# Patient Record
Sex: MALE | Race: WHITE | URBAN - METROPOLITAN AREA
[De-identification: names, ages, dates, MRNs, and addresses within clinical notes are randomized per-mention and may not be internally consistent; named-entity substitution may affect disease eponyms.]

---

## 2017-10-25 ENCOUNTER — APPOINTMENT (RX ONLY)
Dept: URBAN - METROPOLITAN AREA CLINIC 4 | Facility: CLINIC | Age: 67
Setting detail: DERMATOLOGY
End: 2017-10-25

## 2017-10-25 DIAGNOSIS — L81.4 OTHER MELANIN HYPERPIGMENTATION: ICD-10-CM

## 2017-10-25 DIAGNOSIS — Z71.89 OTHER SPECIFIED COUNSELING: ICD-10-CM

## 2017-10-25 DIAGNOSIS — Z87.2 PERSONAL HISTORY OF DISEASES OF THE SKIN AND SUBCUTANEOUS TISSUE: ICD-10-CM

## 2017-10-25 DIAGNOSIS — D18.0 HEMANGIOMA: ICD-10-CM

## 2017-10-25 DIAGNOSIS — L21.8 OTHER SEBORRHEIC DERMATITIS: ICD-10-CM

## 2017-10-25 DIAGNOSIS — Z85.828 PERSONAL HISTORY OF OTHER MALIGNANT NEOPLASM OF SKIN: ICD-10-CM

## 2017-10-25 DIAGNOSIS — L82.1 OTHER SEBORRHEIC KERATOSIS: ICD-10-CM

## 2017-10-25 DIAGNOSIS — L82.0 INFLAMED SEBORRHEIC KERATOSIS: ICD-10-CM

## 2017-10-25 DIAGNOSIS — D22 MELANOCYTIC NEVI: ICD-10-CM

## 2017-10-25 PROBLEM — D48.5 NEOPLASM OF UNCERTAIN BEHAVIOR OF SKIN: Status: ACTIVE | Noted: 2017-10-25

## 2017-10-25 PROBLEM — D22.5 MELANOCYTIC NEVI OF TRUNK: Status: ACTIVE | Noted: 2017-10-25

## 2017-10-25 PROBLEM — E78.5 HYPERLIPIDEMIA, UNSPECIFIED: Status: ACTIVE | Noted: 2017-10-25

## 2017-10-25 PROBLEM — D18.01 HEMANGIOMA OF SKIN AND SUBCUTANEOUS TISSUE: Status: ACTIVE | Noted: 2017-10-25

## 2017-10-25 PROCEDURE — ? COUNSELING

## 2017-10-25 PROCEDURE — 11100: CPT | Mod: 59

## 2017-10-25 PROCEDURE — ? BIOPSY BY SHAVE METHOD

## 2017-10-25 PROCEDURE — ? LIQUID NITROGEN

## 2017-10-25 PROCEDURE — 99213 OFFICE O/P EST LOW 20 MIN: CPT | Mod: 25

## 2017-10-25 PROCEDURE — ? PRESCRIPTION

## 2017-10-25 PROCEDURE — 17110 DESTRUCTION B9 LES UP TO 14: CPT

## 2017-10-25 RX ORDER — KETOCONAZOLE 20 MG/G
1 CREAM TOPICAL BID
Qty: 1 | Refills: 3 | Status: ERX | COMMUNITY
Start: 2017-10-25

## 2017-10-25 RX ADMIN — KETOCONAZOLE 1: 20 CREAM TOPICAL at 00:00

## 2017-10-25 ASSESSMENT — LOCATION ZONE DERM
LOCATION ZONE: FACE
LOCATION ZONE: LEG
LOCATION ZONE: NECK
LOCATION ZONE: TRUNK
LOCATION ZONE: NOSE
LOCATION ZONE: ARM

## 2017-10-25 ASSESSMENT — LOCATION DETAILED DESCRIPTION DERM
LOCATION DETAILED: RIGHT CLAVICULAR NECK
LOCATION DETAILED: LEFT LATERAL TRAPEZIAL NECK
LOCATION DETAILED: LEFT CLAVICULAR NECK
LOCATION DETAILED: INFERIOR THORACIC SPINE
LOCATION DETAILED: RIGHT POSTERIOR SHOULDER
LOCATION DETAILED: LEFT POSTERIOR SHOULDER
LOCATION DETAILED: SUPERIOR THORACIC SPINE
LOCATION DETAILED: RIGHT SUPERIOR LATERAL UPPER BACK
LOCATION DETAILED: LEFT MEDIAL MALAR CHEEK
LOCATION DETAILED: LEFT INFERIOR POSTERIOR NECK
LOCATION DETAILED: PERIUMBILICAL SKIN
LOCATION DETAILED: RIGHT LATERAL POPLITEAL SKIN
LOCATION DETAILED: RIGHT SUPERIOR UPPER BACK
LOCATION DETAILED: NASAL DORSUM
LOCATION DETAILED: LEFT INFERIOR CENTRAL MALAR CHEEK

## 2017-10-25 ASSESSMENT — LOCATION SIMPLE DESCRIPTION DERM
LOCATION SIMPLE: RIGHT POPLITEAL SKIN
LOCATION SIMPLE: NOSE
LOCATION SIMPLE: LEFT ANTERIOR NECK
LOCATION SIMPLE: LEFT CHEEK
LOCATION SIMPLE: ABDOMEN
LOCATION SIMPLE: UPPER BACK
LOCATION SIMPLE: RIGHT ANTERIOR NECK
LOCATION SIMPLE: POSTERIOR NECK
LOCATION SIMPLE: RIGHT SHOULDER
LOCATION SIMPLE: LEFT SHOULDER
LOCATION SIMPLE: RIGHT UPPER BACK

## 2017-10-25 NOTE — HPI: FULL BODY SKIN EXAMINATION
What Is The Reason For Today's Visit?: Full Body Skin Examination
What Is The Reason For Today's Visit? (Being Monitored For X): concerning skin lesions on an annual basis
Additional History: He has not noticed any changes with his moles.  He has  not noticed any tender or bleeding spots on his face.

## 2017-10-25 NOTE — PROCEDURE: BIOPSY BY SHAVE METHOD
Detail Level: Detailed
Lab: [id number]
Type Of Destruction Used: Curettage
Notification Instructions: Patient will be notified of biopsy results. However, patient instructed to call the office if not contacted within 2 weeks.
Electrodesiccation Text: The wound bed was treated with electrodesiccation after the biopsy was performed.
Size Of Lesion In Cm: 1.1
Destruction After The Procedure: No
Anesthesia Type: 1% lidocaine with epinephrine
Biopsy Type: H and E
Silver Nitrate Text: The wound bed was treated with silver nitrate after the biopsy was performed.
Additional Anesthesia Volume In Cc (Will Not Render If 0): 0
Post-Care Instructions: I reviewed with the patient in detail post-care instructions. Patient is to keep the biopsy site dry overnight, and then apply vaseline twice daily until healed.
Lab Facility: 
Anesthesia Volume In Cc: 0.5
Biopsy Method: Personna blade
Render Post-Care Instructions In Note?: yes
Hemostasis: Drysol and Electrocautery
Cryotherapy Text: The wound bed was treated with cryotherapy after the biopsy was performed.
Electrodesiccation And Curettage Text: The wound bed was treated with electrodesiccation and curettage after the biopsy was performed.
Billing Type: Third-Party Bill
Dressing: bandage
Wound Care: Vaseline
Curettage Text: The wound bed was treated with curettage after the biopsy was performed.
Consent: Written consent was obtained and risks were reviewed including but not limited to scarring, infection, bleeding, scabbing, incomplete removal, nerve damage and allergy to anesthesia.

## 2017-10-25 NOTE — PROCEDURE: LIQUID NITROGEN
Include Z78.9 (Other Specified Conditions Influencing Health Status) As An Associated Diagnosis?: No
Post-Care Instructions: I reviewed with the patient in detail post-care instructions. Patient is to wear sunprotection, and avoid picking at any of the treated lesions. Pt may apply Vaseline to crusted or scabbing areas.
Medical Necessity Clause: This procedure was medically necessary because the lesions that were treated were:
Detail Level: Detailed
Medical Necessity Information: It is in your best interest to select a reason for this procedure from the list below. All of these items fulfill various CMS LCD requirements except the new and changing color options.
Render Post-Care Instructions In Note?: yes
Consent: The patient's consent was obtained including but not limited to risks of crusting, scabbing, blistering, scarring, darker or lighter pigmentary change, recurrence, incomplete removal and infection.

## 2017-11-13 ENCOUNTER — APPOINTMENT (RX ONLY)
Dept: URBAN - METROPOLITAN AREA CLINIC 4 | Facility: CLINIC | Age: 67
Setting detail: DERMATOLOGY
End: 2017-11-13

## 2017-11-13 PROBLEM — D04.62 CARCINOMA IN SITU OF SKIN OF LEFT UPPER LIMB, INCLUDING SHOULDER: Status: ACTIVE | Noted: 2017-11-13

## 2017-11-13 PROCEDURE — ? CURETTAGE AND DESTRUCTION

## 2017-11-13 PROCEDURE — 17261 DSTRJ MAL LES T/A/L .6-1.0CM: CPT

## 2017-11-13 PROCEDURE — ? ADDITIONAL NOTES

## 2017-11-13 NOTE — PROCEDURE: CURETTAGE AND DESTRUCTION
Size Of Lesion In Cm: 0.6
Size Of Lesion After Curettage: 0.9
Detail Level: Detailed
Anesthesia Type: 1% lidocaine with epinephrine
Number Of Curettages: 3
Cautery Type: electrodesiccation
What Was Performed First?: Curettage
Additional Information: (Optional): The wound was cleaned, and a pressure dressing was applied.  The patient received detailed post-op instructions.
Consent was obtained from the patient. The risks, benefits and alternatives to therapy were discussed in detail. Specifically, the risks of infection, scarring, bleeding, prolonged wound healing, nerve injury, incomplete removal, allergy to anesthesia and recurrence were addressed. Alternatives to ED&C, such as: surgical removal and XRT were also discussed.  Prior to the procedure, the treatment site was clearly identified and confirmed by the patient. All components of Universal Protocol/PAUSE Rule completed.
Render Post-Care Instructions In Note?: no
Post-Care Instructions: I reviewed with the patient in detail post-care instructions. Patient is to keep the area dry for 48 hours, and not to engage in any swimming until the area is healed. Should the patient develop any fevers, chills, bleeding, severe pain patient will contact the office immediately.
Bill As A Line Item Or As Units: Line Item

## 2017-11-13 NOTE — PROCEDURE: ADDITIONAL NOTES
Additional Notes: ED & C vs excision discussed at length, patient elects ED & C. Patient is a renal transplant patient, will avoid topical chemo medications at this time.

## 2018-04-18 RX ORDER — KETOCONAZOLE 20 MG/G
CREAM TOPICAL BID
Qty: 1 | Refills: 1 | Status: ERX

## 2018-05-02 ENCOUNTER — APPOINTMENT (RX ONLY)
Dept: URBAN - METROPOLITAN AREA CLINIC 4 | Facility: CLINIC | Age: 68
Setting detail: DERMATOLOGY
End: 2018-05-02

## 2018-05-02 DIAGNOSIS — Z85.828 PERSONAL HISTORY OF OTHER MALIGNANT NEOPLASM OF SKIN: ICD-10-CM

## 2018-05-02 DIAGNOSIS — L82.1 OTHER SEBORRHEIC KERATOSIS: ICD-10-CM

## 2018-05-02 DIAGNOSIS — L57.0 ACTINIC KERATOSIS: ICD-10-CM

## 2018-05-02 PROCEDURE — 17000 DESTRUCT PREMALG LESION: CPT

## 2018-05-02 PROCEDURE — ? ADDITIONAL NOTES

## 2018-05-02 PROCEDURE — ? LIQUID NITROGEN

## 2018-05-02 PROCEDURE — ? OBSERVATION AND MEASURE

## 2018-05-02 PROCEDURE — 99212 OFFICE O/P EST SF 10 MIN: CPT | Mod: 25

## 2018-05-02 PROCEDURE — ? COUNSELING

## 2018-05-02 ASSESSMENT — LOCATION ZONE DERM
LOCATION ZONE: FACE
LOCATION ZONE: ARM
LOCATION ZONE: SCALP

## 2018-05-02 ASSESSMENT — LOCATION SIMPLE DESCRIPTION DERM
LOCATION SIMPLE: LEFT UPPER ARM
LOCATION SIMPLE: SCALP
LOCATION SIMPLE: RIGHT CHEEK

## 2018-05-02 ASSESSMENT — LOCATION DETAILED DESCRIPTION DERM
LOCATION DETAILED: LEFT CENTRAL POSTAURICULAR SKIN
LOCATION DETAILED: RIGHT INFERIOR CENTRAL MALAR CHEEK
LOCATION DETAILED: LEFT DISTAL POSTERIOR UPPER ARM
LOCATION DETAILED: RIGHT CENTRAL POSTAURICULAR SKIN

## 2018-05-02 NOTE — HPI: SKIN LESIONS
Is This A New Presentation, Or A Follow-Up?: Skin Lesions
How Severe Is Your Skin Lesion?: mild
Have Your Skin Lesions Been Treated?: not been treated
Additional History: Per pt c pap machine makes them worse by rubbing

## 2018-05-04 ENCOUNTER — HOSPITAL ENCOUNTER (INPATIENT)
Facility: MEDICAL CENTER | Age: 68
LOS: 6 days | DRG: 871 | End: 2018-05-10
Attending: EMERGENCY MEDICINE | Admitting: HOSPITALIST
Payer: MEDICARE

## 2018-05-04 ENCOUNTER — APPOINTMENT (OUTPATIENT)
Dept: RADIOLOGY | Facility: MEDICAL CENTER | Age: 68
DRG: 871 | End: 2018-05-04
Attending: EMERGENCY MEDICINE
Payer: MEDICARE

## 2018-05-04 DIAGNOSIS — R50.81 FEVER IN OTHER DISEASES: ICD-10-CM

## 2018-05-04 DIAGNOSIS — Z94.0 HISTORY OF RENAL TRANSPLANT: ICD-10-CM

## 2018-05-04 DIAGNOSIS — J96.01 ACUTE RESPIRATORY FAILURE WITH HYPOXIA (HCC): ICD-10-CM

## 2018-05-04 DIAGNOSIS — A41.9 SEPSIS, DUE TO UNSPECIFIED ORGANISM: ICD-10-CM

## 2018-05-04 DIAGNOSIS — N17.9 AKI (ACUTE KIDNEY INJURY) (HCC): ICD-10-CM

## 2018-05-04 DIAGNOSIS — J18.9 PNEUMONIA OF LEFT LOWER LOBE DUE TO INFECTIOUS ORGANISM: ICD-10-CM

## 2018-05-04 LAB
ALBUMIN SERPL BCP-MCNC: 3.8 G/DL (ref 3.2–4.9)
ALBUMIN/GLOB SERPL: 1.1 G/DL
ALP SERPL-CCNC: 82 U/L (ref 30–99)
ALT SERPL-CCNC: 16 U/L (ref 2–50)
ANION GAP SERPL CALC-SCNC: 9 MMOL/L (ref 0–11.9)
APPEARANCE UR: CLEAR
AST SERPL-CCNC: 17 U/L (ref 12–45)
BACTERIA #/AREA URNS HPF: NEGATIVE /HPF
BASOPHILS # BLD AUTO: 0.3 % (ref 0–1.8)
BASOPHILS # BLD: 0.04 K/UL (ref 0–0.12)
BILIRUB SERPL-MCNC: 0.8 MG/DL (ref 0.1–1.5)
BILIRUB UR QL STRIP.AUTO: NEGATIVE
BUN SERPL-MCNC: 27 MG/DL (ref 8–22)
CALCIUM SERPL-MCNC: 9.6 MG/DL (ref 8.5–10.5)
CHLORIDE SERPL-SCNC: 105 MMOL/L (ref 96–112)
CO2 SERPL-SCNC: 25 MMOL/L (ref 20–33)
COLOR UR: YELLOW
CREAT SERPL-MCNC: 1.23 MG/DL (ref 0.5–1.4)
EOSINOPHIL # BLD AUTO: 0.09 K/UL (ref 0–0.51)
EOSINOPHIL NFR BLD: 0.8 % (ref 0–6.9)
EPI CELLS #/AREA URNS HPF: NEGATIVE /HPF
ERYTHROCYTE [DISTWIDTH] IN BLOOD BY AUTOMATED COUNT: 48.2 FL (ref 35.9–50)
GLOBULIN SER CALC-MCNC: 3.4 G/DL (ref 1.9–3.5)
GLUCOSE SERPL-MCNC: 108 MG/DL (ref 65–99)
GLUCOSE UR STRIP.AUTO-MCNC: NEGATIVE MG/DL
HCT VFR BLD AUTO: 46.1 % (ref 42–52)
HGB BLD-MCNC: 15.2 G/DL (ref 14–18)
HYALINE CASTS #/AREA URNS LPF: ABNORMAL /LPF
IMM GRANULOCYTES # BLD AUTO: 0.03 K/UL (ref 0–0.11)
IMM GRANULOCYTES NFR BLD AUTO: 0.3 % (ref 0–0.9)
KETONES UR STRIP.AUTO-MCNC: NEGATIVE MG/DL
LACTATE BLD-SCNC: 2 MMOL/L (ref 0.5–2)
LEUKOCYTE ESTERASE UR QL STRIP.AUTO: ABNORMAL
LYMPHOCYTES # BLD AUTO: 2.31 K/UL (ref 1–4.8)
LYMPHOCYTES NFR BLD: 19.8 % (ref 22–41)
MCH RBC QN AUTO: 29.7 PG (ref 27–33)
MCHC RBC AUTO-ENTMCNC: 33 G/DL (ref 33.7–35.3)
MCV RBC AUTO: 90.2 FL (ref 81.4–97.8)
MICRO URNS: ABNORMAL
MONOCYTES # BLD AUTO: 1 K/UL (ref 0–0.85)
MONOCYTES NFR BLD AUTO: 8.6 % (ref 0–13.4)
NEUTROPHILS # BLD AUTO: 8.17 K/UL (ref 1.82–7.42)
NEUTROPHILS NFR BLD: 70.2 % (ref 44–72)
NITRITE UR QL STRIP.AUTO: NEGATIVE
NRBC # BLD AUTO: 0 K/UL
NRBC BLD-RTO: 0 /100 WBC
PH UR STRIP.AUTO: 5.5 [PH]
PLATELET # BLD AUTO: 146 K/UL (ref 164–446)
PMV BLD AUTO: 9.1 FL (ref 9–12.9)
POTASSIUM SERPL-SCNC: 4.3 MMOL/L (ref 3.6–5.5)
PROT SERPL-MCNC: 7.2 G/DL (ref 6–8.2)
PROT UR QL STRIP: 100 MG/DL
RBC # BLD AUTO: 5.11 M/UL (ref 4.7–6.1)
RBC # URNS HPF: ABNORMAL /HPF
RBC UR QL AUTO: ABNORMAL
SODIUM SERPL-SCNC: 139 MMOL/L (ref 135–145)
SP GR UR STRIP.AUTO: 1.02
UROBILINOGEN UR STRIP.AUTO-MCNC: 0.2 MG/DL
WBC # BLD AUTO: 11.6 K/UL (ref 4.8–10.8)
WBC #/AREA URNS HPF: ABNORMAL /HPF

## 2018-05-04 PROCEDURE — 81001 URINALYSIS AUTO W/SCOPE: CPT

## 2018-05-04 PROCEDURE — 87086 URINE CULTURE/COLONY COUNT: CPT

## 2018-05-04 PROCEDURE — 99223 1ST HOSP IP/OBS HIGH 75: CPT | Performed by: HOSPITALIST

## 2018-05-04 PROCEDURE — 700111 HCHG RX REV CODE 636 W/ 250 OVERRIDE (IP): Performed by: HOSPITALIST

## 2018-05-04 PROCEDURE — 700102 HCHG RX REV CODE 250 W/ 637 OVERRIDE(OP): Performed by: EMERGENCY MEDICINE

## 2018-05-04 PROCEDURE — 96361 HYDRATE IV INFUSION ADD-ON: CPT

## 2018-05-04 PROCEDURE — 700102 HCHG RX REV CODE 250 W/ 637 OVERRIDE(OP): Performed by: HOSPITALIST

## 2018-05-04 PROCEDURE — 96365 THER/PROPH/DIAG IV INF INIT: CPT

## 2018-05-04 PROCEDURE — 700105 HCHG RX REV CODE 258: Performed by: HOSPITALIST

## 2018-05-04 PROCEDURE — 99285 EMERGENCY DEPT VISIT HI MDM: CPT

## 2018-05-04 PROCEDURE — 770006 HCHG ROOM/CARE - MED/SURG/GYN SEMI*

## 2018-05-04 PROCEDURE — 700111 HCHG RX REV CODE 636 W/ 250 OVERRIDE (IP): Performed by: EMERGENCY MEDICINE

## 2018-05-04 PROCEDURE — 700105 HCHG RX REV CODE 258: Performed by: EMERGENCY MEDICINE

## 2018-05-04 PROCEDURE — A9270 NON-COVERED ITEM OR SERVICE: HCPCS | Performed by: EMERGENCY MEDICINE

## 2018-05-04 PROCEDURE — 700102 HCHG RX REV CODE 250 W/ 637 OVERRIDE(OP): Performed by: FAMILY MEDICINE

## 2018-05-04 PROCEDURE — 700101 HCHG RX REV CODE 250: Performed by: EMERGENCY MEDICINE

## 2018-05-04 PROCEDURE — 85025 COMPLETE CBC W/AUTO DIFF WBC: CPT

## 2018-05-04 PROCEDURE — 94760 N-INVAS EAR/PLS OXIMETRY 1: CPT

## 2018-05-04 PROCEDURE — A9270 NON-COVERED ITEM OR SERVICE: HCPCS | Performed by: HOSPITALIST

## 2018-05-04 PROCEDURE — 94640 AIRWAY INHALATION TREATMENT: CPT

## 2018-05-04 PROCEDURE — 83605 ASSAY OF LACTIC ACID: CPT

## 2018-05-04 PROCEDURE — 96375 TX/PRO/DX INJ NEW DRUG ADDON: CPT

## 2018-05-04 PROCEDURE — 71045 X-RAY EXAM CHEST 1 VIEW: CPT

## 2018-05-04 PROCEDURE — 87040 BLOOD CULTURE FOR BACTERIA: CPT

## 2018-05-04 PROCEDURE — 36415 COLL VENOUS BLD VENIPUNCTURE: CPT

## 2018-05-04 PROCEDURE — 80053 COMPREHEN METABOLIC PANEL: CPT

## 2018-05-04 RX ORDER — SODIUM CHLORIDE 9 MG/ML
30 INJECTION, SOLUTION INTRAVENOUS ONCE
Status: COMPLETED | OUTPATIENT
Start: 2018-05-04 | End: 2018-05-04

## 2018-05-04 RX ORDER — HYDROCORTISONE ACETATE 0.5 %
2 CREAM (GRAM) TOPICAL 2 TIMES DAILY
Status: ON HOLD | COMMUNITY
End: 2022-01-01

## 2018-05-04 RX ORDER — TACROLIMUS 1 MG/1
.5-1 CAPSULE ORAL 2 TIMES DAILY
Status: ON HOLD | COMMUNITY
End: 2022-01-01

## 2018-05-04 RX ORDER — ATORVASTATIN CALCIUM 10 MG/1
10 TABLET, FILM COATED ORAL EVERY MORNING
Status: DISCONTINUED | OUTPATIENT
Start: 2018-05-05 | End: 2018-05-10 | Stop reason: HOSPADM

## 2018-05-04 RX ORDER — GUAIFENESIN/DEXTROMETHORPHAN 100-10MG/5
15 SYRUP ORAL EVERY 6 HOURS PRN
Status: DISCONTINUED | OUTPATIENT
Start: 2018-05-04 | End: 2018-05-10 | Stop reason: HOSPADM

## 2018-05-04 RX ORDER — FUROSEMIDE 20 MG/1
20 TABLET ORAL EVERY MORNING
Status: ON HOLD | COMMUNITY
End: 2018-05-10

## 2018-05-04 RX ORDER — PANTOPRAZOLE SODIUM 40 MG/1
40 TABLET, DELAYED RELEASE ORAL EVERY EVENING
COMMUNITY
End: 2019-12-02

## 2018-05-04 RX ORDER — ONDANSETRON 2 MG/ML
4 INJECTION INTRAMUSCULAR; INTRAVENOUS EVERY 4 HOURS PRN
Status: DISCONTINUED | OUTPATIENT
Start: 2018-05-04 | End: 2018-05-10 | Stop reason: HOSPADM

## 2018-05-04 RX ORDER — MYCOPHENOLATE MOFETIL 250 MG/1
500 CAPSULE ORAL 2 TIMES DAILY
Status: DISCONTINUED | OUTPATIENT
Start: 2018-05-04 | End: 2018-05-10 | Stop reason: HOSPADM

## 2018-05-04 RX ORDER — ACETAMINOPHEN 325 MG/1
650 TABLET ORAL EVERY 6 HOURS PRN
Status: DISCONTINUED | OUTPATIENT
Start: 2018-05-04 | End: 2018-05-10 | Stop reason: HOSPADM

## 2018-05-04 RX ORDER — BISACODYL 10 MG
10 SUPPOSITORY, RECTAL RECTAL
Status: DISCONTINUED | OUTPATIENT
Start: 2018-05-04 | End: 2018-05-10 | Stop reason: HOSPADM

## 2018-05-04 RX ORDER — TACROLIMUS 1 MG/1
1 CAPSULE ORAL 2 TIMES DAILY
Status: DISCONTINUED | OUTPATIENT
Start: 2018-05-04 | End: 2018-05-10 | Stop reason: HOSPADM

## 2018-05-04 RX ORDER — ERGOCALCIFEROL 1.25 MG/1
50000 CAPSULE ORAL
COMMUNITY

## 2018-05-04 RX ORDER — CALCITRIOL 0.25 UG/1
0.25 CAPSULE, LIQUID FILLED ORAL
COMMUNITY
End: 2021-01-01

## 2018-05-04 RX ORDER — CEFTRIAXONE 2 G/1
2 INJECTION, POWDER, FOR SOLUTION INTRAMUSCULAR; INTRAVENOUS ONCE
Status: COMPLETED | OUTPATIENT
Start: 2018-05-04 | End: 2018-05-04

## 2018-05-04 RX ORDER — PREDNISONE 5 MG/1
5 TABLET ORAL EVERY MORNING
COMMUNITY
End: 2021-01-01

## 2018-05-04 RX ORDER — PREDNISONE 5 MG/1
5 TABLET ORAL EVERY MORNING
Status: DISCONTINUED | OUTPATIENT
Start: 2018-05-05 | End: 2018-05-10 | Stop reason: HOSPADM

## 2018-05-04 RX ORDER — ACETAMINOPHEN 325 MG/1
650 TABLET ORAL ONCE
Status: COMPLETED | OUTPATIENT
Start: 2018-05-04 | End: 2018-05-04

## 2018-05-04 RX ORDER — MYCOPHENOLATE MOFETIL 250 MG/1
500 CAPSULE ORAL 2 TIMES DAILY
Status: ON HOLD | COMMUNITY
End: 2022-01-01 | Stop reason: SDUPTHER

## 2018-05-04 RX ORDER — SODIUM CHLORIDE 9 MG/ML
INJECTION, SOLUTION INTRAVENOUS CONTINUOUS
Status: ACTIVE | OUTPATIENT
Start: 2018-05-04 | End: 2018-05-05

## 2018-05-04 RX ORDER — OMEPRAZOLE 20 MG/1
20 CAPSULE, DELAYED RELEASE ORAL
Status: DISCONTINUED | OUTPATIENT
Start: 2018-05-04 | End: 2018-05-10 | Stop reason: HOSPADM

## 2018-05-04 RX ORDER — AMOXICILLIN 250 MG
2 CAPSULE ORAL 2 TIMES DAILY
Status: DISCONTINUED | OUTPATIENT
Start: 2018-05-04 | End: 2018-05-10 | Stop reason: HOSPADM

## 2018-05-04 RX ORDER — ALLOPURINOL 300 MG/1
300 TABLET ORAL
Status: ON HOLD | COMMUNITY
End: 2022-01-01

## 2018-05-04 RX ORDER — PANTOPRAZOLE SODIUM 40 MG/1
40 TABLET, DELAYED RELEASE ORAL EVERY EVENING
Status: DISCONTINUED | OUTPATIENT
Start: 2018-05-04 | End: 2018-05-04

## 2018-05-04 RX ORDER — CALCITRIOL 0.25 UG/1
0.25 CAPSULE, LIQUID FILLED ORAL EVERY MORNING
Status: DISCONTINUED | OUTPATIENT
Start: 2018-05-05 | End: 2018-05-10 | Stop reason: HOSPADM

## 2018-05-04 RX ORDER — ALLOPURINOL 300 MG/1
300 TABLET ORAL EVERY MORNING
Status: DISCONTINUED | OUTPATIENT
Start: 2018-05-05 | End: 2018-05-10 | Stop reason: HOSPADM

## 2018-05-04 RX ORDER — AZITHROMYCIN 500 MG/1
500 INJECTION, POWDER, LYOPHILIZED, FOR SOLUTION INTRAVENOUS ONCE
Status: COMPLETED | OUTPATIENT
Start: 2018-05-04 | End: 2018-05-04

## 2018-05-04 RX ORDER — ONDANSETRON 4 MG/1
4 TABLET, ORALLY DISINTEGRATING ORAL EVERY 4 HOURS PRN
Status: DISCONTINUED | OUTPATIENT
Start: 2018-05-04 | End: 2018-05-10 | Stop reason: HOSPADM

## 2018-05-04 RX ORDER — POLYETHYLENE GLYCOL 3350 17 G/17G
1 POWDER, FOR SOLUTION ORAL
Status: DISCONTINUED | OUTPATIENT
Start: 2018-05-04 | End: 2018-05-10 | Stop reason: HOSPADM

## 2018-05-04 RX ADMIN — SODIUM CHLORIDE: 9 INJECTION, SOLUTION INTRAVENOUS at 20:14

## 2018-05-04 RX ADMIN — AZITHROMYCIN MONOHYDRATE 500 MG: 500 INJECTION, POWDER, LYOPHILIZED, FOR SOLUTION INTRAVENOUS at 17:45

## 2018-05-04 RX ADMIN — CEFTRIAXONE SODIUM 2 G: 2 INJECTION, POWDER, FOR SOLUTION INTRAMUSCULAR; INTRAVENOUS at 17:40

## 2018-05-04 RX ADMIN — MYCOPHENOLATE MOFETIL 500 MG: 250 CAPSULE ORAL at 22:36

## 2018-05-04 RX ADMIN — ALBUTEROL SULFATE 2.5 MG: 2.5 SOLUTION RESPIRATORY (INHALATION) at 17:31

## 2018-05-04 RX ADMIN — GUAIFENESIN AND DEXTROMETHORPHAN 15 ML: 100; 10 SYRUP ORAL at 23:31

## 2018-05-04 RX ADMIN — ACETAMINOPHEN 650 MG: 325 TABLET, FILM COATED ORAL at 17:28

## 2018-05-04 RX ADMIN — OMEPRAZOLE 20 MG: 20 CAPSULE, DELAYED RELEASE ORAL at 22:35

## 2018-05-04 RX ADMIN — TACROLIMUS 1 MG: 1 CAPSULE ORAL at 22:36

## 2018-05-04 RX ADMIN — SODIUM CHLORIDE 3756 ML: 9 INJECTION, SOLUTION INTRAVENOUS at 16:00

## 2018-05-04 RX ADMIN — ONDANSETRON 4 MG: 2 INJECTION, SOLUTION INTRAMUSCULAR; INTRAVENOUS at 22:28

## 2018-05-04 ASSESSMENT — ENCOUNTER SYMPTOMS
SPUTUM PRODUCTION: 1
ABDOMINAL PAIN: 1
ORTHOPNEA: 0
PALPITATIONS: 0
SHORTNESS OF BREATH: 1
COUGH: 1
DIZZINESS: 0
HEMOPTYSIS: 0
NAUSEA: 1
CHILLS: 0

## 2018-05-04 ASSESSMENT — PATIENT HEALTH QUESTIONNAIRE - PHQ9
2. FEELING DOWN, DEPRESSED, IRRITABLE, OR HOPELESS: NOT AT ALL
SUM OF ALL RESPONSES TO PHQ9 QUESTIONS 1 AND 2: 0
1. LITTLE INTEREST OR PLEASURE IN DOING THINGS: NOT AT ALL

## 2018-05-04 ASSESSMENT — COPD QUESTIONNAIRES
HAVE YOU SMOKED AT LEAST 100 CIGARETTES IN YOUR ENTIRE LIFE: NO/DON'T KNOW
COPD SCREENING SCORE: 2
DO YOU EVER COUGH UP ANY MUCUS OR PHLEGM?: NO/ONLY WITH OCCASIONAL COLDS OR INFECTIONS
DURING THE PAST 4 WEEKS HOW MUCH DID YOU FEEL SHORT OF BREATH: NONE/LITTLE OF THE TIME

## 2018-05-04 ASSESSMENT — LIFESTYLE VARIABLES
EVER_SMOKED: NEVER
DO YOU DRINK ALCOHOL: NO
EVER_SMOKED: NEVER

## 2018-05-04 ASSESSMENT — PAIN SCALES - GENERAL: PAINLEVEL_OUTOF10: 0

## 2018-05-04 NOTE — ED TRIAGE NOTES
"Chief Complaint   Patient presents with   • Shortness of Breath   To triage via wheelchair with wife, reports SOB, swelling, edema, vomiting. Hx kidney transplant, has been unable to take anti-rejection medication. Increased WOB noted. Sepsis score 3. Charge RN notified.     /97   Pulse (!) 109   Temp (!) 38.4 °C (101.2 °F)   Resp 18   Ht 1.803 m (5' 11\")   Wt (!) 125.2 kg (276 lb)   SpO2 91%   BMI 38.49 kg/m²       "

## 2018-05-04 NOTE — ED PROVIDER NOTES
ED Provider Note    Scribed for Dr. Earl Shipman M.D. by Kel Ramos. 5/4/2018  4:42 PM    Primary care provider: Damian Diaz M.D.  Means of arrival: Walk-in  History obtained from: Patient  History limited by: None    CHIEF COMPLAINT  Chief Complaint   Patient presents with   • Shortness of Breath       HPI  Krishan Acevedo is a 68 y.o. male who presents to the Emergency Department for worsening shortness of breath with a cough onset two days ago. Patient's shortness of breath first started two weeks ago, but was it was only minor at that time. Today, he also began experiencing body aches, nausea, vomiting, and a fever.   Patient has a history of a kidney transplant 10 years ago and he has been compliant with all of his immunosuppressant drugs. At his last follow up, his kidney function was normal.   He denies diarrhea, urinary symptoms.     REVIEW OF SYSTEMS  Pertinent positives include shortness of breath, cough, body aches, nausea, vomiting, fever. Pertinent negatives include no diarrhea, urinary symptoms. As above, all other systems reviewed and are negative. C.  See HPI for further details.     PAST MEDICAL HISTORY   has a past medical history of Kidney failure and Kidney transplant pain.    SURGICAL HISTORY   has a past surgical history that includes peg placement (10/10/2014).    SOCIAL HISTORY  Social History   Substance Use Topics   • Smoking status: Never Smoker      History   Drug use: Unknown       FAMILY HISTORY  No pertinent family history.    CURRENT MEDICATIONS    Current Outpatient Prescriptions on File Prior to Encounter   Medication Sig Dispense Refill   • hydrocodone-acetaminophen (NORCO) 5-325 MG Tab per tablet Take 1-2 Tabs by mouth every four hours as needed. 15 Tab 0   • alprazolam (XANAX) 0.25 MG TABS 0.25 mg by Nasogastric route every 4 hours.     • amiodarone (CORDARONE) 200 MG TABS Take 200 mg by mouth every day.     • Enoxaparin Sodium (LOVENOX SC) Inject 100 mg as  instructed 2 Times a Day. When INR <2     • Ceftazidime 1 GM SOLR by Intravenous route every 8 hours.     • chlorhexidine (PERIDEX) 0.12 % SOLN Take 15 mL by mouth every 12 hours.     • Lactobacillus Rhamnosus, GG, (CULTURELLE PO) Take  by mouth every day.     • cyclobenzaprine (FLEXERIL) 10 MG TABS Take 10 mg by mouth every 8 hours.     • DOCUSATE SODIUM PO Take 100 mg by mouth 2 Times a Day.     • Guaifenesin 200 MG/5ML LIQD Take  by mouth 2 Times a Day.     • Metoprolol Tartrate (LOPRESSOR PO) Take 37.5 mg by mouth every 8 hours. Hold for systolic b/p<110 or HR <60     • miconazole (MICOTIN) 2 % CREA Apply  to affected area(s) every 6 hours.     • Sulfamethoxazole-Trimethoprim (BACTRIM DS PO) by Nasogastric route 2 Times a Day.     • miconazole (MICOTIN) 2 % powder Apply  to affected area(s) 2 Times a Day.     • mycophenolate (CELLCEPT) 500 MG tablet 500 mg by Nasogastric route 2 times a day.     • omeprazole (PRILOSEC) 40 MG delayed-release capsule Take 40 mg by mouth every day.     • TACROLIMUS PO Take 2 mg by mouth 2 Times a Day.     • risperidone (RISPERDAL) 1 MG TABS Take 1 mg by mouth 2 times a day. Check dosage .25 mg prn     • ipratropium-albuterol (DUONEB) 0.5-2.5 (3) MG/3ML nebulizer solution 3 mL by Nebulization route every 6 hours.     • Haloperidol Lactate (HALDOL INJ) 1 mg by Intravenous route every 4 hours.     • MORPHINE SULFATE, PF, IV 1 mg by Intravenous route as needed.     • oxycodone, immediate release, (ROXICODONE) 5 MG TABS 5 mg by Nasogastric route every four hours as needed.     • sennosides (SENOKOT) 8.6 MG TABS Take 8.6 mg by mouth as needed.     • furosemide (LASIX) 40 MG TABS Take 40 mg by mouth every day.     • predniSONE (DELTASONE) 5 MG TABS 5 mg by Nasogastric route every day.     • atorvastatin (LIPITOR) 10 MG TABS Take 10 mg by mouth every evening.     • allopurinol (ZYLOPRIM) 100 MG TABS Take 100 mg by mouth every day.        ALLERGIES  Allergies   Allergen Reactions   • Nsaids  "     Cannot take because of anti rejection meds.       PHYSICAL EXAM  VITAL SIGNS: /97   Pulse (!) 109   Temp (!) 38.4 °C (101.2 °F)   Resp 18   Ht 1.803 m (5' 11\")   Wt (!) 125.2 kg (276 lb)   SpO2 91%   BMI 38.49 kg/m²     Constitutional: Well developed, Well nourished, mild distress, Non-toxic appearance.   HENT: Normocephalic, Atraumatic, Bilateral external ears normal, Oropharynx moist, No oral exudates.   Eyes: PERRLA, EOMI, Conjunctiva normal, No discharge.   Neck: No tenderness, Supple, No stridor.   Lymphatic: No lymphadenopathy noted.   Cardiovascular: Tachycardic, Normal rhythm.   Thorax & Lungs: Tachypnic, Weak breath sounds bilaterally, No respiratory distress, No wheezing, No crackles.   Abdomen: Soft, No tenderness, No masses, No pulsatile masses.   Skin: Warm, Dry, No erythema, No rash.   Extremities:, No edema No cyanosis.   Musculoskeletal: No tenderness to palpation or major deformities noted.  Intact distal pulses  Neurologic: Awake, alert. Moves all extremities spontaneously.  Psychiatric: Affect normal, Judgment normal, Mood normal.     LABS  Results for orders placed or performed during the hospital encounter of 05/04/18   Lactic acid (lactate)   Result Value Ref Range    Lactic Acid 2.0 0.5 - 2.0 mmol/L   CBC WITH DIFFERENTIAL   Result Value Ref Range    WBC 11.6 (H) 4.8 - 10.8 K/uL    RBC 5.11 4.70 - 6.10 M/uL    Hemoglobin 15.2 14.0 - 18.0 g/dL    Hematocrit 46.1 42.0 - 52.0 %    MCV 90.2 81.4 - 97.8 fL    MCH 29.7 27.0 - 33.0 pg    MCHC 33.0 (L) 33.7 - 35.3 g/dL    RDW 48.2 35.9 - 50.0 fL    Platelet Count 146 (L) 164 - 446 K/uL    MPV 9.1 9.0 - 12.9 fL    Neutrophils-Polys 70.20 44.00 - 72.00 %    Lymphocytes 19.80 (L) 22.00 - 41.00 %    Monocytes 8.60 0.00 - 13.40 %    Eosinophils 0.80 0.00 - 6.90 %    Basophils 0.30 0.00 - 1.80 %    Immature Granulocytes 0.30 0.00 - 0.90 %    Nucleated RBC 0.00 /100 WBC    Neutrophils (Absolute) 8.17 (H) 1.82 - 7.42 K/uL    Lymphs " (Absolute) 2.31 1.00 - 4.80 K/uL    Monos (Absolute) 1.00 (H) 0.00 - 0.85 K/uL    Eos (Absolute) 0.09 0.00 - 0.51 K/uL    Baso (Absolute) 0.04 0.00 - 0.12 K/uL    Immature Granulocytes (abs) 0.03 0.00 - 0.11 K/uL    NRBC (Absolute) 0.00 K/uL   COMP METABOLIC PANEL   Result Value Ref Range    Sodium 139 135 - 145 mmol/L    Potassium 4.3 3.6 - 5.5 mmol/L    Chloride 105 96 - 112 mmol/L    Co2 25 20 - 33 mmol/L    Anion Gap 9.0 0.0 - 11.9    Glucose 108 (H) 65 - 99 mg/dL    Bun 27 (H) 8 - 22 mg/dL    Creatinine 1.23 0.50 - 1.40 mg/dL    Calcium 9.6 8.5 - 10.5 mg/dL    AST(SGOT) 17 12 - 45 U/L    ALT(SGPT) 16 2 - 50 U/L    Alkaline Phosphatase 82 30 - 99 U/L    Total Bilirubin 0.8 0.1 - 1.5 mg/dL    Albumin 3.8 3.2 - 4.9 g/dL    Total Protein 7.2 6.0 - 8.2 g/dL    Globulin 3.4 1.9 - 3.5 g/dL    A-G Ratio 1.1 g/dL   ESTIMATED GFR   Result Value Ref Range    GFR If African American >60 >60 mL/min/1.73 m 2    GFR If Non African American 58 (A) >60 mL/min/1.73 m 2      All labs reviewed by me.    RADIOLOGY  DX-CHEST-PORTABLE (1 VIEW)   Final Result      No consolidation.        The radiologist's interpretation of all radiological studies have been reviewed by me.    COURSE & MEDICAL DECISION MAKING  Pertinent Labs & Imaging studies reviewed. (See chart for details)    4:42 PM - Patient seen and examined at bedside. Ordered DX chest, lactate, eGFR, CBC, CMP, urinalysis, urine culture, and blood culture to evaluate his symptoms. The differential diagnoses include but are not limited to: sepsis, pneumonia, renal failure     5:00 PM Paged the hospitalist.    5:14 PM I discussed the patient's case and the above findings with Dr. Tee, hospitalist, who agrees to admit the patient at this time. He will be treated with Tylenol 650 mg PO, Rocephin 2 g IV, Zithromax 500 mg IV, and Proventil nebulizer for his symptoms. Patient will receive an NS bolus for dehydration.    5:36 PM Patient is responding well to the bolus. Informed patient  of decision to admit. He agrees to the plan of care.      Decision Making:  Patient workup is not particularly remarkable, however he is febrile probably septic, certainly at risk because of his immunosuppressive drugs. We'll give him fluids and antibiotics    DISPOSITION:  Patient will be admitted to Dr. Tee in guarded condition.     FINAL IMPRESSION  1. Sepsis, due to unspecified organism (HCC)          Kel RODRIGUES (Scribe), am scribing for, and in the presence of, Earl Shipman M.D..    Electronically signed by: Kel Ramos (Ramin), 5/4/2018    Earl RODRIGUES M.D. personally performed the services described in this documentation, as scribed by Kel Ramos in my presence, and it is both accurate and complete.    The note accurately reflects work and decisions made by me.  Earl Shipman  5/4/2018  11:17 PM

## 2018-05-05 ENCOUNTER — APPOINTMENT (OUTPATIENT)
Dept: RADIOLOGY | Facility: MEDICAL CENTER | Age: 68
DRG: 871 | End: 2018-05-05
Attending: HOSPITALIST
Payer: MEDICARE

## 2018-05-05 PROBLEM — J18.9 LLL PNEUMONIA: Status: ACTIVE | Noted: 2018-05-05

## 2018-05-05 PROBLEM — I10 ESSENTIAL HYPERTENSION: Status: ACTIVE | Noted: 2018-05-05

## 2018-05-05 LAB
ANION GAP SERPL CALC-SCNC: 6 MMOL/L (ref 0–11.9)
BASOPHILS # BLD AUTO: 0.3 % (ref 0–1.8)
BASOPHILS # BLD: 0.03 K/UL (ref 0–0.12)
BNP SERPL-MCNC: 210 PG/ML (ref 0–100)
BUN SERPL-MCNC: 22 MG/DL (ref 8–22)
CALCIUM SERPL-MCNC: 8.3 MG/DL (ref 8.5–10.5)
CHLORIDE SERPL-SCNC: 114 MMOL/L (ref 96–112)
CO2 SERPL-SCNC: 19 MMOL/L (ref 20–33)
CREAT SERPL-MCNC: 1.18 MG/DL (ref 0.5–1.4)
EOSINOPHIL # BLD AUTO: 0.04 K/UL (ref 0–0.51)
EOSINOPHIL NFR BLD: 0.4 % (ref 0–6.9)
ERYTHROCYTE [DISTWIDTH] IN BLOOD BY AUTOMATED COUNT: 49.9 FL (ref 35.9–50)
GLUCOSE SERPL-MCNC: 102 MG/DL (ref 65–99)
HCT VFR BLD AUTO: 38.9 % (ref 42–52)
HGB BLD-MCNC: 12.9 G/DL (ref 14–18)
IMM GRANULOCYTES # BLD AUTO: 0.05 K/UL (ref 0–0.11)
IMM GRANULOCYTES NFR BLD AUTO: 0.4 % (ref 0–0.9)
LYMPHOCYTES # BLD AUTO: 2.29 K/UL (ref 1–4.8)
LYMPHOCYTES NFR BLD: 20.2 % (ref 22–41)
MCH RBC QN AUTO: 30.3 PG (ref 27–33)
MCHC RBC AUTO-ENTMCNC: 33.2 G/DL (ref 33.7–35.3)
MCV RBC AUTO: 91.3 FL (ref 81.4–97.8)
MONOCYTES # BLD AUTO: 1.03 K/UL (ref 0–0.85)
MONOCYTES NFR BLD AUTO: 9.1 % (ref 0–13.4)
NEUTROPHILS # BLD AUTO: 7.88 K/UL (ref 1.82–7.42)
NEUTROPHILS NFR BLD: 69.6 % (ref 44–72)
NRBC # BLD AUTO: 0 K/UL
NRBC BLD-RTO: 0 /100 WBC
PLATELET # BLD AUTO: 128 K/UL (ref 164–446)
PMV BLD AUTO: 9.9 FL (ref 9–12.9)
POTASSIUM SERPL-SCNC: 4.1 MMOL/L (ref 3.6–5.5)
PROCALCITONIN SERPL-MCNC: 0.18 NG/ML
RBC # BLD AUTO: 4.26 M/UL (ref 4.7–6.1)
SODIUM SERPL-SCNC: 139 MMOL/L (ref 135–145)
WBC # BLD AUTO: 11.3 K/UL (ref 4.8–10.8)

## 2018-05-05 PROCEDURE — 700102 HCHG RX REV CODE 250 W/ 637 OVERRIDE(OP): Performed by: HOSPITALIST

## 2018-05-05 PROCEDURE — A9270 NON-COVERED ITEM OR SERVICE: HCPCS | Performed by: HOSPITALIST

## 2018-05-05 PROCEDURE — 700111 HCHG RX REV CODE 636 W/ 250 OVERRIDE (IP): Performed by: HOSPITALIST

## 2018-05-05 PROCEDURE — 83880 ASSAY OF NATRIURETIC PEPTIDE: CPT

## 2018-05-05 PROCEDURE — 71250 CT THORAX DX C-: CPT

## 2018-05-05 PROCEDURE — 87205 SMEAR GRAM STAIN: CPT

## 2018-05-05 PROCEDURE — 80048 BASIC METABOLIC PNL TOTAL CA: CPT

## 2018-05-05 PROCEDURE — 94760 N-INVAS EAR/PLS OXIMETRY 1: CPT

## 2018-05-05 PROCEDURE — 99233 SBSQ HOSP IP/OBS HIGH 50: CPT | Performed by: HOSPITALIST

## 2018-05-05 PROCEDURE — 770006 HCHG ROOM/CARE - MED/SURG/GYN SEMI*

## 2018-05-05 PROCEDURE — 87070 CULTURE OTHR SPECIMN AEROBIC: CPT

## 2018-05-05 PROCEDURE — 700102 HCHG RX REV CODE 250 W/ 637 OVERRIDE(OP): Performed by: FAMILY MEDICINE

## 2018-05-05 PROCEDURE — 80197 ASSAY OF TACROLIMUS: CPT

## 2018-05-05 PROCEDURE — 84145 PROCALCITONIN (PCT): CPT

## 2018-05-05 PROCEDURE — 85025 COMPLETE CBC W/AUTO DIFF WBC: CPT

## 2018-05-05 PROCEDURE — 700105 HCHG RX REV CODE 258: Performed by: HOSPITALIST

## 2018-05-05 PROCEDURE — 36415 COLL VENOUS BLD VENIPUNCTURE: CPT

## 2018-05-05 RX ORDER — SODIUM CHLORIDE 9 MG/ML
INJECTION, SOLUTION INTRAVENOUS CONTINUOUS
Status: DISCONTINUED | OUTPATIENT
Start: 2018-05-05 | End: 2018-05-06

## 2018-05-05 RX ADMIN — TACROLIMUS 1 MG: 1 CAPSULE ORAL at 09:21

## 2018-05-05 RX ADMIN — METOPROLOL TARTRATE 25 MG: 25 TABLET, FILM COATED ORAL at 22:18

## 2018-05-05 RX ADMIN — ALLOPURINOL 300 MG: 300 TABLET ORAL at 09:21

## 2018-05-05 RX ADMIN — ENOXAPARIN SODIUM 40 MG: 100 INJECTION SUBCUTANEOUS at 09:22

## 2018-05-05 RX ADMIN — GUAIFENESIN AND DEXTROMETHORPHAN 15 ML: 100; 10 SYRUP ORAL at 18:00

## 2018-05-05 RX ADMIN — CALCITRIOL 0.25 MCG: 0.25 CAPSULE, LIQUID FILLED ORAL at 09:21

## 2018-05-05 RX ADMIN — SODIUM CHLORIDE: 9 INJECTION, SOLUTION INTRAVENOUS at 10:52

## 2018-05-05 RX ADMIN — PREDNISONE 5 MG: 5 TABLET ORAL at 09:21

## 2018-05-05 RX ADMIN — MYCOPHENOLATE MOFETIL 500 MG: 250 CAPSULE ORAL at 22:18

## 2018-05-05 RX ADMIN — METOPROLOL TARTRATE 25 MG: 25 TABLET, FILM COATED ORAL at 10:51

## 2018-05-05 RX ADMIN — AZITHROMYCIN FOR INJECTION INJECTION, POWDER, LYOPHILIZED, FOR SOLUTION 500 MG: 500 INJECTION INTRAVENOUS at 18:00

## 2018-05-05 RX ADMIN — MYCOPHENOLATE MOFETIL 500 MG: 250 CAPSULE ORAL at 09:22

## 2018-05-05 RX ADMIN — ATORVASTATIN CALCIUM 10 MG: 10 TABLET, FILM COATED ORAL at 09:21

## 2018-05-05 RX ADMIN — ACETAMINOPHEN 650 MG: 325 TABLET, FILM COATED ORAL at 22:17

## 2018-05-05 RX ADMIN — CEFTRIAXONE 2 G: 2 INJECTION, POWDER, FOR SOLUTION INTRAMUSCULAR; INTRAVENOUS at 18:00

## 2018-05-05 RX ADMIN — TACROLIMUS 1 MG: 1 CAPSULE ORAL at 22:17

## 2018-05-05 RX ADMIN — OMEPRAZOLE 20 MG: 20 CAPSULE, DELAYED RELEASE ORAL at 22:17

## 2018-05-05 ASSESSMENT — ENCOUNTER SYMPTOMS
FOCAL WEAKNESS: 0
DEPRESSION: 0
DIARRHEA: 0
VOMITING: 0
DIZZINESS: 0
SHORTNESS OF BREATH: 1
WEAKNESS: 0
HEMOPTYSIS: 0
MYALGIAS: 0
COUGH: 1
PALPITATIONS: 0
CONSTIPATION: 0
HEADACHES: 0
SPEECH CHANGE: 0
DIAPHORESIS: 0
ABDOMINAL PAIN: 0
SORE THROAT: 0
NAUSEA: 0
CLAUDICATION: 0
FEVER: 1
BACK PAIN: 0
SENSORY CHANGE: 0
CHILLS: 1
EYE DISCHARGE: 0
NECK PAIN: 0
EYE PAIN: 0
LOSS OF CONSCIOUSNESS: 0
SPUTUM PRODUCTION: 1
BRUISES/BLEEDS EASILY: 0
WHEEZING: 0

## 2018-05-05 ASSESSMENT — PAIN SCALES - GENERAL
PAINLEVEL_OUTOF10: 5
PAINLEVEL_OUTOF10: 0

## 2018-05-05 ASSESSMENT — COPD QUESTIONNAIRES
HAVE YOU SMOKED AT LEAST 100 CIGARETTES IN YOUR ENTIRE LIFE: NO/DON'T KNOW
DURING THE PAST 4 WEEKS HOW MUCH DID YOU FEEL SHORT OF BREATH: SOME OF THE TIME
DO YOU EVER COUGH UP ANY MUCUS OR PHLEGM?: YES, A FEW DAYS A WEEK OR MONTH
COPD SCREENING SCORE: 5

## 2018-05-05 ASSESSMENT — LIFESTYLE VARIABLES
EVER_SMOKED: NEVER
SUBSTANCE_ABUSE: 0

## 2018-05-05 NOTE — ED NOTES
Pt to Red 8.  IV access obtained.  Blood drawn and sent to lab.  2 set of blood cultures collected and sent to lab.

## 2018-05-05 NOTE — ASSESSMENT & PLAN NOTE
Baseline creatinine is 0.8-1  Avoid nephrotoxins  Renal transplant u/s wnl.  Currently cr is 1.23  Will add 1/2 ns at 75cc/hr

## 2018-05-05 NOTE — CARE PLAN
Problem: Communication  Goal: The ability to communicate needs accurately and effectively will improve  Outcome: PROGRESSING AS EXPECTED  Patient utilizes call light and verbalizes questions/concerns/need    Problem: Infection  Goal: Will remain free from infection  Outcome: PROGRESSING AS EXPECTED  Patient utilizes hand hygiene

## 2018-05-05 NOTE — PROGRESS NOTES
[facility] Hospitalist Progress Note    Date of Service: 5/5/2018    Chief Complaint  68 y.o. 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  Patient has been well for the past couple of months, he recently saw his transplant nephrologist in [place] and creatinine was stable.  He presents with 2-3 weeks of progressive cough, productive of yellow sputum, no hemoptysis.  He endorses fever like symptoms and malaise.  Coughing has been so severe at times that he has vomited.  His abdomen is sore from coughing.  No diarrhea, no constipation, has been urinating normally, no dysuria or flank pain.    Interval Problem Update  5/5:  Productive cough and SOB, wife at bedside.  Ordered sputum culture, urine culture. Started NS 75/hr renal protective.    Consultants/Specialty  none    Disposition  Ambulates, lives with wife.  No needs anticipated.        Review of Systems   Constitutional: Positive for chills, fever and malaise/fatigue. Negative for diaphoresis.   HENT: Negative for congestion and sore throat.    Eyes: Negative for pain and discharge.   Respiratory: Positive for cough, sputum production and shortness of breath. Negative for hemoptysis and wheezing.    Cardiovascular: Negative for chest pain, palpitations, claudication and leg swelling.   Gastrointestinal: Negative for abdominal pain, constipation, diarrhea, melena, nausea and vomiting.   Genitourinary: Negative for dysuria, frequency and urgency.   Musculoskeletal: Negative for back pain, joint pain, myalgias and neck pain.   Skin: Negative for itching and rash.   Neurological: Negative for dizziness, sensory change, speech change, focal weakness, loss of consciousness, weakness and headaches.   Endo/Heme/Allergies: Does not bruise/bleed easily.   Psychiatric/Behavioral: Negative for depression, substance abuse and suicidal ideas.       Physical Exam  Laboratory/Imaging   Hemodynamics  Temp (24hrs), Av.3 °C (99.2 °F), Min:36.9 °C (98.4 °F), Max:37.6 °C (99.7 °F)   Temperature: 37.6 °C (99.7 °F)  Pulse  Av.1  Min: 83  Max: 110   Blood Pressure : 151/74 (Informed RN)      Respiratory      Respiration: 20, Pulse Oximetry: 92 %, O2 Daily Delivery Respiratory : Room Air with O2 Available     Work Of Breathing / Effort: Moderate  RUL Breath Sounds: Clear;Diminished, RML Breath Sounds: Diminished, RLL Breath Sounds: Diminished, ELIAZAR Breath Sounds: Clear;Diminished, LLL Breath Sounds: Diminished    Fluids    Intake/Output Summary (Last 24 hours) at 18 1806  Last data filed at 18 1800   Gross per 24 hour   Intake              584 ml   Output                0 ml   Net              584 ml       Nutrition  Orders Placed This Encounter   Procedures   • Diet Order     Standing Status:   Standing     Number of Occurrences:   1     Order Specific Question:   Diet:     Answer:   Regular [1]     Physical Exam   Constitutional: He is oriented to person, place, and time. He appears well-developed and well-nourished. No distress.   HENT:   Head: Normocephalic and atraumatic.   Mouth/Throat: Oropharynx is clear and moist. No oropharyngeal exudate.   Eyes: Conjunctivae and EOM are normal. Pupils are equal, round, and reactive to light. Right eye exhibits no discharge. Left eye exhibits no discharge. No scleral icterus.   Neck: Normal range of motion. Neck supple. No JVD present. No tracheal deviation present. No thyromegaly present.   Cardiovascular: Normal rate, regular rhythm and normal heart sounds.  Exam reveals no gallop and no friction rub.    No murmur heard.  Pulmonary/Chest: Effort normal and breath sounds normal. No respiratory distress. He has no wheezes. He has no rales. He exhibits no tenderness.   Coughing on exam.   Abdominal: Soft. Bowel sounds are normal. He exhibits no distension and no mass. There is no tenderness. There is no  rebound and no guarding.   Musculoskeletal: Normal range of motion. He exhibits no edema or tenderness.   Lymphadenopathy:     He has no cervical adenopathy.   Neurological: He is alert and oriented to person, place, and time. No cranial nerve deficit. He exhibits normal muscle tone.   Skin: Skin is warm and dry. No rash noted. He is not diaphoretic. No erythema.   Psychiatric: He has a normal mood and affect. His behavior is normal. Judgment and thought content normal.   Nursing note and vitals reviewed.      Recent Labs      05/04/18   1558  05/05/18   0303   WBC  11.6*  11.3*   RBC  5.11  4.26*   HEMOGLOBIN  15.2  12.9*   HEMATOCRIT  46.1  38.9*   MCV  90.2  91.3   MCH  29.7  30.3   MCHC  33.0*  33.2*   RDW  48.2  49.9   PLATELETCT  146*  128*   MPV  9.1  9.9     Recent Labs      05/04/18   1558  05/05/18   0303   SODIUM  139  139   POTASSIUM  4.3  4.1   CHLORIDE  105  114*   CO2  25  19*   GLUCOSE  108*  102*   BUN  27*  22   CREATININE  1.23  1.18   CALCIUM  9.6  8.3*         Recent Labs      05/05/18   0303   BNPBTYPENAT  210*              Assessment/Plan     * LLL pneumonia (HCC)- (present on admission)   Assessment & Plan    LLL seen on CT chest w/o contrast:  Multifocal areas of consolidation.  h/o pneumonia 2008  No home O2.  Now on 2 LPM Nc.  Ordered RT protocol with albuterol prn  Sputum culture induced ordered.  Continue rocephin, zpack.  Started NS 75/hr to keep kidneys well hydrated.        Fever- (present on admission)   Assessment & Plan    Fever with mild leukocytosis  Concerning in setting of immunosuppression  Source LLL pneumonia   pro calcitonin 0.18  Urinalysis neg for infection, +proteinuria.        RADHA (acute kidney injury) (HCC)- (present on admission)   Assessment & Plan    Baseline creatinine is 0.8-1  He is probably on the dry side with probable hemoconcentration  5/5:  Started NS 75/hr.  Avoid nephrotoxins        Acute respiratory failure with hypoxia (HCC)- (present on admission)    Assessment & Plan    No home O2.  On 2 LPM NC  Neb treatments  tx pneumonia.        Essential hypertension- (present on admission)   Assessment & Plan    Added metoprolol 25 bid.        History of renal transplant- (present on admission)   Assessment & Plan    Patient had a transplant approximately 10 years ago in [facility]  Follow renal function carefully  Continue tacrolimus and CellCept  Check tacrolimus level          Quality-Core Measures

## 2018-05-05 NOTE — PROGRESS NOTES
2 RN skin check completed.  Pt has no pressure ulcers, DTIs, suspected pressure ulcers or DTIs noted on assessment.

## 2018-05-05 NOTE — PROGRESS NOTES
Patient returned from CT. Resting on side of bed comfortably eating breakfast. Dr. Short notified bp elevated. Provider to add new orders

## 2018-05-05 NOTE — H&P
Hospital Medicine History and Physical    Date of Service  5/4/2018    Chief Complaint  Chief Complaint   Patient presents with   • Shortness of Breath       History of Presenting Illness  68 y.o. male who presented 5/4/2018 with cough, shortness of breath and fever.    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  Patient has been well for the past couple of months, he recently saw his transplant nephrologist in [place] and creatinine was stable.  He presents with 2-3 weeks of progressive cough, productive of yellow sputum, no hemoptysis.  He endorses fever like symptoms and malaise.  Coughing has been so severe at times that he has vomited.  His abdomen is sore from coughing.  No diarrhea, no constipation, has been urinating normally, no dysuria or flank pain.  Primary Care Physician  Damian Diaz M.D.    Consultants  None    Code Status  Full Code    Review of Systems  Review of Systems   Constitutional: Negative for chills and malaise/fatigue.   Respiratory: Positive for cough, sputum production and shortness of breath. Negative for hemoptysis.    Cardiovascular: Negative for chest pain, palpitations and orthopnea.   Gastrointestinal: Positive for abdominal pain and nausea.   Skin: Negative for itching and rash.   Neurological: Negative for dizziness.   All other systems reviewed and are negative.       Past Medical History  Past Medical History:   Diagnosis Date   • Kidney failure    • Kidney transplant pain        Surgical History  Past Surgical History:   Procedure Laterality Date   • PEG PLACEMENT  10/10/2014    Performed by Brijesh Orozco M.D. at [facility]       Medications  No current facility-administered medications on file prior to encounter.      Current Outpatient Prescriptions on File Prior to Encounter   Medication Sig Dispense Refill   • atorvastatin (LIPITOR) 10 MG TABS Take 10 mg by mouth every morning.          Family History  No family history of autoimmune disease or renal disease in first degree relatives    Social History  Social History   Substance Use Topics   • Smoking status: Never Smoker   • Smokeless tobacco: Not on file   • Alcohol use Not on file       Allergies  Allergies   Allergen Reactions   • Nsaids      Cannot take because of anti rejection meds.        Physical Exam  Laboratory   Hemodynamics  Temp (24hrs), Av.4 °C (101.2 °F), Min:38.4 °C (101.2 °F), Max:38.4 °C (101.2 °F)   Temperature: (!) 38.4 °C (101.2 °F)  Pulse  Av.3  Min: 103  Max: 110 Heart Rate (Monitored): (!) 106  Blood Pressure : 134/72      Respiratory      Respiration: (!) 22, Pulse Oximetry: 91 %, O2 Daily Delivery Respiratory : Room Air with O2 Available     Given By:: Mouthpiece, Work Of Breathing / Effort: Moderate  RML Breath Sounds: Diminished, RLL Breath Sounds: Diminished, LLL Breath Sounds: Diminished    Physical Exam   Constitutional: He is oriented to person, place, and time. He appears well-developed and well-nourished.   Eyes: Conjunctivae and EOM are normal. Right eye exhibits no discharge. Left eye exhibits no discharge.   Neck: Normal range of motion. Neck supple.   Cardiovascular: Normal rate, regular rhythm and intact distal pulses.    No murmur heard.  Pulmonary/Chest: Effort normal and breath sounds normal. No respiratory distress. He has no wheezes.   Abdominal: Soft. Bowel sounds are normal. He exhibits no distension. There is no tenderness. There is no rebound and no guarding.   Musculoskeletal: Normal range of motion. He exhibits no edema.   Neurological: He is alert and oriented to person, place, and time. No cranial nerve deficit.   Skin: Skin is warm and dry. He is not diaphoretic. No erythema.   Psychiatric: He has a normal mood and affect.       Recent Labs      18   1558   WBC  11.6*   RBC  5.11   HEMOGLOBIN  15.2   HEMATOCRIT  46.1   MCV  90.2   MCH  29.7   MCHC  33.0*   RDW  48.2    PLATELETCT  146*   MPV  9.1     Recent Labs      05/04/18   1558   SODIUM  139   POTASSIUM  4.3   CHLORIDE  105   CO2  25   GLUCOSE  108*   BUN  27*   CREATININE  1.23   CALCIUM  9.6     Recent Labs      05/04/18   1558   ALTSGPT  16   ASTSGOT  17   ALKPHOSPHAT  82   TBILIRUBIN  0.8   GLUCOSE  108*                 Lab Results   Component Value Date    TROPONINI 0.03 10/03/2014     Urinalysis:    Lab Results  Component Value Date/Time   SPECGRAVITY 1.025 10/01/2014 1551   GLUCOSEUR Negative 10/01/2014 1551   KETONES Negative 10/01/2014 1551   NITRITE Negative 10/01/2014 1551   WBCURINE 0-2 (A) 10/01/2014 1551   RBCURINE 5-10 (A) 10/01/2014 1551   EPITHELCELL Few 09/09/2014 0150        Imaging  CXR:  No focal consolidation   Assessment/Plan     I anticipate this patient will require at least two midnights for appropriate medical management, necessitating inpatient admission.    Fever- (present on admission)   Assessment & Plan    Fever with mild leukocytosis  Concerning in setting of immunosuppression  Complaints of cough, would be concerned about a respiratory source  Cover pneumonia  Check pro calcitonin  Again with immunosuppression, I am going to check a CT scan of his chest  Urinalysis has been ordered, it is pending        RADHA (acute kidney injury) (HCC)- (present on admission)   Assessment & Plan    Baseline creatinine is 0.8-1  He is probably on the dry side with probable hemoconcentration  Gentle IVF, repeat labs AM  Avoid nephrotoxins        History of renal transplant- (present on admission)   Assessment & Plan    Patient had a transplant approximately 10 years ago in [facility]  Follow renal function carefully  Continue tacrolimus and CellCept  Check tacrolimus level            VTE prophylaxis: lovenox.

## 2018-05-05 NOTE — PROGRESS NOTES
Pt transfered from ED via gurney accompanied by transporter.  Pt ambulated to bed with stand by assist.  Pt assessed, complaining of nausea, pt medicated with zofran per MAR.  Pt has no complaints of pain at this time.  Updated pt on unit routine including call light system, hourly rounding, white board information, and visitors policy.  Bed in lowest position, locked, bed alarm not indicated, pt wearing treaded socks, and pt instructed on need to call for assistance prior to getting out of bed.  Call light, phone, and personal belongs within reach, white board updated.

## 2018-05-05 NOTE — ASSESSMENT & PLAN NOTE
Fever with mild leukocytosis  Concerning in setting of immunosuppression  Source LLL pneumonia   pro calcitonin 0.18 with increase to 0.33, repeat pct  Urinalysis packed wbc, +proteinuria.  UC no growth x 48 hours.  Renal transplant u/s wnl.

## 2018-05-05 NOTE — FLOWSHEET NOTE
Respiratory Therapy Update    Interdisciplinary Plan of Care-Goals (Indications)  Bronchodilator Indications: Strong Subjective / Objective Improvement (05/04/18 1733)  Interdisciplinary Plan of Care-Outcomes   Bronchodilator Outcome: Improved Patient Appearance with Decreased use of Accessory Muscles;Improved Vital Signs and Measures of Gas Exchange (05/04/18 1733)          #SVN Performed: Yes (05/04/18 1733)       FiO2%: 21 % (05/04/18 1733)  O2 (LPM): 0 (05/04/18 1733)  O2 Daily Delivery Respiratory : Room Air with O2 Available (05/04/18 1733)    Breath Sounds  Pre/Post Intervention: Pre Intervention Assessment (05/04/18 1733)  RML Breath Sounds: Diminished (05/04/18 1733)  RLL Breath Sounds: Diminished (05/04/18 1733)  LLL Breath Sounds: Diminished (05/04/18 1733)

## 2018-05-05 NOTE — ASSESSMENT & PLAN NOTE
Patient had a transplant approximately 10 years ago in [facility]  Follow renal function carefully  Continue tacrolimus and CellCept  Check tacrolimus level  5/6 renal transplant u/s wnl.

## 2018-05-06 ENCOUNTER — APPOINTMENT (OUTPATIENT)
Dept: RADIOLOGY | Facility: MEDICAL CENTER | Age: 68
DRG: 871 | End: 2018-05-06
Attending: HOSPITALIST
Payer: MEDICARE

## 2018-05-06 LAB
BACTERIA UR CULT: NORMAL
GRAM STN SPEC: NORMAL
SIGNIFICANT IND 70042: NORMAL
SIGNIFICANT IND 70042: NORMAL
SITE SITE: NORMAL
SITE SITE: NORMAL
SOURCE SOURCE: NORMAL
SOURCE SOURCE: NORMAL

## 2018-05-06 PROCEDURE — A9270 NON-COVERED ITEM OR SERVICE: HCPCS | Performed by: HOSPITALIST

## 2018-05-06 PROCEDURE — 700101 HCHG RX REV CODE 250: Performed by: HOSPITALIST

## 2018-05-06 PROCEDURE — 700105 HCHG RX REV CODE 258: Performed by: HOSPITALIST

## 2018-05-06 PROCEDURE — 94760 N-INVAS EAR/PLS OXIMETRY 1: CPT

## 2018-05-06 PROCEDURE — 99233 SBSQ HOSP IP/OBS HIGH 50: CPT | Performed by: HOSPITALIST

## 2018-05-06 PROCEDURE — 700102 HCHG RX REV CODE 250 W/ 637 OVERRIDE(OP): Performed by: FAMILY MEDICINE

## 2018-05-06 PROCEDURE — 700111 HCHG RX REV CODE 636 W/ 250 OVERRIDE (IP): Performed by: HOSPITALIST

## 2018-05-06 PROCEDURE — 700102 HCHG RX REV CODE 250 W/ 637 OVERRIDE(OP): Performed by: HOSPITALIST

## 2018-05-06 PROCEDURE — 94667 MNPJ CHEST WALL 1ST: CPT

## 2018-05-06 PROCEDURE — 71045 X-RAY EXAM CHEST 1 VIEW: CPT

## 2018-05-06 PROCEDURE — 94640 AIRWAY INHALATION TREATMENT: CPT

## 2018-05-06 PROCEDURE — 76776 US EXAM K TRANSPL W/DOPPLER: CPT

## 2018-05-06 PROCEDURE — 94669 MECHANICAL CHEST WALL OSCILL: CPT

## 2018-05-06 PROCEDURE — 94660 CPAP INITIATION&MGMT: CPT

## 2018-05-06 PROCEDURE — 770006 HCHG ROOM/CARE - MED/SURG/GYN SEMI*

## 2018-05-06 RX ORDER — HYDRALAZINE HYDROCHLORIDE 20 MG/ML
20 INJECTION INTRAMUSCULAR; INTRAVENOUS EVERY 6 HOURS PRN
Status: DISCONTINUED | OUTPATIENT
Start: 2018-05-06 | End: 2018-05-10 | Stop reason: HOSPADM

## 2018-05-06 RX ORDER — METOPROLOL TARTRATE 50 MG/1
50 TABLET, FILM COATED ORAL TWICE DAILY
Status: DISCONTINUED | OUTPATIENT
Start: 2018-05-06 | End: 2018-05-10 | Stop reason: HOSPADM

## 2018-05-06 RX ORDER — TRAMADOL HYDROCHLORIDE 50 MG/1
50-100 TABLET ORAL EVERY 6 HOURS PRN
Status: DISCONTINUED | OUTPATIENT
Start: 2018-05-06 | End: 2018-05-10 | Stop reason: HOSPADM

## 2018-05-06 RX ADMIN — TRAMADOL HYDROCHLORIDE 50 MG: 50 TABLET, COATED ORAL at 22:26

## 2018-05-06 RX ADMIN — PREDNISONE 5 MG: 5 TABLET ORAL at 09:42

## 2018-05-06 RX ADMIN — AZITHROMYCIN FOR INJECTION INJECTION, POWDER, LYOPHILIZED, FOR SOLUTION 500 MG: 500 INJECTION INTRAVENOUS at 17:02

## 2018-05-06 RX ADMIN — MYCOPHENOLATE MOFETIL 500 MG: 250 CAPSULE ORAL at 20:52

## 2018-05-06 RX ADMIN — ATORVASTATIN CALCIUM 10 MG: 10 TABLET, FILM COATED ORAL at 09:42

## 2018-05-06 RX ADMIN — GUAIFENESIN AND DEXTROMETHORPHAN 15 ML: 100; 10 SYRUP ORAL at 06:22

## 2018-05-06 RX ADMIN — METOPROLOL TARTRATE 50 MG: 50 TABLET, FILM COATED ORAL at 20:48

## 2018-05-06 RX ADMIN — ALBUTEROL SULFATE 2.5 MG: 2.5 SOLUTION RESPIRATORY (INHALATION) at 19:14

## 2018-05-06 RX ADMIN — TACROLIMUS 1 MG: 1 CAPSULE ORAL at 09:42

## 2018-05-06 RX ADMIN — ACETAMINOPHEN 650 MG: 325 TABLET, FILM COATED ORAL at 06:22

## 2018-05-06 RX ADMIN — ENOXAPARIN SODIUM 40 MG: 100 INJECTION SUBCUTANEOUS at 09:42

## 2018-05-06 RX ADMIN — ACETAMINOPHEN 650 MG: 325 TABLET, FILM COATED ORAL at 20:52

## 2018-05-06 RX ADMIN — MYCOPHENOLATE MOFETIL 500 MG: 250 CAPSULE ORAL at 09:50

## 2018-05-06 RX ADMIN — METOPROLOL TARTRATE 25 MG: 25 TABLET, FILM COATED ORAL at 09:42

## 2018-05-06 RX ADMIN — GUAIFENESIN AND DEXTROMETHORPHAN 15 ML: 100; 10 SYRUP ORAL at 09:52

## 2018-05-06 RX ADMIN — HYDRALAZINE HYDROCHLORIDE 20 MG: 20 INJECTION INTRAMUSCULAR; INTRAVENOUS at 18:01

## 2018-05-06 RX ADMIN — PIPERACILLIN AND TAZOBACTAM 4.5 G: 4; .5 INJECTION, POWDER, LYOPHILIZED, FOR SOLUTION INTRAVENOUS; PARENTERAL at 18:01

## 2018-05-06 RX ADMIN — PIPERACILLIN AND TAZOBACTAM 4.5 G: 4; .5 INJECTION, POWDER, LYOPHILIZED, FOR SOLUTION INTRAVENOUS; PARENTERAL at 09:51

## 2018-05-06 RX ADMIN — TACROLIMUS 1 MG: 1 CAPSULE ORAL at 20:48

## 2018-05-06 RX ADMIN — OMEPRAZOLE 20 MG: 20 CAPSULE, DELAYED RELEASE ORAL at 20:48

## 2018-05-06 RX ADMIN — CALCITRIOL 0.25 MCG: 0.25 CAPSULE, LIQUID FILLED ORAL at 09:42

## 2018-05-06 RX ADMIN — ALLOPURINOL 300 MG: 300 TABLET ORAL at 09:42

## 2018-05-06 RX ADMIN — TRAMADOL HYDROCHLORIDE 50 MG: 50 TABLET, COATED ORAL at 15:20

## 2018-05-06 ASSESSMENT — ENCOUNTER SYMPTOMS
WHEEZING: 0
DIARRHEA: 0
SORE THROAT: 0
SPEECH CHANGE: 0
SHORTNESS OF BREATH: 1
ABDOMINAL PAIN: 0
CONSTIPATION: 0
MYALGIAS: 0
DIZZINESS: 0
BACK PAIN: 0
VOMITING: 0
CLAUDICATION: 0
FEVER: 1
DIAPHORESIS: 0
HEMOPTYSIS: 0
NAUSEA: 0
FOCAL WEAKNESS: 0
EYE PAIN: 0
CHILLS: 1
EYE DISCHARGE: 0
PALPITATIONS: 0
LOSS OF CONSCIOUSNESS: 0
HEADACHES: 0
COUGH: 1
SPUTUM PRODUCTION: 1
DEPRESSION: 0
NECK PAIN: 0
BRUISES/BLEEDS EASILY: 0
SENSORY CHANGE: 0
WEAKNESS: 0

## 2018-05-06 ASSESSMENT — PAIN SCALES - GENERAL
PAINLEVEL_OUTOF10: 4
PAINLEVEL_OUTOF10: 9
PAINLEVEL_OUTOF10: 6
PAINLEVEL_OUTOF10: 5

## 2018-05-06 ASSESSMENT — LIFESTYLE VARIABLES: SUBSTANCE_ABUSE: 0

## 2018-05-06 NOTE — PROGRESS NOTES
Assumed care of pt, received report from day shift RN, pt assessed.  Pt complaining of 5/10 BL shoulder pain, pt medicated per MAR.  Pt is A&O x4.  Pt not a fall risk, wearing treaded socks, bed locked and in lowest position, bed alarm not indicated.  Pt instructed to call for assistance prior to getting OOB, pt verbalized understanding.  Call light, phone, and personal belongings within reach.

## 2018-05-06 NOTE — ASSESSMENT & PLAN NOTE
h/o machelle's, s/p renal transplant 10 years ago  h/o trach, s/p reversal 2014    LLL seen on CT chest w/o contrast:  Multifocal areas of consolidation.  h/o pneumonia 2008  No home O2.  Ordered RT protocol with albuterol prn.  5/6:  dc'd NS.  Changed rocephin to zosyn IV.  Repeat CXR negative.    - wean zosyn, sputum suggestive of mold growth, given immunocompromised state, its likely cause of pneumonia  - continue diflucan, recommend a 3 week course  - currently on 3litres 02  - continue supplemental oxygen, if unable to wean will need home o2  - f/u cxr 5/9 showed 1.  Decreased central vascular congestion and interstitial pulmonary edema  2.  Persistently enlarged cardiac silhouette

## 2018-05-06 NOTE — CARE PLAN
Problem: Venous Thromboembolism (VTW)/Deep Vein Thrombosis (DVT) Prevention:  Goal: Patient will participate in Venous Thrombosis (VTE)/Deep Vein Thrombosis (DVT)Prevention Measures  Outcome: PROGRESSING AS EXPECTED  Pt on Lovenox for pharmacologic prophylaxis.    Problem: Respiratory:  Goal: Respiratory status will improve  Outcome: PROGRESSING AS EXPECTED  Pt SPO2 94% on 2L NC and has no complaints of shortness of breath or difficulty breathing.

## 2018-05-06 NOTE — PROGRESS NOTES
Assumed care at 0700, received report from NOC RN. Pt c/o shoulder pain, stronger pain med ordered, waiting for new pain medication. POC discussed with patient, IV abx. Bed locked and in lowest position, treaded slippers on, and call light within reach. All needs met at this time, hourly rounding in place.

## 2018-05-06 NOTE — RESPIRATORY CARE
COPD EDUCATION by COPD CLINICAL EDUCATOR  5/6/2018 at 6:47 AM by Christiana Biswas     Patient reviewed by COPD education team. Patient does not qualify for COPD program.

## 2018-05-07 LAB
ANION GAP SERPL CALC-SCNC: 7 MMOL/L (ref 0–11.9)
BACTERIA SPEC RESP CULT: ABNORMAL
BACTERIA SPEC RESP CULT: ABNORMAL
BASOPHILS # BLD AUTO: 0.2 % (ref 0–1.8)
BASOPHILS # BLD: 0.01 K/UL (ref 0–0.12)
BUN SERPL-MCNC: 22 MG/DL (ref 8–22)
CALCIUM SERPL-MCNC: 9 MG/DL (ref 8.5–10.5)
CHLORIDE SERPL-SCNC: 108 MMOL/L (ref 96–112)
CO2 SERPL-SCNC: 20 MMOL/L (ref 20–33)
CREAT SERPL-MCNC: 1.23 MG/DL (ref 0.5–1.4)
EOSINOPHIL # BLD AUTO: 0.05 K/UL (ref 0–0.51)
EOSINOPHIL NFR BLD: 0.9 % (ref 0–6.9)
ERYTHROCYTE [DISTWIDTH] IN BLOOD BY AUTOMATED COUNT: 50.3 FL (ref 35.9–50)
GLUCOSE SERPL-MCNC: 135 MG/DL (ref 65–99)
GRAM STN SPEC: ABNORMAL
HCT VFR BLD AUTO: 38.4 % (ref 42–52)
HGB BLD-MCNC: 12.7 G/DL (ref 14–18)
IMM GRANULOCYTES # BLD AUTO: 0.02 K/UL (ref 0–0.11)
IMM GRANULOCYTES NFR BLD AUTO: 0.4 % (ref 0–0.9)
LYMPHOCYTES # BLD AUTO: 0.94 K/UL (ref 1–4.8)
LYMPHOCYTES NFR BLD: 16.8 % (ref 22–41)
MCH RBC QN AUTO: 30.2 PG (ref 27–33)
MCHC RBC AUTO-ENTMCNC: 33.1 G/DL (ref 33.7–35.3)
MCV RBC AUTO: 91.2 FL (ref 81.4–97.8)
MONOCYTES # BLD AUTO: 0.54 K/UL (ref 0–0.85)
MONOCYTES NFR BLD AUTO: 9.6 % (ref 0–13.4)
NEUTROPHILS # BLD AUTO: 4.04 K/UL (ref 1.82–7.42)
NEUTROPHILS NFR BLD: 72.1 % (ref 44–72)
NRBC # BLD AUTO: 0 K/UL
NRBC BLD-RTO: 0 /100 WBC
PLATELET # BLD AUTO: 98 K/UL (ref 164–446)
PMV BLD AUTO: 9.3 FL (ref 9–12.9)
POTASSIUM SERPL-SCNC: 3.5 MMOL/L (ref 3.6–5.5)
PROCALCITONIN SERPL-MCNC: 0.33 NG/ML
RBC # BLD AUTO: 4.21 M/UL (ref 4.7–6.1)
SIGNIFICANT IND 70042: ABNORMAL
SITE SITE: ABNORMAL
SODIUM SERPL-SCNC: 135 MMOL/L (ref 135–145)
SOURCE SOURCE: ABNORMAL
TACROLIMUS BLD-MCNC: 6.8 NG/ML
WBC # BLD AUTO: 5.6 K/UL (ref 4.8–10.8)

## 2018-05-07 PROCEDURE — 700111 HCHG RX REV CODE 636 W/ 250 OVERRIDE (IP): Performed by: HOSPITALIST

## 2018-05-07 PROCEDURE — A9270 NON-COVERED ITEM OR SERVICE: HCPCS | Performed by: HOSPITALIST

## 2018-05-07 PROCEDURE — 99233 SBSQ HOSP IP/OBS HIGH 50: CPT | Performed by: HOSPITALIST

## 2018-05-07 PROCEDURE — 94640 AIRWAY INHALATION TREATMENT: CPT

## 2018-05-07 PROCEDURE — 80048 BASIC METABOLIC PNL TOTAL CA: CPT

## 2018-05-07 PROCEDURE — 700102 HCHG RX REV CODE 250 W/ 637 OVERRIDE(OP): Performed by: HOSPITALIST

## 2018-05-07 PROCEDURE — 770006 HCHG ROOM/CARE - MED/SURG/GYN SEMI*

## 2018-05-07 PROCEDURE — 93970 EXTREMITY STUDY: CPT

## 2018-05-07 PROCEDURE — 700101 HCHG RX REV CODE 250: Performed by: HOSPITALIST

## 2018-05-07 PROCEDURE — 84145 PROCALCITONIN (PCT): CPT

## 2018-05-07 PROCEDURE — 36415 COLL VENOUS BLD VENIPUNCTURE: CPT

## 2018-05-07 PROCEDURE — 94668 MNPJ CHEST WALL SBSQ: CPT

## 2018-05-07 PROCEDURE — 700105 HCHG RX REV CODE 258: Performed by: HOSPITALIST

## 2018-05-07 PROCEDURE — 85025 COMPLETE CBC W/AUTO DIFF WBC: CPT

## 2018-05-07 RX ORDER — AZITHROMYCIN 250 MG/1
500 TABLET, FILM COATED ORAL DAILY
Status: COMPLETED | OUTPATIENT
Start: 2018-05-07 | End: 2018-05-08

## 2018-05-07 RX ORDER — FLUCONAZOLE 200 MG/1
200 TABLET ORAL DAILY
Status: DISCONTINUED | OUTPATIENT
Start: 2018-05-07 | End: 2018-05-10 | Stop reason: HOSPADM

## 2018-05-07 RX ADMIN — METOPROLOL TARTRATE 50 MG: 50 TABLET, FILM COATED ORAL at 21:02

## 2018-05-07 RX ADMIN — MYCOPHENOLATE MOFETIL 500 MG: 250 CAPSULE ORAL at 21:02

## 2018-05-07 RX ADMIN — TACROLIMUS 1 MG: 1 CAPSULE ORAL at 09:26

## 2018-05-07 RX ADMIN — PREDNISONE 5 MG: 5 TABLET ORAL at 09:26

## 2018-05-07 RX ADMIN — PIPERACILLIN AND TAZOBACTAM 4.5 G: 4; .5 INJECTION, POWDER, LYOPHILIZED, FOR SOLUTION INTRAVENOUS; PARENTERAL at 09:27

## 2018-05-07 RX ADMIN — CALCITRIOL 0.25 MCG: 0.25 CAPSULE, LIQUID FILLED ORAL at 09:26

## 2018-05-07 RX ADMIN — PIPERACILLIN AND TAZOBACTAM 4.5 G: 4; .5 INJECTION, POWDER, LYOPHILIZED, FOR SOLUTION INTRAVENOUS; PARENTERAL at 02:30

## 2018-05-07 RX ADMIN — MYCOPHENOLATE MOFETIL 500 MG: 250 CAPSULE ORAL at 09:26

## 2018-05-07 RX ADMIN — FLUCONAZOLE 200 MG: 200 TABLET ORAL at 17:25

## 2018-05-07 RX ADMIN — ALBUTEROL SULFATE 2.5 MG: 2.5 SOLUTION RESPIRATORY (INHALATION) at 08:02

## 2018-05-07 RX ADMIN — ALBUTEROL SULFATE 2.5 MG: 2.5 SOLUTION RESPIRATORY (INHALATION) at 11:40

## 2018-05-07 RX ADMIN — OMEPRAZOLE 20 MG: 20 CAPSULE, DELAYED RELEASE ORAL at 21:02

## 2018-05-07 RX ADMIN — TACROLIMUS 1 MG: 1 CAPSULE ORAL at 21:02

## 2018-05-07 RX ADMIN — METOPROLOL TARTRATE 50 MG: 50 TABLET, FILM COATED ORAL at 09:27

## 2018-05-07 RX ADMIN — AZITHROMYCIN 500 MG: 250 TABLET, FILM COATED ORAL at 09:26

## 2018-05-07 RX ADMIN — PIPERACILLIN AND TAZOBACTAM 4.5 G: 4; .5 INJECTION, POWDER, LYOPHILIZED, FOR SOLUTION INTRAVENOUS; PARENTERAL at 17:25

## 2018-05-07 RX ADMIN — ENOXAPARIN SODIUM 40 MG: 100 INJECTION SUBCUTANEOUS at 09:27

## 2018-05-07 RX ADMIN — ATORVASTATIN CALCIUM 10 MG: 10 TABLET, FILM COATED ORAL at 09:26

## 2018-05-07 RX ADMIN — ALLOPURINOL 300 MG: 300 TABLET ORAL at 09:26

## 2018-05-07 RX ADMIN — TRAMADOL HYDROCHLORIDE 50 MG: 50 TABLET, COATED ORAL at 21:02

## 2018-05-07 ASSESSMENT — ENCOUNTER SYMPTOMS
SHORTNESS OF BREATH: 1
PALPITATIONS: 0
LOSS OF CONSCIOUSNESS: 0
DIZZINESS: 0
DIARRHEA: 0
SORE THROAT: 0
VOMITING: 0
HEMOPTYSIS: 0
EYE PAIN: 0
CONSTIPATION: 0
WEAKNESS: 0
SPEECH CHANGE: 0
NAUSEA: 0
CLAUDICATION: 0
FEVER: 1
ABDOMINAL PAIN: 0
DEPRESSION: 0
SENSORY CHANGE: 0
BRUISES/BLEEDS EASILY: 0
NECK PAIN: 0
DIAPHORESIS: 0
CHILLS: 1
MYALGIAS: 0
FOCAL WEAKNESS: 0
WHEEZING: 0
EYE DISCHARGE: 0
COUGH: 1
SPUTUM PRODUCTION: 1
BACK PAIN: 0
HEADACHES: 0

## 2018-05-07 ASSESSMENT — PAIN SCALES - GENERAL
PAINLEVEL_OUTOF10: 2
PAINLEVEL_OUTOF10: 3
PAINLEVEL_OUTOF10: 2

## 2018-05-07 ASSESSMENT — LIFESTYLE VARIABLES: SUBSTANCE_ABUSE: 0

## 2018-05-07 NOTE — PROGRESS NOTES
[facility] Hospitalist Progress Note    Date of Service: 5/6/2018    Chief Complaint  68 y.o. 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  Patient has been well for the past couple of months, he recently saw his transplant nephrologist in [place] and creatinine was stable.  He presents with 2-3 weeks of progressive cough, productive of yellow sputum, no hemoptysis.  He endorses fever like symptoms and malaise.  Coughing has been so severe at times that he has vomited.  His abdomen is sore from coughing.  No diarrhea, no constipation, has been urinating normally, no dysuria or flank pain.    Interval Problem Update  5/5:  Productive cough and SOB, wife at bedside.  Ordered sputum culture, urine culture. Started NS 75/hr renal protective.  5/6:  States rt shoulder painful to touch.  No abdominal pain.  Repeated CXR, negative, lungs CTA b/l.  No respiratory distress noted.  Added tramadol prn pain.  Review of chart, 2014 PSAR sputum.  Changed rocephin to zosyn iV.  Ordered u/s renal transplant.  dc'd IVFs, taking po well.      Consultants/Specialty  none    Disposition  Ambulates, lives with wife.  No needs anticipated.        Review of Systems   Constitutional: Positive for chills, fever and malaise/fatigue. Negative for diaphoresis.   HENT: Negative for congestion and sore throat.    Eyes: Negative for pain and discharge.   Respiratory: Positive for cough, sputum production and shortness of breath. Negative for hemoptysis and wheezing.    Cardiovascular: Negative for chest pain, palpitations, claudication and leg swelling.   Gastrointestinal: Negative for abdominal pain, constipation, diarrhea, melena, nausea and vomiting.   Genitourinary: Negative for dysuria, frequency and urgency.   Musculoskeletal: Negative for back pain, joint pain, myalgias and neck pain.   Skin: Negative for itching and  rash.   Neurological: Negative for dizziness, sensory change, speech change, focal weakness, loss of consciousness, weakness and headaches.   Endo/Heme/Allergies: Does not bruise/bleed easily.   Psychiatric/Behavioral: Negative for depression, substance abuse and suicidal ideas.      Physical Exam  Laboratory/Imaging   Hemodynamics  Temp (24hrs), Av.5 °C (99.5 °F), Min:37.3 °C (99.1 °F), Max:37.8 °C (100 °F)   Temperature: 37.8 °C (100 °F)  Pulse  Av.3  Min: 83  Max: 110   Blood Pressure : (!) 166/85 (RN notified)      Respiratory      Respiration: (!) 33, Pulse Oximetry: 93 %     Work Of Breathing / Effort: Moderate;Increased Work of Breathing (dyspnea upon exertion )  RUL Breath Sounds: Diminished, RML Breath Sounds: Diminished, RLL Breath Sounds: Diminished, ELIAZAR Breath Sounds: Diminished, LLL Breath Sounds: Diminished    Fluids    Intake/Output Summary (Last 24 hours) at 18 1710  Last data filed at 18 1800   Gross per 24 hour   Intake              584 ml   Output                0 ml   Net              584 ml       Nutrition  Orders Placed This Encounter   Procedures   • Diet Order     Standing Status:   Standing     Number of Occurrences:   1     Order Specific Question:   Diet:     Answer:   Regular [1]     Physical Exam   Constitutional: He is oriented to person, place, and time. He appears well-developed and well-nourished. No distress.   HENT:   Head: Normocephalic and atraumatic.   Mouth/Throat: Oropharynx is clear and moist. No oropharyngeal exudate.   Eyes: Conjunctivae and EOM are normal. Pupils are equal, round, and reactive to light. Right eye exhibits no discharge. Left eye exhibits no discharge. No scleral icterus.   Neck: Normal range of motion. Neck supple. No JVD present. No tracheal deviation present. No thyromegaly present.   Cardiovascular: Normal rate, regular rhythm and normal heart sounds.  Exam reveals no gallop and no friction rub.    No murmur heard.  Pulmonary/Chest:  Effort normal and breath sounds normal. No respiratory distress. He has no wheezes. He has no rales. He exhibits no tenderness.   No Coughing on exam.   Abdominal: Soft. Bowel sounds are normal. He exhibits no distension and no mass. There is no tenderness. There is no rebound and no guarding.   Musculoskeletal: Normal range of motion. He exhibits tenderness (reproducible rt anterior shoulder pain with palpation.  no obvious deformity.  no RUQ pain.). He exhibits no edema.   Lymphadenopathy:     He has no cervical adenopathy.   Neurological: He is alert and oriented to person, place, and time. No cranial nerve deficit. He exhibits normal muscle tone.   Skin: Skin is warm and dry. No rash noted. He is not diaphoretic. No erythema.   Psychiatric: He has a normal mood and affect. His behavior is normal. Judgment and thought content normal.   Nursing note and vitals reviewed.      Recent Labs      05/04/18   1558  05/05/18   0303   WBC  11.6*  11.3*   RBC  5.11  4.26*   HEMOGLOBIN  15.2  12.9*   HEMATOCRIT  46.1  38.9*   MCV  90.2  91.3   MCH  29.7  30.3   MCHC  33.0*  33.2*   RDW  48.2  49.9   PLATELETCT  146*  128*   MPV  9.1  9.9     Recent Labs      05/04/18   1558  05/05/18   0303   SODIUM  139  139   POTASSIUM  4.3  4.1   CHLORIDE  105  114*   CO2  25  19*   GLUCOSE  108*  102*   BUN  27*  22   CREATININE  1.23  1.18   CALCIUM  9.6  8.3*         Recent Labs      05/05/18   0303   BNPBTYPENAT  210*              Assessment/Plan     * LLL pneumonia (HCC)- (present on admission)   Assessment & Plan    LLL seen on CT chest w/o contrast:  Multifocal areas of consolidation.  h/o pneumonia 2008  No home O2.  Now on 2 LPM Nc.  Ordered RT protocol with albuterol prn  Sputum culture induced ordered.  Continue rocephin, zpack.  Started NS 75/hr to keep kidneys well hydrated.  5/6:  dc'd NS.  Changed rocephin to zosyn IV.  Repeat CXR negative.        Fever- (present on admission)   Assessment & Plan    Fever with mild  leukocytosis  Concerning in setting of immunosuppression  Source LLL pneumonia   pro calcitonin 0.18  Urinalysis packed wbc, +proteinuria.  UC no growth x 24 hours.          RADHA (acute kidney injury) (HCC)- (present on admission)   Assessment & Plan    Baseline creatinine is 0.8-1  He is probably on the dry side with probable hemoconcentration  5/6:  dc'd NS 75/hr since states SOB.  Avoid nephrotoxins  Renal transplant u/s        Acute respiratory failure with hypoxia (HCC)- (present on admission)   Assessment & Plan    No home O2.  On 2 LPM NC  Neb treatments  tx pneumonia.        Essential hypertension- (present on admission)   Assessment & Plan    Added metoprolol 25 bid.        History of renal transplant- (present on admission)   Assessment & Plan    Patient had a transplant approximately 10 years ago in [facility]  Follow renal function carefully  Continue tacrolimus and CellCept  Check tacrolimus level  5/6 renal transplant u/s          Quality-Core Measures

## 2018-05-07 NOTE — DOCUMENTATION QUERY
DOCUMENTATION QUERY    PROVIDERS: Please select “Cosign w/ note”to reply to query.    To better represent the severity of illness of your patient, please review the following information and exercise your independent professional judgment in responding to this query.     Sepsis is documented in the ER MD Progress Notes, Final impression. Based upon the clinical findings, risk factors, and treatment, can this diagnosis be further specified?    Please select all that apply:   • Sepsis present on admission (specify causative organism if known and any associated organ dysfunction if known)  • Sepsis developed after admission  • Sepsis has been ruled out  • Other explanation of clinical findings  • Findings of no clinical significance  • Unable to determine    The medical record reflects the following:   Clinical Findings Admit Vital signs:  -/97, P 109, T 101.2, RR 18, O2 sat 91%    Admit Labs:  -WBC 11.6, Lactic Acid 2.0    Pneumonia dx  Acute respiratory failure dx  Acute kidney injury dx     Treatment NC IVF infusion  Sputum cx  Ct chest  CXR  Rocephin  Zosyn   Risk Factors Hx renal transplant-compliant on immunosuppressant medications  Pneumonia  Kidney injury  Respiratory failure  HTN   Location within medical record History and Physical, Progress Notes, Lab Results      Thank you,   Jory Richard RN BSN  Clinical   [phone] [facility] office  [phone] Cell phone

## 2018-05-07 NOTE — PROGRESS NOTES
Assumed patient care at 1900. POC discussed w/ day nurse and pt, pt in agreement w/ goals. Pt AOx4. Pt states pain in right shoulder and arm, given Tramdol. Pt denies nausea. Pt appears SOB and has difficulty finishing sentences without becoming SOB. RT given treatment. Patient sating >92% on 3L. RT set up CPAP at bedside for patient. Pt up SBA, edge of bed self, voids in urinal. PIV patent, IV abx given. Patient educated on use of call light, hourly rounding, and pain scale. Personal possession and call light within reach. Bed alarm refused. Patient calls appropriately.

## 2018-05-07 NOTE — CARE PLAN
Problem: Safety  Goal: Will remain free from injury  Bed locked and in lowest position, call light and personal belongings within reach, pt educated to call for assistance. Bed alarm refused. Hourly rounding in effect.       Problem: Respiratory:  Goal: Respiratory status will improve  Patient sating above >92% on 3L. Appears SOB with increased work of breathing. Head of bed around 90 degrees to aid breathing. Wheezing noted.

## 2018-05-08 PROCEDURE — 94668 MNPJ CHEST WALL SBSQ: CPT

## 2018-05-08 PROCEDURE — 700111 HCHG RX REV CODE 636 W/ 250 OVERRIDE (IP): Performed by: HOSPITALIST

## 2018-05-08 PROCEDURE — 700102 HCHG RX REV CODE 250 W/ 637 OVERRIDE(OP): Performed by: HOSPITALIST

## 2018-05-08 PROCEDURE — 700105 HCHG RX REV CODE 258: Performed by: INTERNAL MEDICINE

## 2018-05-08 PROCEDURE — 700101 HCHG RX REV CODE 250: Performed by: INTERNAL MEDICINE

## 2018-05-08 PROCEDURE — 99233 SBSQ HOSP IP/OBS HIGH 50: CPT | Performed by: INTERNAL MEDICINE

## 2018-05-08 PROCEDURE — 94640 AIRWAY INHALATION TREATMENT: CPT

## 2018-05-08 PROCEDURE — 94760 N-INVAS EAR/PLS OXIMETRY 1: CPT

## 2018-05-08 PROCEDURE — A9270 NON-COVERED ITEM OR SERVICE: HCPCS | Performed by: HOSPITALIST

## 2018-05-08 PROCEDURE — 700111 HCHG RX REV CODE 636 W/ 250 OVERRIDE (IP): Mod: JG | Performed by: HOSPITALIST

## 2018-05-08 PROCEDURE — 700105 HCHG RX REV CODE 258: Performed by: HOSPITALIST

## 2018-05-08 PROCEDURE — 770006 HCHG ROOM/CARE - MED/SURG/GYN SEMI*

## 2018-05-08 RX ORDER — SODIUM CHLORIDE 450 MG/100ML
INJECTION, SOLUTION INTRAVENOUS CONTINUOUS
Status: DISCONTINUED | OUTPATIENT
Start: 2018-05-08 | End: 2018-05-10 | Stop reason: HOSPADM

## 2018-05-08 RX ADMIN — ALLOPURINOL 300 MG: 300 TABLET ORAL at 08:50

## 2018-05-08 RX ADMIN — MYCOPHENOLATE MOFETIL 500 MG: 250 CAPSULE ORAL at 20:04

## 2018-05-08 RX ADMIN — PIPERACILLIN AND TAZOBACTAM 4.5 G: 4; .5 INJECTION, POWDER, LYOPHILIZED, FOR SOLUTION INTRAVENOUS; PARENTERAL at 13:16

## 2018-05-08 RX ADMIN — SODIUM CHLORIDE: 4.5 INJECTION, SOLUTION INTRAVENOUS at 15:30

## 2018-05-08 RX ADMIN — CALCITRIOL 0.25 MCG: 0.25 CAPSULE, LIQUID FILLED ORAL at 08:50

## 2018-05-08 RX ADMIN — DORNASE ALFA 2.5 MG: 1 SOLUTION RESPIRATORY (INHALATION) at 14:25

## 2018-05-08 RX ADMIN — TACROLIMUS 1 MG: 1 CAPSULE ORAL at 20:03

## 2018-05-08 RX ADMIN — MYCOPHENOLATE MOFETIL 500 MG: 250 CAPSULE ORAL at 08:49

## 2018-05-08 RX ADMIN — TACROLIMUS 1 MG: 1 CAPSULE ORAL at 08:48

## 2018-05-08 RX ADMIN — PIPERACILLIN AND TAZOBACTAM 4.5 G: 4; .5 INJECTION, POWDER, LYOPHILIZED, FOR SOLUTION INTRAVENOUS; PARENTERAL at 01:49

## 2018-05-08 RX ADMIN — TRAMADOL HYDROCHLORIDE 50 MG: 50 TABLET, COATED ORAL at 08:51

## 2018-05-08 RX ADMIN — OMEPRAZOLE 20 MG: 20 CAPSULE, DELAYED RELEASE ORAL at 20:03

## 2018-05-08 RX ADMIN — FLUCONAZOLE 200 MG: 200 TABLET ORAL at 08:50

## 2018-05-08 RX ADMIN — PIPERACILLIN AND TAZOBACTAM 4.5 G: 4; .5 INJECTION, POWDER, LYOPHILIZED, FOR SOLUTION INTRAVENOUS; PARENTERAL at 20:06

## 2018-05-08 RX ADMIN — PREDNISONE 5 MG: 5 TABLET ORAL at 08:51

## 2018-05-08 RX ADMIN — METOPROLOL TARTRATE 50 MG: 50 TABLET, FILM COATED ORAL at 20:03

## 2018-05-08 RX ADMIN — ENOXAPARIN SODIUM 40 MG: 100 INJECTION SUBCUTANEOUS at 08:47

## 2018-05-08 RX ADMIN — ATORVASTATIN CALCIUM 10 MG: 10 TABLET, FILM COATED ORAL at 08:48

## 2018-05-08 RX ADMIN — ACETAMINOPHEN 650 MG: 325 TABLET, FILM COATED ORAL at 20:11

## 2018-05-08 RX ADMIN — METOPROLOL TARTRATE 50 MG: 50 TABLET, FILM COATED ORAL at 08:49

## 2018-05-08 RX ADMIN — AZITHROMYCIN 500 MG: 250 TABLET, FILM COATED ORAL at 08:48

## 2018-05-08 ASSESSMENT — ENCOUNTER SYMPTOMS
DOUBLE VISION: 0
HEADACHES: 0
BACK PAIN: 0
CHILLS: 0
BRUISES/BLEEDS EASILY: 0
FEVER: 0
BLURRED VISION: 0
MYALGIAS: 0
HEARTBURN: 0
PALPITATIONS: 0
DEPRESSION: 0
DIZZINESS: 0
SPUTUM PRODUCTION: 1
SHORTNESS OF BREATH: 1

## 2018-05-08 ASSESSMENT — PAIN SCALES - GENERAL
PAINLEVEL_OUTOF10: 8
PAINLEVEL_OUTOF10: 0
PAINLEVEL_OUTOF10: 2
PAINLEVEL_OUTOF10: 2

## 2018-05-08 NOTE — PROGRESS NOTES
Patient resting comfortably in bed, mild low back pain see MAR. A/Ox4,+sob at times and +bynum with activity. currently on 3liters nc. Respiratory at bedside briefly for neb tx. Fall precautions in place, bed locked in lowest position, call light and personal belongings in reach. Education plan of care for the day and Hourly rounding

## 2018-05-08 NOTE — PROGRESS NOTES
[facility] Hospitalist Progress Note    Date of Service: 5/7/2018    Chief Complaint  68 y.o. 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  Patient has been well for the past couple of months, he recently saw his transplant nephrologist in [place] and creatinine was stable.  He presents with 2-3 weeks of progressive cough, productive of yellow sputum, no hemoptysis.  He endorses fever like symptoms and malaise.  Coughing has been so severe at times that he has vomited.  His abdomen is sore from coughing.  No diarrhea, no constipation, has been urinating normally, no dysuria or flank pain.    Interval Problem Update  5/5:  Productive cough and SOB, wife at bedside.  Ordered sputum culture, urine culture. Started NS 75/hr renal protective.  5/6:  States rt shoulder painful to touch.  No abdominal pain.  Repeated CXR, negative, lungs CTA b/l.  No respiratory distress noted.  Added tramadol prn pain.  Review of chart, 2014 PSAR sputum.  Changed rocephin to zosyn iV.  Ordered u/s renal transplant.  dc'd IVFs, taking po well.    5/7:  Improving overall with zosyn start, less cough of productive sputum.  Sputum culture with mould.  Added diflucan.  Renal transplant u/s wnl.  UC negative for growth.  BCs negative.  Increase in procalcitonin 0.18 to 0.33.  Spoke with wife at bedside, gave updates.    Consultants/Specialty  none    Disposition  Ambulates, lives with wife.  No home O2, has home CPAP.        Review of Systems   Constitutional: Positive for chills, fever and malaise/fatigue. Negative for diaphoresis.   HENT: Negative for congestion and sore throat.    Eyes: Negative for pain and discharge.   Respiratory: Positive for cough, sputum production and shortness of breath. Negative for hemoptysis and wheezing.    Cardiovascular: Negative for chest pain, palpitations, claudication and leg swelling.    Gastrointestinal: Negative for abdominal pain, constipation, diarrhea, melena, nausea and vomiting.   Genitourinary: Negative for dysuria, frequency and urgency.   Musculoskeletal: Negative for back pain, joint pain, myalgias and neck pain.   Skin: Negative for itching and rash.   Neurological: Negative for dizziness, sensory change, speech change, focal weakness, loss of consciousness, weakness and headaches.   Endo/Heme/Allergies: Does not bruise/bleed easily.   Psychiatric/Behavioral: Negative for depression, substance abuse and suicidal ideas.      Physical Exam  Laboratory/Imaging   Hemodynamics  Temp (24hrs), Av.2 °C (98.9 °F), Min:36.4 °C (97.6 °F), Max:38 °C (100.4 °F)   Temperature: 37.1 °C (98.7 °F)  Pulse  Av  Min: 75  Max: 112 Heart Rate (Monitored): 80  Blood Pressure : 135/77      Respiratory      Respiration: 18, Pulse Oximetry: 95 %, O2 Daily Delivery Respiratory : Silicone Nasal Cannula     Given By:: Mouthpiece, PEP/CPT Method: Positive Airway Pressure Device, Work Of Breathing / Effort: Mild  RUL Breath Sounds: Diminished, RML Breath Sounds: Diminished, RLL Breath Sounds: Diminished, ELIAZAR Breath Sounds: Diminished, LLL Breath Sounds: Diminished    Fluids    Intake/Output Summary (Last 24 hours) at 18 1854  Last data filed at 18 1000   Gross per 24 hour   Intake              460 ml   Output              350 ml   Net              110 ml       Nutrition  Orders Placed This Encounter   Procedures   • Diet Order     Standing Status:   Standing     Number of Occurrences:   1     Order Specific Question:   Diet:     Answer:   Regular [1]     Physical Exam   Constitutional: He is oriented to person, place, and time. He appears well-developed and well-nourished. No distress.   HENT:   Head: Normocephalic and atraumatic.   Mouth/Throat: Oropharynx is clear and moist. No oropharyngeal exudate.   Eyes: Conjunctivae and EOM are normal. Pupils are equal, round, and reactive to light. Right  eye exhibits no discharge. Left eye exhibits no discharge. No scleral icterus.   Neck: Normal range of motion. Neck supple. No JVD present. No tracheal deviation present. No thyromegaly present.   Cardiovascular: Normal rate, regular rhythm and normal heart sounds.  Exam reveals no gallop and no friction rub.    No murmur heard.  Pulmonary/Chest: Effort normal and breath sounds normal. No respiratory distress. He has no wheezes. He has no rales. He exhibits no tenderness.   No Coughing on exam.   Abdominal: Soft. Bowel sounds are normal. He exhibits no distension and no mass. There is no tenderness. There is no rebound and no guarding.   Musculoskeletal: Normal range of motion. He exhibits tenderness (reproducible rt anterior shoulder pain with palpation.  no obvious deformity.  no RUQ pain.). He exhibits no edema.   Lymphadenopathy:     He has no cervical adenopathy.   Neurological: He is alert and oriented to person, place, and time. No cranial nerve deficit. He exhibits normal muscle tone.   Skin: Skin is warm and dry. No rash noted. He is not diaphoretic. No erythema.   Psychiatric: He has a normal mood and affect. His behavior is normal. Judgment and thought content normal.   Nursing note and vitals reviewed.      Recent Labs      05/05/18   0303  05/07/18   1115   WBC  11.3*  5.6   RBC  4.26*  4.21*   HEMOGLOBIN  12.9*  12.7*   HEMATOCRIT  38.9*  38.4*   MCV  91.3  91.2   MCH  30.3  30.2   MCHC  33.2*  33.1*   RDW  49.9  50.3*   PLATELETCT  128*  98*   MPV  9.9  9.3     Recent Labs      05/05/18   0303  05/07/18   1115   SODIUM  139  135   POTASSIUM  4.1  3.5*   CHLORIDE  114*  108   CO2  19*  20   GLUCOSE  102*  135*   BUN  22  22   CREATININE  1.18  1.23   CALCIUM  8.3*  9.0         Recent Labs      05/05/18   0303   BNPBTYPENAT  210*              Assessment/Plan     * LLL pneumonia (HCC)- (present on admission)   Assessment & Plan    LLL seen on CT chest w/o contrast:  Multifocal areas of  consolidation.  h/o pneumonia 2008  No home O2.  Now on 2 LPM Nc.  Ordered RT protocol with albuterol prn  Sputum culture induced ordered.  Continue rocephin, zpack.  Started NS 75/hr to keep kidneys well hydrated.  5/6:  dc'd NS.  Changed rocephin to zosyn IV.  Repeat CXR negative.        Fever- (present on admission)   Assessment & Plan    Fever with mild leukocytosis  Concerning in setting of immunosuppression  Source LLL pneumonia   pro calcitonin 0.18 with increase to 0.33.  Urinalysis packed wbc, +proteinuria.  UC no growth x 48 hours.  Renal transplant u/s wnl.          RADHA (acute kidney injury) (HCC)- (present on admission)   Assessment & Plan    Baseline creatinine is 0.8-1  He is probably on the dry side with probable hemoconcentration  5/6:  dc'd NS 75/hr since states SOB and no RADHA noted.  Avoid nephrotoxins  Renal transplant u/s wnl.        Acute respiratory failure with hypoxia (HCC)- (present on admission)   Assessment & Plan    No home O2.  On 2 LPM NC  Neb treatments  tx pneumonia.        Essential hypertension- (present on admission)   Assessment & Plan    Added metoprolol 25 bid.        History of renal transplant- (present on admission)   Assessment & Plan    Patient had a transplant approximately 10 years ago in [facility]  Follow renal function carefully  Continue tacrolimus and CellCept  Check tacrolimus level  5/6 renal transplant u/s wnl.          Quality-Core Measures

## 2018-05-08 NOTE — CARE PLAN
Problem: Safety  Goal: Will remain free from injury  Bed locked and in lowest position, call light and personal belongings within reach, pt educated to call for assistance. Bed alarm refused. Hourly rounding in effect.       Problem: Respiratory:  Goal: Respiratory status will improve  Patient sating >94% on 3L of oxygen. SOB on exertion, no longer SOB at rest. CPAP at night. RT working with patient.

## 2018-05-08 NOTE — DISCHARGE PLANNING
Pt. Currently uses [facility] for his CPAP needs and would like to use [facility] for his oxygen needs. Choice form signed for [facility]. Pt. Has not made a decision about which home health agency he wants to use, and will let case management know when he decides.

## 2018-05-08 NOTE — PROGRESS NOTES
Patient a+o x 4, denies lightheadedness/numbness/tingling/dizziness/chest pain/n/v. Loose bm today. +sob at times and +bynum with activity. currently on 3liters vis nc - pulse ox >93%. Followed by RT - nebs ordered.  Tolerating diet. Iv/po abx a/o, afebrile, vss. Fall precautions/hourly rounding maintained, call light within reach and functioning, all items within reach.  Patient encouraged to call for assistance, poc reviewed with patient, ?'s/concerns answered.

## 2018-05-08 NOTE — DISCHARGE PLANNING
Cierraivon advised that his first choice of home health agencies does not cover the [place] area. Mr. Acevedo has chosen [facility], and CHOICE form   has been faxed to [facility].

## 2018-05-08 NOTE — PROGRESS NOTES
Assumed patient care at 1900. POC discussed w/ day nurse and pt, pt in agreement w/ goals. Pt AOx4. Pt states slight pain in right shoulder, given Tramadol as needed. Pt denies nausea, good oral intake. Pt up SBA, desats with ambulation. CPAP at night. PIV patent, IV abx given. Patient educated on use of call light, hourly rounding, and pain scale. Personal possession and call light within reach. Bed alarm refused. Patient calls appropriately.

## 2018-05-08 NOTE — PROGRESS NOTES
[facility] Hospitalist Progress Note    Date of Service: 5/8/2018    Chief Complaint  68 y.o. 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  Patient has been well for the past couple of months, he recently saw his transplant nephrologist in [place] and creatinine was stable.  He presents with 2-3 weeks of progressive cough, productive of yellow sputum, no hemoptysis.  He endorses fever like symptoms and malaise.  Coughing has been so severe at times that he has vomited.  His abdomen is sore from coughing.  No diarrhea, no constipation, has been urinating normally, no dysuria or flank pain.    Interval Problem Update  5/5:  Productive cough and SOB, wife at bedside.  Ordered sputum culture, urine culture. Started NS 75/hr renal protective.  5/6:  States rt shoulder painful to touch.  No abdominal pain.  Repeated CXR, negative, lungs CTA b/l.  No respiratory distress noted.  Added tramadol prn pain.  Review of chart, 2014 PSAR sputum.  Changed rocephin to zosyn iV.  Ordered u/s renal transplant.  dc'd IVFs, taking po well.    5/7:  Improving overall with zosyn start, less cough of productive sputum.  Sputum culture with mould.  Added diflucan.  Renal transplant u/s wnl.  UC negative for growth.  BCs negative.  Increase in procalcitonin 0.18 to 0.33.  Spoke with wife at bedside, gave updates.  5/8: patient is stable, requiring oxygen 3 litres, improved activity tolerance. Air entry on auscultation is patchy, and rhonchi present. On iv abx's, diflucan.         Consultants/Specialty  none    Disposition  Wean iv abx's, oxygen. Continue diflucan. Fluids for manuel, Serial cxrs. Assess for home o2 tomorrow        Review of Systems   Constitutional: Negative for chills and fever.   HENT: Negative for hearing loss.    Eyes: Negative for blurred vision and double vision.   Respiratory: Positive for sputum  production and shortness of breath.    Cardiovascular: Negative for chest pain and palpitations.   Gastrointestinal: Negative for heartburn.   Genitourinary: Negative for dysuria.   Musculoskeletal: Negative for back pain and myalgias.   Skin: Negative for itching and rash.   Neurological: Negative for dizziness and headaches.   Endo/Heme/Allergies: Does not bruise/bleed easily.   Psychiatric/Behavioral: Negative for depression.      Physical Exam  Laboratory/Imaging   Hemodynamics  Temp (24hrs), Av.4 °C (97.6 °F), Min:36.1 °C (97 °F), Max:37.1 °C (98.7 °F)   Temperature: 36.1 °C (97 °F)  Pulse  Av.8  Min: 64  Max: 112    Blood Pressure : 136/112      Respiratory      Respiration: (!) 24, Pulse Oximetry: 95 %, O2 Daily Delivery Respiratory : Silicone Nasal Cannula     PEP/CPT Method: Positive Airway Pressure Device, Work Of Breathing / Effort: Moderate  RUL Breath Sounds: Diminished, RML Breath Sounds: Diminished, RLL Breath Sounds: Diminished, ELIAZAR Breath Sounds: Diminished, LLL Breath Sounds: Diminished    Fluids    Intake/Output Summary (Last 24 hours) at 18 1426  Last data filed at 18 1056   Gross per 24 hour   Intake             3340 ml   Output              700 ml   Net             2640 ml       Nutrition  Orders Placed This Encounter   Procedures   • Diet Order     Standing Status:   Standing     Number of Occurrences:   1     Order Specific Question:   Diet:     Answer:   Regular [1]     Physical Exam   Constitutional: He is oriented to person, place, and time. No distress.   White male, nad, cooperative   HENT:   Head: Atraumatic.   Eyes: Left eye exhibits no discharge.   Neck: Neck supple.   Cardiovascular: Normal rate, regular rhythm, normal heart sounds and intact distal pulses.    Pulmonary/Chest: Effort normal. No respiratory distress.   (+) rhonchi, patchy air entry b/l   Abdominal: Soft. Bowel sounds are normal. He exhibits no distension.   Musculoskeletal: Normal range of motion.  He exhibits no edema.   Neurological: He is alert and oriented to person, place, and time.   Skin: Skin is warm and dry.   Psychiatric: He has a normal mood and affect.       Recent Labs      05/07/18   1115   WBC  5.6   RBC  4.21*   HEMOGLOBIN  12.7*   HEMATOCRIT  38.4*   MCV  91.2   MCH  30.2   MCHC  33.1*   RDW  50.3*   PLATELETCT  98*   MPV  9.3     Recent Labs      05/07/18   1115   SODIUM  135   POTASSIUM  3.5*   CHLORIDE  108   CO2  20   GLUCOSE  135*   BUN  22   CREATININE  1.23   CALCIUM  9.0                      Assessment/Plan     * LLL pneumonia (HCC)- (present on admission)   Assessment & Plan    h/o machelle's, s/p renal transplant 10 years ago  h/o trach, s/p reversal 2014    LLL seen on CT chest w/o contrast:  Multifocal areas of consolidation.  h/o pneumonia 2008  No home O2.  Ordered RT protocol with albuterol prn.  5/6:  dc'd NS.  Changed rocephin to zosyn IV.  Repeat CXR negative.    - wean zosyn, sputum suggestive of mold growth, given immunocompromised state, its likely cause of pneumonia  - continue diflucan, recommend a 3 week course  - currently on 3litres 02  - continue supplemental oxygen, if unable to wean will need home o2        Fever- (present on admission)   Assessment & Plan    Fever with mild leukocytosis  Concerning in setting of immunosuppression  Source LLL pneumonia   pro calcitonin 0.18 with increase to 0.33.  Urinalysis packed wbc, +proteinuria.  UC no growth x 48 hours.  Renal transplant u/s wnl.          RADHA (acute kidney injury) (HCC)- (present on admission)   Assessment & Plan    Baseline creatinine is 0.8-1  Avoid nephrotoxins  Renal transplant u/s wnl.  Currently cr is 1.23  Will add 1/2 ns at 75cc/hr        Acute respiratory failure with hypoxia (HCC)- (present on admission)   Assessment & Plan    No home O2.  On 2 LPM NC  Neb treatments  tx pneumonia.        Essential hypertension- (present on admission)   Assessment & Plan    Added metoprolol 25 bid.        History of  renal transplant- (present on admission)   Assessment & Plan    Patient had a transplant approximately 10 years ago in [facility]  Follow renal function carefully  Continue tacrolimus and CellCept  Check tacrolimus level  5/6 renal transplant u/s wnl.          Quality-Core Measures   Reviewed items::  Labs reviewed, Radiology images reviewed and Medications reviewed  Gaston catheter::  No Gaston  DVT prophylaxis pharmacological::  Enoxaparin (Lovenox)  Ulcer Prophylaxis::  Yes

## 2018-05-09 ENCOUNTER — APPOINTMENT (OUTPATIENT)
Dept: RADIOLOGY | Facility: MEDICAL CENTER | Age: 68
DRG: 871 | End: 2018-05-09
Attending: INTERNAL MEDICINE
Payer: MEDICARE

## 2018-05-09 LAB
ANION GAP SERPL CALC-SCNC: 7 MMOL/L (ref 0–11.9)
BACTERIA BLD CULT: NORMAL
BACTERIA BLD CULT: NORMAL
BUN SERPL-MCNC: 25 MG/DL (ref 8–22)
CALCIUM SERPL-MCNC: 9.1 MG/DL (ref 8.5–10.5)
CHLORIDE SERPL-SCNC: 113 MMOL/L (ref 96–112)
CO2 SERPL-SCNC: 20 MMOL/L (ref 20–33)
CREAT SERPL-MCNC: 1.17 MG/DL (ref 0.5–1.4)
GLUCOSE SERPL-MCNC: 108 MG/DL (ref 65–99)
POTASSIUM SERPL-SCNC: 4.3 MMOL/L (ref 3.6–5.5)
PROCALCITONIN SERPL-MCNC: 0.15 NG/ML
SIGNIFICANT IND 70042: NORMAL
SIGNIFICANT IND 70042: NORMAL
SITE SITE: NORMAL
SITE SITE: NORMAL
SODIUM SERPL-SCNC: 140 MMOL/L (ref 135–145)
SOURCE SOURCE: NORMAL
SOURCE SOURCE: NORMAL

## 2018-05-09 PROCEDURE — 700105 HCHG RX REV CODE 258: Performed by: INTERNAL MEDICINE

## 2018-05-09 PROCEDURE — 700111 HCHG RX REV CODE 636 W/ 250 OVERRIDE (IP): Performed by: HOSPITALIST

## 2018-05-09 PROCEDURE — A9270 NON-COVERED ITEM OR SERVICE: HCPCS | Performed by: HOSPITALIST

## 2018-05-09 PROCEDURE — 770006 HCHG ROOM/CARE - MED/SURG/GYN SEMI*

## 2018-05-09 PROCEDURE — 94640 AIRWAY INHALATION TREATMENT: CPT

## 2018-05-09 PROCEDURE — 80048 BASIC METABOLIC PNL TOTAL CA: CPT

## 2018-05-09 PROCEDURE — 700111 HCHG RX REV CODE 636 W/ 250 OVERRIDE (IP): Performed by: INTERNAL MEDICINE

## 2018-05-09 PROCEDURE — 71045 X-RAY EXAM CHEST 1 VIEW: CPT

## 2018-05-09 PROCEDURE — 99233 SBSQ HOSP IP/OBS HIGH 50: CPT | Performed by: INTERNAL MEDICINE

## 2018-05-09 PROCEDURE — 700102 HCHG RX REV CODE 250 W/ 637 OVERRIDE(OP): Performed by: HOSPITALIST

## 2018-05-09 PROCEDURE — 700105 HCHG RX REV CODE 258: Performed by: HOSPITALIST

## 2018-05-09 PROCEDURE — 94660 CPAP INITIATION&MGMT: CPT

## 2018-05-09 PROCEDURE — 84145 PROCALCITONIN (PCT): CPT

## 2018-05-09 PROCEDURE — 36415 COLL VENOUS BLD VENIPUNCTURE: CPT

## 2018-05-09 PROCEDURE — 700101 HCHG RX REV CODE 250: Performed by: INTERNAL MEDICINE

## 2018-05-09 RX ORDER — DOXYCYCLINE 100 MG/1
100 TABLET ORAL EVERY 12 HOURS
Status: DISCONTINUED | OUTPATIENT
Start: 2018-05-10 | End: 2018-05-10 | Stop reason: HOSPADM

## 2018-05-09 RX ADMIN — METOPROLOL TARTRATE 50 MG: 50 TABLET, FILM COATED ORAL at 10:17

## 2018-05-09 RX ADMIN — OMEPRAZOLE 20 MG: 20 CAPSULE, DELAYED RELEASE ORAL at 19:39

## 2018-05-09 RX ADMIN — ATORVASTATIN CALCIUM 10 MG: 10 TABLET, FILM COATED ORAL at 10:17

## 2018-05-09 RX ADMIN — MYCOPHENOLATE MOFETIL 500 MG: 250 CAPSULE ORAL at 19:39

## 2018-05-09 RX ADMIN — METOPROLOL TARTRATE 50 MG: 50 TABLET, FILM COATED ORAL at 19:39

## 2018-05-09 RX ADMIN — TACROLIMUS 1 MG: 1 CAPSULE ORAL at 19:39

## 2018-05-09 RX ADMIN — TACROLIMUS 1 MG: 1 CAPSULE ORAL at 10:16

## 2018-05-09 RX ADMIN — PIPERACILLIN AND TAZOBACTAM 4.5 G: 4; .5 INJECTION, POWDER, LYOPHILIZED, FOR SOLUTION INTRAVENOUS; PARENTERAL at 17:43

## 2018-05-09 RX ADMIN — FLUCONAZOLE 200 MG: 200 TABLET ORAL at 10:17

## 2018-05-09 RX ADMIN — PIPERACILLIN AND TAZOBACTAM 4.5 G: 4; .5 INJECTION, POWDER, LYOPHILIZED, FOR SOLUTION INTRAVENOUS; PARENTERAL at 10:18

## 2018-05-09 RX ADMIN — ALLOPURINOL 300 MG: 300 TABLET ORAL at 10:17

## 2018-05-09 RX ADMIN — DORNASE ALFA 2.5 MG: 1 SOLUTION RESPIRATORY (INHALATION) at 11:12

## 2018-05-09 RX ADMIN — PREDNISONE 5 MG: 5 TABLET ORAL at 10:17

## 2018-05-09 RX ADMIN — MYCOPHENOLATE MOFETIL 500 MG: 250 CAPSULE ORAL at 10:16

## 2018-05-09 RX ADMIN — CALCITRIOL 0.25 MCG: 0.25 CAPSULE, LIQUID FILLED ORAL at 10:17

## 2018-05-09 RX ADMIN — ENOXAPARIN SODIUM 40 MG: 100 INJECTION SUBCUTANEOUS at 10:17

## 2018-05-09 RX ADMIN — PIPERACILLIN AND TAZOBACTAM 4.5 G: 4; .5 INJECTION, POWDER, LYOPHILIZED, FOR SOLUTION INTRAVENOUS; PARENTERAL at 01:29

## 2018-05-09 ASSESSMENT — ENCOUNTER SYMPTOMS
SHORTNESS OF BREATH: 0
MYALGIAS: 0
HEADACHES: 0
DEPRESSION: 0
CHILLS: 0
COUGH: 0
BRUISES/BLEEDS EASILY: 0
HEARTBURN: 0
DIZZINESS: 0
FEVER: 0
DIARRHEA: 0

## 2018-05-09 ASSESSMENT — PAIN SCALES - GENERAL
PAINLEVEL_OUTOF10: 0
PAINLEVEL_OUTOF10: 2
PAINLEVEL_OUTOF10: 0

## 2018-05-09 NOTE — DISCHARGE PLANNING
Received DME and HH choice forms, need orders prior to sending. Roxane KING and BRIDGETTE Lowe notified.

## 2018-05-09 NOTE — PROGRESS NOTES
"Assumed care at 1900. Received report from RN. Patient is AOx4. Patient is on 3L NC O2 with a baseline of 0L. Will attempt to wean. Patient is sitting up in bed and appears calm and in no distress. Assessment complete. Labs reviewed. Patient and RN discussed plan of care. Patient questions answered. Patient needs are met at this time. Bed in lowest and locked position. Call light is within reach. Hourly rounding in place. /108   Pulse 95   Temp 36.3 °C (97.3 °F)   Resp 17   Ht 1.803 m (5' 11\")   Wt (!) 128.5 kg (283 lb 4.7 oz)   SpO2 95%   BMI 39.51 kg/m²       "

## 2018-05-09 NOTE — CARE PLAN
Problem: Safety  Goal: Will remain free from injury  Outcome: PROGRESSING AS EXPECTED  Bed on lowest position, call light within reach, patient educated about use of call light and safety precautions. Patient calls appropriately.     Problem: Respiratory:  Goal: Respiratory status will improve  Outcome: PROGRESSING AS EXPECTED  RT following for breathing treatments. Patient is still on 3L O2 NC. Baseline is 0L. Will attempt to wean.

## 2018-05-09 NOTE — DISCHARGE PLANNING
note:  MD indicated that pt will be discharged tomorrow.   RN aware that pt will need home O2 study.   MD aware that he needs to place order for HH and Home O2 DME and F2F.    Addendum:  Based on the home O2 study, pt does not need home O2.

## 2018-05-09 NOTE — PROGRESS NOTES
Patient resting, sitting at bedside mild low back pain  A/Ox4,+sob at times and +bynum with activity. currently on 3liters nc.Fall precautions in place, bed locked in lowest position, call light and personal belongings in reach. Education plan of care for the day and Hourly rounding.  all ?s answered.

## 2018-05-09 NOTE — PROGRESS NOTES
[facility] Hospitalist Progress Note    Date of Service: 5/9/2018    Chief Complaint  68 y.o. 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  Patient has been well for the past couple of months, he recently saw his transplant nephrologist in [place] and creatinine was stable.  He presents with 2-3 weeks of progressive cough, productive of yellow sputum, no hemoptysis.  He endorses fever like symptoms and malaise.  Coughing has been so severe at times that he has vomited.  His abdomen is sore from coughing.  No diarrhea, no constipation, has been urinating normally, no dysuria or flank pain.    Interval Problem Update  5/5:  Productive cough and SOB, wife at bedside.  Ordered sputum culture, urine culture. Started NS 75/hr renal protective.  5/6:  States rt shoulder painful to touch.  No abdominal pain.  Repeated CXR, negative, lungs CTA b/l.  No respiratory distress noted.  Added tramadol prn pain.  Review of chart, 2014 PSAR sputum.  Changed rocephin to zosyn iV.  Ordered u/s renal transplant.  dc'd IVFs, taking po well.    5/7:  Improving overall with zosyn start, less cough of productive sputum.  Sputum culture with mould.  Added diflucan.  Renal transplant u/s wnl.  UC negative for growth.  BCs negative.  Increase in procalcitonin 0.18 to 0.33.  Spoke with wife at bedside, gave updates.  5/8: patient is stable, requiring oxygen 3 litres, improved activity tolerance. Air entry on auscultation is patchy, and rhonchi present. On iv abx's, diflucan.    5/9: still requiring 3 litres o2.  Feeling better, improved activity tolerance. Will check echo today.     Consultants/Specialty  none    Disposition  Possible home dc tomorrow, assess for home o2. f/u with echo        Review of Systems   Constitutional: Negative for chills and fever.   HENT: Negative for hearing loss.    Respiratory: Negative for  cough and shortness of breath.    Cardiovascular: Negative for chest pain.   Gastrointestinal: Negative for diarrhea and heartburn.   Genitourinary: Negative for dysuria.   Musculoskeletal: Negative for myalgias.   Skin: Negative for rash.   Neurological: Negative for dizziness and headaches.   Endo/Heme/Allergies: Does not bruise/bleed easily.   Psychiatric/Behavioral: Negative for depression.      Physical Exam  Laboratory/Imaging   Hemodynamics  Temp (24hrs), Av.3 °C (97.3 °F), Min:36.1 °C (97 °F), Max:36.6 °C (97.8 °F)   Temperature: 36.6 °C (97.8 °F)  Pulse  Av.8  Min: 64  Max: 112    Blood Pressure : 153/88      Respiratory      Respiration: 18, Pulse Oximetry: 95 %, O2 Daily Delivery Respiratory : Silicone Nasal Cannula     Given By:: Mouthpiece, Work Of Breathing / Effort: Moderate  RUL Breath Sounds: Diminished, RML Breath Sounds: Diminished, RLL Breath Sounds: Diminished, ELIAZAR Breath Sounds: Diminished, LLL Breath Sounds: Diminished    Fluids    Intake/Output Summary (Last 24 hours) at 18 1426  Last data filed at 18 1300   Gross per 24 hour   Intake              560 ml   Output              450 ml   Net              110 ml       Nutrition  Orders Placed This Encounter   Procedures   • Diet Order     Standing Status:   Standing     Number of Occurrences:   1     Order Specific Question:   Diet:     Answer:   Regular [1]     Physical Exam   Constitutional: He is oriented to person, place, and time. No distress.   Eyes: Pupils are equal, round, and reactive to light. Left eye exhibits no discharge.   Neck: Neck supple.   Cardiovascular: Normal rate, regular rhythm, normal heart sounds and intact distal pulses.    Pulmonary/Chest: No respiratory distress. He has rales.   Abdominal: Soft. Bowel sounds are normal. He exhibits no distension.   Musculoskeletal: Normal range of motion. He exhibits no edema.   Neurological: He is alert and oriented to person, place, and time.   ambulatory   Skin:  Skin is warm and dry.   Psychiatric: He has a normal mood and affect.   Vitals reviewed.      Recent Labs      05/07/18   1115   WBC  5.6   RBC  4.21*   HEMOGLOBIN  12.7*   HEMATOCRIT  38.4*   MCV  91.2   MCH  30.2   MCHC  33.1*   RDW  50.3*   PLATELETCT  98*   MPV  9.3     Recent Labs      05/07/18   1115  05/09/18   0224   SODIUM  135  140   POTASSIUM  3.5*  4.3   CHLORIDE  108  113*   CO2  20  20   GLUCOSE  135*  108*   BUN  22  25*   CREATININE  1.23  1.17   CALCIUM  9.0  9.1                      Assessment/Plan     * LLL pneumonia (HCC)- (present on admission)   Assessment & Plan    h/o machelle's, s/p renal transplant 10 years ago  h/o trach, s/p reversal 2014    LLL seen on CT chest w/o contrast:  Multifocal areas of consolidation.  h/o pneumonia 2008  No home O2.  Ordered RT protocol with albuterol prn.  5/6:  dc'd NS.  Changed rocephin to zosyn IV.  Repeat CXR negative.    - wean zosyn, sputum suggestive of mold growth, given immunocompromised state, its likely cause of pneumonia  - continue diflucan, recommend a 3 week course  - currently on 3litres 02  - continue supplemental oxygen, if unable to wean will need home o2  - f/u cxr 5/9 showed 1.  Decreased central vascular congestion and interstitial pulmonary edema  2.  Persistently enlarged cardiac silhouette        Fever- (present on admission)   Assessment & Plan    Fever with mild leukocytosis  Concerning in setting of immunosuppression  Source LLL pneumonia   pro calcitonin 0.18 with increase to 0.33, repeat pct  Urinalysis packed wbc, +proteinuria.  UC no growth x 48 hours.  Renal transplant u/s wnl.          RADHA (acute kidney injury) (HCC)- (present on admission)   Assessment & Plan    Baseline creatinine is 0.8-1  Avoid nephrotoxins  Renal transplant u/s wnl.  Currently cr is 1.23  Will add 1/2 ns at 75cc/hr        Acute respiratory failure with hypoxia (HCC)- (present on admission)   Assessment & Plan    No home O2.  On 3 LPM NC  Check echo r/o HF         Essential hypertension- (present on admission)   Assessment & Plan    continue metoprolol 25 bid.        History of renal transplant- (present on admission)   Assessment & Plan    Patient had a transplant approximately 10 years ago in [facility]  Follow renal function carefully  Continue tacrolimus and CellCept  Check tacrolimus level  5/6 renal transplant u/s wnl.          Quality-Core Measures   Reviewed items::  Labs reviewed, Radiology images reviewed and Medications reviewed  Gaston catheter::  No Gaston  DVT prophylaxis pharmacological::  Enoxaparin (Lovenox)  Ulcer Prophylaxis::  Yes

## 2018-05-09 NOTE — CARE PLAN
Problem: Safety  Goal: Will remain free from falls  Outcome: PROGRESSING AS EXPECTED  Pt up standby assist to the bathroom. Pt calls appropriately before getting OOB. Pt refuses bed alarm. Call light within reach, bed in lowest and locked position.     Problem: Respiratory:  Goal: Respiratory status will improve  Outcome: PROGRESSING SLOWER THAN EXPECTED  Pt currently on 3L O2 via NC. Pt de-sats on exertion. O2 sats currently at 95% on 3L. Pt to have home O2 eval. Performed today.

## 2018-05-10 ENCOUNTER — PATIENT OUTREACH (OUTPATIENT)
Dept: HEALTH INFORMATION MANAGEMENT | Facility: OTHER | Age: 68
End: 2018-05-10

## 2018-05-10 VITALS
TEMPERATURE: 98.3 F | SYSTOLIC BLOOD PRESSURE: 169 MMHG | OXYGEN SATURATION: 94 % | WEIGHT: 283.29 LBS | BODY MASS INDEX: 39.66 KG/M2 | HEIGHT: 71 IN | RESPIRATION RATE: 18 BRPM | DIASTOLIC BLOOD PRESSURE: 93 MMHG | HEART RATE: 92 BPM

## 2018-05-10 PROBLEM — N17.9 AKI (ACUTE KIDNEY INJURY) (HCC): Status: RESOLVED | Noted: 2018-05-04 | Resolved: 2018-05-10

## 2018-05-10 LAB
ANION GAP SERPL CALC-SCNC: 5 MMOL/L (ref 0–11.9)
BASOPHILS # BLD AUTO: 0.3 % (ref 0–1.8)
BASOPHILS # BLD: 0.02 K/UL (ref 0–0.12)
BUN SERPL-MCNC: 21 MG/DL (ref 8–22)
CALCIUM SERPL-MCNC: 9.5 MG/DL (ref 8.5–10.5)
CHLORIDE SERPL-SCNC: 113 MMOL/L (ref 96–112)
CO2 SERPL-SCNC: 22 MMOL/L (ref 20–33)
CREAT SERPL-MCNC: 1.3 MG/DL (ref 0.5–1.4)
EOSINOPHIL # BLD AUTO: 0.2 K/UL (ref 0–0.51)
EOSINOPHIL NFR BLD: 3.3 % (ref 0–6.9)
ERYTHROCYTE [DISTWIDTH] IN BLOOD BY AUTOMATED COUNT: 51.2 FL (ref 35.9–50)
GLUCOSE SERPL-MCNC: 136 MG/DL (ref 65–99)
HCT VFR BLD AUTO: 38.3 % (ref 42–52)
HGB BLD-MCNC: 12.6 G/DL (ref 14–18)
IMM GRANULOCYTES # BLD AUTO: 0.02 K/UL (ref 0–0.11)
IMM GRANULOCYTES NFR BLD AUTO: 0.3 % (ref 0–0.9)
LV EJECT FRACT  99904: 60
LV EJECT FRACT MOD 2C 99903: 61.2
LV EJECT FRACT MOD 4C 99902: 57.94
LV EJECT FRACT MOD BP 99901: 56.13
LYMPHOCYTES # BLD AUTO: 1.51 K/UL (ref 1–4.8)
LYMPHOCYTES NFR BLD: 24.6 % (ref 22–41)
MCH RBC QN AUTO: 30.2 PG (ref 27–33)
MCHC RBC AUTO-ENTMCNC: 32.9 G/DL (ref 33.7–35.3)
MCV RBC AUTO: 91.8 FL (ref 81.4–97.8)
MONOCYTES # BLD AUTO: 0.44 K/UL (ref 0–0.85)
MONOCYTES NFR BLD AUTO: 7.2 % (ref 0–13.4)
NEUTROPHILS # BLD AUTO: 3.95 K/UL (ref 1.82–7.42)
NEUTROPHILS NFR BLD: 64.3 % (ref 44–72)
NRBC # BLD AUTO: 0 K/UL
NRBC BLD-RTO: 0 /100 WBC
PLATELET # BLD AUTO: 154 K/UL (ref 164–446)
PMV BLD AUTO: 9.6 FL (ref 9–12.9)
POTASSIUM SERPL-SCNC: 4.1 MMOL/L (ref 3.6–5.5)
RBC # BLD AUTO: 4.17 M/UL (ref 4.7–6.1)
SODIUM SERPL-SCNC: 140 MMOL/L (ref 135–145)
WBC # BLD AUTO: 6.1 K/UL (ref 4.8–10.8)

## 2018-05-10 PROCEDURE — A9270 NON-COVERED ITEM OR SERVICE: HCPCS | Performed by: INTERNAL MEDICINE

## 2018-05-10 PROCEDURE — 99239 HOSP IP/OBS DSCHRG MGMT >30: CPT | Performed by: INTERNAL MEDICINE

## 2018-05-10 PROCEDURE — 93306 TTE W/DOPPLER COMPLETE: CPT

## 2018-05-10 PROCEDURE — 700111 HCHG RX REV CODE 636 W/ 250 OVERRIDE (IP): Mod: JG | Performed by: HOSPITALIST

## 2018-05-10 PROCEDURE — 36415 COLL VENOUS BLD VENIPUNCTURE: CPT

## 2018-05-10 PROCEDURE — 700102 HCHG RX REV CODE 250 W/ 637 OVERRIDE(OP): Performed by: HOSPITALIST

## 2018-05-10 PROCEDURE — 85025 COMPLETE CBC W/AUTO DIFF WBC: CPT

## 2018-05-10 PROCEDURE — 94640 AIRWAY INHALATION TREATMENT: CPT

## 2018-05-10 PROCEDURE — 93306 TTE W/DOPPLER COMPLETE: CPT | Mod: 26 | Performed by: INTERNAL MEDICINE

## 2018-05-10 PROCEDURE — 700102 HCHG RX REV CODE 250 W/ 637 OVERRIDE(OP): Performed by: FAMILY MEDICINE

## 2018-05-10 PROCEDURE — 80048 BASIC METABOLIC PNL TOTAL CA: CPT

## 2018-05-10 PROCEDURE — 700101 HCHG RX REV CODE 250: Performed by: INTERNAL MEDICINE

## 2018-05-10 PROCEDURE — A9270 NON-COVERED ITEM OR SERVICE: HCPCS | Performed by: HOSPITALIST

## 2018-05-10 PROCEDURE — 94760 N-INVAS EAR/PLS OXIMETRY 1: CPT

## 2018-05-10 PROCEDURE — 700102 HCHG RX REV CODE 250 W/ 637 OVERRIDE(OP): Performed by: INTERNAL MEDICINE

## 2018-05-10 RX ORDER — DOXYCYCLINE 100 MG/1
100 TABLET ORAL EVERY 12 HOURS
Qty: 7 TAB | Refills: 0 | Status: SHIPPED | OUTPATIENT
Start: 2018-05-10 | End: 2019-12-02

## 2018-05-10 RX ORDER — DOXYCYCLINE 100 MG/1
100 TABLET ORAL EVERY 12 HOURS
Qty: 7 TAB | Refills: 0 | Status: SHIPPED | OUTPATIENT
Start: 2018-05-10 | End: 2018-05-10

## 2018-05-10 RX ORDER — FLUCONAZOLE 200 MG/1
200 TABLET ORAL DAILY
Qty: 14 TAB | Refills: 0 | Status: SHIPPED | OUTPATIENT
Start: 2018-05-11 | End: 2019-12-02

## 2018-05-10 RX ORDER — GUAIFENESIN/DEXTROMETHORPHAN 100-10MG/5
15 SYRUP ORAL EVERY 6 HOURS PRN
Qty: 560 ML | Refills: 1 | Status: SHIPPED | OUTPATIENT
Start: 2018-05-10 | End: 2018-05-10

## 2018-05-10 RX ORDER — FLUCONAZOLE 200 MG/1
200 TABLET ORAL DAILY
Qty: 14 TAB | Refills: 0 | Status: SHIPPED | OUTPATIENT
Start: 2018-05-11 | End: 2018-05-10

## 2018-05-10 RX ORDER — GUAIFENESIN/DEXTROMETHORPHAN 100-10MG/5
15 SYRUP ORAL EVERY 6 HOURS PRN
Qty: 560 ML | Refills: 1 | Status: SHIPPED | OUTPATIENT
Start: 2018-05-10 | End: 2019-12-02

## 2018-05-10 RX ADMIN — MYCOPHENOLATE MOFETIL 500 MG: 250 CAPSULE ORAL at 08:26

## 2018-05-10 RX ADMIN — GUAIFENESIN AND DEXTROMETHORPHAN 15 ML: 100; 10 SYRUP ORAL at 16:30

## 2018-05-10 RX ADMIN — PREDNISONE 5 MG: 5 TABLET ORAL at 08:26

## 2018-05-10 RX ADMIN — CALCITRIOL 0.25 MCG: 0.25 CAPSULE, LIQUID FILLED ORAL at 08:26

## 2018-05-10 RX ADMIN — ACETAMINOPHEN 650 MG: 325 TABLET, FILM COATED ORAL at 08:31

## 2018-05-10 RX ADMIN — METOPROLOL TARTRATE 50 MG: 50 TABLET, FILM COATED ORAL at 08:27

## 2018-05-10 RX ADMIN — ALLOPURINOL 300 MG: 300 TABLET ORAL at 08:26

## 2018-05-10 RX ADMIN — FLUCONAZOLE 200 MG: 200 TABLET ORAL at 08:26

## 2018-05-10 RX ADMIN — ATORVASTATIN CALCIUM 10 MG: 10 TABLET, FILM COATED ORAL at 08:26

## 2018-05-10 RX ADMIN — DORNASE ALFA 2.5 MG: 1 SOLUTION RESPIRATORY (INHALATION) at 07:20

## 2018-05-10 RX ADMIN — ENOXAPARIN SODIUM 40 MG: 100 INJECTION SUBCUTANEOUS at 08:25

## 2018-05-10 RX ADMIN — DOXYCYCLINE 100 MG: 100 TABLET ORAL at 08:25

## 2018-05-10 RX ADMIN — TACROLIMUS 1 MG: 1 CAPSULE ORAL at 08:26

## 2018-05-10 ASSESSMENT — PAIN SCALES - GENERAL
PAINLEVEL_OUTOF10: 2
PAINLEVEL_OUTOF10: 3
PAINLEVEL_OUTOF10: 5
PAINLEVEL_OUTOF10: 1
PAINLEVEL_OUTOF10: 2

## 2018-05-10 NOTE — PROGRESS NOTES
Assumed care of pt at 0700. Received report from RN. Pt A&Ox4. Assessment complete. Labs reviewed. Pt on 2L O2 via NC, baseline is zero. Pt c/o lower back pain of 5/10, pt medicated per MAR. Pt up standby assist to the bathroom, refuses bed alarm, steady on feet. Pt PIV patent running IVF. Pt denies SOB, chest pain, nausea, dizziness at this time. Pt and RN discussed plan of care. Pt questions answered. Pt needs are met at this time. Bed in lowest and locked position. Call light within reach. Upper bed rails up. Hourly rounding in place.

## 2018-05-10 NOTE — DISCHARGE INSTRUCTIONS
Discharge Instructions    Discharged to home by car with relative. Discharged via wheelchair, hospital escort: Yes.  Special equipment needed: Not Applicable    Be sure to schedule a follow-up appointment with your primary care doctor or any specialists as instructed.     Discharge Plan:   Diet Plan: Discussed  Activity Level: Discussed  Confirmed Follow up Appointment: Patient to Call and Schedule Appointment  Confirmed Symptoms Management: Discussed  Medication Reconciliation Updated: Yes  Pneumococcal Vaccine Administered/Refused: Not given - Patient refused pneumococcal vaccine  Influenza Vaccine Indication: Not indicated: Previously immunized this influenza season and > 8 years of age    I understand that a diet low in cholesterol, fat, and sodium is recommended for good health. Unless I have been given specific instructions below for another diet, I accept this instruction as my diet prescription.   Other diet: Regular    Special Instructions: None    · Is patient discharged on Warfarin / Coumadin?   No     Depression / Suicide Risk    As you are discharged from this [facility] facility, it is important to learn how to keep safe from harming yourself.    Recognize the warning signs:  · Abrupt changes in personality, positive or negative- including increase in energy   · Giving away possessions  · Change in eating patterns- significant weight changes-  positive or negative  · Change in sleeping patterns- unable to sleep or sleeping all the time   · Unwillingness or inability to communicate  · Depression  · Unusual sadness, discouragement and loneliness  · Talk of wanting to die  · Neglect of personal appearance   · Rebelliousness- reckless behavior  · Withdrawal from people/activities they love  · Confusion- inability to concentrate     If you or a loved one observes any of these behaviors or has concerns about self-harm, here's what you can do:  · Talk about it- your feelings and reasons for harming  yourself  · Remove any means that you might use to hurt yourself (examples: pills, rope, extension cords, firearm)  · Get professional help from the community (Mental Health, Substance Abuse, psychological counseling)  · Do not be alone:Call your Safe Contact- someone whom you trust who will be there for you.  · Call your local CRISIS HOTLINE 335-0673 or 655-581-1424  · Call your local Children's Mobile Crisis Response Team [place] (306) 421-3304 or www.[employer]  · Call the toll free National Suicide Prevention Hotlines   · National Suicide Prevention Lifeline 650-895-SDKI (1276)  · National Hope Line Network 800-SUICIDE (744-3779)

## 2018-05-10 NOTE — DISCHARGE SUMMARY
"CHIEF COMPLAINT ON ADMISSION  Chief Complaint   Patient presents with   • Shortness of Breath       CODE STATUS  Full Code    HPI & HOSPITAL COURSE  This is a 68 y.o. 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 Per h&p \"  Patient has been well for the past couple of months, he recently saw his transplant nephrologist in [place] and creatinine was stable.  He presents with 2-3 weeks of progressive cough, productive of yellow sputum, no hemoptysis.  He endorses fever like symptoms and malaise.  Coughing has been so severe at times that he has vomited.  His abdomen is sore from coughing.  No diarrhea, no constipation, has been urinating normally, no dysuria or flank pain.\"    During hospitalization here patient was treated with iv antibiotics, fluids. Sputum cultures were remarkable for mould growth and diflucan was initiated. We are suspecting pna was likely due to yeast given his immunocompromised state. Patient has slowly progressed over the hospital course, he was requiring 3 litres of oxygen, the requirement is now down to 2 litres. Our home oxygen assessment showed he doesn't meet criteria for o2 given he was sating 92-94% on ra. He is tolerating activity, walking in the hallways, and overall responded well to supportive care with breathing treatments, antibiotics. His antibiotics have been weaned to po doxycycline. I expect patient to continue his transplant drugs and be closely followed by his transplant physicians in [place]. Denies cp, sob, n/v, diarrhea.     The patient met 2-midnight criteria for an inpatient stay at the time of discharge.    Therefore, he is discharged in good and stable condition with close outpatient follow-up.    SPECIFIC OUTPATIENT FOLLOW-UP  f/u with transplant renal in 1-2 weeks  f/u with pulmonology as scheduled for pulmonary function testing, sleep apnea management, " addressing face mask issues  Recommend wearing a protective face mask in public areas to avoid exposure to viruses/mold  Resume home medication regimen  Agree with current transplant rejection drug regimen  Continue diflucan for 2 more weeks  Continue doxycycline twice daily x 7 days  mucinex for mucus clearance  Recommend ct chest imaging study in 3-4 weeks (can be done via pulmonology office)    DISCHARGE PROBLEM LIST  Principal Problem:    LLL pneumonia (HCC) POA: Yes  Active Problems:    Fever POA: Yes    Acute respiratory failure with hypoxia (HCC) POA: Yes    RADHA (acute kidney injury) (HCC) POA: Yes    History of renal transplant POA: Yes    Essential hypertension POA: Yes  Resolved Problems:    * No resolved hospital problems. *      FOLLOW UP  No future appointments.  No follow-up provider specified.    MEDICATIONS ON DISCHARGE               DIET  Orders Placed This Encounter   Procedures   • Diet Order     Standing Status:   Standing     Number of Occurrences:   1     Order Specific Question:   Diet:     Answer:   Regular [1]       ACTIVITY  As tolerated.  Weight bearing as tolerated      CONSULTATIONS  None      PROCEDURES  None    DX-CHEST-PORTABLE (1 VIEW)   Final Result      1.  Decreased central vascular congestion and interstitial pulmonary edema   2.  Persistently enlarged cardiac silhouette      LE VENOUS DUPLEX - DVT ([facility] Only)   Final Result      DX-CHEST-PORTABLE (1 VIEW)   Final Result      Congestive heart failure with mild edema.      US-RENAL TRANSPLANT   Final Result      Normal appearance of right lower quadrant renal transplant      CT-CHEST (THORAX) W/O   Final Result      1.  Multifocal mild bilateral pulmonary airspace process and carmelo consolidation within the left lower lobe,      2.  These findings could indicate typical or atypical infection, autoimmune/inflammatory process, drug reaction, or other etiology.      3.  Aortic and coronary artery atherosclerotic  plaque      4.  Cholelithiasis      5.  Small kidneys consistent with sequela of chronic process                  DX-CHEST-PORTABLE (1 VIEW)   Final Result      No consolidation.      ECHOCARDIOGRAM LTD W/ CONT    (Results Pending)       LABORATORY  Lab Results   Component Value Date/Time    SODIUM 140 05/09/2018 02:24 AM    POTASSIUM 4.3 05/09/2018 02:24 AM    CHLORIDE 113 (H) 05/09/2018 02:24 AM    CO2 20 05/09/2018 02:24 AM    GLUCOSE 108 (H) 05/09/2018 02:24 AM    BUN 25 (H) 05/09/2018 02:24 AM    CREATININE 1.17 05/09/2018 02:24 AM        Lab Results   Component Value Date/Time    WBC 5.6 05/07/2018 11:15 AM    HEMOGLOBIN 12.7 (L) 05/07/2018 11:15 AM    HEMATOCRIT 38.4 (L) 05/07/2018 11:15 AM    PLATELETCT 98 (L) 05/07/2018 11:15 AM        Total time of the discharge process exceeds 60 minutes.

## 2018-05-10 NOTE — PROGRESS NOTES
"Assumed care at 1900. Received report from RN. Patient is AOx4. Patient is sitting up in bed and appears calm and in no distress. Patient on O2 for comfort, cpap at night. No complaints of sob at this moment. ECHO pending. Assessment complete. Labs reviewed. Patient and RN discussed plan of care. Patient questions answered. Patient needs are met at this time. Bed in lowest and locked position. Call light is within reach. Hourly rounding in place. /89   Pulse 87   Temp 36.3 °C (97.4 °F)   Resp 18   Ht 1.803 m (5' 11\")   Wt (!) 128.5 kg (283 lb 4.7 oz)   SpO2 95%   BMI 39.51 kg/m²       "

## 2018-05-10 NOTE — CARE PLAN
Problem: Safety  Goal: Will remain free from falls  Outcome: PROGRESSING AS EXPECTED  Pt up standby assist. Pt steady on feet. Pt refuses bed alarm. Bed in lowest and locked position, call light within reach. Pt calls appropriately before getting OOB.     Problem: Discharge Barriers/Planning  Goal: Patient's continuum of care needs will be met  Outcome: PROGRESSING AS EXPECTED  Pt has questions regarding discharge. MD notified. Pt agreeable to discharge today. Pt stated that a friend will come to pick him up.

## 2018-05-10 NOTE — CARE PLAN
Problem: Oxygenation:  Goal: Maintain adequate oxygenation dependent on patient condition  NOC CPAP, 6 cm H2O, 2 LPM bleed-in

## 2018-05-10 NOTE — PROGRESS NOTES
"Pt having coughing spells at this time to the point of vomiting. Pt states \"I seem to not be able to control it.\" MD paged. Pt to be discharged but patient spouse questioning if patient should be discharged.   "

## 2018-05-10 NOTE — DISCHARGE PLANNING
note:  MD aware that DME and HH order needed.  RN will redo home O2 study today although yesterday home O2 study showed pt does not need home O2. However, pt is still SOB today and back on 2liters per NC.    Addendum:  Discussed with IDT :  MD said that pt will not need homehealth and homeO2.

## 2018-05-10 NOTE — DISCHARGE PLANNING
Pt. States his wife is unable to pick him up at discharge due to her sustaining a back injury last night. Pt. Is trying to find a ride via friends. Support given, I told Mr. Acevedo I would check back with him and we will assist with transport home to [place] if he is unable to find a ride.

## 2018-05-10 NOTE — CARE PLAN
Problem: Safety  Goal: Will remain free from injury  Outcome: PROGRESSING AS EXPECTED  Bed on lowest position, call light within reach, patient educated about use of call light and safety precautions. Patient calls appropriately for needs.     Problem: Respiratory:  Goal: Respiratory status will improve  Outcome: PROGRESSING AS EXPECTED  Patient currently on 1-2L O2 for comfort, CPAP at night. Home O2 study completed per day rn.

## 2018-05-10 NOTE — PROGRESS NOTES
MD notified of second home O2 study, per MD, no home O2 needed at this time.     Pt discharge pending ECHO evaluation. Per technician, ECHO to be performed within the next hour.

## 2018-05-11 NOTE — PROGRESS NOTES
"MD saw patient. Pt states \"it feels like I have a feather stuck in my lungs and causing a tickle.\" MD stated to give patient robitussin and hot tea, wait one hour, reassess. If patient feels okay, can discharge today. If patient does not feel okay, per MD, patient can stay over night.   "

## 2018-05-11 NOTE — PROGRESS NOTES
Pt to be discharged home with family. Pt PIV removed prior to discharge. Discharge instructions signed and in patient chart. All patient questions answered. Patient and family requesting Rx to be faxed to pharmacy in [place], pharmacy updated in patient chart. MD notified, MD to e-fax patient Rx.

## 2018-10-10 ENCOUNTER — APPOINTMENT (RX ONLY)
Dept: URBAN - METROPOLITAN AREA CLINIC 4 | Facility: CLINIC | Age: 68
Setting detail: DERMATOLOGY
End: 2018-10-10

## 2018-10-10 DIAGNOSIS — Z71.89 OTHER SPECIFIED COUNSELING: ICD-10-CM

## 2018-10-10 DIAGNOSIS — Z87.2 PERSONAL HISTORY OF DISEASES OF THE SKIN AND SUBCUTANEOUS TISSUE: ICD-10-CM

## 2018-10-10 DIAGNOSIS — L21.8 OTHER SEBORRHEIC DERMATITIS: ICD-10-CM

## 2018-10-10 DIAGNOSIS — Z85.828 PERSONAL HISTORY OF OTHER MALIGNANT NEOPLASM OF SKIN: ICD-10-CM

## 2018-10-10 DIAGNOSIS — D22 MELANOCYTIC NEVI: ICD-10-CM

## 2018-10-10 DIAGNOSIS — L81.4 OTHER MELANIN HYPERPIGMENTATION: ICD-10-CM

## 2018-10-10 DIAGNOSIS — D18.0 HEMANGIOMA: ICD-10-CM

## 2018-10-10 DIAGNOSIS — L82.0 INFLAMED SEBORRHEIC KERATOSIS: ICD-10-CM

## 2018-10-10 DIAGNOSIS — L82.1 OTHER SEBORRHEIC KERATOSIS: ICD-10-CM

## 2018-10-10 PROBLEM — D18.01 HEMANGIOMA OF SKIN AND SUBCUTANEOUS TISSUE: Status: ACTIVE | Noted: 2018-10-10

## 2018-10-10 PROBLEM — D48.5 NEOPLASM OF UNCERTAIN BEHAVIOR OF SKIN: Status: ACTIVE | Noted: 2018-10-10

## 2018-10-10 PROBLEM — D22.5 MELANOCYTIC NEVI OF TRUNK: Status: ACTIVE | Noted: 2018-10-10

## 2018-10-10 PROCEDURE — 99213 OFFICE O/P EST LOW 20 MIN: CPT | Mod: 25

## 2018-10-10 PROCEDURE — 11100: CPT | Mod: 59

## 2018-10-10 PROCEDURE — ? LIQUID NITROGEN

## 2018-10-10 PROCEDURE — 17110 DESTRUCTION B9 LES UP TO 14: CPT

## 2018-10-10 PROCEDURE — ? COUNSELING

## 2018-10-10 PROCEDURE — ? ADDITIONAL NOTES

## 2018-10-10 PROCEDURE — ? BIOPSY BY SHAVE METHOD

## 2018-10-10 ASSESSMENT — LOCATION SIMPLE DESCRIPTION DERM
LOCATION SIMPLE: POSTERIOR SCALP
LOCATION SIMPLE: LEFT UPPER BACK
LOCATION SIMPLE: ABDOMEN
LOCATION SIMPLE: POSTERIOR NECK
LOCATION SIMPLE: UPPER BACK
LOCATION SIMPLE: LEFT UPPER ARM
LOCATION SIMPLE: LEFT LOWER BACK
LOCATION SIMPLE: RIGHT SHOULDER
LOCATION SIMPLE: RIGHT UPPER BACK

## 2018-10-10 ASSESSMENT — LOCATION DETAILED DESCRIPTION DERM
LOCATION DETAILED: LEFT DISTAL POSTERIOR UPPER ARM
LOCATION DETAILED: LEFT INFERIOR UPPER BACK
LOCATION DETAILED: POSTERIOR MID-PARIETAL SCALP
LOCATION DETAILED: MID POSTERIOR NECK
LOCATION DETAILED: LEFT SUPERIOR MEDIAL MIDBACK
LOCATION DETAILED: RIGHT POSTERIOR SHOULDER
LOCATION DETAILED: PERIUMBILICAL SKIN
LOCATION DETAILED: SUPERIOR THORACIC SPINE
LOCATION DETAILED: RIGHT MID-UPPER BACK
LOCATION DETAILED: RIGHT SUPERIOR MEDIAL UPPER BACK

## 2018-10-10 ASSESSMENT — LOCATION ZONE DERM
LOCATION ZONE: ARM
LOCATION ZONE: NECK
LOCATION ZONE: TRUNK
LOCATION ZONE: SCALP

## 2018-10-10 NOTE — PROCEDURE: BIOPSY BY SHAVE METHOD
Cryotherapy Text: The wound bed was treated with cryotherapy after the biopsy was performed.
Hemostasis: Drysol and Electrocautery
Type Of Destruction Used: Curettage
Anesthesia Volume In Cc: 0.5
Post-Care Instructions: I reviewed with the patient in detail post-care instructions. Patient is to keep the biopsy site dry overnight, and then apply vaseline twice daily until healed.
Additional Anesthesia Volume In Cc (Will Not Render If 0): 0
Bill [id number] For Specimen Handling/Conveyance To Laboratory?: no
Silver Nitrate Text: The wound bed was treated with silver nitrate after the biopsy was performed.
Was A Bandage Applied: Yes
Lab: [id number]
Biopsy Type: H and E
Biopsy Method: Personna blade
Anesthesia Type: 1% lidocaine with epinephrine
Electrodesiccation Text: The wound bed was treated with electrodesiccation after the biopsy was performed.
Lab Facility: 
Notification Instructions: Patient will be notified of biopsy results. However, patient instructed to call the office if not contacted within 2 weeks.
Curettage Text: The wound bed was treated with curettage after the biopsy was performed.
Depth Of Biopsy: dermis
Dressing: bandage
Detail Level: Detailed
Billing Type: Third-Party Bill
Electrodesiccation And Curettage Text: The wound bed was treated with electrodesiccation and curettage after the biopsy was performed.
Consent: Written consent was obtained and risks were reviewed including but not limited to scarring, infection, bleeding, scabbing, incomplete removal, nerve damage and allergy to anesthesia.
Wound Care: Vaseline

## 2018-10-10 NOTE — PROCEDURE: LIQUID NITROGEN
Medical Necessity Information: It is in your best interest to select a reason for this procedure from the list below. All of these items fulfill various CMS LCD requirements except the new and changing color options.
Include Z78.9 (Other Specified Conditions Influencing Health Status) As An Associated Diagnosis?: No
Post-Care Instructions: I reviewed with the patient in detail post-care instructions. Patient is to wear sunprotection, and avoid picking at any of the treated lesions. Pt may apply Vaseline to crusted or scabbing areas.
Consent: The patient's consent was obtained including but not limited to risks of crusting, scabbing, blistering, scarring, darker or lighter pigmentary change, recurrence, incomplete removal and infection.
Detail Level: Detailed
Medical Necessity Clause: This procedure was medically necessary because the lesions that were treated were:

## 2018-10-31 ENCOUNTER — APPOINTMENT (RX ONLY)
Dept: URBAN - METROPOLITAN AREA CLINIC 4 | Facility: CLINIC | Age: 68
Setting detail: DERMATOLOGY
End: 2018-10-31

## 2018-10-31 PROBLEM — C44.329 SQUAMOUS CELL CARCINOMA OF SKIN OF OTHER PARTS OF FACE: Status: ACTIVE | Noted: 2018-10-31

## 2018-10-31 PROCEDURE — ? EXCISION

## 2018-10-31 PROCEDURE — 13132 CMPLX RPR F/C/C/M/N/AX/G/H/F: CPT

## 2018-10-31 PROCEDURE — 11642 EXC F/E/E/N/L MAL+MRG 1.1-2: CPT

## 2018-10-31 NOTE — PROCEDURE: EXCISION
Complex Repair And Dorsal Nasal Flap Text: The defect edges were debeveled with a #15 scalpel blade.  The primary defect was closed partially with a complex linear closure.  Given the location of the remaining defect, shape of the defect and the proximity to free margins a dorsal nasal flap was deemed most appropriate for complete closure of the defect.  Using a sterile surgical marker, an appropriate flap was drawn incorporating the defect and placing the expected incisions within the relaxed skin tension lines where possible.    The area thus outlined was incised deep to adipose tissue with a #15 scalpel blade.  The skin margins were undermined to an appropriate distance in all directions utilizing iris scissors.
Trilobed Flap Text: The defect edges were debeveled with a #15 scalpel blade.  Given the location of the defect and the proximity to free margins a trilobed flap was deemed most appropriate.  Using a sterile surgical marker, an appropriate trilobed flap drawn around the defect.    The area thus outlined was incised deep to adipose tissue with a #15 scalpel blade.  The skin margins were undermined to an appropriate distance in all directions utilizing iris scissors.
Complex Repair And O-L Flap Text: The defect edges were debeveled with a #15 scalpel blade.  The primary defect was closed partially with a complex linear closure.  Given the location of the remaining defect, shape of the defect and the proximity to free margins an O-L flap was deemed most appropriate for complete closure of the defect.  Using a sterile surgical marker, an appropriate flap was drawn incorporating the defect and placing the expected incisions within the relaxed skin tension lines where possible.    The area thus outlined was incised deep to adipose tissue with a #15 scalpel blade.  The skin margins were undermined to an appropriate distance in all directions utilizing iris scissors.
Rhombic Flap Text: The defect edges were debeveled with a #15 scalpel blade.  Given the location of the defect and the proximity to free margins a rhombic flap was deemed most appropriate.  Using a sterile surgical marker, an appropriate rhombic flap was drawn incorporating the defect.    The area thus outlined was incised deep to adipose tissue with a #15 scalpel blade.  The skin margins were undermined to an appropriate distance in all directions utilizing iris scissors.
Double M-Plasty Complex Repair Preamble Text (Leave Blank If You Do Not Want): Extensive wide undermining was performed.
Dermal Closure: buried vertical mattress
Cheek-To-Nose Interpolation Flap Text: A decision was made to reconstruct the defect utilizing an interpolation axial flap and a staged reconstruction.  A telfa template was made of the defect.  This telfa template was then used to outline the Cheek-To-Nose Interpolation flap.  The donor area for the pedicle flap was then injected with anesthesia.  The flap was excised through the skin and subcutaneous tissue down to the layer of the underlying musculature.  The interpolation flap was carefully excised within this deep plane to maintain its blood supply.  The edges of the donor site were undermined.   The donor site was closed in a primary fashion.  The pedicle was then rotated into position and sutured.  Once the tube was sutured into place, adequate blood supply was confirmed with blanching and refill.  The pedicle was then wrapped with xeroform gauze and dressed appropriately with a telfa and gauze bandage to ensure continued blood supply and protect the attached pedicle.
Crescentic Advancement Flap Text: The defect edges were debeveled with a #15 scalpel blade.  Given the location of the defect and the proximity to free margins a crescentic advancement flap was deemed most appropriate.  Using a sterile surgical marker, the appropriate advancement flap was drawn incorporating the defect and placing the expected incisions within the relaxed skin tension lines where possible.    The area thus outlined was incised deep to adipose tissue with a #15 scalpel blade.  The skin margins were undermined to an appropriate distance in all directions utilizing iris scissors.
Complex Repair And Skin Substitute Graft Text: The defect edges were debeveled with a #15 scalpel blade.  The primary defect was closed partially with a complex linear closure.  Given the location of the remaining defect, shape of the defect and the proximity to free margins a skin substitute graft was deemed most appropriate to repair the remaining defect.  The graft was trimmed to fit the size of the remaining defect.  The graft was then placed in the primary defect, oriented appropriately, and sutured into place.
Crescentic Intermediate Repair Preamble Text (Leave Blank If You Do Not Want): Undermining was performed with blunt dissection.
Hatchet Flap Text: The defect edges were debeveled with a #15 scalpel blade.  Given the location of the defect, shape of the defect and the proximity to free margins a hatchet flap was deemed most appropriate.  Using a sterile surgical marker, an appropriate hatchet flap was drawn incorporating the defect and placing the expected incisions within the relaxed skin tension lines where possible.    The area thus outlined was incised deep to adipose tissue with a #15 scalpel blade.  The skin margins were undermined to an appropriate distance in all directions utilizing iris scissors.
Consent was obtained from the patient. The risks and benefits to therapy were discussed in detail. Specifically, the risks of infection, scarring, bleeding, prolonged wound healing, incomplete removal, allergy to anesthesia, nerve injury and recurrence were addressed. Prior to the procedure, the treatment site was clearly identified and confirmed by the patient. All components of Universal Protocol/PAUSE Rule completed.
Partial Purse String (Intermediate) Text: Given the location of the defect and the characteristics of the surrounding skin an intermediate purse string closure was deemed most appropriate.  Undermining was performed circumferentially around the surgical defect.  A purse string suture was then placed and tightened. Wound tension of the circular defect prevented complete closure of the wound.
Slit Excision Additional Text (Leave Blank If You Do Not Want): A linear line was drawn on the skin overlying the lesion. An incision was made slowly until the lesion was visualized.  Once visualized, the lesion was removed with blunt dissection.
Mucosal Advancement Flap Text: Given the location of the defect, shape of the defect and the proximity to free margins a mucosal advancement flap was deemed most appropriate. Incisions were made with a 15 blade scalpel in the appropriate fashion along the cutaneous vermilion border and the mucosal lip. The remaining actinically damaged mucosal tissue was excised.  The mucosal advancement flap was then elevated to the gingival sulcus with care taken to preserve the neurovascular structures and advanced into the primary defect. Care was taken to ensure that precise realignment of the vermilion border was achieved.
Show Curettage Variables: Yes
Pre-Excision Curettage Text (Leave Blank If You Do Not Want): Prior to drawing the surgical margin the visible lesion was removed with electrodesiccation and curettage to clearly define the lesion size.
Home Suture Removal Text: Patient was provided a home suture removal kit and will remove their sutures at home.  If they have any questions or difficulties they will call the office.
Secondary Defect Width (In Cm): 0
Alar Island Pedicle Flap Text: The defect edges were debeveled with a #15 scalpel blade.  Given the location of the defect, shape of the defect and the proximity to the alar rim an island pedicle advancement flap was deemed most appropriate.  Using a sterile surgical marker, an appropriate advancement flap was drawn incorporating the defect, outlining the appropriate donor tissue and placing the expected incisions within the nasal ala running parallel to the alar rim. The area thus outlined was incised with a #15 scalpel blade.  The skin margins were undermined minimally to an appropriate distance in all directions around the primary defect and laterally outward around the island pedicle utilizing iris scissors.  There was minimal undermining beneath the pedicle flap.
Render Path Notes In Note?: no
O-T Plasty Text: The defect edges were debeveled with a #15 scalpel blade.  Given the location of the defect, shape of the defect and the proximity to free margins an O-T plasty was deemed most appropriate.  Using a sterile surgical marker, an appropriate O-T plasty was drawn incorporating the defect and placing the expected incisions within the relaxed skin tension lines where possible.    The area thus outlined was incised deep to adipose tissue with a #15 scalpel blade.  The skin margins were undermined to an appropriate distance in all directions utilizing iris scissors.
Repair Type: Complex
Surgeon (Optional): Gabino
Transposition Flap Text: The defect edges were debeveled with a #15 scalpel blade.  Given the location of the defect and the proximity to free margins a transposition flap was deemed most appropriate.  Using a sterile surgical marker, an appropriate transposition flap was drawn incorporating the defect.    The area thus outlined was incised deep to adipose tissue with a #15 scalpel blade.  The skin margins were undermined to an appropriate distance in all directions utilizing iris scissors.
Interpolation Flap Text: A decision was made to reconstruct the defect utilizing an interpolation axial flap and a staged reconstruction.  A telfa template was made of the defect.  This telfa template was then used to outline the interpolation flap.  The donor area for the pedicle flap was then injected with anesthesia.  The flap was excised through the skin and subcutaneous tissue down to the layer of the underlying musculature.  The interpolation flap was carefully excised within this deep plane to maintain its blood supply.  The edges of the donor site were undermined.   The donor site was closed in a primary fashion.  The pedicle was then rotated into position and sutured.  Once the tube was sutured into place, adequate blood supply was confirmed with blanching and refill.  The pedicle was then wrapped with xeroform gauze and dressed appropriately with a telfa and gauze bandage to ensure continued blood supply and protect the attached pedicle.
Saucerization Excision Additional Text (Leave Blank If You Do Not Want): The margin was drawn around the clinically apparent lesion.  Incisions were then made along these lines, in a tangential fashion, to the appropriate tissue plane and the lesion was extirpated.
Modified Advancement Flap Text: The defect edges were debeveled with a #15 scalpel blade.  Given the location of the defect, shape of the defect and the proximity to free margins a modified advancement flap was deemed most appropriate.  Using a sterile surgical marker, an appropriate advancement flap was drawn incorporating the defect and placing the expected incisions within the relaxed skin tension lines where possible.    The area thus outlined was incised deep to adipose tissue with a #15 scalpel blade.  The skin margins were undermined to an appropriate distance in all directions utilizing iris scissors.
Wound Care: Bacitracin
Melolabial Transposition Flap Text: The defect edges were debeveled with a #15 scalpel blade.  Given the location of the defect and the proximity to free margins a melolabial flap was deemed most appropriate.  Using a sterile surgical marker, an appropriate melolabial transposition flap was drawn incorporating the defect.    The area thus outlined was incised deep to adipose tissue with a #15 scalpel blade.  The skin margins were undermined to an appropriate distance in all directions utilizing iris scissors.
Path Notes (To The Dermatopathologist): Please check margins.
O-Z Plasty Text: The defect edges were debeveled with a #15 scalpel blade.  Given the location of the defect, shape of the defect and the proximity to free margins an O-Z plasty (double transposition flap) was deemed most appropriate.  Using a sterile surgical marker, the appropriate transposition flaps were drawn incorporating the defect and placing the expected incisions within the relaxed skin tension lines where possible.    The area thus outlined was incised deep to adipose tissue with a #15 scalpel blade.  The skin margins were undermined to an appropriate distance in all directions utilizing iris scissors.  Hemostasis was achieved with electrocautery.  The flaps were then transposed into place, one clockwise and the other counterclockwise, and anchored with interrupted buried subcutaneous sutures.
Complex Repair And Ftsg Text: The defect edges were debeveled with a #15 scalpel blade.  The primary defect was closed partially with a complex linear closure.  Given the location of the defect, shape of the defect and the proximity to free margins a full thickness skin graft was deemed most appropriate to repair the remaining defect.  The graft was trimmed to fit the size of the remaining defect.  The graft was then placed in the primary defect, oriented appropriately, and sutured into place.
Epidermal Closure: running subcuticular
Lip Wedge Excision Repair Text: Given the location of the defect and the proximity to free margins a full thickness wedge repair was deemed most appropriate.  Using a sterile surgical marker, the appropriate repair was drawn incorporating the defect and placing the expected incisions perpendicular to the vermilion border.  The vermilion border was also meticulously outlined to ensure appropriate reapproximation during the repair.  The area thus outlined was incised through and through with a #15 scalpel blade.  The muscularis and dermis were reaproximated with deep sutures following hemostasis. Care was taken to realign the vermilion border before proceeding with the superficial closure.  Once the vermilion was realigned the superfical and mucosal closure was finished.
Repair Performed By Another Provider Text (Leave Blank If You Do Not Want): After the tissue was excised the defect was repaired by another provider.
Size Of Lesion In Cm: 1.2
Complex Repair And O-T Advancement Flap Text: The defect edges were debeveled with a #15 scalpel blade.  The primary defect was closed partially with a complex linear closure.  Given the location of the remaining defect, shape of the defect and the proximity to free margins an O-T advancement flap was deemed most appropriate for complete closure of the defect.  Using a sterile surgical marker, an appropriate advancement flap was drawn incorporating the defect and placing the expected incisions within the relaxed skin tension lines where possible.    The area thus outlined was incised deep to adipose tissue with a #15 scalpel blade.  The skin margins were undermined to an appropriate distance in all directions utilizing iris scissors.
Complex Repair And Bilobe Flap Text: The defect edges were debeveled with a #15 scalpel blade.  The primary defect was closed partially with a complex linear closure.  Given the location of the remaining defect, shape of the defect and the proximity to free margins a bilobe flap was deemed most appropriate for complete closure of the defect.  Using a sterile surgical marker, an appropriate advancement flap was drawn incorporating the defect and placing the expected incisions within the relaxed skin tension lines where possible.    The area thus outlined was incised deep to adipose tissue with a #15 scalpel blade.  The skin margins were undermined to an appropriate distance in all directions utilizing iris scissors.
Melolabial Interpolation Flap Text: A decision was made to reconstruct the defect utilizing an interpolation axial flap and a staged reconstruction.  A telfa template was made of the defect.  This telfa template was then used to outline the melolabial interpolation flap.  The donor area for the pedicle flap was then injected with anesthesia.  The flap was excised through the skin and subcutaneous tissue down to the layer of the underlying musculature.  The pedicle flap was carefully excised within this deep plane to maintain its blood supply.  The edges of the donor site were undermined.   The donor site was closed in a primary fashion.  The pedicle was then rotated into position and sutured.  Once the tube was sutured into place, adequate blood supply was confirmed with blanching and refill.  The pedicle was then wrapped with xeroform gauze and dressed appropriately with a telfa and gauze bandage to ensure continued blood supply and protect the attached pedicle.
Complex Repair And Double M Plasty Text: The defect edges were debeveled with a #15 scalpel blade.  The primary defect was closed partially with a complex linear closure.  Given the location of the remaining defect, shape of the defect and the proximity to free margins a double M plasty was deemed most appropriate for complete closure of the defect.  Using a sterile surgical marker, an appropriate advancement flap was drawn incorporating the defect and placing the expected incisions within the relaxed skin tension lines where possible.    The area thus outlined was incised deep to adipose tissue with a #15 scalpel blade.  The skin margins were undermined to an appropriate distance in all directions utilizing iris scissors.
Bilobed Transposition Flap Text: The defect edges were debeveled with a #15 scalpel blade.  Given the location of the defect and the proximity to free margins a bilobed transposition flap was deemed most appropriate.  Using a sterile surgical marker, an appropriate bilobe flap drawn around the defect.    The area thus outlined was incised deep to adipose tissue with a #15 scalpel blade.  The skin margins were undermined to an appropriate distance in all directions utilizing iris scissors.
Repair Anesthesia Method: local infiltration
Epidermal Sutures: 5-0 Vicryl Rapide
Z Plasty Text: The lesion was extirpated to the level of the fat with a #15 scalpel blade.  Given the location of the defect, shape of the defect and the proximity to free margins a Z-plasty was deemed most appropriate for repair.  Using a sterile surgical marker, the appropriate transposition arms of the Z-plasty were drawn incorporating the defect and placing the expected incisions within the relaxed skin tension lines where possible.    The area thus outlined was incised deep to adipose tissue with a #15 scalpel blade.  The skin margins were undermined to an appropriate distance in all directions utilizing iris scissors.  The opposing transposition arms were then transposed into place in opposite direction and anchored with interrupted buried subcutaneous sutures.
Cheek Interpolation Flap Text: A decision was made to reconstruct the defect utilizing an interpolation axial flap and a staged reconstruction.  A telfa template was made of the defect.  This telfa template was then used to outline the Cheek Interpolation flap.  The donor area for the pedicle flap was then injected with anesthesia.  The flap was excised through the skin and subcutaneous tissue down to the layer of the underlying musculature.  The interpolation flap was carefully excised within this deep plane to maintain its blood supply.  The edges of the donor site were undermined.   The donor site was closed in a primary fashion.  The pedicle was then rotated into position and sutured.  Once the tube was sutured into place, adequate blood supply was confirmed with blanching and refill.  The pedicle was then wrapped with xeroform gauze and dressed appropriately with a telfa and gauze bandage to ensure continued blood supply and protect the attached pedicle.
O-T Advancement Flap Text: The defect edges were debeveled with a #15 scalpel blade.  Given the location of the defect, shape of the defect and the proximity to free margins an O-T advancement flap was deemed most appropriate.  Using a sterile surgical marker, an appropriate advancement flap was drawn incorporating the defect and placing the expected incisions within the relaxed skin tension lines where possible.    The area thus outlined was incised deep to adipose tissue with a #15 scalpel blade.  The skin margins were undermined to an appropriate distance in all directions utilizing iris scissors.
Double Island Pedicle Flap Text: The defect edges were debeveled with a #15 scalpel blade.  Given the location of the defect, shape of the defect and the proximity to free margins a double island pedicle advancement flap was deemed most appropriate.  Using a sterile surgical marker, an appropriate advancement flap was drawn incorporating the defect, outlining the appropriate donor tissue and placing the expected incisions within the relaxed skin tension lines where possible.    The area thus outlined was incised deep to adipose tissue with a #15 scalpel blade.  The skin margins were undermined to an appropriate distance in all directions around the primary defect and laterally outward around the island pedicle utilizing iris scissors.  There was minimal undermining beneath the pedicle flap.
Banner Transposition Flap Text: The defect edges were debeveled with a #15 scalpel blade.  Given the location of the defect and the proximity to free margins a Banner transposition flap was deemed most appropriate.  Using a sterile surgical marker, an appropriate flap drawn around the defect. The area thus outlined was incised deep to adipose tissue with a #15 scalpel blade.  The skin margins were undermined to an appropriate distance in all directions utilizing iris scissors.
Complex Repair And Epidermal Autograft Text: The defect edges were debeveled with a #15 scalpel blade.  The primary defect was closed partially with a complex linear closure.  Given the location of the defect, shape of the defect and the proximity to free margins an epidermal autograft was deemed most appropriate to repair the remaining defect.  The graft was trimmed to fit the size of the remaining defect.  The graft was then placed in the primary defect, oriented appropriately, and sutured into place.
Complex Repair And Single Advancement Flap Text: The defect edges were debeveled with a #15 scalpel blade.  The primary defect was closed partially with a complex linear closure.  Given the location of the remaining defect, shape of the defect and the proximity to free margins a single advancement flap was deemed most appropriate for complete closure of the defect.  Using a sterile surgical marker, an appropriate advancement flap was drawn incorporating the defect and placing the expected incisions within the relaxed skin tension lines where possible.    The area thus outlined was incised deep to adipose tissue with a #15 scalpel blade.  The skin margins were undermined to an appropriate distance in all directions utilizing iris scissors.
Previous Accession (Optional): [id number]
Additional Anesthesia Volume In Cc: 6
Positioning (Leave Blank If You Do Not Want): The patient was placed in a comfortable position exposing the surgical site.
S Plasty Text: Given the location and shape of the defect, and the orientation of relaxed skin tension lines, an S-plasty was deemed most appropriate for repair.  Using a sterile surgical marker, the appropriate outline of the S-plasty was drawn, incorporating the defect and placing the expected incisions within the relaxed skin tension lines where possible.  The area thus outlined was incised deep to adipose tissue with a #15 scalpel blade.  The skin margins were undermined to an appropriate distance in all directions utilizing iris scissors. The skin flaps were advanced over the defect.  The opposing margins were then approximated with interrupted buried subcutaneous sutures.
Purse String (Simple) Text: Given the location of the defect and the characteristics of the surrounding skin a purse string simple closure was deemed most appropriate.  Undermining was performed circumferentially around the surgical defect.  A purse string suture was then placed and tightened.
Intermediate / Complex Repair - Final Wound Length In Cm: 4.2
Advancement Flap (Double) Text: The defect edges were debeveled with a #15 scalpel blade.  Given the location of the defect and the proximity to free margins a double advancement flap was deemed most appropriate.  Using a sterile surgical marker, the appropriate advancement flaps were drawn incorporating the defect and placing the expected incisions within the relaxed skin tension lines where possible.    The area thus outlined was incised deep to adipose tissue with a #15 scalpel blade.  The skin margins were undermined to an appropriate distance in all directions utilizing iris scissors.
Spiral Flap Text: The defect edges were debeveled with a #15 scalpel blade.  Given the location of the defect, shape of the defect and the proximity to free margins a spiral flap was deemed most appropriate.  Using a sterile surgical marker, an appropriate rotation flap was drawn incorporating the defect and placing the expected incisions within the relaxed skin tension lines where possible. The area thus outlined was incised deep to adipose tissue with a #15 scalpel blade.  The skin margins were undermined to an appropriate distance in all directions utilizing iris scissors.
Deep Sutures: 4-0 Vicryl
Undermining Location (Optional): in the superficial subcutaneous fat
Bi-Rhombic Flap Text: The defect edges were debeveled with a #15 scalpel blade.  Given the location of the defect and the proximity to free margins a bi-rhombic flap was deemed most appropriate.  Using a sterile surgical marker, an appropriate rhombic flap was drawn incorporating the defect. The area thus outlined was incised deep to adipose tissue with a #15 scalpel blade.  The skin margins were undermined to an appropriate distance in all directions utilizing iris scissors.
Estimated Blood Loss (Cc): minimal
V-Y Plasty Text: The defect edges were debeveled with a #15 scalpel blade.  Given the location of the defect, shape of the defect and the proximity to free margins an V-Y advancement flap was deemed most appropriate.  Using a sterile surgical marker, an appropriate advancement flap was drawn incorporating the defect and placing the expected incisions within the relaxed skin tension lines where possible.    The area thus outlined was incised deep to adipose tissue with a #15 scalpel blade.  The skin margins were undermined to an appropriate distance in all directions utilizing iris scissors.
Mercedes Flap Text: The defect edges were debeveled with a #15 scalpel blade.  Given the location of the defect, shape of the defect and the proximity to free margins a Mercedes flap was deemed most appropriate.  Using a sterile surgical marker, an appropriate advancement flap was drawn incorporating the defect and placing the expected incisions within the relaxed skin tension lines where possible. The area thus outlined was incised deep to adipose tissue with a #15 scalpel blade.  The skin margins were undermined to an appropriate distance in all directions utilizing iris scissors.
Complex Repair And M Plasty Text: The defect edges were debeveled with a #15 scalpel blade.  The primary defect was closed partially with a complex linear closure.  Given the location of the remaining defect, shape of the defect and the proximity to free margins an M plasty was deemed most appropriate for complete closure of the defect.  Using a sterile surgical marker, an appropriate advancement flap was drawn incorporating the defect and placing the expected incisions within the relaxed skin tension lines where possible.    The area thus outlined was incised deep to adipose tissue with a #15 scalpel blade.  The skin margins were undermined to an appropriate distance in all directions utilizing iris scissors.
Burow's Advancement Flap Text: The defect edges were debeveled with a #15 scalpel blade.  Given the location of the defect and the proximity to free margins a Burow's advancement flap was deemed most appropriate.  Using a sterile surgical marker, the appropriate advancement flap was drawn incorporating the defect and placing the expected incisions within the relaxed skin tension lines where possible.    The area thus outlined was incised deep to adipose tissue with a #15 scalpel blade.  The skin margins were undermined to an appropriate distance in all directions utilizing iris scissors.
Complex Repair And Z Plasty Text: The defect edges were debeveled with a #15 scalpel blade.  The primary defect was closed partially with a complex linear closure.  Given the location of the remaining defect, shape of the defect and the proximity to free margins a Z plasty was deemed most appropriate for complete closure of the defect.  Using a sterile surgical marker, an appropriate advancement flap was drawn incorporating the defect and placing the expected incisions within the relaxed skin tension lines where possible.    The area thus outlined was incised deep to adipose tissue with a #15 scalpel blade.  The skin margins were undermined to an appropriate distance in all directions utilizing iris scissors.
Anesthesia Volume In Cc: 12
Complex Repair And W Plasty Text: The defect edges were debeveled with a #15 scalpel blade.  The primary defect was closed partially with a complex linear closure.  Given the location of the remaining defect, shape of the defect and the proximity to free margins a W plasty was deemed most appropriate for complete closure of the defect.  Using a sterile surgical marker, an appropriate advancement flap was drawn incorporating the defect and placing the expected incisions within the relaxed skin tension lines where possible.    The area thus outlined was incised deep to adipose tissue with a #15 scalpel blade.  The skin margins were undermined to an appropriate distance in all directions utilizing iris scissors.
Rotation Flap Text: The defect edges were debeveled with a #15 scalpel blade.  Given the location of the defect, shape of the defect and the proximity to free margins a rotation flap was deemed most appropriate.  Using a sterile surgical marker, an appropriate rotation flap was drawn incorporating the defect and placing the expected incisions within the relaxed skin tension lines where possible.    The area thus outlined was incised deep to adipose tissue with a #15 scalpel blade.  The skin margins were undermined to an appropriate distance in all directions utilizing iris scissors.
Body Location Override (Optional - Billing Will Still Be Based On Selected Body Map Location If Applicable): right superior lateral malar cheek
Post-Care Instructions: I reviewed with the patient in detail post-care instructions:\\n1. Apply bacitracin over the steri-strips.  \\n2. Cut non-stick pad (Telfa) to cover the steri-strips\\n3. Apply tape (hypafix) over the non-stick pad\\n4. Change once per day for 5 days\\n5. Shower with bandage on, change bandage after shower\\n\\nPatient is not to engage in any heavy lifting, exercise, hot tub, or swimming for the next 14 days. Should the patient develop any fevers, chills, bleeding, severe pain patient will contact the office immediately.
Ftsg Text: The defect edges were debeveled with a #15 scalpel blade.  Given the location of the defect, shape of the defect and the proximity to free margins a full thickness skin graft was deemed most appropriate.  Using a sterile surgical marker, the primary defect shape was transferred to the donor site. The area thus outlined was incised deep to adipose tissue with a #15 scalpel blade.  The harvested graft was then trimmed of adipose tissue until only dermis and epidermis was left.  The skin margins of the secondary defect were undermined to an appropriate distance in all directions utilizing iris scissors.  The secondary defect was closed with interrupted buried subcutaneous sutures.  The skin edges were then re-apposed with running  sutures.  The skin graft was then placed in the primary defect and oriented appropriately.
Bilobed Flap Text: The defect edges were debeveled with a #15 scalpel blade.  Given the location of the defect and the proximity to free margins a bilobe flap was deemed most appropriate.  Using a sterile surgical marker, an appropriate bilobe flap drawn around the defect.    The area thus outlined was incised deep to adipose tissue with a #15 scalpel blade.  The skin margins were undermined to an appropriate distance in all directions utilizing iris scissors.
Detail Level: Detailed
Split-Thickness Skin Graft Text: The defect edges were debeveled with a #15 scalpel blade.  Given the location of the defect, shape of the defect and the proximity to free margins a split thickness skin graft was deemed most appropriate.  Using a sterile surgical marker, the primary defect shape was transferred to the donor site. The split thickness graft was then harvested.  The skin graft was then placed in the primary defect and oriented appropriately.
Perilesional Excision Additional Text (Leave Blank If You Do Not Want): The margin was drawn around the clinically apparent lesion. Incisions were then made along these lines to the appropriate tissue plane and the lesion was extirpated.
Complex Repair And Double Advancement Flap Text: The defect edges were debeveled with a #15 scalpel blade.  The primary defect was closed partially with a complex linear closure.  Given the location of the remaining defect, shape of the defect and the proximity to free margins a double advancement flap was deemed most appropriate for complete closure of the defect.  Using a sterile surgical marker, an appropriate advancement flap was drawn incorporating the defect and placing the expected incisions within the relaxed skin tension lines where possible.    The area thus outlined was incised deep to adipose tissue with a #15 scalpel blade.  The skin margins were undermined to an appropriate distance in all directions utilizing iris scissors.
Star Wedge Flap Text: The defect edges were debeveled with a #15 scalpel blade.  Given the location of the defect, shape of the defect and the proximity to free margins a star wedge flap was deemed most appropriate.  Using a sterile surgical marker, an appropriate rotation flap was drawn incorporating the defect and placing the expected incisions within the relaxed skin tension lines where possible. The area thus outlined was incised deep to adipose tissue with a #15 scalpel blade.  The skin margins were undermined to an appropriate distance in all directions utilizing iris scissors.
Complex Repair And Dermal Autograft Text: The defect edges were debeveled with a #15 scalpel blade.  The primary defect was closed partially with a complex linear closure.  Given the location of the defect, shape of the defect and the proximity to free margins an dermal autograft was deemed most appropriate to repair the remaining defect.  The graft was trimmed to fit the size of the remaining defect.  The graft was then placed in the primary defect, oriented appropriately, and sutured into place.
Bilateral Helical Rim Advancement Flap Text: The defect edges were debeveled with a #15 blade scalpel.  Given the location of the defect and the proximity to free margins (helical rim) a bilateral helical rim advancement flap was deemed most appropriate.  Using a sterile surgical marker, the appropriate advancement flaps were drawn incorporating the defect and placing the expected incisions between the helical rim and antihelix where possible.  The area thus outlined was incised through and through with a #15 scalpel blade.  With a skin hook and iris scissors, the flaps were gently and sharply undermined and freed up.
Purse String (Intermediate) Text: Given the location of the defect and the characteristics of the surrounding skin a purse string intermediate closure was deemed most appropriate.  Undermining was performed circumfirentially around the surgical defect.  A purse string suture was then placed and tightened.
Lab: [id number]
Keystone Flap Text: The defect edges were debeveled with a #15 scalpel blade.  Given the location of the defect, shape of the defect a keystone flap was deemed most appropriate.  Using a sterile surgical marker, an appropriate keystone flap was drawn incorporating the defect, outlining the appropriate donor tissue and placing the expected incisions within the relaxed skin tension lines where possible. The area thus outlined was incised deep to adipose tissue with a #15 scalpel blade.  The skin margins were undermined to an appropriate distance in all directions around the primary defect and laterally outward around the flap utilizing iris scissors.
Skin Substitute Text: The defect edges were debeveled with a #15 scalpel blade.  Given the location of the defect, shape of the defect and the proximity to free margins a skin substitute graft was deemed most appropriate.  The graft material was trimmed to fit the size of the defect. The graft was then placed in the primary defect and oriented appropriately.
Complex Repair And Modified Advancement Flap Text: The defect edges were debeveled with a #15 scalpel blade.  The primary defect was closed partially with a complex linear closure.  Given the location of the remaining defect, shape of the defect and the proximity to free margins a modified advancement flap was deemed most appropriate for complete closure of the defect.  Using a sterile surgical marker, an appropriate advancement flap was drawn incorporating the defect and placing the expected incisions within the relaxed skin tension lines where possible.    The area thus outlined was incised deep to adipose tissue with a #15 scalpel blade.  The skin margins were undermined to an appropriate distance in all directions utilizing iris scissors.
Cartilage Graft Text: The defect edges were debeveled with a #15 scalpel blade.  Given the location of the defect, shape of the defect, the fact the defect involved a full thickness cartilage defect a cartilage graft was deemed most appropriate.  An appropriate donor site was identified, cleansed, and anesthetized. The cartilage graft was then harvested and transferred to the recipient site, oriented appropriately and then sutured into place.  The secondary defect was then repaired using a primary closure.
Tissue Cultured Epidermal Autograft Text: The defect edges were debeveled with a #15 scalpel blade.  Given the location of the defect, shape of the defect and the proximity to free margins a tissue cultured epidermal autograft was deemed most appropriate.  The graft was then trimmed to fit the size of the defect.  The graft was then placed in the primary defect and oriented appropriately.
Excision Depth: adipose tissue
Epidermal Closure Graft Donor Site (Optional): simple interrupted
Island Pedicle Flap Text: The defect edges were debeveled with a #15 scalpel blade.  Given the location of the defect, shape of the defect and the proximity to free margins an island pedicle advancement flap was deemed most appropriate.  Using a sterile surgical marker, an appropriate advancement flap was drawn incorporating the defect, outlining the appropriate donor tissue and placing the expected incisions within the relaxed skin tension lines where possible.    The area thus outlined was incised deep to adipose tissue with a #15 scalpel blade.  The skin margins were undermined to an appropriate distance in all directions around the primary defect and laterally outward around the island pedicle utilizing iris scissors.  There was minimal undermining beneath the pedicle flap.
Complex Repair And V-Y Plasty Text: The defect edges were debeveled with a #15 scalpel blade.  The primary defect was closed partially with a complex linear closure.  Given the location of the remaining defect, shape of the defect and the proximity to free margins a V-Y plasty was deemed most appropriate for complete closure of the defect.  Using a sterile surgical marker, an appropriate advancement flap was drawn incorporating the defect and placing the expected incisions within the relaxed skin tension lines where possible.    The area thus outlined was incised deep to adipose tissue with a #15 scalpel blade.  The skin margins were undermined to an appropriate distance in all directions utilizing iris scissors.
Complex Repair And Melolabial Flap Text: The defect edges were debeveled with a #15 scalpel blade.  The primary defect was closed partially with a complex linear closure.  Given the location of the remaining defect, shape of the defect and the proximity to free margins a melolabial flap was deemed most appropriate for complete closure of the defect.  Using a sterile surgical marker, an appropriate advancement flap was drawn incorporating the defect and placing the expected incisions within the relaxed skin tension lines where possible.    The area thus outlined was incised deep to adipose tissue with a #15 scalpel blade.  The skin margins were undermined to an appropriate distance in all directions utilizing iris scissors.
Hemostasis: Electrocautery
Dressing: dry sterile dressing
Fusiform Excision Additional Text (Leave Blank If You Do Not Want): The margin was drawn around the clinically apparent lesion.  A fusiform shape was then drawn on the skin incorporating the lesion and margins.  Incisions were then made along these lines to the appropriate tissue plane and the lesion was extirpated.
Paramedian Forehead Flap Text: A decision was made to reconstruct the defect utilizing an interpolation axial flap and a staged reconstruction.  A telfa template was made of the defect.  This telfa template was then used to outline the paramedian forehead pedicle flap.  The donor area for the pedicle flap was then injected with anesthesia.  The flap was excised through the skin and subcutaneous tissue down to the layer of the underlying musculature.  The pedicle flap was carefully excised within this deep plane to maintain its blood supply.  The edges of the donor site were undermined.   The donor site was closed in a primary fashion.  The pedicle was then rotated into position and sutured.  Once the tube was sutured into place, adequate blood supply was confirmed with blanching and refill.  The pedicle was then wrapped with xeroform gauze and dressed appropriately with a telfa and gauze bandage to ensure continued blood supply and protect the attached pedicle.
Island Pedicle Flap-Requiring Vessel Identification Text: The defect edges were debeveled with a #15 scalpel blade.  Given the location of the defect, shape of the defect and the proximity to free margins an island pedicle advancement flap was deemed most appropriate.  Using a sterile surgical marker, an appropriate advancement flap was drawn, based on the axial vessel mentioned above, incorporating the defect, outlining the appropriate donor tissue and placing the expected incisions within the relaxed skin tension lines where possible.    The area thus outlined was incised deep to adipose tissue with a #15 scalpel blade.  The skin margins were undermined to an appropriate distance in all directions around the primary defect and laterally outward around the island pedicle utilizing iris scissors.  There was minimal undermining beneath the pedicle flap.
O-L Flap Text: The defect edges were debeveled with a #15 scalpel blade.  Given the location of the defect, shape of the defect and the proximity to free margins an O-L flap was deemed most appropriate.  Using a sterile surgical marker, an appropriate advancement flap was drawn incorporating the defect and placing the expected incisions within the relaxed skin tension lines where possible.    The area thus outlined was incised deep to adipose tissue with a #15 scalpel blade.  The skin margins were undermined to an appropriate distance in all directions utilizing iris scissors.
Size Of Margin In Cm: 0.2
Excisional Biopsy Additional Text (Leave Blank If You Do Not Want): The margin was drawn around the clinically apparent lesion. An elliptical shape was then drawn on the skin incorporating the lesion and margins.  Incisions were then made along these lines to the appropriate tissue plane and the lesion was extirpated.
W Plasty Text: The lesion was extirpated to the level of the fat with a #15 scalpel blade.  Given the location of the defect, shape of the defect and the proximity to free margins a W-plasty was deemed most appropriate for repair.  Using a sterile surgical marker, the appropriate transposition arms of the W-plasty were drawn incorporating the defect and placing the expected incisions within the relaxed skin tension lines where possible.    The area thus outlined was incised deep to adipose tissue with a #15 scalpel blade.  The skin margins were undermined to an appropriate distance in all directions utilizing iris scissors.  The opposing transposition arms were then transposed into place in opposite direction and anchored with interrupted buried subcutaneous sutures.
V-Y Flap Text: The defect edges were debeveled with a #15 scalpel blade.  Given the location of the defect, shape of the defect and the proximity to free margins a V-Y flap was deemed most appropriate.  Using a sterile surgical marker, an appropriate advancement flap was drawn incorporating the defect and placing the expected incisions within the relaxed skin tension lines where possible.    The area thus outlined was incised deep to adipose tissue with a #15 scalpel blade.  The skin margins were undermined to an appropriate distance in all directions utilizing iris scissors.
Helical Rim Advancement Flap Text: The defect edges were debeveled with a #15 blade scalpel.  Given the location of the defect and the proximity to free margins (helical rim) a double helical rim advancement flap was deemed most appropriate.  Using a sterile surgical marker, the appropriate advancement flaps were drawn incorporating the defect and placing the expected incisions between the helical rim and antihelix where possible.  The area thus outlined was incised through and through with a #15 scalpel blade.  With a skin hook and iris scissors, the flaps were gently and sharply undermined and freed up.
H Plasty Text: Given the location of the defect, shape of the defect and the proximity to free margins a H-plasty was deemed most appropriate for repair.  Using a sterile surgical marker, the appropriate advancement arms of the H-plasty were drawn incorporating the defect and placing the expected incisions within the relaxed skin tension lines where possible. The area thus outlined was incised deep to adipose tissue with a #15 scalpel blade. The skin margins were undermined to an appropriate distance in all directions utilizing iris scissors.  The opposing advancement arms were then advanced into place in opposite direction and anchored with interrupted buried subcutaneous sutures.
Billing Type: Third-Party Bill
Xenograft Text: The defect edges were debeveled with a #15 scalpel blade.  Given the location of the defect, shape of the defect and the proximity to free margins a xenograft was deemed most appropriate.  The graft was then trimmed to fit the size of the defect.  The graft was then placed in the primary defect and oriented appropriately.
Lab Facility: [id number]
Epidermal Autograft Text: The defect edges were debeveled with a #15 scalpel blade.  Given the location of the defect, shape of the defect and the proximity to free margins an epidermal autograft was deemed most appropriate.  Using a sterile surgical marker, the primary defect shape was transferred to the donor site. The epidermal graft was then harvested.  The skin graft was then placed in the primary defect and oriented appropriately.
Elliptical Excision Additional Text (Leave Blank If You Do Not Want): The margin was drawn around the clinically apparent lesion.  An elliptical shape was then drawn on the skin incorporating the lesion and margins.  Incisions were then made along these lines to the appropriate tissue plane and the lesion was extirpated.
Ear Star Wedge Flap Text: The defect edges were debeveled with a #15 blade scalpel.  Given the location of the defect and the proximity to free margins (helical rim) an ear star wedge flap was deemed most appropriate.  Using a sterile surgical marker, the appropriate flap was drawn incorporating the defect and placing the expected incisions between the helical rim and antihelix where possible.  The area thus outlined was incised through and through with a #15 scalpel blade.
Complex Repair And Xenograft Text: The defect edges were debeveled with a #15 scalpel blade.  The primary defect was closed partially with a complex linear closure.  Given the location of the defect, shape of the defect and the proximity to free margins a xenograft was deemed most appropriate to repair the remaining defect.  The graft was trimmed to fit the size of the remaining defect.  The graft was then placed in the primary defect, oriented appropriately, and sutured into place.
Muscle Hinge Flap Text: The defect edges were debeveled with a #15 scalpel blade.  Given the size, depth and location of the defect and the proximity to free margins a muscle hinge flap was deemed most appropriate.  Using a sterile surgical marker, an appropriate hinge flap was drawn incorporating the defect. The area thus outlined was incised with a #15 scalpel blade.  The skin margins were undermined to an appropriate distance in all directions utilizing iris scissors.
Complex Repair And Rotation Flap Text: The defect edges were debeveled with a #15 scalpel blade.  The primary defect was closed partially with a complex linear closure.  Given the location of the remaining defect, shape of the defect and the proximity to free margins a rotation flap was deemed most appropriate for complete closure of the defect.  Using a sterile surgical marker, an appropriate advancement flap was drawn incorporating the defect and placing the expected incisions within the relaxed skin tension lines where possible.    The area thus outlined was incised deep to adipose tissue with a #15 scalpel blade.  The skin margins were undermined to an appropriate distance in all directions utilizing iris scissors.
Dorsal Nasal Flap Text: The defect edges were debeveled with a #15 scalpel blade.  Given the location of the defect and the proximity to free margins a dorsal nasal flap was deemed most appropriate.  Using a sterile surgical marker, an appropriate dorsal nasal flap was drawn around the defect.    The area thus outlined was incised deep to adipose tissue with a #15 scalpel blade.  The skin margins were undermined to an appropriate distance in all directions utilizing iris scissors.
Anesthesia Type: 1% lidocaine with epinephrine
Island Pedicle Flap With Canthal Suspension Text: The defect edges were debeveled with a #15 scalpel blade.  Given the location of the defect, shape of the defect and the proximity to free margins an island pedicle advancement flap was deemed most appropriate.  Using a sterile surgical marker, an appropriate advancement flap was drawn incorporating the defect, outlining the appropriate donor tissue and placing the expected incisions within the relaxed skin tension lines where possible. The area thus outlined was incised deep to adipose tissue with a #15 scalpel blade.  The skin margins were undermined to an appropriate distance in all directions around the primary defect and laterally outward around the island pedicle utilizing iris scissors.  There was minimal undermining beneath the pedicle flap. A suspension suture was placed in the canthal tendon to prevent tension and prevent ectropion.
Posterior Auricular Interpolation Flap Text: A decision was made to reconstruct the defect utilizing an interpolation axial flap and a staged reconstruction.  A telfa template was made of the defect.  This telfa template was then used to outline the posterior auricular interpolation flap.  The donor area for the pedicle flap was then injected with anesthesia.  The flap was excised through the skin and subcutaneous tissue down to the layer of the underlying musculature.  The pedicle flap was carefully excised within this deep plane to maintain its blood supply.  The edges of the donor site were undermined.   The donor site was closed in a primary fashion.  The pedicle was then rotated into position and sutured.  Once the tube was sutured into place, adequate blood supply was confirmed with blanching and refill.  The pedicle was then wrapped with xeroform gauze and dressed appropriately with a telfa and gauze bandage to ensure continued blood supply and protect the attached pedicle.
Advancement Flap (Single) Text: The defect edges were debeveled with a #15 scalpel blade.  Given the location of the defect and the proximity to free margins a single advancement flap was deemed most appropriate.  Using a sterile surgical marker, an appropriate advancement flap was drawn incorporating the defect and placing the expected incisions within the relaxed skin tension lines where possible.    The area thus outlined was incised deep to adipose tissue with a #15 scalpel blade.  The skin margins were undermined to an appropriate distance in all directions utilizing iris scissors.
Mastoid Interpolation Flap Text: A decision was made to reconstruct the defect utilizing an interpolation axial flap and a staged reconstruction.  A telfa template was made of the defect.  This telfa template was then used to outline the mastoid interpolation flap.  The donor area for the pedicle flap was then injected with anesthesia.  The flap was excised through the skin and subcutaneous tissue down to the layer of the underlying musculature.  The pedicle flap was carefully excised within this deep plane to maintain its blood supply.  The edges of the donor site were undermined.   The donor site was closed in a primary fashion.  The pedicle was then rotated into position and sutured.  Once the tube was sutured into place, adequate blood supply was confirmed with blanching and refill.  The pedicle was then wrapped with xeroform gauze and dressed appropriately with a telfa and gauze bandage to ensure continued blood supply and protect the attached pedicle.
Dermal Autograft Text: The defect edges were debeveled with a #15 scalpel blade.  Given the location of the defect, shape of the defect and the proximity to free margins a dermal autograft was deemed most appropriate.  Using a sterile surgical marker, the primary defect shape was transferred to the donor site. The area thus outlined was incised deep to adipose tissue with a #15 scalpel blade.  The harvested graft was then trimmed of adipose and epidermal tissue until only dermis was left.  The skin graft was then placed in the primary defect and oriented appropriately.
Complex Repair And Split-Thickness Skin Graft Text: The defect edges were debeveled with a #15 scalpel blade.  The primary defect was closed partially with a complex linear closure.  Given the location of the defect, shape of the defect and the proximity to free margins a split thickness skin graft was deemed most appropriate to repair the remaining defect.  The graft was trimmed to fit the size of the remaining defect.  The graft was then placed in the primary defect, oriented appropriately, and sutured into place.
No Repair - Repaired With Adjacent Surgical Defect Text (Leave Blank If You Do Not Want): After the excision the defect was repaired concurrently with another surgical defect which was in close approximation.
Curvilinear Excision Additional Text (Leave Blank If You Do Not Want): The margin was drawn around the clinically apparent lesion.  A curvilinear shape was then drawn on the skin incorporating the lesion and margins.  Incisions were then made along these lines to the appropriate tissue plane and the lesion was extirpated.
Complex Repair And Rhombic Flap Text: The defect edges were debeveled with a #15 scalpel blade.  The primary defect was closed partially with a complex linear closure.  Given the location of the remaining defect, shape of the defect and the proximity to free margins a rhombic flap was deemed most appropriate for complete closure of the defect.  Using a sterile surgical marker, an appropriate advancement flap was drawn incorporating the defect and placing the expected incisions within the relaxed skin tension lines where possible.    The area thus outlined was incised deep to adipose tissue with a #15 scalpel blade.  The skin margins were undermined to an appropriate distance in all directions utilizing iris scissors.
Partial Purse String (Simple) Text: Given the location of the defect and the characteristics of the surrounding skin a simple purse string closure was deemed most appropriate.  Undermining was performed circumferentially around the surgical defect.  A purse string suture was then placed and tightened. Wound tension of the circular defect prevented complete closure of the wound.
Complex Repair And Tissue Cultured Epidermal Autograft Text: The defect edges were debeveled with a #15 scalpel blade.  The primary defect was closed partially with a complex linear closure.  Given the location of the defect, shape of the defect and the proximity to free margins an tissue cultured epidermal autograft was deemed most appropriate to repair the remaining defect.  The graft was trimmed to fit the size of the remaining defect.  The graft was then placed in the primary defect, oriented appropriately, and sutured into place.
Scalpel Size: 15 blade
Excision Method: Elliptical
A-T Advancement Flap Text: The defect edges were debeveled with a #15 scalpel blade.  Given the location of the defect, shape of the defect and the proximity to free margins an A-T advancement flap was deemed most appropriate.  Using a sterile surgical marker, an appropriate advancement flap was drawn incorporating the defect and placing the expected incisions within the relaxed skin tension lines where possible.    The area thus outlined was incised deep to adipose tissue with a #15 scalpel blade.  The skin margins were undermined to an appropriate distance in all directions utilizing iris scissors.
Advancement-Rotation Flap Text: The defect edges were debeveled with a #15 scalpel blade.  Given the location of the defect, shape of the defect and the proximity to free margins an advancement-rotation flap was deemed most appropriate.  Using a sterile surgical marker, an appropriate flap was drawn incorporating the defect and placing the expected incisions within the relaxed skin tension lines where possible. The area thus outlined was incised deep to adipose tissue with a #15 scalpel blade.  The skin margins were undermined to an appropriate distance in all directions utilizing iris scissors.
Composite Graft Text: The defect edges were debeveled with a #15 scalpel blade.  Given the location of the defect, shape of the defect, the proximity to free margins and the fact the defect was full thickness a composite graft was deemed most appropriate.  The defect was outline and then transferred to the donor site.  A full thickness graft was then excised from the donor site. The graft was then placed in the primary defect, oriented appropriately and then sutured into place.  The secondary defect was then repaired using a primary closure.
Complex Repair And Transposition Flap Text: The defect edges were debeveled with a #15 scalpel blade.  The primary defect was closed partially with a complex linear closure.  Given the location of the remaining defect, shape of the defect and the proximity to free margins a transposition flap was deemed most appropriate for complete closure of the defect.  Using a sterile surgical marker, an appropriate advancement flap was drawn incorporating the defect and placing the expected incisions within the relaxed skin tension lines where possible.    The area thus outlined was incised deep to adipose tissue with a #15 scalpel blade.  The skin margins were undermined to an appropriate distance in all directions utilizing iris scissors.
Graft Donor Site Bandage (Optional-Leave Blank If You Don't Want In Note): Steri-strips and a pressure bandage were applied to the donor site.
Complex Repair And A-T Advancement Flap Text: The defect edges were debeveled with a #15 scalpel blade.  The primary defect was closed partially with a complex linear closure.  Given the location of the remaining defect, shape of the defect and the proximity to free margins an A-T advancement flap was deemed most appropriate for complete closure of the defect.  Using a sterile surgical marker, an appropriate advancement flap was drawn incorporating the defect and placing the expected incisions within the relaxed skin tension lines where possible.    The area thus outlined was incised deep to adipose tissue with a #15 scalpel blade.  The skin margins were undermined to an appropriate distance in all directions utilizing iris scissors.

## 2019-01-14 NOTE — CARE PLAN
Patient is able to rest between pain assessments. Patient is not grimacing or displaying other pain associated behaviors. Monitoring respirations and oxygen saturation closely. Re-educated on the 0-10 numeric pain scale. Problem: Communication  Goal: The ability to communicate needs accurately and effectively will improve  Outcome: PROGRESSING AS EXPECTED  Pt capable of verbalizing all needs and utilizes call light system approprietly.    Problem: Safety  Goal: Will remain free from falls  Outcome: PROGRESSING AS EXPECTED  Pt low fall risk, pt wearing treaded socks, bed locked and in lowest position, bed alarm not indicated.  Pt call light and phone within reach.

## 2019-05-29 ENCOUNTER — APPOINTMENT (RX ONLY)
Dept: URBAN - METROPOLITAN AREA CLINIC 4 | Facility: CLINIC | Age: 69
Setting detail: DERMATOLOGY
End: 2019-05-29

## 2019-05-29 DIAGNOSIS — L57.0 ACTINIC KERATOSIS: ICD-10-CM

## 2019-05-29 DIAGNOSIS — L24.9 IRRITANT CONTACT DERMATITIS, UNSPECIFIED CAUSE: ICD-10-CM

## 2019-05-29 DIAGNOSIS — Z85.828 PERSONAL HISTORY OF OTHER MALIGNANT NEOPLASM OF SKIN: ICD-10-CM

## 2019-05-29 DIAGNOSIS — L82.0 INFLAMED SEBORRHEIC KERATOSIS: ICD-10-CM

## 2019-05-29 PROBLEM — D48.5 NEOPLASM OF UNCERTAIN BEHAVIOR OF SKIN: Status: ACTIVE | Noted: 2019-05-29

## 2019-05-29 PROCEDURE — ? PRESCRIPTION

## 2019-05-29 PROCEDURE — 11102 TANGNTL BX SKIN SINGLE LES: CPT | Mod: 59

## 2019-05-29 PROCEDURE — 17003 DESTRUCT PREMALG LES 2-14: CPT | Mod: 59

## 2019-05-29 PROCEDURE — ? ADDITIONAL NOTES

## 2019-05-29 PROCEDURE — 17000 DESTRUCT PREMALG LESION: CPT | Mod: 59

## 2019-05-29 PROCEDURE — 99213 OFFICE O/P EST LOW 20 MIN: CPT | Mod: 25

## 2019-05-29 PROCEDURE — ? LIQUID NITROGEN

## 2019-05-29 PROCEDURE — ? COUNSELING

## 2019-05-29 PROCEDURE — 17110 DESTRUCTION B9 LES UP TO 14: CPT

## 2019-05-29 PROCEDURE — ? BIOPSY BY SHAVE METHOD

## 2019-05-29 RX ORDER — KETOCONAZOLE 20 MG/G
TOPICAL CREAM TOPICAL
Qty: 1 | Refills: 1 | Status: ERX

## 2019-05-29 ASSESSMENT — LOCATION SIMPLE DESCRIPTION DERM
LOCATION SIMPLE: RIGHT FOREHEAD
LOCATION SIMPLE: PENIS
LOCATION SIMPLE: LEFT FOREARM
LOCATION SIMPLE: RIGHT CHEEK
LOCATION SIMPLE: ABDOMEN
LOCATION SIMPLE: LEFT ZYGOMA
LOCATION SIMPLE: LEFT EAR

## 2019-05-29 ASSESSMENT — LOCATION ZONE DERM
LOCATION ZONE: EAR
LOCATION ZONE: TRUNK
LOCATION ZONE: PENIS
LOCATION ZONE: ARM
LOCATION ZONE: FACE

## 2019-05-29 ASSESSMENT — LOCATION DETAILED DESCRIPTION DERM
LOCATION DETAILED: LEFT MEDIAL ZYGOMA
LOCATION DETAILED: LEFT TRIANGULAR FOSSA
LOCATION DETAILED: RIGHT SUPERIOR LATERAL MALAR CHEEK
LOCATION DETAILED: RIGHT INFERIOR FOREHEAD
LOCATION DETAILED: LEFT PROXIMAL DORSAL FOREARM
LOCATION DETAILED: XIPHOID
LOCATION DETAILED: RIGHT INFERIOR CENTRAL MALAR CHEEK
LOCATION DETAILED: DORSAL CORONAL SULCUS
LOCATION DETAILED: LEFT VENTRAL PROXIMAL FOREARM

## 2019-05-29 NOTE — PROCEDURE: ADDITIONAL NOTES
Additional Notes: DC hand lotion and all other products to penis. Use dove unscented in shower and ketoconaxole cream bid until clear, then use another week. Return to clinic one month if not clear.
Detail Level: Simple

## 2019-05-29 NOTE — PROCEDURE: BIOPSY BY SHAVE METHOD
Type Of Destruction Used: Curettage
Anesthesia Volume In Cc: 0.5
Electrodesiccation And Curettage Text: The wound bed was treated with electrodesiccation and curettage after the biopsy was performed.
Was A Bandage Applied: Yes
Detail Level: Detailed
Post-Care Instructions: I reviewed with the patient in detail post-care instructions. Patient is to keep the biopsy site dry overnight, and then apply vaseline twice daily until healed.
Biopsy Method: Personna blade
Dressing: bandage
Silver Nitrate Text: The wound bed was treated with silver nitrate after the biopsy was performed.
Hemostasis: Drysol and Electrocautery
Notification Instructions: Patient will be notified of biopsy results. However, patient instructed to call the office if not contacted within 2 weeks.
Bill For Surgical Tray: no
Curettage Text: The wound bed was treated with curettage after the biopsy was performed.
Lab: [id number]
Consent: Written consent was obtained and risks were reviewed including but not limited to scarring, infection, bleeding, scabbing, incomplete removal, nerve damage and allergy to anesthesia.
Size Of Lesion In Cm: 0.4
Anesthesia Type: 1% lidocaine with epinephrine
Additional Anesthesia Volume In Cc (Will Not Render If 0): 0
Cryotherapy Text: The wound bed was treated with cryotherapy after the biopsy was performed.
Depth Of Biopsy: dermis
Lab Facility: 
Biopsy Type: H and E
Wound Care: Vaseline
Electrodesiccation Text: The wound bed was treated with electrodesiccation after the biopsy was performed.
Billing Type: Third-Party Bill

## 2019-05-29 NOTE — PROCEDURE: LIQUID NITROGEN
Detail Level: Detailed
Post-Care Instructions: I reviewed with the patient in detail post-care instructions. Patient is to wear sunprotection, and avoid picking at any of the treated lesions. Pt may apply Vaseline to crusted or scabbing areas.
Render Post-Care Instructions In Note?: no
Medical Necessity Clause: This procedure was medically necessary because the lesions that were treated were:
Consent: The patient's consent was obtained including but not limited to risks of crusting, scabbing, blistering, scarring, darker or lighter pigmentary change, recurrence, incomplete removal and infection.
Medical Necessity Information: It is in your best interest to select a reason for this procedure from the list below. All of these items fulfill various CMS LCD requirements except the new and changing color options.
Duration Of Freeze Thaw-Cycle (Seconds): 0

## 2019-07-29 RX ORDER — KETOCONAZOLE 20 MG/G
2% CREAM TOPICAL BID
Qty: 1 | Refills: 1 | Status: ERX

## 2019-11-12 ENCOUNTER — HOSPITAL ENCOUNTER (EMERGENCY)
Facility: MEDICAL CENTER | Age: 69
End: 2019-11-12
Attending: EMERGENCY MEDICINE
Payer: MEDICARE

## 2019-11-12 ENCOUNTER — APPOINTMENT (OUTPATIENT)
Dept: RADIOLOGY | Facility: MEDICAL CENTER | Age: 69
End: 2019-11-12
Attending: EMERGENCY MEDICINE
Payer: MEDICARE

## 2019-11-12 VITALS
RESPIRATION RATE: 17 BRPM | OXYGEN SATURATION: 96 % | HEART RATE: 85 BPM | TEMPERATURE: 97.5 F | DIASTOLIC BLOOD PRESSURE: 80 MMHG | BODY MASS INDEX: 37.94 KG/M2 | WEIGHT: 271 LBS | HEIGHT: 71 IN | SYSTOLIC BLOOD PRESSURE: 175 MMHG

## 2019-11-12 DIAGNOSIS — I82.411 ACUTE DEEP VEIN THROMBOSIS (DVT) OF FEMORAL VEIN OF RIGHT LOWER EXTREMITY (HCC): ICD-10-CM

## 2019-11-12 LAB
ANION GAP SERPL CALC-SCNC: 8 MMOL/L (ref 0–11.9)
APTT PPP: 33.4 SEC (ref 24.7–36)
BASOPHILS # BLD AUTO: 0.1 % (ref 0–1.8)
BASOPHILS # BLD: 0.01 K/UL (ref 0–0.12)
BUN SERPL-MCNC: 39 MG/DL (ref 8–22)
CALCIUM SERPL-MCNC: 9.4 MG/DL (ref 8.5–10.5)
CHLORIDE SERPL-SCNC: 105 MMOL/L (ref 96–112)
CO2 SERPL-SCNC: 25 MMOL/L (ref 20–33)
CREAT SERPL-MCNC: 1.37 MG/DL (ref 0.5–1.4)
EOSINOPHIL # BLD AUTO: 0.1 K/UL (ref 0–0.51)
EOSINOPHIL NFR BLD: 1 % (ref 0–6.9)
ERYTHROCYTE [DISTWIDTH] IN BLOOD BY AUTOMATED COUNT: 49.9 FL (ref 35.9–50)
GLUCOSE SERPL-MCNC: 117 MG/DL (ref 65–99)
HCT VFR BLD AUTO: 45.4 % (ref 42–52)
HGB BLD-MCNC: 14.9 G/DL (ref 14–18)
IMM GRANULOCYTES # BLD AUTO: 0.04 K/UL (ref 0–0.11)
IMM GRANULOCYTES NFR BLD AUTO: 0.4 % (ref 0–0.9)
INR PPP: 1.04 (ref 0.87–1.13)
LYMPHOCYTES # BLD AUTO: 2.96 K/UL (ref 1–4.8)
LYMPHOCYTES NFR BLD: 28.5 % (ref 22–41)
MCH RBC QN AUTO: 31.6 PG (ref 27–33)
MCHC RBC AUTO-ENTMCNC: 32.8 G/DL (ref 33.7–35.3)
MCV RBC AUTO: 96.2 FL (ref 81.4–97.8)
MONOCYTES # BLD AUTO: 0.94 K/UL (ref 0–0.85)
MONOCYTES NFR BLD AUTO: 9.1 % (ref 0–13.4)
NEUTROPHILS # BLD AUTO: 6.32 K/UL (ref 1.82–7.42)
NEUTROPHILS NFR BLD: 60.9 % (ref 44–72)
NRBC # BLD AUTO: 0 K/UL
NRBC BLD-RTO: 0 /100 WBC
PLATELET # BLD AUTO: 129 K/UL (ref 164–446)
PMV BLD AUTO: 10.2 FL (ref 9–12.9)
POTASSIUM SERPL-SCNC: 4.2 MMOL/L (ref 3.6–5.5)
PROTHROMBIN TIME: 13.8 SEC (ref 12–14.6)
RBC # BLD AUTO: 4.72 M/UL (ref 4.7–6.1)
SODIUM SERPL-SCNC: 138 MMOL/L (ref 135–145)
WBC # BLD AUTO: 10.4 K/UL (ref 4.8–10.8)

## 2019-11-12 PROCEDURE — 85610 PROTHROMBIN TIME: CPT

## 2019-11-12 PROCEDURE — 93971 EXTREMITY STUDY: CPT | Mod: RT

## 2019-11-12 PROCEDURE — 85025 COMPLETE CBC W/AUTO DIFF WBC: CPT

## 2019-11-12 PROCEDURE — 700102 HCHG RX REV CODE 250 W/ 637 OVERRIDE(OP): Performed by: EMERGENCY MEDICINE

## 2019-11-12 PROCEDURE — A9270 NON-COVERED ITEM OR SERVICE: HCPCS | Performed by: EMERGENCY MEDICINE

## 2019-11-12 PROCEDURE — 80048 BASIC METABOLIC PNL TOTAL CA: CPT

## 2019-11-12 PROCEDURE — 85730 THROMBOPLASTIN TIME PARTIAL: CPT

## 2019-11-12 PROCEDURE — 99284 EMERGENCY DEPT VISIT MOD MDM: CPT

## 2019-11-12 RX ORDER — HYDROCODONE BITARTRATE AND ACETAMINOPHEN 5; 325 MG/1; MG/1
1 TABLET ORAL ONCE
Status: DISCONTINUED | OUTPATIENT
Start: 2019-11-12 | End: 2019-11-12

## 2019-11-12 RX ORDER — HYDROCODONE BITARTRATE AND ACETAMINOPHEN 5; 325 MG/1; MG/1
1 TABLET ORAL EVERY 4 HOURS PRN
Qty: 12 TAB | Refills: 0 | Status: SHIPPED | OUTPATIENT
Start: 2019-11-12 | End: 2019-11-15

## 2019-11-12 RX ORDER — HYDROCODONE BITARTRATE AND ACETAMINOPHEN 5; 325 MG/1; MG/1
1 TABLET ORAL ONCE
Status: COMPLETED | OUTPATIENT
Start: 2019-11-12 | End: 2019-11-12

## 2019-11-12 RX ADMIN — RIVAROXABAN 15 MG: 15 TABLET, FILM COATED ORAL at 17:08

## 2019-11-12 RX ADMIN — HYDROCODONE BITARTRATE AND ACETAMINOPHEN 1 TABLET: 5; 325 TABLET ORAL at 15:33

## 2019-11-12 NOTE — ED TRIAGE NOTES
Pt to triage , c/o RLE pain and swelling, pt back from a recent car trip to [place], denies any trauma / injury

## 2019-11-13 NOTE — ED NOTES
..Pt verbalizes understanding of dc instructions. Rx given. Pt states will not drink/drive on rx of narcotics provided. Pt instructed to follow up with anticoagulation clinic and give phone number.  Pt states all questions have been answered and they feel comfortable with dc instructions provided. Pt states has all personal belonging. Pt to lobby via w/c w/o incident

## 2019-11-13 NOTE — ED PROVIDER NOTES
"ED provider note      CHIEF COMPLAINT  Chief Complaint   Patient presents with   • Leg Pain   • Leg Swelling       HPI  Krishan Acevedo is a 69 y.o. male who presents for evaluation of pain and swelling of his right leg, recently traveled to and from [place] in a car, states that he has no known injury to this leg but just feels the pressure increasing particularly in his calf.  No weakness or numbness or tingling, no fever.  No history of DVT or PE.  He has no chest pain or shortness of breath and otherwise states he feels well.  His past medical history is significant for a kidney transplant.      REVIEW OF SYSTEMS  Negative for fever, rash, chest pain, dyspnea, abdominal pain, nausea, vomiting, diarrhea, back pain. All other systems are negative.     PAST MEDICAL HISTORY  Past Medical History:   Diagnosis Date   • Kidney failure    • Kidney transplant pain        FAMILY HISTORY  History reviewed. No pertinent family history.    SOCIAL HISTORY  Social History     Tobacco Use   • Smoking status: Never Smoker   • Smokeless tobacco: Never Used   Substance Use Topics   • Alcohol use: Not Currently   • Drug use: Not on file       SURGICAL HISTORY  Past Surgical History:   Procedure Laterality Date   • PEG PLACEMENT  10/10/2014    Performed by Brijesh Orozco M.D. at SURGERY [facility] ORS       CURRENT MEDICATIONS  I personally reviewed the medication list in the charting documentation.     ALLERGIES  Allergies   Allergen Reactions   • Nsaids      Cannot take because of anti rejection meds.       MEDICAL RECORD  I have reviewed patient's medical record and pertinent results are listed above.      PHYSICAL EXAM  VITAL SIGNS: BP (!) 175/80   Pulse 85   Temp 36.4 °C (97.5 °F) (Temporal)   Resp 17   Ht 1.803 m (5' 11\")   Wt 122.9 kg (271 lb)   SpO2 96%   BMI 37.80 kg/m²    Constitutional: Alert in no apparent distress.  HENT: No signs of trauma.   Eyes: Conjunctiva normal, Non-icteric.   Chest: " Normal nonlabored respirations  Skin: Warm, Dry, No erythema, No rash.   Extremities: No edema.   Musculoskeletal: Asymmetric edema of the right lower extremity, tenderness involving the calf and popliteal region.  No overlying erythema, neurovascularly intact distally.  Neurologic: Alert, No focal deficits noted. Gait is normal.  Psychiatric: Affect normal, Judgment normal.    DIAGNOSTIC STUDIES / PROCEDURES    LABS/EKGs     Results for orders placed or performed during the hospital encounter of 11/12/19   BMP   Result Value Ref Range    Sodium 138 135 - 145 mmol/L    Potassium 4.2 3.6 - 5.5 mmol/L    Chloride 105 96 - 112 mmol/L    Co2 25 20 - 33 mmol/L    Glucose 117 (H) 65 - 99 mg/dL    Bun 39 (H) 8 - 22 mg/dL    Creatinine 1.37 0.50 - 1.40 mg/dL    Calcium 9.4 8.5 - 10.5 mg/dL    Anion Gap 8.0 0.0 - 11.9   ESTIMATED GFR   Result Value Ref Range    GFR If African American >60 >60 mL/min/1.73 m 2    GFR If Non  51 (A) >60 mL/min/1.73 m 2   CBC WITH DIFFERENTIAL   Result Value Ref Range    WBC 10.4 4.8 - 10.8 K/uL    RBC 4.72 4.70 - 6.10 M/uL    Hemoglobin 14.9 14.0 - 18.0 g/dL    Hematocrit 45.4 42.0 - 52.0 %    MCV 96.2 81.4 - 97.8 fL    MCH 31.6 27.0 - 33.0 pg    MCHC 32.8 (L) 33.7 - 35.3 g/dL    RDW 49.9 35.9 - 50.0 fL    Platelet Count 129 (L) 164 - 446 K/uL    MPV 10.2 9.0 - 12.9 fL    Neutrophils-Polys 60.90 44.00 - 72.00 %    Lymphocytes 28.50 22.00 - 41.00 %    Monocytes 9.10 0.00 - 13.40 %    Eosinophils 1.00 0.00 - 6.90 %    Basophils 0.10 0.00 - 1.80 %    Immature Granulocytes 0.40 0.00 - 0.90 %    Nucleated RBC 0.00 /100 WBC    Neutrophils (Absolute) 6.32 1.82 - 7.42 K/uL    Lymphs (Absolute) 2.96 1.00 - 4.80 K/uL    Monos (Absolute) 0.94 (H) 0.00 - 0.85 K/uL    Eos (Absolute) 0.10 0.00 - 0.51 K/uL    Baso (Absolute) 0.01 0.00 - 0.12 K/uL    Immature Granulocytes (abs) 0.04 0.00 - 0.11 K/uL    NRBC (Absolute) 0.00 K/uL   APTT   Result Value Ref Range    APTT 33.4 24.7 - 36.0 sec    PROTHROMBIN TIME   Result Value Ref Range    PT 13.8 12.0 - 14.6 sec    INR 1.04 0.87 - 1.13        RADIOLOGY  US-EXTREMITY VENOUS LOWER UNILAT RIGHT   Final Result      Positive for DVT involving the popliteal vein and lower femoral vein according to discussion with the vascular technician    COURSE & MEDICAL DECISION MAKING  I have reviewed any medical record information, laboratory studies and radiographic results as noted above.    Encounter Summary: This is a 69 y.o. male with asymmetric leg pain and tenderness with some edema, recent traveling, neurovascularly intact, duplex reveals DVT.  He does have a history of kidney transplants of blood work was obtained revealing good renal function with a GFR of 51 and a creatinine of 1.37.  He will be treated with Xarelto, he is been referred to the anticoagulation clinic, stable and appropriate for discharge with outpatient follow-up.  Strict return instructions have been discussed and the patient and his wife at the bedside expressed understanding.      DISPOSITION: Discharged home in stable condition      FINAL IMPRESSION  1. Acute deep vein thrombosis (DVT) of femoral vein of right lower extremity (HCC)           This dictation was created using voice recognition software. The accuracy of the dictation is limited to the abilities of the software. I expect there may be some errors of grammar and possibly content. The nursing notes were reviewed and certain aspects of this information were incorporated into this note.    Electronically signed by: Nadeem Mckeon, 11/12/2019 11:47 PM

## 2019-11-15 ENCOUNTER — ANTICOAGULATION VISIT (OUTPATIENT)
Dept: VASCULAR LAB | Facility: MEDICAL CENTER | Age: 69
End: 2019-11-15
Attending: INTERNAL MEDICINE
Payer: MEDICARE

## 2019-11-15 DIAGNOSIS — I82.4Y1 ACUTE VENOUS EMBOLISM AND THROMBOSIS OF DEEP VESSELS OF PROXIMAL END OF RIGHT LOWER EXTREMITY (HCC): ICD-10-CM

## 2019-11-15 PROBLEM — I82.4Y9 ACUTE VENOUS EMBOLISM AND THROMBOSIS OF DEEP VESSELS OF PROXIMAL LOWER EXTREMITY (HCC): Status: ACTIVE | Noted: 2019-11-15

## 2019-11-15 PROCEDURE — 99212 OFFICE O/P EST SF 10 MIN: CPT

## 2019-11-16 NOTE — PROGRESS NOTES
Anticoagulation Summary  As of 11/15/2019    INR goal:   1.5-2.0   TTR:   --   INR used for dosing:   No new INR was available at the time of this encounter.   Warfarin maintenance plan:   No maintenance plan   Next INR check:   12/2/2019   Target end date:   5/15/2020    Indications    Acute venous embolism and thrombosis of deep vessels of proximal lower extremity (HCC) [I82.4Y9]             Anticoagulation Episode Summary     INR check location:       Preferred lab:       Send INR reminders to:       Comments:         Anticoagulation Care Providers     Provider Role Specialty Phone number    Nadeem Mckeon M.D. Referring Emergency Medicine [phone]    [facility] Responsible  [phone]        Anticoagulation Patient Findings      Pt is new to Xarelto and new to [facility].  Discussed indication for drug therapy.  Explained our services, hours of operation, Xarelto therapy, potential SE. Discussed monitoring parameters, such as blood in urine, blood in stool, discussed what to do if a dose is missed, or suspected as missed.  Pt to continue with current Xarelto dosing regimen. Pt denies any unusual s/s of bleeding, bruising, clotting or any changes to diet or medications.    Patient was seen in ER 11/12/2019 for increasing pain and swelling in right calf 3 days after a drive from [place], [place] prior to that patient also states that he had multiple long car rides to [place].  He denied any injury to this leg.  Patient with right deep venous thrombosis within the right distal femoral vein and popliteal   veins as seen on ultrasound 11/12/2019.  Patient was started on Xarelto 15mg twice daily on 11/12/2019.     Per referring provider Nadeem Mckeon M.D. patient's duration of therapy is 6 months.    Patients CrCl 51 mL/min, 11/12/2019 patient has a hx of renal transplant and is on anti-rejection medications (tacrolimus and mycophenolate)     Patient was started on 15 mg twice daily  for 21 days, then to transition to 20 mg daily 12/04/2019.    Medication reconciliation done.     Potential DI None     Follow up with patient 12/02/2019 before transitioning to 20mg daily.    Added [facility] to Care Team    Written education given to patient    Stefanie Rincon, Pharm.D  Cc Dr Bloch

## 2019-11-19 ENCOUNTER — TELEPHONE (OUTPATIENT)
Dept: VASCULAR LAB | Facility: MEDICAL CENTER | Age: 69
End: 2019-11-19

## 2019-11-20 NOTE — TELEPHONE ENCOUNTER
Initial anticoagulation clinic note and most recent ED note reviewed    Patient started anticoagulation with Xarelto for extensive DVT, possibly provoked by long car rides    Per recommendation of ED physician, we will continue with 6 months of oral anti-coagulation after which time we can check back with PCP as to whether or not we should continue and/or consider further work-up    Will defer any indicated age appropriate screening for occult malignancy to pcp.    Michael Bloch, MD  Anticoagulation Clinic    Cc:      EVENS Diaz

## 2019-11-25 ENCOUNTER — APPOINTMENT (RX ONLY)
Dept: URBAN - METROPOLITAN AREA CLINIC 4 | Facility: CLINIC | Age: 69
Setting detail: DERMATOLOGY
End: 2019-11-25

## 2019-11-25 DIAGNOSIS — D22 MELANOCYTIC NEVI: ICD-10-CM

## 2019-11-25 DIAGNOSIS — L72.0 EPIDERMAL CYST: ICD-10-CM

## 2019-11-25 DIAGNOSIS — L82.1 OTHER SEBORRHEIC KERATOSIS: ICD-10-CM

## 2019-11-25 DIAGNOSIS — L81.4 OTHER MELANIN HYPERPIGMENTATION: ICD-10-CM

## 2019-11-25 PROBLEM — D22.5 MELANOCYTIC NEVI OF TRUNK: Status: ACTIVE | Noted: 2019-11-25

## 2019-11-25 PROCEDURE — 99213 OFFICE O/P EST LOW 20 MIN: CPT

## 2019-11-25 PROCEDURE — ? INCISION AND DRAINAGE

## 2019-11-25 ASSESSMENT — LOCATION SIMPLE DESCRIPTION DERM
LOCATION SIMPLE: LEFT NOSE
LOCATION SIMPLE: LEFT ANTERIOR NECK
LOCATION SIMPLE: RIGHT UPPER BACK
LOCATION SIMPLE: RIGHT FOREARM
LOCATION SIMPLE: RIGHT LOWER BACK
LOCATION SIMPLE: CHEST
LOCATION SIMPLE: LEFT FOREARM
LOCATION SIMPLE: LEFT CHEEK

## 2019-11-25 ASSESSMENT — LOCATION ZONE DERM
LOCATION ZONE: NECK
LOCATION ZONE: ARM
LOCATION ZONE: NOSE
LOCATION ZONE: TRUNK
LOCATION ZONE: FACE

## 2019-11-25 ASSESSMENT — LOCATION DETAILED DESCRIPTION DERM
LOCATION DETAILED: LEFT INFERIOR CENTRAL MALAR CHEEK
LOCATION DETAILED: RIGHT PROXIMAL DORSAL FOREARM
LOCATION DETAILED: LEFT PROXIMAL DORSAL FOREARM
LOCATION DETAILED: LEFT LATERAL SUPERIOR CHEST
LOCATION DETAILED: LEFT CENTRAL MALAR CHEEK
LOCATION DETAILED: LEFT INFERIOR ANTERIOR NECK
LOCATION DETAILED: LEFT NASAL SIDEWALL
LOCATION DETAILED: RIGHT SUPERIOR MEDIAL MIDBACK
LOCATION DETAILED: RIGHT MEDIAL UPPER BACK

## 2019-11-25 NOTE — PROCEDURE: INCISION AND DRAINAGE
Method: 11 blade
Post-Care Instructions: I reviewed with the patient in detail post-care instructions. Patient should keep wound covered and call the office should any redness, pain, swelling or worsening occur.
Epidermal Sutures: 4-0 Ethilon
Preparation Text: The area was prepped in the usual clean fashion.
Size Of Lesion In Cm (Optional But May Be Required For Some Insurances): 0
Lesion Type: Abscess
Detail Level: Detailed
Render Postcare In Note?: No
Dressing: no dressing
Suture Text: The incision was partially closed with
Drainage Type?: cyst-like
Curette Text (Optional): After the contents were expressed a curette was used to partially remove the cyst wall.
Drainage Amount?: minimal
Consent was obtained and risks were reviewed including but not limited to delayed wound healing, infection, need for multiple I and D's, and pain.
Epidermal Closure: simple interrupted

## 2019-12-02 ENCOUNTER — ANTICOAGULATION VISIT (OUTPATIENT)
Dept: VASCULAR LAB | Facility: MEDICAL CENTER | Age: 69
End: 2019-12-02
Attending: INTERNAL MEDICINE
Payer: MEDICARE

## 2019-12-02 DIAGNOSIS — I82.4Y1 ACUTE VENOUS EMBOLISM AND THROMBOSIS OF DEEP VESSELS OF PROXIMAL END OF RIGHT LOWER EXTREMITY (HCC): ICD-10-CM

## 2019-12-02 LAB
INR BLD: 1.6 (ref 0.9–1.2)
INR PPP: 1.6 (ref 2–3.5)

## 2019-12-02 PROCEDURE — 99212 OFFICE O/P EST SF 10 MIN: CPT | Performed by: NURSE PRACTITIONER

## 2019-12-02 PROCEDURE — 85610 PROTHROMBIN TIME: CPT

## 2019-12-02 RX ORDER — FUROSEMIDE 20 MG/1
20 TABLET ORAL 2 TIMES DAILY
COMMUNITY
End: 2020-08-27 | Stop reason: SDUPTHER

## 2019-12-02 RX ORDER — BRIMONIDINE TARTRATE AND TIMOLOL MALEATE 2; 5 MG/ML; MG/ML
2 SOLUTION OPHTHALMIC 2 TIMES DAILY
COMMUNITY

## 2019-12-02 RX ORDER — OMEPRAZOLE 20 MG/1
20 CAPSULE, DELAYED RELEASE ORAL EVERY EVENING
Status: ON HOLD | COMMUNITY
End: 2022-01-01

## 2019-12-02 NOTE — PROGRESS NOTES
Diagnosis: DVT  Drug: Xarelto 20 mg daily  LOT: 6 months (May 2020)  CHADSVASC: n/a  HAS-BLED: 1     Health Status Since Last Assessment  Pt here for his Xarelto 2 week check up. Currently taking Xarelto 15 mg BID then will start 20 mg daily on 12/4/19. Over the weekend the pt states he noticed significantly brown urine which has continued. No carmelo blood. He is concerned as he is a kidney transplant pt and has one kidney. Denies any other bleeding    Also noticed swelling and tenderness to the top of his right foot. May have bummed it while getting in and out of his truck on Saturday but he isn't sure. No calf pain or tenderness. No SOB.    Upon assessment, the anterior portion of his left his mildly edematous, soft to touch, somewhat tender. No redness or warmth noted. Nissa's neg. I don't think an US is necessary at this point.    After much discussion with pt and his wife, given brown urine, I will stop his Xarelto and switch him to Eliquis 5 mg BID. He will start his 1st dose tonight. Samples given and rx sent to [facility] in [place]. He will monitor closely for any discoloration in his urine or for any other signs/symptoms of bleeding.    Encouraged to elevate his RLE as much as possible and to let us know if the swelling or pain worsens. Will f/u with pt in 1 week. Pt requesting BMP to check creatinine. Lab ordered.    - stop taking Xarelto  - start taking Eliquis 5 mg by mouth twice daily - 1st dose tonight  - call for any brown or red urine  - get lab done this week  - elevate right leg as much as possible  - go to the ER for any worsening swelling/pain/redness to your extremities or for any shortness of breath or chest pain    Follow up on Monday, Dec 9th, 2019 at 3:15 pm.    Julianna WAGGONER    Cc: Dr Bloch

## 2019-12-02 NOTE — PATIENT INSTRUCTIONS
- stop taking Xarelto  - start taking Eliquis 5 mg by mouth twice daily - 1st dose tonight  - call for any brown or red urine  - get lab done this week  - elevate right leg as much as possible  - go to the ER for any worsening swelling/pain/redness to your extremities or for any shortness of breath or chest pain

## 2019-12-02 NOTE — Clinical Note
This pt started Xarelto 2 weeks ago for new DVT but having brown urine. Switching him to Eliquis and will see him in 1 week. Just SHANICE.

## 2019-12-04 DIAGNOSIS — R31.9 HEMATURIA, UNSPECIFIED TYPE: ICD-10-CM

## 2019-12-04 NOTE — PROGRESS NOTES
Spoke with pt. He will go to the lab today for a UA. In the meantime, continue Eliquis.    F/u on Monday.    Julianna WAGGONER

## 2019-12-05 ENCOUNTER — HOSPITAL ENCOUNTER (OUTPATIENT)
Dept: LAB | Facility: MEDICAL CENTER | Age: 69
End: 2019-12-05
Attending: NURSE PRACTITIONER
Payer: MEDICARE

## 2019-12-05 DIAGNOSIS — R31.9 HEMATURIA, UNSPECIFIED TYPE: ICD-10-CM

## 2019-12-05 DIAGNOSIS — I82.4Y1 ACUTE VENOUS EMBOLISM AND THROMBOSIS OF DEEP VESSELS OF PROXIMAL END OF RIGHT LOWER EXTREMITY (HCC): ICD-10-CM

## 2019-12-05 LAB
ANION GAP SERPL CALC-SCNC: 8 MMOL/L (ref 0–11.9)
APPEARANCE UR: CLEAR
BACTERIA #/AREA URNS HPF: NEGATIVE /HPF
BILIRUB UR QL STRIP.AUTO: NEGATIVE
BUN SERPL-MCNC: 39 MG/DL (ref 8–22)
CALCIUM SERPL-MCNC: 9.8 MG/DL (ref 8.5–10.5)
CHLORIDE SERPL-SCNC: 104 MMOL/L (ref 96–112)
CO2 SERPL-SCNC: 25 MMOL/L (ref 20–33)
COLOR UR: YELLOW
CREAT SERPL-MCNC: 1.38 MG/DL (ref 0.5–1.4)
EPI CELLS #/AREA URNS HPF: NEGATIVE /HPF
GLUCOSE SERPL-MCNC: 161 MG/DL (ref 65–99)
GLUCOSE UR STRIP.AUTO-MCNC: NEGATIVE MG/DL
HYALINE CASTS #/AREA URNS LPF: ABNORMAL /LPF
KETONES UR STRIP.AUTO-MCNC: NEGATIVE MG/DL
LEUKOCYTE ESTERASE UR QL STRIP.AUTO: ABNORMAL
MICRO URNS: ABNORMAL
NITRITE UR QL STRIP.AUTO: NEGATIVE
PH UR STRIP.AUTO: 5.5 [PH] (ref 5–8)
POTASSIUM SERPL-SCNC: 4.1 MMOL/L (ref 3.6–5.5)
PROT UR QL STRIP: 30 MG/DL
RBC # URNS HPF: ABNORMAL /HPF
RBC UR QL AUTO: ABNORMAL
SODIUM SERPL-SCNC: 137 MMOL/L (ref 135–145)
SP GR UR STRIP.AUTO: 1.01
UROBILINOGEN UR STRIP.AUTO-MCNC: 0.2 MG/DL
WBC #/AREA URNS HPF: ABNORMAL /HPF

## 2019-12-05 PROCEDURE — 87086 URINE CULTURE/COLONY COUNT: CPT

## 2019-12-05 PROCEDURE — 81001 URINALYSIS AUTO W/SCOPE: CPT

## 2019-12-05 PROCEDURE — 36415 COLL VENOUS BLD VENIPUNCTURE: CPT

## 2019-12-05 PROCEDURE — 80048 BASIC METABOLIC PNL TOTAL CA: CPT

## 2019-12-07 LAB
BACTERIA UR CULT: NORMAL
SIGNIFICANT IND 70042: NORMAL
SITE SITE: NORMAL
SOURCE SOURCE: NORMAL

## 2019-12-09 ENCOUNTER — ANTICOAGULATION VISIT (OUTPATIENT)
Dept: VASCULAR LAB | Facility: MEDICAL CENTER | Age: 69
End: 2019-12-09
Attending: INTERNAL MEDICINE
Payer: MEDICARE

## 2019-12-09 VITALS — DIASTOLIC BLOOD PRESSURE: 79 MMHG | SYSTOLIC BLOOD PRESSURE: 146 MMHG | HEART RATE: 100 BPM

## 2019-12-09 DIAGNOSIS — Z79.01 CHRONIC ANTICOAGULATION: ICD-10-CM

## 2019-12-09 DIAGNOSIS — Z23 FLU VACCINE NEED: ICD-10-CM

## 2019-12-09 PROCEDURE — 99212 OFFICE O/P EST SF 10 MIN: CPT

## 2019-12-09 PROCEDURE — 90471 IMMUNIZATION ADMIN: CPT

## 2019-12-09 RX ORDER — METOPROLOL SUCCINATE 50 MG/1
50 TABLET, EXTENDED RELEASE ORAL DAILY
Status: ON HOLD | COMMUNITY
End: 2021-01-31

## 2019-12-09 NOTE — PROGRESS NOTES
Target end date:May 2020     Indication: DVT     Drug: Eliquis      Health Status Since Last Assessment   Patient denies any new relevant medical problems, ED visits or hospitalizations   Patient denies any embolic events (stroke/tia/systemic embolism)    Adherence with DOAC Therapy   Pt has none missed any doses in the average week    Bleeding Risk Assessment     none Epistaxis   Pt denies any excessive or unusual bleeding/hematomas.  Pt denies any GI bleeds or hematemesis.  Pt denies any concerning daily headache or sub dural hematoma symptoms.     Pt denies any hematuria or abnormal vaginal bleeding.   Latest Hemoglobin 14.9   ETOH overuse none     Creatinine Clearance/Renal Function     Latest ClCr >60ml/min    Hepatic function   Latest LFTs not available   Pt denies any history of liver dysfunction      Drug Interactions   Platelets: 129   ASA/other antiplatelets none   NSAID none   Other drug interactions none      XX Verified no anticonvulsant or azole therapy, education provided for future use.     Examination   Blood Pressure 146/79   Symptomatic hypotension none   Significant gait impairment/imbalance/high risk for falls? none    Final Assessment and Recommendations:   Patient appears stable from the anticoagulation staindpoint.     Benefits of continued DOAC therapy outweigh risks for this patient   Recommend pt continue with current DOAC therapy  (with dose adjustment)     Other Actions: urinalysis  Pt has been experiencing hematuria with Xarelto, pt has subsequently been switched to Eliquis.  Will recheck urine in 7 days.    Follow up:   Will follow up with patient 5  months.        Beth Lutz, Clinical Pharmacist, CDE, CACP  Pt requested, consented to and received a FLU HD vaccine during this visit

## 2019-12-16 ENCOUNTER — TELEPHONE (OUTPATIENT)
Dept: VASCULAR LAB | Facility: MEDICAL CENTER | Age: 69
End: 2019-12-16

## 2019-12-16 ENCOUNTER — HOSPITAL ENCOUNTER (OUTPATIENT)
Dept: LAB | Facility: MEDICAL CENTER | Age: 69
End: 2019-12-16
Attending: NURSE PRACTITIONER
Payer: MEDICARE

## 2019-12-16 DIAGNOSIS — Z79.01 CHRONIC ANTICOAGULATION: ICD-10-CM

## 2019-12-16 PROCEDURE — 81015 MICROSCOPIC EXAM OF URINE: CPT

## 2019-12-17 ENCOUNTER — TELEPHONE (OUTPATIENT)
Dept: VASCULAR LAB | Facility: MEDICAL CENTER | Age: 69
End: 2019-12-17

## 2019-12-17 LAB
BACTERIA #/AREA URNS HPF: NEGATIVE /HPF
EPI CELLS #/AREA URNS HPF: NEGATIVE /HPF
HYALINE CASTS #/AREA URNS LPF: ABNORMAL /LPF
RBC # URNS HPF: ABNORMAL /HPF
WBC #/AREA URNS HPF: ABNORMAL /HPF

## 2019-12-18 ENCOUNTER — DOCUMENTATION (OUTPATIENT)
Dept: VASCULAR LAB | Facility: MEDICAL CENTER | Age: 69
End: 2019-12-18

## 2019-12-18 NOTE — Clinical Note
Zurdo Campos - remember that UA you ordered last week for that pt that had hematuria on xarelto that I switched to eliquis? The UA doesn't show occult blood. I talked to the lab and they are supposed to call back to see if it can still be tested from the specimen. Can you keep an eye on this result for me? I don't know if it will just post or what.Thank you for doing that! I'm not back to work until Jan 2nd. I owe you!

## 2019-12-19 NOTE — PROGRESS NOTES
Reviewed UA results from Monday. Occult blood not tested - awaiting call back from lab supervisor to determine if specimen still avail.    Julianna WAGGONER

## 2019-12-20 DIAGNOSIS — Z79.01 CHRONIC ANTICOAGULATION: ICD-10-CM

## 2019-12-23 ENCOUNTER — HOSPITAL ENCOUNTER (OUTPATIENT)
Dept: LAB | Facility: MEDICAL CENTER | Age: 69
End: 2019-12-23
Attending: INTERNAL MEDICINE
Payer: MEDICARE

## 2019-12-23 DIAGNOSIS — Z79.01 CHRONIC ANTICOAGULATION: ICD-10-CM

## 2019-12-23 LAB
APPEARANCE UR: CLEAR
BACTERIA #/AREA URNS HPF: NEGATIVE /HPF
BILIRUB UR QL STRIP.AUTO: NEGATIVE
COLOR UR: YELLOW
EPI CELLS #/AREA URNS HPF: NEGATIVE /HPF
GLUCOSE UR STRIP.AUTO-MCNC: NEGATIVE MG/DL
HYALINE CASTS #/AREA URNS LPF: ABNORMAL /LPF
KETONES UR STRIP.AUTO-MCNC: NEGATIVE MG/DL
LEUKOCYTE ESTERASE UR QL STRIP.AUTO: ABNORMAL
MICRO URNS: ABNORMAL
NITRITE UR QL STRIP.AUTO: NEGATIVE
PH UR STRIP.AUTO: 6 [PH] (ref 5–8)
PROT UR QL STRIP: NEGATIVE MG/DL
RBC # URNS HPF: ABNORMAL /HPF
RBC UR QL AUTO: ABNORMAL
SP GR UR STRIP.AUTO: 1.01
UROBILINOGEN UR STRIP.AUTO-MCNC: 0.2 MG/DL
WBC #/AREA URNS HPF: ABNORMAL /HPF

## 2019-12-23 PROCEDURE — 81001 URINALYSIS AUTO W/SCOPE: CPT

## 2019-12-26 DIAGNOSIS — Z79.01 CHRONIC ANTICOAGULATION: ICD-10-CM

## 2019-12-26 NOTE — PROGRESS NOTES
Discussed trace amount of occult blood in urinalysis.  Much improved over when he was on Xarelto   Will continue to monitor and retest in 4 weeks.  Beth Lutz, Clinical Pharmacist, CDE, CACP

## 2020-01-28 ENCOUNTER — HOSPITAL ENCOUNTER (OUTPATIENT)
Dept: LAB | Facility: MEDICAL CENTER | Age: 70
End: 2020-01-28
Attending: NURSE PRACTITIONER
Payer: MEDICARE

## 2020-01-28 PROCEDURE — 82274 ASSAY TEST FOR BLOOD FECAL: CPT

## 2020-01-30 ENCOUNTER — HOSPITAL ENCOUNTER (OUTPATIENT)
Dept: LAB | Facility: MEDICAL CENTER | Age: 70
End: 2020-01-30
Attending: NURSE PRACTITIONER
Payer: MEDICARE

## 2020-01-30 ENCOUNTER — HOSPITAL ENCOUNTER (OUTPATIENT)
Dept: LAB | Facility: MEDICAL CENTER | Age: 70
End: 2020-01-30
Attending: INTERNAL MEDICINE
Payer: MEDICARE

## 2020-01-30 DIAGNOSIS — Z79.01 CHRONIC ANTICOAGULATION: ICD-10-CM

## 2020-01-30 LAB
25(OH)D3 SERPL-MCNC: 58 NG/ML (ref 30–100)
ALBUMIN SERPL BCP-MCNC: 4 G/DL (ref 3.2–4.9)
ALBUMIN SERPL BCP-MCNC: 4.1 G/DL (ref 3.2–4.9)
ALBUMIN/GLOB SERPL: 1.3 G/DL
ALBUMIN/GLOB SERPL: 1.3 G/DL
ALP SERPL-CCNC: 76 U/L (ref 30–99)
ALP SERPL-CCNC: 79 U/L (ref 30–99)
ALT SERPL-CCNC: 12 U/L (ref 2–50)
ALT SERPL-CCNC: 14 U/L (ref 2–50)
ANION GAP SERPL CALC-SCNC: 7 MMOL/L (ref 0–11.9)
ANION GAP SERPL CALC-SCNC: 7 MMOL/L (ref 0–11.9)
APPEARANCE UR: ABNORMAL
APPEARANCE UR: CLEAR
AST SERPL-CCNC: 11 U/L (ref 12–45)
AST SERPL-CCNC: 11 U/L (ref 12–45)
BACTERIA #/AREA URNS HPF: NEGATIVE /HPF
BACTERIA #/AREA URNS HPF: NEGATIVE /HPF
BASOPHILS # BLD AUTO: 0.2 % (ref 0–1.8)
BASOPHILS # BLD AUTO: 0.3 % (ref 0–1.8)
BASOPHILS # BLD: 0.02 K/UL (ref 0–0.12)
BASOPHILS # BLD: 0.03 K/UL (ref 0–0.12)
BILIRUB SERPL-MCNC: 0.6 MG/DL (ref 0.1–1.5)
BILIRUB SERPL-MCNC: 0.6 MG/DL (ref 0.1–1.5)
BILIRUB UR QL STRIP.AUTO: NEGATIVE
BILIRUB UR QL STRIP.AUTO: NEGATIVE
BUN SERPL-MCNC: 38 MG/DL (ref 8–22)
BUN SERPL-MCNC: 39 MG/DL (ref 8–22)
C DIFF DNA SPEC QL NAA+PROBE: NEGATIVE
C DIFF TOX GENS STL QL NAA+PROBE: NEGATIVE
CALCIUM SERPL-MCNC: 10.1 MG/DL (ref 8.5–10.5)
CALCIUM SERPL-MCNC: 10.1 MG/DL (ref 8.5–10.5)
CHLORIDE SERPL-SCNC: 106 MMOL/L (ref 96–112)
CHLORIDE SERPL-SCNC: 107 MMOL/L (ref 96–112)
CO2 SERPL-SCNC: 24 MMOL/L (ref 20–33)
CO2 SERPL-SCNC: 24 MMOL/L (ref 20–33)
COLOR UR: YELLOW
COLOR UR: YELLOW
CREAT SERPL-MCNC: 1.33 MG/DL (ref 0.5–1.4)
CREAT SERPL-MCNC: 1.37 MG/DL (ref 0.5–1.4)
CREAT UR-MCNC: 39.3 MG/DL
EOSINOPHIL # BLD AUTO: 0.29 K/UL (ref 0–0.51)
EOSINOPHIL # BLD AUTO: 0.3 K/UL (ref 0–0.51)
EOSINOPHIL NFR BLD: 3.1 % (ref 0–6.9)
EOSINOPHIL NFR BLD: 3.3 % (ref 0–6.9)
EPI CELLS #/AREA URNS HPF: NEGATIVE /HPF
EPI CELLS #/AREA URNS HPF: NEGATIVE /HPF
ERYTHROCYTE [DISTWIDTH] IN BLOOD BY AUTOMATED COUNT: 49.2 FL (ref 35.9–50)
ERYTHROCYTE [DISTWIDTH] IN BLOOD BY AUTOMATED COUNT: 49.2 FL (ref 35.9–50)
G LAMBLIA+C PARVUM AG STL QL RAPID: NORMAL
GLOBULIN SER CALC-MCNC: 3.2 G/DL (ref 1.9–3.5)
GLOBULIN SER CALC-MCNC: 3.2 G/DL (ref 1.9–3.5)
GLUCOSE SERPL-MCNC: 109 MG/DL (ref 65–99)
GLUCOSE SERPL-MCNC: 110 MG/DL (ref 65–99)
GLUCOSE UR STRIP.AUTO-MCNC: NEGATIVE MG/DL
GLUCOSE UR STRIP.AUTO-MCNC: NEGATIVE MG/DL
HCT VFR BLD AUTO: 41.8 % (ref 42–52)
HCT VFR BLD AUTO: 42.2 % (ref 42–52)
HGB BLD-MCNC: 13.5 G/DL (ref 14–18)
HGB BLD-MCNC: 13.6 G/DL (ref 14–18)
HYALINE CASTS #/AREA URNS LPF: ABNORMAL /LPF
HYALINE CASTS #/AREA URNS LPF: ABNORMAL /LPF
IMM GRANULOCYTES # BLD AUTO: 0.02 K/UL (ref 0–0.11)
IMM GRANULOCYTES # BLD AUTO: 0.02 K/UL (ref 0–0.11)
IMM GRANULOCYTES NFR BLD AUTO: 0.2 % (ref 0–0.9)
IMM GRANULOCYTES NFR BLD AUTO: 0.2 % (ref 0–0.9)
KETONES UR STRIP.AUTO-MCNC: NEGATIVE MG/DL
KETONES UR STRIP.AUTO-MCNC: NEGATIVE MG/DL
LEUKOCYTE ESTERASE UR QL STRIP.AUTO: ABNORMAL
LEUKOCYTE ESTERASE UR QL STRIP.AUTO: ABNORMAL
LYMPHOCYTES # BLD AUTO: 2.97 K/UL (ref 1–4.8)
LYMPHOCYTES # BLD AUTO: 2.98 K/UL (ref 1–4.8)
LYMPHOCYTES NFR BLD: 32 % (ref 22–41)
LYMPHOCYTES NFR BLD: 32.2 % (ref 22–41)
MCH RBC QN AUTO: 30.9 PG (ref 27–33)
MCH RBC QN AUTO: 31 PG (ref 27–33)
MCHC RBC AUTO-ENTMCNC: 32.2 G/DL (ref 33.7–35.3)
MCHC RBC AUTO-ENTMCNC: 32.3 G/DL (ref 33.7–35.3)
MCV RBC AUTO: 95.9 FL (ref 81.4–97.8)
MCV RBC AUTO: 96.1 FL (ref 81.4–97.8)
MICRO URNS: ABNORMAL
MICRO URNS: ABNORMAL
MONOCYTES # BLD AUTO: 0.85 K/UL (ref 0–0.85)
MONOCYTES # BLD AUTO: 0.89 K/UL (ref 0–0.85)
MONOCYTES NFR BLD AUTO: 9.1 % (ref 0–13.4)
MONOCYTES NFR BLD AUTO: 9.7 % (ref 0–13.4)
NEUTROPHILS # BLD AUTO: 5.01 K/UL (ref 1.82–7.42)
NEUTROPHILS # BLD AUTO: 5.14 K/UL (ref 1.82–7.42)
NEUTROPHILS NFR BLD: 54.4 % (ref 44–72)
NEUTROPHILS NFR BLD: 55.3 % (ref 44–72)
NITRITE UR QL STRIP.AUTO: NEGATIVE
NITRITE UR QL STRIP.AUTO: NEGATIVE
NRBC # BLD AUTO: 0 K/UL
NRBC # BLD AUTO: 0 K/UL
NRBC BLD-RTO: 0 /100 WBC
NRBC BLD-RTO: 0 /100 WBC
PH UR STRIP.AUTO: 6 [PH] (ref 5–8)
PH UR STRIP.AUTO: 6 [PH] (ref 5–8)
PLATELET # BLD AUTO: 162 K/UL (ref 164–446)
PLATELET # BLD AUTO: 170 K/UL (ref 164–446)
PMV BLD AUTO: 9.7 FL (ref 9–12.9)
PMV BLD AUTO: 9.9 FL (ref 9–12.9)
POTASSIUM SERPL-SCNC: 4.2 MMOL/L (ref 3.6–5.5)
POTASSIUM SERPL-SCNC: 4.2 MMOL/L (ref 3.6–5.5)
PROT SERPL-MCNC: 7.2 G/DL (ref 6–8.2)
PROT SERPL-MCNC: 7.3 G/DL (ref 6–8.2)
PROT UR QL STRIP: NEGATIVE MG/DL
PROT UR QL STRIP: NEGATIVE MG/DL
PROT UR-MCNC: 13.5 MG/DL (ref 0–15)
PROT/CREAT UR: 344 MG/G (ref 15–68)
PTH-INTACT SERPL-MCNC: 119 PG/ML (ref 14–72)
RBC # BLD AUTO: 4.35 M/UL (ref 4.7–6.1)
RBC # BLD AUTO: 4.4 M/UL (ref 4.7–6.1)
RBC # URNS HPF: ABNORMAL /HPF
RBC # URNS HPF: ABNORMAL /HPF
RBC UR QL AUTO: ABNORMAL
RBC UR QL AUTO: ABNORMAL
SIGNIFICANT IND 70042: NORMAL
SITE SITE: NORMAL
SODIUM SERPL-SCNC: 137 MMOL/L (ref 135–145)
SODIUM SERPL-SCNC: 138 MMOL/L (ref 135–145)
SOURCE SOURCE: NORMAL
SP GR UR STRIP.AUTO: 1.01
SP GR UR STRIP.AUTO: 1.01
UROBILINOGEN UR STRIP.AUTO-MCNC: 0.2 MG/DL
UROBILINOGEN UR STRIP.AUTO-MCNC: 0.2 MG/DL
WBC # BLD AUTO: 9.2 K/UL (ref 4.8–10.8)
WBC # BLD AUTO: 9.3 K/UL (ref 4.8–10.8)
WBC #/AREA URNS HPF: ABNORMAL /HPF
WBC #/AREA URNS HPF: ABNORMAL /HPF
WBC STL QL MICRO: NORMAL

## 2020-01-30 PROCEDURE — 89055 LEUKOCYTE ASSESSMENT FECAL: CPT

## 2020-01-30 PROCEDURE — 80158 DRUG ASSAY CYCLOSPORINE: CPT

## 2020-01-30 PROCEDURE — 87086 URINE CULTURE/COLONY COUNT: CPT

## 2020-01-30 PROCEDURE — 81001 URINALYSIS AUTO W/SCOPE: CPT | Mod: 91

## 2020-01-30 PROCEDURE — 80053 COMPREHEN METABOLIC PANEL: CPT | Mod: 91

## 2020-01-30 PROCEDURE — 81001 URINALYSIS AUTO W/SCOPE: CPT

## 2020-01-30 PROCEDURE — 87086 URINE CULTURE/COLONY COUNT: CPT | Mod: GA,91

## 2020-01-30 PROCEDURE — 82306 VITAMIN D 25 HYDROXY: CPT

## 2020-01-30 PROCEDURE — 87329 GIARDIA AG IA: CPT

## 2020-01-30 PROCEDURE — 83970 ASSAY OF PARATHORMONE: CPT

## 2020-01-30 PROCEDURE — 85025 COMPLETE CBC W/AUTO DIFF WBC: CPT | Mod: 91

## 2020-01-30 PROCEDURE — 36415 COLL VENOUS BLD VENIPUNCTURE: CPT

## 2020-01-30 PROCEDURE — 87493 C DIFF AMPLIFIED PROBE: CPT

## 2020-01-30 PROCEDURE — 80053 COMPREHEN METABOLIC PANEL: CPT

## 2020-01-30 PROCEDURE — 87328 CRYPTOSPORIDIUM AG IA: CPT

## 2020-01-30 PROCEDURE — 85025 COMPLETE CBC W/AUTO DIFF WBC: CPT

## 2020-01-31 LAB — HEMOCCULT STL QL IA: POSITIVE

## 2020-02-01 LAB
BACTERIA UR CULT: NORMAL
BACTERIA UR CULT: NORMAL
CYCLOSPORINE BLD-MCNC: <20 NG/ML
SIGNIFICANT IND 70042: NORMAL
SIGNIFICANT IND 70042: NORMAL
SITE SITE: NORMAL
SITE SITE: NORMAL
SOURCE SOURCE: NORMAL
SOURCE SOURCE: NORMAL

## 2020-02-06 ENCOUNTER — TELEPHONE (OUTPATIENT)
Dept: VASCULAR LAB | Facility: MEDICAL CENTER | Age: 70
End: 2020-02-06

## 2020-02-06 NOTE — TELEPHONE ENCOUNTER
"Spoke with pt by phone regarding UA results.  He denies any difficulty urination or painful urination, no fevers. No blood in urine. Saw neph yesterday who states his urine test was \"fine.\"  Will continue to follow; pt to f/u with PCP if any symptoms.  Labs in 6mo for DOAC.    ROSALINE Angulo  [facility]    "

## 2020-03-09 DIAGNOSIS — Z79.01 CHRONIC ANTICOAGULATION: ICD-10-CM

## 2020-05-11 ENCOUNTER — APPOINTMENT (OUTPATIENT)
Dept: VASCULAR LAB | Facility: MEDICAL CENTER | Age: 70
End: 2020-05-11
Payer: MEDICARE

## 2020-05-18 ENCOUNTER — RX ONLY (OUTPATIENT)
Age: 70
Setting detail: RX ONLY
End: 2020-05-18

## 2020-05-18 RX ORDER — TRIAMCINOLONE ACETONIDE 1 MG/G
ONCE CREAM TOPICAL BID
Qty: 1 | Refills: 6 | Status: ERX

## 2020-05-19 ENCOUNTER — OFFICE VISIT (OUTPATIENT)
Dept: VASCULAR LAB | Facility: MEDICAL CENTER | Age: 70
End: 2020-05-19
Attending: FAMILY MEDICINE
Payer: MEDICARE

## 2020-05-19 VITALS
SYSTOLIC BLOOD PRESSURE: 122 MMHG | HEART RATE: 89 BPM | BODY MASS INDEX: 39.5 KG/M2 | WEIGHT: 275.9 LBS | HEIGHT: 70 IN | DIASTOLIC BLOOD PRESSURE: 67 MMHG

## 2020-05-19 DIAGNOSIS — Z79.60 LONG-TERM USE OF IMMUNOSUPPRESSANT MEDICATION: ICD-10-CM

## 2020-05-19 DIAGNOSIS — Z79.52 LONG TERM (CURRENT) USE OF SYSTEMIC STEROIDS: ICD-10-CM

## 2020-05-19 DIAGNOSIS — Z94.0 HISTORY OF RENAL TRANSPLANT: ICD-10-CM

## 2020-05-19 DIAGNOSIS — I77.82 ANCA-ASSOCIATED VASCULITIS (HCC): ICD-10-CM

## 2020-05-19 DIAGNOSIS — I82.4Y1 ACUTE VENOUS EMBOLISM AND THROMBOSIS OF DEEP VESSELS OF PROXIMAL END OF RIGHT LOWER EXTREMITY (HCC): ICD-10-CM

## 2020-05-19 DIAGNOSIS — Z79.01 CHRONIC ANTICOAGULATION: ICD-10-CM

## 2020-05-19 DIAGNOSIS — I10 ESSENTIAL HYPERTENSION: ICD-10-CM

## 2020-05-19 PROCEDURE — 99204 OFFICE O/P NEW MOD 45 MIN: CPT | Performed by: FAMILY MEDICINE

## 2020-05-19 PROCEDURE — 99212 OFFICE O/P EST SF 10 MIN: CPT | Performed by: FAMILY MEDICINE

## 2020-05-19 ASSESSMENT — FIBROSIS 4 INDEX: FIB4 SCORE: 1.411300658019085202

## 2020-05-19 ASSESSMENT — ENCOUNTER SYMPTOMS
ORTHOPNEA: 0
CHILLS: 0
DIARRHEA: 0
SORE THROAT: 0
DOUBLE VISION: 0
PALPITATIONS: 0
MYALGIAS: 0
COUGH: 0
FEVER: 0
DIZZINESS: 0
INSOMNIA: 0
BLURRED VISION: 0
ABDOMINAL PAIN: 0
DEPRESSION: 0
VOMITING: 0
HEADACHES: 0
SEIZURES: 0
BLOOD IN STOOL: 0
WEAKNESS: 0
FOCAL WEAKNESS: 0
NAUSEA: 0
WHEEZING: 0
BRUISES/BLEEDS EASILY: 0
TREMORS: 0
HEMOPTYSIS: 0
SHORTNESS OF BREATH: 0
NERVOUS/ANXIOUS: 0

## 2020-05-19 NOTE — PROGRESS NOTES
INITIAL VASCULAR VISIT  Subjective:   Krishan Acevedo is a 70 y.o. male who presents today 5/19/2020 for   Chief Complaint   Patient presents with   • New Patient     Length of Therapy / Eliquis    Referred for length of therapy (LOT) determination of anticoagulation in context of acute venothromboembolic disease    HPI:    VTE disease / Anticoagulation:   Current symptoms:  Denies current SOB, CP, leg swelling, edema, leg pain, cyanosis of digits, redness of extremities.  Has some mild soreness only.   Current antithrombotic agent: eliquis  Complications: none, tolerating well, no bleeding or bruising   Date of initiation of anticoagulation: 11/2019  Adherence: reports complete, no missed doses      Pertinent VTE pmhx:   Date of Diagnosis: 11/2019  Type of Venous thromboembolic disease (VTE): acute RLE DVT   Type of imaging: duplex   Preceding/presenting symptoms: acute RLE swelling and pain after car travel   VTE tx course: start eliquis 10mg BID for 10d, then 5mg BID   Any personal VTE hx? No  Any family VTE hx? No    UNPROVOKED VS PROVOKED:   Recent surgery ? No, denies prior VTE   Recent trauma ? No  Smoker?  reports that he has never smoked. He has never used smokeless tobacco.    Extended travel? Yes, Details: had driven to [place] early october, then Nov travel from [place]  Other periods of immobility? No  Other risk factors for VTE disease:  Chronic prednisone use, tacrolimus, cellcept, obesity.  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  seing nephrology (Dr. Blount) routinely.    Transplant team at [facility].   Reports ab testing monitoring via [facility] and reports has been undetectable.      Past Medical History:    Diagnosis Date   • Kidney failure    • Kidney transplant pain      Past Surgical History:   Procedure Laterality Date   • PEG PLACEMENT  10/10/2014    Performed by Brijesh Orozco M.D. at SURGERY [facility]     Family History   Problem Relation Age of Onset   • Stroke Brother 26        , ? cause   • Stroke Maternal Grandmother            • Clotting Disorder Neg Hx      Social History     Tobacco Use   Smoking Status Never Smoker   Smokeless Tobacco Never Used     Social History     Tobacco Use   • Smoking status: Never Smoker   • Smokeless tobacco: Never Used   Substance Use Topics   • Alcohol use: Not Currently   • Drug use: Not on file     Outpatient Encounter Medications as of 2020   Medication Sig Dispense Refill   • apixaban (ELIQUIS) 5mg Tab Take 1 Tab by mouth 2 Times a Day for 360 days. 180 Tab 3   • metoprolol SR (TOPROL XL) 50 MG TABLET SR 24 HR Take 50 mg by mouth every day.     • furosemide (LASIX) 20 MG Tab Take 20 mg by mouth 2 times a day.     • omeprazole (PRILOSEC) 20 MG delayed-release capsule Take 20 mg by mouth every day.     • Brimonidine Tartrate-Timolol (COMBIGAN) 0.2-0.5 % Solution by Ophthalmic route.     • allopurinol (ZYLOPRIM) 300 MG Tab Take 300 mg by mouth every morning.     • calcitRIOL (ROCALTROL) 0.25 MCG Cap Take 0.25 mcg by mouth every morning.     • Multiple Vitamins-Minerals (CENTRUM SILVER 50+MEN) Tab Take 1 Tab by mouth every morning.     • vitamin D, Ergocalciferol, (DRISDOL) 21833 units Cap capsule Take 50,000 Units by mouth every Monday.     • Glucosamine-Chondroit-Vit C-Mn (GLUCOSAMINE 1500 COMPLEX) Cap Take 2 Caps by mouth 2 Times a Day.     • MAGNESIUM PO Take 1 Tab by mouth 2 Times a Day.     • mycophenolate (CELLCEPT) 250 MG Cap Take 500 mg by mouth 2 times a day.     • predniSONE (DELTASONE) 5 MG Tab Take 5 mg by mouth every morning.     • tacrolimus (PROGRAF) 1 MG Cap Take 1 mg by mouth 2 times a day.     • atorvastatin (LIPITOR) 10 MG TABS  "Take 10 mg by mouth every morning.     • [DISCONTINUED] apixaban (ELIQUIS) 5mg Tab Take 1 Tab by mouth 2 Times a Day. 180 Tab 0     No facility-administered encounter medications on file as of 5/19/2020.      Allergies   Allergen Reactions   • Nsaids      Cannot take because of anti rejection meds.     DIET AND EXERCISE:  Weight Change: stable   BMI Readings from Last 5 Encounters:   05/19/20 39.59 kg/m²   11/12/19 37.80 kg/m²   05/05/18 39.51 kg/m²   11/23/16 33.81 kg/m²   10/10/14 32.89 kg/m²    Diet: common adult  Exercise: no regular exercise program     Review of Systems   Constitutional: Negative for chills, fever and malaise/fatigue.   HENT: Negative for nosebleeds, sore throat and tinnitus.    Eyes: Negative for blurred vision and double vision.   Respiratory: Negative for cough, hemoptysis, shortness of breath and wheezing.    Cardiovascular: Negative for chest pain, palpitations, orthopnea and leg swelling.   Gastrointestinal: Negative for abdominal pain, blood in stool, diarrhea, melena, nausea and vomiting.   Genitourinary: Negative for hematuria.   Musculoskeletal: Negative for joint pain and myalgias.   Skin: Negative for itching and rash.   Neurological: Negative for dizziness, tremors, focal weakness, seizures, weakness and headaches.   Endo/Heme/Allergies: Does not bruise/bleed easily.   Psychiatric/Behavioral: Negative for depression. The patient is not nervous/anxious and does not have insomnia.       Objective:     Vitals:    05/19/20 1429   BP: 122/67   BP Location: Right arm   Patient Position: Sitting   BP Cuff Size: Large adult   Pulse: 89   Weight: (!) 125.1 kg (275 lb 14.4 oz)   Height: 1.778 m (5' 10\")      BP Readings from Last 5 Encounters:   05/19/20 122/67   12/09/19 146/79   11/12/19 (!) 175/80   05/10/18 (!) 169/93   11/23/16 149/87      Body mass index is 39.59 kg/m².  Physical Exam  Vitals signs reviewed.   Constitutional:       Appearance: Normal appearance.   HENT:      Head: " Normocephalic and atraumatic.      Nose: Nose normal.      Mouth/Throat:      Mouth: Mucous membranes are moist.      Pharynx: Oropharynx is clear.   Eyes:      Extraocular Movements: Extraocular movements intact.      Conjunctiva/sclera: Conjunctivae normal.   Neck:      Musculoskeletal: Normal range of motion and neck supple.   Cardiovascular:      Rate and Rhythm: Normal rate and regular rhythm.      Pulses: Normal pulses.           Carotid pulses are 2+ on the right side and 2+ on the left side.       Radial pulses are 2+ on the right side and 2+ on the left side.        Dorsalis pedis pulses are 2+ on the right side and 2+ on the left side.        Posterior tibial pulses are 2+ on the right side and 2+ on the left side.      Heart sounds: Normal heart sounds.      Comments:    Spider telangectasia:       RLE:  None      LLE: none   Varicosities:           RLE: none      LLE: none   Corona phlebectatica:      RLE:  None        LLE:  None   Cording:         RLE:  None     LLE: None     Pulmonary:      Effort: Pulmonary effort is normal.      Breath sounds: Normal breath sounds.   Abdominal:      General: Abdomen is flat. Bowel sounds are normal.      Palpations: Abdomen is soft.   Musculoskeletal:      Right lower leg: No edema.      Left lower leg: No edema.   Skin:     General: Skin is warm and dry.      Capillary Refill: Capillary refill takes less than 2 seconds.   Neurological:      General: No focal deficit present.      Mental Status: He is alert and oriented to person, place, and time. Mental status is at baseline.   Psychiatric:         Mood and Affect: Mood normal.         Behavior: Behavior normal.           Lab Results   Component Value Date    PROTHROMBTM 13.8 11/12/2019    INR 1.60 12/02/2019         Lab Results   Component Value Date    SODIUM 138 01/30/2020    POTASSIUM 4.2 01/30/2020    CHLORIDE 107 01/30/2020    CO2 24 01/30/2020    GLUCOSE 109 (H) 01/30/2020    BUN 39 (H) 01/30/2020     CREATININE 1.33 01/30/2020    IFAFRICA >60 01/30/2020    IFNOTAFR 53 (A) 01/30/2020        Lab Results   Component Value Date    WBC 9.2 01/30/2020    RBC 4.35 (L) 01/30/2020    HEMOGLOBIN 13.5 (L) 01/30/2020    HEMATOCRIT 41.8 (L) 01/30/2020    MCV 96.1 01/30/2020    MCH 31.0 01/30/2020    MCHC 32.3 (L) 01/30/2020    MPV 9.7 01/30/2020      VASCULAR IMAGING:     Last EKG:     CT chest 2018   3.  Aortic and coronary artery atherosclerotic plaque    Echo 2018  Normal left ventricular size and systolic function.  Left ventricular ejection fraction is visually estimated to be 60%.  Mild concentric left ventricular hypertrophy.  Mildly dilated right ventricle.  Normal right ventricular systolic function.  Moderate mitral regurgitation.  Estimated right ventricular systolic pressure  is 35 mmHg.    RLE Venous 11/2019  Deep venous thrombosis within the right distal femoral vein and popliteal    veins.    Limited evaluation of the posterior tibial and peroneal veins. Thrombus not    excluded.    Medical Decision Making:  Today's Assessment / Status / Plan:     1. Acute venous embolism and thrombosis of deep vessels of proximal end of right lower extremity (HCC)     2. Essential hypertension     3. Chronic anticoagulation  apixaban (ELIQUIS) 5mg Tab   4. History of renal transplant     5. Long term (current) use of systemic steroids     6. Long-term use of immunosuppressant medication     7. ANCA-associated vasculitis (HCC)       Patient Type: Primary Prevention    Etiology of Established CVD if Present:   1) Aortic athero per CT scan   2) CAD subclinical per CT scan   3) c-ANCA associated vasculitis (Wegener's, aka GPA)     Antithrombotic therapy:  Indication: acute RLE DVT   Anti-Platelet/Anti-Coagulant Tx: yes  HAS-BLED bleeding risk calc ([email or web address]): 2 pts, 4.1% annual risk (moderate)  Cost is not an issue based upon insurance type.   Had multiple risk factos including travel, prednisone therapy and immunosuppresants  all carry increased risk for VTE.    Underlying AAV increases risk of recurrence but unclear if related to immunosuppresant tx vs disease itself.   We reviewed that 6mo of OAC is adequate, then discussed extension with high dose vs low-dose for additional 1 year, then start ASA 162mg daily indefinitely.  Would also offer options for low-dose indefinite therapy, though evidence is lacking on effectiveness, it is likely beneficial and benefit would outweigh risk of bleeding.    Antithrombotic therapy plan:  - continue eliquis 5mg BID for now - refilled   - he will consider 2.5mg BID and would like to review with Dr. Blount 1st inJuly  - start compression stockings, 20-30mmHg  - will need labs updated and ongoing CBC, CrCl evaluations Q6mo while on DOAC   - counseled on signs and symptoms of acute VTE that require seeking prompt attention in the ED to include shortness of breath, chest pain, pain with deep inhalation, acute leg swelling and/or pain in calf or leg   - elevate legs as much as possible, use compression stockings/socks if directed by your provider  - Avoid hormonal therapies including estrogen or testosterone-containing meds, or raloxifene or tamoxifene (commonly used for osteoporosis)  - Avoid sedentary periods  - continue complete avoidance of tobacco products  - if having any invasive procedure,please make sure the doctor knows of your history of blood clots and current anticoagulation status  - avoid Aspirin and anti-inflammatories (eg. Advil, ibuprofen, Aleve, naproxen, etc) while anticoagulated   - avoid skiing or other dangerous activities to reduce risk of head injury and brain bleeds  - if any bleeding lasting 30min without stopping, please seek care with your PCP, urgent care, or ED  - reversal agents for most blood thinners are now available and used if you have major bleeding    Lipid Management: Qualifies for Statin Therapy Based on 2018 ACC/AHA Guidelines: yes  Calculated 10-Year Risk of  ASCVD (if LDL <189, no CKD G3b/4/5): N/A  Currently on Statin: Yes  NLA Risk Category (2014): High: due to renal transplant, KDIGO guidelines   Tx threshold: non-HDL-C >129, LDL-C >99  Tx goal:  non-HDL <130, LDL-C <100 (optional: apoB<90)   At goal:  yes  Plan:  - reinforced ongoing TLC measures   - lab surveillance as noted   - continue atorva 10mg, consider increase to 20mg     Blood Pressure Management:Goal: ACC/AHA (2017) goal <130/80  Home BP at goal:  yes  Office BP at goal:  yes  Echo: N/A  ACR: N/A  Device candidate? no  Plan:   Monitoring:   - continue home BP monitoring, reviewed correct technique, provide BP log and instructions  - order 24h ABPM:  NO  - monitor lytes/gfr routinely   - contact office if BP consistently >140/>90 to discussion of tx adjustments   Medications:  ACEi/ARB: none   DHP-CCB: none   Thiazide: none   Aldosterone Antagonist: not indicated at this time   Other:   Peripheral Alpha Blocker: not indicated   Loop Diuretic: continue furosemide 20mg BID, KCl if low K+   Other CCB: not indicated   Centrally Acting Alpha Agonist: not indicated   Potassium-sparing diuretic: not indicated   BB: continue metoprolol 50mg  ER daily   DRI: not indicated    Direct Vasodilator: not indicated     Glycemic Status: Normal    Smoking:  continued complete avoidance of all tobacco products     Physical Activity: continue healthy activity to improve CV fitness, see care instructions for additional details     Weight Management and Nutrition: Dietary plan was discussed with patient at this visit including DASH, low sodium and/or as outlined in care instructions     Other:   1) 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    Has ongoing care with nephrology, appears stable on immunosuppresant therapy.   Has risk for arterial and venous disease baseline  - defer mgmt to nephrology    2) hx of renal transplant, stable BUN/Cr  - defer mgmt to nephrology and transplant team     3) atherosclerosis of coronaries  and aorta, subclinical per CT  - continue BP and lipid mgmt, no further imaging needed at this time     Instructed to follow-up with PCP for remainder of adult medical needs: yes  We will partner with other providers in the management of established vascular disease and cardiometabolic risk factors.    Studies to Be Obtained: none    Labs to Be Obtained: as noted above     Follow up in: july with nephro, august with me     Abiodun Delgado M.D.

## 2020-05-20 NOTE — PATIENT INSTRUCTIONS
- counseled on signs and symptoms of acute VTE that require seeking prompt attention in the ED to include shortness of breath, chest pain, pain with deep inhalation, acute leg swelling and/or pain in calf or leg   - elevate legs as much as possible, use compression stockings/socks if directed by your provider  - Avoid hormonal therapies including estrogen or testosterone-containing meds, or raloxifene or tamoxifene (commonly used for osteoporosis)  - Avoid sedentary periods  - continue complete avoidance of tobacco products  - if having any invasive procedure,please make sure the doctor knows of your history of blood clots and current anticoagulation status  - avoid Aspirin and anti-inflammatories (eg. Advil, ibuprofen, Aleve, naproxen, etc) while anticoagulated   - avoid skiing or other dangerous activities to reduce risk of head injury and brain bleeds  - recommended to see your PCP to discuss if you need age-appropriate cancer screenings as a small % of blood clots may be caused by an underlying malignancy  - if any bleeding lasting 30min without stopping, please seek care with your PCP, urgent care, or ED  - reversal agents for most blood thinners are now available and used if you have major bleeding

## 2020-05-27 ENCOUNTER — APPOINTMENT (RX ONLY)
Dept: URBAN - METROPOLITAN AREA CLINIC 4 | Facility: CLINIC | Age: 70
Setting detail: DERMATOLOGY
End: 2020-05-27

## 2020-05-27 DIAGNOSIS — L81.4 OTHER MELANIN HYPERPIGMENTATION: ICD-10-CM

## 2020-05-27 DIAGNOSIS — L82.1 OTHER SEBORRHEIC KERATOSIS: ICD-10-CM

## 2020-05-27 DIAGNOSIS — L82.0 INFLAMED SEBORRHEIC KERATOSIS: ICD-10-CM

## 2020-05-27 DIAGNOSIS — D22 MELANOCYTIC NEVI: ICD-10-CM

## 2020-05-27 PROBLEM — D22.5 MELANOCYTIC NEVI OF TRUNK: Status: ACTIVE | Noted: 2020-05-27

## 2020-05-27 PROBLEM — D48.5 NEOPLASM OF UNCERTAIN BEHAVIOR OF SKIN: Status: ACTIVE | Noted: 2020-05-27

## 2020-05-27 PROCEDURE — ? LIQUID NITROGEN (COSMETIC)

## 2020-05-27 PROCEDURE — 99213 OFFICE O/P EST LOW 20 MIN: CPT | Mod: 25

## 2020-05-27 PROCEDURE — ? BIOPSY BY SHAVE METHOD

## 2020-05-27 PROCEDURE — 11102 TANGNTL BX SKIN SINGLE LES: CPT

## 2020-05-27 PROCEDURE — ? DIAGNOSIS COMMENT

## 2020-05-27 ASSESSMENT — LOCATION ZONE DERM
LOCATION ZONE: TRUNK
LOCATION ZONE: FACE
LOCATION ZONE: NECK
LOCATION ZONE: ARM

## 2020-05-27 ASSESSMENT — LOCATION SIMPLE DESCRIPTION DERM
LOCATION SIMPLE: RIGHT LOWER BACK
LOCATION SIMPLE: RIGHT FOREARM
LOCATION SIMPLE: LEFT ANTERIOR NECK
LOCATION SIMPLE: CHEST
LOCATION SIMPLE: LEFT TEMPLE
LOCATION SIMPLE: RIGHT UPPER BACK
LOCATION SIMPLE: LEFT CHEEK
LOCATION SIMPLE: LEFT FOREARM

## 2020-05-27 ASSESSMENT — LOCATION DETAILED DESCRIPTION DERM
LOCATION DETAILED: LEFT PROXIMAL DORSAL FOREARM
LOCATION DETAILED: RIGHT PROXIMAL DORSAL FOREARM
LOCATION DETAILED: LEFT CENTRAL MALAR CHEEK
LOCATION DETAILED: RIGHT MEDIAL UPPER BACK
LOCATION DETAILED: LEFT LATERAL SUPERIOR CHEST
LOCATION DETAILED: LEFT INFERIOR CENTRAL MALAR CHEEK
LOCATION DETAILED: RIGHT SUPERIOR MEDIAL MIDBACK
LOCATION DETAILED: LEFT INFERIOR ANTERIOR NECK
LOCATION DETAILED: LEFT CENTRAL TEMPLE

## 2020-05-27 NOTE — PROCEDURE: BIOPSY BY SHAVE METHOD
Detail Level: Detailed
Depth Of Biopsy: dermis
Was A Bandage Applied: Yes
Size Of Lesion In Cm: 0
Additional Anticipated Plans: If malignant consider excision
Biopsy Type: H and E
Biopsy Method: Personna blade
Anesthesia Type: 1% lidocaine with epinephrine and a 1:10 solution of 8.4% sodium bicarbonate
Anesthesia Volume In Cc: 6
Hemostasis: Drysol and Electrocautery
Wound Care: Vaseline
Dressing: Band-Aid
Type Of Destruction Used: Curettage
Curettage Text: The wound bed was treated with curettage after the biopsy was performed.
Electrodesiccation Text: The wound bed was treated with electrodesiccation after the biopsy was performed.
Electrodesiccation And Curettage Text: The wound bed was treated with electrodesiccation and curettage after the biopsy was performed.
Lab: [id number]
Lab Facility: 
Render Path Notes In Note?: No
Consent: Written consent was obtained and risks were reviewed including but not limited to scarring, infection, bleeding, scabbing, incomplete removal, nerve damage and allergy to anesthesia.
Post-Care Instructions: I reviewed with the patient in detail post-care instructions. Patient is to keep the biopsy site dry overnight, and then apply vasaline twice daily until healed.
Notification Instructions: Patient will be notified of biopsy results. However, patient instructed to call the office if not contacted within 2 weeks.
Billing Type: Third-Party Bill
Information: Selecting Yes will display possible errors in your note based on the variables you have selected. This validation is only offered as a suggestion for you. PLEASE NOTE THAT THE VALIDATION TEXT WILL BE REMOVED WHEN YOU FINALIZE YOUR NOTE. IF YOU WANT TO FAX A PRELIMINARY NOTE YOU WILL NEED TO TOGGLE THIS TO 'NO' IF YOU DO NOT WANT IT IN YOUR FAXED NOTE.

## 2020-05-27 NOTE — HPI: SKIN LESION
Is This A New Presentation, Or A Follow-Up?: Growth
What Type Of Note Output Would You Prefer (Optional)?: Bullet Format
How Severe Is Your Skin Lesion?: severe
Has Your Skin Lesion Been Treated?: not been treated
Additional History: We ordered Tac cream for patient but the RX was sent to [facility] (no longer his mail order pharmacy) instead of [facility] so he never received it.

## 2020-05-27 NOTE — PROCEDURE: DIAGNOSIS COMMENT
Detail Level: Detailed
Comment: Curette was used. Because patient is immunosuppressed, we will do an excision. PA started today so if excision is necessary we can schedule when results are being given.\\nSEE TASK.

## 2020-06-08 ENCOUNTER — APPOINTMENT (RX ONLY)
Dept: URBAN - METROPOLITAN AREA CLINIC 4 | Facility: CLINIC | Age: 70
Setting detail: DERMATOLOGY
End: 2020-06-08

## 2020-06-08 PROBLEM — D04.5 CARCINOMA IN SITU OF SKIN OF TRUNK: Status: ACTIVE | Noted: 2020-06-08

## 2020-06-08 PROCEDURE — 12032 INTMD RPR S/A/T/EXT 2.6-7.5: CPT

## 2020-06-08 PROCEDURE — 11602 EXC TR-EXT MAL+MARG 1.1-2 CM: CPT

## 2020-06-08 PROCEDURE — ? EXCISION

## 2020-06-08 NOTE — PROCEDURE: EXCISION
Surgeon Performing The Repair (Optional): Anshul
Biopsy Photograph Reviewed: Yes
Previous Accession (Optional): [id number]
Size Of Lesion In Cm: 1.4
X Size Of Lesion In Cm (Optional): 0
Size Of Margin In Cm: 0.3
Excision Method: Fusiform
Anesthesia Volume In Cc: 9
Did You Provide Opioid Counseling: No
Repair Type: Intermediate
Intermediate / Complex Repair - Final Wound Length In Cm: 5.9
Undermining Type: Entire Wound
Debridement Text: The wound edges were debrided prior to proceeding with the closure to facilitate wound healing.
Helical Rim Text: The closure involved the helical rim.
Vermilion Border Text: The closure involved the vermilion border.
Nostril Rim Text: The closure involved the nostril rim.
Retention Suture Text: Retention sutures were placed to support the closure and prevent dehiscence.
Suture Removal: 14 days
Lab: [id number]
Lab Facility: 
Graft Donor Site Bandage (Optional-Leave Blank If You Don't Want In Note): Steri-strips and a pressure bandage were applied to the donor site.
Epidermal Closure Graft Donor Site (Optional): simple interrupted
Billing Type: Third-Party Bill
Excision Depth: adipose tissue
Scalpel Size: 15 blade
Anesthesia Type: 1% lidocaine with 1:100,000 epinephrine and a 1:10 solution of 8.4% sodium bicarbonate
Hemostasis: Electrocautery
Estimated Blood Loss (Cc): minimal
Detail Level: Detailed
Anesthesia Volume In Cc: 6
Deep Sutures: 4-0 Polysorb
Number Of Deep Sutures (Optional): 3
Epidermal Sutures: 4-0 Nylon
Epidermal Closure: running cuticular
Wound Care: Vaseline
Dressing: pressure dressing
Positioning (Leave Blank If You Do Not Want): The patient was placed in a comfortable position exposing the surgical site.
Pre-Excision Curettage Text (Leave Blank If You Do Not Want): Prior to drawing the surgical margin the visible lesion was removed with electrodesiccation and curettage to clearly define the lesion size.
Complex Repair Preamble Text (Leave Blank If You Do Not Want): Extensive wide undermining was performed.
Intermediate Repair Preamble Text (Leave Blank If You Do Not Want): Undermining was performed with blunt dissection.
Curvilinear Excision Additional Text (Leave Blank If You Do Not Want): The margin was drawn around the clinically apparent lesion.  A curvilinear shape was then drawn on the skin incorporating the lesion and margins.  Incisions were then made along these lines to the appropriate tissue plane and the lesion was extirpated.
Fusiform Excision Additional Text (Leave Blank If You Do Not Want): The margin was drawn around the clinically apparent lesion.  A fusiform shape was then drawn on the skin incorporating the lesion and margins.  Incisions were then made along these lines to the appropriate tissue plane and the lesion was extirpated.
Elliptical Excision Additional Text (Leave Blank If You Do Not Want): The margin was drawn around the clinically apparent lesion.  An elliptical shape was then drawn on the skin incorporating the lesion and margins.  Incisions were then made along these lines to the appropriate tissue plane and the lesion was extirpated.
Saucerization Excision Additional Text (Leave Blank If You Do Not Want): The margin was drawn around the clinically apparent lesion.  Incisions were then made along these lines, in a tangential fashion, to the appropriate tissue plane and the lesion was extirpated.
Slit Excision Additional Text (Leave Blank If You Do Not Want): A linear line was drawn on the skin overlying the lesion. An incision was made slowly until the lesion was visualized.  Once visualized, the lesion was removed with blunt dissection.
Excisional Biopsy Additional Text (Leave Blank If You Do Not Want): The margin was drawn around the clinically apparent lesion. An elliptical shape was then drawn on the skin incorporating the lesion and margins.  Incisions were then made along these lines to the appropriate tissue plane and the lesion was extirpated.
Perilesional Excision Additional Text (Leave Blank If You Do Not Want): The margin was drawn around the clinically apparent lesion. Incisions were then made along these lines to the appropriate tissue plane and the lesion was extirpated.
Repair Performed By Another Provider Text (Leave Blank If You Do Not Want): After the tissue was excised the defect was repaired by another provider.
No Repair - Repaired With Adjacent Surgical Defect Text (Leave Blank If You Do Not Want): After the excision the defect was repaired concurrently with another surgical defect which was in close approximation.
Advancement Flap (Single) Text: The defect edges were debeveled with a #15 scalpel blade.  Given the location of the defect and the proximity to free margins a single advancement flap was deemed most appropriate.  Using a sterile surgical marker, an appropriate advancement flap was drawn incorporating the defect and placing the expected incisions within the relaxed skin tension lines where possible.    The area thus outlined was incised deep to adipose tissue with a #15 scalpel blade.  The skin margins were undermined to an appropriate distance in all directions utilizing iris scissors.
Advancement Flap (Double) Text: The defect edges were debeveled with a #15 scalpel blade.  Given the location of the defect and the proximity to free margins a double advancement flap was deemed most appropriate.  Using a sterile surgical marker, the appropriate advancement flaps were drawn incorporating the defect and placing the expected incisions within the relaxed skin tension lines where possible.    The area thus outlined was incised deep to adipose tissue with a #15 scalpel blade.  The skin margins were undermined to an appropriate distance in all directions utilizing iris scissors.
Burow's Advancement Flap Text: The defect edges were debeveled with a #15 scalpel blade.  Given the location of the defect and the proximity to free margins a Burow's advancement flap was deemed most appropriate.  Using a sterile surgical marker, the appropriate advancement flap was drawn incorporating the defect and placing the expected incisions within the relaxed skin tension lines where possible.    The area thus outlined was incised deep to adipose tissue with a #15 scalpel blade.  The skin margins were undermined to an appropriate distance in all directions utilizing iris scissors.
Chonodrocutaneous Helical Advancement Flap Text: The defect edges were debeveled with a #15 scalpel blade.  Given the location of the defect and the proximity to free margins a chondrocutaneous helical advancement flap was deemed most appropriate.  Using a sterile surgical marker, the appropriate advancement flap was drawn incorporating the defect and placing the expected incisions within the relaxed skin tension lines where possible.    The area thus outlined was incised deep to adipose tissue with a #15 scalpel blade.  The skin margins were undermined to an appropriate distance in all directions utilizing iris scissors.
Crescentic Advancement Flap Text: The defect edges were debeveled with a #15 scalpel blade.  Given the location of the defect and the proximity to free margins a crescentic advancement flap was deemed most appropriate.  Using a sterile surgical marker, the appropriate advancement flap was drawn incorporating the defect and placing the expected incisions within the relaxed skin tension lines where possible.    The area thus outlined was incised deep to adipose tissue with a #15 scalpel blade.  The skin margins were undermined to an appropriate distance in all directions utilizing iris scissors.
A-T Advancement Flap Text: The defect edges were debeveled with a #15 scalpel blade.  Given the location of the defect, shape of the defect and the proximity to free margins an A-T advancement flap was deemed most appropriate.  Using a sterile surgical marker, an appropriate advancement flap was drawn incorporating the defect and placing the expected incisions within the relaxed skin tension lines where possible.    The area thus outlined was incised deep to adipose tissue with a #15 scalpel blade.  The skin margins were undermined to an appropriate distance in all directions utilizing iris scissors.
O-T Advancement Flap Text: The defect edges were debeveled with a #15 scalpel blade.  Given the location of the defect, shape of the defect and the proximity to free margins an O-T advancement flap was deemed most appropriate.  Using a sterile surgical marker, an appropriate advancement flap was drawn incorporating the defect and placing the expected incisions within the relaxed skin tension lines where possible.    The area thus outlined was incised deep to adipose tissue with a #15 scalpel blade.  The skin margins were undermined to an appropriate distance in all directions utilizing iris scissors.
O-L Flap Text: The defect edges were debeveled with a #15 scalpel blade.  Given the location of the defect, shape of the defect and the proximity to free margins an O-L flap was deemed most appropriate.  Using a sterile surgical marker, an appropriate advancement flap was drawn incorporating the defect and placing the expected incisions within the relaxed skin tension lines where possible.    The area thus outlined was incised deep to adipose tissue with a #15 scalpel blade.  The skin margins were undermined to an appropriate distance in all directions utilizing iris scissors.
O-Z Flap Text: The defect edges were debeveled with a #15 scalpel blade.  Given the location of the defect, shape of the defect and the proximity to free margins an O-Z flap was deemed most appropriate.  Using a sterile surgical marker, an appropriate transposition flap was drawn incorporating the defect and placing the expected incisions within the relaxed skin tension lines where possible. The area thus outlined was incised deep to adipose tissue with a #15 scalpel blade.  The skin margins were undermined to an appropriate distance in all directions utilizing iris scissors.
Double O-Z Flap Text: The defect edges were debeveled with a #15 scalpel blade.  Given the location of the defect, shape of the defect and the proximity to free margins a Double O-Z flap was deemed most appropriate.  Using a sterile surgical marker, an appropriate transposition flap was drawn incorporating the defect and placing the expected incisions within the relaxed skin tension lines where possible. The area thus outlined was incised deep to adipose tissue with a #15 scalpel blade.  The skin margins were undermined to an appropriate distance in all directions utilizing iris scissors.
V-Y Flap Text: The defect edges were debeveled with a #15 scalpel blade.  Given the location of the defect, shape of the defect and the proximity to free margins a V-Y flap was deemed most appropriate.  Using a sterile surgical marker, an appropriate advancement flap was drawn incorporating the defect and placing the expected incisions within the relaxed skin tension lines where possible.    The area thus outlined was incised deep to adipose tissue with a #15 scalpel blade.  The skin margins were undermined to an appropriate distance in all directions utilizing iris scissors.
Advancement-Rotation Flap Text: The defect edges were debeveled with a #15 scalpel blade.  Given the location of the defect, shape of the defect and the proximity to free margins an advancement-rotation flap was deemed most appropriate.  Using a sterile surgical marker, an appropriate flap was drawn incorporating the defect and placing the expected incisions within the relaxed skin tension lines where possible. The area thus outlined was incised deep to adipose tissue with a #15 scalpel blade.  The skin margins were undermined to an appropriate distance in all directions utilizing iris scissors.
Mercedes Flap Text: The defect edges were debeveled with a #15 scalpel blade.  Given the location of the defect, shape of the defect and the proximity to free margins a Mercedes flap was deemed most appropriate.  Using a sterile surgical marker, an appropriate advancement flap was drawn incorporating the defect and placing the expected incisions within the relaxed skin tension lines where possible. The area thus outlined was incised deep to adipose tissue with a #15 scalpel blade.  The skin margins were undermined to an appropriate distance in all directions utilizing iris scissors.
Modified Advancement Flap Text: The defect edges were debeveled with a #15 scalpel blade.  Given the location of the defect, shape of the defect and the proximity to free margins a modified advancement flap was deemed most appropriate.  Using a sterile surgical marker, an appropriate advancement flap was drawn incorporating the defect and placing the expected incisions within the relaxed skin tension lines where possible.    The area thus outlined was incised deep to adipose tissue with a #15 scalpel blade.  The skin margins were undermined to an appropriate distance in all directions utilizing iris scissors.
Mucosal Advancement Flap Text: Given the location of the defect, shape of the defect and the proximity to free margins a mucosal advancement flap was deemed most appropriate. Incisions were made with a 15 blade scalpel in the appropriate fashion along the cutaneous vermilion border and the mucosal lip. The remaining actinically damaged mucosal tissue was excised.  The mucosal advancement flap was then elevated to the gingival sulcus with care taken to preserve the neurovascular structures and advanced into the primary defect. Care was taken to ensure that precise realignment of the vermilion border was achieved.
Peng Advancement Flap Text: The defect edges were debeveled with a #15 scalpel blade.  Given the location of the defect, shape of the defect and the proximity to free margins a Peng advancement flap was deemed most appropriate.  Using a sterile surgical marker, an appropriate advancement flap was drawn incorporating the defect and placing the expected incisions within the relaxed skin tension lines where possible. The area thus outlined was incised deep to adipose tissue with a #15 scalpel blade.  The skin margins were undermined to an appropriate distance in all directions utilizing iris scissors.
Hatchet Flap Text: The defect edges were debeveled with a #15 scalpel blade.  Given the location of the defect, shape of the defect and the proximity to free margins a hatchet flap was deemed most appropriate.  Using a sterile surgical marker, an appropriate hatchet flap was drawn incorporating the defect and placing the expected incisions within the relaxed skin tension lines where possible.    The area thus outlined was incised deep to adipose tissue with a #15 scalpel blade.  The skin margins were undermined to an appropriate distance in all directions utilizing iris scissors.
Rotation Flap Text: The defect edges were debeveled with a #15 scalpel blade.  Given the location of the defect, shape of the defect and the proximity to free margins a rotation flap was deemed most appropriate.  Using a sterile surgical marker, an appropriate rotation flap was drawn incorporating the defect and placing the expected incisions within the relaxed skin tension lines where possible.    The area thus outlined was incised deep to adipose tissue with a #15 scalpel blade.  The skin margins were undermined to an appropriate distance in all directions utilizing iris scissors.
Spiral Flap Text: The defect edges were debeveled with a #15 scalpel blade.  Given the location of the defect, shape of the defect and the proximity to free margins a spiral flap was deemed most appropriate.  Using a sterile surgical marker, an appropriate rotation flap was drawn incorporating the defect and placing the expected incisions within the relaxed skin tension lines where possible. The area thus outlined was incised deep to adipose tissue with a #15 scalpel blade.  The skin margins were undermined to an appropriate distance in all directions utilizing iris scissors.
Star Wedge Flap Text: The defect edges were debeveled with a #15 scalpel blade.  Given the location of the defect, shape of the defect and the proximity to free margins a star wedge flap was deemed most appropriate.  Using a sterile surgical marker, an appropriate rotation flap was drawn incorporating the defect and placing the expected incisions within the relaxed skin tension lines where possible. The area thus outlined was incised deep to adipose tissue with a #15 scalpel blade.  The skin margins were undermined to an appropriate distance in all directions utilizing iris scissors.
Transposition Flap Text: The defect edges were debeveled with a #15 scalpel blade.  Given the location of the defect and the proximity to free margins a transposition flap was deemed most appropriate.  Using a sterile surgical marker, an appropriate transposition flap was drawn incorporating the defect.    The area thus outlined was incised deep to adipose tissue with a #15 scalpel blade.  The skin margins were undermined to an appropriate distance in all directions utilizing iris scissors.
Muscle Hinge Flap Text: The defect edges were debeveled with a #15 scalpel blade.  Given the size, depth and location of the defect and the proximity to free margins a muscle hinge flap was deemed most appropriate.  Using a sterile surgical marker, an appropriate hinge flap was drawn incorporating the defect. The area thus outlined was incised with a #15 scalpel blade.  The skin margins were undermined to an appropriate distance in all directions utilizing iris scissors.
Melolabial Transposition Flap Text: The defect edges were debeveled with a #15 scalpel blade.  Given the location of the defect and the proximity to free margins a melolabial flap was deemed most appropriate.  Using a sterile surgical marker, an appropriate melolabial transposition flap was drawn incorporating the defect.    The area thus outlined was incised deep to adipose tissue with a #15 scalpel blade.  The skin margins were undermined to an appropriate distance in all directions utilizing iris scissors.
Rhombic Flap Text: The defect edges were debeveled with a #15 scalpel blade.  Given the location of the defect and the proximity to free margins a rhombic flap was deemed most appropriate.  Using a sterile surgical marker, an appropriate rhombic flap was drawn incorporating the defect.    The area thus outlined was incised deep to adipose tissue with a #15 scalpel blade.  The skin margins were undermined to an appropriate distance in all directions utilizing iris scissors.
Rhomboid Transposition Flap Text: The defect edges were debeveled with a #15 scalpel blade.  Given the location of the defect and the proximity to free margins a rhomboid transposition flap was deemed most appropriate.  Using a sterile surgical marker, an appropriate rhomboid flap was drawn incorporating the defect.    The area thus outlined was incised deep to adipose tissue with a #15 scalpel blade.  The skin margins were undermined to an appropriate distance in all directions utilizing iris scissors.
Bi-Rhombic Flap Text: The defect edges were debeveled with a #15 scalpel blade.  Given the location of the defect and the proximity to free margins a bi-rhombic flap was deemed most appropriate.  Using a sterile surgical marker, an appropriate rhombic flap was drawn incorporating the defect. The area thus outlined was incised deep to adipose tissue with a #15 scalpel blade.  The skin margins were undermined to an appropriate distance in all directions utilizing iris scissors.
Helical Rim Advancement Flap Text: The defect edges were debeveled with a #15 blade scalpel.  Given the location of the defect and the proximity to free margins (helical rim) a double helical rim advancement flap was deemed most appropriate.  Using a sterile surgical marker, the appropriate advancement flaps were drawn incorporating the defect and placing the expected incisions between the helical rim and antihelix where possible.  The area thus outlined was incised through and through with a #15 scalpel blade.  With a skin hook and iris scissors, the flaps were gently and sharply undermined and freed up.
Bilateral Helical Rim Advancement Flap Text: The defect edges were debeveled with a #15 blade scalpel.  Given the location of the defect and the proximity to free margins (helical rim) a bilateral helical rim advancement flap was deemed most appropriate.  Using a sterile surgical marker, the appropriate advancement flaps were drawn incorporating the defect and placing the expected incisions between the helical rim and antihelix where possible.  The area thus outlined was incised through and through with a #15 scalpel blade.  With a skin hook and iris scissors, the flaps were gently and sharply undermined and freed up.
Ear Star Wedge Flap Text: The defect edges were debeveled with a #15 blade scalpel.  Given the location of the defect and the proximity to free margins (helical rim) an ear star wedge flap was deemed most appropriate.  Using a sterile surgical marker, the appropriate flap was drawn incorporating the defect and placing the expected incisions between the helical rim and antihelix where possible.  The area thus outlined was incised through and through with a #15 scalpel blade.
Banner Transposition Flap Text: The defect edges were debeveled with a #15 scalpel blade.  Given the location of the defect and the proximity to free margins a Banner transposition flap was deemed most appropriate.  Using a sterile surgical marker, an appropriate flap drawn around the defect. The area thus outlined was incised deep to adipose tissue with a #15 scalpel blade.  The skin margins were undermined to an appropriate distance in all directions utilizing iris scissors.
Bilobed Flap Text: The defect edges were debeveled with a #15 scalpel blade.  Given the location of the defect and the proximity to free margins a bilobe flap was deemed most appropriate.  Using a sterile surgical marker, an appropriate bilobe flap drawn around the defect.    The area thus outlined was incised deep to adipose tissue with a #15 scalpel blade.  The skin margins were undermined to an appropriate distance in all directions utilizing iris scissors.
Bilobed Transposition Flap Text: The defect edges were debeveled with a #15 scalpel blade.  Given the location of the defect and the proximity to free margins a bilobed transposition flap was deemed most appropriate.  Using a sterile surgical marker, an appropriate bilobe flap drawn around the defect.    The area thus outlined was incised deep to adipose tissue with a #15 scalpel blade.  The skin margins were undermined to an appropriate distance in all directions utilizing iris scissors.
Trilobed Flap Text: The defect edges were debeveled with a #15 scalpel blade.  Given the location of the defect and the proximity to free margins a trilobed flap was deemed most appropriate.  Using a sterile surgical marker, an appropriate trilobed flap drawn around the defect.    The area thus outlined was incised deep to adipose tissue with a #15 scalpel blade.  The skin margins were undermined to an appropriate distance in all directions utilizing iris scissors.
Dorsal Nasal Flap Text: The defect edges were debeveled with a #15 scalpel blade.  Given the location of the defect and the proximity to free margins a dorsal nasal flap was deemed most appropriate.  Using a sterile surgical marker, an appropriate dorsal nasal flap was drawn around the defect.    The area thus outlined was incised deep to adipose tissue with a #15 scalpel blade.  The skin margins were undermined to an appropriate distance in all directions utilizing iris scissors.
Island Pedicle Flap Text: The defect edges were debeveled with a #15 scalpel blade.  Given the location of the defect, shape of the defect and the proximity to free margins an island pedicle advancement flap was deemed most appropriate.  Using a sterile surgical marker, an appropriate advancement flap was drawn incorporating the defect, outlining the appropriate donor tissue and placing the expected incisions within the relaxed skin tension lines where possible.    The area thus outlined was incised deep to adipose tissue with a #15 scalpel blade.  The skin margins were undermined to an appropriate distance in all directions around the primary defect and laterally outward around the island pedicle utilizing iris scissors.  There was minimal undermining beneath the pedicle flap.
Island Pedicle Flap With Canthal Suspension Text: The defect edges were debeveled with a #15 scalpel blade.  Given the location of the defect, shape of the defect and the proximity to free margins an island pedicle advancement flap was deemed most appropriate.  Using a sterile surgical marker, an appropriate advancement flap was drawn incorporating the defect, outlining the appropriate donor tissue and placing the expected incisions within the relaxed skin tension lines where possible. The area thus outlined was incised deep to adipose tissue with a #15 scalpel blade.  The skin margins were undermined to an appropriate distance in all directions around the primary defect and laterally outward around the island pedicle utilizing iris scissors.  There was minimal undermining beneath the pedicle flap. A suspension suture was placed in the canthal tendon to prevent tension and prevent ectropion.
Alar Island Pedicle Flap Text: The defect edges were debeveled with a #15 scalpel blade.  Given the location of the defect, shape of the defect and the proximity to the alar rim an island pedicle advancement flap was deemed most appropriate.  Using a sterile surgical marker, an appropriate advancement flap was drawn incorporating the defect, outlining the appropriate donor tissue and placing the expected incisions within the nasal ala running parallel to the alar rim. The area thus outlined was incised with a #15 scalpel blade.  The skin margins were undermined minimally to an appropriate distance in all directions around the primary defect and laterally outward around the island pedicle utilizing iris scissors.  There was minimal undermining beneath the pedicle flap.
Double Island Pedicle Flap Text: The defect edges were debeveled with a #15 scalpel blade.  Given the location of the defect, shape of the defect and the proximity to free margins a double island pedicle advancement flap was deemed most appropriate.  Using a sterile surgical marker, an appropriate advancement flap was drawn incorporating the defect, outlining the appropriate donor tissue and placing the expected incisions within the relaxed skin tension lines where possible.    The area thus outlined was incised deep to adipose tissue with a #15 scalpel blade.  The skin margins were undermined to an appropriate distance in all directions around the primary defect and laterally outward around the island pedicle utilizing iris scissors.  There was minimal undermining beneath the pedicle flap.
Island Pedicle Flap-Requiring Vessel Identification Text: The defect edges were debeveled with a #15 scalpel blade.  Given the location of the defect, shape of the defect and the proximity to free margins an island pedicle advancement flap was deemed most appropriate.  Using a sterile surgical marker, an appropriate advancement flap was drawn, based on the axial vessel mentioned above, incorporating the defect, outlining the appropriate donor tissue and placing the expected incisions within the relaxed skin tension lines where possible.    The area thus outlined was incised deep to adipose tissue with a #15 scalpel blade.  The skin margins were undermined to an appropriate distance in all directions around the primary defect and laterally outward around the island pedicle utilizing iris scissors.  There was minimal undermining beneath the pedicle flap.
Keystone Flap Text: The defect edges were debeveled with a #15 scalpel blade.  Given the location of the defect, shape of the defect a keystone flap was deemed most appropriate.  Using a sterile surgical marker, an appropriate keystone flap was drawn incorporating the defect, outlining the appropriate donor tissue and placing the expected incisions within the relaxed skin tension lines where possible. The area thus outlined was incised deep to adipose tissue with a #15 scalpel blade.  The skin margins were undermined to an appropriate distance in all directions around the primary defect and laterally outward around the flap utilizing iris scissors.
O-T Plasty Text: The defect edges were debeveled with a #15 scalpel blade.  Given the location of the defect, shape of the defect and the proximity to free margins an O-T plasty was deemed most appropriate.  Using a sterile surgical marker, an appropriate O-T plasty was drawn incorporating the defect and placing the expected incisions within the relaxed skin tension lines where possible.    The area thus outlined was incised deep to adipose tissue with a #15 scalpel blade.  The skin margins were undermined to an appropriate distance in all directions utilizing iris scissors.
O-Z Plasty Text: The defect edges were debeveled with a #15 scalpel blade.  Given the location of the defect, shape of the defect and the proximity to free margins an O-Z plasty (double transposition flap) was deemed most appropriate.  Using a sterile surgical marker, the appropriate transposition flaps were drawn incorporating the defect and placing the expected incisions within the relaxed skin tension lines where possible.    The area thus outlined was incised deep to adipose tissue with a #15 scalpel blade.  The skin margins were undermined to an appropriate distance in all directions utilizing iris scissors.  Hemostasis was achieved with electrocautery.  The flaps were then transposed into place, one clockwise and the other counterclockwise, and anchored with interrupted buried subcutaneous sutures.
Double O-Z Plasty Text: The defect edges were debeveled with a #15 scalpel blade.  Given the location of the defect, shape of the defect and the proximity to free margins a Double O-Z plasty (double transposition flap) was deemed most appropriate.  Using a sterile surgical marker, the appropriate transposition flaps were drawn incorporating the defect and placing the expected incisions within the relaxed skin tension lines where possible. The area thus outlined was incised deep to adipose tissue with a #15 scalpel blade.  The skin margins were undermined to an appropriate distance in all directions utilizing iris scissors.  Hemostasis was achieved with electrocautery.  The flaps were then transposed into place, one clockwise and the other counterclockwise, and anchored with interrupted buried subcutaneous sutures.
S Plasty Text: Given the location and shape of the defect, and the orientation of relaxed skin tension lines, an S-plasty was deemed most appropriate for repair.  Using a sterile surgical marker, the appropriate outline of the S-plasty was drawn, incorporating the defect and placing the expected incisions within the relaxed skin tension lines where possible.  The area thus outlined was incised deep to adipose tissue with a #15 scalpel blade.  The skin margins were undermined to an appropriate distance in all directions utilizing iris scissors. The skin flaps were advanced over the defect.  The opposing margins were then approximated with interrupted buried subcutaneous sutures.
V-Y Plasty Text: The defect edges were debeveled with a #15 scalpel blade.  Given the location of the defect, shape of the defect and the proximity to free margins an V-Y advancement flap was deemed most appropriate.  Using a sterile surgical marker, an appropriate advancement flap was drawn incorporating the defect and placing the expected incisions within the relaxed skin tension lines where possible.    The area thus outlined was incised deep to adipose tissue with a #15 scalpel blade.  The skin margins were undermined to an appropriate distance in all directions utilizing iris scissors.
H Plasty Text: Given the location of the defect, shape of the defect and the proximity to free margins a H-plasty was deemed most appropriate for repair.  Using a sterile surgical marker, the appropriate advancement arms of the H-plasty were drawn incorporating the defect and placing the expected incisions within the relaxed skin tension lines where possible. The area thus outlined was incised deep to adipose tissue with a #15 scalpel blade. The skin margins were undermined to an appropriate distance in all directions utilizing iris scissors.  The opposing advancement arms were then advanced into place in opposite direction and anchored with interrupted buried subcutaneous sutures.
W Plasty Text: The lesion was extirpated to the level of the fat with a #15 scalpel blade.  Given the location of the defect, shape of the defect and the proximity to free margins a W-plasty was deemed most appropriate for repair.  Using a sterile surgical marker, the appropriate transposition arms of the W-plasty were drawn incorporating the defect and placing the expected incisions within the relaxed skin tension lines where possible.    The area thus outlined was incised deep to adipose tissue with a #15 scalpel blade.  The skin margins were undermined to an appropriate distance in all directions utilizing iris scissors.  The opposing transposition arms were then transposed into place in opposite direction and anchored with interrupted buried subcutaneous sutures.
Z Plasty Text: The lesion was extirpated to the level of the fat with a #15 scalpel blade.  Given the location of the defect, shape of the defect and the proximity to free margins a Z-plasty was deemed most appropriate for repair.  Using a sterile surgical marker, the appropriate transposition arms of the Z-plasty were drawn incorporating the defect and placing the expected incisions within the relaxed skin tension lines where possible.    The area thus outlined was incised deep to adipose tissue with a #15 scalpel blade.  The skin margins were undermined to an appropriate distance in all directions utilizing iris scissors.  The opposing transposition arms were then transposed into place in opposite direction and anchored with interrupted buried subcutaneous sutures.
Cheek Interpolation Flap Text: A decision was made to reconstruct the defect utilizing an interpolation axial flap and a staged reconstruction.  A telfa template was made of the defect.  This telfa template was then used to outline the Cheek Interpolation flap.  The donor area for the pedicle flap was then injected with anesthesia.  The flap was excised through the skin and subcutaneous tissue down to the layer of the underlying musculature.  The interpolation flap was carefully excised within this deep plane to maintain its blood supply.  The edges of the donor site were undermined.   The donor site was closed in a primary fashion.  The pedicle was then rotated into position and sutured.  Once the tube was sutured into place, adequate blood supply was confirmed with blanching and refill.  The pedicle was then wrapped with xeroform gauze and dressed appropriately with a telfa and gauze bandage to ensure continued blood supply and protect the attached pedicle.
Cheek-To-Nose Interpolation Flap Text: A decision was made to reconstruct the defect utilizing an interpolation axial flap and a staged reconstruction.  A telfa template was made of the defect.  This telfa template was then used to outline the Cheek-To-Nose Interpolation flap.  The donor area for the pedicle flap was then injected with anesthesia.  The flap was excised through the skin and subcutaneous tissue down to the layer of the underlying musculature.  The interpolation flap was carefully excised within this deep plane to maintain its blood supply.  The edges of the donor site were undermined.   The donor site was closed in a primary fashion.  The pedicle was then rotated into position and sutured.  Once the tube was sutured into place, adequate blood supply was confirmed with blanching and refill.  The pedicle was then wrapped with xeroform gauze and dressed appropriately with a telfa and gauze bandage to ensure continued blood supply and protect the attached pedicle.
Interpolation Flap Text: A decision was made to reconstruct the defect utilizing an interpolation axial flap and a staged reconstruction.  A telfa template was made of the defect.  This telfa template was then used to outline the interpolation flap.  The donor area for the pedicle flap was then injected with anesthesia.  The flap was excised through the skin and subcutaneous tissue down to the layer of the underlying musculature.  The interpolation flap was carefully excised within this deep plane to maintain its blood supply.  The edges of the donor site were undermined.   The donor site was closed in a primary fashion.  The pedicle was then rotated into position and sutured.  Once the tube was sutured into place, adequate blood supply was confirmed with blanching and refill.  The pedicle was then wrapped with xeroform gauze and dressed appropriately with a telfa and gauze bandage to ensure continued blood supply and protect the attached pedicle.
Melolabial Interpolation Flap Text: A decision was made to reconstruct the defect utilizing an interpolation axial flap and a staged reconstruction.  A telfa template was made of the defect.  This telfa template was then used to outline the melolabial interpolation flap.  The donor area for the pedicle flap was then injected with anesthesia.  The flap was excised through the skin and subcutaneous tissue down to the layer of the underlying musculature.  The pedicle flap was carefully excised within this deep plane to maintain its blood supply.  The edges of the donor site were undermined.   The donor site was closed in a primary fashion.  The pedicle was then rotated into position and sutured.  Once the tube was sutured into place, adequate blood supply was confirmed with blanching and refill.  The pedicle was then wrapped with xeroform gauze and dressed appropriately with a telfa and gauze bandage to ensure continued blood supply and protect the attached pedicle.
Mastoid Interpolation Flap Text: A decision was made to reconstruct the defect utilizing an interpolation axial flap and a staged reconstruction.  A telfa template was made of the defect.  This telfa template was then used to outline the mastoid interpolation flap.  The donor area for the pedicle flap was then injected with anesthesia.  The flap was excised through the skin and subcutaneous tissue down to the layer of the underlying musculature.  The pedicle flap was carefully excised within this deep plane to maintain its blood supply.  The edges of the donor site were undermined.   The donor site was closed in a primary fashion.  The pedicle was then rotated into position and sutured.  Once the tube was sutured into place, adequate blood supply was confirmed with blanching and refill.  The pedicle was then wrapped with xeroform gauze and dressed appropriately with a telfa and gauze bandage to ensure continued blood supply and protect the attached pedicle.
Posterior Auricular Interpolation Flap Text: A decision was made to reconstruct the defect utilizing an interpolation axial flap and a staged reconstruction.  A telfa template was made of the defect.  This telfa template was then used to outline the posterior auricular interpolation flap.  The donor area for the pedicle flap was then injected with anesthesia.  The flap was excised through the skin and subcutaneous tissue down to the layer of the underlying musculature.  The pedicle flap was carefully excised within this deep plane to maintain its blood supply.  The edges of the donor site were undermined.   The donor site was closed in a primary fashion.  The pedicle was then rotated into position and sutured.  Once the tube was sutured into place, adequate blood supply was confirmed with blanching and refill.  The pedicle was then wrapped with xeroform gauze and dressed appropriately with a telfa and gauze bandage to ensure continued blood supply and protect the attached pedicle.
Paramedian Forehead Flap Text: A decision was made to reconstruct the defect utilizing an interpolation axial flap and a staged reconstruction.  A telfa template was made of the defect.  This telfa template was then used to outline the paramedian forehead pedicle flap.  The donor area for the pedicle flap was then injected with anesthesia.  The flap was excised through the skin and subcutaneous tissue down to the layer of the underlying musculature.  The pedicle flap was carefully excised within this deep plane to maintain its blood supply.  The edges of the donor site were undermined.   The donor site was closed in a primary fashion.  The pedicle was then rotated into position and sutured.  Once the tube was sutured into place, adequate blood supply was confirmed with blanching and refill.  The pedicle was then wrapped with xeroform gauze and dressed appropriately with a telfa and gauze bandage to ensure continued blood supply and protect the attached pedicle.
Lip Wedge Excision Repair Text: Given the location of the defect and the proximity to free margins a full thickness wedge repair was deemed most appropriate.  Using a sterile surgical marker, the appropriate repair was drawn incorporating the defect and placing the expected incisions perpendicular to the vermilion border.  The vermilion border was also meticulously outlined to ensure appropriate reapproximation during the repair.  The area thus outlined was incised through and through with a #15 scalpel blade.  The muscularis and dermis were reaproximated with deep sutures following hemostasis. Care was taken to realign the vermilion border before proceeding with the superficial closure.  Once the vermilion was realigned the superfical and mucosal closure was finished.
Ftsg Text: The defect edges were debeveled with a #15 scalpel blade.  Given the location of the defect, shape of the defect and the proximity to free margins a full thickness skin graft was deemed most appropriate.  Using a sterile surgical marker, the primary defect shape was transferred to the donor site. The area thus outlined was incised deep to adipose tissue with a #15 scalpel blade.  The harvested graft was then trimmed of adipose tissue until only dermis and epidermis was left.  The skin margins of the secondary defect were undermined to an appropriate distance in all directions utilizing iris scissors.  The secondary defect was closed with interrupted buried subcutaneous sutures.  The skin edges were then re-apposed with running  sutures.  The skin graft was then placed in the primary defect and oriented appropriately.
Split-Thickness Skin Graft Text: The defect edges were debeveled with a #15 scalpel blade.  Given the location of the defect, shape of the defect and the proximity to free margins a split thickness skin graft was deemed most appropriate.  Using a sterile surgical marker, the primary defect shape was transferred to the donor site. The split thickness graft was then harvested.  The skin graft was then placed in the primary defect and oriented appropriately.
Burow's Graft Text: The defect edges were debeveled with a #15 scalpel blade.  Given the location of the defect, shape of the defect, the proximity to free margins and the presence of a standing cone deformity a Burow's skin graft was deemed most appropriate. The standing cone was removed and this tissue was then trimmed to the shape of the primary defect. The adipose tissue was also removed until only dermis and epidermis were left.  The skin margins of the secondary defect were undermined to an appropriate distance in all directions utilizing iris scissors.  The secondary defect was closed with interrupted buried subcutaneous sutures.  The skin edges were then re-apposed with running  sutures.  The skin graft was then placed in the primary defect and oriented appropriately.
Cartilage Graft Text: The defect edges were debeveled with a #15 scalpel blade.  Given the location of the defect, shape of the defect, the fact the defect involved a full thickness cartilage defect a cartilage graft was deemed most appropriate.  An appropriate donor site was identified, cleansed, and anesthetized. The cartilage graft was then harvested and transferred to the recipient site, oriented appropriately and then sutured into place.  The secondary defect was then repaired using a primary closure.
Composite Graft Text: The defect edges were debeveled with a #15 scalpel blade.  Given the location of the defect, shape of the defect, the proximity to free margins and the fact the defect was full thickness a composite graft was deemed most appropriate.  The defect was outline and then transferred to the donor site.  A full thickness graft was then excised from the donor site. The graft was then placed in the primary defect, oriented appropriately and then sutured into place.  The secondary defect was then repaired using a primary closure.
Epidermal Autograft Text: The defect edges were debeveled with a #15 scalpel blade.  Given the location of the defect, shape of the defect and the proximity to free margins an epidermal autograft was deemed most appropriate.  Using a sterile surgical marker, the primary defect shape was transferred to the donor site. The epidermal graft was then harvested.  The skin graft was then placed in the primary defect and oriented appropriately.
Dermal Autograft Text: The defect edges were debeveled with a #15 scalpel blade.  Given the location of the defect, shape of the defect and the proximity to free margins a dermal autograft was deemed most appropriate.  Using a sterile surgical marker, the primary defect shape was transferred to the donor site. The area thus outlined was incised deep to adipose tissue with a #15 scalpel blade.  The harvested graft was then trimmed of adipose and epidermal tissue until only dermis was left.  The skin graft was then placed in the primary defect and oriented appropriately.
Skin Substitute Text: The defect edges were debeveled with a #15 scalpel blade.  Given the location of the defect, shape of the defect and the proximity to free margins a skin substitute graft was deemed most appropriate.  The graft material was trimmed to fit the size of the defect. The graft was then placed in the primary defect and oriented appropriately.
Tissue Cultured Epidermal Autograft Text: The defect edges were debeveled with a #15 scalpel blade.  Given the location of the defect, shape of the defect and the proximity to free margins a tissue cultured epidermal autograft was deemed most appropriate.  The graft was then trimmed to fit the size of the defect.  The graft was then placed in the primary defect and oriented appropriately.
Xenograft Text: The defect edges were debeveled with a #15 scalpel blade.  Given the location of the defect, shape of the defect and the proximity to free margins a xenograft was deemed most appropriate.  The graft was then trimmed to fit the size of the defect.  The graft was then placed in the primary defect and oriented appropriately.
Purse String (Intermediate) Text: Given the location of the defect and the characteristics of the surrounding skin a purse string intermediate closure was deemed most appropriate.  Undermining was performed circumfirentially around the surgical defect.  A purse string suture was then placed and tightened.
Purse String (Simple) Text: Given the location of the defect and the characteristics of the surrounding skin a purse string simple closure was deemed most appropriate.  Undermining was performed circumferentially around the surgical defect.  A purse string suture was then placed and tightened.
Partial Purse String (Intermediate) Text: Given the location of the defect and the characteristics of the surrounding skin an intermediate purse string closure was deemed most appropriate.  Undermining was performed circumferentially around the surgical defect.  A purse string suture was then placed and tightened. Wound tension of the circular defect prevented complete closure of the wound.
Partial Purse String (Simple) Text: Given the location of the defect and the characteristics of the surrounding skin a simple purse string closure was deemed most appropriate.  Undermining was performed circumferentially around the surgical defect.  A purse string suture was then placed and tightened. Wound tension of the circular defect prevented complete closure of the wound.
Complex Repair And Single Advancement Flap Text: The defect edges were debeveled with a #15 scalpel blade.  The primary defect was closed partially with a complex linear closure.  Given the location of the remaining defect, shape of the defect and the proximity to free margins a single advancement flap was deemed most appropriate for complete closure of the defect.  Using a sterile surgical marker, an appropriate advancement flap was drawn incorporating the defect and placing the expected incisions within the relaxed skin tension lines where possible.    The area thus outlined was incised deep to adipose tissue with a #15 scalpel blade.  The skin margins were undermined to an appropriate distance in all directions utilizing iris scissors.
Complex Repair And Double Advancement Flap Text: The defect edges were debeveled with a #15 scalpel blade.  The primary defect was closed partially with a complex linear closure.  Given the location of the remaining defect, shape of the defect and the proximity to free margins a double advancement flap was deemed most appropriate for complete closure of the defect.  Using a sterile surgical marker, an appropriate advancement flap was drawn incorporating the defect and placing the expected incisions within the relaxed skin tension lines where possible.    The area thus outlined was incised deep to adipose tissue with a #15 scalpel blade.  The skin margins were undermined to an appropriate distance in all directions utilizing iris scissors.
Complex Repair And Modified Advancement Flap Text: The defect edges were debeveled with a #15 scalpel blade.  The primary defect was closed partially with a complex linear closure.  Given the location of the remaining defect, shape of the defect and the proximity to free margins a modified advancement flap was deemed most appropriate for complete closure of the defect.  Using a sterile surgical marker, an appropriate advancement flap was drawn incorporating the defect and placing the expected incisions within the relaxed skin tension lines where possible.    The area thus outlined was incised deep to adipose tissue with a #15 scalpel blade.  The skin margins were undermined to an appropriate distance in all directions utilizing iris scissors.
Complex Repair And A-T Advancement Flap Text: The defect edges were debeveled with a #15 scalpel blade.  The primary defect was closed partially with a complex linear closure.  Given the location of the remaining defect, shape of the defect and the proximity to free margins an A-T advancement flap was deemed most appropriate for complete closure of the defect.  Using a sterile surgical marker, an appropriate advancement flap was drawn incorporating the defect and placing the expected incisions within the relaxed skin tension lines where possible.    The area thus outlined was incised deep to adipose tissue with a #15 scalpel blade.  The skin margins were undermined to an appropriate distance in all directions utilizing iris scissors.
Complex Repair And O-T Advancement Flap Text: The defect edges were debeveled with a #15 scalpel blade.  The primary defect was closed partially with a complex linear closure.  Given the location of the remaining defect, shape of the defect and the proximity to free margins an O-T advancement flap was deemed most appropriate for complete closure of the defect.  Using a sterile surgical marker, an appropriate advancement flap was drawn incorporating the defect and placing the expected incisions within the relaxed skin tension lines where possible.    The area thus outlined was incised deep to adipose tissue with a #15 scalpel blade.  The skin margins were undermined to an appropriate distance in all directions utilizing iris scissors.
Complex Repair And O-L Flap Text: The defect edges were debeveled with a #15 scalpel blade.  The primary defect was closed partially with a complex linear closure.  Given the location of the remaining defect, shape of the defect and the proximity to free margins an O-L flap was deemed most appropriate for complete closure of the defect.  Using a sterile surgical marker, an appropriate flap was drawn incorporating the defect and placing the expected incisions within the relaxed skin tension lines where possible.    The area thus outlined was incised deep to adipose tissue with a #15 scalpel blade.  The skin margins were undermined to an appropriate distance in all directions utilizing iris scissors.
Complex Repair And Bilobe Flap Text: The defect edges were debeveled with a #15 scalpel blade.  The primary defect was closed partially with a complex linear closure.  Given the location of the remaining defect, shape of the defect and the proximity to free margins a bilobe flap was deemed most appropriate for complete closure of the defect.  Using a sterile surgical marker, an appropriate advancement flap was drawn incorporating the defect and placing the expected incisions within the relaxed skin tension lines where possible.    The area thus outlined was incised deep to adipose tissue with a #15 scalpel blade.  The skin margins were undermined to an appropriate distance in all directions utilizing iris scissors.
Complex Repair And Melolabial Flap Text: The defect edges were debeveled with a #15 scalpel blade.  The primary defect was closed partially with a complex linear closure.  Given the location of the remaining defect, shape of the defect and the proximity to free margins a melolabial flap was deemed most appropriate for complete closure of the defect.  Using a sterile surgical marker, an appropriate advancement flap was drawn incorporating the defect and placing the expected incisions within the relaxed skin tension lines where possible.    The area thus outlined was incised deep to adipose tissue with a #15 scalpel blade.  The skin margins were undermined to an appropriate distance in all directions utilizing iris scissors.
Complex Repair And Rotation Flap Text: The defect edges were debeveled with a #15 scalpel blade.  The primary defect was closed partially with a complex linear closure.  Given the location of the remaining defect, shape of the defect and the proximity to free margins a rotation flap was deemed most appropriate for complete closure of the defect.  Using a sterile surgical marker, an appropriate advancement flap was drawn incorporating the defect and placing the expected incisions within the relaxed skin tension lines where possible.    The area thus outlined was incised deep to adipose tissue with a #15 scalpel blade.  The skin margins were undermined to an appropriate distance in all directions utilizing iris scissors.
Complex Repair And Rhombic Flap Text: The defect edges were debeveled with a #15 scalpel blade.  The primary defect was closed partially with a complex linear closure.  Given the location of the remaining defect, shape of the defect and the proximity to free margins a rhombic flap was deemed most appropriate for complete closure of the defect.  Using a sterile surgical marker, an appropriate advancement flap was drawn incorporating the defect and placing the expected incisions within the relaxed skin tension lines where possible.    The area thus outlined was incised deep to adipose tissue with a #15 scalpel blade.  The skin margins were undermined to an appropriate distance in all directions utilizing iris scissors.
Complex Repair And Transposition Flap Text: The defect edges were debeveled with a #15 scalpel blade.  The primary defect was closed partially with a complex linear closure.  Given the location of the remaining defect, shape of the defect and the proximity to free margins a transposition flap was deemed most appropriate for complete closure of the defect.  Using a sterile surgical marker, an appropriate advancement flap was drawn incorporating the defect and placing the expected incisions within the relaxed skin tension lines where possible.    The area thus outlined was incised deep to adipose tissue with a #15 scalpel blade.  The skin margins were undermined to an appropriate distance in all directions utilizing iris scissors.
Complex Repair And V-Y Plasty Text: The defect edges were debeveled with a #15 scalpel blade.  The primary defect was closed partially with a complex linear closure.  Given the location of the remaining defect, shape of the defect and the proximity to free margins a V-Y plasty was deemed most appropriate for complete closure of the defect.  Using a sterile surgical marker, an appropriate advancement flap was drawn incorporating the defect and placing the expected incisions within the relaxed skin tension lines where possible.    The area thus outlined was incised deep to adipose tissue with a #15 scalpel blade.  The skin margins were undermined to an appropriate distance in all directions utilizing iris scissors.
Complex Repair And M Plasty Text: The defect edges were debeveled with a #15 scalpel blade.  The primary defect was closed partially with a complex linear closure.  Given the location of the remaining defect, shape of the defect and the proximity to free margins an M plasty was deemed most appropriate for complete closure of the defect.  Using a sterile surgical marker, an appropriate advancement flap was drawn incorporating the defect and placing the expected incisions within the relaxed skin tension lines where possible.    The area thus outlined was incised deep to adipose tissue with a #15 scalpel blade.  The skin margins were undermined to an appropriate distance in all directions utilizing iris scissors.
Complex Repair And Double M Plasty Text: The defect edges were debeveled with a #15 scalpel blade.  The primary defect was closed partially with a complex linear closure.  Given the location of the remaining defect, shape of the defect and the proximity to free margins a double M plasty was deemed most appropriate for complete closure of the defect.  Using a sterile surgical marker, an appropriate advancement flap was drawn incorporating the defect and placing the expected incisions within the relaxed skin tension lines where possible.    The area thus outlined was incised deep to adipose tissue with a #15 scalpel blade.  The skin margins were undermined to an appropriate distance in all directions utilizing iris scissors.
Complex Repair And W Plasty Text: The defect edges were debeveled with a #15 scalpel blade.  The primary defect was closed partially with a complex linear closure.  Given the location of the remaining defect, shape of the defect and the proximity to free margins a W plasty was deemed most appropriate for complete closure of the defect.  Using a sterile surgical marker, an appropriate advancement flap was drawn incorporating the defect and placing the expected incisions within the relaxed skin tension lines where possible.    The area thus outlined was incised deep to adipose tissue with a #15 scalpel blade.  The skin margins were undermined to an appropriate distance in all directions utilizing iris scissors.
Complex Repair And Z Plasty Text: The defect edges were debeveled with a #15 scalpel blade.  The primary defect was closed partially with a complex linear closure.  Given the location of the remaining defect, shape of the defect and the proximity to free margins a Z plasty was deemed most appropriate for complete closure of the defect.  Using a sterile surgical marker, an appropriate advancement flap was drawn incorporating the defect and placing the expected incisions within the relaxed skin tension lines where possible.    The area thus outlined was incised deep to adipose tissue with a #15 scalpel blade.  The skin margins were undermined to an appropriate distance in all directions utilizing iris scissors.
Complex Repair And Dorsal Nasal Flap Text: The defect edges were debeveled with a #15 scalpel blade.  The primary defect was closed partially with a complex linear closure.  Given the location of the remaining defect, shape of the defect and the proximity to free margins a dorsal nasal flap was deemed most appropriate for complete closure of the defect.  Using a sterile surgical marker, an appropriate flap was drawn incorporating the defect and placing the expected incisions within the relaxed skin tension lines where possible.    The area thus outlined was incised deep to adipose tissue with a #15 scalpel blade.  The skin margins were undermined to an appropriate distance in all directions utilizing iris scissors.
Complex Repair And Ftsg Text: The defect edges were debeveled with a #15 scalpel blade.  The primary defect was closed partially with a complex linear closure.  Given the location of the defect, shape of the defect and the proximity to free margins a full thickness skin graft was deemed most appropriate to repair the remaining defect.  The graft was trimmed to fit the size of the remaining defect.  The graft was then placed in the primary defect, oriented appropriately, and sutured into place.
Complex Repair And Burow's Graft Text: The defect edges were debeveled with a #15 scalpel blade.  The primary defect was closed partially with a complex linear closure.  Given the location of the defect, shape of the defect, the proximity to free margins and the presence of a standing cone deformity a Burow's graft was deemed most appropriate to repair the remaining defect.  The graft was trimmed to fit the size of the remaining defect.  The graft was then placed in the primary defect, oriented appropriately, and sutured into place.
Complex Repair And Split-Thickness Skin Graft Text: The defect edges were debeveled with a #15 scalpel blade.  The primary defect was closed partially with a complex linear closure.  Given the location of the defect, shape of the defect and the proximity to free margins a split thickness skin graft was deemed most appropriate to repair the remaining defect.  The graft was trimmed to fit the size of the remaining defect.  The graft was then placed in the primary defect, oriented appropriately, and sutured into place.
Complex Repair And Epidermal Autograft Text: The defect edges were debeveled with a #15 scalpel blade.  The primary defect was closed partially with a complex linear closure.  Given the location of the defect, shape of the defect and the proximity to free margins an epidermal autograft was deemed most appropriate to repair the remaining defect.  The graft was trimmed to fit the size of the remaining defect.  The graft was then placed in the primary defect, oriented appropriately, and sutured into place.
Complex Repair And Dermal Autograft Text: The defect edges were debeveled with a #15 scalpel blade.  The primary defect was closed partially with a complex linear closure.  Given the location of the defect, shape of the defect and the proximity to free margins an dermal autograft was deemed most appropriate to repair the remaining defect.  The graft was trimmed to fit the size of the remaining defect.  The graft was then placed in the primary defect, oriented appropriately, and sutured into place.
Complex Repair And Tissue Cultured Epidermal Autograft Text: The defect edges were debeveled with a #15 scalpel blade.  The primary defect was closed partially with a complex linear closure.  Given the location of the defect, shape of the defect and the proximity to free margins an tissue cultured epidermal autograft was deemed most appropriate to repair the remaining defect.  The graft was trimmed to fit the size of the remaining defect.  The graft was then placed in the primary defect, oriented appropriately, and sutured into place.
Complex Repair And Xenograft Text: The defect edges were debeveled with a #15 scalpel blade.  The primary defect was closed partially with a complex linear closure.  Given the location of the defect, shape of the defect and the proximity to free margins a xenograft was deemed most appropriate to repair the remaining defect.  The graft was trimmed to fit the size of the remaining defect.  The graft was then placed in the primary defect, oriented appropriately, and sutured into place.
Complex Repair And Skin Substitute Graft Text: The defect edges were debeveled with a #15 scalpel blade.  The primary defect was closed partially with a complex linear closure.  Given the location of the remaining defect, shape of the defect and the proximity to free margins a skin substitute graft was deemed most appropriate to repair the remaining defect.  The graft was trimmed to fit the size of the remaining defect.  The graft was then placed in the primary defect, oriented appropriately, and sutured into place.
Path Notes (To The Dermatopathologist): Please check margins.
Consent was obtained from the patient. The risks and benefits to therapy were discussed in detail. Specifically, the risks of infection, scarring, bleeding, prolonged wound healing, incomplete removal, allergy to anesthesia, nerve injury and recurrence were addressed. Prior to the procedure, the treatment site was clearly identified and confirmed by the patient. All components of Universal Protocol/PAUSE Rule completed.
Post-Care Instructions: I reviewed with the patient in detail post-care instructions. Patient is not to engage in any heavy lifting, exercise, or swimming for the next 14 days. Should the patient develop any fevers, chills, bleeding, severe pain patient will contact the office immediately.
Where Do You Want The Question To Include Opioid Counseling Located?: Case Summary Tab
Information: Selecting Yes will display possible errors in your note based on the variables you have selected. This validation is only offered as a suggestion for you. PLEASE NOTE THAT THE VALIDATION TEXT WILL BE REMOVED WHEN YOU FINALIZE YOUR NOTE. IF YOU WANT TO FAX A PRELIMINARY NOTE YOU WILL NEED TO TOGGLE THIS TO 'NO' IF YOU DO NOT WANT IT IN YOUR FAXED NOTE.

## 2020-06-11 ENCOUNTER — HOSPITAL ENCOUNTER (OUTPATIENT)
Dept: LAB | Facility: MEDICAL CENTER | Age: 70
End: 2020-06-11
Attending: INTERNAL MEDICINE
Payer: MEDICARE

## 2020-06-11 LAB
25(OH)D3 SERPL-MCNC: 45 NG/ML (ref 30–100)
ALBUMIN SERPL BCP-MCNC: 3.8 G/DL (ref 3.2–4.9)
ALBUMIN/GLOB SERPL: 1.2 G/DL
ALP SERPL-CCNC: 93 U/L (ref 30–99)
ALT SERPL-CCNC: 17 U/L (ref 2–50)
ANION GAP SERPL CALC-SCNC: 12 MMOL/L (ref 7–16)
APPEARANCE UR: ABNORMAL
AST SERPL-CCNC: 17 U/L (ref 12–45)
BACTERIA #/AREA URNS HPF: NEGATIVE /HPF
BASOPHILS # BLD AUTO: 0.3 % (ref 0–1.8)
BASOPHILS # BLD: 0.03 K/UL (ref 0–0.12)
BILIRUB SERPL-MCNC: 0.5 MG/DL (ref 0.1–1.5)
BILIRUB UR QL STRIP.AUTO: NEGATIVE
BUN SERPL-MCNC: 47 MG/DL (ref 8–22)
CALCIUM SERPL-MCNC: 9.8 MG/DL (ref 8.5–10.5)
CHLORIDE SERPL-SCNC: 106 MMOL/L (ref 96–112)
CHOLEST SERPL-MCNC: 112 MG/DL (ref 100–199)
CO2 SERPL-SCNC: 23 MMOL/L (ref 20–33)
COLOR UR: YELLOW
CREAT SERPL-MCNC: 1.57 MG/DL (ref 0.5–1.4)
CREAT UR-MCNC: 88.22 MG/DL
EOSINOPHIL # BLD AUTO: 0.23 K/UL (ref 0–0.51)
EOSINOPHIL NFR BLD: 2.3 % (ref 0–6.9)
EPI CELLS #/AREA URNS HPF: NEGATIVE /HPF
ERYTHROCYTE [DISTWIDTH] IN BLOOD BY AUTOMATED COUNT: 50.1 FL (ref 35.9–50)
FASTING STATUS PATIENT QL REPORTED: NORMAL
GLOBULIN SER CALC-MCNC: 3.3 G/DL (ref 1.9–3.5)
GLUCOSE SERPL-MCNC: 120 MG/DL (ref 65–99)
GLUCOSE UR STRIP.AUTO-MCNC: NEGATIVE MG/DL
HCT VFR BLD AUTO: 42.2 % (ref 42–52)
HDLC SERPL-MCNC: 35 MG/DL
HGB BLD-MCNC: 13.5 G/DL (ref 14–18)
HYALINE CASTS #/AREA URNS LPF: ABNORMAL /LPF
IMM GRANULOCYTES # BLD AUTO: 0.04 K/UL (ref 0–0.11)
IMM GRANULOCYTES NFR BLD AUTO: 0.4 % (ref 0–0.9)
KETONES UR STRIP.AUTO-MCNC: NEGATIVE MG/DL
LDLC SERPL CALC-MCNC: 57 MG/DL
LEUKOCYTE ESTERASE UR QL STRIP.AUTO: ABNORMAL
LYMPHOCYTES # BLD AUTO: 3.71 K/UL (ref 1–4.8)
LYMPHOCYTES NFR BLD: 37.5 % (ref 22–41)
MAGNESIUM SERPL-MCNC: 1.9 MG/DL (ref 1.5–2.5)
MCH RBC QN AUTO: 30.1 PG (ref 27–33)
MCHC RBC AUTO-ENTMCNC: 32 G/DL (ref 33.7–35.3)
MCV RBC AUTO: 94 FL (ref 81.4–97.8)
MICRO URNS: ABNORMAL
MONOCYTES # BLD AUTO: 0.92 K/UL (ref 0–0.85)
MONOCYTES NFR BLD AUTO: 9.3 % (ref 0–13.4)
NEUTROPHILS # BLD AUTO: 4.96 K/UL (ref 1.82–7.42)
NEUTROPHILS NFR BLD: 50.2 % (ref 44–72)
NITRITE UR QL STRIP.AUTO: NEGATIVE
NRBC # BLD AUTO: 0 K/UL
NRBC BLD-RTO: 0 /100 WBC
PH UR STRIP.AUTO: 5.5 [PH] (ref 5–8)
PHOSPHATE SERPL-MCNC: 3.5 MG/DL (ref 2.5–4.5)
PLATELET # BLD AUTO: 152 K/UL (ref 164–446)
PMV BLD AUTO: 9.9 FL (ref 9–12.9)
POTASSIUM SERPL-SCNC: 4.6 MMOL/L (ref 3.6–5.5)
PROT SERPL-MCNC: 7.1 G/DL (ref 6–8.2)
PROT UR QL STRIP: NEGATIVE MG/DL
PROT UR-MCNC: 13 MG/DL (ref 0–15)
PROT/CREAT UR: 147 MG/G (ref 15–68)
PTH-INTACT SERPL-MCNC: 108 PG/ML (ref 14–72)
RBC # BLD AUTO: 4.49 M/UL (ref 4.7–6.1)
RBC # URNS HPF: ABNORMAL /HPF
RBC UR QL AUTO: ABNORMAL
SODIUM SERPL-SCNC: 141 MMOL/L (ref 135–145)
SP GR UR STRIP.AUTO: 1.02
TRIGL SERPL-MCNC: 98 MG/DL (ref 0–149)
URATE SERPL-MCNC: 4.8 MG/DL (ref 2.5–8.3)
UROBILINOGEN UR STRIP.AUTO-MCNC: 0.2 MG/DL
WBC # BLD AUTO: 9.9 K/UL (ref 4.8–10.8)
WBC #/AREA URNS HPF: ABNORMAL /HPF

## 2020-06-11 PROCEDURE — 85025 COMPLETE CBC W/AUTO DIFF WBC: CPT

## 2020-06-11 PROCEDURE — 36415 COLL VENOUS BLD VENIPUNCTURE: CPT

## 2020-06-11 PROCEDURE — 83970 ASSAY OF PARATHORMONE: CPT

## 2020-06-11 PROCEDURE — 84550 ASSAY OF BLOOD/URIC ACID: CPT

## 2020-06-11 PROCEDURE — 82306 VITAMIN D 25 HYDROXY: CPT

## 2020-06-11 PROCEDURE — 80197 ASSAY OF TACROLIMUS: CPT

## 2020-06-11 PROCEDURE — 80053 COMPREHEN METABOLIC PANEL: CPT

## 2020-06-11 PROCEDURE — 80061 LIPID PANEL: CPT

## 2020-06-11 PROCEDURE — 81001 URINALYSIS AUTO W/SCOPE: CPT

## 2020-06-11 PROCEDURE — 84100 ASSAY OF PHOSPHORUS: CPT

## 2020-06-11 PROCEDURE — 83735 ASSAY OF MAGNESIUM: CPT

## 2020-06-13 LAB — TACROLIMUS BLD-MCNC: 9.1 NG/ML

## 2020-06-22 ENCOUNTER — APPOINTMENT (RX ONLY)
Dept: URBAN - METROPOLITAN AREA CLINIC 4 | Facility: CLINIC | Age: 70
Setting detail: DERMATOLOGY
End: 2020-06-22

## 2020-06-22 DIAGNOSIS — Z48.02 ENCOUNTER FOR REMOVAL OF SUTURES: ICD-10-CM

## 2020-06-22 PROCEDURE — ? SUTURE REMOVAL (NO GLOBAL PERIOD)

## 2020-06-22 ASSESSMENT — LOCATION DETAILED DESCRIPTION DERM: LOCATION DETAILED: RIGHT MID-UPPER BACK

## 2020-06-22 ASSESSMENT — LOCATION ZONE DERM: LOCATION ZONE: TRUNK

## 2020-06-22 ASSESSMENT — LOCATION SIMPLE DESCRIPTION DERM: LOCATION SIMPLE: RIGHT UPPER BACK

## 2020-08-06 ENCOUNTER — OFFICE VISIT (OUTPATIENT)
Dept: VASCULAR LAB | Facility: MEDICAL CENTER | Age: 70
End: 2020-08-06
Attending: FAMILY MEDICINE
Payer: MEDICARE

## 2020-08-06 ENCOUNTER — TELEPHONE (OUTPATIENT)
Dept: VASCULAR LAB | Facility: MEDICAL CENTER | Age: 70
End: 2020-08-06

## 2020-08-06 VITALS
BODY MASS INDEX: 38.84 KG/M2 | WEIGHT: 277.4 LBS | DIASTOLIC BLOOD PRESSURE: 76 MMHG | HEART RATE: 70 BPM | SYSTOLIC BLOOD PRESSURE: 145 MMHG | HEIGHT: 71 IN

## 2020-08-06 DIAGNOSIS — R29.898 LEG WEAKNESS, BILATERAL: ICD-10-CM

## 2020-08-06 DIAGNOSIS — Z79.60 LONG-TERM USE OF IMMUNOSUPPRESSANT MEDICATION: ICD-10-CM

## 2020-08-06 DIAGNOSIS — I82.4Y1 ACUTE VENOUS EMBOLISM AND THROMBOSIS OF DEEP VESSELS OF PROXIMAL END OF RIGHT LOWER EXTREMITY (HCC): ICD-10-CM

## 2020-08-06 DIAGNOSIS — I10 ESSENTIAL HYPERTENSION: ICD-10-CM

## 2020-08-06 DIAGNOSIS — R06.09 DYSPNEA ON EXERTION: ICD-10-CM

## 2020-08-06 DIAGNOSIS — Z94.0 HISTORY OF RENAL TRANSPLANT: ICD-10-CM

## 2020-08-06 DIAGNOSIS — Z79.52 LONG TERM (CURRENT) USE OF SYSTEMIC STEROIDS: ICD-10-CM

## 2020-08-06 DIAGNOSIS — Z79.01 CHRONIC ANTICOAGULATION: ICD-10-CM

## 2020-08-06 DIAGNOSIS — I77.82 ANCA-ASSOCIATED VASCULITIS (HCC): ICD-10-CM

## 2020-08-06 DIAGNOSIS — I73.9 CLAUDICATION (HCC): ICD-10-CM

## 2020-08-06 PROCEDURE — 99212 OFFICE O/P EST SF 10 MIN: CPT | Performed by: FAMILY MEDICINE

## 2020-08-06 PROCEDURE — 99214 OFFICE O/P EST MOD 30 MIN: CPT | Performed by: FAMILY MEDICINE

## 2020-08-06 RX ORDER — BROMFENAC SODIUM 0.7 MG/ML
SOLUTION/ DROPS OPHTHALMIC
COMMUNITY
End: 2021-01-22

## 2020-08-06 ASSESSMENT — ENCOUNTER SYMPTOMS
FOCAL WEAKNESS: 0
COUGH: 0
ORTHOPNEA: 0
CLAUDICATION: 1
HEMOPTYSIS: 0
FEVER: 0
VOMITING: 0
BLOOD IN STOOL: 0
SHORTNESS OF BREATH: 1
DIAPHORESIS: 0
NAUSEA: 0
BLURRED VISION: 0
WEIGHT LOSS: 0
TREMORS: 0
ABDOMINAL PAIN: 0
DEPRESSION: 0
HEADACHES: 0
DIZZINESS: 0
PALPITATIONS: 0
SORE THROAT: 0
DIARRHEA: 0
SEIZURES: 0
WEAKNESS: 0
BRUISES/BLEEDS EASILY: 0
DOUBLE VISION: 0
MYALGIAS: 0
CHILLS: 0
NERVOUS/ANXIOUS: 0
INSOMNIA: 0
WHEEZING: 0

## 2020-08-06 ASSESSMENT — FIBROSIS 4 INDEX: FIB4 SCORE: 1.95

## 2020-08-06 NOTE — TELEPHONE ENCOUNTER
Requested 6/2020 consult notes \be faxed over    ----- Message from Abiodun Delgado M.D. sent at 8/6/2020  1:35 PM PDT -----  Please request recent consult note from Dr. Blount (nephrology). Thanks

## 2020-08-06 NOTE — PROGRESS NOTES
FOLLOW-UP VASCULAR VISIT  Subjective:   Krishan Acevedo is a 70 y.o. male who presents today 8/6/2020 for   Chief Complaint   Patient presents with   • Follow-Up     Hx of wegener's, Right Lower Extremity Deep Vein Thrombosis, Length of therapy/Eliquis    Referred for length of therapy (LOT) determination of anticoagulation in context of acute venothromboembolic disease    HPI:    VTE disease / Anticoagulation:   Current symptoms:  Denies current leg pain, cyanosis of digits, redness of extremities.   Residual mild edema bilat, using furosemide.  No pain.  Reports new onset of worsening weakness with walking and DAS over last 1-2 months.   Prior echo 2018 was normal with BNP 200s.  No recent cough, f/c/s or URI symptoms.   Some leg pain with walking resolves at rest.    Current antithrombotic agent: eliquis 5mg BID - has spoken with Dr. Blount (nephro) who feels 2.5mg BID is fine.   Complications: none, tolerating well, no bleeding or bruising   Date of initiation of anticoagulation: 11/2019  Adherence: reports complete, no missed doses      Pertinent VTE pmhx:   Date of Diagnosis: 11/2019  Type of Venous thromboembolic disease (VTE): acute RLE DVT   Type of imaging: duplex   Preceding/presenting symptoms: acute RLE swelling and pain after car travel   VTE tx course: start eliquis 10mg BID for 10d, then 5mg BID   Any personal VTE hx? No  Any family VTE hx? No    UNPROVOKED VS PROVOKED:   Recent surgery ? No, denies prior VTE   Recent trauma ? No  Smoker?  reports that he has never smoked. He has never used smokeless tobacco.    Extended travel? Yes, Details: had driven to [place] early october, then Nov travel from [place]  Other periods of immobility? No  Other risk factors for VTE disease:  Chronic prednisone use, tacrolimus, cellcept, obesity.  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  No leg discoloration.   No prior MARY   Stable on immunosuppressants.  seing nephrology (Dr. Blount) routinely.   Seen last month   Transplant team at [facility].   Reports ab testing monitoring via [facility] and reports has been undetectable.      Outpatient Encounter Medications as of 8/6/2020   Medication Sig Dispense Refill   • Probiotic Product (PROBIOTIC DAILY PO) Take  by mouth.     • Bromfenac Sodium (PROLENSA) 0.07 % Solution by Ophthalmic route.     • apixaban (ELIQUIS) 2.5mg Tab Take 1 Tab by mouth 2 Times a Day for 360 days. 180 Tab 3   • metoprolol SR (TOPROL XL) 50 MG TABLET SR 24 HR Take 50 mg by mouth every day.     • furosemide (LASIX) 20 MG Tab Take 20 mg by mouth 2 times a day.     • omeprazole (PRILOSEC) 20 MG delayed-release capsule Take 20 mg by mouth every day.     • Brimonidine Tartrate-Timolol (COMBIGAN) 0.2-0.5 % Solution by Ophthalmic route.     • allopurinol (ZYLOPRIM) 300 MG Tab Take 300 mg by mouth every morning.     • calcitRIOL (ROCALTROL) 0.25 MCG Cap Take 0.25 mcg by mouth every morning.     • Multiple Vitamins-Minerals (CENTRUM SILVER 50+MEN) Tab Take 1 Tab by mouth every morning.     • vitamin D, Ergocalciferol, (DRISDOL) 97538 units Cap capsule Take 50,000 Units by mouth every Monday.     • Glucosamine-Chondroit-Vit C-Mn (GLUCOSAMINE 1500 COMPLEX) Cap Take 2 Caps by mouth 2 Times a Day.     • MAGNESIUM PO Take 1 Tab by mouth 2 Times a Day.     • mycophenolate (CELLCEPT) 250 MG Cap Take 500 mg by mouth 2 times a day.     • predniSONE (DELTASONE) 5 MG Tab Take 5 mg by mouth every morning.     • tacrolimus (PROGRAF) 1 MG Cap Take 1 mg by mouth 2 times a day.     • atorvastatin (LIPITOR) 10 MG TABS Take 10 mg by mouth every morning.     • [DISCONTINUED] apixaban (ELIQUIS) 5mg Tab Take 1 Tab by mouth 2 Times a Day for 360 days. 180 Tab  "3     No facility-administered encounter medications on file as of 8/6/2020.      Allergies   Allergen Reactions   • Nsaids      Cannot take because of anti rejection meds.     Social History     Tobacco Use   • Smoking status: Never Smoker   • Smokeless tobacco: Never Used   Substance Use Topics   • Alcohol use: Not Currently   • Drug use: Not on file       DIET AND EXERCISE:  Weight Change: stable   BMI Readings from Last 5 Encounters:   08/06/20 39.24 kg/m²   05/19/20 39.59 kg/m²   11/12/19 37.80 kg/m²   05/05/18 39.51 kg/m²   11/23/16 33.81 kg/m²    Diet: common adult  Exercise: no regular exercise program     Review of Systems   Constitutional: Positive for malaise/fatigue. Negative for chills, diaphoresis, fever and weight loss.   HENT: Negative for nosebleeds, sore throat and tinnitus.    Eyes: Negative for blurred vision and double vision.   Respiratory: Positive for shortness of breath. Negative for cough, hemoptysis and wheezing.    Cardiovascular: Positive for claudication and leg swelling (mild). Negative for chest pain, palpitations and orthopnea.   Gastrointestinal: Negative for abdominal pain, blood in stool, diarrhea, melena, nausea and vomiting.   Genitourinary: Negative for hematuria.   Musculoskeletal: Negative for joint pain and myalgias.   Skin: Negative for itching and rash.   Neurological: Negative for dizziness, tremors, focal weakness, seizures, weakness and headaches.   Endo/Heme/Allergies: Does not bruise/bleed easily.   Psychiatric/Behavioral: Negative for depression. The patient is not nervous/anxious and does not have insomnia.       Objective:     Vitals:    08/06/20 1401 08/06/20 1410   BP: 142/77 145/76   BP Location: Right arm Right arm   Patient Position: Sitting Sitting   BP Cuff Size: Large adult Large adult   Pulse: 86 70   Weight: (!) 125.8 kg (277 lb 6.4 oz)    Height: 1.791 m (5' 10.5\")       BP Readings from Last 5 Encounters:   08/06/20 145/76   05/19/20 122/67   12/09/19 " 146/79   19 (!) 175/80   05/10/18 (!) 169/93      Body mass index is 39.24 kg/m².  Physical Exam  Vitals signs reviewed.   Constitutional:       Appearance: Normal appearance.   HENT:      Head: Normocephalic and atraumatic.      Nose: Nose normal.      Mouth/Throat:      Mouth: Mucous membranes are moist.      Pharynx: Oropharynx is clear.   Eyes:      Extraocular Movements: Extraocular movements intact.      Conjunctiva/sclera: Conjunctivae normal.   Neck:      Musculoskeletal: Normal range of motion and neck supple.   Cardiovascular:      Rate and Rhythm: Normal rate and regular rhythm.      Pulses: Normal pulses.           Carotid pulses are 2+ on the right side and 2+ on the left side.       Radial pulses are 2+ on the right side and 2+ on the left side.        Dorsalis pedis pulses are 2+ on the right side and 2+ on the left side.        Posterior tibial pulses are 2+ on the right side and 2+ on the left side.      Heart sounds: Normal heart sounds.      Comments:  stemmer's sign negative     Spider telangectasia:       RLE:  None      LLE: none   Varicosities:           RLE: none      LLE: none   Corona phlebectatica:      RLE:  None        LLE:  None   Cording:         RLE:  None     LLE: None     Pulmonary:      Effort: Pulmonary effort is normal. No respiratory distress.      Breath sounds: Normal breath sounds and air entry.   Abdominal:      General: Abdomen is flat. Bowel sounds are normal.      Palpations: Abdomen is soft.   Musculoskeletal:      Right lower le+ Pitting Edema present.      Left lower le+ Pitting Edema present.   Skin:     General: Skin is warm and dry.      Capillary Refill: Capillary refill takes less than 2 seconds.   Neurological:      General: No focal deficit present.      Mental Status: He is alert and oriented to person, place, and time. Mental status is at baseline.   Psychiatric:         Mood and Affect: Mood normal.         Behavior: Behavior normal.       Lab  Results   Component Value Date    CHOLSTRLTOT 112 06/11/2020    LDL 57 06/11/2020    HDL 35 (A) 06/11/2020    TRIGLYCERIDE 98 06/11/2020      Lab Results   Component Value Date    PROTHROMBTM 13.8 11/12/2019    INR 1.60 12/02/2019         Lab Results   Component Value Date    SODIUM 141 06/11/2020    POTASSIUM 4.6 06/11/2020    CHLORIDE 106 06/11/2020    CO2 23 06/11/2020    GLUCOSE 120 (H) 06/11/2020    BUN 47 (H) 06/11/2020    CREATININE 1.57 (H) 06/11/2020    IFAFRICA 53 (A) 06/11/2020    IFNOTAFR 44 (A) 06/11/2020        Lab Results   Component Value Date    WBC 9.9 06/11/2020    RBC 4.49 (L) 06/11/2020    HEMOGLOBIN 13.5 (L) 06/11/2020    HEMATOCRIT 42.2 06/11/2020    MCV 94.0 06/11/2020    MCH 30.1 06/11/2020    MCHC 32.0 (L) 06/11/2020    MPV 9.9 06/11/2020      VASCULAR IMAGING:     Last EKG:     CT chest 2018   3.  Aortic and coronary artery atherosclerotic plaque    Echo 2018  Normal left ventricular size and systolic function.  Left ventricular ejection fraction is visually estimated to be 60%.  Mild concentric left ventricular hypertrophy.  Mildly dilated right ventricle.  Normal right ventricular systolic function.  Moderate mitral regurgitation.  Estimated right ventricular systolic pressure  is 35 mmHg.    RLE Venous 11/2019  Deep venous thrombosis within the right distal femoral vein and popliteal    veins.    Limited evaluation of the posterior tibial and peroneal veins. Thrombus not    excluded.    Medical Decision Making:  Today's Assessment / Status / Plan:     1. Acute venous embolism and thrombosis of deep vessels of proximal end of right lower extremity (HCC)  apixaban (ELIQUIS) 2.5mg Tab   2. Essential hypertension  EC-ECHOCARDIOGRAM COMPLETE W/O CONT   3. Chronic anticoagulation     4. History of renal transplant     5. Long term (current) use of systemic steroids     6. Long-term use of immunosuppressant medication     7. ANCA-associated vasculitis (HCC)  US-EXTREMITY ARTERY LOWER BILAT  W/MARY (COMBO)   8. Dyspnea on exertion  EC-ECHOCARDIOGRAM COMPLETE W/O CONT    apixaban (ELIQUIS) 2.5mg Tab    DX-CHEST-2 VIEWS    proBrain Natriuretic Peptide, NT    CBC WITHOUT DIFFERENTIAL    Comp Metabolic Panel   9. Claudication (HCC)  US-EXTREMITY ARTERY LOWER BILAT W/MARY (COMBO)   10. Leg weakness, bilateral  US-EXTREMITY ARTERY LOWER BILAT W/MARY (COMBO)     Patient Type: Primary Prevention    Etiology of Established CVD if Present:   1) Aortic athero per CT scan   2) CAD subclinical per CT scan   3) c-ANCA associated vasculitis (Wegener's, aka GPA)     Antithrombotic therapy:  Indication: acute RLE DVT   Anti-Platelet/Anti-Coagulant Tx: yes  Date of initiation: 11/2019  HAS-BLED bleeding risk calc ([email or web address]): 2 pts, 4.1% annual risk (moderate)  Cost is not an issue based upon insurance type.   Had multiple risk factos including travel, prednisone therapy and immunosuppresants all carry increased risk for VTE.    Underlying AAV increases risk of recurrence but unclear if related to immunosuppresant tx vs disease itself.   We reviewed that 6mo of OAC is adequate, then discussed extension with high dose vs low-dose for additional 1 year, then start ASA 162mg daily indefinitely.  Would also offer options for low-dose indefinite therapy, though evidence is lacking on effectiveness, it is likely beneficial and benefit would outweigh risk of bleeding.    Antithrombotic therapy plan:  - reduce eliquis to 2.5mg BID for additional 1 year through Aug 2021, then consider transition to ASA vs continued OAC   - continue compression stockings, 20-30mmHg  - will need labs updated and ongoing CBC, CrCl evaluations Q6mo while on DOAC   - counseled on signs and symptoms of acute VTE that require seeking prompt attention in the ED to include shortness of breath, chest pain, pain with deep inhalation, acute leg swelling and/or pain in calf or leg   - elevate legs as much as possible, use compression stockings/socks if directed  by your provider  - Avoid hormonal therapies including estrogen or testosterone-containing meds, or raloxifene or tamoxifene (commonly used for osteoporosis)  - Avoid sedentary periods  - continue complete avoidance of tobacco products  - if having any invasive procedure,please make sure the doctor knows of your history of blood clots and current anticoagulation status  - avoid Aspirin and anti-inflammatories (eg. Advil, ibuprofen, Aleve, naproxen, etc) while anticoagulated   - avoid skiing or other dangerous activities to reduce risk of head injury and brain bleeds  - if any bleeding lasting 30min without stopping, please seek care with your PCP, urgent care, or ED  - reversal agents for most blood thinners are now available and used if you have major bleeding    Lipid Management: Qualifies for Statin Therapy Based on 2018 ACC/AHA Guidelines: yes  Calculated 10-Year Risk of ASCVD (if LDL <189, no CKD G3b/4/5): N/A  Currently on Statin: Yes  NLA Risk Category (2014): High: due to renal transplant, KDIGO guidelines   Tx goal:  non-HDL <130, LDL-C <100 (optional: apoB<90)   At goal:  yes  Plan:  - reinforced ongoing TLC measures   - monitor labs Q6-12mo  - continue atorva 10mg, consider increase to 20mg     Blood Pressure Management:Goal: ACC/AHA (2017) goal <130/80  Home BP at goal:  yes  Office BP at goal:  yes  Echo: N/A  UACR: N/A  Device candidate? no  Plan:   Monitoring:   - continue home BP monitoring, reviewed correct technique, provide BP log and instructions  - order 24h ABPM:  NO  - monitor lytes/gfr routinely   - contact office if BP consistently >140/>90 to discussion of tx adjustments   Medications:  ACEi/ARB: consider low dose as additional agent   DHP-CCB: none   Thiazide: none   Aldosterone Antagonist: not indicated at this time   Other:   Peripheral Alpha Blocker: not indicated   Loop Diuretic: continue furosemide 20mg BID, KCl if low K+ , consider increasing or using torsemide based upon echo and  BNP results   Other CCB: not indicated   BB: continue metoprolol 50mg  ER daily for now     Glycemic Status: Normal    Smoking:  continued complete avoidance of all tobacco products     Physical Activity: continue healthy activity to improve CV fitness, see care instructions for additional details     Weight Management and Nutrition: Dietary plan was discussed with patient at this visit including DASH, low sodium and/or as outlined in care instructions     Other:   1) 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  Stable.   Has ongoing care with nephrology, appears stable on immunosuppresant therapy.   Has risk for arterial and venous disease baseline  - defer mgmt to nephrology    2) hx of renal transplant, stable BUN/Cr  - defer mgmt to nephrology and transplant team     3) atherosclerosis of coronaries and aorta, subclinical per CT, stable  - continue BP and lipid mgmt, no further imaging needed at this time     4) new weakness with walking, r/o claudication   - check MARY     5) DAS, no overt hypervolemia   No indications of PE, very unlikely while on DOAC   - check Cxr, echo and BNP with labs   - may consider increasing furoseimde or transition to torsemide     Instructed to follow-up with PCP for remainder of adult medical needs: yes  We will partner with other providers in the management of established vascular disease and cardiometabolic risk factors.    Studies to Be Obtained: MARY, CXR, echo - ordered, aprn to track   Labs to Be Obtained: as noted above     Follow up in: 1 mo with me, ED if worsening symptoms or new onset CP     Abiodun Delgado M.D.

## 2020-08-21 ENCOUNTER — HOSPITAL ENCOUNTER (OUTPATIENT)
Dept: LAB | Facility: MEDICAL CENTER | Age: 70
End: 2020-08-21
Attending: FAMILY MEDICINE
Payer: MEDICARE

## 2020-08-21 ENCOUNTER — HOSPITAL ENCOUNTER (OUTPATIENT)
Dept: RADIOLOGY | Facility: MEDICAL CENTER | Age: 70
End: 2020-08-21
Attending: FAMILY MEDICINE
Payer: MEDICARE

## 2020-08-21 ENCOUNTER — HOSPITAL ENCOUNTER (OUTPATIENT)
Dept: CARDIOLOGY | Facility: MEDICAL CENTER | Age: 70
End: 2020-08-21
Attending: FAMILY MEDICINE
Payer: MEDICARE

## 2020-08-21 DIAGNOSIS — I77.82 ANCA-ASSOCIATED VASCULITIS (HCC): ICD-10-CM

## 2020-08-21 DIAGNOSIS — R06.09 DYSPNEA ON EXERTION: ICD-10-CM

## 2020-08-21 DIAGNOSIS — I10 ESSENTIAL HYPERTENSION: ICD-10-CM

## 2020-08-21 DIAGNOSIS — I73.9 CLAUDICATION (HCC): ICD-10-CM

## 2020-08-21 DIAGNOSIS — R29.898 LEG WEAKNESS, BILATERAL: ICD-10-CM

## 2020-08-21 LAB
ALBUMIN SERPL BCP-MCNC: 4.3 G/DL (ref 3.2–4.9)
ALBUMIN/GLOB SERPL: 1.3 G/DL
ALP SERPL-CCNC: 109 U/L (ref 30–99)
ALT SERPL-CCNC: 18 U/L (ref 2–50)
ANION GAP SERPL CALC-SCNC: 14 MMOL/L (ref 7–16)
AST SERPL-CCNC: 17 U/L (ref 12–45)
BILIRUB SERPL-MCNC: 0.4 MG/DL (ref 0.1–1.5)
BUN SERPL-MCNC: 44 MG/DL (ref 8–22)
CALCIUM SERPL-MCNC: 10.8 MG/DL (ref 8.5–10.5)
CHLORIDE SERPL-SCNC: 103 MMOL/L (ref 96–112)
CO2 SERPL-SCNC: 22 MMOL/L (ref 20–33)
CREAT SERPL-MCNC: 1.32 MG/DL (ref 0.5–1.4)
ERYTHROCYTE [DISTWIDTH] IN BLOOD BY AUTOMATED COUNT: 49.3 FL (ref 35.9–50)
GLOBULIN SER CALC-MCNC: 3.3 G/DL (ref 1.9–3.5)
GLUCOSE SERPL-MCNC: 187 MG/DL (ref 65–99)
HCT VFR BLD AUTO: 41.9 % (ref 42–52)
HGB BLD-MCNC: 13.4 G/DL (ref 14–18)
LV EJECT FRACT  99904: 65
LV EJECT FRACT MOD 2C 99903: 66.93
LV EJECT FRACT MOD 4C 99902: 65.51
LV EJECT FRACT MOD BP 99901: 66.49
MCH RBC QN AUTO: 29.8 PG (ref 27–33)
MCHC RBC AUTO-ENTMCNC: 32 G/DL (ref 33.7–35.3)
MCV RBC AUTO: 93.3 FL (ref 81.4–97.8)
NT-PROBNP SERPL IA-MCNC: 1974 PG/ML (ref 0–125)
PLATELET # BLD AUTO: 179 K/UL (ref 164–446)
PMV BLD AUTO: 9.6 FL (ref 9–12.9)
POTASSIUM SERPL-SCNC: 4.7 MMOL/L (ref 3.6–5.5)
PROT SERPL-MCNC: 7.6 G/DL (ref 6–8.2)
RBC # BLD AUTO: 4.49 M/UL (ref 4.7–6.1)
SODIUM SERPL-SCNC: 139 MMOL/L (ref 135–145)
WBC # BLD AUTO: 10.4 K/UL (ref 4.8–10.8)

## 2020-08-21 PROCEDURE — 36415 COLL VENOUS BLD VENIPUNCTURE: CPT

## 2020-08-21 PROCEDURE — 93306 TTE W/DOPPLER COMPLETE: CPT

## 2020-08-21 PROCEDURE — 85027 COMPLETE CBC AUTOMATED: CPT

## 2020-08-21 PROCEDURE — 93922 UPR/L XTREMITY ART 2 LEVELS: CPT

## 2020-08-21 PROCEDURE — 80053 COMPREHEN METABOLIC PANEL: CPT

## 2020-08-21 PROCEDURE — 93306 TTE W/DOPPLER COMPLETE: CPT | Mod: 26 | Performed by: INTERNAL MEDICINE

## 2020-08-21 PROCEDURE — 83880 ASSAY OF NATRIURETIC PEPTIDE: CPT

## 2020-08-27 ENCOUNTER — TELEPHONE (OUTPATIENT)
Dept: VASCULAR LAB | Facility: MEDICAL CENTER | Age: 70
End: 2020-08-27

## 2020-08-27 DIAGNOSIS — I35.1 AORTIC VALVE INSUFFICIENCY, ETIOLOGY OF CARDIAC VALVE DISEASE UNSPECIFIED: ICD-10-CM

## 2020-08-27 DIAGNOSIS — Z79.899 LONG TERM CURRENT USE OF DIURETIC: ICD-10-CM

## 2020-08-27 DIAGNOSIS — R06.09 DYSPNEA ON EXERTION: ICD-10-CM

## 2020-08-27 RX ORDER — FUROSEMIDE 40 MG/1
40 TABLET ORAL 2 TIMES DAILY
Qty: 90 TAB | Refills: 3 | Status: SHIPPED | OUTPATIENT
Start: 2020-08-27 | End: 2020-10-14

## 2020-08-27 RX ORDER — POTASSIUM CHLORIDE 20 MEQ/1
20 TABLET, EXTENDED RELEASE ORAL 2 TIMES DAILY
Qty: 180 TAB | Refills: 3 | Status: SHIPPED | OUTPATIENT
Start: 2020-08-27 | End: 2020-11-17

## 2020-08-27 NOTE — TELEPHONE ENCOUNTER
LM with pt regarding recent echo results with moderate aortic insufficiency.  Instructed him to increase Lasix to 40mg BID per Dr. Delgado.  Also start KCl 20mEq BID.  Referral placed to cardiology to evaluate aortic insufficiency.  Follow up as planned in [facility].   Call back with any questions.     Katerina WAGGONER  [facility]

## 2020-09-01 ENCOUNTER — HOSPITAL ENCOUNTER (OUTPATIENT)
Dept: LAB | Facility: MEDICAL CENTER | Age: 70
End: 2020-09-01
Attending: INTERNAL MEDICINE
Payer: MEDICARE

## 2020-09-01 DIAGNOSIS — I35.1 AORTIC VALVE INSUFFICIENCY, ETIOLOGY OF CARDIAC VALVE DISEASE UNSPECIFIED: ICD-10-CM

## 2020-09-01 LAB
25(OH)D3 SERPL-MCNC: 67 NG/ML (ref 30–100)
ALBUMIN SERPL BCP-MCNC: 4 G/DL (ref 3.2–4.9)
ALBUMIN/GLOB SERPL: 1.3 G/DL
ALP SERPL-CCNC: 106 U/L (ref 30–99)
ALT SERPL-CCNC: 13 U/L (ref 2–50)
ANION GAP SERPL CALC-SCNC: 16 MMOL/L (ref 7–16)
APPEARANCE UR: CLEAR
AST SERPL-CCNC: 11 U/L (ref 12–45)
BACTERIA #/AREA URNS HPF: NEGATIVE /HPF
BASOPHILS # BLD AUTO: 0.2 % (ref 0–1.8)
BASOPHILS # BLD: 0.03 K/UL (ref 0–0.12)
BILIRUB SERPL-MCNC: 0.8 MG/DL (ref 0.1–1.5)
BILIRUB UR QL STRIP.AUTO: NEGATIVE
BUN SERPL-MCNC: 43 MG/DL (ref 8–22)
CALCIUM SERPL-MCNC: 10.3 MG/DL (ref 8.5–10.5)
CHLORIDE SERPL-SCNC: 104 MMOL/L (ref 96–112)
CHOLEST SERPL-MCNC: 120 MG/DL (ref 100–199)
CO2 SERPL-SCNC: 20 MMOL/L (ref 20–33)
COLOR UR: YELLOW
CREAT SERPL-MCNC: 1.36 MG/DL (ref 0.5–1.4)
EOSINOPHIL # BLD AUTO: 0.19 K/UL (ref 0–0.51)
EOSINOPHIL NFR BLD: 1.6 % (ref 0–6.9)
EPI CELLS #/AREA URNS HPF: ABNORMAL /HPF
ERYTHROCYTE [DISTWIDTH] IN BLOOD BY AUTOMATED COUNT: 49.1 FL (ref 35.9–50)
FASTING STATUS PATIENT QL REPORTED: NORMAL
GLOBULIN SER CALC-MCNC: 3.2 G/DL (ref 1.9–3.5)
GLUCOSE SERPL-MCNC: 142 MG/DL (ref 65–99)
GLUCOSE UR STRIP.AUTO-MCNC: NEGATIVE MG/DL
HCT VFR BLD AUTO: 43 % (ref 42–52)
HDLC SERPL-MCNC: 35 MG/DL
HGB BLD-MCNC: 13.8 G/DL (ref 14–18)
HYALINE CASTS #/AREA URNS LPF: ABNORMAL /LPF
IMM GRANULOCYTES # BLD AUTO: 0.05 K/UL (ref 0–0.11)
IMM GRANULOCYTES NFR BLD AUTO: 0.4 % (ref 0–0.9)
KETONES UR STRIP.AUTO-MCNC: NEGATIVE MG/DL
LDLC SERPL CALC-MCNC: 66 MG/DL
LEUKOCYTE ESTERASE UR QL STRIP.AUTO: ABNORMAL
LYMPHOCYTES # BLD AUTO: 3.34 K/UL (ref 1–4.8)
LYMPHOCYTES NFR BLD: 27.3 % (ref 22–41)
MAGNESIUM SERPL-MCNC: 1.6 MG/DL (ref 1.5–2.5)
MCH RBC QN AUTO: 30 PG (ref 27–33)
MCHC RBC AUTO-ENTMCNC: 32.1 G/DL (ref 33.7–35.3)
MCV RBC AUTO: 93.5 FL (ref 81.4–97.8)
MICRO URNS: ABNORMAL
MONOCYTES # BLD AUTO: 1.07 K/UL (ref 0–0.85)
MONOCYTES NFR BLD AUTO: 8.7 % (ref 0–13.4)
NEUTROPHILS # BLD AUTO: 7.57 K/UL (ref 1.82–7.42)
NEUTROPHILS NFR BLD: 61.8 % (ref 44–72)
NITRITE UR QL STRIP.AUTO: NEGATIVE
NRBC # BLD AUTO: 0 K/UL
NRBC BLD-RTO: 0 /100 WBC
PH UR STRIP.AUTO: 5.5 [PH] (ref 5–8)
PHOSPHATE SERPL-MCNC: 3.1 MG/DL (ref 2.5–4.5)
PLATELET # BLD AUTO: 179 K/UL (ref 164–446)
PMV BLD AUTO: 9.9 FL (ref 9–12.9)
POTASSIUM SERPL-SCNC: 4.5 MMOL/L (ref 3.6–5.5)
PROT SERPL-MCNC: 7.2 G/DL (ref 6–8.2)
PROT UR QL STRIP: 30 MG/DL
PTH-INTACT SERPL-MCNC: 80.8 PG/ML (ref 14–72)
RBC # BLD AUTO: 4.6 M/UL (ref 4.7–6.1)
RBC # URNS HPF: ABNORMAL /HPF
RBC UR QL AUTO: ABNORMAL
SODIUM SERPL-SCNC: 140 MMOL/L (ref 135–145)
SP GR UR STRIP.AUTO: 1.02
TRIGL SERPL-MCNC: 96 MG/DL (ref 0–149)
URATE SERPL-MCNC: 5 MG/DL (ref 2.5–8.3)
UROBILINOGEN UR STRIP.AUTO-MCNC: 0.2 MG/DL
WBC # BLD AUTO: 12.3 K/UL (ref 4.8–10.8)
WBC #/AREA URNS HPF: ABNORMAL /HPF

## 2020-09-01 PROCEDURE — 84550 ASSAY OF BLOOD/URIC ACID: CPT

## 2020-09-01 PROCEDURE — 36415 COLL VENOUS BLD VENIPUNCTURE: CPT

## 2020-09-01 PROCEDURE — 84156 ASSAY OF PROTEIN URINE: CPT

## 2020-09-01 PROCEDURE — 82306 VITAMIN D 25 HYDROXY: CPT

## 2020-09-01 PROCEDURE — 85025 COMPLETE CBC W/AUTO DIFF WBC: CPT

## 2020-09-01 PROCEDURE — 84100 ASSAY OF PHOSPHORUS: CPT

## 2020-09-01 PROCEDURE — 80197 ASSAY OF TACROLIMUS: CPT

## 2020-09-01 PROCEDURE — 81001 URINALYSIS AUTO W/SCOPE: CPT

## 2020-09-01 PROCEDURE — 80053 COMPREHEN METABOLIC PANEL: CPT

## 2020-09-01 PROCEDURE — 82570 ASSAY OF URINE CREATININE: CPT

## 2020-09-01 PROCEDURE — 83735 ASSAY OF MAGNESIUM: CPT

## 2020-09-01 PROCEDURE — 83970 ASSAY OF PARATHORMONE: CPT

## 2020-09-01 PROCEDURE — 87086 URINE CULTURE/COLONY COUNT: CPT

## 2020-09-01 PROCEDURE — 82043 UR ALBUMIN QUANTITATIVE: CPT

## 2020-09-01 PROCEDURE — 80061 LIPID PANEL: CPT

## 2020-09-02 LAB
CREAT UR-MCNC: 84.12 MG/DL
CREAT UR-MCNC: 86.41 MG/DL
MICROALBUMIN UR-MCNC: 18.3 MG/DL
MICROALBUMIN/CREAT UR: 212 MG/G (ref 0–30)
PROT UR-MCNC: 32 MG/DL (ref 0–15)

## 2020-09-03 ENCOUNTER — OFFICE VISIT (OUTPATIENT)
Dept: VASCULAR LAB | Facility: MEDICAL CENTER | Age: 70
End: 2020-09-03
Attending: FAMILY MEDICINE
Payer: MEDICARE

## 2020-09-03 VITALS
DIASTOLIC BLOOD PRESSURE: 94 MMHG | HEIGHT: 70 IN | BODY MASS INDEX: 38.64 KG/M2 | SYSTOLIC BLOOD PRESSURE: 140 MMHG | HEART RATE: 104 BPM | WEIGHT: 269.9 LBS

## 2020-09-03 DIAGNOSIS — I77.82 ANCA-ASSOCIATED VASCULITIS (HCC): ICD-10-CM

## 2020-09-03 DIAGNOSIS — I10 ESSENTIAL HYPERTENSION: ICD-10-CM

## 2020-09-03 DIAGNOSIS — Z79.01 CHRONIC ANTICOAGULATION: ICD-10-CM

## 2020-09-03 DIAGNOSIS — I82.4Y1 ACUTE VENOUS EMBOLISM AND THROMBOSIS OF DEEP VESSELS OF PROXIMAL END OF RIGHT LOWER EXTREMITY (HCC): ICD-10-CM

## 2020-09-03 DIAGNOSIS — R60.0 LOCALIZED EDEMA: ICD-10-CM

## 2020-09-03 DIAGNOSIS — Z79.52 LONG TERM (CURRENT) USE OF SYSTEMIC STEROIDS: ICD-10-CM

## 2020-09-03 DIAGNOSIS — Z94.0 HISTORY OF RENAL TRANSPLANT: ICD-10-CM

## 2020-09-03 DIAGNOSIS — R06.09 DYSPNEA ON EXERTION: ICD-10-CM

## 2020-09-03 DIAGNOSIS — I73.9 CLAUDICATION (HCC): ICD-10-CM

## 2020-09-03 DIAGNOSIS — I35.1 AORTIC VALVE INSUFFICIENCY, ETIOLOGY OF CARDIAC VALVE DISEASE UNSPECIFIED: ICD-10-CM

## 2020-09-03 DIAGNOSIS — Z79.899 LONG TERM CURRENT USE OF DIURETIC: ICD-10-CM

## 2020-09-03 DIAGNOSIS — Z79.60 LONG-TERM USE OF IMMUNOSUPPRESSANT MEDICATION: ICD-10-CM

## 2020-09-03 LAB — TACROLIMUS BLD-MCNC: 8.5 NG/ML

## 2020-09-03 PROCEDURE — 99214 OFFICE O/P EST MOD 30 MIN: CPT | Performed by: FAMILY MEDICINE

## 2020-09-03 PROCEDURE — 99212 OFFICE O/P EST SF 10 MIN: CPT

## 2020-09-03 RX ORDER — BENAZEPRIL HYDROCHLORIDE 20 MG/1
20 TABLET ORAL DAILY
Qty: 90 TAB | Refills: 3 | Status: SHIPPED | OUTPATIENT
Start: 2020-09-03 | End: 2021-02-17

## 2020-09-03 ASSESSMENT — ENCOUNTER SYMPTOMS
SHORTNESS OF BREATH: 1
DIZZINESS: 0
CHILLS: 0
NERVOUS/ANXIOUS: 0
SEIZURES: 0
DIAPHORESIS: 0
TREMORS: 0
FEVER: 0
BRUISES/BLEEDS EASILY: 0
DOUBLE VISION: 0
SPUTUM PRODUCTION: 0
HEMOPTYSIS: 0
WEIGHT LOSS: 0
DEPRESSION: 0
WEAKNESS: 0
PALPITATIONS: 0
WHEEZING: 0
BLURRED VISION: 0
COUGH: 0
INSOMNIA: 0
VOMITING: 0
NAUSEA: 0
FOCAL WEAKNESS: 0
SORE THROAT: 0
DIARRHEA: 0
CLAUDICATION: 1
BLOOD IN STOOL: 0
ORTHOPNEA: 0
ABDOMINAL PAIN: 0
HEADACHES: 0
MYALGIAS: 0

## 2020-09-03 ASSESSMENT — FIBROSIS 4 INDEX: FIB4 SCORE: 1.23

## 2020-09-03 NOTE — PROGRESS NOTES
FOLLOW-UP VASCULAR VISIT  Subjective:   Krishan Acevedo is a 70 y.o. male who presents today 9/3/20 for   Chief Complaint   Patient presents with   • Follow-Up      MARY, CXR, echo   Referred for length of therapy (LOT) determination of anticoagulation in context of acute venothromboembolic disease    HPI:    DAS:   Reports some SOB but improved improvement on DAS.    Much less edema.   Had echo with normal LVEF and RVSP wnl. Noted to have mod AI  Has lost 10lb over the course of 2-3 days.   BNP 1974 - only mild CKD, normal liver enzymes.   Has increased lasix to 40mg BID with kcl and pending with cardiology 10/14/20.     VTE disease / Anticoagulation:   Current symptoms:  Denies current leg pain, cyanosis of digits, redness of extremities.   Residual mild edema bilat, using furosemide.  No pain.  Reports new onset of worsening weakness with walking and DAS over last 1-2 months.   Prior echo 2018 was normal with BNP 200s.  No recent cough, f/c/s or URI symptoms.   Some leg pain with walking resolves at rest.    Current antithrombotic agent: eliquis 5mg BID - has spoken with Dr. Blount (nephro) who feels 2.5mg BID is fine.   Complications: none, tolerating well, no bleeding or bruising   Date of initiation of anticoagulation: 11/2019  Adherence: reports complete, no missed doses      Pertinent VTE pmhx:   Date of Diagnosis: 11/2019  Type of Venous thromboembolic disease (VTE): acute RLE DVT   Type of imaging: duplex   Preceding/presenting symptoms: acute RLE swelling and pain after car travel   VTE tx course: start eliquis 10mg BID for 10d, then 5mg BID   Any personal VTE hx? No  Any family VTE hx? No    UNPROVOKED VS PROVOKED:   Recent surgery ? No, denies prior VTE   Recent trauma ? No  Smoker?  reports that he has never smoked. He has never used smokeless tobacco.    Extended travel? Yes, Details: had driven to [place] early october, then Nov travel from [place]  Other periods of immobility? No  Other  risk factors for VTE disease:  Chronic prednisone use, tacrolimus, cellcept, obesity.  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  No leg discoloration.   No prior MARY   Stable on immunosuppressants.  seing nephrology (Dr. Blount) routinely.   Seen last month   Transplant team at [facility].   Reports ab testing monitoring via [facility] and reports has been undetectable.      Outpatient Encounter Medications as of 9/3/2020   Medication Sig Dispense Refill   • benazepril (LOTENSIN) 20 MG Tab Take 1 Tab by mouth every day for 360 days. 90 Tab 3   • furosemide (LASIX) 40 MG Tab Take 1 Tab by mouth 2 times a day. 90 Tab 3   • potassium chloride SA (KDUR) 20 MEQ Tab CR Take 1 Tab by mouth 2 times a day. 180 Tab 3   • Probiotic Product (PROBIOTIC DAILY PO) Take  by mouth.     • Bromfenac Sodium (PROLENSA) 0.07 % Solution by Ophthalmic route.     • apixaban (ELIQUIS) 2.5mg Tab Take 1 Tab by mouth 2 Times a Day for 360 days. 180 Tab 3   • metoprolol SR (TOPROL XL) 50 MG TABLET SR 24 HR Take 50 mg by mouth every day.     • omeprazole (PRILOSEC) 20 MG delayed-release capsule Take 20 mg by mouth every day.     • Brimonidine Tartrate-Timolol (COMBIGAN) 0.2-0.5 % Solution by Ophthalmic route.     • allopurinol (ZYLOPRIM) 300 MG Tab Take 300 mg by mouth every morning.     • calcitRIOL (ROCALTROL) 0.25 MCG Cap Take 0.25 mcg by mouth every morning.     • Multiple Vitamins-Minerals (CENTRUM SILVER 50+MEN) Tab Take 1 Tab by mouth every morning.     • vitamin D, Ergocalciferol, (DRISDOL) 42305 units Cap capsule Take 50,000 Units by mouth every Monday.     • Glucosamine-Chondroit-Vit C-Mn (GLUCOSAMINE 1500 COMPLEX) Cap Take 2 Caps by mouth 2 Times a Day.     • MAGNESIUM PO Take  1 Tab by mouth 2 Times a Day.     • mycophenolate (CELLCEPT) 250 MG Cap Take 500 mg by mouth 2 times a day.     • predniSONE (DELTASONE) 5 MG Tab Take 5 mg by mouth every morning.     • tacrolimus (PROGRAF) 1 MG Cap Take 1 mg by mouth 2 times a day.     • atorvastatin (LIPITOR) 10 MG TABS Take 10 mg by mouth every morning.       No facility-administered encounter medications on file as of 9/3/2020.      Allergies   Allergen Reactions   • Nsaids      Cannot take because of anti rejection meds.     Social History     Tobacco Use   • Smoking status: Never Smoker   • Smokeless tobacco: Never Used   Substance Use Topics   • Alcohol use: Not Currently   • Drug use: Not on file       DIET AND EXERCISE:  Weight Change: stable   BMI Readings from Last 5 Encounters:   09/03/20 38.21 kg/m²   08/06/20 39.24 kg/m²   05/19/20 39.59 kg/m²   11/12/19 37.80 kg/m²   05/05/18 39.51 kg/m²    Diet: common adult  Exercise: no regular exercise program     Review of Systems   Constitutional: Positive for malaise/fatigue (improving). Negative for chills, diaphoresis, fever and weight loss.   HENT: Negative for nosebleeds, sore throat and tinnitus.    Eyes: Negative for blurred vision and double vision.   Respiratory: Positive for shortness of breath (improved ). Negative for cough, hemoptysis, sputum production and wheezing.    Cardiovascular: Positive for claudication. Negative for chest pain, palpitations, orthopnea and leg swelling (resolved ).   Gastrointestinal: Negative for abdominal pain, blood in stool, diarrhea, melena, nausea and vomiting.   Genitourinary: Negative for hematuria.   Musculoskeletal: Negative for joint pain and myalgias.   Skin: Negative for itching and rash.   Neurological: Negative for dizziness, tremors, focal weakness, seizures, weakness and headaches.   Endo/Heme/Allergies: Does not bruise/bleed easily.   Psychiatric/Behavioral: Negative for depression. The patient is not nervous/anxious and does not have  "insomnia.       Objective:     Vitals:    09/03/20 1531 09/03/20 1538   BP: 153/74 140/94   BP Location: Right arm Right arm   Patient Position: Sitting Sitting   BP Cuff Size: Adult Large adult   Pulse: 88 (!) 104   Weight: 122.4 kg (269 lb 14.4 oz)    Height: 1.79 m (5' 10.47\")       BP Readings from Last 5 Encounters:   09/03/20 140/94   08/06/20 145/76   05/19/20 122/67   12/09/19 146/79   11/12/19 (!) 175/80      Body mass index is 38.21 kg/m².  Physical Exam  Vitals signs reviewed.   Constitutional:       Appearance: Normal appearance.   HENT:      Head: Normocephalic and atraumatic.      Nose: Nose normal.      Mouth/Throat:      Mouth: Mucous membranes are moist.      Pharynx: Oropharynx is clear.   Eyes:      Extraocular Movements: Extraocular movements intact.      Conjunctiva/sclera: Conjunctivae normal.   Neck:      Musculoskeletal: Normal range of motion and neck supple.   Cardiovascular:      Rate and Rhythm: Normal rate and regular rhythm.      Pulses: Normal pulses.           Carotid pulses are 2+ on the right side and 2+ on the left side.       Radial pulses are 2+ on the right side and 2+ on the left side.        Dorsalis pedis pulses are 2+ on the right side and 2+ on the left side.        Posterior tibial pulses are 2+ on the right side and 2+ on the left side.      Heart sounds: Normal heart sounds.      Comments:  stemmer's sign negative     Spider telangectasia:       RLE:  None      LLE: none   Varicosities:           RLE: none      LLE: none   Corona phlebectatica:      RLE:  None        LLE:  None   Cording:         RLE:  None     LLE: None     Pulmonary:      Effort: Pulmonary effort is normal. No respiratory distress.      Breath sounds: Normal breath sounds and air entry.   Abdominal:      General: Abdomen is flat. Bowel sounds are normal.      Palpations: Abdomen is soft.   Musculoskeletal:      Right lower leg: No edema.      Left lower leg: No edema.   Skin:     General: Skin is warm " and dry.      Capillary Refill: Capillary refill takes less than 2 seconds.   Neurological:      General: No focal deficit present.      Mental Status: He is alert and oriented to person, place, and time. Mental status is at baseline.   Psychiatric:         Mood and Affect: Mood normal.         Behavior: Behavior normal.       Lab Results   Component Value Date    CHOLSTRLTOT 120 09/01/2020    LDL 66 09/01/2020    HDL 35 (A) 09/01/2020    TRIGLYCERIDE 96 09/01/2020      Lab Results   Component Value Date    PROTHROMBTM 13.8 11/12/2019    INR 1.60 12/02/2019         Lab Results   Component Value Date    SODIUM 140 09/01/2020    POTASSIUM 4.5 09/01/2020    CHLORIDE 104 09/01/2020    CO2 20 09/01/2020    GLUCOSE 142 (H) 09/01/2020    BUN 43 (H) 09/01/2020    CREATININE 1.36 09/01/2020    IFAFRICA >60 09/01/2020    IFNOTAFR 51 (A) 09/01/2020        Lab Results   Component Value Date    WBC 12.3 (H) 09/01/2020    RBC 4.60 (L) 09/01/2020    HEMOGLOBIN 13.8 (L) 09/01/2020    HEMATOCRIT 43.0 09/01/2020    MCV 93.5 09/01/2020    MCH 30.0 09/01/2020    MCHC 32.1 (L) 09/01/2020    MPV 9.9 09/01/2020       Ref. Range 8/21/2020 13:24   NT-proBNP Latest Ref Range: 0 - 125 pg/mL 1974 (H)       VASCULAR IMAGING:     Last EKG:     CT chest 2018   3.  Aortic and coronary artery atherosclerotic plaque    Echo 2018  Normal left ventricular size and systolic function.  Left ventricular ejection fraction is visually estimated to be 60%.  Mild concentric left ventricular hypertrophy.  Mildly dilated right ventricle.  Normal right ventricular systolic function.  Moderate mitral regurgitation.  Estimated right ventricular systolic pressure  is 35 mmHg.    RLE Venous 11/2019  Deep venous thrombosis within the right distal femoral vein and popliteal    veins.    Limited evaluation of the posterior tibial and peroneal veins. Thrombus not    excluded.    MARY 8/21/20   Normal toe pressure   Normal triphasic waveforms with non compressible  vessel   No signifaicant stenosi identified    Echo 8/21/20  Left ventricle is mildly dilated.  A reliable estimation of diastolic function cannot be made due to   dysrhythmia.  Left ventricular ejection fraction is visually estimated to be 65%.  Mild mitral regurgitation.  Moderate aortic insufficiency.  Estimated right ventricular systolic pressure is  20 mmHg.    Medical Decision Making:  Today's Assessment / Status / Plan:     1. Aortic valve insufficiency, etiology of cardiac valve disease unspecified     2. Dyspnea on exertion  Basic Metabolic Panel    proBrain Natriuretic Peptide, NT   3. Long term current use of diuretic     4. Acute venous embolism and thrombosis of deep vessels of proximal end of right lower extremity (HCC)     5. Essential hypertension  benazepril (LOTENSIN) 20 MG Tab    Basic Metabolic Panel    proBrain Natriuretic Peptide, NT   6. ANCA-associated vasculitis (HCC)     7. Long-term use of immunosuppressant medication     8. Long term (current) use of systemic steroids     9. History of renal transplant     10. Chronic anticoagulation     11. Claudication (HCC)     12. Localized edema  Basic Metabolic Panel    proBrain Natriuretic Peptide, NT     Patient Type: Primary Prevention    Etiology of Established CVD if Present:   1) Aortic athero per CT scan   2) CAD subclinical per CT scan   3) c-ANCA associated vasculitis (Wegener's, aka GPA)     Antithrombotic therapy:   Indication: acute RLE DVT    Anti-Platelet/Anti-Coagulant Tx: yes   Date of initiation: 11/2019   HAS-BLED bleeding risk calc ([email or web address]): 2 pts, 4.1% annual risk (moderate)   Last CBC/ BMP: 9/2020  Cost is not an issue based upon insurance type.   Had multiple risk factos including travel, prednisone therapy and immunosuppresants all carry increased risk for VTE.    Underlying AAV increases risk of recurrence but unclear if related to immunosuppresant tx vs disease itself.   We reviewed that 6mo of OAC is adequate, then  discussed extension with high dose vs low-dose for additional 1 year, then start ASA 162mg daily indefinitely.  Would also offer options for low-dose indefinite therapy, though evidence is lacking on effectiveness, it is likely beneficial and benefit would outweigh risk of bleeding.    Antithrombotic therapy plan:  - continue eliquis to 2.5mg BID for additional 1 year through Aug 2021, then consider transition to ASA vs continued OAC   - continue compression stockings, 20-30mmHg  - will need labs updated and ongoing CBC, CrCl evaluations Q6mo while on DOAC   - counseled on signs and symptoms of acute VTE that require seeking prompt attention in the ED to include shortness of breath, chest pain, pain with deep inhalation, acute leg swelling and/or pain in calf or leg   - elevate legs as much as possible, use compression stockings/socks if directed by your provider  - Avoid hormonal therapies including estrogen or testosterone-containing meds, or raloxifene or tamoxifene (commonly used for osteoporosis)  - Avoid sedentary periods  - continue complete avoidance of tobacco products  - if having any invasive procedure,please make sure the doctor knows of your history of blood clots and current anticoagulation status  - avoid Aspirin and anti-inflammatories (eg. Advil, ibuprofen, Aleve, naproxen, etc) while anticoagulated   - avoid skiing or other dangerous activities to reduce risk of head injury and brain bleeds  - if any bleeding lasting 30min without stopping, please seek care with your PCP, urgent care, or ED  - reversal agents for most blood thinners are now available and used if you have major bleeding    Lipid Management: Qualifies for Statin Therapy Based on 2018 ACC/AHA Guidelines: yes  Calculated 10-Year Risk of ASCVD (if LDL <189, no CKD G3b/4/5): N/A  Currently on Statin: Yes  NLA Risk Category (2014): High: due to renal transplant, KDIGO guidelines    Tx goal:  non-HDL <130, LDL-C <100 (optional: apoB<90)     At goal:  yes  Plan:  - reinforced ongoing TLC measures   - monitor labs Q6-12mo  - continue atorva 10mg, consider increase to 20mg     Blood Pressure Management:Goal: ACC/AHA (2017) goal <130/80  Home BP at goal:  yes  Office BP at goal:  yes  Echo: no LVH, mod Ao insuff  UACR: N/A  Device candidate? no  Plan:   Monitoring:   - continue home BP monitoring, reviewed correct technique, provide BP log and instructions  - order 24h ABPM:  NO  - monitor lytes/gfr routinely   - contact office if BP consistently >140/>90 to discussion of tx adjustments   Medications:  ACEi/ARB: start benazepril 20mg daily   DHP-CCB: none   Thiazide: none   Aldosterone Antagonist: not indicated at this time   Other:   Peripheral Alpha Blocker: not indicated   Loop Diuretic: continue furosemide 40mg BID with Kcl 20mEq   Other CCB: not indicated   BB: continue metoprolol 50mg  ER daily for now     Glycemic Status: Normal    Smoking:  continued complete avoidance of all tobacco products     Physical Activity: continue healthy activity to improve CV fitness, see care instructions for additional details     Weight Management and Nutrition: Dietary plan was discussed with patient at this visit including DASH, low sodium and/or as outlined in care instructions     Other:   1) 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  Stable.   Has ongoing care with nephrology, appears stable on immunosuppresant therapy.   Has risk for arterial and venous disease baseline  - defer mgmt to nephrology    2) hx of renal transplant, stable BUN/Cr  - defer mgmt to nephrology and transplant team     3) atherosclerosis of coronaries and aorta, subclinical per CT, stable  - continue BP and lipid mgmt, no further imaging needed at this time     4) new weakness with walking, r/o claudication   - check MARY     5) DAS, no overt hypervolemia, improving after diuresis  BNP elevated, CXR normal   No indications of PE, very unlikely while on DOAC   - continue lasix, f/u with  cardiology     6) moderate AI  - eval with cardiology recommended     7)  Sterile pyuria, neg culture   - consider therapy if symptoms develop  - ongoing care with nephrology     Instructed to follow-up with PCP for remainder of adult medical needs: yes  We will partner with other providers in the management of established vascular disease and cardiometabolic risk factors.    Studies to Be Obtained: none   Labs to Be Obtained: as noted above     Follow up in: 2mo with me     Abiodun Delgado M.D.

## 2020-09-04 LAB
BACTERIA UR CULT: NORMAL
SIGNIFICANT IND 70042: NORMAL
SITE SITE: NORMAL
SOURCE SOURCE: NORMAL

## 2020-10-14 ENCOUNTER — TELEMEDICINE2 (OUTPATIENT)
Dept: CARDIOLOGY | Facility: MEDICAL CENTER | Age: 70
End: 2020-10-14
Payer: MEDICARE

## 2020-10-14 VITALS
BODY MASS INDEX: 37.1 KG/M2 | SYSTOLIC BLOOD PRESSURE: 106 MMHG | HEIGHT: 71 IN | RESPIRATION RATE: 16 BRPM | HEART RATE: 80 BPM | WEIGHT: 265 LBS | OXYGEN SATURATION: 95 % | DIASTOLIC BLOOD PRESSURE: 58 MMHG | TEMPERATURE: 97.3 F

## 2020-10-14 DIAGNOSIS — R07.89 CHEST PRESSURE: ICD-10-CM

## 2020-10-14 DIAGNOSIS — Z86.718 HISTORY OF DVT (DEEP VEIN THROMBOSIS): ICD-10-CM

## 2020-10-14 DIAGNOSIS — I35.1 NONRHEUMATIC AORTIC VALVE INSUFFICIENCY: ICD-10-CM

## 2020-10-14 DIAGNOSIS — R94.31 ABNORMAL ELECTROCARDIOGRAM (ECG) (EKG): ICD-10-CM

## 2020-10-14 DIAGNOSIS — R79.9 ABNORMAL FINDING OF BLOOD CHEMISTRY, UNSPECIFIED: ICD-10-CM

## 2020-10-14 DIAGNOSIS — I48.92 ATRIAL FLUTTER WITH RAPID VENTRICULAR RESPONSE (HCC): ICD-10-CM

## 2020-10-14 DIAGNOSIS — I10 ESSENTIAL HYPERTENSION: ICD-10-CM

## 2020-10-14 DIAGNOSIS — R60.9 EDEMA, UNSPECIFIED TYPE: ICD-10-CM

## 2020-10-14 PROCEDURE — 99205 OFFICE O/P NEW HI 60 MIN: CPT | Mod: 95,CR | Performed by: INTERNAL MEDICINE

## 2020-10-14 ASSESSMENT — ENCOUNTER SYMPTOMS
BACK PAIN: 0
PND: 0
COUGH: 0
CLAUDICATION: 0
WEIGHT GAIN: 0
VOMITING: 0
ALTERED MENTAL STATUS: 0
NEAR-SYNCOPE: 0
DYSPNEA ON EXERTION: 1
HEARTBURN: 0
DEPRESSION: 0
SYNCOPE: 0
CONSTIPATION: 0
ABDOMINAL PAIN: 0
BLURRED VISION: 0
WEIGHT LOSS: 0
FEVER: 0
SHORTNESS OF BREATH: 0
ORTHOPNEA: 0
DECREASED APPETITE: 0
DIZZINESS: 0
DIARRHEA: 0
IRREGULAR HEARTBEAT: 0
FLANK PAIN: 0
PALPITATIONS: 0
NAUSEA: 0

## 2020-10-14 ASSESSMENT — FIBROSIS 4 INDEX: FIB4 SCORE: 1.23

## 2020-10-14 NOTE — PROGRESS NOTES
This encounter was conducted via [employer].  Verbal consent was obtained. Patient's identity was verified.    Cardiology Note    Chief Complaint   Patient presents with   • Shortness of Breath       History of Present Illness: Krishan Acevedo is a 70 y.o. male PMH machelle's disease causing renal failure s/p renal transplant 2008 [facility] ([facility]), severe sepsis requiring admission 2014, hx fungal pneumonia thereafter, hx lower extremity DVT after prolonged car trip who presents for chest tightness and dyspnea.    Describes winded in a few seconds. Also complaints diarrhea believes related to medications. His wife suspects when he started benazepril and metoprolol. As well has been taking diuretics for leg edema which were stopped by nephrologist. They associate stumbling to these medication. His main complaint is chest pressure, dyspnea, leg swelling over last few months. Started on diuretic, lost weight, felt better, however was stopped as was affecting his kidneys. Has only taken lasix three times in past week. Not significantly more symptomatic since stopping diuretic. Angiogram 2003 in [facility] states.     Review of Systems   Constitution: Negative for decreased appetite, fever, malaise/fatigue, weight gain and weight loss.   HENT: Negative for congestion and nosebleeds.    Eyes: Negative for blurred vision.   Cardiovascular: Positive for chest pain, dyspnea on exertion and leg swelling. Negative for claudication, irregular heartbeat, near-syncope, orthopnea, palpitations, paroxysmal nocturnal dyspnea and syncope.   Respiratory: Negative for cough and shortness of breath.    Endocrine: Negative for cold intolerance and heat intolerance.   Skin: Negative for rash.   Musculoskeletal: Negative for back pain.   Gastrointestinal: Negative for abdominal pain, constipation, diarrhea, heartburn, melena, nausea and vomiting.   Genitourinary: Negative for dysuria, flank pain and  hematuria.   Neurological: Negative for dizziness.   Psychiatric/Behavioral: Negative for altered mental status and depression.         Past Medical History:   Diagnosis Date   • Kidney failure    • Kidney transplant pain          Past Surgical History:   Procedure Laterality Date   • PEG PLACEMENT  10/10/2014    Performed by Brijesh Orozco M.D. at [facility]         Current Outpatient Medications   Medication Sig Dispense Refill   • benazepril (LOTENSIN) 20 MG Tab Take 1 Tab by mouth every day for 360 days. 90 Tab 3   • potassium chloride SA (KDUR) 20 MEQ Tab CR Take 1 Tab by mouth 2 times a day. 180 Tab 3   • Probiotic Product (PROBIOTIC DAILY PO) Take  by mouth.     • apixaban (ELIQUIS) 2.5mg Tab Take 1 Tab by mouth 2 Times a Day for 360 days. 180 Tab 3   • metoprolol SR (TOPROL XL) 50 MG TABLET SR 24 HR Take 50 mg by mouth every day.     • omeprazole (PRILOSEC) 20 MG delayed-release capsule Take 20 mg by mouth every day.     • Brimonidine Tartrate-Timolol (COMBIGAN) 0.2-0.5 % Solution by Ophthalmic route.     • allopurinol (ZYLOPRIM) 300 MG Tab Take 300 mg by mouth every bedtime.     • calcitRIOL (ROCALTROL) 0.25 MCG Cap Take 0.25 mcg by mouth every morning.     • Multiple Vitamins-Minerals (CENTRUM SILVER 50+MEN) Tab Take 1 Tab by mouth every morning.     • vitamin D, Ergocalciferol, (DRISDOL) 82959 units Cap capsule Take 50,000 Units by mouth every Monday.     • Glucosamine-Chondroit-Vit C-Mn (GLUCOSAMINE 1500 COMPLEX) Cap Take 2 Caps by mouth 2 Times a Day.     • MAGNESIUM PO Take 1 Tab by mouth 2 Times a Day.     • mycophenolate (CELLCEPT) 250 MG Cap Take 500 mg by mouth 2 times a day.     • predniSONE (DELTASONE) 5 MG Tab Take 5 mg by mouth every morning.     • tacrolimus (PROGRAF) 1 MG Cap Take 1 mg by mouth 2 times a day.     • atorvastatin (LIPITOR) 10 MG TABS Take 10 mg by mouth every morning.     • Bromfenac Sodium (PROLENSA) 0.07 % Solution by Ophthalmic route.       No current  "facility-administered medications for this visit.          Allergies   Allergen Reactions   • Nsaids      Cannot take because of anti rejection meds.         Family History   Problem Relation Age of Onset   • Stroke Brother 26        , ? cause   • Stroke Maternal Grandmother            • Clotting Disorder Neg Hx          Social History     Socioeconomic History   • Marital status:      Spouse name: Not on file   • Number of children: Not on file   • Years of education: Not on file   • Highest education level: Not on file   Occupational History   • Not on file   Social Needs   • Financial resource strain: Not on file   • Food insecurity     Worry: Not on file     Inability: Not on file   • Transportation needs     Medical: Not on file     Non-medical: Not on file   Tobacco Use   • Smoking status: Never Smoker   • Smokeless tobacco: Never Used   Substance and Sexual Activity   • Alcohol use: Not Currently   • Drug use: Not on file   • Sexual activity: Not on file   Lifestyle   • Physical activity     Days per week: Not on file     Minutes per session: Not on file   • Stress: Not on file   Relationships   • Social connections     Talks on phone: Not on file     Gets together: Not on file     Attends Orthodoxy service: Not on file     Active member of club or organization: Not on file     Attends meetings of clubs or organizations: Not on file     Relationship status: Not on file   • Intimate partner violence     Fear of current or ex partner: Not on file     Emotionally abused: Not on file     Physically abused: Not on file     Forced sexual activity: Not on file   Other Topics Concern   • Not on file   Social History Narrative   • Not on file         Physical Exam:  Ambulatory Vitals  /58 (BP Location: Right arm, Patient Position: Sitting, BP Cuff Size: Adult)   Pulse 80   Temp 36.3 °C (97.3 °F)   Resp 16   Ht 1.791 m (5' 10.5\")   Wt 120.2 kg (265 lb)   SpO2 95%    BP Readings from Last 4 " "Encounters:   10/14/20 106/58   09/03/20 140/94   08/06/20 145/76   05/19/20 122/67     Weight/BMI:   Vitals:    10/14/20 1406   BP: 106/58   Weight: 120.2 kg (265 lb)   Height: 1.791 m (5' 10.5\")    Body mass index is 37.49 kg/m².  Wt Readings from Last 4 Encounters:   10/14/20 120.2 kg (265 lb)   09/03/20 122.4 kg (269 lb 14.4 oz)   08/06/20 (!) 125.8 kg (277 lb 6.4 oz)   05/19/20 (!) 125.1 kg (275 lb 14.4 oz)       Physical Exam   Physical Exam:  Constitutional: Alert, no distress, well-groomed.  Skin: No rashes in visible areas.  Eye: Round. Conjunctiva clear, lids normal. No icterus.   ENMT: Lips pink without lesions, good dentition, moist mucous membranes. Phonation normal.  Neck: No masses, no thyromegaly. Moves freely without pain.  CV: Pulse as reported by patient  Respiratory: Unlabored respiratory effort, no cough or audible wheeze  Psych: Alert and oriented x3, normal affect and mood.    Lab Data Review:  Lab Results   Component Value Date/Time    CHOLSTRLTOT 120 09/01/2020 07:33 AM    LDL 66 09/01/2020 07:33 AM    HDL 35 (A) 09/01/2020 07:33 AM    TRIGLYCERIDE 96 09/01/2020 07:33 AM       Lab Results   Component Value Date/Time    SODIUM 140 09/01/2020 07:33 AM    POTASSIUM 4.5 09/01/2020 07:33 AM    CHLORIDE 104 09/01/2020 07:33 AM    CO2 20 09/01/2020 07:33 AM    GLUCOSE 142 (H) 09/01/2020 07:33 AM    BUN 43 (H) 09/01/2020 07:33 AM    CREATININE 1.36 09/01/2020 07:33 AM     CrCl cannot be calculated (Patient's most recent lab result is older than the maximum 7 days allowed.).  Lab Results   Component Value Date/Time    ALKPHOSPHAT 106 (H) 09/01/2020 07:33 AM    ASTSGOT 11 (L) 09/01/2020 07:33 AM    ALTSGPT 13 09/01/2020 07:33 AM    TBILIRUBIN 0.8 09/01/2020 07:33 AM      Lab Results   Component Value Date/Time    WBC 12.3 (H) 09/01/2020 07:33 AM     No results found for: HBA1C  No components found for: TROP      Cardiac Imaging and Procedures Review:      TTE 8/21/20  CONCLUSIONS  Left ventricle is " mildly dilated.  A reliable estimation of diastolic function cannot be made due to   dysrhythmia.  Left ventricular ejection fraction is visually estimated to be 65%.  Mild mitral regurgitation.  Moderate aortic insufficiency.  Estimated right ventricular systolic pressure is  20 mmHg.  Left Ventricle  Left ventricle is mildly dilated. Normal left ventricular wall   thickness. Normal left ventricular systolic function. Left ventricular   ejection fraction is visually estimated to be 65%. Normal regional wall   motion. A reliable estimation of diastolic function cannot be made due   to dysrhythmia.    Medical Decision Making:  Problem List Items Addressed This Visit     Atrial flutter with rapid ventricular response (HCC)    Relevant Orders    Cardiac Event Monitor    Essential hypertension    Relevant Orders    NM-CARDIAC PET    CBC WITH DIFFERENTIAL    Comp Metabolic Panel    LIPID PANEL    TSH    HEMOGLOBIN A1C    proBrain Natriuretic Peptide, NT    Acute venous embolism and thrombosis of deep vessels of proximal lower extremity (HCC)    Aortic regurgitation    Chest pressure    Relevant Orders    NM-CARDIAC PET    CBC WITH DIFFERENTIAL    Comp Metabolic Panel    LIPID PANEL    TSH    HEMOGLOBIN A1C    proBrain Natriuretic Peptide, NT    EC-ECHOCARDIOGRAM COMPLETE W/O CONT    Edema    Relevant Orders    NM-CARDIAC PET    CBC WITH DIFFERENTIAL    Comp Metabolic Panel    LIPID PANEL    TSH    HEMOGLOBIN A1C    proBrain Natriuretic Peptide, NT    EC-ECHOCARDIOGRAM COMPLETE W/O CONT    Abnormal electrocardiogram (ECG) (EKG)     Relevant Orders    NM-CARDIAC PET    Abnormal finding of blood chemistry, unspecified     Relevant Orders    HEMOGLOBIN A1C        ?atrial flutter history? - check event monitor.    DVT, by history provoked although tolerating well and appears largely sedentary still as well as travels by car long distances still. Half dose doac reasonable and can continue.    Describes symptoms of heart  failure. Off diuretic for now. Repeat labs. Repeat TTE. Check nuclear stress.     **In need of further outpatient records. In process of obtaining.    It was my pleasure to meet with Mr. Acevedo.    34 min spent interviewing patient.

## 2020-10-16 ENCOUNTER — TELEPHONE (OUTPATIENT)
Dept: CARDIOLOGY | Facility: MEDICAL CENTER | Age: 70
End: 2020-10-16

## 2020-10-16 NOTE — TELEPHONE ENCOUNTER
JAZMIN Eid from [facility] calling for clarification on PT orders for NM PET scan. On order it states chemical which is confusing them. They do not do PET scans. They want to know if it should be a NM stress test.    Please call Ragini back at [phone]    Thank you

## 2020-10-16 NOTE — TELEPHONE ENCOUNTER
You  Jones Garcia M.D. 3 hours ago (10:22 AM)     I guess they don't do cardiac PETs at [facility]. Did you want him to do the PET here, or would a regular NM at [facility] be ok?      Jones Garcia M.D.  You 25 minutes ago (1:25 PM)     Anthony Phillips,   Yes if patient can travel PET is a much better camera. It should be the default camera for any pharmacologic nuclear stress unless unaffordable to patient or if cannot travel. However, the reading schedule for PET is such that the person ordering it may not be the person reading it which is why you'll see some practitioners stick to pharm spect cameras     It looks like pt is now scheduled for PET at [facility] on 10/21. Called and s/w pt's wife to confirm they are willing to travel. She confirms that she called this morning to make this appt, and they are fine traveling. The only testing they haven't scheduled yet is the Zio. They prefer mail in monitor. Placed on schedule for this on 10/27.    Called Ragini at [facility] and informed that pt will do cardiac PET elsewhere.

## 2020-10-17 NOTE — TELEPHONE ENCOUNTER
Per VR's request, faxed STAT records request to patient's nephrologist, Dr. Blount (F: [phone], P: [phone]), as well as [facility] (F: [phone], P: [phone]), [facility] (F: [phone], P: [phone]), and patient's PCP, Dr. Diaz (F: [phone], P: [phone]). Fax confirmations received.

## 2020-10-21 ENCOUNTER — HOSPITAL ENCOUNTER (OUTPATIENT)
Dept: RADIOLOGY | Facility: MEDICAL CENTER | Age: 70
End: 2020-10-21
Attending: INTERNAL MEDICINE
Payer: MEDICARE

## 2020-10-21 DIAGNOSIS — R94.31 ABNORMAL ELECTROCARDIOGRAM (ECG) (EKG): ICD-10-CM

## 2020-10-21 DIAGNOSIS — R60.9 EDEMA, UNSPECIFIED TYPE: ICD-10-CM

## 2020-10-21 DIAGNOSIS — I10 ESSENTIAL HYPERTENSION: ICD-10-CM

## 2020-10-21 DIAGNOSIS — R07.89 CHEST PRESSURE: ICD-10-CM

## 2020-10-21 PROCEDURE — A9555 RB82 RUBIDIUM: HCPCS

## 2020-10-21 PROCEDURE — 700111 HCHG RX REV CODE 636 W/ 250 OVERRIDE (IP)

## 2020-10-21 PROCEDURE — 78492 MYOCRD IMG PET MLT RST&STRS: CPT | Mod: 26 | Performed by: INTERNAL MEDICINE

## 2020-10-21 PROCEDURE — 93018 CV STRESS TEST I&R ONLY: CPT | Performed by: INTERNAL MEDICINE

## 2020-10-22 NOTE — PROCEDURES
REFERRING PHYSICIAN:  Jones Garcia MD    AGE:  70.    GENDER:  Male.    HEIGHT:  5 feet 10 inches.    WEIGHT:  265   pounds.      INDICATIONS: Chest pain.    PROCEDURE:  The patient reviewed and signed the acknowledgement for testing   form.  The patient was in a fasting state and was properly prepared for testing.    An intravenous line was inserted and a flush of normal saline followed to insure   line patency.    A transmission scan was acquired for attenuation correction using the internal   Germanium sources.  The patient was then administered 30.0 mCi of   Rubidium-82.  Approximately 90 seconds after the infusion, resting imagine   were obtained with ECG-gating.  Following the resting series, the patient   administered 69.4 mg of adenosine over four minutes.  The blood pressure,   heart rate and ECG were monitored and recorded.  After the adenosine infusion   was completed, another transmission scan for attenuation correction was   obtained.  The patient was then administered 30 mCi of Rubidium-82.    Approximately 90 seconds after the infusion, Peak stress images were obtained   with ECG-gating.    CLINICAL RESPONSE:  Resting blood pressure was 138/77 mmHg with a heart rate   of 66 beats per minute.  Immediately post-adenosine infusion the blood   pressure was 150/81 mmHg with a heart rate of 88 beats per minute.    The patient experienced shortness of breath, flush sensation, dizziness,   nausea, wheezing symptoms during testing.      Aminophylline 100 mg was administered following the scan.    ELECTROCARDIOGRAPHIC FINDINGS:  Rest and stress EKG appears to be sinus;   however, atrial flutter cannot be ruled out due to significant tremor   artifact.      SCINTOGRAPHIC FINDINGS:  Rest and stress images show no significant   attenuation.      GATED WALL MOTION FINDINGS:  Resting ejection fraction 48%, stress ejection   fraction 57%, no segmental wall motion abnormalities noted.      CONCLUSIONS AND  IMPRESSIONS:    1.  Normal cardiac PET scan.         No evidence of ischemia nor infarct.         TID 1.31, likely due to artifact.    2.  Resting ejection fraction 48%, stress ejection fraction 57%.    3.  EKG as above.    4.  Shortness of breath; however, chest pain was not experienced during this        study.       ____________________________________     MD MIGNON HANCOCK / STEPHEN    DD:  10/21/2020 17:04:49  DT:  10/21/2020 18:40:09    D#:  [id number]  Job#:  [id number]

## 2020-10-27 ENCOUNTER — TELEPHONE (OUTPATIENT)
Dept: CARDIOLOGY | Facility: MEDICAL CENTER | Age: 70
End: 2020-10-27

## 2020-10-27 ENCOUNTER — NON-PROVIDER VISIT (OUTPATIENT)
Dept: CARDIOLOGY | Facility: MEDICAL CENTER | Age: 70
End: 2020-10-27
Payer: MEDICARE

## 2020-10-27 DIAGNOSIS — I48.92 ATRIAL FLUTTER, UNSPECIFIED TYPE (HCC): ICD-10-CM

## 2020-10-27 NOTE — TELEPHONE ENCOUNTER
Home enrollment completed for the 14 day Zio patch program per .  Monitor to be mailed to patient by [employer].    >Currently pending EOS.

## 2020-10-27 NOTE — TELEPHONE ENCOUNTER
PET DICTATED RESULTS  Jones Garcia M.D.  Jacqueline Muller R.N.               Pending repeat echo. Notable change between rest and stress LVEF. Spoke to patient he is feeling good on current dose of lasix 40mg am and 20mg pm. Can we send him orders for echo and labs to make sure he has a chance to repeat? Not on mychart. Thank you!      Pt currently scheduled for echo on 11/13. Mailed copy of lab slips ordered during 10/14 VR appt.

## 2020-11-05 ENCOUNTER — HOSPITAL ENCOUNTER (OUTPATIENT)
Dept: LAB | Facility: MEDICAL CENTER | Age: 70
End: 2020-11-05
Attending: INTERNAL MEDICINE
Payer: MEDICARE

## 2020-11-05 DIAGNOSIS — I10 ESSENTIAL HYPERTENSION: ICD-10-CM

## 2020-11-05 DIAGNOSIS — R79.9 ABNORMAL FINDING OF BLOOD CHEMISTRY, UNSPECIFIED: ICD-10-CM

## 2020-11-05 DIAGNOSIS — R07.89 CHEST PRESSURE: ICD-10-CM

## 2020-11-05 DIAGNOSIS — R60.9 EDEMA, UNSPECIFIED TYPE: ICD-10-CM

## 2020-11-05 LAB
BASOPHILS # BLD AUTO: 0.3 % (ref 0–1.8)
BASOPHILS # BLD: 0.03 K/UL (ref 0–0.12)
EOSINOPHIL # BLD AUTO: 0.18 K/UL (ref 0–0.51)
EOSINOPHIL NFR BLD: 1.7 % (ref 0–6.9)
ERYTHROCYTE [DISTWIDTH] IN BLOOD BY AUTOMATED COUNT: 55.3 FL (ref 35.9–50)
HCT VFR BLD AUTO: 41 % (ref 42–52)
HGB BLD-MCNC: 12.9 G/DL (ref 14–18)
IMM GRANULOCYTES # BLD AUTO: 0.04 K/UL (ref 0–0.11)
IMM GRANULOCYTES NFR BLD AUTO: 0.4 % (ref 0–0.9)
LYMPHOCYTES # BLD AUTO: 3.22 K/UL (ref 1–4.8)
LYMPHOCYTES NFR BLD: 30.6 % (ref 22–41)
MCH RBC QN AUTO: 30.1 PG (ref 27–33)
MCHC RBC AUTO-ENTMCNC: 31.5 G/DL (ref 33.7–35.3)
MCV RBC AUTO: 95.6 FL (ref 81.4–97.8)
MONOCYTES # BLD AUTO: 1.08 K/UL (ref 0–0.85)
MONOCYTES NFR BLD AUTO: 10.3 % (ref 0–13.4)
NEUTROPHILS # BLD AUTO: 5.98 K/UL (ref 1.82–7.42)
NEUTROPHILS NFR BLD: 56.7 % (ref 44–72)
NRBC # BLD AUTO: 0 K/UL
NRBC BLD-RTO: 0 /100 WBC
PLATELET # BLD AUTO: 144 K/UL (ref 164–446)
PMV BLD AUTO: 10.5 FL (ref 9–12.9)
RBC # BLD AUTO: 4.29 M/UL (ref 4.7–6.1)
WBC # BLD AUTO: 10.5 K/UL (ref 4.8–10.8)

## 2020-11-05 PROCEDURE — 83036 HEMOGLOBIN GLYCOSYLATED A1C: CPT | Mod: GA

## 2020-11-05 PROCEDURE — 84443 ASSAY THYROID STIM HORMONE: CPT

## 2020-11-05 PROCEDURE — 80061 LIPID PANEL: CPT

## 2020-11-05 PROCEDURE — 80053 COMPREHEN METABOLIC PANEL: CPT

## 2020-11-05 PROCEDURE — 83880 ASSAY OF NATRIURETIC PEPTIDE: CPT | Mod: GA

## 2020-11-05 PROCEDURE — 36415 COLL VENOUS BLD VENIPUNCTURE: CPT

## 2020-11-05 PROCEDURE — 85025 COMPLETE CBC W/AUTO DIFF WBC: CPT

## 2020-11-06 ENCOUNTER — OFFICE VISIT (OUTPATIENT)
Dept: VASCULAR LAB | Facility: MEDICAL CENTER | Age: 70
End: 2020-11-06
Attending: FAMILY MEDICINE
Payer: MEDICARE

## 2020-11-06 ENCOUNTER — TELEPHONE (OUTPATIENT)
Dept: CARDIOLOGY | Facility: MEDICAL CENTER | Age: 70
End: 2020-11-06

## 2020-11-06 VITALS
BODY MASS INDEX: 37.1 KG/M2 | SYSTOLIC BLOOD PRESSURE: 122 MMHG | HEIGHT: 71 IN | WEIGHT: 265 LBS | DIASTOLIC BLOOD PRESSURE: 63 MMHG | HEART RATE: 70 BPM

## 2020-11-06 DIAGNOSIS — I35.1 AORTIC VALVE INSUFFICIENCY, ETIOLOGY OF CARDIAC VALVE DISEASE UNSPECIFIED: ICD-10-CM

## 2020-11-06 DIAGNOSIS — Z86.718 HISTORY OF DVT (DEEP VEIN THROMBOSIS): ICD-10-CM

## 2020-11-06 DIAGNOSIS — Z79.899 LONG TERM CURRENT USE OF DIURETIC: ICD-10-CM

## 2020-11-06 DIAGNOSIS — Z79.01 CHRONIC ANTICOAGULATION: ICD-10-CM

## 2020-11-06 DIAGNOSIS — Z79.60 LONG-TERM USE OF IMMUNOSUPPRESSANT MEDICATION: ICD-10-CM

## 2020-11-06 DIAGNOSIS — Z79.52 LONG TERM (CURRENT) USE OF SYSTEMIC STEROIDS: ICD-10-CM

## 2020-11-06 DIAGNOSIS — I77.82 ANCA-ASSOCIATED VASCULITIS (HCC): ICD-10-CM

## 2020-11-06 DIAGNOSIS — I10 ESSENTIAL HYPERTENSION: ICD-10-CM

## 2020-11-06 DIAGNOSIS — R06.09 DYSPNEA ON EXERTION: ICD-10-CM

## 2020-11-06 LAB
ALBUMIN SERPL BCP-MCNC: 4 G/DL (ref 3.2–4.9)
ALBUMIN/GLOB SERPL: 1.3 G/DL
ALP SERPL-CCNC: 91 U/L (ref 30–99)
ALT SERPL-CCNC: 17 U/L (ref 2–50)
ANION GAP SERPL CALC-SCNC: 11 MMOL/L (ref 7–16)
AST SERPL-CCNC: 14 U/L (ref 12–45)
BILIRUB SERPL-MCNC: 0.4 MG/DL (ref 0.1–1.5)
BUN SERPL-MCNC: 59 MG/DL (ref 8–22)
CALCIUM SERPL-MCNC: 10.4 MG/DL (ref 8.5–10.5)
CHLORIDE SERPL-SCNC: 108 MMOL/L (ref 96–112)
CHOLEST SERPL-MCNC: 91 MG/DL (ref 100–199)
CO2 SERPL-SCNC: 20 MMOL/L (ref 20–33)
CREAT SERPL-MCNC: 1.84 MG/DL (ref 0.5–1.4)
EST. AVERAGE GLUCOSE BLD GHB EST-MCNC: 171 MG/DL
FASTING STATUS PATIENT QL REPORTED: NORMAL
GLOBULIN SER CALC-MCNC: 3 G/DL (ref 1.9–3.5)
GLUCOSE SERPL-MCNC: 122 MG/DL (ref 65–99)
HBA1C MFR BLD: 7.6 % (ref 0–5.6)
HDLC SERPL-MCNC: 32 MG/DL
LDLC SERPL CALC-MCNC: 39 MG/DL
NT-PROBNP SERPL IA-MCNC: 1059 PG/ML (ref 0–125)
POTASSIUM SERPL-SCNC: 5.7 MMOL/L (ref 3.6–5.5)
PROT SERPL-MCNC: 7 G/DL (ref 6–8.2)
SODIUM SERPL-SCNC: 139 MMOL/L (ref 135–145)
TRIGL SERPL-MCNC: 99 MG/DL (ref 0–149)
TSH SERPL DL<=0.005 MIU/L-ACNC: 0.76 UIU/ML (ref 0.38–5.33)

## 2020-11-06 PROCEDURE — 99214 OFFICE O/P EST MOD 30 MIN: CPT | Performed by: FAMILY MEDICINE

## 2020-11-06 PROCEDURE — 99212 OFFICE O/P EST SF 10 MIN: CPT

## 2020-11-06 ASSESSMENT — ENCOUNTER SYMPTOMS
SHORTNESS OF BREATH: 1
HEMOPTYSIS: 0
NERVOUS/ANXIOUS: 0
VOMITING: 0
NAUSEA: 0
DOUBLE VISION: 0
HEADACHES: 0
BLURRED VISION: 0
CLAUDICATION: 0
DIZZINESS: 0
WEAKNESS: 0
INSOMNIA: 0
WHEEZING: 0
PALPITATIONS: 0
DIAPHORESIS: 0
TREMORS: 0
ABDOMINAL PAIN: 0
CHILLS: 0
FEVER: 0
WEIGHT LOSS: 0
MYALGIAS: 0
BLOOD IN STOOL: 0
SPUTUM PRODUCTION: 0
BRUISES/BLEEDS EASILY: 0
COUGH: 0
DIARRHEA: 0
ORTHOPNEA: 0
DEPRESSION: 0
FOCAL WEAKNESS: 0
SEIZURES: 0
SORE THROAT: 0

## 2020-11-06 ASSESSMENT — FIBROSIS 4 INDEX: FIB4 SCORE: 1.697749375254330815

## 2020-11-06 NOTE — PATIENT INSTRUCTIONS
Ayr nasal saline gel   Ponaris nasal emollient - astronauts   Humidifier in room     Continue follow-up with cardiology, nephrology, and pulmonology

## 2020-11-06 NOTE — PROGRESS NOTES
FOLLOW-UP VASCULAR VISIT  Subjective:   Krishan Acevedo is a 70 y.o. male who presents today 11/06/20  for   Chief Complaint   Patient presents with   • Office Visit     f/u HTN, DAS, labs   Referred for length of therapy (LOT) determination of anticoagulation in context of acute venothromboembolic disease    HPI:    DAS:   Improving SOB, pending f/u with Dr. Ayers at [facility] for 6min walk test.   Seen by Dr. Quinones (cardio), had neg cardiac PET late 10/2020.    Much less edema. Tolerating diuretics.   Had echo with normal LVEF and RVSP wnl. Noted to have mod AI and has ongoing questions relating to this.      VTE disease / Anticoagulation:   Current symptoms:  Denies current leg pain, cyanosis of digits, redness of extremities.   Residual mild edema bilat, using furosemide.  No pain.  Reports new onset of worsening weakness with walking and DAS over last 1-2 months.   Prior echo 2018 was normal with BNP 200s.  No recent cough, f/c/s or URI symptoms.   Some leg pain with walking resolves at rest.    Current antithrombotic agent: eliquis 5mg BID - has spoken with Dr. Blount (nephro) who feels 2.5mg BID is fine.   Complications: none, tolerating well, no bleeding or bruising   Date of initiation of anticoagulation: 11/2019  Adherence: reports complete, no missed doses      Pertinent VTE pmhx:   Date of Diagnosis: 11/2019  Type of Venous thromboembolic disease (VTE): acute RLE DVT   Type of imaging: duplex   Preceding/presenting symptoms: acute RLE swelling and pain after car travel   VTE tx course: start eliquis 10mg BID for 10d, then 5mg BID   Any personal VTE hx? No  Any family VTE hx? No    UNPROVOKED VS PROVOKED:   Recent surgery ? No, denies prior VTE   Recent trauma ? No  Smoker?  reports that he has never smoked. He has never used smokeless tobacco.    Extended travel? Yes, Details: had driven to [place] early october, then Nov travel from [place]  Other periods of immobility? No  Other  risk factors for VTE disease:  Chronic prednisone use, tacrolimus, cellcept, obesity.  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  No leg discoloration.   No prior MARY   Stable on immunosuppressants.  seing nephrology (Dr. Blount) routinely.   Seen last month   Transplant team at [facility].   Reports ab testing monitoring via [facility] and reports has been undetectable.      Outpatient Encounter Medications as of 11/6/2020   Medication Sig Dispense Refill   • benazepril (LOTENSIN) 20 MG Tab Take 1 Tab by mouth every day for 360 days. 90 Tab 3   • potassium chloride SA (KDUR) 20 MEQ Tab CR Take 1 Tab by mouth 2 times a day. 180 Tab 3   • Probiotic Product (PROBIOTIC DAILY PO) Take  by mouth.     • Bromfenac Sodium (PROLENSA) 0.07 % Solution by Ophthalmic route.     • apixaban (ELIQUIS) 2.5mg Tab Take 1 Tab by mouth 2 Times a Day for 360 days. 180 Tab 3   • metoprolol SR (TOPROL XL) 50 MG TABLET SR 24 HR Take 50 mg by mouth every day.     • omeprazole (PRILOSEC) 20 MG delayed-release capsule Take 20 mg by mouth every day.     • Brimonidine Tartrate-Timolol (COMBIGAN) 0.2-0.5 % Solution by Ophthalmic route.     • allopurinol (ZYLOPRIM) 300 MG Tab Take 300 mg by mouth every bedtime.     • calcitRIOL (ROCALTROL) 0.25 MCG Cap Take 0.25 mcg by mouth every morning.     • Multiple Vitamins-Minerals (CENTRUM SILVER 50+MEN) Tab Take 1 Tab by mouth every morning.     • vitamin D, Ergocalciferol, (DRISDOL) 10208 units Cap capsule Take 50,000 Units by mouth every Monday.     • Glucosamine-Chondroit-Vit C-Mn (GLUCOSAMINE 1500 COMPLEX) Cap Take 2 Caps by mouth 2 Times a Day.     • MAGNESIUM PO Take 1 Tab by mouth 2 Times a Day.     • mycophenolate (CELLCEPT) 250 MG Cap  Take 500 mg by mouth 2 times a day.     • predniSONE (DELTASONE) 5 MG Tab Take 5 mg by mouth every morning.     • tacrolimus (PROGRAF) 1 MG Cap Take 1 mg by mouth 2 times a day.     • atorvastatin (LIPITOR) 10 MG TABS Take 10 mg by mouth every morning.       No facility-administered encounter medications on file as of 11/6/2020.      Allergies   Allergen Reactions   • Nsaids      Cannot take because of anti rejection meds.     Social History     Tobacco Use   • Smoking status: Never Smoker   • Smokeless tobacco: Never Used   Substance Use Topics   • Alcohol use: Not Currently   • Drug use: Not on file       DIET AND EXERCISE:  Weight Change: stable   BMI Readings from Last 5 Encounters:   11/06/20 37.49 kg/m²   10/14/20 37.49 kg/m²   09/03/20 38.21 kg/m²   08/06/20 39.24 kg/m²   05/19/20 39.59 kg/m²    Diet: common adult  Exercise: no regular exercise program     Review of Systems   Constitutional: Positive for malaise/fatigue (improving). Negative for chills, diaphoresis, fever and weight loss.   HENT: Negative for nosebleeds, sore throat and tinnitus.    Eyes: Negative for blurred vision and double vision.   Respiratory: Positive for shortness of breath (improved ). Negative for cough, hemoptysis, sputum production and wheezing.    Cardiovascular: Negative for chest pain, palpitations, orthopnea, claudication and leg swelling (resolved ).   Gastrointestinal: Negative for abdominal pain, blood in stool, diarrhea, melena, nausea and vomiting.   Genitourinary: Negative for hematuria.   Musculoskeletal: Negative for joint pain and myalgias.   Skin: Negative for itching and rash.   Neurological: Negative for dizziness, tremors, focal weakness, seizures, weakness and headaches.   Endo/Heme/Allergies: Does not bruise/bleed easily.   Psychiatric/Behavioral: Negative for depression. The patient is not nervous/anxious and does not have insomnia.       Objective:     Vitals:    11/06/20 1013   BP: 122/63   BP Location: Right  "arm   Patient Position: Sitting   BP Cuff Size: Adult   Pulse: 70   Weight: 120.2 kg (265 lb)   Height: 1.791 m (5' 10.5\")      BP Readings from Last 5 Encounters:   11/06/20 122/63   10/14/20 106/58   09/03/20 140/94   08/06/20 145/76   05/19/20 122/67      Body mass index is 37.49 kg/m².  Physical Exam  Vitals signs reviewed.   Constitutional:       Appearance: Normal appearance.   HENT:      Head: Normocephalic and atraumatic.      Nose: Nose normal.      Mouth/Throat:      Mouth: Mucous membranes are moist.      Pharynx: Oropharynx is clear.   Eyes:      Extraocular Movements: Extraocular movements intact.      Conjunctiva/sclera: Conjunctivae normal.   Neck:      Musculoskeletal: Normal range of motion and neck supple.   Cardiovascular:      Rate and Rhythm: Normal rate and regular rhythm.      Pulses: Normal pulses.           Carotid pulses are 2+ on the right side and 2+ on the left side.       Radial pulses are 2+ on the right side and 2+ on the left side.        Dorsalis pedis pulses are 2+ on the right side and 2+ on the left side.        Posterior tibial pulses are 2+ on the right side and 2+ on the left side.      Heart sounds: Normal heart sounds.      Comments:  stemmer's sign negative     Spider telangectasia:       RLE:  None      LLE: none   Varicosities:           RLE: none      LLE: none   Corona phlebectatica:      RLE:  None        LLE:  None   Cording:         RLE:  None     LLE: None     Pulmonary:      Effort: Pulmonary effort is normal. No respiratory distress.      Breath sounds: Normal breath sounds and air entry.   Abdominal:      General: Abdomen is flat. Bowel sounds are normal.      Palpations: Abdomen is soft.   Musculoskeletal:      Right lower leg: No edema.      Left lower leg: No edema.   Skin:     General: Skin is warm and dry.      Capillary Refill: Capillary refill takes less than 2 seconds.   Neurological:      General: No focal deficit present.      Mental Status: He is alert " and oriented to person, place, and time. Mental status is at baseline.   Psychiatric:         Mood and Affect: Mood normal.         Behavior: Behavior normal.       Lab Results   Component Value Date    CHOLSTRLTOT 91 (L) 11/05/2020    LDL 39 11/05/2020    HDL 32 (A) 11/05/2020    TRIGLYCERIDE 99 11/05/2020      Lab Results   Component Value Date    PROTHROMBTM 13.8 11/12/2019    INR 1.60 12/02/2019       Lab Results   Component Value Date    HBA1C 7.6 (H) 11/05/2020      Lab Results   Component Value Date    SODIUM 139 11/05/2020    POTASSIUM 5.7 (H) 11/05/2020    CHLORIDE 108 11/05/2020    CO2 20 11/05/2020    GLUCOSE 122 (H) 11/05/2020    BUN 59 (H) 11/05/2020    CREATININE 1.84 (H) 11/05/2020    IFAFRICA 44 (A) 11/05/2020    IFNOTAFR 36 (A) 11/05/2020        Lab Results   Component Value Date    WBC 10.5 11/05/2020    RBC 4.29 (L) 11/05/2020    HEMOGLOBIN 12.9 (L) 11/05/2020    HEMATOCRIT 41.0 (L) 11/05/2020    MCV 95.6 11/05/2020    MCH 30.1 11/05/2020    MCHC 31.5 (L) 11/05/2020    MPV 10.5 11/05/2020       Ref. Range 8/21/2020 13:24   NT-proBNP Latest Ref Range: 0 - 125 pg/mL 1974 (H)       VASCULAR IMAGING:     Last EKG:     CT chest 2018   3.  Aortic and coronary artery atherosclerotic plaque    Echo 2018  Normal left ventricular size and systolic function.  Left ventricular ejection fraction is visually estimated to be 60%.  Mild concentric left ventricular hypertrophy.  Mildly dilated right ventricle.  Normal right ventricular systolic function.  Moderate mitral regurgitation.  Estimated right ventricular systolic pressure  is 35 mmHg.    RLE Venous 11/2019  Deep venous thrombosis within the right distal femoral vein and popliteal    veins.    Limited evaluation of the posterior tibial and peroneal veins. Thrombus not    excluded.    MARY 8/21/20   Normal toe pressure   Normal triphasic waveforms with non compressible vessel   No signifaicant stenosi identified    Echo 8/21/20  Left ventricle is mildly  dilated.  A reliable estimation of diastolic function cannot be made due to   dysrhythmia.  Left ventricular ejection fraction is visually estimated to be 65%.  Mild mitral regurgitation.  Moderate aortic insufficiency.  Estimated right ventricular systolic pressure is  20 mmHg.    MARY 8/21/20   Normal toe pressure   Normal triphasic waveforms with non compressible vessel   No significant stenosis identified    Cardiac PET 10/21/20  1.  Normal cardiac PET scan.         No evidence of ischemia nor infarct.         TID 1.31, likely due to artifact.    2.  Resting ejection fraction 48%, stress ejection fraction 57%.    3.  EKG as above.    4.  Shortness of breath; however, chest pain was not experienced during this        study.    Medical Decision Making:  Today's Assessment / Status / Plan:     1. Essential hypertension     2. Dyspnea on exertion     3. History of DVT (deep vein thrombosis)     4. Chronic anticoagulation     5. ANCA-associated vasculitis (HCC)     6. Long-term use of immunosuppressant medication     7. Long term (current) use of systemic steroids     8. Long term current use of diuretic     9. Aortic valve insufficiency, etiology of cardiac valve disease unspecified       Patient Type: Primary Prevention    Etiology of Established CVD if Present:   1) Aortic athero per CT scan   2) CAD subclinical per CT scan   3) c-ANCA associated vasculitis (Wegener's, aka GPA)     Antithrombotic therapy:   Indication: acute RLE DVT    Anti-Platelet/Anti-Coagulant Tx: yes   Date of initiation: 11/2019   HAS-BLED bleeding risk calc ([email or web address]): 2 pts, 4.1% annual risk (moderate)   Last CBC/ BMP: 9/2020  Cost is not an issue based upon insurance type.   Had multiple risk factos including travel, prednisone therapy and immunosuppresants all carry increased risk for VTE.    Underlying AAV increases risk of recurrence but unclear if related to immunosuppresant tx vs disease itself.   We reviewed that 6mo of OAC is  adequate, then discussed extension with high dose vs low-dose for additional 1 year, then start ASA 162mg daily indefinitely.  Would also offer options for low-dose indefinite therapy, though evidence is lacking on effectiveness, it is likely beneficial and benefit would outweigh risk of bleeding.    Antithrombotic therapy plan:  - continue eliquis to 2.5mg BID for additional 1 year through Aug 2021, then consider transition to ASA vs continued OAC   - continue compression stockings, 20-30mmHg  - will need labs updated and ongoing CBC, CrCl evaluations Q6mo while on DOAC   - counseled on signs and symptoms of acute VTE that require seeking prompt attention in the ED to include shortness of breath, chest pain, pain with deep inhalation, acute leg swelling and/or pain in calf or leg   - elevate legs as much as possible, use compression stockings/socks if directed by your provider  - Avoid hormonal therapies including estrogen or testosterone-containing meds, or raloxifene or tamoxifene (commonly used for osteoporosis)  - Avoid sedentary periods  - continue complete avoidance of tobacco products  - if having any invasive procedure,please make sure the doctor knows of your history of blood clots and current anticoagulation status  - avoid Aspirin and anti-inflammatories (eg. Advil, ibuprofen, Aleve, naproxen, etc) while anticoagulated   - avoid skiing or other dangerous activities to reduce risk of head injury and brain bleeds  - if any bleeding lasting 30min without stopping, please seek care with your PCP, urgent care, or ED  - reversal agents for most blood thinners are now available and used if you have major bleeding    Lipid Management: Qualifies for Statin Therapy Based on 2018 ACC/AHA Guidelines: yes  Calculated 10-Year Risk of ASCVD (if LDL <189, no CKD G3b/4/5): N/A  Currently on Statin: Yes  NLA Risk Category (2014): High: due to renal transplant, KDIGO  guidelines    Tx goal:  non-HDL <130, LDL-C <100  (optional: apoB<90)    At goal:  yes  Plan:  - reinforced ongoing TLC measures   - monitor labs Q6-12mo  - continue atorva 10mg, consider increase to 20mg     Blood Pressure Management:   ACC/AHA (2017) goal <130/80   Home BP at goal:  yes   Office BP at goal:  yes  Echo: no LVH, mod Ao insuff  UACR: N/A  Device candidate? no  Plan:   Monitoring:   - continue home BP monitoring, reviewed correct technique, provide BP log and instructions  - order 24h ABPM:  NO  - monitor lytes/gfr routinely   - contact office if BP consistently >140/>90 to discussion of tx adjustments   Medications:  ACEi/ARB: continue benazepril 20mg daily   DHP-CCB: none   Thiazide: none   Aldosterone Antagonist: not indicated at this time   Other:   Peripheral Alpha Blocker: not indicated   Loop Diuretic: continue furosemide 40mg BID with Kcl 20mEq   BB: continue metoprolol 50mg  ER daily for now     Glycemic Status: Normal    Smoking:  continued complete avoidance of all tobacco products     Physical Activity: continue healthy activity to improve CV fitness, see care instructions for additional details     Weight Management and Nutrition: Dietary plan was discussed with patient at this visit including DASH, low sodium and/or as outlined in care instructions     Other:   1) 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  Stable.   Has ongoing care with nephrology, appears stable on immunosuppresant therapy.   Has risk for arterial and venous disease baseline  - defer mgmt to nephrology    2) hx of renal transplant, notable reduction in eGFR from G3a to G4  - defer mgmt to nephrology and transplant team     3) atherosclerosis of coronaries and aorta, subclinical per CT, stable  - continue BP and lipid mgmt, no further imaging needed at this time     4) weakness with walking  Normal MARY indicating little indication of arterial disease.     5) DAS, no overt hypervolemia, improving after diuresis  BNP elevated but improved - difficult to interpret due to  CKD baseline   CXR normal   No indications of PE, very unlikely while on DOAC   Normal cardiac PET  - continue lasix, f/u with cardiology, pulm MD for ongoing care     6) moderate AI, stable   - eval with cardiology recommended     7) normocytic, normochromic anemia, mild   Presumed ACKD, does not appear to be related to acute blood loss from DOAC   - continue surveillance due to use of DOAC     Instructed to follow-up with PCP for remainder of adult medical needs: yes  We will partner with other providers in the management of established vascular disease and cardiometabolic risk factors.    Studies to Be Obtained: none   Labs to Be Obtained: as noted above     Follow up in: 3mo with me, cardiology, nephrology, pulmonology     Abiodun Delgado M.D.

## 2020-11-06 NOTE — TELEPHONE ENCOUNTER
Potassium 5.7 and Cr 1.84 on 11/5/20 labs. LVM with pt at [phone] requesting call back to discuss.

## 2020-11-06 NOTE — TELEPHONE ENCOUNTER
Voalte message from VR: If pt still taking potassium he should stop. Also suggests sending results to [facility] and asking them if pt can start dapagliflozin with a renal transplant.    Faxed lab results to [facility] at [phone] along with a note asking Dr. Blount if pt can take dapagliflozin. Receipt confirmed. Called [phone] and s/w their  alerting them to the faxed question, and informing that they can fax the response back if that is easier.

## 2020-11-07 NOTE — TELEPHONE ENCOUNTER
Called and s/w pt and his wife for 20 mins. They have various concerns and frustrations because pt is seeing multiple specialists, which makes his plan of care complicated and they feel that concerns get lost in the shuffle. For instance, pt has been having bowel issues and has to keep a very bland diet in order to prevent diarrhea. They explain that he is used to having some level of bowel troubles with all the meds he's had to take since his kidney transplant, but they feel it's worsening and are frustrated with it. Due to their various questions and concerns, we decided it would be best to address with VR during an in person appt. Scheduled for his next available on 12/9.     Discussed elevated potassium levels. Pt is continuing to take Lasix 40mg in the morning and 20mg in the evening, and potassium 20meq BID. We discussed stopping the potassium at least for now, and we would f/u next week with further instruction. Pt reports that he will be completing repeat labs for his nephrologist on 11/11, followed by an appt with their APRN a few days later.     To VR

## 2020-11-09 NOTE — TELEPHONE ENCOUNTER
You  Jones Garcia M.D. 3 days ago     Pt still taking  Lasix 40mg in the morning and 20mg in the evening, and potassium 20meq BID. He will try holding KCl over the weekend, and he's completing labs for nephrology on 11/11. Did you want him to do anything else?      Jones Garcia M.D.  You 14 hours ago (6:28 PM)     Sounds like a plan. Lets also try decreasing lasix by holding pm dose. Thanks Jacqueline!      Left detailed VM for pt at [phone] instructing to stop evening dose of Lasix, and to repeat labs as planned for nephrology. Requested that he have them send us the results.

## 2020-11-11 ENCOUNTER — HOSPITAL ENCOUNTER (OUTPATIENT)
Dept: LAB | Facility: MEDICAL CENTER | Age: 70
End: 2020-11-11
Attending: INTERNAL MEDICINE
Payer: MEDICARE

## 2020-11-11 LAB
ALBUMIN SERPL BCP-MCNC: 4.1 G/DL (ref 3.2–4.9)
ALBUMIN/GLOB SERPL: 1.2 G/DL
ALP SERPL-CCNC: 93 U/L (ref 30–99)
ALT SERPL-CCNC: 17 U/L (ref 2–50)
ANION GAP SERPL CALC-SCNC: 12 MMOL/L (ref 7–16)
APPEARANCE UR: CLEAR
AST SERPL-CCNC: 10 U/L (ref 12–45)
BACTERIA #/AREA URNS HPF: NEGATIVE /HPF
BASOPHILS # BLD AUTO: 0.3 % (ref 0–1.8)
BASOPHILS # BLD: 0.03 K/UL (ref 0–0.12)
BILIRUB SERPL-MCNC: 0.4 MG/DL (ref 0.1–1.5)
BILIRUB UR QL STRIP.AUTO: NEGATIVE
BUN SERPL-MCNC: 58 MG/DL (ref 8–22)
CALCIUM SERPL-MCNC: 11 MG/DL (ref 8.5–10.5)
CHLORIDE SERPL-SCNC: 106 MMOL/L (ref 96–112)
CO2 SERPL-SCNC: 21 MMOL/L (ref 20–33)
COLOR UR: YELLOW
CREAT SERPL-MCNC: 1.71 MG/DL (ref 0.5–1.4)
CREAT UR-MCNC: 115.62 MG/DL
EOSINOPHIL # BLD AUTO: 0.25 K/UL (ref 0–0.51)
EOSINOPHIL NFR BLD: 2.4 % (ref 0–6.9)
EPI CELLS #/AREA URNS HPF: NEGATIVE /HPF
ERYTHROCYTE [DISTWIDTH] IN BLOOD BY AUTOMATED COUNT: 55.3 FL (ref 35.9–50)
GLOBULIN SER CALC-MCNC: 3.3 G/DL (ref 1.9–3.5)
GLUCOSE SERPL-MCNC: 100 MG/DL (ref 65–99)
GLUCOSE UR STRIP.AUTO-MCNC: NEGATIVE MG/DL
HCT VFR BLD AUTO: 42.9 % (ref 42–52)
HGB BLD-MCNC: 13.4 G/DL (ref 14–18)
HYALINE CASTS #/AREA URNS LPF: ABNORMAL /LPF
IMM GRANULOCYTES # BLD AUTO: 0.04 K/UL (ref 0–0.11)
IMM GRANULOCYTES NFR BLD AUTO: 0.4 % (ref 0–0.9)
KETONES UR STRIP.AUTO-MCNC: NEGATIVE MG/DL
LEUKOCYTE ESTERASE UR QL STRIP.AUTO: ABNORMAL
LYMPHOCYTES # BLD AUTO: 4.26 K/UL (ref 1–4.8)
LYMPHOCYTES NFR BLD: 40.9 % (ref 22–41)
MCH RBC QN AUTO: 30.5 PG (ref 27–33)
MCHC RBC AUTO-ENTMCNC: 31.2 G/DL (ref 33.7–35.3)
MCV RBC AUTO: 97.5 FL (ref 81.4–97.8)
MICRO URNS: ABNORMAL
MONOCYTES # BLD AUTO: 0.94 K/UL (ref 0–0.85)
MONOCYTES NFR BLD AUTO: 9 % (ref 0–13.4)
NEUTROPHILS # BLD AUTO: 4.89 K/UL (ref 1.82–7.42)
NEUTROPHILS NFR BLD: 47 % (ref 44–72)
NITRITE UR QL STRIP.AUTO: NEGATIVE
NRBC # BLD AUTO: 0 K/UL
NRBC BLD-RTO: 0 /100 WBC
PH UR STRIP.AUTO: 5.5 [PH] (ref 5–8)
PLATELET # BLD AUTO: 149 K/UL (ref 164–446)
PMV BLD AUTO: 10.5 FL (ref 9–12.9)
POTASSIUM SERPL-SCNC: 5.4 MMOL/L (ref 3.6–5.5)
PROT SERPL-MCNC: 7.4 G/DL (ref 6–8.2)
PROT UR QL STRIP: NEGATIVE MG/DL
PROT UR-MCNC: 7 MG/DL (ref 0–15)
PROT/CREAT UR: 61 MG/G (ref 15–68)
RBC # BLD AUTO: 4.4 M/UL (ref 4.7–6.1)
RBC # URNS HPF: ABNORMAL /HPF
RBC UR QL AUTO: ABNORMAL
SODIUM SERPL-SCNC: 139 MMOL/L (ref 135–145)
SP GR UR STRIP.AUTO: 1.02
UROBILINOGEN UR STRIP.AUTO-MCNC: 0.2 MG/DL
WBC # BLD AUTO: 10.4 K/UL (ref 4.8–10.8)
WBC #/AREA URNS HPF: ABNORMAL /HPF

## 2020-11-11 PROCEDURE — 81001 URINALYSIS AUTO W/SCOPE: CPT

## 2020-11-11 PROCEDURE — 82570 ASSAY OF URINE CREATININE: CPT

## 2020-11-11 PROCEDURE — 84156 ASSAY OF PROTEIN URINE: CPT

## 2020-11-11 PROCEDURE — 80053 COMPREHEN METABOLIC PANEL: CPT

## 2020-11-11 PROCEDURE — 36415 COLL VENOUS BLD VENIPUNCTURE: CPT

## 2020-11-11 PROCEDURE — 80197 ASSAY OF TACROLIMUS: CPT

## 2020-11-11 PROCEDURE — 85025 COMPLETE CBC W/AUTO DIFF WBC: CPT

## 2020-11-11 PROCEDURE — 87186 SC STD MICRODIL/AGAR DIL: CPT

## 2020-11-11 PROCEDURE — 87077 CULTURE AEROBIC IDENTIFY: CPT

## 2020-11-11 PROCEDURE — 87086 URINE CULTURE/COLONY COUNT: CPT

## 2020-11-13 ENCOUNTER — HOSPITAL ENCOUNTER (OUTPATIENT)
Dept: CARDIOLOGY | Facility: MEDICAL CENTER | Age: 70
End: 2020-11-13
Attending: INTERNAL MEDICINE
Payer: MEDICARE

## 2020-11-13 DIAGNOSIS — R60.9 EDEMA, UNSPECIFIED TYPE: ICD-10-CM

## 2020-11-13 DIAGNOSIS — R07.89 CHEST PRESSURE: ICD-10-CM

## 2020-11-13 LAB
LV EJECT FRACT  99904: 65
LV EJECT FRACT MOD 2C 99903: 62.01
LV EJECT FRACT MOD 4C 99902: 52.89
LV EJECT FRACT MOD BP 99901: 60.63

## 2020-11-13 PROCEDURE — 93306 TTE W/DOPPLER COMPLETE: CPT

## 2020-11-13 PROCEDURE — 93306 TTE W/DOPPLER COMPLETE: CPT | Mod: 26 | Performed by: INTERNAL MEDICINE

## 2020-11-14 LAB
BACTERIA UR CULT: ABNORMAL
BACTERIA UR CULT: ABNORMAL
SIGNIFICANT IND 70042: ABNORMAL
SITE SITE: ABNORMAL
SOURCE SOURCE: ABNORMAL
TACROLIMUS BLD-MCNC: 10.6 NG/ML

## 2020-11-16 ENCOUNTER — TELEPHONE (OUTPATIENT)
Dept: CARDIOLOGY | Facility: MEDICAL CENTER | Age: 70
End: 2020-11-16

## 2020-11-16 NOTE — TELEPHONE ENCOUNTER
[facility] Released Result Comments  Viewed by Krishan Acevedo on 11/14/2020 10:18 AM  Written by Jones Garcia M.D. on 11/13/2020  6:36 PM  Mr Acevedo,   Attempted to call you. Great news! Your echo has improved. Your aortic regurgitation is now mild where previously it was moderate in severity. Please let us know how you are doing when you get a chance. Am interested in further adjusting your medicines. Also please complete your event monitor.   Best,   Dr Garcia      Called pt. He was returning VR's call from Friday, but he saw result note on [facility] over the weekend and is very pleased with the results.     He reports that he has been feeling about the same with stable SOB. -130s/60-70s, HR 70-80s, SpO2 94-95%.

## 2020-11-16 NOTE — TELEPHONE ENCOUNTER
JAZMIN/eva    Pt called & left following msg with our answering service at 7:25pm on 11/13:    Pt returning call regarding echo results.  Pt ph# is

## 2020-11-17 RX ORDER — FUROSEMIDE 20 MG/1
20 TABLET ORAL 2 TIMES DAILY
COMMUNITY
Start: 2020-11-17 | End: 2021-01-01 | Stop reason: SDUPTHER

## 2020-11-17 NOTE — TELEPHONE ENCOUNTER
You  Jones Garcia M.D. 17 hours ago (2:49 PM)     Which meds did you want to adjust?      Jones Garcia M.D.  You 10 hours ago (10:27 PM)     Yefrancheska lets further decrease his lasix to 40mg once daily. If no change in symptoms, weight, leg swelling, decrease to 20mg daily in one week. Did we ever get his EKG from the telemedicine office? Also is he talking to his nephrologist about his urine culture? Looks like growing pseudomonas. Thanks Jacqueline!      Called pt and discussed decreasing Lasix. He is already taking Lasix 40mg daily (this was decreased on 11/9) and has remained stable on this dose. Discussed cutting dose back to 20mg daily. He wants to make sure this is ok with his nephrologist first. He has an appt with them tomorrow and plans to ask them about this, and the urine culture. If they're ok with him decreasing to Lasix 20mg he will, and if not he will let us know. Instructed that if he does decrease Lasix further that he keep an eye out for increased SOB, weight gain, or swelling.

## 2020-11-23 ENCOUNTER — PATIENT MESSAGE (OUTPATIENT)
Dept: CARDIOLOGY | Facility: MEDICAL CENTER | Age: 70
End: 2020-11-23

## 2020-11-23 NOTE — PATIENT COMMUNICATION
Krishan Acevedo  Patient Customer Service Request Pool 5 days ago        Topic: Technical Quality     Just had a visit with kidney center,    Dr Goldman had requested I reduce Furosemide dose from 40 mg once per day to 20 MG once per day.  Kidney center said go ahead and make the reduction. My next appt with Dr. Landa's office will be Dec. 9, 2020.     I have been exposed to Covid and have been tested at Dr. WILLIAM office, on Nov 16th, they said results will be 3 to 7 days.  I have begun a 14 quarantine.

## 2020-11-30 PROCEDURE — 0298T PR EXT ECG > 48HR TO 21 DAY REVIEW AND INTERPRETATN: CPT | Performed by: INTERNAL MEDICINE

## 2020-11-30 PROCEDURE — 0296T PR EXT ECG > 48HR TO 21 DAY RCRD W/CONECT INTL RCRD: CPT | Performed by: INTERNAL MEDICINE

## 2020-12-04 ENCOUNTER — HOSPITAL ENCOUNTER (OUTPATIENT)
Dept: LAB | Facility: MEDICAL CENTER | Age: 70
End: 2020-12-04
Attending: NURSE PRACTITIONER
Payer: MEDICARE

## 2020-12-04 LAB
ALBUMIN SERPL BCP-MCNC: 4 G/DL (ref 3.2–4.9)
ALBUMIN/GLOB SERPL: 1.2 G/DL
ALP SERPL-CCNC: 119 U/L (ref 30–99)
ALT SERPL-CCNC: 18 U/L (ref 2–50)
ANION GAP SERPL CALC-SCNC: 6 MMOL/L (ref 7–16)
APPEARANCE UR: ABNORMAL
AST SERPL-CCNC: 15 U/L (ref 12–45)
BACTERIA #/AREA URNS HPF: NEGATIVE /HPF
BASOPHILS # BLD AUTO: 0.2 % (ref 0–1.8)
BASOPHILS # BLD: 0.02 K/UL (ref 0–0.12)
BILIRUB SERPL-MCNC: 0.4 MG/DL (ref 0.1–1.5)
BILIRUB UR QL STRIP.AUTO: NEGATIVE
BUN SERPL-MCNC: 42 MG/DL (ref 8–22)
CALCIUM SERPL-MCNC: 10.7 MG/DL (ref 8.5–10.5)
CHLORIDE SERPL-SCNC: 109 MMOL/L (ref 96–112)
CO2 SERPL-SCNC: 24 MMOL/L (ref 20–33)
COLOR UR: YELLOW
CREAT SERPL-MCNC: 1.51 MG/DL (ref 0.5–1.4)
CREAT UR-MCNC: 112.46 MG/DL
EOSINOPHIL # BLD AUTO: 0.28 K/UL (ref 0–0.51)
EOSINOPHIL NFR BLD: 3 % (ref 0–6.9)
EPI CELLS #/AREA URNS HPF: NEGATIVE /HPF
ERYTHROCYTE [DISTWIDTH] IN BLOOD BY AUTOMATED COUNT: 54.2 FL (ref 35.9–50)
GLOBULIN SER CALC-MCNC: 3.3 G/DL (ref 1.9–3.5)
GLUCOSE SERPL-MCNC: 105 MG/DL (ref 65–99)
GLUCOSE UR STRIP.AUTO-MCNC: NEGATIVE MG/DL
HCT VFR BLD AUTO: 41.4 % (ref 42–52)
HGB BLD-MCNC: 12.9 G/DL (ref 14–18)
HYALINE CASTS #/AREA URNS LPF: ABNORMAL /LPF
IMM GRANULOCYTES # BLD AUTO: 0.04 K/UL (ref 0–0.11)
IMM GRANULOCYTES NFR BLD AUTO: 0.4 % (ref 0–0.9)
KETONES UR STRIP.AUTO-MCNC: NEGATIVE MG/DL
LEUKOCYTE ESTERASE UR QL STRIP.AUTO: ABNORMAL
LYMPHOCYTES # BLD AUTO: 3.18 K/UL (ref 1–4.8)
LYMPHOCYTES NFR BLD: 34 % (ref 22–41)
MCH RBC QN AUTO: 30.4 PG (ref 27–33)
MCHC RBC AUTO-ENTMCNC: 31.2 G/DL (ref 33.7–35.3)
MCV RBC AUTO: 97.4 FL (ref 81.4–97.8)
MICRO URNS: ABNORMAL
MONOCYTES # BLD AUTO: 0.9 K/UL (ref 0–0.85)
MONOCYTES NFR BLD AUTO: 9.6 % (ref 0–13.4)
NEUTROPHILS # BLD AUTO: 4.93 K/UL (ref 1.82–7.42)
NEUTROPHILS NFR BLD: 52.8 % (ref 44–72)
NITRITE UR QL STRIP.AUTO: NEGATIVE
NRBC # BLD AUTO: 0 K/UL
NRBC BLD-RTO: 0 /100 WBC
PH UR STRIP.AUTO: 5 [PH] (ref 5–8)
PLATELET # BLD AUTO: 154 K/UL (ref 164–446)
PMV BLD AUTO: 10.4 FL (ref 9–12.9)
POTASSIUM SERPL-SCNC: 5.1 MMOL/L (ref 3.6–5.5)
PROT SERPL-MCNC: 7.3 G/DL (ref 6–8.2)
PROT UR QL STRIP: NEGATIVE MG/DL
PROT UR-MCNC: 8 MG/DL (ref 0–15)
PROT/CREAT UR: 71 MG/G (ref 15–68)
RBC # BLD AUTO: 4.25 M/UL (ref 4.7–6.1)
RBC # URNS HPF: ABNORMAL /HPF
RBC UR QL AUTO: NEGATIVE
SODIUM SERPL-SCNC: 139 MMOL/L (ref 135–145)
SP GR UR STRIP.AUTO: 1.02
UROBILINOGEN UR STRIP.AUTO-MCNC: 0.2 MG/DL
WBC # BLD AUTO: 9.4 K/UL (ref 4.8–10.8)
WBC #/AREA URNS HPF: ABNORMAL /HPF

## 2020-12-04 PROCEDURE — 81001 URINALYSIS AUTO W/SCOPE: CPT

## 2020-12-04 PROCEDURE — 36415 COLL VENOUS BLD VENIPUNCTURE: CPT

## 2020-12-04 PROCEDURE — 87077 CULTURE AEROBIC IDENTIFY: CPT

## 2020-12-04 PROCEDURE — 87186 SC STD MICRODIL/AGAR DIL: CPT

## 2020-12-04 PROCEDURE — 87086 URINE CULTURE/COLONY COUNT: CPT

## 2020-12-04 PROCEDURE — 80053 COMPREHEN METABOLIC PANEL: CPT

## 2020-12-04 PROCEDURE — 80197 ASSAY OF TACROLIMUS: CPT

## 2020-12-04 PROCEDURE — 85025 COMPLETE CBC W/AUTO DIFF WBC: CPT

## 2020-12-07 LAB — TACROLIMUS BLD-MCNC: 9.9 NG/ML

## 2020-12-08 DIAGNOSIS — R06.09 DYSPNEA ON EXERTION: ICD-10-CM

## 2020-12-08 DIAGNOSIS — I82.4Y1 ACUTE VENOUS EMBOLISM AND THROMBOSIS OF DEEP VESSELS OF PROXIMAL END OF RIGHT LOWER EXTREMITY (HCC): ICD-10-CM

## 2020-12-08 LAB
BACTERIA UR CULT: ABNORMAL
BACTERIA UR CULT: ABNORMAL
SIGNIFICANT IND 70042: ABNORMAL
SITE SITE: ABNORMAL
SOURCE SOURCE: ABNORMAL

## 2020-12-09 ENCOUNTER — OFFICE VISIT (OUTPATIENT)
Dept: CARDIOLOGY | Facility: MEDICAL CENTER | Age: 70
End: 2020-12-09
Payer: MEDICARE

## 2020-12-09 ENCOUNTER — TELEPHONE (OUTPATIENT)
Dept: CARDIOLOGY | Facility: MEDICAL CENTER | Age: 70
End: 2020-12-09

## 2020-12-09 VITALS
BODY MASS INDEX: 36.54 KG/M2 | DIASTOLIC BLOOD PRESSURE: 84 MMHG | HEART RATE: 66 BPM | WEIGHT: 261 LBS | SYSTOLIC BLOOD PRESSURE: 120 MMHG | OXYGEN SATURATION: 95 % | HEIGHT: 71 IN

## 2020-12-09 VITALS
HEART RATE: 66 BPM | OXYGEN SATURATION: 95 % | SYSTOLIC BLOOD PRESSURE: 120 MMHG | DIASTOLIC BLOOD PRESSURE: 84 MMHG | BODY MASS INDEX: 36.67 KG/M2 | HEIGHT: 71 IN | RESPIRATION RATE: 14 BRPM | WEIGHT: 261.9 LBS

## 2020-12-09 DIAGNOSIS — I48.92 ATRIAL FLUTTER WITH RAPID VENTRICULAR RESPONSE (HCC): ICD-10-CM

## 2020-12-09 DIAGNOSIS — I25.10 CORONARY ARTERY CALCIFICATION SEEN ON CT SCAN: ICD-10-CM

## 2020-12-09 DIAGNOSIS — I48.0 PAROXYSMAL ATRIAL FIBRILLATION (HCC): ICD-10-CM

## 2020-12-09 DIAGNOSIS — I10 ESSENTIAL HYPERTENSION: ICD-10-CM

## 2020-12-09 DIAGNOSIS — E78.5 HYPERLIPIDEMIA, UNSPECIFIED HYPERLIPIDEMIA TYPE: ICD-10-CM

## 2020-12-09 LAB — EKG IMPRESSION: NORMAL

## 2020-12-09 PROCEDURE — 99205 OFFICE O/P NEW HI 60 MIN: CPT | Performed by: INTERNAL MEDICINE

## 2020-12-09 PROCEDURE — 93000 ELECTROCARDIOGRAM COMPLETE: CPT | Performed by: INTERNAL MEDICINE

## 2020-12-09 PROCEDURE — 99215 OFFICE O/P EST HI 40 MIN: CPT | Performed by: INTERNAL MEDICINE

## 2020-12-09 ASSESSMENT — ENCOUNTER SYMPTOMS
FLANK PAIN: 0
FEVER: 0
ABDOMINAL PAIN: 0
CONSTIPATION: 0
CLAUDICATION: 0
NAUSEA: 0
BACK PAIN: 0
COUGH: 0
IRREGULAR HEARTBEAT: 0
PALPITATIONS: 0
SYNCOPE: 0
DECREASED APPETITE: 0
BLURRED VISION: 0
NEAR-SYNCOPE: 0
PND: 0
ALTERED MENTAL STATUS: 0
DIZZINESS: 0
DYSPNEA ON EXERTION: 0
WEIGHT LOSS: 0
SHORTNESS OF BREATH: 0
HEARTBURN: 0
WEIGHT GAIN: 0
VOMITING: 0
DEPRESSION: 0
DIARRHEA: 0
ORTHOPNEA: 0

## 2020-12-09 ASSESSMENT — FIBROSIS 4 INDEX
FIB4 SCORE: 1.65
FIB4 SCORE: 1.65

## 2020-12-09 NOTE — TELEPHONE ENCOUNTER
Patient scheduled for afib ablation w/[facility] on 1-27-21 with Dr. Forrester. This patient has been instructed to check in at 9:30 for 11:30 case time. Message sent to authorizations. Emailed Cart[facility].

## 2020-12-09 NOTE — TELEPHONE ENCOUNTER
----- Message from Rubin Forrester M.D. sent at 12/9/2020 10:40 AM PST -----  Please schedule for Afib ablation next available with [facility], no meds to hold, thank you    MC

## 2020-12-09 NOTE — PROGRESS NOTES
Cardiology Note    Chief Complaint   Patient presents with   • Atrial Flutter       History of Present Illness: Krishan Acevedo is a 70 y.o. male PMH nonobstructive CAD, machelle's disease causing renal failure s/p renal transplant 2008 [facility] ([facility]), severe sepsis requiring admission 2014, hx fungal pneumonia thereafter, hx lower extremity DVT after prolonged car trip who initially presented for chest tightness and dyspnea s/p cardiac testing 2020 where found atrial flutter.    Improved from prior visit. Had significant weight loss with initial lasix increase. Now stable on decrease to maintenance 20mg. Breathing, leg swelling, chest discomfort, orthopnea resolved. Wife states he has lost about 20 lbs. Since reduction lasix renal markers trending down. Combination water weight and diet; has to watch foods he eats due to diarrhea. Both him and his wife tested negative for covid 11/16/20. Angiogram 2003 in [facility] he states where found nonobstructive CAD.     Review of Systems   Constitution: Negative for decreased appetite, fever, malaise/fatigue, weight gain and weight loss.   HENT: Negative for congestion and nosebleeds.    Eyes: Negative for blurred vision.   Cardiovascular: Negative for chest pain, claudication, dyspnea on exertion, irregular heartbeat, leg swelling, near-syncope, orthopnea, palpitations, paroxysmal nocturnal dyspnea and syncope.   Respiratory: Negative for cough and shortness of breath.    Endocrine: Negative for cold intolerance and heat intolerance.   Skin: Negative for rash.   Musculoskeletal: Negative for back pain.   Gastrointestinal: Negative for abdominal pain, constipation, diarrhea, heartburn, melena, nausea and vomiting.   Genitourinary: Negative for dysuria, flank pain and hematuria.   Neurological: Negative for dizziness.   Psychiatric/Behavioral: Negative for altered mental status and depression.         Past Medical History:   Diagnosis Date    • Kidney failure    • Kidney transplant pain          Past Surgical History:   Procedure Laterality Date   • PEG PLACEMENT  10/10/2014    Performed by Brijesh Orozco M.D. at SURGERY [facility]         Current Outpatient Medications   Medication Sig Dispense Refill   • apixaban (ELIQUIS) 5mg Tab Take 1 Tab by mouth 2 Times a Day for 360 days. 180 Tab 3   • furosemide (LASIX) 20 MG Tab Take 1 Tab by mouth every day.     • benazepril (LOTENSIN) 20 MG Tab Take 1 Tab by mouth every day for 360 days. 90 Tab 3   • Probiotic Product (PROBIOTIC DAILY PO) Take  by mouth.     • metoprolol SR (TOPROL XL) 50 MG TABLET SR 24 HR Take 50 mg by mouth every day.     • omeprazole (PRILOSEC) 20 MG delayed-release capsule Take 20 mg by mouth every day.     • Brimonidine Tartrate-Timolol (COMBIGAN) 0.2-0.5 % Solution by Ophthalmic route.     • allopurinol (ZYLOPRIM) 300 MG Tab Take 300 mg by mouth every bedtime.     • calcitRIOL (ROCALTROL) 0.25 MCG Cap Take 0.25 mcg by mouth every morning.     • Multiple Vitamins-Minerals (CENTRUM SILVER 50+MEN) Tab Take 1 Tab by mouth every morning.     • vitamin D, Ergocalciferol, (DRISDOL) 79280 units Cap capsule Take 50,000 Units by mouth every Monday.     • Glucosamine-Chondroit-Vit C-Mn (GLUCOSAMINE 1500 COMPLEX) Cap Take 2 Caps by mouth 2 Times a Day.     • MAGNESIUM PO Take 1 Tab by mouth 2 Times a Day.     • mycophenolate (CELLCEPT) 250 MG Cap Take 500 mg by mouth 2 times a day.     • predniSONE (DELTASONE) 5 MG Tab Take 5 mg by mouth every morning.     • tacrolimus (PROGRAF) 1 MG Cap Take 1 mg by mouth 2 times a day.     • atorvastatin (LIPITOR) 10 MG TABS Take 10 mg by mouth every morning.     • Bromfenac Sodium (PROLENSA) 0.07 % Solution by Ophthalmic route.       No current facility-administered medications for this visit.          Allergies   Allergen Reactions   • Nsaids      Cannot take because of anti rejection meds.         Family History   Problem Relation Age of Onset  "  • Stroke Brother 26        , ? cause   • Stroke Maternal Grandmother            • Clotting Disorder Neg Hx          Social History     Socioeconomic History   • Marital status:      Spouse name: Not on file   • Number of children: Not on file   • Years of education: Not on file   • Highest education level: Not on file   Occupational History   • Not on file   Social Needs   • Financial resource strain: Not on file   • Food insecurity     Worry: Not on file     Inability: Not on file   • Transportation needs     Medical: Not on file     Non-medical: Not on file   Tobacco Use   • Smoking status: Never Smoker   • Smokeless tobacco: Never Used   Substance and Sexual Activity   • Alcohol use: Not Currently   • Drug use: Not on file   • Sexual activity: Not on file   Lifestyle   • Physical activity     Days per week: Not on file     Minutes per session: Not on file   • Stress: Not on file   Relationships   • Social connections     Talks on phone: Not on file     Gets together: Not on file     Attends Sabianism service: Not on file     Active member of club or organization: Not on file     Attends meetings of clubs or organizations: Not on file     Relationship status: Not on file   • Intimate partner violence     Fear of current or ex partner: Not on file     Emotionally abused: Not on file     Physically abused: Not on file     Forced sexual activity: Not on file   Other Topics Concern   • Not on file   Social History Narrative   • Not on file         Physical Exam:  Ambulatory Vitals  /84 (BP Location: Right arm, Patient Position: Sitting, BP Cuff Size: Adult)   Pulse 66   Resp 14   Ht 1.791 m (5' 10.5\")   Wt 118.8 kg (261 lb 14.4 oz)   SpO2 95%    BP Readings from Last 4 Encounters:   20 120/84   20 120/84   20 122/63   10/14/20 106/58     Weight/BMI:   Vitals:    20 0915   BP: 120/84   Weight: 118.8 kg (261 lb 14.4 oz)   Height: 1.791 m (5' 10.5\")    Body mass index " is 37.05 kg/m².  Wt Readings from Last 4 Encounters:   12/09/20 118.4 kg (261 lb)   12/09/20 118.8 kg (261 lb 14.4 oz)   11/06/20 120.2 kg (265 lb)   10/14/20 120.2 kg (265 lb)       Physical Exam   Constitutional: He is oriented to person, place, and time and well-developed, well-nourished, and in no distress. No distress.   HENT:   Head: Normocephalic and atraumatic.   Eyes: Pupils are equal, round, and reactive to light. Conjunctivae are normal.   Neck: Normal range of motion. Neck supple. No JVD present.   Cardiovascular: Normal rate, regular rhythm, normal heart sounds and intact distal pulses. Exam reveals no gallop and no friction rub.   No murmur heard.  Pulmonary/Chest: Effort normal and breath sounds normal. No respiratory distress. He has no wheezes. He has no rales. He exhibits no tenderness.   Abdominal: Soft. Bowel sounds are normal. He exhibits no distension.   Musculoskeletal:         General: Edema present.   Neurological: He is alert and oriented to person, place, and time.   Skin: Skin is warm and dry.   Psychiatric: Affect and judgment normal.       Lab Data Review:  Lab Results   Component Value Date/Time    CHOLSTRLTOT 91 (L) 11/05/2020 09:22 AM    LDL 39 11/05/2020 09:22 AM    HDL 32 (A) 11/05/2020 09:22 AM    TRIGLYCERIDE 99 11/05/2020 09:22 AM       Lab Results   Component Value Date/Time    SODIUM 139 12/04/2020 07:57 AM    POTASSIUM 5.1 12/04/2020 07:57 AM    CHLORIDE 109 12/04/2020 07:57 AM    CO2 24 12/04/2020 07:57 AM    GLUCOSE 105 (H) 12/04/2020 07:57 AM    BUN 42 (H) 12/04/2020 07:57 AM    CREATININE 1.51 (H) 12/04/2020 07:57 AM     Estimated Creatinine Clearance: 57.5 mL/min (A) (by C-G formula based on SCr of 1.51 mg/dL (H)).  Lab Results   Component Value Date/Time    ALKPHOSPHAT 119 (H) 12/04/2020 07:57 AM    ASTSGOT 15 12/04/2020 07:57 AM    ALTSGPT 18 12/04/2020 07:57 AM    TBILIRUBIN 0.4 12/04/2020 07:57 AM      Lab Results   Component Value Date/Time    WBC 9.4 12/04/2020  07:57 AM     Lab Results   Component Value Date/Time    HBA1C 7.6 (H) 11/05/2020 09:22 AM     No components found for: TROP      Cardiac Imaging and Procedures Review:      EKG 12/9/20 reviewed by me atrial flutter, likely CTI dependent, rate 64, RBBB    zio monitor 11/30/20   -100% atrial flutter, symptomatic  -rare ventricular ectopy.    TTE 8/21/20  CONCLUSIONS  Left ventricle is mildly dilated.  A reliable estimation of diastolic function cannot be made due to   dysrhythmia.  Left ventricular ejection fraction is visually estimated to be 65%.  Mild mitral regurgitation.  Moderate aortic insufficiency.  Estimated right ventricular systolic pressure is  20 mmHg.  Left Ventricle  Left ventricle is mildly dilated. Normal left ventricular wall   thickness. Normal left ventricular systolic function. Left ventricular   ejection fraction is visually estimated to be 65%. Normal regional wall   motion. A reliable estimation of diastolic function cannot be made due   to dysrhythmia.    TTE 11/13/20  CONCLUSIONS  Normal left ventricular size, systolic, and diastolic function. Mild   concentric left ventricular hypertrophy.  Mildly enlarged right ventricular size with preserved systolic   function.  Biatrial enlargement.  Mild mitral regurgitation.  Aortic sclerosis without stenosis. Mild aortic insufficiency.  Mild tricuspid regurgitation.  Estimated right ventricular systolic pressure is  20 mmHg.  Right atrial pressure is estimated to be 3 mmHg.  Normal pericardium without effusion.  Compared to the images of the prior study done on 08/21/20, aortic   regurgitation has improved from moderate to mild.    Nuclear stress PET 10/21/20  CONCLUSIONS AND IMPRESSIONS:    1.  Normal cardiac PET scan.         No evidence of ischemia nor infarct.         TID 1.31, likely due to artifact.    2.  Resting ejection fraction 48%, stress ejection fraction 57%.    3.  EKG as above.    4.  Shortness of breath; however, chest pain was not  experienced during this        study.    Medical Decision Making:  Problem List Items Addressed This Visit     Atrial flutter with rapid ventricular response (HCC)    Relevant Orders    EKG (Completed)    REFERRAL TO CARDIOLOGY    Essential hypertension    Coronary artery calcification seen on CT scan    Hyperlipidemia        atrial flutter   - increase eliquis to 5mg bid for cva prevention  - refer to EP for ablation    HFpEF - NYHA II  -improved, reduce diuretic to maitenance. Over time can attempt to further reduce as tolerated.    DVT - dual purpose doac.     Nonobstructive CAD / HLD - no need additional antiplatelet while on doac. Continue statin. Lipids at goal LDL <70 and trig <150.    It was my pleasure to meet with Mr. Acevedo.

## 2020-12-09 NOTE — PROGRESS NOTES
Arrhythmia Clinic Note (New patient)     DOS: 12/9/2020    Referring physician: Dr Garcia    Chief complaint/Reason for consult: Fatigue    HPI: 70-year-old man with history of Wegener's granulomatosis and end-stage renal disease status post kidney transplant in 2008, E. coli bacteremia in 2014 with prolonged hospitalization and recovery, found to have paroxysmal atrial fibrillation and flutter with RVR during this hospitalization, started on amiodarone, which was discontinued at some point after that, history of DVT last year started on anticoagulation, presented with dyspnea, fatigue, shortness of breath, low energy, found to be in atrial flutter.  He is referred to electrophysiology for evaluation.  Patient notes he has lower energy than he used to.  Definitely more shortness of breath.  No syncope or presyncope.  He is not interested in taking any more medication than he has to.  He was recently increased to Eliquis 5 mg twice daily.  His weight always fluctuates, he says it is hard to balance his furosemide dose with his kidney insufficiency due to history of renal transplant.    ROS (+ highlighted in bold):  Constitutional: Fevers/chills/fatigue/weightloss  HEENT: Blurry vision/eye pain/sore throat/hearing loss  Respiratory: Shortness of breath/cough  Cardiovascular: Chest pain/palpitations/edema/orthopnea/syncope  GI: Nausea/vomitting/diarrhea  MSK: Arthralgias/myagias/muscle weakness  Skin: Rash/sores  Neurological: Numbness/tremors/vertigo  Endocrine: Excessive thirst/polyuria/cold intolerance/heat intolerance  Psych: Depression/anxiety    Past Medical History:   Diagnosis Date   • Kidney failure    • Kidney transplant pain        Past Surgical History:   Procedure Laterality Date   • PEG PLACEMENT  10/10/2014    Performed by Brijesh Orozco M.D. at [facility]       Social History     Socioeconomic History   • Marital status:      Spouse name: Not on file   • Number of  children: Not on file   • Years of education: Not on file   • Highest education level: Not on file   Occupational History   • Not on file   Social Needs   • Financial resource strain: Not on file   • Food insecurity     Worry: Not on file     Inability: Not on file   • Transportation needs     Medical: Not on file     Non-medical: Not on file   Tobacco Use   • Smoking status: Never Smoker   • Smokeless tobacco: Never Used   Substance and Sexual Activity   • Alcohol use: Not Currently   • Drug use: Not on file   • Sexual activity: Not on file   Lifestyle   • Physical activity     Days per week: Not on file     Minutes per session: Not on file   • Stress: Not on file   Relationships   • Social connections     Talks on phone: Not on file     Gets together: Not on file     Attends Spiritism service: Not on file     Active member of club or organization: Not on file     Attends meetings of clubs or organizations: Not on file     Relationship status: Not on file   • Intimate partner violence     Fear of current or ex partner: Not on file     Emotionally abused: Not on file     Physically abused: Not on file     Forced sexual activity: Not on file   Other Topics Concern   • Not on file   Social History Narrative   • Not on file       Family History   Problem Relation Age of Onset   • Stroke Brother 26        , ? cause   • Stroke Maternal Grandmother            • Clotting Disorder Neg Hx        Allergies   Allergen Reactions   • Nsaids      Cannot take because of anti rejection meds.       Current Outpatient Medications   Medication Sig Dispense Refill   • apixaban (ELIQUIS) 5mg Tab Take 1 Tab by mouth 2 Times a Day for 360 days. 180 Tab 3   • furosemide (LASIX) 20 MG Tab Take 1 Tab by mouth every day.     • benazepril (LOTENSIN) 20 MG Tab Take 1 Tab by mouth every day for 360 days. 90 Tab 3   • Probiotic Product (PROBIOTIC DAILY PO) Take  by mouth.     • Bromfenac Sodium (PROLENSA) 0.07 % Solution by Ophthalmic  "route.     • metoprolol SR (TOPROL XL) 50 MG TABLET SR 24 HR Take 50 mg by mouth every day.     • omeprazole (PRILOSEC) 20 MG delayed-release capsule Take 20 mg by mouth every day.     • Brimonidine Tartrate-Timolol (COMBIGAN) 0.2-0.5 % Solution by Ophthalmic route.     • allopurinol (ZYLOPRIM) 300 MG Tab Take 300 mg by mouth every bedtime.     • calcitRIOL (ROCALTROL) 0.25 MCG Cap Take 0.25 mcg by mouth every morning.     • Multiple Vitamins-Minerals (CENTRUM SILVER 50+MEN) Tab Take 1 Tab by mouth every morning.     • vitamin D, Ergocalciferol, (DRISDOL) 17395 units Cap capsule Take 50,000 Units by mouth every Monday.     • Glucosamine-Chondroit-Vit C-Mn (GLUCOSAMINE 1500 COMPLEX) Cap Take 2 Caps by mouth 2 Times a Day.     • MAGNESIUM PO Take 1 Tab by mouth 2 Times a Day.     • mycophenolate (CELLCEPT) 250 MG Cap Take 500 mg by mouth 2 times a day.     • predniSONE (DELTASONE) 5 MG Tab Take 5 mg by mouth every morning.     • tacrolimus (PROGRAF) 1 MG Cap Take 1 mg by mouth 2 times a day.     • atorvastatin (LIPITOR) 10 MG TABS Take 10 mg by mouth every morning.       No current facility-administered medications for this visit.        Physical Exam:  Vitals:    12/09/20 1000   BP: 120/84   BP Location: Left arm   Patient Position: Sitting   BP Cuff Size: Adult   Pulse: 66   SpO2: 95%   Weight: 118.4 kg (261 lb)   Height: 1.791 m (5' 10.5\")     General appearance: NAD, conversant   Eyes: anicteric sclerae, moist conjunctivae; no lid-lag; PERRLA  HENT: Atraumatic; oropharynx clear with moist mucous membranes and no mucosal ulcerations; normal hard and soft palate  Neck: Trachea midline; FROM, supple, no thyromegaly or lymphadenopathy  Lungs: CTA, with normal respiratory effort and no intercostal retractions  CV: Irregular, no MRGs, no JVD   Abdomen: Soft, non-tender; no masses or HSM  Extremities: No peripheral edema or extremity lymphadenopathy  Skin: Normal temperature, turgor and texture; no rash, ulcers or " subcutaneous nodules  Psych: Appropriate affect, alert and oriented to person, place and time    Data:  Lipids:   Lab Results   Component Value Date/Time    CHOLSTRLTOT 91 (L) 11/05/2020 09:22 AM    TRIGLYCERIDE 99 11/05/2020 09:22 AM    HDL 32 (A) 11/05/2020 09:22 AM    LDL 39 11/05/2020 09:22 AM        BMP:  Lab Results   Component Value Date/Time    SODIUM 139 12/04/2020 0757    POTASSIUM 5.1 12/04/2020 0757    CHLORIDE 109 12/04/2020 0757    CO2 24 12/04/2020 0757    GLUCOSE 105 (H) 12/04/2020 0757    BUN 42 (H) 12/04/2020 0757    CREATININE 1.51 (H) 12/04/2020 0757    CALCIUM 10.7 (H) 12/04/2020 0757    ANION 6.0 (L) 12/04/2020 0757        TSH:   Lab Results   Component Value Date/Time    TSHULTRASEN 0.758 11/05/2020 0922        THYROXINE (T4):   No results found for: DANG     CBC:   Lab Results   Component Value Date/Time    WBC 9.4 12/04/2020 07:57 AM    RBC 4.25 (L) 12/04/2020 07:57 AM    HEMOGLOBIN 12.9 (L) 12/04/2020 07:57 AM    HEMATOCRIT 41.4 (L) 12/04/2020 07:57 AM    MCV 97.4 12/04/2020 07:57 AM    MCH 30.4 12/04/2020 07:57 AM    MCHC 31.2 (L) 12/04/2020 07:57 AM    RDW 54.2 (H) 12/04/2020 07:57 AM    PLATELETCT 154 (L) 12/04/2020 07:57 AM    MPV 10.4 12/04/2020 07:57 AM    NEUTSPOLYS 52.80 12/04/2020 07:57 AM    LYMPHOCYTES 34.00 12/04/2020 07:57 AM    MONOCYTES 9.60 12/04/2020 07:57 AM    EOSINOPHILS 3.00 12/04/2020 07:57 AM    BASOPHILS 0.20 12/04/2020 07:57 AM    IMMGRAN 0.40 12/04/2020 07:57 AM    NRBC 0.00 12/04/2020 07:57 AM    NEUTS 4.93 12/04/2020 07:57 AM    LYMPHS 3.18 12/04/2020 07:57 AM    MONOS 0.90 (H) 12/04/2020 07:57 AM    EOS 0.28 12/04/2020 07:57 AM    BASO 0.02 12/04/2020 07:57 AM    IMMGRANAB 0.04 12/04/2020 07:57 AM    NRBCAB 0.00 12/04/2020 07:57 AM        CBC w/o DIFF  Lab Results   Component Value Date/Time    WBC 9.4 12/04/2020 07:57 AM    RBC 4.25 (L) 12/04/2020 07:57 AM    HEMOGLOBIN 12.9 (L) 12/04/2020 07:57 AM    MCV 97.4 12/04/2020 07:57 AM    MCH 30.4 12/04/2020 07:57  AM    MCHC 31.2 (L) 12/04/2020 07:57 AM    RDW 54.2 (H) 12/04/2020 07:57 AM    MPV 10.4 12/04/2020 07:57 AM       Prior echo/stress results reviewed: Preserved ejection fraction, biatrial enlargement    Prior cath results reviewed: Per report, angiogram in 2003 showed nonobstructive coronary disease    EKG interpreted by me: Typical atrial flutter    Impression/Plan:  1.  Paroxysmal atrial fibrillation  2.  Persistent atrial flutter, typical  3.  CKD status post renal transplant  4.  Heart failure with preserved ejection fraction    -We discussed treatment options for his atrial arrhythmias.  He had documented evidence of paroxysmal atrial fibrillation with RVR during his hospitalization in 2014, in addition to atrial flutter.  He is now back in atrial flutter with controlled rate currently.  It does appear typical.  -We discussed options for treatment including cardioversion in the short-term, long-term medication management with antiarrhythmics, versus EP study and ablation.  Patient would like to get everything done at once, and does not want to take anymore medications.  He prefers ablation.  -We discussed pulmonary vein isolation for therapeutic management and continued rhythm control.  We discussed the risks and benefits of this procedure.  Risks include 1-3% risk of major cardiovascular event including stroke, myocardial infarction, phrenic nerve damage, esophageal injury and/or fistula formation, cardiac perforation, pericardial effusion, tamponade, major bleeding, or death.  I quoted a 70 to 80% chance free of atrial fibrillation at 12 months.  We discussed that he may also need a second procedure.  Atrial flutter success rates are above 90%.    We will plan on proceeding with A. fib ablation with CTI line next available with JAVIER.  Continue Eliquis.      Rubin Forrester MD  Cardiac Electrophysiology

## 2020-12-21 ENCOUNTER — APPOINTMENT (RX ONLY)
Dept: URBAN - METROPOLITAN AREA CLINIC 4 | Facility: CLINIC | Age: 70
Setting detail: DERMATOLOGY
End: 2020-12-21

## 2020-12-21 DIAGNOSIS — L82.1 OTHER SEBORRHEIC KERATOSIS: ICD-10-CM

## 2020-12-21 DIAGNOSIS — D22 MELANOCYTIC NEVI: ICD-10-CM

## 2020-12-21 DIAGNOSIS — Z86.007 PERSONAL HISTORY OF IN-SITU NEOPLASM OF SKIN: ICD-10-CM

## 2020-12-21 DIAGNOSIS — L81.4 OTHER MELANIN HYPERPIGMENTATION: ICD-10-CM

## 2020-12-21 PROBLEM — Z85.828 PERSONAL HISTORY OF OTHER MALIGNANT NEOPLASM OF SKIN: Status: ACTIVE | Noted: 2020-12-21

## 2020-12-21 PROBLEM — D22.5 MELANOCYTIC NEVI OF TRUNK: Status: ACTIVE | Noted: 2020-12-21

## 2020-12-21 PROBLEM — D48.5 NEOPLASM OF UNCERTAIN BEHAVIOR OF SKIN: Status: ACTIVE | Noted: 2020-12-21

## 2020-12-21 PROCEDURE — 11102 TANGNTL BX SKIN SINGLE LES: CPT

## 2020-12-21 PROCEDURE — ? OBSERVATION

## 2020-12-21 PROCEDURE — ? BIOPSY BY SHAVE METHOD

## 2020-12-21 PROCEDURE — 99213 OFFICE O/P EST LOW 20 MIN: CPT | Mod: 25

## 2020-12-21 ASSESSMENT — LOCATION ZONE DERM
LOCATION ZONE: FACE
LOCATION ZONE: NECK
LOCATION ZONE: ARM
LOCATION ZONE: TRUNK

## 2020-12-21 ASSESSMENT — LOCATION DETAILED DESCRIPTION DERM
LOCATION DETAILED: LEFT PROXIMAL DORSAL FOREARM
LOCATION DETAILED: RIGHT MID-UPPER BACK
LOCATION DETAILED: LEFT INFERIOR CENTRAL MALAR CHEEK
LOCATION DETAILED: RIGHT SUPERIOR MEDIAL MIDBACK
LOCATION DETAILED: LEFT LATERAL SUPERIOR CHEST
LOCATION DETAILED: LEFT INFERIOR ANTERIOR NECK
LOCATION DETAILED: RIGHT MEDIAL UPPER BACK
LOCATION DETAILED: RIGHT PROXIMAL DORSAL FOREARM
LOCATION DETAILED: LEFT CENTRAL MALAR CHEEK

## 2020-12-21 ASSESSMENT — LOCATION SIMPLE DESCRIPTION DERM
LOCATION SIMPLE: CHEST
LOCATION SIMPLE: LEFT FOREARM
LOCATION SIMPLE: RIGHT LOWER BACK
LOCATION SIMPLE: LEFT ANTERIOR NECK
LOCATION SIMPLE: LEFT CHEEK
LOCATION SIMPLE: RIGHT UPPER BACK
LOCATION SIMPLE: RIGHT FOREARM

## 2020-12-21 NOTE — PROCEDURE: BIOPSY BY SHAVE METHOD
Detail Level: Detailed
Depth Of Biopsy: dermis
Was A Bandage Applied: Yes
Size Of Lesion In Cm: 0
Anticipated Plan (Based On Presumed Biopsy Results): Imiquimod.  If benign needs 6 month appointment
Biopsy Type: H and E
Biopsy Method: Personna blade
Anesthesia Type: 1% lidocaine with epinephrine and a 1:10 solution of 8.4% sodium bicarbonate
Anesthesia Volume In Cc: 2
Hemostasis: Drysol and Electrocautery
Wound Care: Vaseline
Dressing: Band-Aid
Destruction After The Procedure: No
Type Of Destruction Used: Curettage
Curettage Text: The wound bed was treated with curettage after the biopsy was performed.
Electrodesiccation Text: The wound bed was treated with electrodesiccation after the biopsy was performed.
Electrodesiccation And Curettage Text: The wound bed was treated with electrodesiccation and curettage after the biopsy was performed.
Lab: [id number]
Lab Facility: 
Consent: Verbal consent was obtained and risks were reviewed including but not limited to scarring, infection, bleeding, scabbing, incomplete removal, nerve damage and allergy to anesthesia.
Post-Care Instructions: I reviewed with the patient in detail post-care instructions. Patient is to keep the biopsy site dry overnight, and then apply vasaline twice daily until healed.
Notification Instructions: Patient will be notified of biopsy results. However, patient instructed to call the office if not contacted within 2 weeks.
Billing Type: Third-Party Bill
Information: Selecting Yes will display possible errors in your note based on the variables you have selected. This validation is only offered as a suggestion for you. PLEASE NOTE THAT THE VALIDATION TEXT WILL BE REMOVED WHEN YOU FINALIZE YOUR NOTE. IF YOU WANT TO FAX A PRELIMINARY NOTE YOU WILL NEED TO TOGGLE THIS TO 'NO' IF YOU DO NOT WANT IT IN YOUR FAXED NOTE.

## 2021-01-01 ENCOUNTER — OFFICE VISIT (OUTPATIENT)
Dept: CARDIOLOGY | Facility: MEDICAL CENTER | Age: 71
End: 2021-01-01
Payer: MEDICARE

## 2021-01-01 ENCOUNTER — PATIENT MESSAGE (OUTPATIENT)
Dept: CARDIOLOGY | Facility: MEDICAL CENTER | Age: 71
End: 2021-01-01

## 2021-01-01 ENCOUNTER — TELEPHONE (OUTPATIENT)
Dept: CARDIOLOGY | Facility: MEDICAL CENTER | Age: 71
End: 2021-01-01

## 2021-01-01 ENCOUNTER — HOSPITAL ENCOUNTER (OUTPATIENT)
Dept: LAB | Facility: MEDICAL CENTER | Age: 71
End: 2021-07-22
Attending: INTERNAL MEDICINE
Payer: MEDICARE

## 2021-01-01 ENCOUNTER — HOSPITAL ENCOUNTER (OUTPATIENT)
Dept: PULMONOLOGY | Facility: MEDICAL CENTER | Age: 71
End: 2021-08-11
Attending: NURSE PRACTITIONER
Payer: MEDICARE

## 2021-01-01 ENCOUNTER — HOSPITAL ENCOUNTER (OUTPATIENT)
Dept: RADIOLOGY | Facility: MEDICAL CENTER | Age: 71
End: 2021-12-15
Attending: INTERNAL MEDICINE
Payer: MEDICARE

## 2021-01-01 ENCOUNTER — HOSPITAL ENCOUNTER (OUTPATIENT)
Dept: LAB | Facility: MEDICAL CENTER | Age: 71
End: 2021-06-11
Attending: NURSE PRACTITIONER
Payer: MEDICARE

## 2021-01-01 VITALS
HEIGHT: 70 IN | HEART RATE: 76 BPM | RESPIRATION RATE: 18 BRPM | DIASTOLIC BLOOD PRESSURE: 78 MMHG | OXYGEN SATURATION: 96 % | WEIGHT: 262 LBS | BODY MASS INDEX: 37.51 KG/M2 | SYSTOLIC BLOOD PRESSURE: 120 MMHG

## 2021-01-01 VITALS
HEART RATE: 73 BPM | HEIGHT: 71 IN | SYSTOLIC BLOOD PRESSURE: 132 MMHG | DIASTOLIC BLOOD PRESSURE: 62 MMHG | WEIGHT: 260.2 LBS | SYSTOLIC BLOOD PRESSURE: 116 MMHG | DIASTOLIC BLOOD PRESSURE: 70 MMHG | WEIGHT: 263 LBS | RESPIRATION RATE: 19 BRPM | OXYGEN SATURATION: 96 % | BODY MASS INDEX: 36.43 KG/M2 | OXYGEN SATURATION: 97 % | RESPIRATION RATE: 18 BRPM | BODY MASS INDEX: 36.82 KG/M2 | HEART RATE: 82 BPM | HEIGHT: 71 IN

## 2021-01-01 VITALS
OXYGEN SATURATION: 96 % | BODY MASS INDEX: 36.18 KG/M2 | DIASTOLIC BLOOD PRESSURE: 70 MMHG | WEIGHT: 258.4 LBS | RESPIRATION RATE: 14 BRPM | HEART RATE: 76 BPM | SYSTOLIC BLOOD PRESSURE: 128 MMHG | HEIGHT: 71 IN

## 2021-01-01 VITALS
RESPIRATION RATE: 18 BRPM | HEIGHT: 71 IN | BODY MASS INDEX: 36.37 KG/M2 | OXYGEN SATURATION: 96 % | SYSTOLIC BLOOD PRESSURE: 158 MMHG | HEART RATE: 79 BPM | DIASTOLIC BLOOD PRESSURE: 82 MMHG | WEIGHT: 259.8 LBS

## 2021-01-01 DIAGNOSIS — R06.09 DYSPNEA ON EXERTION: ICD-10-CM

## 2021-01-01 DIAGNOSIS — R60.9 EDEMA, UNSPECIFIED TYPE: ICD-10-CM

## 2021-01-01 DIAGNOSIS — Z86.79 S/P ABLATION OF ATRIAL FIBRILLATION: ICD-10-CM

## 2021-01-01 DIAGNOSIS — I82.4Y1 ACUTE VENOUS EMBOLISM AND THROMBOSIS OF DEEP VESSELS OF PROXIMAL END OF RIGHT LOWER EXTREMITY (HCC): ICD-10-CM

## 2021-01-01 DIAGNOSIS — R06.09 DOE (DYSPNEA ON EXERTION): ICD-10-CM

## 2021-01-01 DIAGNOSIS — I35.1 NONRHEUMATIC AORTIC VALVE INSUFFICIENCY: ICD-10-CM

## 2021-01-01 DIAGNOSIS — N18.32 STAGE 3B CHRONIC KIDNEY DISEASE: ICD-10-CM

## 2021-01-01 DIAGNOSIS — I50.32 CHRONIC DIASTOLIC HEART FAILURE (HCC): ICD-10-CM

## 2021-01-01 DIAGNOSIS — Z79.01 CHRONIC ANTICOAGULATION: ICD-10-CM

## 2021-01-01 DIAGNOSIS — I10 ESSENTIAL HYPERTENSION: ICD-10-CM

## 2021-01-01 DIAGNOSIS — I48.92 ATRIAL FLUTTER WITH RAPID VENTRICULAR RESPONSE (HCC): ICD-10-CM

## 2021-01-01 DIAGNOSIS — Z87.898 HISTORY OF CHRONIC COUGH: ICD-10-CM

## 2021-01-01 DIAGNOSIS — I48.0 PAF (PAROXYSMAL ATRIAL FIBRILLATION) (HCC): ICD-10-CM

## 2021-01-01 DIAGNOSIS — I48.0 PAROXYSMAL ATRIAL FIBRILLATION (HCC): ICD-10-CM

## 2021-01-01 DIAGNOSIS — I25.10 CORONARY ARTERY CALCIFICATION SEEN ON CT SCAN: ICD-10-CM

## 2021-01-01 DIAGNOSIS — Z98.890 S/P ABLATION OF ATRIAL FIBRILLATION: ICD-10-CM

## 2021-01-01 DIAGNOSIS — E78.5 HYPERLIPIDEMIA, UNSPECIFIED HYPERLIPIDEMIA TYPE: ICD-10-CM

## 2021-01-01 DIAGNOSIS — I87.2 VENOUS INSUFFICIENCY: ICD-10-CM

## 2021-01-01 LAB
25(OH)D3 SERPL-MCNC: 58 NG/ML (ref 30–100)
ALBUMIN SERPL BCP-MCNC: 3.7 G/DL (ref 3.2–4.9)
ALBUMIN SERPL BCP-MCNC: 3.9 G/DL (ref 3.2–4.9)
ALBUMIN/GLOB SERPL: 1.2 G/DL
ALP SERPL-CCNC: 110 U/L (ref 30–99)
ALT SERPL-CCNC: 14 U/L (ref 2–50)
ANION GAP SERPL CALC-SCNC: 11 MMOL/L (ref 7–16)
APPEARANCE UR: CLEAR
APPEARANCE UR: CLEAR
AST SERPL-CCNC: 14 U/L (ref 12–45)
BACTERIA #/AREA URNS HPF: NEGATIVE /HPF
BACTERIA #/AREA URNS HPF: NEGATIVE /HPF
BACTERIA UR CULT: ABNORMAL
BACTERIA UR CULT: ABNORMAL
BACTERIA UR CULT: NORMAL
BASOPHILS # BLD AUTO: 0.2 % (ref 0–1.8)
BASOPHILS # BLD: 0.02 K/UL (ref 0–0.12)
BILIRUB SERPL-MCNC: 0.4 MG/DL (ref 0.1–1.5)
BILIRUB UR QL STRIP.AUTO: NEGATIVE
BILIRUB UR QL STRIP.AUTO: NEGATIVE
BUN SERPL-MCNC: 35 MG/DL (ref 8–22)
BUN SERPL-MCNC: 62 MG/DL (ref 8–22)
CALCIUM SERPL-MCNC: 10.3 MG/DL (ref 8.5–10.5)
CALCIUM SERPL-MCNC: 9.8 MG/DL (ref 8.5–10.5)
CHLORIDE SERPL-SCNC: 109 MMOL/L (ref 96–112)
CHLORIDE SERPL-SCNC: 112 MMOL/L (ref 96–112)
CO2 SERPL-SCNC: 20 MMOL/L (ref 20–33)
CO2 SERPL-SCNC: 21 MMOL/L (ref 20–33)
COLOR UR: YELLOW
COLOR UR: YELLOW
CREAT SERPL-MCNC: 1.27 MG/DL (ref 0.5–1.4)
CREAT SERPL-MCNC: 1.71 MG/DL (ref 0.5–1.4)
CREAT UR-MCNC: 63.75 MG/DL
CREAT UR-MCNC: 80.04 MG/DL
EKG IMPRESSION: NORMAL
EKG IMPRESSION: NORMAL
EOSINOPHIL # BLD AUTO: 0.3 K/UL (ref 0–0.51)
EOSINOPHIL NFR BLD: 3.1 % (ref 0–6.9)
EPI CELLS #/AREA URNS HPF: NEGATIVE /HPF
EPI CELLS #/AREA URNS HPF: NEGATIVE /HPF
ERYTHROCYTE [DISTWIDTH] IN BLOOD BY AUTOMATED COUNT: 49.3 FL (ref 35.9–50)
GLOBULIN SER CALC-MCNC: 3.2 G/DL (ref 1.9–3.5)
GLUCOSE SERPL-MCNC: 103 MG/DL (ref 65–99)
GLUCOSE SERPL-MCNC: 123 MG/DL (ref 65–99)
GLUCOSE UR STRIP.AUTO-MCNC: NEGATIVE MG/DL
GLUCOSE UR STRIP.AUTO-MCNC: NEGATIVE MG/DL
HCT VFR BLD AUTO: 40.1 % (ref 42–52)
HGB BLD-MCNC: 12.5 G/DL (ref 14–18)
HYALINE CASTS #/AREA URNS LPF: ABNORMAL /LPF
HYALINE CASTS #/AREA URNS LPF: ABNORMAL /LPF
IMM GRANULOCYTES # BLD AUTO: 0.04 K/UL (ref 0–0.11)
IMM GRANULOCYTES NFR BLD AUTO: 0.4 % (ref 0–0.9)
KETONES UR STRIP.AUTO-MCNC: NEGATIVE MG/DL
KETONES UR STRIP.AUTO-MCNC: NEGATIVE MG/DL
LEUKOCYTE ESTERASE UR QL STRIP.AUTO: ABNORMAL
LEUKOCYTE ESTERASE UR QL STRIP.AUTO: ABNORMAL
LYMPHOCYTES # BLD AUTO: 3.64 K/UL (ref 1–4.8)
LYMPHOCYTES NFR BLD: 37.1 % (ref 22–41)
MAGNESIUM SERPL-MCNC: 1.8 MG/DL (ref 1.5–2.5)
MAGNESIUM SERPL-MCNC: 2.2 MG/DL (ref 1.5–2.5)
MCH RBC QN AUTO: 30 PG (ref 27–33)
MCHC RBC AUTO-ENTMCNC: 31.2 G/DL (ref 33.7–35.3)
MCV RBC AUTO: 96.2 FL (ref 81.4–97.8)
MICRO URNS: ABNORMAL
MICRO URNS: ABNORMAL
MONOCYTES # BLD AUTO: 0.86 K/UL (ref 0–0.85)
MONOCYTES NFR BLD AUTO: 8.8 % (ref 0–13.4)
NEUTROPHILS # BLD AUTO: 4.94 K/UL (ref 1.82–7.42)
NEUTROPHILS NFR BLD: 50.4 % (ref 44–72)
NITRITE UR QL STRIP.AUTO: NEGATIVE
NITRITE UR QL STRIP.AUTO: NEGATIVE
NRBC # BLD AUTO: 0 K/UL
NRBC BLD-RTO: 0 /100 WBC
PH UR STRIP.AUTO: 5.5 [PH] (ref 5–8)
PH UR STRIP.AUTO: 6 [PH] (ref 5–8)
PHOSPHATE SERPL-MCNC: 3.3 MG/DL (ref 2.5–4.5)
PHOSPHATE SERPL-MCNC: 3.5 MG/DL (ref 2.5–4.5)
PLATELET # BLD AUTO: 160 K/UL (ref 164–446)
PMV BLD AUTO: 10.3 FL (ref 9–12.9)
POTASSIUM SERPL-SCNC: 4.6 MMOL/L (ref 3.6–5.5)
POTASSIUM SERPL-SCNC: 4.7 MMOL/L (ref 3.6–5.5)
PROT SERPL-MCNC: 7.1 G/DL (ref 6–8.2)
PROT UR QL STRIP: NEGATIVE MG/DL
PROT UR QL STRIP: NEGATIVE MG/DL
PROT UR-MCNC: 5 MG/DL (ref 0–15)
PROT UR-MCNC: 8 MG/DL (ref 0–15)
PROT/CREAT UR: 100 MG/G (ref 15–68)
PTH-INTACT SERPL-MCNC: 124 PG/ML (ref 14–72)
RBC # BLD AUTO: 4.17 M/UL (ref 4.7–6.1)
RBC # URNS HPF: ABNORMAL /HPF
RBC # URNS HPF: ABNORMAL /HPF
RBC UR QL AUTO: ABNORMAL
RBC UR QL AUTO: ABNORMAL
SIGNIFICANT IND 70042: ABNORMAL
SIGNIFICANT IND 70042: NORMAL
SITE SITE: ABNORMAL
SITE SITE: NORMAL
SODIUM SERPL-SCNC: 140 MMOL/L (ref 135–145)
SODIUM SERPL-SCNC: 142 MMOL/L (ref 135–145)
SOURCE SOURCE: ABNORMAL
SOURCE SOURCE: NORMAL
SP GR UR STRIP.AUTO: 1.01
SP GR UR STRIP.AUTO: 1.02
TACROLIMUS BLD-MCNC: 10.9 NG/ML
TACROLIMUS BLD-MCNC: 6.5 NG/ML
URATE SERPL-MCNC: 4.4 MG/DL (ref 2.5–8.3)
URATE SERPL-MCNC: 4.4 MG/DL (ref 2.5–8.3)
UROBILINOGEN UR STRIP.AUTO-MCNC: 0.2 MG/DL
UROBILINOGEN UR STRIP.AUTO-MCNC: 0.2 MG/DL
WBC # BLD AUTO: 9.8 K/UL (ref 4.8–10.8)
WBC #/AREA URNS HPF: ABNORMAL /HPF
WBC #/AREA URNS HPF: ABNORMAL /HPF

## 2021-01-01 PROCEDURE — 84156 ASSAY OF PROTEIN URINE: CPT

## 2021-01-01 PROCEDURE — 83970 ASSAY OF PARATHORMONE: CPT

## 2021-01-01 PROCEDURE — 87086 URINE CULTURE/COLONY COUNT: CPT

## 2021-01-01 PROCEDURE — 99214 OFFICE O/P EST MOD 30 MIN: CPT | Performed by: INTERNAL MEDICINE

## 2021-01-01 PROCEDURE — 81001 URINALYSIS AUTO W/SCOPE: CPT

## 2021-01-01 PROCEDURE — 83735 ASSAY OF MAGNESIUM: CPT

## 2021-01-01 PROCEDURE — 80053 COMPREHEN METABOLIC PANEL: CPT

## 2021-01-01 PROCEDURE — 87077 CULTURE AEROBIC IDENTIFY: CPT

## 2021-01-01 PROCEDURE — 84100 ASSAY OF PHOSPHORUS: CPT

## 2021-01-01 PROCEDURE — 80197 ASSAY OF TACROLIMUS: CPT

## 2021-01-01 PROCEDURE — 84550 ASSAY OF BLOOD/URIC ACID: CPT

## 2021-01-01 PROCEDURE — 36415 COLL VENOUS BLD VENIPUNCTURE: CPT

## 2021-01-01 PROCEDURE — 94726 PLETHYSMOGRAPHY LUNG VOLUMES: CPT | Mod: 26 | Performed by: INTERNAL MEDICINE

## 2021-01-01 PROCEDURE — 99214 OFFICE O/P EST MOD 30 MIN: CPT | Performed by: NURSE PRACTITIONER

## 2021-01-01 PROCEDURE — 94726 PLETHYSMOGRAPHY LUNG VOLUMES: CPT

## 2021-01-01 PROCEDURE — 93970 EXTREMITY STUDY: CPT

## 2021-01-01 PROCEDURE — 94060 EVALUATION OF WHEEZING: CPT

## 2021-01-01 PROCEDURE — 93000 ELECTROCARDIOGRAM COMPLETE: CPT | Performed by: INTERNAL MEDICINE

## 2021-01-01 PROCEDURE — 94729 DIFFUSING CAPACITY: CPT | Mod: 26 | Performed by: INTERNAL MEDICINE

## 2021-01-01 PROCEDURE — 93970 EXTREMITY STUDY: CPT | Mod: 26 | Performed by: INTERNAL MEDICINE

## 2021-01-01 PROCEDURE — 94060 EVALUATION OF WHEEZING: CPT | Mod: 26 | Performed by: INTERNAL MEDICINE

## 2021-01-01 PROCEDURE — 80069 RENAL FUNCTION PANEL: CPT

## 2021-01-01 PROCEDURE — 82570 ASSAY OF URINE CREATININE: CPT

## 2021-01-01 PROCEDURE — 99215 OFFICE O/P EST HI 40 MIN: CPT | Performed by: INTERNAL MEDICINE

## 2021-01-01 PROCEDURE — 85025 COMPLETE CBC W/AUTO DIFF WBC: CPT

## 2021-01-01 PROCEDURE — 82306 VITAMIN D 25 HYDROXY: CPT

## 2021-01-01 PROCEDURE — 94729 DIFFUSING CAPACITY: CPT

## 2021-01-01 PROCEDURE — 87186 SC STD MICRODIL/AGAR DIL: CPT

## 2021-01-01 RX ORDER — LOSARTAN POTASSIUM 100 MG/1
TABLET ORAL
Qty: 90 TABLET | Refills: 3 | Status: ON HOLD | OUTPATIENT
Start: 2021-01-01 | End: 2022-01-01

## 2021-01-01 RX ORDER — HYDROCODONE BITARTRATE AND ACETAMINOPHEN 5; 325 MG/1; MG/1
TABLET ORAL
COMMUNITY
Start: 2021-04-24 | End: 2021-01-01

## 2021-01-01 RX ORDER — HYDROCHLOROTHIAZIDE 12.5 MG/1
12.5 CAPSULE, GELATIN COATED ORAL DAILY
Qty: 30 CAPSULE | Refills: 11 | Status: SHIPPED | OUTPATIENT
Start: 2021-01-01 | End: 2021-01-01

## 2021-01-01 RX ORDER — AMOXICILLIN 500 MG/1
TABLET, FILM COATED ORAL
COMMUNITY
Start: 2021-01-01 | End: 2022-01-01

## 2021-01-01 RX ORDER — HYDROCHLOROTHIAZIDE 25 MG/1
25 TABLET ORAL DAILY
Qty: 90 TABLET | Refills: 3 | Status: SHIPPED | OUTPATIENT
Start: 2021-01-01 | End: 2022-01-01

## 2021-01-01 RX ORDER — FUROSEMIDE 20 MG/1
TABLET ORAL
Qty: 180 TABLET | Refills: 3 | Status: SHIPPED | OUTPATIENT
Start: 2021-01-01 | End: 2021-01-01

## 2021-01-01 RX ORDER — FLUTICASONE PROPIONATE 50 MCG
1 SPRAY, SUSPENSION (ML) NASAL DAILY
COMMUNITY

## 2021-01-01 RX ORDER — TACROLIMUS 0.5 MG/1
CAPSULE ORAL
COMMUNITY
Start: 2021-01-01 | End: 2022-01-01

## 2021-01-01 RX ORDER — ALBUTEROL SULFATE 90 UG/1
2 AEROSOL, METERED RESPIRATORY (INHALATION)
Status: DISCONTINUED | OUTPATIENT
Start: 2021-01-01 | End: 2021-01-01 | Stop reason: HOSPADM

## 2021-01-01 RX ORDER — PREDNISONE 5 MG/1
5 TABLET ORAL EVERY MORNING
Status: ON HOLD | COMMUNITY
End: 2022-01-01

## 2021-01-01 RX ORDER — APIXABAN 5 MG/1
TABLET, FILM COATED ORAL
Qty: 180 TABLET | Refills: 3 | Status: ON HOLD | OUTPATIENT
Start: 2021-01-01 | End: 2022-01-01 | Stop reason: SDUPTHER

## 2021-01-01 ASSESSMENT — ENCOUNTER SYMPTOMS
BLOOD IN STOOL: 0
DECREASED APPETITE: 0
FLANK PAIN: 0
DIZZINESS: 0
PALPITATIONS: 0
DYSPNEA ON EXERTION: 0
CONSTIPATION: 0
HEARTBURN: 0
HEARTBURN: 0
ALTERED MENTAL STATUS: 0
WEIGHT GAIN: 0
NAUSEA: 0
ALTERED MENTAL STATUS: 0
COUGH: 1
COUGH: 0
FOCAL WEAKNESS: 0
VOMITING: 0
ABDOMINAL PAIN: 0
NAUSEA: 0
BACK PAIN: 0
DIARRHEA: 0
IRREGULAR HEARTBEAT: 0
SHORTNESS OF BREATH: 0
DEPRESSION: 0
BLURRED VISION: 0
BLURRED VISION: 0
NAUSEA: 0
WEIGHT LOSS: 0
CHILLS: 0
CLAUDICATION: 0
WEIGHT LOSS: 0
NEAR-SYNCOPE: 0
COUGH: 0
ORTHOPNEA: 0
PND: 0
VOMITING: 0
ABDOMINAL PAIN: 0
PND: 0
CONSTIPATION: 0
BACK PAIN: 0
HEADACHES: 0
HEMOPTYSIS: 0
PALPITATIONS: 0
DYSPNEA ON EXERTION: 0
SPEECH CHANGE: 0
NEAR-SYNCOPE: 0
DIARRHEA: 0
FEVER: 0
SHORTNESS OF BREATH: 0
DEPRESSION: 0
TREMORS: 0
WEIGHT GAIN: 0
PND: 0
ORTHOPNEA: 0
PALPITATIONS: 0
FEVER: 0
SYNCOPE: 0
TINGLING: 0
SPUTUM PRODUCTION: 0
SENSORY CHANGE: 0
DIZZINESS: 0
DIZZINESS: 0
ABDOMINAL PAIN: 0
LOSS OF CONSCIOUSNESS: 0
DECREASED APPETITE: 0
WEIGHT LOSS: 0
HEARTBURN: 0
CLAUDICATION: 0
WHEEZING: 0
DIARRHEA: 0
IRREGULAR HEARTBEAT: 0
SHORTNESS OF BREATH: 0
ORTHOPNEA: 0
FEVER: 0
VOMITING: 0
SYNCOPE: 0
FLANK PAIN: 0

## 2021-01-01 ASSESSMENT — PULMONARY FUNCTION TESTS
FEV1: 1.82
FEV1/FVC_PERCENT_PREDICTED: 75
FEV1_PERCENT_PREDICTED: 57
FVC_LLN: 3.51
FEV1/FVC_PERCENT_PREDICTED: 95
FEV1/FVC_PERCENT_PREDICTED: 94
FEV1/FVC_PERCENT_PREDICTED: 95
FEV1/FVC_PERCENT_PREDICTED: 95
FEV1/FVC_PERCENT_CHANGE: 0
FEV1/FVC_PERCENT_LLN: 63.01
FEV1/FVC_PERCENT_CHANGE: 67
FEV1: 1.86
FEV1/FVC: 71.87
FEV1/FVC_PERCENT_LLN: 63.01
FVC: 2.53
FEV1/FVC: 71.26
FEV1_PERCENT_PREDICTED: 58
FVC_PERCENT_PREDICTED: 60
FEV1_LLN: 2.64
FEV1_LLN: 2.64
FEV1_PREDICTED: 3.16
FVC_PERCENT_PREDICTED: 61
FVC_PREDICTED: 4.21
FEV1/FVC: 72
FEV1/FVC_PREDICTED: 75.47
FEV1_PERCENT_CHANGE: 3
FEV1_PERCENT_CHANGE: 2
FVC_LLN: 3.51
FEV1/FVC: 71.50
FVC: 2.61

## 2021-01-01 ASSESSMENT — FIBROSIS 4 INDEX
FIB4 SCORE: 1.52
FIB4 SCORE: 1.71

## 2021-01-04 ENCOUNTER — NURSE TRIAGE (OUTPATIENT)
Dept: HEALTH INFORMATION MANAGEMENT | Facility: OTHER | Age: 71
End: 2021-01-04

## 2021-01-05 NOTE — TELEPHONE ENCOUNTER
Received communication from Dr. Rubin Forrester to hold Eliquis morning of procedure. Patient called 01/05/2020 and relayed the information from Dr. Forrester to hold Eliquis the morning of procedure. All questions answered.

## 2021-01-05 NOTE — TELEPHONE ENCOUNTER
" Wondering if he should stop taking his eliquis before his procedure on 01/27/2021.  Message MD Rubin Forrester to notify patient of directions with his Eliquis prior to his JAVIER. Informed patient to clarify with his drRobert If he does not get any answer.       Reason for Disposition  • [1] Caller requesting NON-URGENT health information AND [2] PCP's office is the best resource    Additional Information  • Negative: [1] Caller is not with the adult (patient) AND [2] reporting urgent symptoms  • Negative: Lab result questions  • Negative: Medication questions  • Negative: Caller can't be reached by phone  • Negative: Caller has already spoken to PCP or another triager  • Negative: RN needs further essential information from caller in order to complete triage    Answer Assessment - Initial Assessment Questions  1. REASON FOR CALL or QUESTION: \"What is your reason for calling today?\" or \"How can I best help you?\" or \"What question do you have that I can help answer?\"      Wondering if he should stop taking his eliquis before his procedure on 01/27/2021    Protocols used: INFORMATION ONLY CALL-A-AH      "

## 2021-01-14 ENCOUNTER — APPOINTMENT (OUTPATIENT)
Dept: ADMISSIONS | Facility: MEDICAL CENTER | Age: 71
DRG: 274 | End: 2021-01-14
Payer: MEDICARE

## 2021-01-20 ENCOUNTER — HOSPITAL ENCOUNTER (OUTPATIENT)
Dept: LAB | Facility: MEDICAL CENTER | Age: 71
End: 2021-01-20
Attending: INTERNAL MEDICINE
Payer: MEDICARE

## 2021-01-20 LAB
25(OH)D3 SERPL-MCNC: 60 NG/ML (ref 30–100)
ALBUMIN SERPL BCP-MCNC: 3.9 G/DL (ref 3.2–4.9)
ALBUMIN/GLOB SERPL: 1.3 G/DL
ALP SERPL-CCNC: 98 U/L (ref 30–99)
ALT SERPL-CCNC: 13 U/L (ref 2–50)
ANION GAP SERPL CALC-SCNC: 9 MMOL/L (ref 7–16)
APPEARANCE UR: CLEAR
AST SERPL-CCNC: 13 U/L (ref 12–45)
BACTERIA #/AREA URNS HPF: NEGATIVE /HPF
BASOPHILS # BLD AUTO: 0.2 % (ref 0–1.8)
BASOPHILS # BLD: 0.02 K/UL (ref 0–0.12)
BILIRUB SERPL-MCNC: 0.4 MG/DL (ref 0.1–1.5)
BILIRUB UR QL STRIP.AUTO: NEGATIVE
BUN SERPL-MCNC: 64 MG/DL (ref 8–22)
CALCIUM SERPL-MCNC: 10.2 MG/DL (ref 8.5–10.5)
CHLORIDE SERPL-SCNC: 111 MMOL/L (ref 96–112)
CHOLEST SERPL-MCNC: 101 MG/DL (ref 100–199)
CO2 SERPL-SCNC: 20 MMOL/L (ref 20–33)
COLOR UR: YELLOW
CREAT SERPL-MCNC: 1.67 MG/DL (ref 0.5–1.4)
EOSINOPHIL # BLD AUTO: 0.22 K/UL (ref 0–0.51)
EOSINOPHIL NFR BLD: 2.4 % (ref 0–6.9)
EPI CELLS #/AREA URNS HPF: NEGATIVE /HPF
ERYTHROCYTE [DISTWIDTH] IN BLOOD BY AUTOMATED COUNT: 51.7 FL (ref 35.9–50)
FASTING STATUS PATIENT QL REPORTED: NORMAL
GLOBULIN SER CALC-MCNC: 3.1 G/DL (ref 1.9–3.5)
GLUCOSE SERPL-MCNC: 148 MG/DL (ref 65–99)
GLUCOSE UR STRIP.AUTO-MCNC: NEGATIVE MG/DL
HCT VFR BLD AUTO: 41.5 % (ref 42–52)
HDLC SERPL-MCNC: 30 MG/DL
HGB BLD-MCNC: 13.1 G/DL (ref 14–18)
HYALINE CASTS #/AREA URNS LPF: ABNORMAL /LPF
IMM GRANULOCYTES # BLD AUTO: 0.04 K/UL (ref 0–0.11)
IMM GRANULOCYTES NFR BLD AUTO: 0.4 % (ref 0–0.9)
KETONES UR STRIP.AUTO-MCNC: NEGATIVE MG/DL
LDLC SERPL CALC-MCNC: 44 MG/DL
LEUKOCYTE ESTERASE UR QL STRIP.AUTO: ABNORMAL
LYMPHOCYTES # BLD AUTO: 3.26 K/UL (ref 1–4.8)
LYMPHOCYTES NFR BLD: 35.2 % (ref 22–41)
MAGNESIUM SERPL-MCNC: 2.2 MG/DL (ref 1.5–2.5)
MCH RBC QN AUTO: 31.2 PG (ref 27–33)
MCHC RBC AUTO-ENTMCNC: 31.6 G/DL (ref 33.7–35.3)
MCV RBC AUTO: 98.8 FL (ref 81.4–97.8)
MICRO URNS: ABNORMAL
MONOCYTES # BLD AUTO: 0.73 K/UL (ref 0–0.85)
MONOCYTES NFR BLD AUTO: 7.9 % (ref 0–13.4)
NEUTROPHILS # BLD AUTO: 4.98 K/UL (ref 1.82–7.42)
NEUTROPHILS NFR BLD: 53.9 % (ref 44–72)
NITRITE UR QL STRIP.AUTO: NEGATIVE
NRBC # BLD AUTO: 0 K/UL
NRBC BLD-RTO: 0 /100 WBC
PH UR STRIP.AUTO: 5.5 [PH] (ref 5–8)
PHOSPHATE SERPL-MCNC: 3.8 MG/DL (ref 2.5–4.5)
PLATELET # BLD AUTO: 131 K/UL (ref 164–446)
PMV BLD AUTO: 10.3 FL (ref 9–12.9)
POTASSIUM SERPL-SCNC: 5.4 MMOL/L (ref 3.6–5.5)
PROT SERPL-MCNC: 7 G/DL (ref 6–8.2)
PROT UR QL STRIP: NEGATIVE MG/DL
RBC # BLD AUTO: 4.2 M/UL (ref 4.7–6.1)
RBC # URNS HPF: ABNORMAL /HPF
RBC UR QL AUTO: ABNORMAL
SODIUM SERPL-SCNC: 140 MMOL/L (ref 135–145)
SP GR UR STRIP.AUTO: 1.02
TRIGL SERPL-MCNC: 133 MG/DL (ref 0–149)
URATE SERPL-MCNC: 4.1 MG/DL (ref 2.5–8.3)
UROBILINOGEN UR STRIP.AUTO-MCNC: 0.2 MG/DL
WBC # BLD AUTO: 9.3 K/UL (ref 4.8–10.8)
WBC #/AREA URNS HPF: ABNORMAL /HPF

## 2021-01-20 PROCEDURE — 85025 COMPLETE CBC W/AUTO DIFF WBC: CPT

## 2021-01-20 PROCEDURE — 82043 UR ALBUMIN QUANTITATIVE: CPT

## 2021-01-20 PROCEDURE — 84156 ASSAY OF PROTEIN URINE: CPT

## 2021-01-20 PROCEDURE — 84100 ASSAY OF PHOSPHORUS: CPT

## 2021-01-20 PROCEDURE — 87086 URINE CULTURE/COLONY COUNT: CPT

## 2021-01-20 PROCEDURE — 80197 ASSAY OF TACROLIMUS: CPT

## 2021-01-20 PROCEDURE — 87186 SC STD MICRODIL/AGAR DIL: CPT

## 2021-01-20 PROCEDURE — 36415 COLL VENOUS BLD VENIPUNCTURE: CPT

## 2021-01-20 PROCEDURE — 83735 ASSAY OF MAGNESIUM: CPT

## 2021-01-20 PROCEDURE — 84550 ASSAY OF BLOOD/URIC ACID: CPT

## 2021-01-20 PROCEDURE — 80053 COMPREHEN METABOLIC PANEL: CPT

## 2021-01-20 PROCEDURE — 81001 URINALYSIS AUTO W/SCOPE: CPT

## 2021-01-20 PROCEDURE — 80061 LIPID PANEL: CPT

## 2021-01-20 PROCEDURE — 82570 ASSAY OF URINE CREATININE: CPT | Mod: 91

## 2021-01-20 PROCEDURE — 82306 VITAMIN D 25 HYDROXY: CPT

## 2021-01-20 PROCEDURE — 87077 CULTURE AEROBIC IDENTIFY: CPT | Mod: 91

## 2021-01-21 LAB
CREAT UR-MCNC: 85.56 MG/DL
CREAT UR-MCNC: 86.66 MG/DL
MICROALBUMIN UR-MCNC: 1.6 MG/DL
MICROALBUMIN/CREAT UR: 18 MG/G (ref 0–30)
PROT UR-MCNC: 5 MG/DL (ref 0–15)
PROT/CREAT UR: 58 MG/G (ref 15–68)

## 2021-01-22 ENCOUNTER — PRE-ADMISSION TESTING (OUTPATIENT)
Dept: ADMISSIONS | Facility: MEDICAL CENTER | Age: 71
DRG: 274 | End: 2021-01-22
Attending: INTERNAL MEDICINE
Payer: MEDICARE

## 2021-01-22 DIAGNOSIS — Z01.810 PRE-OPERATIVE CARDIOVASCULAR EXAMINATION: ICD-10-CM

## 2021-01-22 DIAGNOSIS — Z01.812 PRE-OPERATIVE LABORATORY EXAMINATION: ICD-10-CM

## 2021-01-22 LAB
ALBUMIN SERPL BCP-MCNC: 4 G/DL (ref 3.2–4.9)
ALBUMIN/GLOB SERPL: 1.3 G/DL
ALP SERPL-CCNC: 107 U/L (ref 30–99)
ALT SERPL-CCNC: 23 U/L (ref 2–50)
ANION GAP SERPL CALC-SCNC: 8 MMOL/L (ref 7–16)
AST SERPL-CCNC: 20 U/L (ref 12–45)
BASOPHILS # BLD AUTO: 0.2 % (ref 0–1.8)
BASOPHILS # BLD: 0.02 K/UL (ref 0–0.12)
BILIRUB SERPL-MCNC: 0.4 MG/DL (ref 0.1–1.5)
BUN SERPL-MCNC: 65 MG/DL (ref 8–22)
CALCIUM SERPL-MCNC: 10.4 MG/DL (ref 8.5–10.5)
CHLORIDE SERPL-SCNC: 108 MMOL/L (ref 96–112)
CO2 SERPL-SCNC: 21 MMOL/L (ref 20–33)
CREAT SERPL-MCNC: 1.7 MG/DL (ref 0.5–1.4)
EKG IMPRESSION: NORMAL
EOSINOPHIL # BLD AUTO: 0.09 K/UL (ref 0–0.51)
EOSINOPHIL NFR BLD: 1 % (ref 0–6.9)
ERYTHROCYTE [DISTWIDTH] IN BLOOD BY AUTOMATED COUNT: 52.1 FL (ref 35.9–50)
GLOBULIN SER CALC-MCNC: 3 G/DL (ref 1.9–3.5)
GLUCOSE SERPL-MCNC: 202 MG/DL (ref 65–99)
HCT VFR BLD AUTO: 40.7 % (ref 42–52)
HGB BLD-MCNC: 12.8 G/DL (ref 14–18)
IMM GRANULOCYTES # BLD AUTO: 0.03 K/UL (ref 0–0.11)
IMM GRANULOCYTES NFR BLD AUTO: 0.3 % (ref 0–0.9)
INR PPP: 1.47 (ref 0.87–1.13)
LYMPHOCYTES # BLD AUTO: 2.17 K/UL (ref 1–4.8)
LYMPHOCYTES NFR BLD: 24.8 % (ref 22–41)
MCH RBC QN AUTO: 30.8 PG (ref 27–33)
MCHC RBC AUTO-ENTMCNC: 31.4 G/DL (ref 33.7–35.3)
MCV RBC AUTO: 98.1 FL (ref 81.4–97.8)
MONOCYTES # BLD AUTO: 0.61 K/UL (ref 0–0.85)
MONOCYTES NFR BLD AUTO: 7 % (ref 0–13.4)
NEUTROPHILS # BLD AUTO: 5.83 K/UL (ref 1.82–7.42)
NEUTROPHILS NFR BLD: 66.7 % (ref 44–72)
NRBC # BLD AUTO: 0 K/UL
NRBC BLD-RTO: 0 /100 WBC
PLATELET # BLD AUTO: 140 K/UL (ref 164–446)
PMV BLD AUTO: 10.1 FL (ref 9–12.9)
POTASSIUM SERPL-SCNC: 6 MMOL/L (ref 3.6–5.5)
PROT SERPL-MCNC: 7 G/DL (ref 6–8.2)
PROTHROMBIN TIME: 18.3 SEC (ref 12–14.6)
RBC # BLD AUTO: 4.15 M/UL (ref 4.7–6.1)
SARS-COV-2 RNA RESP QL NAA+PROBE: NOTDETECTED
SODIUM SERPL-SCNC: 137 MMOL/L (ref 135–145)
SPECIMEN SOURCE: NORMAL
TACROLIMUS BLD-MCNC: 10 NG/ML
WBC # BLD AUTO: 8.8 K/UL (ref 4.8–10.8)

## 2021-01-22 PROCEDURE — 80053 COMPREHEN METABOLIC PANEL: CPT

## 2021-01-22 PROCEDURE — 93010 ELECTROCARDIOGRAM REPORT: CPT | Performed by: INTERNAL MEDICINE

## 2021-01-22 PROCEDURE — U0005 INFEC AGEN DETEC AMPLI PROBE: HCPCS

## 2021-01-22 PROCEDURE — U0003 INFECTIOUS AGENT DETECTION BY NUCLEIC ACID (DNA OR RNA); SEVERE ACUTE RESPIRATORY SYNDROME CORONAVIRUS 2 (SARS-COV-2) (CORONAVIRUS DISEASE [COVID-19]), AMPLIFIED PROBE TECHNIQUE, MAKING USE OF HIGH THROUGHPUT TECHNOLOGIES AS DESCRIBED BY CMS-2020-01-R: HCPCS

## 2021-01-22 PROCEDURE — C9803 HOPD COVID-19 SPEC COLLECT: HCPCS

## 2021-01-22 PROCEDURE — 93005 ELECTROCARDIOGRAM TRACING: CPT

## 2021-01-22 PROCEDURE — 85610 PROTHROMBIN TIME: CPT

## 2021-01-22 PROCEDURE — 36415 COLL VENOUS BLD VENIPUNCTURE: CPT

## 2021-01-22 PROCEDURE — 85025 COMPLETE CBC W/AUTO DIFF WBC: CPT

## 2021-01-22 ASSESSMENT — FIBROSIS 4 INDEX: FIB4 SCORE: 1.98

## 2021-01-27 ENCOUNTER — ANESTHESIA (OUTPATIENT)
Dept: CARDIOLOGY | Facility: MEDICAL CENTER | Age: 71
DRG: 274 | End: 2021-01-27
Payer: MEDICARE

## 2021-01-27 ENCOUNTER — APPOINTMENT (OUTPATIENT)
Dept: CARDIOLOGY | Facility: MEDICAL CENTER | Age: 71
DRG: 274 | End: 2021-01-27
Attending: INTERNAL MEDICINE
Payer: MEDICARE

## 2021-01-27 ENCOUNTER — HOSPITAL ENCOUNTER (INPATIENT)
Facility: MEDICAL CENTER | Age: 71
LOS: 4 days | DRG: 274 | End: 2021-02-01
Attending: INTERNAL MEDICINE | Admitting: INTERNAL MEDICINE
Payer: MEDICARE

## 2021-01-27 ENCOUNTER — ANESTHESIA EVENT (OUTPATIENT)
Dept: CARDIOLOGY | Facility: MEDICAL CENTER | Age: 71
DRG: 274 | End: 2021-01-27
Payer: MEDICARE

## 2021-01-27 DIAGNOSIS — Z79.01 CHRONIC ANTICOAGULATION: ICD-10-CM

## 2021-01-27 DIAGNOSIS — Z98.890 S/P ABLATION OF ATRIAL FIBRILLATION: ICD-10-CM

## 2021-01-27 DIAGNOSIS — I48.0 PAROXYSMAL ATRIAL FIBRILLATION (HCC): ICD-10-CM

## 2021-01-27 DIAGNOSIS — N18.32 STAGE 3B CHRONIC KIDNEY DISEASE: ICD-10-CM

## 2021-01-27 DIAGNOSIS — I48.92 ATRIAL FLUTTER WITH RAPID VENTRICULAR RESPONSE (HCC): ICD-10-CM

## 2021-01-27 DIAGNOSIS — I77.82 ANCA-ASSOCIATED VASCULITIS (HCC): ICD-10-CM

## 2021-01-27 DIAGNOSIS — Z86.79 S/P ABLATION OF ATRIAL FIBRILLATION: ICD-10-CM

## 2021-01-27 DIAGNOSIS — Z79.60 LONG-TERM USE OF IMMUNOSUPPRESSANT MEDICATION: ICD-10-CM

## 2021-01-27 DIAGNOSIS — Z94.0 HISTORY OF RENAL TRANSPLANT: ICD-10-CM

## 2021-01-27 DIAGNOSIS — G93.40 ENCEPHALOPATHY ACUTE: ICD-10-CM

## 2021-01-27 LAB
ACT BLD: 356 SEC (ref 74–137)
ACT BLD: 406 SEC (ref 74–137)
EKG IMPRESSION: NORMAL
INR PPP: 1.49 (ref 0.87–1.13)
PROTHROMBIN TIME: 18.5 SEC (ref 12–14.6)

## 2021-01-27 PROCEDURE — 85610 PROTHROMBIN TIME: CPT

## 2021-01-27 PROCEDURE — 02583ZZ DESTRUCTION OF CONDUCTION MECHANISM, PERCUTANEOUS APPROACH: ICD-10-PCS | Performed by: INTERNAL MEDICINE

## 2021-01-27 PROCEDURE — 700101 HCHG RX REV CODE 250: Performed by: ANESTHESIOLOGY

## 2021-01-27 PROCEDURE — 93613 INTRACARDIAC EPHYS 3D MAPG: CPT | Performed by: INTERNAL MEDICINE

## 2021-01-27 PROCEDURE — 93655 ICAR CATH ABLTJ DSCRT ARRHYT: CPT | Performed by: INTERNAL MEDICINE

## 2021-01-27 PROCEDURE — 93623 PRGRMD STIMJ&PACG IV RX NFS: CPT | Mod: 26 | Performed by: INTERNAL MEDICINE

## 2021-01-27 PROCEDURE — 93010 ELECTROCARDIOGRAM REPORT: CPT | Mod: 59 | Performed by: INTERNAL MEDICINE

## 2021-01-27 PROCEDURE — 93325 DOPPLER ECHO COLOR FLOW MAPG: CPT

## 2021-01-27 PROCEDURE — 4A023FZ MEASUREMENT OF CARDIAC RHYTHM, PERCUTANEOUS APPROACH: ICD-10-PCS | Performed by: INTERNAL MEDICINE

## 2021-01-27 PROCEDURE — 02K83ZZ MAP CONDUCTION MECHANISM, PERCUTANEOUS APPROACH: ICD-10-PCS | Performed by: INTERNAL MEDICINE

## 2021-01-27 PROCEDURE — 4A0234Z MEASUREMENT OF CARDIAC ELECTRICAL ACTIVITY, PERCUTANEOUS APPROACH: ICD-10-PCS | Performed by: INTERNAL MEDICINE

## 2021-01-27 PROCEDURE — 700102 HCHG RX REV CODE 250 W/ 637 OVERRIDE(OP): Performed by: INTERNAL MEDICINE

## 2021-01-27 PROCEDURE — A9270 NON-COVERED ITEM OR SERVICE: HCPCS | Performed by: INTERNAL MEDICINE

## 2021-01-27 PROCEDURE — 160002 HCHG RECOVERY MINUTES (STAT)

## 2021-01-27 PROCEDURE — 700101 HCHG RX REV CODE 250

## 2021-01-27 PROCEDURE — 93325 DOPPLER ECHO COLOR FLOW MAPG: CPT | Mod: 26 | Performed by: INTERNAL MEDICINE

## 2021-01-27 PROCEDURE — 700105 HCHG RX REV CODE 258: Performed by: INTERNAL MEDICINE

## 2021-01-27 PROCEDURE — 700105 HCHG RX REV CODE 258: Performed by: ANESTHESIOLOGY

## 2021-01-27 PROCEDURE — G0378 HOSPITAL OBSERVATION PER HR: HCPCS

## 2021-01-27 PROCEDURE — 700111 HCHG RX REV CODE 636 W/ 250 OVERRIDE (IP): Performed by: ANESTHESIOLOGY

## 2021-01-27 PROCEDURE — 700111 HCHG RX REV CODE 636 W/ 250 OVERRIDE (IP): Performed by: INTERNAL MEDICINE

## 2021-01-27 PROCEDURE — 93662 INTRACARDIAC ECG (ICE): CPT | Mod: 26 | Performed by: INTERNAL MEDICINE

## 2021-01-27 PROCEDURE — 99153 MOD SED SAME PHYS/QHP EA: CPT

## 2021-01-27 PROCEDURE — 93312 ECHO TRANSESOPHAGEAL: CPT | Mod: 26 | Performed by: INTERNAL MEDICINE

## 2021-01-27 PROCEDURE — 85347 COAGULATION TIME ACTIVATED: CPT

## 2021-01-27 PROCEDURE — 700101 HCHG RX REV CODE 250: Performed by: INTERNAL MEDICINE

## 2021-01-27 PROCEDURE — 700111 HCHG RX REV CODE 636 W/ 250 OVERRIDE (IP)

## 2021-01-27 PROCEDURE — 93005 ELECTROCARDIOGRAM TRACING: CPT | Performed by: INTERNAL MEDICINE

## 2021-01-27 PROCEDURE — 93656 COMPRE EP EVAL ABLTJ ATR FIB: CPT | Performed by: INTERNAL MEDICINE

## 2021-01-27 RX ORDER — PROTAMINE SULFATE 10 MG/ML
INJECTION, SOLUTION INTRAVENOUS
Status: COMPLETED
Start: 2021-01-27 | End: 2021-01-27

## 2021-01-27 RX ORDER — LIDOCAINE HYDROCHLORIDE 20 MG/ML
INJECTION, SOLUTION EPIDURAL; INFILTRATION; INTRACAUDAL; PERINEURAL PRN
Status: DISCONTINUED | OUTPATIENT
Start: 2021-01-27 | End: 2021-01-27 | Stop reason: SURG

## 2021-01-27 RX ORDER — HEPARIN SODIUM 200 [USP'U]/100ML
INJECTION, SOLUTION INTRAVENOUS
Status: COMPLETED
Start: 2021-01-27 | End: 2021-01-27

## 2021-01-27 RX ORDER — MYCOPHENOLATE MOFETIL 250 MG/1
500 CAPSULE ORAL 2 TIMES DAILY
Status: DISCONTINUED | OUTPATIENT
Start: 2021-01-27 | End: 2021-02-01 | Stop reason: HOSPADM

## 2021-01-27 RX ORDER — PROTAMINE SULFATE 10 MG/ML
INJECTION, SOLUTION INTRAVENOUS PRN
Status: DISCONTINUED | OUTPATIENT
Start: 2021-01-27 | End: 2021-01-27 | Stop reason: SURG

## 2021-01-27 RX ORDER — TACROLIMUS 1 MG/1
1 CAPSULE ORAL 2 TIMES DAILY
Status: DISCONTINUED | OUTPATIENT
Start: 2021-01-27 | End: 2021-02-01 | Stop reason: HOSPADM

## 2021-01-27 RX ORDER — PHENYLEPHRINE HYDROCHLORIDE 10 MG/ML
INJECTION, SOLUTION INTRAMUSCULAR; INTRAVENOUS; SUBCUTANEOUS PRN
Status: DISCONTINUED | OUTPATIENT
Start: 2021-01-27 | End: 2021-01-27 | Stop reason: SURG

## 2021-01-27 RX ORDER — PHENYLEPHRINE HCL IN 0.9% NACL 0.5 MG/5ML
SYRINGE (ML) INTRAVENOUS
Status: DISPENSED
Start: 2021-01-27 | End: 2021-01-28

## 2021-01-27 RX ORDER — FUROSEMIDE 20 MG/1
20 TABLET ORAL 2 TIMES DAILY
Status: DISCONTINUED | OUTPATIENT
Start: 2021-01-27 | End: 2021-02-01

## 2021-01-27 RX ORDER — ISOPROTERENOL HYDROCHLORIDE 0.2 MG/ML
INJECTION, SOLUTION INTRAVENOUS
Status: COMPLETED
Start: 2021-01-27 | End: 2021-01-27

## 2021-01-27 RX ORDER — ATORVASTATIN CALCIUM 10 MG/1
10 TABLET, FILM COATED ORAL EVERY MORNING
Status: DISCONTINUED | OUTPATIENT
Start: 2021-01-28 | End: 2021-01-28

## 2021-01-27 RX ORDER — ONDANSETRON 2 MG/ML
INJECTION INTRAMUSCULAR; INTRAVENOUS PRN
Status: DISCONTINUED | OUTPATIENT
Start: 2021-01-27 | End: 2021-01-27 | Stop reason: SURG

## 2021-01-27 RX ORDER — PREDNISONE 5 MG/1
5 TABLET ORAL EVERY MORNING
Status: DISCONTINUED | OUTPATIENT
Start: 2021-01-28 | End: 2021-02-01 | Stop reason: HOSPADM

## 2021-01-27 RX ORDER — HEPARIN SODIUM 1000 [USP'U]/ML
INJECTION, SOLUTION INTRAVENOUS; SUBCUTANEOUS
Status: COMPLETED
Start: 2021-01-27 | End: 2021-01-27

## 2021-01-27 RX ORDER — AMOXICILLIN 500 MG/1
CAPSULE ORAL
Status: ON HOLD | COMMUNITY
Start: 2020-12-03 | End: 2021-01-27

## 2021-01-27 RX ORDER — MIDAZOLAM HYDROCHLORIDE 1 MG/ML
INJECTION INTRAMUSCULAR; INTRAVENOUS
Status: COMPLETED
Start: 2021-01-27 | End: 2021-01-27

## 2021-01-27 RX ORDER — BUPIVACAINE HYDROCHLORIDE 2.5 MG/ML
INJECTION, SOLUTION EPIDURAL; INFILTRATION; INTRACAUDAL
Status: COMPLETED
Start: 2021-01-27 | End: 2021-01-27

## 2021-01-27 RX ORDER — LIDOCAINE HYDROCHLORIDE 20 MG/ML
INJECTION, SOLUTION INFILTRATION; PERINEURAL
Status: COMPLETED
Start: 2021-01-27 | End: 2021-01-27

## 2021-01-27 RX ORDER — OMEPRAZOLE 20 MG/1
20 CAPSULE, DELAYED RELEASE ORAL
Status: DISCONTINUED | OUTPATIENT
Start: 2021-01-27 | End: 2021-01-28

## 2021-01-27 RX ORDER — ALLOPURINOL 300 MG/1
300 TABLET ORAL
Status: DISCONTINUED | OUTPATIENT
Start: 2021-01-27 | End: 2021-01-30

## 2021-01-27 RX ORDER — SODIUM CHLORIDE, SODIUM LACTATE, POTASSIUM CHLORIDE, CALCIUM CHLORIDE 600; 310; 30; 20 MG/100ML; MG/100ML; MG/100ML; MG/100ML
INJECTION, SOLUTION INTRAVENOUS CONTINUOUS
Status: ACTIVE | OUTPATIENT
Start: 2021-01-27 | End: 2021-01-27

## 2021-01-27 RX ADMIN — TACROLIMUS 1 MG: 1 CAPSULE ORAL at 21:58

## 2021-01-27 RX ADMIN — PHENYLEPHRINE HYDROCHLORIDE 100 MCG: 10 INJECTION INTRAVENOUS at 13:23

## 2021-01-27 RX ADMIN — POVIDONE IODINE 15 ML: 100 SOLUTION TOPICAL at 10:58

## 2021-01-27 RX ADMIN — PROTAMINE SULFATE 50 MG: 10 INJECTION, SOLUTION INTRAVENOUS at 15:55

## 2021-01-27 RX ADMIN — APIXABAN 5 MG: 5 TABLET, FILM COATED ORAL at 21:58

## 2021-01-27 RX ADMIN — SODIUM CHLORIDE, POTASSIUM CHLORIDE, SODIUM LACTATE AND CALCIUM CHLORIDE: 600; 310; 30; 20 INJECTION, SOLUTION INTRAVENOUS at 14:31

## 2021-01-27 RX ADMIN — MIDAZOLAM 2 MG: 1 INJECTION INTRAMUSCULAR; INTRAVENOUS at 13:06

## 2021-01-27 RX ADMIN — EPHEDRINE SULFATE 10 MG: 50 INJECTION INTRAMUSCULAR; INTRAVENOUS; SUBCUTANEOUS at 13:28

## 2021-01-27 RX ADMIN — PHENYLEPHRINE HYDROCHLORIDE 100 MCG: 10 INJECTION INTRAVENOUS at 14:10

## 2021-01-27 RX ADMIN — EPHEDRINE SULFATE 10 MG: 50 INJECTION INTRAMUSCULAR; INTRAVENOUS; SUBCUTANEOUS at 13:20

## 2021-01-27 RX ADMIN — PROPOFOL 150 MG: 10 INJECTION, EMULSION INTRAVENOUS at 13:06

## 2021-01-27 RX ADMIN — BUPIVACAINE HYDROCHLORIDE: 2.5 INJECTION, SOLUTION EPIDURAL; INFILTRATION; INTRACAUDAL; PERINEURAL at 13:19

## 2021-01-27 RX ADMIN — ONDANSETRON 4 MG: 2 INJECTION INTRAMUSCULAR; INTRAVENOUS at 13:06

## 2021-01-27 RX ADMIN — SODIUM CHLORIDE, POTASSIUM CHLORIDE, SODIUM LACTATE AND CALCIUM CHLORIDE: 600; 310; 30; 20 INJECTION, SOLUTION INTRAVENOUS at 10:58

## 2021-01-27 RX ADMIN — EPHEDRINE SULFATE 10 MG: 50 INJECTION INTRAMUSCULAR; INTRAVENOUS; SUBCUTANEOUS at 14:28

## 2021-01-27 RX ADMIN — EPINEPHRINE 4 MCG/MIN: 1 INJECTION INTRAMUSCULAR; INTRAVENOUS; SUBCUTANEOUS at 14:51

## 2021-01-27 RX ADMIN — HEPARIN SODIUM 2000 UNITS: 200 INJECTION, SOLUTION INTRAVENOUS at 13:18

## 2021-01-27 RX ADMIN — ROCURONIUM BROMIDE 40 MG: 10 INJECTION, SOLUTION INTRAVENOUS at 13:06

## 2021-01-27 RX ADMIN — FUROSEMIDE 20 MG: 20 TABLET ORAL at 21:58

## 2021-01-27 RX ADMIN — PHENYLEPHRINE HYDROCHLORIDE 100 MCG: 10 INJECTION INTRAVENOUS at 13:41

## 2021-01-27 RX ADMIN — MYCOPHENOLATE MOFETIL 500 MG: 250 CAPSULE ORAL at 21:58

## 2021-01-27 RX ADMIN — PHENYLEPHRINE HYDROCHLORIDE 100 MCG: 10 INJECTION INTRAVENOUS at 14:28

## 2021-01-27 RX ADMIN — ISOPROTERENOL HYDROCHLORIDE 200 MCG: 0.2 INJECTION, SOLUTION INTRAMUSCULAR; INTRAVENOUS at 15:31

## 2021-01-27 RX ADMIN — FENTANYL CITRATE 100 MCG: 50 INJECTION, SOLUTION INTRAMUSCULAR; INTRAVENOUS at 13:06

## 2021-01-27 RX ADMIN — ALLOPURINOL 300 MG: 300 TABLET ORAL at 21:58

## 2021-01-27 RX ADMIN — LIDOCAINE HYDROCHLORIDE 100 MG: 20 INJECTION, SOLUTION EPIDURAL; INFILTRATION; INTRACAUDAL at 13:06

## 2021-01-27 RX ADMIN — SUGAMMADEX 200 MG: 100 INJECTION, SOLUTION INTRAVENOUS at 16:00

## 2021-01-27 RX ADMIN — HEPARIN SODIUM 17000 UNITS: 1000 INJECTION, SOLUTION INTRAVENOUS; SUBCUTANEOUS at 15:42

## 2021-01-27 ASSESSMENT — COGNITIVE AND FUNCTIONAL STATUS - GENERAL
DAILY ACTIVITIY SCORE: 24
SUGGESTED CMS G CODE MODIFIER DAILY ACTIVITY: CH
WALKING IN HOSPITAL ROOM: A LITTLE
STANDING UP FROM CHAIR USING ARMS: A LITTLE
MOBILITY SCORE: 21
SUGGESTED CMS G CODE MODIFIER MOBILITY: CJ
CLIMB 3 TO 5 STEPS WITH RAILING: A LITTLE

## 2021-01-27 ASSESSMENT — PAIN DESCRIPTION - PAIN TYPE: TYPE: CHRONIC PAIN

## 2021-01-27 ASSESSMENT — FIBROSIS 4 INDEX: FIB4 SCORE: 2.14

## 2021-01-27 ASSESSMENT — PAIN SCALES - GENERAL: PAIN_LEVEL: 0

## 2021-01-27 NOTE — OP REPORT
Electrophysiology Procedure Note    Procedures Performed:  Pulmonary Vein Isolation  Ablation of additional arrhythmia  Intracardiac Echocardiography  Three-dimensional intracardiac mapping  IV isoproterenol infusion with programmed stimulation  Trans-esophageal echocardiogram    Electrophysiologist: Rubin Forrester MD    Assistant(s): None    Anesthesia: General anesthesia was provided by Dr. Downing of the Anesthesiology service.    Statement of Medical Necessity: This is a 70  year-old male with history of symptomatic paroxysmal atrial  fibrillation and persistent flutter.    Description of Procedure:    Access and catheter placement: After obtaining informed written consent, the patient was  brought to the EP lab in the fasting, non-sedated stated. The patient was sedated and intubated  by the anesthesiologist. The patient was prepped and draped in the usual sterile fashion. Using  the modified Seldinger technique, access was obtained in the right  femoral vein. Guidewires were advanced into the IVC. In the right femoral vein, 3 sheaths of   8F were placed.     IV access in the femoral veins was complicated due to heavily tortuous venous anatomy. Fluoroscopy was used to advance catheters into the heart and long sheaths were necessary to bypass tortuosity.     A deflectable  decapolar catheter was advanced through the LFV to the coronary sinus. A 8F intracardiac  echo catheter was advanced to the right atrium.   Patient was noted to be in atrial flutter. An ablation catheter (ST SF D-F) was advanced into the right atrium. LAT map confirmed CCW typical flutter. Ablation was performed from inferior tricuspid annulus to IVC resulting in flutter termination and bidirectional block >170ms.    Through and 8F sheath,  a long wire was advanced to the SVC. The short sheath was  changed out for a medium-curl Vizigo ([employer]) sheath which was advanced to the SVC. The wire was  removed and the dilator was flushed. A  98-cm transseptal needle ("[employer] was advanced to the tip of the  dilator. The transseptal needle was attached to the manifold and  flushed. Under intracardiac echocardiographic guidance, the sheath/needle   system was withdrawn to the fossa ovalis. At this time, 17,000 units of heparin were administered.   Additional boluses of heparin were given as needed to keep -350 sec during LA dwell time.   The needle was advanced out of the dilator, and RF energy applied via the Henderson needle.   ICE visualized the needle passage through the fossa ovalis into the left  atrium. Confirmation of left atrial location was confirmed by injecting saline and transducing  pressure. The dilator was advanced over the needle into the left atrium, and then the sheath was advanced over the dilator. The dilator and  needle were removed. The sheath was flushed and connected to a continuous heparinized   saline drip.       A duodecapolar Penta-Ray catheter ([employer]) was  advanced through the Vizigo sheath into the left atrium. A 3-dimensional electroanatomical  map was constructed using the CARTO system. The Penta-ray was removed and a 3.5-mm externally-irrigated ablation catheter ([employer]   Thermocool ST SF DF-curve) was advanced through the Vizigo sheath into the left atrium.     Attention was first turned to the left pulmonary veins. The Penta-Ray  catheter was placed in the LSPV and LIPV which showed conduction into the vein. A circumferential  ablation line using radiofrequency energy 20-40W was placed which resulted in LSPV and LIPV isolation.  The catheters were then moved to the RSPV and RIPV which showed evidence of conduction into the veins,   and ablation 20-40W was performed circumferentially around these veins   resulting in RSPV and RIPV isolation. Power was reduced near the esophagus.    Kasey ablation was necessary to isolate the RPVs. In the LPVs, multiple leaks were noted require several re-maps,  and susanne ablation was also performed. Though the veins were noted to be isolated initially upon moving to the RPVs, the LSPV was once again noted to have reconnected after RPV isolation. Further ablation was performed along the roof to isolate this vein.    The Penta-Ray was advanced into all four pulmonary veins and persistent conduction block into the right and left pulmonary veins was demonstrated.     Isuprel 2-4mcg/min was administered and burst pacing was performed in the left atrium without induction of sustained AF, AFL, or SVT.     The left atrial sheath and catheter were withdrawn to the right atrium. The CTI was confirmed to be blocked.  ICE visualization   of the pericardium confirmed no pericardial effusion at the end of the case. The  patient was awakened from anesthesia, catheters and sheaths were removed, Vascade MVP closure devices were deployed, and manual  pressure was held on bilateral groins until hemostasis was achieved.     Electrophysiological Findings:  Sinus cycle length 699 msec  Intervals- A-H 119 msec  H-V 54 msec  QRS 117msec   msec   msec  AV block  ms    Total RF time: 1676 sec  Fluoro time: 4.0 min    Arrhythmias:  Atrial flutter was present at baseline TCL 255ms    Estimated blood loss - 30 mL    Complications: None    Impression:  1. Atrial fibrillation, Paroxysmal  2. Successful isolation of all four pulmonary veins  3. Atrial flutter, persistent, typical  4. Successful CTI ablation with bidirectional block.    Recommendations:  1. Bed rest for 4 hours  2. Telemetry monitoring during recovery  3. Anticoagulant therapy for at least 6-12 months  4. Follow up in Arrhythmia clinic in 4 weeks      Rubin Forrester MD  Cardiac Electrophysiology

## 2021-01-27 NOTE — ANESTHESIA PROCEDURE NOTES
Airway    Date/Time: 1/27/2021 1:06 PM  Performed by: Nehemiah Downing M.D.  Authorized by: Nehemiah Downing M.D.     Location:  OR  Urgency:  Elective  Indications for Airway Management:  Anesthesia      Spontaneous Ventilation: absent    Sedation Level:  Deep  Preoxygenated: Yes    Patient Position:  Sniffing  Final Airway Type:  Endotracheal airway  Final Endotracheal Airway:  ETT  Cuffed: Yes    Technique Used for Successful ETT Placement:  Direct laryngoscopy    Insertion Site:  Oral  Blade Type:  Grace  Laryngoscope Blade/Videolaryngoscope Blade Size:  2  ETT Size (mm):  8.0  Measured from:  Teeth  ETT to Teeth (cm):  24  Placement Verified by: auscultation and capnometry    Cormack-Lehane Classification:  Grade I - full view of glottis  Number of Attempts at Approach:  1

## 2021-01-27 NOTE — ANESTHESIA PREPROCEDURE EVALUATION
Relevant Problems   PULMONARY   (+) LLL pneumonia      CARDIAC   (+) ANCA-associated vasculitis (HCC)   (+) Acute venous embolism and thrombosis of deep vessels of proximal lower extremity (HCC)   (+) Aortic regurgitation   (+) Atrial flutter with rapid ventricular response (HCC)   (+) Coronary artery calcification seen on CT scan   (+) Essential hypertension         (+) Acute renal failure (HCC)       Physical Exam    Airway   Mallampati: II  TM distance: >3 FB  Neck ROM: full       Cardiovascular - normal exam  Rhythm: regular  Rate: normal  (-) murmur     Dental - normal exam           Pulmonary - normal exam  Breath sounds clear to auscultation     Abdominal    Neurological - normal exam                 Anesthesia Plan    ASA 2       Plan - general       Airway plan will be ETT        Induction: intravenous    Postoperative Plan: Postoperative administration of opioids is intended.    Pertinent diagnostic labs and testing reviewed    Informed Consent:    Anesthetic plan and risks discussed with patient.    Use of blood products discussed with: patient whom consented to blood products.

## 2021-01-27 NOTE — H&P
[facility]  Electrophysiology Pre-procedure H&P    DOS:1/27/2021    Planned Procedure: Ablation    Chief complaint/Reason for Procedure: AFL/AF    HPI: 71 y/o M with h/o AF and AFL referred for evaluation, presents for ablation. Feeling OK today.      Past Medical History:   Diagnosis Date   • Arrhythmia 2021    flutter   • Arthritis 2020    right shoulder, knees, & back   • Cough 2020    dry-on lisinipril, now productive -brown   • Dialysis patient (HCC) 2006    x 18 months   • E coli bacteremia 2014    hospitalized x 5 months   • Kidney failure 03/2008    kidney transplant   • Kidney transplant pain 2020    low back   • Sleep apnea     CPAP   • Snoring        Past Surgical History:   Procedure Laterality Date   • OTHER Right 2017    eye surgery, lasix & cataracts, retinal detatchment   • PEG PLACEMENT  10/10/2014    Performed by Brijesh Orozco M.D. at [facility]   • OTHER ORTHOPEDIC SURGERY Left 1995    knee       Social History     Socioeconomic History   • Marital status:      Spouse name: Not on file   • Number of children: Not on file   • Years of education: Not on file   • Highest education level: Not on file   Occupational History   • Not on file   Social Needs   • Financial resource strain: Not on file   • Food insecurity     Worry: Not on file     Inability: Not on file   • Transportation needs     Medical: Not on file     Non-medical: Not on file   Tobacco Use   • Smoking status: Never Smoker   • Smokeless tobacco: Former User     Types: Snuff, Chew   Substance and Sexual Activity   • Alcohol use: Not Currently   • Drug use: Never   • Sexual activity: Not on file   Lifestyle   • Physical activity     Days per week: Not on file     Minutes per session: Not on file   • Stress: Not on file   Relationships   • Social connections     Talks on phone: Not on file     Gets together: Not on file     Attends Mandaen service: Not on file     Active member of club or  "organization: Not on file     Attends meetings of clubs or organizations: Not on file     Relationship status: Not on file   • Intimate partner violence     Fear of current or ex partner: Not on file     Emotionally abused: Not on file     Physically abused: Not on file     Forced sexual activity: Not on file   Other Topics Concern   • Not on file   Social History Narrative   • Not on file       Family History   Problem Relation Age of Onset   • Stroke Brother 26        , ? cause   • Stroke Maternal Grandmother            • Clotting Disorder Neg Hx        Allergies   Allergen Reactions   • Nsaids      Cannot take because of anti rejection meds.       Current Facility-Administered Medications   Medication Dose Route Frequency Provider Last Rate Last Admin   • lactated ringers infusion   Intravenous Continuous Rubin Forrester M.D. 10 mL/hr at 21 1058 New Bag at 21 1058   • BUPIVACAINE HCL (PF) 0.25 % INJ SOLN            • HEPARIN SODIUM (PORCINE) 1000 UNIT/ML INJ SOLN            • LIDOCAINE HCL 2 % INJ SOLN            • HEPARIN (PORCINE) IN NACL 2000-0.9 UNIT/L-% IV SOLN            • SUGAMMADEX SODIUM 200 MG/2ML IV SOLN            • FENTANYL CITRATE (PF) 0.05 MG/ML INJ SOLN (WRAPPED)            • MIDAZOLAM HCL 2 MG/2ML INJ SOLN (WRAPPED)            • PHENYLEPHRINE HCL-NACL 1-0.9 MG/10ML-% IV SOSY                Physical Exam:  Vitals:    21 1325 21 1045   BP:  (!) 98/80   Pulse:  (!) 103   Resp:  20   Temp:  36.3 °C (97.3 °F)   TempSrc:  Temporal   Weight: 114.3 kg (252 lb) 116.5 kg (256 lb 13.4 oz)   Height: 1.803 m (5' 11\") 1.803 m (5' 11\")     General appearance: NAD, conversant   Neck: Trachea midline; FROM, supple, no thyromegaly or lymphadenopathy  CV: Irregular, no MRGs, no JVD   Extremities: No peripheral edema or extremity lymphadenopathy  Skin: Normal temperature, turgor and texture; no rash, ulcers or subcutaneous nodules  Psych: Appropriate affect, alert and oriented to " person, place and time    Data:  Lab Results   Component Value Date/Time    CHOLSTRLTOT 101 01/20/2021 07:48 AM    LDL 44 01/20/2021 07:48 AM    HDL 30 (A) 01/20/2021 07:48 AM    TRIGLYCERIDE 133 01/20/2021 07:48 AM       Lab Results   Component Value Date/Time    SODIUM 137 01/22/2021 02:08 PM    POTASSIUM 6.0 (H) 01/22/2021 02:08 PM    CHLORIDE 108 01/22/2021 02:08 PM    CO2 21 01/22/2021 02:08 PM    GLUCOSE 202 (H) 01/22/2021 02:08 PM    BUN 65 (H) 01/22/2021 02:08 PM    CREATININE 1.70 (H) 01/22/2021 02:08 PM     Lab Results   Component Value Date/Time    ALKPHOSPHAT 107 (H) 01/22/2021 02:08 PM    ASTSGOT 20 01/22/2021 02:08 PM    ALTSGPT 23 01/22/2021 02:08 PM    TBILIRUBIN 0.4 01/22/2021 02:08 PM      Lab Results   Component Value Date/Time    BNPBTYPENAT 210 (H) 05/05/2018 03:03 AM               EKG interpreted by me: Typical RA flutter    Impression/Plan:  1) AFL, persistent  2) pAF    We discussed pulmonary vein isolation for therapeutic management and continued rhythm control.  We discussed the risks and benefits of this procedure.  Risks include 1-3% risk of major cardiovascular event including stroke, myocardial infarction, phrenic nerve damage, esophageal injury and/or fistula formation, cardiac perforation, pericardial effusion, tamponade, major bleeding, or death.  I quoted a 70 to 80% chance free of atrial fibrillation at 12 months.  We discussed that he may also need a second procedure.        Rubin Forrester MD  Cardiac Electrophysiology

## 2021-01-28 ENCOUNTER — APPOINTMENT (OUTPATIENT)
Dept: RADIOLOGY | Facility: MEDICAL CENTER | Age: 71
DRG: 274 | End: 2021-01-28
Attending: INTERNAL MEDICINE
Payer: MEDICARE

## 2021-01-28 ENCOUNTER — APPOINTMENT (OUTPATIENT)
Dept: RADIOLOGY | Facility: MEDICAL CENTER | Age: 71
DRG: 274 | End: 2021-01-28
Attending: NURSE PRACTITIONER
Payer: MEDICARE

## 2021-01-28 ENCOUNTER — APPOINTMENT (OUTPATIENT)
Dept: CARDIOLOGY | Facility: MEDICAL CENTER | Age: 71
DRG: 274 | End: 2021-01-28
Attending: NURSE PRACTITIONER
Payer: MEDICARE

## 2021-01-28 PROBLEM — R65.10 SIRS (SYSTEMIC INFLAMMATORY RESPONSE SYNDROME) (HCC): Status: ACTIVE | Noted: 2021-01-28

## 2021-01-28 PROBLEM — R73.9 HYPERGLYCEMIA: Status: ACTIVE | Noted: 2021-01-28

## 2021-01-28 PROBLEM — G93.40 ENCEPHALOPATHY ACUTE: Status: ACTIVE | Noted: 2021-01-28

## 2021-01-28 PROBLEM — R11.2 NAUSEA AND VOMITING: Status: ACTIVE | Noted: 2021-01-28

## 2021-01-28 LAB
ALBUMIN SERPL BCP-MCNC: 3.8 G/DL (ref 3.2–4.9)
ALBUMIN/GLOB SERPL: 1.3 G/DL
ALP SERPL-CCNC: 135 U/L (ref 30–99)
ALT SERPL-CCNC: 28 U/L (ref 2–50)
AMMONIA PLAS-SCNC: 21 UMOL/L (ref 11–45)
AMYLASE SERPL-CCNC: 20 U/L (ref 20–103)
ANION GAP SERPL CALC-SCNC: 11 MMOL/L (ref 7–16)
ANION GAP SERPL CALC-SCNC: 14 MMOL/L (ref 7–16)
APPEARANCE UR: CLEAR
AST SERPL-CCNC: 37 U/L (ref 12–45)
BACTERIA #/AREA URNS HPF: NEGATIVE /HPF
BASE EXCESS BLDV CALC-SCNC: -8 MMOL/L
BILIRUB SERPL-MCNC: 0.7 MG/DL (ref 0.1–1.5)
BILIRUB UR QL STRIP.AUTO: NEGATIVE
BODY TEMPERATURE: ABNORMAL CENTIGRADE
BUN SERPL-MCNC: 51 MG/DL (ref 8–22)
BUN SERPL-MCNC: 55 MG/DL (ref 8–22)
CALCIUM SERPL-MCNC: 9.9 MG/DL (ref 8.5–10.5)
CALCIUM SERPL-MCNC: 9.9 MG/DL (ref 8.5–10.5)
CHLORIDE SERPL-SCNC: 108 MMOL/L (ref 96–112)
CHLORIDE SERPL-SCNC: 114 MMOL/L (ref 96–112)
CK SERPL-CCNC: 129 U/L (ref 0–154)
CO2 SERPL-SCNC: 15 MMOL/L (ref 20–33)
CO2 SERPL-SCNC: 16 MMOL/L (ref 20–33)
COLOR UR: YELLOW
CREAT SERPL-MCNC: 1.79 MG/DL (ref 0.5–1.4)
CREAT SERPL-MCNC: 1.84 MG/DL (ref 0.5–1.4)
EKG IMPRESSION: NORMAL
EPI CELLS #/AREA URNS HPF: NEGATIVE /HPF
ERYTHROCYTE [DISTWIDTH] IN BLOOD BY AUTOMATED COUNT: 52 FL (ref 35.9–50)
GLOBULIN SER CALC-MCNC: 3 G/DL (ref 1.9–3.5)
GLUCOSE BLD-MCNC: 185 MG/DL (ref 65–99)
GLUCOSE BLD-MCNC: 207 MG/DL (ref 65–99)
GLUCOSE SERPL-MCNC: 180 MG/DL (ref 65–99)
GLUCOSE SERPL-MCNC: 199 MG/DL (ref 65–99)
GLUCOSE UR STRIP.AUTO-MCNC: NEGATIVE MG/DL
HCO3 BLDV-SCNC: 16 MMOL/L (ref 24–28)
HCT VFR BLD AUTO: 39.1 % (ref 42–52)
HGB BLD-MCNC: 12.6 G/DL (ref 14–18)
HYALINE CASTS #/AREA URNS LPF: ABNORMAL /LPF
INR PPP: 1.78 (ref 0.87–1.13)
KETONES UR STRIP.AUTO-MCNC: NEGATIVE MG/DL
LACTATE BLD-SCNC: 1.8 MMOL/L (ref 0.5–2)
LACTATE BLD-SCNC: 2 MMOL/L (ref 0.5–2)
LEUKOCYTE ESTERASE UR QL STRIP.AUTO: ABNORMAL
LIPASE SERPL-CCNC: 11 U/L (ref 11–82)
MAGNESIUM SERPL-MCNC: 1.8 MG/DL (ref 1.5–2.5)
MCH RBC QN AUTO: 31.6 PG (ref 27–33)
MCHC RBC AUTO-ENTMCNC: 32.2 G/DL (ref 33.7–35.3)
MCV RBC AUTO: 98 FL (ref 81.4–97.8)
MICRO URNS: ABNORMAL
NITRITE UR QL STRIP.AUTO: NEGATIVE
PCO2 BLDV: 30.2 MMHG (ref 41–51)
PH BLDV: 7.35 [PH] (ref 7.31–7.45)
PH UR STRIP.AUTO: 5 [PH] (ref 5–8)
PHOSPHATE SERPL-MCNC: 2.6 MG/DL (ref 2.5–4.5)
PLATELET # BLD AUTO: 127 K/UL (ref 164–446)
PMV BLD AUTO: 9.5 FL (ref 9–12.9)
PO2 BLDV: 46.7 MMHG (ref 25–40)
POTASSIUM SERPL-SCNC: 5.3 MMOL/L (ref 3.6–5.5)
POTASSIUM SERPL-SCNC: 5.5 MMOL/L (ref 3.6–5.5)
PROT SERPL-MCNC: 6.8 G/DL (ref 6–8.2)
PROT UR QL STRIP: NEGATIVE MG/DL
PROTHROMBIN TIME: 21.2 SEC (ref 12–14.6)
RBC # BLD AUTO: 3.99 M/UL (ref 4.7–6.1)
RBC # URNS HPF: ABNORMAL /HPF
RBC UR QL AUTO: NEGATIVE
SAO2 % BLDV: 82.8 %
SODIUM SERPL-SCNC: 135 MMOL/L (ref 135–145)
SODIUM SERPL-SCNC: 143 MMOL/L (ref 135–145)
SP GR UR STRIP.AUTO: 1.01
TROPONIN T SERPL-MCNC: 2141 NG/L (ref 6–19)
UROBILINOGEN UR STRIP.AUTO-MCNC: 0.2 MG/DL
WBC # BLD AUTO: 12.4 K/UL (ref 4.8–10.8)
WBC #/AREA URNS HPF: ABNORMAL /HPF

## 2021-01-28 PROCEDURE — 84484 ASSAY OF TROPONIN QUANT: CPT

## 2021-01-28 PROCEDURE — 80048 BASIC METABOLIC PNL TOTAL CA: CPT

## 2021-01-28 PROCEDURE — 82150 ASSAY OF AMYLASE: CPT

## 2021-01-28 PROCEDURE — 700111 HCHG RX REV CODE 636 W/ 250 OVERRIDE (IP): Performed by: INTERNAL MEDICINE

## 2021-01-28 PROCEDURE — 93005 ELECTROCARDIOGRAM TRACING: CPT | Performed by: INTERNAL MEDICINE

## 2021-01-28 PROCEDURE — 70450 CT HEAD/BRAIN W/O DYE: CPT

## 2021-01-28 PROCEDURE — 87040 BLOOD CULTURE FOR BACTERIA: CPT

## 2021-01-28 PROCEDURE — 700102 HCHG RX REV CODE 250 W/ 637 OVERRIDE(OP): Performed by: NURSE PRACTITIONER

## 2021-01-28 PROCEDURE — 71250 CT THORAX DX C-: CPT

## 2021-01-28 PROCEDURE — 99221 1ST HOSP IP/OBS SF/LOW 40: CPT | Performed by: PSYCHIATRY & NEUROLOGY

## 2021-01-28 PROCEDURE — 93005 ELECTROCARDIOGRAM TRACING: CPT | Performed by: NURSE PRACTITIONER

## 2021-01-28 PROCEDURE — 82962 GLUCOSE BLOOD TEST: CPT | Mod: 91

## 2021-01-28 PROCEDURE — 700102 HCHG RX REV CODE 250 W/ 637 OVERRIDE(OP): Performed by: INTERNAL MEDICINE

## 2021-01-28 PROCEDURE — 770020 HCHG ROOM/CARE - TELE (206)

## 2021-01-28 PROCEDURE — 99233 SBSQ HOSP IP/OBS HIGH 50: CPT | Performed by: NURSE PRACTITIONER

## 2021-01-28 PROCEDURE — 93010 ELECTROCARDIOGRAM REPORT: CPT | Performed by: INTERNAL MEDICINE

## 2021-01-28 PROCEDURE — 74018 RADEX ABDOMEN 1 VIEW: CPT

## 2021-01-28 PROCEDURE — 85610 PROTHROMBIN TIME: CPT

## 2021-01-28 PROCEDURE — 93325 DOPPLER ECHO COLOR FLOW MAPG: CPT | Mod: 26 | Performed by: INTERNAL MEDICINE

## 2021-01-28 PROCEDURE — 93325 DOPPLER ECHO COLOR FLOW MAPG: CPT

## 2021-01-28 PROCEDURE — 82550 ASSAY OF CK (CPK): CPT

## 2021-01-28 PROCEDURE — 700111 HCHG RX REV CODE 636 W/ 250 OVERRIDE (IP): Performed by: NURSE PRACTITIONER

## 2021-01-28 PROCEDURE — 700105 HCHG RX REV CODE 258: Performed by: NURSE PRACTITIONER

## 2021-01-28 PROCEDURE — 99223 1ST HOSP IP/OBS HIGH 75: CPT | Performed by: INTERNAL MEDICINE

## 2021-01-28 PROCEDURE — A9270 NON-COVERED ITEM OR SERVICE: HCPCS | Performed by: INTERNAL MEDICINE

## 2021-01-28 PROCEDURE — 85027 COMPLETE CBC AUTOMATED: CPT

## 2021-01-28 PROCEDURE — 51798 US URINE CAPACITY MEASURE: CPT

## 2021-01-28 PROCEDURE — 96374 THER/PROPH/DIAG INJ IV PUSH: CPT

## 2021-01-28 PROCEDURE — A9270 NON-COVERED ITEM OR SERVICE: HCPCS | Performed by: NURSE PRACTITIONER

## 2021-01-28 PROCEDURE — 93010 ELECTROCARDIOGRAM REPORT: CPT | Mod: 59,76 | Performed by: INTERNAL MEDICINE

## 2021-01-28 PROCEDURE — 93308 TTE F-UP OR LMTD: CPT | Mod: 26 | Performed by: INTERNAL MEDICINE

## 2021-01-28 PROCEDURE — 700101 HCHG RX REV CODE 250: Performed by: NURSE PRACTITIONER

## 2021-01-28 PROCEDURE — 80053 COMPREHEN METABOLIC PANEL: CPT

## 2021-01-28 PROCEDURE — 82140 ASSAY OF AMMONIA: CPT

## 2021-01-28 PROCEDURE — 81001 URINALYSIS AUTO W/SCOPE: CPT

## 2021-01-28 PROCEDURE — 36415 COLL VENOUS BLD VENIPUNCTURE: CPT

## 2021-01-28 PROCEDURE — 82607 VITAMIN B-12: CPT

## 2021-01-28 PROCEDURE — 83605 ASSAY OF LACTIC ACID: CPT | Mod: 91

## 2021-01-28 PROCEDURE — 83735 ASSAY OF MAGNESIUM: CPT

## 2021-01-28 PROCEDURE — 84443 ASSAY THYROID STIM HORMONE: CPT

## 2021-01-28 PROCEDURE — 83690 ASSAY OF LIPASE: CPT

## 2021-01-28 PROCEDURE — 80180 DRUG SCRN QUAN MYCOPHENOLATE: CPT

## 2021-01-28 PROCEDURE — 84100 ASSAY OF PHOSPHORUS: CPT

## 2021-01-28 PROCEDURE — 96376 TX/PRO/DX INJ SAME DRUG ADON: CPT

## 2021-01-28 PROCEDURE — 82803 BLOOD GASES ANY COMBINATION: CPT

## 2021-01-28 RX ORDER — HYDRALAZINE HYDROCHLORIDE 20 MG/ML
10 INJECTION INTRAMUSCULAR; INTRAVENOUS EVERY 6 HOURS PRN
Status: DISCONTINUED | OUTPATIENT
Start: 2021-01-28 | End: 2021-02-01 | Stop reason: HOSPADM

## 2021-01-28 RX ORDER — SODIUM CHLORIDE 9 MG/ML
INJECTION, SOLUTION INTRAVENOUS CONTINUOUS
Status: ACTIVE | OUTPATIENT
Start: 2021-01-28 | End: 2021-01-29

## 2021-01-28 RX ORDER — MORPHINE SULFATE 4 MG/ML
2 INJECTION, SOLUTION INTRAMUSCULAR; INTRAVENOUS
Status: COMPLETED | OUTPATIENT
Start: 2021-01-28 | End: 2021-01-28

## 2021-01-28 RX ORDER — COLCHICINE 0.6 MG/1
0.6 TABLET ORAL DAILY
Status: DISCONTINUED | OUTPATIENT
Start: 2021-01-28 | End: 2021-01-28

## 2021-01-28 RX ORDER — BENAZEPRIL HYDROCHLORIDE 20 MG/1
20 TABLET ORAL
Status: DISCONTINUED | OUTPATIENT
Start: 2021-01-28 | End: 2021-02-01 | Stop reason: HOSPADM

## 2021-01-28 RX ORDER — MORPHINE SULFATE 4 MG/ML
2 INJECTION, SOLUTION INTRAMUSCULAR; INTRAVENOUS ONCE
Status: COMPLETED | OUTPATIENT
Start: 2021-01-28 | End: 2021-01-28

## 2021-01-28 RX ORDER — TRAMADOL HYDROCHLORIDE 50 MG/1
50 TABLET ORAL EVERY 6 HOURS PRN
Status: DISCONTINUED | OUTPATIENT
Start: 2021-01-28 | End: 2021-01-28

## 2021-01-28 RX ORDER — DEXTROSE MONOHYDRATE 25 G/50ML
50 INJECTION, SOLUTION INTRAVENOUS
Status: DISCONTINUED | OUTPATIENT
Start: 2021-01-28 | End: 2021-02-01 | Stop reason: HOSPADM

## 2021-01-28 RX ORDER — TRAMADOL HYDROCHLORIDE 50 MG/1
50 TABLET ORAL EVERY 8 HOURS PRN
Status: DISCONTINUED | OUTPATIENT
Start: 2021-01-28 | End: 2021-01-28

## 2021-01-28 RX ORDER — SUCRALFATE 1 G/1
1 TABLET ORAL EVERY 6 HOURS
Status: DISCONTINUED | OUTPATIENT
Start: 2021-01-28 | End: 2021-02-01 | Stop reason: HOSPADM

## 2021-01-28 RX ORDER — METOPROLOL SUCCINATE 25 MG/1
25 TABLET, EXTENDED RELEASE ORAL 2 TIMES DAILY
Status: DISCONTINUED | OUTPATIENT
Start: 2021-01-28 | End: 2021-02-01 | Stop reason: HOSPADM

## 2021-01-28 RX ORDER — COLCHICINE 0.6 MG/1
0.6 TABLET ORAL DAILY
Status: DISCONTINUED | OUTPATIENT
Start: 2021-01-28 | End: 2021-01-30

## 2021-01-28 RX ORDER — ACETAMINOPHEN 500 MG
1000 TABLET ORAL EVERY 6 HOURS PRN
Status: DISCONTINUED | OUTPATIENT
Start: 2021-01-28 | End: 2021-02-01 | Stop reason: HOSPADM

## 2021-01-28 RX ORDER — OMEPRAZOLE 20 MG/1
20 CAPSULE, DELAYED RELEASE ORAL DAILY
Status: DISCONTINUED | OUTPATIENT
Start: 2021-01-28 | End: 2021-02-01 | Stop reason: HOSPADM

## 2021-01-28 RX ORDER — METOPROLOL TARTRATE 1 MG/ML
2.5 INJECTION, SOLUTION INTRAVENOUS
Status: COMPLETED | OUTPATIENT
Start: 2021-01-28 | End: 2021-01-28

## 2021-01-28 RX ADMIN — SUCRALFATE 1 G: 1 TABLET ORAL at 17:38

## 2021-01-28 RX ADMIN — TACROLIMUS 1 MG: 1 CAPSULE ORAL at 07:42

## 2021-01-28 RX ADMIN — MYCOPHENOLATE MOFETIL 500 MG: 250 CAPSULE ORAL at 20:04

## 2021-01-28 RX ADMIN — ALLOPURINOL 300 MG: 300 TABLET ORAL at 20:04

## 2021-01-28 RX ADMIN — MORPHINE SULFATE 2 MG: 4 INJECTION INTRAVENOUS at 10:50

## 2021-01-28 RX ADMIN — HYDRALAZINE HYDROCHLORIDE 10 MG: 20 INJECTION INTRAMUSCULAR; INTRAVENOUS at 15:25

## 2021-01-28 RX ADMIN — SODIUM CHLORIDE: 9 INJECTION, SOLUTION INTRAVENOUS at 15:12

## 2021-01-28 RX ADMIN — ATORVASTATIN CALCIUM 10 MG: 10 TABLET, FILM COATED ORAL at 06:03

## 2021-01-28 RX ADMIN — OMEPRAZOLE 20 MG: 20 CAPSULE, DELAYED RELEASE ORAL at 07:42

## 2021-01-28 RX ADMIN — FAMOTIDINE 20 MG: 10 INJECTION INTRAVENOUS at 17:39

## 2021-01-28 RX ADMIN — BENAZEPRIL HYDROCHLORIDE 20 MG: 20 TABLET ORAL at 06:03

## 2021-01-28 RX ADMIN — MORPHINE SULFATE 2 MG: 4 INJECTION INTRAVENOUS at 07:42

## 2021-01-28 RX ADMIN — MYCOPHENOLATE MOFETIL 500 MG: 250 CAPSULE ORAL at 08:37

## 2021-01-28 RX ADMIN — SUCRALFATE 1 G: 1 TABLET ORAL at 23:53

## 2021-01-28 RX ADMIN — TACROLIMUS 1 MG: 1 CAPSULE ORAL at 20:04

## 2021-01-28 RX ADMIN — METOPROLOL SUCCINATE 25 MG: 25 TABLET, EXTENDED RELEASE ORAL at 17:39

## 2021-01-28 RX ADMIN — METOPROLOL SUCCINATE 25 MG: 25 TABLET, EXTENDED RELEASE ORAL at 06:03

## 2021-01-28 RX ADMIN — COLCHICINE 0.6 MG: 0.6 TABLET, FILM COATED ORAL at 08:37

## 2021-01-28 RX ADMIN — APIXABAN 5 MG: 5 TABLET, FILM COATED ORAL at 06:03

## 2021-01-28 RX ADMIN — FUROSEMIDE 20 MG: 20 TABLET ORAL at 20:05

## 2021-01-28 RX ADMIN — METOPROLOL TARTRATE 2.5 MG: 1 INJECTION, SOLUTION INTRAVENOUS at 10:44

## 2021-01-28 RX ADMIN — PREDNISONE 5 MG: 5 TABLET ORAL at 06:03

## 2021-01-28 RX ADMIN — ACETAMINOPHEN 1000 MG: 500 TABLET ORAL at 20:05

## 2021-01-28 RX ADMIN — INSULIN HUMAN 1 UNITS: 100 INJECTION, SOLUTION PARENTERAL at 20:19

## 2021-01-28 RX ADMIN — FUROSEMIDE 20 MG: 20 TABLET ORAL at 07:42

## 2021-01-28 RX ADMIN — ACETAMINOPHEN 1000 MG: 500 TABLET ORAL at 01:12

## 2021-01-28 RX ADMIN — APIXABAN 5 MG: 5 TABLET, FILM COATED ORAL at 17:39

## 2021-01-28 RX ADMIN — MORPHINE SULFATE 2 MG: 4 INJECTION INTRAVENOUS at 03:36

## 2021-01-28 ASSESSMENT — LIFESTYLE VARIABLES
TOTAL SCORE: 0
HAVE PEOPLE ANNOYED YOU BY CRITICIZING YOUR DRINKING: NO
ALCOHOL_USE: NO
HOW MANY TIMES IN THE PAST YEAR HAVE YOU HAD 5 OR MORE DRINKS IN A DAY: 0
EVER HAD A DRINK FIRST THING IN THE MORNING TO STEADY YOUR NERVES TO GET RID OF A HANGOVER: NO
TOTAL SCORE: 0
SUBSTANCE_ABUSE: 0
ON A TYPICAL DAY WHEN YOU DRINK ALCOHOL HOW MANY DRINKS DO YOU HAVE: 0
HAVE YOU EVER FELT YOU SHOULD CUT DOWN ON YOUR DRINKING: NO
EVER FELT BAD OR GUILTY ABOUT YOUR DRINKING: NO
AVERAGE NUMBER OF DAYS PER WEEK YOU HAVE A DRINK CONTAINING ALCOHOL: 0
CONSUMPTION TOTAL: NEGATIVE
TOTAL SCORE: 0

## 2021-01-28 ASSESSMENT — ENCOUNTER SYMPTOMS
BRUISES/BLEEDS EASILY: 0
CHILLS: 0
NUMBNESS: 0
ORTHOPNEA: 0
COUGH: 0
HEADACHES: 0
NAUSEA: 0
LIGHT-HEADEDNESS: 0
TROUBLE SWALLOWING: 0
SPEECH CHANGE: 0
HALLUCINATIONS: 0
POLYDIPSIA: 0
NECK PAIN: 0
HEARTBURN: 0
WEIGHT LOSS: 0
DOUBLE VISION: 0
SPUTUM PRODUCTION: 0
FOCAL WEAKNESS: 0
PHOTOPHOBIA: 0
WEAKNESS: 0
BLURRED VISION: 0
FATIGUE: 0
SPEECH DIFFICULTY: 0
BLOOD IN STOOL: 0
SHORTNESS OF BREATH: 0
WHEEZING: 0
TREMORS: 0
PALPITATIONS: 0
VOMITING: 0
FEVER: 0
FLANK PAIN: 0
DIZZINESS: 0
BACK PAIN: 0
ABDOMINAL PAIN: 0
CHEST TIGHTNESS: 1
HEMOPTYSIS: 0
NERVOUS/ANXIOUS: 0

## 2021-01-28 ASSESSMENT — CHA2DS2 SCORE
AGE 65 TO 74: YES
SEX: MALE
VASCULAR DISEASE: NO
CHF OR LEFT VENTRICULAR DYSFUNCTION: NO
HYPERTENSION: YES
DIABETES: YES
AGE 75 OR GREATER: NO
CHA2DS2 VASC SCORE: 3
PRIOR STROKE OR TIA OR THROMBOEMBOLISM: NO

## 2021-01-28 ASSESSMENT — PATIENT HEALTH QUESTIONNAIRE - PHQ9
2. FEELING DOWN, DEPRESSED, IRRITABLE, OR HOPELESS: NOT AT ALL
1. LITTLE INTEREST OR PLEASURE IN DOING THINGS: NOT AT ALL
SUM OF ALL RESPONSES TO PHQ9 QUESTIONS 1 AND 2: 0

## 2021-01-28 ASSESSMENT — PAIN DESCRIPTION - PAIN TYPE
TYPE: ACUTE PAIN;CHRONIC PAIN
TYPE: ACUTE PAIN;CHRONIC PAIN
TYPE: CHRONIC PAIN
TYPE: ACUTE PAIN;CHRONIC PAIN

## 2021-01-28 NOTE — CONSULTS
Neurology STROKE CODE H&P  Neurohospitalist Service, [facility] Neurosciences    Referring Physician: Rubin Forrester M.D.    STROKE CODE: Aphasia      To obtain the most accurate data regarding the time called, and time patient seen, refer to the stroke run-sheet and chart.  For time of CT, refer to the radiology report. See A&P below for TPA Decision and door to needle time if and when applicable.    HPI: Krishan Acevedo is a 70 y.o. male with history of arthritis, ESRD s/p kidney transplant (2020), E.coli bacteremia (2014), and TUSHAR with CPAP who presented to [facility] on 1/27/21 elective ablation with PVI of right atrial flutter. Per nursing report, patient was in his normal state of health today at 07:30 this morning. Throughout the day, the patient was complaining of chest and shoulder pain, nausea/vomiting, and mild pressure. At approximately 12:00 today, his RN noted the patient was stammering. The Cardiology APRN was notified and this speech impediment was attributed to his chest and shoulder pain. Rapid response was called at at 14:20 due to speech changes and then unable to follow commands. Blood pressure at this time was 162/99 and BG was 201 mg/dL. Neurology was consulted via Code Stroke at 14:33 for aphasia. Initial NIHSS was 6 for decreased LOC, right lower quadrant vision field cut of right eye only, and generalized weakness with drift to all 4 extremities. STAT CT head was completed with results and interpretation below. Following CT head, the patient's spouse provided further history that he has baseline vision difficulty due to remote retinal detachment in the right eye and has arthritis to the right shoulder with pain limiting his right arm movement. NIHSS was repeated, and  then improved to 3 for decreased LOC, likely baseline right lower quadrant vision cut to right eye, and right arm weakness.     Review of systems: In addition to what is detailed in the HPI  above, (and scanned into the chart if and when applicable), all other systems reviewed and are negative.    Past Medical History:    has a past medical history of Arrhythmia (2021), Arthritis (2020), Cough (2020), Dialysis patient (HCC) (2006), E coli bacteremia (2014), Kidney failure (03/2008), Kidney transplant pain (2020), Sleep apnea, and Snoring.    FHx:  family history includes Stroke in his maternal grandmother; Stroke (age of onset: 26) in his brother.    SHx:   reports that he has never smoked. He quit smokeless tobacco use about 30 years ago.  His smokeless tobacco use included snuff and chew. He reports previous alcohol use. He reports that he does not use drugs.    Allergies:  Allergies   Allergen Reactions   • Nsaids      Cannot take because of anti rejection meds.       Medications:    Current Facility-Administered Medications:   •  acetaminophen (TYLENOL) tablet 1,000 mg, 1,000 mg, Oral, Q6HRS PRN, Alina Luna M.D., 1,000 mg at 01/28/21 0112  •  benazepril (LOTENSIN) tablet 20 mg, 20 mg, Oral, Q DAY, Alina Luna M.D., 20 mg at 01/28/21 0603  •  metoprolol SR (TOPROL XL) tablet 25 mg, 25 mg, Oral, BID, Alina Luna M.D., 25 mg at 01/28/21 1739  •  omeprazole (PRILOSEC) capsule 20 mg, 20 mg, Oral, DAILY, Leticia Todds, A.P.R.N., 20 mg at 01/28/21 0742  •  colchicine (COLCRYS) tablet 0.6 mg, 0.6 mg, Oral, DAILY, Leticia L Ambers, A.P.R.N., 0.6 mg at 01/28/21 0837  •  hydrALAZINE (APRESOLINE) injection 10 mg, 10 mg, Intravenous, Q6HRS PRN, Leticia L Ambers, A.P.R.N., 10 mg at 01/28/21 1525  •  NS infusion, , Intravenous, Continuous, Leticia L Ambers, A.P.R.N., Last Rate: 50 mL/hr at 01/28/21 1512, New Bag at 01/28/21 1512  •  famotidine (PEPCID) injection 20 mg, 20 mg, Intravenous, DAILY, Lamine Perez M.D., 20 mg at 01/28/21 1739  •  sucralfate (CARAFATE) tablet 1 g, 1 g, Oral, Q6HRS, Lamine Perez M.D., 1 g at 01/28/21 1738  •  insulin regular (HumuLIN R,NovoLIN R) injection, 1-6 Units,  Subcutaneous, 4X/DAY ACHS **AND** POC Blood Glucose, , , Q AC AND BEDTIME(S) **AND** NOTIFY MD and PharmD, , , Once **AND** glucose 4 g chewable tablet 16 g, 16 g, Oral, Q15 MIN PRN **AND** dextrose 50% (D50W) injection 50 mL, 50 mL, Intravenous, Q15 MIN PRN, Lamine Perez M.D.  •  allopurinol (ZYLOPRIM) tablet 300 mg, 300 mg, Oral, QHS, Rubin Forrester M.D., 300 mg at 01/27/21 2158  •  apixaban (ELIQUIS) tablet 5 mg, 5 mg, Oral, BID, Rubin Forrester M.D., 5 mg at 01/28/21 1739  •  furosemide (LASIX) tablet 20 mg, 20 mg, Oral, BID, Rubin Forrester M.D., 20 mg at 01/28/21 0742  •  mycophenolate (CELLCEPT) capsule 500 mg, 500 mg, Oral, BID, Rubin Forrester M.D., 500 mg at 01/28/21 0837  •  predniSONE (DELTASONE) tablet 5 mg, 5 mg, Oral, QAM, Rubin Forrester M.D., 5 mg at 01/28/21 0603  •  tacrolimus (PROGRAF) capsule 1 mg, 1 mg, Oral, BID, Rubin Forrester M.D., 1 mg at 01/28/21 0742    Physical Examination:    Vitals:    01/28/21 1401 01/28/21 1416 01/28/21 1515 01/28/21 1543   BP: 149/94 149/89 (!) 168/93 158/89   Pulse: (!) 124 (!) 126     Resp: 20 20     Temp:       TempSrc:       SpO2: 92% 93%     Weight:       Height:           General: Patient is awake and in no acute distress  Eyes: examination of optic disks not indicated at this time  CV: RRR    NEUROLOGICAL EXAM:     Mental status: Drowsy, awakens briskly to repeated verbal stimuli to attend, fully oriented, and follows commands.  Speech and language: Speech is clear with stammering impediment and fluent. The patient is able to name and repeat.  Cranial nerve exam: Pupils are equal, round and reactive to light bilaterally. Visual fields with right lower quadrant vision cut at baseline. Extraocular muscles are intact. Sensation in the face is intact to light touch. Face is symmetric. Hearing to finger rub equal. Palate elevates symmetrically. Shoulder shrug is full. Tongue is midline.  Motor exam: Strength is 3/5 to RUE limited by R shoulder pain, 4/5 to  LUE, and 4-/5 to BLE both distally and proximally. Tone is normal. Asterixis noted to BUE and BLE.  Sensory exam: No sensory deficits identified   Deep tendon reflexes:  2+ and symmetric. Toes down-going bilaterally.  Coordination: No ataxia   Gait: deferred as patient is high fall risk with decreased LOC.     NIH Stroke Scale:    1a. Level of Consciousness (Alert, drowsy, etc): 1= Drowsy    1b. LOC Questions (Month, age): 0= Answers both correctly    1c. LOC Commands (Open/close eyes make fist/let go): 0= Obeys both correctly    2.   Best Gaze (Eyes open - patient follows examiner's finger on face): 0= Normal    3.   Visual Fields (introduce visual stimulus/threat to patient's field quadrants): 1= Partial Hemiania    4.   Facial Paresis (Show teeth, raise eyebrows and squeeze eyes shut): 0= Normal     5a. Motor Arm - Left (Elevate arm to 90 degrees if patient is sitting, 45 degrees if  supine): 1= Drift    5b. Motor Arm - Right (Elevate arm to 90 degrees if patient is sitting, 45 degrees if supine): 1= Drift    6a. Motor Leg - Left (Elevate leg 30 degrees with patient supine): 1= Drift    6b. Motor Leg - Right  (Elevate leg 30 degrees with patient supine): 1= Drift    7.   Limb Ataxia (Finger-nose, heel down shin): 0= No ataxia    8.   Sensory (Pin prick to face, arm, trunk and leg - compare side to side): 0= Normal    9.  Best Language (Name item, describe a picture and read sentences): 0= No aphasia    10. Dysarthria (Evaluate speech clarity by patient repeating listed words): 0= Normal articulation    11. Extinction and Inattention (Use information from prior testing to identify neglect or  double simultaneous stimuli testing): 0= No neglect    Total NIH Score: 6    Objective Data:    Labs:  Lab Results   Component Value Date/Time    PROTHROMBTM 21.2 (H) 01/28/2021 02:10 PM    INR 1.78 (H) 01/28/2021 02:10 PM      Lab Results   Component Value Date/Time    WBC 12.4 (H) 01/28/2021 06:35 AM    RBC 3.99 (L)  01/28/2021 06:35 AM    HEMOGLOBIN 12.6 (L) 01/28/2021 06:35 AM    HEMATOCRIT 39.1 (L) 01/28/2021 06:35 AM    MCV 98.0 (H) 01/28/2021 06:35 AM    MCH 31.6 01/28/2021 06:35 AM    MCHC 32.2 (L) 01/28/2021 06:35 AM    MPV 9.5 01/28/2021 06:35 AM    NEUTSPOLYS 66.70 01/22/2021 02:08 PM    LYMPHOCYTES 24.80 01/22/2021 02:08 PM    MONOCYTES 7.00 01/22/2021 02:08 PM    EOSINOPHILS 1.00 01/22/2021 02:08 PM    BASOPHILS 0.20 01/22/2021 02:08 PM    ANISOCYTOSIS 1+ 08/30/2014 01:45 AM      Lab Results   Component Value Date/Time    SODIUM 135 01/28/2021 02:10 PM    POTASSIUM 5.3 01/28/2021 02:10 PM    CHLORIDE 108 01/28/2021 02:10 PM    CO2 16 (L) 01/28/2021 02:10 PM    GLUCOSE 199 (H) 01/28/2021 02:10 PM    BUN 51 (H) 01/28/2021 02:10 PM    CREATININE 1.84 (H) 01/28/2021 02:10 PM      Lab Results   Component Value Date/Time    CHOLSTRLTOT 101 01/20/2021 07:48 AM    LDL 44 01/20/2021 07:48 AM    HDL 30 (A) 01/20/2021 07:48 AM    TRIGLYCERIDE 133 01/20/2021 07:48 AM       Lab Results   Component Value Date/Time    ALKPHOSPHAT 135 (H) 01/28/2021 06:35 AM    ASTSGOT 37 01/28/2021 06:35 AM    ALTSGPT 28 01/28/2021 06:35 AM    TBILIRUBIN 0.7 01/28/2021 06:35 AM        Imaging/Testing:    I interpreted and/or reviewed the patient's neuroimaging    CT-HEAD W/O   Final Result      No acute intracranial abnormality is identified.      Atrophy      There are periventricular and subcortical white matter changes present.  This finding is nonspecific and could be from previous small vessel ischemia, demyelination, or gliosis.      Hypodensity in the left basal ganglia likely represents an old lacunar infarct.      CT-CHEST (THORAX) W/O   Final Result      1.  Mild scattered pulmonary opacifications are noted some of which could be due to fibrosis or atelectasis. No sizable consolidations are identified. Some mild edema or inflammation could be present.      2.  No pleural effusions are identified.      3.  There is calcific atherosclerosis.       4.  Cholelithiasis.               EC-ECHOCARDIOGRAM LTD W/O CONT   Final Result      EC-JAVIER W/O CONT   Final Result      CL-EP ABLATION ATRIAL FIBRILLATION    (Results Pending)   US-RUQ    (Results Pending)   DG-ENTWATS-0 VIEW    (Results Pending)       Assessment:    Krishan Acevedo is a 70 y.o. male with relevant history of arthritis, ESRD s/p kidney transplant (2020), E.coli bacteremia (2014), and TUSHAR with CPAP who presented to [facility] on 1/27/21 elective ablation with PVI of right atrial flutter. Rapid response was called at at 14:20 due to worsening speech difficulty. Blood pressure at this time was 162/99 and BG was 201 mg/dL. Neurology was consulted via Code Stroke at 14:33 for aphasia. Initial NIHSS was 6 for decreased LOC, right lower quadrant vision field cut of right eye only, and generalized weakness with drift to all 4 extremities. STAT CT head was completed and revealed no acute intracranial abnormality, with hypodensity to the left basal ganglia consistent with chronic lacunar infarct. On exam, patient was noted to have asterixis to BUE and BLE. Given the report that patient's last known well was greater than 4.5 hours and patient is currently taking Eliquis, tPA was contraindicated and not given. Differential diagnoses include toxic/metabolic encephalopathy given leukocytosis and asterixis versus recrudescence of chronic lacunar stroke versus less likely small ischemic stroke.     Plan:    -q4h and PRN neuro assessment. VS per nursing/unit protocol.   -Obtain MRI Brain wo contrast.   -Toxic/metabolic workup per primary team  -Telemetry; currently ST. Screen for Afib/arrhythmia. Last ECG with ST and right BBB at 13:53  -All other medical management per primary team.   -DVT PPX: SCDs and Eliquis.      The evaluation of the patient, and recommended management, was discussed with Dr. Pamela Faith, Dr. Perez, rapid response team, bedside RN, and the resident staff.      Hussein Chavez, MSN APRN [facility]  Nurse Practitioner, Neurohospitalist  [facility]  Clinical  of Neurology, [facility] [employer]

## 2021-01-28 NOTE — CODE DOCUMENTATION
Code stroke called per Dr Cain's request. Pt taken to CT STAT. Per Dr Santiago, Neurology MD assessment, code stroke cancelled. Pt returned to room

## 2021-01-28 NOTE — DISCHARGE INSTRUCTIONS
Discharge Instructions    Discharged to home by car with relative. Discharged via wheelchair, hospital escort: Yes.  Special equipment needed: Walker    Be sure to schedule a follow-up appointment with your primary care doctor or any specialists as instructed.     Discharge Plan:   Diet Plan: Discussed  Activity Level: Discussed  Confirmed Follow up Appointment: Appointment Scheduled  Confirmed Symptoms Management: Discussed  Medication Reconciliation Updated: Yes  Influenza Vaccine Indication: Not indicated: Previously immunized this influenza season and > 8 years of age    I understand that a diet low in cholesterol, fat, and sodium is recommended for good health. Unless I have been given specific instructions below for another diet, I accept this instruction as my diet prescription.   Other diet: Cardiac    Special Instructions:  and None    · Is patient discharged on Warfarin / Coumadin?   No     Depression / Suicide Risk    As you are discharged from this [facility] facility, it is important to learn how to keep safe from harming yourself.    Recognize the warning signs:  · Abrupt changes in personality, positive or negative- including increase in energy   · Giving away possessions  · Change in eating patterns- significant weight changes-  positive or negative  · Change in sleeping patterns- unable to sleep or sleeping all the time   · Unwillingness or inability to communicate  · Depression  · Unusual sadness, discouragement and loneliness  · Talk of wanting to die  · Neglect of personal appearance   · Rebelliousness- reckless behavior  · Withdrawal from people/activities they love  · Confusion- inability to concentrate     If you or a loved one observes any of these behaviors or has concerns about self-harm, here's what you can do:  · Talk about it- your feelings and reasons for harming yourself  · Remove any means that you might use to hurt yourself (examples: pills, rope, extension cords, firearm)  · Get  professional help from the community (Mental Health, Substance Abuse, psychological counseling)  · Do not be alone:Call your Safe Contact- someone whom you trust who will be there for you.  · Call your local CRISIS HOTLINE 790-7420 or 203-744-2592  · Call your local Children's Mobile Crisis Response Team [place] (570) 111-2719 or www.Zorap  · Call the toll free National Suicide Prevention Hotlines   · National Suicide Prevention Lifeline 069-922-TTSM (9997)  · [employer] Hope Line Network 800-SUICIDE (618-7844)        [facility] Post Ablation Patient Instructions:  1. No lifting > 10 lbs x 1 week.      2. No soaking in baths, hot tubs, pools x 1 week.  May shower the day after discharge and take off groin dressings and leave uncovered.  Continue to monitor sites daily for warmth, redness, discolored drainage.  It is common to have a small lump in the area where the cather was (usually the size of a small marble); this will go away but takes approximately 6 weeks to normalize.     3.   Please take all medications as prescribed to you; please do not stop any medications prescribed post ablation unless directed by your healthcare provider.      4.   Please do not miss any doses of your blood thinner (if you have been started on, or take chronic blood thinners).     5.   Please walk and take deep breaths after discharge.  After discharge, if  experiences severe chest pain, shortness of breath unrelieved by rest, neurological changes/signs of stroke, high fever, drainage or bleeding at the catheter sites, you need to be seen in the emergency dept.     6.  Please contact call our office ([phone]) or message via [employer] if you have any questions or concerns post procedurally.    7.  You need to be seen for post ablation follow up 3-4 weeks post procedure.  If you do not have an appointment scheduled, please call [phone] to schedule your follow up appointment.        ACTIVITY: Rest and take it easy for the first 24 hours.  A responsible adult is recommended to remain with you during that time.  It is normal to feel sleepy.  We encourage you to not do anything that requires balance, judgment or coordination.    MILD FLU-LIKE SYMPTOMS ARE NORMAL. YOU MAY EXPERIENCE GENERALIZED MUSCLE ACHES, THROAT IRRITATION, HEADACHE AND/OR SOME NAUSEA.    FOR 24 HOURS DO NOT:  Drive, operate machinery or run household appliances.  Drink beer or alcoholic beverages.   Make important decisions or sign legal documents.    SPECIAL INSTRUCTIONS:     Cardiac Ablation, Care After  This sheet gives you information about how to care for yourself after your procedure. Your health care provider may also give you more specific instructions. If you have problems or questions, contact your health care provider.  What can I expect after the procedure?  After the procedure, it is common to have:  · Bruising around your puncture site.  · Tenderness around your puncture site.  · Skipped heartbeats.  · Tiredness (fatigue).  Follow these instructions at home:  Puncture site care  · Check your puncture site every day for signs of infection. Check for:  ? Redness, swelling, or pain.  ? Fluid or blood. If your puncture site starts to bleed, lie down on your back, apply firm pressure to the area, and contact your health care provider.  ? Warmth.  ? Pus or a bad smell.  Driving  · Ask your health care provider when it is safe for you to drive again after the procedure.  · Do not drive or use heavy machinery while taking prescription pain medicine.  · Do not drive for 24 hours if you were given a medicine to help you relax (sedative) during your procedure.  Activity  · Avoid activities that take a lot of effort for at least 3 days after your procedure.  · Do not lift anything that is heavier than 10 lb (4.5 kg), or the limit that you are told, until your health care provider says that it is safe.  · Return to your  normal activities as told by your health care provider. Ask your health care provider what activities are safe for you.  General instructions  · Take over-the-counter and prescription medicines only as told by your health care provider.  · Do not use any products that contain nicotine or tobacco, such as cigarettes and e-cigarettes. If you need help quitting, ask your health care provider.  · Do not take baths, swim, or use a hot tub until your health care provider approves.  · Do not drink alcohol for 24 hours after your procedure.  · Keep all follow-up visits as told by your health care provider. This is important.  Contact a health care provider if:  · You have redness, mild swelling, or pain around your puncture site.  · You have fluid or blood coming from your puncture site that stops after applying firm pressure to the area.  · Your puncture site feels warm to the touch.  · You have pus or a bad smell coming from your puncture site.  · You have a fever.  · You have chest pain or discomfort that spreads to your neck, jaw, or arm.  · You are sweating a lot.  · You feel nauseous.  · You have a fast or irregular heartbeat.  · You have shortness of breath.  · You are dizzy or light-headed and feel the need to lie down.  · You have pain or numbness in the arm or leg closest to your puncture site.  Get help right away if:  · Your puncture site suddenly swells.  · Your puncture site is bleeding and the bleeding does not stop after applying firm pressure to the area.  These symptoms may represent a serious problem that is an emergency. Do not wait to see if the symptoms will go away. Get medical help right away. Call your local emergency services (911 in the [place] Do not drive yourself to the hospital.  Summary  · After the procedure, it is normal to have bruising and tenderness at the puncture site in your groin, neck, or forearm.  · Check your puncture site every day for signs of infection.  · Get help right away if  your puncture site is bleeding and the bleeding does not stop after applying firm pressure to the area. This is a medical emergency.  This information is not intended to replace advice given to you by your health care provider. Make sure you discuss any questions you have with your health care provider.  Document Released: 03/29/2018 Document Revised: 11/30/2018 Document Reviewed: 03/29/2018  [employer] Patient Education © 2020 [employer]    DIET: To avoid nausea, slowly advance diet as tolerated, avoiding spicy or greasy foods for the first day.  Add more substantial food to your diet according to your physician's instructions. INCREASE FLUIDS AND FIBER TO AVOID CONSTIPATION.    SURGICAL DRESSING/BATHING: Leave dressing in place for 24 hours, may shower once removed    FOLLOW-UP APPOINTMENT:  A follow-up appointment should be arranged with your cardiologist; call to schedule.    You should CALL YOUR PHYSICIAN if you develop:  Fever greater than 101 degrees F.  Pain not relieved by medication, or persistent nausea or vomiting.  Excessive bleeding (blood soaking through dressing) or unexpected drainage from the wound.  Extreme redness or swelling around the incision site, drainage of pus or foul smelling drainage.  Inability to urinate or empty your bladder within 8 hours.  Problems with breathing or chest pain.    You should call 911 if you develop problems with breathing or chest pain.  If you are unable to contact your doctor or surgical center, you should go to the nearest emergency room or urgent care center.  Physician's telephone #: [phone]    If any questions arise, call your doctor.  If your doctor is not available, please feel free to call the Surgical Center at [phone]. The Contact Center is open Monday through Friday 7AM to 5PM and may speak to a nurse at [phone], or toll free at [phone].     A registered nurse may call you a few days after your surgery to see how you are doing after  your procedure.    MEDICATIONS: Resume taking daily medication.  Take prescribed pain medication with food.  If no medication is prescribed, you may take non-aspirin pain medication if needed.  PAIN MEDICATION CAN BE VERY CONSTIPATING.  Take a stool softener or laxative such as senokot, pericolace, or milk of magnesia if needed.    If your physician has prescribed pain medication that includes Acetaminophen (Tylenol), do not take additional Acetaminophen (Tylenol) while taking the prescribed medication.    Depression / Suicide Risk    As you are discharged from this [facility] facility, it is important to learn how to keep safe from harming yourself.    Recognize the warning signs:  · Abrupt changes in personality, positive or negative- including increase in energy   · Giving away possessions  · Change in eating patterns- significant weight changes-  positive or negative  · Change in sleeping patterns- unable to sleep or sleeping all the time   · Unwillingness or inability to communicate  · Depression  · Unusual sadness, discouragement and loneliness  · Talk of wanting to die  · Neglect of personal appearance   · Rebelliousness- reckless behavior  · Withdrawal from people/activities they love  · Confusion- inability to concentrate     If you or a loved one observes any of these behaviors or has concerns about self-harm, here's what you can do:  · Talk about it- your feelings and reasons for harming yourself  · Remove any means that you might use to hurt yourself (examples: pills, rope, extension cords, firearm)  · Get professional help from the community (Mental Health, Substance Abuse, psychological counseling)  · Do not be alone:Call your Safe Contact- someone whom you trust who will be there for you.  · Call your local CRISIS HOTLINE 090-4111 or 173-988-8182  · Call your local Children's Mobile Crisis Response Team [place] (683) 717-8481 or www.POSLavu  · Call the toll free National Suicide  Prevention Hotlines   · National Suicide Prevention Lifeline 881-451-PZDX (3702)  · National Hope Line Network 800-SUICIDE (723-1220)

## 2021-01-28 NOTE — PROGRESS NOTES
12 Hour CC    Monitor Summary:     ST 1st Degree 103-120     Ectopy: (R) PVCs, coup, trip     Measurements:     0.22 / 0.12/ 0.34

## 2021-01-28 NOTE — PROGRESS NOTES
Pt received from PPU. Tele monitor in place monitor room notified. VSS. Pt oriented to room. Educated on use of the call light. Pt demonstrated use of the call light. Discussed POC. All questions answered.

## 2021-01-28 NOTE — CARE PLAN
Problem: Respiratory:  Goal: Respiratory status will improve  Outcome: PROGRESSING AS EXPECTED     Problem: Fluid Volume:  Goal: Will maintain balanced intake and output  Outcome: PROGRESSING AS EXPECTED     Problem: Communication  Goal: The ability to communicate needs accurately and effectively will improve  Outcome: PROGRESSING AS EXPECTED     Problem: Safety  Goal: Will remain free from injury  Outcome: PROGRESSING AS EXPECTED  Goal: Will remain free from falls  Outcome: PROGRESSING AS EXPECTED     Problem: Infection  Goal: Will remain free from infection  Outcome: PROGRESSING AS EXPECTED     Problem: Venous Thromboembolism (VTW)/Deep Vein Thrombosis (DVT) Prevention:  Goal: Patient will participate in Venous Thrombosis (VTE)/Deep Vein Thrombosis (DVT)Prevention Measures  Outcome: PROGRESSING AS EXPECTED     Problem: Bowel/Gastric:  Goal: Normal bowel function is maintained or improved  Outcome: PROGRESSING AS EXPECTED  Goal: Will not experience complications related to bowel motility  Outcome: PROGRESSING AS EXPECTED     Problem: Knowledge Deficit  Goal: Knowledge of disease process/condition, treatment plan, diagnostic tests, and medications will improve  Outcome: PROGRESSING AS EXPECTED  Goal: Knowledge of the prescribed therapeutic regimen will improve  Outcome: PROGRESSING AS EXPECTED

## 2021-01-28 NOTE — ANESTHESIA TIME REPORT
Anesthesia Start and Stop Event Times     Date Time Event    1/27/2021 1250 Ready for Procedure     1300 Anesthesia Start     1617 Anesthesia Stop        Responsible Staff  01/27/21    Name Role Begin End    Nehemiah Downing M.D. Anesth 1300 1617        Preop Diagnosis (Free Text):  Pre-op Diagnosis             Preop Diagnosis (Codes):    Post op Diagnosis  Atrial flutter (HCC)      Premium Reason  A. 3PM - 7AM    Comments:

## 2021-01-28 NOTE — PROGRESS NOTES
1628: Pt arrived from cath lab post Afib ablation. Pt is confused but arouses to RN voice. Pt denies nausea. Pt reports no pain. R groin sight is CDI and soft. Cardiac rhythm appears to be Sinus Tach in the low 100's.     1715: Notified Penelope of pt's status and discharge plan. Pt tolerating water. Pt had small amount of oozing on dressing. Sand bag applied for ten minutes, blood outlined; no new bleeding. No evidence of hematoma.      1755: Wife to bedside.     1806: Dr. Forrester to bedside. Spoke with wife and decided pt needs to be admitted. Pt shivering; bear hugger applied. Temp 97.1F temporal.     1928: Tranfer criteria met.     1744: Transfer criteria met. Report given to Mary Jane ANGELES.     0957: Pt transported to  82-2. Pt's wife Penelope notified.

## 2021-01-28 NOTE — PROGRESS NOTES
MD Luna returned page. MD updated on patient's vitals and current condition. Received orders for a 1 time dose of morphine and home meds benazepril and metoprolol to be ordered for morning.

## 2021-01-28 NOTE — PROGRESS NOTES
"Assumed care of patient this AM. Patient in apparent distress in bed, complaining of 10/10 shoulder to chest pain. Describing it as \"pressure in my chest\" and \"my right arm feels disconnected\". This has been ongoing overnight (see night RN notes). Cardiologist at bedside preforming assessment. Orders to follow. Will continue to monitor.    Post procedure orders not released over night. Released order for EKG.     Patient very hypertensive this AM at shift change. 181/101, 171/99, then 166/99 after IV morphine dose. Will continue to monitor. MD aware of BP.    Patient continues to have shoulder/chest pain in spite of PRN meds. Making small gasping sounds with movement. Patient also continually mumbling incoherent words to himself. When this RN asked who patient was talking to, he stated \"oh I am just mumbling to myself.\" Patient remains cognizant and alert and oriented.  No neuro deficits up assessment. Will continue to monitor.    Patient down for STAT chest CT.    Patient returned to unit. Remains very hypertensive. 153/99 then a recheck of 152/98. Not within parameters for hydralazine PRN. Patient continues to moan and gasp in pain.    Assessed patient shortly after lunch. Patient continues to have small verbal sounds, and is now showing some distinct expressive aphasia. Attempted to complete an NIGH, but patient unable to follow basic commands. When asked the year and president patient stated \"2018\" and \"Obama\". Rapid response called.    (Please see rapid response details for summary of events)    After rapid response workup, patient's mental status continues to fluctuate. Continues to have intermittent expressive aphasia. Continues to have 10/10 chest/shoulder pain. Remains hypertensive in spite of hydralazine.    Wife left bedside.          Monitor Summary  STach @ 120  12/14/32        12 hr CC  "

## 2021-01-28 NOTE — CONSULTS
Hospital Medicine Consultation    Date of Service  1/28/2021    Referring Physician  Rubin Forrester M.D.    Consulting Physician  Lamine Perez M.D.    Reason for Consultation  Altered mental status    History of Presenting Illness  70 y.o. male with past medical history of ANCA vasculitis, kidney transplant, on tacrolimus, mycophenolate, prednisone, CKD stage III, sleep apnea, persistent A. fib, chronic lower back pain, remote history of DVT, who presented 1/27/2021 for ablation procedure.  Patient was admitted to cardiology service and ablation for arrhythmia performed on 1/27 without apparent complications.  Initially treated with heparin, patient was started on apixaban last night.  After the surgery last night patient noted to be somewhat confused, shivering and decision was made to not discharge patient to the hospital.  Overnight she had intermittent chest pain radiating to left shoulder, elevated blood pressure up to 181/101 tachycardia 120.  Patient has been treated with antihypertensive medications labetalol, hydralazine.  Patient has been nauseous and had several episodes of vomiting.  In the morning starting at approximately 11-12 AM according to RN patient noted to have stuttering of the speech and possible mild expressive aphasia, with no gross focal deficit.  He has been confused.  His wife at bedside, according to her this is new.  NIH score 6 due to right and left arm drift, visual field deficit, drowsiness  Neurology consulted, code stroke activated.    CT head without contrast.  Result showed possible old lacunar infarct in left basal ganglia, periventricular and subcortical white matter changes.  Bladder scan showed 0 cc.  UA showed white blood cells 2-5, trace leukocyte esterase.  CT chest without contrast was done due to chest pain, showed scattered pulmonary opacifications, fibrosis versus atelectasis, no consolidations, mild edema, no pleural effusion.  There is cholelithiasis  noted.    History is limited due to patient altered mentation.  According to him he does have some epigastric discomfort.  He states that he is having difficulties concentrating on questions and feel confused in general.  He is complaining of lower back pain, which is chronic.According to his wife, patient noted to have memory problems in the last 6 months.            Review of Systems  Review of Systems   Unable to perform ROS: Mental status change   Constitutional: Negative for chills, fever and weight loss.   HENT: Negative for ear pain, hearing loss and tinnitus.    Eyes: Negative for blurred vision, double vision and photophobia.   Respiratory: Negative for cough, hemoptysis and sputum production.    Cardiovascular: Negative for chest pain, palpitations and orthopnea.   Gastrointestinal: Negative for heartburn, nausea and vomiting.   Genitourinary: Negative for dysuria, flank pain, frequency and hematuria.   Musculoskeletal: Negative for back pain, joint pain and neck pain.   Skin: Negative for itching and rash.   Neurological: Negative for tremors, speech change, focal weakness and headaches.   Endo/Heme/Allergies: Negative for environmental allergies and polydipsia. Does not bruise/bleed easily.   Psychiatric/Behavioral: Negative for hallucinations and substance abuse. The patient is not nervous/anxious.        Past Medical History   has a past medical history of Arrhythmia (2021), Arthritis (2020), Cough (2020), Dialysis patient ([facility]) (2006), E coli bacteremia (2014), Kidney failure (03/2008), Kidney transplant pain (2020), Sleep apnea, and Snoring.    Surgical History   has a past surgical history that includes peg placement (10/10/2014); other orthopedic surgery (Left, 1995); and other (Right, 2017).    Family History  family history includes Stroke in his maternal grandmother; Stroke (age of onset: 26) in his brother.    Social History   reports that he has never smoked. He quit smokeless tobacco use  about 30 years ago.  His smokeless tobacco use included snuff and chew. He reports previous alcohol use. He reports that he does not use drugs.    Medications  Prior to Admission Medications   Prescriptions Last Dose Informant Patient Reported? Taking?   Brimonidine Tartrate-Timolol (COMBIGAN) 0.2-0.5 % Solution 1/27/2021 at [facility] Patient Yes No   Sig: by Ophthalmic route.   Glucosamine-Chondroit-Vit C-Mn (GLUCOSAMINE 1500 COMPLEX) Cap 1/27/2021 at [facility] Patient Yes No   Sig: Take 2 Caps by mouth 2 Times a Day.   MAGNESIUM PO 1/27/2021 at [facility] Patient Yes No   Sig: Take 1 Tab by mouth 2 Times a Day.   Multiple Vitamins-Minerals (CENTRUM SILVER 50+MEN) Tab 1/27/2021 at [facility] Patient Yes No   Sig: Take 1 Tab by mouth every morning.   allopurinol (ZYLOPRIM) 300 MG Tab 1/26/2021 at PM Patient Yes No   Sig: Take 300 mg by mouth every bedtime.   apixaban (ELIQUIS) 5mg Tab 1/27/2021 at [facility] Patient No No   Sig: Take 1 Tab by mouth 2 Times a Day for 360 days.   atorvastatin (LIPITOR) 10 MG TABS 1/27/2021 at [facility] Patient Yes No   Sig: Take 10 mg by mouth every morning.   benazepril (LOTENSIN) 20 MG Tab 1/27/2021 at [facility] Patient No No   Sig: Take 1 Tab by mouth every day for 360 days.   calcitRIOL (ROCALTROL) 0.25 MCG Cap 1/27/2021 at [facility] Patient Yes No   Sig: Take 0.25 mcg by mouth two times a week. 3x per week M W F   furosemide (LASIX) 20 MG Tab 1/27/2021 at [facility] Patient Yes No   Sig: Take 20 mg by mouth 2 times a day.   metoprolol SR (TOPROL XL) 50 MG TABLET SR 24 HR 1/23/2021 Patient Yes No   Sig: Take 50 mg by mouth every day.   mycophenolate (CELLCEPT) 250 MG Cap 1/27/2021 at [facility] Patient Yes No   Sig: Take 500 mg by mouth 2 times a day.   omeprazole (PRILOSEC) 20 MG delayed-release capsule 1/26/2021 at PM Patient Yes No   Sig: Take 20 mg by mouth 1 time a day as needed.   predniSONE (DELTASONE) 5 MG Tab 1/20/2021 Patient Yes No   Sig: Take 5 mg by mouth every morning.   tacrolimus (PROGRAF) 1 MG Cap 1/27/2021 at 0605  Patient Yes No   Sig: Take 1 mg by mouth 2 times a day.   vitamin D, Ergocalciferol, (DRISDOL) 60179 units Cap capsule 1/25/2021 Patient Yes No   Sig: Take 50,000 Units by mouth every Monday.      Facility-Administered Medications: None       Allergies  Allergies   Allergen Reactions   • Nsaids      Cannot take because of anti rejection meds.       Physical Exam  Temp:  [36.1 °C (96.9 °F)-37.5 °C (99.5 °F)] 37.5 °C (99.5 °F)  Pulse:  [] 126  Resp:  [16-20] 20  BP: (110-181)/() 149/89  SpO2:  [91 %-99 %] 93 %    Physical Exam  Vitals signs and nursing note reviewed.   Constitutional:       General: He is not in acute distress.  HENT:      Head: Normocephalic and atraumatic.      Nose: Nose normal.      Mouth/Throat:      Mouth: Mucous membranes are moist.   Eyes:      Extraocular Movements: Extraocular movements intact.      Pupils: Pupils are equal, round, and reactive to light.   Neck:      Musculoskeletal: Normal range of motion and neck supple.   Cardiovascular:      Rate and Rhythm: Regular rhythm. Tachycardia present.   Pulmonary:      Effort: Pulmonary effort is normal.      Breath sounds: Normal breath sounds.   Abdominal:      General: Abdomen is flat. There is no distension.      Tenderness: There is abdominal tenderness in the epigastric area. There is no guarding or rebound.   Musculoskeletal: Normal range of motion.         General: No swelling or deformity.   Skin:     General: Skin is warm and dry.   Neurological:      General: No focal deficit present.      Mental Status: He is alert and oriented to person, place, and time.      Motor: Weakness (generalized) and tremor present.      Coordination: Coordination abnormal.   Psychiatric:         Mood and Affect: Mood normal.         Behavior: Behavior normal.         Cognition and Memory: Cognition is impaired. Memory is impaired.         Fluids      Laboratory  Recent Labs     01/28/21  0635   WBC 12.4*   RBC 3.99*   HEMOGLOBIN 12.6*    HEMATOCRIT 39.1*   MCV 98.0*   MCH 31.6   MCHC 32.2*   RDW 52.0*   PLATELETCT 127*   MPV 9.5     Recent Labs     01/28/21  0635 01/28/21  1410   SODIUM 143 135   POTASSIUM 5.5 5.3   CHLORIDE 114* 108   CO2 15* 16*   GLUCOSE 180* 199*   BUN 55* 51*   CREATININE 1.79* 1.84*   CALCIUM 9.9 9.9     Recent Labs     01/27/21  1045   INR 1.49*                 Imaging  CT-HEAD W/O   Final Result      No acute intracranial abnormality is identified.      Atrophy      There are periventricular and subcortical white matter changes present.  This finding is nonspecific and could be from previous small vessel ischemia, demyelination, or gliosis.      Hypodensity in the left basal ganglia likely represents an old lacunar infarct.      CT-CHEST (THORAX) W/O   Final Result      1.  Mild scattered pulmonary opacifications are noted some of which could be due to fibrosis or atelectasis. No sizable consolidations are identified. Some mild edema or inflammation could be present.      2.  No pleural effusions are identified.      3.  There is calcific atherosclerosis.      4.  Cholelithiasis.               EC-ECHOCARDIOGRAM LTD W/O CONT   Final Result      EC-JAVIER W/O CONT   Final Result      CL-EP ABLATION ATRIAL FIBRILLATION    (Results Pending)   US-RUQ    (Results Pending)   TO-[id number] VIEW    (Results Pending)       Assessment/Plan  Encephalopathy acute  Assessment & Plan  Etiology is unclear at this time, possibly metabolic encephalopathy secondary to tacrolimus and mycophenolate toxicity, hypertensive encephalopathy,  possible stroke.  Sepsis is possible, but no source of infection identified at this time  Bladder scan showed 0 cc.  UA showed white blood cells 2-5, trace leukocyte esterase.  CT chest without contrast was done due to chest pain, showed scattered pulmonary opacifications, fibrosis versus atelectasis, no consolidations, mild edema, no pleural effusion.  There is cholelithiasis noted.  Ordered TSH, CPK, vitamin B12  level, ammonia level, venous blood gases, tacrolimus and mycophenolate levels   neurochecks every 4 hours  Not a candidate for TPA   MRI brain w/o pending      Hyperglycemia  Assessment & Plan  History of diabetes is not adocumented, patient is not on diabetic medication  We will start a sliding scale, check A1c    SIRS (systemic inflammatory response syndrome) (HCC)  Assessment & Plan  SIRS criteria identified on my evaluation include:  Tachycardia, with heart rate greater than 90 BPM and Leukocytosis, with WBC greater than 12,000   No definite source of infection identified at this time.  Patient tested negative for COVID-19 on 1/22.  Consider to repeat if patient spikes fever  UA showed white blood cells 2-5, small leukocyte esterase, however patient denies dysuric symptoms   CT chest without contrast: Mild scattered pulmonary opacifications are noted some of which could be due to fibrosis or atelectasis.  No consolidation seen  Follow with blood culture result, monitor for fever, repeat CBC      Nausea and vomiting  Assessment & Plan  Lipase is not elevated.  Patient does have epigastric discomfort   did have bowel movement today  CT of the chest without contrast showed some cholelithiasis  Ordered right upper quadrant ultrasound for further evaluation  Clear liquid diet    Chest pressure- (present on admission)  Assessment & Plan  At the moment denies chest pain  CT chest without contrast noted, negative for acute abnormalities  Pulses symmetrical, no mediastinal widening on imaging noted.  PE is unlikely as patient is on apixaban and diagnosis would not   Further management per cardiology    Chronic anticoagulation- (present on admission)  Assessment & Plan  Continue apixaban    Long-term use of immunosuppressant medication- (present on admission)  Assessment & Plan  Resume tacrolimus, mycophenolate, prednisone  We will check tacrolimus and mycophenolate level, considered possible  toxicity    Essential hypertension- (present on admission)  Assessment & Plan  Not well controlled  Continue metoprolol, Lasix, benazepril  IV hydralazine as needed

## 2021-01-28 NOTE — PROGRESS NOTES
Cardiology Follow Up Progress Note    Date of Service  1/28/2021    Attending Physician  Rubin Forrester M.D.    Chief Complaint   Elective admission for AF ablation    HPI  Krishan Acevedo is a 70 y.o. male admitted 1/27/2021 for elective admission for AF ablation.  He underwent ablation with  1/27/21 with PVI with roof ablation, Right atrial flutter.    Past medical history significant for ESRD S/P renal transplant in 2008, wegener's granulomatosis, paroxysmal atrial fibrillation, chronic anticoagulation.      Interim Events  N/V, chest pain overnight, states mild pressure and mostly worse with deep breath,. + left shoulder pain (pt states was occurring prior to ablation).  Monitored rhythm: tachy 115-120s.   Labs pending.   BP- hypertensive.       Review of Systems  Review of Systems   Constitutional: Negative for chills, fatigue and fever.   HENT: Negative for congestion, nosebleeds, tinnitus and trouble swallowing.    Respiratory: Positive for chest tightness. Negative for cough, shortness of breath and wheezing.    Cardiovascular: Positive for chest pain. Negative for palpitations and leg swelling.   Gastrointestinal: Negative for abdominal pain, blood in stool, nausea (previously, none now ) and vomiting (Previously, none now ).   Genitourinary: Negative for difficulty urinating.   Musculoskeletal:        Shoulder pain   Neurological: Negative for dizziness, syncope, speech difficulty, weakness, light-headedness and numbness.       Vital signs in last 24 hours  Temp:  [36.1 °C (96.9 °F)-36.7 °C (98 °F)] 36.7 °C (98 °F)  Pulse:  [] 124  Resp:  [16-19] 18  BP: (110-181)/() 157/95  SpO2:  [92 %-99 %] 94 %    Physical Exam  Physical Exam  Vitals signs and nursing note reviewed.   Constitutional:       Appearance: Normal appearance.   HENT:      Head: Normocephalic and atraumatic.   Eyes:      Conjunctiva/sclera: Conjunctivae normal.      Pupils: Pupils are equal, round, and reactive to light.    Neck:      Musculoskeletal: Normal range of motion.   Cardiovascular:      Rate and Rhythm: Regular rhythm. Tachycardia present.      Pulses: Normal pulses.      Heart sounds: Normal heart sounds. No murmur. No friction rub. No gallop.    Pulmonary:      Effort: Pulmonary effort is normal.      Breath sounds: Normal breath sounds. No wheezing, rhonchi or rales.   Musculoskeletal: Normal range of motion.   Skin:     General: Skin is warm and dry.   Neurological:      Mental Status: He is alert and oriented to person, place, and time.   Psychiatric:         Mood and Affect: Mood normal.         Behavior: Behavior normal.         Thought Content: Thought content normal.         Judgment: Judgment normal.         Lab Review  Lab Results   Component Value Date/Time    WBC 12.4 (H) 01/28/2021 06:35 AM    RBC 3.99 (L) 01/28/2021 06:35 AM    HEMOGLOBIN 12.6 (L) 01/28/2021 06:35 AM    HEMATOCRIT 39.1 (L) 01/28/2021 06:35 AM    MCV 98.0 (H) 01/28/2021 06:35 AM    MCH 31.6 01/28/2021 06:35 AM    MCHC 32.2 (L) 01/28/2021 06:35 AM    MPV 9.5 01/28/2021 06:35 AM      Lab Results   Component Value Date/Time    SODIUM 143 01/28/2021 06:35 AM    POTASSIUM 5.5 01/28/2021 06:35 AM    CHLORIDE 114 (H) 01/28/2021 06:35 AM    CO2 15 (L) 01/28/2021 06:35 AM    GLUCOSE 180 (H) 01/28/2021 06:35 AM    BUN 55 (H) 01/28/2021 06:35 AM    CREATININE 1.79 (H) 01/28/2021 06:35 AM      Lab Results   Component Value Date/Time    ASTSGOT 37 01/28/2021 06:35 AM    ALTSGPT 28 01/28/2021 06:35 AM     Lab Results   Component Value Date/Time    CHOLSTRLTOT 101 01/20/2021 07:48 AM    LDL 44 01/20/2021 07:48 AM    HDL 30 (A) 01/20/2021 07:48 AM    TRIGLYCERIDE 133 01/20/2021 07:48 AM       No results for input(s): NTPROBNP in the last 72 hours.    Cardiac Imaging and Procedures Review  Echocardiogram:  1/28/21  CONCLUSIONS  Limited echocardiogram study to evaluate pericardial effusion  post   ablation.  Trivial pericardial effusion without evidence of  hemodynamic   Compromise.    Imaging    Assessment/Plan  1. Atrial Arrhythmias:  -S/P Atrial fibrillation ablation with PVI and roof ablation, typical atrial flutter 1/27/21.  - Monitored rhythm/EKG this AM is Tachy 120s.  ?possible ST vs AT/Atypical AFL.  Will review with Dr. Forrester.   - Post procedure chest pain: EKG this AM without acute findings.  No friction rub on exam.  Stat LTD echo without evidence of effusion.  Did improve some with 2 mg IV morphine. Findings and hx support likely pleuritic/inflammatory pain post ablation.   Unable to give NSAIDs with hx of renal transplant.  Will give another 2 mg dose of IV morphine, start low dose Colchicine given renal status.  Already on oral steroids.   - Anticoagulated with Eliquis.     2. Chest pain:  - As above.     3. HTN:  - PRN Lopressor for tachycardia and HTN.    ADDENDUM:  Chest pain 6/10 remains.  Repeat EKG obtained.  No new changes.    Discussed with Dr. Forrester, will get stat non contrast CT of the chest.   - IV hydralazine PRN  for continued HTN.     ADDENDUM:  - No acute findings on chest CT.  - Reviewed with Dr. Forrester, will start hansel hydration.  Repeat labs at 1300.  - BC for hx of MDRO, will discuss with ID, Dr. Leal.      I have called his wife alonzo and updated her on status/plan.   Plan in collaboration with Dr. Forrester.     LLOYD Hansen.   [facility]  [phone]

## 2021-01-28 NOTE — PROGRESS NOTES
Notified by Charge RN at approximately 1420 rapid response in progress for neurological concerns.  .  I immediately went to pt bedside.  NIH in progress.  Monitored rhythm: remains tachy in 120s.  BP 140s/90s.  Not hypoglycemic.  Pt is having some word finding difficulties.   Stat head CT ordered.  Stat labs  In progress.  EKG without changes.   Dr. Forrester and Payton notified.  Dr. Cain (Covering EP) to bedside to eval pt.  Stroke code called.   Pt to Head CT. Pt evaluated by stroke team, state no hemorrhagic CVA.  Will await further consult/recommendations.   Hospitalist called for consultation.

## 2021-01-28 NOTE — PROGRESS NOTES
Patient nauseous and vomiting. After getting patient back in bed he started to complain of chest pain that he describes as pressure and radiating from right shoulder down to chest. Unclear if pain is from shoulder or cardiac. Cardiology paged for the second time. Stat EKG ordered.

## 2021-01-28 NOTE — CARE PLAN
Problem: Pain Management  Goal: Pain level will decrease to patient's comfort goal  Outcome: PROGRESSING AS EXPECTED     Problem: Respiratory:  Goal: Respiratory status will improve  Outcome: PROGRESSING AS EXPECTED     Problem: Fluid Volume:  Goal: Will maintain balanced intake and output  Outcome: PROGRESSING AS EXPECTED

## 2021-01-28 NOTE — ANESTHESIA POSTPROCEDURE EVALUATION
Patient: Krishan Acevedo    Procedure Summary     Date: 01/27/21 Room / Location: [facility] IMAGING - CATH LAB [facility]    Anesthesia Start: 1300 Anesthesia Stop: 1617    Procedure: CL-EP ABLATION ATRIAL FIBRILLATION Diagnosis:       Atrial flutter with rapid ventricular response (HCC)      Paroxysmal atrial fibrillation (HCC)      Unspecified atrial flutter      Paroxysmal atrial fibrillation      (See Associated Dx)    Scheduled Providers: Rubin Forrester M.D.; Nehemiah Downing M.D. Responsible Provider: Nehemiah Downing M.D.    Anesthesia Type: general ASA Status: 2          Final Anesthesia Type: general  Last vitals  BP   Blood Pressure : (!) 98/80    Temp   36.3 °C (97.3 °F)    Pulse   Pulse: (!) 103   Resp   20    SpO2          Anesthesia Post Evaluation    Patient location during evaluation: PACU  Patient participation: complete - patient participated  Level of consciousness: awake and alert  Pain score: 0    Airway patency: patent  Anesthetic complications: no  Cardiovascular status: hemodynamically stable  Respiratory status: acceptable  Hydration status: euvolemic    PONV: none           Nurse Pain Score: 0 (NPRS)

## 2021-01-29 ENCOUNTER — APPOINTMENT (OUTPATIENT)
Dept: RADIOLOGY | Facility: MEDICAL CENTER | Age: 71
DRG: 274 | End: 2021-01-29
Attending: INTERNAL MEDICINE
Payer: MEDICARE

## 2021-01-29 ENCOUNTER — APPOINTMENT (OUTPATIENT)
Dept: RADIOLOGY | Facility: MEDICAL CENTER | Age: 71
DRG: 274 | End: 2021-01-29
Attending: STUDENT IN AN ORGANIZED HEALTH CARE EDUCATION/TRAINING PROGRAM
Payer: MEDICARE

## 2021-01-29 PROBLEM — R07.89 CHEST PRESSURE: Status: RESOLVED | Noted: 2020-10-14 | Resolved: 2021-01-29

## 2021-01-29 PROBLEM — R11.2 NAUSEA AND VOMITING: Status: RESOLVED | Noted: 2021-01-28 | Resolved: 2021-01-29

## 2021-01-29 PROBLEM — R65.10 SIRS (SYSTEMIC INFLAMMATORY RESPONSE SYNDROME) (HCC): Status: RESOLVED | Noted: 2021-01-28 | Resolved: 2021-01-29

## 2021-01-29 PROBLEM — R25.1 TREMOR: Status: ACTIVE | Noted: 2021-01-29

## 2021-01-29 LAB
ALBUMIN SERPL BCP-MCNC: 3.6 G/DL (ref 3.2–4.9)
ALBUMIN/GLOB SERPL: 1.1 G/DL
ALP SERPL-CCNC: 118 U/L (ref 30–99)
ALT SERPL-CCNC: 25 U/L (ref 2–50)
ANION GAP SERPL CALC-SCNC: 10 MMOL/L (ref 7–16)
ANION GAP SERPL CALC-SCNC: 11 MMOL/L (ref 7–16)
AST SERPL-CCNC: 30 U/L (ref 12–45)
BASOPHILS # BLD AUTO: 0.2 % (ref 0–1.8)
BASOPHILS # BLD: 0.02 K/UL (ref 0–0.12)
BILIRUB SERPL-MCNC: 0.9 MG/DL (ref 0.1–1.5)
BUN SERPL-MCNC: 50 MG/DL (ref 8–22)
BUN SERPL-MCNC: 52 MG/DL (ref 8–22)
CALCIUM SERPL-MCNC: 10.2 MG/DL (ref 8.5–10.5)
CALCIUM SERPL-MCNC: 9.6 MG/DL (ref 8.5–10.5)
CHLORIDE SERPL-SCNC: 108 MMOL/L (ref 96–112)
CHLORIDE SERPL-SCNC: 109 MMOL/L (ref 96–112)
CO2 SERPL-SCNC: 15 MMOL/L (ref 20–33)
CO2 SERPL-SCNC: 17 MMOL/L (ref 20–33)
CREAT SERPL-MCNC: 1.8 MG/DL (ref 0.5–1.4)
CREAT SERPL-MCNC: 1.93 MG/DL (ref 0.5–1.4)
EKG IMPRESSION: NORMAL
EOSINOPHIL # BLD AUTO: 0.01 K/UL (ref 0–0.51)
EOSINOPHIL NFR BLD: 0.1 % (ref 0–6.9)
ERYTHROCYTE [DISTWIDTH] IN BLOOD BY AUTOMATED COUNT: 52.5 FL (ref 35.9–50)
ERYTHROCYTE [DISTWIDTH] IN BLOOD BY AUTOMATED COUNT: 53.1 FL (ref 35.9–50)
EST. AVERAGE GLUCOSE BLD GHB EST-MCNC: 160 MG/DL
GGT SERPL-CCNC: 159 U/L (ref 7–51)
GLOBULIN SER CALC-MCNC: 3.2 G/DL (ref 1.9–3.5)
GLUCOSE BLD-MCNC: 145 MG/DL (ref 65–99)
GLUCOSE BLD-MCNC: 150 MG/DL (ref 65–99)
GLUCOSE BLD-MCNC: 153 MG/DL (ref 65–99)
GLUCOSE BLD-MCNC: 164 MG/DL (ref 65–99)
GLUCOSE BLD-MCNC: 167 MG/DL (ref 65–99)
GLUCOSE SERPL-MCNC: 151 MG/DL (ref 65–99)
GLUCOSE SERPL-MCNC: 164 MG/DL (ref 65–99)
HBA1C MFR BLD: 7.2 % (ref 0–5.6)
HCT VFR BLD AUTO: 35.4 % (ref 42–52)
HCT VFR BLD AUTO: 37.3 % (ref 42–52)
HGB BLD-MCNC: 11.4 G/DL (ref 14–18)
HGB BLD-MCNC: 11.9 G/DL (ref 14–18)
IMM GRANULOCYTES # BLD AUTO: 0.08 K/UL (ref 0–0.11)
IMM GRANULOCYTES NFR BLD AUTO: 0.6 % (ref 0–0.9)
LACTATE BLD-SCNC: 1 MMOL/L (ref 0.5–2)
LACTATE BLD-SCNC: 1.3 MMOL/L (ref 0.5–2)
LYMPHOCYTES # BLD AUTO: 1.74 K/UL (ref 1–4.8)
LYMPHOCYTES NFR BLD: 13.9 % (ref 22–41)
MCH RBC QN AUTO: 31.6 PG (ref 27–33)
MCH RBC QN AUTO: 32 PG (ref 27–33)
MCHC RBC AUTO-ENTMCNC: 31.9 G/DL (ref 33.7–35.3)
MCHC RBC AUTO-ENTMCNC: 32.2 G/DL (ref 33.7–35.3)
MCV RBC AUTO: 99.2 FL (ref 81.4–97.8)
MCV RBC AUTO: 99.4 FL (ref 81.4–97.8)
MONOCYTES # BLD AUTO: 1 K/UL (ref 0–0.85)
MONOCYTES NFR BLD AUTO: 8 % (ref 0–13.4)
NEUTROPHILS # BLD AUTO: 9.71 K/UL (ref 1.82–7.42)
NEUTROPHILS NFR BLD: 77.2 % (ref 44–72)
NRBC # BLD AUTO: 0 K/UL
NRBC BLD-RTO: 0 /100 WBC
PLATELET # BLD AUTO: 110 K/UL (ref 164–446)
PLATELET # BLD AUTO: 114 K/UL (ref 164–446)
PMV BLD AUTO: 9.6 FL (ref 9–12.9)
PMV BLD AUTO: 9.8 FL (ref 9–12.9)
POTASSIUM SERPL-SCNC: 5 MMOL/L (ref 3.6–5.5)
POTASSIUM SERPL-SCNC: 5.1 MMOL/L (ref 3.6–5.5)
PROT SERPL-MCNC: 6.8 G/DL (ref 6–8.2)
RBC # BLD AUTO: 3.56 M/UL (ref 4.7–6.1)
RBC # BLD AUTO: 3.76 M/UL (ref 4.7–6.1)
SODIUM SERPL-SCNC: 135 MMOL/L (ref 135–145)
SODIUM SERPL-SCNC: 135 MMOL/L (ref 135–145)
T4 FREE SERPL-MCNC: 1.75 NG/DL (ref 0.93–1.7)
TSH SERPL DL<=0.005 MIU/L-ACNC: 0.37 UIU/ML (ref 0.38–5.33)
VIT B12 SERPL-MCNC: 2427 PG/ML (ref 211–911)
WBC # BLD AUTO: 12.5 K/UL (ref 4.8–10.8)
WBC # BLD AUTO: 12.6 K/UL (ref 4.8–10.8)

## 2021-01-29 PROCEDURE — 700111 HCHG RX REV CODE 636 W/ 250 OVERRIDE (IP): Performed by: INTERNAL MEDICINE

## 2021-01-29 PROCEDURE — 82962 GLUCOSE BLOOD TEST: CPT

## 2021-01-29 PROCEDURE — A9270 NON-COVERED ITEM OR SERVICE: HCPCS | Performed by: NURSE PRACTITIONER

## 2021-01-29 PROCEDURE — 93010 ELECTROCARDIOGRAM REPORT: CPT | Performed by: INTERNAL MEDICINE

## 2021-01-29 PROCEDURE — 76705 ECHO EXAM OF ABDOMEN: CPT

## 2021-01-29 PROCEDURE — 85025 COMPLETE CBC W/AUTO DIFF WBC: CPT

## 2021-01-29 PROCEDURE — 80048 BASIC METABOLIC PNL TOTAL CA: CPT

## 2021-01-29 PROCEDURE — 51798 US URINE CAPACITY MEASURE: CPT

## 2021-01-29 PROCEDURE — 93005 ELECTROCARDIOGRAM TRACING: CPT | Performed by: INTERNAL MEDICINE

## 2021-01-29 PROCEDURE — 700102 HCHG RX REV CODE 250 W/ 637 OVERRIDE(OP): Performed by: INTERNAL MEDICINE

## 2021-01-29 PROCEDURE — 71045 X-RAY EXAM CHEST 1 VIEW: CPT

## 2021-01-29 PROCEDURE — A9270 NON-COVERED ITEM OR SERVICE: HCPCS | Performed by: INTERNAL MEDICINE

## 2021-01-29 PROCEDURE — 83605 ASSAY OF LACTIC ACID: CPT

## 2021-01-29 PROCEDURE — 85027 COMPLETE CBC AUTOMATED: CPT

## 2021-01-29 PROCEDURE — 99232 SBSQ HOSP IP/OBS MODERATE 35: CPT | Performed by: NURSE PRACTITIONER

## 2021-01-29 PROCEDURE — 70551 MRI BRAIN STEM W/O DYE: CPT

## 2021-01-29 PROCEDURE — 82977 ASSAY OF GGT: CPT

## 2021-01-29 PROCEDURE — 99231 SBSQ HOSP IP/OBS SF/LOW 25: CPT | Performed by: NURSE PRACTITIONER

## 2021-01-29 PROCEDURE — 770020 HCHG ROOM/CARE - TELE (206)

## 2021-01-29 PROCEDURE — 80053 COMPREHEN METABOLIC PANEL: CPT

## 2021-01-29 PROCEDURE — 36415 COLL VENOUS BLD VENIPUNCTURE: CPT

## 2021-01-29 PROCEDURE — 83036 HEMOGLOBIN GLYCOSYLATED A1C: CPT

## 2021-01-29 PROCEDURE — 84439 ASSAY OF FREE THYROXINE: CPT

## 2021-01-29 PROCEDURE — 99233 SBSQ HOSP IP/OBS HIGH 50: CPT | Mod: GC | Performed by: HOSPITALIST

## 2021-01-29 PROCEDURE — 700102 HCHG RX REV CODE 250 W/ 637 OVERRIDE(OP): Performed by: NURSE PRACTITIONER

## 2021-01-29 PROCEDURE — 700111 HCHG RX REV CODE 636 W/ 250 OVERRIDE (IP): Performed by: STUDENT IN AN ORGANIZED HEALTH CARE EDUCATION/TRAINING PROGRAM

## 2021-01-29 RX ORDER — LORAZEPAM 2 MG/ML
0.5 INJECTION INTRAMUSCULAR
Status: DISCONTINUED | OUTPATIENT
Start: 2021-01-29 | End: 2021-01-29

## 2021-01-29 RX ORDER — DIAZEPAM 5 MG/ML
2.5 INJECTION, SOLUTION INTRAMUSCULAR; INTRAVENOUS
Status: COMPLETED | OUTPATIENT
Start: 2021-01-29 | End: 2021-01-29

## 2021-01-29 RX ADMIN — SUCRALFATE 1 G: 1 TABLET ORAL at 17:28

## 2021-01-29 RX ADMIN — BENAZEPRIL HYDROCHLORIDE 20 MG: 20 TABLET ORAL at 05:50

## 2021-01-29 RX ADMIN — DIAZEPAM 2.5 MG: 5 INJECTION, SOLUTION INTRAMUSCULAR; INTRAVENOUS at 10:49

## 2021-01-29 RX ADMIN — INSULIN HUMAN 1 UNITS: 100 INJECTION, SOLUTION PARENTERAL at 18:12

## 2021-01-29 RX ADMIN — ACETAMINOPHEN 1000 MG: 500 TABLET ORAL at 05:50

## 2021-01-29 RX ADMIN — MYCOPHENOLATE MOFETIL 500 MG: 250 CAPSULE ORAL at 10:04

## 2021-01-29 RX ADMIN — ALLOPURINOL 300 MG: 300 TABLET ORAL at 21:50

## 2021-01-29 RX ADMIN — TACROLIMUS 1 MG: 1 CAPSULE ORAL at 21:50

## 2021-01-29 RX ADMIN — METOPROLOL SUCCINATE 25 MG: 25 TABLET, EXTENDED RELEASE ORAL at 17:29

## 2021-01-29 RX ADMIN — FUROSEMIDE 20 MG: 20 TABLET ORAL at 10:06

## 2021-01-29 RX ADMIN — APIXABAN 5 MG: 5 TABLET, FILM COATED ORAL at 17:29

## 2021-01-29 RX ADMIN — APIXABAN 5 MG: 5 TABLET, FILM COATED ORAL at 05:50

## 2021-01-29 RX ADMIN — FUROSEMIDE 20 MG: 20 TABLET ORAL at 21:48

## 2021-01-29 RX ADMIN — OMEPRAZOLE 20 MG: 20 CAPSULE, DELAYED RELEASE ORAL at 05:50

## 2021-01-29 RX ADMIN — FAMOTIDINE 20 MG: 10 INJECTION INTRAVENOUS at 05:50

## 2021-01-29 RX ADMIN — SUCRALFATE 1 G: 1 TABLET ORAL at 13:26

## 2021-01-29 RX ADMIN — MYCOPHENOLATE MOFETIL 500 MG: 250 CAPSULE ORAL at 21:49

## 2021-01-29 RX ADMIN — COLCHICINE 0.6 MG: 0.6 TABLET, FILM COATED ORAL at 05:50

## 2021-01-29 RX ADMIN — SUCRALFATE 1 G: 1 TABLET ORAL at 23:48

## 2021-01-29 RX ADMIN — TACROLIMUS 1 MG: 1 CAPSULE ORAL at 10:05

## 2021-01-29 RX ADMIN — PREDNISONE 5 MG: 5 TABLET ORAL at 05:50

## 2021-01-29 RX ADMIN — SUCRALFATE 1 G: 1 TABLET ORAL at 05:50

## 2021-01-29 RX ADMIN — METOPROLOL SUCCINATE 25 MG: 25 TABLET, EXTENDED RELEASE ORAL at 05:50

## 2021-01-29 ASSESSMENT — ENCOUNTER SYMPTOMS
DOUBLE VISION: 0
LOSS OF CONSCIOUSNESS: 0
WEAKNESS: 0
SENSORY CHANGE: 0
BLURRED VISION: 0
FALLS: 0
SORE THROAT: 0
TROUBLE SWALLOWING: 0
TREMORS: 1
CHEST TIGHTNESS: 0
SEIZURES: 0
FATIGUE: 0
SPEECH DIFFICULTY: 0
NUMBNESS: 0
PALPITATIONS: 0
BLOOD IN STOOL: 0
ABDOMINAL PAIN: 0
MYALGIAS: 0
FEVER: 0
WHEEZING: 0
SPEECH CHANGE: 0
CHILLS: 0
FOCAL WEAKNESS: 0
SHORTNESS OF BREATH: 0
VOMITING: 0
NAUSEA: 0
HEADACHES: 0
LIGHT-HEADEDNESS: 0
DIZZINESS: 0
TINGLING: 0
COUGH: 0

## 2021-01-29 ASSESSMENT — PAIN DESCRIPTION - PAIN TYPE
TYPE: CHRONIC PAIN
TYPE: CHRONIC PAIN
TYPE: ACUTE PAIN;CHRONIC PAIN
TYPE: CHRONIC PAIN

## 2021-01-29 ASSESSMENT — FIBROSIS 4 INDEX: FIB4 SCORE: 3.96

## 2021-01-29 NOTE — PROGRESS NOTES
Cardiology Follow Up Progress Note    Date of Service  1/29/2021    Attending Physician  Rubin Forrester M.D.       Chief Complaint   Elective admission for AF ablation     HPI  Krishan Acevedo is a 70 y.o. male admitted 1/27/2021 for elective admission for AF ablation.  He underwent ablation with  1/27/21 with PVI with roof ablation, Right atrial flutter.     Medical history significant for ESRD S/P renal transplant in 2008, wegener's granulomatosis, paroxysmal atrial fibrillation, chronic anticoagulation.       Interim Events  01/29/2021: Seen in EP Rounds  Denies cardiac complaints. AAOx4. No neuro deficits. BP stable.   Monitored rhythm: ST 100s-120s.   BC x 2- NTD   TSH 0.370- low    Interim Events    No overnight cardiac events  Currently sinus tach, rate 107  Asymptomatic  Denies chest discomfort/dyspnea  Weak to ambulate  Discussed with RN to have PT OT to evaluate the patient  Creatinine rising, consider nephrology consult  Transient disorientation and confusion, currently awake alert oriented      Review of Systems  Review of Systems   Respiratory: Negative for apnea, cough, choking, chest tightness, shortness of breath, wheezing and stridor.    Cardiovascular: Negative for chest pain and leg swelling.   Psychiatric/Behavioral: Dysphoric mood:          Vital signs in last 24 hours  Temp:  [36.3 °C (97.4 °F)-37.1 °C (98.8 °F)] 36.3 °C (97.4 °F)  Pulse:  [] 107  Resp:  [18-28] 18  BP: (128-168)/(67-95) 138/90  SpO2:  [92 %-95 %] 95 %    Physical Exam  Physical Exam  Cardiovascular:      Rate and Rhythm: Tachycardia present.      Pulses: Normal pulses.   Skin:     General: Skin is warm.      Comments: Right groin cath site with mild evidence of ecchymosis, no hematoma   Neurological:      Mental Status: He is alert.         Lab Review  Lab Results   Component Value Date/Time    WBC 12.5 (H) 01/29/2021 08:23 AM    RBC 3.56 (L) 01/29/2021 08:23 AM    HEMOGLOBIN 11.4 (L) 01/29/2021 08:23 AM     HEMATOCRIT 35.4 (L) 01/29/2021 08:23 AM    MCV 99.4 (H) 01/29/2021 08:23 AM    MCH 32.0 01/29/2021 08:23 AM    MCHC 32.2 (L) 01/29/2021 08:23 AM    MPV 9.8 01/29/2021 08:23 AM      Lab Results   Component Value Date/Time    SODIUM 135 01/29/2021 08:23 AM    POTASSIUM 5.0 01/29/2021 08:23 AM    CHLORIDE 109 01/29/2021 08:23 AM    CO2 15 (L) 01/29/2021 08:23 AM    GLUCOSE 151 (H) 01/29/2021 08:23 AM    BUN 50 (H) 01/29/2021 08:23 AM    CREATININE 1.80 (H) 01/29/2021 08:23 AM      Lab Results   Component Value Date/Time    ASTSGOT 37 01/28/2021 06:35 AM    ALTSGPT 28 01/28/2021 06:35 AM     Lab Results   Component Value Date/Time    CHOLSTRLTOT 101 01/20/2021 07:48 AM    LDL 44 01/20/2021 07:48 AM    HDL 30 (A) 01/20/2021 07:48 AM    TRIGLYCERIDE 133 01/20/2021 07:48 AM    TROPONINT 2141 (H) 01/28/2021 02:10 PM       No results for input(s): NTPROBNP in the last 72 hours.     Cardiac Imaging and Procedures Review  EKG 01/29/2021: Sinus tachycardia, RBBB, PVC/PACs     Echocardiogram:  1/28/21  CONCLUSIONS  Limited echocardiogram study to evaluate pericardial effusion post ablation. Trivial pericardial effusion without evidence of hemodynamic compromise.     Imaging  Chest X-Ray (01/29/2021):  IMPRESSION:  Stable enlargement of the cardiomediastinal silhouette without acute cardiopulmonary abnormality.     Cheat X-Ray (01/28/2021):  IMPRESSION:  1.  Air-filled distended loops of bowel are seen with reactive mucosal pattern, appearance suggests ileus or enteritis. Recommend radiographic followup to resolution to exclude progression to obstruction.     CT chest without contrast: Mild scattered pulmonary opacifications are noted some of which could be due to fibrosis or atelectasis.  No consolidation seen     RUQ US (01/29/21):  IMPRESSION:  1.  Hepatomegaly and echogenic liver, compatible with fatty change versus fibrosis.  2.  Cholelithiasis without additional sonographic findings of acute cholecystitis.     Brain MRI  (01/29/2021):  IMPRESSION:  1.  No acute abnormality.  2.  Mild cerebral volume loss  3.  Mild chronic microvascular ischemic disease.        Assessment/Plan  1. Atrial Arrhythmias (AF/AFL)  - S/P RF ablation with PVI/roof ablation and CTI with block by Dr. Loving on 1/27/21.  - Post operative CP/encephalopathy, resolving. Per neurology, possible toxic/metabloic encephalopathy.   - AAOx4, no neuro deficits upon exam. Brain CT and MRI negative. Denies CP. Chest CT negative. RUQ US- No acute cholecystitis.   - No evidence of acute infectious process, TMax 99.3*F, WBC 11. BC x 2 NTD. COVID negative (1/22/21). hx of MDRO. Primary team following.   - LTD echo without evidence of effusion (trivial). Findings and hx support likely pleuritic/inflammatory pain post ablation. Unable to give NSAIDs with hx of renal transplant.  Continue Colchicine given renal status.  Already on oral steroids.   - Continued sinus tachycardia, improving. No evidence of AT/AF/AFL.    - Continue anticoagulation with Eliquis.   Watch CR, consider nephrology consult    Addendum: I discussed with Dr. Duffy patient appears to be dry, bolus of 500 cc NS, will monitor BMP in a.m.      Please contact me with any questions.    WESLY Valdez.   Cardiologist, [facility]  [phone]

## 2021-01-29 NOTE — PROGRESS NOTES
Neurology Progress Note  Neurohospitalist Service, [facility]    Referring Physician: Rubin Forrester M.D.    CC: Aphasia    HPI: Refer to initial documented Neurology H&P, as detailed in the patient's chart.    Interval History 2021: No acute events overnight. Patient is currently sitting up in bed, awake, alert, following commands, and oriented to self, place, situation, but not time (unsure of date, month, or year). Asterixis of BUE and BLE still apparent. Neurologically appears more alert with no impediment of speech, dysarthria, or aphasia noted on exam. He denies headache, vision changes, facial weakness, new muscle weakness, numbness, tingling, abnormal movements, or seizures.     Past Medical History:   Past Medical History:   Diagnosis Date   • Arrhythmia     flutter   • Arthritis     right shoulder, knees, & back   • Cough     dry-on lisinipril, now productive -brown   • Dialysis patient ([facility]) 2006    x 18 months   • E coli bacteremia     hospitalized x 5 months   • Kidney failure 2008    kidney transplant   • Kidney transplant pain 2020    low back   • Sleep apnea     CPAP   • Snoring         FHx:  Family History   Problem Relation Age of Onset   • Stroke Brother 26        , ? cause   • Stroke Maternal Grandmother            • Clotting Disorder Neg Hx         SHx:  Social History     Socioeconomic History   • Marital status:      Spouse name: Not on file   • Number of children: Not on file   • Years of education: Not on file   • Highest education level: Not on file   Occupational History   • Not on file   Social Needs   • Financial resource strain: Not on file   • Food insecurity     Worry: Not on file     Inability: Not on file   • Transportation needs     Medical: Not on file     Non-medical: Not on file   Tobacco Use   • Smoking status: Never Smoker   • Smokeless tobacco: Former User     Types: Snuff, Chew   Substance and Sexual Activity    • Alcohol use: Not Currently   • Drug use: Never   • Sexual activity: Not on file   Lifestyle   • Physical activity     Days per week: Not on file     Minutes per session: Not on file   • Stress: Not on file   Relationships   • Social connections     Talks on phone: Not on file     Gets together: Not on file     Attends Nondenominational service: Not on file     Active member of club or organization: Not on file     Attends meetings of clubs or organizations: Not on file     Relationship status: Not on file   • Intimate partner violence     Fear of current or ex partner: Not on file     Emotionally abused: Not on file     Physically abused: Not on file     Forced sexual activity: Not on file   Other Topics Concern   • Not on file   Social History Narrative   • Not on file        Medications:    Current Facility-Administered Medications:   •  acetaminophen (TYLENOL) tablet 1,000 mg, 1,000 mg, Oral, Q6HRS PRN, Alina Luna M.D., 1,000 mg at 01/29/21 0550  •  benazepril (LOTENSIN) tablet 20 mg, 20 mg, Oral, Q DAY, Alina Luna M.D., 20 mg at 01/29/21 0550  •  metoprolol SR (TOPROL XL) tablet 25 mg, 25 mg, Oral, BID, Alina Luna M.D., 25 mg at 01/29/21 0550  •  omeprazole (PRILOSEC) capsule 20 mg, 20 mg, Oral, DAILY, Leticia Deluca, A.P.R.N., 20 mg at 01/29/21 0550  •  colchicine (COLCRYS) tablet 0.6 mg, 0.6 mg, Oral, DAILY, Leticia Deluca, A.P.R.N., 0.6 mg at 01/29/21 0550  •  hydrALAZINE (APRESOLINE) injection 10 mg, 10 mg, Intravenous, Q6HRS PRN, Leticia Deluca, A.P.R.N., 10 mg at 01/28/21 1525  •  famotidine (PEPCID) injection 20 mg, 20 mg, Intravenous, DAILY, Lamine Perez M.D., 20 mg at 01/29/21 0550  •  sucralfate (CARAFATE) tablet 1 g, 1 g, Oral, Q6HRS, Lamine Perez M.D., 1 g at 01/29/21 1326  •  insulin regular (HumuLIN R,NovoLIN R) injection, 1-6 Units, Subcutaneous, 4X/DAY ACHS, Stopped at 01/29/21 0700 **AND** POC Blood Glucose, , , Q AC AND BEDTIME(S) **AND** NOTIFY MD and PharmD, , , Once **AND**  glucose 4 g chewable tablet 16 g, 16 g, Oral, Q15 MIN PRN **AND** dextrose 50% (D50W) injection 50 mL, 50 mL, Intravenous, Q15 MIN PRN, Lamine Perez M.D.  •  allopurinol (ZYLOPRIM) tablet 300 mg, 300 mg, Oral, QHS, Rubin Forrester M.D., 300 mg at 01/28/21 2004  •  apixaban (ELIQUIS) tablet 5 mg, 5 mg, Oral, BID, Rubin Forrester M.D., 5 mg at 01/29/21 0550  •  furosemide (LASIX) tablet 20 mg, 20 mg, Oral, BID, Rubin Forrester M.D., 20 mg at 01/29/21 1006  •  mycophenolate (CELLCEPT) capsule 500 mg, 500 mg, Oral, BID, Rubin Forrester M.D., 500 mg at 01/29/21 1004  •  predniSONE (DELTASONE) tablet 5 mg, 5 mg, Oral, QAM, Rubin Forrester M.D., 5 mg at 01/29/21 0550  •  tacrolimus (PROGRAF) capsule 1 mg, 1 mg, Oral, BID, Rubin Forrester M.D., 1 mg at 01/29/21 1005    Allergies:  Allergies   Allergen Reactions   • Nsaids      Cannot take because of anti rejection meds.        Review of systems: In addition to what is detailed in the interval history above, all other systems reviewed and are negative.      Physical Examination:   Vitals:    01/29/21 0439 01/29/21 0538 01/29/21 0834 01/29/21 1206   BP:  128/95 139/67 138/90   Pulse:  (!) 102 (!) 105 (!) 107   Resp:  18 18 18   Temp:  36.4 °C (97.5 °F) 37.1 °C (98.8 °F) 36.3 °C (97.4 °F)   TempSrc:  Temporal Temporal Temporal   SpO2:  93% 93% 95%   Weight: 121 kg (265 lb 14 oz)      Height:         General: Patient in no acute distress, pleasant and cooperative.  HEENT: Normocephalic, no signs of acute trauma.   Neck: Supple, no meningeal signs or carotid bruits. There is normal range of motion. No tenderness on exam.   Chest: Clear to auscultation. No cough.   CV: RRR, no murmurs.   Skin: No signs of acute rashes or trauma.   Musculoskeletal: Joints exhibit full range of motion, without any pain to palpation. There are no signs of joint or muscle swelling. There is no tenderness to deep palpation of muscles.   Psychiatric: No hallucinatory behavior. Denies symptoms of  depression or suicidal ideation. Mood and affect appear normal on exam.     NEUROLOGICAL EXAM:   Mental status, orientation: Awake, alert, following commands, and oriented to self, place, situation, but not time (unsure of date, month, or year).   Speech and language: Speech is clear and fluent. The patient is able to name, repeat, and comprehend.   Memory: There is intact recollection of recent and remote events.   Cranial nerve exam: Pupils are 3-4 mm bilaterally and equally reactive to light and accommodation. Visual fields are full to field test. There is no nystagmus on primary or secondary gaze. Intact full EOM in all directions of gaze. Face appears symmetric. Sensation in the face is intact to light touch. Uvula is midline. Palate elevates symmetrically. Tongue is midline and without any signs of tongue biting or fasciculations. Sternocleidomastoid muscles exhibit is normal strength bilaterally. Shoulder shrug is intact bilaterally.   Motor exam: Strength is 4/5 to RUE limited by R shoulder pain, 5/5 to LUE and BLE both distally and proximally. Tone is normal. Asterixis noted to BUE and BLE.  Sensory exam Reveals normal sense of light touch in all extremities.   Deep tendon reflexes:  2+ throughout. Plantar responses are flexor. There is no clonus.   Coordination: Shows a normal finger-nose-finger and heel-down-shin. Normal rapidly alternating movements.   Gait: Deferred per patient preference.       Ancillary Data Reviewed:    Labs:  Lab Results   Component Value Date/Time    PROTHROMBTM 21.2 (H) 01/28/2021 02:10 PM    INR 1.78 (H) 01/28/2021 02:10 PM      Lab Results   Component Value Date/Time    WBC 12.6 (H) 01/29/2021 01:13 PM    RBC 3.76 (L) 01/29/2021 01:13 PM    HEMOGLOBIN 11.9 (L) 01/29/2021 01:13 PM    HEMATOCRIT 37.3 (L) 01/29/2021 01:13 PM    MCV 99.2 (H) 01/29/2021 01:13 PM    MCH 31.6 01/29/2021 01:13 PM    MCHC 31.9 (L) 01/29/2021 01:13 PM    MPV 9.6 01/29/2021 01:13 PM    NEUTSPOLYS 77.20 (H)  01/29/2021 01:13 PM    LYMPHOCYTES 13.90 (L) 01/29/2021 01:13 PM    MONOCYTES 8.00 01/29/2021 01:13 PM    EOSINOPHILS 0.10 01/29/2021 01:13 PM    BASOPHILS 0.20 01/29/2021 01:13 PM    ANISOCYTOSIS 1+ 08/30/2014 01:45 AM      Lab Results   Component Value Date/Time    SODIUM 135 01/29/2021 08:23 AM    POTASSIUM 5.0 01/29/2021 08:23 AM    CHLORIDE 109 01/29/2021 08:23 AM    CO2 15 (L) 01/29/2021 08:23 AM    GLUCOSE 151 (H) 01/29/2021 08:23 AM    BUN 50 (H) 01/29/2021 08:23 AM    CREATININE 1.80 (H) 01/29/2021 08:23 AM      Lab Results   Component Value Date/Time    CHOLSTRLTOT 101 01/20/2021 07:48 AM    LDL 44 01/20/2021 07:48 AM    HDL 30 (A) 01/20/2021 07:48 AM    TRIGLYCERIDE 133 01/20/2021 07:48 AM       Lab Results   Component Value Date/Time    ALKPHOSPHAT 135 (H) 01/28/2021 06:35 AM    ASTSGOT 37 01/28/2021 06:35 AM    ALTSGPT 28 01/28/2021 06:35 AM    TBILIRUBIN 0.7 01/28/2021 06:35 AM        Imaging/Testing:    I interpreted and/or reviewed the patient's neuroimaging    DX-CHEST-PORTABLE (1 VIEW)   Final Result      Stable enlargement of the cardiomediastinal silhouette without acute cardiopulmonary abnormality.      MR-BRAIN-W/O   Final Result      1.  No acute abnormality.   2.  Mild cerebral volume loss   3.  Mild chronic microvascular ischemic disease.      US-RUQ   Final Result         1.  Hepatomegaly and echogenic liver, compatible with fatty change versus fibrosis.   2.  Cholelithiasis without additional sonographic findings of acute cholecystitis.      [id number] VIEW   Final Result         1.  Air-filled distended loops of bowel are seen with reactive mucosal pattern, appearance suggests ileus or enteritis. Recommend radiographic followup to resolution to exclude progression to obstruction.      CT-HEAD W/O   Final Result      No acute intracranial abnormality is identified.      Atrophy      There are periventricular and subcortical white matter changes present.  This finding is nonspecific and  could be from previous small vessel ischemia, demyelination, or gliosis.      Hypodensity in the left basal ganglia likely represents an old lacunar infarct.      CT-CHEST (THORAX) W/O   Final Result      1.  Mild scattered pulmonary opacifications are noted some of which could be due to fibrosis or atelectasis. No sizable consolidations are identified. Some mild edema or inflammation could be present.      2.  No pleural effusions are identified.      3.  There is calcific atherosclerosis.      4.  Cholelithiasis.               EC-ECHOCARDIOGRAM LTD W/O CONT   Final Result      EC-JAVIER W/O CONT   Final Result      CL-EP ABLATION ATRIAL FIBRILLATION    (Results Pending)       Assessment:    Krishan Acevedo is a 70 y.o. male with relevant history of arthritis, ESRD s/p kidney transplant (2020), E.coli bacteremia (2014), and TUSHAR with CPAP who presented to [facility] on 1/27/21 elective ablation with PVI of right atrial flutter. Rapid response was called at at 14:20 due to worsening speech difficulty. Blood pressure at this time was 162/99 and BG was 201 mg/dL. Neurology was consulted via Code Stroke at 14:33 for aphasia. Initial NIHSS was 6 for decreased LOC, right lower quadrant vision field cut of right eye only, and generalized weakness with drift to all 4 extremities. STAT CT head was completed and revealed no acute intracranial abnormality, with hypodensity to the left basal ganglia consistent with chronic lacunar infarct. Given the report that patient's last known well was greater than 4.5 hours and patient is currently taking Eliquis, tPA was contraindicated and not given. Neurological exam today has improved with patient now alert with no impediment of speech, dysarthria, or aphasia noted on exam. On exam, patient has persistent asterixis to BUE and BLE. MRI brain wo contrast reveals no acute intracranial abnormality. Mild disorientation and transient speech difficulty may be best  explained by toxic/metabolic encephalopathy given leukocytosis and asterixis in the setting of new RADHA versus possible infectious process.    Plan:    -Toxic/metabolic workup per primary team  -Telemetry; currently ST. Screen for Afib/arrhythmia. Last ECG with ST and right BBB at 13:53  -All other medical management per primary team.   -DVT PPX: SCDs and Eliquis.     No further recommendations or further studies from a neurological standpoint at this time. Please re-consult if you have further questions or there is a change in status.    The evaluation of the patient, and recommended management, was discussed with Dr. Pamela Faith and bedside RN. I have performed a physical exam and reviewed and updated ROS and Plan today (1/29/2021). In review of yesterday's note (1/28/2021), there are no changes except as documented above.    LLOYD Rangel.   Nurse Practitioner, Neurohospitalist  [facility]  t) [phone] (f) [phone]

## 2021-01-29 NOTE — ASSESSMENT & PLAN NOTE
At the moment denies chest pain  CT chest without contrast noted, negative for acute abnormalities  Pulses symmetrical, no mediastinal widening on imaging noted.  PE is unlikely as patient is on apixaban and diagnosis would not   Further management per cardiology

## 2021-01-29 NOTE — ASSESSMENT & PLAN NOTE
SIRS criteria identified on my evaluation include:  Tachycardia, with heart rate greater than 90 BPM and Leukocytosis, with WBC greater than 12,000   No definite source of infection identified at this time.  Patient tested negative for COVID-19 on 1/22.  Consider to repeat if patient spikes fever  UA showed white blood cells 2-5, small leukocyte esterase, however patient denies dysuric symptoms   CT chest without contrast: Mild scattered pulmonary opacifications are noted some of which could be due to fibrosis or atelectasis.  No consolidation seen  Follow with blood culture result, monitor for fever, repeat CBC

## 2021-01-29 NOTE — ASSESSMENT & PLAN NOTE
Resume tacrolimus, mycophenolate, prednisone  Tacrolimus and mycophenolate level is still pending.  However, since the patient's symptoms have now resolved it is less likely toxicity from these medications.

## 2021-01-29 NOTE — ASSESSMENT & PLAN NOTE
History of diabetes is not adocumented, patient is not on diabetic medication  We will start a sliding scale, check A1c

## 2021-01-29 NOTE — ASSESSMENT & PLAN NOTE
In the neurology note, there is a question of asterixis.  However, on my exam it was more tremor than asterixis.  The patient has a bilateral tremor that is present at rest and worse in the right upper extremity.  The patient also had some rigidity in his right arm.    Plan:  -I am concerned that the patient might have Parkinson's or early Parkinson's.  Therefore, we are going to try him on a trial of Sinemet and as needed Risperdal at nighttime for agitation.  I discussed this medication with the patient and his wife extensively.  They have agreed at this time for a trial of Sinemet.    -Recommend outpatient follow-up.

## 2021-01-29 NOTE — CARE PLAN
Problem: Pain Management  Goal: Pain level will decrease to patient's comfort goal  1/28/2021 2303 by Mary Jane Keys R.N.  Outcome: PROGRESSING SLOWER THAN EXPECTED    Problem: Respiratory:  Goal: Respiratory status will improve  Outcome: PROGRESSING AS EXPECTED     Problem: Communication  Goal: The ability to communicate needs accurately and effectively will improve  1/28/2021 2303 by Mary Jane Keys R.N.  Outcome: PROGRESSING AS EXPECTED  1/28/2021 2301 by Mary Jane Keys R.N.  Outcome: PROGRESSING AS EXPECTED

## 2021-01-29 NOTE — ASSESSMENT & PLAN NOTE
This is likely multifactorial.  The patient did have anesthesia and given his age he may have a little bit of hospital delirium as well (specially at nighttime).  Initially this was thought to be a stroke, but imaging including CT and MRI of the head has been negative.  CT did show an old lacunar stroke in the left basal ganglia.  The patient had a slight elevation in white count post procedure which is often expected and in absence of fever or other symptoms, negative blood cultures, negative chest x-ray and UA this is not sepsis.  The patient's white count has since been trending down, which goes along with an elevated white count that was postprocedural.     Plan:  -Mycophenolate and tacrolimus levels are still pending.  Given that the patient's symptoms have now resolved it is likely not toxicity.  -Patient is now back to baseline.  -Recommend PT/OT

## 2021-01-29 NOTE — PROGRESS NOTES
Daily Progress Note:     Date of Service: 1/29/2021  Primary Team: [facility] Red Team    Attending: Rubin Forrester M.D.   Senior Resident: Loan Alfonso M.D.  Contact:  [phone]    ID:   The patient is a 70-year-old male with a history of ANCA vasculitis, kidney transplant in 2008 on tacrolimus/mycophenolate/prednisone, CKD stage III (baseline creatinine 1.5-1.8), sleep apnea, paroxysmal atrial fibrillation, atrial flutter who presented on 1/27/2021 for an atrial flutter ablation.  The patient successfully underwent an ablation on 1/27.  The patient was initially treated with heparin and started on apixaban on 1/28.  On the night of 1/28 the patient began having left shoulder pain and mild expressive aphasia with an NIH stroke scale of 6 due to right and left arm drift, visual field deficit, drowsiness.  The patient was not given TPA since he is currently taking Eliquis.    Interval Update:  The patient states that he is doing well today and feels better than he did yesterday.  He states that the right shoulder pain is now gone.  Of note the patient is in sinus tachycardia with a RBBB.    The patient had a CT head without contrast that showed a possible old lacunar infarct in the left basal ganglia, periventricular and subcortical white matter changes.  The patient had a subsequent MRI brain without contrast today which showed no acute intracranial abnormality.  Per neurology, this is a toxic/metabolic encephalopathy.      Consultants/Specialty:  Cardiology, EP  Neurology    Review of Systems:    Review of Systems   Constitutional: Negative for chills and fever.   HENT: Negative for congestion and sore throat.    Eyes: Negative for blurred vision and double vision.   Respiratory: Negative for cough and shortness of breath.    Cardiovascular: Negative for chest pain and palpitations.   Gastrointestinal: Negative for nausea and vomiting.   Genitourinary: Negative for dysuria and urgency.   Musculoskeletal: Negative  for falls and myalgias.   Skin: Negative for itching and rash.   Neurological: Positive for tremors. Negative for dizziness, tingling, sensory change, speech change, focal weakness, seizures, loss of consciousness, weakness and headaches.       Objective Data:   Physical Exam:   Vitals:   Temp:  [36.3 °C (97.4 °F)-37.1 °C (98.8 °F)] 36.3 °C (97.4 °F)  Pulse:  [] 107  Resp:  [18-28] 18  BP: (128-168)/(67-95) 138/90  SpO2:  [92 %-95 %] 95 %     Physical Exam  Vitals signs and nursing note reviewed.   Constitutional:       General: He is not in acute distress.     Appearance: He is not toxic-appearing.   HENT:      Head: Normocephalic and atraumatic.      Mouth/Throat:      Mouth: Mucous membranes are moist.      Pharynx: Oropharynx is clear. No oropharyngeal exudate.   Eyes:      General: No scleral icterus.     Extraocular Movements: Extraocular movements intact.      Pupils: Pupils are equal, round, and reactive to light.   Cardiovascular:      Rate and Rhythm: Regular rhythm. Tachycardia present.      Pulses: Normal pulses.      Heart sounds: No murmur. No friction rub. No gallop.    Pulmonary:      Effort: Pulmonary effort is normal. No respiratory distress.      Breath sounds: Normal breath sounds. No wheezing or rales.   Abdominal:      General: Abdomen is flat. There is no distension.      Palpations: Abdomen is soft.   Musculoskeletal:      Right lower leg: No edema.      Left lower leg: No edema.   Skin:     General: Skin is warm and dry.   Neurological:      Mental Status: He is alert and oriented to person, place, and time.      Comments: GENERAL:  Lying in the hospital bed/later seen in chair in no apparent distress.  MENTAL STATUS:  Awake, alert, oriented times 3.  Speech is fluent, comprehension is intact.  Can name and repeat appropriately.  CRANIAL NERVES:  PERRL, EOMI with no nystagmus, face is symmetric, facial sensation is intact, tongue is in the midline, palate is symmetric.  MOTOR:  5/5  throughout.  The patient does have a tremor in bilateral upper extremities worse in the right arm.  There is also some rigidity in right arm.  REFLEXES:  2+ and symmetric, toes are downgoing bilaterally  SENSATION:  Intact to light touch throughout  COORDINATION:  Normal finger to nose on left.  Some overshooting possibly due to tremor on right.  GAIT:  Deferred, but was able to stand up and get to chair with minimal assist.     Psychiatric:         Mood and Affect: Mood normal.         Behavior: Behavior normal.         Labs:   Results for orders placed or performed during the hospital encounter of 01/27/21   PT   Result Value Ref Range    PT 18.5 (H) 12.0 - 14.6 sec    INR 1.49 (H) 0.87 - 1.13   CBC WITHOUT DIFFERENTIAL   Result Value Ref Range    WBC 12.4 (H) 4.8 - 10.8 K/uL    RBC 3.99 (L) 4.70 - 6.10 M/uL    Hemoglobin 12.6 (L) 14.0 - 18.0 g/dL    Hematocrit 39.1 (L) 42.0 - 52.0 %    MCV 98.0 (H) 81.4 - 97.8 fL    MCH 31.6 27.0 - 33.0 pg    MCHC 32.2 (L) 33.7 - 35.3 g/dL    RDW 52.0 (H) 35.9 - 50.0 fL    Platelet Count 127 (L) 164 - 446 K/uL    MPV 9.5 9.0 - 12.9 fL   Comp Metabolic Panel   Result Value Ref Range    Sodium 143 135 - 145 mmol/L    Potassium 5.5 3.6 - 5.5 mmol/L    Chloride 114 (H) 96 - 112 mmol/L    Co2 15 (L) 20 - 33 mmol/L    Anion Gap 14.0 7.0 - 16.0    Glucose 180 (H) 65 - 99 mg/dL    Bun 55 (H) 8 - 22 mg/dL    Creatinine 1.79 (H) 0.50 - 1.40 mg/dL    Calcium 9.9 8.5 - 10.5 mg/dL    AST(SGOT) 37 12 - 45 U/L    ALT(SGPT) 28 2 - 50 U/L    Alkaline Phosphatase 135 (H) 30 - 99 U/L    Total Bilirubin 0.7 0.1 - 1.5 mg/dL    Albumin 3.8 3.2 - 4.9 g/dL    Total Protein 6.8 6.0 - 8.2 g/dL    Globulin 3.0 1.9 - 3.5 g/dL    A-G Ratio 1.3 g/dL   ESTIMATED GFR   Result Value Ref Range    GFR If African American 45 (A) >60 mL/min/1.73 m 2    GFR If Non  37 (A) >60 mL/min/1.73 m 2   LIPASE   Result Value Ref Range    Lipase 11 11 - 82 U/L   AMYLASE   Result Value Ref Range    Amylase 20 20  - 103 U/L   BLOOD CULTURE    Specimen: Peripheral; Blood   Result Value Ref Range    Significant Indicator NEG     Source BLD     Site PERIPHERAL     Culture Result       No Growth  Note: Blood cultures are incubated for 5 days and  are monitored continuously.Positive blood cultures  are called to the RN and reported as soon as  they are identified.     BLOOD CULTURE    Specimen: Peripheral; Blood   Result Value Ref Range    Significant Indicator NEG     Source BLD     Site PERIPHERAL     Culture Result       No Growth  Note: Blood cultures are incubated for 5 days and  are monitored continuously.Positive blood cultures  are called to the RN and reported as soon as  they are identified.     URINALYSIS    Specimen: Urine, Clean Catch   Result Value Ref Range    Color Yellow     Character Clear     Specific Gravity 1.015 <1.035    Ph 5.0 5.0 - 8.0    Glucose Negative Negative mg/dL    Ketones Negative Negative mg/dL    Protein Negative Negative mg/dL    Bilirubin Negative Negative    Urobilinogen, Urine 0.2 Negative    Nitrite Negative Negative    Leukocyte Esterase Small (A) Negative    Occult Blood Negative Negative    Micro Urine Req Microscopic    Basic Metabolic Panel   Result Value Ref Range    Sodium 135 135 - 145 mmol/L    Potassium 5.3 3.6 - 5.5 mmol/L    Chloride 108 96 - 112 mmol/L    Co2 16 (L) 20 - 33 mmol/L    Glucose 199 (H) 65 - 99 mg/dL    Bun 51 (H) 8 - 22 mg/dL    Creatinine 1.84 (H) 0.50 - 1.40 mg/dL    Calcium 9.9 8.5 - 10.5 mg/dL    Anion Gap 11.0 7.0 - 16.0   TROPONIN   Result Value Ref Range    Troponin T 2141 (H) 6 - 19 ng/L   ESTIMATED GFR   Result Value Ref Range    GFR If  44 (A) >60 mL/min/1.73 m 2    GFR If Non  36 (A) >60 mL/min/1.73 m 2   Lactic Acid -STAT Once   Result Value Ref Range    Lactic Acid 1.8 0.5 - 2.0 mmol/L   Lactic Acid Every four hours after STAT order   Result Value Ref Range    Lactic Acid 2.0 0.5 - 2.0 mmol/L   Prothrombin time (INR)    Result Value Ref Range    PT 21.2 (H) 12.0 - 14.6 sec    INR 1.78 (H) 0.87 - 1.13   CREATINE KINASE   Result Value Ref Range    CPK Total 129 0 - 154 U/L   VENOUS BLOOD GAS   Result Value Ref Range    Venous Bg Ph 7.35 7.31 - 7.45    Venous Bg Pco2 30.2 (L) 41.0 - 51.0 mmHg    Venous Bg Po2 46.7 (H) 25.0 - 40.0 mmHg    Venous Bg O2 Saturation 82.8 %    Venous Bg Hco3 16 (L) 24 - 28 mmol/L    Venous Bg Base Excess -8 mmol/L    Body Temp see below Centigrade   TSH   Result Value Ref Range    TSH 0.370 (L) 0.380 - 5.330 uIU/mL   VITAMIN B12   Result Value Ref Range    Vitamin B12 -True Cobalamin 2427 (H) 211 - 911 pg/mL   PHOSPHORUS   Result Value Ref Range    Phosphorus 2.6 2.5 - 4.5 mg/dL   URINE MICROSCOPIC (W/UA)   Result Value Ref Range    WBC 2-5 (A) /hpf    RBC 0-2 (A) /hpf    Bacteria Negative None /hpf    Epithelial Cells Negative /hpf    Hyaline Cast 0-2 /lpf   HEMOGLOBIN A1C   Result Value Ref Range    Glycohemoglobin 7.2 (H) 0.0 - 5.6 %    Est Avg Glucose 160 mg/dL   MAGNESIUM   Result Value Ref Range    Magnesium 1.8 1.5 - 2.5 mg/dL   AMMONIA   Result Value Ref Range    Ammonia 21 11 - 45 umol/L   Lactic Acid Every four hours after STAT order   Result Value Ref Range    Lactic Acid 1.3 0.5 - 2.0 mmol/L   Lactic Acid Every four hours after STAT order   Result Value Ref Range    Lactic Acid 1.0 0.5 - 2.0 mmol/L   CBC WITHOUT DIFFERENTIAL   Result Value Ref Range    WBC 12.5 (H) 4.8 - 10.8 K/uL    RBC 3.56 (L) 4.70 - 6.10 M/uL    Hemoglobin 11.4 (L) 14.0 - 18.0 g/dL    Hematocrit 35.4 (L) 42.0 - 52.0 %    MCV 99.4 (H) 81.4 - 97.8 fL    MCH 32.0 27.0 - 33.0 pg    MCHC 32.2 (L) 33.7 - 35.3 g/dL    RDW 53.1 (H) 35.9 - 50.0 fL    Platelet Count 110 (L) 164 - 446 K/uL    MPV 9.8 9.0 - 12.9 fL   Basic Metabolic Panel   Result Value Ref Range    Sodium 135 135 - 145 mmol/L    Potassium 5.0 3.6 - 5.5 mmol/L    Chloride 109 96 - 112 mmol/L    Co2 15 (L) 20 - 33 mmol/L    Glucose 151 (H) 65 - 99 mg/dL    Bun 50 (H) 8  - 22 mg/dL    Creatinine 1.80 (H) 0.50 - 1.40 mg/dL    Calcium 9.6 8.5 - 10.5 mg/dL    Anion Gap 11.0 7.0 - 16.0   ESTIMATED GFR   Result Value Ref Range    GFR If African American 45 (A) >60 mL/min/1.73 m 2    GFR If Non  37 (A) >60 mL/min/1.73 m 2   CBC WITH DIFFERENTIAL   Result Value Ref Range    WBC 12.6 (H) 4.8 - 10.8 K/uL    RBC 3.76 (L) 4.70 - 6.10 M/uL    Hemoglobin 11.9 (L) 14.0 - 18.0 g/dL    Hematocrit 37.3 (L) 42.0 - 52.0 %    MCV 99.2 (H) 81.4 - 97.8 fL    MCH 31.6 27.0 - 33.0 pg    MCHC 31.9 (L) 33.7 - 35.3 g/dL    RDW 52.5 (H) 35.9 - 50.0 fL    Platelet Count 114 (L) 164 - 446 K/uL    MPV 9.6 9.0 - 12.9 fL    Neutrophils-Polys 77.20 (H) 44.00 - 72.00 %    Lymphocytes 13.90 (L) 22.00 - 41.00 %    Monocytes 8.00 0.00 - 13.40 %    Eosinophils 0.10 0.00 - 6.90 %    Basophils 0.20 0.00 - 1.80 %    Immature Granulocytes 0.60 0.00 - 0.90 %    Nucleated RBC 0.00 /100 WBC    Neutrophils (Absolute) 9.71 (H) 1.82 - 7.42 K/uL    Lymphs (Absolute) 1.74 1.00 - 4.80 K/uL    Monos (Absolute) 1.00 (H) 0.00 - 0.85 K/uL    Eos (Absolute) 0.01 0.00 - 0.51 K/uL    Baso (Absolute) 0.02 0.00 - 0.12 K/uL    Immature Granulocytes (abs) 0.08 0.00 - 0.11 K/uL    NRBC (Absolute) 0.00 K/uL   Comp Metabolic Panel   Result Value Ref Range    Sodium 135 135 - 145 mmol/L    Potassium 5.1 3.6 - 5.5 mmol/L    Chloride 108 96 - 112 mmol/L    Co2 17 (L) 20 - 33 mmol/L    Anion Gap 10.0 7.0 - 16.0    Glucose 164 (H) 65 - 99 mg/dL    Bun 52 (H) 8 - 22 mg/dL    Creatinine 1.93 (H) 0.50 - 1.40 mg/dL    Calcium 10.2 8.5 - 10.5 mg/dL    AST(SGOT) 30 12 - 45 U/L    ALT(SGPT) 25 2 - 50 U/L    Alkaline Phosphatase 118 (H) 30 - 99 U/L    Total Bilirubin 0.9 0.1 - 1.5 mg/dL    Albumin 3.6 3.2 - 4.9 g/dL    Total Protein 6.8 6.0 - 8.2 g/dL    Globulin 3.2 1.9 - 3.5 g/dL    A-G Ratio 1.1 g/dL   ESTIMATED GFR   Result Value Ref Range    GFR If  42 (A) >60 mL/min/1.73 m 2    GFR If Non  34 (A) >60  mL/min/1.73 m 2   POC ACT (resulted by nursing)   Result Value Ref Range    Istat Activated Clotting Time 406 (H) 74 - 137 sec   POC ACT (resulted by nursing)   Result Value Ref Range    Istat Activated Clotting Time 356 (H) 74 - 137 sec   ACCU-CHEK GLUCOSE   Result Value Ref Range    Glucose - Accu-Ck 207 (H) 65 - 99 mg/dL   ACCU-CHEK GLUCOSE   Result Value Ref Range    Glucose - Accu-Ck 185 (H) 65 - 99 mg/dL   ACCU-CHEK GLUCOSE   Result Value Ref Range    Glucose - Accu-Ck 153 (H) 65 - 99 mg/dL   ACCU-CHEK GLUCOSE   Result Value Ref Range    Glucose - Accu-Ck 145 (H) 65 - 99 mg/dL   EKG   Result Value Ref Range    Report       [facility]    Test Date:  2021  Pt Name:    MOMO ESCUDERO                  Department: [facility]  MRN:        [id number]                      Room:       [facility]  Gender:     Male                         Technician: PEP  :        1950                   Requested By:ALAN KUMARI  Order #:    [id number]                    Reading MD: James Quan MD    Measurements  Intervals                                Axis  Rate:       97                           P:          -62  OR:         180                          QRS:        34  QRSD:       152                          T:          -44  QT:         384  QTc:        488    Interpretive Statements  SINUS OR ECTOPIC ATRIAL RHYTHM  RIGHT BUNDLE BRANCH BLOCK  Compared to ECG 2021 14:09:33  Atrial flutter no longer present  Electronically Signed On 2021 17:55:11 PST by James Quan MD     EKG   Result Value Ref Range    Report       [facility]    Test Date:  2021  Pt Name:    MOMO CONROY                  Department: [facility]  MRN:        [id number]                      Room:       [facility]  Gender:     Male                         Technician: DGG  :        1950                   Requested By:ALAN KUMARI  Order #:    [id number]                    Reading MD: Alina Luna MD    Measurements  Intervals                                 Axis  Rate:       113                          P:          0  LA:                                      QRS:        -11  QRSD:       152                          T:          -15  QT:         364  QTc:        500    Interpretive Statements  SINUS TACHYCARDIA  MULTIFORM VENTRICULAR PREMATURE COMPLEXES  RIGHT BUNDLE BRANCH BLOCK  Compared to ECG 2021 17:21:05  Ventricular premature complex(es) now present  Electronically Signed On 2021 7:24:56 PST by Alina Luna MD     EKG   Result Value Ref Range    Report       [facility]    Test Date:  2021  Pt Name:    MOMO CONROY                  Department: 183  MRN:        [id number]                      Room:       T824  Gender:     Male                         Technician: [facility]  :        1950                   Requested By:ALAN KUMARI  Order #:    [id number]                    Reading MD:    Measurements  Intervals                                Axis  Rate:       113                          P:          0  LA:         161                          QRS:        -7  QRSD:       144                          T:          9  QT:         372  QTc:        510    Interpretive Statements  SINUS TACHYCARDIA  MULTIPLE PREMATURE COMPLEXES, VENT & SUPRAVEN  RIGHT BUNDLE BRANCH BLOCK  BASELINE WANDER IN LEAD(S) V5  Compared to ECG 2021 13:53:18  No significant changes     EKG   Result Value Ref Range    Report       [facility]    Test Date:  2021  Pt Name:    MOMO CONROY                  Department: 183  MRN:        [id number]                      Room:       T824  Gender:     Male                         Technician: [facility]  :        1950                   Requested By:MASON NAIK  Order #:    [id number]                    Reading MD: Michael Woods MD    Measurements  Intervals                                Axis  Rate:       121                          P:          0  LA:         296                          QRS:         -29  QRSD:       144                          T:          5  QT:         364  QTc:        517    Interpretive Statements  SINUS TACHYCARDIA  FIRST DEGREE AV BLOCK  RIGHT BUNDLE BRANCH BLOCK  Compared to ECG 2021 02:28:24  Ventricular premature complex(es) no longer present  Electronically Signed On 2021 13:07:27 PST by Mcihael Woods MD     EKG   Result Value Ref Range    Report       [facility]    Test Date:  2021  Pt Name:    MOMO ESCUDERO                  Department: 183  MRN:        [id number]                      Room:       T824  Gender:     Male                         Technician: [facility]  :        1950                   Requested By:ALAN KUMARI  Order #:    [id number]                    Reading MD: Michael Woods MD    Measurements  Intervals                                Axis  Rate:       124                          P:  FL:                                      QRS:        -35  QRSD:       152                          T:          27  QT:         356  QTc:        512    Interpretive Statements  SINUS TACHYCARDIA  RIGHT BUNDLE BRANCH BLOCK  Compared to ECG 2021 10:37:48  No significant changes  Electronically Signed On 2021 14:13:20 PST by Michael Woods MD         Imaging:   DX-CHEST-PORTABLE (1 VIEW)   Final Result      Stable enlargement of the cardiomediastinal silhouette without acute cardiopulmonary abnormality.      MR-BRAIN-W/O   Final Result      1.  No acute abnormality.   2.  Mild cerebral volume loss   3.  Mild chronic microvascular ischemic disease.      US-RUQ   Final Result         1.  Hepatomegaly and echogenic liver, compatible with fatty change versus fibrosis.   2.  Cholelithiasis without additional sonographic findings of acute cholecystitis.      NZ-KRAKITH-0 VIEW   Final Result         1.  Air-filled distended loops of bowel are seen with reactive mucosal pattern, appearance suggests ileus or enteritis. Recommend radiographic followup to  resolution to exclude progression to obstruction.      CT-HEAD W/O   Final Result      No acute intracranial abnormality is identified.      Atrophy      There are periventricular and subcortical white matter changes present.  This finding is nonspecific and could be from previous small vessel ischemia, demyelination, or gliosis.      Hypodensity in the left basal ganglia likely represents an old lacunar infarct.      CT-CHEST (THORAX) W/O   Final Result      1.  Mild scattered pulmonary opacifications are noted some of which could be due to fibrosis or atelectasis. No sizable consolidations are identified. Some mild edema or inflammation could be present.      2.  No pleural effusions are identified.      3.  There is calcific atherosclerosis.      4.  Cholelithiasis.               EC-ECHOCARDIOGRAM LTD W/O CONT   Final Result      EC-JAVIER W/O CONT   Final Result      CL-EP ABLATION ATRIAL FIBRILLATION    (Results Pending)       Problem Representation:   The patient is a 70-year-old male with a past medical history of ANCA vasculitis, kidney transplant in 2008 on tacrolimus, mycophenolate, prednisone.  The patient also has CKD stage III (with baseline creatinine 1.5-1.8), sleep apnea, paroxysmal A. fib, remote history of DVT who presented on 1/27/2021 for atrial flutter ablation.  On 1/28 the patient was noted to have dysarthria/expressive aphasia that was concerning for stroke.  However, imaging did not show any acute infarct.    Encephalopathy acute  Assessment & Plan  Initially this was thought to be a stroke, but imaging including CT and MRI of the head has been negative.  CT did show an old lacunar stroke in the left basal ganglia.  The patient had a slight elevation in white count post procedure which is often expected and in absence of fever or other symptoms, negative blood cultures, negative chest x-ray and UA this is much less likely sepsis.  There is a question if this is recrudescence of his old  stroke.    Plan:  -Mycophenolate and tacrolimus levels are still pending.  Given that the patient's symptoms have now resolved it is likely not toxicity.  -Free T4 ordered  -Patient is now back to baseline.  -Recommend PT/OT.    Tremor  Assessment & Plan  In the neurology note, there is a question of asterixis.  However, on my exam it was more tremor than asterixis.  The patient has a bilateral tremor that is present at rest and worse in the right upper extremity.  The patient also had some rigidity in his right arm.    Plan:  -Recommend outpatient follow-up.    Hyperglycemia  Assessment & Plan  History of diabetes is not adocumented, patient is not on diabetic medication  We will start a sliding scale, check A1c    Chronic anticoagulation- (present on admission)  Assessment & Plan  Continue apixaban for atrial fibrillation.    Long-term use of immunosuppressant medication- (present on admission)  Assessment & Plan  Resume tacrolimus, mycophenolate, prednisone  Tacrolimus and mycophenolate level is still pending.  However, since the patient's symptoms have now resolved it is less likely toxicity from these medications.    Essential hypertension- (present on admission)  Assessment & Plan  Continue metoprolol, Lasix, benazepril  IV hydralazine as needed

## 2021-01-29 NOTE — PROGRESS NOTES
Assumed care at 0700, bedside report received from Mary Jane ANGELES. Pt. Is SR on the monitor. Initial assessment completed, orders reviewed, call light within reach, bed alarm is in use, and hourly rounding in place. POC addressed with patient, no additional questions at this time.

## 2021-01-29 NOTE — ASSESSMENT & PLAN NOTE
Continue metoprolol. Lasix, benazepril have been held in the setting of acute kidney injury.  IV hydralazine as needed

## 2021-01-30 PROBLEM — N18.32 STAGE 3B CHRONIC KIDNEY DISEASE: Status: ACTIVE | Noted: 2021-01-30

## 2021-01-30 PROBLEM — N17.9 AKI (ACUTE KIDNEY INJURY) (HCC): Status: ACTIVE | Noted: 2021-01-30

## 2021-01-30 PROBLEM — Z86.79 S/P ABLATION OF ATRIAL FIBRILLATION: Status: ACTIVE | Noted: 2021-01-30

## 2021-01-30 PROBLEM — Z98.890 S/P ABLATION OF ATRIAL FIBRILLATION: Status: ACTIVE | Noted: 2021-01-30

## 2021-01-30 LAB
ALBUMIN SERPL BCP-MCNC: 3.2 G/DL (ref 3.2–4.9)
ALBUMIN/GLOB SERPL: 1 G/DL
ALP SERPL-CCNC: 123 U/L (ref 30–99)
ALT SERPL-CCNC: 33 U/L (ref 2–50)
ANION GAP SERPL CALC-SCNC: 10 MMOL/L (ref 7–16)
AST SERPL-CCNC: 32 U/L (ref 12–45)
BASOPHILS # BLD AUTO: 0.2 % (ref 0–1.8)
BASOPHILS # BLD: 0.02 K/UL (ref 0–0.12)
BILIRUB SERPL-MCNC: 0.7 MG/DL (ref 0.1–1.5)
BUN SERPL-MCNC: 51 MG/DL (ref 8–22)
CALCIUM SERPL-MCNC: 10 MG/DL (ref 8.5–10.5)
CHLORIDE SERPL-SCNC: 110 MMOL/L (ref 96–112)
CO2 SERPL-SCNC: 18 MMOL/L (ref 20–33)
CREAT SERPL-MCNC: 2.04 MG/DL (ref 0.5–1.4)
EOSINOPHIL # BLD AUTO: 0.13 K/UL (ref 0–0.51)
EOSINOPHIL NFR BLD: 1.2 % (ref 0–6.9)
ERYTHROCYTE [DISTWIDTH] IN BLOOD BY AUTOMATED COUNT: 52.7 FL (ref 35.9–50)
GLOBULIN SER CALC-MCNC: 3.3 G/DL (ref 1.9–3.5)
GLUCOSE BLD-MCNC: 109 MG/DL (ref 65–99)
GLUCOSE BLD-MCNC: 132 MG/DL (ref 65–99)
GLUCOSE BLD-MCNC: 146 MG/DL (ref 65–99)
GLUCOSE BLD-MCNC: 158 MG/DL (ref 65–99)
GLUCOSE BLD-MCNC: 228 MG/DL (ref 65–99)
GLUCOSE SERPL-MCNC: 146 MG/DL (ref 65–99)
HCT VFR BLD AUTO: 36.2 % (ref 42–52)
HGB BLD-MCNC: 11.6 G/DL (ref 14–18)
IMM GRANULOCYTES # BLD AUTO: 0.06 K/UL (ref 0–0.11)
IMM GRANULOCYTES NFR BLD AUTO: 0.5 % (ref 0–0.9)
LYMPHOCYTES # BLD AUTO: 2.32 K/UL (ref 1–4.8)
LYMPHOCYTES NFR BLD: 21 % (ref 22–41)
MCH RBC QN AUTO: 31.9 PG (ref 27–33)
MCHC RBC AUTO-ENTMCNC: 32 G/DL (ref 33.7–35.3)
MCV RBC AUTO: 99.5 FL (ref 81.4–97.8)
MONOCYTES # BLD AUTO: 0.99 K/UL (ref 0–0.85)
MONOCYTES NFR BLD AUTO: 9 % (ref 0–13.4)
MYCOPHENOLATE SERPL LC/MS/MS-MCNC: <0.5 UG/ML (ref 1–3.5)
MYCOPHENOLATE-G SERPL LC/MS/MS-MCNC: 77.1 UG/ML (ref 35–100)
NEUTROPHILS # BLD AUTO: 7.52 K/UL (ref 1.82–7.42)
NEUTROPHILS NFR BLD: 68.1 % (ref 44–72)
NRBC # BLD AUTO: 0 K/UL
NRBC BLD-RTO: 0 /100 WBC
PLATELET # BLD AUTO: 117 K/UL (ref 164–446)
PMV BLD AUTO: 9.5 FL (ref 9–12.9)
POTASSIUM SERPL-SCNC: 4.8 MMOL/L (ref 3.6–5.5)
PROT SERPL-MCNC: 6.5 G/DL (ref 6–8.2)
RBC # BLD AUTO: 3.64 M/UL (ref 4.7–6.1)
SODIUM SERPL-SCNC: 138 MMOL/L (ref 135–145)
WBC # BLD AUTO: 11 K/UL (ref 4.8–10.8)

## 2021-01-30 PROCEDURE — 700102 HCHG RX REV CODE 250 W/ 637 OVERRIDE(OP): Performed by: INTERNAL MEDICINE

## 2021-01-30 PROCEDURE — 99233 SBSQ HOSP IP/OBS HIGH 50: CPT | Mod: GC | Performed by: HOSPITALIST

## 2021-01-30 PROCEDURE — 85025 COMPLETE CBC W/AUTO DIFF WBC: CPT

## 2021-01-30 PROCEDURE — 80197 ASSAY OF TACROLIMUS: CPT

## 2021-01-30 PROCEDURE — A9270 NON-COVERED ITEM OR SERVICE: HCPCS | Performed by: HOSPITALIST

## 2021-01-30 PROCEDURE — A9270 NON-COVERED ITEM OR SERVICE: HCPCS | Performed by: NURSE PRACTITIONER

## 2021-01-30 PROCEDURE — 700102 HCHG RX REV CODE 250 W/ 637 OVERRIDE(OP): Performed by: HOSPITALIST

## 2021-01-30 PROCEDURE — A9270 NON-COVERED ITEM OR SERVICE: HCPCS | Performed by: INTERNAL MEDICINE

## 2021-01-30 PROCEDURE — 82962 GLUCOSE BLOOD TEST: CPT

## 2021-01-30 PROCEDURE — 700105 HCHG RX REV CODE 258: Performed by: STUDENT IN AN ORGANIZED HEALTH CARE EDUCATION/TRAINING PROGRAM

## 2021-01-30 PROCEDURE — 770020 HCHG ROOM/CARE - TELE (206)

## 2021-01-30 PROCEDURE — 80053 COMPREHEN METABOLIC PANEL: CPT

## 2021-01-30 PROCEDURE — 700111 HCHG RX REV CODE 636 W/ 250 OVERRIDE (IP): Performed by: INTERNAL MEDICINE

## 2021-01-30 PROCEDURE — 36415 COLL VENOUS BLD VENIPUNCTURE: CPT

## 2021-01-30 PROCEDURE — 700102 HCHG RX REV CODE 250 W/ 637 OVERRIDE(OP): Performed by: NURSE PRACTITIONER

## 2021-01-30 RX ORDER — RISPERIDONE 0.5 MG/1
0.5 TABLET ORAL
Status: DISCONTINUED | OUTPATIENT
Start: 2021-01-30 | End: 2021-02-01 | Stop reason: HOSPADM

## 2021-01-30 RX ORDER — FAMOTIDINE 20 MG/1
20 TABLET, FILM COATED ORAL DAILY
Status: DISCONTINUED | OUTPATIENT
Start: 2021-01-30 | End: 2021-01-30

## 2021-01-30 RX ORDER — SODIUM CHLORIDE 9 MG/ML
500 INJECTION, SOLUTION INTRAVENOUS ONCE
Status: COMPLETED | OUTPATIENT
Start: 2021-01-30 | End: 2021-01-30

## 2021-01-30 RX ADMIN — SODIUM CHLORIDE 500 ML: 9 INJECTION, SOLUTION INTRAVENOUS at 12:24

## 2021-01-30 RX ADMIN — INSULIN HUMAN 2 UNITS: 100 INJECTION, SOLUTION PARENTERAL at 12:20

## 2021-01-30 RX ADMIN — APIXABAN 5 MG: 5 TABLET, FILM COATED ORAL at 17:05

## 2021-01-30 RX ADMIN — SODIUM CHLORIDE 500 ML: 9 INJECTION, SOLUTION INTRAVENOUS at 15:29

## 2021-01-30 RX ADMIN — COLCHICINE 0.6 MG: 0.6 TABLET, FILM COATED ORAL at 04:54

## 2021-01-30 RX ADMIN — FAMOTIDINE 20 MG: 20 TABLET ORAL at 05:41

## 2021-01-30 RX ADMIN — SUCRALFATE 1 G: 1 TABLET ORAL at 12:24

## 2021-01-30 RX ADMIN — CARBIDOPA AND LEVODOPA 1 TABLET: 25; 100 TABLET ORAL at 21:06

## 2021-01-30 RX ADMIN — BENAZEPRIL HYDROCHLORIDE 20 MG: 20 TABLET ORAL at 04:55

## 2021-01-30 RX ADMIN — APIXABAN 5 MG: 5 TABLET, FILM COATED ORAL at 04:54

## 2021-01-30 RX ADMIN — SUCRALFATE 1 G: 1 TABLET ORAL at 05:41

## 2021-01-30 RX ADMIN — METOPROLOL SUCCINATE 25 MG: 25 TABLET, EXTENDED RELEASE ORAL at 17:05

## 2021-01-30 RX ADMIN — TACROLIMUS 1 MG: 1 CAPSULE ORAL at 21:06

## 2021-01-30 RX ADMIN — PREDNISONE 5 MG: 5 TABLET ORAL at 04:54

## 2021-01-30 RX ADMIN — SUCRALFATE 1 G: 1 TABLET ORAL at 23:06

## 2021-01-30 RX ADMIN — SUCRALFATE 1 G: 1 TABLET ORAL at 17:05

## 2021-01-30 RX ADMIN — METOPROLOL SUCCINATE 25 MG: 25 TABLET, EXTENDED RELEASE ORAL at 04:55

## 2021-01-30 RX ADMIN — MYCOPHENOLATE MOFETIL 500 MG: 250 CAPSULE ORAL at 21:06

## 2021-01-30 RX ADMIN — MYCOPHENOLATE MOFETIL 500 MG: 250 CAPSULE ORAL at 09:48

## 2021-01-30 RX ADMIN — TACROLIMUS 1 MG: 1 CAPSULE ORAL at 09:48

## 2021-01-30 RX ADMIN — OMEPRAZOLE 20 MG: 20 CAPSULE, DELAYED RELEASE ORAL at 04:55

## 2021-01-30 RX ADMIN — CARBIDOPA AND LEVODOPA 1 TABLET: 25; 100 TABLET ORAL at 12:24

## 2021-01-30 ASSESSMENT — ENCOUNTER SYMPTOMS
SENSORY CHANGE: 0
DOUBLE VISION: 0
TINGLING: 0
STRIDOR: 0
MYALGIAS: 0
FOCAL WEAKNESS: 0
VOMITING: 0
SEIZURES: 0
BLURRED VISION: 0
SORE THROAT: 0
FALLS: 0
COUGH: 0
APNEA: 0
HEADACHES: 0
LOSS OF CONSCIOUSNESS: 0
PALPITATIONS: 0
WEAKNESS: 0
FEVER: 0
DIZZINESS: 0
CHILLS: 0
TREMORS: 1
SHORTNESS OF BREATH: 0
SPEECH CHANGE: 0
CHEST TIGHTNESS: 0
NAUSEA: 0
WHEEZING: 0
CHOKING: 0

## 2021-01-30 ASSESSMENT — PAIN DESCRIPTION - PAIN TYPE
TYPE: CHRONIC PAIN
TYPE: ACUTE PAIN
TYPE: ACUTE PAIN
TYPE: CHRONIC PAIN

## 2021-01-30 ASSESSMENT — FIBROSIS 4 INDEX
FIB4 SCORE: 3.43
FIB4 SCORE: 3.79

## 2021-01-30 NOTE — PROGRESS NOTES
Assumed care at 0700, bedside report received from Cecilia ANGELES. Pt. Is ST on the monitor. Initial assessment completed, orders reviewed, call light within reach, bed alarm is in use, and hourly rounding in place. POC addressed with patient, no additional questions at this time.

## 2021-01-30 NOTE — PROGRESS NOTES
Cardiology Follow Up Progress Note    Date of Service  1/29/2021    Attending Physician  Rubin Forrester M.D.    Chief Complaint   Elective admission for AF ablation    HPI  Krishan Acevedo is a 70 y.o. male admitted 1/27/2021 for elective admission for AF ablation.  He underwent ablation with  1/27/21 with PVI with roof ablation, Right atrial flutter.    Medical history significant for ESRD S/P renal transplant in 2008, wegener's granulomatosis, paroxysmal atrial fibrillation, chronic anticoagulation.      Interim Events  01/29/2021: Seen in EP Rounds  Denies cardiac complaints. AAOx4. No neuro deficits. BP stable.   Monitored rhythm: ST 100s-120s.   BC x 2- NTD   TSH 0.370- low    Review of Systems  Review of Systems   Constitutional: Negative for chills, fatigue and fever.   HENT: Negative for congestion, nosebleeds, tinnitus and trouble swallowing.    Respiratory: Negative for cough, chest tightness, shortness of breath and wheezing.    Cardiovascular: Negative for chest pain, palpitations and leg swelling.   Gastrointestinal: Negative for abdominal pain, blood in stool, nausea and vomiting.   Genitourinary: Negative for difficulty urinating.   Musculoskeletal:        Shoulder pain   Neurological: Negative for dizziness, syncope, speech difficulty, weakness, light-headedness and numbness.     Vital signs in last 24 hours  Temp:  [36.3 °C (97.4 °F)-37.1 °C (98.8 °F)] 36.6 °C (97.9 °F)  Pulse:  [] 111  Resp:  [18-28] 18  BP: (128-158)/(67-95) 142/87  SpO2:  [92 %-97 %] 94 %    Physical Exam  Physical Exam  Vitals signs and nursing note reviewed.   Constitutional:       General: He is not in acute distress.     Appearance: Normal appearance. He is not ill-appearing.   Neck:      Musculoskeletal: Normal range of motion.   Cardiovascular:      Rate and Rhythm: Regular rhythm. Tachycardia present.      Pulses: Normal pulses.      Heart sounds: Normal heart sounds. No murmur. No friction rub. No gallop.     Pulmonary:      Effort: Pulmonary effort is normal.      Breath sounds: Normal breath sounds. No wheezing, rhonchi or rales.   Musculoskeletal: Normal range of motion.   Skin:     General: Skin is warm and dry.      Comments: Right groin site CDI, mild bruising, Soft, non-tender. No evidence of active bleeding, edema, erythema, or hematoma.    Neurological:      Mental Status: He is alert and oriented to person, place, and time.   Psychiatric:         Mood and Affect: Mood normal.         Behavior: Behavior normal.         Thought Content: Thought content normal.         Judgment: Judgment normal.       Lab Review  Lab Results   Component Value Date/Time    WBC 12.6 (H) 01/29/2021 01:13 PM    RBC 3.76 (L) 01/29/2021 01:13 PM    HEMOGLOBIN 11.9 (L) 01/29/2021 01:13 PM    HEMATOCRIT 37.3 (L) 01/29/2021 01:13 PM    MCV 99.2 (H) 01/29/2021 01:13 PM    MCH 31.6 01/29/2021 01:13 PM    MCHC 31.9 (L) 01/29/2021 01:13 PM    MPV 9.6 01/29/2021 01:13 PM      Lab Results   Component Value Date/Time    SODIUM 135 01/29/2021 01:13 PM    POTASSIUM 5.1 01/29/2021 01:13 PM    CHLORIDE 108 01/29/2021 01:13 PM    CO2 17 (L) 01/29/2021 01:13 PM    GLUCOSE 164 (H) 01/29/2021 01:13 PM    BUN 52 (H) 01/29/2021 01:13 PM    CREATININE 1.93 (H) 01/29/2021 01:13 PM      Lab Results   Component Value Date/Time    ASTSGOT 30 01/29/2021 01:13 PM    ALTSGPT 25 01/29/2021 01:13 PM     Lab Results   Component Value Date/Time    CHOLSTRLTOT 101 01/20/2021 07:48 AM    LDL 44 01/20/2021 07:48 AM    HDL 30 (A) 01/20/2021 07:48 AM    TRIGLYCERIDE 133 01/20/2021 07:48 AM    TROPONINT 2141 (H) 01/28/2021 02:10 PM       No results for input(s): NTPROBNP in the last 72 hours.    Cardiac Imaging and Procedures Review  EKG 01/29/2021: Sinus tachycardia, RBBB, PVC/PACs    Echocardiogram:  1/28/21  CONCLUSIONS  Limited echocardiogram study to evaluate pericardial effusion post ablation. Trivial pericardial effusion without evidence of hemodynamic  compromise.    Imaging  Chest X-Ray (01/29/2021):  IMPRESSION:  Stable enlargement of the cardiomediastinal silhouette without acute cardiopulmonary abnormality.    Cheat X-Ray (01/28/2021):  IMPRESSION:  1.  Air-filled distended loops of bowel are seen with reactive mucosal pattern, appearance suggests ileus or enteritis. Recommend radiographic followup to resolution to exclude progression to obstruction.    CT chest without contrast: Mild scattered pulmonary opacifications are noted some of which could be due to fibrosis or atelectasis.  No consolidation seen    RUQ US (01/29/21):  IMPRESSION:  1.  Hepatomegaly and echogenic liver, compatible with fatty change versus fibrosis.  2.  Cholelithiasis without additional sonographic findings of acute cholecystitis.    Brain MRI (01/29/2021):  IMPRESSION:  1.  No acute abnormality.  2.  Mild cerebral volume loss  3.  Mild chronic microvascular ischemic disease.    Assessment/Plan  1. Atrial Arrhythmias (AF/AFL)  - S/P RF ablation with PVI/roof ablation and CTI with block by Dr. Loving on 1/27/21.  - Post operative CP/encephalopathy, resolving. Per neurology, possible toxic/metabloic encephalopathy.   - AAOx4, no neuro deficits upon exam. Brain CT and MRI negative. Denies CP. Chest CT negative. RUQ US- No acute cholecystitis.   - No evidence of acute infectious process, TMax 99.5*F, WBC 12.6. BC x 2 NTD. COVID negative (1/22/21). hx of MDRO. Primary team following.   - LTD echo without evidence of effusion (trivial). Findings and hx support likely pleuritic/inflammatory pain post ablation. Unable to give NSAIDs with hx of renal transplant.  Continue Colchicine given renal status.  Already on oral steroids.   - Continued sinus tachycardia, improving. No evidence of AT/AF/AFL.    - Continue anticoagulation with Eliquis.   - Post ablation discharge education per below.   - Continue to monitor, [place] in AM, significantly improved.      ACTIVITY/SPECIFIC OUTPATIENT DISCHARGE  INSTRUCTIONS  Post Ablation Patient Instructions:  No lifting > 10 lbs x 1 week.  No baths or hot tubs x 1 week.  May shower on 01/30/2021 and take off groin dressings.  Continue to monitor sites daily for warmth, redness, discolored drainage.     Please take the esophageal protection medication that is prescribed to you for one month post procedure without missed doses.  Please do not miss any doses of your blood thinner.       Please walk and take deep breaths after discharge.  After discharge, if  experiences chest pain, shortness of breath, hemoptysis, neurological changes, high fever, lightheadedness/dizziness, trouble with catheter sites needs to be seen in the emergency department.    FOLLOW UP  Patient will follow up with EP in outpatient in 2-4 weeks as arranged for procedural followup or sooner if needed.  Patient instructed to monitor blood pressure and Hrs, and call the office if abnormal or with any questions/concerns.      LLOYD Singh.   [facility]  [phone]

## 2021-01-30 NOTE — CARE PLAN
Problem: Communication  Goal: The ability to communicate needs accurately and effectively will improve  Outcome: PROGRESSING AS EXPECTED  Note: Pt is able to communicate needs appropriately. Call light within reach.       Problem: Safety  Goal: Will remain free from injury  Outcome: PROGRESSING AS EXPECTED  Note: Fall precautions in place. Bed in lowest position. Non-skid socks in place. Personal possessions within reach. Mobility sign on door. Bed-alarm on. Call light within reach. Pt educated regarding fall prevention and states understanding.     Goal: Will remain free from falls  Outcome: PROGRESSING AS EXPECTED  Note: Bed locked and in lowest position. Bed alarm on. Treaded socks in use. Call light and belongings within reach. Pt educated to call for assistance. Pt verbalized understanding. Hourly rounding in place.

## 2021-01-30 NOTE — PROGRESS NOTES
Assumed care of patient, bedside report received from BRIDGETTE cassidy. Updated on POC, call light within reach and fall precautions in place. Bed locked and in lowest position. Patient instructed to call for assistance before getting out of bed. All questions answered, no other needs at this time.

## 2021-01-30 NOTE — PROGRESS NOTES
Daily Progress Note:     Date of Service: 1/30/2021  Primary Team: [facility] Red Team    Attending: Rubin Forrester M.D.   Senior Resident: Loan Alfonso M.D.  Contact:  [phone]    ID:   The patient is a 70-year-old male with a history of ANCA vasculitis, kidney transplant in 2008 on tacrolimus/mycophenolate/prednisone, CKD stage III (baseline creatinine 1.5-1.8), sleep apnea, paroxysmal atrial fibrillation, atrial flutter who presented on 1/27/2021 for an atrial flutter ablation.  The patient successfully underwent an ablation on 1/27.  The patient was initially treated with heparin and started on apixaban on 1/28.  On the night of 1/28 the patient began having left shoulder pain and mild expressive aphasia with an NIH stroke scale of 6 due to right and left arm drift, visual field deficit, drowsiness.  The patient was not given TPA since he is currently taking Eliquis.    Interval Update:  The patient was a little confused overnight.  The patient is aware that he was confused.  This morning when I saw him he was doing significantly better.  However, he continues to have a tremor.  His creatinine has been slowly increasing.  He appears clinically dry on exam.  He also has hyaline casts in his urine which is indicative of intravascular volume depletion.  We will be giving him to 500 cc boluses and monitoring his creatinine in the morning.  All nephrotoxic medications such as allopurinol, colchicine, Lasix, benazepril have been held at this time.    Consultants/Specialty:  Cardiology, EP  Neurology    Review of Systems:    Review of Systems   Constitutional: Negative for chills and fever.   HENT: Negative for congestion and sore throat.    Eyes: Negative for blurred vision and double vision.   Respiratory: Negative for cough and shortness of breath.    Cardiovascular: Negative for chest pain and palpitations.   Gastrointestinal: Negative for nausea and vomiting.   Genitourinary: Negative for dysuria and urgency.    Musculoskeletal: Negative for falls and myalgias.   Skin: Negative for itching and rash.   Neurological: Positive for tremors. Negative for dizziness, tingling, sensory change, speech change, focal weakness, seizures, loss of consciousness, weakness and headaches.       Objective Data:   Physical Exam:   Vitals:   Temp:  [36.6 °C (97.9 °F)-37.4 °C (99.3 °F)] 37.3 °C (99.2 °F)  Pulse:  [] 102  Resp:  [18-20] 20  BP: (118-158)/(56-98) 126/70  SpO2:  [92 %-97 %] 95 %     Physical Exam  Vitals signs and nursing note reviewed.   Constitutional:       General: He is not in acute distress.     Appearance: He is not toxic-appearing.   HENT:      Head: Normocephalic and atraumatic.      Mouth/Throat:      Mouth: Mucous membranes are dry.      Pharynx: Oropharynx is clear. No oropharyngeal exudate.   Eyes:      General: No scleral icterus.     Extraocular Movements: Extraocular movements intact.      Pupils: Pupils are equal, round, and reactive to light.   Cardiovascular:      Rate and Rhythm: Regular rhythm. Tachycardia present.      Pulses: Normal pulses.      Heart sounds: No murmur. No friction rub. No gallop.    Pulmonary:      Effort: Pulmonary effort is normal. No respiratory distress.      Breath sounds: Normal breath sounds. No wheezing or rales.   Abdominal:      General: Abdomen is flat. There is no distension.      Palpations: Abdomen is soft.   Musculoskeletal:      Right lower leg: No edema.      Left lower leg: No edema.   Skin:     General: Skin is warm and dry.   Neurological:      Mental Status: He is alert and oriented to person, place, and time.      Comments: GENERAL:  Lying in the hospital bed/later seen in chair in no apparent distress.  MENTAL STATUS:  Awake, alert, oriented times 3.  Speech is fluent, comprehension is intact.  Can name and repeat appropriately.  CRANIAL NERVES:  PERRL, EOMI with no nystagmus, face is symmetric, facial sensation is intact, tongue is in the midline, palate is  symmetric.  MOTOR:  5/5 throughout.  The patient does have a tremor in bilateral upper extremities worse in the right arm.  There is also some rigidity in right arm.  REFLEXES:  2+ and symmetric, toes are downgoing bilaterally  SENSATION:  Intact to light touch throughout  COORDINATION:  Normal finger to nose on left.  Some overshooting possibly due to tremor on right.  GAIT:  Deferred, but was able to stand up and get to chair with minimal assist.     Psychiatric:         Mood and Affect: Mood normal.         Behavior: Behavior normal.         Labs:   Results for orders placed or performed during the hospital encounter of 01/27/21   PT   Result Value Ref Range    PT 18.5 (H) 12.0 - 14.6 sec    INR 1.49 (H) 0.87 - 1.13   CBC WITHOUT DIFFERENTIAL   Result Value Ref Range    WBC 12.4 (H) 4.8 - 10.8 K/uL    RBC 3.99 (L) 4.70 - 6.10 M/uL    Hemoglobin 12.6 (L) 14.0 - 18.0 g/dL    Hematocrit 39.1 (L) 42.0 - 52.0 %    MCV 98.0 (H) 81.4 - 97.8 fL    MCH 31.6 27.0 - 33.0 pg    MCHC 32.2 (L) 33.7 - 35.3 g/dL    RDW 52.0 (H) 35.9 - 50.0 fL    Platelet Count 127 (L) 164 - 446 K/uL    MPV 9.5 9.0 - 12.9 fL   Comp Metabolic Panel   Result Value Ref Range    Sodium 143 135 - 145 mmol/L    Potassium 5.5 3.6 - 5.5 mmol/L    Chloride 114 (H) 96 - 112 mmol/L    Co2 15 (L) 20 - 33 mmol/L    Anion Gap 14.0 7.0 - 16.0    Glucose 180 (H) 65 - 99 mg/dL    Bun 55 (H) 8 - 22 mg/dL    Creatinine 1.79 (H) 0.50 - 1.40 mg/dL    Calcium 9.9 8.5 - 10.5 mg/dL    AST(SGOT) 37 12 - 45 U/L    ALT(SGPT) 28 2 - 50 U/L    Alkaline Phosphatase 135 (H) 30 - 99 U/L    Total Bilirubin 0.7 0.1 - 1.5 mg/dL    Albumin 3.8 3.2 - 4.9 g/dL    Total Protein 6.8 6.0 - 8.2 g/dL    Globulin 3.0 1.9 - 3.5 g/dL    A-G Ratio 1.3 g/dL   ESTIMATED GFR   Result Value Ref Range    GFR If African American 45 (A) >60 mL/min/1.73 m 2    GFR If Non  37 (A) >60 mL/min/1.73 m 2   LIPASE   Result Value Ref Range    Lipase 11 11 - 82 U/L   AMYLASE   Result Value Ref  Range    Amylase 20 20 - 103 U/L   BLOOD CULTURE    Specimen: Peripheral; Blood   Result Value Ref Range    Significant Indicator NEG     Source BLD     Site PERIPHERAL     Culture Result       No Growth  Note: Blood cultures are incubated for 5 days and  are monitored continuously.Positive blood cultures  are called to the RN and reported as soon as  they are identified.     BLOOD CULTURE    Specimen: Peripheral; Blood   Result Value Ref Range    Significant Indicator NEG     Source BLD     Site PERIPHERAL     Culture Result       No Growth  Note: Blood cultures are incubated for 5 days and  are monitored continuously.Positive blood cultures  are called to the RN and reported as soon as  they are identified.     URINALYSIS    Specimen: Urine, Clean Catch   Result Value Ref Range    Color Yellow     Character Clear     Specific Gravity 1.015 <1.035    Ph 5.0 5.0 - 8.0    Glucose Negative Negative mg/dL    Ketones Negative Negative mg/dL    Protein Negative Negative mg/dL    Bilirubin Negative Negative    Urobilinogen, Urine 0.2 Negative    Nitrite Negative Negative    Leukocyte Esterase Small (A) Negative    Occult Blood Negative Negative    Micro Urine Req Microscopic    Basic Metabolic Panel   Result Value Ref Range    Sodium 135 135 - 145 mmol/L    Potassium 5.3 3.6 - 5.5 mmol/L    Chloride 108 96 - 112 mmol/L    Co2 16 (L) 20 - 33 mmol/L    Glucose 199 (H) 65 - 99 mg/dL    Bun 51 (H) 8 - 22 mg/dL    Creatinine 1.84 (H) 0.50 - 1.40 mg/dL    Calcium 9.9 8.5 - 10.5 mg/dL    Anion Gap 11.0 7.0 - 16.0   TROPONIN   Result Value Ref Range    Troponin T 2141 (H) 6 - 19 ng/L   ESTIMATED GFR   Result Value Ref Range    GFR If  44 (A) >60 mL/min/1.73 m 2    GFR If Non  36 (A) >60 mL/min/1.73 m 2   Lactic Acid -STAT Once   Result Value Ref Range    Lactic Acid 1.8 0.5 - 2.0 mmol/L   Lactic Acid Every four hours after STAT order   Result Value Ref Range    Lactic Acid 2.0 0.5 - 2.0 mmol/L    Prothrombin time (INR)   Result Value Ref Range    PT 21.2 (H) 12.0 - 14.6 sec    INR 1.78 (H) 0.87 - 1.13   CREATINE KINASE   Result Value Ref Range    CPK Total 129 0 - 154 U/L   VENOUS BLOOD GAS   Result Value Ref Range    Venous Bg Ph 7.35 7.31 - 7.45    Venous Bg Pco2 30.2 (L) 41.0 - 51.0 mmHg    Venous Bg Po2 46.7 (H) 25.0 - 40.0 mmHg    Venous Bg O2 Saturation 82.8 %    Venous Bg Hco3 16 (L) 24 - 28 mmol/L    Venous Bg Base Excess -8 mmol/L    Body Temp see below Centigrade   TSH   Result Value Ref Range    TSH 0.370 (L) 0.380 - 5.330 uIU/mL   VITAMIN B12   Result Value Ref Range    Vitamin B12 -True Cobalamin 2427 (H) 211 - 911 pg/mL   PHOSPHORUS   Result Value Ref Range    Phosphorus 2.6 2.5 - 4.5 mg/dL   URINE MICROSCOPIC (W/UA)   Result Value Ref Range    WBC 2-5 (A) /hpf    RBC 0-2 (A) /hpf    Bacteria Negative None /hpf    Epithelial Cells Negative /hpf    Hyaline Cast 0-2 /lpf   HEMOGLOBIN A1C   Result Value Ref Range    Glycohemoglobin 7.2 (H) 0.0 - 5.6 %    Est Avg Glucose 160 mg/dL   MAGNESIUM   Result Value Ref Range    Magnesium 1.8 1.5 - 2.5 mg/dL   AMMONIA   Result Value Ref Range    Ammonia 21 11 - 45 umol/L   Lactic Acid Every four hours after STAT order   Result Value Ref Range    Lactic Acid 1.3 0.5 - 2.0 mmol/L   Lactic Acid Every four hours after STAT order   Result Value Ref Range    Lactic Acid 1.0 0.5 - 2.0 mmol/L   CBC WITHOUT DIFFERENTIAL   Result Value Ref Range    WBC 12.5 (H) 4.8 - 10.8 K/uL    RBC 3.56 (L) 4.70 - 6.10 M/uL    Hemoglobin 11.4 (L) 14.0 - 18.0 g/dL    Hematocrit 35.4 (L) 42.0 - 52.0 %    MCV 99.4 (H) 81.4 - 97.8 fL    MCH 32.0 27.0 - 33.0 pg    MCHC 32.2 (L) 33.7 - 35.3 g/dL    RDW 53.1 (H) 35.9 - 50.0 fL    Platelet Count 110 (L) 164 - 446 K/uL    MPV 9.8 9.0 - 12.9 fL   Basic Metabolic Panel   Result Value Ref Range    Sodium 135 135 - 145 mmol/L    Potassium 5.0 3.6 - 5.5 mmol/L    Chloride 109 96 - 112 mmol/L    Co2 15 (L) 20 - 33 mmol/L    Glucose 151 (H) 65 -  99 mg/dL    Bun 50 (H) 8 - 22 mg/dL    Creatinine 1.80 (H) 0.50 - 1.40 mg/dL    Calcium 9.6 8.5 - 10.5 mg/dL    Anion Gap 11.0 7.0 - 16.0   ESTIMATED GFR   Result Value Ref Range    GFR If African American 45 (A) >60 mL/min/1.73 m 2    GFR If Non  37 (A) >60 mL/min/1.73 m 2   CBC WITH DIFFERENTIAL   Result Value Ref Range    WBC 12.6 (H) 4.8 - 10.8 K/uL    RBC 3.76 (L) 4.70 - 6.10 M/uL    Hemoglobin 11.9 (L) 14.0 - 18.0 g/dL    Hematocrit 37.3 (L) 42.0 - 52.0 %    MCV 99.2 (H) 81.4 - 97.8 fL    MCH 31.6 27.0 - 33.0 pg    MCHC 31.9 (L) 33.7 - 35.3 g/dL    RDW 52.5 (H) 35.9 - 50.0 fL    Platelet Count 114 (L) 164 - 446 K/uL    MPV 9.6 9.0 - 12.9 fL    Neutrophils-Polys 77.20 (H) 44.00 - 72.00 %    Lymphocytes 13.90 (L) 22.00 - 41.00 %    Monocytes 8.00 0.00 - 13.40 %    Eosinophils 0.10 0.00 - 6.90 %    Basophils 0.20 0.00 - 1.80 %    Immature Granulocytes 0.60 0.00 - 0.90 %    Nucleated RBC 0.00 /100 WBC    Neutrophils (Absolute) 9.71 (H) 1.82 - 7.42 K/uL    Lymphs (Absolute) 1.74 1.00 - 4.80 K/uL    Monos (Absolute) 1.00 (H) 0.00 - 0.85 K/uL    Eos (Absolute) 0.01 0.00 - 0.51 K/uL    Baso (Absolute) 0.02 0.00 - 0.12 K/uL    Immature Granulocytes (abs) 0.08 0.00 - 0.11 K/uL    NRBC (Absolute) 0.00 K/uL   Comp Metabolic Panel   Result Value Ref Range    Sodium 135 135 - 145 mmol/L    Potassium 5.1 3.6 - 5.5 mmol/L    Chloride 108 96 - 112 mmol/L    Co2 17 (L) 20 - 33 mmol/L    Anion Gap 10.0 7.0 - 16.0    Glucose 164 (H) 65 - 99 mg/dL    Bun 52 (H) 8 - 22 mg/dL    Creatinine 1.93 (H) 0.50 - 1.40 mg/dL    Calcium 10.2 8.5 - 10.5 mg/dL    AST(SGOT) 30 12 - 45 U/L    ALT(SGPT) 25 2 - 50 U/L    Alkaline Phosphatase 118 (H) 30 - 99 U/L    Total Bilirubin 0.9 0.1 - 1.5 mg/dL    Albumin 3.6 3.2 - 4.9 g/dL    Total Protein 6.8 6.0 - 8.2 g/dL    Globulin 3.2 1.9 - 3.5 g/dL    A-G Ratio 1.1 g/dL   ESTIMATED GFR   Result Value Ref Range    GFR If  42 (A) >60 mL/min/1.73 m 2    GFR If Non African  American 34 (A) >60 mL/min/1.73 m 2   FREE THYROXINE   Result Value Ref Range    Free T-4 1.75 (H) 0.93 - 1.70 ng/dL   GAMMA GT (GGT)   Result Value Ref Range    Gamma Gt 159 (H) 7 - 51 U/L   CBC WITH DIFFERENTIAL   Result Value Ref Range    WBC 11.0 (H) 4.8 - 10.8 K/uL    RBC 3.64 (L) 4.70 - 6.10 M/uL    Hemoglobin 11.6 (L) 14.0 - 18.0 g/dL    Hematocrit 36.2 (L) 42.0 - 52.0 %    MCV 99.5 (H) 81.4 - 97.8 fL    MCH 31.9 27.0 - 33.0 pg    MCHC 32.0 (L) 33.7 - 35.3 g/dL    RDW 52.7 (H) 35.9 - 50.0 fL    Platelet Count 117 (L) 164 - 446 K/uL    MPV 9.5 9.0 - 12.9 fL    Neutrophils-Polys 68.10 44.00 - 72.00 %    Lymphocytes 21.00 (L) 22.00 - 41.00 %    Monocytes 9.00 0.00 - 13.40 %    Eosinophils 1.20 0.00 - 6.90 %    Basophils 0.20 0.00 - 1.80 %    Immature Granulocytes 0.50 0.00 - 0.90 %    Nucleated RBC 0.00 /100 WBC    Neutrophils (Absolute) 7.52 (H) 1.82 - 7.42 K/uL    Lymphs (Absolute) 2.32 1.00 - 4.80 K/uL    Monos (Absolute) 0.99 (H) 0.00 - 0.85 K/uL    Eos (Absolute) 0.13 0.00 - 0.51 K/uL    Baso (Absolute) 0.02 0.00 - 0.12 K/uL    Immature Granulocytes (abs) 0.06 0.00 - 0.11 K/uL    NRBC (Absolute) 0.00 K/uL   Comp Metabolic Panel   Result Value Ref Range    Sodium 138 135 - 145 mmol/L    Potassium 4.8 3.6 - 5.5 mmol/L    Chloride 110 96 - 112 mmol/L    Co2 18 (L) 20 - 33 mmol/L    Anion Gap 10.0 7.0 - 16.0    Glucose 146 (H) 65 - 99 mg/dL    Bun 51 (H) 8 - 22 mg/dL    Creatinine 2.04 (H) 0.50 - 1.40 mg/dL    Calcium 10.0 8.5 - 10.5 mg/dL    AST(SGOT) 32 12 - 45 U/L    ALT(SGPT) 33 2 - 50 U/L    Alkaline Phosphatase 123 (H) 30 - 99 U/L    Total Bilirubin 0.7 0.1 - 1.5 mg/dL    Albumin 3.2 3.2 - 4.9 g/dL    Total Protein 6.5 6.0 - 8.2 g/dL    Globulin 3.3 1.9 - 3.5 g/dL    A-G Ratio 1.0 g/dL   ESTIMATED GFR   Result Value Ref Range    GFR If  39 (A) >60 mL/min/1.73 m 2    GFR If Non  32 (A) >60 mL/min/1.73 m 2   POC ACT (resulted by nursing)   Result Value Ref Range    Istat  Activated Clotting Time 406 (H) 74 - 137 sec   POC ACT (resulted by nursing)   Result Value Ref Range    Istat Activated Clotting Time 356 (H) 74 - 137 sec   ACCU-CHEK GLUCOSE   Result Value Ref Range    Glucose - Accu-Ck 207 (H) 65 - 99 mg/dL   ACCU-CHEK GLUCOSE   Result Value Ref Range    Glucose - Accu-Ck 185 (H) 65 - 99 mg/dL   ACCU-CHEK GLUCOSE   Result Value Ref Range    Glucose - Accu-Ck 153 (H) 65 - 99 mg/dL   ACCU-CHEK GLUCOSE   Result Value Ref Range    Glucose - Accu-Ck 145 (H) 65 - 99 mg/dL   ACCU-CHEK GLUCOSE   Result Value Ref Range    Glucose - Accu-Ck 164 (H) 65 - 99 mg/dL   ACCU-CHEK GLUCOSE   Result Value Ref Range    Glucose - Accu-Ck 167 (H) 65 - 99 mg/dL   ACCU-CHEK GLUCOSE   Result Value Ref Range    Glucose - Accu-Ck 150 (H) 65 - 99 mg/dL   ACCU-CHEK GLUCOSE   Result Value Ref Range    Glucose - Accu-Ck 146 (H) 65 - 99 mg/dL   ACCU-CHEK GLUCOSE   Result Value Ref Range    Glucose - Accu-Ck 228 (H) 65 - 99 mg/dL   EKG   Result Value Ref Range    Report       [facility]    Test Date:  2021  Pt Name:    MOMO ESCUDERO                  Department: [facility]  MRN:        [id number]                      Room:       [facility]  Gender:     Male                         Technician: PEP  :        1950                   Requested By:ALAN KUMARI  Order #:    [id number]                    Reading MD: James Quan MD    Measurements  Intervals                                Axis  Rate:       97                           P:          -62  OK:         180                          QRS:        34  QRSD:       152                          T:          -44  QT:         384  QTc:        488    Interpretive Statements  SINUS OR ECTOPIC ATRIAL RHYTHM  RIGHT BUNDLE BRANCH BLOCK  Compared to ECG 2021 14:09:33  Atrial flutter no longer present  Electronically Signed On 2021 17:55:11 PST by James Quan MD     EKG   Result Value Ref Range    Report       [facility]    Test Date:   2021  Pt Name:    MOMO ESCUDERO                  Department: 183  MRN:        [id number]                      Room:       T824  Gender:     Male                         Technician: [facility]  :        1950                   Requested By:ALAN KUMARI  Order #:    [id number]                    Reading MD: Alina Luna MD    Measurements  Intervals                                Axis  Rate:       113                          P:          0  WI:                                      QRS:        -11  QRSD:       152                          T:          -15  QT:         364  QTc:        500    Interpretive Statements  SINUS TACHYCARDIA  MULTIFORM VENTRICULAR PREMATURE COMPLEXES  RIGHT BUNDLE BRANCH BLOCK  Compared to ECG 2021 17:21:05  Ventricular premature complex(es) now present  Electronically Signed On 2021 7:24:56 PST by Alina Luna MD     EKG   Result Value Ref Range    Report       [facility]    Test Date:  2021  Pt Name:    MOMO ESCUDERO                  Department: 183  MRN:        [id number]                      Room:       T824  Gender:     Male                         Technician: JEREMIAH  :        1950                   Requested By:ALAN KUMARI  Order #:    [id number]                    Reading MD: Alina Luna MD    Measurements  Intervals                                Axis  Rate:       113                          P:          0  WI:         161                          QRS:        -7  QRSD:       144                          T:          9  QT:         372  QTc:        510    Interpretive Statements  SINUS TACHYCARDIA  MULTIPLE PREMATURE COMPLEXES, VENT & SUPRAVEN  RIGHT BUNDLE BRANCH BLOCK  BASELINE WANDER IN LEAD(S) V5  Compared to ECG 2021 13:53:18  No significant changes  Electronically Signed On 2021 15:01:35 PST by Alina Luna MD     EKG   Result Value Ref Range    Report       [facility]    Test Date:  2021  Pt Name:    MOMO CONROYRICH                   Department: 183  MRN:        [id number]                      Room:       T824  Gender:     Male                         Technician: [facility]  :        1950                   Requested By:MASON NAIK  Order #:    [id number]                    Reading MD: Michael Woods MD    Measurements  Intervals                                Axis  Rate:       121                          P:          0  LA:         296                          QRS:        -29  QRSD:       144                          T:          5  QT:         364  QTc:        517    Interpretive Statements  SINUS TACHYCARDIA  FIRST DEGREE AV BLOCK  RIGHT BUNDLE BRANCH BLOCK  Compared to ECG 2021 02:28:24  Ventricular premature complex(es) no longer present  Electronically Signed On 2021 13:07:27 PST by Michael Woods MD     EKG   Result Value Ref Range    Report       [facility]    Test Date:  2021  Pt Name:    MOMO ESCUDERO                  Department: 183  MRN:        [id number]                      Room:       T824  Gender:     Male                         Technician: [facility]  :        1950                   Requested By:ALAN KUMARI  Order #:    [id number]                    Reading MD: Michael Woods MD    Measurements  Intervals                                Axis  Rate:       124                          P:  LA:                                      QRS:        -35  QRSD:       152                          T:          27  QT:         356  QTc:        512    Interpretive Statements  SINUS TACHYCARDIA  RIGHT BUNDLE BRANCH BLOCK  Compared to ECG 2021 10:37:48  No significant changes  Electronically Signed On 2021 14:13:20 PST by Michael Woods MD         Imaging:   DX-CHEST-PORTABLE (1 VIEW)   Final Result      Stable enlargement of the cardiomediastinal silhouette without acute cardiopulmonary abnormality.      MR-BRAIN-W/O   Final Result      1.  No acute abnormality.   2.  Mild cerebral volume loss   3.  Mild  chronic microvascular ischemic disease.      US-RUQ   Final Result         1.  Hepatomegaly and echogenic liver, compatible with fatty change versus fibrosis.   2.  Cholelithiasis without additional sonographic findings of acute cholecystitis.      IB-[id number]-1 VIEW   Final Result         1.  Air-filled distended loops of bowel are seen with reactive mucosal pattern, appearance suggests ileus or enteritis. Recommend radiographic followup to resolution to exclude progression to obstruction.      CT-HEAD W/O   Final Result      No acute intracranial abnormality is identified.      Atrophy      There are periventricular and subcortical white matter changes present.  This finding is nonspecific and could be from previous small vessel ischemia, demyelination, or gliosis.      Hypodensity in the left basal ganglia likely represents an old lacunar infarct.      CT-CHEST (THORAX) W/O   Final Result      1.  Mild scattered pulmonary opacifications are noted some of which could be due to fibrosis or atelectasis. No sizable consolidations are identified. Some mild edema or inflammation could be present.      2.  No pleural effusions are identified.      3.  There is calcific atherosclerosis.      4.  Cholelithiasis.               EC-ECHOCARDIOGRAM LTD W/O CONT   Final Result      EC-JAVIER W/O CONT   Final Result      CL-EP ABLATION ATRIAL FIBRILLATION    (Results Pending)       Problem Representation:   The patient is a 70-year-old male with a past medical history of ANCA vasculitis, kidney transplant in 2008 on tacrolimus, mycophenolate, prednisone.  The patient also has CKD stage III (with baseline creatinine 1.5-1.8), sleep apnea, paroxysmal A. fib, remote history of DVT who presented on 1/27/2021 for atrial flutter ablation.  On 1/28 the patient was noted to have dysarthria/expressive aphasia that was concerning for stroke.  However, imaging did not show any acute infarct.    RADHA (acute kidney injury) (HCC)  Assessment &  Plan  The patient's creatinine is slowly been increasing throughout this admission.  He has CKD with a baseline creatinine about 1.5-1.8.  Today the creatinine was 2.  Urinalysis showed hyaline cast.  The patient appears clinically dry.     Plan:  -We will try a fluid challenge with 1 L.  We will recheck creatinine in the morning.    -Stopped benazepril and Lasix in the setting of acute kidney injury.  Also stopped allopurinol and colchicine.  If the patient's creatinine continues to improve then we can slowly restart these medications.    Encephalopathy acute  Assessment & Plan  This is likely multifactorial.  The patient did have anesthesia and given his age he may have a little bit of hospital delirium as well (specially at nighttime).  Initially this was thought to be a stroke, but imaging including CT and MRI of the head has been negative.  CT did show an old lacunar stroke in the left basal ganglia.  The patient had a slight elevation in white count post procedure which is often expected and in absence of fever or other symptoms, negative blood cultures, negative chest x-ray and UA this is not sepsis.  The patient's white count has since been trending down, which goes along with an elevated white count that was postprocedural.     Plan:  -Mycophenolate and tacrolimus levels are still pending.  Given that the patient's symptoms have now resolved it is likely not toxicity.  -Patient is now back to baseline.  -Recommend PT/OT    Chronic anticoagulation- (present on admission)  Assessment & Plan  Continue apixaban for atrial fibrillation.    Essential hypertension- (present on admission)  Assessment & Plan  Continue metoprolol. Lasix, benazepril have been held in the setting of acute kidney injury.  IV hydralazine as needed    Tremor  Assessment & Plan  In the neurology note, there is a question of asterixis.  However, on my exam it was more tremor than asterixis.  The patient has a bilateral tremor that is  present at rest and worse in the right upper extremity.  The patient also had some rigidity in his right arm.    Plan:  -I am concerned that the patient might have Parkinson's or early Parkinson's.  Therefore, we are going to try him on a trial of Sinemet and as needed Risperdal at nighttime for agitation.  I discussed this medication with the patient and his wife extensively.  They have agreed at this time for a trial of Sinemet.    -Recommend outpatient follow-up.    Hyperglycemia  Assessment & Plan  History of diabetes is not adocumented, patient is not on diabetic medication  We will start a sliding scale, check A1c    Long-term use of immunosuppressant medication- (present on admission)  Assessment & Plan  Resume tacrolimus, mycophenolate, prednisone  Tacrolimus and mycophenolate level is still pending.  However, since the patient's symptoms have now resolved it is less likely toxicity from these medications.

## 2021-01-30 NOTE — CARE PLAN
Problem: Communication  Goal: The ability to communicate needs accurately and effectively will improve  Outcome: PROGRESSING AS EXPECTED  Note: Pt is able to communicate needs appropriately. Call light within reach.       Problem: Safety  Goal: Will remain free from injury  Outcome: PROGRESSING AS EXPECTED  Note: Fall precautions in place. Bed in lowest position. Non-skid socks in place. Personal possessions within reach. Mobility sign on door. Bed-alarm on. Call light within reach. Pt educated regarding fall prevention and states understanding.     Goal: Will remain free from falls  Outcome: PROGRESSING AS EXPECTED  Note: Pt educated on the importance fall prevention methods, such as treaded sock and the bed alarm. Pt stated they will use the call light prior to any attempts of ambulation. Ambulatory ability assessed, treaded socks in place, bed locked and in low position, frequent trips to bathroom offered, and call light and phone within reach.

## 2021-01-30 NOTE — PROGRESS NOTES
Monitor Summary    .24-.12-.34  Sinus tach  First degree AV block  Borderline bundle branch block    Rate: 108-116  Ectopies: frequent PVCs, rare PACs, rare couplets, rare triplets, rare trigeminy

## 2021-01-30 NOTE — CARE PLAN
Problem: Pain Management  Goal: Pain level will decrease to patient's comfort goal  Outcome: PROGRESSING AS EXPECTED  Patient states no pain.  Problem: Respiratory:  Goal: Respiratory status will improve  Outcome: PROGRESSING AS EXPECTED   Patient is on room air.    Problem: Communication  Goal: The ability to communicate needs accurately and effectively will improve  Outcome: PROGRESSING AS EXPECTED  Patient communicates with RN appropriately.

## 2021-01-30 NOTE — ASSESSMENT & PLAN NOTE
The patient's creatinine is slowly been increasing throughout this admission.  He has CKD with a baseline creatinine about 1.5-1.8.  Today the creatinine was 2.  Urinalysis showed hyaline cast.  The patient appears clinically dry.     Plan:  -We will try a fluid challenge with 1 L.  We will recheck creatinine in the morning.    -Stopped benazepril and Lasix in the setting of acute kidney injury.  Also stopped allopurinol and colchicine.  If the patient's creatinine continues to improve then we can slowly restart these medications.

## 2021-01-31 LAB
ALBUMIN SERPL BCP-MCNC: 3.2 G/DL (ref 3.2–4.9)
ALBUMIN/GLOB SERPL: 1 G/DL
ALP SERPL-CCNC: 140 U/L (ref 30–99)
ALT SERPL-CCNC: 8 U/L (ref 2–50)
ANION GAP SERPL CALC-SCNC: 8 MMOL/L (ref 7–16)
AST SERPL-CCNC: 43 U/L (ref 12–45)
BILIRUB SERPL-MCNC: 0.6 MG/DL (ref 0.1–1.5)
BUN SERPL-MCNC: 46 MG/DL (ref 8–22)
CALCIUM SERPL-MCNC: 9.7 MG/DL (ref 8.5–10.5)
CHLORIDE SERPL-SCNC: 112 MMOL/L (ref 96–112)
CO2 SERPL-SCNC: 17 MMOL/L (ref 20–33)
CREAT SERPL-MCNC: 1.74 MG/DL (ref 0.5–1.4)
ERYTHROCYTE [DISTWIDTH] IN BLOOD BY AUTOMATED COUNT: 52.6 FL (ref 35.9–50)
GLOBULIN SER CALC-MCNC: 3.2 G/DL (ref 1.9–3.5)
GLUCOSE BLD-MCNC: 139 MG/DL (ref 65–99)
GLUCOSE BLD-MCNC: 143 MG/DL (ref 65–99)
GLUCOSE BLD-MCNC: 147 MG/DL (ref 65–99)
GLUCOSE SERPL-MCNC: 135 MG/DL (ref 65–99)
HCT VFR BLD AUTO: 35.4 % (ref 42–52)
HGB BLD-MCNC: 11.4 G/DL (ref 14–18)
MCH RBC QN AUTO: 31.8 PG (ref 27–33)
MCHC RBC AUTO-ENTMCNC: 32.2 G/DL (ref 33.7–35.3)
MCV RBC AUTO: 98.9 FL (ref 81.4–97.8)
PLATELET # BLD AUTO: 117 K/UL (ref 164–446)
PMV BLD AUTO: 9.6 FL (ref 9–12.9)
POTASSIUM SERPL-SCNC: 5.1 MMOL/L (ref 3.6–5.5)
PROT SERPL-MCNC: 6.4 G/DL (ref 6–8.2)
RBC # BLD AUTO: 3.58 M/UL (ref 4.7–6.1)
SODIUM SERPL-SCNC: 137 MMOL/L (ref 135–145)
WBC # BLD AUTO: 9 K/UL (ref 4.8–10.8)

## 2021-01-31 PROCEDURE — 99232 SBSQ HOSP IP/OBS MODERATE 35: CPT | Performed by: INTERNAL MEDICINE

## 2021-01-31 PROCEDURE — A9270 NON-COVERED ITEM OR SERVICE: HCPCS | Performed by: HOSPITALIST

## 2021-01-31 PROCEDURE — 700102 HCHG RX REV CODE 250 W/ 637 OVERRIDE(OP): Performed by: INTERNAL MEDICINE

## 2021-01-31 PROCEDURE — 700111 HCHG RX REV CODE 636 W/ 250 OVERRIDE (IP): Performed by: NURSE PRACTITIONER

## 2021-01-31 PROCEDURE — 770020 HCHG ROOM/CARE - TELE (206)

## 2021-01-31 PROCEDURE — 700102 HCHG RX REV CODE 250 W/ 637 OVERRIDE(OP): Performed by: HOSPITALIST

## 2021-01-31 PROCEDURE — 80053 COMPREHEN METABOLIC PANEL: CPT

## 2021-01-31 PROCEDURE — 82962 GLUCOSE BLOOD TEST: CPT | Mod: 91

## 2021-01-31 PROCEDURE — A9270 NON-COVERED ITEM OR SERVICE: HCPCS | Performed by: STUDENT IN AN ORGANIZED HEALTH CARE EDUCATION/TRAINING PROGRAM

## 2021-01-31 PROCEDURE — A9270 NON-COVERED ITEM OR SERVICE: HCPCS | Performed by: NURSE PRACTITIONER

## 2021-01-31 PROCEDURE — 85027 COMPLETE CBC AUTOMATED: CPT

## 2021-01-31 PROCEDURE — A9270 NON-COVERED ITEM OR SERVICE: HCPCS | Performed by: INTERNAL MEDICINE

## 2021-01-31 PROCEDURE — 700102 HCHG RX REV CODE 250 W/ 637 OVERRIDE(OP): Performed by: STUDENT IN AN ORGANIZED HEALTH CARE EDUCATION/TRAINING PROGRAM

## 2021-01-31 PROCEDURE — 700102 HCHG RX REV CODE 250 W/ 637 OVERRIDE(OP): Performed by: NURSE PRACTITIONER

## 2021-01-31 PROCEDURE — 36415 COLL VENOUS BLD VENIPUNCTURE: CPT

## 2021-01-31 PROCEDURE — 97165 OT EVAL LOW COMPLEX 30 MIN: CPT

## 2021-01-31 PROCEDURE — 97161 PT EVAL LOW COMPLEX 20 MIN: CPT

## 2021-01-31 PROCEDURE — 99232 SBSQ HOSP IP/OBS MODERATE 35: CPT | Mod: GC | Performed by: HOSPITALIST

## 2021-01-31 PROCEDURE — 700111 HCHG RX REV CODE 636 W/ 250 OVERRIDE (IP): Performed by: INTERNAL MEDICINE

## 2021-01-31 RX ORDER — DEXTROMETHORPHAN HBR. AND GUAIFENESIN 10; 100 MG/5ML; MG/5ML
10 SOLUTION ORAL EVERY 6 HOURS PRN
Status: DISCONTINUED | OUTPATIENT
Start: 2021-01-31 | End: 2021-02-01 | Stop reason: HOSPADM

## 2021-01-31 RX ORDER — RISPERIDONE 0.5 MG/1
0.5 TABLET ORAL
Qty: 30 TAB | Refills: 0 | Status: SHIPPED | OUTPATIENT
Start: 2021-01-31 | End: 2021-02-01

## 2021-01-31 RX ORDER — METOPROLOL SUCCINATE 25 MG/1
25 TABLET, EXTENDED RELEASE ORAL 2 TIMES DAILY
Qty: 60 TAB | Refills: 0 | Status: SHIPPED | OUTPATIENT
Start: 2021-02-01 | End: 2021-02-01

## 2021-01-31 RX ADMIN — APIXABAN 5 MG: 5 TABLET, FILM COATED ORAL at 18:14

## 2021-01-31 RX ADMIN — CARBIDOPA AND LEVODOPA 1 TABLET: 25; 100 TABLET ORAL at 18:15

## 2021-01-31 RX ADMIN — METOPROLOL SUCCINATE 25 MG: 25 TABLET, EXTENDED RELEASE ORAL at 18:15

## 2021-01-31 RX ADMIN — CARBIDOPA AND LEVODOPA 1 TABLET: 25; 100 TABLET ORAL at 10:10

## 2021-01-31 RX ADMIN — PREDNISONE 5 MG: 5 TABLET ORAL at 04:31

## 2021-01-31 RX ADMIN — DEXTROMETHORPHAN HBR, GUAIFENESIN 10 ML: 20; 200 SOLUTION ORAL at 19:42

## 2021-01-31 RX ADMIN — MYCOPHENOLATE MOFETIL 500 MG: 250 CAPSULE ORAL at 10:10

## 2021-01-31 RX ADMIN — SUCRALFATE 1 G: 1 TABLET ORAL at 18:14

## 2021-01-31 RX ADMIN — OMEPRAZOLE 20 MG: 20 CAPSULE, DELAYED RELEASE ORAL at 04:30

## 2021-01-31 RX ADMIN — ACETAMINOPHEN 1000 MG: 500 TABLET ORAL at 05:52

## 2021-01-31 RX ADMIN — MYCOPHENOLATE MOFETIL 500 MG: 250 CAPSULE ORAL at 18:13

## 2021-01-31 RX ADMIN — APIXABAN 5 MG: 5 TABLET, FILM COATED ORAL at 04:30

## 2021-01-31 RX ADMIN — HYDRALAZINE HYDROCHLORIDE 10 MG: 20 INJECTION INTRAMUSCULAR; INTRAVENOUS at 04:29

## 2021-01-31 RX ADMIN — TACROLIMUS 1 MG: 1 CAPSULE ORAL at 18:14

## 2021-01-31 RX ADMIN — DEXTROMETHORPHAN HBR, GUAIFENESIN 10 ML: 20; 200 SOLUTION ORAL at 05:52

## 2021-01-31 RX ADMIN — TACROLIMUS 1 MG: 1 CAPSULE ORAL at 10:10

## 2021-01-31 RX ADMIN — SUCRALFATE 1 G: 1 TABLET ORAL at 12:30

## 2021-01-31 RX ADMIN — SUCRALFATE 1 G: 1 TABLET ORAL at 04:30

## 2021-01-31 RX ADMIN — METOPROLOL SUCCINATE 25 MG: 25 TABLET, EXTENDED RELEASE ORAL at 05:52

## 2021-01-31 ASSESSMENT — ENCOUNTER SYMPTOMS
CHEST TIGHTNESS: 0
APNEA: 0
STRIDOR: 0
CHILLS: 0
SORE THROAT: 0
CHOKING: 0
BLURRED VISION: 0
COUGH: 0
PALPITATIONS: 0
VOMITING: 0
NAUSEA: 0
SPEECH CHANGE: 0
DIZZINESS: 0
FOCAL WEAKNESS: 0
WEAKNESS: 0
TINGLING: 0
MYALGIAS: 0
FEVER: 0
SHORTNESS OF BREATH: 0
DOUBLE VISION: 0
WHEEZING: 0
FALLS: 0
LOSS OF CONSCIOUSNESS: 0
SENSORY CHANGE: 0
TREMORS: 1
SEIZURES: 0
HEADACHES: 0

## 2021-01-31 ASSESSMENT — COGNITIVE AND FUNCTIONAL STATUS - GENERAL
MOVING TO AND FROM BED TO CHAIR: A LITTLE
DAILY ACTIVITIY SCORE: 22
HELP NEEDED FOR BATHING: A LITTLE
SUGGESTED CMS G CODE MODIFIER DAILY ACTIVITY: CJ
STANDING UP FROM CHAIR USING ARMS: A LOT
SUGGESTED CMS G CODE MODIFIER MOBILITY: CK
MOVING FROM LYING ON BACK TO SITTING ON SIDE OF FLAT BED: A LITTLE
TURNING FROM BACK TO SIDE WHILE IN FLAT BAD: A LOT
DRESSING REGULAR LOWER BODY CLOTHING: A LITTLE
CLIMB 3 TO 5 STEPS WITH RAILING: A LITTLE
MOBILITY SCORE: 16
WALKING IN HOSPITAL ROOM: A LITTLE

## 2021-01-31 ASSESSMENT — GAIT ASSESSMENTS
DEVIATION: INCREASED BASE OF SUPPORT
GAIT LEVEL OF ASSIST: SUPERVISED
DISTANCE (FEET): 350

## 2021-01-31 ASSESSMENT — PAIN DESCRIPTION - PAIN TYPE
TYPE: ACUTE PAIN

## 2021-01-31 ASSESSMENT — FIBROSIS 4 INDEX: FIB4 SCORE: 9.36

## 2021-01-31 ASSESSMENT — ACTIVITIES OF DAILY LIVING (ADL): TOILETING: INDEPENDENT

## 2021-01-31 NOTE — PROGRESS NOTES
Cardiology Follow Up Progress Note    Date of Service  1/31/2021    Attending Physician  Rubin Forrester M.D.       Chief Complaint   Elective admission for AF ablation     HPI  Krishan Acevedo is a 70 y.o. male admitted 1/27/2021 for elective admission for AF ablation.  He underwent ablation with  1/27/21 with PVI with roof ablation, Right atrial flutter.     Medical history significant for ESRD S/P renal transplant in 2008, wegener's granulomatosis, paroxysmal atrial fibrillation, chronic anticoagulation.       Interim Events  01/29/2021: Seen in EP Rounds  Denies cardiac complaints. AAOx4. No neuro deficits. BP stable.   Monitored rhythm: ST 100s-120s.   BC x 2- NTD   TSH 0.370- low    Interim Events    No overnight cardiac events  Rhythm stable  Denies chest discomfort/dyspnea  Wife at bedside concerned about discharge today with patient being weak and immobile  PT was unable to evaluate the patient yesterday due to heavy workload.  We will request PT evaluation today prior to discharge.  Cr improved, baseline 1.5-1.8        Review of Systems  Review of Systems   Respiratory: Negative for apnea, cough, choking, chest tightness, shortness of breath, wheezing and stridor.    Cardiovascular: Negative for chest pain and leg swelling.   Psychiatric/Behavioral: Dysphoric mood:          Vital signs in last 24 hours  Temp:  [36.4 °C (97.6 °F)-37.3 °C (99.2 °F)] 36.7 °C (98 °F)  Pulse:  [] 91  Resp:  [17-20] 18  BP: (116-166)/() 134/81  SpO2:  [92 %-95 %] 92 %    Physical Exam  Physical Exam  Cardiovascular:      Rate and Rhythm: Tachycardia present.      Pulses: Normal pulses.   Skin:     General: Skin is warm.      Comments: Right groin cath site with mild evidence of ecchymosis, no hematoma   Neurological:      Mental Status: He is alert.         Lab Review  Lab Results   Component Value Date/Time    WBC 9.0 01/31/2021 02:35 AM    RBC 3.58 (L) 01/31/2021 02:35 AM    HEMOGLOBIN 11.4 (L) 01/31/2021  02:35 AM    HEMATOCRIT 35.4 (L) 01/31/2021 02:35 AM    MCV 98.9 (H) 01/31/2021 02:35 AM    MCH 31.8 01/31/2021 02:35 AM    MCHC 32.2 (L) 01/31/2021 02:35 AM    MPV 9.6 01/31/2021 02:35 AM      Lab Results   Component Value Date/Time    SODIUM 137 01/31/2021 02:35 AM    POTASSIUM 5.1 01/31/2021 02:35 AM    CHLORIDE 112 01/31/2021 02:35 AM    CO2 17 (L) 01/31/2021 02:35 AM    GLUCOSE 135 (H) 01/31/2021 02:35 AM    BUN 46 (H) 01/31/2021 02:35 AM    CREATININE 1.74 (H) 01/31/2021 02:35 AM      Lab Results   Component Value Date/Time    ASTSGOT 43 01/31/2021 02:35 AM    ALTSGPT 8 01/31/2021 02:35 AM     Lab Results   Component Value Date/Time    CHOLSTRLTOT 101 01/20/2021 07:48 AM    LDL 44 01/20/2021 07:48 AM    HDL 30 (A) 01/20/2021 07:48 AM    TRIGLYCERIDE 133 01/20/2021 07:48 AM    TROPONINT 2141 (H) 01/28/2021 02:10 PM       No results for input(s): NTPROBNP in the last 72 hours.     Cardiac Imaging and Procedures Review  EKG 01/29/2021: Sinus tachycardia, RBBB, PVC/PACs     Echocardiogram:  1/28/21  CONCLUSIONS  Limited echocardiogram study to evaluate pericardial effusion post ablation. Trivial pericardial effusion without evidence of hemodynamic compromise.     Imaging  Chest X-Ray (01/29/2021):  IMPRESSION:  Stable enlargement of the cardiomediastinal silhouette without acute cardiopulmonary abnormality.     Cheat X-Ray (01/28/2021):  IMPRESSION:  1.  Air-filled distended loops of bowel are seen with reactive mucosal pattern, appearance suggests ileus or enteritis. Recommend radiographic followup to resolution to exclude progression to obstruction.     CT chest without contrast: Mild scattered pulmonary opacifications are noted some of which could be due to fibrosis or atelectasis.  No consolidation seen     RUQ US (01/29/21):  IMPRESSION:  1.  Hepatomegaly and echogenic liver, compatible with fatty change versus fibrosis.  2.  Cholelithiasis without additional sonographic findings of acute  cholecystitis.     Brain MRI (01/29/2021):  IMPRESSION:  1.  No acute abnormality.  2.  Mild cerebral volume loss  3.  Mild chronic microvascular ischemic disease.        Assessment/Plan  1. Atrial Arrhythmias (AF/AFL)  - S/P RF ablation with PVI/roof ablation and CTI with block by Dr. Forrester on 1/27/21.  - Post operative CP/encephalopathy, resolving. Per neurology, possible toxic/metabloic encephalopathy.   - AAOx4, no neuro deficits upon exam. Brain CT and MRI negative. Denies CP. Chest CT negative. RUQ US- No acute cholecystitis.   - No evidence of acute infectious process, TMax 99.3*F, WBC 11. BC x 2 NTD. COVID negative (1/22/21). hx of MDRO. Primary team following.   - LTD echo without evidence of effusion (trivial). Findings and hx support likely pleuritic/inflammatory pain post ablation. Unable to give NSAIDs with hx of renal transplant.  Continue Colchicine given renal status.  Already on oral steroids.   - Continued sinus tachycardia, improving. No evidence of AT/AF/AFL.    - Continue anticoagulation with Eliquis.       Patient is to follow-up with our cardiology office on 2/10/2021 at 2:15 PM.    Post ablation instructions: No lifting > 10 lbs x 1 week.  No baths or hot tubs x 1 week.  May shower on  and take off dressings.  Continue to monitor sites daily for warmth, redness, or discolored drainage.  Please walk and take deep breaths.  After discharge if you experience chest pain, shortness of breath, coughing up blood, stroke symptoms, fever > 101F, passing out/near passing out, or trouble with the catheter sites, please be seen in the emergency dept. Please call our office at [phone] for atrial fibrillation lasting longer than 2 hours or if you develop signs or symptoms of a urinary tract infection (pain or burning during urination, frequency, urgency, drainage, fever).      Please contact me with any questions.    WESLY Valdez.   Cardiologist, [facility]  [phone]

## 2021-01-31 NOTE — PROGRESS NOTES
Assumed care of patient, bedside report received from BRIDGETTE stauffer. Updated on POC, call light within reach and fall precautions in place. Bed locked and in lowest position. Patient instructed to call for assistance before getting out of bed. All questions answered, no other needs at this time.

## 2021-02-01 VITALS
SYSTOLIC BLOOD PRESSURE: 155 MMHG | TEMPERATURE: 98.9 F | DIASTOLIC BLOOD PRESSURE: 93 MMHG | HEIGHT: 71 IN | BODY MASS INDEX: 35.71 KG/M2 | OXYGEN SATURATION: 97 % | WEIGHT: 255.07 LBS | HEART RATE: 92 BPM | RESPIRATION RATE: 18 BRPM

## 2021-02-01 LAB
ALBUMIN SERPL BCP-MCNC: 3.2 G/DL (ref 3.2–4.9)
ALBUMIN/GLOB SERPL: 1 G/DL
ALP SERPL-CCNC: 145 U/L (ref 30–99)
ALT SERPL-CCNC: 20 U/L (ref 2–50)
ANION GAP SERPL CALC-SCNC: 10 MMOL/L (ref 7–16)
AST SERPL-CCNC: 40 U/L (ref 12–45)
BILIRUB SERPL-MCNC: 0.6 MG/DL (ref 0.1–1.5)
BUN SERPL-MCNC: 44 MG/DL (ref 8–22)
CALCIUM SERPL-MCNC: 9.8 MG/DL (ref 8.5–10.5)
CHLORIDE SERPL-SCNC: 112 MMOL/L (ref 96–112)
CO2 SERPL-SCNC: 16 MMOL/L (ref 20–33)
CREAT SERPL-MCNC: 1.33 MG/DL (ref 0.5–1.4)
EKG IMPRESSION: NORMAL
ERYTHROCYTE [DISTWIDTH] IN BLOOD BY AUTOMATED COUNT: 51.4 FL (ref 35.9–50)
GLOBULIN SER CALC-MCNC: 3.3 G/DL (ref 1.9–3.5)
GLUCOSE BLD-MCNC: 129 MG/DL (ref 65–99)
GLUCOSE SERPL-MCNC: 123 MG/DL (ref 65–99)
HCT VFR BLD AUTO: 36.3 % (ref 42–52)
HGB BLD-MCNC: 11.6 G/DL (ref 14–18)
MAGNESIUM SERPL-MCNC: 1.8 MG/DL (ref 1.5–2.5)
MCH RBC QN AUTO: 31.2 PG (ref 27–33)
MCHC RBC AUTO-ENTMCNC: 32 G/DL (ref 33.7–35.3)
MCV RBC AUTO: 97.6 FL (ref 81.4–97.8)
PHOSPHATE SERPL-MCNC: 2.7 MG/DL (ref 2.5–4.5)
PLATELET # BLD AUTO: 140 K/UL (ref 164–446)
PMV BLD AUTO: 9.6 FL (ref 9–12.9)
POTASSIUM SERPL-SCNC: 4.9 MMOL/L (ref 3.6–5.5)
PROT SERPL-MCNC: 6.5 G/DL (ref 6–8.2)
RBC # BLD AUTO: 3.72 M/UL (ref 4.7–6.1)
SODIUM SERPL-SCNC: 138 MMOL/L (ref 135–145)
WBC # BLD AUTO: 8.6 K/UL (ref 4.8–10.8)

## 2021-02-01 PROCEDURE — A9270 NON-COVERED ITEM OR SERVICE: HCPCS | Performed by: STUDENT IN AN ORGANIZED HEALTH CARE EDUCATION/TRAINING PROGRAM

## 2021-02-01 PROCEDURE — A9270 NON-COVERED ITEM OR SERVICE: HCPCS | Performed by: INTERNAL MEDICINE

## 2021-02-01 PROCEDURE — 84100 ASSAY OF PHOSPHORUS: CPT

## 2021-02-01 PROCEDURE — 85027 COMPLETE CBC AUTOMATED: CPT

## 2021-02-01 PROCEDURE — 83735 ASSAY OF MAGNESIUM: CPT

## 2021-02-01 PROCEDURE — 700102 HCHG RX REV CODE 250 W/ 637 OVERRIDE(OP): Performed by: HOSPITALIST

## 2021-02-01 PROCEDURE — 80053 COMPREHEN METABOLIC PANEL: CPT

## 2021-02-01 PROCEDURE — 700102 HCHG RX REV CODE 250 W/ 637 OVERRIDE(OP): Performed by: INTERNAL MEDICINE

## 2021-02-01 PROCEDURE — 82962 GLUCOSE BLOOD TEST: CPT

## 2021-02-01 PROCEDURE — 700102 HCHG RX REV CODE 250 W/ 637 OVERRIDE(OP): Performed by: STUDENT IN AN ORGANIZED HEALTH CARE EDUCATION/TRAINING PROGRAM

## 2021-02-01 PROCEDURE — 700111 HCHG RX REV CODE 636 W/ 250 OVERRIDE (IP): Performed by: INTERNAL MEDICINE

## 2021-02-01 PROCEDURE — 36415 COLL VENOUS BLD VENIPUNCTURE: CPT

## 2021-02-01 PROCEDURE — 700102 HCHG RX REV CODE 250 W/ 637 OVERRIDE(OP): Performed by: NURSE PRACTITIONER

## 2021-02-01 PROCEDURE — 99238 HOSP IP/OBS DSCHRG MGMT 30/<: CPT | Mod: GC | Performed by: INTERNAL MEDICINE

## 2021-02-01 PROCEDURE — 93010 ELECTROCARDIOGRAM REPORT: CPT | Performed by: INTERNAL MEDICINE

## 2021-02-01 PROCEDURE — A9270 NON-COVERED ITEM OR SERVICE: HCPCS | Performed by: HOSPITALIST

## 2021-02-01 PROCEDURE — 93005 ELECTROCARDIOGRAM TRACING: CPT | Performed by: HOSPITALIST

## 2021-02-01 PROCEDURE — 99232 SBSQ HOSP IP/OBS MODERATE 35: CPT | Performed by: NURSE PRACTITIONER

## 2021-02-01 PROCEDURE — A9270 NON-COVERED ITEM OR SERVICE: HCPCS | Performed by: NURSE PRACTITIONER

## 2021-02-01 RX ORDER — FUROSEMIDE 20 MG/1
20 TABLET ORAL
Status: DISCONTINUED | OUTPATIENT
Start: 2021-02-01 | End: 2021-02-01 | Stop reason: HOSPADM

## 2021-02-01 RX ORDER — METOPROLOL SUCCINATE 25 MG/1
25 TABLET, EXTENDED RELEASE ORAL 2 TIMES DAILY
Qty: 60 TAB | Refills: 0 | Status: SHIPPED | OUTPATIENT
Start: 2021-02-01 | End: 2021-02-17

## 2021-02-01 RX ORDER — RISPERIDONE 0.5 MG/1
0.5 TABLET ORAL
Qty: 10 TAB | Refills: 0 | Status: SHIPPED | OUTPATIENT
Start: 2021-02-01 | End: 2021-03-03

## 2021-02-01 RX ADMIN — PREDNISONE 5 MG: 5 TABLET ORAL at 04:49

## 2021-02-01 RX ADMIN — TACROLIMUS 1 MG: 1 CAPSULE ORAL at 04:47

## 2021-02-01 RX ADMIN — FUROSEMIDE 20 MG: 20 TABLET ORAL at 08:10

## 2021-02-01 RX ADMIN — SUCRALFATE 1 G: 1 TABLET ORAL at 01:27

## 2021-02-01 RX ADMIN — SUCRALFATE 1 G: 1 TABLET ORAL at 04:51

## 2021-02-01 RX ADMIN — ACETAMINOPHEN 1000 MG: 500 TABLET ORAL at 04:58

## 2021-02-01 RX ADMIN — MYCOPHENOLATE MOFETIL 500 MG: 250 CAPSULE ORAL at 04:50

## 2021-02-01 RX ADMIN — METOPROLOL SUCCINATE 25 MG: 25 TABLET, EXTENDED RELEASE ORAL at 04:47

## 2021-02-01 RX ADMIN — OMEPRAZOLE 20 MG: 20 CAPSULE, DELAYED RELEASE ORAL at 04:49

## 2021-02-01 RX ADMIN — CARBIDOPA AND LEVODOPA 1 TABLET: 25; 100 TABLET ORAL at 04:49

## 2021-02-01 RX ADMIN — BENAZEPRIL HYDROCHLORIDE 20 MG: 20 TABLET ORAL at 10:23

## 2021-02-01 RX ADMIN — APIXABAN 5 MG: 5 TABLET, FILM COATED ORAL at 04:47

## 2021-02-01 ASSESSMENT — ENCOUNTER SYMPTOMS
SPEECH DIFFICULTY: 0
CHILLS: 0
FEVER: 0
ABDOMINAL PAIN: 0
FATIGUE: 0
WEAKNESS: 0
NUMBNESS: 0
WHEEZING: 0
PALPITATIONS: 0
NAUSEA: 0
VOMITING: 0
DIZZINESS: 0
BLOOD IN STOOL: 0
COUGH: 0
SHORTNESS OF BREATH: 0
LIGHT-HEADEDNESS: 0
TROUBLE SWALLOWING: 0

## 2021-02-01 ASSESSMENT — PAIN DESCRIPTION - PAIN TYPE
TYPE: ACUTE PAIN
TYPE: ACUTE PAIN

## 2021-02-01 NOTE — PROGRESS NOTES
Received report from night shift RNAida. Assumed care of pt @ 0700. Pt reports no pain at this time. Updated pt on plan of care. Pt resting comfortably at edge of bed. A & O x4. Educated on use of call light. Hourly rounding and continuous monitoring in place.

## 2021-02-01 NOTE — THERAPY
"Occupational Therapy   Initial Evaluation     Patient Name: Krishan Acevedo  Age:  70 y.o., Sex:  male  Medical Record #: [id number]  Today's Date: 1/31/2021    Precautions: Fall Risk    Assessment  Patient is 70 y.o. male admitted for atrial flutter ablation, procedure was successful. Pt with transient confusion following procedure which has since cleared. Pt spouse at bedside, very supportive. Pt presents to OT eval demonstrating basic ADLs and ADL transfers at SPV level, pt spouse able to provide light ADLs assist ie: LB dress. Pt will benefit from home health OT for compensatory strategy recommendations in his natural home environment upon DC, pt and spouse in agreement. Recommend DC home with home health once medically cleared.     Plan    Recommend Occupational Therapy 3 times per week until therapy goals are met for the following treatments:  Self Care/Activities of Daily Living, Therapeutic Activities and Therapeutic Exercises.    DC Equipment Recommendations: Unable to determine at this time  Discharge Recommendations: Recommend home health for continued occupational therapy services     Subjective    \"I feel a lot better than yesterday.\"     Objective     01/31/21 1528   Prior Living Situation   Prior Services Intermittent Physical Support for ADL Per Family   Housing / Facility 1 Story House   Steps Into Home 1   Bathroom Set up Walk In Shower;Shower Chair;Grab Bars   Equipment Owned Tub / Shower Seat;Grab Bar(s) In Tub / Shower   Lives with - Patient's Self Care Capacity Spouse   Comments pt has hx kidney transplant and spouse is very supportive   Prior Level of ADL Function   Comments independent   Prior Level of IADL Function   Home Management Independent   Shopping Independent   Prior Level Of Mobility Independent Without Device in Community;Independent Without Device in Home   Precautions   Precautions Fall Risk   Balance Assessment   Weight Shift Sitting Fair   Weight Shift Standing Fair   Comments " with and without FWW   Bed Mobility    Supine to Sit Supervised   Scooting Supervised   ADL Assessment   Eating Modified Independent   Grooming Supervision;Standing   Upper Body Dressing Supervision   Toileting Supervision   Comments spouse present, able to assist with light ADLs like LB dress   Functional Mobility   Sit to Stand Supervised   Bed, Chair, Wheelchair Transfer Supervised   Toilet Transfers Minimal Assist   Transfer Method Stand Step   Mobility with and without FWW   Short Term Goals   Short Term Goal # 1 pt will demo all functional transfers at SPV level   Short Term Goal # 2 pt will demo LB dress with Swapna   Short Term Goal # 3 pt will tolerate >6min standing grooming at sink at SPV level   Problem List   Problem List Decreased Active Daily Living Skills;Decreased Homemaking Skills;Safety Awareness Deficits / Cognition;Decreased Activity Tolerance;Decreased Functional Mobility;Impaired Postural Control / Balance

## 2021-02-01 NOTE — PROGRESS NOTES
Assumed care of patient, bedside report received from BRIDGETTE Caicedo. Updated on POC, call light within reach and fall precautions in place. Bed locked and in lowest position. Patient instructed to call for assistance before getting out of bed. All questions answered, no other needs at this time.

## 2021-02-01 NOTE — FACE TO FACE
Face to Face Supporting Documentation - Home Health    The encounter with this patient was in whole or in part the primary reason for home health admission.    Date of encounter:   Patient:                    MRN:                       YOB: 2023  Krishan Acevedo  [id number]  1950     Home health to see patient for:  Skilled Nursing care for assessment, interventions & education and Physical Therapy evaluation and treatment    Skilled need for:  Recent Deterioration of Health Status CKD possible early Parkinsons  and Medication Management chronic medications     Skilled nursing interventions to include:  Comment: medication managment     Homebound status evidenced by:  Needs the assistance of another person in order to leave the home. Leaving home requires a considerable and taxing effort. There is a normal inability to leave the home.    Community Physician to provide follow up care: Damian Diaz M.D.     Optional Interventions? No      I certify the face to face encounter for this home health care referral meets the CMS requirements and the encounter/clinical assessment with the patient was, in whole, or in part, for the medical condition(s) listed above, which is the primary reason for home health care. Based on my clinical findings: the service(s) are medically necessary, support the need for home health care, and the homebound criteria are met.  I certify that this patient has had a face to face encounter by myself.  Kolby Watkins M.D. - NPI: [id number]

## 2021-02-01 NOTE — DISCHARGE SUMMARY
Internal Medicine Discharge Summary     Date of Admission: 1/27/2021  Date of Discharge: 02/01/21  Service: Internal Medicine  Attending Physician: Dr. Gardner  Senior Resident: Loan Alfonso M.D.    Discharge Diagnosis:   Atrial flutter  Acute kidney injury on chronic kidney disease stage III  Atrial fibrillation  History of kidney transplant on immunosuppression  Tremors    Hospital problems:  Active Problems:    Encephalopathy acute POA: No    RADHA (acute kidney injury) (HCC) POA: Unknown    Essential hypertension POA: Yes    Chronic anticoagulation POA: Yes    Stage 3b chronic kidney disease POA: Yes    Long-term use of immunosuppressant medication POA: Yes    Hyperglycemia POA: Unknown    Tremor POA: Unknown    S/P ablation of atrial fibrillation POA: Unknown      Overview: 1/27/21    History of renal transplant POA: Yes  Resolved Problems:    Chest pressure POA: Yes    Nausea and vomiting POA: Unknown    SIRS (systemic inflammatory response syndrome) (HCC) POA: Unknown    Hospital Course:   The patient is a 70-year-old male with a past medical history of ANCA vasculitis, kidney transplant on tacrolimus mycophenolate and prednisone, CKD stage III (baseline creatinine 1.5-1.8), atrial fibrillation/atrial flutter (anticoagulated).      Atrial flutter s/p ablation: The patient was admitted on 1/27/2021 for an ablation for atrial flutter.  The patient underwent a successful ablation and will be discharged on benazepril, Lasix 20 mg, metoprolol 25 mg twice daily.  We will continue apixaban on discharge.  Upon discharge the patient was in sinus rhythm.    Encephalopathy/tremor: On 1/28/2021 the patient had an episode of slurred speech and confusion.  Code stroke was called and the patient had a stat CT scan done that showed an old lacunar infarct in the left basal ganglia.  The patient had a subsequent MRI that was negative.  On exam however, the patient did have a tremor in bilateral upper extremities that was  present at rest and worse in the right upper extremity.  The patient also had some mild rigidity in the right arm.  The patient was trialed on Sinemet .  His tremor got significantly better.  We recommend outpatient follow-up with neurology and primary care provider.  The patient was also given 0.5 mg of Risperdal as needed for sundowning.  The patient was seen by PT/OT who recommended that the patient go home with home health.  The patient was also discharged with a walker.    Acute kidney injury: On admission the patient was started back on benazepril, Lasix, colchicine, allopurinol.  After the patient had his ablation, he was still tachycardic in the 100s.  His creatinine slowly increased to 2.  The patient was given a 1 L bolus of fluids and his creatinine improved to 1.3 on discharge.  His kidney injury was likely secondary to dehydration from being n.p.o. from his procedure.  After a 1 liter bolus he was no longer tachycardic.  Recommend not continuing colchicine on discharge.  However, he can continue taking Lasix, benazepril, allopurinol.  The patient has a follow-up with his nephrologist Dr. Blount.    Hyperglycemia: The patient had an A1c of 7.2 on admission.  He does not have a history of diabetes.  Recommend follow-up with PCP.      Consultants:   Dr. Menendez, cardiology    Physical Exam on Day of Discharge:   Vitals:    01/31/21 2103 02/01/21 0013 02/01/21 0424 02/01/21 0800   BP: 150/78 144/79 148/85 155/93   Pulse: 92 85 87 92   Resp: 19 19 19 18   Temp: 37.5 °C (99.5 °F) 37.4 °C (99.3 °F) 37 °C (98.6 °F) 37.2 °C (98.9 °F)   TempSrc: Temporal Temporal Temporal Temporal   SpO2: 96% 94% 94% 97%   Weight: 116 kg (255 lb 1.2 oz)      Height:         Weight/BMI: Body mass index is 35.58 kg/m².  Pulse Oximetry: 97 %, O2 (LPM): 0, O2 Delivery Device: None - Room Air    General: No acute distress, appears comfortable  CVS: RRR, no MGR.   PULM: Clear to auscultation bilaterally  ABD: Non-tender,  non-distended, +BS  EXTR: No peripheral edema  NEURO: MENTAL STATUS:  Awake, alert, oriented times 3.  Speech is fluent, comprehension is intact. He is able to name and repeat.  CRANIAL NERVES:  PERRL, EOMI with no nystagmus, face is symmetric, facial sensation is intact, tongue is in the midline, palate is symmetric.  MOTOR:  5/5 throughout  REFLEXES:  2+ and symmetric, toes are downgoing bilaterally  SENSATION:  Intact to light touch and proprioception throughout  COORDINATION:  Normal finger to nose bilaterally. His tremor has significantly improved.   GAIT:  Deferred      Most Recent Labs:    Lab Results   Component Value Date/Time    WBC 8.6 02/01/2021 03:52 AM    RBC 3.72 (L) 02/01/2021 03:52 AM    HEMOGLOBIN 11.6 (L) 02/01/2021 03:52 AM    HEMATOCRIT 36.3 (L) 02/01/2021 03:52 AM    MCV 97.6 02/01/2021 03:52 AM    MCH 31.2 02/01/2021 03:52 AM    MCHC 32.0 (L) 02/01/2021 03:52 AM    MPV 9.6 02/01/2021 03:52 AM    NEUTSPOLYS 68.10 01/30/2021 02:07 AM    LYMPHOCYTES 21.00 (L) 01/30/2021 02:07 AM    MONOCYTES 9.00 01/30/2021 02:07 AM    EOSINOPHILS 1.20 01/30/2021 02:07 AM    BASOPHILS 0.20 01/30/2021 02:07 AM    ANISOCYTOSIS 1+ 08/30/2014 01:45 AM      Lab Results   Component Value Date/Time    SODIUM 138 02/01/2021 03:52 AM    POTASSIUM 4.9 02/01/2021 03:52 AM    CHLORIDE 112 02/01/2021 03:52 AM    CO2 16 (L) 02/01/2021 03:52 AM    GLUCOSE 123 (H) 02/01/2021 03:52 AM    BUN 44 (H) 02/01/2021 03:52 AM    CREATININE 1.33 02/01/2021 03:52 AM      Lab Results   Component Value Date/Time    ALTSGPT 20 02/01/2021 03:52 AM    ASTSGOT 40 02/01/2021 03:52 AM    ALKPHOSPHAT 145 (H) 02/01/2021 03:52 AM    TBILIRUBIN 0.6 02/01/2021 03:52 AM    LIPASE 11 01/28/2021 02:10 PM    ALBUMIN 3.2 02/01/2021 03:52 AM    GLOBULIN 3.3 02/01/2021 03:52 AM    PREALBUMIN 8.0 (L) 08/25/2014 04:50 AM    INR 1.78 (H) 01/28/2021 02:10 PM    MACROCYTOSIS 1+ 08/30/2014 01:45 AM     Lab Results   Component Value Date/Time    PROTHROMBTM 21.2 (H)  01/28/2021 02:10 PM    INR 1.78 (H) 01/28/2021 02:10 PM        Procedures and Imaging:     DX-CHEST-PORTABLE (1 VIEW)   Final Result      Stable enlargement of the cardiomediastinal silhouette without acute cardiopulmonary abnormality.      MR-BRAIN-W/O   Final Result      1.  No acute abnormality.   2.  Mild cerebral volume loss   3.  Mild chronic microvascular ischemic disease.      US-RUQ   Final Result         1.  Hepatomegaly and echogenic liver, compatible with fatty change versus fibrosis.   2.  Cholelithiasis without additional sonographic findings of acute cholecystitis.      ZU-KRCNMMM-0 VIEW   Final Result         1.  Air-filled distended loops of bowel are seen with reactive mucosal pattern, appearance suggests ileus or enteritis. Recommend radiographic followup to resolution to exclude progression to obstruction.      CT-HEAD W/O   Final Result      No acute intracranial abnormality is identified.      Atrophy      There are periventricular and subcortical white matter changes present.  This finding is nonspecific and could be from previous small vessel ischemia, demyelination, or gliosis.      Hypodensity in the left basal ganglia likely represents an old lacunar infarct.      CT-CHEST (THORAX) W/O   Final Result      1.  Mild scattered pulmonary opacifications are noted some of which could be due to fibrosis or atelectasis. No sizable consolidations are identified. Some mild edema or inflammation could be present.      2.  No pleural effusions are identified.      3.  There is calcific atherosclerosis.      4.  Cholelithiasis.               EC-ECHOCARDIOGRAM LTD W/O CONT   Final Result      EC-JAVIER W/O CONT   Final Result      CL-EP ABLATION ATRIAL FIBRILLATION    (Results Pending)       Condition at Discharge:   Stable     Disposition:    Home with home health and a walker.    Discharge Medications:      Medication List      START taking these medications      Instructions   carbidopa-levodopa   MG Tabs  Commonly known as: SINEMET   Take 1 Tab by mouth 2 (two) times a day for 30 days.  Dose: 1 Tab     risperiDONE 0.5 MG Tabs  Commonly known as: RISPERDAL   Take 1 Tab by mouth at bedtime as needed (Agitation, sundowning) for up to 30 days.  Dose: 0.5 mg        CHANGE how you take these medications      Instructions   metoprolol SR 25 MG Tb24  What changed:   · medication strength  · how much to take  · when to take this  Commonly known as: TOPROL XL   Take 1 Tab by mouth 2 Times a Day for 30 days.  Dose: 25 mg        CONTINUE taking these medications      Instructions   allopurinol 300 MG Tabs  Commonly known as: ZYLOPRIM   Take 300 mg by mouth every bedtime.  Dose: 300 mg     apixaban 5mg Tabs  Commonly known as: ELIQUIS   Take 1 Tab by mouth 2 Times a Day for 360 days.  Dose: 5 mg     atorvastatin 10 MG Tabs  Commonly known as: LIPITOR   Take 10 mg by mouth every morning.  Dose: 10 mg     benazepril 20 MG Tabs  Commonly known as: LOTENSIN   Take 1 Tab by mouth every day for 360 days.  Dose: 20 mg     calcitRIOL 0.25 MCG Caps  Commonly known as: ROCALTROL   Take 0.25 mcg by mouth two times a week. 3x per week M W F  Dose: 0.25 mcg     Centrum Silver 50+Men Tabs   Take 1 Tab by mouth every morning.  Dose: 1 Tab     Combigan 0.2-0.5 % Soln  Generic drug: Brimonidine Tartrate-Timolol   by Ophthalmic route.     furosemide 20 MG Tabs  Commonly known as: LASIX   Take 20 mg by mouth 2 times a day.  Dose: 20 mg     Glucosamine 1500 Complex Caps   Take 2 Caps by mouth 2 Times a Day.  Dose: 2 Cap     MAGNESIUM PO   Take 1 Tab by mouth 2 Times a Day.  Dose: 1 Tab     mycophenolate 250 MG Caps  Commonly known as: CELLCEPT   Take 500 mg by mouth 2 times a day.  Dose: 500 mg     omeprazole 20 MG delayed-release capsule  Commonly known as: PRILOSEC   Take 20 mg by mouth 1 time a day as needed.  Dose: 20 mg     predniSONE 5 MG Tabs  Commonly known as: DELTASONE   Take 5 mg by mouth every morning.  Dose: 5 mg     tacrolimus  1 MG Caps  Commonly known as: PROGRAF   Take 1 mg by mouth 2 times a day.  Dose: 1 mg     vitamin D (Ergocalciferol) 1.25 MG (94295 UT) Caps capsule  Commonly known as: DRISDOL   Take 50,000 Units by mouth every Monday.  Dose: 50,000 Units              Instructions:      The patient was instructed to return to the ER in the event of worsening symptoms. I have counseled the patient on the importance of compliance and the patient has agreed to proceed with all medical recommendations and follow up plan indicated above.   The patient understands that all medications come with benefits and risks. Risks may include permanent injury or death and these risks can be minimized with close reassessment and monitoring.        Follow-up:   Follow-up with Dr. Forrester (EP)  Follow-up with Dr. Blount (Nephrology)  Follow-up with PCP    Time Spent on Discharge: 36 minutes

## 2021-02-01 NOTE — THERAPY
Physical Therapy   Initial Evaluation     Patient Name: Krishan Acevedo  Age:  70 y.o., Sex:  male  Medical Record #: [id number]  Today's Date: 1/31/2021     Precautions: Fall Risk    Assessment  Patient is a 70 y.o. male admitted with atrial flutter s/p ablation on 1/27. Pt seen for PT evaluation at this time. Spouse present throughout. Spouse supportive and able to assist. Pt able to complete functional mobility without assist, ambulated with and without FWW, no LOB. Pt negotiated 3 steps without difficulty. Pt and spouse report no concerns with return home. Would benefit from HHPT to facilitate transition home and assist with DME recommendations. Recommend FWW for community mobility at [place]. Patient will not be actively followed for physical therapy services at this time, however may be seen if requested by physician for 1 more visit within 30 days to address any discharge or equipment needs.     Plan  Recommend Physical Therapy for Evaluation only   [place] Equipment Recommendations: Front-Wheel Walker  Discharge Recommendations: Recommend home health for continued physical therapy services       01/31/21 1529   Prior Living Situation   Prior Services Intermittent Physical Support for ADL Per Family   Housing / Facility 1 Story House   Steps Into Home 1   Steps In Home 0   Bathroom Set up Walk In Shower;Grab Bars (built in bench)   Equipment Owned None   Lives with -  Spouse   Comments spouse present and supportive. able to assist with ADLs and IADLs. pt indep PTA.    Prior Level of Functional Mobility   Bed Mobility Independent   Transfer Status Independent   Ambulation Independent   Distance Ambulation (Feet) community distances   Assistive Devices Used None   Stairs Independent   Balance Assessment   Sitting Balance (Static) Fair +   Sitting Balance (Dynamic) Fair +   Standing Balance (Static) Fair   Standing Balance (Dynamic) Fair   Weight Shift Sitting Fair   Weight Shift Standing Fair   Comments with and without  FWW   Gait Analysis   Gait Level Of Assist Supervised   Assistive Device None   Distance (Feet) 350   Deviation Increased Base Of Support   # of Stairs Climbed 3   Level of Assist with Stairs Supervised   Weight Bearing Status no restrictions   Comments with and without FWW, no LOB. reciprocal gait on stairs   Bed Mobility    Supine to Sit Supervised   Scooting Supervised   Functional Mobility   Sit to Stand Supervised   Bed, Chair, WC Transfer Supervised

## 2021-02-01 NOTE — CARE PLAN
Problem: Pain Management  Goal: Pain level will decrease to patient's comfort goal  Patient denies pain at this time.   Outcome: PROGRESSING AS EXPECTED     Problem: Respiratory:  Goal: Respiratory status will improve  Patient on room air. No SOB at this time.   Outcome: PROGRESSING AS EXPECTED     Problem: Communication  Goal: The ability to communicate needs accurately and effectively will improve  Discussed POC, answered all current questions   Outcome: PROGRESSING AS EXPECTED

## 2021-02-01 NOTE — PROGRESS NOTES
Cardiology Follow Up Progress Note    Date of Service  2/1/2021    Attending Physician  Jesse Gardner M.D.    Chief Complaint   Elective Admission for AF ablation.     HPI  Krishan Acevedo is a 70 y.o. male admitted 1/27/2021 for elective admission for AF ablation.  He underwent ablation with  1/27/21 with PVI with roof ablation, Right atrial flutter.     Medical history significant for ESRD S/P renal transplant in 2008, wegener's granulomatosis, paroxysmal atrial fibrillation, chronic anticoagulation.        Interim Events  Seen in EP rounds.   Monitored rhythm: Sinus.  No events overnight.   States that he is feeling well this AM; no chest pain, no dyspnea, no new concerns.  Hopeful to DC today.  Neuro is back to baseline.  A/O x 4, no dysarthria/evidence of ongoing aphasia.     Labs reviewed.     Review of Systems  Review of Systems   Constitutional: Negative for chills, fatigue and fever.   HENT: Negative for congestion, nosebleeds, tinnitus and trouble swallowing.    Respiratory: Negative for cough, shortness of breath and wheezing.    Cardiovascular: Negative for chest pain, palpitations and leg swelling.   Gastrointestinal: Negative for abdominal pain, blood in stool, nausea and vomiting.   Genitourinary: Negative for hematuria.   Neurological: Negative for dizziness, syncope, speech difficulty, weakness, light-headedness and numbness.       Vital signs in last 24 hours  Temp:  [36.2 °C (97.2 °F)-37.5 °C (99.5 °F)] 37 °C (98.6 °F)  Pulse:  [79-92] 87  Resp:  [18-19] 19  BP: (125-150)/(78-99) 148/85  SpO2:  [92 %-97 %] 94 %    Physical Exam  Physical Exam  Vitals signs and nursing note reviewed.   Constitutional:       Appearance: Normal appearance.   HENT:      Head: Normocephalic and atraumatic.   Eyes:      Conjunctiva/sclera: Conjunctivae normal.      Pupils: Pupils are equal, round, and reactive to light.   Neck:      Musculoskeletal: Normal range of motion.   Cardiovascular:      Rate and  Rhythm: Normal rate and regular rhythm.      Pulses: Normal pulses.      Heart sounds: Normal heart sounds. No murmur. No friction rub. No gallop.    Pulmonary:      Effort: Pulmonary effort is normal.      Breath sounds: Normal breath sounds. No wheezing, rhonchi or rales.   Abdominal:      General: Bowel sounds are normal.   Musculoskeletal: Normal range of motion.      Right lower leg: Edema (Trace ) present.      Left lower leg: Edema (trace) present.   Skin:     General: Skin is warm and dry.   Neurological:      General: No focal deficit present.      Mental Status: He is alert and oriented to person, place, and time. Mental status is at baseline.   Psychiatric:         Mood and Affect: Mood normal.         Behavior: Behavior normal.         Thought Content: Thought content normal.         Judgment: Judgment normal.         Lab Review  Lab Results   Component Value Date/Time    WBC 8.6 02/01/2021 03:52 AM    RBC 3.72 (L) 02/01/2021 03:52 AM    HEMOGLOBIN 11.6 (L) 02/01/2021 03:52 AM    HEMATOCRIT 36.3 (L) 02/01/2021 03:52 AM    MCV 97.6 02/01/2021 03:52 AM    MCH 31.2 02/01/2021 03:52 AM    MCHC 32.0 (L) 02/01/2021 03:52 AM    MPV 9.6 02/01/2021 03:52 AM      Lab Results   Component Value Date/Time    SODIUM 138 02/01/2021 03:52 AM    POTASSIUM 4.9 02/01/2021 03:52 AM    CHLORIDE 112 02/01/2021 03:52 AM    CO2 16 (L) 02/01/2021 03:52 AM    GLUCOSE 123 (H) 02/01/2021 03:52 AM    BUN 44 (H) 02/01/2021 03:52 AM    CREATININE 1.33 02/01/2021 03:52 AM      Lab Results   Component Value Date/Time    ASTSGOT 40 02/01/2021 03:52 AM    ALTSGPT 20 02/01/2021 03:52 AM     Lab Results   Component Value Date/Time    CHOLSTRLTOT 101 01/20/2021 07:48 AM    LDL 44 01/20/2021 07:48 AM    HDL 30 (A) 01/20/2021 07:48 AM    TRIGLYCERIDE 133 01/20/2021 07:48 AM    TROPONINT 2141 (H) 01/28/2021 02:10 PM       No results for input(s): NTPROBNP in the last 72 hours.    Cardiac Imaging and Procedures Review  EKG:  My personal  interpretation of the EKG dated 2/1/21 is Sinus rhtyhm, RBBB, first deg AVB.     Echocardiogram:  1/28/21  CONCLUSIONS  Limited echocardiogram study to evaluate pericardial effusion post ablation. Trivial pericardial effusion without evidence of hemodynamic compromise.     Imaging  Chest X-Ray (01/29/2021):  IMPRESSION:  Stable enlargement of the cardiomediastinal silhouette without acute cardiopulmonary abnormality.     Cheat X-Ray (01/28/2021):  IMPRESSION:  1.  Air-filled distended loops of bowel are seen with reactive mucosal pattern, appearance suggests ileus or enteritis. Recommend radiographic followup to resolution to exclude progression to obstruction.     CT chest without contrast: Mild scattered pulmonary opacifications are noted some of which could be due to fibrosis or atelectasis.  No consolidation seen     RUQ US (01/29/21):  IMPRESSION:  1.  Hepatomegaly and echogenic liver, compatible with fatty change versus fibrosis.  2.  Cholelithiasis without additional sonographic findings of acute cholecystitis.     Brain MRI (01/29/2021):  IMPRESSION:  1.  No acute abnormality.  2.  Mild cerebral volume loss  3.  Mild chronic microvascular ischemic disease.    Assessment/Plan  1. Atrial Arrhythmias (Hx of AF and AFL):  - Elective admission for ablation with PVI/Roof and CTI flutter line by Dr. Forrester 1/27/21.  - Post procedurally with CP, encephalopathy, developed concern for CVA, however CT and MRI without acute findings suggestive of CVA..  No evidence of infectious cause with BC x 2 NTD. Does have a history of MDRO with ecoli.   - Per IM,neuro  suspect possible metabolic cause vs early parkinson's.  He has been started on sinemet per IM team.   - Symptoms have resolved at this time.  Appreciate assistance of neurology and internal medicine teams.   - A/O x 4 this AM, clear speech, appears back at baseline.   - Monitored rhythm: Sinus rhythm this AM.   - Hx of ESRD S/P renal transplant in 2008.  Did have  elevation to SCr which has improved after hydration and medication adjustment.  SCr 1.3 this AM.  Plan is to resume his mediations that were previously held in setting of RADHA.     - He is cleared for discharge from EP standpoint.  Discussed with IM team.  [facility] post DC.       Follow up is arranged next week in EP clinic.  Please call with any questions.     LLOYD Hansen.   [facility]  [phone]

## 2021-02-01 NOTE — FACE TO FACE
Face to Face Supporting Documentation - Home Health    The encounter with this patient was in whole or in part the primary reason for home health admission.    Date of encounter:   Patient:                    MRN:                       YOB: 2023  Krishan Acevedo  [id number]  1950     Home health to see patient for:  Skilled Nursing care for assessment, interventions & education and Physical Therapy evaluation and treatment    Skilled need for:  Recent deterioration of physical health    Skilled nursing interventions to include:  Assessment, intervention, education, physical therapy and treatment    Homebound status evidenced by:  Need the aid of supportive devices such as crutches, canes, wheelchairs or walkers. Leaving home requires a considerable and taxing effort. There is a normal inability to leave the home. NEEDS [place]     Community Physician to provide follow up care: Damian Diaz M.D.     Optional Interventions? No      I certify the face to face encounter for this home health care referral meets the CMS requirements and the encounter/clinical assessment with the patient was, in whole, or in part, for the medical condition(s) listed above, which is the primary reason for home health care. Based on my clinical findings: the service(s) are medically necessary, support the need for home health care, and the homebound criteria are met.  I certify that this patient has had a face to face encounter by myself.  HARLEEN Valdez - NPI: [id number]

## 2021-02-01 NOTE — PROGRESS NOTES
Daily Progress Note:     Date of Service: 1/31/2021  Primary Team: [facility] Red Team    Attending: Dr. Duffy   Senior Resident: Kolby Watkins M.D.  Contact:  [phone]    ID:   The patient is a 70-year-old male with a history of ANCA vasculitis, kidney transplant in 2008 on tacrolimus/mycophenolate/prednisone, CKD stage III (baseline creatinine 1.5-1.8), sleep apnea, paroxysmal atrial fibrillation, atrial flutter who presented on 1/27/2021 for an atrial flutter ablation.  The patient successfully underwent an ablation on 1/27.  The patient was initially treated with heparin and started on apixaban on 1/28.  On the night of 1/28 the patient began having left shoulder pain and mild expressive aphasia with an NIH stroke scale of 6 due to right and left arm drift, visual field deficit, drowsiness.  The patient was not given TPA since he is currently taking Eliquis.    Interval Update:  Patient sitting in the chair this morning. No events overnight. He is alert and oriented. Cardiology saw him and cleared for discharge pending therapy reassement of mobility. His rigidity and intention tremor much improved on Sinemet. Likely has early Parkinson's. Creatinine back to baseline with iv fluids.   All nephrotoxic medications such as allopurinol, colchicine, Lasix, benazepril have been held. If kidney function remains at baseline, consider restarting on discharge. Will d/c in am with home health. Referral placed.     Consultants/Specialty:  Cardiology, EP  Neurology    Review of Systems:    Review of Systems   Constitutional: Negative for chills and fever.   HENT: Negative for congestion and sore throat.    Eyes: Negative for blurred vision and double vision.   Respiratory: Negative for cough and shortness of breath.    Cardiovascular: Negative for chest pain and palpitations.   Gastrointestinal: Negative for nausea and vomiting.   Genitourinary: Negative for dysuria and urgency.   Musculoskeletal: Negative for falls  and myalgias.   Skin: Negative for itching and rash.   Neurological: Positive for tremors. Negative for dizziness, tingling, sensory change, speech change, focal weakness, seizures, loss of consciousness, weakness and headaches.       Objective Data:   Physical Exam:   Vitals:   Temp:  [36.2 °C (97.2 °F)-37.1 °C (98.7 °F)] 36.9 °C (98.4 °F)  Pulse:  [] 79  Resp:  [17-19] 18  BP: (116-166)/() 125/99  SpO2:  [92 %-97 %] 95 %     Physical Exam  Vitals signs and nursing note reviewed.   Constitutional:       General: He is not in acute distress.     Appearance: He is not toxic-appearing.   HENT:      Head: Normocephalic and atraumatic.      Mouth/Throat:      Mouth: Mucous membranes are dry.      Pharynx: Oropharynx is clear. No oropharyngeal exudate.   Eyes:      General: No scleral icterus.     Extraocular Movements: Extraocular movements intact.      Pupils: Pupils are equal, round, and reactive to light.   Cardiovascular:      Rate and Rhythm: Normal rate and regular rhythm.      Pulses: Normal pulses.      Heart sounds: No murmur. No friction rub. No gallop.    Pulmonary:      Effort: Pulmonary effort is normal. No respiratory distress.      Breath sounds: Normal breath sounds. No wheezing or rales.   Abdominal:      General: Abdomen is flat. There is no distension.      Palpations: Abdomen is soft.   Musculoskeletal:      Right lower leg: No edema.      Left lower leg: No edema.   Skin:     General: Skin is warm and dry.   Neurological:      Mental Status: He is alert and oriented to person, place, and time.      Comments: GENERAL:  Up in the chair   MENTAL STATUS:  Awake, alert, oriented times 3.  Speech is fluent, comprehension is intact.  Can name and repeat appropriately.  CRANIAL NERVES:  PERRL, EOMI with no nystagmus, face is symmetric, facial sensation is intact, tongue is in the midline, palate is symmetric.  MOTOR:  5/5 throughout.  Intention tremor bilateral upper extremities that is  improved. Rigidity in the right arm much improved.   COORDINATION:  Finger to nose essentially normal bilateral       Psychiatric:         Mood and Affect: Mood normal.         Behavior: Behavior normal.         Labs:   Results for orders placed or performed during the hospital encounter of 01/27/21   PT   Result Value Ref Range    PT 18.5 (H) 12.0 - 14.6 sec    INR 1.49 (H) 0.87 - 1.13   CBC WITHOUT DIFFERENTIAL   Result Value Ref Range    WBC 12.4 (H) 4.8 - 10.8 K/uL    RBC 3.99 (L) 4.70 - 6.10 M/uL    Hemoglobin 12.6 (L) 14.0 - 18.0 g/dL    Hematocrit 39.1 (L) 42.0 - 52.0 %    MCV 98.0 (H) 81.4 - 97.8 fL    MCH 31.6 27.0 - 33.0 pg    MCHC 32.2 (L) 33.7 - 35.3 g/dL    RDW 52.0 (H) 35.9 - 50.0 fL    Platelet Count 127 (L) 164 - 446 K/uL    MPV 9.5 9.0 - 12.9 fL   Comp Metabolic Panel   Result Value Ref Range    Sodium 143 135 - 145 mmol/L    Potassium 5.5 3.6 - 5.5 mmol/L    Chloride 114 (H) 96 - 112 mmol/L    Co2 15 (L) 20 - 33 mmol/L    Anion Gap 14.0 7.0 - 16.0    Glucose 180 (H) 65 - 99 mg/dL    Bun 55 (H) 8 - 22 mg/dL    Creatinine 1.79 (H) 0.50 - 1.40 mg/dL    Calcium 9.9 8.5 - 10.5 mg/dL    AST(SGOT) 37 12 - 45 U/L    ALT(SGPT) 28 2 - 50 U/L    Alkaline Phosphatase 135 (H) 30 - 99 U/L    Total Bilirubin 0.7 0.1 - 1.5 mg/dL    Albumin 3.8 3.2 - 4.9 g/dL    Total Protein 6.8 6.0 - 8.2 g/dL    Globulin 3.0 1.9 - 3.5 g/dL    A-G Ratio 1.3 g/dL   ESTIMATED GFR   Result Value Ref Range    GFR If African American 45 (A) >60 mL/min/1.73 m 2    GFR If Non  37 (A) >60 mL/min/1.73 m 2   LIPASE   Result Value Ref Range    Lipase 11 11 - 82 U/L   AMYLASE   Result Value Ref Range    Amylase 20 20 - 103 U/L   BLOOD CULTURE    Specimen: Peripheral; Blood   Result Value Ref Range    Significant Indicator NEG     Source BLD     Site PERIPHERAL     Culture Result       No Growth  Note: Blood cultures are incubated for 5 days and  are monitored continuously.Positive blood cultures  are called to the RN and  reported as soon as  they are identified.     BLOOD CULTURE    Specimen: Peripheral; Blood   Result Value Ref Range    Significant Indicator NEG     Source BLD     Site PERIPHERAL     Culture Result       No Growth  Note: Blood cultures are incubated for 5 days and  are monitored continuously.Positive blood cultures  are called to the RN and reported as soon as  they are identified.     URINALYSIS    Specimen: Urine, Clean Catch   Result Value Ref Range    Color Yellow     Character Clear     Specific Gravity 1.015 <1.035    Ph 5.0 5.0 - 8.0    Glucose Negative Negative mg/dL    Ketones Negative Negative mg/dL    Protein Negative Negative mg/dL    Bilirubin Negative Negative    Urobilinogen, Urine 0.2 Negative    Nitrite Negative Negative    Leukocyte Esterase Small (A) Negative    Occult Blood Negative Negative    Micro Urine Req Microscopic    Basic Metabolic Panel   Result Value Ref Range    Sodium 135 135 - 145 mmol/L    Potassium 5.3 3.6 - 5.5 mmol/L    Chloride 108 96 - 112 mmol/L    Co2 16 (L) 20 - 33 mmol/L    Glucose 199 (H) 65 - 99 mg/dL    Bun 51 (H) 8 - 22 mg/dL    Creatinine 1.84 (H) 0.50 - 1.40 mg/dL    Calcium 9.9 8.5 - 10.5 mg/dL    Anion Gap 11.0 7.0 - 16.0   TROPONIN   Result Value Ref Range    Troponin T 2141 (H) 6 - 19 ng/L   ESTIMATED GFR   Result Value Ref Range    GFR If  44 (A) >60 mL/min/1.73 m 2    GFR If Non  36 (A) >60 mL/min/1.73 m 2   Lactic Acid -STAT Once   Result Value Ref Range    Lactic Acid 1.8 0.5 - 2.0 mmol/L   Lactic Acid Every four hours after STAT order   Result Value Ref Range    Lactic Acid 2.0 0.5 - 2.0 mmol/L   Prothrombin time (INR)   Result Value Ref Range    PT 21.2 (H) 12.0 - 14.6 sec    INR 1.78 (H) 0.87 - 1.13   CREATINE KINASE   Result Value Ref Range    CPK Total 129 0 - 154 U/L   VENOUS BLOOD GAS   Result Value Ref Range    Venous Bg Ph 7.35 7.31 - 7.45    Venous Bg Pco2 30.2 (L) 41.0 - 51.0 mmHg    Venous Bg Po2 46.7 (H) 25.0 -  40.0 mmHg    Venous Bg O2 Saturation 82.8 %    Venous Bg Hco3 16 (L) 24 - 28 mmol/L    Venous Bg Base Excess -8 mmol/L    Body Temp see below Centigrade   TSH   Result Value Ref Range    TSH 0.370 (L) 0.380 - 5.330 uIU/mL   VITAMIN B12   Result Value Ref Range    Vitamin B12 -True Cobalamin 2427 (H) 211 - 911 pg/mL   PHOSPHORUS   Result Value Ref Range    Phosphorus 2.6 2.5 - 4.5 mg/dL   URINE MICROSCOPIC (W/UA)   Result Value Ref Range    WBC 2-5 (A) /hpf    RBC 0-2 (A) /hpf    Bacteria Negative None /hpf    Epithelial Cells Negative /hpf    Hyaline Cast 0-2 /lpf   HEMOGLOBIN A1C   Result Value Ref Range    Glycohemoglobin 7.2 (H) 0.0 - 5.6 %    Est Avg Glucose 160 mg/dL   MYCOPHENOLIC ACID   Result Value Ref Range    Mycophenolic Acid Serum <0.5 (L) 1.0 - 3.5 ug/mL    Mycophenolic Acid Glucuronide 77.1 35.0 - 100.0 ug/mL   MAGNESIUM   Result Value Ref Range    Magnesium 1.8 1.5 - 2.5 mg/dL   AMMONIA   Result Value Ref Range    Ammonia 21 11 - 45 umol/L   Lactic Acid Every four hours after STAT order   Result Value Ref Range    Lactic Acid 1.3 0.5 - 2.0 mmol/L   Lactic Acid Every four hours after STAT order   Result Value Ref Range    Lactic Acid 1.0 0.5 - 2.0 mmol/L   CBC WITHOUT DIFFERENTIAL   Result Value Ref Range    WBC 12.5 (H) 4.8 - 10.8 K/uL    RBC 3.56 (L) 4.70 - 6.10 M/uL    Hemoglobin 11.4 (L) 14.0 - 18.0 g/dL    Hematocrit 35.4 (L) 42.0 - 52.0 %    MCV 99.4 (H) 81.4 - 97.8 fL    MCH 32.0 27.0 - 33.0 pg    MCHC 32.2 (L) 33.7 - 35.3 g/dL    RDW 53.1 (H) 35.9 - 50.0 fL    Platelet Count 110 (L) 164 - 446 K/uL    MPV 9.8 9.0 - 12.9 fL   Basic Metabolic Panel   Result Value Ref Range    Sodium 135 135 - 145 mmol/L    Potassium 5.0 3.6 - 5.5 mmol/L    Chloride 109 96 - 112 mmol/L    Co2 15 (L) 20 - 33 mmol/L    Glucose 151 (H) 65 - 99 mg/dL    Bun 50 (H) 8 - 22 mg/dL    Creatinine 1.80 (H) 0.50 - 1.40 mg/dL    Calcium 9.6 8.5 - 10.5 mg/dL    Anion Gap 11.0 7.0 - 16.0   ESTIMATED GFR   Result Value Ref Range     GFR If African American 45 (A) >60 mL/min/1.73 m 2    GFR If Non  37 (A) >60 mL/min/1.73 m 2   CBC WITH DIFFERENTIAL   Result Value Ref Range    WBC 12.6 (H) 4.8 - 10.8 K/uL    RBC 3.76 (L) 4.70 - 6.10 M/uL    Hemoglobin 11.9 (L) 14.0 - 18.0 g/dL    Hematocrit 37.3 (L) 42.0 - 52.0 %    MCV 99.2 (H) 81.4 - 97.8 fL    MCH 31.6 27.0 - 33.0 pg    MCHC 31.9 (L) 33.7 - 35.3 g/dL    RDW 52.5 (H) 35.9 - 50.0 fL    Platelet Count 114 (L) 164 - 446 K/uL    MPV 9.6 9.0 - 12.9 fL    Neutrophils-Polys 77.20 (H) 44.00 - 72.00 %    Lymphocytes 13.90 (L) 22.00 - 41.00 %    Monocytes 8.00 0.00 - 13.40 %    Eosinophils 0.10 0.00 - 6.90 %    Basophils 0.20 0.00 - 1.80 %    Immature Granulocytes 0.60 0.00 - 0.90 %    Nucleated RBC 0.00 /100 WBC    Neutrophils (Absolute) 9.71 (H) 1.82 - 7.42 K/uL    Lymphs (Absolute) 1.74 1.00 - 4.80 K/uL    Monos (Absolute) 1.00 (H) 0.00 - 0.85 K/uL    Eos (Absolute) 0.01 0.00 - 0.51 K/uL    Baso (Absolute) 0.02 0.00 - 0.12 K/uL    Immature Granulocytes (abs) 0.08 0.00 - 0.11 K/uL    NRBC (Absolute) 0.00 K/uL   Comp Metabolic Panel   Result Value Ref Range    Sodium 135 135 - 145 mmol/L    Potassium 5.1 3.6 - 5.5 mmol/L    Chloride 108 96 - 112 mmol/L    Co2 17 (L) 20 - 33 mmol/L    Anion Gap 10.0 7.0 - 16.0    Glucose 164 (H) 65 - 99 mg/dL    Bun 52 (H) 8 - 22 mg/dL    Creatinine 1.93 (H) 0.50 - 1.40 mg/dL    Calcium 10.2 8.5 - 10.5 mg/dL    AST(SGOT) 30 12 - 45 U/L    ALT(SGPT) 25 2 - 50 U/L    Alkaline Phosphatase 118 (H) 30 - 99 U/L    Total Bilirubin 0.9 0.1 - 1.5 mg/dL    Albumin 3.6 3.2 - 4.9 g/dL    Total Protein 6.8 6.0 - 8.2 g/dL    Globulin 3.2 1.9 - 3.5 g/dL    A-G Ratio 1.1 g/dL   ESTIMATED GFR   Result Value Ref Range    GFR If  42 (A) >60 mL/min/1.73 m 2    GFR If Non  34 (A) >60 mL/min/1.73 m 2   FREE THYROXINE   Result Value Ref Range    Free T-4 1.75 (H) 0.93 - 1.70 ng/dL   GAMMA GT (GGT)   Result Value Ref Range    Gamma Gt 159 (H) 7  - 51 U/L   CBC WITH DIFFERENTIAL   Result Value Ref Range    WBC 11.0 (H) 4.8 - 10.8 K/uL    RBC 3.64 (L) 4.70 - 6.10 M/uL    Hemoglobin 11.6 (L) 14.0 - 18.0 g/dL    Hematocrit 36.2 (L) 42.0 - 52.0 %    MCV 99.5 (H) 81.4 - 97.8 fL    MCH 31.9 27.0 - 33.0 pg    MCHC 32.0 (L) 33.7 - 35.3 g/dL    RDW 52.7 (H) 35.9 - 50.0 fL    Platelet Count 117 (L) 164 - 446 K/uL    MPV 9.5 9.0 - 12.9 fL    Neutrophils-Polys 68.10 44.00 - 72.00 %    Lymphocytes 21.00 (L) 22.00 - 41.00 %    Monocytes 9.00 0.00 - 13.40 %    Eosinophils 1.20 0.00 - 6.90 %    Basophils 0.20 0.00 - 1.80 %    Immature Granulocytes 0.50 0.00 - 0.90 %    Nucleated RBC 0.00 /100 WBC    Neutrophils (Absolute) 7.52 (H) 1.82 - 7.42 K/uL    Lymphs (Absolute) 2.32 1.00 - 4.80 K/uL    Monos (Absolute) 0.99 (H) 0.00 - 0.85 K/uL    Eos (Absolute) 0.13 0.00 - 0.51 K/uL    Baso (Absolute) 0.02 0.00 - 0.12 K/uL    Immature Granulocytes (abs) 0.06 0.00 - 0.11 K/uL    NRBC (Absolute) 0.00 K/uL   Comp Metabolic Panel   Result Value Ref Range    Sodium 138 135 - 145 mmol/L    Potassium 4.8 3.6 - 5.5 mmol/L    Chloride 110 96 - 112 mmol/L    Co2 18 (L) 20 - 33 mmol/L    Anion Gap 10.0 7.0 - 16.0    Glucose 146 (H) 65 - 99 mg/dL    Bun 51 (H) 8 - 22 mg/dL    Creatinine 2.04 (H) 0.50 - 1.40 mg/dL    Calcium 10.0 8.5 - 10.5 mg/dL    AST(SGOT) 32 12 - 45 U/L    ALT(SGPT) 33 2 - 50 U/L    Alkaline Phosphatase 123 (H) 30 - 99 U/L    Total Bilirubin 0.7 0.1 - 1.5 mg/dL    Albumin 3.2 3.2 - 4.9 g/dL    Total Protein 6.5 6.0 - 8.2 g/dL    Globulin 3.3 1.9 - 3.5 g/dL    A-G Ratio 1.0 g/dL   ESTIMATED GFR   Result Value Ref Range    GFR If  39 (A) >60 mL/min/1.73 m 2    GFR If Non  32 (A) >60 mL/min/1.73 m 2   Comp Metabolic Panel   Result Value Ref Range    Sodium 137 135 - 145 mmol/L    Potassium 5.1 3.6 - 5.5 mmol/L    Chloride 112 96 - 112 mmol/L    Co2 17 (L) 20 - 33 mmol/L    Anion Gap 8.0 7.0 - 16.0    Glucose 135 (H) 65 - 99 mg/dL    Bun 46 (H) 8  - 22 mg/dL    Creatinine 1.74 (H) 0.50 - 1.40 mg/dL    Calcium 9.7 8.5 - 10.5 mg/dL    AST(SGOT) 43 12 - 45 U/L    ALT(SGPT) 8 2 - 50 U/L    Alkaline Phosphatase 140 (H) 30 - 99 U/L    Total Bilirubin 0.6 0.1 - 1.5 mg/dL    Albumin 3.2 3.2 - 4.9 g/dL    Total Protein 6.4 6.0 - 8.2 g/dL    Globulin 3.2 1.9 - 3.5 g/dL    A-G Ratio 1.0 g/dL   CBC WITHOUT DIFFERENTIAL   Result Value Ref Range    WBC 9.0 4.8 - 10.8 K/uL    RBC 3.58 (L) 4.70 - 6.10 M/uL    Hemoglobin 11.4 (L) 14.0 - 18.0 g/dL    Hematocrit 35.4 (L) 42.0 - 52.0 %    MCV 98.9 (H) 81.4 - 97.8 fL    MCH 31.8 27.0 - 33.0 pg    MCHC 32.2 (L) 33.7 - 35.3 g/dL    RDW 52.6 (H) 35.9 - 50.0 fL    Platelet Count 117 (L) 164 - 446 K/uL    MPV 9.6 9.0 - 12.9 fL   ESTIMATED GFR   Result Value Ref Range    GFR If  47 (A) >60 mL/min/1.73 m 2    GFR If Non  39 (A) >60 mL/min/1.73 m 2   POC ACT (resulted by nursing)   Result Value Ref Range    Istat Activated Clotting Time 406 (H) 74 - 137 sec   POC ACT (resulted by nursing)   Result Value Ref Range    Istat Activated Clotting Time 356 (H) 74 - 137 sec   ACCU-CHEK GLUCOSE   Result Value Ref Range    Glucose - Accu-Ck 207 (H) 65 - 99 mg/dL   ACCU-CHEK GLUCOSE   Result Value Ref Range    Glucose - Accu-Ck 185 (H) 65 - 99 mg/dL   ACCU-CHEK GLUCOSE   Result Value Ref Range    Glucose - Accu-Ck 153 (H) 65 - 99 mg/dL   ACCU-CHEK GLUCOSE   Result Value Ref Range    Glucose - Accu-Ck 145 (H) 65 - 99 mg/dL   ACCU-CHEK GLUCOSE   Result Value Ref Range    Glucose - Accu-Ck 164 (H) 65 - 99 mg/dL   ACCU-CHEK GLUCOSE   Result Value Ref Range    Glucose - Accu-Ck 167 (H) 65 - 99 mg/dL   ACCU-CHEK GLUCOSE   Result Value Ref Range    Glucose - Accu-Ck 150 (H) 65 - 99 mg/dL   ACCU-CHEK GLUCOSE   Result Value Ref Range    Glucose - Accu-Ck 146 (H) 65 - 99 mg/dL   ACCU-CHEK GLUCOSE   Result Value Ref Range    Glucose - Accu-Ck 228 (H) 65 - 99 mg/dL   ACCU-CHEK GLUCOSE   Result Value Ref Range    Glucose -  Accu-Ck 109 (H) 65 - 99 mg/dL   ACCU-CHEK GLUCOSE   Result Value Ref Range    Glucose - Accu-Ck 158 (H) 65 - 99 mg/dL   ACCU-CHEK GLUCOSE   Result Value Ref Range    Glucose - Accu-Ck 132 (H) 65 - 99 mg/dL   ACCU-CHEK GLUCOSE   Result Value Ref Range    Glucose - Accu-Ck 143 (H) 65 - 99 mg/dL   ACCU-CHEK GLUCOSE   Result Value Ref Range    Glucose - Accu-Ck 139 (H) 65 - 99 mg/dL   EKG   Result Value Ref Range    Report       [facility]    Test Date:  2021  Pt Name:    MOMO ESCUDERO                  Department: [facility]  MRN:        [id number]                      Room:       [facility]  Gender:     Male                         Technician: PEP  :        1950                   Requested By:ALAN KUMARI  Order #:    [id number]                    Reading MD: James Quan MD    Measurements  Intervals                                Axis  Rate:       97                           P:          -62  AR:         180                          QRS:        34  QRSD:       152                          T:          -44  QT:         384  QTc:        488    Interpretive Statements  SINUS OR ECTOPIC ATRIAL RHYTHM  RIGHT BUNDLE BRANCH BLOCK  Compared to ECG 2021 14:09:33  Atrial flutter no longer present  Electronically Signed On 2021 17:55:11 PST by James Quan MD     EKG   Result Value Ref Range    Report       [facility]    Test Date:  2021  Pt Name:    MOMO ESCUDERO                  Department: [facility]  MRN:        [id number]                      Room:       T824  Gender:     Male                         Technician: DGG  :        1950                   Requested By:ALAN KUMARI  Order #:    [id number]                    Reading MD: Alina Luna MD    Measurements  Intervals                                Axis  Rate:       113                          P:          0  AR:                                      QRS:        -11  QRSD:       152                          T:          -15  QT:          364  QTc:        500    Interpretive Statements  SINUS TACHYCARDIA  MULTIFORM VENTRICULAR PREMATURE COMPLEXES  RIGHT BUNDLE BRANCH BLOCK  Compared to ECG 2021 17:21:05  Ventricular premature complex(es) now present  Electronically Signed On 2021 7:24:56 PST by Alina Luna MD     EKG   Result Value Ref Range    Report       [facility]    Test Date:  2021  Pt Name:    MOMO [facility]                  Department: 183  MRN:        [id number]                      Room:       T824  Gender:     Male                         Technician: [facility]  :        1950                   Requested By:ALNA KUMRAI  Order #:    [id number]                    Reading MD: Alina Luna MD    Measurements  Intervals                                Axis  Rate:       113                          P:          0  VA:         161                          QRS:        -7  QRSD:       144                          T:          9  QT:         372  QTc:        510    Interpretive Statements  SINUS TACHYCARDIA  MULTIPLE PREMATURE COMPLEXES, VENT & SUPRAVEN  RIGHT BUNDLE BRANCH BLOCK  BASELINE WANDER IN LEAD(S) V5  Compared to ECG 2021 13:53:18  No significant changes  Electronically Signed On 2021 15:01:35 PST by Alina Luna MD     EKG   Result Value Ref Range    Report       [facility]    Test Date:  2021  Pt Name:    [facility]                  Department: 183  MRN:        [id number]                      Room:       T824  Gender:     Male                         Technician: [facility]  :        1950                   Requested By:MASON NAIK  Order #:    [id number]                    Reading MD: Michael Woods MD    Measurements  Intervals                                Axis  Rate:       121                          P:          0  VA:         296                          QRS:        -29  QRSD:       144                          T:          5  QT:         364  QTc:        517    Interpretive  Statements  SINUS TACHYCARDIA  FIRST DEGREE AV BLOCK  RIGHT BUNDLE BRANCH BLOCK  Compared to ECG 2021 02:28:24  Ventricular premature complex(es) no longer present  Electronically Signed On 2021 13:07:27 PST by Michael Woods MD     EKG   Result Value Ref Range    Report       [facility]    Test Date:  2021  Pt Name:    MMOO ESCUDERO                  Department: 183  MRN:        [id number]                      Room:       T824  Gender:     Male                         Technician: [facility]  :        1950                   Requested By:ALAN KUMARI  Order #:    [id number]                    Reading MD: Michael Woods MD    Measurements  Intervals                                Axis  Rate:       124                          P:  CA:                                      QRS:        -35  QRSD:       152                          T:          27  QT:         356  QTc:        512    Interpretive Statements  SINUS TACHYCARDIA  RIGHT BUNDLE BRANCH BLOCK  Compared to ECG 2021 10:37:48  No significant changes  Electronically Signed On 2021 14:13:20 PST by Michael Woods MD         Imaging:   DX-CHEST-PORTABLE (1 VIEW)   Final Result      Stable enlargement of the cardiomediastinal silhouette without acute cardiopulmonary abnormality.      MR-BRAIN-W/O   Final Result      1.  No acute abnormality.   2.  Mild cerebral volume loss   3.  Mild chronic microvascular ischemic disease.      US-RUQ   Final Result         1.  Hepatomegaly and echogenic liver, compatible with fatty change versus fibrosis.   2.  Cholelithiasis without additional sonographic findings of acute cholecystitis.      [id number] VIEW   Final Result         1.  Air-filled distended loops of bowel are seen with reactive mucosal pattern, appearance suggests ileus or enteritis. Recommend radiographic followup to resolution to exclude progression to obstruction.      CT-HEAD W/O   Final Result      No acute intracranial  abnormality is identified.      Atrophy      There are periventricular and subcortical white matter changes present.  This finding is nonspecific and could be from previous small vessel ischemia, demyelination, or gliosis.      Hypodensity in the left basal ganglia likely represents an old lacunar infarct.      CT-CHEST (THORAX) W/O   Final Result      1.  Mild scattered pulmonary opacifications are noted some of which could be due to fibrosis or atelectasis. No sizable consolidations are identified. Some mild edema or inflammation could be present.      2.  No pleural effusions are identified.      3.  There is calcific atherosclerosis.      4.  Cholelithiasis.               EC-ECHOCARDIOGRAM LTD W/O CONT   Final Result      EC-JAVIER W/O CONT   Final Result      CL-EP ABLATION ATRIAL FIBRILLATION    (Results Pending)       Problem Representation:   The patient is a 70-year-old male with a past medical history of ANCA vasculitis, kidney transplant in 2008 on tacrolimus, mycophenolate, prednisone.  The patient also has CKD stage III (with baseline creatinine 1.5-1.8), sleep apnea, paroxysmal A. fib, remote history of DVT who presented on 1/27/2021 for atrial flutter ablation.  On 1/28 the patient was noted to have dysarthria/expressive aphasia that was concerning for stroke.  However, imaging did not show any acute infarct.    RADHA (acute kidney injury) (HCC)  Assessment & Plan  The patient's creatinine is slowly been increasing throughout this admission.  He has CKD with a baseline creatinine about 1.5-1.8.  Yesterday creatinine was 2.  Urinalysis showed hyaline cast.  The patient appeared clinically dry.      Plan:  Creatinine responded to fluids back to baseline  Can restart chronic medications and d/c in am 2/1/21 home with wife      Encephalopathy acute  Assessment & Plan  This is likely multifactorial.  The patient did have anesthesia and given his age he may have a little bit of hospital delirium as well  (specially at nighttime).  Initially this was thought to be a stroke, but imaging including CT and MRI of the head has been negative.  CT did show an old lacunar stroke in the left basal ganglia.  The patient had a slight elevation in white count post procedure which is often expected and in absence of fever or other symptoms, negative blood cultures, negative chest x-ray and UA this is not sepsis.  The patient's white count has since been trending down, which goes along with an elevated white count that was postprocedural. Mycophenolate level ok, reviewed with nephrology.      Plan:  - tacrolimus levels are still pending.  Given that the patient's symptoms have now resolved it is likely not toxicity.  -Patient is now back to baseline.  -OT recommend home health, order placed plan for dc 2/1/21 if cleared on morning rounds      Chronic anticoagulation- (present on admission)  Assessment & Plan  Continue apixaban for atrial fibrillation.     Essential hypertension- (present on admission)  Assessment & Plan  Continue metoprolol. Lasix, benazepril have been held in the setting of acute kidney injury.  IV hydralazine as needed  Can resume home meds on discharge as Cr appears to be back to baseline      Tremor  Assessment & Plan  In the neurology note, there is a question of asterixis.  However, on my exam it was an intention tremor.  The patient has a bilateral motor induced tremor. The patient also had some rigidity in his right arm. Concern for early Parkinson's      Plan:   Sinemet has improved these symptoms and will be continued on discharge   Should f/u with neurology outpatient      Hyperglycemia  Assessment & Plan  History of diabetes is not adocumented, patient is not on diabetic medication  We will start a sliding scale, check A1c     Long-term use of immunosuppressant medication- (present on admission)  Assessment & Plan  Resume tacrolimus, mycophenolate, prednisone  Mycophenolate level appropriate, spoke  with nephrology who confirmed serum levels are not important for that drug usually not measured   Tacrolimus  level is still pending.  However, since the patient's symptoms have now resolved it is less likely toxicity from these medications.

## 2021-02-01 NOTE — PROGRESS NOTES
Pt discharged to home with relative off floor by wheelchair. Tele box removed, monitor room notified. IV's removed. Discharge instructions given to wife and patient.

## 2021-02-02 LAB
BACTERIA BLD CULT: NORMAL
BACTERIA BLD CULT: NORMAL
SIGNIFICANT IND 70042: NORMAL
SIGNIFICANT IND 70042: NORMAL
SITE SITE: NORMAL
SITE SITE: NORMAL
SOURCE SOURCE: NORMAL
SOURCE SOURCE: NORMAL
TACROLIMUS BLD-MCNC: 15.5 NG/ML

## 2021-02-04 NOTE — DOCUMENTATION QUERY
[facility]                                                                       Query Response Note      PATIENT:               MOMO ESCUDERO  ACCT #:                  [id number]  MRN:                     [id number]  :                      1950  ADMIT DATE:       2021 9:10 AM  DISCH DATE:        2021 11:00 AM  RESPONDING  PROVIDER #:        [id number]           QUERY TEXT:    The type of acute encephalopathy is not clear.     Neuro Consult: Most c/w metabolic encephalopathy due to new RADHA   Neuro PN: May be toxic/metabolic encephalopathy given leukocytosis, asterixis, new RADHA   DC Summary: Did return to baseline cognition, unlikely AMS 2/2 immunosuppressive agents.    Please specify type.    NOTE:  If an appropriate response is not listed below, please respond with a new note.    The patient's Clinical Indicators include:   Neuro Consult: Most c/w metabolic encephalopathy due to new RADHA.   BUN: 44 - 45; Creatinine: 1.79 - 1.84; GFR: 36 - 37   CT-Head & MR-Brain: No acute abnormality   MD PN: Encephalopathy poss metabolic 2/2 tacrolimus/mycophenolate toxicity   Neuro PN: May be toxic/metabolic encephalopathy given leukocytosis, asterixis, new RADHA   DC Summary: Did return to baseline cognition, unlikely AMS was 2/2 immunosuppressive agents.  Treatment: Neurology consult; treat underlying condition (RADHA on CKD 3, a-flutter); lab/imaging  Risk Factors: RADHA on CKD 3; hypovolemia/dehydration; a-flutter; immunosuppressive meds    Thank You,  Heike Ramirez RN  Clinical    Connect via [employer] Messenger  Options provided:   -- Metabolic encephalopathy   -- Toxic encephalopathy   -- Toxic/metabolic encephalopathy   -- Acute encephalopathy due to other medical condition, (please specify other medical condition)   -- Other type of encephalopathy   -- Unable to determine      Query created by:  Heike Ramirez on 2/4/2021 9:48 AM    RESPONSE TEXT:    Unable to determine       QUERY TEXT:    Please clarify the relationship, if any, between RADHA and SIRS.    The patient's Clinical Indicators include:  1/28 SIRS 2/4 (HR: 91 - 126; WBC's: 12.4)  1/28 HM Consult: SIRS, no definite source of infection; acute encephalopathy.  1/28 BUN: 44 - 45; Creatinine: 1.79 - 1.84; GFR: 36 - 37; BC's x 2: NEG  1/28 Neuro Consult: New RADHA  Treatment: IVF; treat underlying conditions (a-flutter; encephalopathy); lab/imaging  Risk Factors: RADHA on CKD 3; SIRS; hypovolemia/dehydration; a-flutter    Thank You,  Heike Ramirez RN  Clinical    Connect via [employer]  Options provided:   -- Condition RADHA is due to or associated with SIRS   -- Unrelated to each other   -- Unable to determine      Query created by: Heike Ramirez on 2/4/2021 9:50 AM    RESPONSE TEXT:    Unrelated to each other          Electronically signed by:  CLAUDINE CULLEN MD 2/4/2021 12:06 PM

## 2021-02-12 ENCOUNTER — APPOINTMENT (OUTPATIENT)
Dept: VASCULAR LAB | Facility: MEDICAL CENTER | Age: 71
End: 2021-02-12
Payer: MEDICARE

## 2021-02-17 ENCOUNTER — OFFICE VISIT (OUTPATIENT)
Dept: CARDIOLOGY | Facility: MEDICAL CENTER | Age: 71
End: 2021-02-17
Payer: MEDICARE

## 2021-02-17 VITALS
WEIGHT: 248 LBS | OXYGEN SATURATION: 96 % | HEART RATE: 83 BPM | BODY MASS INDEX: 34.72 KG/M2 | DIASTOLIC BLOOD PRESSURE: 78 MMHG | HEIGHT: 71 IN | RESPIRATION RATE: 16 BRPM | SYSTOLIC BLOOD PRESSURE: 128 MMHG

## 2021-02-17 DIAGNOSIS — I48.92 ATRIAL FLUTTER WITH RAPID VENTRICULAR RESPONSE (HCC): ICD-10-CM

## 2021-02-17 LAB — EKG IMPRESSION: NORMAL

## 2021-02-17 PROCEDURE — 99214 OFFICE O/P EST MOD 30 MIN: CPT | Performed by: INTERNAL MEDICINE

## 2021-02-17 PROCEDURE — 93000 ELECTROCARDIOGRAM COMPLETE: CPT | Performed by: INTERNAL MEDICINE

## 2021-02-17 RX ORDER — LOSARTAN POTASSIUM 100 MG/1
TABLET ORAL
COMMUNITY
Start: 2021-02-02 | End: 2021-04-16 | Stop reason: SDUPTHER

## 2021-02-17 ASSESSMENT — FIBROSIS 4 INDEX: FIB4 SCORE: 4.6

## 2021-02-17 NOTE — PROGRESS NOTES
Arrhythmia Clinic Note (Established patient)    DOS: 2/17/2021    Chief complaint/Reason for consult: AF/AFL    Interval History: Status post recent ablation for atrial fibrillation and CTI dependent flutter.  After ablation, he was not doing well and had a bit of an acute kidney injury.  After few days this resolved and he was discharged home.  Since discharge, he was having persistent cough, this has gotten a little bit better after changing his ACE inhibitor to an ARB but he still has persistent cough.  Still some persistent lethargy as well.  No palpitations however.  No syncope.  He did have some emesis this morning after a significant coughing spasm.  No fevers and no sick contacts.    ROS (+ highlighted in bold):  Constitutional: Fevers/chills/fatigue/weightloss  HEENT: Blurry vision/eye pain/sore throat/hearing loss  Respiratory: Shortness of breath/cough  Cardiovascular: Chest pain/palpitations/edema/orthopnea/syncope  GI: Nausea/vomitting/diarrhea  MSK: Arthralgias/myagias/muscle weakness  Skin: Rash/sores  Neurological: Numbness/tremors/vertigo  Endocrine: Excessive thirst/polyuria/cold intolerance/heat intolerance  Psych: Depression/anxiety    Past Medical History:   Diagnosis Date   • Arrhythmia 2021    flutter   • Arthritis 2020    right shoulder, knees, & back   • Cough 2020    dry-on lisinipril, now productive -brown   • Dialysis patient ([facility]) 2006    x 18 months   • E coli bacteremia 2014    hospitalized x 5 months   • Kidney failure 03/2008    kidney transplant   • Kidney transplant pain 2020    low back   • Sleep apnea     CPAP   • Snoring        Past Surgical History:   Procedure Laterality Date   • OTHER Right 2017    eye surgery, lasix & cataracts, retinal detatchment   • PEG PLACEMENT  10/10/2014    Performed by Brijesh [facility] M[facility] at [facility]   • OTHER ORTHOPEDIC SURGERY Left 1995    knee       Social History     Socioeconomic History   • Marital status:       Spouse name: Not on file   • Number of children: Not on file   • Years of education: Not on file   • Highest education level: Not on file   Occupational History   • Not on file   Tobacco Use   • Smoking status: Never Smoker   • Smokeless tobacco: Former User     Types: Snuff, Chew   Substance and Sexual Activity   • Alcohol use: Not Currently   • Drug use: Never   • Sexual activity: Not on file   Other Topics Concern   • Not on file   Social History Narrative   • Not on file     Social Determinants of Health     Financial Resource Strain:    • Difficulty of Paying Living Expenses:    Food Insecurity:    • Worried About Running Out of Food in the Last Year:    • Ran Out of Food in the Last Year:    Transportation Needs:    • Lack of Transportation (Medical):    • Lack of Transportation (Non-Medical):    Physical Activity:    • Days of Exercise per Week:    • Minutes of Exercise per Session:    Stress:    • Feeling of Stress :    Social Connections:    • Frequency of Communication with Friends and Family:    • Frequency of Social Gatherings with Friends and Family:    • Attends Judaism Services:    • Active Member of Clubs or Organizations:    • Attends Club or Organization Meetings:    • Marital Status:    Intimate Partner Violence:    • Fear of Current or Ex-Partner:    • Emotionally Abused:    • Physically Abused:    • Sexually Abused:        Family History   Problem Relation Age of Onset   • Stroke Brother 26        , ? cause   • Stroke Maternal Grandmother            • Clotting Disorder Neg Hx        Allergies   Allergen Reactions   • Nsaids      Cannot take because of anti rejection meds.       Current Outpatient Medications   Medication Sig Dispense Refill   • losartan (COZAAR) 100 MG Tab TAKE 1 TABLET BY MOUTH DAILY     • apixaban (ELIQUIS) 5mg Tab Take 1 Tab by mouth 2 Times a Day for 360 days. 180 Tab 3   • furosemide (LASIX) 20 MG Tab Take 20 mg by mouth 2 times a day.     • omeprazole (PRILOSEC)  "20 MG delayed-release capsule Take 20 mg by mouth 1 time a day as needed.     • Brimonidine Tartrate-Timolol (COMBIGAN) 0.2-0.5 % Solution by Ophthalmic route.     • allopurinol (ZYLOPRIM) 300 MG Tab Take 300 mg by mouth every bedtime.     • calcitRIOL (ROCALTROL) 0.25 MCG Cap Take 0.25 mcg by mouth two times a week. 3x per week M W F     • Multiple Vitamins-Minerals (CENTRUM SILVER 50+MEN) Tab Take 1 Tab by mouth every morning.     • vitamin D, Ergocalciferol, (DRISDOL) 00889 units Cap capsule Take 50,000 Units by mouth every Monday.     • Glucosamine-Chondroit-Vit C-Mn (GLUCOSAMINE 1500 COMPLEX) Cap Take 2 Caps by mouth 2 Times a Day.     • MAGNESIUM PO Take 1 Tab by mouth 2 Times a Day.     • mycophenolate (CELLCEPT) 250 MG Cap Take 500 mg by mouth 2 times a day.     • predniSONE (DELTASONE) 5 MG Tab Take 5 mg by mouth every morning.     • tacrolimus (PROGRAF) 1 MG Cap Take 1 mg by mouth 2 times a day.     • atorvastatin (LIPITOR) 10 MG TABS Take 10 mg by mouth every morning.     • carbidopa-levodopa (SINEMET)  MG Tab Take 1 Tab by mouth 2 (two) times a day for 30 days. (Patient not taking: Reported on 2/17/2021) 60 Tab 0   • risperiDONE (RISPERDAL) 0.5 MG Tab Take 1 Tab by mouth at bedtime as needed (Agitation, sundowning) for up to 30 days. (Patient not taking: Reported on 2/17/2021) 10 Tab 0     No current facility-administered medications for this visit.       Physical Exam:  Vitals:    02/17/21 1011   BP: 128/78   BP Location: Right arm   Patient Position: Sitting   BP Cuff Size: Adult   Pulse: 83   Resp: 16   SpO2: 96%   Weight: 112 kg (248 lb)   Height: 1.791 m (5' 10.5\")     General appearance: NAD, conversant   Eyes: anicteric sclerae, moist conjunctivae; no lid-lag; PERRLA  HENT: Atraumatic; oropharynx clear with moist mucous membranes and no mucosal ulcerations; normal hard and soft palate  Neck: Trachea midline; FROM, supple, no thyromegaly or lymphadenopathy  Lungs: CTA, with normal respiratory " effort and no intercostal retractions  CV: RRR, no MRGs, no JVD  Abdomen: Soft, non-tender; no masses or HSM  Extremities: No peripheral edema or extremity lymphadenopathy  Skin: Normal temperature, turgor and texture; no rash, ulcers or subcutaneous nodules  Psych: Appropriate affect, alert and oriented to person, place and time    Data:  Lipids:   Lab Results   Component Value Date/Time    CHOLSTRLTOT 101 01/20/2021 07:48 AM    TRIGLYCERIDE 133 01/20/2021 07:48 AM    HDL 30 (A) 01/20/2021 07:48 AM    LDL 44 01/20/2021 07:48 AM        BMP:  Lab Results   Component Value Date/Time    SODIUM 138 02/01/2021 0352    POTASSIUM 4.9 02/01/2021 0352    CHLORIDE 112 02/01/2021 0352    CO2 16 (L) 02/01/2021 0352    GLUCOSE 123 (H) 02/01/2021 0352    BUN 44 (H) 02/01/2021 0352    CREATININE 1.33 02/01/2021 0352    CALCIUM 9.8 02/01/2021 0352    ANION 10.0 02/01/2021 0352        TSH:   Lab Results   Component Value Date/Time    TSHULTRASEN 0.370 (L) 01/28/2021 1410        THYROXINE (T4):   No results found for: DANG     CBC:   Lab Results   Component Value Date/Time    WBC 8.6 02/01/2021 03:52 AM    RBC 3.72 (L) 02/01/2021 03:52 AM    HEMOGLOBIN 11.6 (L) 02/01/2021 03:52 AM    HEMATOCRIT 36.3 (L) 02/01/2021 03:52 AM    MCV 97.6 02/01/2021 03:52 AM    MCH 31.2 02/01/2021 03:52 AM    MCHC 32.0 (L) 02/01/2021 03:52 AM    RDW 51.4 (H) 02/01/2021 03:52 AM    PLATELETCT 140 (L) 02/01/2021 03:52 AM    MPV 9.6 02/01/2021 03:52 AM    NEUTSPOLYS 68.10 01/30/2021 02:07 AM    LYMPHOCYTES 21.00 (L) 01/30/2021 02:07 AM    MONOCYTES 9.00 01/30/2021 02:07 AM    EOSINOPHILS 1.20 01/30/2021 02:07 AM    BASOPHILS 0.20 01/30/2021 02:07 AM    IMMGRAN 0.50 01/30/2021 02:07 AM    NRBC 0.00 01/30/2021 02:07 AM    NEUTS 7.52 (H) 01/30/2021 02:07 AM    LYMPHS 2.32 01/30/2021 02:07 AM    MONOS 0.99 (H) 01/30/2021 02:07 AM    EOS 0.13 01/30/2021 02:07 AM    BASO 0.02 01/30/2021 02:07 AM    IMMGRANAB 0.06 01/30/2021 02:07 AM    NRBCAB 0.00 01/30/2021  02:07 AM        CBC w/o DIFF  Lab Results   Component Value Date/Time    WBC 8.6 02/01/2021 03:52 AM    RBC 3.72 (L) 02/01/2021 03:52 AM    HEMOGLOBIN 11.6 (L) 02/01/2021 03:52 AM    MCV 97.6 02/01/2021 03:52 AM    MCH 31.2 02/01/2021 03:52 AM    MCHC 32.0 (L) 02/01/2021 03:52 AM    RDW 51.4 (H) 02/01/2021 03:52 AM    MPV 9.6 02/01/2021 03:52 AM         EKG interpreted by me: Sinus rhythm    Impression/Plan:  1. Atrial flutter with rapid ventricular response (HCC)  EKG     1.  Typical atrial flutter status post ablation  2.  Paroxysmal atrial fibrillation status post ablation  3.  Chronic oral anticoagulation management with apixaban  4.  Acute on chronic cough  5.  CKD, 1 kidney    -He is getting labs done via his primary care doctor to assess his electrolytes and renal function given his continued lethargy  -Given his cough, I suggested a chest x-ray in a couple weeks if his cough is not improved after coming off of his ACE inhibitor  -He does not have any fever or sick contacts and the cough is acute on chronic, however we did discuss Covid testing which I recommended especially if he has a fever or worsening cough  -Continue anticoagulation status post ablation at this point    I will see him again in 3 months, sooner if needed    Rubin Forrester MD  Cardiac Electrophysiology

## 2021-02-18 NOTE — PROGRESS NOTES
Please be advised that the date of service for the progress note written by ADELA Angulo and attested by me on 1/30/2021 is actually 1/30/2021 but was erroneously reported in the note as 1/29/2021.

## 2021-02-19 ENCOUNTER — HOSPITAL ENCOUNTER (OUTPATIENT)
Dept: LAB | Facility: MEDICAL CENTER | Age: 71
End: 2021-02-19
Attending: INTERNAL MEDICINE
Payer: MEDICARE

## 2021-02-19 LAB
ALBUMIN SERPL BCP-MCNC: 4 G/DL (ref 3.2–4.9)
ALBUMIN/GLOB SERPL: 1.2 G/DL
ALP SERPL-CCNC: 149 U/L (ref 30–99)
ALT SERPL-CCNC: 15 U/L (ref 2–50)
ANION GAP SERPL CALC-SCNC: 12 MMOL/L (ref 7–16)
AST SERPL-CCNC: 18 U/L (ref 12–45)
BASOPHILS # BLD AUTO: 0.2 % (ref 0–1.8)
BASOPHILS # BLD: 0.02 K/UL (ref 0–0.12)
BILIRUB SERPL-MCNC: 0.4 MG/DL (ref 0.1–1.5)
BUN SERPL-MCNC: 67 MG/DL (ref 8–22)
CALCIUM SERPL-MCNC: 11.5 MG/DL (ref 8.5–10.5)
CHLORIDE SERPL-SCNC: 106 MMOL/L (ref 96–112)
CO2 SERPL-SCNC: 17 MMOL/L (ref 20–33)
CREAT SERPL-MCNC: 1.86 MG/DL (ref 0.5–1.4)
EOSINOPHIL # BLD AUTO: 0.35 K/UL (ref 0–0.51)
EOSINOPHIL NFR BLD: 3.8 % (ref 0–6.9)
ERYTHROCYTE [DISTWIDTH] IN BLOOD BY AUTOMATED COUNT: 48.6 FL (ref 35.9–50)
GLOBULIN SER CALC-MCNC: 3.3 G/DL (ref 1.9–3.5)
GLUCOSE SERPL-MCNC: 122 MG/DL (ref 65–99)
HCT VFR BLD AUTO: 40.4 % (ref 42–52)
HGB BLD-MCNC: 13.2 G/DL (ref 14–18)
IMM GRANULOCYTES # BLD AUTO: 0.02 K/UL (ref 0–0.11)
IMM GRANULOCYTES NFR BLD AUTO: 0.2 % (ref 0–0.9)
LYMPHOCYTES # BLD AUTO: 3.82 K/UL (ref 1–4.8)
LYMPHOCYTES NFR BLD: 41.2 % (ref 22–41)
MAGNESIUM SERPL-MCNC: 1.8 MG/DL (ref 1.5–2.5)
MCH RBC QN AUTO: 31.4 PG (ref 27–33)
MCHC RBC AUTO-ENTMCNC: 32.7 G/DL (ref 33.7–35.3)
MCV RBC AUTO: 96 FL (ref 81.4–97.8)
MONOCYTES # BLD AUTO: 1.03 K/UL (ref 0–0.85)
MONOCYTES NFR BLD AUTO: 11.1 % (ref 0–13.4)
NEUTROPHILS # BLD AUTO: 4.03 K/UL (ref 1.82–7.42)
NEUTROPHILS NFR BLD: 43.5 % (ref 44–72)
NRBC # BLD AUTO: 0 K/UL
NRBC BLD-RTO: 0 /100 WBC
PHOSPHATE SERPL-MCNC: 3.3 MG/DL (ref 2.5–4.5)
PLATELET # BLD AUTO: 163 K/UL (ref 164–446)
PMV BLD AUTO: 11.2 FL (ref 9–12.9)
POTASSIUM SERPL-SCNC: 5 MMOL/L (ref 3.6–5.5)
PROT SERPL-MCNC: 7.3 G/DL (ref 6–8.2)
RBC # BLD AUTO: 4.21 M/UL (ref 4.7–6.1)
SODIUM SERPL-SCNC: 135 MMOL/L (ref 135–145)
URATE SERPL-MCNC: 3.6 MG/DL (ref 2.5–8.3)
WBC # BLD AUTO: 9.3 K/UL (ref 4.8–10.8)

## 2021-02-19 PROCEDURE — 80197 ASSAY OF TACROLIMUS: CPT

## 2021-02-19 PROCEDURE — 84550 ASSAY OF BLOOD/URIC ACID: CPT

## 2021-02-19 PROCEDURE — 36415 COLL VENOUS BLD VENIPUNCTURE: CPT

## 2021-02-19 PROCEDURE — 85025 COMPLETE CBC W/AUTO DIFF WBC: CPT

## 2021-02-19 PROCEDURE — 83735 ASSAY OF MAGNESIUM: CPT

## 2021-02-19 PROCEDURE — 80053 COMPREHEN METABOLIC PANEL: CPT

## 2021-02-19 PROCEDURE — 84100 ASSAY OF PHOSPHORUS: CPT

## 2021-02-22 ENCOUNTER — HOSPITAL ENCOUNTER (OUTPATIENT)
Facility: MEDICAL CENTER | Age: 71
End: 2021-02-22
Attending: INTERNAL MEDICINE
Payer: MEDICARE

## 2021-02-22 LAB
APPEARANCE UR: CLEAR
BACTERIA #/AREA URNS HPF: ABNORMAL /HPF
BILIRUB UR QL STRIP.AUTO: NEGATIVE
COLOR UR: YELLOW
CREAT UR-MCNC: 69.71 MG/DL
CREAT UR-MCNC: 70.08 MG/DL
EPI CELLS #/AREA URNS HPF: NEGATIVE /HPF
GLUCOSE UR STRIP.AUTO-MCNC: NEGATIVE MG/DL
KETONES UR STRIP.AUTO-MCNC: NEGATIVE MG/DL
LEUKOCYTE ESTERASE UR QL STRIP.AUTO: ABNORMAL
MICRO URNS: ABNORMAL
MICROALBUMIN UR-MCNC: <1.2 MG/DL
MICROALBUMIN/CREAT UR: NORMAL MG/G (ref 0–30)
NITRITE UR QL STRIP.AUTO: NEGATIVE
PH UR STRIP.AUTO: 6 [PH] (ref 5–8)
PROT UR QL STRIP: NEGATIVE MG/DL
PROT UR-MCNC: 5 MG/DL (ref 0–15)
PROT/CREAT UR: 72 MG/G (ref 15–68)
RBC # URNS HPF: ABNORMAL /HPF
RBC UR QL AUTO: NEGATIVE
SP GR UR STRIP.AUTO: 1.02
TACROLIMUS BLD-MCNC: 13.1 NG/ML
UROBILINOGEN UR STRIP.AUTO-MCNC: 0.2 MG/DL
WBC #/AREA URNS HPF: ABNORMAL /HPF

## 2021-02-22 PROCEDURE — 82043 UR ALBUMIN QUANTITATIVE: CPT

## 2021-02-22 PROCEDURE — 84156 ASSAY OF PROTEIN URINE: CPT

## 2021-02-22 PROCEDURE — 87086 URINE CULTURE/COLONY COUNT: CPT

## 2021-02-22 PROCEDURE — 82570 ASSAY OF URINE CREATININE: CPT | Mod: 91

## 2021-02-22 PROCEDURE — 81001 URINALYSIS AUTO W/SCOPE: CPT

## 2021-02-25 LAB
BACTERIA UR CULT: NORMAL
SIGNIFICANT IND 70042: NORMAL
SITE SITE: NORMAL
SOURCE SOURCE: NORMAL

## 2021-03-22 ENCOUNTER — HOSPITAL ENCOUNTER (OUTPATIENT)
Dept: LAB | Facility: MEDICAL CENTER | Age: 71
End: 2021-03-22
Attending: NURSE PRACTITIONER
Payer: MEDICARE

## 2021-03-22 PROCEDURE — 81001 URINALYSIS AUTO W/SCOPE: CPT

## 2021-03-22 PROCEDURE — 85025 COMPLETE CBC W/AUTO DIFF WBC: CPT

## 2021-03-22 PROCEDURE — 82306 VITAMIN D 25 HYDROXY: CPT

## 2021-03-22 PROCEDURE — 36415 COLL VENOUS BLD VENIPUNCTURE: CPT

## 2021-03-22 PROCEDURE — 84100 ASSAY OF PHOSPHORUS: CPT

## 2021-03-22 PROCEDURE — 84156 ASSAY OF PROTEIN URINE: CPT

## 2021-03-22 PROCEDURE — 87086 URINE CULTURE/COLONY COUNT: CPT

## 2021-03-22 PROCEDURE — 83970 ASSAY OF PARATHORMONE: CPT

## 2021-03-22 PROCEDURE — 87077 CULTURE AEROBIC IDENTIFY: CPT

## 2021-03-22 PROCEDURE — 83735 ASSAY OF MAGNESIUM: CPT

## 2021-03-22 PROCEDURE — 84550 ASSAY OF BLOOD/URIC ACID: CPT

## 2021-03-22 PROCEDURE — 82570 ASSAY OF URINE CREATININE: CPT

## 2021-03-22 PROCEDURE — 80197 ASSAY OF TACROLIMUS: CPT

## 2021-03-22 PROCEDURE — 80053 COMPREHEN METABOLIC PANEL: CPT

## 2021-03-22 PROCEDURE — 87186 SC STD MICRODIL/AGAR DIL: CPT

## 2021-03-23 LAB
25(OH)D3 SERPL-MCNC: 59 NG/ML (ref 30–100)
ALBUMIN SERPL BCP-MCNC: 4 G/DL (ref 3.2–4.9)
ALBUMIN/GLOB SERPL: 1.3 G/DL
ALP SERPL-CCNC: 133 U/L (ref 30–99)
ALT SERPL-CCNC: 22 U/L (ref 2–50)
ANION GAP SERPL CALC-SCNC: 8 MMOL/L (ref 7–16)
APPEARANCE UR: CLEAR
AST SERPL-CCNC: 16 U/L (ref 12–45)
BACTERIA #/AREA URNS HPF: NEGATIVE /HPF
BASOPHILS # BLD AUTO: 0.3 % (ref 0–1.8)
BASOPHILS # BLD: 0.03 K/UL (ref 0–0.12)
BILIRUB SERPL-MCNC: 0.5 MG/DL (ref 0.1–1.5)
BILIRUB UR QL STRIP.AUTO: NEGATIVE
BUN SERPL-MCNC: 41 MG/DL (ref 8–22)
CALCIUM SERPL-MCNC: 10.4 MG/DL (ref 8.5–10.5)
CHLORIDE SERPL-SCNC: 105 MMOL/L (ref 96–112)
CO2 SERPL-SCNC: 25 MMOL/L (ref 20–33)
COLOR UR: YELLOW
CREAT SERPL-MCNC: 1.59 MG/DL (ref 0.5–1.4)
CREAT UR-MCNC: 71.91 MG/DL
EOSINOPHIL # BLD AUTO: 0.27 K/UL (ref 0–0.51)
EOSINOPHIL NFR BLD: 2.8 % (ref 0–6.9)
EPI CELLS #/AREA URNS HPF: NEGATIVE /HPF
ERYTHROCYTE [DISTWIDTH] IN BLOOD BY AUTOMATED COUNT: 50.9 FL (ref 35.9–50)
GLOBULIN SER CALC-MCNC: 3 G/DL (ref 1.9–3.5)
GLUCOSE SERPL-MCNC: 107 MG/DL (ref 65–99)
GLUCOSE UR STRIP.AUTO-MCNC: NEGATIVE MG/DL
HCT VFR BLD AUTO: 39.4 % (ref 42–52)
HGB BLD-MCNC: 12.4 G/DL (ref 14–18)
HYALINE CASTS #/AREA URNS LPF: ABNORMAL /LPF
IMM GRANULOCYTES # BLD AUTO: 0.05 K/UL (ref 0–0.11)
IMM GRANULOCYTES NFR BLD AUTO: 0.5 % (ref 0–0.9)
KETONES UR STRIP.AUTO-MCNC: NEGATIVE MG/DL
LEUKOCYTE ESTERASE UR QL STRIP.AUTO: ABNORMAL
LYMPHOCYTES # BLD AUTO: 4.09 K/UL (ref 1–4.8)
LYMPHOCYTES NFR BLD: 42.5 % (ref 22–41)
MAGNESIUM SERPL-MCNC: 2.1 MG/DL (ref 1.5–2.5)
MCH RBC QN AUTO: 31.2 PG (ref 27–33)
MCHC RBC AUTO-ENTMCNC: 31.5 G/DL (ref 33.7–35.3)
MCV RBC AUTO: 99 FL (ref 81.4–97.8)
MICRO URNS: ABNORMAL
MONOCYTES # BLD AUTO: 1.06 K/UL (ref 0–0.85)
MONOCYTES NFR BLD AUTO: 11 % (ref 0–13.4)
NEUTROPHILS # BLD AUTO: 4.12 K/UL (ref 1.82–7.42)
NEUTROPHILS NFR BLD: 42.9 % (ref 44–72)
NITRITE UR QL STRIP.AUTO: NEGATIVE
NRBC # BLD AUTO: 0 K/UL
NRBC BLD-RTO: 0 /100 WBC
PH UR STRIP.AUTO: 5.5 [PH] (ref 5–8)
PHOSPHATE SERPL-MCNC: 3.8 MG/DL (ref 2.5–4.5)
PLATELET # BLD AUTO: 164 K/UL (ref 164–446)
PMV BLD AUTO: 10 FL (ref 9–12.9)
POTASSIUM SERPL-SCNC: 5.4 MMOL/L (ref 3.6–5.5)
PROT SERPL-MCNC: 7 G/DL (ref 6–8.2)
PROT UR QL STRIP: NEGATIVE MG/DL
PROT UR-MCNC: 7 MG/DL (ref 0–15)
PROT/CREAT UR: 97 MG/G (ref 15–68)
PTH-INTACT SERPL-MCNC: 106 PG/ML (ref 14–72)
RBC # BLD AUTO: 3.98 M/UL (ref 4.7–6.1)
RBC # URNS HPF: ABNORMAL /HPF
RBC UR QL AUTO: ABNORMAL
SODIUM SERPL-SCNC: 138 MMOL/L (ref 135–145)
SP GR UR STRIP.AUTO: 1.01
URATE SERPL-MCNC: 4.7 MG/DL (ref 2.5–8.3)
UROBILINOGEN UR STRIP.AUTO-MCNC: 0.2 MG/DL
WBC # BLD AUTO: 9.6 K/UL (ref 4.8–10.8)
WBC #/AREA URNS HPF: ABNORMAL /HPF

## 2021-03-25 LAB — TACROLIMUS BLD-MCNC: 9.7 NG/ML

## 2021-04-07 DIAGNOSIS — I82.4Y1 ACUTE VENOUS EMBOLISM AND THROMBOSIS OF DEEP VESSELS OF PROXIMAL END OF RIGHT LOWER EXTREMITY (HCC): ICD-10-CM

## 2021-04-07 DIAGNOSIS — R06.09 DYSPNEA ON EXERTION: ICD-10-CM

## 2021-04-07 RX ORDER — APIXABAN 2.5 MG/1
TABLET, FILM COATED ORAL
Qty: 180 TABLET | Refills: 3 | Status: SHIPPED | OUTPATIENT
Start: 2021-04-07 | End: 2021-01-01 | Stop reason: SDUPTHER

## 2021-04-16 DIAGNOSIS — I10 ESSENTIAL HYPERTENSION: ICD-10-CM

## 2021-04-16 RX ORDER — LOSARTAN POTASSIUM 100 MG/1
100 TABLET ORAL DAILY
Qty: 90 TABLET | Refills: 3 | Status: SHIPPED | OUTPATIENT
Start: 2021-04-16 | End: 2021-01-01

## 2021-04-21 ENCOUNTER — PATIENT MESSAGE (OUTPATIENT)
Dept: CARDIOLOGY | Facility: MEDICAL CENTER | Age: 71
End: 2021-04-21

## 2021-04-22 NOTE — PROGRESS NOTES
That is fine.     Thanks,     SILVIA Singh   [facility]  [phone] - [phone]

## 2021-04-24 NOTE — PROGRESS NOTES
Request from dental office. Would not hold OAC that long. Given hx, defer to Dr. Forrester for recommendations on holding duration.     Thanks,     LLOYD Singh.   [facility]  [phone]

## 2021-04-24 NOTE — PROGRESS NOTES
Please notify patient once confirmed. See my prior note.     Thanks,     LLOYD Singh.   [facility]  [phone] - [phone]

## 2021-04-26 NOTE — PATIENT COMMUNICATION
Leticia:   I'm Krishan Acevedo,  1950.  Dr. Forrester did surgery on me 2021.  I have a couple questions for the doctor.   My dentist needs to pull several teeth to install a bridge for me.  He has requested my to be off of blood thinners 5 days prior to and 2 days following the extractions.  Is Dr. Forrester ok with this?     Please contact me at  or  (my wife).  Please leave a message if I don't answer.  Thank you for your time.   I can also be contacted via email @  [email or web address]  Krishan Acevedo    To  to advise per ED

## 2021-05-20 PROBLEM — H35.373 EPIRETINAL MEMBRANE (ERM) OF BOTH EYES: Status: ACTIVE | Noted: 2019-06-10

## 2021-05-20 NOTE — PROGRESS NOTES
Arrhythmia Clinic Note (Established patient)    DOS: 5/20/2021    Chief complaint/Reason for consult: pAF/AFL    Interval History: 71-year-old man with history of Wegener's granulomatosis and renal transplant, paroxysmal atrial fibrillation flutter status post ablation, hypertension, presenting for follow-up.  He had a rough postoperative course following his ablation but has largely recovered.  He notes his blood pressure is little bit higher more recently.  Otherwise he has no complaints and has no palpitations.    ROS (+ highlighted in bold):  Constitutional: Fevers/chills/fatigue/weightloss  HEENT: Blurry vision/eye pain/sore throat/hearing loss  Respiratory: Shortness of breath/cough  Cardiovascular: Chest pain/palpitations/edema/orthopnea/syncope  GI: Nausea/vomitting/diarrhea  MSK: Arthralgias/myagias/muscle weakness  Skin: Rash/sores  Neurological: Numbness/tremors/vertigo  Endocrine: Excessive thirst/polyuria/cold intolerance/heat intolerance  Psych: Depression/anxiety    Past Medical History:   Diagnosis Date   • Arrhythmia 2021    flutter   • Arthritis 2020    right shoulder, knees, & back   • Cough 2020    dry-on lisinipril, now productive -brown   • Dialysis patient ([facility]) 2006    x 18 months   • E coli bacteremia 2014    hospitalized x 5 months   • Kidney failure 03/2008    kidney transplant   • Kidney transplant pain 2020    low back   • Sleep apnea     CPAP   • Snoring        Past Surgical History:   Procedure Laterality Date   • OTHER Right 2017    eye surgery, lasix & cataracts, retinal detatchment   • PEG PLACEMENT  10/10/2014    Performed by Brijesh Orozco M.D. at [facility]   • OTHER ORTHOPEDIC SURGERY Left 1995    knee       Social History     Socioeconomic History   • Marital status:      Spouse name: Not on file   • Number of children: Not on file   • Years of education: Not on file   • Highest education level: Not on file   Occupational History   • Not on file    Tobacco Use   • Smoking status: Never Smoker   • Smokeless tobacco: Former User     Types: Snuff, Chew   Vaping Use   • Vaping Use: Never used   Substance and Sexual Activity   • Alcohol use: Not Currently   • Drug use: Never   • Sexual activity: Not on file   Other Topics Concern   • Not on file   Social History Narrative   • Not on file     Social Determinants of Health     Financial Resource Strain:    • Difficulty of Paying Living Expenses:    Food Insecurity:    • Worried About Running Out of Food in the Last Year:    • Ran Out of Food in the Last Year:    Transportation Needs:    • Lack of Transportation (Medical):    • Lack of Transportation (Non-Medical):    Physical Activity:    • Days of Exercise per Week:    • Minutes of Exercise per Session:    Stress:    • Feeling of Stress :    Social Connections:    • Frequency of Communication with Friends and Family:    • Frequency of Social Gatherings with Friends and Family:    • Attends Latter day Services:    • Active Member of Clubs or Organizations:    • Attends Club or Organization Meetings:    • Marital Status:    Intimate Partner Violence:    • Fear of Current or Ex-Partner:    • Emotionally Abused:    • Physically Abused:    • Sexually Abused:        Family History   Problem Relation Age of Onset   • Stroke Brother 26        , ? cause   • Stroke Maternal Grandmother            • Clotting Disorder Neg Hx        Allergies   Allergen Reactions   • Triazolam Unspecified     All anti anxiety medications cause hallucinations    • Nsaids      Cannot take because of anti rejection meds.       Current Outpatient Medications   Medication Sig Dispense Refill   • Amoxicillin 500 MG Tab TAKE 4 TABLETS BY MOUTH 1 HOUR PRIOR TO DENTAL APPOINTMENT     • apixaban (ELIQUIS) 5mg Tab Take 1 tablet by mouth 2 times a day. 60 tablet 11   • hydrochlorothiazide (MICROZIDE) 12.5 MG capsule Take 1 capsule by mouth every day. 30 capsule 11   • losartan (COZAAR) 100 MG Tab  "Take 1 tablet by mouth every day. TAKE 1 TABLET BY MOUTH DAILY 90 tablet 3   • furosemide (LASIX) 20 MG Tab Take 20 mg by mouth 2 times a day.     • omeprazole (PRILOSEC) 20 MG delayed-release capsule Take 20 mg by mouth 1 time a day as needed.     • Brimonidine Tartrate-Timolol (COMBIGAN) 0.2-0.5 % Solution by Ophthalmic route.     • allopurinol (ZYLOPRIM) 300 MG Tab Take 300 mg by mouth every bedtime.     • Multiple Vitamins-Minerals (CENTRUM SILVER 50+MEN) Tab Take 1 Tab by mouth every morning.     • vitamin D, Ergocalciferol, (DRISDOL) 22748 units Cap capsule Take 50,000 Units by mouth every Monday.     • Glucosamine-Chondroit-Vit C-Mn (GLUCOSAMINE 1500 COMPLEX) Cap Take 2 Caps by mouth 2 Times a Day.     • MAGNESIUM PO Take 1 Tab by mouth 2 Times a Day.     • mycophenolate (CELLCEPT) 250 MG Cap Take 500 mg by mouth 2 times a day.     • predniSONE (DELTASONE) 5 MG Tab Take 5 mg by mouth every morning.     • tacrolimus (PROGRAF) 1 MG Cap Take 1 mg by mouth 2 times a day.     • atorvastatin (LIPITOR) 10 MG TABS Take 10 mg by mouth every morning.       No current facility-administered medications for this visit.       Physical Exam:  Vitals:    05/20/21 1100   BP: 120/78   BP Location: Left arm   Patient Position: Sitting   BP Cuff Size: Adult   Pulse: 76   Resp: 18   SpO2: 96%   Weight: 119 kg (262 lb)   Height: 1.778 m (5' 10\")     General appearance: NAD, conversant   Eyes: anicteric sclerae, moist conjunctivae; no lid-lag; PERRLA  HENT: Atraumatic; oropharynx clear with moist mucous membranes and no mucosal ulcerations; normal hard and soft palate  Neck: Trachea midline; FROM, supple, no thyromegaly or lymphadenopathy  Lungs: CTA, with normal respiratory effort and no intercostal retractions  CV: RRR, no MRGs, no JVD  Abdomen: Soft, non-tender; no masses or HSM  Extremities: No peripheral edema or extremity lymphadenopathy  Skin: Normal temperature, turgor and texture; no rash, ulcers or subcutaneous " nodules  Psych: Appropriate affect, alert and oriented to person, place and time    Data:  Lipids:   Lab Results   Component Value Date/Time    CHOLSTRLTOT 101 01/20/2021 07:48 AM    TRIGLYCERIDE 133 01/20/2021 07:48 AM    HDL 30 (A) 01/20/2021 07:48 AM    LDL 44 01/20/2021 07:48 AM        BMP:  Lab Results   Component Value Date/Time    SODIUM 138 03/22/2021 0734    POTASSIUM 5.4 03/22/2021 0734    CHLORIDE 105 03/22/2021 0734    CO2 25 03/22/2021 0734    GLUCOSE 107 (H) 03/22/2021 0734    BUN 41 (H) 03/22/2021 0734    CREATININE 1.59 (H) 03/22/2021 0734    CALCIUM 10.4 03/22/2021 0734    ANION 8.0 03/22/2021 0734        TSH:   Lab Results   Component Value Date/Time    TSHULTRASEN 0.370 (L) 01/28/2021 1410        THYROXINE (T4):   No results found for: DANG     CBC:   Lab Results   Component Value Date/Time    WBC 9.6 03/22/2021 07:34 AM    RBC 3.98 (L) 03/22/2021 07:34 AM    HEMOGLOBIN 12.4 (L) 03/22/2021 07:34 AM    HEMATOCRIT 39.4 (L) 03/22/2021 07:34 AM    MCV 99.0 (H) 03/22/2021 07:34 AM    MCH 31.2 03/22/2021 07:34 AM    MCHC 31.5 (L) 03/22/2021 07:34 AM    RDW 50.9 (H) 03/22/2021 07:34 AM    PLATELETCT 164 03/22/2021 07:34 AM    MPV 10.0 03/22/2021 07:34 AM    NEUTSPOLYS 42.90 (L) 03/22/2021 07:34 AM    LYMPHOCYTES 42.50 (H) 03/22/2021 07:34 AM    MONOCYTES 11.00 03/22/2021 07:34 AM    EOSINOPHILS 2.80 03/22/2021 07:34 AM    BASOPHILS 0.30 03/22/2021 07:34 AM    IMMGRAN 0.50 03/22/2021 07:34 AM    NRBC 0.00 03/22/2021 07:34 AM    NEUTS 4.12 03/22/2021 07:34 AM    LYMPHS 4.09 03/22/2021 07:34 AM    MONOS 1.06 (H) 03/22/2021 07:34 AM    EOS 0.27 03/22/2021 07:34 AM    BASO 0.03 03/22/2021 07:34 AM    IMMGRANAB 0.05 03/22/2021 07:34 AM    NRBCAB 0.00 03/22/2021 07:34 AM        CBC w/o DIFF  Lab Results   Component Value Date/Time    WBC 9.6 03/22/2021 07:34 AM    RBC 3.98 (L) 03/22/2021 07:34 AM    HEMOGLOBIN 12.4 (L) 03/22/2021 07:34 AM    MCV 99.0 (H) 03/22/2021 07:34 AM    MCH 31.2 03/22/2021 07:34 AM     MCHC 31.5 (L) 03/22/2021 07:34 AM    RDW 50.9 (H) 03/22/2021 07:34 AM    MPV 10.0 03/22/2021 07:34 AM       Prior echo/stress reviewed: Normal EF    EKG interpreted by me: Sinus rhythm with PVCs    Impression/Plan:  1. PAF (paroxysmal atrial fibrillation) ([facility])  EKG   2. Acute venous embolism and thrombosis of deep vessels of proximal end of right lower extremity ([facility])  apixaban (ELIQUIS) 5mg Tab   3. Dyspnea on exertion  apixaban (ELIQUIS) 5mg Tab     1.  Atrial flutter status post ablation  2.  Paroxysmal atrial fibrillation status post PVI  3.  Chronic oral anticoagulation, Eliquis  4.  Hypertension    -Although blood pressure today is normal, he does note elevated reading at home recently.  -I will prescribe hydrochlorothiazide 12.5 mg daily in addition to his currently prescribed losartan  -He has follow-up with Dr. Garcia next month  -Continue Eliquis, labs reviewed    APN follow-up in 6 months, annual MD follow-up with me    Rubin Forrester MD  Cardiac Electrophysiology

## 2021-06-16 PROBLEM — I50.32 CHRONIC DIASTOLIC HEART FAILURE (HCC): Status: ACTIVE | Noted: 2021-01-01

## 2021-06-16 NOTE — PROGRESS NOTES
Cardiology Note    Chief Complaint   Patient presents with   • Atrial Flutter     F/V Dx: Atrial flutter with rapid ventricular response    • Abnormal EKG   • Hyperlipidemia     History of Present Illness: Krishan Acevedo is a 71 y.o. 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), severe sepsis requiring admission 2014, hx fungal pneumonia thereafter, hx lower extremity DVT after prolonged car trip who initially presented for chest tightness and dyspnea where found atrial flutter now s/p ablation 1/27/21 who presents for follow up.    Recounting history so far, patient feels significantly better than since we first met. Also adds has had incremental benefit since receiving ablation. Overall breathing much improved. However, he does state has dyspnea and has to stop walking after about 600 ft. States everyone in his family has some form of lung disease. He was appropriately told by Dr Forrester that machelle's can affect the lungs and was referred to pulmonary as well. Blood pressure has been better controlled since addition of hctz. Repeat labs showed mild increase in creatinine not meeting criteria for manuel. No other cardiac complaints. Compliant with medications.     Review of Systems   Constitutional: Negative for decreased appetite, fever, malaise/fatigue, weight gain and weight loss.   HENT: Negative for congestion and nosebleeds.    Eyes: Negative for blurred vision.   Cardiovascular: Negative for chest pain, claudication, dyspnea on exertion, irregular heartbeat, leg swelling, near-syncope, orthopnea, palpitations, paroxysmal nocturnal dyspnea and syncope.   Respiratory: Negative for cough and shortness of breath.    Endocrine: Negative for cold intolerance and heat intolerance.   Skin: Negative for rash.   Musculoskeletal: Negative for back pain.   Gastrointestinal: Negative for abdominal pain,  constipation, diarrhea, heartburn, melena, nausea and vomiting.   Genitourinary: Negative for dysuria, flank pain and hematuria.   Neurological: Negative for dizziness.   Psychiatric/Behavioral: Negative for altered mental status and depression.         Past Medical History:   Diagnosis Date   • Arrhythmia 2021    flutter   • Arthritis 2020    right shoulder, knees, & back   • Cough 2020    dry-on lisinipril, now productive -brown   • Dialysis patient (HCC) 2006    x 18 months   • E coli bacteremia 2014    hospitalized x 5 months   • Kidney failure 03/2008    kidney transplant   • Kidney transplant pain 2020    low back   • Sleep apnea     CPAP   • Snoring          Past Surgical History:   Procedure Laterality Date   • OTHER Right 2017    eye surgery, lasix & cataracts, retinal detatchment   • PEG PLACEMENT  10/10/2014    Performed by Brijesh Orozco M.D. at [facility] ORS   • OTHER ORTHOPEDIC SURGERY Left 1995    knee         Current Outpatient Medications   Medication Sig Dispense Refill   • furosemide (LASIX) 20 MG Tab Take one tap per day and also take additional tab  tablet 3   • Amoxicillin 500 MG Tab TAKE 4 TABLETS BY MOUTH 1 HOUR PRIOR TO DENTAL APPOINTMENT     • apixaban (ELIQUIS) 5mg Tab Take 1 tablet by mouth 2 times a day. 60 tablet 11   • hydrochlorothiazide (MICROZIDE) 12.5 MG capsule Take 1 capsule by mouth every day. 30 capsule 11   • losartan (COZAAR) 100 MG Tab Take 1 tablet by mouth every day. TAKE 1 TABLET BY MOUTH DAILY 90 tablet 3   • omeprazole (PRILOSEC) 20 MG delayed-release capsule Take 20 mg by mouth 1 time a day as needed.     • Brimonidine Tartrate-Timolol (COMBIGAN) 0.2-0.5 % Solution by Ophthalmic route.     • allopurinol (ZYLOPRIM) 300 MG Tab Take 300 mg by mouth every bedtime.     • Multiple Vitamins-Minerals (CENTRUM SILVER 50+MEN) Tab Take 1 Tab by mouth every morning.     • vitamin D, Ergocalciferol, (DRISDOL) 06000 units Cap capsule Take 50,000 Units by  mouth every Monday.     • Glucosamine-Chondroit-Vit C-Mn (GLUCOSAMINE 1500 COMPLEX) Cap Take 2 Caps by mouth 2 Times a Day.     • MAGNESIUM PO Take 1 Tab by mouth 2 Times a Day.     • mycophenolate (CELLCEPT) 250 MG Cap Take 500 mg by mouth 2 times a day.     • predniSONE (DELTASONE) 5 MG Tab Take 5 mg by mouth every morning.     • tacrolimus (PROGRAF) 1 MG Cap Take 1 mg by mouth 2 times a day.     • atorvastatin (LIPITOR) 10 MG TABS Take 10 mg by mouth every morning.       No current facility-administered medications for this visit.         Allergies   Allergen Reactions   • Triazolam Unspecified     All anti anxiety medications cause hallucinations    • Nsaids      Cannot take because of anti rejection meds.         Family History   Problem Relation Age of Onset   • Stroke Brother 26        , ? cause   • Stroke Maternal Grandmother            • Clotting Disorder Neg Hx          Social History     Socioeconomic History   • Marital status:      Spouse name: Not on file   • Number of children: Not on file   • Years of education: Not on file   • Highest education level: Not on file   Occupational History   • Not on file   Tobacco Use   • Smoking status: Never Smoker   • Smokeless tobacco: Former User     Types: Snuff, Chew   Vaping Use   • Vaping Use: Never used   Substance and Sexual Activity   • Alcohol use: Not Currently   • Drug use: Never   • Sexual activity: Not on file   Other Topics Concern   • Not on file   Social History Narrative   • Not on file     Social Determinants of Health     Financial Resource Strain:    • Difficulty of Paying Living Expenses:    Food Insecurity:    • Worried About Running Out of Food in the Last Year:    • Ran Out of Food in the Last Year:    Transportation Needs:    • Lack of Transportation (Medical):    • Lack of Transportation (Non-Medical):    Physical Activity:    • Days of Exercise per Week:    • Minutes of Exercise per Session:    Stress:    • Feeling of  "Stress :    Social Connections:    • Frequency of Communication with Friends and Family:    • Frequency of Social Gatherings with Friends and Family:    • Attends Anabaptism Services:    • Active Member of Clubs or Organizations:    • Attends Club or Organization Meetings:    • Marital Status:    Intimate Partner Violence:    • Fear of Current or Ex-Partner:    • Emotionally Abused:    • Physically Abused:    • Sexually Abused:          Physical Exam:  Ambulatory Vitals  /70 (BP Location: Left arm, Patient Position: Sitting, BP Cuff Size: Adult)   Pulse 76   Resp 14   Ht 1.791 m (5' 10.5\")   Wt 117 kg (258 lb 6.4 oz)   SpO2 96%    BP Readings from Last 4 Encounters:   06/16/21 128/70   05/20/21 120/78   02/17/21 128/78   02/01/21 155/93     Weight/BMI:   Vitals:    06/16/21 0909   BP: 128/70   Weight: 117 kg (258 lb 6.4 oz)   Height: 1.791 m (5' 10.5\")    Body mass index is 36.55 kg/m².  Wt Readings from Last 4 Encounters:   06/16/21 117 kg (258 lb 6.4 oz)   05/20/21 119 kg (262 lb)   02/17/21 112 kg (248 lb)   01/31/21 116 kg (255 lb 1.2 oz)       Physical Exam  Constitutional:       General: He is not in acute distress.  HENT:      Head: Normocephalic and atraumatic.   Eyes:      Conjunctiva/sclera: Conjunctivae normal.      Pupils: Pupils are equal, round, and reactive to light.   Neck:      Vascular: No JVD.   Cardiovascular:      Rate and Rhythm: Normal rate and regular rhythm.      Heart sounds: Normal heart sounds. No murmur heard.   No friction rub. No gallop.    Pulmonary:      Effort: Pulmonary effort is normal. No respiratory distress.      Breath sounds: Normal breath sounds. No wheezing or rales.   Chest:      Chest wall: No tenderness.   Abdominal:      General: Bowel sounds are normal. There is no distension.      Palpations: Abdomen is soft.   Musculoskeletal:      Cervical back: Normal range of motion and neck supple.   Skin:     General: Skin is warm and dry.   Neurological:      Mental " Status: He is alert and oriented to person, place, and time.   Psychiatric:         Mood and Affect: Affect normal.         Judgment: Judgment normal.         Lab Data Review:  Lab Results   Component Value Date/Time    CHOLSTRLTOT 101 01/20/2021 07:48 AM    LDL 44 01/20/2021 07:48 AM    HDL 30 (A) 01/20/2021 07:48 AM    TRIGLYCERIDE 133 01/20/2021 07:48 AM       Lab Results   Component Value Date/Time    SODIUM 140 06/11/2021 06:32 AM    POTASSIUM 4.7 06/11/2021 06:32 AM    CHLORIDE 109 06/11/2021 06:32 AM    CO2 20 06/11/2021 06:32 AM    GLUCOSE 103 (H) 06/11/2021 06:32 AM    BUN 62 (H) 06/11/2021 06:32 AM    CREATININE 1.71 (H) 06/11/2021 06:32 AM     Estimated Creatinine Clearance: 49.7 mL/min (A) (by C-G formula based on SCr of 1.71 mg/dL (H)).  Lab Results   Component Value Date/Time    ALKPHOSPHAT 110 (H) 06/11/2021 06:32 AM    ASTSGOT 14 06/11/2021 06:32 AM    ALTSGPT 14 06/11/2021 06:32 AM    TBILIRUBIN 0.4 06/11/2021 06:32 AM      Lab Results   Component Value Date/Time    WBC 9.8 06/11/2021 06:32 AM     Lab Results   Component Value Date/Time    HBA1C 7.2 (H) 01/29/2021 12:12 AM     No components found for: TROP      Cardiac Imaging and Procedures Review:      EKG 12/9/20 reviewed by me atrial flutter, likely CTI dependent, rate 64, RBBB    zio monitor 11/30/20   -100% atrial flutter, symptomatic  -rare ventricular ectopy.    TTE 8/21/20  CONCLUSIONS  Left ventricle is mildly dilated.  A reliable estimation of diastolic function cannot be made due to   dysrhythmia.  Left ventricular ejection fraction is visually estimated to be 65%.  Mild mitral regurgitation.  Moderate aortic insufficiency.  Estimated right ventricular systolic pressure is  20 mmHg.  Left Ventricle  Left ventricle is mildly dilated. Normal left ventricular wall   thickness. Normal left ventricular systolic function. Left ventricular   ejection fraction is visually estimated to be 65%. Normal regional wall   motion. A reliable estimation  of diastolic function cannot be made due   to dysrhythmia.    TTE 11/13/20  CONCLUSIONS  Normal left ventricular size, systolic, and diastolic function. Mild   concentric left ventricular hypertrophy.  Mildly enlarged right ventricular size with preserved systolic   function.  Biatrial enlargement.  Mild mitral regurgitation.  Aortic sclerosis without stenosis. Mild aortic insufficiency.  Mild tricuspid regurgitation.  Estimated right ventricular systolic pressure is  20 mmHg.  Right atrial pressure is estimated to be 3 mmHg.  Normal pericardium without effusion.  Compared to the images of the prior study done on 08/21/20, aortic   regurgitation has improved from moderate to mild.    Nuclear stress PET 10/21/20  CONCLUSIONS AND IMPRESSIONS:    1.  Normal cardiac PET scan.         No evidence of ischemia nor infarct.         TID 1.31, likely due to artifact.    2.  Resting ejection fraction 48%, stress ejection fraction 57%.    3.  EKG as above.    4.  Shortness of breath; however, chest pain was not experienced during this        study.    Medical Decision Making:  Problem List Items Addressed This Visit     Atrial flutter with rapid ventricular response (HCC)    Relevant Medications    furosemide (LASIX) 20 MG Tab    Essential hypertension    Relevant Medications    furosemide (LASIX) 20 MG Tab    Aortic regurgitation    Relevant Medications    furosemide (LASIX) 20 MG Tab    Coronary artery calcification seen on CT scan    Relevant Medications    furosemide (LASIX) 20 MG Tab    Hyperlipidemia    Relevant Medications    furosemide (LASIX) 20 MG Tab    Chronic diastolic heart failure (HCC)    Relevant Medications    furosemide (LASIX) 20 MG Tab    Other Relevant Orders    Comp Metabolic Panel    TSH WITH REFLEX TO FT4        HTN - goal 120/80. Continue ARB and HCTZ. Repeat CMP. Reduce lasix    atrial flutter / fib  - continue eliquis bid  - follow up w EP    HFpEF / AI - NYHA II  -maintenance diuresis. Given recent  addition hctz will reduce to lasix 20 daily and bid three times weekly. Repeat labs    DVT - dual purpose doac.     Nonobstructive CAD / HLD - no need additional antiplatelet while on doac. Continue statin. Lipids at goal LDL <70 and trig <150.    It was my pleasure to meet with Mr. Gray

## 2021-06-21 ENCOUNTER — APPOINTMENT (RX ONLY)
Dept: URBAN - METROPOLITAN AREA CLINIC 4 | Facility: CLINIC | Age: 71
Setting detail: DERMATOLOGY
End: 2021-06-21

## 2021-06-21 DIAGNOSIS — D22 MELANOCYTIC NEVI: ICD-10-CM

## 2021-06-21 DIAGNOSIS — Z85.828 PERSONAL HISTORY OF OTHER MALIGNANT NEOPLASM OF SKIN: ICD-10-CM

## 2021-06-21 DIAGNOSIS — L82.1 OTHER SEBORRHEIC KERATOSIS: ICD-10-CM

## 2021-06-21 DIAGNOSIS — D18.0 HEMANGIOMA: ICD-10-CM

## 2021-06-21 DIAGNOSIS — L57.0 ACTINIC KERATOSIS: ICD-10-CM

## 2021-06-21 DIAGNOSIS — Z86.007 PERSONAL HISTORY OF IN-SITU NEOPLASM OF SKIN: ICD-10-CM

## 2021-06-21 DIAGNOSIS — L81.4 OTHER MELANIN HYPERPIGMENTATION: ICD-10-CM

## 2021-06-21 PROBLEM — D22.62 MELANOCYTIC NEVI OF LEFT UPPER LIMB, INCLUDING SHOULDER: Status: ACTIVE | Noted: 2021-06-21

## 2021-06-21 PROBLEM — D22.61 MELANOCYTIC NEVI OF RIGHT UPPER LIMB, INCLUDING SHOULDER: Status: ACTIVE | Noted: 2021-06-21

## 2021-06-21 PROBLEM — D22.5 MELANOCYTIC NEVI OF TRUNK: Status: ACTIVE | Noted: 2021-06-21

## 2021-06-21 PROBLEM — D18.01 HEMANGIOMA OF SKIN AND SUBCUTANEOUS TISSUE: Status: ACTIVE | Noted: 2021-06-21

## 2021-06-21 PROCEDURE — ? COUNSELING

## 2021-06-21 PROCEDURE — 17000 DESTRUCT PREMALG LESION: CPT

## 2021-06-21 PROCEDURE — 17003 DESTRUCT PREMALG LES 2-14: CPT

## 2021-06-21 PROCEDURE — ? LIQUID NITROGEN

## 2021-06-21 PROCEDURE — ? OBSERVATION

## 2021-06-21 PROCEDURE — 99213 OFFICE O/P EST LOW 20 MIN: CPT | Mod: 25

## 2021-06-21 ASSESSMENT — LOCATION DETAILED DESCRIPTION DERM
LOCATION DETAILED: EPIGASTRIC SKIN
LOCATION DETAILED: LEFT PROXIMAL ULNAR DORSAL FOREARM
LOCATION DETAILED: RIGHT CENTRAL MALAR CHEEK
LOCATION DETAILED: RIGHT PROXIMAL RADIAL DORSAL FOREARM
LOCATION DETAILED: RIGHT LOWER CUTANEOUS LIP
LOCATION DETAILED: LEFT DISTAL POSTERIOR UPPER ARM
LOCATION DETAILED: RIGHT SUPERIOR UPPER BACK
LOCATION DETAILED: RIGHT DISTAL POSTERIOR UPPER ARM
LOCATION DETAILED: RIGHT SUPERIOR MEDIAL UPPER BACK
LOCATION DETAILED: RIGHT ANTERIOR DISTAL UPPER ARM
LOCATION DETAILED: RIGHT MID-UPPER BACK
LOCATION DETAILED: LEFT MEDIAL INFERIOR CHEST
LOCATION DETAILED: LEFT ANTERIOR DISTAL UPPER ARM

## 2021-06-21 ASSESSMENT — LOCATION ZONE DERM
LOCATION ZONE: LIP
LOCATION ZONE: FACE
LOCATION ZONE: TRUNK
LOCATION ZONE: ARM

## 2021-06-21 ASSESSMENT — LOCATION SIMPLE DESCRIPTION DERM
LOCATION SIMPLE: CHEST
LOCATION SIMPLE: LEFT FOREARM
LOCATION SIMPLE: RIGHT UPPER BACK
LOCATION SIMPLE: ABDOMEN
LOCATION SIMPLE: RIGHT UPPER ARM
LOCATION SIMPLE: RIGHT LIP
LOCATION SIMPLE: RIGHT FOREARM
LOCATION SIMPLE: LEFT UPPER ARM
LOCATION SIMPLE: RIGHT CHEEK

## 2021-07-15 NOTE — PROGRESS NOTES
Cardiology Clinic Follow-up Note    Date of note:    7/15/2021  Primary Care Provider: Damian Diaz M.D.    Name:             Krishan Acevedo  YOB: 1950  MRN:               [id number]    CC: Increasing dyspnea with exertion    Primary Cardiologist: Dr. Garcia    Patient HPI:   Krishan Acevedo is a 71 y.o. male with PMH including Wegener's granulomatosis causing renal disease, s/p renal transplant in 2008, CKD stage III, mild aortic stenosis, lower extremity DVT, nonobstructive CAD    Interim History:  Mr. Acevedo was last seen in this cardiology office by Dr. Garcia.   At that time his symptoms had seemed to improve and his Lasix was decreased.    Uses CPAP at night.    Patient endorses medication compliance. Stopped lasix per Dr. Blount with Nephrology.    Chronic cough over years, ACE inhibitor was stopped and changed to losartan, did not seem to improve cough. Sees pulmonary at [place] is asking to be referred to [facility] pulmonary.  Is concerned about lungs as primary source vs. Heart.  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    Just got [employer] mobile this week, using now, no arrhythmias    Patient is positive for increasing dyspnea on exertion, this has been progressively worsened over the past few years.  He also has a chronic cough that has been present for the past few years. He states he has mild lower extremity swelling, he has tried to use compression stockings for.  Does experience PND.  He denies palpitations, dizziness or syncopal episodes, and recent weight gain, as his weights have maintained stable for stopping Lasix per nephrology    Review of systems:  All others systems reviewed and negative except for what is outlined in the above HPI    Past Medical History:   Diagnosis Date   • Arrhythmia 2021     flutter   • Arthritis     right shoulder, knees, & back   • Cough 2020    dry-on lisinipril, now productive -brown   • Dialysis patient (HCC) 2006    x 18 months   • E coli bacteremia 2014    hospitalized x 5 months   • Kidney failure 2008    kidney transplant   • Kidney transplant pain     low back   • Sleep apnea     CPAP   • Snoring      Past Surgical History:   Procedure Laterality Date   • OTHER Right 2017    eye surgery, lasix & cataracts, retinal detatchment   • PEG PLACEMENT  10/10/2014    Performed by Brijesh O[facility]co M.D. at [facility]   • OTHER ORTHOPEDIC SURGERY Left     knee     Family History   Problem Relation Age of Onset   • Stroke Brother 26        , ? cause   • Stroke Maternal Grandmother            • Clotting Disorder Neg Hx      Social History     Socioeconomic History   • Marital status:      Spouse name: Not on file   • Number of children: Not on file   • Years of education: Not on file   • Highest education level: Not on file   Occupational History   • Not on file   Tobacco Use   • Smoking status: Never Smoker   • Smokeless tobacco: Former User     Types: Snuff, Chew   Vaping Use   • Vaping Use: Never used   Substance and Sexual Activity   • Alcohol use: Not Currently   • Drug use: Never   • Sexual activity: Not on file   Other Topics Concern   • Not on file   Social History Narrative   • Not on file     Social Determinants of Health     Financial Resource Strain:    • Difficulty of Paying Living Expenses:    Food Insecurity:    • Worried About Running Out of Food in the Last Year:    • Ran Out of Food in the Last Year:    Transportation Needs:    • Lack of Transportation (Medical):    • Lack of Transportation (Non-Medical):    Physical Activity:    • Days of Exercise per Week:    • Minutes of Exercise per Session:    Stress:    • Feeling of Stress :    Social Connections:    • Frequency of Communication with Friends and Family:    •  Frequency of Social Gatherings with Friends and Family:    • Attends Taoism Services:    • Active Member of Clubs or Organizations:    • Attends Club or Organization Meetings:    • Marital Status:    Intimate Partner Violence:    • Fear of Current or Ex-Partner:    • Emotionally Abused:    • Physically Abused:    • Sexually Abused:      Allergies   Allergen Reactions   • Triazolam Unspecified     All anti anxiety medications cause hallucinations    • Nsaids      Cannot take because of anti rejection meds.     Current Outpatient Medications   Medication Sig Dispense Refill   • Amoxicillin 500 MG Tab TAKE 4 TABLETS BY MOUTH 1 HOUR PRIOR TO DENTAL APPOINTMENT     • apixaban (ELIQUIS) 5mg Tab Take 1 tablet by mouth 2 times a day. 60 tablet 11   • hydrochlorothiazide (MICROZIDE) 12.5 MG capsule Take 1 capsule by mouth every day. 30 capsule 11   • losartan (COZAAR) 100 MG Tab Take 1 tablet by mouth every day. TAKE 1 TABLET BY MOUTH DAILY 90 tablet 3   • omeprazole (PRILOSEC) 20 MG delayed-release capsule Take 20 mg by mouth 1 time a day as needed.     • Brimonidine Tartrate-Timolol (COMBIGAN) 0.2-0.5 % Solution by Ophthalmic route.     • allopurinol (ZYLOPRIM) 300 MG Tab Take 300 mg by mouth every bedtime.     • Multiple Vitamins-Minerals (CENTRUM SILVER 50+MEN) Tab Take 1 Tab by mouth every morning.     • vitamin D, Ergocalciferol, (DRISDOL) 98303 units Cap capsule Take 50,000 Units by mouth every Monday.     • Glucosamine-Chondroit-Vit C-Mn (GLUCOSAMINE 1500 COMPLEX) Cap Take 2 Caps by mouth 2 Times a Day.     • MAGNESIUM PO Take 1 Tab by mouth 2 Times a Day.     • mycophenolate (CELLCEPT) 250 MG Cap Take 500 mg by mouth 2 times a day.     • predniSONE (DELTASONE) 5 MG Tab Take 5 mg by mouth every morning.     • tacrolimus (PROGRAF) 1 MG Cap Take 1 mg by mouth 2 times a day.     • atorvastatin (LIPITOR) 10 MG TABS Take 10 mg by mouth every morning.       No current facility-administered medications for this visit.  "      Physical Exam:  Ambulatory Vitals  /82 (BP Location: Left arm, Patient Position: Sitting, BP Cuff Size: Adult)   Pulse 79   Resp 18   Ht 1.791 m (5' 10.5\")   Wt 118 kg (259 lb 12.8 oz)   SpO2 96%    BP Readings from Last 4 Encounters:   07/15/21 158/82   06/16/21 128/70   05/20/21 120/78   02/17/21 128/78       Weight/BMI: Body mass index is 36.75 kg/m².  Wt Readings from Last 4 Encounters:   07/15/21 118 kg (259 lb 12.8 oz)   06/16/21 117 kg (258 lb 6.4 oz)   05/20/21 119 kg (262 lb)   02/17/21 112 kg (248 lb)       General: No apparent distress. Well nourished.   Neck: No JVD. No caroid bruits, trachea midline  Lungs: CTAB. Normal effort, without crackles/rhonchi, no wheezing  Heart: RRR. Normal S1/S2, 2/6 systolic murmur, no rub. 1+ lower extremity edema. 2+ radial pulses.  Ext: No clubbing or cyanosis.  Abdomen: soft, non tender, non distended, no carmelo hepatomegaly.  Neurological: No focal deficits, no facial asymmetry.  Normal speech.  Psychiatric: Appropriate affect, alert and oriented x 4.   Skin: Warm and dry, no rash.    Lab Data Review:  Lab Results   Component Value Date/Time    CHOLSTRLTOT 101 01/20/2021 07:48 AM    LDL 44 01/20/2021 07:48 AM    HDL 30 (A) 01/20/2021 07:48 AM    TRIGLYCERIDE 133 01/20/2021 07:48 AM       Lab Results   Component Value Date/Time    SODIUM 140 06/11/2021 06:32 AM    POTASSIUM 4.7 06/11/2021 06:32 AM    CHLORIDE 109 06/11/2021 06:32 AM    CO2 20 06/11/2021 06:32 AM    GLUCOSE 103 (H) 06/11/2021 06:32 AM    BUN 62 (H) 06/11/2021 06:32 AM    CREATININE 1.71 (H) 06/11/2021 06:32 AM     Lab Results   Component Value Date/Time    ALKPHOSPHAT 110 (H) 06/11/2021 06:32 AM    ASTSGOT 14 06/11/2021 06:32 AM    ALTSGPT 14 06/11/2021 06:32 AM    TBILIRUBIN 0.4 06/11/2021 06:32 AM      Lab Results   Component Value Date/Time    WBC 9.8 06/11/2021 06:32 AM         Cardiac Imaging and Procedures Review:      EKG:     TTE 8/21/20  CONCLUSIONS  Left ventricle is mildly " dilated.  A reliable estimation of diastolic function cannot be made due to   dysrhythmia.  Left ventricular ejection fraction is visually estimated to be 65%.  Mild mitral regurgitation.  Moderate aortic insufficiency.  Estimated right ventricular systolic pressure is  20 mmHg.  Left Ventricle  Left ventricle is mildly dilated. Normal left ventricular wall   thickness. Normal left ventricular systolic function. Left ventricular   ejection fraction is visually estimated to be 65%. Normal regional wall   motion. A reliable estimation of diastolic function cannot be made due   to dysrhythmia.     TTE 20  CONCLUSIONS  Normal left ventricular size, systolic, and diastolic function. Mild   concentric left ventricular hypertrophy.  Mildly enlarged right ventricular size with preserved systolic   function.  Biatrial enlargement.  Mild mitral regurgitation.  Aortic sclerosis without stenosis. Mild aortic insufficiency.  Mild tricuspid regurgitation.  Estimated right ventricular systolic pressure is  20 mmHg.  Right atrial pressure is estimated to be 3 mmHg.  Normal pericardium without effusion.  Compared to the images of the prior study done on 20, aortic   regurgitation has improved from moderate to mild.     Nuclear stress PET 10/21/20  CONCLUSIONS AND IMPRESSIONS:    1.  Normal cardiac PET scan.         No evidence of ischemia nor infarct.         TID 1.31, likely due to artifact.    2.  Resting ejection fraction 48%, stress ejection fraction 57%.    3.  EKG as above.    4.  Shortness of breath; however, chest pain was not experienced during this        study.      Assessment and Clinical Decision Makin. Chronic anticoagulation     2. Stage 3b chronic kidney disease (HCC)     3. Edema, unspecified type     4. Chronic diastolic heart failure (HCC)  REFERRAL TO PULMONARY AND SLEEP MEDICINE    PULMONARY FUNCTION TESTS -Test requested: Complete Pulmonary Function Test   5. DAS (dyspnea on exertion)  REFERRAL  TO PULMONARY AND SLEEP MEDICINE    PULMONARY FUNCTION TESTS -Test requested: Complete Pulmonary Function Test   6. History of chronic cough  REFERRAL TO PULMONARY AND SLEEP MEDICINE    PULMONARY FUNCTION TESTS -Test requested: Complete Pulmonary Function Test   7. S/P ablation of atrial fibrillation       The following treatment plan was discussed    A. fib, status post ablation, chronic anticoagulation  -Continue using [employer], contact office if arrhythmias noticed  -Continue Eliquis  -Has not been on any rate control medication    Stage 3 CKD  -Per Nephro, Dr. Blount, furosemide stopped as patient also on HCTZ  -Has follow-up appointment soon along with BMP check    Dyspnea on exertion, chronic diastolic heart failure, HFpEF   -query lungs as primary source of DAS vs. HFpEF  -PFTs ordered  -Referral to [facility] pulmonology placed    Lower extremity edema  -Contact Cardiology office if increased weight gain, 3lbs in 2 days or 5 lbs in 1 week, and/or increased LE edema  -Also encouraged using compression stocking during day    Plan reviewed in detail with the patient, verbalizes understanding and is in agreement.  Pt is to follow up with  Future Appointments   Date Time Provider Department Center   11/10/2021 10:00 AM SILVIA Hansen [facility] None   12/1/2021 10:20 AM Jones Garcia M.D. [facility] None       Encouraged Pt to follow up with us over the phone or electronically using my [employer] as cardiac issues/concerns arise.      PLEASE NOTE: This dictation was created using voice recognition software. I have made every reasonable attempt to correct obvious errors, but I expect that there are errors of grammar and possibly content that I did not discover before finalizing the note.       LLOYD Gaines.   [facility]  [phone]    Collaborating Physician: Dr Bundy

## 2021-07-15 NOTE — PATIENT COMMUNICATION
Called pt. He reports that his SOB has been slowly worsening for about 2 months. Now it is to the point where he has to stop and rest when walking from one room to another. His O2 sats used to be 95% or greater, but now they are in the lower 90s%. BP has been 140s/70s. About 1.5 weeks ago his nephrologist told him to stop taking Lasix, but he has continued with HCTZ. Scheduled with MR today at 2pm. Discussed ER precautions if symptoms worsen.   
How Severe Is Your Dry Skin?: mild
Additional History: Pt has been applying olive oil on back and aloe on forehead

## 2021-08-12 NOTE — TELEPHONE ENCOUNTER
----- Message from SILVIA Gaines sent at 8/12/2021 12:39 PM PDT -----  Hx of chronic cough and dyspnea on exertion. Wants to follow-up with [facility]. Has been seen at [facility] in past.   PFT showing moderate airflow obstruction.  Can we call to update with results and make sure he is getting appointment with [facility]. I ordered referral at my last visit with him.  Thanks

## 2021-08-12 NOTE — TELEPHONE ENCOUNTER
Called and LM for the patient to callback or respond to [place] message.     Mychart message sent.

## 2021-08-12 NOTE — PROCEDURES
DATE OF SERVICE:  08/11/2021     PULMONARY FUNCTION TEST INTERPRETATION    This exam is performed with a primary diagnosis of shortness of breath to help establish a pre-test probability of the patient’s diagnostic findings.    The Flow Volume Loop is of sufficient quality and the volume-time graph demonstrates an adequate exhalation to provide a confident interpretation. Review of the flow-volume loop reveals a concave pattern suggestive of obstruction.    The FEV1/FVC ratio is 0.71 with an abnormally reduced FEV1 and FVC based on predicted values. After administration of 2 puffs of albuterol, the patient did not achieve a significant bronchodilator response (12% and 200 ml in FEV1 or FVC).    Total lung capacity is within the limits of predicted values. Residual volume is elevated indicating air trapping. The DLCO is normal.    Impression:   1. The noted appearance of the flow volume loop and evidence of increased residual volume suggest an additional obstructive defect even in the presence of a 'technically normal' FEV1/FVC ratio.  If this is correlated clinically, the degree of obstruction would be considered moderate by the FEV1.  2. Air trapping   3. This test does not rule out reactive airways disease. Suggest clinical correlation.      ______________________________  MD MADYSON Flores/CHEN    DD:  08/11/2021 17:06  DT:  08/11/2021 17:18    Job#:  [id number]

## 2021-11-09 NOTE — PROGRESS NOTES
Cardiology/Electrophysiology Follow-up Note      Subjective:   Chief Complaint:   Chief Complaint   Patient presents with   • Atrial Fibrillation     Fv Dx: PAF (paroxysmal atrial fibrillation)       Krishan Acevedo is a 71 y.o. male who presents today for EP follow up for atrial fibrillation.      He previously underwent ablation with Dr. Forrester 1/27/21 with PVI, roof ablation and right atrial flutter with CTI line.     He is followed by Dr. Forrester for EP and Dr. Garcia for cardiology .  Past medical history also significant for ESRD S/P renal transplant in 2008, wegener's granulomatosis, paroxysmal atrial fibrillation, chronic anticoagulation.      Last seen by Rosetta WAGGONER for general cardiology.     Today in follow up he states that he thinks that he has been doing well from a rhythm standpoint.  Has not had any evidence of Afib.  Uses Discoursea to help track.  Reports BPs starting to creep up a bit, log reviewed.  Was on vacation last week in [place], lots of restaurants and car traveling, because of this had slightly increased edema to lower extremities.  Uses lasix PRN per Dr. Blount's rec.  He currently denies chest pain, dizziness, palpitations, pre syncope or syncope, PND, orthopnea.    Patient endorses medication compliance     Riding Recumbent Bike.        Past Medical History:   Diagnosis Date   • Arrhythmia 2021    flutter   • Arthritis 2020    right shoulder, knees, & back   • Cough 2020    dry-on lisinipril, now productive -brown   • Dialysis patient (HCC) 2006    x 18 months   • E coli bacteremia 2014    hospitalized x 5 months   • Kidney failure 03/2008    kidney transplant   • Kidney transplant pain 2020    low back   • Sleep apnea     CPAP   • Snoring      Past Surgical History:   Procedure Laterality Date   • OTHER Right 2017    eye surgery, lasix & cataracts, retinal detatchment   • PEG PLACEMENT  10/10/2014    Performed by Brijesh [facility]rozco M.D. at [facility]   • OTHER  ORTHOPEDIC SURGERY Left     knee     Family History   Problem Relation Age of Onset   • Stroke Brother 26        , ? cause   • Stroke Maternal Grandmother            • Clotting Disorder Neg Hx      Social History     Socioeconomic History   • Marital status:      Spouse name: Not on file   • Number of children: Not on file   • Years of education: Not on file   • Highest education level: Not on file   Occupational History   • Not on file   Tobacco Use   • Smoking status: Never Smoker   • Smokeless tobacco: Former User     Types: Snuff, Chew   Vaping Use   • Vaping Use: Never used   Substance and Sexual Activity   • Alcohol use: Not Currently   • Drug use: Never   • Sexual activity: Not on file   Other Topics Concern   • Not on file   Social History Narrative   • Not on file     Social Determinants of Health     Financial Resource Strain:    • Difficulty of Paying Living Expenses: Not on file   Food Insecurity:    • Worried About Running Out of Food in the Last Year: Not on file   • Ran Out of Food in the Last Year: Not on file   Transportation Needs:    • Lack of Transportation (Medical): Not on file   • Lack of Transportation (Non-Medical): Not on file   Physical Activity:    • Days of Exercise per Week: Not on file   • Minutes of Exercise per Session: Not on file   Stress:    • Feeling of Stress : Not on file   Social Connections:    • Frequency of Communication with Friends and Family: Not on file   • Frequency of Social Gatherings with Friends and Family: Not on file   • Attends Jewish Services: Not on file   • Active Member of Clubs or Organizations: Not on file   • Attends Club or Organization Meetings: Not on file   • Marital Status: Not on file   Intimate Partner Violence:    • Fear of Current or Ex-Partner: Not on file   • Emotionally Abused: Not on file   • Physically Abused: Not on file   • Sexually Abused: Not on file   Housing Stability:    • Unable to Pay for Housing in the Last  Year: Not on file   • Number of Places Lived in the Last Year: Not on file   • Unstable Housing in the Last Year: Not on file     Allergies   Allergen Reactions   • Triazolam Unspecified     All anti anxiety medications cause hallucinations    • Nsaids      Cannot take because of anti rejection meds.       Current Outpatient Medications   Medication Sig Dispense Refill   • Amoxicillin 500 MG Tab TAKE 4 TABLETS BY MOUTH 1 HOUR PRIOR TO DENTAL APPOINTMENT     • apixaban (ELIQUIS) 5mg Tab Take 1 tablet by mouth 2 times a day. 60 tablet 11   • hydrochlorothiazide (MICROZIDE) 12.5 MG capsule Take 1 capsule by mouth every day. 30 capsule 11   • losartan (COZAAR) 100 MG Tab Take 1 tablet by mouth every day. TAKE 1 TABLET BY MOUTH DAILY 90 tablet 3   • omeprazole (PRILOSEC) 20 MG delayed-release capsule Take 20 mg by mouth 1 time a day as needed.     • Brimonidine Tartrate-Timolol (COMBIGAN) 0.2-0.5 % Solution by Ophthalmic route.     • allopurinol (ZYLOPRIM) 300 MG Tab Take 300 mg by mouth every bedtime.     • Multiple Vitamins-Minerals (CENTRUM SILVER 50+MEN) Tab Take 1 Tab by mouth every morning.     • vitamin D, Ergocalciferol, (DRISDOL) 18459 units Cap capsule Take 50,000 Units by mouth every Monday.     • Glucosamine-Chondroit-Vit C-Mn (GLUCOSAMINE 1500 COMPLEX) Cap Take 2 Caps by mouth 2 Times a Day.     • MAGNESIUM PO Take 1 Tab by mouth 2 Times a Day.     • mycophenolate (CELLCEPT) 250 MG Cap Take 500 mg by mouth 2 times a day.     • predniSONE (DELTASONE) 5 MG Tab Take 5 mg by mouth every morning.     • tacrolimus (PROGRAF) 1 MG Cap Take 1 mg by mouth 2 times a day.     • atorvastatin (LIPITOR) 10 MG TABS Take 10 mg by mouth every morning.       No current facility-administered medications for this visit.       Review of Systems   Constitutional: Negative for chills, fever, malaise/fatigue and weight loss.   Respiratory: Positive for cough (secondary to allergies ). Negative for hemoptysis, sputum production,  "shortness of breath and wheezing.    Cardiovascular: Positive for leg swelling. Negative for chest pain, palpitations, orthopnea and PND.   Gastrointestinal: Negative for abdominal pain, blood in stool, diarrhea, heartburn, nausea and vomiting.   Skin: Negative for rash.   Neurological: Negative for dizziness, tingling, tremors, sensory change, speech change, focal weakness, loss of consciousness and headaches.     All others systems reviewed and negative.     Objective:     /70 (BP Location: Right arm, Patient Position: Sitting, BP Cuff Size: Adult)   Pulse 73   Resp 19   Ht 1.791 m (5' 10.5\")   Wt 119 kg (263 lb)   SpO2 97%  Body mass index is 37.2 kg/m².    Weight/BMI: There is no height or weight on file to calculate BMI.  Wt Readings from Last 4 Encounters:   07/15/21 118 kg (259 lb 12.8 oz)   06/16/21 117 kg (258 lb 6.4 oz)   05/20/21 119 kg (262 lb)   02/17/21 112 kg (248 lb)         Physical Exam  HENT:      Head: Normocephalic and atraumatic.      Mouth/Throat:      Mouth: Mucous membranes are moist.      Pharynx: Oropharynx is clear.   Eyes:      Extraocular Movements: Extraocular movements intact.      Conjunctiva/sclera: Conjunctivae normal.      Pupils: Pupils are equal, round, and reactive to light.   Neck:      Vascular: No JVD.      Trachea: No tracheal deviation.   Cardiovascular:      Rate and Rhythm: Normal rate and regular rhythm.      Pulses:           Radial pulses are 2+ on the right side and 2+ on the left side.        Dorsalis pedis pulses are 2+ on the right side and 2+ on the left side.        Posterior tibial pulses are 2+ on the right side and 2+ on the left side.      Heart sounds: Normal heart sounds. No murmur heard.  No friction rub. No gallop.    Pulmonary:      Effort: Pulmonary effort is normal. No respiratory distress.      Breath sounds: Normal breath sounds. No wheezing or rales.   Chest:      Chest wall: No tenderness.   Abdominal:      General: Bowel sounds are " normal.      Palpations: Abdomen is soft.   Musculoskeletal:         General: Normal range of motion.      Cervical back: Normal range of motion and neck supple.      Right lower le+ Edema present.      Left lower le+ Edema present.   Skin:     General: Skin is warm and dry.   Neurological:      Mental Status: He is alert and oriented to person, place, and time.   Psychiatric:         Mood and Affect: Mood and affect normal.         Cognition and Memory: Memory normal.         Judgment: Judgment normal.           Cardiac Imaging and Procedures Review:    EKG (11/10/21) reviewed by myself:   Sinus ty      Labs (personally reviewed and notable for):   BMP  Lab Results   Component Value Date/Time    SODIUM 142 2021 06:29 AM    POTASSIUM 4.6 2021 06:29 AM    CHLORIDE 112 2021 06:29 AM    CO2 21 2021 06:29 AM    GLUCOSE 123 (H) 2021 06:29 AM    BUN 35 (H) 2021 06:29 AM    CREATININE 1.27 2021 06:29 AM      CBC W/O DIFF  Lab Results   Component Value Date/Time    WBC 9.8 2021 06:32 AM    RBC 4.17 (L) 2021 06:32 AM    HEMOGLOBIN 12.5 (L) 2021 06:32 AM    MCV 96.2 2021 06:32 AM    MCH 30.0 2021 06:32 AM    MCHC 31.2 (L) 2021 06:32 AM    RDW 49.3 2021 06:32 AM    MPV 10.3 2021 06:32 AM     Lipids:   Lab Results   Component Value Date/Time    CHOLSTRLTOT 101 2021 07:48 AM    TRIGLYCERIDE 133 2021 07:48 AM    HDL 30 (A) 2021 07:48 AM    LDL 44 2021 07:48 AM     TSH:   Lab Results   Component Value Date/Time    TSHULTRASEN 0.370 (L) 2021 1410     THYROXINE (T4):   [facility]      Assessment:     1. Paroxysmal atrial fibrillation (HCC)     2. S/P ablation of atrial fibrillation     3. Essential hypertension     4. Chronic anticoagulation     5. Stage 3b chronic kidney disease (HCC)         Medical Decision Making:  Today's Assessment / Status / Plan:   1. Paroxysmal Afib:  - S/P  ablation of Afib with PVI, LA roof ablation and typical AFL by Dr. Forrester 2021.    - Has clinically done well post ablation without significant recurrent atrial arrhythmias.  Uses Kardia at home to help track.   - 12 lead EKG today is sinus rhythm.   - He will continue Eliquis.  Not on AADs or av vi blockers.     2. HTN:  - BPs mildly elevated on los-140s systolic.  Discussed potentially increasing HCTZ vs adding another low dose agent.  He would like to discuss with his nephrologist Dr. Blount, will let me know outcome.   - Using lasix PRN, I think this is OK, though increased edema today post vacation so I have asked him to take for the next two days.     3. Anticoagulation:  - Eliquis and tolerating well without bleeding.     4. CKD S/P Transplant:  - Follows with Dr. Blount.  He will touch base with in in regards to HTN agents.        Plan reviewed in detail with the patient and he verbalizes understanding and is in agreement.   RTC in 6 months for reivew, sooner if clinical condition changes      Please note that this dictation was created using voice recognition software. I have made every reasonable attempt to correct obvious errors, but I expect that there are errors of grammar and possibly content that I did not discover before finalizing the note.    LLOYD Hansen.

## 2021-11-18 PROBLEM — I48.91 ATRIAL FIBRILLATION (HCC): Status: ACTIVE | Noted: 2021-01-01

## 2021-12-01 NOTE — PROGRESS NOTES
Cardiology Note    Chief Complaint   Patient presents with   • Atrial Flutter   • Atrial Fibrillation     History of Present Illness: Krishan Acevedo is a 71 y.o. 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    More edema this visit of late related to increased salt intake while on vacation. Responds to compression socks and hctz with somewhat acceptable volume control when strict salt restrictions. Despite this, has some orthopnea with cough during these times. Content with control to date. Followed with nephrologist Dr Griggs recommended no loop diuretics going forward due to RADHA. Now resolved. No other cardiac complaints.     Roni griggs nephrology [phone]    Review of Systems   Constitutional: Negative for decreased appetite, fever, malaise/fatigue, weight gain and weight loss.   HENT: Negative for congestion and nosebleeds.    Eyes: Negative for blurred vision.   Cardiovascular: Negative for chest pain, claudication, dyspnea on exertion, irregular heartbeat, leg swelling, near-syncope, orthopnea, palpitations, paroxysmal nocturnal dyspnea and syncope.   Respiratory: Negative for cough and shortness of breath.    Endocrine: Negative for cold intolerance and heat intolerance.   Skin: Negative for rash.   Musculoskeletal: Negative for back pain.   Gastrointestinal: Negative for abdominal pain, constipation, diarrhea, heartburn, melena, nausea and vomiting.   Genitourinary: Negative for dysuria, flank pain and hematuria.   Neurological: Negative for dizziness.   Psychiatric/Behavioral: Negative for altered mental status and  depression.         Past Medical History:   Diagnosis Date   • Arrhythmia 2021    flutter   • Arthritis 2020    right shoulder, knees, & back   • Cough 2020    dry-on lisinipril, now productive -brown   • Dialysis patient (HCC) 2006    x 18 months   • E coli bacteremia 2014    hospitalized x 5 months   • Kidney failure 03/2008    kidney transplant   • Kidney transplant pain 2020    low back   • Sleep apnea     CPAP   • Snoring          Past Surgical History:   Procedure Laterality Date   • OTHER Right 2017    eye surgery, lasix & cataracts, retinal detatchment   • PEG PLACEMENT  10/10/2014    Performed by Brijesh Orozco M.D. at [facility]S   • OTHER ORTHOPEDIC SURGERY Left 1995    knee         Current Outpatient Medications   Medication Sig Dispense Refill   • PREDNISONE PO Take 5 mg by mouth.     • hydroCHLOROthiazide (HYDRODIURIL) 25 MG Tab Take 1 Tablet by mouth every day. 90 Tablet 3   • tacrolimus (PROGRAF) 0.5 MG Cap      • fluticasone (FLONASE) 50 MCG/ACT nasal spray Administer 1 Spray into affected nostril(S) every day.     • Amoxicillin 500 MG Tab TAKE 4 TABLETS BY MOUTH 1 HOUR PRIOR TO DENTAL APPOINTMENT     • apixaban (ELIQUIS) 5mg Tab Take 1 tablet by mouth 2 times a day. 60 tablet 11   • losartan (COZAAR) 100 MG Tab Take 1 tablet by mouth every day. TAKE 1 TABLET BY MOUTH DAILY 90 tablet 3   • omeprazole (PRILOSEC) 20 MG delayed-release capsule Take 20 mg by mouth 1 time a day as needed.     • Brimonidine Tartrate-Timolol (COMBIGAN) 0.2-0.5 % Solution by Ophthalmic route.     • allopurinol (ZYLOPRIM) 300 MG Tab Take 300 mg by mouth every bedtime.     • Multiple Vitamins-Minerals (CENTRUM SILVER 50+MEN) Tab Take 1 Tab by mouth every morning.     • vitamin D, Ergocalciferol, (DRISDOL) 13506 units Cap capsule Take 50,000 Units by mouth every Monday.     • Glucosamine-Chondroit-Vit C-Mn (GLUCOSAMINE 1500 COMPLEX) Cap Take 2 Caps by mouth 2 Times a Day.     • MAGNESIUM PO Take 1 Tab by  mouth 2 Times a Day.     • mycophenolate (CELLCEPT) 250 MG Cap Take 500 mg by mouth 2 times a day.     • tacrolimus (PROGRAF) 1 MG Cap Take 1 mg by mouth 2 times a day. AM is 1MG tab and the PM is   1/2 MG tab     • atorvastatin (LIPITOR) 10 MG TABS Take 10 mg by mouth every morning.       No current facility-administered medications for this visit.         Allergies   Allergen Reactions   • Triazolam Unspecified     All anti anxiety medications cause hallucinations    • Nsaids      Cannot take because of anti rejection meds.         Family History   Problem Relation Age of Onset   • Stroke Brother 26        , ? cause   • Stroke Maternal Grandmother            • Clotting Disorder Neg Hx          Social History     Socioeconomic History   • Marital status:      Spouse name: Not on file   • Number of children: Not on file   • Years of education: Not on file   • Highest education level: Not on file   Occupational History   • Not on file   Tobacco Use   • Smoking status: Never Smoker   • Smokeless tobacco: Former User     Types: Snuff, Chew   Vaping Use   • Vaping Use: Never used   Substance and Sexual Activity   • Alcohol use: Not Currently   • Drug use: Never   • Sexual activity: Not on file   Other Topics Concern   • Not on file   Social History Narrative   • Not on file     Social Determinants of Health     Financial Resource Strain:    • Difficulty of Paying Living Expenses: Not on file   Food Insecurity:    • Worried About Running Out of Food in the Last Year: Not on file   • Ran Out of Food in the Last Year: Not on file   Transportation Needs:    • Lack of Transportation (Medical): Not on file   • Lack of Transportation (Non-Medical): Not on file   Physical Activity:    • Days of Exercise per Week: Not on file   • Minutes of Exercise per Session: Not on file   Stress:    • Feeling of Stress : Not on file   Social Connections:    • Frequency of Communication with Friends and Family: Not on file   •  "Frequency of Social Gatherings with Friends and Family: Not on file   • Attends Baptism Services: Not on file   • Active Member of Clubs or Organizations: Not on file   • Attends Club or Organization Meetings: Not on file   • Marital Status: Not on file   Intimate Partner Violence:    • Fear of Current or Ex-Partner: Not on file   • Emotionally Abused: Not on file   • Physically Abused: Not on file   • Sexually Abused: Not on file   Housing Stability:    • Unable to Pay for Housing in the Last Year: Not on file   • Number of Places Lived in the Last Year: Not on file   • Unstable Housing in the Last Year: Not on file         Physical Exam:  Ambulatory Vitals  /62 (BP Location: Right arm, Patient Position: Sitting, BP Cuff Size: Large adult)   Pulse 82   Resp 18   Ht 1.791 m (5' 10.5\")   Wt 118 kg (260 lb 3.2 oz)   SpO2 96%    BP Readings from Last 4 Encounters:   12/01/21 116/62   11/10/21 132/70   07/15/21 158/82   06/16/21 128/70     Weight/BMI:   Vitals:    12/01/21 1001   BP: 116/62   Weight: 118 kg (260 lb 3.2 oz)   Height: 1.791 m (5' 10.5\")    Body mass index is 36.81 kg/m².  Wt Readings from Last 4 Encounters:   12/01/21 118 kg (260 lb 3.2 oz)   11/10/21 119 kg (263 lb)   07/15/21 118 kg (259 lb 12.8 oz)   06/16/21 117 kg (258 lb 6.4 oz)       Physical Exam  Constitutional:       General: He is not in acute distress.  HENT:      Head: Normocephalic and atraumatic.   Eyes:      Conjunctiva/sclera: Conjunctivae normal.      Pupils: Pupils are equal, round, and reactive to light.   Neck:      Vascular: No JVD.   Cardiovascular:      Rate and Rhythm: Normal rate and regular rhythm.      Heart sounds: Normal heart sounds. No murmur heard.  No friction rub. No gallop.    Pulmonary:      Effort: Pulmonary effort is normal. No respiratory distress.      Breath sounds: Normal breath sounds. No wheezing or rales.   Chest:      Chest wall: No tenderness.   Abdominal:      General: Bowel sounds are normal. " There is no distension.      Palpations: Abdomen is soft.   Musculoskeletal:      Cervical back: Normal range of motion and neck supple.   Skin:     General: Skin is warm and dry.   Neurological:      Mental Status: He is alert and oriented to person, place, and time.   Psychiatric:         Mood and Affect: Affect normal.         Judgment: Judgment normal.         Lab Data Review:  Lab Results   Component Value Date/Time    CHOLSTRLTOT 101 01/20/2021 07:48 AM    LDL 44 01/20/2021 07:48 AM    HDL 30 (A) 01/20/2021 07:48 AM    TRIGLYCERIDE 133 01/20/2021 07:48 AM       Lab Results   Component Value Date/Time    SODIUM 142 07/22/2021 06:29 AM    POTASSIUM 4.6 07/22/2021 06:29 AM    CHLORIDE 112 07/22/2021 06:29 AM    CO2 21 07/22/2021 06:29 AM    GLUCOSE 123 (H) 07/22/2021 06:29 AM    BUN 35 (H) 07/22/2021 06:29 AM    CREATININE 1.27 07/22/2021 06:29 AM     CrCl cannot be calculated (Patient's most recent lab result is older than the maximum 7 days allowed.).  Lab Results   Component Value Date/Time    ALKPHOSPHAT 110 (H) 06/11/2021 06:32 AM    ASTSGOT 14 06/11/2021 06:32 AM    ALTSGPT 14 06/11/2021 06:32 AM    TBILIRUBIN 0.4 06/11/2021 06:32 AM      Lab Results   Component Value Date/Time    WBC 9.8 06/11/2021 06:32 AM     Lab Results   Component Value Date/Time    HBA1C 7.2 (H) 01/29/2021 12:12 AM     No components found for: TROP      Cardiac Imaging and Procedures Review:      EKG 12/9/20 reviewed by me atrial flutter, likely CTI dependent, rate 64, RBBB    zio monitor 11/30/20   -100% atrial flutter, symptomatic  -rare ventricular ectopy.    TTE 8/21/20  CONCLUSIONS  Left ventricle is mildly dilated.  A reliable estimation of diastolic function cannot be made due to   dysrhythmia.  Left ventricular ejection fraction is visually estimated to be 65%.  Mild mitral regurgitation.  Moderate aortic insufficiency.  Estimated right ventricular systolic pressure is  20 mmHg.  Left Ventricle  Left ventricle is mildly  dilated. Normal left ventricular wall   thickness. Normal left ventricular systolic function. Left ventricular   ejection fraction is visually estimated to be 65%. Normal regional wall   motion. A reliable estimation of diastolic function cannot be made due   to dysrhythmia.    TTE 11/13/20  CONCLUSIONS  Normal left ventricular size, systolic, and diastolic function. Mild   concentric left ventricular hypertrophy.  Mildly enlarged right ventricular size with preserved systolic   function.  Biatrial enlargement.  Mild mitral regurgitation.  Aortic sclerosis without stenosis. Mild aortic insufficiency.  Mild tricuspid regurgitation.  Estimated right ventricular systolic pressure is  20 mmHg.  Right atrial pressure is estimated to be 3 mmHg.  Normal pericardium without effusion.  Compared to the images of the prior study done on 08/21/20, aortic   regurgitation has improved from moderate to mild.    Nuclear stress PET 10/21/20  CONCLUSIONS AND IMPRESSIONS:    1.  Normal cardiac PET scan.         No evidence of ischemia nor infarct.         TID 1.31, likely due to artifact.    2.  Resting ejection fraction 48%, stress ejection fraction 57%.    3.  EKG as above.    4.  Shortness of breath; however, chest pain was not experienced during this        study.    TTE 1/28/21  CONCLUSIONS  Limited echocardiogram study to evaluate pericardial effusion  post   ablation.  Trivial pericardial effusion without evidence of hemodynamic   compromise.    Medical Decision Making:  Problem List Items Addressed This Visit     Essential hypertension    Chronic anticoagulation    Edema    Relevant Orders    US-EXTREMITY VENOUS LOWER BILAT    proBrain Natriuretic Peptide, NT    Basic Metabolic Panel    Coronary artery calcification seen on CT scan    Hyperlipidemia    Atrial fibrillation (HCC)        HTN - goal 120/80. Continue ARB and HCTZ. Repeat BMP.     atrial flutter / fib  - continue eliquis bid  - follow up w EP    HFpEF / AI - NYHA  II  -off loop diuretics. On HCTZ. Mild symptoms.   -consider low dose spironolactone in combo with hctz.   -check BMP and BNP    DVT - dual purpose doac. Repeat venous ultrasound    Nonobstructive CAD / HLD - no need additional antiplatelet while on doac. Continue statin. Lipids at goal LDL <70 and trig <150.    Called [facility] to discuss with Dr Blount.     It was my pleasure to meet with Mr. Acevedo.    A total of 45 minutes of time was spent on day of encounter reviewing medical record, performing history and examination, counseling, ordering medication/test/consults and documentation.

## 2021-12-16 NOTE — TELEPHONE ENCOUNTER
Mr Acevedo,  Does appear you have some venous insufficiency. Will have you follow up with a local vein clinic for further management.   Best,  Dr Garcia   Written by Jones Garcia M.D. on 12/16/2021  8:52 AM PST    Referral placed. [employer] message sent to pt.

## 2022-01-01 ENCOUNTER — APPOINTMENT (OUTPATIENT)
Dept: RADIOLOGY | Facility: MEDICAL CENTER | Age: 72
DRG: 871 | End: 2022-01-01
Attending: STUDENT IN AN ORGANIZED HEALTH CARE EDUCATION/TRAINING PROGRAM
Payer: MEDICARE

## 2022-01-01 ENCOUNTER — APPOINTMENT (OUTPATIENT)
Dept: RADIOLOGY | Facility: MEDICAL CENTER | Age: 72
DRG: 682 | End: 2022-01-01
Attending: NURSE PRACTITIONER
Payer: MEDICARE

## 2022-01-01 ENCOUNTER — APPOINTMENT (OUTPATIENT)
Dept: RADIOLOGY | Facility: MEDICAL CENTER | Age: 72
DRG: 682 | End: 2022-01-01
Attending: INTERNAL MEDICINE
Payer: MEDICARE

## 2022-01-01 ENCOUNTER — APPOINTMENT (OUTPATIENT)
Dept: RADIOLOGY | Facility: MEDICAL CENTER | Age: 72
DRG: 377 | End: 2022-01-01
Attending: EMERGENCY MEDICINE
Payer: MEDICARE

## 2022-01-01 ENCOUNTER — HOSPITAL ENCOUNTER (INPATIENT)
Facility: MEDICAL CENTER | Age: 72
LOS: 7 days | DRG: 377 | End: 2022-03-24
Attending: EMERGENCY MEDICINE | Admitting: STUDENT IN AN ORGANIZED HEALTH CARE EDUCATION/TRAINING PROGRAM
Payer: MEDICARE

## 2022-01-01 ENCOUNTER — APPOINTMENT (OUTPATIENT)
Dept: CARDIOLOGY | Facility: MEDICAL CENTER | Age: 72
DRG: 682 | End: 2022-01-01
Attending: HOSPITALIST
Payer: MEDICARE

## 2022-01-01 ENCOUNTER — ANESTHESIA (OUTPATIENT)
Dept: SURGERY | Facility: MEDICAL CENTER | Age: 72
DRG: 377 | End: 2022-01-01
Payer: MEDICARE

## 2022-01-01 ENCOUNTER — APPOINTMENT (OUTPATIENT)
Dept: RADIOLOGY | Facility: MEDICAL CENTER | Age: 72
DRG: 871 | End: 2022-01-01
Attending: EMERGENCY MEDICINE
Payer: MEDICARE

## 2022-01-01 ENCOUNTER — HOSPITAL ENCOUNTER (OUTPATIENT)
Facility: MEDICAL CENTER | Age: 72
End: 2022-04-12
Attending: EMERGENCY MEDICINE | Admitting: STUDENT IN AN ORGANIZED HEALTH CARE EDUCATION/TRAINING PROGRAM
Payer: MEDICARE

## 2022-01-01 ENCOUNTER — APPOINTMENT (OUTPATIENT)
Dept: RADIOLOGY | Facility: MEDICAL CENTER | Age: 72
DRG: 377 | End: 2022-01-01
Attending: INTERNAL MEDICINE
Payer: MEDICARE

## 2022-01-01 ENCOUNTER — HOSPITAL ENCOUNTER (INPATIENT)
Facility: MEDICAL CENTER | Age: 72
LOS: 11 days | DRG: 871 | End: 2022-04-26
Attending: EMERGENCY MEDICINE | Admitting: STUDENT IN AN ORGANIZED HEALTH CARE EDUCATION/TRAINING PROGRAM
Payer: MEDICARE

## 2022-01-01 ENCOUNTER — APPOINTMENT (OUTPATIENT)
Dept: RADIOLOGY | Facility: MEDICAL CENTER | Age: 72
DRG: 682 | End: 2022-01-01
Attending: GENERAL PRACTICE
Payer: MEDICARE

## 2022-01-01 ENCOUNTER — HOSPITAL ENCOUNTER (EMERGENCY)
Facility: MEDICAL CENTER | Age: 72
End: 2022-03-16
Attending: EMERGENCY MEDICINE
Payer: MEDICARE

## 2022-01-01 ENCOUNTER — APPOINTMENT (OUTPATIENT)
Dept: RADIOLOGY | Facility: MEDICAL CENTER | Age: 72
DRG: 871 | End: 2022-01-01
Attending: NURSE PRACTITIONER
Payer: MEDICARE

## 2022-01-01 ENCOUNTER — HOSPICE ADMISSION (OUTPATIENT)
Dept: HOSPICE | Facility: HOSPICE | Age: 72
End: 2022-01-01
Payer: MEDICARE

## 2022-01-01 ENCOUNTER — HOSPITAL ENCOUNTER (OUTPATIENT)
Dept: LAB | Facility: MEDICAL CENTER | Age: 72
End: 2022-01-05
Attending: NURSE PRACTITIONER
Payer: MEDICARE

## 2022-01-01 ENCOUNTER — APPOINTMENT (OUTPATIENT)
Dept: RADIOLOGY | Facility: MEDICAL CENTER | Age: 72
DRG: 682 | End: 2022-01-01
Attending: EMERGENCY MEDICINE
Payer: MEDICARE

## 2022-01-01 ENCOUNTER — HOME CARE VISIT (OUTPATIENT)
Dept: HOSPICE | Facility: HOSPICE | Age: 72
End: 2022-01-01
Payer: MEDICARE

## 2022-01-01 ENCOUNTER — APPOINTMENT (OUTPATIENT)
Dept: RADIOLOGY | Facility: MEDICAL CENTER | Age: 72
DRG: 682 | End: 2022-01-01
Attending: HOSPITALIST
Payer: MEDICARE

## 2022-01-01 ENCOUNTER — APPOINTMENT (OUTPATIENT)
Dept: RADIOLOGY | Facility: MEDICAL CENTER | Age: 72
DRG: 682 | End: 2022-01-01
Attending: PSYCHIATRY & NEUROLOGY
Payer: MEDICARE

## 2022-01-01 ENCOUNTER — APPOINTMENT (OUTPATIENT)
Dept: RADIOLOGY | Facility: MEDICAL CENTER | Age: 72
DRG: 377 | End: 2022-01-01
Attending: HOSPITALIST
Payer: MEDICARE

## 2022-01-01 ENCOUNTER — APPOINTMENT (OUTPATIENT)
Dept: RADIOLOGY | Facility: MEDICAL CENTER | Age: 72
End: 2022-01-01
Attending: EMERGENCY MEDICINE
Payer: MEDICARE

## 2022-01-01 ENCOUNTER — APPOINTMENT (OUTPATIENT)
Dept: RADIOLOGY | Facility: MEDICAL CENTER | Age: 72
DRG: 682 | End: 2022-01-01
Attending: STUDENT IN AN ORGANIZED HEALTH CARE EDUCATION/TRAINING PROGRAM
Payer: MEDICARE

## 2022-01-01 ENCOUNTER — PATIENT OUTREACH (OUTPATIENT)
Dept: HEALTH INFORMATION MANAGEMENT | Facility: OTHER | Age: 72
End: 2022-01-01

## 2022-01-01 ENCOUNTER — APPOINTMENT (OUTPATIENT)
Dept: CARDIOLOGY | Facility: MEDICAL CENTER | Age: 72
DRG: 871 | End: 2022-01-01
Attending: NURSE PRACTITIONER
Payer: MEDICARE

## 2022-01-01 ENCOUNTER — HOSPITAL ENCOUNTER (INPATIENT)
Facility: MEDICAL CENTER | Age: 72
LOS: 32 days | DRG: 682 | End: 2022-03-05
Attending: INTERNAL MEDICINE | Admitting: INTERNAL MEDICINE
Payer: MEDICARE

## 2022-01-01 ENCOUNTER — ANESTHESIA EVENT (OUTPATIENT)
Dept: SURGERY | Facility: MEDICAL CENTER | Age: 72
DRG: 377 | End: 2022-01-01
Payer: MEDICARE

## 2022-01-01 VITALS
BODY MASS INDEX: 35.79 KG/M2 | DIASTOLIC BLOOD PRESSURE: 65 MMHG | WEIGHT: 250 LBS | SYSTOLIC BLOOD PRESSURE: 144 MMHG | OXYGEN SATURATION: 94 % | TEMPERATURE: 98.6 F | HEIGHT: 70 IN | RESPIRATION RATE: 16 BRPM | HEART RATE: 97 BPM

## 2022-01-01 VITALS
WEIGHT: 249.12 LBS | RESPIRATION RATE: 18 BRPM | DIASTOLIC BLOOD PRESSURE: 63 MMHG | SYSTOLIC BLOOD PRESSURE: 100 MMHG | HEART RATE: 93 BPM | OXYGEN SATURATION: 91 % | BODY MASS INDEX: 33.02 KG/M2 | TEMPERATURE: 96.9 F | HEIGHT: 73 IN

## 2022-01-01 VITALS
SYSTOLIC BLOOD PRESSURE: 146 MMHG | OXYGEN SATURATION: 97 % | WEIGHT: 221.56 LBS | HEIGHT: 70 IN | BODY MASS INDEX: 31.72 KG/M2 | DIASTOLIC BLOOD PRESSURE: 69 MMHG | HEART RATE: 94 BPM | RESPIRATION RATE: 18 BRPM | TEMPERATURE: 98.3 F

## 2022-01-01 VITALS
DIASTOLIC BLOOD PRESSURE: 69 MMHG | TEMPERATURE: 97.5 F | BODY MASS INDEX: 32.72 KG/M2 | OXYGEN SATURATION: 93 % | WEIGHT: 233.69 LBS | HEART RATE: 85 BPM | RESPIRATION RATE: 17 BRPM | SYSTOLIC BLOOD PRESSURE: 130 MMHG | HEIGHT: 71 IN

## 2022-01-01 VITALS
RESPIRATION RATE: 14 BRPM | OXYGEN SATURATION: 95 % | TEMPERATURE: 97.5 F | HEART RATE: 96 BPM | WEIGHT: 250 LBS | BODY MASS INDEX: 35.79 KG/M2 | DIASTOLIC BLOOD PRESSURE: 86 MMHG | HEIGHT: 70 IN | SYSTOLIC BLOOD PRESSURE: 156 MMHG

## 2022-01-01 DIAGNOSIS — R65.21 SEPTIC SHOCK (HCC): ICD-10-CM

## 2022-01-01 DIAGNOSIS — D69.6 THROMBOCYTOPENIA (HCC): ICD-10-CM

## 2022-01-01 DIAGNOSIS — E87.0 HYPERNATREMIA: ICD-10-CM

## 2022-01-01 DIAGNOSIS — R25.1 TREMOR: ICD-10-CM

## 2022-01-01 DIAGNOSIS — R13.10 DYSPHAGIA, UNSPECIFIED TYPE: ICD-10-CM

## 2022-01-01 DIAGNOSIS — M10.9 GOUTY ARTHRITIS: ICD-10-CM

## 2022-01-01 DIAGNOSIS — I48.0 PAROXYSMAL ATRIAL FIBRILLATION (HCC): ICD-10-CM

## 2022-01-01 DIAGNOSIS — R19.7 DIARRHEA, UNSPECIFIED TYPE: ICD-10-CM

## 2022-01-01 DIAGNOSIS — N17.9 ACUTE RENAL FAILURE, UNSPECIFIED ACUTE RENAL FAILURE TYPE (HCC): ICD-10-CM

## 2022-01-01 DIAGNOSIS — D62 ACUTE BLOOD LOSS ANEMIA: ICD-10-CM

## 2022-01-01 DIAGNOSIS — I77.82 ANCA-ASSOCIATED VASCULITIS (HCC): ICD-10-CM

## 2022-01-01 DIAGNOSIS — Z79.01 CHRONIC ANTICOAGULATION: ICD-10-CM

## 2022-01-01 DIAGNOSIS — R93.89 ABNORMAL CT SCAN: ICD-10-CM

## 2022-01-01 DIAGNOSIS — I82.4Y9 ACUTE VENOUS EMBOLISM AND THROMBOSIS OF DEEP VESSELS OF PROXIMAL LOWER EXTREMITY, UNSPECIFIED LATERALITY (HCC): ICD-10-CM

## 2022-01-01 DIAGNOSIS — K26.9 DUODENAL ULCER: ICD-10-CM

## 2022-01-01 DIAGNOSIS — J18.9 CHRONIC PNEUMONIA: ICD-10-CM

## 2022-01-01 DIAGNOSIS — R57.8 HEMORRHAGIC SHOCK (HCC): ICD-10-CM

## 2022-01-01 DIAGNOSIS — Z79.60 LONG-TERM USE OF IMMUNOSUPPRESSANT MEDICATION: ICD-10-CM

## 2022-01-01 DIAGNOSIS — E66.09 CLASS 1 OBESITY DUE TO EXCESS CALORIES WITH BODY MASS INDEX (BMI) OF 33.0 TO 33.9 IN ADULT, UNSPECIFIED WHETHER SERIOUS COMORBIDITY PRESENT: ICD-10-CM

## 2022-01-01 DIAGNOSIS — I82.4Y1 ACUTE VENOUS EMBOLISM AND THROMBOSIS OF DEEP VESSELS OF PROXIMAL END OF RIGHT LOWER EXTREMITY (HCC): ICD-10-CM

## 2022-01-01 DIAGNOSIS — E87.5 HYPERKALEMIA: ICD-10-CM

## 2022-01-01 DIAGNOSIS — I25.10 CORONARY ARTERY CALCIFICATION SEEN ON CT SCAN: ICD-10-CM

## 2022-01-01 DIAGNOSIS — R60.9 EDEMA, UNSPECIFIED TYPE: ICD-10-CM

## 2022-01-01 DIAGNOSIS — R53.1 WEAKNESS: ICD-10-CM

## 2022-01-01 DIAGNOSIS — R53.81 PHYSICAL DECONDITIONING: ICD-10-CM

## 2022-01-01 DIAGNOSIS — Z79.52 LONG TERM (CURRENT) USE OF SYSTEMIC STEROIDS: ICD-10-CM

## 2022-01-01 DIAGNOSIS — N17.9 AKI (ACUTE KIDNEY INJURY) (HCC): ICD-10-CM

## 2022-01-01 DIAGNOSIS — R41.82 ALTERED MENTAL STATUS, UNSPECIFIED ALTERED MENTAL STATUS TYPE: ICD-10-CM

## 2022-01-01 DIAGNOSIS — D84.9 IMMUNOSUPPRESSION (HCC): ICD-10-CM

## 2022-01-01 DIAGNOSIS — E83.42 LOW SERUM MAGNESIUM LEVEL: ICD-10-CM

## 2022-01-01 DIAGNOSIS — I35.1 NONRHEUMATIC AORTIC VALVE INSUFFICIENCY: ICD-10-CM

## 2022-01-01 DIAGNOSIS — K72.00 SHOCK LIVER: ICD-10-CM

## 2022-01-01 DIAGNOSIS — J12.82 PNEUMONIA DUE TO COVID-19 VIRUS: ICD-10-CM

## 2022-01-01 DIAGNOSIS — Z94.0 KIDNEY TRANSPLANT STATUS, LIVING RELATED DONOR: ICD-10-CM

## 2022-01-01 DIAGNOSIS — G93.40 ACUTE ENCEPHALOPATHY: ICD-10-CM

## 2022-01-01 DIAGNOSIS — R79.9 ABNORMAL FINDING OF BLOOD CHEMISTRY, UNSPECIFIED: ICD-10-CM

## 2022-01-01 DIAGNOSIS — R73.9 HYPERGLYCEMIA: ICD-10-CM

## 2022-01-01 DIAGNOSIS — I63.9 CEREBROVASCULAR ACCIDENT (CVA), UNSPECIFIED MECHANISM (HCC): ICD-10-CM

## 2022-01-01 DIAGNOSIS — E87.20 ACIDOSIS: ICD-10-CM

## 2022-01-01 DIAGNOSIS — U07.1 PNEUMONIA DUE TO COVID-19 VIRUS: ICD-10-CM

## 2022-01-01 DIAGNOSIS — H54.7 VISION LOSS: ICD-10-CM

## 2022-01-01 DIAGNOSIS — A41.9 SEPTIC SHOCK (HCC): ICD-10-CM

## 2022-01-01 DIAGNOSIS — R79.89 ELEVATED BRAIN NATRIURETIC PEPTIDE (BNP) LEVEL: ICD-10-CM

## 2022-01-01 DIAGNOSIS — E83.52 HYPERCALCEMIA: ICD-10-CM

## 2022-01-01 DIAGNOSIS — N18.32 STAGE 3B CHRONIC KIDNEY DISEASE: ICD-10-CM

## 2022-01-01 DIAGNOSIS — H57.12 ACUTE LEFT EYE PAIN: ICD-10-CM

## 2022-01-01 DIAGNOSIS — E11.69 TYPE 2 DIABETES MELLITUS WITH OTHER SPECIFIED COMPLICATION, UNSPECIFIED WHETHER LONG TERM INSULIN USE (HCC): ICD-10-CM

## 2022-01-01 DIAGNOSIS — I50.32 CHRONIC DIASTOLIC HEART FAILURE (HCC): ICD-10-CM

## 2022-01-01 DIAGNOSIS — R06.09 DYSPNEA ON EXERTION: ICD-10-CM

## 2022-01-01 DIAGNOSIS — K26.4 GASTROINTESTINAL HEMORRHAGE ASSOCIATED WITH DUODENAL ULCER: ICD-10-CM

## 2022-01-01 DIAGNOSIS — M79.662 PAIN IN LEFT SHIN: ICD-10-CM

## 2022-01-01 DIAGNOSIS — H57.12 LEFT EYE PAIN: ICD-10-CM

## 2022-01-01 DIAGNOSIS — H35.373 EPIRETINAL MEMBRANE (ERM) OF BOTH EYES: ICD-10-CM

## 2022-01-01 DIAGNOSIS — D72.829 LEUKOCYTOSIS, UNSPECIFIED TYPE: ICD-10-CM

## 2022-01-01 DIAGNOSIS — R94.31 ABNORMAL ELECTROCARDIOGRAM (ECG) (EKG): ICD-10-CM

## 2022-01-01 DIAGNOSIS — Z79.01 ANTICOAGULATED: ICD-10-CM

## 2022-01-01 DIAGNOSIS — N45.1 EPIDIDYMITIS, LEFT: ICD-10-CM

## 2022-01-01 DIAGNOSIS — E21.3 HYPERPARATHYROIDISM (HCC): ICD-10-CM

## 2022-01-01 DIAGNOSIS — R63.4 WEIGHT LOSS: ICD-10-CM

## 2022-01-01 DIAGNOSIS — Z94.0 HISTORY OF KIDNEY TRANSPLANT: ICD-10-CM

## 2022-01-01 DIAGNOSIS — I10 ESSENTIAL HYPERTENSION: ICD-10-CM

## 2022-01-01 DIAGNOSIS — E78.5 HYPERLIPIDEMIA, UNSPECIFIED HYPERLIPIDEMIA TYPE: ICD-10-CM

## 2022-01-01 DIAGNOSIS — G93.40 ENCEPHALOPATHY ACUTE: ICD-10-CM

## 2022-01-01 DIAGNOSIS — I48.92 ATRIAL FLUTTER WITH RAPID VENTRICULAR RESPONSE (HCC): ICD-10-CM

## 2022-01-01 DIAGNOSIS — Z98.890 S/P ABLATION OF ATRIAL FIBRILLATION: ICD-10-CM

## 2022-01-01 DIAGNOSIS — K92.2 UPPER GI BLEED: ICD-10-CM

## 2022-01-01 DIAGNOSIS — R82.79 POSITIVE URINE CULTURE: ICD-10-CM

## 2022-01-01 DIAGNOSIS — J18.9 PNEUMONIA OF LEFT LOWER LOBE DUE TO INFECTIOUS ORGANISM: ICD-10-CM

## 2022-01-01 DIAGNOSIS — D84.9 IMMUNOCOMPROMISED (HCC): ICD-10-CM

## 2022-01-01 DIAGNOSIS — Z86.79 S/P ABLATION OF ATRIAL FIBRILLATION: ICD-10-CM

## 2022-01-01 LAB
25(OH)D3 SERPL-MCNC: 62 NG/ML (ref 30–100)
ABO + RH BLD: NORMAL
ABO GROUP BLD: NORMAL
ALBUMIN SERPL BCP-MCNC: 1.9 G/DL (ref 3.2–4.9)
ALBUMIN SERPL BCP-MCNC: 2 G/DL (ref 3.2–4.9)
ALBUMIN SERPL BCP-MCNC: 2.1 G/DL (ref 3.2–4.9)
ALBUMIN SERPL BCP-MCNC: 2.2 G/DL (ref 3.2–4.9)
ALBUMIN SERPL BCP-MCNC: 2.2 G/DL (ref 3.2–4.9)
ALBUMIN SERPL BCP-MCNC: 2.3 G/DL (ref 3.2–4.9)
ALBUMIN SERPL BCP-MCNC: 2.4 G/DL (ref 3.2–4.9)
ALBUMIN SERPL BCP-MCNC: 2.5 G/DL (ref 3.2–4.9)
ALBUMIN SERPL BCP-MCNC: 2.6 G/DL (ref 3.2–4.9)
ALBUMIN SERPL BCP-MCNC: 2.7 G/DL (ref 3.2–4.9)
ALBUMIN SERPL BCP-MCNC: 2.8 G/DL (ref 3.2–4.9)
ALBUMIN SERPL BCP-MCNC: 2.8 G/DL (ref 3.2–4.9)
ALBUMIN SERPL BCP-MCNC: 2.9 G/DL (ref 3.2–4.9)
ALBUMIN SERPL BCP-MCNC: 3.1 G/DL (ref 3.2–4.9)
ALBUMIN SERPL BCP-MCNC: 3.1 G/DL (ref 3.2–4.9)
ALBUMIN SERPL BCP-MCNC: 3.2 G/DL (ref 3.2–4.9)
ALBUMIN SERPL BCP-MCNC: 3.3 G/DL (ref 3.2–4.9)
ALBUMIN SERPL BCP-MCNC: 3.3 G/DL (ref 3.2–4.9)
ALBUMIN SERPL BCP-MCNC: 3.4 G/DL (ref 3.2–4.9)
ALBUMIN SERPL BCP-MCNC: 3.4 G/DL (ref 3.2–4.9)
ALBUMIN SERPL BCP-MCNC: 3.5 G/DL (ref 3.2–4.9)
ALBUMIN SERPL BCP-MCNC: 3.6 G/DL (ref 3.2–4.9)
ALBUMIN SERPL BCP-MCNC: 3.7 G/DL (ref 3.2–4.9)
ALBUMIN SERPL BCP-MCNC: 4.1 G/DL (ref 3.2–4.9)
ALBUMIN SERPL ELPH-MCNC: 3.18 G/DL (ref 3.75–5.01)
ALBUMIN/GLOB SERPL: 0.6 G/DL
ALBUMIN/GLOB SERPL: 0.7 G/DL
ALBUMIN/GLOB SERPL: 0.8 G/DL
ALBUMIN/GLOB SERPL: 0.9 G/DL
ALBUMIN/GLOB SERPL: 1 G/DL
ALBUMIN/GLOB SERPL: 1.1 G/DL
ALBUMIN/GLOB SERPL: 1.3 G/DL
ALP SERPL-CCNC: 100 U/L (ref 30–99)
ALP SERPL-CCNC: 105 U/L (ref 30–99)
ALP SERPL-CCNC: 108 U/L (ref 30–99)
ALP SERPL-CCNC: 111 U/L (ref 30–99)
ALP SERPL-CCNC: 112 U/L (ref 30–99)
ALP SERPL-CCNC: 114 U/L (ref 30–99)
ALP SERPL-CCNC: 116 U/L (ref 30–99)
ALP SERPL-CCNC: 117 U/L (ref 30–99)
ALP SERPL-CCNC: 119 U/L (ref 30–99)
ALP SERPL-CCNC: 124 U/L (ref 30–99)
ALP SERPL-CCNC: 127 U/L (ref 30–99)
ALP SERPL-CCNC: 133 U/L (ref 30–99)
ALP SERPL-CCNC: 133 U/L (ref 30–99)
ALP SERPL-CCNC: 134 U/L (ref 30–99)
ALP SERPL-CCNC: 136 U/L (ref 30–99)
ALP SERPL-CCNC: 138 U/L (ref 30–99)
ALP SERPL-CCNC: 140 U/L (ref 30–99)
ALP SERPL-CCNC: 148 U/L (ref 30–99)
ALP SERPL-CCNC: 149 U/L (ref 30–99)
ALP SERPL-CCNC: 151 U/L (ref 30–99)
ALP SERPL-CCNC: 153 U/L (ref 30–99)
ALP SERPL-CCNC: 153 U/L (ref 30–99)
ALP SERPL-CCNC: 156 U/L (ref 30–99)
ALP SERPL-CCNC: 156 U/L (ref 30–99)
ALP SERPL-CCNC: 157 U/L (ref 30–99)
ALP SERPL-CCNC: 160 U/L (ref 30–99)
ALP SERPL-CCNC: 160 U/L (ref 30–99)
ALP SERPL-CCNC: 175 U/L (ref 30–99)
ALP SERPL-CCNC: 176 U/L (ref 30–99)
ALP SERPL-CCNC: 179 U/L (ref 30–99)
ALP SERPL-CCNC: 86 U/L (ref 30–99)
ALP SERPL-CCNC: 93 U/L (ref 30–99)
ALP SERPL-CCNC: 95 U/L (ref 30–99)
ALP SERPL-CCNC: 97 U/L (ref 30–99)
ALP SERPL-CCNC: 98 U/L (ref 30–99)
ALPHA1 GLOB SERPL ELPH-MCNC: 0.43 G/DL (ref 0.19–0.46)
ALPHA2 GLOB SERPL ELPH-MCNC: 0.93 G/DL (ref 0.48–1.05)
ALT SERPL-CCNC: 107 U/L (ref 2–50)
ALT SERPL-CCNC: 108 U/L (ref 2–50)
ALT SERPL-CCNC: 108 U/L (ref 2–50)
ALT SERPL-CCNC: 11 U/L (ref 2–50)
ALT SERPL-CCNC: 111 U/L (ref 2–50)
ALT SERPL-CCNC: 116 U/L (ref 2–50)
ALT SERPL-CCNC: 12 U/L (ref 2–50)
ALT SERPL-CCNC: 129 U/L (ref 2–50)
ALT SERPL-CCNC: 138 U/L (ref 2–50)
ALT SERPL-CCNC: 14 U/L (ref 2–50)
ALT SERPL-CCNC: 16 U/L (ref 2–50)
ALT SERPL-CCNC: 17 U/L (ref 2–50)
ALT SERPL-CCNC: 177 U/L (ref 2–50)
ALT SERPL-CCNC: 18 U/L (ref 2–50)
ALT SERPL-CCNC: 19 U/L (ref 2–50)
ALT SERPL-CCNC: 20 U/L (ref 2–50)
ALT SERPL-CCNC: 23 U/L (ref 2–50)
ALT SERPL-CCNC: 23 U/L (ref 2–50)
ALT SERPL-CCNC: 24 U/L (ref 2–50)
ALT SERPL-CCNC: 26 U/L (ref 2–50)
ALT SERPL-CCNC: 263 U/L (ref 2–50)
ALT SERPL-CCNC: 28 U/L (ref 2–50)
ALT SERPL-CCNC: 34 U/L (ref 2–50)
ALT SERPL-CCNC: 348 U/L (ref 2–50)
ALT SERPL-CCNC: 35 U/L (ref 2–50)
ALT SERPL-CCNC: 36 U/L (ref 2–50)
ALT SERPL-CCNC: 39 U/L (ref 2–50)
ALT SERPL-CCNC: 397 U/L (ref 2–50)
ALT SERPL-CCNC: 41 U/L (ref 2–50)
ALT SERPL-CCNC: 42 U/L (ref 2–50)
ALT SERPL-CCNC: 45 U/L (ref 2–50)
ALT SERPL-CCNC: 51 U/L (ref 2–50)
ALT SERPL-CCNC: 55 U/L (ref 2–50)
ALT SERPL-CCNC: 57 U/L (ref 2–50)
ALT SERPL-CCNC: 68 U/L (ref 2–50)
ALT SERPL-CCNC: 69 U/L (ref 2–50)
ALT SERPL-CCNC: 73 U/L (ref 2–50)
ALT SERPL-CCNC: 74 U/L (ref 2–50)
ALT SERPL-CCNC: 74 U/L (ref 2–50)
ALT SERPL-CCNC: 88 U/L (ref 2–50)
AMMONIA PLAS-SCNC: 16 UMOL/L (ref 11–45)
AMMONIA PLAS-SCNC: 18 UMOL/L (ref 11–45)
AMMONIA PLAS-SCNC: 73 UMOL/L (ref 11–45)
ANION GAP SERPL CALC-SCNC: 10 MMOL/L (ref 7–16)
ANION GAP SERPL CALC-SCNC: 11 MMOL/L (ref 7–16)
ANION GAP SERPL CALC-SCNC: 12 MMOL/L (ref 7–16)
ANION GAP SERPL CALC-SCNC: 13 MMOL/L (ref 7–16)
ANION GAP SERPL CALC-SCNC: 14 MMOL/L (ref 7–16)
ANION GAP SERPL CALC-SCNC: 15 MMOL/L (ref 7–16)
ANION GAP SERPL CALC-SCNC: 16 MMOL/L (ref 7–16)
ANION GAP SERPL CALC-SCNC: 16 MMOL/L (ref 7–16)
ANION GAP SERPL CALC-SCNC: 17 MMOL/L (ref 7–16)
ANION GAP SERPL CALC-SCNC: 18 MMOL/L (ref 7–16)
ANION GAP SERPL CALC-SCNC: 7 MMOL/L (ref 7–16)
ANION GAP SERPL CALC-SCNC: 7 MMOL/L (ref 7–16)
ANION GAP SERPL CALC-SCNC: 8 MMOL/L (ref 7–16)
ANION GAP SERPL CALC-SCNC: 9 MMOL/L (ref 7–16)
ANISOCYTOSIS BLD QL SMEAR: ABNORMAL
APPEARANCE UR: ABNORMAL
APPEARANCE UR: ABNORMAL
APPEARANCE UR: CLEAR
APTT PPP: 40.4 SEC (ref 24.7–36)
APTT PPP: 42.6 SEC (ref 24.7–36)
APTT PPP: 47.1 SEC (ref 24.7–36)
AST SERPL-CCNC: 100 U/L (ref 12–45)
AST SERPL-CCNC: 16 U/L (ref 12–45)
AST SERPL-CCNC: 17 U/L (ref 12–45)
AST SERPL-CCNC: 18 U/L (ref 12–45)
AST SERPL-CCNC: 18 U/L (ref 12–45)
AST SERPL-CCNC: 19 U/L (ref 12–45)
AST SERPL-CCNC: 20 U/L (ref 12–45)
AST SERPL-CCNC: 20 U/L (ref 12–45)
AST SERPL-CCNC: 21 U/L (ref 12–45)
AST SERPL-CCNC: 22 U/L (ref 12–45)
AST SERPL-CCNC: 244 U/L (ref 12–45)
AST SERPL-CCNC: 25 U/L (ref 12–45)
AST SERPL-CCNC: 27 U/L (ref 12–45)
AST SERPL-CCNC: 288 U/L (ref 12–45)
AST SERPL-CCNC: 29 U/L (ref 12–45)
AST SERPL-CCNC: 32 U/L (ref 12–45)
AST SERPL-CCNC: 38 U/L (ref 12–45)
AST SERPL-CCNC: 39 U/L (ref 12–45)
AST SERPL-CCNC: 39 U/L (ref 12–45)
AST SERPL-CCNC: 40 U/L (ref 12–45)
AST SERPL-CCNC: 44 U/L (ref 12–45)
AST SERPL-CCNC: 45 U/L (ref 12–45)
AST SERPL-CCNC: 46 U/L (ref 12–45)
AST SERPL-CCNC: 51 U/L (ref 12–45)
AST SERPL-CCNC: 53 U/L (ref 12–45)
AST SERPL-CCNC: 54 U/L (ref 12–45)
AST SERPL-CCNC: 58 U/L (ref 12–45)
AST SERPL-CCNC: 63 U/L (ref 12–45)
AST SERPL-CCNC: 66 U/L (ref 12–45)
AST SERPL-CCNC: 70 U/L (ref 12–45)
AST SERPL-CCNC: 72 U/L (ref 12–45)
AST SERPL-CCNC: 73 U/L (ref 12–45)
B-GLOBULIN SERPL ELPH-MCNC: 0.83 G/DL (ref 0.48–1.1)
BACTERIA #/AREA URNS HPF: ABNORMAL /HPF
BACTERIA #/AREA URNS HPF: NEGATIVE /HPF
BACTERIA BLD CULT: ABNORMAL
BACTERIA BLD CULT: ABNORMAL
BACTERIA BLD CULT: NORMAL
BACTERIA CSF CULT: NORMAL
BACTERIA STL CULT: NORMAL
BACTERIA UR CULT: ABNORMAL
BACTERIA UR CULT: NORMAL
BARCODED ABORH UBTYP: 5100
BARCODED ABORH UBTYP: 5100
BARCODED ABORH UBTYP: 6200
BARCODED PRD CODE UBPRD: NORMAL
BARCODED UNIT NUM UBUNT: NORMAL
BASE EXCESS BLDA CALC-SCNC: -6 MMOL/L (ref -4–3)
BASE EXCESS BLDA CALC-SCNC: -7 MMOL/L (ref -4–3)
BASE EXCESS BLDA CALC-SCNC: -9 MMOL/L (ref -4–3)
BASE EXCESS BLDA CALC-SCNC: 3 MMOL/L (ref -4–3)
BASOPHILS # BLD AUTO: 0 % (ref 0–1.8)
BASOPHILS # BLD AUTO: 0.1 % (ref 0–1.8)
BASOPHILS # BLD AUTO: 0.2 % (ref 0–1.8)
BASOPHILS # BLD AUTO: 0.3 % (ref 0–1.8)
BASOPHILS # BLD AUTO: 0.4 % (ref 0–1.8)
BASOPHILS # BLD: 0 K/UL (ref 0–0.12)
BASOPHILS # BLD: 0.01 K/UL (ref 0–0.12)
BASOPHILS # BLD: 0.02 K/UL (ref 0–0.12)
BASOPHILS # BLD: 0.03 K/UL (ref 0–0.12)
BASOPHILS # BLD: 0.04 K/UL (ref 0–0.12)
BASOPHILS # BLD: 0.05 K/UL (ref 0–0.12)
BASOPHILS # BLD: 0.07 K/UL (ref 0–0.12)
BASOPHILS # BLD: 0.08 K/UL (ref 0–0.12)
BILIRUB CONJ SERPL-MCNC: <0.2 MG/DL (ref 0.1–0.5)
BILIRUB INDIRECT SERPL-MCNC: ABNORMAL MG/DL (ref 0–1)
BILIRUB SERPL-MCNC: 0.3 MG/DL (ref 0.1–1.5)
BILIRUB SERPL-MCNC: 0.3 MG/DL (ref 0.1–1.5)
BILIRUB SERPL-MCNC: 0.4 MG/DL (ref 0.1–1.5)
BILIRUB SERPL-MCNC: 0.5 MG/DL (ref 0.1–1.5)
BILIRUB SERPL-MCNC: 0.6 MG/DL (ref 0.1–1.5)
BILIRUB SERPL-MCNC: 0.7 MG/DL (ref 0.1–1.5)
BILIRUB SERPL-MCNC: 0.8 MG/DL (ref 0.1–1.5)
BILIRUB SERPL-MCNC: 0.8 MG/DL (ref 0.1–1.5)
BILIRUB UR QL STRIP.AUTO: NEGATIVE
BLD GP AB SCN SERPL QL: NORMAL
BM IGG SER QL IF: NEGATIVE
BODY TEMPERATURE: 35 CENTIGRADE
BODY TEMPERATURE: ABNORMAL CENTIGRADE
BODY TEMPERATURE: ABNORMAL CENTIGRADE
BODY TEMPERATURE: ABNORMAL DEGREES
BUN SERPL-MCNC: 11 MG/DL (ref 8–22)
BUN SERPL-MCNC: 16 MG/DL (ref 8–22)
BUN SERPL-MCNC: 17 MG/DL (ref 8–22)
BUN SERPL-MCNC: 18 MG/DL (ref 8–22)
BUN SERPL-MCNC: 19 MG/DL (ref 8–22)
BUN SERPL-MCNC: 21 MG/DL (ref 8–22)
BUN SERPL-MCNC: 23 MG/DL (ref 8–22)
BUN SERPL-MCNC: 24 MG/DL (ref 8–22)
BUN SERPL-MCNC: 25 MG/DL (ref 8–22)
BUN SERPL-MCNC: 26 MG/DL (ref 8–22)
BUN SERPL-MCNC: 26 MG/DL (ref 8–22)
BUN SERPL-MCNC: 27 MG/DL (ref 8–22)
BUN SERPL-MCNC: 31 MG/DL (ref 8–22)
BUN SERPL-MCNC: 32 MG/DL (ref 8–22)
BUN SERPL-MCNC: 32 MG/DL (ref 8–22)
BUN SERPL-MCNC: 34 MG/DL (ref 8–22)
BUN SERPL-MCNC: 39 MG/DL (ref 8–22)
BUN SERPL-MCNC: 41 MG/DL (ref 8–22)
BUN SERPL-MCNC: 43 MG/DL (ref 8–22)
BUN SERPL-MCNC: 44 MG/DL (ref 8–22)
BUN SERPL-MCNC: 45 MG/DL (ref 8–22)
BUN SERPL-MCNC: 48 MG/DL (ref 8–22)
BUN SERPL-MCNC: 53 MG/DL (ref 8–22)
BUN SERPL-MCNC: 54 MG/DL (ref 8–22)
BUN SERPL-MCNC: 59 MG/DL (ref 8–22)
BUN SERPL-MCNC: 60 MG/DL (ref 8–22)
BUN SERPL-MCNC: 61 MG/DL (ref 8–22)
BUN SERPL-MCNC: 61 MG/DL (ref 8–22)
BUN SERPL-MCNC: 62 MG/DL (ref 8–22)
BUN SERPL-MCNC: 62 MG/DL (ref 8–22)
BUN SERPL-MCNC: 63 MG/DL (ref 8–22)
BUN SERPL-MCNC: 64 MG/DL (ref 8–22)
BUN SERPL-MCNC: 66 MG/DL (ref 8–22)
BUN SERPL-MCNC: 67 MG/DL (ref 8–22)
BUN SERPL-MCNC: 68 MG/DL (ref 8–22)
BUN SERPL-MCNC: 69 MG/DL (ref 8–22)
BUN SERPL-MCNC: 7 MG/DL (ref 8–22)
BUN SERPL-MCNC: 7 MG/DL (ref 8–22)
BUN SERPL-MCNC: 71 MG/DL (ref 8–22)
BUN SERPL-MCNC: 72 MG/DL (ref 8–22)
BUN SERPL-MCNC: 77 MG/DL (ref 8–22)
BUN SERPL-MCNC: 8 MG/DL (ref 8–22)
BUN SERPL-MCNC: 80 MG/DL (ref 8–22)
BUN SERPL-MCNC: 82 MG/DL (ref 8–22)
BUN SERPL-MCNC: 84 MG/DL (ref 8–22)
BUN SERPL-MCNC: 86 MG/DL (ref 8–22)
BUN SERPL-MCNC: 88 MG/DL (ref 8–22)
BUN SERPL-MCNC: 90 MG/DL (ref 8–22)
BUN SERPL-MCNC: 92 MG/DL (ref 8–22)
BURR CELLS BLD QL SMEAR: NORMAL
BURR CELLS/RBC NFR CSF MANUAL: 9 %
C DIFF DNA SPEC QL NAA+PROBE: NEGATIVE
C DIFF DNA SPEC QL NAA+PROBE: NEGATIVE
C DIFF DNA SPEC QL NAA+PROBE: NORMAL
C DIFF TOX A+B STL QL IA: POSITIVE
C DIFF TOX GENS STL QL NAA+PROBE: NEGATIVE
C DIFF TOX GENS STL QL NAA+PROBE: NEGATIVE
C DIFF TOX GENS STL QL NAA+PROBE: NORMAL
C GATTII+NEOFOR DNA CSF QL NAA+NON-PROBE: NOT DETECTED
C JEJUNI+C COLI AG STL QL: NORMAL
C3 SERPL-MCNC: 127.2 MG/DL (ref 87–200)
C4 SERPL-MCNC: 34.7 MG/DL (ref 19–52)
CA-I SERPL-SCNC: 1.5 MMOL/L (ref 1.1–1.3)
CALCIUM SERPL-MCNC: 10 MG/DL (ref 8.5–10.5)
CALCIUM SERPL-MCNC: 10.3 MG/DL (ref 8.5–10.5)
CALCIUM SERPL-MCNC: 10.4 MG/DL (ref 8.5–10.5)
CALCIUM SERPL-MCNC: 10.5 MG/DL (ref 8.5–10.5)
CALCIUM SERPL-MCNC: 10.8 MG/DL (ref 8.5–10.5)
CALCIUM SERPL-MCNC: 11 MG/DL (ref 8.5–10.5)
CALCIUM SERPL-MCNC: 11 MG/DL (ref 8.5–10.5)
CALCIUM SERPL-MCNC: 11.2 MG/DL (ref 8.5–10.5)
CALCIUM SERPL-MCNC: 11.4 MG/DL (ref 8.5–10.5)
CALCIUM SERPL-MCNC: 11.4 MG/DL (ref 8.5–10.5)
CALCIUM SERPL-MCNC: 11.5 MG/DL (ref 8.5–10.5)
CALCIUM SERPL-MCNC: 11.5 MG/DL (ref 8.5–10.5)
CALCIUM SERPL-MCNC: 11.7 MG/DL (ref 8.5–10.5)
CALCIUM SERPL-MCNC: 11.8 MG/DL (ref 8.5–10.5)
CALCIUM SERPL-MCNC: 11.8 MG/DL (ref 8.5–10.5)
CALCIUM SERPL-MCNC: 11.9 MG/DL (ref 8.5–10.5)
CALCIUM SERPL-MCNC: 7.6 MG/DL (ref 8.5–10.5)
CALCIUM SERPL-MCNC: 7.8 MG/DL (ref 8.5–10.5)
CALCIUM SERPL-MCNC: 8 MG/DL (ref 8.4–10.2)
CALCIUM SERPL-MCNC: 8 MG/DL (ref 8.5–10.5)
CALCIUM SERPL-MCNC: 8.1 MG/DL (ref 8.4–10.2)
CALCIUM SERPL-MCNC: 8.2 MG/DL (ref 8.5–10.5)
CALCIUM SERPL-MCNC: 8.2 MG/DL (ref 8.5–10.5)
CALCIUM SERPL-MCNC: 8.3 MG/DL (ref 8.4–10.2)
CALCIUM SERPL-MCNC: 8.3 MG/DL (ref 8.4–10.2)
CALCIUM SERPL-MCNC: 8.3 MG/DL (ref 8.5–10.5)
CALCIUM SERPL-MCNC: 8.4 MG/DL (ref 8.4–10.2)
CALCIUM SERPL-MCNC: 8.4 MG/DL (ref 8.4–10.2)
CALCIUM SERPL-MCNC: 8.4 MG/DL (ref 8.5–10.5)
CALCIUM SERPL-MCNC: 8.5 MG/DL (ref 8.4–10.2)
CALCIUM SERPL-MCNC: 8.5 MG/DL (ref 8.4–10.2)
CALCIUM SERPL-MCNC: 8.5 MG/DL (ref 8.5–10.5)
CALCIUM SERPL-MCNC: 8.5 MG/DL (ref 8.5–10.5)
CALCIUM SERPL-MCNC: 8.7 MG/DL (ref 8.5–10.5)
CALCIUM SERPL-MCNC: 9 MG/DL (ref 8.5–10.5)
CALCIUM SERPL-MCNC: 9.1 MG/DL (ref 8.5–10.5)
CALCIUM SERPL-MCNC: 9.2 MG/DL (ref 8.5–10.5)
CALCIUM SERPL-MCNC: 9.3 MG/DL (ref 8.5–10.5)
CALCIUM SERPL-MCNC: 9.4 MG/DL (ref 8.5–10.5)
CALCIUM SERPL-MCNC: 9.5 MG/DL (ref 8.5–10.5)
CALCIUM SERPL-MCNC: 9.6 MG/DL (ref 8.5–10.5)
CALCIUM SERPL-MCNC: 9.7 MG/DL (ref 8.5–10.5)
CALCIUM SERPL-MCNC: 9.8 MG/DL (ref 8.5–10.5)
CALCIUM SERPL-MCNC: 9.8 MG/DL (ref 8.5–10.5)
CALCIUM SERPL-MCNC: 9.9 MG/DL (ref 8.5–10.5)
CFT BLD TEG: 3.7 MIN (ref 4.6–9.1)
CFT BLD TEG: 4.2 MIN (ref 4.6–9.1)
CFT BLD TEG: 5 MIN (ref 4.6–9.1)
CFT P HPASE BLD TEG: 3.6 MIN (ref 4.3–8.3)
CFT P HPASE BLD TEG: 4.2 MIN (ref 4.3–8.3)
CFT P HPASE BLD TEG: 4.8 MIN (ref 4.3–8.3)
CH50 SERPL-ACNC: 71.2 U/ML (ref 38.7–89.9)
CHLORIDE SERPL-SCNC: 101 MMOL/L (ref 96–112)
CHLORIDE SERPL-SCNC: 105 MMOL/L (ref 96–112)
CHLORIDE SERPL-SCNC: 106 MMOL/L (ref 96–112)
CHLORIDE SERPL-SCNC: 107 MMOL/L (ref 96–112)
CHLORIDE SERPL-SCNC: 108 MMOL/L (ref 96–112)
CHLORIDE SERPL-SCNC: 109 MMOL/L (ref 96–112)
CHLORIDE SERPL-SCNC: 110 MMOL/L (ref 96–112)
CHLORIDE SERPL-SCNC: 111 MMOL/L (ref 96–112)
CHLORIDE SERPL-SCNC: 112 MMOL/L (ref 96–112)
CHLORIDE SERPL-SCNC: 113 MMOL/L (ref 96–112)
CHLORIDE SERPL-SCNC: 114 MMOL/L (ref 96–112)
CHLORIDE SERPL-SCNC: 115 MMOL/L (ref 96–112)
CHLORIDE SERPL-SCNC: 116 MMOL/L (ref 96–112)
CHLORIDE SERPL-SCNC: 117 MMOL/L (ref 96–112)
CHLORIDE SERPL-SCNC: 117 MMOL/L (ref 96–112)
CHLORIDE SERPL-SCNC: 121 MMOL/L (ref 96–112)
CHLORIDE SERPL-SCNC: 123 MMOL/L (ref 96–112)
CHLORIDE SERPL-SCNC: 124 MMOL/L (ref 96–112)
CHOLEST SERPL-MCNC: 98 MG/DL (ref 100–199)
CK SERPL-CCNC: 108 U/L (ref 0–154)
CLARITY CSF: CLEAR
CLOT ANGLE BLD TEG: 76.7 DEGREES (ref 63–78)
CLOT ANGLE BLD TEG: 77.1 DEGREES (ref 63–78)
CLOT ANGLE BLD TEG: 77.5 DEGREES (ref 63–78)
CLOT LYSIS 30M P MA LENFR BLD TEG: 0 % (ref 0–2.6)
CMV DNA CSF QL NAA+NON-PROBE: NOT DETECTED
CO2 BLDA-SCNC: 17 MMOL/L (ref 20–33)
CO2 SERPL-SCNC: 13 MMOL/L (ref 20–33)
CO2 SERPL-SCNC: 14 MMOL/L (ref 20–33)
CO2 SERPL-SCNC: 15 MMOL/L (ref 20–33)
CO2 SERPL-SCNC: 16 MMOL/L (ref 20–33)
CO2 SERPL-SCNC: 17 MMOL/L (ref 20–33)
CO2 SERPL-SCNC: 18 MMOL/L (ref 20–33)
CO2 SERPL-SCNC: 19 MMOL/L (ref 20–33)
CO2 SERPL-SCNC: 20 MMOL/L (ref 20–33)
CO2 SERPL-SCNC: 21 MMOL/L (ref 20–33)
CO2 SERPL-SCNC: 22 MMOL/L (ref 20–33)
CO2 SERPL-SCNC: 23 MMOL/L (ref 20–33)
CO2 SERPL-SCNC: 24 MMOL/L (ref 20–33)
CO2 SERPL-SCNC: 25 MMOL/L (ref 20–33)
CO2 SERPL-SCNC: 26 MMOL/L (ref 20–33)
COLOR CSF: COLORLESS
COLOR SPUN CSF: COLORLESS
COLOR UR: YELLOW
COMMENT 1642: NORMAL
COMPONENT R 8504R: NORMAL
CORTIS SERPL-MCNC: 12.8 UG/DL (ref 0–23)
CREAT SERPL-MCNC: 1.02 MG/DL (ref 0.5–1.4)
CREAT SERPL-MCNC: 1.13 MG/DL (ref 0.5–1.4)
CREAT SERPL-MCNC: 1.13 MG/DL (ref 0.5–1.4)
CREAT SERPL-MCNC: 1.14 MG/DL (ref 0.5–1.4)
CREAT SERPL-MCNC: 1.15 MG/DL (ref 0.5–1.4)
CREAT SERPL-MCNC: 1.15 MG/DL (ref 0.5–1.4)
CREAT SERPL-MCNC: 1.2 MG/DL (ref 0.5–1.4)
CREAT SERPL-MCNC: 1.25 MG/DL (ref 0.5–1.4)
CREAT SERPL-MCNC: 1.31 MG/DL (ref 0.5–1.4)
CREAT SERPL-MCNC: 1.31 MG/DL (ref 0.5–1.4)
CREAT SERPL-MCNC: 1.35 MG/DL (ref 0.5–1.4)
CREAT SERPL-MCNC: 1.37 MG/DL (ref 0.5–1.4)
CREAT SERPL-MCNC: 1.39 MG/DL (ref 0.5–1.4)
CREAT SERPL-MCNC: 1.41 MG/DL (ref 0.5–1.4)
CREAT SERPL-MCNC: 1.43 MG/DL (ref 0.5–1.4)
CREAT SERPL-MCNC: 1.47 MG/DL (ref 0.5–1.4)
CREAT SERPL-MCNC: 1.51 MG/DL (ref 0.5–1.4)
CREAT SERPL-MCNC: 1.52 MG/DL (ref 0.5–1.4)
CREAT SERPL-MCNC: 1.53 MG/DL (ref 0.5–1.4)
CREAT SERPL-MCNC: 1.55 MG/DL (ref 0.5–1.4)
CREAT SERPL-MCNC: 1.59 MG/DL (ref 0.5–1.4)
CREAT SERPL-MCNC: 1.61 MG/DL (ref 0.5–1.4)
CREAT SERPL-MCNC: 1.61 MG/DL (ref 0.5–1.4)
CREAT SERPL-MCNC: 1.64 MG/DL (ref 0.5–1.4)
CREAT SERPL-MCNC: 1.66 MG/DL (ref 0.5–1.4)
CREAT SERPL-MCNC: 1.67 MG/DL (ref 0.5–1.4)
CREAT SERPL-MCNC: 1.68 MG/DL (ref 0.5–1.4)
CREAT SERPL-MCNC: 1.7 MG/DL (ref 0.5–1.4)
CREAT SERPL-MCNC: 1.7 MG/DL (ref 0.5–1.4)
CREAT SERPL-MCNC: 1.71 MG/DL (ref 0.5–1.4)
CREAT SERPL-MCNC: 1.72 MG/DL (ref 0.5–1.4)
CREAT SERPL-MCNC: 1.76 MG/DL (ref 0.5–1.4)
CREAT SERPL-MCNC: 1.79 MG/DL (ref 0.5–1.4)
CREAT SERPL-MCNC: 1.81 MG/DL (ref 0.5–1.4)
CREAT SERPL-MCNC: 1.82 MG/DL (ref 0.5–1.4)
CREAT SERPL-MCNC: 1.84 MG/DL (ref 0.5–1.4)
CREAT SERPL-MCNC: 1.89 MG/DL (ref 0.5–1.4)
CREAT SERPL-MCNC: 1.95 MG/DL (ref 0.5–1.4)
CREAT SERPL-MCNC: 1.95 MG/DL (ref 0.5–1.4)
CREAT SERPL-MCNC: 1.97 MG/DL (ref 0.5–1.4)
CREAT SERPL-MCNC: 1.98 MG/DL (ref 0.5–1.4)
CREAT SERPL-MCNC: 2.02 MG/DL (ref 0.5–1.4)
CREAT SERPL-MCNC: 2.04 MG/DL (ref 0.5–1.4)
CREAT SERPL-MCNC: 2.06 MG/DL (ref 0.5–1.4)
CREAT SERPL-MCNC: 2.06 MG/DL (ref 0.5–1.4)
CREAT SERPL-MCNC: 2.07 MG/DL (ref 0.5–1.4)
CREAT SERPL-MCNC: 2.1 MG/DL (ref 0.5–1.4)
CREAT SERPL-MCNC: 2.11 MG/DL (ref 0.5–1.4)
CREAT SERPL-MCNC: 2.12 MG/DL (ref 0.5–1.4)
CREAT SERPL-MCNC: 2.15 MG/DL (ref 0.5–1.4)
CREAT SERPL-MCNC: 2.15 MG/DL (ref 0.5–1.4)
CREAT SERPL-MCNC: 2.25 MG/DL (ref 0.5–1.4)
CREAT SERPL-MCNC: 2.29 MG/DL (ref 0.5–1.4)
CREAT SERPL-MCNC: 2.4 MG/DL (ref 0.5–1.4)
CREAT SERPL-MCNC: 2.62 MG/DL (ref 0.5–1.4)
CREAT SERPL-MCNC: 2.72 MG/DL (ref 0.5–1.4)
CREAT SERPL-MCNC: 3.06 MG/DL (ref 0.5–1.4)
CREAT SERPL-MCNC: 3.52 MG/DL (ref 0.5–1.4)
CREAT SERPL-MCNC: 3.56 MG/DL (ref 0.5–1.4)
CREAT UR-MCNC: 55.24 MG/DL
CRP SERPL HS-MCNC: 0.68 MG/DL (ref 0–0.75)
CRP SERPL HS-MCNC: 1.6 MG/DL (ref 0–0.75)
CRP SERPL HS-MCNC: 12.51 MG/DL (ref 0–0.75)
CRP SERPL HS-MCNC: 3.73 MG/DL (ref 0–0.75)
CRP SERPL HS-MCNC: 5.27 MG/DL (ref 0–0.75)
CRP SERPL HS-MCNC: 8.69 MG/DL (ref 0–0.75)
CT.EXTRINSIC BLD ROTEM: 0.8 MIN (ref 0.8–2.1)
CT.EXTRINSIC BLD ROTEM: 1 MIN (ref 0.8–2.1)
CT.EXTRINSIC BLD ROTEM: 1 MIN (ref 0.8–2.1)
DELSYS IDSYS: ABNORMAL
DOHLE BOD BLD QL SMEAR: NORMAL
DOHLE BOD BLD QL SMEAR: NORMAL
E COLI K1 DNA CSF QL NAA+NON-PROBE: NOT DETECTED
EKG IMPRESSION: NORMAL
EOSINOPHIL # BLD AUTO: 0 K/UL (ref 0–0.51)
EOSINOPHIL # BLD AUTO: 0.01 K/UL (ref 0–0.51)
EOSINOPHIL # BLD AUTO: 0.02 K/UL (ref 0–0.51)
EOSINOPHIL # BLD AUTO: 0.03 K/UL (ref 0–0.51)
EOSINOPHIL # BLD AUTO: 0.04 K/UL (ref 0–0.51)
EOSINOPHIL # BLD AUTO: 0.05 K/UL (ref 0–0.51)
EOSINOPHIL # BLD AUTO: 0.06 K/UL (ref 0–0.51)
EOSINOPHIL # BLD AUTO: 0.07 K/UL (ref 0–0.51)
EOSINOPHIL # BLD AUTO: 0.09 K/UL (ref 0–0.51)
EOSINOPHIL # BLD AUTO: 0.11 K/UL (ref 0–0.51)
EOSINOPHIL # BLD AUTO: 0.15 K/UL (ref 0–0.51)
EOSINOPHIL # BLD AUTO: 0.26 K/UL (ref 0–0.51)
EOSINOPHIL NFR BLD: 0 % (ref 0–6.9)
EOSINOPHIL NFR BLD: 0.1 % (ref 0–6.9)
EOSINOPHIL NFR BLD: 0.2 % (ref 0–6.9)
EOSINOPHIL NFR BLD: 0.3 % (ref 0–6.9)
EOSINOPHIL NFR BLD: 0.4 % (ref 0–6.9)
EOSINOPHIL NFR BLD: 0.6 % (ref 0–6.9)
EOSINOPHIL NFR BLD: 0.7 % (ref 0–6.9)
EOSINOPHIL NFR BLD: 0.8 % (ref 0–6.9)
EOSINOPHIL NFR BLD: 0.9 % (ref 0–6.9)
EOSINOPHIL NFR BLD: 1.3 % (ref 0–6.9)
EOSINOPHIL NFR BLD: 1.4 % (ref 0–6.9)
EOSINOPHIL NFR BLD: 1.9 % (ref 0–6.9)
EOSINOPHIL NFR BLD: 2.6 % (ref 0–6.9)
EOSINOPHIL NFR BLD: 2.7 % (ref 0–6.9)
EPI CELLS #/AREA URNS HPF: ABNORMAL /HPF
EPI CELLS #/AREA URNS HPF: NEGATIVE /HPF
ERYTHROCYTE [DISTWIDTH] IN BLOOD BY AUTOMATED COUNT: 48.6 FL (ref 35.9–50)
ERYTHROCYTE [DISTWIDTH] IN BLOOD BY AUTOMATED COUNT: 49.1 FL (ref 35.9–50)
ERYTHROCYTE [DISTWIDTH] IN BLOOD BY AUTOMATED COUNT: 49.4 FL (ref 35.9–50)
ERYTHROCYTE [DISTWIDTH] IN BLOOD BY AUTOMATED COUNT: 49.5 FL (ref 35.9–50)
ERYTHROCYTE [DISTWIDTH] IN BLOOD BY AUTOMATED COUNT: 50.1 FL (ref 35.9–50)
ERYTHROCYTE [DISTWIDTH] IN BLOOD BY AUTOMATED COUNT: 50.4 FL (ref 35.9–50)
ERYTHROCYTE [DISTWIDTH] IN BLOOD BY AUTOMATED COUNT: 50.6 FL (ref 35.9–50)
ERYTHROCYTE [DISTWIDTH] IN BLOOD BY AUTOMATED COUNT: 51.1 FL (ref 35.9–50)
ERYTHROCYTE [DISTWIDTH] IN BLOOD BY AUTOMATED COUNT: 51.2 FL (ref 35.9–50)
ERYTHROCYTE [DISTWIDTH] IN BLOOD BY AUTOMATED COUNT: 51.2 FL (ref 35.9–50)
ERYTHROCYTE [DISTWIDTH] IN BLOOD BY AUTOMATED COUNT: 51.4 FL (ref 35.9–50)
ERYTHROCYTE [DISTWIDTH] IN BLOOD BY AUTOMATED COUNT: 51.5 FL (ref 35.9–50)
ERYTHROCYTE [DISTWIDTH] IN BLOOD BY AUTOMATED COUNT: 51.5 FL (ref 35.9–50)
ERYTHROCYTE [DISTWIDTH] IN BLOOD BY AUTOMATED COUNT: 53.1 FL (ref 35.9–50)
ERYTHROCYTE [DISTWIDTH] IN BLOOD BY AUTOMATED COUNT: 53.2 FL (ref 35.9–50)
ERYTHROCYTE [DISTWIDTH] IN BLOOD BY AUTOMATED COUNT: 53.5 FL (ref 35.9–50)
ERYTHROCYTE [DISTWIDTH] IN BLOOD BY AUTOMATED COUNT: 57.2 FL (ref 35.9–50)
ERYTHROCYTE [DISTWIDTH] IN BLOOD BY AUTOMATED COUNT: 57.5 FL (ref 35.9–50)
ERYTHROCYTE [DISTWIDTH] IN BLOOD BY AUTOMATED COUNT: 58.1 FL (ref 35.9–50)
ERYTHROCYTE [DISTWIDTH] IN BLOOD BY AUTOMATED COUNT: 58.9 FL (ref 35.9–50)
ERYTHROCYTE [DISTWIDTH] IN BLOOD BY AUTOMATED COUNT: 59 FL (ref 35.9–50)
ERYTHROCYTE [DISTWIDTH] IN BLOOD BY AUTOMATED COUNT: 59.5 FL (ref 35.9–50)
ERYTHROCYTE [DISTWIDTH] IN BLOOD BY AUTOMATED COUNT: 59.6 FL (ref 35.9–50)
ERYTHROCYTE [DISTWIDTH] IN BLOOD BY AUTOMATED COUNT: 59.6 FL (ref 35.9–50)
ERYTHROCYTE [DISTWIDTH] IN BLOOD BY AUTOMATED COUNT: 59.7 FL (ref 35.9–50)
ERYTHROCYTE [DISTWIDTH] IN BLOOD BY AUTOMATED COUNT: 59.8 FL (ref 35.9–50)
ERYTHROCYTE [DISTWIDTH] IN BLOOD BY AUTOMATED COUNT: 59.9 FL (ref 35.9–50)
ERYTHROCYTE [DISTWIDTH] IN BLOOD BY AUTOMATED COUNT: 59.9 FL (ref 35.9–50)
ERYTHROCYTE [DISTWIDTH] IN BLOOD BY AUTOMATED COUNT: 60 FL (ref 35.9–50)
ERYTHROCYTE [DISTWIDTH] IN BLOOD BY AUTOMATED COUNT: 60.2 FL (ref 35.9–50)
ERYTHROCYTE [DISTWIDTH] IN BLOOD BY AUTOMATED COUNT: 60.2 FL (ref 35.9–50)
ERYTHROCYTE [DISTWIDTH] IN BLOOD BY AUTOMATED COUNT: 60.3 FL (ref 35.9–50)
ERYTHROCYTE [DISTWIDTH] IN BLOOD BY AUTOMATED COUNT: 60.5 FL (ref 35.9–50)
ERYTHROCYTE [DISTWIDTH] IN BLOOD BY AUTOMATED COUNT: 60.5 FL (ref 35.9–50)
ERYTHROCYTE [DISTWIDTH] IN BLOOD BY AUTOMATED COUNT: 60.7 FL (ref 35.9–50)
ERYTHROCYTE [DISTWIDTH] IN BLOOD BY AUTOMATED COUNT: 60.8 FL (ref 35.9–50)
ERYTHROCYTE [DISTWIDTH] IN BLOOD BY AUTOMATED COUNT: 60.9 FL (ref 35.9–50)
ERYTHROCYTE [DISTWIDTH] IN BLOOD BY AUTOMATED COUNT: 61 FL (ref 35.9–50)
ERYTHROCYTE [DISTWIDTH] IN BLOOD BY AUTOMATED COUNT: 61.2 FL (ref 35.9–50)
ERYTHROCYTE [DISTWIDTH] IN BLOOD BY AUTOMATED COUNT: 61.3 FL (ref 35.9–50)
ERYTHROCYTE [DISTWIDTH] IN BLOOD BY AUTOMATED COUNT: 61.3 FL (ref 35.9–50)
ERYTHROCYTE [DISTWIDTH] IN BLOOD BY AUTOMATED COUNT: 61.5 FL (ref 35.9–50)
ERYTHROCYTE [DISTWIDTH] IN BLOOD BY AUTOMATED COUNT: 61.7 FL (ref 35.9–50)
ERYTHROCYTE [DISTWIDTH] IN BLOOD BY AUTOMATED COUNT: 61.8 FL (ref 35.9–50)
ERYTHROCYTE [DISTWIDTH] IN BLOOD BY AUTOMATED COUNT: 61.9 FL (ref 35.9–50)
ERYTHROCYTE [DISTWIDTH] IN BLOOD BY AUTOMATED COUNT: 62.1 FL (ref 35.9–50)
ERYTHROCYTE [DISTWIDTH] IN BLOOD BY AUTOMATED COUNT: 62.4 FL (ref 35.9–50)
ERYTHROCYTE [DISTWIDTH] IN BLOOD BY AUTOMATED COUNT: 62.7 FL (ref 35.9–50)
ERYTHROCYTE [DISTWIDTH] IN BLOOD BY AUTOMATED COUNT: 63 FL (ref 35.9–50)
ERYTHROCYTE [DISTWIDTH] IN BLOOD BY AUTOMATED COUNT: 63.5 FL (ref 35.9–50)
ERYTHROCYTE [DISTWIDTH] IN BLOOD BY AUTOMATED COUNT: 64.4 FL (ref 35.9–50)
ERYTHROCYTE [DISTWIDTH] IN BLOOD BY AUTOMATED COUNT: 66 FL (ref 35.9–50)
ERYTHROCYTE [DISTWIDTH] IN BLOOD BY AUTOMATED COUNT: 66.9 FL (ref 35.9–50)
ERYTHROCYTE [DISTWIDTH] IN BLOOD BY AUTOMATED COUNT: 67.7 FL (ref 35.9–50)
ERYTHROCYTE [DISTWIDTH] IN BLOOD BY AUTOMATED COUNT: 69.7 FL (ref 35.9–50)
ERYTHROCYTE [DISTWIDTH] IN BLOOD BY AUTOMATED COUNT: 70 FL (ref 35.9–50)
ERYTHROCYTE [DISTWIDTH] IN BLOOD BY AUTOMATED COUNT: 70.9 FL (ref 35.9–50)
ERYTHROCYTE [SEDIMENTATION RATE] IN BLOOD BY WESTERGREN METHOD: 39 MM/HOUR (ref 0–20)
ERYTHROCYTE [SEDIMENTATION RATE] IN BLOOD BY WESTERGREN METHOD: 45 MM/HOUR (ref 0–20)
ERYTHROCYTE [SEDIMENTATION RATE] IN BLOOD BY WESTERGREN METHOD: 67 MM/HOUR (ref 0–20)
EST. AVERAGE GLUCOSE BLD GHB EST-MCNC: 160 MG/DL
EST. AVERAGE GLUCOSE BLD GHB EST-MCNC: 166 MG/DL
EST. AVERAGE GLUCOSE BLD GHB EST-MCNC: 91 MG/DL
EV RNA CSF QL NAA+NON-PROBE: NOT DETECTED
FERRITIN SERPL-MCNC: 373 NG/ML (ref 22–322)
FLUAV RNA SPEC QL NAA+PROBE: NEGATIVE
FLUAV RNA SPEC QL NAA+PROBE: NEGATIVE
FLUBV RNA SPEC QL NAA+PROBE: NEGATIVE
FLUBV RNA SPEC QL NAA+PROBE: NEGATIVE
GAMMA GLOB SERPL ELPH-MCNC: 1.62 G/DL (ref 0.62–1.51)
GASTRIN SERPL-MCNC: 150 PG/ML (ref 0–100)
GFR SERPLBLD CREATININE-BSD FMLA CKD-EPI: 24 ML/MIN/1.73 M 2
GFR SERPLBLD CREATININE-BSD FMLA CKD-EPI: 29 ML/MIN/1.73 M 2
GFR SERPLBLD CREATININE-BSD FMLA CKD-EPI: 32 ML/MIN/1.73 M 2
GFR SERPLBLD CREATININE-BSD FMLA CKD-EPI: 34 ML/MIN/1.73 M 2
GFR SERPLBLD CREATININE-BSD FMLA CKD-EPI: 35 ML/MIN/1.73 M 2
GFR SERPLBLD CREATININE-BSD FMLA CKD-EPI: 38 ML/MIN/1.73 M 2
GFR SERPLBLD CREATININE-BSD FMLA CKD-EPI: 39 ML/MIN/1.73 M 2
GFR SERPLBLD CREATININE-BSD FMLA CKD-EPI: 43 ML/MIN/1.73 M 2
GFR SERPLBLD CREATININE-BSD FMLA CKD-EPI: 47 ML/MIN/1.73 M 2
GFR SERPLBLD CREATININE-BSD FMLA CKD-EPI: 50 ML/MIN/1.73 M 2
GFR SERPLBLD CREATININE-BSD FMLA CKD-EPI: 51 ML/MIN/1.73 M 2
GFR SERPLBLD CREATININE-BSD FMLA CKD-EPI: 53 ML/MIN/1.73 M 2
GFR SERPLBLD CREATININE-BSD FMLA CKD-EPI: 54 ML/MIN/1.73 M 2
GFR SERPLBLD CREATININE-BSD FMLA CKD-EPI: 55 ML/MIN/1.73 M 2
GFR SERPLBLD CREATININE-BSD FMLA CKD-EPI: 57 ML/MIN/1.73 M 2
GFR SERPLBLD CREATININE-BSD FMLA CKD-EPI: 57 ML/MIN/1.73 M 2
GFR SERPLBLD CREATININE-BSD FMLA CKD-EPI: 60 ML/MIN/1.73 M 2
GFR SERPLBLD CREATININE-BSD FMLA CKD-EPI: 67 ML/MIN/1.73 M 2
GFR SERPLBLD CREATININE-BSD FMLA CKD-EPI: 67 ML/MIN/1.73 M 2
GFR SERPLBLD CREATININE-BSD FMLA CKD-EPI: 68 ML/MIN/1.73 M 2
GFR SERPLBLD CREATININE-BSD FMLA CKD-EPI: 77 ML/MIN/1.73 M 2
GLOBULIN SER CALC-MCNC: 1.8 G/DL (ref 1.9–3.5)
GLOBULIN SER CALC-MCNC: 2 G/DL (ref 1.9–3.5)
GLOBULIN SER CALC-MCNC: 2 G/DL (ref 1.9–3.5)
GLOBULIN SER CALC-MCNC: 2.1 G/DL (ref 1.9–3.5)
GLOBULIN SER CALC-MCNC: 2.2 G/DL (ref 1.9–3.5)
GLOBULIN SER CALC-MCNC: 2.2 G/DL (ref 1.9–3.5)
GLOBULIN SER CALC-MCNC: 2.3 G/DL (ref 1.9–3.5)
GLOBULIN SER CALC-MCNC: 2.3 G/DL (ref 1.9–3.5)
GLOBULIN SER CALC-MCNC: 2.7 G/DL (ref 1.9–3.5)
GLOBULIN SER CALC-MCNC: 2.8 G/DL (ref 1.9–3.5)
GLOBULIN SER CALC-MCNC: 2.8 G/DL (ref 1.9–3.5)
GLOBULIN SER CALC-MCNC: 2.9 G/DL (ref 1.9–3.5)
GLOBULIN SER CALC-MCNC: 3 G/DL (ref 1.9–3.5)
GLOBULIN SER CALC-MCNC: 3.1 G/DL (ref 1.9–3.5)
GLOBULIN SER CALC-MCNC: 3.1 G/DL (ref 1.9–3.5)
GLOBULIN SER CALC-MCNC: 3.2 G/DL (ref 1.9–3.5)
GLOBULIN SER CALC-MCNC: 3.3 G/DL (ref 1.9–3.5)
GLOBULIN SER CALC-MCNC: 3.4 G/DL (ref 1.9–3.5)
GLOBULIN SER CALC-MCNC: 3.6 G/DL (ref 1.9–3.5)
GLOBULIN SER CALC-MCNC: 3.7 G/DL (ref 1.9–3.5)
GLOBULIN SER CALC-MCNC: 3.8 G/DL (ref 1.9–3.5)
GLOBULIN SER CALC-MCNC: 3.8 G/DL (ref 1.9–3.5)
GLOBULIN SER CALC-MCNC: 3.9 G/DL (ref 1.9–3.5)
GLOBULIN SER CALC-MCNC: 4.1 G/DL (ref 1.9–3.5)
GLUCOSE BLD STRIP.AUTO-MCNC: 102 MG/DL (ref 65–99)
GLUCOSE BLD STRIP.AUTO-MCNC: 103 MG/DL (ref 65–99)
GLUCOSE BLD STRIP.AUTO-MCNC: 105 MG/DL (ref 65–99)
GLUCOSE BLD STRIP.AUTO-MCNC: 110 MG/DL (ref 65–99)
GLUCOSE BLD STRIP.AUTO-MCNC: 116 MG/DL (ref 65–99)
GLUCOSE BLD STRIP.AUTO-MCNC: 118 MG/DL (ref 65–99)
GLUCOSE BLD STRIP.AUTO-MCNC: 130 MG/DL (ref 65–99)
GLUCOSE BLD STRIP.AUTO-MCNC: 132 MG/DL (ref 65–99)
GLUCOSE BLD STRIP.AUTO-MCNC: 133 MG/DL (ref 65–99)
GLUCOSE BLD STRIP.AUTO-MCNC: 136 MG/DL (ref 65–99)
GLUCOSE BLD STRIP.AUTO-MCNC: 139 MG/DL (ref 65–99)
GLUCOSE BLD STRIP.AUTO-MCNC: 141 MG/DL (ref 65–99)
GLUCOSE BLD STRIP.AUTO-MCNC: 142 MG/DL (ref 65–99)
GLUCOSE BLD STRIP.AUTO-MCNC: 148 MG/DL (ref 65–99)
GLUCOSE BLD STRIP.AUTO-MCNC: 149 MG/DL (ref 65–99)
GLUCOSE BLD STRIP.AUTO-MCNC: 151 MG/DL (ref 65–99)
GLUCOSE BLD STRIP.AUTO-MCNC: 152 MG/DL (ref 65–99)
GLUCOSE BLD STRIP.AUTO-MCNC: 154 MG/DL (ref 65–99)
GLUCOSE BLD STRIP.AUTO-MCNC: 160 MG/DL (ref 65–99)
GLUCOSE BLD STRIP.AUTO-MCNC: 166 MG/DL (ref 65–99)
GLUCOSE BLD STRIP.AUTO-MCNC: 169 MG/DL (ref 65–99)
GLUCOSE BLD STRIP.AUTO-MCNC: 178 MG/DL (ref 65–99)
GLUCOSE BLD STRIP.AUTO-MCNC: 179 MG/DL (ref 65–99)
GLUCOSE BLD STRIP.AUTO-MCNC: 186 MG/DL (ref 65–99)
GLUCOSE BLD STRIP.AUTO-MCNC: 198 MG/DL (ref 65–99)
GLUCOSE BLD STRIP.AUTO-MCNC: 198 MG/DL (ref 65–99)
GLUCOSE BLD STRIP.AUTO-MCNC: 204 MG/DL (ref 65–99)
GLUCOSE BLD STRIP.AUTO-MCNC: 208 MG/DL (ref 65–99)
GLUCOSE BLD STRIP.AUTO-MCNC: 218 MG/DL (ref 65–99)
GLUCOSE BLD STRIP.AUTO-MCNC: 220 MG/DL (ref 65–99)
GLUCOSE BLD STRIP.AUTO-MCNC: 240 MG/DL (ref 65–99)
GLUCOSE BLD STRIP.AUTO-MCNC: 80 MG/DL (ref 65–99)
GLUCOSE BLD STRIP.AUTO-MCNC: 85 MG/DL (ref 65–99)
GLUCOSE BLD STRIP.AUTO-MCNC: 88 MG/DL (ref 65–99)
GLUCOSE BLD STRIP.AUTO-MCNC: 88 MG/DL (ref 65–99)
GLUCOSE BLD STRIP.AUTO-MCNC: 90 MG/DL (ref 65–99)
GLUCOSE BLD STRIP.AUTO-MCNC: 93 MG/DL (ref 65–99)
GLUCOSE BLD STRIP.AUTO-MCNC: 93 MG/DL (ref 65–99)
GLUCOSE BLD STRIP.AUTO-MCNC: 94 MG/DL (ref 65–99)
GLUCOSE BLD STRIP.AUTO-MCNC: 96 MG/DL (ref 65–99)
GLUCOSE BLD STRIP.AUTO-MCNC: 97 MG/DL (ref 65–99)
GLUCOSE BLD STRIP.AUTO-MCNC: 98 MG/DL (ref 65–99)
GLUCOSE BLD-MCNC: 101 MG/DL (ref 65–99)
GLUCOSE BLD-MCNC: 109 MG/DL (ref 65–99)
GLUCOSE BLD-MCNC: 116 MG/DL (ref 65–99)
GLUCOSE BLD-MCNC: 117 MG/DL (ref 65–99)
GLUCOSE BLD-MCNC: 118 MG/DL (ref 65–99)
GLUCOSE BLD-MCNC: 122 MG/DL (ref 65–99)
GLUCOSE BLD-MCNC: 128 MG/DL (ref 65–99)
GLUCOSE BLD-MCNC: 129 MG/DL (ref 65–99)
GLUCOSE BLD-MCNC: 142 MG/DL (ref 65–99)
GLUCOSE BLD-MCNC: 147 MG/DL (ref 65–99)
GLUCOSE BLD-MCNC: 156 MG/DL (ref 65–99)
GLUCOSE BLD-MCNC: 167 MG/DL (ref 65–99)
GLUCOSE BLD-MCNC: 170 MG/DL (ref 65–99)
GLUCOSE BLD-MCNC: 172 MG/DL (ref 65–99)
GLUCOSE BLD-MCNC: 179 MG/DL (ref 65–99)
GLUCOSE BLD-MCNC: 191 MG/DL (ref 65–99)
GLUCOSE BLD-MCNC: 201 MG/DL (ref 65–99)
GLUCOSE BLD-MCNC: 205 MG/DL (ref 65–99)
GLUCOSE BLD-MCNC: 211 MG/DL (ref 65–99)
GLUCOSE BLD-MCNC: 213 MG/DL (ref 65–99)
GLUCOSE BLD-MCNC: 213 MG/DL (ref 65–99)
GLUCOSE BLD-MCNC: 223 MG/DL (ref 65–99)
GLUCOSE BLD-MCNC: 230 MG/DL (ref 65–99)
GLUCOSE BLD-MCNC: 256 MG/DL (ref 65–99)
GLUCOSE BLD-MCNC: 262 MG/DL (ref 65–99)
GLUCOSE BLD-MCNC: 264 MG/DL (ref 65–99)
GLUCOSE BLD-MCNC: 275 MG/DL (ref 65–99)
GLUCOSE BLD-MCNC: 306 MG/DL (ref 65–99)
GLUCOSE BLD-MCNC: 309 MG/DL (ref 65–99)
GLUCOSE BLD-MCNC: 313 MG/DL (ref 65–99)
GLUCOSE BLD-MCNC: 313 MG/DL (ref 65–99)
GLUCOSE BLD-MCNC: 326 MG/DL (ref 65–99)
GLUCOSE BLD-MCNC: 326 MG/DL (ref 65–99)
GLUCOSE BLD-MCNC: 331 MG/DL (ref 65–99)
GLUCOSE BLD-MCNC: 340 MG/DL (ref 65–99)
GLUCOSE BLD-MCNC: 371 MG/DL (ref 65–99)
GLUCOSE BLD-MCNC: 90 MG/DL (ref 65–99)
GLUCOSE BLD-MCNC: 93 MG/DL (ref 65–99)
GLUCOSE BLD-MCNC: 97 MG/DL (ref 65–99)
GLUCOSE BLD-MCNC: 99 MG/DL (ref 65–99)
GLUCOSE CSF-MCNC: 68 MG/DL (ref 40–80)
GLUCOSE SERPL-MCNC: 100 MG/DL (ref 65–99)
GLUCOSE SERPL-MCNC: 100 MG/DL (ref 65–99)
GLUCOSE SERPL-MCNC: 102 MG/DL (ref 65–99)
GLUCOSE SERPL-MCNC: 103 MG/DL (ref 65–99)
GLUCOSE SERPL-MCNC: 103 MG/DL (ref 65–99)
GLUCOSE SERPL-MCNC: 105 MG/DL (ref 65–99)
GLUCOSE SERPL-MCNC: 108 MG/DL (ref 65–99)
GLUCOSE SERPL-MCNC: 110 MG/DL (ref 65–99)
GLUCOSE SERPL-MCNC: 111 MG/DL (ref 65–99)
GLUCOSE SERPL-MCNC: 112 MG/DL (ref 65–99)
GLUCOSE SERPL-MCNC: 113 MG/DL (ref 65–99)
GLUCOSE SERPL-MCNC: 113 MG/DL (ref 65–99)
GLUCOSE SERPL-MCNC: 114 MG/DL (ref 65–99)
GLUCOSE SERPL-MCNC: 114 MG/DL (ref 65–99)
GLUCOSE SERPL-MCNC: 115 MG/DL (ref 65–99)
GLUCOSE SERPL-MCNC: 116 MG/DL (ref 65–99)
GLUCOSE SERPL-MCNC: 118 MG/DL (ref 65–99)
GLUCOSE SERPL-MCNC: 125 MG/DL (ref 65–99)
GLUCOSE SERPL-MCNC: 126 MG/DL (ref 65–99)
GLUCOSE SERPL-MCNC: 129 MG/DL (ref 65–99)
GLUCOSE SERPL-MCNC: 129 MG/DL (ref 65–99)
GLUCOSE SERPL-MCNC: 131 MG/DL (ref 65–99)
GLUCOSE SERPL-MCNC: 134 MG/DL (ref 65–99)
GLUCOSE SERPL-MCNC: 135 MG/DL (ref 65–99)
GLUCOSE SERPL-MCNC: 137 MG/DL (ref 65–99)
GLUCOSE SERPL-MCNC: 138 MG/DL (ref 65–99)
GLUCOSE SERPL-MCNC: 142 MG/DL (ref 65–99)
GLUCOSE SERPL-MCNC: 148 MG/DL (ref 65–99)
GLUCOSE SERPL-MCNC: 148 MG/DL (ref 65–99)
GLUCOSE SERPL-MCNC: 156 MG/DL (ref 65–99)
GLUCOSE SERPL-MCNC: 160 MG/DL (ref 65–99)
GLUCOSE SERPL-MCNC: 169 MG/DL (ref 65–99)
GLUCOSE SERPL-MCNC: 171 MG/DL (ref 65–99)
GLUCOSE SERPL-MCNC: 171 MG/DL (ref 65–99)
GLUCOSE SERPL-MCNC: 173 MG/DL (ref 65–99)
GLUCOSE SERPL-MCNC: 175 MG/DL (ref 65–99)
GLUCOSE SERPL-MCNC: 183 MG/DL (ref 65–99)
GLUCOSE SERPL-MCNC: 214 MG/DL (ref 65–99)
GLUCOSE SERPL-MCNC: 215 MG/DL (ref 65–99)
GLUCOSE SERPL-MCNC: 232 MG/DL (ref 65–99)
GLUCOSE SERPL-MCNC: 265 MG/DL (ref 65–99)
GLUCOSE SERPL-MCNC: 336 MG/DL (ref 65–99)
GLUCOSE SERPL-MCNC: 337 MG/DL (ref 65–99)
GLUCOSE SERPL-MCNC: 79 MG/DL (ref 65–99)
GLUCOSE SERPL-MCNC: 82 MG/DL (ref 65–99)
GLUCOSE SERPL-MCNC: 83 MG/DL (ref 65–99)
GLUCOSE SERPL-MCNC: 85 MG/DL (ref 65–99)
GLUCOSE SERPL-MCNC: 86 MG/DL (ref 65–99)
GLUCOSE SERPL-MCNC: 87 MG/DL (ref 65–99)
GLUCOSE SERPL-MCNC: 89 MG/DL (ref 65–99)
GLUCOSE SERPL-MCNC: 91 MG/DL (ref 65–99)
GLUCOSE SERPL-MCNC: 92 MG/DL (ref 65–99)
GLUCOSE SERPL-MCNC: 93 MG/DL (ref 65–99)
GLUCOSE SERPL-MCNC: 94 MG/DL (ref 65–99)
GLUCOSE SERPL-MCNC: 96 MG/DL (ref 65–99)
GLUCOSE SERPL-MCNC: 97 MG/DL (ref 65–99)
GLUCOSE UR STRIP.AUTO-MCNC: NEGATIVE MG/DL
GP B STREP DNA CSF QL NAA+NON-PROBE: NOT DETECTED
GRAM STN SPEC: NORMAL
GRAM STN SPEC: NORMAL
HAEM INFLU DNA CSF QL NAA+NON-PROBE: NOT DETECTED
HAV IGM SERPL QL IA: NORMAL
HBA1C MFR BLD: 4.8 % (ref 4–5.6)
HBA1C MFR BLD: 7.2 % (ref 4–5.6)
HBA1C MFR BLD: 7.4 % (ref 4–5.6)
HBV CORE IGM SER QL: NORMAL
HBV SURFACE AB SERPL IA-ACNC: 28.8 MIU/ML (ref 0–10)
HBV SURFACE AG SER QL: NORMAL
HCO3 BLDA-SCNC: 15 MMOL/L (ref 17–25)
HCO3 BLDA-SCNC: 16 MMOL/L (ref 17–25)
HCO3 BLDA-SCNC: 16.5 MMOL/L (ref 17–25)
HCO3 BLDA-SCNC: 26 MMOL/L (ref 17–25)
HCT VFR BLD AUTO: 20.5 % (ref 42–52)
HCT VFR BLD AUTO: 21.9 % (ref 42–52)
HCT VFR BLD AUTO: 22.1 % (ref 42–52)
HCT VFR BLD AUTO: 22.5 % (ref 42–52)
HCT VFR BLD AUTO: 23.2 % (ref 42–52)
HCT VFR BLD AUTO: 23.4 % (ref 42–52)
HCT VFR BLD AUTO: 23.5 % (ref 42–52)
HCT VFR BLD AUTO: 23.8 % (ref 42–52)
HCT VFR BLD AUTO: 24.4 % (ref 42–52)
HCT VFR BLD AUTO: 25.1 % (ref 42–52)
HCT VFR BLD AUTO: 25.4 % (ref 42–52)
HCT VFR BLD AUTO: 25.8 % (ref 42–52)
HCT VFR BLD AUTO: 25.8 % (ref 42–52)
HCT VFR BLD AUTO: 26.3 % (ref 42–52)
HCT VFR BLD AUTO: 26.3 % (ref 42–52)
HCT VFR BLD AUTO: 26.8 % (ref 42–52)
HCT VFR BLD AUTO: 26.9 % (ref 42–52)
HCT VFR BLD AUTO: 26.9 % (ref 42–52)
HCT VFR BLD AUTO: 27.1 % (ref 42–52)
HCT VFR BLD AUTO: 27.2 % (ref 42–52)
HCT VFR BLD AUTO: 27.2 % (ref 42–52)
HCT VFR BLD AUTO: 27.3 % (ref 42–52)
HCT VFR BLD AUTO: 27.3 % (ref 42–52)
HCT VFR BLD AUTO: 27.4 % (ref 42–52)
HCT VFR BLD AUTO: 27.4 % (ref 42–52)
HCT VFR BLD AUTO: 27.6 % (ref 42–52)
HCT VFR BLD AUTO: 27.8 % (ref 42–52)
HCT VFR BLD AUTO: 27.9 % (ref 42–52)
HCT VFR BLD AUTO: 28.3 % (ref 42–52)
HCT VFR BLD AUTO: 28.4 % (ref 42–52)
HCT VFR BLD AUTO: 28.8 % (ref 42–52)
HCT VFR BLD AUTO: 29.1 % (ref 42–52)
HCT VFR BLD AUTO: 29.2 % (ref 42–52)
HCT VFR BLD AUTO: 29.4 % (ref 42–52)
HCT VFR BLD AUTO: 29.5 % (ref 42–52)
HCT VFR BLD AUTO: 29.6 % (ref 42–52)
HCT VFR BLD AUTO: 29.8 % (ref 42–52)
HCT VFR BLD AUTO: 30 % (ref 42–52)
HCT VFR BLD AUTO: 30.2 % (ref 42–52)
HCT VFR BLD AUTO: 31.9 % (ref 42–52)
HCT VFR BLD AUTO: 33.1 % (ref 42–52)
HCT VFR BLD AUTO: 33.9 % (ref 42–52)
HCT VFR BLD AUTO: 34.7 % (ref 42–52)
HCT VFR BLD AUTO: 35.7 % (ref 42–52)
HCT VFR BLD AUTO: 36.7 % (ref 42–52)
HCT VFR BLD AUTO: 37.2 % (ref 42–52)
HCT VFR BLD AUTO: 37.3 % (ref 42–52)
HCT VFR BLD AUTO: 37.7 % (ref 42–52)
HCT VFR BLD AUTO: 38.2 % (ref 42–52)
HCT VFR BLD AUTO: 38.3 % (ref 42–52)
HCT VFR BLD AUTO: 38.9 % (ref 42–52)
HCT VFR BLD AUTO: 39.2 % (ref 42–52)
HCT VFR BLD AUTO: 39.5 % (ref 42–52)
HCT VFR BLD AUTO: 39.5 % (ref 42–52)
HCT VFR BLD AUTO: 40 % (ref 42–52)
HCT VFR BLD AUTO: 40.5 % (ref 42–52)
HCT VFR BLD AUTO: 42.1 % (ref 42–52)
HCT VFR BLD AUTO: 43.4 % (ref 42–52)
HCT VFR BLD AUTO: 44.6 % (ref 42–52)
HCV AB SER QL: NORMAL
HDLC SERPL-MCNC: 35 MG/DL
HEMOCCULT STL QL: POSITIVE
HGB BLD-MCNC: 10 G/DL (ref 14–18)
HGB BLD-MCNC: 10.1 G/DL (ref 14–18)
HGB BLD-MCNC: 10.1 G/DL (ref 14–18)
HGB BLD-MCNC: 10.2 G/DL (ref 14–18)
HGB BLD-MCNC: 10.8 G/DL (ref 14–18)
HGB BLD-MCNC: 11.5 G/DL (ref 14–18)
HGB BLD-MCNC: 11.5 G/DL (ref 14–18)
HGB BLD-MCNC: 11.7 G/DL (ref 14–18)
HGB BLD-MCNC: 11.9 G/DL (ref 14–18)
HGB BLD-MCNC: 12.1 G/DL (ref 14–18)
HGB BLD-MCNC: 12.2 G/DL (ref 14–18)
HGB BLD-MCNC: 12.2 G/DL (ref 14–18)
HGB BLD-MCNC: 12.3 G/DL (ref 14–18)
HGB BLD-MCNC: 12.7 G/DL (ref 14–18)
HGB BLD-MCNC: 12.8 G/DL (ref 14–18)
HGB BLD-MCNC: 12.9 G/DL (ref 14–18)
HGB BLD-MCNC: 13 G/DL (ref 14–18)
HGB BLD-MCNC: 13 G/DL (ref 14–18)
HGB BLD-MCNC: 13.1 G/DL (ref 14–18)
HGB BLD-MCNC: 13.3 G/DL (ref 14–18)
HGB BLD-MCNC: 14 G/DL (ref 14–18)
HGB BLD-MCNC: 14.1 G/DL (ref 14–18)
HGB BLD-MCNC: 6.7 G/DL (ref 14–18)
HGB BLD-MCNC: 7 G/DL (ref 14–18)
HGB BLD-MCNC: 7.3 G/DL (ref 14–18)
HGB BLD-MCNC: 7.3 G/DL (ref 14–18)
HGB BLD-MCNC: 7.4 G/DL (ref 14–18)
HGB BLD-MCNC: 7.5 G/DL (ref 14–18)
HGB BLD-MCNC: 7.8 G/DL (ref 14–18)
HGB BLD-MCNC: 8.1 G/DL (ref 14–18)
HGB BLD-MCNC: 8.2 G/DL (ref 14–18)
HGB BLD-MCNC: 8.2 G/DL (ref 14–18)
HGB BLD-MCNC: 8.4 G/DL (ref 14–18)
HGB BLD-MCNC: 8.4 G/DL (ref 14–18)
HGB BLD-MCNC: 8.5 G/DL (ref 14–18)
HGB BLD-MCNC: 8.6 G/DL (ref 14–18)
HGB BLD-MCNC: 8.7 G/DL (ref 14–18)
HGB BLD-MCNC: 8.8 G/DL (ref 14–18)
HGB BLD-MCNC: 8.9 G/DL (ref 14–18)
HGB BLD-MCNC: 9.1 G/DL (ref 14–18)
HGB BLD-MCNC: 9.2 G/DL (ref 14–18)
HGB BLD-MCNC: 9.3 G/DL (ref 14–18)
HGB BLD-MCNC: 9.3 G/DL (ref 14–18)
HGB BLD-MCNC: 9.4 G/DL (ref 14–18)
HGB BLD-MCNC: 9.7 G/DL (ref 14–18)
HGB RETIC QN AUTO: 33.4 PG/CELL (ref 29–35)
HHV6 DNA CSF QL NAA+NON-PROBE: NOT DETECTED
HOROWITZ INDEX BLDA+IHG-RTO: 379 MM[HG]
HSV1 DNA CSF QL NAA+NON-PROBE: NOT DETECTED
HSV2 DNA CSF QL NAA+NON-PROBE: NOT DETECTED
HYALINE CASTS #/AREA URNS LPF: ABNORMAL /LPF
HYPOCHROMIA BLD QL SMEAR: ABNORMAL
HYPOCHROMIA BLD QL SMEAR: ABNORMAL
IGA SERPL-MCNC: 371 MG/DL (ref 68–408)
IGG SERPL-MCNC: 1440 MG/DL (ref 768–1632)
IGM SERPL-MCNC: 124 MG/DL (ref 35–263)
IMM GRANULOCYTES # BLD AUTO: 0.01 K/UL (ref 0–0.11)
IMM GRANULOCYTES # BLD AUTO: 0.02 K/UL (ref 0–0.11)
IMM GRANULOCYTES # BLD AUTO: 0.03 K/UL (ref 0–0.11)
IMM GRANULOCYTES # BLD AUTO: 0.04 K/UL (ref 0–0.11)
IMM GRANULOCYTES # BLD AUTO: 0.05 K/UL (ref 0–0.11)
IMM GRANULOCYTES # BLD AUTO: 0.06 K/UL (ref 0–0.11)
IMM GRANULOCYTES # BLD AUTO: 0.06 K/UL (ref 0–0.11)
IMM GRANULOCYTES # BLD AUTO: 0.07 K/UL (ref 0–0.11)
IMM GRANULOCYTES # BLD AUTO: 0.07 K/UL (ref 0–0.11)
IMM GRANULOCYTES # BLD AUTO: 0.08 K/UL (ref 0–0.11)
IMM GRANULOCYTES # BLD AUTO: 0.09 K/UL (ref 0–0.11)
IMM GRANULOCYTES # BLD AUTO: 0.1 K/UL (ref 0–0.11)
IMM GRANULOCYTES # BLD AUTO: 0.1 K/UL (ref 0–0.11)
IMM GRANULOCYTES # BLD AUTO: 0.11 K/UL (ref 0–0.11)
IMM GRANULOCYTES # BLD AUTO: 0.11 K/UL (ref 0–0.11)
IMM GRANULOCYTES # BLD AUTO: 0.12 K/UL (ref 0–0.11)
IMM GRANULOCYTES # BLD AUTO: 0.13 K/UL (ref 0–0.11)
IMM GRANULOCYTES # BLD AUTO: 0.14 K/UL (ref 0–0.11)
IMM GRANULOCYTES # BLD AUTO: 0.15 K/UL (ref 0–0.11)
IMM GRANULOCYTES # BLD AUTO: 0.16 K/UL (ref 0–0.11)
IMM GRANULOCYTES # BLD AUTO: 0.17 K/UL (ref 0–0.11)
IMM GRANULOCYTES # BLD AUTO: 0.21 K/UL (ref 0–0.11)
IMM GRANULOCYTES # BLD AUTO: 0.23 K/UL (ref 0–0.11)
IMM GRANULOCYTES # BLD AUTO: 0.32 K/UL (ref 0–0.11)
IMM GRANULOCYTES # BLD AUTO: 0.46 K/UL (ref 0–0.11)
IMM GRANULOCYTES NFR BLD AUTO: 0.2 % (ref 0–0.9)
IMM GRANULOCYTES NFR BLD AUTO: 0.3 % (ref 0–0.9)
IMM GRANULOCYTES NFR BLD AUTO: 0.3 % (ref 0–0.9)
IMM GRANULOCYTES NFR BLD AUTO: 0.4 % (ref 0–0.9)
IMM GRANULOCYTES NFR BLD AUTO: 0.5 % (ref 0–0.9)
IMM GRANULOCYTES NFR BLD AUTO: 0.5 % (ref 0–0.9)
IMM GRANULOCYTES NFR BLD AUTO: 0.6 % (ref 0–0.9)
IMM GRANULOCYTES NFR BLD AUTO: 0.7 % (ref 0–0.9)
IMM GRANULOCYTES NFR BLD AUTO: 0.8 % (ref 0–0.9)
IMM GRANULOCYTES NFR BLD AUTO: 0.8 % (ref 0–0.9)
IMM GRANULOCYTES NFR BLD AUTO: 0.9 % (ref 0–0.9)
IMM GRANULOCYTES NFR BLD AUTO: 1 % (ref 0–0.9)
IMM GRANULOCYTES NFR BLD AUTO: 1.1 % (ref 0–0.9)
IMM GRANULOCYTES NFR BLD AUTO: 1.2 % (ref 0–0.9)
IMM GRANULOCYTES NFR BLD AUTO: 1.3 % (ref 0–0.9)
IMM GRANULOCYTES NFR BLD AUTO: 1.4 % (ref 0–0.9)
IMM GRANULOCYTES NFR BLD AUTO: 1.5 % (ref 0–0.9)
IMM GRANULOCYTES NFR BLD AUTO: 1.6 % (ref 0–0.9)
IMM GRANULOCYTES NFR BLD AUTO: 1.8 % (ref 0–0.9)
IMM GRANULOCYTES NFR BLD AUTO: 1.8 % (ref 0–0.9)
IMM RETICS NFR: 28.8 % (ref 9.3–17.4)
INR PPP: 1.07 (ref 0.87–1.13)
INR PPP: 1.1 (ref 0.87–1.13)
INR PPP: 1.22 (ref 0.87–1.13)
INR PPP: 1.23 (ref 0.87–1.13)
INR PPP: 1.29 (ref 0.87–1.13)
INR PPP: 1.31 (ref 0.87–1.13)
INR PPP: 1.55 (ref 0.87–1.13)
INR PPP: 1.96 (ref 0.87–1.13)
INTERPRETATION SERPL IFE-IMP: ABNORMAL
IRON SATN MFR SERPL: ABNORMAL % (ref 15–55)
IRON SERPL-MCNC: 11 UG/DL (ref 50–180)
KETONES UR STRIP.AUTO-MCNC: ABNORMAL MG/DL
KETONES UR STRIP.AUTO-MCNC: NEGATIVE MG/DL
L MONOCYTOG DNA CSF QL NAA+NON-PROBE: NOT DETECTED
LACTATE BLD-SCNC: 1.2 MMOL/L (ref 0.5–2)
LACTATE BLD-SCNC: 1.3 MMOL/L (ref 0.5–2)
LACTATE BLD-SCNC: 1.5 MMOL/L (ref 0.5–2)
LACTATE BLD-SCNC: 1.6 MMOL/L (ref 0.5–2)
LACTATE BLD-SCNC: 1.6 MMOL/L (ref 0.5–2)
LACTATE BLD-SCNC: 2.7 MMOL/L (ref 0.5–2)
LACTATE BLD-SCNC: 3 MMOL/L (ref 0.5–2)
LACTATE BLD-SCNC: 4.6 MMOL/L (ref 0.5–2)
LACTATE BLD-SCNC: 4.9 MMOL/L (ref 0.5–2)
LACTOFERRIN STL QL IA: NEGATIVE
LDLC SERPL CALC-MCNC: 38 MG/DL
LEUKOCYTE ESTERASE UR QL STRIP.AUTO: ABNORMAL
LPM ILPM: 2 LPM
LV EJECT FRACT  99904: 60
LV EJECT FRACT  99904: 65
LV EJECT FRACT MOD 2C 99903: 65.34
LV EJECT FRACT MOD 4C 99902: 59.98
LV EJECT FRACT MOD BP 99901: 61.6
LYMPHOCYTES # BLD AUTO: 0.14 K/UL (ref 1–4.8)
LYMPHOCYTES # BLD AUTO: 0.21 K/UL (ref 1–4.8)
LYMPHOCYTES # BLD AUTO: 0.37 K/UL (ref 1–4.8)
LYMPHOCYTES # BLD AUTO: 0.41 K/UL (ref 1–4.8)
LYMPHOCYTES # BLD AUTO: 0.42 K/UL (ref 1–4.8)
LYMPHOCYTES # BLD AUTO: 0.43 K/UL (ref 1–4.8)
LYMPHOCYTES # BLD AUTO: 0.51 K/UL (ref 1–4.8)
LYMPHOCYTES # BLD AUTO: 0.52 K/UL (ref 1–4.8)
LYMPHOCYTES # BLD AUTO: 0.53 K/UL (ref 1–4.8)
LYMPHOCYTES # BLD AUTO: 0.54 K/UL (ref 1–4.8)
LYMPHOCYTES # BLD AUTO: 0.6 K/UL (ref 1–4.8)
LYMPHOCYTES # BLD AUTO: 0.64 K/UL (ref 1–4.8)
LYMPHOCYTES # BLD AUTO: 0.68 K/UL (ref 1–4.8)
LYMPHOCYTES # BLD AUTO: 0.68 K/UL (ref 1–4.8)
LYMPHOCYTES # BLD AUTO: 0.74 K/UL (ref 1–4.8)
LYMPHOCYTES # BLD AUTO: 0.76 K/UL (ref 1–4.8)
LYMPHOCYTES # BLD AUTO: 0.8 K/UL (ref 1–4.8)
LYMPHOCYTES # BLD AUTO: 0.81 K/UL (ref 1–4.8)
LYMPHOCYTES # BLD AUTO: 0.81 K/UL (ref 1–4.8)
LYMPHOCYTES # BLD AUTO: 0.85 K/UL (ref 1–4.8)
LYMPHOCYTES # BLD AUTO: 0.88 K/UL (ref 1–4.8)
LYMPHOCYTES # BLD AUTO: 0.89 K/UL (ref 1–4.8)
LYMPHOCYTES # BLD AUTO: 0.95 K/UL (ref 1–4.8)
LYMPHOCYTES # BLD AUTO: 1.04 K/UL (ref 1–4.8)
LYMPHOCYTES # BLD AUTO: 1.06 K/UL (ref 1–4.8)
LYMPHOCYTES # BLD AUTO: 1.07 K/UL (ref 1–4.8)
LYMPHOCYTES # BLD AUTO: 1.08 K/UL (ref 1–4.8)
LYMPHOCYTES # BLD AUTO: 1.12 K/UL (ref 1–4.8)
LYMPHOCYTES # BLD AUTO: 1.15 K/UL (ref 1–4.8)
LYMPHOCYTES # BLD AUTO: 1.21 K/UL (ref 1–4.8)
LYMPHOCYTES # BLD AUTO: 1.22 K/UL (ref 1–4.8)
LYMPHOCYTES # BLD AUTO: 1.25 K/UL (ref 1–4.8)
LYMPHOCYTES # BLD AUTO: 1.26 K/UL (ref 1–4.8)
LYMPHOCYTES # BLD AUTO: 1.28 K/UL (ref 1–4.8)
LYMPHOCYTES # BLD AUTO: 1.28 K/UL (ref 1–4.8)
LYMPHOCYTES # BLD AUTO: 1.3 K/UL (ref 1–4.8)
LYMPHOCYTES # BLD AUTO: 1.3 K/UL (ref 1–4.8)
LYMPHOCYTES # BLD AUTO: 1.36 K/UL (ref 1–4.8)
LYMPHOCYTES # BLD AUTO: 1.42 K/UL (ref 1–4.8)
LYMPHOCYTES # BLD AUTO: 1.42 K/UL (ref 1–4.8)
LYMPHOCYTES # BLD AUTO: 1.43 K/UL (ref 1–4.8)
LYMPHOCYTES # BLD AUTO: 1.56 K/UL (ref 1–4.8)
LYMPHOCYTES # BLD AUTO: 1.7 K/UL (ref 1–4.8)
LYMPHOCYTES # BLD AUTO: 1.88 K/UL (ref 1–4.8)
LYMPHOCYTES # BLD AUTO: 1.93 K/UL (ref 1–4.8)
LYMPHOCYTES # BLD AUTO: 1.95 K/UL (ref 1–4.8)
LYMPHOCYTES # BLD AUTO: 2.11 K/UL (ref 1–4.8)
LYMPHOCYTES # BLD AUTO: 2.35 K/UL (ref 1–4.8)
LYMPHOCYTES # BLD AUTO: 2.47 K/UL (ref 1–4.8)
LYMPHOCYTES # BLD AUTO: 2.49 K/UL (ref 1–4.8)
LYMPHOCYTES # BLD AUTO: 3.2 K/UL (ref 1–4.8)
LYMPHOCYTES # BLD AUTO: 3.32 K/UL (ref 1–4.8)
LYMPHOCYTES NFR BLD: 0.9 % (ref 22–41)
LYMPHOCYTES NFR BLD: 11.6 % (ref 22–41)
LYMPHOCYTES NFR BLD: 12 % (ref 22–41)
LYMPHOCYTES NFR BLD: 12.5 % (ref 22–41)
LYMPHOCYTES NFR BLD: 12.8 % (ref 22–41)
LYMPHOCYTES NFR BLD: 13.7 % (ref 22–41)
LYMPHOCYTES NFR BLD: 14.7 % (ref 22–41)
LYMPHOCYTES NFR BLD: 15.4 % (ref 22–41)
LYMPHOCYTES NFR BLD: 15.6 % (ref 22–41)
LYMPHOCYTES NFR BLD: 15.8 % (ref 22–41)
LYMPHOCYTES NFR BLD: 17 % (ref 22–41)
LYMPHOCYTES NFR BLD: 17.5 % (ref 22–41)
LYMPHOCYTES NFR BLD: 17.7 % (ref 22–41)
LYMPHOCYTES NFR BLD: 17.8 % (ref 22–41)
LYMPHOCYTES NFR BLD: 19.7 % (ref 22–41)
LYMPHOCYTES NFR BLD: 2.6 % (ref 22–41)
LYMPHOCYTES NFR BLD: 2.6 % (ref 22–41)
LYMPHOCYTES NFR BLD: 2.7 % (ref 22–41)
LYMPHOCYTES NFR BLD: 21.5 % (ref 22–41)
LYMPHOCYTES NFR BLD: 23.5 % (ref 22–41)
LYMPHOCYTES NFR BLD: 24.1 % (ref 22–41)
LYMPHOCYTES NFR BLD: 24.2 % (ref 22–41)
LYMPHOCYTES NFR BLD: 25.3 % (ref 22–41)
LYMPHOCYTES NFR BLD: 25.4 % (ref 22–41)
LYMPHOCYTES NFR BLD: 29.1 % (ref 22–41)
LYMPHOCYTES NFR BLD: 29.8 % (ref 22–41)
LYMPHOCYTES NFR BLD: 33.3 % (ref 22–41)
LYMPHOCYTES NFR BLD: 34 % (ref 22–41)
LYMPHOCYTES NFR BLD: 34.8 % (ref 22–41)
LYMPHOCYTES NFR BLD: 4.4 % (ref 22–41)
LYMPHOCYTES NFR BLD: 4.5 % (ref 22–41)
LYMPHOCYTES NFR BLD: 4.5 % (ref 22–41)
LYMPHOCYTES NFR BLD: 4.7 % (ref 22–41)
LYMPHOCYTES NFR BLD: 40.8 % (ref 22–41)
LYMPHOCYTES NFR BLD: 5.1 % (ref 22–41)
LYMPHOCYTES NFR BLD: 5.5 % (ref 22–41)
LYMPHOCYTES NFR BLD: 6 % (ref 22–41)
LYMPHOCYTES NFR BLD: 6.1 % (ref 22–41)
LYMPHOCYTES NFR BLD: 6.6 % (ref 22–41)
LYMPHOCYTES NFR BLD: 6.9 % (ref 22–41)
LYMPHOCYTES NFR BLD: 7.1 % (ref 22–41)
LYMPHOCYTES NFR BLD: 7.3 % (ref 22–41)
LYMPHOCYTES NFR BLD: 7.4 % (ref 22–41)
LYMPHOCYTES NFR BLD: 7.5 % (ref 22–41)
LYMPHOCYTES NFR BLD: 7.6 % (ref 22–41)
LYMPHOCYTES NFR BLD: 8.4 % (ref 22–41)
LYMPHOCYTES NFR BLD: 8.5 % (ref 22–41)
LYMPHOCYTES NFR BLD: 8.8 % (ref 22–41)
LYMPHOCYTES NFR BLD: 8.9 % (ref 22–41)
LYMPHOCYTES NFR BLD: 9.3 % (ref 22–41)
LYMPHOCYTES NFR BLD: 9.4 % (ref 22–41)
LYMPHOCYTES NFR BLD: 9.7 % (ref 22–41)
LYMPHOCYTES NFR CSF: 3 %
MACROCYTES BLD QL SMEAR: ABNORMAL
MAGNESIUM SERPL-MCNC: 1.1 MG/DL (ref 1.5–2.5)
MAGNESIUM SERPL-MCNC: 1.2 MG/DL (ref 1.5–2.5)
MAGNESIUM SERPL-MCNC: 1.3 MG/DL (ref 1.5–2.5)
MAGNESIUM SERPL-MCNC: 1.3 MG/DL (ref 1.5–2.5)
MAGNESIUM SERPL-MCNC: 1.4 MG/DL (ref 1.5–2.5)
MAGNESIUM SERPL-MCNC: 1.4 MG/DL (ref 1.5–2.5)
MAGNESIUM SERPL-MCNC: 1.5 MG/DL (ref 1.5–2.5)
MAGNESIUM SERPL-MCNC: 1.7 MG/DL (ref 1.5–2.5)
MAGNESIUM SERPL-MCNC: 1.7 MG/DL (ref 1.5–2.5)
MAGNESIUM SERPL-MCNC: 1.8 MG/DL (ref 1.5–2.5)
MAGNESIUM SERPL-MCNC: 2 MG/DL (ref 1.5–2.5)
MAGNESIUM SERPL-MCNC: 2.1 MG/DL (ref 1.5–2.5)
MAGNESIUM SERPL-MCNC: 2.2 MG/DL (ref 1.5–2.5)
MAGNESIUM SERPL-MCNC: 2.3 MG/DL (ref 1.5–2.5)
MAGNESIUM SERPL-MCNC: 2.3 MG/DL (ref 1.5–2.5)
MANUAL DIFF BLD: ABNORMAL
MANUAL DIFF BLD: NORMAL
MCF BLD TEG: 57.2 MM (ref 52–69)
MCF BLD TEG: 60.8 MM (ref 52–69)
MCF BLD TEG: 65.5 MM (ref 52–69)
MCF.PLATELET INHIB BLD ROTEM: 24.2 MM (ref 15–32)
MCF.PLATELET INHIB BLD ROTEM: 26.3 MM (ref 15–32)
MCF.PLATELET INHIB BLD ROTEM: 34 MM (ref 15–32)
MCH RBC QN AUTO: 28.5 PG (ref 27–33)
MCH RBC QN AUTO: 28.6 PG (ref 27–33)
MCH RBC QN AUTO: 28.6 PG (ref 27–33)
MCH RBC QN AUTO: 28.7 PG (ref 27–33)
MCH RBC QN AUTO: 28.7 PG (ref 27–33)
MCH RBC QN AUTO: 28.8 PG (ref 27–33)
MCH RBC QN AUTO: 28.9 PG (ref 27–33)
MCH RBC QN AUTO: 29 PG (ref 27–33)
MCH RBC QN AUTO: 29.1 PG (ref 27–33)
MCH RBC QN AUTO: 29.2 PG (ref 27–33)
MCH RBC QN AUTO: 29.3 PG (ref 27–33)
MCH RBC QN AUTO: 29.4 PG (ref 27–33)
MCH RBC QN AUTO: 29.5 PG (ref 27–33)
MCH RBC QN AUTO: 29.5 PG (ref 27–33)
MCH RBC QN AUTO: 29.6 PG (ref 27–33)
MCH RBC QN AUTO: 29.7 PG (ref 27–33)
MCH RBC QN AUTO: 29.9 PG (ref 27–33)
MCH RBC QN AUTO: 29.9 PG (ref 27–33)
MCH RBC QN AUTO: 30 PG (ref 27–33)
MCH RBC QN AUTO: 30.1 PG (ref 27–33)
MCH RBC QN AUTO: 30.2 PG (ref 27–33)
MCH RBC QN AUTO: 30.2 PG (ref 27–33)
MCH RBC QN AUTO: 30.3 PG (ref 27–33)
MCH RBC QN AUTO: 30.4 PG (ref 27–33)
MCH RBC QN AUTO: 30.4 PG (ref 27–33)
MCH RBC QN AUTO: 30.6 PG (ref 27–33)
MCH RBC QN AUTO: 31 PG (ref 27–33)
MCHC RBC AUTO-ENTMCNC: 30.5 G/DL (ref 33.7–35.3)
MCHC RBC AUTO-ENTMCNC: 30.6 G/DL (ref 33.7–35.3)
MCHC RBC AUTO-ENTMCNC: 30.6 G/DL (ref 33.7–35.3)
MCHC RBC AUTO-ENTMCNC: 30.7 G/DL (ref 33.7–35.3)
MCHC RBC AUTO-ENTMCNC: 30.8 G/DL (ref 33.7–35.3)
MCHC RBC AUTO-ENTMCNC: 30.9 G/DL (ref 33.7–35.3)
MCHC RBC AUTO-ENTMCNC: 31 G/DL (ref 33.7–35.3)
MCHC RBC AUTO-ENTMCNC: 31 G/DL (ref 33.7–35.3)
MCHC RBC AUTO-ENTMCNC: 31.1 G/DL (ref 33.7–35.3)
MCHC RBC AUTO-ENTMCNC: 31.2 G/DL (ref 33.7–35.3)
MCHC RBC AUTO-ENTMCNC: 31.2 G/DL (ref 33.7–35.3)
MCHC RBC AUTO-ENTMCNC: 31.4 G/DL (ref 33.7–35.3)
MCHC RBC AUTO-ENTMCNC: 31.5 G/DL (ref 33.7–35.3)
MCHC RBC AUTO-ENTMCNC: 31.6 G/DL (ref 33.7–35.3)
MCHC RBC AUTO-ENTMCNC: 31.7 G/DL (ref 33.7–35.3)
MCHC RBC AUTO-ENTMCNC: 31.9 G/DL (ref 33.7–35.3)
MCHC RBC AUTO-ENTMCNC: 32 G/DL (ref 33.7–35.3)
MCHC RBC AUTO-ENTMCNC: 32.1 G/DL (ref 33.7–35.3)
MCHC RBC AUTO-ENTMCNC: 32.2 G/DL (ref 33.7–35.3)
MCHC RBC AUTO-ENTMCNC: 32.3 G/DL (ref 33.7–35.3)
MCHC RBC AUTO-ENTMCNC: 32.3 G/DL (ref 33.7–35.3)
MCHC RBC AUTO-ENTMCNC: 32.4 G/DL (ref 33.7–35.3)
MCHC RBC AUTO-ENTMCNC: 32.5 G/DL (ref 33.7–35.3)
MCHC RBC AUTO-ENTMCNC: 32.6 G/DL (ref 33.7–35.3)
MCHC RBC AUTO-ENTMCNC: 32.7 G/DL (ref 33.7–35.3)
MCHC RBC AUTO-ENTMCNC: 32.8 G/DL (ref 33.7–35.3)
MCHC RBC AUTO-ENTMCNC: 32.9 G/DL (ref 33.7–35.3)
MCHC RBC AUTO-ENTMCNC: 33 G/DL (ref 33.7–35.3)
MCHC RBC AUTO-ENTMCNC: 33 G/DL (ref 33.7–35.3)
MCHC RBC AUTO-ENTMCNC: 33.1 G/DL (ref 33.7–35.3)
MCHC RBC AUTO-ENTMCNC: 33.2 G/DL (ref 33.7–35.3)
MCHC RBC AUTO-ENTMCNC: 33.2 G/DL (ref 33.7–35.3)
MCHC RBC AUTO-ENTMCNC: 33.3 G/DL (ref 33.7–35.3)
MCHC RBC AUTO-ENTMCNC: 33.4 G/DL (ref 33.7–35.3)
MCHC RBC AUTO-ENTMCNC: 33.6 G/DL (ref 33.7–35.3)
MCV RBC AUTO: 85.9 FL (ref 81.4–97.8)
MCV RBC AUTO: 86.4 FL (ref 81.4–97.8)
MCV RBC AUTO: 86.4 FL (ref 81.4–97.8)
MCV RBC AUTO: 86.5 FL (ref 81.4–97.8)
MCV RBC AUTO: 86.7 FL (ref 81.4–97.8)
MCV RBC AUTO: 87.1 FL (ref 81.4–97.8)
MCV RBC AUTO: 87.3 FL (ref 81.4–97.8)
MCV RBC AUTO: 87.5 FL (ref 81.4–97.8)
MCV RBC AUTO: 88 FL (ref 81.4–97.8)
MCV RBC AUTO: 88.6 FL (ref 81.4–97.8)
MCV RBC AUTO: 88.9 FL (ref 81.4–97.8)
MCV RBC AUTO: 89 FL (ref 81.4–97.8)
MCV RBC AUTO: 89.1 FL (ref 81.4–97.8)
MCV RBC AUTO: 89.1 FL (ref 81.4–97.8)
MCV RBC AUTO: 89.2 FL (ref 81.4–97.8)
MCV RBC AUTO: 89.6 FL (ref 81.4–97.8)
MCV RBC AUTO: 89.9 FL (ref 81.4–97.8)
MCV RBC AUTO: 90 FL (ref 81.4–97.8)
MCV RBC AUTO: 90.3 FL (ref 81.4–97.8)
MCV RBC AUTO: 90.3 FL (ref 81.4–97.8)
MCV RBC AUTO: 90.4 FL (ref 81.4–97.8)
MCV RBC AUTO: 90.6 FL (ref 81.4–97.8)
MCV RBC AUTO: 90.6 FL (ref 81.4–97.8)
MCV RBC AUTO: 90.7 FL (ref 81.4–97.8)
MCV RBC AUTO: 90.8 FL (ref 81.4–97.8)
MCV RBC AUTO: 91.1 FL (ref 81.4–97.8)
MCV RBC AUTO: 91.7 FL (ref 81.4–97.8)
MCV RBC AUTO: 91.8 FL (ref 81.4–97.8)
MCV RBC AUTO: 91.9 FL (ref 81.4–97.8)
MCV RBC AUTO: 92.3 FL (ref 81.4–97.8)
MCV RBC AUTO: 92.3 FL (ref 81.4–97.8)
MCV RBC AUTO: 92.5 FL (ref 81.4–97.8)
MCV RBC AUTO: 92.6 FL (ref 81.4–97.8)
MCV RBC AUTO: 92.7 FL (ref 81.4–97.8)
MCV RBC AUTO: 92.8 FL (ref 81.4–97.8)
MCV RBC AUTO: 92.9 FL (ref 81.4–97.8)
MCV RBC AUTO: 92.9 FL (ref 81.4–97.8)
MCV RBC AUTO: 93.2 FL (ref 81.4–97.8)
MCV RBC AUTO: 93.3 FL (ref 81.4–97.8)
MCV RBC AUTO: 93.6 FL (ref 81.4–97.8)
MCV RBC AUTO: 93.9 FL (ref 81.4–97.8)
MCV RBC AUTO: 94.2 FL (ref 81.4–97.8)
MCV RBC AUTO: 94.2 FL (ref 81.4–97.8)
MCV RBC AUTO: 94.4 FL (ref 81.4–97.8)
MCV RBC AUTO: 94.5 FL (ref 81.4–97.8)
MCV RBC AUTO: 95.1 FL (ref 81.4–97.8)
MCV RBC AUTO: 95.2 FL (ref 81.4–97.8)
MCV RBC AUTO: 95.7 FL (ref 81.4–97.8)
MCV RBC AUTO: 95.9 FL (ref 81.4–97.8)
METAMYELOCYTES NFR BLD MANUAL: 1.8 %
MICRO URNS: ABNORMAL
MICROCYTES BLD QL SMEAR: ABNORMAL
MICROCYTES BLD QL SMEAR: ABNORMAL
MONOCLON BAND OBS SERPL: ABNORMAL
MONOCYTES # BLD AUTO: 0 K/UL (ref 0–0.85)
MONOCYTES # BLD AUTO: 0 K/UL (ref 0–0.85)
MONOCYTES # BLD AUTO: 0.06 K/UL (ref 0–0.85)
MONOCYTES # BLD AUTO: 0.16 K/UL (ref 0–0.85)
MONOCYTES # BLD AUTO: 0.21 K/UL (ref 0–0.85)
MONOCYTES # BLD AUTO: 0.23 K/UL (ref 0–0.85)
MONOCYTES # BLD AUTO: 0.24 K/UL (ref 0–0.85)
MONOCYTES # BLD AUTO: 0.26 K/UL (ref 0–0.85)
MONOCYTES # BLD AUTO: 0.27 K/UL (ref 0–0.85)
MONOCYTES # BLD AUTO: 0.3 K/UL (ref 0–0.85)
MONOCYTES # BLD AUTO: 0.3 K/UL (ref 0–0.85)
MONOCYTES # BLD AUTO: 0.33 K/UL (ref 0–0.85)
MONOCYTES # BLD AUTO: 0.33 K/UL (ref 0–0.85)
MONOCYTES # BLD AUTO: 0.34 K/UL (ref 0–0.85)
MONOCYTES # BLD AUTO: 0.34 K/UL (ref 0–0.85)
MONOCYTES # BLD AUTO: 0.35 K/UL (ref 0–0.85)
MONOCYTES # BLD AUTO: 0.35 K/UL (ref 0–0.85)
MONOCYTES # BLD AUTO: 0.37 K/UL (ref 0–0.85)
MONOCYTES # BLD AUTO: 0.37 K/UL (ref 0–0.85)
MONOCYTES # BLD AUTO: 0.39 K/UL (ref 0–0.85)
MONOCYTES # BLD AUTO: 0.4 K/UL (ref 0–0.85)
MONOCYTES # BLD AUTO: 0.41 K/UL (ref 0–0.85)
MONOCYTES # BLD AUTO: 0.42 K/UL (ref 0–0.85)
MONOCYTES # BLD AUTO: 0.43 K/UL (ref 0–0.85)
MONOCYTES # BLD AUTO: 0.44 K/UL (ref 0–0.85)
MONOCYTES # BLD AUTO: 0.45 K/UL (ref 0–0.85)
MONOCYTES # BLD AUTO: 0.48 K/UL (ref 0–0.85)
MONOCYTES # BLD AUTO: 0.51 K/UL (ref 0–0.85)
MONOCYTES # BLD AUTO: 0.52 K/UL (ref 0–0.85)
MONOCYTES # BLD AUTO: 0.53 K/UL (ref 0–0.85)
MONOCYTES # BLD AUTO: 0.57 K/UL (ref 0–0.85)
MONOCYTES # BLD AUTO: 0.59 K/UL (ref 0–0.85)
MONOCYTES # BLD AUTO: 0.59 K/UL (ref 0–0.85)
MONOCYTES # BLD AUTO: 0.61 K/UL (ref 0–0.85)
MONOCYTES # BLD AUTO: 0.62 K/UL (ref 0–0.85)
MONOCYTES # BLD AUTO: 0.62 K/UL (ref 0–0.85)
MONOCYTES # BLD AUTO: 0.66 K/UL (ref 0–0.85)
MONOCYTES # BLD AUTO: 0.68 K/UL (ref 0–0.85)
MONOCYTES # BLD AUTO: 0.7 K/UL (ref 0–0.85)
MONOCYTES # BLD AUTO: 0.73 K/UL (ref 0–0.85)
MONOCYTES # BLD AUTO: 0.74 K/UL (ref 0–0.85)
MONOCYTES # BLD AUTO: 0.82 K/UL (ref 0–0.85)
MONOCYTES # BLD AUTO: 0.83 K/UL (ref 0–0.85)
MONOCYTES # BLD AUTO: 0.9 K/UL (ref 0–0.85)
MONOCYTES # BLD AUTO: 0.93 K/UL (ref 0–0.85)
MONOCYTES # BLD AUTO: 1.18 K/UL (ref 0–0.85)
MONOCYTES NFR BLD AUTO: 0 % (ref 0–13.4)
MONOCYTES NFR BLD AUTO: 0 % (ref 0–13.4)
MONOCYTES NFR BLD AUTO: 0.9 % (ref 0–13.4)
MONOCYTES NFR BLD AUTO: 1.7 % (ref 0–13.4)
MONOCYTES NFR BLD AUTO: 1.7 % (ref 0–13.4)
MONOCYTES NFR BLD AUTO: 1.9 % (ref 0–13.4)
MONOCYTES NFR BLD AUTO: 10.1 % (ref 0–13.4)
MONOCYTES NFR BLD AUTO: 2.5 % (ref 0–13.4)
MONOCYTES NFR BLD AUTO: 2.7 % (ref 0–13.4)
MONOCYTES NFR BLD AUTO: 2.8 % (ref 0–13.4)
MONOCYTES NFR BLD AUTO: 2.9 % (ref 0–13.4)
MONOCYTES NFR BLD AUTO: 2.9 % (ref 0–13.4)
MONOCYTES NFR BLD AUTO: 3 % (ref 0–13.4)
MONOCYTES NFR BLD AUTO: 3.1 % (ref 0–13.4)
MONOCYTES NFR BLD AUTO: 3.2 % (ref 0–13.4)
MONOCYTES NFR BLD AUTO: 3.5 % (ref 0–13.4)
MONOCYTES NFR BLD AUTO: 3.5 % (ref 0–13.4)
MONOCYTES NFR BLD AUTO: 3.6 % (ref 0–13.4)
MONOCYTES NFR BLD AUTO: 3.9 % (ref 0–13.4)
MONOCYTES NFR BLD AUTO: 4.3 % (ref 0–13.4)
MONOCYTES NFR BLD AUTO: 4.4 % (ref 0–13.4)
MONOCYTES NFR BLD AUTO: 4.5 % (ref 0–13.4)
MONOCYTES NFR BLD AUTO: 5.6 % (ref 0–13.4)
MONOCYTES NFR BLD AUTO: 5.8 % (ref 0–13.4)
MONOCYTES NFR BLD AUTO: 6 % (ref 0–13.4)
MONOCYTES NFR BLD AUTO: 6.2 % (ref 0–13.4)
MONOCYTES NFR BLD AUTO: 6.2 % (ref 0–13.4)
MONOCYTES NFR BLD AUTO: 6.8 % (ref 0–13.4)
MONOCYTES NFR BLD AUTO: 7 % (ref 0–13.4)
MONOCYTES NFR BLD AUTO: 7.3 % (ref 0–13.4)
MONOCYTES NFR BLD AUTO: 8 % (ref 0–13.4)
MONOCYTES NFR BLD AUTO: 8.1 % (ref 0–13.4)
MONOCYTES NFR BLD AUTO: 8.1 % (ref 0–13.4)
MONOCYTES NFR BLD AUTO: 8.3 % (ref 0–13.4)
MONOCYTES NFR BLD AUTO: 8.7 % (ref 0–13.4)
MONOCYTES NFR BLD AUTO: 8.8 % (ref 0–13.4)
MONOCYTES NFR BLD AUTO: 8.9 % (ref 0–13.4)
MONOCYTES NFR BLD AUTO: 9 % (ref 0–13.4)
MONOCYTES NFR BLD AUTO: 9 % (ref 0–13.4)
MONOCYTES NFR BLD AUTO: 9.7 % (ref 0–13.4)
MONOCYTES NFR BLD AUTO: 9.7 % (ref 0–13.4)
MONONUC CELLS NFR CSF: 3 %
MORPHOLOGY BLD-IMP: NORMAL
MYELOCYTES NFR BLD MANUAL: 0.9 %
MYELOCYTES NFR BLD MANUAL: 3.4 %
MYELOPEROXIDASE AB SER-ACNC: 48 AU/ML (ref 0–19)
N MEN DNA CSF QL NAA+NON-PROBE: NOT DETECTED
NEUTROPHILS # BLD AUTO: 10.23 K/UL (ref 1.82–7.42)
NEUTROPHILS # BLD AUTO: 10.31 K/UL (ref 1.82–7.42)
NEUTROPHILS # BLD AUTO: 10.84 K/UL (ref 1.82–7.42)
NEUTROPHILS # BLD AUTO: 11.33 K/UL (ref 1.82–7.42)
NEUTROPHILS # BLD AUTO: 12.93 K/UL (ref 1.82–7.42)
NEUTROPHILS # BLD AUTO: 13.37 K/UL (ref 1.82–7.42)
NEUTROPHILS # BLD AUTO: 13.83 K/UL (ref 1.82–7.42)
NEUTROPHILS # BLD AUTO: 14.68 K/UL (ref 1.82–7.42)
NEUTROPHILS # BLD AUTO: 15.21 K/UL (ref 1.82–7.42)
NEUTROPHILS # BLD AUTO: 15.4 K/UL (ref 1.82–7.42)
NEUTROPHILS # BLD AUTO: 18.8 K/UL (ref 1.82–7.42)
NEUTROPHILS # BLD AUTO: 18.8 K/UL (ref 1.82–7.42)
NEUTROPHILS # BLD AUTO: 2.76 K/UL (ref 1.82–7.42)
NEUTROPHILS # BLD AUTO: 2.88 K/UL (ref 1.82–7.42)
NEUTROPHILS # BLD AUTO: 2.93 K/UL (ref 1.82–7.42)
NEUTROPHILS # BLD AUTO: 23.11 K/UL (ref 1.82–7.42)
NEUTROPHILS # BLD AUTO: 3.04 K/UL (ref 1.82–7.42)
NEUTROPHILS # BLD AUTO: 3.11 K/UL (ref 1.82–7.42)
NEUTROPHILS # BLD AUTO: 3.14 K/UL (ref 1.82–7.42)
NEUTROPHILS # BLD AUTO: 3.16 K/UL (ref 1.82–7.42)
NEUTROPHILS # BLD AUTO: 3.47 K/UL (ref 1.82–7.42)
NEUTROPHILS # BLD AUTO: 4.37 K/UL (ref 1.82–7.42)
NEUTROPHILS # BLD AUTO: 4.98 K/UL (ref 1.82–7.42)
NEUTROPHILS # BLD AUTO: 5.03 K/UL (ref 1.82–7.42)
NEUTROPHILS # BLD AUTO: 5.07 K/UL (ref 1.82–7.42)
NEUTROPHILS # BLD AUTO: 5.11 K/UL (ref 1.82–7.42)
NEUTROPHILS # BLD AUTO: 5.41 K/UL (ref 1.82–7.42)
NEUTROPHILS # BLD AUTO: 5.81 K/UL (ref 1.82–7.42)
NEUTROPHILS # BLD AUTO: 5.83 K/UL (ref 1.82–7.42)
NEUTROPHILS # BLD AUTO: 5.98 K/UL (ref 1.82–7.42)
NEUTROPHILS # BLD AUTO: 6.18 K/UL (ref 1.82–7.42)
NEUTROPHILS # BLD AUTO: 6.25 K/UL (ref 1.82–7.42)
NEUTROPHILS # BLD AUTO: 6.26 K/UL (ref 1.82–7.42)
NEUTROPHILS # BLD AUTO: 6.26 K/UL (ref 1.82–7.42)
NEUTROPHILS # BLD AUTO: 6.69 K/UL (ref 1.82–7.42)
NEUTROPHILS # BLD AUTO: 7.01 K/UL (ref 1.82–7.42)
NEUTROPHILS # BLD AUTO: 7.2 K/UL (ref 1.82–7.42)
NEUTROPHILS # BLD AUTO: 7.3 K/UL (ref 1.82–7.42)
NEUTROPHILS # BLD AUTO: 7.39 K/UL (ref 1.82–7.42)
NEUTROPHILS # BLD AUTO: 7.44 K/UL (ref 1.82–7.42)
NEUTROPHILS # BLD AUTO: 7.71 K/UL (ref 1.82–7.42)
NEUTROPHILS # BLD AUTO: 7.8 K/UL (ref 1.82–7.42)
NEUTROPHILS # BLD AUTO: 8.06 K/UL (ref 1.82–7.42)
NEUTROPHILS # BLD AUTO: 8.29 K/UL (ref 1.82–7.42)
NEUTROPHILS # BLD AUTO: 8.31 K/UL (ref 1.82–7.42)
NEUTROPHILS # BLD AUTO: 8.78 K/UL (ref 1.82–7.42)
NEUTROPHILS # BLD AUTO: 8.88 K/UL (ref 1.82–7.42)
NEUTROPHILS # BLD AUTO: 9.02 K/UL (ref 1.82–7.42)
NEUTROPHILS # BLD AUTO: 9.1 K/UL (ref 1.82–7.42)
NEUTROPHILS # BLD AUTO: 9.12 K/UL (ref 1.82–7.42)
NEUTROPHILS # BLD AUTO: 9.14 K/UL (ref 1.82–7.42)
NEUTROPHILS # BLD AUTO: 9.16 K/UL (ref 1.82–7.42)
NEUTROPHILS NFR BLD: 48 % (ref 44–72)
NEUTROPHILS NFR BLD: 52.2 % (ref 44–72)
NEUTROPHILS NFR BLD: 54 % (ref 44–72)
NEUTROPHILS NFR BLD: 58.6 % (ref 44–72)
NEUTROPHILS NFR BLD: 60.2 % (ref 44–72)
NEUTROPHILS NFR BLD: 61.8 % (ref 44–72)
NEUTROPHILS NFR BLD: 62.3 % (ref 44–72)
NEUTROPHILS NFR BLD: 62.6 % (ref 44–72)
NEUTROPHILS NFR BLD: 63.3 % (ref 44–72)
NEUTROPHILS NFR BLD: 65 % (ref 44–72)
NEUTROPHILS NFR BLD: 65.2 % (ref 44–72)
NEUTROPHILS NFR BLD: 66.5 % (ref 44–72)
NEUTROPHILS NFR BLD: 69.7 % (ref 44–72)
NEUTROPHILS NFR BLD: 72.2 % (ref 44–72)
NEUTROPHILS NFR BLD: 73 % (ref 44–72)
NEUTROPHILS NFR BLD: 73.3 % (ref 44–72)
NEUTROPHILS NFR BLD: 73.5 % (ref 44–72)
NEUTROPHILS NFR BLD: 74.8 % (ref 44–72)
NEUTROPHILS NFR BLD: 75.2 % (ref 44–72)
NEUTROPHILS NFR BLD: 76.9 % (ref 44–72)
NEUTROPHILS NFR BLD: 77.5 % (ref 44–72)
NEUTROPHILS NFR BLD: 79.3 % (ref 44–72)
NEUTROPHILS NFR BLD: 80.2 % (ref 44–72)
NEUTROPHILS NFR BLD: 81.1 % (ref 44–72)
NEUTROPHILS NFR BLD: 81.2 % (ref 44–72)
NEUTROPHILS NFR BLD: 82.5 % (ref 44–72)
NEUTROPHILS NFR BLD: 83.7 % (ref 44–72)
NEUTROPHILS NFR BLD: 84.7 % (ref 44–72)
NEUTROPHILS NFR BLD: 85.6 % (ref 44–72)
NEUTROPHILS NFR BLD: 85.9 % (ref 44–72)
NEUTROPHILS NFR BLD: 86 % (ref 44–72)
NEUTROPHILS NFR BLD: 86.2 % (ref 44–72)
NEUTROPHILS NFR BLD: 86.9 % (ref 44–72)
NEUTROPHILS NFR BLD: 87.3 % (ref 44–72)
NEUTROPHILS NFR BLD: 88.2 % (ref 44–72)
NEUTROPHILS NFR BLD: 88.2 % (ref 44–72)
NEUTROPHILS NFR BLD: 88.3 % (ref 44–72)
NEUTROPHILS NFR BLD: 88.6 % (ref 44–72)
NEUTROPHILS NFR BLD: 88.7 % (ref 44–72)
NEUTROPHILS NFR BLD: 89.5 % (ref 44–72)
NEUTROPHILS NFR BLD: 89.7 % (ref 44–72)
NEUTROPHILS NFR BLD: 90 % (ref 44–72)
NEUTROPHILS NFR BLD: 90.9 % (ref 44–72)
NEUTROPHILS NFR BLD: 91.7 % (ref 44–72)
NEUTROPHILS NFR BLD: 91.7 % (ref 44–72)
NEUTROPHILS NFR BLD: 93 % (ref 44–72)
NEUTROPHILS NFR BLD: 93.4 % (ref 44–72)
NEUTROPHILS NFR BLD: 93.7 % (ref 44–72)
NEUTROPHILS NFR BLD: 94.5 % (ref 44–72)
NEUTROPHILS NFR BLD: 97.3 % (ref 44–72)
NEUTROPHILS NFR CSF: 94 %
NEUTS BAND NFR BLD MANUAL: 1.8 % (ref 0–10)
NEUTS BAND NFR BLD MANUAL: 2.8 % (ref 0–10)
NEUTS BAND NFR BLD MANUAL: 22.2 % (ref 0–10)
NEUTS BAND NFR BLD MANUAL: 4.3 % (ref 0–10)
NITRITE UR QL STRIP.AUTO: NEGATIVE
NITRITE UR QL STRIP.AUTO: POSITIVE
NITRITE UR QL STRIP.AUTO: POSITIVE
NRBC # BLD AUTO: 0 K/UL
NRBC # BLD AUTO: 0.02 K/UL
NRBC # BLD AUTO: 0.03 K/UL
NRBC # BLD AUTO: 0.03 K/UL
NRBC # BLD AUTO: 0.04 K/UL
NRBC # BLD AUTO: 0.04 K/UL
NRBC # BLD AUTO: 0.05 K/UL
NRBC # BLD AUTO: 0.06 K/UL
NRBC # BLD AUTO: 0.06 K/UL
NRBC # BLD AUTO: 0.07 K/UL
NRBC # BLD AUTO: 0.08 K/UL
NRBC # BLD AUTO: 0.09 K/UL
NRBC # BLD AUTO: 0.09 K/UL
NRBC # BLD AUTO: 0.1 K/UL
NRBC # BLD AUTO: 0.14 K/UL
NRBC BLD-RTO: 0 /100 WBC
NRBC BLD-RTO: 0.1 /100 WBC
NRBC BLD-RTO: 0.1 /100 WBC
NRBC BLD-RTO: 0.2 /100 WBC
NRBC BLD-RTO: 0.3 /100 WBC
NRBC BLD-RTO: 0.4 /100 WBC
NRBC BLD-RTO: 0.6 /100 WBC
NRBC BLD-RTO: 0.6 /100 WBC
NRBC BLD-RTO: 0.7 /100 WBC
NRBC BLD-RTO: 0.8 /100 WBC
NRBC BLD-RTO: 2.3 /100 WBC
NT-PROBNP SERPL IA-MCNC: 1275 PG/ML (ref 0–125)
NT-PROBNP SERPL IA-MCNC: 1943 PG/ML (ref 0–125)
NT-PROBNP SERPL IA-MCNC: 3347 PG/ML (ref 0–125)
O2/TOTAL GAS SETTING VFR VENT: 28 %
O2/TOTAL GAS SETTING VFR VENT: 95 % (ref 30–60)
OVALOCYTES BLD QL SMEAR: NORMAL
OVALOCYTES BLD QL SMEAR: NORMAL
PA AA BLD-ACNC: 13.1 % (ref 0–11)
PA AA BLD-ACNC: 6 % (ref 0–11)
PA AA BLD-ACNC: NORMAL % (ref 0–11)
PA ADP BLD-ACNC: 29.2 % (ref 0–17)
PA ADP BLD-ACNC: 38.5 % (ref 0–17)
PA ADP BLD-ACNC: NORMAL % (ref 0–17)
PARECHOVIRUS A RNA CSF QL NAA+NON-PROBE: NOT DETECTED
PATHOLOGY CONSULT NOTE: NORMAL
PATHOLOGY STUDY: ABNORMAL
PCO2 BLDA: 25.6 MMHG (ref 26–37)
PCO2 BLDA: 25.8 MMHG (ref 26–37)
PCO2 BLDA: 26.6 MMHG (ref 26–37)
PCO2 BLDA: 33.2 MMHG (ref 26–37)
PCO2 TEMP ADJ BLDA: 24.7 MMHG (ref 26–37)
PCO2 TEMP ADJ BLDA: 30.4 MMHG (ref 26–37)
PH BLDA: 7.38 [PH] (ref 7.4–7.5)
PH BLDA: 7.42 [PH] (ref 7.4–7.5)
PH BLDA: 7.42 [PH] (ref 7.4–7.5)
PH BLDA: 7.51 [PH] (ref 7.4–7.5)
PH TEMP ADJ BLDA: 7.43 [PH] (ref 7.4–7.5)
PH TEMP ADJ BLDA: 7.54 [PH] (ref 7.4–7.5)
PH UR STRIP.AUTO: 5 [PH] (ref 5–8)
PH UR STRIP.AUTO: 5.5 [PH] (ref 5–8)
PH UR STRIP.AUTO: 5.5 [PH] (ref 5–8)
PH UR STRIP.AUTO: 6 [PH] (ref 5–8)
PHOSPHATE SERPL-MCNC: 2.3 MG/DL (ref 2.5–4.5)
PHOSPHATE SERPL-MCNC: 2.4 MG/DL (ref 2.5–4.5)
PHOSPHATE SERPL-MCNC: 2.5 MG/DL (ref 2.5–4.5)
PHOSPHATE SERPL-MCNC: 2.6 MG/DL (ref 2.5–4.5)
PHOSPHATE SERPL-MCNC: 2.8 MG/DL (ref 2.5–4.5)
PHOSPHATE SERPL-MCNC: 2.9 MG/DL (ref 2.5–4.5)
PHOSPHATE SERPL-MCNC: 3 MG/DL (ref 2.5–4.5)
PHOSPHATE SERPL-MCNC: 3.1 MG/DL (ref 2.5–4.5)
PHOSPHATE SERPL-MCNC: 3.3 MG/DL (ref 2.5–4.5)
PHOSPHATE SERPL-MCNC: 3.5 MG/DL (ref 2.5–4.5)
PHOSPHATE SERPL-MCNC: 3.5 MG/DL (ref 2.5–4.5)
PHOSPHATE SERPL-MCNC: 3.7 MG/DL (ref 2.5–4.5)
PHOSPHATE SERPL-MCNC: 3.8 MG/DL (ref 2.5–4.5)
PHOSPHATE SERPL-MCNC: 5.2 MG/DL (ref 2.5–4.5)
PLATELET # BLD AUTO: 104 K/UL (ref 164–446)
PLATELET # BLD AUTO: 105 K/UL (ref 164–446)
PLATELET # BLD AUTO: 106 K/UL (ref 164–446)
PLATELET # BLD AUTO: 107 K/UL (ref 164–446)
PLATELET # BLD AUTO: 108 K/UL (ref 164–446)
PLATELET # BLD AUTO: 109 K/UL (ref 164–446)
PLATELET # BLD AUTO: 112 K/UL (ref 164–446)
PLATELET # BLD AUTO: 115 K/UL (ref 164–446)
PLATELET # BLD AUTO: 118 K/UL (ref 164–446)
PLATELET # BLD AUTO: 119 K/UL (ref 164–446)
PLATELET # BLD AUTO: 122 K/UL (ref 164–446)
PLATELET # BLD AUTO: 122 K/UL (ref 164–446)
PLATELET # BLD AUTO: 123 K/UL (ref 164–446)
PLATELET # BLD AUTO: 124 K/UL (ref 164–446)
PLATELET # BLD AUTO: 126 K/UL (ref 164–446)
PLATELET # BLD AUTO: 127 K/UL (ref 164–446)
PLATELET # BLD AUTO: 132 K/UL (ref 164–446)
PLATELET # BLD AUTO: 143 K/UL (ref 164–446)
PLATELET # BLD AUTO: 143 K/UL (ref 164–446)
PLATELET # BLD AUTO: 144 K/UL (ref 164–446)
PLATELET # BLD AUTO: 146 K/UL (ref 164–446)
PLATELET # BLD AUTO: 152 K/UL (ref 164–446)
PLATELET # BLD AUTO: 154 K/UL (ref 164–446)
PLATELET # BLD AUTO: 156 K/UL (ref 164–446)
PLATELET # BLD AUTO: 157 K/UL (ref 164–446)
PLATELET # BLD AUTO: 161 K/UL (ref 164–446)
PLATELET # BLD AUTO: 163 K/UL (ref 164–446)
PLATELET # BLD AUTO: 165 K/UL (ref 164–446)
PLATELET # BLD AUTO: 165 K/UL (ref 164–446)
PLATELET # BLD AUTO: 175 K/UL (ref 164–446)
PLATELET # BLD AUTO: 183 K/UL (ref 164–446)
PLATELET # BLD AUTO: 186 K/UL (ref 164–446)
PLATELET # BLD AUTO: 188 K/UL (ref 164–446)
PLATELET # BLD AUTO: 215 K/UL (ref 164–446)
PLATELET # BLD AUTO: 54 K/UL (ref 164–446)
PLATELET # BLD AUTO: 56 K/UL (ref 164–446)
PLATELET # BLD AUTO: 57 K/UL (ref 164–446)
PLATELET # BLD AUTO: 58 K/UL (ref 164–446)
PLATELET # BLD AUTO: 60 K/UL (ref 164–446)
PLATELET # BLD AUTO: 61 K/UL (ref 164–446)
PLATELET # BLD AUTO: 64 K/UL (ref 164–446)
PLATELET # BLD AUTO: 64 K/UL (ref 164–446)
PLATELET # BLD AUTO: 66 K/UL (ref 164–446)
PLATELET # BLD AUTO: 67 K/UL (ref 164–446)
PLATELET # BLD AUTO: 67 K/UL (ref 164–446)
PLATELET # BLD AUTO: 70 K/UL (ref 164–446)
PLATELET # BLD AUTO: 71 K/UL (ref 164–446)
PLATELET # BLD AUTO: 73 K/UL (ref 164–446)
PLATELET # BLD AUTO: 73 K/UL (ref 164–446)
PLATELET # BLD AUTO: 76 K/UL (ref 164–446)
PLATELET # BLD AUTO: 84 K/UL (ref 164–446)
PLATELET # BLD AUTO: 85 K/UL (ref 164–446)
PLATELET # BLD AUTO: 85 K/UL (ref 164–446)
PLATELET # BLD AUTO: 87 K/UL (ref 164–446)
PLATELET # BLD AUTO: 91 K/UL (ref 164–446)
PLATELET # BLD AUTO: 92 K/UL (ref 164–446)
PLATELET # BLD AUTO: 93 K/UL (ref 164–446)
PLATELET # BLD AUTO: 98 K/UL (ref 164–446)
PLATELET # BLD AUTO: 98 K/UL (ref 164–446)
PLATELET BLD QL SMEAR: NORMAL
PMV BLD AUTO: 10 FL (ref 9–12.9)
PMV BLD AUTO: 10 FL (ref 9–12.9)
PMV BLD AUTO: 10.1 FL (ref 9–12.9)
PMV BLD AUTO: 10.2 FL (ref 9–12.9)
PMV BLD AUTO: 10.3 FL (ref 9–12.9)
PMV BLD AUTO: 10.4 FL (ref 9–12.9)
PMV BLD AUTO: 10.5 FL (ref 9–12.9)
PMV BLD AUTO: 10.6 FL (ref 9–12.9)
PMV BLD AUTO: 10.6 FL (ref 9–12.9)
PMV BLD AUTO: 10.7 FL (ref 9–12.9)
PMV BLD AUTO: 10.9 FL (ref 9–12.9)
PMV BLD AUTO: 11 FL (ref 9–12.9)
PMV BLD AUTO: 11.1 FL (ref 9–12.9)
PMV BLD AUTO: 11.1 FL (ref 9–12.9)
PMV BLD AUTO: 11.2 FL (ref 9–12.9)
PMV BLD AUTO: 11.4 FL (ref 9–12.9)
PMV BLD AUTO: 11.5 FL (ref 9–12.9)
PMV BLD AUTO: 11.6 FL (ref 9–12.9)
PMV BLD AUTO: 11.6 FL (ref 9–12.9)
PMV BLD AUTO: 11.7 FL (ref 9–12.9)
PMV BLD AUTO: 12.9 FL (ref 9–12.9)
PMV BLD AUTO: 9.1 FL (ref 9–12.9)
PMV BLD AUTO: 9.3 FL (ref 9–12.9)
PMV BLD AUTO: 9.4 FL (ref 9–12.9)
PMV BLD AUTO: 9.5 FL (ref 9–12.9)
PMV BLD AUTO: 9.6 FL (ref 9–12.9)
PMV BLD AUTO: 9.7 FL (ref 9–12.9)
PMV BLD AUTO: 9.8 FL (ref 9–12.9)
PMV BLD AUTO: 9.9 FL (ref 9–12.9)
PO2 BLDA: 106 MMHG (ref 64–87)
PO2 BLDA: 133.7 MMHG (ref 64–87)
PO2 BLDA: 70.9 MMHG (ref 64–87)
PO2 BLDA: 72.1 MMHG (ref 64–87)
PO2 TEMP ADJ BLDA: 101 MMHG (ref 64–87)
PO2 TEMP ADJ BLDA: 61.9 MMHG (ref 64–87)
POIKILOCYTOSIS BLD QL SMEAR: NORMAL
POLYCHROMASIA BLD QL SMEAR: NORMAL
POTASSIUM SERPL-SCNC: 2.8 MMOL/L (ref 3.6–5.5)
POTASSIUM SERPL-SCNC: 3.2 MMOL/L (ref 3.6–5.5)
POTASSIUM SERPL-SCNC: 3.2 MMOL/L (ref 3.6–5.5)
POTASSIUM SERPL-SCNC: 3.4 MMOL/L (ref 3.6–5.5)
POTASSIUM SERPL-SCNC: 3.5 MMOL/L (ref 3.6–5.5)
POTASSIUM SERPL-SCNC: 3.5 MMOL/L (ref 3.6–5.5)
POTASSIUM SERPL-SCNC: 3.6 MMOL/L (ref 3.6–5.5)
POTASSIUM SERPL-SCNC: 3.7 MMOL/L (ref 3.6–5.5)
POTASSIUM SERPL-SCNC: 3.7 MMOL/L (ref 3.6–5.5)
POTASSIUM SERPL-SCNC: 3.8 MMOL/L (ref 3.6–5.5)
POTASSIUM SERPL-SCNC: 3.9 MMOL/L (ref 3.6–5.5)
POTASSIUM SERPL-SCNC: 4.1 MMOL/L (ref 3.6–5.5)
POTASSIUM SERPL-SCNC: 4.2 MMOL/L (ref 3.6–5.5)
POTASSIUM SERPL-SCNC: 4.3 MMOL/L (ref 3.6–5.5)
POTASSIUM SERPL-SCNC: 4.4 MMOL/L (ref 3.6–5.5)
POTASSIUM SERPL-SCNC: 4.4 MMOL/L (ref 3.6–5.5)
POTASSIUM SERPL-SCNC: 4.5 MMOL/L (ref 3.6–5.5)
POTASSIUM SERPL-SCNC: 4.6 MMOL/L (ref 3.6–5.5)
POTASSIUM SERPL-SCNC: 4.7 MMOL/L (ref 3.6–5.5)
POTASSIUM SERPL-SCNC: 4.8 MMOL/L (ref 3.6–5.5)
POTASSIUM SERPL-SCNC: 4.8 MMOL/L (ref 3.6–5.5)
POTASSIUM SERPL-SCNC: 4.9 MMOL/L (ref 3.6–5.5)
POTASSIUM SERPL-SCNC: 5 MMOL/L (ref 3.6–5.5)
POTASSIUM SERPL-SCNC: 5.2 MMOL/L (ref 3.6–5.5)
POTASSIUM SERPL-SCNC: 5.3 MMOL/L (ref 3.6–5.5)
POTASSIUM SERPL-SCNC: 5.4 MMOL/L (ref 3.6–5.5)
POTASSIUM SERPL-SCNC: 5.5 MMOL/L (ref 3.6–5.5)
POTASSIUM SERPL-SCNC: 5.5 MMOL/L (ref 3.6–5.5)
POTASSIUM SERPL-SCNC: 5.7 MMOL/L (ref 3.6–5.5)
POTASSIUM SERPL-SCNC: 5.7 MMOL/L (ref 3.6–5.5)
PREALB SERPL-MCNC: 11.7 MG/DL (ref 18–38)
PREALB SERPL-MCNC: 17.6 MG/DL (ref 18–38)
PREALB SERPL-MCNC: 7.7 MG/DL (ref 18–38)
PROCALCITONIN SERPL-MCNC: 0.07 NG/ML
PROCALCITONIN SERPL-MCNC: 0.22 NG/ML
PRODUCT TYPE UPROD: NORMAL
PROT CSF-MCNC: 92 MG/DL (ref 15–45)
PROT SERPL-MCNC: 3.7 G/DL (ref 6–8.2)
PROT SERPL-MCNC: 3.9 G/DL (ref 6–8.2)
PROT SERPL-MCNC: 4.2 G/DL (ref 6–8.2)
PROT SERPL-MCNC: 4.3 G/DL (ref 6–8.2)
PROT SERPL-MCNC: 4.4 G/DL (ref 6–8.2)
PROT SERPL-MCNC: 4.4 G/DL (ref 6–8.2)
PROT SERPL-MCNC: 4.5 G/DL (ref 6–8.2)
PROT SERPL-MCNC: 4.9 G/DL (ref 6–8.2)
PROT SERPL-MCNC: 4.9 G/DL (ref 6–8.2)
PROT SERPL-MCNC: 5.1 G/DL (ref 6–8.2)
PROT SERPL-MCNC: 5.1 G/DL (ref 6–8.2)
PROT SERPL-MCNC: 5.2 G/DL (ref 6–8.2)
PROT SERPL-MCNC: 5.2 G/DL (ref 6–8.2)
PROT SERPL-MCNC: 5.3 G/DL (ref 6–8.2)
PROT SERPL-MCNC: 5.5 G/DL (ref 6–8.2)
PROT SERPL-MCNC: 5.6 G/DL (ref 6–8.2)
PROT SERPL-MCNC: 5.7 G/DL (ref 6–8.2)
PROT SERPL-MCNC: 5.7 G/DL (ref 6–8.2)
PROT SERPL-MCNC: 5.8 G/DL (ref 6–8.2)
PROT SERPL-MCNC: 5.8 G/DL (ref 6–8.2)
PROT SERPL-MCNC: 5.9 G/DL (ref 6–8.2)
PROT SERPL-MCNC: 6 G/DL (ref 6–8.2)
PROT SERPL-MCNC: 6 G/DL (ref 6–8.2)
PROT SERPL-MCNC: 6.1 G/DL (ref 6–8.2)
PROT SERPL-MCNC: 6.3 G/DL (ref 6–8.2)
PROT SERPL-MCNC: 6.4 G/DL (ref 6–8.2)
PROT SERPL-MCNC: 6.4 G/DL (ref 6–8.2)
PROT SERPL-MCNC: 6.8 G/DL (ref 6–8.2)
PROT SERPL-MCNC: 6.9 G/DL (ref 6–8.2)
PROT SERPL-MCNC: 6.9 G/DL (ref 6–8.2)
PROT SERPL-MCNC: 7 G/DL (ref 6.3–8.2)
PROT SERPL-MCNC: 7 G/DL (ref 6–8.2)
PROT SERPL-MCNC: 7.2 G/DL (ref 6–8.2)
PROT SERPL-MCNC: 7.3 G/DL (ref 6–8.2)
PROT SERPL-MCNC: 7.5 G/DL (ref 6–8.2)
PROT UR QL STRIP: 30 MG/DL
PROT UR QL STRIP: NEGATIVE MG/DL
PROT UR-MCNC: 6 MG/DL (ref 0–15)
PROT/CREAT UR: 109 MG/G (ref 15–68)
PROTEINASE3 AB SER-ACNC: 61 AU/ML (ref 0–19)
PROTHROMBIN TIME: 13.6 SEC (ref 12–14.6)
PROTHROMBIN TIME: 13.8 SEC (ref 12–14.6)
PROTHROMBIN TIME: 14.5 SEC (ref 12–14.6)
PROTHROMBIN TIME: 15.1 SEC (ref 12–14.6)
PROTHROMBIN TIME: 15.1 SEC (ref 12–14.6)
PROTHROMBIN TIME: 15.3 SEC (ref 12–14.6)
PROTHROMBIN TIME: 18.1 SEC (ref 12–14.6)
PROTHROMBIN TIME: 20.9 SEC (ref 12–14.6)
PTH-INTACT SERPL-MCNC: 124 PG/ML (ref 14–72)
PTH-INTACT SERPL-MCNC: 170 PG/ML (ref 14–72)
PTH-INTACT SERPL-MCNC: 194 PG/ML (ref 14–72)
RBC # BLD AUTO: 2.19 M/UL (ref 4.7–6.1)
RBC # BLD AUTO: 2.32 M/UL (ref 4.7–6.1)
RBC # BLD AUTO: 2.42 M/UL (ref 4.7–6.1)
RBC # BLD AUTO: 2.44 M/UL (ref 4.7–6.1)
RBC # BLD AUTO: 2.49 M/UL (ref 4.7–6.1)
RBC # BLD AUTO: 2.55 M/UL (ref 4.7–6.1)
RBC # BLD AUTO: 2.57 M/UL (ref 4.7–6.1)
RBC # BLD AUTO: 2.69 M/UL (ref 4.7–6.1)
RBC # BLD AUTO: 2.72 M/UL (ref 4.7–6.1)
RBC # BLD AUTO: 2.8 M/UL (ref 4.7–6.1)
RBC # BLD AUTO: 2.83 M/UL (ref 4.7–6.1)
RBC # BLD AUTO: 2.84 M/UL (ref 4.7–6.1)
RBC # BLD AUTO: 2.85 M/UL (ref 4.7–6.1)
RBC # BLD AUTO: 2.88 M/UL (ref 4.7–6.1)
RBC # BLD AUTO: 2.89 M/UL (ref 4.7–6.1)
RBC # BLD AUTO: 2.9 M/UL (ref 4.7–6.1)
RBC # BLD AUTO: 2.9 M/UL (ref 4.7–6.1)
RBC # BLD AUTO: 2.91 M/UL (ref 4.7–6.1)
RBC # BLD AUTO: 2.94 M/UL (ref 4.7–6.1)
RBC # BLD AUTO: 2.95 M/UL (ref 4.7–6.1)
RBC # BLD AUTO: 2.96 M/UL (ref 4.7–6.1)
RBC # BLD AUTO: 2.99 M/UL (ref 4.7–6.1)
RBC # BLD AUTO: 3 M/UL (ref 4.7–6.1)
RBC # BLD AUTO: 3.02 M/UL (ref 4.7–6.1)
RBC # BLD AUTO: 3.03 M/UL (ref 4.7–6.1)
RBC # BLD AUTO: 3.04 M/UL (ref 4.7–6.1)
RBC # BLD AUTO: 3.04 M/UL (ref 4.7–6.1)
RBC # BLD AUTO: 3.05 M/UL (ref 4.7–6.1)
RBC # BLD AUTO: 3.07 M/UL (ref 4.7–6.1)
RBC # BLD AUTO: 3.07 M/UL (ref 4.7–6.1)
RBC # BLD AUTO: 3.08 M/UL (ref 4.7–6.1)
RBC # BLD AUTO: 3.11 M/UL (ref 4.7–6.1)
RBC # BLD AUTO: 3.13 M/UL (ref 4.7–6.1)
RBC # BLD AUTO: 3.16 M/UL (ref 4.7–6.1)
RBC # BLD AUTO: 3.17 M/UL (ref 4.7–6.1)
RBC # BLD AUTO: 3.17 M/UL (ref 4.7–6.1)
RBC # BLD AUTO: 3.22 M/UL (ref 4.7–6.1)
RBC # BLD AUTO: 3.22 M/UL (ref 4.7–6.1)
RBC # BLD AUTO: 3.42 M/UL (ref 4.7–6.1)
RBC # BLD AUTO: 3.45 M/UL (ref 4.7–6.1)
RBC # BLD AUTO: 3.45 M/UL (ref 4.7–6.1)
RBC # BLD AUTO: 3.49 M/UL (ref 4.7–6.1)
RBC # BLD AUTO: 3.74 M/UL (ref 4.7–6.1)
RBC # BLD AUTO: 3.9 M/UL (ref 4.7–6.1)
RBC # BLD AUTO: 3.92 M/UL (ref 4.7–6.1)
RBC # BLD AUTO: 4.03 M/UL (ref 4.7–6.1)
RBC # BLD AUTO: 4.11 M/UL (ref 4.7–6.1)
RBC # BLD AUTO: 4.15 M/UL (ref 4.7–6.1)
RBC # BLD AUTO: 4.19 M/UL (ref 4.7–6.1)
RBC # BLD AUTO: 4.37 M/UL (ref 4.7–6.1)
RBC # BLD AUTO: 4.43 M/UL (ref 4.7–6.1)
RBC # BLD AUTO: 4.43 M/UL (ref 4.7–6.1)
RBC # BLD AUTO: 4.46 M/UL (ref 4.7–6.1)
RBC # BLD AUTO: 4.48 M/UL (ref 4.7–6.1)
RBC # BLD AUTO: 4.62 M/UL (ref 4.7–6.1)
RBC # BLD AUTO: 4.8 M/UL (ref 4.7–6.1)
RBC # BLD AUTO: 4.94 M/UL (ref 4.7–6.1)
RBC # CSF: 13 CELLS/UL
RBC # URNS HPF: ABNORMAL /HPF
RBC BLD AUTO: NORMAL
RBC BLD AUTO: PRESENT
RBC UR QL AUTO: ABNORMAL
RETICS # AUTO: 0.07 M/UL (ref 0.04–0.06)
RETICS/RBC NFR: 2.4 % (ref 0.8–2.1)
RH BLD: NORMAL
RSV RNA SPEC QL NAA+PROBE: NEGATIVE
RSV RNA SPEC QL NAA+PROBE: NEGATIVE
S PNEUM DNA CSF QL NAA+NON-PROBE: NOT DETECTED
SAO2 % BLDA: 93.4 % (ref 93–99)
SAO2 % BLDA: 93.9 % (ref 93–99)
SAO2 % BLDA: 97 % (ref 93–99)
SAO2 % BLDA: 98 % (ref 93–99)
SARS-COV+SARS-COV-2 AG RESP QL IA.RAPID: NOTDETECTED
SARS-COV-2 RNA RESP QL NAA+PROBE: DETECTED
SARS-COV-2 RNA RESP QL NAA+PROBE: DETECTED
SCCMEC + MECA PNL NOSE NAA+PROBE: POSITIVE
SCHISTOCYTES BLD QL SMEAR: NORMAL
SIGNIFICANT IND 70042: ABNORMAL
SIGNIFICANT IND 70042: NORMAL
SITE SITE: ABNORMAL
SITE SITE: NORMAL
SODIUM SERPL-SCNC: 134 MMOL/L (ref 135–145)
SODIUM SERPL-SCNC: 135 MMOL/L (ref 135–145)
SODIUM SERPL-SCNC: 135 MMOL/L (ref 135–145)
SODIUM SERPL-SCNC: 136 MMOL/L (ref 135–145)
SODIUM SERPL-SCNC: 136 MMOL/L (ref 135–145)
SODIUM SERPL-SCNC: 137 MMOL/L (ref 135–145)
SODIUM SERPL-SCNC: 137 MMOL/L (ref 135–145)
SODIUM SERPL-SCNC: 138 MMOL/L (ref 135–145)
SODIUM SERPL-SCNC: 138 MMOL/L (ref 135–145)
SODIUM SERPL-SCNC: 139 MMOL/L (ref 135–145)
SODIUM SERPL-SCNC: 140 MMOL/L (ref 135–145)
SODIUM SERPL-SCNC: 141 MMOL/L (ref 135–145)
SODIUM SERPL-SCNC: 142 MMOL/L (ref 135–145)
SODIUM SERPL-SCNC: 143 MMOL/L (ref 135–145)
SODIUM SERPL-SCNC: 144 MMOL/L (ref 135–145)
SODIUM SERPL-SCNC: 145 MMOL/L (ref 135–145)
SODIUM SERPL-SCNC: 145 MMOL/L (ref 135–145)
SODIUM SERPL-SCNC: 146 MMOL/L (ref 135–145)
SODIUM SERPL-SCNC: 147 MMOL/L (ref 135–145)
SODIUM SERPL-SCNC: 148 MMOL/L (ref 135–145)
SODIUM SERPL-SCNC: 149 MMOL/L (ref 135–145)
SODIUM SERPL-SCNC: 149 MMOL/L (ref 135–145)
SODIUM SERPL-SCNC: 150 MMOL/L (ref 135–145)
SODIUM SERPL-SCNC: 151 MMOL/L (ref 135–145)
SODIUM SERPL-SCNC: 152 MMOL/L (ref 135–145)
SODIUM UR-SCNC: 26 MMOL/L
SOURCE SOURCE: ABNORMAL
SOURCE SOURCE: NORMAL
SP GR UR STRIP.AUTO: 1.01
SP GR UR STRIP.AUTO: 1.02
SP GR UR STRIP.AUTO: 1.02
SPECIMEN DRAWN FROM PATIENT: ABNORMAL
SPECIMEN SOURCE: ABNORMAL
SPECIMEN SOURCE: ABNORMAL
SPECIMEN SOURCE: NORMAL
SPECIMEN VOL CSF: 8 ML
T PALLIDUM AB SER QL IA: NORMAL
T3FREE SERPL-MCNC: 2.31 PG/ML (ref 2–4.4)
T4 FREE SERPL-MCNC: 1.47 NG/DL (ref 0.93–1.7)
T4 FREE SERPL-MCNC: 1.47 NG/DL (ref 0.93–1.7)
TACROLIMUS BLD-MCNC: 13.6 NG/ML
TACROLIMUS BLD-MCNC: 18.6 NG/ML
TACROLIMUS BLD-MCNC: 22.5 NG/ML
TACROLIMUS BLD-MCNC: 4.2 NG/ML
TACROLIMUS BLD-MCNC: 5.6 NG/ML
TACROLIMUS BLD-MCNC: 5.9 NG/ML
TACROLIMUS BLD-MCNC: 6.1 NG/ML
TACROLIMUS BLD-MCNC: 7.5 NG/ML
TACROLIMUS BLD-MCNC: 8.9 NG/ML
TEG ALGORITHM TGALG: ABNORMAL
TEG ALGORITHM TGALG: ABNORMAL
TEG ALGORITHM TGALG: NORMAL
TIBC SERPL-MCNC: 100 UG/DL (ref 250–450)
TOXIC GRANULES BLD QL SMEAR: NORMAL
TOXIC GRANULES BLD QL SMEAR: SLIGHT
TOXIC GRANULES BLD QL SMEAR: SLIGHT
TRANS CELLS #/AREA URNS HPF: ABNORMAL /HPF
TRIGL SERPL-MCNC: 124 MG/DL (ref 0–149)
TROPONIN T SERPL-MCNC: 112 NG/L (ref 6–19)
TROPONIN T SERPL-MCNC: 78 NG/L (ref 6–19)
TROPONIN T SERPL-MCNC: 87 NG/L (ref 6–19)
TSH SERPL DL<=0.005 MIU/L-ACNC: 0.16 UIU/ML (ref 0.38–5.33)
TSH SERPL DL<=0.005 MIU/L-ACNC: 0.46 UIU/ML (ref 0.38–5.33)
TUBE # CSF: 3
TUBE # CSF: 3
UIBC SERPL-MCNC: 89 UG/DL (ref 110–370)
UNIT STATUS USTAT: NORMAL
UROBILINOGEN UR STRIP.AUTO-MCNC: 0.2 MG/DL
VIT B12 SERPL-MCNC: 1437 PG/ML (ref 211–911)
VZV DNA CSF QL NAA+NON-PROBE: NOT DETECTED
WBC # BLD AUTO: 10 K/UL (ref 4.8–10.8)
WBC # BLD AUTO: 10.1 K/UL (ref 4.8–10.8)
WBC # BLD AUTO: 11 K/UL (ref 4.8–10.8)
WBC # BLD AUTO: 11.1 K/UL (ref 4.8–10.8)
WBC # BLD AUTO: 11.1 K/UL (ref 4.8–10.8)
WBC # BLD AUTO: 11.2 K/UL (ref 4.8–10.8)
WBC # BLD AUTO: 11.3 K/UL (ref 4.8–10.8)
WBC # BLD AUTO: 11.4 K/UL (ref 4.8–10.8)
WBC # BLD AUTO: 12.2 K/UL (ref 4.8–10.8)
WBC # BLD AUTO: 12.5 K/UL (ref 4.8–10.8)
WBC # BLD AUTO: 13 K/UL (ref 4.8–10.8)
WBC # BLD AUTO: 13.6 K/UL (ref 4.8–10.8)
WBC # BLD AUTO: 14.3 K/UL (ref 4.8–10.8)
WBC # BLD AUTO: 14.3 K/UL (ref 4.8–10.8)
WBC # BLD AUTO: 14.4 K/UL (ref 4.8–10.8)
WBC # BLD AUTO: 14.4 K/UL (ref 4.8–10.8)
WBC # BLD AUTO: 15.4 K/UL (ref 4.8–10.8)
WBC # BLD AUTO: 15.6 K/UL (ref 4.8–10.8)
WBC # BLD AUTO: 16.1 K/UL (ref 4.8–10.8)
WBC # BLD AUTO: 17.5 K/UL (ref 4.8–10.8)
WBC # BLD AUTO: 21 K/UL (ref 4.8–10.8)
WBC # BLD AUTO: 21.3 K/UL (ref 4.8–10.8)
WBC # BLD AUTO: 25.7 K/UL (ref 4.8–10.8)
WBC # BLD AUTO: 4.6 K/UL (ref 4.8–10.8)
WBC # BLD AUTO: 4.8 K/UL (ref 4.8–10.8)
WBC # BLD AUTO: 4.9 K/UL (ref 4.8–10.8)
WBC # BLD AUTO: 4.9 K/UL (ref 4.8–10.8)
WBC # BLD AUTO: 5 K/UL (ref 4.8–10.8)
WBC # BLD AUTO: 5.8 K/UL (ref 4.8–10.8)
WBC # BLD AUTO: 5.8 K/UL (ref 4.8–10.8)
WBC # BLD AUTO: 5.9 K/UL (ref 4.8–10.8)
WBC # BLD AUTO: 6 K/UL (ref 4.8–10.8)
WBC # BLD AUTO: 6.2 K/UL (ref 4.8–10.8)
WBC # BLD AUTO: 6.7 K/UL (ref 4.8–10.8)
WBC # BLD AUTO: 6.9 K/UL (ref 4.8–10.8)
WBC # BLD AUTO: 7 K/UL (ref 4.8–10.8)
WBC # BLD AUTO: 7.1 K/UL (ref 4.8–10.8)
WBC # BLD AUTO: 7.1 K/UL (ref 4.8–10.8)
WBC # BLD AUTO: 7.3 K/UL (ref 4.8–10.8)
WBC # BLD AUTO: 7.3 K/UL (ref 4.8–10.8)
WBC # BLD AUTO: 7.4 K/UL (ref 4.8–10.8)
WBC # BLD AUTO: 7.6 K/UL (ref 4.8–10.8)
WBC # BLD AUTO: 7.6 K/UL (ref 4.8–10.8)
WBC # BLD AUTO: 8 K/UL (ref 4.8–10.8)
WBC # BLD AUTO: 8.1 K/UL (ref 4.8–10.8)
WBC # BLD AUTO: 8.3 K/UL (ref 4.8–10.8)
WBC # BLD AUTO: 8.4 K/UL (ref 4.8–10.8)
WBC # BLD AUTO: 8.6 K/UL (ref 4.8–10.8)
WBC # BLD AUTO: 8.7 K/UL (ref 4.8–10.8)
WBC # BLD AUTO: 9.1 K/UL (ref 4.8–10.8)
WBC # BLD AUTO: 9.2 K/UL (ref 4.8–10.8)
WBC # BLD AUTO: 9.3 K/UL (ref 4.8–10.8)
WBC # BLD AUTO: 9.3 K/UL (ref 4.8–10.8)
WBC # BLD AUTO: 9.5 K/UL (ref 4.8–10.8)
WBC # BLD AUTO: 9.6 K/UL (ref 4.8–10.8)
WBC # BLD AUTO: 9.6 K/UL (ref 4.8–10.8)
WBC # BLD AUTO: 9.7 K/UL (ref 4.8–10.8)
WBC # CSF: 1 CELLS/UL (ref 0–10)
WBC #/AREA URNS HPF: ABNORMAL /HPF

## 2022-01-01 PROCEDURE — 99153 MOD SED SAME PHYS/QHP EA: CPT

## 2022-01-01 PROCEDURE — 97535 SELF CARE MNGMENT TRAINING: CPT

## 2022-01-01 PROCEDURE — 700102 HCHG RX REV CODE 250 W/ 637 OVERRIDE(OP): Performed by: INTERNAL MEDICINE

## 2022-01-01 PROCEDURE — A9270 NON-COVERED ITEM OR SERVICE: HCPCS | Performed by: INTERNAL MEDICINE

## 2022-01-01 PROCEDURE — 700111 HCHG RX REV CODE 636 W/ 250 OVERRIDE (IP): Performed by: STUDENT IN AN ORGANIZED HEALTH CARE EDUCATION/TRAINING PROGRAM

## 2022-01-01 PROCEDURE — 700101 HCHG RX REV CODE 250: Performed by: INTERNAL MEDICINE

## 2022-01-01 PROCEDURE — 700111 HCHG RX REV CODE 636 W/ 250 OVERRIDE (IP): Performed by: HOSPITALIST

## 2022-01-01 PROCEDURE — 83630 LACTOFERRIN FECAL (QUAL): CPT

## 2022-01-01 PROCEDURE — 700111 HCHG RX REV CODE 636 W/ 250 OVERRIDE (IP): Performed by: INTERNAL MEDICINE

## 2022-01-01 PROCEDURE — A9270 NON-COVERED ITEM OR SERVICE: HCPCS | Performed by: STUDENT IN AN ORGANIZED HEALTH CARE EDUCATION/TRAINING PROGRAM

## 2022-01-01 PROCEDURE — 82962 GLUCOSE BLOOD TEST: CPT

## 2022-01-01 PROCEDURE — 700101 HCHG RX REV CODE 250: Performed by: HOSPITALIST

## 2022-01-01 PROCEDURE — 99233 SBSQ HOSP IP/OBS HIGH 50: CPT | Performed by: HOSPITALIST

## 2022-01-01 PROCEDURE — 700105 HCHG RX REV CODE 258: Performed by: STUDENT IN AN ORGANIZED HEALTH CARE EDUCATION/TRAINING PROGRAM

## 2022-01-01 PROCEDURE — 700102 HCHG RX REV CODE 250 W/ 637 OVERRIDE(OP): Performed by: HOSPITALIST

## 2022-01-01 PROCEDURE — 36415 COLL VENOUS BLD VENIPUNCTURE: CPT

## 2022-01-01 PROCEDURE — 99233 SBSQ HOSP IP/OBS HIGH 50: CPT | Performed by: STUDENT IN AN ORGANIZED HEALTH CARE EDUCATION/TRAINING PROGRAM

## 2022-01-01 PROCEDURE — A9270 NON-COVERED ITEM OR SERVICE: HCPCS | Performed by: HOSPITALIST

## 2022-01-01 PROCEDURE — 51798 US URINE CAPACITY MEASURE: CPT

## 2022-01-01 PROCEDURE — 83540 ASSAY OF IRON: CPT

## 2022-01-01 PROCEDURE — 84100 ASSAY OF PHOSPHORUS: CPT

## 2022-01-01 PROCEDURE — 700101 HCHG RX REV CODE 250: Performed by: ANESTHESIOLOGY

## 2022-01-01 PROCEDURE — 700105 HCHG RX REV CODE 258: Performed by: INTERNAL MEDICINE

## 2022-01-01 PROCEDURE — 84300 ASSAY OF URINE SODIUM: CPT

## 2022-01-01 PROCEDURE — 87045 FECES CULTURE AEROBIC BACT: CPT

## 2022-01-01 PROCEDURE — 160035 HCHG PACU - 1ST 60 MINS PHASE I: Performed by: INTERNAL MEDICINE

## 2022-01-01 PROCEDURE — 700111 HCHG RX REV CODE 636 W/ 250 OVERRIDE (IP): Performed by: NURSE PRACTITIONER

## 2022-01-01 PROCEDURE — 83880 ASSAY OF NATRIURETIC PEPTIDE: CPT

## 2022-01-01 PROCEDURE — 700101 HCHG RX REV CODE 250: Performed by: STUDENT IN AN ORGANIZED HEALTH CARE EDUCATION/TRAINING PROGRAM

## 2022-01-01 PROCEDURE — 770022 HCHG ROOM/CARE - ICU (200)

## 2022-01-01 PROCEDURE — 86923 COMPATIBILITY TEST ELECTRIC: CPT

## 2022-01-01 PROCEDURE — 85014 HEMATOCRIT: CPT

## 2022-01-01 PROCEDURE — 85025 COMPLETE CBC W/AUTO DIFF WBC: CPT

## 2022-01-01 PROCEDURE — 99233 SBSQ HOSP IP/OBS HIGH 50: CPT | Performed by: INTERNAL MEDICINE

## 2022-01-01 PROCEDURE — 85025 COMPLETE CBC W/AUTO DIFF WBC: CPT | Mod: 91

## 2022-01-01 PROCEDURE — 80197 ASSAY OF TACROLIMUS: CPT

## 2022-01-01 PROCEDURE — 95819 EEG AWAKE AND ASLEEP: CPT | Performed by: STUDENT IN AN ORGANIZED HEALTH CARE EDUCATION/TRAINING PROGRAM

## 2022-01-01 PROCEDURE — 700102 HCHG RX REV CODE 250 W/ 637 OVERRIDE(OP): Performed by: STUDENT IN AN ORGANIZED HEALTH CARE EDUCATION/TRAINING PROGRAM

## 2022-01-01 PROCEDURE — 700102 HCHG RX REV CODE 250 W/ 637 OVERRIDE(OP): Performed by: NURSE PRACTITIONER

## 2022-01-01 PROCEDURE — 70450 CT HEAD/BRAIN W/O DYE: CPT | Mod: ME

## 2022-01-01 PROCEDURE — 99232 SBSQ HOSP IP/OBS MODERATE 35: CPT | Performed by: GENERAL PRACTICE

## 2022-01-01 PROCEDURE — 83735 ASSAY OF MAGNESIUM: CPT

## 2022-01-01 PROCEDURE — 80053 COMPREHEN METABOLIC PANEL: CPT

## 2022-01-01 PROCEDURE — 87205 SMEAR GRAM STAIN: CPT

## 2022-01-01 PROCEDURE — 85007 BL SMEAR W/DIFF WBC COUNT: CPT

## 2022-01-01 PROCEDURE — 97530 THERAPEUTIC ACTIVITIES: CPT

## 2022-01-01 PROCEDURE — 36556 INSERT NON-TUNNEL CV CATH: CPT

## 2022-01-01 PROCEDURE — 93010 ELECTROCARDIOGRAM REPORT: CPT | Performed by: INTERNAL MEDICINE

## 2022-01-01 PROCEDURE — 86255 FLUORESCENT ANTIBODY SCREEN: CPT

## 2022-01-01 PROCEDURE — 82607 VITAMIN B-12: CPT

## 2022-01-01 PROCEDURE — 83605 ASSAY OF LACTIC ACID: CPT

## 2022-01-01 PROCEDURE — 93005 ELECTROCARDIOGRAM TRACING: CPT | Performed by: NURSE PRACTITIONER

## 2022-01-01 PROCEDURE — 74175 CTA ABDOMEN W/CONTRAST: CPT | Mod: MG

## 2022-01-01 PROCEDURE — 97530 THERAPEUTIC ACTIVITIES: CPT | Mod: CQ

## 2022-01-01 PROCEDURE — 99232 SBSQ HOSP IP/OBS MODERATE 35: CPT | Performed by: NURSE PRACTITIONER

## 2022-01-01 PROCEDURE — 86140 C-REACTIVE PROTEIN: CPT

## 2022-01-01 PROCEDURE — 99221 1ST HOSP IP/OBS SF/LOW 40: CPT | Performed by: STUDENT IN AN ORGANIZED HEALTH CARE EDUCATION/TRAINING PROGRAM

## 2022-01-01 PROCEDURE — 85610 PROTHROMBIN TIME: CPT

## 2022-01-01 PROCEDURE — 83516 IMMUNOASSAY NONANTIBODY: CPT | Mod: 91

## 2022-01-01 PROCEDURE — 770001 HCHG ROOM/CARE - MED/SURG/GYN PRIV*

## 2022-01-01 PROCEDURE — 700105 HCHG RX REV CODE 258: Performed by: EMERGENCY MEDICINE

## 2022-01-01 PROCEDURE — C9113 INJ PANTOPRAZOLE SODIUM, VIA: HCPCS | Performed by: STUDENT IN AN ORGANIZED HEALTH CARE EDUCATION/TRAINING PROGRAM

## 2022-01-01 PROCEDURE — 700102 HCHG RX REV CODE 250 W/ 637 OVERRIDE(OP): Performed by: EMERGENCY MEDICINE

## 2022-01-01 PROCEDURE — 85384 FIBRINOGEN ACTIVITY: CPT

## 2022-01-01 PROCEDURE — 86901 BLOOD TYPING SEROLOGIC RH(D): CPT

## 2022-01-01 PROCEDURE — 99157 MOD SED OTHER PHYS/QHP EA: CPT | Mod: 59 | Performed by: INTERNAL MEDICINE

## 2022-01-01 PROCEDURE — 88300 SURGICAL PATH GROSS: CPT

## 2022-01-01 PROCEDURE — 99223 1ST HOSP IP/OBS HIGH 75: CPT | Mod: FS | Performed by: NURSE PRACTITIONER

## 2022-01-01 PROCEDURE — 70551 MRI BRAIN STEM W/O DYE: CPT | Mod: MC

## 2022-01-01 PROCEDURE — C9113 INJ PANTOPRAZOLE SODIUM, VIA: HCPCS | Performed by: NURSE PRACTITIONER

## 2022-01-01 PROCEDURE — C9803 HOPD COVID-19 SPEC COLLECT: HCPCS | Performed by: EMERGENCY MEDICINE

## 2022-01-01 PROCEDURE — 87899 AGENT NOS ASSAY W/OPTIC: CPT

## 2022-01-01 PROCEDURE — 85046 RETICYTE/HGB CONCENTRATE: CPT

## 2022-01-01 PROCEDURE — 93005 ELECTROCARDIOGRAM TRACING: CPT | Performed by: EMERGENCY MEDICINE

## 2022-01-01 PROCEDURE — 700101 HCHG RX REV CODE 250: Performed by: NURSE PRACTITIONER

## 2022-01-01 PROCEDURE — 02HV33Z INSERTION OF INFUSION DEVICE INTO SUPERIOR VENA CAVA, PERCUTANEOUS APPROACH: ICD-10-PCS | Performed by: INTERNAL MEDICINE

## 2022-01-01 PROCEDURE — 84145 PROCALCITONIN (PCT): CPT

## 2022-01-01 PROCEDURE — 84100 ASSAY OF PHOSPHORUS: CPT | Mod: 91

## 2022-01-01 PROCEDURE — 92526 ORAL FUNCTION THERAPY: CPT

## 2022-01-01 PROCEDURE — 770020 HCHG ROOM/CARE - TELE (206)

## 2022-01-01 PROCEDURE — 97162 PT EVAL MOD COMPLEX 30 MIN: CPT

## 2022-01-01 PROCEDURE — 88346 IMFLUOR 1ST 1ANTB STAIN PX: CPT

## 2022-01-01 PROCEDURE — 94760 N-INVAS EAR/PLS OXIMETRY 1: CPT

## 2022-01-01 PROCEDURE — 30283B1 TRANSFUSION OF NONAUTOLOGOUS 4-FACTOR PROTHROMBIN COMPLEX CONCENTRATE INTO VEIN, PERCUTANEOUS APPROACH: ICD-10-PCS | Performed by: HOSPITALIST

## 2022-01-01 PROCEDURE — 82962 GLUCOSE BLOOD TEST: CPT | Mod: 91

## 2022-01-01 PROCEDURE — 97112 NEUROMUSCULAR REEDUCATION: CPT

## 2022-01-01 PROCEDURE — 99232 SBSQ HOSP IP/OBS MODERATE 35: CPT | Performed by: STUDENT IN AN ORGANIZED HEALTH CARE EDUCATION/TRAINING PROGRAM

## 2022-01-01 PROCEDURE — C9113 INJ PANTOPRAZOLE SODIUM, VIA: HCPCS | Performed by: HOSPITALIST

## 2022-01-01 PROCEDURE — 82140 ASSAY OF AMMONIA: CPT

## 2022-01-01 PROCEDURE — 87086 URINE CULTURE/COLONY COUNT: CPT

## 2022-01-01 PROCEDURE — 99291 CRITICAL CARE FIRST HOUR: CPT | Performed by: INTERNAL MEDICINE

## 2022-01-01 PROCEDURE — 83036 HEMOGLOBIN GLYCOSYLATED A1C: CPT | Mod: GA

## 2022-01-01 PROCEDURE — 80048 BASIC METABOLIC PNL TOTAL CA: CPT

## 2022-01-01 PROCEDURE — A9270 NON-COVERED ITEM OR SERVICE: HCPCS | Performed by: EMERGENCY MEDICINE

## 2022-01-01 PROCEDURE — 99292 CRITICAL CARE ADDL 30 MIN: CPT | Mod: 25 | Performed by: INTERNAL MEDICINE

## 2022-01-01 PROCEDURE — 85652 RBC SED RATE AUTOMATED: CPT

## 2022-01-01 PROCEDURE — 87040 BLOOD CULTURE FOR BACTERIA: CPT

## 2022-01-01 PROCEDURE — 85027 COMPLETE CBC AUTOMATED: CPT

## 2022-01-01 PROCEDURE — 99232 SBSQ HOSP IP/OBS MODERATE 35: CPT | Performed by: INTERNAL MEDICINE

## 2022-01-01 PROCEDURE — A9560 TC99M LABELED RBC: HCPCS

## 2022-01-01 PROCEDURE — 30233N1 TRANSFUSION OF NONAUTOLOGOUS RED BLOOD CELLS INTO PERIPHERAL VEIN, PERCUTANEOUS APPROACH: ICD-10-PCS | Performed by: HOSPITALIST

## 2022-01-01 PROCEDURE — 85730 THROMBOPLASTIN TIME PARTIAL: CPT

## 2022-01-01 PROCEDURE — 85347 COAGULATION TIME ACTIVATED: CPT

## 2022-01-01 PROCEDURE — 88313 SPECIAL STAINS GROUP 2: CPT | Mod: 91

## 2022-01-01 PROCEDURE — 84157 ASSAY OF PROTEIN OTHER: CPT

## 2022-01-01 PROCEDURE — 36430 TRANSFUSION BLD/BLD COMPNT: CPT

## 2022-01-01 PROCEDURE — 87077 CULTURE AEROBIC IDENTIFY: CPT

## 2022-01-01 PROCEDURE — 87426 SARSCOV CORONAVIRUS AG IA: CPT

## 2022-01-01 PROCEDURE — 700105 HCHG RX REV CODE 258: Performed by: HOSPITALIST

## 2022-01-01 PROCEDURE — 84134 ASSAY OF PREALBUMIN: CPT

## 2022-01-01 PROCEDURE — 82784 ASSAY IGA/IGD/IGG/IGM EACH: CPT

## 2022-01-01 PROCEDURE — 80069 RENAL FUNCTION PANEL: CPT

## 2022-01-01 PROCEDURE — 87040 BLOOD CULTURE FOR BACTERIA: CPT | Mod: 91

## 2022-01-01 PROCEDURE — 87493 C DIFF AMPLIFIED PROBE: CPT

## 2022-01-01 PROCEDURE — 83970 ASSAY OF PARATHORMONE: CPT

## 2022-01-01 PROCEDURE — 503369 HCHG GOLDPROBE, 7 FR: Performed by: INTERNAL MEDICINE

## 2022-01-01 PROCEDURE — 84443 ASSAY THYROID STIM HORMONE: CPT

## 2022-01-01 PROCEDURE — 86341 ISLET CELL ANTIBODY: CPT

## 2022-01-01 PROCEDURE — 86162 COMPLEMENT TOTAL (CH50): CPT

## 2022-01-01 PROCEDURE — 86160 COMPLEMENT ANTIGEN: CPT

## 2022-01-01 PROCEDURE — C9113 INJ PANTOPRAZOLE SODIUM, VIA: HCPCS | Performed by: INTERNAL MEDICINE

## 2022-01-01 PROCEDURE — 99285 EMERGENCY DEPT VISIT HI MDM: CPT

## 2022-01-01 PROCEDURE — 99232 SBSQ HOSP IP/OBS MODERATE 35: CPT | Performed by: HOSPITALIST

## 2022-01-01 PROCEDURE — 84295 ASSAY OF SERUM SODIUM: CPT

## 2022-01-01 PROCEDURE — A9270 NON-COVERED ITEM OR SERVICE: HCPCS | Performed by: NURSE PRACTITIONER

## 2022-01-01 PROCEDURE — 93308 TTE F-UP OR LMTD: CPT

## 2022-01-01 PROCEDURE — 88342 IMHCHEM/IMCYTCHM 1ST ANTB: CPT

## 2022-01-01 PROCEDURE — 700111 HCHG RX REV CODE 636 W/ 250 OVERRIDE (IP)

## 2022-01-01 PROCEDURE — 160202 HCHG ENDO MINUTES - 1ST 30 MINS LEVEL 3: Performed by: INTERNAL MEDICINE

## 2022-01-01 PROCEDURE — 84481 FREE ASSAY (FT-3): CPT

## 2022-01-01 PROCEDURE — G0378 HOSPITAL OBSERVATION PER HR: HCPCS

## 2022-01-01 PROCEDURE — 81001 URINALYSIS AUTO W/SCOPE: CPT

## 2022-01-01 PROCEDURE — 82803 BLOOD GASES ANY COMBINATION: CPT

## 2022-01-01 PROCEDURE — 70450 CT HEAD/BRAIN W/O DYE: CPT | Mod: MC

## 2022-01-01 PROCEDURE — 700111 HCHG RX REV CODE 636 W/ 250 OVERRIDE (IP): Mod: JG | Performed by: INTERNAL MEDICINE

## 2022-01-01 PROCEDURE — 83036 HEMOGLOBIN GLYCOSYLATED A1C: CPT

## 2022-01-01 PROCEDURE — 0W3P8ZZ CONTROL BLEEDING IN GASTROINTESTINAL TRACT, VIA NATURAL OR ARTIFICIAL OPENING ENDOSCOPIC: ICD-10-PCS | Performed by: INTERNAL MEDICINE

## 2022-01-01 PROCEDURE — 99233 SBSQ HOSP IP/OBS HIGH 50: CPT | Mod: 25 | Performed by: HOSPITALIST

## 2022-01-01 PROCEDURE — 93005 ELECTROCARDIOGRAM TRACING: CPT | Performed by: HOSPITALIST

## 2022-01-01 PROCEDURE — 93306 TTE W/DOPPLER COMPLETE: CPT | Mod: 26 | Performed by: INTERNAL MEDICINE

## 2022-01-01 PROCEDURE — P9016 RBC LEUKOCYTES REDUCED: HCPCS

## 2022-01-01 PROCEDURE — 160048 HCHG OR STATISTICAL LEVEL 1-5: Performed by: INTERNAL MEDICINE

## 2022-01-01 PROCEDURE — 93970 EXTREMITY STUDY: CPT

## 2022-01-01 PROCEDURE — 86900 BLOOD TYPING SEROLOGIC ABO: CPT

## 2022-01-01 PROCEDURE — 82550 ASSAY OF CK (CPK): CPT

## 2022-01-01 PROCEDURE — 82945 GLUCOSE OTHER FLUID: CPT

## 2022-01-01 PROCEDURE — 99152 MOD SED SAME PHYS/QHP 5/>YRS: CPT

## 2022-01-01 PROCEDURE — 86850 RBC ANTIBODY SCREEN: CPT

## 2022-01-01 PROCEDURE — 36556 INSERT NON-TUNNEL CV CATH: CPT | Mod: LT | Performed by: NURSE PRACTITIONER

## 2022-01-01 PROCEDURE — 86923 COMPATIBILITY TEST ELECTRIC: CPT | Mod: 91

## 2022-01-01 PROCEDURE — 700105 HCHG RX REV CODE 258: Performed by: GENERAL PRACTICE

## 2022-01-01 PROCEDURE — 90935 HEMODIALYSIS ONE EVALUATION: CPT

## 2022-01-01 PROCEDURE — 82941 ASSAY OF GASTRIN: CPT

## 2022-01-01 PROCEDURE — 83550 IRON BINDING TEST: CPT

## 2022-01-01 PROCEDURE — 99239 HOSP IP/OBS DSCHRG MGMT >30: CPT | Performed by: INTERNAL MEDICINE

## 2022-01-01 PROCEDURE — 76775 US EXAM ABDO BACK WALL LIM: CPT

## 2022-01-01 PROCEDURE — 82306 VITAMIN D 25 HYDROXY: CPT

## 2022-01-01 PROCEDURE — 0241U HCHG SARS-COV-2 COVID-19 NFCT DS RESP RNA 4 TRGT MIC: CPT

## 2022-01-01 PROCEDURE — 160009 HCHG ANES TIME/MIN: Performed by: INTERNAL MEDICINE

## 2022-01-01 PROCEDURE — 85018 HEMOGLOBIN: CPT

## 2022-01-01 PROCEDURE — 96374 THER/PROPH/DIAG INJ IV PUSH: CPT

## 2022-01-01 PROCEDURE — 88348 ELECTRON MICROSCOPY DX: CPT

## 2022-01-01 PROCEDURE — 80180 DRUG SCRN QUAN MYCOPHENOLATE: CPT

## 2022-01-01 PROCEDURE — 82272 OCCULT BLD FECES 1-3 TESTS: CPT

## 2022-01-01 PROCEDURE — 84165 PROTEIN E-PHORESIS SERUM: CPT

## 2022-01-01 PROCEDURE — 99291 CRITICAL CARE FIRST HOUR: CPT | Mod: 25,GC | Performed by: PSYCHIATRY & NEUROLOGY

## 2022-01-01 PROCEDURE — 71045 X-RAY EXAM CHEST 1 VIEW: CPT

## 2022-01-01 PROCEDURE — 96372 THER/PROPH/DIAG INJ SC/IM: CPT

## 2022-01-01 PROCEDURE — 89051 BODY FLUID CELL COUNT: CPT

## 2022-01-01 PROCEDURE — 83735 ASSAY OF MAGNESIUM: CPT | Mod: 91

## 2022-01-01 PROCEDURE — 92523 SPEECH SOUND LANG COMPREHEN: CPT

## 2022-01-01 PROCEDURE — 99223 1ST HOSP IP/OBS HIGH 75: CPT | Performed by: INTERNAL MEDICINE

## 2022-01-01 PROCEDURE — 99152 MOD SED SAME PHYS/QHP 5/>YRS: CPT | Performed by: HOSPITALIST

## 2022-01-01 PROCEDURE — A9270 NON-COVERED ITEM OR SERVICE: HCPCS | Performed by: GENERAL PRACTICE

## 2022-01-01 PROCEDURE — 84484 ASSAY OF TROPONIN QUANT: CPT

## 2022-01-01 PROCEDURE — 99223 1ST HOSP IP/OBS HIGH 75: CPT | Performed by: PHYSICAL MEDICINE & REHABILITATION

## 2022-01-01 PROCEDURE — 86780 TREPONEMA PALLIDUM: CPT

## 2022-01-01 PROCEDURE — 36600 WITHDRAWAL OF ARTERIAL BLOOD: CPT

## 2022-01-01 PROCEDURE — 4A10X4Z MONITORING OF CENTRAL NERVOUS ELECTRICAL ACTIVITY, EXTERNAL APPROACH: ICD-10-PCS | Performed by: STUDENT IN AN ORGANIZED HEALTH CARE EDUCATION/TRAINING PROGRAM

## 2022-01-01 PROCEDURE — C1752 CATH,HEMODIALYSIS,SHORT-TERM: HCPCS

## 2022-01-01 PROCEDURE — 85610 PROTHROMBIN TIME: CPT | Mod: 91

## 2022-01-01 PROCEDURE — 160002 HCHG RECOVERY MINUTES (STAT): Performed by: INTERNAL MEDICINE

## 2022-01-01 PROCEDURE — 770000 HCHG ROOM/CARE - INTERMEDIATE ICU *

## 2022-01-01 PROCEDURE — 99233 SBSQ HOSP IP/OBS HIGH 50: CPT | Performed by: GENERAL PRACTICE

## 2022-01-01 PROCEDURE — 700102 HCHG RX REV CODE 250 W/ 637 OVERRIDE(OP): Performed by: GENERAL PRACTICE

## 2022-01-01 PROCEDURE — 700101 HCHG RX REV CODE 250: Performed by: EMERGENCY MEDICINE

## 2022-01-01 PROCEDURE — 0TB03ZX EXCISION OF RIGHT KIDNEY, PERCUTANEOUS APPROACH, DIAGNOSTIC: ICD-10-PCS | Performed by: RADIOLOGY

## 2022-01-01 PROCEDURE — 62270 DX LMBR SPI PNXR: CPT | Performed by: HOSPITALIST

## 2022-01-01 PROCEDURE — 36620 INSERTION CATHETER ARTERY: CPT

## 2022-01-01 PROCEDURE — 4410012 CT-NEEDLE BX-RENAL

## 2022-01-01 PROCEDURE — 700111 HCHG RX REV CODE 636 W/ 250 OVERRIDE (IP): Performed by: EMERGENCY MEDICINE

## 2022-01-01 PROCEDURE — 85576 BLOOD PLATELET AGGREGATION: CPT

## 2022-01-01 PROCEDURE — 36556 INSERT NON-TUNNEL CV CATH: CPT | Mod: RT | Performed by: INTERNAL MEDICINE

## 2022-01-01 PROCEDURE — 96365 THER/PROPH/DIAG IV INF INIT: CPT

## 2022-01-01 PROCEDURE — 85576 BLOOD PLATELET AGGREGATION: CPT | Mod: 91

## 2022-01-01 PROCEDURE — 83916 OLIGOCLONAL BANDS: CPT

## 2022-01-01 PROCEDURE — 82728 ASSAY OF FERRITIN: CPT

## 2022-01-01 PROCEDURE — 700111 HCHG RX REV CODE 636 W/ 250 OVERRIDE (IP): Performed by: GENERAL PRACTICE

## 2022-01-01 PROCEDURE — 87186 SC STD MICRODIL/AGAR DIL: CPT

## 2022-01-01 PROCEDURE — 76776 US EXAM K TRANSPL W/DOPPLER: CPT

## 2022-01-01 PROCEDURE — 96375 TX/PRO/DX INJ NEW DRUG ADDON: CPT

## 2022-01-01 PROCEDURE — 96366 THER/PROPH/DIAG IV INF ADDON: CPT

## 2022-01-01 PROCEDURE — 74176 CT ABD & PELVIS W/O CONTRAST: CPT | Mod: MG

## 2022-01-01 PROCEDURE — 700105 HCHG RX REV CODE 258

## 2022-01-01 PROCEDURE — 93308 TTE F-UP OR LMTD: CPT | Mod: 26 | Performed by: INTERNAL MEDICINE

## 2022-01-01 PROCEDURE — 700111 HCHG RX REV CODE 636 W/ 250 OVERRIDE (IP): Mod: JG | Performed by: STUDENT IN AN ORGANIZED HEALTH CARE EDUCATION/TRAINING PROGRAM

## 2022-01-01 PROCEDURE — 82330 ASSAY OF CALCIUM: CPT

## 2022-01-01 PROCEDURE — 99156 MOD SED OTH PHYS/QHP 5/>YRS: CPT | Mod: 59 | Performed by: INTERNAL MEDICINE

## 2022-01-01 PROCEDURE — 92610 EVALUATE SWALLOWING FUNCTION: CPT

## 2022-01-01 PROCEDURE — 86334 IMMUNOFIX E-PHORESIS SERUM: CPT

## 2022-01-01 PROCEDURE — 36620 INSERTION CATHETER ARTERY: CPT | Performed by: INTERNAL MEDICINE

## 2022-01-01 PROCEDURE — 87641 MR-STAPH DNA AMP PROBE: CPT

## 2022-01-01 PROCEDURE — 88305 TISSUE EXAM BY PATHOLOGIST: CPT

## 2022-01-01 PROCEDURE — 97166 OT EVAL MOD COMPLEX 45 MIN: CPT

## 2022-01-01 PROCEDURE — 82570 ASSAY OF URINE CREATININE: CPT

## 2022-01-01 PROCEDURE — P9016 RBC LEUKOCYTES REDUCED: HCPCS | Mod: 91

## 2022-01-01 PROCEDURE — 99291 CRITICAL CARE FIRST HOUR: CPT | Mod: GC | Performed by: PSYCHIATRY & NEUROLOGY

## 2022-01-01 PROCEDURE — 86706 HEP B SURFACE ANTIBODY: CPT

## 2022-01-01 PROCEDURE — 700105 HCHG RX REV CODE 258: Performed by: PSYCHIATRY & NEUROLOGY

## 2022-01-01 PROCEDURE — 88350 IMFLUOR EA ADDL 1ANTB STN PX: CPT

## 2022-01-01 PROCEDURE — 74018 RADEX ABDOMEN 1 VIEW: CPT

## 2022-01-01 PROCEDURE — 93306 TTE W/DOPPLER COMPLETE: CPT

## 2022-01-01 PROCEDURE — 700101 HCHG RX REV CODE 250: Performed by: PSYCHIATRY & NEUROLOGY

## 2022-01-01 PROCEDURE — 87483 CNS DNA AMP PROBE TYPE 12-25: CPT

## 2022-01-01 PROCEDURE — 93005 ELECTROCARDIOGRAM TRACING: CPT

## 2022-01-01 PROCEDURE — 009U3ZX DRAINAGE OF SPINAL CANAL, PERCUTANEOUS APPROACH, DIAGNOSTIC: ICD-10-PCS | Performed by: HOSPITALIST

## 2022-01-01 PROCEDURE — 95819 EEG AWAKE AND ASLEEP: CPT | Mod: 26 | Performed by: STUDENT IN AN ORGANIZED HEALTH CARE EDUCATION/TRAINING PROGRAM

## 2022-01-01 PROCEDURE — 93005 ELECTROCARDIOGRAM TRACING: CPT | Performed by: INTERNAL MEDICINE

## 2022-01-01 PROCEDURE — 87324 CLOSTRIDIUM AG IA: CPT

## 2022-01-01 PROCEDURE — 99223 1ST HOSP IP/OBS HIGH 75: CPT | Mod: AI | Performed by: INTERNAL MEDICINE

## 2022-01-01 PROCEDURE — 99223 1ST HOSP IP/OBS HIGH 75: CPT | Performed by: STUDENT IN AN ORGANIZED HEALTH CARE EDUCATION/TRAINING PROGRAM

## 2022-01-01 PROCEDURE — 99223 1ST HOSP IP/OBS HIGH 75: CPT | Mod: AI | Performed by: STUDENT IN AN ORGANIZED HEALTH CARE EDUCATION/TRAINING PROGRAM

## 2022-01-01 PROCEDURE — 80074 ACUTE HEPATITIS PANEL: CPT

## 2022-01-01 PROCEDURE — C1751 CATH, INF, PER/CENT/MIDLINE: HCPCS

## 2022-01-01 PROCEDURE — 87070 CULTURE OTHR SPECIMN AEROBIC: CPT

## 2022-01-01 PROCEDURE — 94799 UNLISTED PULMONARY SVC/PX: CPT

## 2022-01-01 PROCEDURE — 84439 ASSAY OF FREE THYROXINE: CPT

## 2022-01-01 PROCEDURE — 99291 CRITICAL CARE FIRST HOUR: CPT | Mod: 25 | Performed by: INTERNAL MEDICINE

## 2022-01-01 PROCEDURE — 84156 ASSAY OF PROTEIN URINE: CPT

## 2022-01-01 PROCEDURE — 84155 ASSAY OF PROTEIN SERUM: CPT

## 2022-01-01 PROCEDURE — 82533 TOTAL CORTISOL: CPT

## 2022-01-01 PROCEDURE — 80061 LIPID PANEL: CPT

## 2022-01-01 PROCEDURE — 70450 CT HEAD/BRAIN W/O DYE: CPT

## 2022-01-01 PROCEDURE — 700117 HCHG RX CONTRAST REV CODE 255: Performed by: NURSE PRACTITIONER

## 2022-01-01 PROCEDURE — 80076 HEPATIC FUNCTION PANEL: CPT

## 2022-01-01 PROCEDURE — 87077 CULTURE AEROBIC IDENTIFY: CPT | Mod: 91

## 2022-01-01 PROCEDURE — 83605 ASSAY OF LACTIC ACID: CPT | Mod: 91

## 2022-01-01 PROCEDURE — 700111 HCHG RX REV CODE 636 W/ 250 OVERRIDE (IP): Performed by: ANESTHESIOLOGY

## 2022-01-01 PROCEDURE — 99291 CRITICAL CARE FIRST HOUR: CPT

## 2022-01-01 PROCEDURE — C9113 INJ PANTOPRAZOLE SODIUM, VIA: HCPCS | Performed by: EMERGENCY MEDICINE

## 2022-01-01 PROCEDURE — 99239 HOSP IP/OBS DSCHRG MGMT >30: CPT | Performed by: HOSPITALIST

## 2022-01-01 PROCEDURE — 70551 MRI BRAIN STEM W/O DYE: CPT | Mod: ME

## 2022-01-01 PROCEDURE — 99236 HOSP IP/OBS SAME DATE HI 85: CPT | Performed by: STUDENT IN AN ORGANIZED HEALTH CARE EDUCATION/TRAINING PROGRAM

## 2022-01-01 RX ORDER — AMLODIPINE BESYLATE 5 MG/1
5 TABLET ORAL DAILY
Qty: 30 TABLET | Status: SHIPPED
Start: 2022-01-01 | End: 2022-01-01

## 2022-01-01 RX ORDER — OMEPRAZOLE 20 MG/1
20 CAPSULE, DELAYED RELEASE ORAL 2 TIMES DAILY
Status: DISCONTINUED | OUTPATIENT
Start: 2022-01-01 | End: 2022-01-01

## 2022-01-01 RX ORDER — POTASSIUM CHLORIDE 7.45 MG/ML
10 INJECTION INTRAVENOUS
Status: DISCONTINUED | OUTPATIENT
Start: 2022-01-01 | End: 2022-01-01

## 2022-01-01 RX ORDER — TACROLIMUS 1 MG/1
1 CAPSULE ORAL EVERY MORNING
Status: DISCONTINUED | OUTPATIENT
Start: 2022-01-01 | End: 2022-01-01

## 2022-01-01 RX ORDER — HALOPERIDOL 5 MG/ML
2-5 INJECTION INTRAMUSCULAR EVERY 4 HOURS PRN
Status: DISCONTINUED | OUTPATIENT
Start: 2022-01-01 | End: 2022-01-01 | Stop reason: HOSPADM

## 2022-01-01 RX ORDER — SODIUM CHLORIDE 9 MG/ML
500 INJECTION, SOLUTION INTRAVENOUS ONCE
Status: COMPLETED | OUTPATIENT
Start: 2022-01-01 | End: 2022-01-01

## 2022-01-01 RX ORDER — SODIUM CHLORIDE, SODIUM LACTATE, POTASSIUM CHLORIDE, AND CALCIUM CHLORIDE .6; .31; .03; .02 G/100ML; G/100ML; G/100ML; G/100ML
1000 INJECTION, SOLUTION INTRAVENOUS ONCE
Status: COMPLETED | OUTPATIENT
Start: 2022-01-01 | End: 2022-01-01

## 2022-01-01 RX ORDER — SODIUM BICARBONATE 650 MG/1
650 TABLET ORAL 3 TIMES DAILY
Qty: 120 TABLET | Refills: 3 | Status: SHIPPED
Start: 2022-01-01 | End: 2022-01-01

## 2022-01-01 RX ORDER — PREDNISONE 5 MG/1
5 TABLET ORAL DAILY
Status: DISCONTINUED | OUTPATIENT
Start: 2022-01-01 | End: 2022-01-01 | Stop reason: HOSPADM

## 2022-01-01 RX ORDER — SODIUM CHLORIDE 9 MG/ML
INJECTION, SOLUTION INTRAVENOUS CONTINUOUS
Status: DISCONTINUED | OUTPATIENT
Start: 2022-01-01 | End: 2022-01-01

## 2022-01-01 RX ORDER — QUETIAPINE FUMARATE 25 MG/1
12.5 TABLET, FILM COATED ORAL ONCE
Status: COMPLETED | OUTPATIENT
Start: 2022-01-01 | End: 2022-01-01

## 2022-01-01 RX ORDER — LANOLIN ALCOHOL/MO/W.PET/CERES
3 CREAM (GRAM) TOPICAL
COMMUNITY

## 2022-01-01 RX ORDER — DEXTROSE MONOHYDRATE 50 MG/ML
INJECTION, SOLUTION INTRAVENOUS CONTINUOUS
Status: DISCONTINUED | OUTPATIENT
Start: 2022-01-01 | End: 2022-01-01

## 2022-01-01 RX ORDER — PREDNISONE 20 MG/1
60 TABLET ORAL DAILY
Status: DISCONTINUED | OUTPATIENT
Start: 2022-01-01 | End: 2022-01-01

## 2022-01-01 RX ORDER — LOPERAMIDE HYDROCHLORIDE 2 MG/1
2 CAPSULE ORAL 4 TIMES DAILY PRN
Status: DISCONTINUED | OUTPATIENT
Start: 2022-01-01 | End: 2022-01-01

## 2022-01-01 RX ORDER — MAGNESIUM SULFATE HEPTAHYDRATE 40 MG/ML
4 INJECTION, SOLUTION INTRAVENOUS ONCE
Status: COMPLETED | OUTPATIENT
Start: 2022-01-01 | End: 2022-01-01

## 2022-01-01 RX ORDER — MAGNESIUM SULFATE HEPTAHYDRATE 40 MG/ML
2 INJECTION, SOLUTION INTRAVENOUS ONCE
Status: COMPLETED | OUTPATIENT
Start: 2022-01-01 | End: 2022-01-01

## 2022-01-01 RX ORDER — MYCOPHENOLATE MOFETIL 250 MG/1
500 CAPSULE ORAL 2 TIMES DAILY
Status: DISCONTINUED | OUTPATIENT
Start: 2022-01-01 | End: 2022-01-01 | Stop reason: HOSPADM

## 2022-01-01 RX ORDER — TACROLIMUS 0.5 MG/1
0.5 CAPSULE ORAL EVERY EVENING
Status: SHIPPED
Start: 2022-01-01

## 2022-01-01 RX ORDER — CINACALCET 30 MG/1
30 TABLET, FILM COATED ORAL
COMMUNITY

## 2022-01-01 RX ORDER — SODIUM BICARBONATE 650 MG/1
650 TABLET ORAL 3 TIMES DAILY
Status: DISCONTINUED | OUTPATIENT
Start: 2022-01-01 | End: 2022-01-01

## 2022-01-01 RX ORDER — PREDNISONE 20 MG/1
20 TABLET ORAL DAILY
Status: DISCONTINUED | OUTPATIENT
Start: 2022-01-01 | End: 2022-01-01 | Stop reason: HOSPADM

## 2022-01-01 RX ORDER — OMEPRAZOLE 20 MG/1
40 CAPSULE, DELAYED RELEASE ORAL 2 TIMES DAILY
Status: SHIPPED
Start: 2022-01-01

## 2022-01-01 RX ORDER — CALCIUM CHLORIDE 100 MG/ML
1 INJECTION INTRAVENOUS; INTRAVENTRICULAR ONCE
Status: COMPLETED | OUTPATIENT
Start: 2022-01-01 | End: 2022-01-01

## 2022-01-01 RX ORDER — FUROSEMIDE 10 MG/ML
80 INJECTION INTRAMUSCULAR; INTRAVENOUS ONCE
Status: COMPLETED | OUTPATIENT
Start: 2022-01-01 | End: 2022-01-01

## 2022-01-01 RX ORDER — DIPHENHYDRAMINE HCL 25 MG
25 TABLET ORAL ONCE
Status: COMPLETED | OUTPATIENT
Start: 2022-01-01 | End: 2022-01-01

## 2022-01-01 RX ORDER — ATENOLOL 25 MG/1
12.5 TABLET ORAL TWICE DAILY
Status: DISCONTINUED | OUTPATIENT
Start: 2022-01-01 | End: 2022-01-01

## 2022-01-01 RX ORDER — TACROLIMUS 0.5 MG/1
0.5 CAPSULE ORAL EVERY 12 HOURS
Status: DISCONTINUED | OUTPATIENT
Start: 2022-01-01 | End: 2022-01-01 | Stop reason: HOSPADM

## 2022-01-01 RX ORDER — DEXTROSE MONOHYDRATE 25 G/50ML
25 INJECTION, SOLUTION INTRAVENOUS
Status: DISCONTINUED | OUTPATIENT
Start: 2022-01-01 | End: 2022-01-01 | Stop reason: HOSPADM

## 2022-01-01 RX ORDER — OMEPRAZOLE 20 MG/1
20 CAPSULE, DELAYED RELEASE ORAL EVERY 12 HOURS
Status: DISCONTINUED | OUTPATIENT
Start: 2022-01-01 | End: 2022-01-01 | Stop reason: HOSPADM

## 2022-01-01 RX ORDER — SODIUM BICARBONATE 650 MG/1
650 TABLET ORAL 2 TIMES DAILY
Status: DISCONTINUED | OUTPATIENT
Start: 2022-01-01 | End: 2022-01-01

## 2022-01-01 RX ORDER — OMEPRAZOLE 20 MG/1
40 CAPSULE, DELAYED RELEASE ORAL 2 TIMES DAILY
Status: DISCONTINUED | OUTPATIENT
Start: 2022-01-01 | End: 2022-01-01 | Stop reason: HOSPADM

## 2022-01-01 RX ORDER — PREDNISONE 5 MG/1
5 TABLET ORAL DAILY
Status: SHIPPED
Start: 2022-01-01 | End: 2022-01-01

## 2022-01-01 RX ORDER — ACETAMINOPHEN 325 MG/1
650 TABLET ORAL EVERY 6 HOURS PRN
Status: DISCONTINUED | OUTPATIENT
Start: 2022-01-01 | End: 2022-01-01 | Stop reason: HOSPADM

## 2022-01-01 RX ORDER — LOSARTAN POTASSIUM 50 MG/1
100 TABLET ORAL
Status: DISCONTINUED | OUTPATIENT
Start: 2022-01-01 | End: 2022-01-01

## 2022-01-01 RX ORDER — KETAMINE HYDROCHLORIDE 50 MG/ML
100 INJECTION, SOLUTION INTRAMUSCULAR; INTRAVENOUS ONCE
Status: COMPLETED | OUTPATIENT
Start: 2022-01-01 | End: 2022-01-01

## 2022-01-01 RX ORDER — SODIUM CHLORIDE, SODIUM LACTATE, POTASSIUM CHLORIDE, AND CALCIUM CHLORIDE .6; .31; .03; .02 G/100ML; G/100ML; G/100ML; G/100ML
500 INJECTION, SOLUTION INTRAVENOUS ONCE
Status: DISCONTINUED | OUTPATIENT
Start: 2022-01-01 | End: 2022-01-01

## 2022-01-01 RX ORDER — PHENYLEPHRINE HYDROCHLORIDE 10 MG/ML
INJECTION, SOLUTION INTRAMUSCULAR; INTRAVENOUS; SUBCUTANEOUS PRN
Status: DISCONTINUED | OUTPATIENT
Start: 2022-01-01 | End: 2022-01-01 | Stop reason: SURG

## 2022-01-01 RX ORDER — HALOPERIDOL 5 MG/ML
1 INJECTION INTRAMUSCULAR
Status: DISCONTINUED | OUTPATIENT
Start: 2022-01-01 | End: 2022-01-01 | Stop reason: HOSPADM

## 2022-01-01 RX ORDER — TACROLIMUS 0.5 MG/1
0.5 CAPSULE ORAL EVERY MORNING
Status: DISCONTINUED | OUTPATIENT
Start: 2022-01-01 | End: 2022-01-01

## 2022-01-01 RX ORDER — DEXTROSE MONOHYDRATE 25 G/50ML
50 INJECTION, SOLUTION INTRAVENOUS
Status: DISCONTINUED | OUTPATIENT
Start: 2022-01-01 | End: 2022-01-01

## 2022-01-01 RX ORDER — MAGNESIUM SULFATE HEPTAHYDRATE 500 MG/ML
2 INJECTION, SOLUTION INTRAMUSCULAR; INTRAVENOUS
Status: DISCONTINUED | OUTPATIENT
Start: 2022-01-01 | End: 2022-01-01

## 2022-01-01 RX ORDER — PHENYLEPHRINE HCL IN 0.9% NACL 0.5 MG/5ML
SYRINGE (ML) INTRAVENOUS
Status: COMPLETED
Start: 2022-01-01 | End: 2022-01-01

## 2022-01-01 RX ORDER — TACROLIMUS 0.5 MG/1
0.5 CAPSULE ORAL EVERY EVENING
Status: DISCONTINUED | OUTPATIENT
Start: 2022-01-01 | End: 2022-01-01 | Stop reason: HOSPADM

## 2022-01-01 RX ORDER — ONDANSETRON 4 MG/1
4 TABLET, ORALLY DISINTEGRATING ORAL EVERY 6 HOURS PRN
COMMUNITY

## 2022-01-01 RX ORDER — DEXTROSE MONOHYDRATE 25 G/50ML
50 INJECTION, SOLUTION INTRAVENOUS ONCE
Status: COMPLETED | OUTPATIENT
Start: 2022-01-01 | End: 2022-01-01

## 2022-01-01 RX ORDER — KETAMINE HYDROCHLORIDE 50 MG/ML
200 INJECTION, SOLUTION INTRAMUSCULAR; INTRAVENOUS ONCE
Status: COMPLETED | OUTPATIENT
Start: 2022-01-01 | End: 2022-01-01

## 2022-01-01 RX ORDER — ONDANSETRON 4 MG/1
4 TABLET, FILM COATED ORAL EVERY 8 HOURS PRN
Status: SHIPPED | COMMUNITY
End: 2022-01-01

## 2022-01-01 RX ORDER — POTASSIUM CHLORIDE 7.45 MG/ML
10 INJECTION INTRAVENOUS
Status: ACTIVE | OUTPATIENT
Start: 2022-01-01 | End: 2022-01-01

## 2022-01-01 RX ORDER — MYCOPHENOLATE MOFETIL 250 MG/1
500 CAPSULE ORAL 2 TIMES DAILY
Status: DISCONTINUED | OUTPATIENT
Start: 2022-01-01 | End: 2022-01-01

## 2022-01-01 RX ORDER — SODIUM BICARBONATE 650 MG/1
650 TABLET ORAL 3 TIMES DAILY
Status: DISCONTINUED | OUTPATIENT
Start: 2022-01-01 | End: 2022-01-01 | Stop reason: HOSPADM

## 2022-01-01 RX ORDER — DIPHENHYDRAMINE HYDROCHLORIDE 50 MG/ML
25 INJECTION INTRAMUSCULAR; INTRAVENOUS EVERY 6 HOURS PRN
Status: DISCONTINUED | OUTPATIENT
Start: 2022-01-01 | End: 2022-01-01

## 2022-01-01 RX ORDER — TACROLIMUS 1 MG/1
1 CAPSULE ORAL EVERY MORNING
Status: DISCONTINUED | OUTPATIENT
Start: 2022-01-01 | End: 2022-01-01 | Stop reason: HOSPADM

## 2022-01-01 RX ORDER — BISACODYL 10 MG
10 SUPPOSITORY, RECTAL RECTAL
COMMUNITY

## 2022-01-01 RX ORDER — LIDOCAINE HYDROCHLORIDE 20 MG/ML
INJECTION, SOLUTION EPIDURAL; INFILTRATION; INTRACAUDAL; PERINEURAL PRN
Status: DISCONTINUED | OUTPATIENT
Start: 2022-01-01 | End: 2022-01-01 | Stop reason: SURG

## 2022-01-01 RX ORDER — MORPHINE SULFATE 4 MG/ML
1 INJECTION INTRAVENOUS ONCE
Status: COMPLETED | OUTPATIENT
Start: 2022-01-01 | End: 2022-01-01

## 2022-01-01 RX ORDER — SODIUM BICARBONATE 650 MG/1
650 TABLET ORAL 3 TIMES DAILY
Qty: 120 TABLET | Refills: 3 | Status: SHIPPED
Start: 2022-01-01

## 2022-01-01 RX ORDER — LOPERAMIDE HYDROCHLORIDE 2 MG/1
2 CAPSULE ORAL 4 TIMES DAILY PRN
Qty: 30 CAPSULE | Refills: 0 | Status: SHIPPED | OUTPATIENT
Start: 2022-01-01 | End: 2022-01-01

## 2022-01-01 RX ORDER — ACETAMINOPHEN 325 MG/1
650 TABLET ORAL ONCE
Status: COMPLETED | OUTPATIENT
Start: 2022-01-01 | End: 2022-01-01

## 2022-01-01 RX ORDER — LOSARTAN POTASSIUM 100 MG/1
100 TABLET ORAL DAILY
Status: SHIPPED | COMMUNITY
End: 2022-01-01

## 2022-01-01 RX ORDER — AMLODIPINE BESYLATE 10 MG/1
5 TABLET ORAL
Status: DISCONTINUED | OUTPATIENT
Start: 2022-01-01 | End: 2022-01-01

## 2022-01-01 RX ORDER — FUROSEMIDE 20 MG/1
20 TABLET ORAL DAILY
Status: ON HOLD | COMMUNITY
End: 2022-01-01

## 2022-01-01 RX ORDER — SODIUM CHLORIDE 9 MG/ML
INJECTION, SOLUTION INTRAVENOUS CONTINUOUS
Status: ACTIVE | OUTPATIENT
Start: 2022-01-01 | End: 2022-01-01

## 2022-01-01 RX ORDER — SODIUM BICARBONATE IN D5W 150/1000ML
PLASTIC BAG, INJECTION (ML) INTRAVENOUS CONTINUOUS
Status: DISCONTINUED | OUTPATIENT
Start: 2022-01-01 | End: 2022-01-01

## 2022-01-01 RX ORDER — SUCRALFATE ORAL 1 G/10ML
1 SUSPENSION ORAL EVERY 6 HOURS
Status: DISCONTINUED | OUTPATIENT
Start: 2022-01-01 | End: 2022-01-01 | Stop reason: HOSPADM

## 2022-01-01 RX ORDER — HYDROCHLOROTHIAZIDE 25 MG/1
25 TABLET ORAL DAILY
Status: SHIPPED | COMMUNITY
End: 2022-01-01

## 2022-01-01 RX ORDER — ACETAMINOPHEN 325 MG/1
650 TABLET ORAL EVERY 6 HOURS PRN
Status: DISCONTINUED | OUTPATIENT
Start: 2022-01-01 | End: 2022-01-01

## 2022-01-01 RX ORDER — HYDRALAZINE HYDROCHLORIDE 20 MG/ML
10 INJECTION INTRAMUSCULAR; INTRAVENOUS EVERY 6 HOURS PRN
Status: DISCONTINUED | OUTPATIENT
Start: 2022-01-01 | End: 2022-01-01

## 2022-01-01 RX ORDER — LOPERAMIDE HYDROCHLORIDE 2 MG/1
2 CAPSULE ORAL 4 TIMES DAILY PRN
Status: DISCONTINUED | OUTPATIENT
Start: 2022-01-01 | End: 2022-01-01 | Stop reason: HOSPADM

## 2022-01-01 RX ORDER — OXYCODONE HCL 5 MG/5 ML
5 SOLUTION, ORAL ORAL
Status: DISCONTINUED | OUTPATIENT
Start: 2022-01-01 | End: 2022-01-01 | Stop reason: HOSPADM

## 2022-01-01 RX ORDER — MYCOPHENOLATE MOFETIL 250 MG/1
500 CAPSULE ORAL 2 TIMES DAILY
Status: SHIPPED
Start: 2022-01-01

## 2022-01-01 RX ORDER — BRIMONIDINE TARTRATE 2 MG/ML
2 SOLUTION/ DROPS OPHTHALMIC 2 TIMES DAILY
Status: DISCONTINUED | OUTPATIENT
Start: 2022-01-01 | End: 2022-01-01 | Stop reason: HOSPADM

## 2022-01-01 RX ORDER — TACROLIMUS 0.5 MG/1
0.5 CAPSULE ORAL EVERY 12 HOURS
Status: DISCONTINUED | OUTPATIENT
Start: 2022-01-01 | End: 2022-01-01

## 2022-01-01 RX ORDER — PHENYLEPHRINE HCL IN 0.9% NACL 0.5 MG/5ML
100 SYRINGE (ML) INTRAVENOUS
Status: ACTIVE | OUTPATIENT
Start: 2022-01-01 | End: 2022-01-01

## 2022-01-01 RX ORDER — DEXTROSE MONOHYDRATE 25 G/50ML
50 INJECTION, SOLUTION INTRAVENOUS
Status: DISCONTINUED | OUTPATIENT
Start: 2022-01-01 | End: 2022-01-01 | Stop reason: HOSPADM

## 2022-01-01 RX ORDER — SUCRALFATE ORAL 1 G/10ML
1 SUSPENSION ORAL EVERY 6 HOURS
Status: SHIPPED
Start: 2022-01-01 | End: 2022-01-01

## 2022-01-01 RX ORDER — CINACALCET 30 MG/1
30 TABLET, FILM COATED ORAL
Status: DISCONTINUED | OUTPATIENT
Start: 2022-01-01 | End: 2022-01-01

## 2022-01-01 RX ORDER — AMLODIPINE BESYLATE 5 MG/1
5 TABLET ORAL
Status: DISCONTINUED | OUTPATIENT
Start: 2022-01-01 | End: 2022-01-01 | Stop reason: HOSPADM

## 2022-01-01 RX ORDER — LINEZOLID 600 MG/1
600 TABLET, FILM COATED ORAL EVERY 12 HOURS
Status: DISCONTINUED | OUTPATIENT
Start: 2022-01-01 | End: 2022-01-01

## 2022-01-01 RX ORDER — SODIUM CHLORIDE, SODIUM LACTATE, POTASSIUM CHLORIDE, CALCIUM CHLORIDE 600; 310; 30; 20 MG/100ML; MG/100ML; MG/100ML; MG/100ML
1000 INJECTION, SOLUTION INTRAVENOUS ONCE
Status: COMPLETED | OUTPATIENT
Start: 2022-01-01 | End: 2022-01-01

## 2022-01-01 RX ORDER — SODIUM BICARBONATE 650 MG/1
1300 TABLET ORAL 3 TIMES DAILY
Qty: 120 TABLET | Refills: 3 | Status: SHIPPED
Start: 2022-01-01 | End: 2022-01-01

## 2022-01-01 RX ORDER — HEPARIN SODIUM 5000 [USP'U]/ML
5000 INJECTION, SOLUTION INTRAVENOUS; SUBCUTANEOUS EVERY 8 HOURS
Status: DISCONTINUED | OUTPATIENT
Start: 2022-01-01 | End: 2022-01-01

## 2022-01-01 RX ORDER — FLUTICASONE PROPIONATE 50 MCG
1 SPRAY, SUSPENSION (ML) NASAL DAILY
Status: DISCONTINUED | OUTPATIENT
Start: 2022-01-01 | End: 2022-01-01

## 2022-01-01 RX ORDER — HYDRALAZINE HYDROCHLORIDE 50 MG/1
25 TABLET, FILM COATED ORAL EVERY 8 HOURS
Status: DISCONTINUED | OUTPATIENT
Start: 2022-01-01 | End: 2022-01-01

## 2022-01-01 RX ORDER — LOPERAMIDE HYDROCHLORIDE 2 MG/1
2 CAPSULE ORAL 4 TIMES DAILY PRN
Status: SHIPPED | COMMUNITY
End: 2022-01-01

## 2022-01-01 RX ORDER — CALCITONIN SALMON 200 [USP'U]/ML
4 INJECTION, SOLUTION INTRAMUSCULAR; SUBCUTANEOUS ONCE
Status: COMPLETED | OUTPATIENT
Start: 2022-01-01 | End: 2022-01-01

## 2022-01-01 RX ORDER — SODIUM CHLORIDE 9 MG/ML
1000 INJECTION, SOLUTION INTRAVENOUS ONCE
Status: COMPLETED | OUTPATIENT
Start: 2022-01-01 | End: 2022-01-01

## 2022-01-01 RX ORDER — TIMOLOL MALEATE 5 MG/ML
2 SOLUTION/ DROPS OPHTHALMIC 2 TIMES DAILY
Status: DISCONTINUED | OUTPATIENT
Start: 2022-01-01 | End: 2022-01-01 | Stop reason: HOSPADM

## 2022-01-01 RX ORDER — ONDANSETRON 4 MG/1
4 TABLET, ORALLY DISINTEGRATING ORAL EVERY 4 HOURS PRN
Status: DISCONTINUED | OUTPATIENT
Start: 2022-01-01 | End: 2022-04-27 | Stop reason: HOSPADM

## 2022-01-01 RX ORDER — OMEPRAZOLE 20 MG/1
20 CAPSULE, DELAYED RELEASE ORAL 2 TIMES DAILY
Status: ON HOLD | COMMUNITY
End: 2022-01-01 | Stop reason: SDUPTHER

## 2022-01-01 RX ORDER — MYCOPHENOLATE MOFETIL 200 MG/ML
500 POWDER, FOR SUSPENSION ORAL 2 TIMES DAILY
Status: DISCONTINUED | OUTPATIENT
Start: 2022-01-01 | End: 2022-01-01

## 2022-01-01 RX ORDER — HYDRALAZINE HYDROCHLORIDE 25 MG/1
25 TABLET, FILM COATED ORAL EVERY 8 HOURS
Status: DISCONTINUED | OUTPATIENT
Start: 2022-01-01 | End: 2022-01-01

## 2022-01-01 RX ORDER — PREDNISONE 20 MG/1
60 TABLET ORAL DAILY
Status: DISCONTINUED | OUTPATIENT
Start: 2022-01-01 | End: 2022-01-01 | Stop reason: HOSPADM

## 2022-01-01 RX ORDER — MAGNESIUM SULFATE HEPTAHYDRATE 40 MG/ML
2 INJECTION, SOLUTION INTRAVENOUS
Status: DISCONTINUED | OUTPATIENT
Start: 2022-01-01 | End: 2022-01-01

## 2022-01-01 RX ORDER — LINEZOLID 2 MG/ML
600 INJECTION, SOLUTION INTRAVENOUS EVERY 12 HOURS
Status: DISCONTINUED | OUTPATIENT
Start: 2022-01-01 | End: 2022-01-01

## 2022-01-01 RX ORDER — OMEPRAZOLE 20 MG/1
20 CAPSULE, DELAYED RELEASE ORAL EVERY 12 HOURS
Status: SHIPPED
Start: 2022-01-01 | End: 2022-01-01

## 2022-01-01 RX ORDER — ACETAMINOPHEN 325 MG/1
650 TABLET ORAL EVERY 6 HOURS PRN
Status: SHIPPED
Start: 2022-01-01 | End: 2022-01-01

## 2022-01-01 RX ORDER — LIDOCAINE HYDROCHLORIDE 20 MG/ML
5 SOLUTION OROPHARYNGEAL EVERY 4 HOURS PRN
Status: DISCONTINUED | OUTPATIENT
Start: 2022-01-01 | End: 2022-04-27 | Stop reason: HOSPADM

## 2022-01-01 RX ORDER — PANTOPRAZOLE SODIUM 40 MG/1
40 TABLET, DELAYED RELEASE ORAL 2 TIMES DAILY
Status: ON HOLD | COMMUNITY
End: 2022-01-01

## 2022-01-01 RX ORDER — TACROLIMUS 0.5 MG/1
0.5 CAPSULE ORAL 2 TIMES DAILY
Status: DISCONTINUED | OUTPATIENT
Start: 2022-01-01 | End: 2022-01-01

## 2022-01-01 RX ORDER — MAGNESIUM SULFATE HEPTAHYDRATE 40 MG/ML
2 INJECTION, SOLUTION INTRAVENOUS
Status: ACTIVE | OUTPATIENT
Start: 2022-01-01 | End: 2022-01-01

## 2022-01-01 RX ORDER — PREDNISONE 10 MG/1
10 TABLET ORAL DAILY
Status: DISCONTINUED | OUTPATIENT
Start: 2022-01-01 | End: 2022-01-01 | Stop reason: HOSPADM

## 2022-01-01 RX ORDER — TACROLIMUS 1 MG/1
1 CAPSULE ORAL EVERY MORNING
Status: SHIPPED
Start: 2022-01-01

## 2022-01-01 RX ORDER — POTASSIUM CHLORIDE 20 MEQ/1
60 TABLET, EXTENDED RELEASE ORAL ONCE
Status: COMPLETED | OUTPATIENT
Start: 2022-01-01 | End: 2022-01-01

## 2022-01-01 RX ORDER — BRIMONIDINE TARTRATE AND TIMOLOL MALEATE 2; 5 MG/ML; MG/ML
2 SOLUTION OPHTHALMIC 2 TIMES DAILY
Status: DISCONTINUED | OUTPATIENT
Start: 2022-01-01 | End: 2022-01-01

## 2022-01-01 RX ORDER — ATROPINE SULFATE 10 MG/ML
2 SOLUTION/ DROPS OPHTHALMIC EVERY 4 HOURS PRN
Status: DISCONTINUED | OUTPATIENT
Start: 2022-01-01 | End: 2022-01-01

## 2022-01-01 RX ORDER — GRANULES FOR ORAL 3 G/1
3 POWDER ORAL ONCE
Status: DISCONTINUED | OUTPATIENT
Start: 2022-01-01 | End: 2022-01-01

## 2022-01-01 RX ORDER — ONDANSETRON 4 MG/1
4 TABLET, ORALLY DISINTEGRATING ORAL EVERY 6 HOURS PRN
Status: DISCONTINUED | OUTPATIENT
Start: 2022-01-01 | End: 2022-01-01

## 2022-01-01 RX ORDER — ONDANSETRON 2 MG/ML
INJECTION INTRAMUSCULAR; INTRAVENOUS
Status: DISPENSED
Start: 2022-01-01 | End: 2022-01-01

## 2022-01-01 RX ORDER — SODIUM CHLORIDE 9 MG/ML
500 INJECTION, SOLUTION INTRAVENOUS
Status: ACTIVE | OUTPATIENT
Start: 2022-01-01 | End: 2022-01-01

## 2022-01-01 RX ORDER — PREDNISONE 20 MG/1
60 TABLET ORAL ONCE
Status: COMPLETED | OUTPATIENT
Start: 2022-01-01 | End: 2022-01-01

## 2022-01-01 RX ORDER — PHENYLEPHRINE HCL IN 0.9% NACL 0.5 MG/5ML
SYRINGE (ML) INTRAVENOUS
Status: ACTIVE
Start: 2022-01-01 | End: 2022-01-01

## 2022-01-01 RX ORDER — ATORVASTATIN CALCIUM 10 MG/1
10 TABLET, FILM COATED ORAL EVERY EVENING
Status: DISCONTINUED | OUTPATIENT
Start: 2022-01-01 | End: 2022-01-01

## 2022-01-01 RX ORDER — ONDANSETRON 2 MG/ML
4 INJECTION INTRAMUSCULAR; INTRAVENOUS EVERY 4 HOURS PRN
Status: DISCONTINUED | OUTPATIENT
Start: 2022-01-01 | End: 2022-04-27 | Stop reason: HOSPADM

## 2022-01-01 RX ORDER — OMEPRAZOLE 20 MG/1
40 CAPSULE, DELAYED RELEASE ORAL 2 TIMES DAILY
Status: DISCONTINUED | OUTPATIENT
Start: 2022-01-01 | End: 2022-01-01

## 2022-01-01 RX ORDER — HYDROMORPHONE HYDROCHLORIDE 1 MG/ML
0.2 INJECTION, SOLUTION INTRAMUSCULAR; INTRAVENOUS; SUBCUTANEOUS
Status: DISCONTINUED | OUTPATIENT
Start: 2022-01-01 | End: 2022-01-01 | Stop reason: HOSPADM

## 2022-01-01 RX ORDER — SODIUM CHLORIDE, SODIUM LACTATE, POTASSIUM CHLORIDE, AND CALCIUM CHLORIDE .6; .31; .03; .02 G/100ML; G/100ML; G/100ML; G/100ML
500 INJECTION, SOLUTION INTRAVENOUS ONCE
Status: COMPLETED | OUTPATIENT
Start: 2022-01-01 | End: 2022-01-01

## 2022-01-01 RX ORDER — MEPERIDINE HYDROCHLORIDE 25 MG/ML
12.5 INJECTION INTRAMUSCULAR; INTRAVENOUS; SUBCUTANEOUS
Status: DISCONTINUED | OUTPATIENT
Start: 2022-01-01 | End: 2022-01-01 | Stop reason: HOSPADM

## 2022-01-01 RX ORDER — LOPERAMIDE HYDROCHLORIDE 2 MG/1
2 CAPSULE ORAL 4 TIMES DAILY PRN
Qty: 30 CAPSULE | Status: SHIPPED
Start: 2022-01-01 | End: 2022-01-01

## 2022-01-01 RX ORDER — POTASSIUM CHLORIDE 20 MEQ/1
20 TABLET, EXTENDED RELEASE ORAL ONCE
Status: COMPLETED | OUTPATIENT
Start: 2022-01-01 | End: 2022-01-01

## 2022-01-01 RX ORDER — SODIUM CHLORIDE AND POTASSIUM CHLORIDE 150; 450 MG/100ML; MG/100ML
INJECTION, SOLUTION INTRAVENOUS CONTINUOUS
Status: DISCONTINUED | OUTPATIENT
Start: 2022-01-01 | End: 2022-01-01

## 2022-01-01 RX ORDER — ONDANSETRON 2 MG/ML
4 INJECTION INTRAMUSCULAR; INTRAVENOUS
Status: DISCONTINUED | OUTPATIENT
Start: 2022-01-01 | End: 2022-01-01 | Stop reason: HOSPADM

## 2022-01-01 RX ORDER — BISACODYL 10 MG
10 SUPPOSITORY, RECTAL RECTAL
Status: DISCONTINUED | OUTPATIENT
Start: 2022-01-01 | End: 2022-01-01 | Stop reason: HOSPADM

## 2022-01-01 RX ORDER — AMLODIPINE BESYLATE 10 MG/1
10 TABLET ORAL
Status: DISCONTINUED | OUTPATIENT
Start: 2022-01-01 | End: 2022-01-01

## 2022-01-01 RX ORDER — AMLODIPINE BESYLATE 5 MG/1
5 TABLET ORAL ONCE
Status: COMPLETED | OUTPATIENT
Start: 2022-01-01 | End: 2022-01-01

## 2022-01-01 RX ORDER — MIDAZOLAM HYDROCHLORIDE 1 MG/ML
.5-2 INJECTION INTRAMUSCULAR; INTRAVENOUS PRN
Status: ACTIVE | OUTPATIENT
Start: 2022-01-01 | End: 2022-01-01

## 2022-01-01 RX ORDER — ATORVASTATIN CALCIUM 10 MG/1
10 TABLET, FILM COATED ORAL EVERY EVENING
Status: DISCONTINUED | OUTPATIENT
Start: 2022-01-01 | End: 2022-01-01 | Stop reason: HOSPADM

## 2022-01-01 RX ORDER — DEXMEDETOMIDINE HYDROCHLORIDE 4 UG/ML
.1-1.5 INJECTION INTRAVENOUS CONTINUOUS
Status: DISCONTINUED | OUTPATIENT
Start: 2022-01-01 | End: 2022-01-01

## 2022-01-01 RX ORDER — LOPERAMIDE HYDROCHLORIDE 2 MG/1
2 CAPSULE ORAL 4 TIMES DAILY PRN
COMMUNITY

## 2022-01-01 RX ORDER — MORPHINE SULFATE 10 MG/ML
5 INJECTION, SOLUTION INTRAMUSCULAR; INTRAVENOUS
Status: DISCONTINUED | OUTPATIENT
Start: 2022-01-01 | End: 2022-04-27 | Stop reason: HOSPADM

## 2022-01-01 RX ORDER — AMLODIPINE BESYLATE 5 MG/1
5 TABLET ORAL EVERY MORNING
Status: ON HOLD | COMMUNITY
End: 2022-01-01

## 2022-01-01 RX ORDER — CINACALCET 30 MG/1
30 TABLET, FILM COATED ORAL
Status: DISCONTINUED | OUTPATIENT
Start: 2022-01-01 | End: 2022-01-01 | Stop reason: HOSPADM

## 2022-01-01 RX ORDER — TACROLIMUS 0.5 MG/1
0.5 CAPSULE ORAL 2 TIMES DAILY
Qty: 60 CAPSULE | Refills: 3 | Status: SHIPPED
Start: 2022-01-01 | End: 2022-01-01

## 2022-01-01 RX ORDER — MAGNESIUM SULFATE HEPTAHYDRATE 500 MG/ML
2 INJECTION, SOLUTION INTRAMUSCULAR; INTRAVENOUS ONCE
Status: DISCONTINUED | OUTPATIENT
Start: 2022-01-01 | End: 2022-01-01

## 2022-01-01 RX ORDER — PREDNISONE 20 MG/1
20 TABLET ORAL DAILY
Status: ON HOLD | COMMUNITY
Start: 2022-01-01 | End: 2022-01-01

## 2022-01-01 RX ORDER — SODIUM BICARBONATE 650 MG/1
1300 TABLET ORAL 3 TIMES DAILY
Status: DISCONTINUED | OUTPATIENT
Start: 2022-01-01 | End: 2022-01-01

## 2022-01-01 RX ORDER — HEPARIN SODIUM (PORCINE) LOCK FLUSH IV SOLN 100 UNIT/ML 100 UNIT/ML
SOLUTION INTRAVENOUS
Status: ACTIVE
Start: 2022-01-01 | End: 2022-01-01

## 2022-01-01 RX ORDER — LINEZOLID 600 MG/1
600 TABLET, FILM COATED ORAL EVERY 12 HOURS
Status: COMPLETED | OUTPATIENT
Start: 2022-01-01 | End: 2022-01-01

## 2022-01-01 RX ORDER — SUCRALFATE ORAL 1 G/10ML
1 SUSPENSION ORAL
COMMUNITY

## 2022-01-01 RX ORDER — CEFTRIAXONE 2 G/1
2 INJECTION, POWDER, FOR SOLUTION INTRAMUSCULAR; INTRAVENOUS ONCE
Status: COMPLETED | OUTPATIENT
Start: 2022-01-01 | End: 2022-01-01

## 2022-01-01 RX ORDER — POTASSIUM CHLORIDE 7.45 MG/ML
10 INJECTION INTRAVENOUS
Status: COMPLETED | OUTPATIENT
Start: 2022-01-01 | End: 2022-01-01

## 2022-01-01 RX ORDER — LABETALOL HYDROCHLORIDE 5 MG/ML
10 INJECTION, SOLUTION INTRAVENOUS EVERY 4 HOURS PRN
Status: DISCONTINUED | OUTPATIENT
Start: 2022-01-01 | End: 2022-01-01

## 2022-01-01 RX ORDER — PHENYLEPHRINE HCL IN 0.9% NACL 0.5 MG/5ML
200 SYRINGE (ML) INTRAVENOUS
Status: ACTIVE | OUTPATIENT
Start: 2022-01-01 | End: 2022-01-01

## 2022-01-01 RX ORDER — HYDROMORPHONE HYDROCHLORIDE 1 MG/ML
0.4 INJECTION, SOLUTION INTRAMUSCULAR; INTRAVENOUS; SUBCUTANEOUS
Status: DISCONTINUED | OUTPATIENT
Start: 2022-01-01 | End: 2022-01-01 | Stop reason: HOSPADM

## 2022-01-01 RX ORDER — FUROSEMIDE 20 MG/1
20 TABLET ORAL 2 TIMES DAILY
Status: SHIPPED | COMMUNITY
End: 2022-01-01

## 2022-01-01 RX ORDER — MIDAZOLAM HYDROCHLORIDE 1 MG/ML
2 INJECTION INTRAMUSCULAR; INTRAVENOUS
Status: DISCONTINUED | OUTPATIENT
Start: 2022-01-01 | End: 2022-01-01

## 2022-01-01 RX ORDER — SODIUM BICARBONATE 650 MG/1
650 TABLET ORAL DAILY
Status: DISCONTINUED | OUTPATIENT
Start: 2022-01-01 | End: 2022-01-01

## 2022-01-01 RX ORDER — NOREPINEPHRINE BITARTRATE 0.03 MG/ML
0-30 INJECTION, SOLUTION INTRAVENOUS CONTINUOUS
Status: DISCONTINUED | OUTPATIENT
Start: 2022-01-01 | End: 2022-01-01

## 2022-01-01 RX ORDER — OXYCODONE HCL 5 MG/5 ML
10 SOLUTION, ORAL ORAL
Status: DISCONTINUED | OUTPATIENT
Start: 2022-01-01 | End: 2022-01-01 | Stop reason: HOSPADM

## 2022-01-01 RX ORDER — SODIUM CHLORIDE 9 MG/ML
250 INJECTION, SOLUTION INTRAVENOUS ONCE
Status: COMPLETED | OUTPATIENT
Start: 2022-01-01 | End: 2022-01-01

## 2022-01-01 RX ORDER — SODIUM POLYSTYRENE SULFONATE 15 G/60ML
15 SUSPENSION ORAL; RECTAL ONCE
Status: COMPLETED | OUTPATIENT
Start: 2022-01-01 | End: 2022-01-01

## 2022-01-01 RX ORDER — ATROPINE SULFATE 10 MG/ML
2 SOLUTION/ DROPS OPHTHALMIC EVERY 4 HOURS PRN
Status: DISCONTINUED | OUTPATIENT
Start: 2022-01-01 | End: 2022-04-27 | Stop reason: HOSPADM

## 2022-01-01 RX ORDER — PREDNISONE 5 MG/1
5 TABLET ORAL DAILY
COMMUNITY
End: 2022-01-01

## 2022-01-01 RX ORDER — KETAMINE HYDROCHLORIDE 50 MG/ML
2 INJECTION, SOLUTION INTRAMUSCULAR; INTRAVENOUS
Status: DISCONTINUED | OUTPATIENT
Start: 2022-01-01 | End: 2022-01-01

## 2022-01-01 RX ORDER — SULFAMETHOXAZOLE AND TRIMETHOPRIM 800; 160 MG/1; MG/1
1 TABLET ORAL 2 TIMES DAILY
COMMUNITY
Start: 2022-01-01

## 2022-01-01 RX ORDER — PREDNISONE 20 MG/1
40 TABLET ORAL DAILY
Status: DISCONTINUED | OUTPATIENT
Start: 2022-04-27 | End: 2022-01-01

## 2022-01-01 RX ORDER — AMLODIPINE BESYLATE 10 MG/1
5 TABLET ORAL ONCE
Status: COMPLETED | OUTPATIENT
Start: 2022-01-01 | End: 2022-01-01

## 2022-01-01 RX ORDER — MORPHINE SULFATE 4 MG/ML
4 INJECTION INTRAVENOUS ONCE
Status: DISCONTINUED | OUTPATIENT
Start: 2022-01-01 | End: 2022-01-01 | Stop reason: HOSPADM

## 2022-01-01 RX ORDER — HEPARIN SODIUM 5000 [USP'U]/ML
5000 INJECTION, SOLUTION INTRAVENOUS; SUBCUTANEOUS EVERY 8 HOURS
Status: DISCONTINUED | OUTPATIENT
Start: 2022-01-01 | End: 2022-01-01 | Stop reason: HOSPADM

## 2022-01-01 RX ORDER — OMEPRAZOLE 20 MG/1
40 CAPSULE, DELAYED RELEASE ORAL EVERY 12 HOURS
Status: DISCONTINUED | OUTPATIENT
Start: 2022-01-01 | End: 2022-01-01

## 2022-01-01 RX ORDER — PREDNISONE 10 MG/1
5 TABLET ORAL EVERY MORNING
Status: DISCONTINUED | OUTPATIENT
Start: 2022-01-01 | End: 2022-01-01

## 2022-01-01 RX ORDER — HYDROCODONE BITARTRATE AND ACETAMINOPHEN 5; 325 MG/1; MG/1
1 TABLET ORAL ONCE
Status: COMPLETED | OUTPATIENT
Start: 2022-01-01 | End: 2022-01-01

## 2022-01-01 RX ORDER — PREDNISONE 10 MG/1
10 TABLET ORAL DAILY
Status: ON HOLD | COMMUNITY
Start: 2022-01-01 | End: 2022-01-01

## 2022-01-01 RX ORDER — POTASSIUM CHLORIDE 20 MEQ/1
40 TABLET, EXTENDED RELEASE ORAL ONCE
Status: COMPLETED | OUTPATIENT
Start: 2022-01-01 | End: 2022-01-01

## 2022-01-01 RX ORDER — PREDNISONE 20 MG/1
20 TABLET ORAL DAILY
Qty: 3 TABLET | Refills: 0 | Status: SHIPPED
Start: 2022-01-01 | End: 2022-01-01

## 2022-01-01 RX ORDER — SODIUM CHLORIDE, SODIUM LACTATE, POTASSIUM CHLORIDE, CALCIUM CHLORIDE 600; 310; 30; 20 MG/100ML; MG/100ML; MG/100ML; MG/100ML
INJECTION, SOLUTION INTRAVENOUS CONTINUOUS
Status: DISCONTINUED | OUTPATIENT
Start: 2022-01-01 | End: 2022-01-01

## 2022-01-01 RX ORDER — TACROLIMUS 0.5 MG/1
0.5 CAPSULE ORAL EVERY EVENING
Status: DISCONTINUED | OUTPATIENT
Start: 2022-01-01 | End: 2022-01-01

## 2022-01-01 RX ORDER — HYDROMORPHONE HYDROCHLORIDE 1 MG/ML
0.1 INJECTION, SOLUTION INTRAMUSCULAR; INTRAVENOUS; SUBCUTANEOUS
Status: DISCONTINUED | OUTPATIENT
Start: 2022-01-01 | End: 2022-01-01 | Stop reason: HOSPADM

## 2022-01-01 RX ORDER — SODIUM CHLORIDE 9 MG/ML
INJECTION, SOLUTION INTRAVENOUS ONCE
Status: DISCONTINUED | OUTPATIENT
Start: 2022-01-01 | End: 2022-01-01 | Stop reason: HOSPADM

## 2022-01-01 RX ORDER — ONDANSETRON 2 MG/ML
4 INJECTION INTRAMUSCULAR; INTRAVENOUS EVERY 6 HOURS PRN
Status: DISCONTINUED | OUTPATIENT
Start: 2022-01-01 | End: 2022-01-01 | Stop reason: HOSPADM

## 2022-01-01 RX ORDER — DIPHENHYDRAMINE HYDROCHLORIDE 50 MG/ML
12.5 INJECTION INTRAMUSCULAR; INTRAVENOUS
Status: DISCONTINUED | OUTPATIENT
Start: 2022-01-01 | End: 2022-01-01 | Stop reason: HOSPADM

## 2022-01-01 RX ORDER — ACETAMINOPHEN 325 MG/1
650 TABLET ORAL EVERY 4 HOURS PRN
Status: DISCONTINUED | OUTPATIENT
Start: 2022-01-01 | End: 2022-01-01

## 2022-01-01 RX ORDER — PANTOPRAZOLE SODIUM 40 MG/10ML
40 INJECTION, POWDER, LYOPHILIZED, FOR SOLUTION INTRAVENOUS 2 TIMES DAILY
Status: DISCONTINUED | OUTPATIENT
Start: 2022-01-01 | End: 2022-01-01 | Stop reason: HOSPADM

## 2022-01-01 RX ORDER — CALCIUM GLUCONATE 20 MG/ML
2 INJECTION, SOLUTION INTRAVENOUS ONCE
Status: COMPLETED | OUTPATIENT
Start: 2022-01-01 | End: 2022-01-01

## 2022-01-01 RX ORDER — HEPARIN SODIUM 1000 [USP'U]/ML
2800 INJECTION, SOLUTION INTRAVENOUS; SUBCUTANEOUS
Status: DISCONTINUED | OUTPATIENT
Start: 2022-01-01 | End: 2022-01-01 | Stop reason: HOSPADM

## 2022-01-01 RX ORDER — POLYETHYLENE GLYCOL 3350 17 G/17G
1 POWDER, FOR SOLUTION ORAL
Status: DISCONTINUED | OUTPATIENT
Start: 2022-01-01 | End: 2022-01-01 | Stop reason: HOSPADM

## 2022-01-01 RX ORDER — MIDAZOLAM HYDROCHLORIDE 1 MG/ML
1 INJECTION INTRAMUSCULAR; INTRAVENOUS
Status: DISCONTINUED | OUTPATIENT
Start: 2022-01-01 | End: 2022-04-27 | Stop reason: HOSPADM

## 2022-01-01 RX ORDER — PANTOPRAZOLE SODIUM 40 MG/10ML
80 INJECTION, POWDER, LYOPHILIZED, FOR SOLUTION INTRAVENOUS ONCE
Status: COMPLETED | OUTPATIENT
Start: 2022-01-01 | End: 2022-01-01

## 2022-01-01 RX ORDER — ALLOPURINOL 300 MG/1
300 TABLET ORAL DAILY
Status: SHIPPED | COMMUNITY
End: 2022-01-01

## 2022-01-01 RX ORDER — MIDAZOLAM HYDROCHLORIDE 1 MG/ML
1-4 INJECTION INTRAMUSCULAR; INTRAVENOUS ONCE
Status: COMPLETED | OUTPATIENT
Start: 2022-01-01 | End: 2022-01-01

## 2022-01-01 RX ORDER — ONDANSETRON 2 MG/ML
4 INJECTION INTRAMUSCULAR; INTRAVENOUS PRN
Status: ACTIVE | OUTPATIENT
Start: 2022-01-01 | End: 2022-01-01

## 2022-01-01 RX ORDER — PREDNISONE 10 MG/1
10 TABLET ORAL DAILY
Qty: 3 TABLET | Refills: 0 | Status: SHIPPED
Start: 2022-01-01 | End: 2022-01-01

## 2022-01-01 RX ORDER — MIDAZOLAM HYDROCHLORIDE 1 MG/ML
INJECTION INTRAMUSCULAR; INTRAVENOUS
Status: DISPENSED
Start: 2022-01-01 | End: 2022-01-01

## 2022-01-01 RX ORDER — TACROLIMUS 0.5 MG/1
.5-1 CAPSULE ORAL 2 TIMES DAILY
Status: DISCONTINUED | OUTPATIENT
Start: 2022-01-01 | End: 2022-01-01

## 2022-01-01 RX ORDER — PREDNISONE 20 MG/1
60 TABLET ORAL DAILY
Qty: 21 TABLET | Refills: 0 | Status: SHIPPED | OUTPATIENT
Start: 2022-01-01 | End: 2022-01-01

## 2022-01-01 RX ORDER — CINACALCET 30 MG/1
30 TABLET, FILM COATED ORAL
Status: SHIPPED
Start: 2022-01-01 | End: 2022-01-01

## 2022-01-01 RX ORDER — PROPARACAINE HYDROCHLORIDE 5 MG/ML
1 SOLUTION/ DROPS OPHTHALMIC ONCE
Status: COMPLETED | OUTPATIENT
Start: 2022-01-01 | End: 2022-01-01

## 2022-01-01 RX ADMIN — FLUTICASONE PROPIONATE 50 MCG: 50 SPRAY, METERED NASAL at 04:40

## 2022-01-01 RX ADMIN — FLUTICASONE PROPIONATE 50 MCG: 50 SPRAY, METERED NASAL at 06:00

## 2022-01-01 RX ADMIN — SODIUM BICARBONATE 650 MG: 650 TABLET ORAL at 09:38

## 2022-01-01 RX ADMIN — HYDRALAZINE HYDROCHLORIDE 25 MG: 25 TABLET, FILM COATED ORAL at 05:03

## 2022-01-01 RX ADMIN — AMLODIPINE BESYLATE 10 MG: 10 TABLET ORAL at 06:18

## 2022-01-01 RX ADMIN — TACROLIMUS 1 MG: 1 CAPSULE ORAL at 05:38

## 2022-01-01 RX ADMIN — ACETAMINOPHEN 650 MG: 325 TABLET, FILM COATED ORAL at 04:47

## 2022-01-01 RX ADMIN — METOPROLOL TARTRATE 25 MG: 25 TABLET, FILM COATED ORAL at 04:50

## 2022-01-01 RX ADMIN — OMEPRAZOLE 40 MG: KIT at 17:17

## 2022-01-01 RX ADMIN — MYCOPHENOLATE MOFETIL 500 MG: 250 CAPSULE ORAL at 05:27

## 2022-01-01 RX ADMIN — PREDNISONE 60 MG: 10 TABLET ORAL at 05:46

## 2022-01-01 RX ADMIN — SODIUM BICARBONATE 650 MG TABLET 650 MG: at 09:38

## 2022-01-01 RX ADMIN — SUCRALFATE 1 G: 1 SUSPENSION ORAL at 17:44

## 2022-01-01 RX ADMIN — TIMOLOL MALEATE 2 DROP: 5 SOLUTION OPHTHALMIC at 04:53

## 2022-01-01 RX ADMIN — ATENOLOL 12.5 MG: 25 TABLET ORAL at 16:42

## 2022-01-01 RX ADMIN — BRIMONIDINE TARTRATE 2 DROP: 2 SOLUTION OPHTHALMIC at 16:40

## 2022-01-01 RX ADMIN — TIMOLOL MALEATE 2 DROP: 5 SOLUTION OPHTHALMIC at 05:32

## 2022-01-01 RX ADMIN — HEPARIN SODIUM 5000 UNITS: 5000 INJECTION, SOLUTION INTRAVENOUS; SUBCUTANEOUS at 22:32

## 2022-01-01 RX ADMIN — HYDRALAZINE HYDROCHLORIDE 25 MG: 25 TABLET, FILM COATED ORAL at 05:10

## 2022-01-01 RX ADMIN — ACETAMINOPHEN 650 MG: 325 TABLET, FILM COATED ORAL at 11:48

## 2022-01-01 RX ADMIN — MEROPENEM 1 G: 1 INJECTION, POWDER, FOR SOLUTION INTRAVENOUS at 23:53

## 2022-01-01 RX ADMIN — PANTOPRAZOLE SODIUM 40 MG: 40 INJECTION, POWDER, LYOPHILIZED, FOR SOLUTION INTRAVENOUS at 05:04

## 2022-01-01 RX ADMIN — APIXABAN 5 MG: 5 TABLET, FILM COATED ORAL at 05:05

## 2022-01-01 RX ADMIN — PREDNISONE 30 MG: 20 TABLET ORAL at 05:19

## 2022-01-01 RX ADMIN — OMEPRAZOLE 40 MG: 20 CAPSULE, DELAYED RELEASE ORAL at 05:10

## 2022-01-01 RX ADMIN — PIPERACILLIN AND TAZOBACTAM 3.38 G: 3; .375 INJECTION, POWDER, LYOPHILIZED, FOR SOLUTION INTRAVENOUS; PARENTERAL at 11:40

## 2022-01-01 RX ADMIN — INSULIN HUMAN 10 UNITS: 100 INJECTION, SOLUTION PARENTERAL at 17:54

## 2022-01-01 RX ADMIN — BRIMONIDINE TARTRATE 2 DROP: 2 SOLUTION OPHTHALMIC at 17:46

## 2022-01-01 RX ADMIN — SODIUM CHLORIDE, POTASSIUM CHLORIDE, SODIUM LACTATE AND CALCIUM CHLORIDE 500 ML: 600; 310; 30; 20 INJECTION, SOLUTION INTRAVENOUS at 20:11

## 2022-01-01 RX ADMIN — SODIUM BICARBONATE: 84 INJECTION, SOLUTION INTRAVENOUS at 10:41

## 2022-01-01 RX ADMIN — MYCOPHENOLATE MOFETIL 500 MG: 250 CAPSULE ORAL at 17:45

## 2022-01-01 RX ADMIN — HYDRALAZINE HYDROCHLORIDE 10 MG: 20 INJECTION INTRAMUSCULAR; INTRAVENOUS at 06:20

## 2022-01-01 RX ADMIN — SODIUM BICARBONATE 650 MG TABLET 650 MG: at 12:20

## 2022-01-01 RX ADMIN — HYDROCORTISONE SODIUM SUCCINATE 50 MG: 100 INJECTION, POWDER, FOR SOLUTION INTRAMUSCULAR; INTRAVENOUS at 05:08

## 2022-01-01 RX ADMIN — OMEPRAZOLE 20 MG: 20 CAPSULE, DELAYED RELEASE ORAL at 04:41

## 2022-01-01 RX ADMIN — INSULIN HUMAN 4 UNITS: 100 INJECTION, SOLUTION PARENTERAL at 21:00

## 2022-01-01 RX ADMIN — SODIUM BICARBONATE 650 MG: 650 TABLET ORAL at 04:42

## 2022-01-01 RX ADMIN — TIMOLOL MALEATE 2 DROP: 5 SOLUTION OPHTHALMIC at 04:43

## 2022-01-01 RX ADMIN — HEPARIN SODIUM 5000 UNITS: 5000 INJECTION, SOLUTION INTRAVENOUS; SUBCUTANEOUS at 04:49

## 2022-01-01 RX ADMIN — DIBASIC SODIUM PHOSPHATE, MONOBASIC POTASSIUM PHOSPHATE AND MONOBASIC SODIUM PHOSPHATE 500 MG: 852; 155; 130 TABLET ORAL at 12:47

## 2022-01-01 RX ADMIN — APIXABAN 5 MG: 5 TABLET, FILM COATED ORAL at 17:52

## 2022-01-01 RX ADMIN — HEPARIN SODIUM 5000 UNITS: 5000 INJECTION, SOLUTION INTRAVENOUS; SUBCUTANEOUS at 22:35

## 2022-01-01 RX ADMIN — TIMOLOL MALEATE 2 DROP: 5 SOLUTION OPHTHALMIC at 18:23

## 2022-01-01 RX ADMIN — HYDROCORTISONE SODIUM SUCCINATE 50 MG: 100 INJECTION, POWDER, FOR SOLUTION INTRAMUSCULAR; INTRAVENOUS at 00:04

## 2022-01-01 RX ADMIN — Medication 200 MCG: at 17:27

## 2022-01-01 RX ADMIN — MYCOPHENOLATE MOFETIL 500 MG: 250 CAPSULE ORAL at 05:03

## 2022-01-01 RX ADMIN — MIDAZOLAM HYDROCHLORIDE 1 MG: 1 INJECTION, SOLUTION INTRAMUSCULAR; INTRAVENOUS at 15:13

## 2022-01-01 RX ADMIN — HYDRALAZINE HYDROCHLORIDE 25 MG: 25 TABLET, FILM COATED ORAL at 14:10

## 2022-01-01 RX ADMIN — MEROPENEM 1 G: 1 INJECTION, POWDER, FOR SOLUTION INTRAVENOUS at 13:14

## 2022-01-01 RX ADMIN — SODIUM BICARBONATE 650 MG: 650 TABLET ORAL at 21:46

## 2022-01-01 RX ADMIN — HEPARIN SODIUM 5000 UNITS: 5000 INJECTION, SOLUTION INTRAVENOUS; SUBCUTANEOUS at 05:15

## 2022-01-01 RX ADMIN — OMEPRAZOLE 20 MG: 20 CAPSULE, DELAYED RELEASE ORAL at 05:26

## 2022-01-01 RX ADMIN — POTASSIUM CHLORIDE AND SODIUM CHLORIDE: 450; 150 INJECTION, SOLUTION INTRAVENOUS at 05:04

## 2022-01-01 RX ADMIN — SODIUM CHLORIDE 1000 MG: 9 INJECTION, SOLUTION INTRAVENOUS at 05:09

## 2022-01-01 RX ADMIN — INSULIN HUMAN 3 UNITS: 100 INJECTION, SOLUTION PARENTERAL at 13:59

## 2022-01-01 RX ADMIN — TIMOLOL MALEATE 2 DROP: 5 SOLUTION OPHTHALMIC at 18:29

## 2022-01-01 RX ADMIN — SUCRALFATE 1 G: 1 SUSPENSION ORAL at 13:23

## 2022-01-01 RX ADMIN — TACROLIMUS 1 MG: 1 CAPSULE ORAL at 05:40

## 2022-01-01 RX ADMIN — HYDRALAZINE HYDROCHLORIDE 25 MG: 25 TABLET, FILM COATED ORAL at 05:26

## 2022-01-01 RX ADMIN — OMEPRAZOLE 40 MG: KIT at 18:42

## 2022-01-01 RX ADMIN — LINEZOLID 600 MG: 600 TABLET, FILM COATED ORAL at 18:46

## 2022-01-01 RX ADMIN — TACROLIMUS 0.5 MG: 0.5 CAPSULE ORAL at 17:45

## 2022-01-01 RX ADMIN — TACROLIMUS 0.5 MG: 0.5 CAPSULE ORAL at 04:45

## 2022-01-01 RX ADMIN — HYDROCORTISONE SODIUM SUCCINATE 50 MG: 100 INJECTION, POWDER, FOR SOLUTION INTRAMUSCULAR; INTRAVENOUS at 00:17

## 2022-01-01 RX ADMIN — TACROLIMUS 1 MG: 1 CAPSULE ORAL at 06:18

## 2022-01-01 RX ADMIN — FUROSEMIDE 80 MG: 10 INJECTION, SOLUTION INTRAMUSCULAR; INTRAVENOUS at 12:09

## 2022-01-01 RX ADMIN — TACROLIMUS 0.5 MG: 0.5 CAPSULE ORAL at 17:47

## 2022-01-01 RX ADMIN — SODIUM BICARBONATE 650 MG: 650 TABLET ORAL at 14:20

## 2022-01-01 RX ADMIN — SODIUM BICARBONATE 650 MG: 650 TABLET ORAL at 21:35

## 2022-01-01 RX ADMIN — VANCOMYCIN HYDROCHLORIDE 125 MG: KIT ORAL at 21:08

## 2022-01-01 RX ADMIN — TACROLIMUS 1 MG: 1 CAPSULE ORAL at 04:48

## 2022-01-01 RX ADMIN — INSULIN HUMAN 3 UNITS: 100 INJECTION, SOLUTION PARENTERAL at 09:53

## 2022-01-01 RX ADMIN — LINEZOLID 600 MG: 600 TABLET, FILM COATED ORAL at 04:51

## 2022-01-01 RX ADMIN — SUCRALFATE 1 G: 1 SUSPENSION ORAL at 13:50

## 2022-01-01 RX ADMIN — METOPROLOL TARTRATE 25 MG: 25 TABLET, FILM COATED ORAL at 04:38

## 2022-01-01 RX ADMIN — TIMOLOL MALEATE 2 DROP: 5 SOLUTION OPHTHALMIC at 18:41

## 2022-01-01 RX ADMIN — PREDNISONE 5 MG: 5 TABLET ORAL at 06:30

## 2022-01-01 RX ADMIN — SODIUM CHLORIDE, POTASSIUM CHLORIDE, SODIUM LACTATE AND CALCIUM CHLORIDE: 600; 310; 30; 20 INJECTION, SOLUTION INTRAVENOUS at 02:31

## 2022-01-01 RX ADMIN — SODIUM BICARBONATE 650 MG: 650 TABLET ORAL at 21:03

## 2022-01-01 RX ADMIN — SODIUM CHLORIDE 8 MG/HR: 9 INJECTION, SOLUTION INTRAVENOUS at 12:05

## 2022-01-01 RX ADMIN — DEXTROSE MONOHYDRATE: 50 INJECTION, SOLUTION INTRAVENOUS at 09:04

## 2022-01-01 RX ADMIN — OMEPRAZOLE 40 MG: 20 CAPSULE, DELAYED RELEASE ORAL at 16:30

## 2022-01-01 RX ADMIN — BRIMONIDINE TARTRATE 2 DROP: 2 SOLUTION OPHTHALMIC at 05:09

## 2022-01-01 RX ADMIN — SODIUM CHLORIDE 8 MG/HR: 9 INJECTION, SOLUTION INTRAVENOUS at 00:47

## 2022-01-01 RX ADMIN — MYCOPHENOLATE MOFETIL 500 MG: 250 CAPSULE ORAL at 17:48

## 2022-01-01 RX ADMIN — SUCRALFATE 1 G: 1 SUSPENSION ORAL at 05:27

## 2022-01-01 RX ADMIN — ATORVASTATIN CALCIUM 10 MG: 10 TABLET, FILM COATED ORAL at 16:30

## 2022-01-01 RX ADMIN — INSULIN HUMAN 4 UNITS: 100 INJECTION, SOLUTION PARENTERAL at 14:02

## 2022-01-01 RX ADMIN — VANCOMYCIN HYDROCHLORIDE 125 MG: KIT ORAL at 08:40

## 2022-01-01 RX ADMIN — POTASSIUM CHLORIDE 40 MEQ: 1500 TABLET, EXTENDED RELEASE ORAL at 09:16

## 2022-01-01 RX ADMIN — FLUTICASONE PROPIONATE 50 MCG: 50 SPRAY, METERED NASAL at 05:11

## 2022-01-01 RX ADMIN — MEROPENEM 1 G: 1 INJECTION, POWDER, FOR SOLUTION INTRAVENOUS at 05:01

## 2022-01-01 RX ADMIN — TIMOLOL MALEATE 2 DROP: 5 SOLUTION OPHTHALMIC at 18:00

## 2022-01-01 RX ADMIN — DEXTROSE MONOHYDRATE: 50 INJECTION, SOLUTION INTRAVENOUS at 09:02

## 2022-01-01 RX ADMIN — HYDROCODONE BITARTRATE AND ACETAMINOPHEN 1 TABLET: 5; 325 TABLET ORAL at 21:49

## 2022-01-01 RX ADMIN — TACROLIMUS 0.5 MG: 0.5 CAPSULE ORAL at 06:00

## 2022-01-01 RX ADMIN — OMEPRAZOLE 20 MG: 20 CAPSULE, DELAYED RELEASE ORAL at 18:19

## 2022-01-01 RX ADMIN — APIXABAN 5 MG: 5 TABLET, FILM COATED ORAL at 16:39

## 2022-01-01 RX ADMIN — LIDOCAINE HYDROCHLORIDE 80 MG: 20 INJECTION, SOLUTION EPIDURAL; INFILTRATION; INTRACAUDAL; PERINEURAL at 14:11

## 2022-01-01 RX ADMIN — BRIMONIDINE TARTRATE 2 DROP: 2 SOLUTION OPHTHALMIC at 18:16

## 2022-01-01 RX ADMIN — SODIUM CHLORIDE 8 MG/HR: 9 INJECTION, SOLUTION INTRAVENOUS at 01:08

## 2022-01-01 RX ADMIN — TIMOLOL MALEATE 2 DROP: 5 SOLUTION OPHTHALMIC at 06:30

## 2022-01-01 RX ADMIN — CALCIUM CHLORIDE INJECTION 1 G: 100 INJECTION, SOLUTION INTRAVENOUS at 21:24

## 2022-01-01 RX ADMIN — BRIMONIDINE TARTRATE 2 DROP: 2 SOLUTION OPHTHALMIC at 18:20

## 2022-01-01 RX ADMIN — SUCRALFATE 1 G: 1 SUSPENSION ORAL at 00:06

## 2022-01-01 RX ADMIN — CINACALCET 30 MG: 30 TABLET, FILM COATED ORAL at 09:07

## 2022-01-01 RX ADMIN — APIXABAN 5 MG: 5 TABLET, FILM COATED ORAL at 18:29

## 2022-01-01 RX ADMIN — TACROLIMUS 0.5 MG: 5 CAPSULE ORAL at 06:31

## 2022-01-01 RX ADMIN — TACROLIMUS 0.5 MG: 0.5 CAPSULE ORAL at 04:51

## 2022-01-01 RX ADMIN — LINEZOLID 600 MG: 600 TABLET, FILM COATED ORAL at 17:17

## 2022-01-01 RX ADMIN — SUCRALFATE 1 G: 1 SUSPENSION ORAL at 23:20

## 2022-01-01 RX ADMIN — PREDNISONE 5 MG: 5 TABLET ORAL at 05:34

## 2022-01-01 RX ADMIN — TIMOLOL MALEATE 2 DROP: 5 SOLUTION OPHTHALMIC at 04:39

## 2022-01-01 RX ADMIN — PIPERACILLIN AND TAZOBACTAM 4.5 G: 4; .5 INJECTION, POWDER, FOR SOLUTION INTRAVENOUS at 00:20

## 2022-01-01 RX ADMIN — HYDRALAZINE HYDROCHLORIDE 25 MG: 25 TABLET, FILM COATED ORAL at 12:22

## 2022-01-01 RX ADMIN — SODIUM CHLORIDE, POTASSIUM CHLORIDE, SODIUM LACTATE AND CALCIUM CHLORIDE 1000 ML: 600; 310; 30; 20 INJECTION, SOLUTION INTRAVENOUS at 22:35

## 2022-01-01 RX ADMIN — PATIROMER 8.4 G: 8.4 POWDER, FOR SUSPENSION ORAL at 13:30

## 2022-01-01 RX ADMIN — CINACALCET 30 MG: 30 TABLET, FILM COATED ORAL at 04:12

## 2022-01-01 RX ADMIN — BRIMONIDINE TARTRATE 2 DROP: 2 SOLUTION OPHTHALMIC at 04:53

## 2022-01-01 RX ADMIN — VANCOMYCIN HYDROCHLORIDE 125 MG: KIT ORAL at 10:42

## 2022-01-01 RX ADMIN — BRIMONIDINE TARTRATE 2 DROP: 2 SOLUTION OPHTHALMIC at 17:16

## 2022-01-01 RX ADMIN — OMEPRAZOLE 20 MG: 20 CAPSULE, DELAYED RELEASE ORAL at 04:40

## 2022-01-01 RX ADMIN — SODIUM BICARBONATE 650 MG: 650 TABLET ORAL at 09:14

## 2022-01-01 RX ADMIN — SODIUM BICARBONATE 650 MG: 650 TABLET ORAL at 16:29

## 2022-01-01 RX ADMIN — PREDNISONE 5 MG: 5 TABLET ORAL at 05:12

## 2022-01-01 RX ADMIN — OMEPRAZOLE 40 MG: 20 CAPSULE, DELAYED RELEASE ORAL at 04:11

## 2022-01-01 RX ADMIN — INSULIN HUMAN 3 UNITS: 100 INJECTION, SOLUTION PARENTERAL at 17:20

## 2022-01-01 RX ADMIN — POTASSIUM PHOSPHATE, MONOBASIC AND POTASSIUM PHOSPHATE, DIBASIC 30 MMOL: 224; 236 INJECTION, SOLUTION, CONCENTRATE INTRAVENOUS at 16:59

## 2022-01-01 RX ADMIN — APIXABAN 5 MG: 5 TABLET, FILM COATED ORAL at 17:48

## 2022-01-01 RX ADMIN — ATENOLOL 12.5 MG: 25 TABLET ORAL at 04:12

## 2022-01-01 RX ADMIN — SUCRALFATE 1 G: 1 SUSPENSION ORAL at 00:41

## 2022-01-01 RX ADMIN — HEPARIN SODIUM 5000 UNITS: 5000 INJECTION, SOLUTION INTRAVENOUS; SUBCUTANEOUS at 05:53

## 2022-01-01 RX ADMIN — INSULIN HUMAN 7 UNITS: 100 INJECTION, SOLUTION PARENTERAL at 17:00

## 2022-01-01 RX ADMIN — CINACALCET 30 MG: 30 TABLET, FILM COATED ORAL at 05:15

## 2022-01-01 RX ADMIN — TACROLIMUS 0.5 MG: 5 CAPSULE ORAL at 05:45

## 2022-01-01 RX ADMIN — PROTHROMBIN, COAGULATION FACTOR VII HUMAN, COAGULATION FACTOR IX HUMAN, COAGULATION FACTOR X HUMAN, PROTEIN C, PROTEIN S HUMAN, AND WATER 2500 UNITS: KIT at 13:54

## 2022-01-01 RX ADMIN — NOREPINEPHRINE BITARTRATE 30 MCG/MIN: 1 INJECTION, SOLUTION, CONCENTRATE INTRAVENOUS at 19:25

## 2022-01-01 RX ADMIN — SUCRALFATE 1 G: 1 SUSPENSION ORAL at 16:59

## 2022-01-01 RX ADMIN — VANCOMYCIN HYDROCHLORIDE 125 MG: KIT ORAL at 06:19

## 2022-01-01 RX ADMIN — TIMOLOL MALEATE 2 DROP: 5 SOLUTION OPHTHALMIC at 17:54

## 2022-01-01 RX ADMIN — INSULIN HUMAN 10 UNITS: 100 INJECTION, SUSPENSION SUBCUTANEOUS at 09:20

## 2022-01-01 RX ADMIN — APIXABAN 5 MG: 5 TABLET, FILM COATED ORAL at 17:06

## 2022-01-01 RX ADMIN — MYCOPHENOLATE MOFETIL 500 MG: 250 CAPSULE ORAL at 16:43

## 2022-01-01 RX ADMIN — FLUTICASONE PROPIONATE 50 MCG: 50 SPRAY, METERED NASAL at 05:40

## 2022-01-01 RX ADMIN — BRIMONIDINE TARTRATE 2 DROP: 2 SOLUTION OPHTHALMIC at 05:15

## 2022-01-01 RX ADMIN — AMLODIPINE BESYLATE 5 MG: 10 TABLET ORAL at 05:10

## 2022-01-01 RX ADMIN — DEXTROSE MONOHYDRATE: 50 INJECTION, SOLUTION INTRAVENOUS at 22:40

## 2022-01-01 RX ADMIN — APIXABAN 5 MG: 5 TABLET, FILM COATED ORAL at 05:11

## 2022-01-01 RX ADMIN — TACROLIMUS 0.5 MG: 0.5 CAPSULE ORAL at 17:22

## 2022-01-01 RX ADMIN — TIMOLOL MALEATE 2 DROP: 5 SOLUTION OPHTHALMIC at 17:06

## 2022-01-01 RX ADMIN — TIMOLOL MALEATE 2 DROP: 5 SOLUTION OPHTHALMIC at 05:26

## 2022-01-01 RX ADMIN — HEPARIN SODIUM 2800 UNITS: 1000 INJECTION, SOLUTION INTRAVENOUS; SUBCUTANEOUS at 18:00

## 2022-01-01 RX ADMIN — CEFEPIME 2 G: 2 INJECTION, POWDER, FOR SOLUTION INTRAVENOUS at 05:11

## 2022-01-01 RX ADMIN — SODIUM CHLORIDE, POTASSIUM CHLORIDE, SODIUM LACTATE AND CALCIUM CHLORIDE: 600; 310; 30; 20 INJECTION, SOLUTION INTRAVENOUS at 05:17

## 2022-01-01 RX ADMIN — PATIROMER 8.4 G: 8.4 POWDER, FOR SUSPENSION ORAL at 12:23

## 2022-01-01 RX ADMIN — POTASSIUM CHLORIDE 60 MEQ: 1500 TABLET, EXTENDED RELEASE ORAL at 10:43

## 2022-01-01 RX ADMIN — HYDRALAZINE HYDROCHLORIDE 25 MG: 25 TABLET, FILM COATED ORAL at 14:39

## 2022-01-01 RX ADMIN — MYCOPHENOLATE MOFETIL 500 MG: 250 CAPSULE ORAL at 05:04

## 2022-01-01 RX ADMIN — SODIUM CHLORIDE, POTASSIUM CHLORIDE, SODIUM LACTATE AND CALCIUM CHLORIDE 1000 ML: 600; 310; 30; 20 INJECTION, SOLUTION INTRAVENOUS at 18:10

## 2022-01-01 RX ADMIN — PREDNISONE 60 MG: 10 TABLET ORAL at 05:30

## 2022-01-01 RX ADMIN — APIXABAN 5 MG: 5 TABLET, FILM COATED ORAL at 05:53

## 2022-01-01 RX ADMIN — MYCOPHENOLATE MOFETIL 500 MG: 250 CAPSULE ORAL at 17:53

## 2022-01-01 RX ADMIN — MYCOPHENOLATE MOFETIL 500 MG: 250 CAPSULE ORAL at 04:39

## 2022-01-01 RX ADMIN — TIMOLOL MALEATE 2 DROP: 5 SOLUTION OPHTHALMIC at 05:13

## 2022-01-01 RX ADMIN — DEXTROSE MONOHYDRATE 50 ML: 25 INJECTION, SOLUTION INTRAVENOUS at 06:39

## 2022-01-01 RX ADMIN — MYCOPHENOLATE MOFETIL 500 MG: 250 CAPSULE ORAL at 16:28

## 2022-01-01 RX ADMIN — SUCRALFATE 1 G: 1 SUSPENSION ORAL at 23:50

## 2022-01-01 RX ADMIN — ONDANSETRON 4 MG: 2 INJECTION INTRAMUSCULAR; INTRAVENOUS at 10:35

## 2022-01-01 RX ADMIN — MYCOPHENOLATE MOFETIL 500 MG: 250 CAPSULE ORAL at 06:36

## 2022-01-01 RX ADMIN — BRIMONIDINE TARTRATE 2 DROP: 2 SOLUTION OPHTHALMIC at 05:33

## 2022-01-01 RX ADMIN — TIMOLOL MALEATE 2 DROP: 5 SOLUTION OPHTHALMIC at 17:16

## 2022-01-01 RX ADMIN — HYDRALAZINE HYDROCHLORIDE 25 MG: 25 TABLET, FILM COATED ORAL at 21:42

## 2022-01-01 RX ADMIN — TIMOLOL MALEATE 2 DROP: 5 SOLUTION OPHTHALMIC at 05:19

## 2022-01-01 RX ADMIN — FLUTICASONE PROPIONATE 50 MCG: 50 SPRAY, METERED NASAL at 04:29

## 2022-01-01 RX ADMIN — SODIUM CHLORIDE 1000 MG: 9 INJECTION, SOLUTION INTRAVENOUS at 08:39

## 2022-01-01 RX ADMIN — MYCOPHENOLATE MOFETIL 500 MG: 250 CAPSULE ORAL at 05:38

## 2022-01-01 RX ADMIN — SODIUM BICARBONATE 650 MG: 650 TABLET ORAL at 16:21

## 2022-01-01 RX ADMIN — LINEZOLID 600 MG: 600 INJECTION, SOLUTION INTRAVENOUS at 05:41

## 2022-01-01 RX ADMIN — CEFTRIAXONE SODIUM 2 G: 2 INJECTION, POWDER, FOR SOLUTION INTRAMUSCULAR; INTRAVENOUS at 13:09

## 2022-01-01 RX ADMIN — SODIUM CHLORIDE 250 ML: 9 INJECTION, SOLUTION INTRAVENOUS at 11:26

## 2022-01-01 RX ADMIN — PROPOFOL 100 MG: 10 INJECTION, EMULSION INTRAVENOUS at 14:11

## 2022-01-01 RX ADMIN — PIPERACILLIN AND TAZOBACTAM 4.5 G: 4; .5 INJECTION, POWDER, FOR SOLUTION INTRAVENOUS at 13:15

## 2022-01-01 RX ADMIN — METOPROLOL TARTRATE 25 MG: 25 TABLET, FILM COATED ORAL at 18:46

## 2022-01-01 RX ADMIN — MEROPENEM 1 G: 1 INJECTION, POWDER, FOR SOLUTION INTRAVENOUS at 13:27

## 2022-01-01 RX ADMIN — MYCOPHENOLATE MOFETIL 500 MG: 250 CAPSULE ORAL at 05:10

## 2022-01-01 RX ADMIN — DEXTROSE MONOHYDRATE: 50 INJECTION, SOLUTION INTRAVENOUS at 18:00

## 2022-01-01 RX ADMIN — DEXMEDETOMIDINE 0.2 MCG/KG/HR: 200 INJECTION, SOLUTION INTRAVENOUS at 20:11

## 2022-01-01 RX ADMIN — PREDNISONE 30 MG: 20 TABLET ORAL at 09:49

## 2022-01-01 RX ADMIN — BRIMONIDINE TARTRATE 2 DROP: 2 SOLUTION OPHTHALMIC at 05:18

## 2022-01-01 RX ADMIN — MYCOPHENOLATE MOFETIL 500 MG: 250 CAPSULE ORAL at 05:53

## 2022-01-01 RX ADMIN — VANCOMYCIN HYDROCHLORIDE 125 MG: KIT ORAL at 05:15

## 2022-01-01 RX ADMIN — BRIMONIDINE TARTRATE 2 DROP: 2 SOLUTION OPHTHALMIC at 05:06

## 2022-01-01 RX ADMIN — OMEPRAZOLE 20 MG: 20 CAPSULE, DELAYED RELEASE ORAL at 18:07

## 2022-01-01 RX ADMIN — SODIUM BICARBONATE 650 MG: 650 TABLET ORAL at 20:38

## 2022-01-01 RX ADMIN — TACROLIMUS 0.5 MG: 0.5 CAPSULE ORAL at 18:11

## 2022-01-01 RX ADMIN — TIMOLOL MALEATE 2 DROP: 5 SOLUTION OPHTHALMIC at 06:20

## 2022-01-01 RX ADMIN — BRIMONIDINE TARTRATE 2 DROP: 2 SOLUTION OPHTHALMIC at 19:20

## 2022-01-01 RX ADMIN — OMEPRAZOLE 40 MG: 20 CAPSULE, DELAYED RELEASE ORAL at 04:28

## 2022-01-01 RX ADMIN — BRIMONIDINE TARTRATE 2 DROP: 2 SOLUTION OPHTHALMIC at 17:08

## 2022-01-01 RX ADMIN — SODIUM CHLORIDE: 9 INJECTION, SOLUTION INTRAVENOUS at 21:43

## 2022-01-01 RX ADMIN — HEPARIN SODIUM 5000 UNITS: 5000 INJECTION, SOLUTION INTRAVENOUS; SUBCUTANEOUS at 21:08

## 2022-01-01 RX ADMIN — OMEPRAZOLE 40 MG: 20 CAPSULE, DELAYED RELEASE ORAL at 04:48

## 2022-01-01 RX ADMIN — TACROLIMUS 0.5 MG: 0.5 CAPSULE ORAL at 16:28

## 2022-01-01 RX ADMIN — SODIUM CHLORIDE 8 MG/HR: 9 INJECTION, SOLUTION INTRAVENOUS at 10:09

## 2022-01-01 RX ADMIN — TACROLIMUS: 5 INJECTION, SOLUTION INTRAVENOUS at 15:27

## 2022-01-01 RX ADMIN — PIPERACILLIN AND TAZOBACTAM 4.5 G: 4; .5 INJECTION, POWDER, FOR SOLUTION INTRAVENOUS at 16:51

## 2022-01-01 RX ADMIN — PIPERACILLIN AND TAZOBACTAM 4.5 G: 4; .5 INJECTION, POWDER, FOR SOLUTION INTRAVENOUS at 22:35

## 2022-01-01 RX ADMIN — INSULIN HUMAN 15 UNITS: 100 INJECTION, SUSPENSION SUBCUTANEOUS at 19:12

## 2022-01-01 RX ADMIN — AMLODIPINE BESYLATE 5 MG: 5 TABLET ORAL at 05:41

## 2022-01-01 RX ADMIN — TACROLIMUS 0.5 MG: 0.5 CAPSULE ORAL at 18:00

## 2022-01-01 RX ADMIN — TIMOLOL MALEATE 2 DROP: 5 SOLUTION OPHTHALMIC at 17:52

## 2022-01-01 RX ADMIN — METOPROLOL TARTRATE 25 MG: 25 TABLET, FILM COATED ORAL at 05:25

## 2022-01-01 RX ADMIN — METOPROLOL TARTRATE 25 MG: 25 TABLET, FILM COATED ORAL at 18:23

## 2022-01-01 RX ADMIN — VANCOMYCIN HYDROCHLORIDE 125 MG: KIT ORAL at 09:22

## 2022-01-01 RX ADMIN — MYCOPHENOLATE MOFETIL 500 MG: 250 CAPSULE ORAL at 06:42

## 2022-01-01 RX ADMIN — CINACALCET 30 MG: 30 TABLET, FILM COATED ORAL at 04:48

## 2022-01-01 RX ADMIN — TACROLIMUS 0.5 MG: 0.5 CAPSULE ORAL at 16:20

## 2022-01-01 RX ADMIN — PREDNISONE 5 MG: 5 TABLET ORAL at 06:35

## 2022-01-01 RX ADMIN — PREDNISONE 60 MG: 20 TABLET ORAL at 04:38

## 2022-01-01 RX ADMIN — MYCOPHENOLATE MOFETIL 500 MG: 250 CAPSULE ORAL at 05:07

## 2022-01-01 RX ADMIN — OMEPRAZOLE 40 MG: 20 CAPSULE, DELAYED RELEASE ORAL at 04:40

## 2022-01-01 RX ADMIN — HEPARIN SODIUM 5000 UNITS: 5000 INJECTION, SOLUTION INTRAVENOUS; SUBCUTANEOUS at 13:15

## 2022-01-01 RX ADMIN — LINEZOLID 600 MG: 600 INJECTION, SOLUTION INTRAVENOUS at 05:53

## 2022-01-01 RX ADMIN — PREDNISONE 5 MG: 5 TABLET ORAL at 05:04

## 2022-01-01 RX ADMIN — MYCOPHENOLATE MOFETIL 500 MG: 250 CAPSULE ORAL at 18:16

## 2022-01-01 RX ADMIN — SODIUM BICARBONATE 650 MG: 650 TABLET ORAL at 08:17

## 2022-01-01 RX ADMIN — MYCOPHENOLATE MOFETIL 500 MG: 250 CAPSULE ORAL at 06:31

## 2022-01-01 RX ADMIN — SODIUM BICARBONATE 650 MG TABLET 650 MG: at 15:53

## 2022-01-01 RX ADMIN — PATIROMER 8.4 G: 8.4 POWDER, FOR SUSPENSION ORAL at 15:29

## 2022-01-01 RX ADMIN — TIMOLOL MALEATE 2 DROP: 5 SOLUTION OPHTHALMIC at 06:04

## 2022-01-01 RX ADMIN — SODIUM BICARBONATE 650 MG: 650 TABLET ORAL at 21:08

## 2022-01-01 RX ADMIN — VANCOMYCIN HYDROCHLORIDE 125 MG: KIT ORAL at 08:17

## 2022-01-01 RX ADMIN — SODIUM BICARBONATE 650 MG: 650 TABLET ORAL at 14:25

## 2022-01-01 RX ADMIN — OMEPRAZOLE 20 MG: 20 CAPSULE, DELAYED RELEASE ORAL at 18:05

## 2022-01-01 RX ADMIN — HEPARIN SODIUM 5000 UNITS: 5000 INJECTION, SOLUTION INTRAVENOUS; SUBCUTANEOUS at 12:47

## 2022-01-01 RX ADMIN — TACROLIMUS 0.5 MG: 0.5 CAPSULE ORAL at 17:36

## 2022-01-01 RX ADMIN — POTASSIUM CHLORIDE 60 MEQ: 1500 TABLET, EXTENDED RELEASE ORAL at 16:28

## 2022-01-01 RX ADMIN — AMLODIPINE BESYLATE 10 MG: 10 TABLET ORAL at 05:04

## 2022-01-01 RX ADMIN — PREDNISONE 60 MG: 10 TABLET ORAL at 05:38

## 2022-01-01 RX ADMIN — BRIMONIDINE TARTRATE 2 DROP: 2 SOLUTION OPHTHALMIC at 04:39

## 2022-01-01 RX ADMIN — TACROLIMUS 1 MG: 1 CAPSULE ORAL at 04:39

## 2022-01-01 RX ADMIN — LINEZOLID 600 MG: 600 TABLET, FILM COATED ORAL at 17:06

## 2022-01-01 RX ADMIN — MEROPENEM 1 G: 1 INJECTION, POWDER, FOR SOLUTION INTRAVENOUS at 13:28

## 2022-01-01 RX ADMIN — APIXABAN 5 MG: 5 TABLET, FILM COATED ORAL at 04:46

## 2022-01-01 RX ADMIN — Medication 200 MCG: at 22:34

## 2022-01-01 RX ADMIN — TIMOLOL MALEATE 2 DROP: 5 SOLUTION OPHTHALMIC at 18:07

## 2022-01-01 RX ADMIN — ACETAMINOPHEN 650 MG: 325 TABLET, FILM COATED ORAL at 05:20

## 2022-01-01 RX ADMIN — TIMOLOL MALEATE 2 DROP: 5 SOLUTION OPHTHALMIC at 05:04

## 2022-01-01 RX ADMIN — MYCOPHENOLATE MOFETIL 500 MG: 250 CAPSULE ORAL at 05:25

## 2022-01-01 RX ADMIN — INSULIN HUMAN 15 UNITS: 100 INJECTION, SUSPENSION SUBCUTANEOUS at 09:57

## 2022-01-01 RX ADMIN — Medication 400 MG: at 09:08

## 2022-01-01 RX ADMIN — HEPARIN SODIUM 5000 UNITS: 5000 INJECTION, SOLUTION INTRAVENOUS; SUBCUTANEOUS at 15:42

## 2022-01-01 RX ADMIN — MYCOPHENOLATE MOFETIL 500 MG: 250 CAPSULE ORAL at 17:22

## 2022-01-01 RX ADMIN — POTASSIUM CHLORIDE 60 MEQ: 1500 TABLET, EXTENDED RELEASE ORAL at 08:08

## 2022-01-01 RX ADMIN — SODIUM CHLORIDE: 9 INJECTION, SOLUTION INTRAVENOUS at 11:38

## 2022-01-01 RX ADMIN — MYCOPHENOLATE MOFETIL 500 MG: 250 CAPSULE ORAL at 17:11

## 2022-01-01 RX ADMIN — ATORVASTATIN CALCIUM 10 MG: 10 TABLET, FILM COATED ORAL at 17:32

## 2022-01-01 RX ADMIN — MYCOPHENOLATE MOFETIL 500 MG: 250 CAPSULE ORAL at 17:44

## 2022-01-01 RX ADMIN — MYCOPHENOLATE MOFETIL 500 MG: 250 CAPSULE ORAL at 16:58

## 2022-01-01 RX ADMIN — SODIUM BICARBONATE 650 MG: 650 TABLET ORAL at 09:57

## 2022-01-01 RX ADMIN — APIXABAN 5 MG: 5 TABLET, FILM COATED ORAL at 06:31

## 2022-01-01 RX ADMIN — ATENOLOL 12.5 MG: 25 TABLET ORAL at 09:06

## 2022-01-01 RX ADMIN — SODIUM CHLORIDE 8 MG/HR: 9 INJECTION, SOLUTION INTRAVENOUS at 15:12

## 2022-01-01 RX ADMIN — SODIUM CHLORIDE, POTASSIUM CHLORIDE, SODIUM LACTATE AND CALCIUM CHLORIDE: 600; 310; 30; 20 INJECTION, SOLUTION INTRAVENOUS at 01:45

## 2022-01-01 RX ADMIN — SODIUM CHLORIDE 8 MG/HR: 9 INJECTION, SOLUTION INTRAVENOUS at 07:17

## 2022-01-01 RX ADMIN — AMLODIPINE BESYLATE 5 MG: 5 TABLET ORAL at 05:33

## 2022-01-01 RX ADMIN — SUCRALFATE 1 G: 1 SUSPENSION ORAL at 11:42

## 2022-01-01 RX ADMIN — FLUTICASONE PROPIONATE 50 MCG: 50 SPRAY, METERED NASAL at 04:37

## 2022-01-01 RX ADMIN — VANCOMYCIN HYDROCHLORIDE 125 MG: KIT ORAL at 21:03

## 2022-01-01 RX ADMIN — APIXABAN 5 MG: 5 TABLET, FILM COATED ORAL at 06:42

## 2022-01-01 RX ADMIN — SODIUM CHLORIDE 1000 MG: 9 INJECTION, SOLUTION INTRAVENOUS at 05:41

## 2022-01-01 RX ADMIN — PREDNISONE 60 MG: 20 TABLET ORAL at 00:41

## 2022-01-01 RX ADMIN — SODIUM CHLORIDE: 9 INJECTION, SOLUTION INTRAVENOUS at 06:03

## 2022-01-01 RX ADMIN — APIXABAN 5 MG: 5 TABLET, FILM COATED ORAL at 05:45

## 2022-01-01 RX ADMIN — TIMOLOL MALEATE 2 DROP: 5 SOLUTION OPHTHALMIC at 04:45

## 2022-01-01 RX ADMIN — TACROLIMUS 0.5 MG: 0.5 CAPSULE ORAL at 04:42

## 2022-01-01 RX ADMIN — SODIUM BICARBONATE 650 MG: 650 TABLET ORAL at 11:48

## 2022-01-01 RX ADMIN — APIXABAN 5 MG: 5 TABLET, FILM COATED ORAL at 06:20

## 2022-01-01 RX ADMIN — HYDROCORTISONE SODIUM SUCCINATE 50 MG: 100 INJECTION, POWDER, FOR SOLUTION INTRAMUSCULAR; INTRAVENOUS at 05:12

## 2022-01-01 RX ADMIN — OMEPRAZOLE 20 MG: 20 CAPSULE, DELAYED RELEASE ORAL at 17:16

## 2022-01-01 RX ADMIN — BRIMONIDINE TARTRATE 2 DROP: 2 SOLUTION OPHTHALMIC at 17:30

## 2022-01-01 RX ADMIN — METOPROLOL TARTRATE 25 MG: 25 TABLET, FILM COATED ORAL at 05:03

## 2022-01-01 RX ADMIN — TACROLIMUS 0.5 MG: 0.5 CAPSULE ORAL at 08:09

## 2022-01-01 RX ADMIN — INSULIN HUMAN 4 UNITS: 100 INJECTION, SOLUTION PARENTERAL at 18:36

## 2022-01-01 RX ADMIN — VANCOMYCIN HYDROCHLORIDE 125 MG: KIT ORAL at 09:46

## 2022-01-01 RX ADMIN — TACROLIMUS 1 MG: 1 CAPSULE ORAL at 06:34

## 2022-01-01 RX ADMIN — INSULIN HUMAN 3 UNITS: 100 INJECTION, SOLUTION PARENTERAL at 06:36

## 2022-01-01 RX ADMIN — SUCRALFATE 1 G: 1 SUSPENSION ORAL at 17:37

## 2022-01-01 RX ADMIN — SODIUM BICARBONATE 650 MG: 650 TABLET ORAL at 09:11

## 2022-01-01 RX ADMIN — LINEZOLID 600 MG: 600 INJECTION, SOLUTION INTRAVENOUS at 20:20

## 2022-01-01 RX ADMIN — SUCRALFATE 1 G: 1 SUSPENSION ORAL at 05:03

## 2022-01-01 RX ADMIN — TACROLIMUS 0.5 MG: 0.5 CAPSULE ORAL at 17:40

## 2022-01-01 RX ADMIN — SUCRALFATE 1 G: 1 SUSPENSION ORAL at 17:48

## 2022-01-01 RX ADMIN — OMEPRAZOLE 20 MG: 20 CAPSULE, DELAYED RELEASE ORAL at 05:20

## 2022-01-01 RX ADMIN — HYDROCORTISONE SODIUM SUCCINATE 50 MG: 100 INJECTION, POWDER, FOR SOLUTION INTRAMUSCULAR; INTRAVENOUS at 17:08

## 2022-01-01 RX ADMIN — BRIMONIDINE TARTRATE 2 DROP: 2 SOLUTION OPHTHALMIC at 17:06

## 2022-01-01 RX ADMIN — SODIUM CHLORIDE 8 MG/HR: 9 INJECTION, SOLUTION INTRAVENOUS at 20:40

## 2022-01-01 RX ADMIN — SODIUM CHLORIDE 8 MG/HR: 9 INJECTION, SOLUTION INTRAVENOUS at 10:30

## 2022-01-01 RX ADMIN — OMEPRAZOLE 20 MG: 20 CAPSULE, DELAYED RELEASE ORAL at 05:24

## 2022-01-01 RX ADMIN — INSULIN HUMAN 15 UNITS: 100 INJECTION, SUSPENSION SUBCUTANEOUS at 18:37

## 2022-01-01 RX ADMIN — FLUTICASONE PROPIONATE 50 MCG: 50 SPRAY, METERED NASAL at 04:13

## 2022-01-01 RX ADMIN — MYCOPHENOLATE MOFETIL 500 MG: 250 CAPSULE ORAL at 16:38

## 2022-01-01 RX ADMIN — MAGNESIUM SULFATE HEPTAHYDRATE 2 G: 40 INJECTION, SOLUTION INTRAVENOUS at 09:52

## 2022-01-01 RX ADMIN — SODIUM BICARBONATE 650 MG: 650 TABLET ORAL at 10:42

## 2022-01-01 RX ADMIN — ATORVASTATIN CALCIUM 10 MG: 10 TABLET, FILM COATED ORAL at 16:46

## 2022-01-01 RX ADMIN — DIBASIC SODIUM PHOSPHATE, MONOBASIC POTASSIUM PHOSPHATE AND MONOBASIC SODIUM PHOSPHATE 500 MG: 852; 155; 130 TABLET ORAL at 09:16

## 2022-01-01 RX ADMIN — METOPROLOL TARTRATE 25 MG: 25 TABLET, FILM COATED ORAL at 17:17

## 2022-01-01 RX ADMIN — ACETAMINOPHEN 650 MG: 325 TABLET, FILM COATED ORAL at 09:15

## 2022-01-01 RX ADMIN — PREDNISONE 60 MG: 20 TABLET ORAL at 05:03

## 2022-01-01 RX ADMIN — DIBASIC SODIUM PHOSPHATE, MONOBASIC POTASSIUM PHOSPHATE AND MONOBASIC SODIUM PHOSPHATE 500 MG: 852; 155; 130 TABLET ORAL at 17:17

## 2022-01-01 RX ADMIN — BRIMONIDINE TARTRATE 2 DROP: 2 SOLUTION OPHTHALMIC at 05:41

## 2022-01-01 RX ADMIN — BRIMONIDINE TARTRATE 2 DROP: 2 SOLUTION OPHTHALMIC at 18:29

## 2022-01-01 RX ADMIN — TACROLIMUS 1 MG: 1 CAPSULE ORAL at 06:42

## 2022-01-01 RX ADMIN — PATIROMER 8.4 G: 8.4 POWDER, FOR SUSPENSION ORAL at 11:41

## 2022-01-01 RX ADMIN — BRIMONIDINE TARTRATE 2 DROP: 2 SOLUTION OPHTHALMIC at 17:52

## 2022-01-01 RX ADMIN — TACROLIMUS 0.5 MG: 0.5 CAPSULE ORAL at 05:34

## 2022-01-01 RX ADMIN — SODIUM BICARBONATE 650 MG TABLET 650 MG: at 15:11

## 2022-01-01 RX ADMIN — BRIMONIDINE TARTRATE 2 DROP: 2 SOLUTION OPHTHALMIC at 18:05

## 2022-01-01 RX ADMIN — SUCRALFATE 1 G: 1 SUSPENSION ORAL at 17:16

## 2022-01-01 RX ADMIN — INSULIN HUMAN 6 UNITS: 100 INJECTION, SOLUTION PARENTERAL at 13:26

## 2022-01-01 RX ADMIN — PATIROMER 8.4 G: 8.4 POWDER, FOR SUSPENSION ORAL at 12:52

## 2022-01-01 RX ADMIN — MYCOPHENOLATE MOFETIL 500 MG: 250 CAPSULE ORAL at 18:23

## 2022-01-01 RX ADMIN — PANTOPRAZOLE SODIUM 80 MG: 40 INJECTION, POWDER, FOR SOLUTION INTRAVENOUS at 10:27

## 2022-01-01 RX ADMIN — SODIUM CHLORIDE, POTASSIUM CHLORIDE, SODIUM LACTATE AND CALCIUM CHLORIDE 1000 ML: 600; 310; 30; 20 INJECTION, SOLUTION INTRAVENOUS at 10:33

## 2022-01-01 RX ADMIN — MEROPENEM 1 G: 1 INJECTION, POWDER, FOR SOLUTION INTRAVENOUS at 17:01

## 2022-01-01 RX ADMIN — HEPARIN SODIUM 5000 UNITS: 5000 INJECTION, SOLUTION INTRAVENOUS; SUBCUTANEOUS at 22:51

## 2022-01-01 RX ADMIN — TACROLIMUS 0.5 MG: 0.5 CAPSULE ORAL at 05:27

## 2022-01-01 RX ADMIN — TACROLIMUS 0.5 MG: 0.5 CAPSULE ORAL at 17:53

## 2022-01-01 RX ADMIN — HYDRALAZINE HYDROCHLORIDE 25 MG: 25 TABLET, FILM COATED ORAL at 20:27

## 2022-01-01 RX ADMIN — SODIUM CHLORIDE 1000 ML: 9 INJECTION, SOLUTION INTRAVENOUS at 14:24

## 2022-01-01 RX ADMIN — PIPERACILLIN AND TAZOBACTAM 4.5 G: 4; .5 INJECTION, POWDER, FOR SOLUTION INTRAVENOUS at 06:18

## 2022-01-01 RX ADMIN — BRIMONIDINE TARTRATE 2 DROP: 2 SOLUTION OPHTHALMIC at 17:20

## 2022-01-01 RX ADMIN — SODIUM CHLORIDE: 9 INJECTION, SOLUTION INTRAVENOUS at 14:30

## 2022-01-01 RX ADMIN — POTASSIUM CHLORIDE 10 MEQ: 7.46 INJECTION, SOLUTION INTRAVENOUS at 16:00

## 2022-01-01 RX ADMIN — MAGNESIUM SULFATE HEPTAHYDRATE 2 G: 40 INJECTION, SOLUTION INTRAVENOUS at 05:06

## 2022-01-01 RX ADMIN — HEPARIN SODIUM 5000 UNITS: 5000 INJECTION, SOLUTION INTRAVENOUS; SUBCUTANEOUS at 06:18

## 2022-01-01 RX ADMIN — TIMOLOL MALEATE 2 DROP: 5 SOLUTION OPHTHALMIC at 17:46

## 2022-01-01 RX ADMIN — TIMOLOL MALEATE 2 DROP: 5 SOLUTION OPHTHALMIC at 18:22

## 2022-01-01 RX ADMIN — HEPARIN SODIUM 5000 UNITS: 5000 INJECTION, SOLUTION INTRAVENOUS; SUBCUTANEOUS at 22:00

## 2022-01-01 RX ADMIN — TIMOLOL MALEATE 2 DROP: 5 SOLUTION OPHTHALMIC at 19:43

## 2022-01-01 RX ADMIN — TIMOLOL MALEATE 2 DROP: 5 SOLUTION OPHTHALMIC at 18:05

## 2022-01-01 RX ADMIN — INSULIN HUMAN 7 UNITS: 100 INJECTION, SOLUTION PARENTERAL at 13:32

## 2022-01-01 RX ADMIN — MYCOPHENOLATE MOFETIL 500 MG: 250 CAPSULE ORAL at 06:11

## 2022-01-01 RX ADMIN — PATIROMER 8.4 G: 8.4 POWDER, FOR SUSPENSION ORAL at 14:58

## 2022-01-01 RX ADMIN — SODIUM BICARBONATE 1300 MG: 650 TABLET ORAL at 21:52

## 2022-01-01 RX ADMIN — BRIMONIDINE TARTRATE 2 DROP: 2 SOLUTION OPHTHALMIC at 18:07

## 2022-01-01 RX ADMIN — SUCRALFATE 1 G: 1 SUSPENSION ORAL at 17:12

## 2022-01-01 RX ADMIN — SODIUM BICARBONATE 650 MG: 650 TABLET ORAL at 15:00

## 2022-01-01 RX ADMIN — VANCOMYCIN HYDROCHLORIDE 125 MG: KIT ORAL at 16:38

## 2022-01-01 RX ADMIN — SUGAMMADEX 200 MG: 100 INJECTION, SOLUTION INTRAVENOUS at 14:28

## 2022-01-01 RX ADMIN — MYCOPHENOLATE MOFETIL 500 MG: 250 CAPSULE ORAL at 18:12

## 2022-01-01 RX ADMIN — METOPROLOL TARTRATE 25 MG: 25 TABLET, FILM COATED ORAL at 05:09

## 2022-01-01 RX ADMIN — INSULIN HUMAN 15 UNITS: 100 INJECTION, SUSPENSION SUBCUTANEOUS at 08:32

## 2022-01-01 RX ADMIN — SODIUM BICARBONATE: 84 INJECTION, SOLUTION INTRAVENOUS at 21:39

## 2022-01-01 RX ADMIN — APIXABAN 5 MG: 5 TABLET, FILM COATED ORAL at 18:16

## 2022-01-01 RX ADMIN — HYDRALAZINE HYDROCHLORIDE 25 MG: 25 TABLET, FILM COATED ORAL at 13:26

## 2022-01-01 RX ADMIN — INSULIN HUMAN 3 UNITS: 100 INJECTION, SOLUTION PARENTERAL at 18:14

## 2022-01-01 RX ADMIN — METOPROLOL TARTRATE 25 MG: 25 TABLET, FILM COATED ORAL at 17:40

## 2022-01-01 RX ADMIN — ONDANSETRON 4 MG: 2 INJECTION INTRAMUSCULAR; INTRAVENOUS at 00:44

## 2022-01-01 RX ADMIN — SODIUM BICARBONATE: 84 INJECTION, SOLUTION INTRAVENOUS at 15:42

## 2022-01-01 RX ADMIN — LOSARTAN POTASSIUM 100 MG: 50 TABLET, FILM COATED ORAL at 05:59

## 2022-01-01 RX ADMIN — HEPARIN SODIUM 5000 UNITS: 5000 INJECTION, SOLUTION INTRAVENOUS; SUBCUTANEOUS at 14:20

## 2022-01-01 RX ADMIN — SODIUM CHLORIDE 8 MG/HR: 9 INJECTION, SOLUTION INTRAVENOUS at 05:05

## 2022-01-01 RX ADMIN — OMEPRAZOLE 20 MG: 20 CAPSULE, DELAYED RELEASE ORAL at 17:17

## 2022-01-01 RX ADMIN — SUCRALFATE 1 G: 1 SUSPENSION ORAL at 04:45

## 2022-01-01 RX ADMIN — TIMOLOL MALEATE 2 DROP: 5 SOLUTION OPHTHALMIC at 05:41

## 2022-01-01 RX ADMIN — TIMOLOL MALEATE 2 DROP: 5 SOLUTION OPHTHALMIC at 06:00

## 2022-01-01 RX ADMIN — VANCOMYCIN HYDROCHLORIDE 125 MG: KIT ORAL at 10:15

## 2022-01-01 RX ADMIN — OMEPRAZOLE 20 MG: 20 CAPSULE, DELAYED RELEASE ORAL at 17:40

## 2022-01-01 RX ADMIN — DEXTROSE MONOHYDRATE: 50 INJECTION, SOLUTION INTRAVENOUS at 07:55

## 2022-01-01 RX ADMIN — LOSARTAN POTASSIUM 100 MG: 50 TABLET, FILM COATED ORAL at 06:30

## 2022-01-01 RX ADMIN — PIPERACILLIN AND TAZOBACTAM 4.5 G: 4; .5 INJECTION, POWDER, FOR SOLUTION INTRAVENOUS at 15:01

## 2022-01-01 RX ADMIN — SODIUM BICARBONATE 650 MG: 650 TABLET ORAL at 21:31

## 2022-01-01 RX ADMIN — BRIMONIDINE TARTRATE 2 DROP: 2 SOLUTION OPHTHALMIC at 05:08

## 2022-01-01 RX ADMIN — VANCOMYCIN HYDROCHLORIDE 125 MG: KIT ORAL at 04:47

## 2022-01-01 RX ADMIN — MYCOPHENOLATE MOFETIL 500 MG: 250 CAPSULE ORAL at 17:13

## 2022-01-01 RX ADMIN — MYCOPHENOLATE MOFETIL 500 MG: 250 CAPSULE ORAL at 05:34

## 2022-01-01 RX ADMIN — SUCRALFATE 1 G: 1 SUSPENSION ORAL at 05:04

## 2022-01-01 RX ADMIN — HYDRALAZINE HYDROCHLORIDE 25 MG: 25 TABLET, FILM COATED ORAL at 06:18

## 2022-01-01 RX ADMIN — INSULIN HUMAN 6 UNITS: 100 INJECTION, SOLUTION PARENTERAL at 08:26

## 2022-01-01 RX ADMIN — TACROLIMUS: 5 INJECTION, SOLUTION INTRAVENOUS at 15:12

## 2022-01-01 RX ADMIN — Medication 400 MG: at 05:04

## 2022-01-01 RX ADMIN — VANCOMYCIN HYDROCHLORIDE 125 MG: KIT ORAL at 16:30

## 2022-01-01 RX ADMIN — BRIMONIDINE TARTRATE 2 DROP: 2 SOLUTION OPHTHALMIC at 18:21

## 2022-01-01 RX ADMIN — BRIMONIDINE TARTRATE 2 DROP: 2 SOLUTION OPHTHALMIC at 06:20

## 2022-01-01 RX ADMIN — HEPARIN SODIUM 5000 UNITS: 5000 INJECTION, SOLUTION INTRAVENOUS; SUBCUTANEOUS at 04:12

## 2022-01-01 RX ADMIN — PREDNISONE 5 MG: 10 TABLET ORAL at 05:53

## 2022-01-01 RX ADMIN — PREDNISONE 5 MG: 5 TABLET ORAL at 06:04

## 2022-01-01 RX ADMIN — PIPERACILLIN AND TAZOBACTAM 4.5 G: 4; .5 INJECTION, POWDER, FOR SOLUTION INTRAVENOUS at 14:53

## 2022-01-01 RX ADMIN — DEXTROSE MONOHYDRATE: 50 INJECTION, SOLUTION INTRAVENOUS at 14:40

## 2022-01-01 RX ADMIN — INSULIN HUMAN 3 UNITS: 100 INJECTION, SOLUTION PARENTERAL at 09:57

## 2022-01-01 RX ADMIN — PREDNISONE 60 MG: 10 TABLET ORAL at 05:10

## 2022-01-01 RX ADMIN — SODIUM CHLORIDE 8 MG/HR: 9 INJECTION, SOLUTION INTRAVENOUS at 04:15

## 2022-01-01 RX ADMIN — OMEPRAZOLE 20 MG: 20 CAPSULE, DELAYED RELEASE ORAL at 05:03

## 2022-01-01 RX ADMIN — TIMOLOL MALEATE 2 DROP: 5 SOLUTION OPHTHALMIC at 18:20

## 2022-01-01 RX ADMIN — MYCOPHENOLATE MOFETIL 500 MG: 250 CAPSULE ORAL at 06:19

## 2022-01-01 RX ADMIN — TACROLIMUS 0.5 MG: 5 CAPSULE ORAL at 18:42

## 2022-01-01 RX ADMIN — SUCRALFATE 1 G: 1 SUSPENSION ORAL at 18:19

## 2022-01-01 RX ADMIN — CINACALCET 30 MG: 30 TABLET, FILM COATED ORAL at 09:49

## 2022-01-01 RX ADMIN — HYDRALAZINE HYDROCHLORIDE 25 MG: 25 TABLET, FILM COATED ORAL at 04:41

## 2022-01-01 RX ADMIN — SODIUM BICARBONATE 83 ML/HR: 84 INJECTION, SOLUTION INTRAVENOUS at 23:47

## 2022-01-01 RX ADMIN — INSULIN HUMAN 10 UNITS: 100 INJECTION, SOLUTION PARENTERAL at 21:43

## 2022-01-01 RX ADMIN — LOPERAMIDE HYDROCHLORIDE 2 MG: 2 CAPSULE ORAL at 09:38

## 2022-01-01 RX ADMIN — TACROLIMUS 0.5 MG: 0.5 CAPSULE ORAL at 17:11

## 2022-01-01 RX ADMIN — PREDNISONE 60 MG: 20 TABLET ORAL at 06:18

## 2022-01-01 RX ADMIN — PIPERACILLIN AND TAZOBACTAM 4.5 G: 4; .5 INJECTION, POWDER, FOR SOLUTION INTRAVENOUS at 21:02

## 2022-01-01 RX ADMIN — ATORVASTATIN CALCIUM 10 MG: 10 TABLET, FILM COATED ORAL at 16:43

## 2022-01-01 RX ADMIN — MAGNESIUM SULFATE 2 G: 2 INJECTION INTRAVENOUS at 09:12

## 2022-01-01 RX ADMIN — OMEPRAZOLE 40 MG: 20 CAPSULE, DELAYED RELEASE ORAL at 05:15

## 2022-01-01 RX ADMIN — TACROLIMUS 0.5 MG: 0.5 CAPSULE ORAL at 18:46

## 2022-01-01 RX ADMIN — HEPARIN SODIUM 5000 UNITS: 5000 INJECTION, SOLUTION INTRAVENOUS; SUBCUTANEOUS at 16:50

## 2022-01-01 RX ADMIN — VANCOMYCIN HYDROCHLORIDE 125 MG: KIT ORAL at 04:52

## 2022-01-01 RX ADMIN — BRIMONIDINE TARTRATE 2 DROP: 2 SOLUTION OPHTHALMIC at 05:32

## 2022-01-01 RX ADMIN — SODIUM BICARBONATE 650 MG: 650 TABLET ORAL at 21:29

## 2022-01-01 RX ADMIN — TACROLIMUS 0.5 MG: 0.5 CAPSULE ORAL at 17:37

## 2022-01-01 RX ADMIN — HEPARIN SODIUM 5000 UNITS: 5000 INJECTION, SOLUTION INTRAVENOUS; SUBCUTANEOUS at 21:30

## 2022-01-01 RX ADMIN — SODIUM BICARBONATE: 84 INJECTION, SOLUTION INTRAVENOUS at 06:16

## 2022-01-01 RX ADMIN — ROCURONIUM BROMIDE 50 MG: 10 INJECTION INTRAVENOUS at 14:11

## 2022-01-01 RX ADMIN — VANCOMYCIN HYDROCHLORIDE 125 MG: KIT ORAL at 15:11

## 2022-01-01 RX ADMIN — SODIUM CHLORIDE 8 MG/HR: 9 INJECTION, SOLUTION INTRAVENOUS at 15:55

## 2022-01-01 RX ADMIN — ATORVASTATIN CALCIUM 10 MG: 10 TABLET, FILM COATED ORAL at 16:38

## 2022-01-01 RX ADMIN — OMEPRAZOLE 40 MG: 20 CAPSULE, DELAYED RELEASE ORAL at 17:12

## 2022-01-01 RX ADMIN — OMEPRAZOLE 40 MG: 20 CAPSULE, DELAYED RELEASE ORAL at 18:28

## 2022-01-01 RX ADMIN — ATORVASTATIN CALCIUM 10 MG: 10 TABLET, FILM COATED ORAL at 16:21

## 2022-01-01 RX ADMIN — VANCOMYCIN HYDROCHLORIDE 125 MG: KIT ORAL at 22:35

## 2022-01-01 RX ADMIN — TIMOLOL MALEATE 2 DROP: 5 SOLUTION OPHTHALMIC at 05:53

## 2022-01-01 RX ADMIN — SUCRALFATE 1 G: 1 SUSPENSION ORAL at 12:20

## 2022-01-01 RX ADMIN — FENTANYL CITRATE 25 MCG: 50 INJECTION, SOLUTION INTRAMUSCULAR; INTRAVENOUS at 15:18

## 2022-01-01 RX ADMIN — PATIROMER 8.4 G: 8.4 POWDER, FOR SUSPENSION ORAL at 12:10

## 2022-01-01 RX ADMIN — SODIUM CHLORIDE 8 MG/HR: 9 INJECTION, SOLUTION INTRAVENOUS at 23:08

## 2022-01-01 RX ADMIN — MYCOPHENOLATE MOFETIL 500 MG: 200 POWDER, FOR SUSPENSION ORAL at 08:39

## 2022-01-01 RX ADMIN — LINEZOLID 600 MG: 600 INJECTION, SOLUTION INTRAVENOUS at 17:51

## 2022-01-01 RX ADMIN — MYCOPHENOLATE MOFETIL 500 MG: 250 CAPSULE ORAL at 06:00

## 2022-01-01 RX ADMIN — APIXABAN 5 MG: 5 TABLET, FILM COATED ORAL at 17:45

## 2022-01-01 RX ADMIN — MYCOPHENOLATE MOFETIL 500 MG: 250 CAPSULE ORAL at 16:20

## 2022-01-01 RX ADMIN — SODIUM CHLORIDE 8 MG/HR: 9 INJECTION, SOLUTION INTRAVENOUS at 03:18

## 2022-01-01 RX ADMIN — VANCOMYCIN HYDROCHLORIDE 125 MG: KIT ORAL at 09:37

## 2022-01-01 RX ADMIN — MYCOPHENOLATE MOFETIL 500 MG: 250 CAPSULE ORAL at 17:47

## 2022-01-01 RX ADMIN — SODIUM BICARBONATE 650 MG TABLET 650 MG: at 20:11

## 2022-01-01 RX ADMIN — PIPERACILLIN AND TAZOBACTAM 4.5 G: 4; .5 INJECTION, POWDER, FOR SOLUTION INTRAVENOUS at 16:27

## 2022-01-01 RX ADMIN — HYDROCORTISONE SODIUM SUCCINATE 50 MG: 100 INJECTION, POWDER, FOR SOLUTION INTRAMUSCULAR; INTRAVENOUS at 05:18

## 2022-01-01 RX ADMIN — OMEPRAZOLE 40 MG: 20 CAPSULE, DELAYED RELEASE ORAL at 06:18

## 2022-01-01 RX ADMIN — ATROPINE SULFATE 2 DROP: 10 SOLUTION OPHTHALMIC at 14:04

## 2022-01-01 RX ADMIN — PREDNISONE 60 MG: 20 TABLET ORAL at 05:09

## 2022-01-01 RX ADMIN — BRIMONIDINE TARTRATE 2 DROP: 2 SOLUTION OPHTHALMIC at 06:42

## 2022-01-01 RX ADMIN — SODIUM CHLORIDE 8 MG/HR: 9 INJECTION, SOLUTION INTRAVENOUS at 22:50

## 2022-01-01 RX ADMIN — METOPROLOL TARTRATE 25 MG: 25 TABLET, FILM COATED ORAL at 16:56

## 2022-01-01 RX ADMIN — OMEPRAZOLE 40 MG: 20 CAPSULE, DELAYED RELEASE ORAL at 05:30

## 2022-01-01 RX ADMIN — SODIUM CHLORIDE 8 MG/HR: 9 INJECTION, SOLUTION INTRAVENOUS at 21:18

## 2022-01-01 RX ADMIN — INSULIN HUMAN 4 UNITS: 100 INJECTION, SOLUTION PARENTERAL at 21:37

## 2022-01-01 RX ADMIN — ENOXAPARIN SODIUM 40 MG: 40 INJECTION SUBCUTANEOUS at 13:20

## 2022-01-01 RX ADMIN — INSULIN HUMAN 6 UNITS: 100 INJECTION, SOLUTION PARENTERAL at 18:09

## 2022-01-01 RX ADMIN — SODIUM CHLORIDE, POTASSIUM CHLORIDE, SODIUM LACTATE AND CALCIUM CHLORIDE: 600; 310; 30; 20 INJECTION, SOLUTION INTRAVENOUS at 09:46

## 2022-01-01 RX ADMIN — BRIMONIDINE TARTRATE 2 DROP: 2 SOLUTION OPHTHALMIC at 05:19

## 2022-01-01 RX ADMIN — TIMOLOL MALEATE 2 DROP: 5 SOLUTION OPHTHALMIC at 05:46

## 2022-01-01 RX ADMIN — HYDROCORTISONE SODIUM SUCCINATE 50 MG: 100 INJECTION, POWDER, FOR SOLUTION INTRAMUSCULAR; INTRAVENOUS at 12:08

## 2022-01-01 RX ADMIN — HYDRALAZINE HYDROCHLORIDE 25 MG: 25 TABLET, FILM COATED ORAL at 14:07

## 2022-01-01 RX ADMIN — TIMOLOL MALEATE 2 DROP: 5 SOLUTION OPHTHALMIC at 18:17

## 2022-01-01 RX ADMIN — CALCIUM GLUCONATE 2 G: 20 INJECTION, SOLUTION INTRAVENOUS at 07:30

## 2022-01-01 RX ADMIN — HYDROCORTISONE SODIUM SUCCINATE 50 MG: 100 INJECTION, POWDER, FOR SOLUTION INTRAMUSCULAR; INTRAVENOUS at 18:44

## 2022-01-01 RX ADMIN — BRIMONIDINE TARTRATE 2 DROP: 2 SOLUTION OPHTHALMIC at 18:23

## 2022-01-01 RX ADMIN — ATENOLOL 12.5 MG: 25 TABLET ORAL at 18:28

## 2022-01-01 RX ADMIN — TIMOLOL MALEATE 2 DROP: 5 SOLUTION OPHTHALMIC at 17:40

## 2022-01-01 RX ADMIN — VANCOMYCIN HYDROCHLORIDE 125 MG: KIT ORAL at 08:59

## 2022-01-01 RX ADMIN — SODIUM BICARBONATE 650 MG: 650 TABLET ORAL at 18:11

## 2022-01-01 RX ADMIN — OMEPRAZOLE 40 MG: 20 CAPSULE, DELAYED RELEASE ORAL at 18:12

## 2022-01-01 RX ADMIN — SODIUM BICARBONATE: 84 INJECTION, SOLUTION INTRAVENOUS at 06:30

## 2022-01-01 RX ADMIN — SUCRALFATE 1 G: 1 SUSPENSION ORAL at 12:00

## 2022-01-01 RX ADMIN — PREDNISONE 5 MG: 5 TABLET ORAL at 05:05

## 2022-01-01 RX ADMIN — SODIUM CHLORIDE 8 MG/HR: 9 INJECTION, SOLUTION INTRAVENOUS at 15:30

## 2022-01-01 RX ADMIN — BRIMONIDINE TARTRATE 2 DROP: 2 SOLUTION OPHTHALMIC at 05:04

## 2022-01-01 RX ADMIN — TIMOLOL MALEATE 2 DROP: 5 SOLUTION OPHTHALMIC at 04:46

## 2022-01-01 RX ADMIN — MYCOPHENOLATE MOFETIL 500 MG: 200 POWDER, FOR SUSPENSION ORAL at 06:31

## 2022-01-01 RX ADMIN — DEXMEDETOMIDINE 0.3 MCG/KG/HR: 200 INJECTION, SOLUTION INTRAVENOUS at 01:32

## 2022-01-01 RX ADMIN — HYDRALAZINE HYDROCHLORIDE 10 MG: 20 INJECTION INTRAMUSCULAR; INTRAVENOUS at 09:03

## 2022-01-01 RX ADMIN — PROTHROMBIN, COAGULATION FACTOR VII HUMAN, COAGULATION FACTOR IX HUMAN, COAGULATION FACTOR X HUMAN, PROTEIN C, PROTEIN S HUMAN, AND WATER 2500 UNITS: KIT at 20:35

## 2022-01-01 RX ADMIN — MYCOPHENOLATE MOFETIL 500 MG: 250 CAPSULE ORAL at 05:40

## 2022-01-01 RX ADMIN — TACROLIMUS 0.5 MG: 0.5 CAPSULE ORAL at 18:20

## 2022-01-01 RX ADMIN — TACROLIMUS 1 MG: 1 CAPSULE ORAL at 04:12

## 2022-01-01 RX ADMIN — HEPARIN SODIUM 5000 UNITS: 5000 INJECTION, SOLUTION INTRAVENOUS; SUBCUTANEOUS at 05:18

## 2022-01-01 RX ADMIN — MAGNESIUM SULFATE HEPTAHYDRATE 2 G: 40 INJECTION, SOLUTION INTRAVENOUS at 14:31

## 2022-01-01 RX ADMIN — BRIMONIDINE TARTRATE 2 DROP: 2 SOLUTION OPHTHALMIC at 04:43

## 2022-01-01 RX ADMIN — TIMOLOL MALEATE 2 DROP: 5 SOLUTION OPHTHALMIC at 05:18

## 2022-01-01 RX ADMIN — DEXTROSE MONOHYDRATE: 50 INJECTION, SOLUTION INTRAVENOUS at 14:07

## 2022-01-01 RX ADMIN — POTASSIUM CHLORIDE 10 MEQ: 7.46 INJECTION, SOLUTION INTRAVENOUS at 14:30

## 2022-01-01 RX ADMIN — TIMOLOL MALEATE 2 DROP: 5 SOLUTION OPHTHALMIC at 18:11

## 2022-01-01 RX ADMIN — APIXABAN 5 MG: 5 TABLET, FILM COATED ORAL at 18:46

## 2022-01-01 RX ADMIN — Medication 200 MCG: at 17:05

## 2022-01-01 RX ADMIN — TACROLIMUS 0.5 MG: 0.5 CAPSULE ORAL at 18:22

## 2022-01-01 RX ADMIN — TACROLIMUS 1 MG: 1 CAPSULE ORAL at 05:10

## 2022-01-01 RX ADMIN — PREDNISONE 60 MG: 10 TABLET ORAL at 04:47

## 2022-01-01 RX ADMIN — DIBASIC SODIUM PHOSPHATE, MONOBASIC POTASSIUM PHOSPHATE AND MONOBASIC SODIUM PHOSPHATE 250 MG: 852; 155; 130 TABLET ORAL at 17:22

## 2022-01-01 RX ADMIN — POTASSIUM CHLORIDE 20 MEQ: 1500 TABLET, EXTENDED RELEASE ORAL at 07:51

## 2022-01-01 RX ADMIN — APIXABAN 5 MG: 5 TABLET, FILM COATED ORAL at 06:07

## 2022-01-01 RX ADMIN — LOSARTAN POTASSIUM 100 MG: 50 TABLET, FILM COATED ORAL at 06:41

## 2022-01-01 RX ADMIN — PIPERACILLIN AND TAZOBACTAM 4.5 G: 4; .5 INJECTION, POWDER, FOR SOLUTION INTRAVENOUS at 06:32

## 2022-01-01 RX ADMIN — OMEPRAZOLE 20 MG: 20 CAPSULE, DELAYED RELEASE ORAL at 18:00

## 2022-01-01 RX ADMIN — PATIROMER 8.4 G: 8.4 POWDER, FOR SUSPENSION ORAL at 13:28

## 2022-01-01 RX ADMIN — TACROLIMUS 0.5 MG: 0.5 CAPSULE ORAL at 17:49

## 2022-01-01 RX ADMIN — BRIMONIDINE TARTRATE 2 DROP: 2 SOLUTION OPHTHALMIC at 04:50

## 2022-01-01 RX ADMIN — VANCOMYCIN HYDROCHLORIDE 125 MG: KIT ORAL at 16:20

## 2022-01-01 RX ADMIN — ATORVASTATIN CALCIUM 10 MG: 10 TABLET, FILM COATED ORAL at 17:12

## 2022-01-01 RX ADMIN — TIMOLOL MALEATE 2 DROP: 5 SOLUTION OPHTHALMIC at 17:59

## 2022-01-01 RX ADMIN — Medication 200 MCG: at 22:14

## 2022-01-01 RX ADMIN — TIMOLOL MALEATE 2 DROP: 5 SOLUTION OPHTHALMIC at 17:50

## 2022-01-01 RX ADMIN — SUCRALFATE 1 G: 1 SUSPENSION ORAL at 06:11

## 2022-01-01 RX ADMIN — VANCOMYCIN HYDROCHLORIDE 125 MG: KIT ORAL at 11:40

## 2022-01-01 RX ADMIN — MAGNESIUM SULFATE HEPTAHYDRATE 2 G: 40 INJECTION, SOLUTION INTRAVENOUS at 08:15

## 2022-01-01 RX ADMIN — PREDNISONE 5 MG: 5 TABLET ORAL at 05:40

## 2022-01-01 RX ADMIN — MYCOPHENOLATE MOFETIL 500 MG: 250 CAPSULE ORAL at 04:29

## 2022-01-01 RX ADMIN — ATORVASTATIN CALCIUM 10 MG: 10 TABLET, FILM COATED ORAL at 16:50

## 2022-01-01 RX ADMIN — AMLODIPINE BESYLATE 10 MG: 10 TABLET ORAL at 05:32

## 2022-01-01 RX ADMIN — HEPARIN SODIUM 5000 UNITS: 5000 INJECTION, SOLUTION INTRAVENOUS; SUBCUTANEOUS at 05:12

## 2022-01-01 RX ADMIN — BRIMONIDINE TARTRATE 2 DROP: 2 SOLUTION OPHTHALMIC at 06:36

## 2022-01-01 RX ADMIN — SODIUM CHLORIDE: 9 INJECTION, SOLUTION INTRAVENOUS at 21:52

## 2022-01-01 RX ADMIN — MEROPENEM 1 G: 1 INJECTION, POWDER, FOR SOLUTION INTRAVENOUS at 01:32

## 2022-01-01 RX ADMIN — APIXABAN 5 MG: 5 TABLET, FILM COATED ORAL at 05:34

## 2022-01-01 RX ADMIN — VANCOMYCIN HYDROCHLORIDE 125 MG: KIT ORAL at 04:29

## 2022-01-01 RX ADMIN — SODIUM POLYSTYRENE SULFONATE 15 G: 15 SUSPENSION ORAL; RECTAL at 08:11

## 2022-01-01 RX ADMIN — SODIUM BICARBONATE 650 MG: 650 TABLET ORAL at 18:04

## 2022-01-01 RX ADMIN — TIMOLOL MALEATE 2 DROP: 5 SOLUTION OPHTHALMIC at 05:09

## 2022-01-01 RX ADMIN — INSULIN HUMAN 15 UNITS: 100 INJECTION, SUSPENSION SUBCUTANEOUS at 17:43

## 2022-01-01 RX ADMIN — SODIUM BICARBONATE 650 MG: 650 TABLET ORAL at 09:06

## 2022-01-01 RX ADMIN — SUCRALFATE 1 G: 1 SUSPENSION ORAL at 23:29

## 2022-01-01 RX ADMIN — FLUORESCEIN SODIUM 1 MG: 1 STRIP OPHTHALMIC at 21:00

## 2022-01-01 RX ADMIN — DEXTROSE MONOHYDRATE: 50 INJECTION, SOLUTION INTRAVENOUS at 05:25

## 2022-01-01 RX ADMIN — HYDRALAZINE HYDROCHLORIDE 25 MG: 25 TABLET, FILM COATED ORAL at 14:33

## 2022-01-01 RX ADMIN — TACROLIMUS 0.5 MG: 0.5 CAPSULE ORAL at 18:04

## 2022-01-01 RX ADMIN — TACROLIMUS 0.5 MG: 5 CAPSULE ORAL at 05:31

## 2022-01-01 RX ADMIN — SODIUM CHLORIDE, POTASSIUM CHLORIDE, SODIUM LACTATE AND CALCIUM CHLORIDE: 600; 310; 30; 20 INJECTION, SOLUTION INTRAVENOUS at 06:54

## 2022-01-01 RX ADMIN — AMLODIPINE BESYLATE 5 MG: 10 TABLET ORAL at 05:46

## 2022-01-01 RX ADMIN — SODIUM CHLORIDE, POTASSIUM CHLORIDE, SODIUM LACTATE AND CALCIUM CHLORIDE: 600; 310; 30; 20 INJECTION, SOLUTION INTRAVENOUS at 16:00

## 2022-01-01 RX ADMIN — PREDNISONE 5 MG: 5 TABLET ORAL at 05:03

## 2022-01-01 RX ADMIN — POTASSIUM CHLORIDE 10 MEQ: 7.46 INJECTION, SOLUTION INTRAVENOUS at 14:32

## 2022-01-01 RX ADMIN — DIBASIC SODIUM PHOSPHATE, MONOBASIC POTASSIUM PHOSPHATE AND MONOBASIC SODIUM PHOSPHATE 250 MG: 852; 155; 130 TABLET ORAL at 05:04

## 2022-01-01 RX ADMIN — BRIMONIDINE TARTRATE 2 DROP: 2 SOLUTION OPHTHALMIC at 06:30

## 2022-01-01 RX ADMIN — MYCOPHENOLATE MOFETIL 500 MG: 250 CAPSULE ORAL at 06:06

## 2022-01-01 RX ADMIN — SODIUM BICARBONATE 650 MG: 650 TABLET ORAL at 11:35

## 2022-01-01 RX ADMIN — HEPARIN SODIUM 2800 UNITS: 1000 INJECTION, SOLUTION INTRAVENOUS; SUBCUTANEOUS at 18:50

## 2022-01-01 RX ADMIN — VANCOMYCIN HYDROCHLORIDE 125 MG: KIT ORAL at 21:16

## 2022-01-01 RX ADMIN — TIMOLOL MALEATE 2 DROP: 5 SOLUTION OPHTHALMIC at 18:13

## 2022-01-01 RX ADMIN — TACROLIMUS 1 MG: 1 CAPSULE ORAL at 06:31

## 2022-01-01 RX ADMIN — TACROLIMUS 1 MG: 1 CAPSULE ORAL at 05:19

## 2022-01-01 RX ADMIN — AMLODIPINE BESYLATE 10 MG: 10 TABLET ORAL at 05:10

## 2022-01-01 RX ADMIN — SODIUM BICARBONATE 650 MG: 650 TABLET ORAL at 09:00

## 2022-01-01 RX ADMIN — POTASSIUM CHLORIDE AND SODIUM CHLORIDE: 450; 150 INJECTION, SOLUTION INTRAVENOUS at 17:23

## 2022-01-01 RX ADMIN — SODIUM CHLORIDE 1000 ML: 9 INJECTION, SOLUTION INTRAVENOUS at 10:30

## 2022-01-01 RX ADMIN — KETAMINE HYDROCHLORIDE 75 MG: 50 INJECTION INTRAMUSCULAR; INTRAVENOUS at 22:15

## 2022-01-01 RX ADMIN — TACROLIMUS 0.5 MG: 0.5 CAPSULE ORAL at 05:04

## 2022-01-01 RX ADMIN — SODIUM CHLORIDE 8 MG/HR: 9 INJECTION, SOLUTION INTRAVENOUS at 01:06

## 2022-01-01 RX ADMIN — BRIMONIDINE TARTRATE 2 DROP: 2 SOLUTION OPHTHALMIC at 17:50

## 2022-01-01 RX ADMIN — TACROLIMUS 0.5 MG: 0.5 CAPSULE ORAL at 18:07

## 2022-01-01 RX ADMIN — TACROLIMUS 0.5 MG: 0.5 CAPSULE ORAL at 17:24

## 2022-01-01 RX ADMIN — TACROLIMUS 1 MG: 1 CAPSULE ORAL at 05:14

## 2022-01-01 RX ADMIN — TACROLIMUS 0.5 MG: 0.5 CAPSULE ORAL at 16:39

## 2022-01-01 RX ADMIN — TACROLIMUS 0.5 MG: 0.5 CAPSULE ORAL at 16:43

## 2022-01-01 RX ADMIN — OMEPRAZOLE 40 MG: 20 CAPSULE, DELAYED RELEASE ORAL at 09:18

## 2022-01-01 RX ADMIN — TACROLIMUS 1 MG: 1 CAPSULE ORAL at 06:05

## 2022-01-01 RX ADMIN — CEFEPIME 2 G: 2 INJECTION, POWDER, FOR SOLUTION INTRAVENOUS at 18:12

## 2022-01-01 RX ADMIN — Medication 300 MCG: at 17:00

## 2022-01-01 RX ADMIN — AMLODIPINE BESYLATE 10 MG: 10 TABLET ORAL at 05:26

## 2022-01-01 RX ADMIN — LOSARTAN POTASSIUM 100 MG: 50 TABLET, FILM COATED ORAL at 04:39

## 2022-01-01 RX ADMIN — TIMOLOL MALEATE 2 DROP: 5 SOLUTION OPHTHALMIC at 17:20

## 2022-01-01 RX ADMIN — OMEPRAZOLE 40 MG: 20 CAPSULE, DELAYED RELEASE ORAL at 16:21

## 2022-01-01 RX ADMIN — VANCOMYCIN HYDROCHLORIDE 125 MG: KIT ORAL at 21:35

## 2022-01-01 RX ADMIN — PREDNISONE 5 MG: 5 TABLET ORAL at 06:11

## 2022-01-01 RX ADMIN — APIXABAN 5 MG: 5 TABLET, FILM COATED ORAL at 05:10

## 2022-01-01 RX ADMIN — SODIUM CHLORIDE 1000 ML: 9 INJECTION, SOLUTION INTRAVENOUS at 10:26

## 2022-01-01 RX ADMIN — HEPARIN SODIUM 5000 UNITS: 5000 INJECTION, SOLUTION INTRAVENOUS; SUBCUTANEOUS at 15:01

## 2022-01-01 RX ADMIN — TACROLIMUS 1 MG: 1 CAPSULE ORAL at 05:30

## 2022-01-01 RX ADMIN — SODIUM BICARBONATE 650 MG: 650 TABLET ORAL at 22:51

## 2022-01-01 RX ADMIN — TIMOLOL MALEATE 2 DROP: 5 SOLUTION OPHTHALMIC at 06:36

## 2022-01-01 RX ADMIN — TIMOLOL MALEATE 2 DROP: 5 SOLUTION OPHTHALMIC at 04:50

## 2022-01-01 RX ADMIN — ACETAMINOPHEN 650 MG: 325 TABLET, FILM COATED ORAL at 06:04

## 2022-01-01 RX ADMIN — MYCOPHENOLATE MOFETIL 500 MG: 250 CAPSULE ORAL at 04:40

## 2022-01-01 RX ADMIN — BRIMONIDINE TARTRATE 2 DROP: 2 SOLUTION OPHTHALMIC at 17:40

## 2022-01-01 RX ADMIN — BRIMONIDINE TARTRATE 2 DROP: 2 SOLUTION OPHTHALMIC at 05:26

## 2022-01-01 RX ADMIN — ACETAMINOPHEN 650 MG: 325 TABLET, FILM COATED ORAL at 04:38

## 2022-01-01 RX ADMIN — DIBASIC SODIUM PHOSPHATE, MONOBASIC POTASSIUM PHOSPHATE AND MONOBASIC SODIUM PHOSPHATE 250 MG: 852; 155; 130 TABLET ORAL at 05:53

## 2022-01-01 RX ADMIN — BRIMONIDINE TARTRATE 2 DROP: 2 SOLUTION OPHTHALMIC at 16:56

## 2022-01-01 RX ADMIN — BRIMONIDINE TARTRATE 2 DROP: 2 SOLUTION OPHTHALMIC at 18:49

## 2022-01-01 RX ADMIN — MORPHINE SULFATE 1 MG: 4 INJECTION INTRAVENOUS at 13:15

## 2022-01-01 RX ADMIN — TIMOLOL MALEATE 2 DROP: 5 SOLUTION OPHTHALMIC at 17:37

## 2022-01-01 RX ADMIN — MAGNESIUM SULFATE HEPTAHYDRATE 4 G: 40 INJECTION, SOLUTION INTRAVENOUS at 09:22

## 2022-01-01 RX ADMIN — SUCRALFATE 1 G: 1 SUSPENSION ORAL at 17:11

## 2022-01-01 RX ADMIN — TACROLIMUS 0.5 MG: 0.5 CAPSULE ORAL at 05:10

## 2022-01-01 RX ADMIN — INSULIN HUMAN 7 UNITS: 100 INJECTION, SOLUTION PARENTERAL at 13:45

## 2022-01-01 RX ADMIN — SUCRALFATE 1 G: 1 SUSPENSION ORAL at 12:47

## 2022-01-01 RX ADMIN — MYCOPHENOLATE MOFETIL 500 MG: 250 CAPSULE ORAL at 19:44

## 2022-01-01 RX ADMIN — QUETIAPINE FUMARATE 12.5 MG: 25 TABLET ORAL at 22:32

## 2022-01-01 RX ADMIN — VANCOMYCIN HYDROCHLORIDE 125 MG: KIT ORAL at 04:11

## 2022-01-01 RX ADMIN — PREDNISONE 60 MG: 10 TABLET ORAL at 06:18

## 2022-01-01 RX ADMIN — BRIMONIDINE TARTRATE 2 DROP: 2 SOLUTION OPHTHALMIC at 18:41

## 2022-01-01 RX ADMIN — VANCOMYCIN HYDROCHLORIDE 125 MG: KIT ORAL at 05:39

## 2022-01-01 RX ADMIN — TACROLIMUS 0.5 MG: 0.5 CAPSULE ORAL at 18:16

## 2022-01-01 RX ADMIN — TACROLIMUS 0.5 MG: 0.5 CAPSULE ORAL at 18:28

## 2022-01-01 RX ADMIN — FLUTICASONE PROPIONATE 50 MCG: 50 SPRAY, METERED NASAL at 05:31

## 2022-01-01 RX ADMIN — APIXABAN 5 MG: 5 TABLET, FILM COATED ORAL at 06:35

## 2022-01-01 RX ADMIN — DIBASIC SODIUM PHOSPHATE, MONOBASIC POTASSIUM PHOSPHATE AND MONOBASIC SODIUM PHOSPHATE 250 MG: 852; 155; 130 TABLET ORAL at 17:43

## 2022-01-01 RX ADMIN — VANCOMYCIN HYDROCHLORIDE 125 MG: KIT ORAL at 22:51

## 2022-01-01 RX ADMIN — LOPERAMIDE HYDROCHLORIDE 2 MG: 2 CAPSULE ORAL at 23:23

## 2022-01-01 RX ADMIN — SODIUM BICARBONATE 650 MG: 650 TABLET ORAL at 10:09

## 2022-01-01 RX ADMIN — INSULIN HUMAN 4 UNITS: 100 INJECTION, SOLUTION PARENTERAL at 11:30

## 2022-01-01 RX ADMIN — MEROPENEM 1 G: 1 INJECTION, POWDER, FOR SOLUTION INTRAVENOUS at 13:23

## 2022-01-01 RX ADMIN — SODIUM BICARBONATE 650 MG: 650 TABLET ORAL at 04:46

## 2022-01-01 RX ADMIN — POTASSIUM CHLORIDE AND SODIUM CHLORIDE: 450; 150 INJECTION, SOLUTION INTRAVENOUS at 17:44

## 2022-01-01 RX ADMIN — PREDNISONE 20 MG: 20 TABLET ORAL at 05:05

## 2022-01-01 RX ADMIN — INSULIN HUMAN 15 UNITS: 100 INJECTION, SUSPENSION SUBCUTANEOUS at 18:11

## 2022-01-01 RX ADMIN — SODIUM BICARBONATE 650 MG: 650 TABLET ORAL at 20:47

## 2022-01-01 RX ADMIN — PREDNISONE 60 MG: 10 TABLET ORAL at 04:12

## 2022-01-01 RX ADMIN — CINACALCET 30 MG: 30 TABLET, FILM COATED ORAL at 09:40

## 2022-01-01 RX ADMIN — MAGNESIUM SULFATE HEPTAHYDRATE 4 G: 40 INJECTION, SOLUTION INTRAVENOUS at 11:39

## 2022-01-01 RX ADMIN — MYCOPHENOLATE MOFETIL 500 MG: 250 CAPSULE ORAL at 18:46

## 2022-01-01 RX ADMIN — APIXABAN 5 MG: 5 TABLET, FILM COATED ORAL at 18:00

## 2022-01-01 RX ADMIN — KETAMINE HYDROCHLORIDE 50 MG: 50 INJECTION INTRAMUSCULAR; INTRAVENOUS at 22:17

## 2022-01-01 RX ADMIN — OMEPRAZOLE 20 MG: 20 CAPSULE, DELAYED RELEASE ORAL at 05:09

## 2022-01-01 RX ADMIN — LOPERAMIDE HYDROCHLORIDE 2 MG: 2 CAPSULE ORAL at 17:47

## 2022-01-01 RX ADMIN — HEPARIN SODIUM 5000 UNITS: 5000 INJECTION, SOLUTION INTRAVENOUS; SUBCUTANEOUS at 21:31

## 2022-01-01 RX ADMIN — PREDNISONE 60 MG: 20 TABLET ORAL at 04:41

## 2022-01-01 RX ADMIN — OMEPRAZOLE 40 MG: 20 CAPSULE, DELAYED RELEASE ORAL at 17:32

## 2022-01-01 RX ADMIN — TACROLIMUS 0.5 MG: 0.5 CAPSULE ORAL at 05:24

## 2022-01-01 RX ADMIN — SUCRALFATE 1 G: 1 SUSPENSION ORAL at 00:35

## 2022-01-01 RX ADMIN — CEFEPIME 2 G: 2 INJECTION, POWDER, FOR SOLUTION INTRAVENOUS at 17:17

## 2022-01-01 RX ADMIN — LINEZOLID 600 MG: 600 TABLET, FILM COATED ORAL at 05:32

## 2022-01-01 RX ADMIN — TIMOLOL MALEATE 2 DROP: 5 SOLUTION OPHTHALMIC at 19:20

## 2022-01-01 RX ADMIN — SODIUM CHLORIDE: 9 INJECTION, SOLUTION INTRAVENOUS at 08:57

## 2022-01-01 RX ADMIN — MYCOPHENOLATE MOFETIL 500 MG: 250 CAPSULE ORAL at 18:28

## 2022-01-01 RX ADMIN — TACROLIMUS 1 MG: 1 CAPSULE ORAL at 04:45

## 2022-01-01 RX ADMIN — MIDAZOLAM HYDROCHLORIDE 1 MG: 1 INJECTION, SOLUTION INTRAMUSCULAR; INTRAVENOUS at 13:06

## 2022-01-01 RX ADMIN — OMEPRAZOLE 20 MG: 20 CAPSULE, DELAYED RELEASE ORAL at 05:04

## 2022-01-01 RX ADMIN — SODIUM BICARBONATE 650 MG: 650 TABLET ORAL at 15:51

## 2022-01-01 RX ADMIN — PIPERACILLIN AND TAZOBACTAM 4.5 G: 4; .5 INJECTION, POWDER, FOR SOLUTION INTRAVENOUS at 12:38

## 2022-01-01 RX ADMIN — HALOPERIDOL LACTATE 5 MG: 5 INJECTION, SOLUTION INTRAMUSCULAR at 19:10

## 2022-01-01 RX ADMIN — PREDNISONE 60 MG: 20 TABLET ORAL at 05:26

## 2022-01-01 RX ADMIN — SODIUM BICARBONATE 650 MG TABLET 650 MG: at 21:11

## 2022-01-01 RX ADMIN — BRIMONIDINE TARTRATE 2 DROP: 2 SOLUTION OPHTHALMIC at 17:59

## 2022-01-01 RX ADMIN — SODIUM CHLORIDE: 9 INJECTION, SOLUTION INTRAVENOUS at 01:23

## 2022-01-01 RX ADMIN — METOPROLOL TARTRATE 25 MG: 25 TABLET, FILM COATED ORAL at 04:42

## 2022-01-01 RX ADMIN — INSULIN HUMAN 12 UNITS: 100 INJECTION, SOLUTION PARENTERAL at 11:38

## 2022-01-01 RX ADMIN — PIPERACILLIN AND TAZOBACTAM 4.5 G: 4; .5 INJECTION, POWDER, FOR SOLUTION INTRAVENOUS at 04:47

## 2022-01-01 RX ADMIN — VANCOMYCIN HYDROCHLORIDE 125 MG: KIT ORAL at 21:31

## 2022-01-01 RX ADMIN — SODIUM CHLORIDE 80 MG: 9 INJECTION, SOLUTION INTRAVENOUS at 21:17

## 2022-01-01 RX ADMIN — SODIUM BICARBONATE 1300 MG: 650 TABLET ORAL at 16:15

## 2022-01-01 RX ADMIN — VANCOMYCIN HYDROCHLORIDE 125 MG: KIT ORAL at 09:06

## 2022-01-01 RX ADMIN — PREDNISONE 5 MG: 5 TABLET ORAL at 05:58

## 2022-01-01 RX ADMIN — BRIMONIDINE TARTRATE 2 DROP: 2 SOLUTION OPHTHALMIC at 18:13

## 2022-01-01 RX ADMIN — PIPERACILLIN AND TAZOBACTAM 4.5 G: 4; .5 INJECTION, POWDER, FOR SOLUTION INTRAVENOUS at 06:37

## 2022-01-01 RX ADMIN — SODIUM BICARBONATE: 84 INJECTION, SOLUTION INTRAVENOUS at 06:11

## 2022-01-01 RX ADMIN — PREDNISONE 30 MG: 20 TABLET ORAL at 05:14

## 2022-01-01 RX ADMIN — IOHEXOL 100 ML: 350 INJECTION, SOLUTION INTRAVENOUS at 13:13

## 2022-01-01 RX ADMIN — MYCOPHENOLATE MOFETIL 500 MG: 250 CAPSULE ORAL at 16:44

## 2022-01-01 RX ADMIN — SODIUM BICARBONATE 650 MG: 650 TABLET ORAL at 10:15

## 2022-01-01 RX ADMIN — SUCRALFATE 1 G: 1 SUSPENSION ORAL at 05:28

## 2022-01-01 RX ADMIN — HEPARIN SODIUM 5000 UNITS: 5000 INJECTION, SOLUTION INTRAVENOUS; SUBCUTANEOUS at 05:39

## 2022-01-01 RX ADMIN — HYDROCORTISONE SODIUM SUCCINATE 50 MG: 100 INJECTION, POWDER, FOR SOLUTION INTRAMUSCULAR; INTRAVENOUS at 12:37

## 2022-01-01 RX ADMIN — HEPARIN SODIUM 5000 UNITS: 5000 INJECTION, SOLUTION INTRAVENOUS; SUBCUTANEOUS at 22:37

## 2022-01-01 RX ADMIN — BRIMONIDINE TARTRATE 2 DROP: 2 SOLUTION OPHTHALMIC at 04:45

## 2022-01-01 RX ADMIN — APIXABAN 5 MG: 5 TABLET, FILM COATED ORAL at 18:05

## 2022-01-01 RX ADMIN — METOPROLOL TARTRATE 25 MG: 25 TABLET, FILM COATED ORAL at 18:29

## 2022-01-01 RX ADMIN — BRIMONIDINE TARTRATE 2 DROP: 2 SOLUTION OPHTHALMIC at 17:37

## 2022-01-01 RX ADMIN — CEFEPIME 2 G: 2 INJECTION, POWDER, FOR SOLUTION INTRAVENOUS at 05:12

## 2022-01-01 RX ADMIN — VANCOMYCIN HYDROCHLORIDE 125 MG: KIT ORAL at 15:08

## 2022-01-01 RX ADMIN — FLUTICASONE PROPIONATE 50 MCG: 50 SPRAY, METERED NASAL at 06:19

## 2022-01-01 RX ADMIN — MYCOPHENOLATE MOFETIL 500 MG: 250 CAPSULE ORAL at 10:58

## 2022-01-01 RX ADMIN — APIXABAN 5 MG: 5 TABLET, FILM COATED ORAL at 05:58

## 2022-01-01 RX ADMIN — APIXABAN 5 MG: 5 TABLET, FILM COATED ORAL at 19:43

## 2022-01-01 RX ADMIN — SODIUM BICARBONATE 650 MG: 650 TABLET ORAL at 16:13

## 2022-01-01 RX ADMIN — SODIUM CHLORIDE: 9 INJECTION, SOLUTION INTRAVENOUS at 07:31

## 2022-01-01 RX ADMIN — SODIUM BICARBONATE 650 MG: 650 TABLET ORAL at 15:11

## 2022-01-01 RX ADMIN — TIMOLOL MALEATE 2 DROP: 5 SOLUTION OPHTHALMIC at 05:15

## 2022-01-01 RX ADMIN — PREDNISONE 30 MG: 20 TABLET ORAL at 05:26

## 2022-01-01 RX ADMIN — PHENYLEPHRINE HYDROCHLORIDE 150 MCG: 10 INJECTION INTRAVENOUS at 14:11

## 2022-01-01 RX ADMIN — SUCRALFATE 1 G: 1 SUSPENSION ORAL at 13:49

## 2022-01-01 RX ADMIN — HYDRALAZINE HYDROCHLORIDE 25 MG: 25 TABLET, FILM COATED ORAL at 22:00

## 2022-01-01 RX ADMIN — VANCOMYCIN HYDROCHLORIDE 125 MG: KIT ORAL at 16:28

## 2022-01-01 RX ADMIN — TACROLIMUS 0.5 MG: 5 CAPSULE ORAL at 17:06

## 2022-01-01 RX ADMIN — METOPROLOL TARTRATE 25 MG: 25 TABLET, FILM COATED ORAL at 12:23

## 2022-01-01 RX ADMIN — SODIUM BICARBONATE: 84 INJECTION, SOLUTION INTRAVENOUS at 16:04

## 2022-01-01 RX ADMIN — OMEPRAZOLE 40 MG: KIT at 16:56

## 2022-01-01 RX ADMIN — OMEPRAZOLE 40 MG: 20 CAPSULE, DELAYED RELEASE ORAL at 16:42

## 2022-01-01 RX ADMIN — TACROLIMUS 0.5 MG: 0.5 CAPSULE ORAL at 22:11

## 2022-01-01 RX ADMIN — BRIMONIDINE TARTRATE 2 DROP: 2 SOLUTION OPHTHALMIC at 05:53

## 2022-01-01 RX ADMIN — SODIUM BICARBONATE 650 MG: 650 TABLET ORAL at 14:59

## 2022-01-01 RX ADMIN — HEPARIN SODIUM 5000 UNITS: 5000 INJECTION, SOLUTION INTRAVENOUS; SUBCUTANEOUS at 04:40

## 2022-01-01 RX ADMIN — TACROLIMUS 0.5 MG: 0.5 CAPSULE ORAL at 16:50

## 2022-01-01 RX ADMIN — TACROLIMUS 1 MG: 1 CAPSULE ORAL at 04:29

## 2022-01-01 RX ADMIN — MIDAZOLAM HYDROCHLORIDE 1 MG: 1 INJECTION, SOLUTION INTRAMUSCULAR; INTRAVENOUS at 18:13

## 2022-01-01 RX ADMIN — PIPERACILLIN AND TAZOBACTAM 4.5 G: 4; .5 INJECTION, POWDER, FOR SOLUTION INTRAVENOUS at 21:32

## 2022-01-01 RX ADMIN — SUCRALFATE 1 G: 1 SUSPENSION ORAL at 05:25

## 2022-01-01 RX ADMIN — VANCOMYCIN HYDROCHLORIDE 125 MG: KIT ORAL at 16:50

## 2022-01-01 RX ADMIN — DEXTROSE MONOHYDRATE: 50 INJECTION, SOLUTION INTRAVENOUS at 16:02

## 2022-01-01 RX ADMIN — BRIMONIDINE TARTRATE 2 DROP: 2 SOLUTION OPHTHALMIC at 19:44

## 2022-01-01 RX ADMIN — METOPROLOL TARTRATE 25 MG: 25 TABLET, FILM COATED ORAL at 18:07

## 2022-01-01 RX ADMIN — TACROLIMUS 0.5 MG: 0.5 CAPSULE ORAL at 19:43

## 2022-01-01 RX ADMIN — SUCRALFATE 1 G: 1 SUSPENSION ORAL at 00:10

## 2022-01-01 RX ADMIN — APIXABAN 5 MG: 5 TABLET, FILM COATED ORAL at 18:19

## 2022-01-01 RX ADMIN — ENOXAPARIN SODIUM 40 MG: 40 INJECTION SUBCUTANEOUS at 05:11

## 2022-01-01 RX ADMIN — DIBASIC SODIUM PHOSPHATE, MONOBASIC POTASSIUM PHOSPHATE AND MONOBASIC SODIUM PHOSPHATE 250 MG: 852; 155; 130 TABLET ORAL at 05:24

## 2022-01-01 RX ADMIN — LIDOCAINE HYDROCHLORIDE 5 ML: 10 INJECTION, SOLUTION INFILTRATION; PERINEURAL at 15:30

## 2022-01-01 RX ADMIN — APIXABAN 5 MG: 5 TABLET, FILM COATED ORAL at 04:40

## 2022-01-01 RX ADMIN — DEXTROSE MONOHYDRATE: 50 INJECTION, SOLUTION INTRAVENOUS at 08:31

## 2022-01-01 RX ADMIN — OMEPRAZOLE 20 MG: 20 CAPSULE, DELAYED RELEASE ORAL at 18:30

## 2022-01-01 RX ADMIN — TIMOLOL MALEATE 2 DROP: 5 SOLUTION OPHTHALMIC at 05:33

## 2022-01-01 RX ADMIN — OMEPRAZOLE 40 MG: 20 CAPSULE, DELAYED RELEASE ORAL at 16:50

## 2022-01-01 RX ADMIN — TACROLIMUS 0.5 MG: 0.5 CAPSULE ORAL at 05:03

## 2022-01-01 RX ADMIN — HEPARIN SODIUM 5000 UNITS: 5000 INJECTION, SOLUTION INTRAVENOUS; SUBCUTANEOUS at 14:34

## 2022-01-01 RX ADMIN — SUCRALFATE 1 G: 1 SUSPENSION ORAL at 17:23

## 2022-01-01 RX ADMIN — OMEPRAZOLE 40 MG: 20 CAPSULE, DELAYED RELEASE ORAL at 16:38

## 2022-01-01 RX ADMIN — HYDRALAZINE HYDROCHLORIDE 25 MG: 25 TABLET, FILM COATED ORAL at 22:26

## 2022-01-01 RX ADMIN — APIXABAN 5 MG: 5 TABLET, FILM COATED ORAL at 04:39

## 2022-01-01 RX ADMIN — VANCOMYCIN HYDROCHLORIDE 125 MG: KIT ORAL at 04:41

## 2022-01-01 RX ADMIN — TACROLIMUS 0.5 MG: 0.5 CAPSULE ORAL at 17:33

## 2022-01-01 RX ADMIN — ATORVASTATIN CALCIUM 10 MG: 10 TABLET, FILM COATED ORAL at 18:13

## 2022-01-01 RX ADMIN — PREDNISONE 5 MG: 5 TABLET ORAL at 04:40

## 2022-01-01 RX ADMIN — PREDNISONE 60 MG: 10 TABLET ORAL at 05:14

## 2022-01-01 RX ADMIN — INSULIN HUMAN 3 UNITS: 100 INJECTION, SOLUTION PARENTERAL at 08:47

## 2022-01-01 RX ADMIN — INSULIN HUMAN 5 UNITS: 100 INJECTION, SOLUTION PARENTERAL at 10:52

## 2022-01-01 RX ADMIN — PREDNISONE 60 MG: 20 TABLET ORAL at 05:31

## 2022-01-01 RX ADMIN — APIXABAN 5 MG: 5 TABLET, FILM COATED ORAL at 19:20

## 2022-01-01 RX ADMIN — LOPERAMIDE HYDROCHLORIDE 2 MG: 2 CAPSULE ORAL at 15:12

## 2022-01-01 RX ADMIN — SODIUM CHLORIDE, POTASSIUM CHLORIDE, SODIUM LACTATE AND CALCIUM CHLORIDE 500 ML: 600; 310; 30; 20 INJECTION, SOLUTION INTRAVENOUS at 16:11

## 2022-01-01 RX ADMIN — TIMOLOL MALEATE 2 DROP: 5 SOLUTION OPHTHALMIC at 18:49

## 2022-01-01 RX ADMIN — INSULIN HUMAN 15 UNITS: 100 INJECTION, SUSPENSION SUBCUTANEOUS at 17:20

## 2022-01-01 RX ADMIN — PATIROMER 8.4 G: 8.4 POWDER, FOR SUSPENSION ORAL at 14:40

## 2022-01-01 RX ADMIN — HYDROCORTISONE SODIUM SUCCINATE 50 MG: 100 INJECTION, POWDER, FOR SOLUTION INTRAMUSCULAR; INTRAVENOUS at 20:23

## 2022-01-01 RX ADMIN — MYCOPHENOLATE MOFETIL 500 MG: 250 CAPSULE ORAL at 05:14

## 2022-01-01 RX ADMIN — PANTOPRAZOLE SODIUM 40 MG: 40 INJECTION, POWDER, LYOPHILIZED, FOR SOLUTION INTRAVENOUS at 09:38

## 2022-01-01 RX ADMIN — SUCRALFATE 1 G: 1 SUSPENSION ORAL at 05:20

## 2022-01-01 RX ADMIN — PREDNISONE 60 MG: 10 TABLET ORAL at 04:40

## 2022-01-01 RX ADMIN — APIXABAN 5 MG: 5 TABLET, FILM COATED ORAL at 05:32

## 2022-01-01 RX ADMIN — MYCOPHENOLATE MOFETIL 500 MG: 250 CAPSULE ORAL at 04:11

## 2022-01-01 RX ADMIN — TACROLIMUS 1 MG: 1 CAPSULE ORAL at 05:07

## 2022-01-01 RX ADMIN — MEROPENEM 1 G: 1 INJECTION, POWDER, FOR SOLUTION INTRAVENOUS at 13:00

## 2022-01-01 RX ADMIN — TACROLIMUS 0.5 MG: 0.5 CAPSULE ORAL at 18:29

## 2022-01-01 RX ADMIN — HEPARIN SODIUM 5000 UNITS: 5000 INJECTION, SOLUTION INTRAVENOUS; SUBCUTANEOUS at 21:16

## 2022-01-01 RX ADMIN — SODIUM BICARBONATE 650 MG: 650 TABLET ORAL at 15:01

## 2022-01-01 RX ADMIN — SODIUM BICARBONATE 650 MG TABLET 650 MG: at 09:08

## 2022-01-01 RX ADMIN — INSULIN HUMAN 6 UNITS: 100 INJECTION, SOLUTION PARENTERAL at 23:53

## 2022-01-01 RX ADMIN — SODIUM BICARBONATE 650 MG: 650 TABLET ORAL at 08:59

## 2022-01-01 RX ADMIN — OMEPRAZOLE 40 MG: 20 CAPSULE, DELAYED RELEASE ORAL at 05:38

## 2022-01-01 RX ADMIN — OMEPRAZOLE 20 MG: 20 CAPSULE, DELAYED RELEASE ORAL at 04:44

## 2022-01-01 RX ADMIN — SODIUM BICARBONATE 650 MG: 650 TABLET ORAL at 09:52

## 2022-01-01 RX ADMIN — INSULIN HUMAN 4 UNITS: 100 INJECTION, SOLUTION PARENTERAL at 17:43

## 2022-01-01 RX ADMIN — SODIUM CHLORIDE 8 MG/HR: 9 INJECTION, SOLUTION INTRAVENOUS at 09:28

## 2022-01-01 RX ADMIN — TACROLIMUS 0.5 MG: 0.5 CAPSULE ORAL at 16:59

## 2022-01-01 RX ADMIN — TACROLIMUS 0.5 MG: 0.5 CAPSULE ORAL at 17:43

## 2022-01-01 RX ADMIN — HYDRALAZINE HYDROCHLORIDE 25 MG: 25 TABLET, FILM COATED ORAL at 22:11

## 2022-01-01 RX ADMIN — APIXABAN 5 MG: 5 TABLET, FILM COATED ORAL at 18:23

## 2022-01-01 RX ADMIN — POTASSIUM PHOSPHATE, MONOBASIC AND POTASSIUM PHOSPHATE, DIBASIC 15 MMOL: 224; 236 INJECTION, SOLUTION, CONCENTRATE INTRAVENOUS at 12:16

## 2022-01-01 RX ADMIN — SODIUM CHLORIDE 8 MG/HR: 9 INJECTION, SOLUTION INTRAVENOUS at 22:41

## 2022-01-01 RX ADMIN — SODIUM CHLORIDE 500 ML: 9 INJECTION, SOLUTION INTRAVENOUS at 02:45

## 2022-01-01 RX ADMIN — SODIUM CHLORIDE, POTASSIUM CHLORIDE, SODIUM LACTATE AND CALCIUM CHLORIDE: 600; 310; 30; 20 INJECTION, SOLUTION INTRAVENOUS at 16:47

## 2022-01-01 RX ADMIN — PATIROMER 8.4 G: 8.4 POWDER, FOR SUSPENSION ORAL at 13:14

## 2022-01-01 RX ADMIN — HYDROCORTISONE SODIUM SUCCINATE 50 MG: 100 INJECTION, POWDER, FOR SOLUTION INTRAMUSCULAR; INTRAVENOUS at 00:57

## 2022-01-01 RX ADMIN — MYCOPHENOLATE MOFETIL 500 MG: 200 POWDER, FOR SUSPENSION ORAL at 19:49

## 2022-01-01 RX ADMIN — MORPHINE SULFATE 5 MG: 10 INJECTION INTRAVENOUS at 18:19

## 2022-01-01 RX ADMIN — MEROPENEM 1 G: 1 INJECTION, POWDER, FOR SOLUTION INTRAVENOUS at 01:00

## 2022-01-01 RX ADMIN — PANTOPRAZOLE SODIUM 40 MG: 40 INJECTION, POWDER, LYOPHILIZED, FOR SOLUTION INTRAVENOUS at 17:44

## 2022-01-01 RX ADMIN — TACROLIMUS 1 MG: 1 CAPSULE ORAL at 05:28

## 2022-01-01 RX ADMIN — VANCOMYCIN HYDROCHLORIDE 125 MG: KIT ORAL at 17:32

## 2022-01-01 RX ADMIN — SODIUM BICARBONATE 650 MG: 650 TABLET ORAL at 08:43

## 2022-01-01 RX ADMIN — LOSARTAN POTASSIUM 100 MG: 50 TABLET, FILM COATED ORAL at 13:28

## 2022-01-01 RX ADMIN — SODIUM BICARBONATE 650 MG: 650 TABLET ORAL at 18:30

## 2022-01-01 RX ADMIN — AMLODIPINE BESYLATE 10 MG: 10 TABLET ORAL at 04:51

## 2022-01-01 RX ADMIN — INSULIN HUMAN 15 UNITS: 100 INJECTION, SUSPENSION SUBCUTANEOUS at 06:34

## 2022-01-01 RX ADMIN — SODIUM CHLORIDE, POTASSIUM CHLORIDE, SODIUM LACTATE AND CALCIUM CHLORIDE 1000 ML: 600; 310; 30; 20 INJECTION, SOLUTION INTRAVENOUS at 13:23

## 2022-01-01 RX ADMIN — SUCRALFATE 1 G: 1 SUSPENSION ORAL at 18:00

## 2022-01-01 RX ADMIN — BRIMONIDINE TARTRATE 2 DROP: 2 SOLUTION OPHTHALMIC at 17:55

## 2022-01-01 RX ADMIN — POTASSIUM CHLORIDE AND SODIUM CHLORIDE: 450; 150 INJECTION, SOLUTION INTRAVENOUS at 03:46

## 2022-01-01 RX ADMIN — CALCITONIN SALMON 300 UNITS: 200 INJECTION, SOLUTION INTRAMUSCULAR; SUBCUTANEOUS at 21:48

## 2022-01-01 RX ADMIN — APIXABAN 5 MG: 5 TABLET, FILM COATED ORAL at 17:47

## 2022-01-01 RX ADMIN — KETAMINE HYDROCHLORIDE 50 MG: 50 INJECTION INTRAMUSCULAR; INTRAVENOUS at 22:31

## 2022-01-01 RX ADMIN — PROPARACAINE HYDROCHLORIDE 1 DROP: 5 SOLUTION/ DROPS OPHTHALMIC at 21:00

## 2022-01-01 RX ADMIN — TIMOLOL MALEATE 2 DROP: 5 SOLUTION OPHTHALMIC at 17:08

## 2022-01-01 RX ADMIN — TACROLIMUS 1 MG: 1 CAPSULE ORAL at 05:12

## 2022-01-01 RX ADMIN — INSULIN HUMAN 10 UNITS: 100 INJECTION, SOLUTION PARENTERAL at 08:16

## 2022-01-01 RX ADMIN — AMLODIPINE BESYLATE 5 MG: 10 TABLET ORAL at 11:39

## 2022-01-01 RX ADMIN — TACROLIMUS 0.5 MG: 0.5 CAPSULE ORAL at 16:56

## 2022-01-01 RX ADMIN — DIPHENHYDRAMINE HYDROCHLORIDE 25 MG: 25 TABLET ORAL at 01:42

## 2022-01-01 RX ADMIN — SODIUM BICARBONATE: 84 INJECTION, SOLUTION INTRAVENOUS at 09:32

## 2022-01-01 RX ADMIN — PIPERACILLIN AND TAZOBACTAM 4.5 G: 4; .5 INJECTION, POWDER, FOR SOLUTION INTRAVENOUS at 21:31

## 2022-01-01 RX ADMIN — SODIUM BICARBONATE: 84 INJECTION, SOLUTION INTRAVENOUS at 02:07

## 2022-01-01 RX ADMIN — HYDRALAZINE HYDROCHLORIDE 25 MG: 25 TABLET, FILM COATED ORAL at 22:44

## 2022-01-01 RX ADMIN — SODIUM CHLORIDE, POTASSIUM CHLORIDE, SODIUM LACTATE AND CALCIUM CHLORIDE: 600; 310; 30; 20 INJECTION, SOLUTION INTRAVENOUS at 23:02

## 2022-01-01 RX ADMIN — SODIUM BICARBONATE 650 MG: 650 TABLET ORAL at 09:22

## 2022-01-01 RX ADMIN — HYDROCORTISONE SODIUM SUCCINATE 50 MG: 100 INJECTION, POWDER, FOR SOLUTION INTRAMUSCULAR; INTRAVENOUS at 11:28

## 2022-01-01 RX ADMIN — MEROPENEM 1 G: 1 INJECTION, POWDER, FOR SOLUTION INTRAVENOUS at 18:22

## 2022-01-01 RX ADMIN — APIXABAN 5 MG: 5 TABLET, FILM COATED ORAL at 17:53

## 2022-01-01 RX ADMIN — HYDROCORTISONE SODIUM SUCCINATE 50 MG: 100 INJECTION, POWDER, FOR SOLUTION INTRAMUSCULAR; INTRAVENOUS at 05:27

## 2022-01-01 RX ADMIN — SODIUM BICARBONATE: 84 INJECTION, SOLUTION INTRAVENOUS at 07:44

## 2022-01-01 RX ADMIN — TIMOLOL MALEATE 2 DROP: 5 SOLUTION OPHTHALMIC at 05:06

## 2022-01-01 RX ADMIN — LINEZOLID 600 MG: 600 TABLET, FILM COATED ORAL at 16:56

## 2022-01-01 RX ADMIN — TACROLIMUS 0.5 MG: 5 CAPSULE ORAL at 05:09

## 2022-01-01 RX ADMIN — MYCOPHENOLATE MOFETIL 500 MG: 250 CAPSULE ORAL at 21:32

## 2022-01-01 RX ADMIN — HYDRALAZINE HYDROCHLORIDE 10 MG: 20 INJECTION INTRAMUSCULAR; INTRAVENOUS at 00:14

## 2022-01-01 RX ADMIN — PIPERACILLIN AND TAZOBACTAM 4.5 G: 4; .5 INJECTION, POWDER, FOR SOLUTION INTRAVENOUS at 04:30

## 2022-01-01 RX ADMIN — MYCOPHENOLATE MOFETIL 500 MG: 250 CAPSULE ORAL at 05:11

## 2022-01-01 RX ADMIN — POTASSIUM CHLORIDE AND SODIUM CHLORIDE: 450; 150 INJECTION, SOLUTION INTRAVENOUS at 05:26

## 2022-01-01 RX ADMIN — VANCOMYCIN HYDROCHLORIDE 125 MG: KIT ORAL at 21:32

## 2022-01-01 RX ADMIN — INSULIN HUMAN 15 UNITS: 100 INJECTION, SUSPENSION SUBCUTANEOUS at 18:34

## 2022-01-01 RX ADMIN — PREDNISONE 5 MG: 5 TABLET ORAL at 05:24

## 2022-01-01 RX ADMIN — AMLODIPINE BESYLATE 5 MG: 5 TABLET ORAL at 15:33

## 2022-01-01 RX ADMIN — MYCOPHENOLATE MOFETIL 500 MG: 250 CAPSULE ORAL at 17:33

## 2022-01-01 RX ADMIN — CINACALCET 30 MG: 30 TABLET, FILM COATED ORAL at 10:07

## 2022-01-01 RX ADMIN — TIMOLOL MALEATE 2 DROP: 5 SOLUTION OPHTHALMIC at 09:11

## 2022-01-01 RX ADMIN — AMLODIPINE BESYLATE 10 MG: 10 TABLET ORAL at 04:41

## 2022-01-01 RX ADMIN — SODIUM BICARBONATE 650 MG TABLET 650 MG: at 14:36

## 2022-01-01 RX ADMIN — OMEPRAZOLE 40 MG: 20 CAPSULE, DELAYED RELEASE ORAL at 16:46

## 2022-01-01 RX ADMIN — INSULIN HUMAN 10 UNITS: 100 INJECTION, SUSPENSION SUBCUTANEOUS at 17:10

## 2022-01-01 RX ADMIN — SODIUM BICARBONATE 650 MG: 650 TABLET ORAL at 08:41

## 2022-01-01 RX ADMIN — APIXABAN 5 MG: 5 TABLET, FILM COATED ORAL at 05:41

## 2022-01-01 RX ADMIN — HEPARIN SODIUM 5000 UNITS: 5000 INJECTION, SOLUTION INTRAVENOUS; SUBCUTANEOUS at 04:38

## 2022-01-01 RX ADMIN — AMLODIPINE BESYLATE 5 MG: 5 TABLET ORAL at 05:53

## 2022-01-01 RX ADMIN — TIMOLOL MALEATE 2 DROP: 5 SOLUTION OPHTHALMIC at 16:56

## 2022-01-01 RX ADMIN — TACROLIMUS 0.5 MG: 0.5 CAPSULE ORAL at 16:38

## 2022-01-01 RX ADMIN — TACROLIMUS 0.5 MG: 5 CAPSULE ORAL at 18:04

## 2022-01-01 RX ADMIN — NOREPINEPHRINE BITARTRATE 7 MCG/MIN: 1 INJECTION, SOLUTION, CONCENTRATE INTRAVENOUS at 23:47

## 2022-01-01 RX ADMIN — HEPARIN SODIUM 5000 UNITS: 5000 INJECTION, SOLUTION INTRAVENOUS; SUBCUTANEOUS at 14:24

## 2022-01-01 RX ADMIN — SODIUM BICARBONATE 650 MG: 650 TABLET ORAL at 15:33

## 2022-01-01 RX ADMIN — PREDNISONE 30 MG: 20 TABLET ORAL at 04:44

## 2022-01-01 RX ADMIN — INSULIN HUMAN 10 UNITS: 100 INJECTION, SUSPENSION SUBCUTANEOUS at 11:37

## 2022-01-01 RX ADMIN — BRIMONIDINE TARTRATE 2 DROP: 2 SOLUTION OPHTHALMIC at 06:00

## 2022-01-01 RX ADMIN — INSULIN HUMAN 15 UNITS: 100 INJECTION, SUSPENSION SUBCUTANEOUS at 09:16

## 2022-01-01 RX ADMIN — HYDROCORTISONE SODIUM SUCCINATE 50 MG: 100 INJECTION, POWDER, FOR SOLUTION INTRAMUSCULAR; INTRAVENOUS at 23:57

## 2022-01-01 RX ADMIN — VANCOMYCIN HYDROCHLORIDE 125 MG: KIT ORAL at 04:37

## 2022-01-01 RX ADMIN — METOPROLOL TARTRATE 25 MG: 25 TABLET, FILM COATED ORAL at 06:18

## 2022-01-01 RX ADMIN — ATORVASTATIN CALCIUM 10 MG: 10 TABLET, FILM COATED ORAL at 18:28

## 2022-01-01 RX ADMIN — INSULIN HUMAN 4 UNITS: 100 INJECTION, SOLUTION PARENTERAL at 09:20

## 2022-01-01 RX ADMIN — CINACALCET 30 MG: 30 TABLET, FILM COATED ORAL at 10:42

## 2022-01-01 RX ADMIN — PREDNISONE 5 MG: 5 TABLET ORAL at 06:42

## 2022-01-01 RX ADMIN — BRIMONIDINE TARTRATE 2 DROP: 2 SOLUTION OPHTHALMIC at 04:46

## 2022-01-01 RX ADMIN — VANCOMYCIN HYDROCHLORIDE 125 MG: KIT ORAL at 14:25

## 2022-01-01 RX ADMIN — TACROLIMUS 0.5 MG: 0.5 CAPSULE ORAL at 05:25

## 2022-01-01 RX ADMIN — HEPARIN SODIUM 5000 UNITS: 5000 INJECTION, SOLUTION INTRAVENOUS; SUBCUTANEOUS at 14:59

## 2022-01-01 RX ADMIN — PIPERACILLIN AND TAZOBACTAM 4.5 G: 4; .5 INJECTION, POWDER, FOR SOLUTION INTRAVENOUS at 23:14

## 2022-01-01 RX ADMIN — TACROLIMUS 0.5 MG: 0.5 CAPSULE ORAL at 18:23

## 2022-01-01 RX ADMIN — LINEZOLID 600 MG: 600 TABLET, FILM COATED ORAL at 06:18

## 2022-01-01 RX ADMIN — KETAMINE HYDROCHLORIDE 50 MG: 50 INJECTION INTRAMUSCULAR; INTRAVENOUS at 14:40

## 2022-01-01 RX ADMIN — DIBASIC SODIUM PHOSPHATE, MONOBASIC POTASSIUM PHOSPHATE AND MONOBASIC SODIUM PHOSPHATE 250 MG: 852; 155; 130 TABLET ORAL at 09:08

## 2022-01-01 RX ADMIN — SUCRALFATE 1 G: 1 SUSPENSION ORAL at 00:29

## 2022-01-01 RX ADMIN — HYDRALAZINE HYDROCHLORIDE 25 MG: 25 TABLET, FILM COATED ORAL at 21:10

## 2022-01-01 RX ADMIN — INSULIN HUMAN 6 UNITS: 100 INJECTION, SOLUTION PARENTERAL at 05:34

## 2022-01-01 RX ADMIN — HYDROCORTISONE SODIUM SUCCINATE 50 MG: 100 INJECTION, POWDER, FOR SOLUTION INTRAMUSCULAR; INTRAVENOUS at 17:30

## 2022-01-01 RX ADMIN — SUCRALFATE 1 G: 1 SUSPENSION ORAL at 23:21

## 2022-01-01 RX ADMIN — SODIUM CHLORIDE 8 MG/HR: 9 INJECTION, SOLUTION INTRAVENOUS at 08:02

## 2022-01-01 RX ADMIN — MYCOPHENOLATE MOFETIL 500 MG: 250 CAPSULE ORAL at 05:30

## 2022-01-01 RX ADMIN — NOREPINEPHRINE BITARTRATE 18 MCG/MIN: 1 INJECTION, SOLUTION, CONCENTRATE INTRAVENOUS at 09:54

## 2022-01-01 RX ADMIN — INSULIN HUMAN 15 UNITS: 100 INJECTION, SUSPENSION SUBCUTANEOUS at 06:15

## 2022-01-01 RX ADMIN — CINACALCET 30 MG: 30 TABLET, FILM COATED ORAL at 08:27

## 2022-01-01 RX ADMIN — TIMOLOL MALEATE 2 DROP: 5 SOLUTION OPHTHALMIC at 17:49

## 2022-01-01 RX ADMIN — ACETAMINOPHEN 650 MG: 325 TABLET, FILM COATED ORAL at 02:44

## 2022-01-01 RX ADMIN — INSULIN HUMAN 3 UNITS: 100 INJECTION, SOLUTION PARENTERAL at 20:26

## 2022-01-01 RX ADMIN — Medication 200 MCG: at 16:55

## 2022-01-01 RX ADMIN — HEPARIN SODIUM 5000 UNITS: 5000 INJECTION, SOLUTION INTRAVENOUS; SUBCUTANEOUS at 13:36

## 2022-01-01 RX ADMIN — SODIUM BICARBONATE 650 MG: 650 TABLET ORAL at 20:27

## 2022-01-01 RX ADMIN — SODIUM CHLORIDE: 9 INJECTION, SOLUTION INTRAVENOUS at 05:33

## 2022-01-01 RX ADMIN — MORPHINE SULFATE 5 MG: 10 INJECTION INTRAVENOUS at 12:57

## 2022-01-01 RX ADMIN — SUCRALFATE 1 G: 1 SUSPENSION ORAL at 05:14

## 2022-01-01 RX ADMIN — LOPERAMIDE HYDROCHLORIDE 2 MG: 2 CAPSULE ORAL at 08:19

## 2022-01-01 RX ADMIN — VANCOMYCIN HYDROCHLORIDE 125 MG: KIT ORAL at 21:30

## 2022-01-01 RX ADMIN — POTASSIUM CHLORIDE 40 MEQ: 20 TABLET, EXTENDED RELEASE ORAL at 10:09

## 2022-01-01 RX ADMIN — PREDNISONE 5 MG: 5 TABLET ORAL at 05:07

## 2022-01-01 RX ADMIN — OMEPRAZOLE 40 MG: KIT at 04:50

## 2022-01-01 RX ADMIN — TACROLIMUS 1 MG: 1 CAPSULE ORAL at 05:05

## 2022-01-01 RX ADMIN — HYDRALAZINE HYDROCHLORIDE 25 MG: 25 TABLET, FILM COATED ORAL at 14:26

## 2022-01-01 RX ADMIN — SODIUM BICARBONATE 650 MG: 650 TABLET ORAL at 21:16

## 2022-01-01 RX ADMIN — SODIUM BICARBONATE: 84 INJECTION, SOLUTION INTRAVENOUS at 09:34

## 2022-01-01 RX ADMIN — TACROLIMUS 1 MG: 1 CAPSULE ORAL at 05:59

## 2022-01-01 RX ADMIN — SUCRALFATE 1 G: 1 SUSPENSION ORAL at 00:33

## 2022-01-01 RX ADMIN — HALOPERIDOL LACTATE 5 MG: 5 INJECTION, SOLUTION INTRAMUSCULAR at 02:49

## 2022-01-01 RX ADMIN — PREDNISONE 5 MG: 5 TABLET ORAL at 05:27

## 2022-01-01 RX ADMIN — INSULIN HUMAN 15 UNITS: 100 INJECTION, SUSPENSION SUBCUTANEOUS at 08:48

## 2022-01-01 RX ADMIN — INSULIN HUMAN 3 UNITS: 100 INJECTION, SOLUTION PARENTERAL at 12:27

## 2022-01-01 RX ADMIN — SODIUM BICARBONATE 650 MG TABLET 650 MG: at 20:45

## 2022-01-01 RX ADMIN — Medication 200 MCG: at 22:25

## 2022-01-01 RX ADMIN — SODIUM CHLORIDE 500 ML: 9 INJECTION, SOLUTION INTRAVENOUS at 21:48

## 2022-01-01 RX ADMIN — HEPARIN SODIUM 5000 UNITS: 5000 INJECTION, SOLUTION INTRAVENOUS; SUBCUTANEOUS at 15:11

## 2022-01-01 RX ADMIN — BRIMONIDINE TARTRATE 2 DROP: 2 SOLUTION OPHTHALMIC at 05:46

## 2022-01-01 RX ADMIN — TACROLIMUS 0.5 MG: 5 CAPSULE ORAL at 17:56

## 2022-01-01 RX ADMIN — Medication 400 MG: at 05:52

## 2022-01-01 RX ADMIN — SODIUM CHLORIDE: 9 INJECTION, SOLUTION INTRAVENOUS at 10:30

## 2022-01-01 RX ADMIN — INSULIN HUMAN 11.5 UNITS: 100 INJECTION, SOLUTION PARENTERAL at 06:40

## 2022-01-01 RX ADMIN — SODIUM BICARBONATE 650 MG: 650 TABLET ORAL at 22:35

## 2022-01-01 RX ADMIN — MYCOPHENOLATE MOFETIL 500 MG: 250 CAPSULE ORAL at 16:50

## 2022-01-01 RX ADMIN — PREDNISONE 60 MG: 20 TABLET ORAL at 04:50

## 2022-01-01 RX ADMIN — HYDRALAZINE HYDROCHLORIDE 25 MG: 25 TABLET, FILM COATED ORAL at 04:38

## 2022-01-01 RX ADMIN — OMEPRAZOLE 20 MG: 20 CAPSULE, DELAYED RELEASE ORAL at 18:22

## 2022-01-01 RX ADMIN — TACROLIMUS 1 MG: 1 CAPSULE ORAL at 04:40

## 2022-01-01 RX ADMIN — SUCRALFATE 1 G: 1 SUSPENSION ORAL at 12:17

## 2022-01-01 RX ADMIN — Medication 200 MCG: at 22:21

## 2022-01-01 RX ADMIN — INSULIN HUMAN 15 UNITS: 100 INJECTION, SUSPENSION SUBCUTANEOUS at 18:14

## 2022-01-01 RX ADMIN — LINEZOLID 600 MG: 600 INJECTION, SOLUTION INTRAVENOUS at 05:09

## 2022-01-01 RX ADMIN — ACETAMINOPHEN 650 MG: 325 TABLET, FILM COATED ORAL at 22:54

## 2022-01-01 RX ADMIN — MYCOPHENOLATE MOFETIL 500 MG: 250 CAPSULE ORAL at 16:39

## 2022-01-01 RX ADMIN — FLUTICASONE PROPIONATE 50 MCG: 50 SPRAY, METERED NASAL at 05:15

## 2022-01-01 RX ADMIN — MYCOPHENOLATE MOFETIL 500 MG: 200 POWDER, FOR SUSPENSION ORAL at 05:11

## 2022-01-01 RX ADMIN — LINEZOLID 600 MG: 600 INJECTION, SOLUTION INTRAVENOUS at 18:00

## 2022-01-01 RX ADMIN — HEPARIN SODIUM 5000 UNITS: 5000 INJECTION, SOLUTION INTRAVENOUS; SUBCUTANEOUS at 05:20

## 2022-01-01 RX ADMIN — HYDRALAZINE HYDROCHLORIDE 25 MG: 25 TABLET, FILM COATED ORAL at 04:50

## 2022-01-01 RX ADMIN — OMEPRAZOLE 40 MG: KIT at 06:19

## 2022-01-01 RX ADMIN — HEPARIN SODIUM 5000 UNITS: 5000 INJECTION, SOLUTION INTRAVENOUS; SUBCUTANEOUS at 04:29

## 2022-01-01 RX ADMIN — Medication 400 MG: at 05:24

## 2022-01-01 RX ADMIN — SODIUM BICARBONATE 650 MG: 650 TABLET ORAL at 14:32

## 2022-01-01 RX ADMIN — CALCIUM GLUCONATE 2 G: 20 INJECTION, SOLUTION INTRAVENOUS at 12:19

## 2022-01-01 RX ADMIN — BRIMONIDINE TARTRATE 2 DROP: 2 SOLUTION OPHTHALMIC at 05:13

## 2022-01-01 RX ADMIN — TACROLIMUS 1 MG: 1 CAPSULE ORAL at 05:48

## 2022-01-01 RX ADMIN — PATIROMER 8.4 G: 8.4 POWDER, FOR SUSPENSION ORAL at 12:22

## 2022-01-01 RX ADMIN — MYCOPHENOLATE MOFETIL 500 MG: 250 CAPSULE ORAL at 04:48

## 2022-01-01 RX ADMIN — HYDRALAZINE HYDROCHLORIDE 25 MG: 25 TABLET, FILM COATED ORAL at 21:17

## 2022-01-01 RX ADMIN — ACETAMINOPHEN 650 MG: 325 TABLET, FILM COATED ORAL at 21:03

## 2022-01-01 RX ADMIN — POTASSIUM CHLORIDE AND SODIUM CHLORIDE: 450; 150 INJECTION, SOLUTION INTRAVENOUS at 17:00

## 2022-01-01 ASSESSMENT — ENCOUNTER SYMPTOMS
NAUSEA: 0
DIAPHORESIS: 0
BRUISES/BLEEDS EASILY: 0
PALPITATIONS: 0
BLURRED VISION: 0
WEIGHT LOSS: 0
MEMORY LOSS: 1
SORE THROAT: 0
HEARTBURN: 0
CONSTITUTIONAL NEGATIVE: 1
EYES NEGATIVE: 1
CHILLS: 0
SORE THROAT: 0
SORE THROAT: 0
PALPITATIONS: 0
DIAPHORESIS: 0
SHORTNESS OF BREATH: 0
ABDOMINAL PAIN: 0
MYALGIAS: 0
PALPITATIONS: 0
SORE THROAT: 0
MYALGIAS: 0
WEAKNESS: 1
FEVER: 0
FOCAL WEAKNESS: 0
CHILLS: 0
DEPRESSION: 0
CONSTITUTIONAL NEGATIVE: 1
CARDIOVASCULAR NEGATIVE: 1
MYALGIAS: 0
MEMORY LOSS: 0
CONSTITUTIONAL NEGATIVE: 1
MYALGIAS: 0
CARDIOVASCULAR NEGATIVE: 1
COUGH: 0
MYALGIAS: 0
ABDOMINAL PAIN: 0
DIZZINESS: 0
DIZZINESS: 0
CHILLS: 0
NERVOUS/ANXIOUS: 0
BRUISES/BLEEDS EASILY: 0
CHILLS: 0
MYALGIAS: 0
BLURRED VISION: 0
BLURRED VISION: 0
EYE REDNESS: 0
NAUSEA: 0
HEADACHES: 0
RESPIRATORY NEGATIVE: 1
DIARRHEA: 0
HEMOPTYSIS: 0
COUGH: 0
COUGH: 0
VOMITING: 0
FOCAL WEAKNESS: 0
MEMORY LOSS: 1
COUGH: 0
BLURRED VISION: 0
RESPIRATORY NEGATIVE: 1
VOMITING: 0
MYALGIAS: 0
CLAUDICATION: 0
SINUS PAIN: 0
CONSTITUTIONAL NEGATIVE: 1
PALPITATIONS: 0
ABDOMINAL PAIN: 0
PSYCHIATRIC NEGATIVE: 1
HEADACHES: 0
NAUSEA: 0
PALPITATIONS: 0
VOMITING: 0
MYALGIAS: 0
NERVOUS/ANXIOUS: 0
ABDOMINAL PAIN: 0
RESPIRATORY NEGATIVE: 1
SHORTNESS OF BREATH: 0
GASTROINTESTINAL NEGATIVE: 1
WEAKNESS: 1
BRUISES/BLEEDS EASILY: 0
DIZZINESS: 0
SORE THROAT: 0
PALPITATIONS: 0
HEADACHES: 0
VOMITING: 0
ABDOMINAL PAIN: 0
NAUSEA: 0
SHORTNESS OF BREATH: 0
DIAPHORESIS: 0
BACK PAIN: 1
VOMITING: 0
HEMOPTYSIS: 0
SHORTNESS OF BREATH: 0
DIARRHEA: 1
STRIDOR: 0
POLYDIPSIA: 0
STRIDOR: 0
CHILLS: 1
WEAKNESS: 1
MEMORY LOSS: 1
DIARRHEA: 1
SORE THROAT: 0
CONSTIPATION: 0
HEADACHES: 0
SHORTNESS OF BREATH: 0
HEADACHES: 0
NAUSEA: 0
COUGH: 0
CARDIOVASCULAR NEGATIVE: 1
EYE REDNESS: 0
CHILLS: 0
EYE PAIN: 1
NAUSEA: 0
FOCAL WEAKNESS: 0
FOCAL WEAKNESS: 0
DIZZINESS: 0
HEADACHES: 0
HEADACHES: 0
MEMORY LOSS: 1
MUSCULOSKELETAL NEGATIVE: 1
EYES NEGATIVE: 1
BRUISES/BLEEDS EASILY: 0
RESPIRATORY NEGATIVE: 1
SHORTNESS OF BREATH: 1
BACK PAIN: 1
WEAKNESS: 1
CARDIOVASCULAR NEGATIVE: 1
VOMITING: 0
MUSCULOSKELETAL NEGATIVE: 1
SHORTNESS OF BREATH: 0
PALPITATIONS: 0
EYES NEGATIVE: 1
HEARTBURN: 0
SORE THROAT: 0
WHEEZING: 0
ABDOMINAL PAIN: 0
CARDIOVASCULAR NEGATIVE: 1
MUSCULOSKELETAL NEGATIVE: 1
CHILLS: 0
FEVER: 0
NECK PAIN: 1
NAUSEA: 0
STRIDOR: 0
CHILLS: 1
CHILLS: 0
SHORTNESS OF BREATH: 0
EYE PAIN: 0
FEVER: 0
FEVER: 0
SHORTNESS OF BREATH: 0
DEPRESSION: 0
DIAPHORESIS: 0
SPUTUM PRODUCTION: 0
HEARTBURN: 0
VOMITING: 0
CHILLS: 1
FLANK PAIN: 0
RESPIRATORY NEGATIVE: 1
EYES NEGATIVE: 1
HEADACHES: 0
DOUBLE VISION: 0
FOCAL WEAKNESS: 0
CARDIOVASCULAR NEGATIVE: 1
EYES NEGATIVE: 1
BLURRED VISION: 0
FEVER: 0
MUSCULOSKELETAL NEGATIVE: 1
DIZZINESS: 0
PSYCHIATRIC NEGATIVE: 1
MUSCULOSKELETAL NEGATIVE: 1
BRUISES/BLEEDS EASILY: 0
CONSTIPATION: 0
NAUSEA: 0
SHORTNESS OF BREATH: 0
PSYCHIATRIC NEGATIVE: 1
NAUSEA: 0
DIAPHORESIS: 0
SORE THROAT: 0
SHORTNESS OF BREATH: 0
PALPITATIONS: 0
SORE THROAT: 0
COUGH: 0
HEADACHES: 0
PALPITATIONS: 0
PALPITATIONS: 0
CONSTITUTIONAL NEGATIVE: 1
DIAPHORESIS: 0
STRIDOR: 0
DIARRHEA: 0
FEVER: 0
HEADACHES: 0
DEPRESSION: 0
CHILLS: 0
DIZZINESS: 0
BACK PAIN: 1
NAUSEA: 0
BRUISES/BLEEDS EASILY: 0
VOMITING: 0
HEADACHES: 0
BLURRED VISION: 0
EYE PAIN: 0
FEVER: 0
DIZZINESS: 0
ABDOMINAL PAIN: 0
NAUSEA: 0
HEADACHES: 0
MUSCULOSKELETAL NEGATIVE: 1
NECK PAIN: 0
ABDOMINAL PAIN: 0
ABDOMINAL PAIN: 0
BACK PAIN: 1
SORE THROAT: 0
NECK PAIN: 1
EYES NEGATIVE: 1
WEAKNESS: 1
PALPITATIONS: 0
EYES NEGATIVE: 1
INSOMNIA: 0
CHILLS: 0
HEMOPTYSIS: 0
EYES NEGATIVE: 1
HEADACHES: 0
EYES NEGATIVE: 1
WEIGHT LOSS: 0
NEUROLOGICAL NEGATIVE: 1
SORE THROAT: 0
PALPITATIONS: 0
MUSCULOSKELETAL NEGATIVE: 1
CHILLS: 0
STRIDOR: 0
COUGH: 0
WEAKNESS: 1
FEVER: 0
SHORTNESS OF BREATH: 0
PALPITATIONS: 0
GASTROINTESTINAL NEGATIVE: 1
NAUSEA: 0
FOCAL WEAKNESS: 0
BLURRED VISION: 0
MUSCULOSKELETAL NEGATIVE: 1
HEADACHES: 0
RESPIRATORY NEGATIVE: 1
DIZZINESS: 0
CHILLS: 0
FEVER: 0
COUGH: 0
EYES NEGATIVE: 1
SHORTNESS OF BREATH: 0
SORE THROAT: 0
DIZZINESS: 0
EYE DISCHARGE: 0
ABDOMINAL PAIN: 0
BLURRED VISION: 0
BRUISES/BLEEDS EASILY: 0
VOMITING: 0
COUGH: 0
SORE THROAT: 0
DIZZINESS: 0
NAUSEA: 0
CARDIOVASCULAR NEGATIVE: 1
FEVER: 0
SHORTNESS OF BREATH: 1
CONSTITUTIONAL NEGATIVE: 1
COUGH: 0
VOMITING: 0
BACK PAIN: 1
FEVER: 0
CONSTIPATION: 0
MEMORY LOSS: 1
SORE THROAT: 0
COUGH: 0
CONSTITUTIONAL NEGATIVE: 1
BLURRED VISION: 0
BRUISES/BLEEDS EASILY: 0
VOMITING: 0
FOCAL WEAKNESS: 0
CONSTITUTIONAL NEGATIVE: 1
MUSCULOSKELETAL NEGATIVE: 1
DIZZINESS: 0
WEIGHT LOSS: 0
COUGH: 0
TINGLING: 0
FEVER: 0
MEMORY LOSS: 1
PALPITATIONS: 0
DIZZINESS: 0
WEAKNESS: 1
WEAKNESS: 1
FEVER: 1
COUGH: 0
HEMOPTYSIS: 0
WEIGHT LOSS: 0
VOMITING: 0
CARDIOVASCULAR NEGATIVE: 1
CHILLS: 0
CHILLS: 0
COUGH: 0
DEPRESSION: 0
COUGH: 0
VOMITING: 0
HEMOPTYSIS: 0
DIZZINESS: 0
COUGH: 1
DIAPHORESIS: 0
CHILLS: 0
PHOTOPHOBIA: 0
DIZZINESS: 0
WHEEZING: 0
NECK PAIN: 1
DEPRESSION: 0
HEADACHES: 0
DIZZINESS: 0
CHILLS: 0
NECK PAIN: 1
NECK PAIN: 1
HEADACHES: 0
PALPITATIONS: 0
WEAKNESS: 1
DIARRHEA: 0
WEAKNESS: 1
FOCAL WEAKNESS: 0
SHORTNESS OF BREATH: 0
WEIGHT LOSS: 0
RESPIRATORY NEGATIVE: 1
DIZZINESS: 0
BACK PAIN: 1
CHILLS: 0
INSOMNIA: 0
HEADACHES: 0
ABDOMINAL PAIN: 0
MEMORY LOSS: 1
NAUSEA: 0
ABDOMINAL PAIN: 0
SORE THROAT: 0
DIAPHORESIS: 0
CARDIOVASCULAR NEGATIVE: 1
ABDOMINAL PAIN: 0
DIARRHEA: 0
DEPRESSION: 0
COUGH: 0
COUGH: 0
EYE REDNESS: 1
VOMITING: 0
COUGH: 0
NERVOUS/ANXIOUS: 0
DEPRESSION: 0
COUGH: 0
DEPRESSION: 0
FEVER: 0
BRUISES/BLEEDS EASILY: 0
ABDOMINAL PAIN: 0
PALPITATIONS: 0
FEVER: 0
LOSS OF CONSCIOUSNESS: 0
CHILLS: 1
DIZZINESS: 0
SORE THROAT: 0
BLOOD IN STOOL: 0
CARDIOVASCULAR NEGATIVE: 1
TINGLING: 0
DOUBLE VISION: 0
FEVER: 0
DEPRESSION: 0
FEVER: 0
HEMOPTYSIS: 0
NECK PAIN: 1
WEIGHT LOSS: 0
DIAPHORESIS: 0
COUGH: 0
MEMORY LOSS: 1
VOMITING: 0
ABDOMINAL PAIN: 0
DOUBLE VISION: 0
COUGH: 0
CHILLS: 0
BACK PAIN: 0
SHORTNESS OF BREATH: 0
PSYCHIATRIC NEGATIVE: 1
SORE THROAT: 0
DIARRHEA: 1
FALLS: 0
SHORTNESS OF BREATH: 0
SEIZURES: 0
RESPIRATORY NEGATIVE: 1
CHILLS: 0
ABDOMINAL PAIN: 0
EYES NEGATIVE: 1
ORTHOPNEA: 0
PSYCHIATRIC NEGATIVE: 1
BACK PAIN: 1
EYES NEGATIVE: 1
SORE THROAT: 0
BLURRED VISION: 0
WEIGHT LOSS: 0
HEADACHES: 0
FEVER: 0
RESPIRATORY NEGATIVE: 1
CONSTITUTIONAL NEGATIVE: 1
EYES NEGATIVE: 1
DIZZINESS: 0
DIZZINESS: 0
WEIGHT LOSS: 0
FEVER: 0
ABDOMINAL PAIN: 0
CONSTIPATION: 0
DIARRHEA: 0
COUGH: 0
ABDOMINAL PAIN: 1
CONSTITUTIONAL NEGATIVE: 1
NAUSEA: 0
SENSORY CHANGE: 0
CONSTIPATION: 0
DIZZINESS: 0
MEMORY LOSS: 1
BLOOD IN STOOL: 1
STRIDOR: 0
WEIGHT LOSS: 0
BLOOD IN STOOL: 0
NERVOUS/ANXIOUS: 0
BRUISES/BLEEDS EASILY: 0
FOCAL WEAKNESS: 0
FOCAL WEAKNESS: 0
WEIGHT LOSS: 0
VOMITING: 0
ABDOMINAL PAIN: 1
DIZZINESS: 0
BACK PAIN: 0
HEADACHES: 0
CARDIOVASCULAR NEGATIVE: 1
DEPRESSION: 0
SHORTNESS OF BREATH: 0
DIARRHEA: 1
ABDOMINAL PAIN: 1
WHEEZING: 0
HEADACHES: 0
BLURRED VISION: 0
MUSCULOSKELETAL NEGATIVE: 1
PSYCHIATRIC NEGATIVE: 1
WEAKNESS: 0
DIARRHEA: 1
MUSCULOSKELETAL NEGATIVE: 1
DIARRHEA: 0
COUGH: 0
BLURRED VISION: 0
DIARRHEA: 0
DOUBLE VISION: 0
COUGH: 0
SORE THROAT: 0
HEADACHES: 0
DIAPHORESIS: 0
BRUISES/BLEEDS EASILY: 0
HEADACHES: 1
NAUSEA: 0
MUSCULOSKELETAL NEGATIVE: 1
PALPITATIONS: 0
NAUSEA: 0
MYALGIAS: 0
WEAKNESS: 1
COUGH: 0
DIARRHEA: 0
DEPRESSION: 0
SHORTNESS OF BREATH: 0
VOMITING: 0
CONSTIPATION: 0
WEAKNESS: 1
FOCAL WEAKNESS: 0
SORE THROAT: 0
FEVER: 0
SHORTNESS OF BREATH: 0
DIZZINESS: 0
WEAKNESS: 1
POLYDIPSIA: 0
WHEEZING: 0
DIZZINESS: 0
VOMITING: 0
ABDOMINAL PAIN: 0
SHORTNESS OF BREATH: 0
FEVER: 0
NAUSEA: 0
WEAKNESS: 1
MYALGIAS: 0
PND: 0
ABDOMINAL PAIN: 0
MYALGIAS: 0
BRUISES/BLEEDS EASILY: 0
WEIGHT LOSS: 0
FEVER: 0
DIARRHEA: 1
ABDOMINAL PAIN: 0
EYE DISCHARGE: 0
DIARRHEA: 1
CHILLS: 0
ABDOMINAL PAIN: 0
PALPITATIONS: 0
RESPIRATORY NEGATIVE: 1
NAUSEA: 0
WEAKNESS: 1
FEVER: 0
FEVER: 0
CARDIOVASCULAR NEGATIVE: 1
SHORTNESS OF BREATH: 0
FEVER: 0
ABDOMINAL PAIN: 0
VOMITING: 0
HEADACHES: 0
DIZZINESS: 0
ABDOMINAL PAIN: 0
CONSTIPATION: 0
SORE THROAT: 0
DEPRESSION: 0
HEADACHES: 0
MYALGIAS: 0
SENSORY CHANGE: 0
CONSTITUTIONAL NEGATIVE: 1
CHILLS: 0
DIZZINESS: 0
ABDOMINAL PAIN: 0
WEIGHT LOSS: 0
SORE THROAT: 0
BRUISES/BLEEDS EASILY: 0
WEAKNESS: 1
STRIDOR: 0
MUSCULOSKELETAL NEGATIVE: 1
WEIGHT LOSS: 0
STRIDOR: 0
SPUTUM PRODUCTION: 0
FEVER: 0
SHORTNESS OF BREATH: 0
DIARRHEA: 0
CARDIOVASCULAR NEGATIVE: 1
EYE REDNESS: 0
BLOOD IN STOOL: 0
SHORTNESS OF BREATH: 0
BLURRED VISION: 1
HEMOPTYSIS: 0
MYALGIAS: 0
BLOOD IN STOOL: 1
DIAPHORESIS: 0
MYALGIAS: 0
PALPITATIONS: 0
STRIDOR: 0
DIARRHEA: 1
SORE THROAT: 0
CHILLS: 0
STRIDOR: 0
PALPITATIONS: 0
SORE THROAT: 0
BLURRED VISION: 0
ORTHOPNEA: 0
FEVER: 0
CHILLS: 0
RESPIRATORY NEGATIVE: 1
BLOOD IN STOOL: 0
HEADACHES: 0
DIZZINESS: 0
NERVOUS/ANXIOUS: 0
WEAKNESS: 1
SENSORY CHANGE: 0
NECK PAIN: 0
COUGH: 0
WEAKNESS: 0
DEPRESSION: 0
EYES NEGATIVE: 1
HEADACHES: 0
STRIDOR: 0
ABDOMINAL PAIN: 0
BRUISES/BLEEDS EASILY: 0
PHOTOPHOBIA: 0
GASTROINTESTINAL NEGATIVE: 1
DIZZINESS: 0
COUGH: 0
VOMITING: 0
GASTROINTESTINAL NEGATIVE: 1
MEMORY LOSS: 1
FEVER: 0
MYALGIAS: 0
NAUSEA: 0
MYALGIAS: 0
COUGH: 0
STRIDOR: 0
CHILLS: 0
SHORTNESS OF BREATH: 0
NECK PAIN: 1
DEPRESSION: 0
NAUSEA: 0

## 2022-01-01 ASSESSMENT — COGNITIVE AND FUNCTIONAL STATUS - GENERAL
STANDING UP FROM CHAIR USING ARMS: A LOT
EATING MEALS: A LITTLE
DRESSING REGULAR UPPER BODY CLOTHING: A LITTLE
DRESSING REGULAR LOWER BODY CLOTHING: TOTAL
DRESSING REGULAR UPPER BODY CLOTHING: A LOT
WALKING IN HOSPITAL ROOM: TOTAL
MOVING FROM LYING ON BACK TO SITTING ON SIDE OF FLAT BED: UNABLE
MOBILITY SCORE: 6
MOVING TO AND FROM BED TO CHAIR: UNABLE
HELP NEEDED FOR BATHING: A LOT
STANDING UP FROM CHAIR USING ARMS: A LOT
SUGGESTED CMS G CODE MODIFIER MOBILITY: CN
SUGGESTED CMS G CODE MODIFIER MOBILITY: CN
DRESSING REGULAR UPPER BODY CLOTHING: A LOT
MOVING FROM LYING ON BACK TO SITTING ON SIDE OF FLAT BED: UNABLE
MOBILITY SCORE: 6
HELP NEEDED FOR BATHING: TOTAL
CLIMB 3 TO 5 STEPS WITH RAILING: TOTAL
TURNING FROM BACK TO SIDE WHILE IN FLAT BAD: A LOT
PERSONAL GROOMING: A LITTLE
SUGGESTED CMS G CODE MODIFIER DAILY ACTIVITY: CL
MOBILITY SCORE: 6
DRESSING REGULAR UPPER BODY CLOTHING: A LITTLE
MOVING FROM LYING ON BACK TO SITTING ON SIDE OF FLAT BED: A LITTLE
TOILETING: TOTAL
SUGGESTED CMS G CODE MODIFIER MOBILITY: CM
EATING MEALS: A LITTLE
MOBILITY SCORE: 19
HELP NEEDED FOR BATHING: TOTAL
TURNING FROM BACK TO SIDE WHILE IN FLAT BAD: UNABLE
SUGGESTED CMS G CODE MODIFIER DAILY ACTIVITY: CK
MOVING FROM LYING ON BACK TO SITTING ON SIDE OF FLAT BED: A LITTLE
TOILETING: TOTAL
SUGGESTED CMS G CODE MODIFIER DAILY ACTIVITY: CL
SUGGESTED CMS G CODE MODIFIER MOBILITY: CK
STANDING UP FROM CHAIR USING ARMS: A LOT
WALKING IN HOSPITAL ROOM: A LOT
SUGGESTED CMS G CODE MODIFIER MOBILITY: CM
STANDING UP FROM CHAIR USING ARMS: A LITTLE
DRESSING REGULAR LOWER BODY CLOTHING: A LOT
STANDING UP FROM CHAIR USING ARMS: TOTAL
HELP NEEDED FOR BATHING: A LOT
DRESSING REGULAR LOWER BODY CLOTHING: A LITTLE
EATING MEALS: A LITTLE
SUGGESTED CMS G CODE MODIFIER DAILY ACTIVITY: CL
WALKING IN HOSPITAL ROOM: A LITTLE
CLIMB 3 TO 5 STEPS WITH RAILING: A LOT
TURNING FROM BACK TO SIDE WHILE IN FLAT BAD: UNABLE
CLIMB 3 TO 5 STEPS WITH RAILING: A LITTLE
SUGGESTED CMS G CODE MODIFIER MOBILITY: CM
MOVING TO AND FROM BED TO CHAIR: UNABLE
DRESSING REGULAR LOWER BODY CLOTHING: A LOT
MOVING TO AND FROM BED TO CHAIR: A LITTLE
HELP NEEDED FOR BATHING: A LITTLE
DAILY ACTIVITIY SCORE: 19
CLIMB 3 TO 5 STEPS WITH RAILING: TOTAL
TOILETING: A LITTLE
SUGGESTED CMS G CODE MODIFIER MOBILITY: CM
MOVING FROM LYING ON BACK TO SITTING ON SIDE OF FLAT BED: A LOT
MOVING TO AND FROM BED TO CHAIR: A LITTLE
SUGGESTED CMS G CODE MODIFIER MOBILITY: CN
CLIMB 3 TO 5 STEPS WITH RAILING: TOTAL
DRESSING REGULAR UPPER BODY CLOTHING: A LOT
HELP NEEDED FOR BATHING: A LITTLE
TURNING FROM BACK TO SIDE WHILE IN FLAT BAD: UNABLE
MOVING FROM LYING ON BACK TO SITTING ON SIDE OF FLAT BED: UNABLE
MOVING FROM LYING ON BACK TO SITTING ON SIDE OF FLAT BED: UNABLE
PERSONAL GROOMING: A LITTLE
DAILY ACTIVITIY SCORE: 14
TOILETING: TOTAL
CLIMB 3 TO 5 STEPS WITH RAILING: TOTAL
TOILETING: A LOT
EATING MEALS: A LOT
SUGGESTED CMS G CODE MODIFIER MOBILITY: CN
PERSONAL GROOMING: A LITTLE
STANDING UP FROM CHAIR USING ARMS: A LOT
DAILY ACTIVITIY SCORE: 10
MOBILITY SCORE: 9
SUGGESTED CMS G CODE MODIFIER MOBILITY: CM
DRESSING REGULAR UPPER BODY CLOTHING: A LITTLE
DAILY ACTIVITIY SCORE: 10
MOBILITY SCORE: 9
DAILY ACTIVITIY SCORE: 11
PERSONAL GROOMING: A LITTLE
STANDING UP FROM CHAIR USING ARMS: A LITTLE
TURNING FROM BACK TO SIDE WHILE IN FLAT BAD: A LITTLE
PERSONAL GROOMING: A LOT
EATING MEALS: A LITTLE
MOBILITY SCORE: 7
STANDING UP FROM CHAIR USING ARMS: A LITTLE
HELP NEEDED FOR BATHING: TOTAL
DAILY ACTIVITIY SCORE: 18
STANDING UP FROM CHAIR USING ARMS: TOTAL
TURNING FROM BACK TO SIDE WHILE IN FLAT BAD: UNABLE
DRESSING REGULAR UPPER BODY CLOTHING: A LITTLE
WALKING IN HOSPITAL ROOM: TOTAL
SUGGESTED CMS G CODE MODIFIER MOBILITY: CM
MOBILITY SCORE: 7
TURNING FROM BACK TO SIDE WHILE IN FLAT BAD: UNABLE
CLIMB 3 TO 5 STEPS WITH RAILING: TOTAL
TOILETING: A LITTLE
STANDING UP FROM CHAIR USING ARMS: A LOT
WALKING IN HOSPITAL ROOM: TOTAL
DAILY ACTIVITIY SCORE: 10
HELP NEEDED FOR BATHING: A LITTLE
PERSONAL GROOMING: A LOT
SUGGESTED CMS G CODE MODIFIER MOBILITY: CK
DAILY ACTIVITIY SCORE: 14
MOBILITY SCORE: 7
DRESSING REGULAR LOWER BODY CLOTHING: TOTAL
CLIMB 3 TO 5 STEPS WITH RAILING: A LOT
TOILETING: TOTAL
DRESSING REGULAR UPPER BODY CLOTHING: A LITTLE
WALKING IN HOSPITAL ROOM: TOTAL
SUGGESTED CMS G CODE MODIFIER DAILY ACTIVITY: CL
DRESSING REGULAR LOWER BODY CLOTHING: TOTAL
WALKING IN HOSPITAL ROOM: TOTAL
MOVING TO AND FROM BED TO CHAIR: A LITTLE
STANDING UP FROM CHAIR USING ARMS: A LOT
DAILY ACTIVITIY SCORE: 14
WALKING IN HOSPITAL ROOM: TOTAL
MOVING FROM LYING ON BACK TO SITTING ON SIDE OF FLAT BED: UNABLE
SUGGESTED CMS G CODE MODIFIER DAILY ACTIVITY: CK
STANDING UP FROM CHAIR USING ARMS: TOTAL
WALKING IN HOSPITAL ROOM: A LOT
MOBILITY SCORE: 7
SUGGESTED CMS G CODE MODIFIER DAILY ACTIVITY: CK
CLIMB 3 TO 5 STEPS WITH RAILING: TOTAL
EATING MEALS: A LOT
DRESSING REGULAR UPPER BODY CLOTHING: A LOT
DRESSING REGULAR LOWER BODY CLOTHING: A LITTLE
WALKING IN HOSPITAL ROOM: A LITTLE
MOVING FROM LYING ON BACK TO SITTING ON SIDE OF FLAT BED: A LITTLE
MOBILITY SCORE: 15
MOBILITY SCORE: 15
MOVING TO AND FROM BED TO CHAIR: UNABLE
MOBILITY SCORE: 7
SUGGESTED CMS G CODE MODIFIER DAILY ACTIVITY: CK
MOVING TO AND FROM BED TO CHAIR: A LITTLE
MOVING FROM LYING ON BACK TO SITTING ON SIDE OF FLAT BED: UNABLE
MOVING TO AND FROM BED TO CHAIR: UNABLE
MOBILITY SCORE: 9
HELP NEEDED FOR BATHING: A LOT
TURNING FROM BACK TO SIDE WHILE IN FLAT BAD: UNABLE
DRESSING REGULAR LOWER BODY CLOTHING: A LOT
DRESSING REGULAR LOWER BODY CLOTHING: A LOT
CLIMB 3 TO 5 STEPS WITH RAILING: TOTAL
TOILETING: A LOT
MOBILITY SCORE: 19
CLIMB 3 TO 5 STEPS WITH RAILING: TOTAL
MOVING FROM LYING ON BACK TO SITTING ON SIDE OF FLAT BED: UNABLE
WALKING IN HOSPITAL ROOM: A LITTLE
MOVING TO AND FROM BED TO CHAIR: UNABLE
STANDING UP FROM CHAIR USING ARMS: TOTAL
MOVING FROM LYING ON BACK TO SITTING ON SIDE OF FLAT BED: UNABLE
CLIMB 3 TO 5 STEPS WITH RAILING: TOTAL
EATING MEALS: A LOT
TURNING FROM BACK TO SIDE WHILE IN FLAT BAD: A LOT
MOVING FROM LYING ON BACK TO SITTING ON SIDE OF FLAT BED: UNABLE
MOVING TO AND FROM BED TO CHAIR: UNABLE
CLIMB 3 TO 5 STEPS WITH RAILING: TOTAL
SUGGESTED CMS G CODE MODIFIER DAILY ACTIVITY: CK
SUGGESTED CMS G CODE MODIFIER MOBILITY: CK
DRESSING REGULAR UPPER BODY CLOTHING: A LITTLE
WALKING IN HOSPITAL ROOM: A LOT
PERSONAL GROOMING: A LITTLE
MOVING FROM LYING ON BACK TO SITTING ON SIDE OF FLAT BED: UNABLE
MOVING FROM LYING ON BACK TO SITTING ON SIDE OF FLAT BED: A LITTLE
MOVING TO AND FROM BED TO CHAIR: UNABLE
CLIMB 3 TO 5 STEPS WITH RAILING: TOTAL
MOVING TO AND FROM BED TO CHAIR: UNABLE
TURNING FROM BACK TO SIDE WHILE IN FLAT BAD: A LOT
HELP NEEDED FOR BATHING: A LOT
DRESSING REGULAR UPPER BODY CLOTHING: A LITTLE
PERSONAL GROOMING: A LOT
HELP NEEDED FOR BATHING: A LOT
MOVING TO AND FROM BED TO CHAIR: UNABLE
EATING MEALS: A LOT
TOILETING: A LOT
TOILETING: A LITTLE
PERSONAL GROOMING: A LITTLE
HELP NEEDED FOR BATHING: A LOT
STANDING UP FROM CHAIR USING ARMS: TOTAL
CLIMB 3 TO 5 STEPS WITH RAILING: A LOT
SUGGESTED CMS G CODE MODIFIER DAILY ACTIVITY: CK
SUGGESTED CMS G CODE MODIFIER MOBILITY: CK
EATING MEALS: A LITTLE
SUGGESTED CMS G CODE MODIFIER MOBILITY: CM
STANDING UP FROM CHAIR USING ARMS: TOTAL
PERSONAL GROOMING: A LITTLE
WALKING IN HOSPITAL ROOM: TOTAL
WALKING IN HOSPITAL ROOM: TOTAL
MOBILITY SCORE: 18
DRESSING REGULAR LOWER BODY CLOTHING: A LOT
TURNING FROM BACK TO SIDE WHILE IN FLAT BAD: UNABLE
WALKING IN HOSPITAL ROOM: TOTAL
MOVING TO AND FROM BED TO CHAIR: UNABLE
SUGGESTED CMS G CODE MODIFIER MOBILITY: CK
DAILY ACTIVITIY SCORE: 18
EATING MEALS: A LITTLE
CLIMB 3 TO 5 STEPS WITH RAILING: A LOT
CLIMB 3 TO 5 STEPS WITH RAILING: TOTAL
DRESSING REGULAR LOWER BODY CLOTHING: A LOT
TURNING FROM BACK TO SIDE WHILE IN FLAT BAD: A LITTLE
PERSONAL GROOMING: A LOT
TOILETING: A LOT
MOBILITY SCORE: 6
DAILY ACTIVITIY SCORE: 16
TOILETING: TOTAL
DRESSING REGULAR LOWER BODY CLOTHING: A LOT
DRESSING REGULAR UPPER BODY CLOTHING: A LITTLE
WALKING IN HOSPITAL ROOM: TOTAL
MOVING FROM LYING ON BACK TO SITTING ON SIDE OF FLAT BED: A LOT
STANDING UP FROM CHAIR USING ARMS: A LOT
TURNING FROM BACK TO SIDE WHILE IN FLAT BAD: UNABLE
SUGGESTED CMS G CODE MODIFIER MOBILITY: CM
MOVING TO AND FROM BED TO CHAIR: A LOT
DAILY ACTIVITIY SCORE: 14
MOVING FROM LYING ON BACK TO SITTING ON SIDE OF FLAT BED: UNABLE
SUGGESTED CMS G CODE MODIFIER DAILY ACTIVITY: CK
WALKING IN HOSPITAL ROOM: TOTAL
STANDING UP FROM CHAIR USING ARMS: A LOT
TURNING FROM BACK TO SIDE WHILE IN FLAT BAD: UNABLE
SUGGESTED CMS G CODE MODIFIER DAILY ACTIVITY: CK

## 2022-01-01 ASSESSMENT — PAIN DESCRIPTION - PAIN TYPE
TYPE: ACUTE PAIN
TYPE: CHRONIC PAIN
TYPE: ACUTE PAIN
TYPE: CHRONIC PAIN
TYPE: ACUTE PAIN
TYPE: ACUTE PAIN
TYPE: CHRONIC PAIN
TYPE: ACUTE PAIN

## 2022-01-01 ASSESSMENT — FIBROSIS 4 INDEX
FIB4 SCORE: 6.06
FIB4 SCORE: 5.77
FIB4 SCORE: 2.08
FIB4 SCORE: 6.72
FIB4 SCORE: 3.9
FIB4 SCORE: 2.8
FIB4 SCORE: 3.33
FIB4 SCORE: 3.11
FIB4 SCORE: 2.83
FIB4 SCORE: 2.04
FIB4 SCORE: 6.72
FIB4 SCORE: 1.43
FIB4 SCORE: 3.11
FIB4 SCORE: 2.91
FIB4 SCORE: 5.11
FIB4 SCORE: 2.93

## 2022-01-01 ASSESSMENT — LIFESTYLE VARIABLES
TOTAL SCORE: 0
TOTAL SCORE: 0
SUBSTANCE_ABUSE: 0
TOTAL SCORE: 0
ALCOHOL_USE: YES
SUBSTANCE_ABUSE: 0
EVER HAD A DRINK FIRST THING IN THE MORNING TO STEADY YOUR NERVES TO GET RID OF A HANGOVER: NO
DOES PATIENT WANT TO STOP DRINKING: NO
CONSUMPTION TOTAL: INCOMPLETE
TOTAL SCORE: 0
DO YOU DRINK ALCOHOL: NO
SUBSTANCE_ABUSE: 0
DOES PATIENT WANT TO STOP DRINKING: NO
HOW MANY TIMES IN THE PAST YEAR HAVE YOU HAD 5 OR MORE DRINKS IN A DAY: 0
SUBSTANCE_ABUSE: 0
ALCOHOL_USE: NO
SUBSTANCE_ABUSE: 0
SUBSTANCE_ABUSE: 0
EVER FELT BAD OR GUILTY ABOUT YOUR DRINKING: NO
HOW MANY TIMES IN THE PAST YEAR HAVE YOU HAD 5 OR MORE DRINKS IN A DAY: 0
ON A TYPICAL DAY WHEN YOU DRINK ALCOHOL HOW MANY DRINKS DO YOU HAVE: 0
TOTAL SCORE: 0
EVER FELT BAD OR GUILTY ABOUT YOUR DRINKING: NO
TOTAL SCORE: 0
SUBSTANCE_ABUSE: 0
EVER FELT BAD OR GUILTY ABOUT YOUR DRINKING: NO
HAVE PEOPLE ANNOYED YOU BY CRITICIZING YOUR DRINKING: NO
AVERAGE NUMBER OF DAYS PER WEEK YOU HAVE A DRINK CONTAINING ALCOHOL: 0
EVER HAD A DRINK FIRST THING IN THE MORNING TO STEADY YOUR NERVES TO GET RID OF A HANGOVER: NO
EVER HAD A DRINK FIRST THING IN THE MORNING TO STEADY YOUR NERVES TO GET RID OF A HANGOVER: NO
SUBSTANCE_ABUSE: 0
CONSUMPTION TOTAL: NEGATIVE
HAVE YOU EVER FELT YOU SHOULD CUT DOWN ON YOUR DRINKING: NO
TOTAL SCORE: 0
SUBSTANCE_ABUSE: 0
SUBSTANCE_ABUSE: 0
CONSUMPTION TOTAL: NEGATIVE
EVER HAD A DRINK FIRST THING IN THE MORNING TO STEADY YOUR NERVES TO GET RID OF A HANGOVER: NO
DO YOU DRINK ALCOHOL: NO
TOTAL SCORE: 0
EVER FELT BAD OR GUILTY ABOUT YOUR DRINKING: NO
TOTAL SCORE: 0
HAVE PEOPLE ANNOYED YOU BY CRITICIZING YOUR DRINKING: NO
HAVE YOU EVER FELT YOU SHOULD CUT DOWN ON YOUR DRINKING: NO
SUBSTANCE_ABUSE: 0
SUBSTANCE_ABUSE: 0
AVERAGE NUMBER OF DAYS PER WEEK YOU HAVE A DRINK CONTAINING ALCOHOL: 0
TOTAL SCORE: 0
HAVE YOU EVER FELT YOU SHOULD CUT DOWN ON YOUR DRINKING: NO
ON A TYPICAL DAY WHEN YOU DRINK ALCOHOL HOW MANY DRINKS DO YOU HAVE: 1
SUBSTANCE_ABUSE: 0
HOW MANY TIMES IN THE PAST YEAR HAVE YOU HAD 5 OR MORE DRINKS IN A DAY: 0
TOTAL SCORE: 0
DOES PATIENT WANT TO STOP DRINKING: NO
ON A TYPICAL DAY WHEN YOU DRINK ALCOHOL HOW MANY DRINKS DO YOU HAVE: 0
HAVE PEOPLE ANNOYED YOU BY CRITICIZING YOUR DRINKING: NO
HAVE PEOPLE ANNOYED YOU BY CRITICIZING YOUR DRINKING: NO
TOTAL SCORE: 0
CONSUMPTION TOTAL: NEGATIVE
HAVE YOU EVER FELT YOU SHOULD CUT DOWN ON YOUR DRINKING: NO
ALCOHOL_USE: NO
AVERAGE NUMBER OF DAYS PER WEEK YOU HAVE A DRINK CONTAINING ALCOHOL: 2

## 2022-01-01 ASSESSMENT — GAIT ASSESSMENTS
GAIT LEVEL OF ASSIST: UNABLE TO PARTICIPATE
ASSISTIVE DEVICE: FRONT WHEEL WALKER
DISTANCE (FEET): 8
GAIT LEVEL OF ASSIST: UNABLE TO PARTICIPATE
ASSISTIVE DEVICE: FRONT WHEEL WALKER
DEVIATION: DECREASED BASE OF SUPPORT;SHUFFLED GAIT;BRADYKINETIC
GAIT LEVEL OF ASSIST: MINIMAL ASSIST
GAIT LEVEL OF ASSIST: UNABLE TO PARTICIPATE
GAIT LEVEL OF ASSIST: MINIMAL ASSIST
GAIT LEVEL OF ASSIST: UNABLE TO PARTICIPATE
DISTANCE (FEET): 5
GAIT LEVEL OF ASSIST: UNABLE TO PARTICIPATE

## 2022-01-01 ASSESSMENT — PAIN SCALES - PAIN ASSESSMENT IN ADVANCED DEMENTIA (PAINAD)
TOTALSCORE: 0
BREATHING: NORMAL
NEGVOCALIZATION: OCCASIONAL MOAN/GROAN, LOW SPEECH, NEGATIVE/DISAPPROVING QUALITY
BODYLANGUAGE: RELAXED
CONSOLABILITY: NO NEED TO CONSOLE
BODYLANGUAGE: TENSE, DISTRESSED PACING, FIDGETING
FACIALEXPRESSION: SMILING OR INEXPRESSIVE
FACIALEXPRESSION: FACIAL GRIMACING
BREATHING: NORMAL
BODYLANGUAGE: RELAXED
TOTALSCORE: 5
FACIALEXPRESSION: SMILING OR INEXPRESSIVE
TOTALSCORE: 0
CONSOLABILITY: DISTRACTED OR REASSURED BY VOICE/TOUCH
BREATHING: NORMAL
CONSOLABILITY: NO NEED TO CONSOLE

## 2022-01-01 ASSESSMENT — CHA2DS2 SCORE
HYPERTENSION: YES
DIABETES: NO
CHF OR LEFT VENTRICULAR DYSFUNCTION: YES
HYPERTENSION: YES
CHF OR LEFT VENTRICULAR DYSFUNCTION: YES
DIABETES: NO
AGE 75 OR GREATER: NO
AGE 65 TO 74: YES
SEX: MALE
PRIOR STROKE OR TIA OR THROMBOEMBOLISM: NO
AGE 75 OR GREATER: NO
VASCULAR DISEASE: NO
SEX: MALE
CHA2DS2 VASC SCORE: 3
AGE 65 TO 74: YES
CHA2DS2 VASC SCORE: 3
VASCULAR DISEASE: NO
PRIOR STROKE OR TIA OR THROMBOEMBOLISM: NO

## 2022-01-01 ASSESSMENT — PATIENT HEALTH QUESTIONNAIRE - PHQ9
1. LITTLE INTEREST OR PLEASURE IN DOING THINGS: NOT AT ALL
2. FEELING DOWN, DEPRESSED, IRRITABLE, OR HOPELESS: NOT AT ALL
SUM OF ALL RESPONSES TO PHQ9 QUESTIONS 1 AND 2: 0
1. LITTLE INTEREST OR PLEASURE IN DOING THINGS: NOT AT ALL
SUM OF ALL RESPONSES TO PHQ9 QUESTIONS 1 AND 2: 0
2. FEELING DOWN, DEPRESSED, IRRITABLE, OR HOPELESS: NOT AT ALL
1. LITTLE INTEREST OR PLEASURE IN DOING THINGS: NOT AT ALL
2. FEELING DOWN, DEPRESSED, IRRITABLE, OR HOPELESS: NOT AT ALL
SUM OF ALL RESPONSES TO PHQ9 QUESTIONS 1 AND 2: 0

## 2022-01-01 ASSESSMENT — PAIN SCALES - GENERAL: PAIN_LEVEL: 2

## 2022-01-01 ASSESSMENT — ACTIVITIES OF DAILY LIVING (ADL): TOILETING: INDEPENDENT

## 2022-02-01 PROBLEM — D63.8 ANEMIA OF CHRONIC DISEASE: Status: ACTIVE | Noted: 2022-01-01

## 2022-02-01 PROBLEM — D69.6 THROMBOCYTOPENIA (HCC): Status: ACTIVE | Noted: 2022-01-01

## 2022-02-01 NOTE — ASSESSMENT & PLAN NOTE
History of, s/p ablation 1/21  On Eliquis 5 mg po BID as outpt  GI cleared for restart of anticoagulation. Apixaban resumed.   Rate controlled.

## 2022-02-01 NOTE — CONSULTS
"[facility] -  CONSULT NOTE               Author: Arslan Javed M.D. Date & Time: 2/1/2022  1:38 PM     DAILY NEPHROLOGY SUMMARY:  2/1: Consult placed, patient presented to hospital    REASON FOR CONSULT: worsening renal funciton    HISTORY OF PRESENT ILLNESS:    Krishan Acevedo is a 71 y.o. 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 Was additionally found to have worsening renal function.     Patient shares that he first tested positive for COVID at an outside hospital several days ago. He felt that he was not improving and possibly getting worse at home which prompted him to call EMS. He mentions that for several days he has been having loose stools, noting 3-5 loose but not watery BMs daily, noting his last loose stool was day prior to presentation. He also mentions that he has become more dependent on his wife to bring him food and water citing fatigue with his COVID infection. He does note that for the past several weeks he has been feeling \"like I'm always thirsty\".     While in the ED he was vitally stable. Was placed on 2L NC sating in the mid 90s BP ranged 123/70 from 159/117. Labs notable for Cr of 2.62. BUN 80, COVID-19 positive, UA noted for large occult blood, small leukocyte esterase. US of kidneys was completed with indicated no hydronephrosis or fluid collections, no renal artery stenosis.      In regards to his transplant history. Living donor transplant was completed in 2008 at an outside hospital. Patient is on Prednisone 5mg, Mycophenolate 500mg BID, and Tacrolimus 1mg and 0.5mg daily. His GFR trends in the 30s-40s although in chart he has climbed to the 56-53 ranges in the past.    Past Medical History:   Diagnosis Date   • Arrhythmia 2021    flutter   • Arthritis 2020    right " shoulder, knees, & back   • Atrial flutter (HCC)    • CAD (coronary artery disease) 2003   • Cough 2020    dry-on lisinipril, now productive -brown   • Dialysis patient (HCC) 2006    x 18 months   • DVT (deep venous thrombosis) (Formerly [facility])    • E coli bacteremia 2014    hospitalized x 5 months   • Kidney failure 03/2008    kidney transplant   • Kidney transplant pain 2020    low back   • Retinal detachment    • Sleep apnea     CPAP   • Snoring    • Wegener's granulomatosis with renal involvement ([facility]) 2008        Past Surgical History:   Procedure Laterality Date   • OTHER Right 2017    eye surgery, lasix & cataracts, retinal detatchment   • PEG PLACEMENT  10/10/2014    Performed by Brijesh Orozco M.D. at [facility]   • OTHER ORTHOPEDIC SURGERY Left 1995    knee        Current Facility-Administered Medications   Medication Dose Route Frequency Provider Last Rate Last Admin   • ondansetron (ZOFRAN) syringe/vial injection 4 mg  4 mg Intravenous Q6HRS PRN Rhonda Mojica M.D.       • ondansetron (ZOFRAN ODT) dispertab 4 mg  4 mg Oral Q6HRS PRN Rhonda Mojica M.D.       • acetaminophen (Tylenol) tablet 650 mg  650 mg Oral Q6HRS PRN Rhonda Mojica M.D.       • hydrALAZINE (APRESOLINE) injection 10 mg  10 mg Intravenous Q6HRS PRN Rhonda Mojica M.D.       • apixaban (ELIQUIS) tablet 5 mg  5 mg Oral BID Rhonda Mojica M.D.       • [START ON 2/2/2022] predniSONE (DELTASONE) tablet 5 mg  5 mg Oral QAM Maria Hassan, A.P.R.N.       • mycophenolate (CELLCEPT) capsule 500 mg  500 mg Oral BID Rhonda Mojica M.D.       • [START ON 2/2/2022] tacrolimus (PROGRAF) capsule 1 mg  1 mg Oral QAM Rhonda Mojica M.D.       • tacrolimus (PROGRAF) capsule 0.5 mg  0.5 mg Oral Q EVENING Rhonda Mojica M.D.       • brimonidine (ALPHAGAN) 0.2 % ophthalmic solution 2 Drop  2 Drop Both Eyes BID Maria Hassan, A.P.R.N.        And   • timolol (TIMOPTIC) 0.5 % ophthalmic solution 2 Drop  2 Drop Both Eyes  BID SILVIA Jeffries         Current Outpatient Medications   Medication Sig Dispense Refill   • furosemide (LASIX) 20 MG Tab Take 20 mg by mouth every day.     • ELIQUIS 5 MG Tab TAKE 1 TABLET BY MOUTH  TWICE DAILY (Patient taking differently: Take 5 mg by mouth 2 times a day.) 180 Tablet 3   • losartan (COZAAR) 100 MG Tab TAKE 1 TABLET BY MOUTH  DAILY (Patient taking differently: Take 100 mg by mouth every day.) 90 Tablet 3   • predniSONE (DELTASONE) 5 MG Tab Take 5 mg by mouth every morning.     • fluticasone (FLONASE) 50 MCG/ACT nasal spray Administer 1 Spray into affected nostril(S) every day.     • omeprazole (PRILOSEC) 20 MG delayed-release capsule Take 20 mg by mouth every evening.     • Brimonidine Tartrate-Timolol (COMBIGAN) 0.2-0.5 % Solution Administer 2 Drops into both eyes 2 times a day.     • allopurinol (ZYLOPRIM) 300 MG Tab Take 300 mg by mouth every bedtime.     • Multiple Vitamins-Minerals (CENTRUM SILVER 50+MEN) Tab Take 1 Tab by mouth every morning.     • vitamin D, Ergocalciferol, (DRISDOL) 97308 units Cap capsule Take 50,000 Units by mouth every Monday.     • Glucosamine-Chondroit-Vit C-Mn (GLUCOSAMINE 1500 COMPLEX) Cap Take 2 Caps by mouth 2 Times a Day.     • MAGNESIUM PO Take 1 Tab by mouth 2 Times a Day.     • mycophenolate (CELLCEPT) 250 MG Cap Take 500 mg by mouth 2 times a day.     • tacrolimus (PROGRAF) 1 MG Cap Take 0.5-1 mg by mouth 2 times a day. 1 mg in the AM  0.5 mg in the PM     • atorvastatin (LIPITOR) 10 MG TABS Take 10 mg by mouth every evening.         Triazolam and Nsaids    Social History     Tobacco Use   • Smoking status: Never Smoker   • Smokeless tobacco: Former User     Types: Snuff, Chew   Vaping Use   • Vaping Use: Never used   Substance Use Topics   • Alcohol use: Not Currently   • Drug use: Never       Social History:   Tobacco: never smoker  Alcohol: Denies  Recreational drugs (illegal and prescription):  denies    Family History   Problem Relation  Age of Onset   • Stroke Brother 26        , ? cause   • Stroke Maternal Grandmother            • Clotting Disorder Neg Hx        Review of Systems:  Review of Systems   Constitutional: Negative for chills and fever.   HENT: Negative for sore throat.    Eyes: Negative for blurred vision and double vision.   Respiratory: Positive for cough and shortness of breath.    Cardiovascular: Negative for chest pain and leg swelling.   Gastrointestinal: Positive for diarrhea. Negative for blood in stool and melena.   Genitourinary: Negative for hematuria and urgency.   Musculoskeletal: Negative for joint pain.   Neurological: Negative for sensory change and weakness.   Psychiatric/Behavioral: Negative for depression and substance abuse.       PHYSICAL EXAM:  Vitals:    22 1015 22 1041 22 1141 22 1257   BP:  147/67 123/70 131/71   Pulse:  99 93 85   Resp:  20 20 20   Temp: 37.2 °C (99 °F)      TempSrc: Temporal      SpO2:  93% 94% 92%   Weight:       Height:           Physical Exam  Constitutional:       Appearance: Normal appearance.   HENT:      Head: Normocephalic and atraumatic.      Mouth/Throat:      Mouth: Mucous membranes are dry.   Eyes:      General: No scleral icterus.  Cardiovascular:      Rate and Rhythm: Normal rate and regular rhythm.      Pulses: Normal pulses.   Pulmonary:      Effort: Pulmonary effort is normal. No respiratory distress.      Breath sounds: Normal breath sounds.      Comments: 2L NC  Abdominal:      Palpations: Abdomen is soft.      Tenderness: There is no abdominal tenderness. There is no right CVA tenderness or left CVA tenderness.   Musculoskeletal:      Cervical back: No rigidity.      Right lower leg: No edema.      Left lower leg: No edema.   Skin:     General: Skin is warm and dry.   Neurological:      Mental Status: He is alert and oriented to person, place, and time.      Cranial Nerves: No cranial nerve deficit.      Sensory: No sensory deficit.       Motor: No weakness.   Psychiatric:         Mood and Affect: Mood normal.         Behavior: Behavior normal.         LABORATORY:  Results for MOMO ESCUDERO (MRN [id number]) as of 2/1/2022 13:56   Ref. Range 2/1/2022 09:00 2/1/2022 09:25 2/1/2022 10:35   WBC Latest Ref Range: 4.8 - 10.8 K/uL 4.9     RBC Latest Ref Range: 4.70 - 6.10 M/uL 4.15 (L)     Hemoglobin Latest Ref Range: 14.0 - 18.0 g/dL 11.9 (L)     Hematocrit Latest Ref Range: 42.0 - 52.0 % 37.2 (L)     MCV Latest Ref Range: 81.4 - 97.8 fL 89.6     MCH Latest Ref Range: 27.0 - 33.0 pg 28.7     MCHC Latest Ref Range: 33.7 - 35.3 g/dL 32.0 (L)     RDW Latest Ref Range: 35.9 - 50.0 fL 48.6     Platelet Count Latest Ref Range: 164 - 446 K/uL 118 (L)     MPV Latest Ref Range: 9.0 - 12.9 fL 9.9     Neutrophils-Polys Latest Ref Range: 44.00 - 72.00 % 61.80     Neutrophils (Absolute) Latest Ref Range: 1.82 - 7.42 K/uL 3.04     Lymphocytes Latest Ref Range: 22.00 - 41.00 % 29.10     Lymphs (Absolute) Latest Ref Range: 1.00 - 4.80 K/uL 1.43     Monocytes Latest Ref Range: 0.00 - 13.40 % 8.10     Monos (Absolute) Latest Ref Range: 0.00 - 0.85 K/uL 0.40     Eosinophils Latest Ref Range: 0.00 - 6.90 % 0.60     Eos (Absolute) Latest Ref Range: 0.00 - 0.51 K/uL 0.03     Basophils Latest Ref Range: 0.00 - 1.80 % 0.00     Baso (Absolute) Latest Ref Range: 0.00 - 0.12 K/uL 0.00     Immature Granulocytes Latest Ref Range: 0.00 - 0.90 % 0.40     Immature Granulocytes (abs) Latest Ref Range: 0.00 - 0.11 K/uL 0.02     Nucleated RBC Latest Units: /100 WBC 0.00     NRBC (Absolute) Latest Units: K/uL 0.00     Sodium Latest Ref Range: 135 - 145 mmol/L 137     Potassium Latest Ref Range: 3.6 - 5.5 mmol/L 4.8     Chloride Latest Ref Range: 96 - 112 mmol/L 107     Co2 Latest Ref Range: 20 - 33 mmol/L 17 (L)     Anion Gap Latest Ref Range: 7.0 - 16.0  13.0     Glucose Latest Ref Range: 65 - 99 mg/dL 131 (H)     Bun Latest Ref Range: 8 - 22 mg/dL 80 (HH)     Creatinine Latest Ref Range:  0.50 - 1.40 mg/dL 2.62 (H)     GFR If  Latest Ref Range: >60 mL/min/1.73 m 2 29 (A)     GFR If Non  Latest Ref Range: >60 mL/min/1.73 m 2 24 (A)     Calcium Latest Ref Range: 8.5 - 10.5 mg/dL 9.7     AST(SGOT) Latest Ref Range: 12 - 45 U/L 40     ALT(SGPT) Latest Ref Range: 2 - 50 U/L 23     Alkaline Phosphatase Latest Ref Range: 30 - 99 U/L 93     Total Bilirubin Latest Ref Range: 0.1 - 1.5 mg/dL 0.5     Albumin Latest Ref Range: 3.2 - 4.9 g/dL 3.3     Total Protein Latest Ref Range: 6.0 - 8.2 g/dL 6.9     Globulin Latest Ref Range: 1.9 - 3.5 g/dL 3.6 (H)     A-G Ratio Latest Units: g/dL 0.9     Lactic Acid Latest Ref Range: 0.5 - 2.0 mmol/L 1.5     Urobilinogen, Urine Latest Ref Range: Negative    0.2   Color Unknown   Yellow   Character Unknown   Clear   Specific Gravity Latest Ref Range: <1.035    1.010   Ph Latest Ref Range: 5.0 - 8.0    5.5   Glucose Latest Ref Range: Negative mg/dL   Negative   Ketones Latest Ref Range: Negative mg/dL   Negative   Bilirubin Latest Ref Range: Negative    Negative   Occult Blood Latest Ref Range: Negative    Large (A)   Protein Latest Ref Range: Negative mg/dL   Negative   Nitrite Latest Ref Range: Negative    Negative   Leukocyte Esterase Latest Ref Range: Negative    Small (A)   Micro Urine Req Unknown   Microscopic   WBC Latest Units: /hpf   10-20 (A)   RBC Latest Units: /hpf   20-50 (A)   Epithelial Cells Latest Units: /hpf   Negative   Bacteria Latest Ref Range: None /hpf   Rare (A)   Influenza virus A RNA Latest Ref Range: Negative   Negative    Influenza virus B, PCR Latest Ref Range: Negative   Negative    RSV, PCR Latest Ref Range: Negative   Negative    SARS-CoV-2 by PCR Unknown  DETECTED (AA)    SARS-CoV-2 Source Unknown  NP Swab        ASSESSMENT AND RECOMMENDATIONS    Problem Representation:   #History of Renal Transplant  #History of ANCA Vasculitis  #Acute on Chronic kidney injury  #CKD 3  #Acidosis  #Afib  #DM2    Suspect  worsening renal function likely secondary to volume depletion in the context of diarrhea and impaired ability to self hydrate secondary to fatigue from COVID-19 infection.  S/P 1L bolus in ED  Recommendations  -Continue with all immunosuppression meds while inpatient: prednisone, tacrolimus, mycophenolate  -Ordering tacrolimus level with AM labs  -Consider starting NS at 125ml/hour for 24 hours. Will reassess rate and fluid type in the AM  -Avoid nephrotoxins where possible, renally dose medications  -Renal diet once eating    Nephrology will continue to follow.

## 2022-02-01 NOTE — ASSESSMENT & PLAN NOTE
Creatinine worsening today 2.06  Worsening  ?rejection vs recurrent vasculitis  Ranged from 1.27-1.71 over the past year  Renal Bx done 2/22: tissue sent out and results are yet pending  Follow daily BMP, UOP  Renally dose medications as appropriate  Renal function improving. Appreciate Nephrology assistance.  HD catheter removed 3/2.

## 2022-02-01 NOTE — ASSESSMENT & PLAN NOTE
S/P transplant 2008 in [place], [place]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 Blount, self-manages lasix administration at home based on fluid volume status  Renal US negative for hydronephrosis or renal stenosis  Neph following  On mycophenalate and tacrolimus with 30mg of prednisone at baseline  DC hydrocortisone and transition to chronic prednisone dosing  Follow daily BMP  Renally dose medications as appropriate  Renal Bx done 2/22; results sent out and pending

## 2022-02-01 NOTE — ASSESSMENT & PLAN NOTE
Outpatient on daily prednisone secondary to renal transplant 2008  Resume home dosing of prednisone 30mg and DC hydrocortisone

## 2022-02-01 NOTE — PROGRESS NOTES
[facility]    Please see Medical Resident Note for full Consult    - RADHA on CKD  - CKD stage III  - Acidosis  - Transplant Kidney  - Chronic Immunosuppression  - Diarrhea  - Covid-19 PNA    Plan is IVF   Continue immunosuppression meds  Avoid nephrotoxins  Renal dose medication as necessary      Thank you and will follow

## 2022-02-01 NOTE — ED TRIAGE NOTES
Pt arrives via EMS from home for Covid. Pt was seen at [facility] a couple days ago but now having increased weakness, cough and in general not getting better. Pt alert and oriented x 4

## 2022-02-01 NOTE — H&P
Hospital Medicine History & Physical Note    Date of Service  2/1/2022    Primary Care Physician  Damian Diaz M.D.    Consultants  nephrology    Specialist Names: Dr. Lyman    Code Status  Prior    Chief Complaint  Chief Complaint   Patient presents with   • Weakness       History of Presenting Illness  Krishan Acevedo is a 71 y.o. male who presented 2/1/2022 with weakness, non-productive cough, SOB, fever, headache, and low energy since attending the ED at [facility] for Covid pneumonia on Sunday 1/25/21. History verified with wife, Penelope, via telephone as patient is unable to give complete history. He has been vaccinated and boosted. Patient also reporting reproducible chest batsheva, no pressure, that does not radiate.  Patient had afib/flutter and ablation in 1/21 and is on Eliquis. He reports he was treated at [facility] for Covid symptoms CHECK WITH WIFE and he was not recovering, hence he called EMS and asked to be brought to [facility].    In the ED, patient was found to be Covid positive. Labs significant for normocytic anemia, glucose of 131, BUN/Cr 80/2.62, GFR 24, and UA negative for UTI however demonstrates presence of blood. CXR demonstrates bilateral hazy opacities and probable vascular congestion/edema. EKG shows sinus tachycardia with paired PVCs and RBBB. Renal ultrasound negative for hydronephrosis or renal stenosis.    I spoke with the patient's wife, Penelope, who is home with Covid symptoms. She verified that she is the medical power of . She verified that patient is a FULL CODE at this time.  Penelope has been updated on the plan of care.    Patient is admitted for management of his Covid 19 pneumonia. Nephrology, Dr. Lyman consulted and has kindly agreed to see the patient.     I discussed the plan of care with patient, bedside RN, nephrology and Dr. Mojica.    Review of Systems  Review of Systems   Constitutional: Positive for fever and malaise/fatigue. Negative for chills.   HENT:  Negative for congestion and hearing loss.    Eyes: Positive for blurred vision. Negative for redness.   Respiratory: Positive for cough and shortness of breath. Negative for hemoptysis and sputum production.    Cardiovascular: Positive for chest pain and leg swelling.   Gastrointestinal: Positive for diarrhea. Negative for nausea and vomiting.   Genitourinary: Positive for frequency and urgency. Negative for dysuria.   Musculoskeletal: Negative for myalgias.   Skin: Negative for rash.   Neurological: Positive for weakness and headaches. Negative for dizziness.   Psychiatric/Behavioral: Negative for depression and substance abuse.   All other systems reviewed and are negative.      Past Medical History   has a past medical history of Arrhythmia (2021), Arthritis (2020), Atrial flutter ([facility]), CAD (coronary artery disease) (2003), Cough (2020), Dialysis patient ([facility]) (2006), DVT (deep venous thrombosis) ([facility]), E coli bacteremia (2014), Kidney failure (03/2008), Kidney transplant pain (2020), Retinal detachment, Sleep apnea, Snoring, and Wegener's granulomatosis with renal involvement ([facility]) (2008).    Surgical History   has a past surgical history that includes peg placement (10/10/2014); other orthopedic surgery (Left, 1995); and other (Right, 2017).     Family History  family history includes Stroke in his maternal grandmother; Stroke (age of onset: 26) in his brother.   Family history reviewed with patient. There is no family history that is pertinent to the chief complaint.     Social History   reports that he has never smoked. He quit smokeless tobacco use about 31 years ago.  His smokeless tobacco use included snuff and chew. He reports previous alcohol use. He reports that he does not use drugs.    Allergies  Allergies   Allergen Reactions   • Triazolam Unspecified     All anti anxiety medications cause hallucinations    • Nsaids      Cannot take because of anti rejection meds.       Medications  Prior to  Admission Medications   Prescriptions Last Dose Informant Patient Reported? Taking?   Brimonidine Tartrate-Timolol (COMBIGAN) 0.2-0.5 % Solution 2/1/2022 at AM Patient Yes No   Sig: Administer 2 Drops into both eyes 2 times a day.   ELIQUIS 5 MG Tab 2/1/2022 at AM Patient No No   Sig: TAKE 1 TABLET BY MOUTH  TWICE DAILY   Patient taking differently: Take 5 mg by mouth 2 times a day.   Glucosamine-Chondroit-Vit C-Mn (GLUCOSAMINE 1500 COMPLEX) Cap 2/1/2022 at AM Patient Yes No   Sig: Take 2 Caps by mouth 2 Times a Day.   MAGNESIUM PO 2/1/2022 at AM Patient Yes No   Sig: Take 1 Tab by mouth 2 Times a Day.   Multiple Vitamins-Minerals (CENTRUM SILVER 50+MEN) Tab 2/1/2022 at AM Patient Yes No   Sig: Take 1 Tab by mouth every morning.   allopurinol (ZYLOPRIM) 300 MG Tab 1/31/2022 at PM Patient Yes No   Sig: Take 300 mg by mouth every bedtime.   atorvastatin (LIPITOR) 10 MG TABS 1/31/2022 at PM Patient Yes No   Sig: Take 10 mg by mouth every evening.   fluticasone (FLONASE) 50 MCG/ACT nasal spray 2/1/2022 at AM Patient Yes No   Sig: Administer 1 Spray into affected nostril(S) every day.   furosemide (LASIX) 20 MG Tab 1/31/2022 at AM Patient Yes Yes   Sig: Take 20 mg by mouth every day.   losartan (COZAAR) 100 MG Tab 1/31/2022 at PM Patient No No   Sig: TAKE 1 TABLET BY MOUTH  DAILY   Patient taking differently: Take 100 mg by mouth every day.   mycophenolate (CELLCEPT) 250 MG Cap 2/1/2022 at AM Patient Yes No   Sig: Take 500 mg by mouth 2 times a day.   omeprazole (PRILOSEC) 20 MG delayed-release capsule 1/31/2022 at PM Patient Yes No   Sig: Take 20 mg by mouth every evening.   predniSONE (DELTASONE) 5 MG Tab 2/1/2022 at AM Patient Yes No   Sig: Take 5 mg by mouth every morning.   tacrolimus (PROGRAF) 1 MG Cap 2/1/2022 at AM Patient Yes No   Sig: Take 0.5-1 mg by mouth 2 times a day. 1 mg in the AM  0.5 mg in the PM   vitamin D, Ergocalciferol, (DRISDOL) 91467 units Cap capsule 1/31/2022 at AM Patient Yes No   Sig: Take  50,000 Units by mouth every Monday.      Facility-Administered Medications: None       Physical Exam  Temp:  [37.9 °C (100.2 °F)] 37.9 °C (100.2 °F)  Pulse:  [100] 100  Resp:  [18] 18  BP: (159)/(118) 159/118  SpO2:  [91 %] 91 %  Blood Pressure : 159/118   Temperature: 37.9 °C (100.2 °F)   Pulse: 100   Respiration: 18   Pulse Oximetry: 91 %       Physical Exam  Vitals and nursing note reviewed.   Constitutional:       General: He is awake.      Appearance: He is obese. He is ill-appearing.   HENT:      Head: Normocephalic and atraumatic.      Right Ear: External ear normal.      Left Ear: External ear normal.      Nose: Nose normal.      Mouth/Throat:      Mouth: Mucous membranes are dry.      Pharynx: Oropharynx is clear.   Eyes:      General: No scleral icterus.     Pupils: Pupils are equal, round, and reactive to light.      Right eye: Pupil is sluggish.      Left eye: Pupil is sluggish.      Comments: Decreased vision right eye secondary to retinal detachment   Cardiovascular:      Rate and Rhythm: Normal rate and regular rhythm.  Extrasystoles are present.     Heart sounds: S1 normal and S2 normal. Heart sounds are distant.   Pulmonary:      Effort: Tachypnea present.      Breath sounds: Decreased air movement present. Rhonchi present.   Abdominal:      General: Bowel sounds are normal.      Palpations: Abdomen is soft.   Genitourinary:     Penis: Normal.    Musculoskeletal:         General: Normal range of motion.      Cervical back: Normal range of motion and neck supple.      Right lower le+ Pitting Edema present.      Left lower le+ Pitting Edema present.   Skin:     General: Skin is warm and dry.      Capillary Refill: Capillary refill takes less than 2 seconds.      Coloration: Skin is pale. Skin is not jaundiced.   Neurological:      Mental Status: He is oriented to person, place, and time. He is lethargic.      Motor: Weakness present.   Psychiatric:         Mood and Affect: Mood is anxious.          Speech: Speech is delayed.         Behavior: Behavior is cooperative.         Laboratory:  Recent Labs     02/01/22  0900   WBC 4.9   RBC 4.15*   HEMOGLOBIN 11.9*   HEMATOCRIT 37.2*   MCV 89.6   MCH 28.7   MCHC 32.0*   RDW 48.6   PLATELETCT 118*   MPV 9.9     Recent Labs     02/01/22  0900   SODIUM 137   POTASSIUM 4.8   CHLORIDE 107   CO2 17*   GLUCOSE 131*   BUN 80*   CREATININE 2.62*   CALCIUM 9.7     Recent Labs     02/01/22  0900   ALTSGPT 23   ASTSGOT 40   ALKPHOSPHAT 93   TBILIRUBIN 0.5   GLUCOSE 131*         No results for input(s): NTPROBNP in the last 72 hours.      No results for input(s): TROPONINT in the last 72 hours.    Imaging:  DX-CHEST-PORTABLE (1 VIEW)   Final Result      1.  Bilateral peripheral lower lobe hazy opacities which is nonspecific but can be seen with Covid 19 pneumonia.   2.  Enlarged cardiac silhouette with probable vascular congestion/edema.      US-RENAL TRANSPLANT COMP    (Results Pending)       X-Ray:  My impression is: diffuse bilateral infiltrates  EKG:  I have personally reviewed the images and compared with prior images. and My impression is: Sinus tachycarida with paried PVCs, RBBB, no ST elevation or depression    Assessment/Plan:  I anticipate this patient will require at least two midnights for appropriate medical management, necessitating inpatient admission.    * RADHA (acute kidney injury) (HCC)- (present on admission)  Assessment & Plan  Creatine 2.64 on admission  Ranged from 1.27-1.71 over the past year  1000 ml fluid bolus in ED  Nephrology, Dr. Lyman consulted, appreciate recommendations  - UA negative for UTI  - AM labs    Thrombocytopenia (HCC)  Assessment & Plan  Platelets 118  - Follow labs    Anemia of chronic disease  Assessment & Plan  Normocytic anemia  - monitor for bleeding    Epiretinal membrane (ERM) of both eyes- (present on admission)  Assessment & Plan  Continue home Combigan    Long term (current) use of systemic steroids- (present on  admission)  Assessment & Plan  Daily prednisone secondary to renal transplant 2008  - Continued home dosing of 5mg daily prednisone  - Await nephrology recomendations    Essential hypertension- (present on admission)  Assessment & Plan  History of  On Losartan at home  - Consider restarting after nephrology consult    Kidney transplant status, living related donor- (present on admission)  Assessment & Plan  S/P transplant 2008 in [place], [place]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 Blount, self-manages lasix administration at home based on fluid volume status  Renal US negative for hydronephrosis or renal stenosis  - Dr. Lyman, nephrology consulted, appreciate recommendations  - Restarted home prednisone   - Will await nephrology recommendations to restart the rest of patients home medications  - Renal dosing for all medication    Atrial flutter - (present on admission)  Assessment & Plan  History of, s/p ablation 1/21  On Eliquis 5 mg po BID  ST on admission  - Eliquis continued      VTE prophylaxis: SCDs/TEDs and therapeutic anticoagulation with Eliquis

## 2022-02-01 NOTE — ED PROVIDER NOTES
ED Provider Note    Scribed for Dwayne Morales M.D. by Sohail Augustine. 2022  9:03 AM    Primary care provider: Damian Diaz M.D.  Means of arrival: EMS  History obtained from: Patient  History limited by: None    CHIEF COMPLAINT  Chief Complaint   Patient presents with    Weakness     HPI  Krishan Acevedo is a 71 y.o. male who presents to the Emergency Department by EMS for evaluation of moderate shortness of breath onset several days ago. The patient was diagnosed with COVID-19 two days ago. Associated symptoms include generalized weakness. The patient denies vomiting. No alleviating or exacerbating factors reported.     REVIEW OF SYSTEMS  Pertinent positives include shortness of breath, generalized weakness  Pertinent negatives include no vomiting.    All other systems reviewed and negative. See HPI for further details.     PAST MEDICAL HISTORY   has a past medical history of Arrhythmia (), Arthritis (), Cough (), Dialysis patient (HCC) (), E coli bacteremia (), Kidney failure (2008), Kidney transplant pain (), Sleep apnea, and Snoring.    SURGICAL HISTORY   has a past surgical history that includes peg placement (10/10/2014); other orthopedic surgery (Left, ); and other (Right, ).    SOCIAL HISTORY  Social History     Tobacco Use    Smoking status: Never Smoker    Smokeless tobacco: Former User     Types: Snuff, Chew   Vaping Use    Vaping Use: Never used   Substance Use Topics    Alcohol use: Not Currently    Drug use: Never      Social History     Substance and Sexual Activity   Drug Use Never       FAMILY HISTORY  Family History   Problem Relation Age of Onset    Stroke Brother 26        , ? cause    Stroke Maternal Grandmother             Clotting Disorder Neg Hx        CURRENT MEDICATIONS  Current Outpatient Medications   Medication Instructions    allopurinol (ZYLOPRIM) 300 mg, Oral, EVERY BEDTIME    Amoxicillin 500 MG Tab TAKE 4 TABLETS BY MOUTH 1  "HOUR PRIOR TO DENTAL APPOINTMENT    atorvastatin (LIPITOR) 10 mg, Oral, EVERY MORNING    Brimonidine Tartrate-Timolol (COMBIGAN) 0.2-0.5 % Solution Ophthalmic    ELIQUIS 5 MG Tab TAKE 1 TABLET BY MOUTH  TWICE DAILY    fluticasone (FLONASE) 50 MCG/ACT nasal spray 1 Spray, Nasal, DAILY    Glucosamine-Chondroit-Vit C-Mn (GLUCOSAMINE 1500 COMPLEX) Cap 2 Capsules, Oral, 2 TIMES DAILY    hydroCHLOROthiazide (HYDRODIURIL) 25 mg, Oral, DAILY    losartan (COZAAR) 100 MG Tab TAKE 1 TABLET BY MOUTH  DAILY    MAGNESIUM PO 1 Tablet, Oral, 2 TIMES DAILY    Multiple Vitamins-Minerals (CENTRUM SILVER 50+MEN) Tab 1 Tablet, Oral, EVERY MORNING    mycophenolate (CELLCEPT) 500 mg, Oral, 2 TIMES DAILY    omeprazole (PRILOSEC) 20 mg, Oral, 1 TIME DAILY PRN    PREDNISONE PO 5 mg, Oral    tacrolimus (PROGRAF) 0.5 MG Cap No dose, route, or frequency recorded.    tacrolimus (PROGRAF) 1 mg, Oral, 2 TIMES DAILY, AM is 1MG tab and the PM is   1/2 MG tab    vitamin D2 (Ergocalciferol) (DRISDOL) 50,000 Units, Oral, EVERY MON     ALLERGIES  Allergies   Allergen Reactions    Triazolam Unspecified     All anti anxiety medications cause hallucinations     Nsaids      Cannot take because of anti rejection meds.       PHYSICAL EXAM  VITAL SIGNS: /118   Pulse 100   Temp 37.9 °C (100.2 °F) (Temporal)   Resp 18   Ht 1.803 m (5' 11\")   Wt 116 kg (255 lb)   SpO2 91%   BMI 35.57 kg/m²     Nursing note and vitals reviewed.  Constitutional: Well-developed. No distress. Obese. Tactile fever.   HENT: Head is normocephalic and atraumatic. Oropharynx is clear and moist without exudate or erythema.   Eyes: Pupils are equal, round, and reactive to light. Conjunctiva are normal.   Cardiovascular: Tachycardic and regular rhythm. No murmur heard. Normal radial pulses.  Pulmonary/Chest: Tachypnenic. Breath sounds normal. No wheezes or rales.   Abdominal: Soft and non-tender. No distention    Musculoskeletal: Extremities exhibit normal range of motion " without edema or tenderness.   Neurological: Awake, alert and oriented to person, place, and time. Slightly confused, answers are somewhat delayed, although does answer most questions appropriately  Skin: Skin is warm and dry. No rash.   Psychiatric: Normal mood and affect. Appropriate for clinical situation.    DIAGNOSTIC STUDIES / PROCEDURES    LABS  Results for orders placed or performed during the hospital encounter of 02/01/22   LACTIC ACID   Result Value Ref Range    Lactic Acid 1.5 0.5 - 2.0 mmol/L   CBC WITH DIFFERENTIAL   Result Value Ref Range    WBC 4.9 4.8 - 10.8 K/uL    RBC 4.15 (L) 4.70 - 6.10 M/uL    Hemoglobin 11.9 (L) 14.0 - 18.0 g/dL    Hematocrit 37.2 (L) 42.0 - 52.0 %    MCV 89.6 81.4 - 97.8 fL    MCH 28.7 27.0 - 33.0 pg    MCHC 32.0 (L) 33.7 - 35.3 g/dL    RDW 48.6 35.9 - 50.0 fL    Platelet Count 118 (L) 164 - 446 K/uL    MPV 9.9 9.0 - 12.9 fL    Neutrophils-Polys 61.80 44.00 - 72.00 %    Lymphocytes 29.10 22.00 - 41.00 %    Monocytes 8.10 0.00 - 13.40 %    Eosinophils 0.60 0.00 - 6.90 %    Basophils 0.00 0.00 - 1.80 %    Immature Granulocytes 0.40 0.00 - 0.90 %    Nucleated RBC 0.00 /100 WBC    Neutrophils (Absolute) 3.04 1.82 - 7.42 K/uL    Lymphs (Absolute) 1.43 1.00 - 4.80 K/uL    Monos (Absolute) 0.40 0.00 - 0.85 K/uL    Eos (Absolute) 0.03 0.00 - 0.51 K/uL    Baso (Absolute) 0.00 0.00 - 0.12 K/uL    Immature Granulocytes (abs) 0.02 0.00 - 0.11 K/uL    NRBC (Absolute) 0.00 K/uL   COMP METABOLIC PANEL   Result Value Ref Range    Sodium 137 135 - 145 mmol/L    Potassium 4.8 3.6 - 5.5 mmol/L    Chloride 107 96 - 112 mmol/L    Co2 17 (L) 20 - 33 mmol/L    Anion Gap 13.0 7.0 - 16.0    Glucose 131 (H) 65 - 99 mg/dL    Bun 80 (HH) 8 - 22 mg/dL    Creatinine 2.62 (H) 0.50 - 1.40 mg/dL    Calcium 9.7 8.5 - 10.5 mg/dL    AST(SGOT) 40 12 - 45 U/L    ALT(SGPT) 23 2 - 50 U/L    Alkaline Phosphatase 93 30 - 99 U/L    Total Bilirubin 0.5 0.1 - 1.5 mg/dL    Albumin 3.3 3.2 - 4.9 g/dL    Total Protein 6.9  6.0 - 8.2 g/dL    Globulin 3.6 (H) 1.9 - 3.5 g/dL    A-G Ratio 0.9 g/dL   COV-2, FLU A/B, AND RSV BY PCR (2-4 HOURS CEPHEID): Collect NP swab in VTM    Specimen: Respirate   Result Value Ref Range    SARS-CoV-2 Source NP Swab    ESTIMATED GFR   Result Value Ref Range    GFR If  29 (A) >60 mL/min/1.73 m 2    GFR If Non  24 (A) >60 mL/min/1.73 m 2     All labs reviewed by me.    RADIOLOGY  DX-CHEST-PORTABLE (1 VIEW)   Final Result      1.  Bilateral peripheral lower lobe hazy opacities which is nonspecific but can be seen with Covid 19 pneumonia.   2.  Enlarged cardiac silhouette with probable vascular congestion/edema.      US-RENAL TRANSPLANT COMP    (Results Pending)     The radiologist's interpretation of all radiological studies have been reviewed by me.    COURSE & MEDICAL DECISION MAKING  Nursing notes, VS, PMSFHx reviewed in chart.     Review of past medical records shows the patient is status post kidney transplant and on immunosuppressants.     9:03 AM - Patient seen and examined at bedside. Patient presents today with likely COVID-19 pneumonia. Patient will be treated with Tylenol 650 mg. Ordered DX-Chest, EKG, Lactic Acid now and every 2 hours, COV-2, Flu A/B and RSV by PCR, CBC with diff, CMP, UA, Urine Culture, and Blood Culture x2 to evaluate his symptoms.     10:03 AM - Patient has acute renal failure with a very high BUN. Will be given a fluid bolus NS infusion 1,000 mL. This is concerning for rejection.    10:11 AM - Paged Hospitalist    10:14 AM I discussed the patient's case and the above findings with Dr. Mojica (Hospitalist) who agreed to evaluate the patient for admission.    HYDRATION: Based on the patient's presentation of Acute Kindney Injury the patient was given IV fluids. IV Hydration was used because oral hydration was not adequate alone. Upon recheck following hydration, the patient was improved.    DISPOSITION:  Patient will be hospitalized by Dr. Mojica in  guarded condition.      FINAL IMPRESSION  1. Weakness    2. RADHA (acute kidney injury) (HCC)    3. History of kidney transplant    4. Pneumonia due to COVID-19 virus    5. Immunosuppression (HCC)        ISohail (Scribe), am scribing for, and in the presence of, Dwayne Morales M.D..    Electronically signed by: Sohail Augustine (Scribe), 2/1/2022    IDwayne M.D. personally performed the services described in this documentation, as scribed by Sohail Augustine in my presence, and it is both accurate and complete.    The note accurately reflects work and decisions made by me.  Dwayne Morales M.D.  2/1/2022  12:18 PM

## 2022-02-02 PROBLEM — R79.89 ELEVATED BRAIN NATRIURETIC PEPTIDE (BNP) LEVEL: Status: ACTIVE | Noted: 2022-01-01

## 2022-02-02 NOTE — HOSPITAL COURSE
Krishan Acevedo is a 71 year old male with PMHx of hypertension, hyperlipidemia, type 2 diabetes A1c 7.2, atrial flutter on Eliquis, ANCA vasculitis s/p transplant, recent COVID infection 1/25/2022 who was admitted on 02/1/2022 with weakness and diarrhea.    Patient is vaccinated against COVID-19. Diagnosed with COVID on 01/25. Patient remains on room air throughout admission.    UA positive for UTI, urine culture positive for carbapenem resistant Pseudomonas aeruginosa. Due to less than 10 K organisms, ID did not recommend any antibiotics at this time.  Patient with worsening AMS on 02/11, repeat urine is worse, pending repeat urine culture and blood culture.  ID reconsulted and prescribed a 7-day course of antibiotics from 2/12-2/18.  He tolerated this and clinical status improved.    Patient had ongoing diarrhea, C. difficile and stool cultures were negative x2.    Patient with RADHA on CKD. Patient initially started on fluid, renal ultrasound unremarkable. Patient started on Veltassa and sodium bicarb.   He developed GI bleeding on 2/24 and was transferred to ICU for altered mentation and hypovolemic shock requiring pressors.  EGD revealed duodenal ulcers.  During this time, he required hemodialysis which improved mentation.  He transferred out of ICU and renal function continued to improve, dialysis access was discontinued.    Patient was evaluated by PT/OT with recommendations for SNF.

## 2022-02-02 NOTE — ED NOTES
Pt awake, semi reclined in bed, speaking without signs of distress. Denies other needs at this time.

## 2022-02-02 NOTE — CARE PLAN
The patient is Stable - Low risk of patient condition declining or worsening    Shift Goals  Clinical Goals: stable respiratory status, maintain skin integrity  Patient Goals: rest, comfort    Progress made toward(s) clinical / shift goals:    Problem: Skin Integrity  Goal: Skin integrity is maintained or improved  Outcome: Progressing  Note: Patient's sacrum and scrotum red and excoriated.  Barrier cream applied.       Problem: Fall Risk  Goal: Patient will remain free from falls  Outcome: Progressing  Note: Free of falls.  Fall precautions in place.  Patient using call light appropriately.        Patient is not progressing towards the following goals:

## 2022-02-02 NOTE — PROGRESS NOTES
4 Eyes Skin Assessment Completed by BRIDGETTE Mansfield and BRIDGETTE Knott.    Head WDL  Ears WDL  Nose WDL  Mouth WDL  Neck WDL  Breast/Chest WDL  Shoulder Blades WDL  Spine WDL  (R) Arm/Elbow/Hand Bruising  (L) Arm/Elbow/Hand Bruising  Abdomen WDL  Groin WDL  Scrotum/Coccyx/Buttocks Redness and Blanching  (R) Leg WDL  (L) Leg WDL  (R) Heel/Foot/Toe WDL  (L) Heel/Foot/Toe WDL          Devices In Places Blood Pressure Cuff      Interventions In Place Q2 Turns, Barrier Cream and Pressure Redistribution Mattress    Possible Skin Injury No    Pictures Uploaded Into Epic N/A  Wound Consult Placed N/A  RN Wound Prevention Protocol Ordered No

## 2022-02-02 NOTE — PROGRESS NOTES
"[facility] -  PROGRESS NOTE               Author: Arslan Javed M.D. Date & Time: 2/2/2022  9:50 AM     HPI:  Krishan Acevedo is a 71 y.o. 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 Was additionally found to have worsening renal function.   Patient shares that he first tested positive for COVID at an outside hospital several days ago. He felt that he was not improving and possibly getting worse at home which prompted him to call EMS. He mentions that for several days he has been having loose stools, noting 3-5 loose but not watery BMs daily, noting his last loose stool was day prior to presentation. He also mentions that he has become more dependent on his wife to bring him food and water citing fatigue with his COVID infection. He does note that for the past several weeks he has been feeling \"like I'm always thirsty\".  While in the ED he was vitally stable. Was placed on 2L NC sating in the mid 90s BP ranged 123/70 from 159/117. Labs notable for Cr of 2.62. BUN 80, COVID-19 positive, UA noted for large occult blood, small leukocyte esterase. US of kidneys was completed with indicated no hydronephrosis or fluid collections, no renal artery stenosis.   In regards to his transplant history. Living donor transplant was completed in 2008 at an outside hospital. Patient is on Prednisone 5mg, Mycophenolate 500mg BID, and Tacrolimus 1mg and 0.5mg daily. His GFR trends in the 30s-40s although in chart he has climbed to the 56-53 ranges in the past.    DAILY NEPHROLOGY SUMMARY:  2/1: Admitted, consult placed  2/2: no overnight events, renal function improving, patient tolerating PO intake    REVIEW OF SYSTEMS:    10 point ROS reviewed and is as per HPI/daily summary or otherwise negative    PMH/PSH/SH/FH: " "Reviewed and unchanged since admission note  CURRENT MEDICATIONS: Reviewed from admission to present day    VS:  BP (!) 165/79   Pulse 84   Temp 37.2 °C (99 °F) (Temporal)   Resp 18   Ht 1.803 m (5' 11\")   Wt 116 kg (255 lb)   SpO2 91%   BMI 35.57 kg/m²   Physical Exam  Constitutional:       Appearance: Normal appearance.   HENT:      Head: Normocephalic and atraumatic.      Mouth/Throat:      Mouth: Mucous membranes are dry.   Eyes:      General: No scleral icterus.  Cardiovascular:      Rate and Rhythm: Normal rate and regular rhythm.      Pulses: Normal pulses.   Pulmonary:      Effort: Pulmonary effort is normal. No respiratory distress.      Breath sounds: Normal breath sounds.   Abdominal:      Palpations: Abdomen is soft.      Tenderness: There is no abdominal tenderness. There is no right CVA tenderness or left CVA tenderness.   Musculoskeletal:      Cervical back: No rigidity.      Right lower leg: No edema.      Left lower leg: No edema.   Skin:     General: Skin is warm and dry.   Neurological:      Mental Status: He is alert and oriented to person, place, and time.      Cranial Nerves: No cranial nerve deficit.      Sensory: No sensory deficit.      Motor: No weakness.   Psychiatric:         Mood and Affect: Mood normal.         Behavior: Behavior normal.       Fluids:  In: 1150 [I.V.:1150]  Out: -     LABS:  Recent Labs     02/01/22  0900 02/02/22  0356   SODIUM 137 139   POTASSIUM 4.8 5.0   CHLORIDE 107 110   CO2 17* 16*   GLUCOSE 131* 113*   BUN 80* 72*   CREATININE 2.62* 1.95*   CALCIUM 9.7 9.8       IMPRESSION:  Problem Representation:   #History of Renal Transplant  #History of ANCA Vasculitis  #Acute on Chronic kidney injury  #CKD 3  #Acidosis  #Afib  #DM2     Suspect worsening renal function likely secondary to volume depletion in the context of diarrhea and impaired ability to self hydrate secondary to fatigue from COVID-19 infection.  S/P 1L bolus in ED    Recommendations:  -Continue " with all immunosuppression meds while inpatient: prednisone, tacrolimus, mycophenolate  -Tacrolimus level drawn on 2/2/22, pending results  -Consider starting NS at 125ml/hour for 24 hours  -Avoid nephrotoxins where possible, renally dose medications  -Renal diet once eating     Nephrology will continue to follow.

## 2022-02-02 NOTE — ED NOTES
Pt noted to have episode of incont. During bed change pt noted to have area of redness and states discomfort to back side of scrotum and middle of buttocks. No open wounds or bleeding noted.

## 2022-02-02 NOTE — ED NOTES
Pt had bowel movement and sheets changed, water given, repositioned for comfort updated on plan of care no other needs at this time. Call light within reach bed low and locked

## 2022-02-02 NOTE — PROGRESS NOTES
[facility] Medicine Daily Progress Note    Date of Service  2/2/2022    Chief Complaint  Krishan Acevedo is a 71 y.o. male admitted 2/1/2022 with RADHA    Hospital Course  Krishan Acevedo is a 71 y.o. male who presented 2/1/2022 with weakness, non-productive cough, SOB, fever, headache, and low energy since attending the ED at [facility] for Covid pneumonia on Sunday 1/25/21.     UA negative for UTI however demonstrates presence of blood. CXR demonstrates bilateral hazy opacities and probable vascular congestion/edema. EKG shows sinus tachycardia with paired PVCs and RBBB. Renal ultrasound negative for hydronephrosis or renal stenosis.  Patient was admitted.  Nephrology was consulted.  Patient's renal function improved.  Nephrology recommended 24 hours of IV fluids.      Interval Problem Update  2/2: Discussed with nephrology, they recommended IV fluids.  Given elevated BNP, will run fluids at 75 mL/h instead of 125.    I have personally seen and examined the patient at bedside. I discussed the plan of care with patient.    Consultants/Specialty  nephrology    Code Status  Full Code    Disposition  Patient is not medically cleared for discharge.   Anticipate discharge to to home with close outpatient follow-up.  I have placed the appropriate orders for post-discharge needs.    Review of Systems  Review of Systems   Constitutional: Negative for chills and fever.   Respiratory: Negative for cough, shortness of breath and wheezing.    Cardiovascular: Negative for chest pain.   Gastrointestinal: Negative for constipation, diarrhea, nausea and vomiting.   Genitourinary: Negative for dysuria.   Neurological: Negative for headaches.        Physical Exam  Temp:  [36.4 °C (97.6 °F)-36.6 °C (97.8 °F)] 36.6 °C (97.8 °F)  Pulse:  [78-98] 78  Resp:  [17-31] 18  BP: (122-178)/(59-91) 137/70  SpO2:  [91 %-97 %] 95 %    Physical Exam  Constitutional:       Appearance: He is well-developed. He is obese. He is ill-appearing.    HENT:      Head: Normocephalic.   Cardiovascular:      Rate and Rhythm: Normal rate.      Heart sounds: No gallop.    Pulmonary:      Effort: No respiratory distress.      Breath sounds: Rales present. No wheezing.   Abdominal:      General: There is no distension.      Tenderness: There is no abdominal tenderness.   Skin:     General: Skin is warm.   Neurological:      Mental Status: He is alert. Mental status is at baseline.         Fluids  No intake or output data in the 24 hours ending 02/02/22 1412    Laboratory  Recent Labs     02/01/22  0900 02/02/22  0356   WBC 4.9 5.0   RBC 4.15* 4.15*   HEMOGLOBIN 11.9* 12.1*   HEMATOCRIT 37.2* 38.3*   MCV 89.6 92.3   MCH 28.7 29.2   MCHC 32.0* 31.6*   RDW 48.6 51.2*   PLATELETCT 118* 115*   MPV 9.9 10.5     Recent Labs     02/01/22  0900 02/02/22  0356   SODIUM 137 139   POTASSIUM 4.8 5.0   CHLORIDE 107 110   CO2 17* 16*   GLUCOSE 131* 113*   BUN 80* 72*   CREATININE 2.62* 1.95*   CALCIUM 9.7 9.8                   Imaging  US-RENAL TRANSPLANT COMP   Final Result      1.  No hydronephrosis or perinephric fluid collection involving the right lower quadrant renal transplant.   2.  No definite evidence of hemodynamically significant renal artery stenosis.   3.  Elevated resistive indices could suggest renal parenchymal disease.      DX-CHEST-PORTABLE (1 VIEW)   Final Result      1.  Bilateral peripheral lower lobe hazy opacities which is nonspecific but can be seen with Covid 19 pneumonia.   2.  Enlarged cardiac silhouette with probable vascular congestion/edema.           Assessment/Plan  * RADHA (acute kidney injury) (HCC)- (present on admission)  Assessment & Plan  Creatine 2.64 on admission  Ranged from 1.27-1.71 over the past year  1000 ml fluid bolus in ED  Nephrology, Dr. Lyman consulted, appreciate recommendations  - UA negative for UTI  - AM labs    Elevated brain natriuretic peptide (BNP) level- (present on admission)  Assessment & Plan  Caution with IV  fluids    Thrombocytopenia (HCC)  Assessment & Plan  Platelets 118 on admission with slight trend down to 115    Anemia of chronic disease  Assessment & Plan  Normocytic anemia  - monitor for bleeding    Epiretinal membrane (ERM) of both eyes- (present on admission)  Assessment & Plan  Continue home Combigan    Long term (current) use of systemic steroids- (present on admission)  Assessment & Plan  Daily prednisone secondary to renal transplant 2008  - Continued home dosing of 5mg daily prednisone  - Await nephrology recomendations    Essential hypertension- (present on admission)  Assessment & Plan  History of  On Losartan at home  - Consider restarting after nephrology consult    Kidney transplant status, living related donor- (present on admission)  Assessment & Plan  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 Blount, self-manages lasix administration at home based on fluid volume status  Renal US negative for hydronephrosis or renal stenosis  - Dr. Lyman, nephrology consulted, appreciate recommendations  - Restarted home prednisone   - Will await nephrology recommendations to restart the rest of patients home medications  - Renal dosing for all medication    Atrial flutter - (present on admission)  Assessment & Plan  History of, s/p ablation 1/21  On Eliquis 5 mg po BID  ST on admission  - Eliquis continued       VTE prophylaxis: apixiban    I have performed a physical exam and reviewed and updated ROS and Plan today (2/2/2022). In review of yesterday's note (2/1/2022), there are no changes except as documented above.

## 2022-02-03 PROBLEM — M79.662 PAIN IN LEFT SHIN: Status: ACTIVE | Noted: 2022-01-01

## 2022-02-03 PROBLEM — R19.7 DIARRHEA: Status: ACTIVE | Noted: 2022-01-01

## 2022-02-03 NOTE — PROGRESS NOTES
[facility] Medicine Daily Progress Note    Date of Service  2/3/2022    Chief Complaint  Krishan Acevedo is a 71 y.o. male admitted 2/1/2022 with weakness    Hospital Course  A 71-year-old man with recently diagnosed COVID 19 (at [facility] ED on 1/25/21), Afib/flutter (AC with eliquis), s/p ablation (1/21), ANCA vasculitis, s/p living donor kdney transplant (2008), DM presented with weakness, non-productive cough, SOB, fever, headache, and low energy.  UA negative for UTI however demonstrates presence of blood. CXR demonstrates bilateral hazy opacities and probable vascular congestion/edema. EKG shows sinus tachycardia with paired PVCs and RBBB. Renal ultrasound negative for hydronephrosis or renal stenosis.      Interval Problem Update  Patient reports ongoing diarrhea for 2 days. Yesterday had 6 and today 2 watery stools. Afebrile, hemodynamically stable. On room air.  BUN/creat 63/1.61, improved, looks like at baseline now.  Resumed home losartan 100 mg daily.  Has pain on left shin on compression. US venous to r/o DVT ordered.    I have personally seen and examined the patient at bedside. I discussed the plan of care with patient, bedside RN, charge RN,  and pharmacy.    Consultants/Specialty  nephrology    Code Status  Full Code    Disposition  Patient is not medically cleared for discharge.   Anticipate discharge to TBD.  I have placed the appropriate orders for post-discharge needs.    Review of Systems  Review of Systems   Constitutional: Positive for malaise/fatigue. Negative for chills and fever.   HENT: Negative.    Eyes: Negative.    Respiratory: Negative for cough and shortness of breath.    Cardiovascular: Negative for chest pain and leg swelling.   Gastrointestinal: Positive for diarrhea. Negative for abdominal pain, nausea and vomiting.   Genitourinary: Negative for dysuria.   Musculoskeletal: Negative for myalgias.   Skin: Negative for rash.   Neurological: Positive for weakness.         Physical Exam  Temp:  [36.4 °C (97.6 °F)-37.3 °C (99.2 °F)] 36.9 °C (98.5 °F)  Pulse:  [73-95] 73  Resp:  [16-20] 20  BP: (134-156)/(70-88) 143/73  SpO2:  [90 %-95 %] 90 %    Physical Exam  Vitals and nursing note reviewed.   Constitutional:       Appearance: He is obese. He is ill-appearing.   HENT:      Head: Normocephalic.      Nose: Nose normal.      Mouth/Throat:      Mouth: Mucous membranes are moist.      Pharynx: Oropharynx is clear.   Eyes:      Pupils: Pupils are equal, round, and reactive to light.   Cardiovascular:      Rate and Rhythm: Normal rate and regular rhythm.   Pulmonary:      Effort: Pulmonary effort is normal. No respiratory distress.      Breath sounds: Rales present. No wheezing.   Abdominal:      General: Abdomen is flat. Bowel sounds are normal. There is no distension.      Tenderness: There is no abdominal tenderness. There is no guarding.   Musculoskeletal:         General: Tenderness (left shin on compression) present. Normal range of motion.      Cervical back: Normal range of motion.   Skin:     General: Skin is warm.   Neurological:      General: No focal deficit present.      Mental Status: He is alert and oriented to person, place, and time.         Fluids    Intake/Output Summary (Last 24 hours) at 2/3/2022 1025  Last data filed at 2/3/2022 0915  Gross per 24 hour   Intake 360 ml   Output 1100 ml   Net -740 ml       Laboratory  Recent Labs     02/01/22  0900 02/02/22  0356 02/03/22  0110   WBC 4.9 5.0 4.9   RBC 4.15* 4.15* 4.19*   HEMOGLOBIN 11.9* 12.1* 12.2*   HEMATOCRIT 37.2* 38.3* 37.7*   MCV 89.6 92.3 90.0   MCH 28.7 29.2 29.1   MCHC 32.0* 31.6* 32.4*   RDW 48.6 51.2* 49.1   PLATELETCT 118* 115* 119*   MPV 9.9 10.5 10.1     Recent Labs     02/01/22  0900 02/02/22  0356 02/03/22  0110   SODIUM 137 139 141   POTASSIUM 4.8 5.0 4.7   CHLORIDE 107 110 110   CO2 17* 16* 19*   GLUCOSE 131* 113* 103*   BUN 80* 72* 63*   CREATININE 2.62* 1.95* 1.61*   CALCIUM 9.7 9.8 9.9                    Imaging  US-RENAL TRANSPLANT COMP   Final Result      1.  No hydronephrosis or perinephric fluid collection involving the right lower quadrant renal transplant.   2.  No definite evidence of hemodynamically significant renal artery stenosis.   3.  Elevated resistive indices could suggest renal parenchymal disease.      DX-CHEST-PORTABLE (1 VIEW)   Final Result      1.  Bilateral peripheral lower lobe hazy opacities which is nonspecific but can be seen with Covid 19 pneumonia.   2.  Enlarged cardiac silhouette with probable vascular congestion/edema.      US-EXTREMITY VENOUS LOWER BILAT    (Results Pending)        Assessment/Plan  * RADHA (acute kidney injury) (HCC)- (present on admission)  Assessment & Plan  Creatine 2.64 on admission  Ranged from 1.27-1.71 over the past year  1000 ml fluid bolus in ED  Nephrology, Dr. 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 Blount, self-manages lasix administration at home based on fluid volume status  Renal US negative for hydronephrosis or renal stenosis  Dr. Lyman, nephrology consulted, appreciate recommendations  Continue home prednisone, tacrolimus, mycophenolate  Renal dosing for all medication    Pain in left shin  Assessment & Plan  On compression  US venous to r/o DVT    Diarrhea  Assessment & Plan  Likely viral  Will check for C.diff  Stool culture, stool WBC    Elevated brain natriuretic peptide (BNP) level- (present on admission)  Assessment & Plan  Caution with IV fluids    Thrombocytopenia (HCC)  Assessment & Plan  Platelets 118 on admission with slight trend down to 115    Anemia of chronic disease  Assessment & Plan  Normocytic anemia  Monitor for bleeding    Epiretinal membrane (ERM) of both eyes- (present on admission)  Assessment  & Plan  Continue home Combigan    Long term (current) use of systemic steroids- (present on admission)  Assessment & Plan  Daily prednisone secondary to renal transplant 2008  Continued home dosing of 5mg daily prednisone    Essential hypertension- (present on admission)  Assessment & Plan  History of  Continue home Losartan    Obesity- (present on admission)  Assessment & Plan  Body mass index is 35.57 kg/m².  Outpatient weight loss counseling     Atrial flutter - (present on admission)  Assessment & Plan  History of, s/p ablation 1/21  On Eliquis 5 mg po BID  ST on admission  Eliquis continued       VTE prophylaxis: therapeutic anticoagulation with apixaban    I have performed a physical exam and reviewed and updated ROS and Plan today (2/3/2022). In review of yesterday's note (2/2/2022), there are no changes except as documented above.

## 2022-02-03 NOTE — PROGRESS NOTES
Received report from NOC RN and assumed care of pt. Pt is A&Ox4 resting in bed on room air. Pt reports no pain. POC discussed with pt; all questions answered. No other needs at this time. Bed locked in lowest position, call light within reach, pt educated to call for assistance, bed alarm on.

## 2022-02-03 NOTE — PROGRESS NOTES
"[facility] -  PROGRESS NOTE               Author: Arslan Javed M.D. Date & Time: 2/3/2022  9:44 AM     HPI:  Krishan Acevedo is a 71 y.o. 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 Was additionally found to have worsening renal function.   Patient shares that he first tested positive for COVID at an outside hospital several days ago. He felt that he was not improving and possibly getting worse at home which prompted him to call EMS. He mentions that for several days he has been having loose stools, noting 3-5 loose but not watery BMs daily, noting his last loose stool was day prior to presentation. He also mentions that he has become more dependent on his wife to bring him food and water citing fatigue with his COVID infection. He does note that for the past several weeks he has been feeling \"like I'm always thirsty\".  While in the ED he was vitally stable. Was placed on 2L NC sating in the mid 90s BP ranged 123/70 from 159/117. Labs notable for Cr of 2.62. BUN 80, COVID-19 positive, UA noted for large occult blood, small leukocyte esterase. US of kidneys was completed with indicated no hydronephrosis or fluid collections, no renal artery stenosis.   In regards to his transplant history. Living donor transplant was completed in 2008 at an outside hospital. Patient is on Prednisone 5mg, Mycophenolate 500mg BID, and Tacrolimus 1mg and 0.5mg daily. His GFR trends in the 30s-40s although in chart he has climbed to the 56-53 ranges in the past.     DAILY NEPHROLOGY SUMMARY:  2/1: Admitted, consult placed  2/2: no overnight events, renal function improving, patient tolerating PO intake  2/3: started on IVF yesterday, renal function improved to around prior baseline range    REVIEW OF SYSTEMS:    10 " "point ROS reviewed and is as per HPI/daily summary or otherwise negative    PMH/PSH/SH/FH: Reviewed and unchanged since admission note  CURRENT MEDICATIONS: Reviewed from admission to present day    VS:  /73   Pulse 73   Temp 36.9 °C (98.5 °F) (Temporal)   Resp 20   Ht 1.803 m (5' 11\")   Wt 116 kg (255 lb)   SpO2 90%   BMI 35.57 kg/m²   Physical Exam  Constitutional:       Appearance: Normal appearance.   HENT:      Head: Normocephalic and atraumatic.      Mouth/Throat:      Mouth: Mucous membranes are moist.   Eyes:      General: No scleral icterus.  Cardiovascular:      Rate and Rhythm: Normal rate and regular rhythm.      Pulses: Normal pulses.   Pulmonary:      Effort: Pulmonary effort is normal. No respiratory distress.   Abdominal:      Palpations: Abdomen is soft.      Tenderness: There is no abdominal tenderness.   Musculoskeletal:      Right lower leg: No edema.      Left lower leg: No edema.   Skin:     General: Skin is warm and dry.   Neurological:      Mental Status: He is alert and oriented to person, place, and time.   Psychiatric:         Mood and Affect: Mood normal.         Behavior: Behavior normal.         Fluids:  In: -   Out: 900     LABS:  Recent Labs     02/01/22  0900 02/02/22  0356 02/03/22  0110   SODIUM 137 139 141   POTASSIUM 4.8 5.0 4.7   CHLORIDE 107 110 110   CO2 17* 16* 19*   GLUCOSE 131* 113* 103*   BUN 80* 72* 63*   CREATININE 2.62* 1.95* 1.61*   CALCIUM 9.7 9.8 9.9       IMPRESSION:    Problem Representation:   #History of Renal Transplant  #History of ANCA Vasculitis  #Acute on Chronic kidney injury  #CKD 3  #Acidosis  #Afib  #DM2     Suspect worsening renal function was likely secondary to volume depletion in the context of diarrhea and impaired ability to self hydrate secondary to fatigue from COVID-19 infection.    S/P 1L bolus in ED  on a 24hr course of 75ml NS started on 2/2/22    Recommendations:  -Continue with all immunosuppression meds while inpatient: " prednisone, tacrolimus, mycophenolate  -Tacrolimus level drawn on 2/2/22, pending results  -Avoid nephrotoxins where possible, renally dose medications  -Renal diet     From a renal/nephrology standpoint, patient is stable for discharge.     Nephrology will continue to follow.

## 2022-02-03 NOTE — CARE PLAN
The patient is Stable - Low risk of patient condition declining or worsening    Shift Goals  Clinical Goals: maintain skin integrity  Patient Goals: rest, comfort    Progress made toward(s) clinical / shift goals:  patient able to turn in bed. Applied barrier cream to esthela anal area.     Patient is not progressing towards the following goals:

## 2022-02-03 NOTE — ASSESSMENT & PLAN NOTE
Improving  Likely viral versus functional  C.diff negative x 2  Stool culture from 2/3 negative.  Imodium PRN

## 2022-02-03 NOTE — PROGRESS NOTES
Received report and assumed care at shift change. A&O x 4 forgetful at times , compliant with cares. Fall precautions in place, bed locked and in lowest position. Needs attended to.

## 2022-02-03 NOTE — CARE PLAN
The patient is Stable - Low risk of patient condition declining or worsening    Shift Goals  Clinical Goals: pt will remain free from falls  Patient Goals: rest, comfort    Progress made toward(s) clinical / shift goals: Assess patient's fall risk. Implement fall precautions. Educate patient on proper use of the call light. Ensure patient's bed is locked in lowest position, call light within reach, and bed alarm is on.     Patient is not progressing towards the following goals:

## 2022-02-04 NOTE — CARE PLAN
The patient is Stable - Low risk of patient condition declining or worsening    Shift Goals  Clinical Goals: pt will remain free from falls  Patient Goals: rest, comfort    Progress made toward(s) clinical / shift goals:  fall prevention measures in place. Bed locked and in lowest position.     Patient is not progressing towards the following goals:

## 2022-02-04 NOTE — PROGRESS NOTES
"[facility] -  PROGRESS NOTE               Author: Arslan Javed M.D. Date & Time: 2/4/2022  12:23 PM     HPI:  Krishan Acevedo is a 71 y.o. 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 Was additionally found to have worsening renal function.   Patient shares that he first tested positive for COVID at an outside hospital several days ago. He felt that he was not improving and possibly getting worse at home which prompted him to call EMS. He mentions that for several days he has been having loose stools, noting 3-5 loose but not watery BMs daily, noting his last loose stool was day prior to presentation. He also mentions that he has become more dependent on his wife to bring him food and water citing fatigue with his COVID infection. He does note that for the past several weeks he has been feeling \"like I'm always thirsty\".  While in the ED he was vitally stable. Was placed on 2L NC sating in the mid 90s BP ranged 123/70 from 159/117. Labs notable for Cr of 2.62. BUN 80, COVID-19 positive, UA noted for large occult blood, small leukocyte esterase. US of kidneys was completed with indicated no hydronephrosis or fluid collections, no renal artery stenosis.   In regards to his transplant history. Living donor transplant was completed in 2008 at an outside hospital. Patient is on Prednisone 5mg, Mycophenolate 500mg BID, and Tacrolimus 1mg and 0.5mg daily. His GFR trends in the 30s-40s although in chart he has climbed to the 56-53 ranges in the past.     DAILY NEPHROLOGY SUMMARY:  2/1: Admitted, consult placed  2/2: no overnight events, renal function improving, patient tolerating PO intake  2/3: started on IVF yesterday, renal function improved to around prior baseline range  2/4: renal function continues " "to improve    REVIEW OF SYSTEMS:    10 point ROS reviewed and is as per HPI/daily summary or otherwise negative    PMH/PSH/SH/FH: Reviewed and unchanged since admission note  CURRENT MEDICATIONS: Reviewed from admission to present day    VS:  /75   Pulse 86   Temp 36.8 °C (98.3 °F) (Temporal)   Resp 16   Ht 1.803 m (5' 11\")   Wt 116 kg (255 lb)   SpO2 96%   BMI 35.57 kg/m²   Physical Exam  Constitutional:       Appearance: Normal appearance.   HENT:      Head: Normocephalic and atraumatic.      Mouth/Throat:      Mouth: Mucous membranes are moist.   Eyes:      General: No scleral icterus.  Cardiovascular:      Rate and Rhythm: Normal rate and regular rhythm.      Pulses: Normal pulses.   Pulmonary:      Effort: Pulmonary effort is normal. No respiratory distress.   Abdominal:      Palpations: Abdomen is soft.      Tenderness: There is no abdominal tenderness.   Musculoskeletal:      Right lower leg: No edema.      Left lower leg: No edema.   Skin:     General: Skin is warm and dry.   Neurological:      Mental Status: He is alert and oriented to person, place, and time.   Psychiatric:         Mood and Affect: Mood normal.         Behavior: Behavior normal.         Fluids:  In: 600 [P.O.:600]  Out: 1250     LABS:  Recent Labs     02/02/22  0356 02/03/22  0110 02/04/22  0227   SODIUM 139 141 141   POTASSIUM 5.0 4.7 4.7   CHLORIDE 110 110 111   CO2 16* 19* 16*   GLUCOSE 113* 103* 100*   BUN 72* 63* 53*   CREATININE 1.95* 1.61* 1.52*   CALCIUM 9.8 9.9 9.9       IMPRESSION:    #History of Renal Transplant  #History of ANCA Vasculitis  #Acute on Chronic kidney injury  #CKD 3  #Acidosis  #Afib  #DM2     Suspect worsening renal function was likely secondary to volume depletion in the context of diarrhea and impaired ability to self hydrate secondary to fatigue from COVID-19 infection.     S/P 1L bolus in ED  S/p IVF from 2/2-2/3  -Tacrolimus level drawn on 2/2/22, : 13.6     Recommendations:  -Continue with all " immunosuppression meds while inpatient: prednisone, tacrolimus, mycophenolate  -Avoid nephrotoxins where possible, renally dose medications  -Renal diet      From a renal/nephrology standpoint, patient is stable for discharge.  Nephrology outpatient follow up in 1 week     Nephrology will continue to follow.

## 2022-02-04 NOTE — PROGRESS NOTES
Received report and assumed care at shift change. A&O x 4, forgetful at times, compliant with cares. Patient continue to have several bowel movements this shift. Fall precautions in place, bed locked and in lowest position. Needs attended to.

## 2022-02-04 NOTE — PROGRESS NOTES
Hospital Medicine Daily Progress Note    Date of Service  2/4/2022    Chief Complaint  Krishan Acevedo is a 71 y.o. male admitted 2/1/2022 with weakness    Hospital Course  A 71-year-old man with recently diagnosed COVID 19 (at [facility] ED on 1/25/21), Afib/flutter (AC with eliquis), s/p ablation (1/21), ANCA vasculitis, s/p living donor kdney transplant (2008), DM presented with weakness, non-productive cough, SOB, fever, headache, and low energy.  UA negative for UTI however demonstrates presence of blood. CXR demonstrates bilateral hazy opacities and probable vascular congestion/edema. EKG shows sinus tachycardia with paired PVCs and RBBB. Renal ultrasound negative for hydronephrosis or renal stenosis.      Interval Problem Update  2/3: Patient reports ongoing diarrhea for 2 days. Yesterday had 6 and today 2 watery stools. Afebrile, hemodynamically stable. On room air.  BUN/creat 63/1.61, improved, looks like at baseline now.  Resumed home losartan 100 mg daily.  Has pain on left shin on compression. US venous to r/o DVT ordered.    2/4: Patient reports RLQ pain. Afebrile, hemodynamically stable. On room air. Still has diarrhea, today x6.   BUN/creat 53/1.52 improving.   C.diff negative. Stool culture from 2/3 negative. Imodium PRN ordered.    I have personally seen and examined the patient at bedside. I discussed the plan of care with patient, bedside RN, charge RN,  and pharmacy.    Consultants/Specialty  nephrology    Code Status  Full Code    Disposition  Patient is not medically cleared for discharge.   Anticipate discharge to to home with close outpatient follow-up.  I have placed the appropriate orders for post-discharge needs.    Review of Systems  Constitutional: Positive for malaise/fatigue. Negative for chills and fever.   HENT: Negative.    Eyes: Negative.    Respiratory: Negative for cough and shortness of breath.    Cardiovascular: Negative for chest pain and leg swelling.    Gastrointestinal: Positive for diarrhea. Negative for abdominal pain, nausea and vomiting.   Genitourinary: Negative for dysuria.   Musculoskeletal: Negative for myalgias.   Skin: Negative for rash.   Neurological: Positive for weakness.     Physical Exam  Temp:  [36.3 °C (97.3 °F)-36.8 °C (98.3 °F)] 36.8 °C (98.3 °F)  Pulse:  [79-97] 86  Resp:  [16-18] 16  BP: (137-155)/(63-81) 155/75  SpO2:  [93 %-96 %] 96 %    Physical Exam  Vitals and nursing note reviewed.   Constitutional:       Appearance: He is obese. He is ill-appearing.   HENT:      Head: Normocephalic.      Nose: Nose normal.      Mouth/Throat:      Mouth: Mucous membranes are moist.      Pharynx: Oropharynx is clear.   Eyes:      Pupils: Pupils are equal, round, and reactive to light.   Cardiovascular:      Rate and Rhythm: Normal rate and regular rhythm.   Pulmonary:      Effort: Pulmonary effort is normal. No respiratory distress.      Breath sounds: Rales present. No wheezing.   Abdominal:      General: Abdomen is flat. Bowel sounds are normal. There is no distension.      Tenderness: There is no abdominal tenderness. There is no guarding.   Musculoskeletal:         General: Tenderness (left shin on compression) present. Normal range of motion.      Cervical back: Normal range of motion.   Skin:     General: Skin is warm.   Neurological:      General: No focal deficit present.      Mental Status: He is alert and oriented to person, place, and time.     Fluids    Intake/Output Summary (Last 24 hours) at 2/4/2022 1454  Last data filed at 2/4/2022 0800  Gross per 24 hour   Intake 360 ml   Output 1050 ml   Net -690 ml       Laboratory  Recent Labs     02/02/22  0356 02/03/22  0110 02/04/22  0227   WBC 5.0 4.9 6.0   RBC 4.15* 4.19* 4.37*   HEMOGLOBIN 12.1* 12.2* 12.7*   HEMATOCRIT 38.3* 37.7* 38.9*   MCV 92.3 90.0 89.0   MCH 29.2 29.1 29.1   MCHC 31.6* 32.4* 32.6*   RDW 51.2* 49.1 49.1   PLATELETCT 115* 119* 123*   MPV 10.5 10.1 9.3     Recent Labs      02/02/22  0356 02/03/22  0110 02/04/22  0227   SODIUM 139 141 141   POTASSIUM 5.0 4.7 4.7   CHLORIDE 110 110 111   CO2 16* 19* 16*   GLUCOSE 113* 103* 100*   BUN 72* 63* 53*   CREATININE 1.95* 1.61* 1.52*   CALCIUM 9.8 9.9 9.9                   Imaging  US-RENAL TRANSPLANT COMP   Final Result      1.  No hydronephrosis or perinephric fluid collection involving the right lower quadrant renal transplant.   2.  No definite evidence of hemodynamically significant renal artery stenosis.   3.  Elevated resistive indices could suggest renal parenchymal disease.      DX-CHEST-PORTABLE (1 VIEW)   Final Result      1.  Bilateral peripheral lower lobe hazy opacities which is nonspecific but can be seen with Covid 19 pneumonia.   2.  Enlarged cardiac silhouette with probable vascular congestion/edema.      US-EXTREMITY VENOUS LOWER BILAT    (Results Pending)        Assessment/Plan  * RADHA (acute kidney injury) (HCC)- (present on admission)  Assessment & Plan  Creatine 2.64 on admission  Ranged from 1.27-1.71 over the past year  1000 ml fluid bolus in ED  Nephrology, Dr. 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 Blount, self-manages lasix administration at home based on fluid volume status  Renal US negative for hydronephrosis or renal stenosis  Dr. Lyman, nephrology consulted, appreciate recommendations  Continue home prednisone, tacrolimus, mycophenolate  Renal dosing for all medication    Pain in left shin  Assessment & Plan  On compression  US venous to r/o DVT    Diarrhea  Assessment & Plan  Likely viral  C.diff negative.  Stool culture from 2/3 negative.  Imodium PRN    Elevated brain natriuretic peptide (BNP) level- (present on admission)  Assessment & Plan  Caution with IV fluids    Thrombocytopenia  ([facility])  Assessment & Plan  Platelets 118 on admission with slight trend down to 115    Anemia of chronic disease  Assessment & Plan  Normocytic anemia  Monitor for bleeding    Epiretinal membrane (ERM) of both eyes- (present on admission)  Assessment & Plan  Continue home Combigan    Long term (current) use of systemic steroids- (present on admission)  Assessment & Plan  Daily prednisone secondary to renal transplant 2008  Continued home dosing of 5mg daily prednisone    Essential hypertension- (present on admission)  Assessment & Plan  History of  Continue home Losartan    Obesity- (present on admission)  Assessment & Plan  Body mass index is 35.57 kg/m².  Outpatient weight loss counseling     Atrial flutter - (present on admission)  Assessment & Plan  History of, s/p ablation 1/21  On Eliquis 5 mg po BID  ST on admission  Eliquis continued       VTE prophylaxis: therapeutic anticoagulation with apixaban    I have performed a physical exam and reviewed and updated ROS and Plan today (2/4/2022). In review of yesterday's note (2/3/2022), there are no changes except as documented above.

## 2022-02-05 NOTE — CARE PLAN
The patient is Stable - Low risk of patient condition declining or worsening    Shift Goals  Clinical Goals: maintain skin integrity  Patient Goals: Rest    Progress made toward(s) clinical / shift goals:  patient able to turn self on bed, barrier paste applied.     Patient is not progressing towards the following goals:

## 2022-02-05 NOTE — DISCHARGE PLANNING
ANTICIPATED DATE OF DISCHARGE:  2/5/2022 or when facility accepts     ANTICIPATED DISCHARGE DISPOSITION:  Home versus SNF     INSURANCE:  Medicare and [employer]     BARRIERS TO DISCHARGE:  COVID, wife with COVID at home, unable to provide care for him, PT/OT recs, weakness and needs transfer assistance, decline in ADLS per bedside nurse     ACTION:  DC Order noted for today. RNCM discussed with bedside nurse and concern for safe dc to home at this time. RNCM called wife, Penelope at [phone] and she says she cannot assist with transfers as she is recovering from COVID as well and is small framed with large . She would like to speak to LASHAUN Barker for medical update and plan. She prefers patient discharge to a facility with her first choice being [facility] and [facility]. Jonh explained [facility] is typically private pay. She says she and her  both have LTC policies when Medicare runs out. She also says her  had kidney transplant in the past which takes him longer to recover from illness. JONH messaged physician.  Choices faxed to [facility] for future needs pending therapy recs.     Plan: CM following for DC planning needs per recs/orders

## 2022-02-05 NOTE — PROGRESS NOTES
Pt. A&Ox4, VSS. Pt. Wife called in AM, notifying RN that she is positive for COVID.  notified. Pt. Given Imodium for frequent loose bowel movements. No complaints of pain. Pt. Resting comfortably, call light within reach. Will continue to monitor.

## 2022-02-05 NOTE — PROGRESS NOTES
Received report and assumed care at shift change. A&O x 4, forgetful at times, compliant with cares. On room air and tolerating well. Fall precautions in place, bed locked and in lowest position. Needs attended to. No complains of pain

## 2022-02-05 NOTE — DISCHARGE SUMMARY
Discharge Summary    CHIEF COMPLAINT ON ADMISSION  Chief Complaint   Patient presents with   • Weakness       Reason for Admission  EMS     Admission Date  2/1/2022    CODE STATUS  Full Code    HPI & HOSPITAL COURSE    A 71-year-old man with recently diagnosed COVID 19 (at [facility] ED on 1/25/21), Afib/flutter (AC with eliquis), s/p ablation (1/21), ANCA vasculitis, s/p living donor kdney transplant (2008), DM presented with weakness, non-productive cough, SOB, fever, headache, and low energy.  UA negative for UTI however demonstrates presence of blood. CXR demonstrates bilateral hazy opacities and probable vascular congestion/edema. EKG shows sinus tachycardia with paired PVCs and RBBB. Renal ultrasound negative for hydronephrosis or renal stenosis.    Patient received IV fluids. Kidney function back to baseline.  C.diff negative. Stool culture from 2/3 negative. Imodium PRN started. Diarrhea improving, stool soft now.  US venous duplex of lower extremitties showed no DVT bilaterally.  Patient is afebrile, hemodynamically stable. On room air. Encouraged oral hydration.    Therefore, he is discharged in fair and stable condition to home with close outpatient follow-up.    The patient met 2-midnight criteria for an inpatient stay at the time of discharge.    Discharge Date  2/5/2022    FOLLOW UP ITEMS POST DISCHARGE  Follow up with PCP  Follow up with nephrology in 1 week    DISCHARGE DIAGNOSES  Principal Problem:    RADHA (acute kidney injury) (HCC) POA: Yes  Active Problems:    Kidney transplant status, living related donor POA: Yes    Atrial flutter  POA: Yes    Obesity POA: Yes    Essential hypertension POA: Yes      Overview: Formatting of this note might be different from the original.      Last Assessment & Plan:       continue metoprolol 25 bid.    Long term (current) use of systemic steroids POA: Yes    Epiretinal membrane (ERM) of both eyes POA: Yes    Anemia of chronic disease POA: Unknown     Thrombocytopenia (HCC) POA: Unknown    Elevated brain natriuretic peptide (BNP) level POA: Yes    Diarrhea POA: Unknown    Pain in left shin POA: Unknown  Resolved Problems:    * No resolved hospital problems. *      FOLLOW UP  Future Appointments   Date Time Provider Department Center   3/11/2022 10:00 AM Rubin Forrester M.D. [facility] None   4/5/2022 10:20 AM Jones Garcia M.D. [facility] None   6/15/2022 12:50 PM Mary Jane Oliveros M.D. [facility] None     [facility] [address] [place] [address]  [phone]  Call  Please call [facility] to schedule a Nephrology hospital follow up. Thank you.     Damian Diaz M.D.  [address] [place] [address]  [phone]    In 1 week        MEDICATIONS ON DISCHARGE     Medication List      START taking these medications      Instructions   loperamide 2 MG Caps  Commonly known as: IMODIUM   Take 1 Capsule by mouth 4 times a day as needed for Diarrhea.  Dose: 2 mg        CHANGE how you take these medications      Instructions   Eliquis 5mg Tabs  What changed: how much to take  Generic drug: apixaban   Doctor's comments: Requesting 1 year supply  TAKE 1 TABLET BY MOUTH  TWICE DAILY        CONTINUE taking these medications      Instructions   allopurinol 300 MG Tabs  Commonly known as: ZYLOPRIM   Take 300 mg by mouth every bedtime.  Dose: 300 mg     atorvastatin 10 MG Tabs  Commonly known as: LIPITOR   Take 10 mg by mouth every evening.  Dose: 10 mg     Centrum Silver 50+Men Tabs   Take 1 Tab by mouth every morning.  Dose: 1 Tablet     Combigan 0.2-0.5 % Soln  Generic drug: Brimonidine Tartrate-Timolol   Administer 2 Drops into both eyes 2 times a day.  Dose: 2 Drop     fluticasone 50 MCG/ACT nasal spray  Commonly known as: FLONASE   Administer 1 Spray into affected nostril(S) every day.  Dose: 1 Spray     Glucosamine 1500 Complex Caps   Take 2 Caps by mouth 2 Times a Day.  Dose: 2 Capsule     losartan 100 MG Tabs  Commonly  known as: COZAAR   Doctor's comments: Requesting 1 year supply  TAKE 1 TABLET BY MOUTH  DAILY     mycophenolate 250 MG Caps  Commonly known as: CELLCEPT   Take 500 mg by mouth 2 times a day.  Dose: 500 mg     omeprazole 20 MG delayed-release capsule  Commonly known as: PRILOSEC   Take 20 mg by mouth every evening.  Dose: 20 mg     predniSONE 5 MG Tabs  Commonly known as: DELTASONE   Take 5 mg by mouth every morning.  Dose: 5 mg     tacrolimus 1 MG Caps  Commonly known as: PROGRAF   Take 0.5-1 mg by mouth 2 times a day. 1 mg in the AM  0.5 mg in the PM  Dose: 0.5-1 mg     vitamin D2 (Ergocalciferol) 1.25 MG (97213 UT) Caps capsule  Commonly known as: Drisdol   Take 50,000 Units by mouth every Monday.  Dose: 50,000 Units        STOP taking these medications    furosemide 20 MG Tabs  Commonly known as: LASIX     MAGNESIUM PO            Allergies  Allergies   Allergen Reactions   • Triazolam Unspecified     All anti anxiety medications cause hallucinations    • Nsaids      Cannot take because of anti rejection meds.       DIET  Orders Placed This Encounter   Procedures   • Diet Order Diet: Consistent CHO (Diabetic); Second Modifier: (optional): Renal     Standing Status:   Standing     Number of Occurrences:   1     Order Specific Question:   Diet:     Answer:   Consistent CHO (Diabetic) [4]     Order Specific Question:   Second Modifier: (optional)     Answer:   Renal [8]       ACTIVITY  As tolerated.  Weight bearing as tolerated    CONSULTATIONS  Nephrology    PROCEDURES  None    LABORATORY  Lab Results   Component Value Date    SODIUM 140 02/05/2022    POTASSIUM 5.0 02/05/2022    CHLORIDE 111 02/05/2022    CO2 17 (L) 02/05/2022    GLUCOSE 97 02/05/2022    BUN 54 (H) 02/05/2022    CREATININE 1.61 (H) 02/05/2022        Lab Results   Component Value Date    WBC 6.0 02/04/2022    HEMOGLOBIN 12.7 (L) 02/04/2022    HEMATOCRIT 38.9 (L) 02/04/2022    PLATELETCT 123 (L) 02/04/2022        Total time of the discharge process  exceeds 32 minutes.

## 2022-02-05 NOTE — PROGRESS NOTES
"[facility] -  PROGRESS NOTE               Author: Tenzin Lyman M.D. Date & Time: 2/5/2022  10:23 AM     HPI:  Krishan Acevedo is a 71 y.o. 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 Was additionally found to have worsening renal function.   Patient shares that he first tested positive for COVID at an outside hospital several days ago. He felt that he was not improving and possibly getting worse at home which prompted him to call EMS. He mentions that for several days he has been having loose stools, noting 3-5 loose but not watery BMs daily, noting his last loose stool was day prior to presentation. He also mentions that he has become more dependent on his wife to bring him food and water citing fatigue with his COVID infection. He does note that for the past several weeks he has been feeling \"like I'm always thirsty\".  While in the ED he was vitally stable. Was placed on 2L NC sating in the mid 90s BP ranged 123/70 from 159/117. Labs notable for Cr of 2.62. BUN 80, COVID-19 positive, UA noted for large occult blood, small leukocyte esterase. US of kidneys was completed with indicated no hydronephrosis or fluid collections, no renal artery stenosis.   In regards to his transplant history. Living donor transplant was completed in 2008 at an outside hospital. Patient is on Prednisone 5mg, Mycophenolate 500mg BID, and Tacrolimus 1mg and 0.5mg daily. His GFR trends in the 30s-40s although in chart he has climbed to the 56-53 ranges in the past.     DAILY NEPHROLOGY SUMMARY:  2/1: Admitted, consult placed  2/2: no overnight events, renal function improving, patient tolerating PO intake  2/3: started on IVF yesterday, renal function improved to around prior baseline range  2/4: renal function " "continues to improve  2/5: Scr down to baseline levels. No new overnight renal events. Feels ok.    REVIEW OF SYSTEMS:    10 point ROS reviewed and is as per HPI/daily summary or otherwise negative    PMH/PSH/SH/FH: Reviewed and unchanged since admission note  CURRENT MEDICATIONS: Reviewed from admission to present day    VS:  BP (!) 161/81   Pulse 95   Temp 36.5 °C (97.7 °F) (Temporal)   Resp 18   Ht 1.803 m (5' 11\")   Wt 116 kg (255 lb)   SpO2 93%   BMI 35.57 kg/m²   Physical Exam  Constitutional:       Appearance: Normal appearance.   HENT:      Head: Normocephalic and atraumatic.      Mouth/Throat:      Mouth: Mucous membranes are moist.   Eyes:      General: No scleral icterus.  Cardiovascular:      Rate and Rhythm: Normal rate and regular rhythm.      Pulses: Normal pulses.   Pulmonary:      Effort: Pulmonary effort is normal. No respiratory distress.   Abdominal:      Palpations: Abdomen is soft.      Tenderness: There is no abdominal tenderness.   Musculoskeletal:      Right lower leg: No edema.      Left lower leg: No edema.   Skin:     General: Skin is warm and dry.   Neurological:      Mental Status: He is alert and oriented to person, place, and time.   Psychiatric:         Mood and Affect: Mood normal.         Behavior: Behavior normal.         Fluids:  In: 360 [P.O.:360]  Out: 1100     LABS:  Recent Labs     02/03/22  0110 02/04/22  0227 02/05/22  0425   SODIUM 141 141 140   POTASSIUM 4.7 4.7 5.0   CHLORIDE 110 111 111   CO2 19* 16* 17*   GLUCOSE 103* 100* 97   BUN 63* 53* 54*   CREATININE 1.61* 1.52* 1.61*   CALCIUM 9.9 9.9 10.3       IMPRESSION:    #History of Renal Transplant  #History of ANCA Vasculitis  #Acute on Chronic kidney injury  #CKD 3  #Acidosis  #Afib  #DM2     Suspect worsening renal function was likely secondary to volume depletion in the context of diarrhea and impaired ability to self hydrate secondary to fatigue from COVID-19 infection.     S/P 1L bolus in ED  S/p IVF from " 2/2-2/3  -Tacrolimus level drawn on 2/2/22, : 13.6     Recommendations:  -Continue with all immunosuppression meds while inpatient: prednisone, tacrolimus, mycophenolate. Will repeat Tac level in the outpatient setting.   -Avoid nephrotoxins where possible, renally dose medications  -Renal diet      From a renal/nephrology standpoint, patient is stable for discharge.  Nephrology outpatient follow up in 1 week

## 2022-02-05 NOTE — PROGRESS NOTES
[facility] Medicine Daily Progress Note    Date of Service  2/5/2022    Chief Complaint  Krishan Acevedo is a 71 y.o. male admitted 2/1/2022 with weakness    Hospital Course  A 71-year-old man with recently diagnosed COVID 19 (at [facility] ED on 1/25/21), Afib/flutter (AC with eliquis), s/p ablation (1/21), ANCA vasculitis, s/p living donor kdney transplant (2008), DM presented with weakness, non-productive cough, SOB, fever, headache, and low energy.  UA negative for UTI however demonstrates presence of blood. CXR demonstrates bilateral hazy opacities and probable vascular congestion/edema. EKG shows sinus tachycardia with paired PVCs and RBBB. Renal ultrasound negative for hydronephrosis or renal stenosis.      Interval Problem Update  2/3: Patient reports ongoing diarrhea for 2 days. Yesterday had 6 and today 2 watery stools. Afebrile, hemodynamically stable. On room air.  BUN/creat 63/1.61, improved, looks like at baseline now.  Resumed home losartan 100 mg daily.  Has pain on left shin on compression. US venous to r/o DVT ordered.     2/4: Patient reports RLQ pain. Afebrile, hemodynamically stable. On room air. Still has diarrhea, today x6.   BUN/creat 53/1.52 improving.   C.diff negative. Stool culture from 2/3 negative. Imodium PRN ordered.    2/5: Diarrhea improving, stool soft now. US venous duplex of lower extremitties showed no DVT bilaterally.  Patient is afebrile, hemodynamically stable. On room air. Encouraged oral hydration. Patient's wife has COVID 19 and cannot take care of patient if he needs any help with ADL. Ordered PT, OT evaluation.    I have personally seen and examined the patient at bedside. I discussed the plan of care with patient, family and bedside RN.    Consultants/Specialty  nephrology    Code Status  Full Code    Disposition  Patient is medically cleared pending PT, OT evaluation for discharge.   Anticipate discharge to TBD.  I have placed the appropriate orders for  post-discharge needs.    Review of Systems  Constitutional: Positive for malaise/fatigue. Negative for chills and fever.   HENT: Negative.    Eyes: Negative.    Respiratory: Negative for cough and shortness of breath.    Cardiovascular: Negative for chest pain and leg swelling.   Gastrointestinal: Positive for diarrhea. Negative for abdominal pain, nausea and vomiting.   Genitourinary: Negative for dysuria.   Musculoskeletal: Negative for myalgias.   Skin: Negative for rash.   Neurological: Positive for weakness.     Physical Exam  Temp:  [36.5 °C (97.7 °F)-37 °C (98.6 °F)] 36.5 °C (97.7 °F)  Pulse:  [88-95] 95  Resp:  [16-18] 18  BP: (156-161)/(77-81) 161/81  SpO2:  [93 %-98 %] 93 %    Physical Exam  Vitals and nursing note reviewed.   Constitutional:       Appearance: He is obese. He is ill-appearing.   HENT:      Head: Normocephalic.      Nose: Nose normal.      Mouth/Throat:      Mouth: Mucous membranes are moist.      Pharynx: Oropharynx is clear.   Eyes:      Pupils: Pupils are equal, round, and reactive to light.   Cardiovascular:      Rate and Rhythm: Normal rate and regular rhythm.   Pulmonary:      Effort: Pulmonary effort is normal. No respiratory distress.      Breath sounds: Rales present. No wheezing.   Abdominal:      General: Abdomen is flat. Bowel sounds are normal. There is no distension.      Tenderness: There is no abdominal tenderness. There is no guarding.   Musculoskeletal:         General: Tenderness (left shin on compression) present. Normal range of motion.      Cervical back: Normal range of motion.   Skin:     General: Skin is warm.   Neurological:      General: No focal deficit present.      Mental Status: He is alert and oriented to person, place, and time.     Fluids    Intake/Output Summary (Last 24 hours) at 2/5/2022 1057  Last data filed at 2/5/2022 0407  Gross per 24 hour   Intake 0 ml   Output 1100 ml   Net -1100 ml       Laboratory  Recent Labs     02/03/22  0110 02/04/22  7557    WBC 4.9 6.0   RBC 4.19* 4.37*   HEMOGLOBIN 12.2* 12.7*   HEMATOCRIT 37.7* 38.9*   MCV 90.0 89.0   MCH 29.1 29.1   MCHC 32.4* 32.6*   RDW 49.1 49.1   PLATELETCT 119* 123*   MPV 10.1 9.3     Recent Labs     02/03/22  0110 02/04/22  0227 02/05/22  0425   SODIUM 141 141 140   POTASSIUM 4.7 4.7 5.0   CHLORIDE 110 111 111   CO2 19* 16* 17*   GLUCOSE 103* 100* 97   BUN 63* 53* 54*   CREATININE 1.61* 1.52* 1.61*   CALCIUM 9.9 9.9 10.3                   Imaging  US-EXTREMITY VENOUS LOWER BILAT   Final Result      US-RENAL TRANSPLANT COMP   Final Result      1.  No hydronephrosis or perinephric fluid collection involving the right lower quadrant renal transplant.   2.  No definite evidence of hemodynamically significant renal artery stenosis.   3.  Elevated resistive indices could suggest renal parenchymal disease.      DX-CHEST-PORTABLE (1 VIEW)   Final Result      1.  Bilateral peripheral lower lobe hazy opacities which is nonspecific but can be seen with Covid 19 pneumonia.   2.  Enlarged cardiac silhouette with probable vascular congestion/edema.           Assessment/Plan  * RADHA (acute kidney injury) (HCC)- (present on admission)  Assessment & Plan  Creatine 2.64 on admission  Ranged from 1.27-1.71 over the past year  1000 ml fluid bolus in ED  Nephrology, Dr. 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 Blount, self-manages lasix administration at home based on fluid volume status  Renal US negative for hydronephrosis or renal stenosis  Dr. Lyman, nephrology consulted, appreciate recommendations  Continue home prednisone, tacrolimus, mycophenolate  Renal dosing for all medication    Pain in left shin  Assessment & Plan  On compression  US venous to r/o DVT    Diarrhea  Assessment &  Plan  Improving  Likely viral  C.diff negative.  Stool culture from 2/3 negative.  Imodium PRN    Elevated brain natriuretic peptide (BNP) level- (present on admission)  Assessment & Plan  Caution with IV fluids    Thrombocytopenia (HCC)  Assessment & Plan  Platelets 118 on admission with slight trend down to 115    Anemia of chronic disease  Assessment & Plan  Normocytic anemia  Monitor for bleeding    Epiretinal membrane (ERM) of both eyes- (present on admission)  Assessment & Plan  Continue home Combigan    Long term (current) use of systemic steroids- (present on admission)  Assessment & Plan  Daily prednisone secondary to renal transplant 2008  Continued home dosing of 5mg daily prednisone    Essential hypertension- (present on admission)  Assessment & Plan  History of  Continue home Losartan    Obesity- (present on admission)  Assessment & Plan  Body mass index is 35.57 kg/m².  Outpatient weight loss counseling     Atrial flutter - (present on admission)  Assessment & Plan  History of, s/p ablation 1/21  On Eliquis 5 mg po BID  ST on admission  Eliquis continued       VTE prophylaxis: therapeutic anticoagulation with apixaban    I have performed a physical exam and reviewed and updated ROS and Plan today (2/5/2022). In review of yesterday's note (2/4/2022), there are no changes except as documented above.

## 2022-02-06 PROBLEM — N39.0 UTI (URINARY TRACT INFECTION): Status: ACTIVE | Noted: 2022-01-01

## 2022-02-06 PROBLEM — R82.79 POSITIVE URINE CULTURE: Status: ACTIVE | Noted: 2022-01-01

## 2022-02-06 NOTE — PROGRESS NOTES
[facility] Medicine Daily Progress Note    Date of Service  2/6/2022    Chief Complaint  Krishan Acevedo is a 71 y.o. male admitted 2/1/2022 with weakness    Hospital Course  A 71-year-old man with recently diagnosed COVID 19 (at [facility] ED on 1/25/21), Afib/flutter (AC with eliquis), s/p ablation (1/21), ANCA vasculitis, s/p living donor kdney transplant (2008), DM presented with weakness, non-productive cough, SOB, fever, headache, and low energy.  UA negative for UTI however demonstrates presence of blood. CXR demonstrates bilateral hazy opacities and probable vascular congestion/edema. EKG shows sinus tachycardia with paired PVCs and RBBB. Renal ultrasound negative for hydronephrosis or renal stenosis.      Interval Problem Update  2/3: Patient reports ongoing diarrhea for 2 days. Yesterday had 6 and today 2 watery stools. Afebrile, hemodynamically stable. On room air.  BUN/creat 63/1.61, improved, looks like at baseline now.  Resumed home losartan 100 mg daily.  Has pain on left shin on compression. US venous to r/o DVT ordered.     2/4: Patient reports RLQ pain. Afebrile, hemodynamically stable. On room air. Still has diarrhea, today x6.   BUN/creat 53/1.52 improving.   C.diff negative. Stool culture from 2/3 negative. Imodium PRN ordered.     2/5: Diarrhea improving, stool soft now. US venous duplex of lower extremitties showed no DVT bilaterally.  Patient is afebrile, hemodynamically stable. On room air. Encouraged oral hydration. Patient's wife has COVID 19 and cannot take care of patient if he needs any help with ADL. Ordered PT, OT evaluation.    2/6: Diarrhea is getting better. Afebrile, hemodynamically stable. On room air.   PT, OT eval pending  Urine culture from 2/1 positive for carbapenem resistant Pseudomonas aeruginosa. Discussed with ID. Dr. Haider reviewed the chart, less than 10K organisms on culture, no antibiotics recommended.  Blood cultures from 2/1 negative to date  PT  recommended post acute placement.  SNF referral placed.    I have personally seen and examined the patient at bedside. I discussed the plan of care with patient and bedside RN.    Consultants/Specialty  nephrology    Code Status  Full Code    Disposition  Patient is not medically cleared for discharge.   Anticipate discharge to D.  I have placed the appropriate orders for post-discharge needs.    Review of Systems  Constitutional: Positive for malaise/fatigue. Negative for chills and fever.   HENT: Negative.    Eyes: Negative.    Respiratory: Negative for cough and shortness of breath.    Cardiovascular: Negative for chest pain and leg swelling.   Gastrointestinal: Positive for diarrhea. Negative for abdominal pain, nausea and vomiting.   Genitourinary: Negative for dysuria.   Musculoskeletal: Negative for myalgias.   Skin: Negative for rash.   Neurological: Positive for weakness.     Physical Exam  Temp:  [35.9 °C (96.6 °F)-36.6 °C (97.9 °F)] 35.9 °C (96.6 °F)  Pulse:  [79-91] 79  Resp:  [18-20] 18  BP: (125-152)/(70-79) 125/70  SpO2:  [91 %-93 %] 93 %    Physical Exam  Vitals and nursing note reviewed.   Constitutional:       Appearance: He is obese. He is ill-appearing.   HENT:      Head: Normocephalic.      Nose: Nose normal.      Mouth/Throat:      Mouth: Mucous membranes are moist.      Pharynx: Oropharynx is clear.   Eyes:      Pupils: Pupils are equal, round, and reactive to light.   Cardiovascular:      Rate and Rhythm: Normal rate and regular rhythm.   Pulmonary:      Effort: Pulmonary effort is normal. No respiratory distress.      Breath sounds: Rales present. No wheezing.   Abdominal:      General: Abdomen is flat. Bowel sounds are normal. There is no distension.      Tenderness: There is no abdominal tenderness. There is no guarding.   Musculoskeletal:         General: Tenderness (left shin on compression) present. Normal range of motion.      Cervical back: Normal range of motion.    Skin:     General: Skin is warm.   Neurological:      General: No focal deficit present.      Mental Status: He is alert and oriented to person, place, and time.     Fluids    Intake/Output Summary (Last 24 hours) at 2/6/2022 1055  Last data filed at 2/6/2022 0746  Gross per 24 hour   Intake 400 ml   Output 300 ml   Net 100 ml       Laboratory  Recent Labs     02/04/22  0227   WBC 6.0   RBC 4.37*   HEMOGLOBIN 12.7*   HEMATOCRIT 38.9*   MCV 89.0   MCH 29.1   MCHC 32.6*   RDW 49.1   PLATELETCT 123*   MPV 9.3     Recent Labs     02/04/22 0227 02/05/22  0425   SODIUM 141 140   POTASSIUM 4.7 5.0   CHLORIDE 111 111   CO2 16* 17*   GLUCOSE 100* 97   BUN 53* 54*   CREATININE 1.52* 1.61*   CALCIUM 9.9 10.3                   Imaging  US-EXTREMITY VENOUS LOWER BILAT   Final Result      US-RENAL TRANSPLANT COMP   Final Result      1.  No hydronephrosis or perinephric fluid collection involving the right lower quadrant renal transplant.   2.  No definite evidence of hemodynamically significant renal artery stenosis.   3.  Elevated resistive indices could suggest renal parenchymal disease.      DX-CHEST-PORTABLE (1 VIEW)   Final Result      1.  Bilateral peripheral lower lobe hazy opacities which is nonspecific but can be seen with Covid 19 pneumonia.   2.  Enlarged cardiac silhouette with probable vascular congestion/edema.           Assessment/Plan  * RADHA (acute kidney injury) (HCC)- (present on admission)  Assessment & Plan  Creatine 2.64 on admission  Ranged from 1.27-1.71 over the past year  1000 ml fluid bolus in ED  Nephrology, Dr. 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 Blount, self-manages lasix administration at home based on fluid volume status  Renal US negative for hydronephrosis or renal  stenosis  Dr. Lyman, nephrology consulted, appreciate recommendations  Continue home prednisone, tacrolimus, mycophenolate  Renal dosing for all medication    Positive urine culture  Assessment & Plan  Urine culture from 2/1 positive for carbapenem resistant Pseudomonas aeruginosa.  Discussed with ID. Dr. Haider, who reviewed the chart, less than 10K organisms on culture, no antibiotics recommended.    Pain in left shin  Assessment & Plan  On compression  US venous to r/o DVT    Diarrhea  Assessment & Plan  Improving  Likely viral  C.diff negative.  Stool culture from 2/3 negative.  Imodium PRN    Elevated brain natriuretic peptide (BNP) level- (present on admission)  Assessment & Plan  Caution with IV fluids    Thrombocytopenia (HCC)  Assessment & Plan  Platelets 118 on admission with slight trend down to 115    Anemia of chronic disease  Assessment & Plan  Normocytic anemia  Monitor for bleeding    Epiretinal membrane (ERM) of both eyes- (present on admission)  Assessment & Plan  Continue home Combigan    Long term (current) use of systemic steroids- (present on admission)  Assessment & Plan  Daily prednisone secondary to renal transplant 2008  Continued home dosing of 5mg daily prednisone    Essential hypertension- (present on admission)  Assessment & Plan  History of  Continue home Losartan    Obesity- (present on admission)  Assessment & Plan  Body mass index is 35.57 kg/m².  Outpatient weight loss counseling     Atrial flutter - (present on admission)  Assessment & Plan  History of, s/p ablation 1/21  On Eliquis 5 mg po BID  ST on admission  Eliquis continued       VTE prophylaxis: therapeutic anticoagulation with apixaban    I have performed a physical exam and reviewed and updated ROS and Plan today (2/6/2022). In review of yesterday's note (2/5/2022), there are no changes except as documented above.

## 2022-02-06 NOTE — THERAPY
"Physical Therapy   Initial Evaluation     Patient Name: Krishan Acevedo  Age:  71 y.o., Sex:  male  Medical Record #: [id number]  Today's Date: 2/6/2022     Precautions  Precautions: Fall Risk    Assessment  Krishan Acevedo is a 71 y.o. 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 Was additionally found to have worsening renal function. Patient presents with confusion, impaired strength, balance and activity tolerance. He was only able to ambulate 8' with Swapna before becoming SOB and needing to sit. His wife is unable to care for him at this time and he will, therefore, need placement for further therapy.  Will continued to follow while in house.     Plan    Recommend Physical Therapy 4 times per week until therapy goals are met for the following treatments:  Bed Mobility, Equipment, Gait Training, Neuro Re-Education / Balance, Stair Training, Therapeutic Activities and Therapeutic Exercises    DC Equipment Recommendations: Unable to determine at this time  Discharge Recommendations: Recommend post-acute placement for additional physical therapy services prior to discharge home       Subjective    \"Let me tell you about my eye before we go any further\"     Objective       02/06/22 1145   Precautions   Precautions Fall Risk   Pain 0 - 10 Group   Location Head   Therapist Pain Assessment Prior to Activity   Prior Living Situation   Prior Services Intermittent Physical Support for ADL Per Family   Housing / Facility 1 Story House   Steps Into Home 1   Steps In Home 0   Bathroom Set up Walk In Shower;Shower Chair   Equipment Owned 4-Wheel Walker;Single Point Cane;Tub / Shower Seat   Lives with - Patient's Self Care Capacity Spouse   Comments spouse normally assists with ADLs, but can no longer 2/2 being sick with Covid    Prior " Level of Functional Mobility   Bed Mobility Independent   Transfer Status Independent   Ambulation Independent   Distance Ambulation (Feet)   (household distances )   Assistive Devices Used Front-Wheel Walker   Stairs Independent   Comments wife assists with dressing and household duties    History of Falls   History of Falls Yes   Cognition    Cognition / Consciousness X   Orientation Level Not Oriented to Year;Not Oriented to Month;Not Oriented to Day   Level of Consciousness Alert   Ability To Follow Commands 1 Step   New Learning Impaired   Attention Impaired  (difficult to redirect )   Comments patient is pleasant and cooperative, but tangential and confused. 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 Satting at 89-90 throughout on RA    Bed Mobility    Supine to Sit Supervised   Scooting Supervised   Functional Mobility   Sit to Stand Minimal Assist   Bed, Chair, Wheelchair Transfer Minimal Assist   How much difficulty does the patient currently have...   Turning over in bed (including  adjusting bedclothes, sheets and blankets)? 4   Sitting down on and standing up from a chair with arms (e.g., wheelchair, bedside commode, etc.) 3   Moving from lying on back to sitting on the side of the bed? 4   How much help from another person does the patient currently need...   Moving to and from a bed to a chair (including a wheelchair)? 3   Need to walk in a hospital room? 3   Climbing 3-5 steps with a railing? 2   6 clicks Mobility Score 19   Activity Tolerance   Sitting in Chair post session    Sitting Edge of Bed 10 min    Standing 3 min    Comments limited by SOB    Edema / Skin Assessment   Comments at risk for skin breakdown    Patient / Family Goals    Patient / Family Goal #1 to go home    Short Term Goals    Short Term Goal # 1 in 6 visits patient will demo all functional transfers with Taty for safe DC    Short Term Goal # 2 in 6 visits patient will ambualte 200' Taty for safe DC    Short Term Goal # 3 in 6 visits patient will navigate 1 stairs with Taty for safe DC    Education Group   Education Provided Role of Physical Therapist;Gait Training;Use of Assistive Device   Role of Physical Therapist Patient Response Patient;Acceptance;Explanation;Demonstration;Verbal Demonstration;Action Demonstration   Gait Training Patient Response Patient;Acceptance;Explanation;Demonstration;Verbal Demonstration;Action Demonstration   Use of Assistive Device Patient Response Patient;Acceptance;Explanation;Demonstration;Verbal Demonstration;Action Demonstration   Problem List    Problems Impaired Bed Mobility;Impaired Transfers;Functional Strength Deficit;Impaired Balance;Decreased Activity Tolerance;Safety Awareness Deficits / Cognition   Anticipated Discharge Equipment and Recommendations   DC Equipment Recommendations Unable to determine at this time   Discharge Recommendations Recommend post-acute placement for additional physical therapy services prior to discharge home     Elizabeth Asencio, PT, DPT, GCS

## 2022-02-06 NOTE — CARE PLAN
The patient is Stable - Low risk of patient condition declining or worsening    Shift Goals  Clinical Goals: Maintain skin integrity  Patient Goals: rest    Progress made toward(s) clinical / shift goals:    Problem: Pain - Standard  Goal: Alleviation of pain or a reduction in pain to the patient’s comfort goal  Outcome: Progressing     Problem: Skin Integrity  Goal: Skin integrity is maintained or improved  Outcome: Progressing       Patient is not progressing towards the following goals:

## 2022-02-06 NOTE — PROGRESS NOTES
Received report and assumed care at shift change. A&O x 4 forgetful at times, cooperative with cares. On room air with oxygen saturation in the 90s. No complain of pain or distress. Fall precautions in place, bed locked and in lowest position. Needs attended to.

## 2022-02-06 NOTE — CONSULTS
[facility] INFECTIOUS DISEASES INPATIENT CONSULT NOTE     Date of Service: 2/6/2022    Consult Requested By: Will Barker M.D.    Reason for Consultation: Bacterial growth on urine culture    Chief Complaint: Weakness    History of Present Illness:     Krishan Acevedo is a 71 y.o. male admitted 2/1/2022.  Patient with renal transplant (2008) secondary to ANCA vasculitis on mycophenolate, tacrolimus, 5 mg of prednisone, atrial flutter status post ablation in January 2021, type 2 diabetes, here with low energy and fatigue following recent COVID-19.  He was also noted to have worsening renal function.  He was tested positive for Covid at outside hospital on 1/25.  He has also been having diarrhea since this diagnosis.  He has been afebrile and without leukocytosis, not on antibiotics.  No urinary symptoms.  UA showed 10-20 white cells, 20-50 red cells.  A urine culture was obtained on 2/1.  Both diarrhea and renal function are improving.  Urine culture from 2/1 growing less than 10,000 CFU of Carbapenem resistant Pseudomonas.  Zosyn was started today and ID consulted for recommendations.  Patient states weakness has overall improved, has no urinary symptoms, notes diarrhea is improved, wishes to go home.    Review of Systems:  All other systems reviewed and are negative expect as noted in HPI    Past Medical History:   Diagnosis Date   • Arrhythmia 2021    flutter   • Arthritis 2020    right shoulder, knees, & back   • Atrial flutter ([facility])    • CAD (coronary artery disease) 2003   • Cough 2020    dry-on lisinipril, now productive -brown   • Dialysis patient ([facility]) 2006    x 18 months   • DVT (deep venous thrombosis) ([facility])    • E coli bacteremia 2014    hospitalized x 5 months   • Kidney failure 03/2008    kidney transplant   • Kidney transplant pain 2020    low back   • Retinal detachment    • Sleep apnea     CPAP   • Snoring    • Wegener's granulomatosis with renal involvement ([facility]) 2008       Past Surgical  History:   Procedure Laterality Date   • OTHER Right 2017    eye surgery, lasix & cataracts, retinal detatchment   • PEG PLACEMENT  10/10/2014    Performed by Brijesh Orozco M.D. at [facility]   • OTHER ORTHOPEDIC SURGERY Left     knee       Family History   Problem Relation Age of Onset   • Stroke Brother 26        , ? cause   • Stroke Maternal Grandmother            • Clotting Disorder Neg Hx        Social History     Socioeconomic History   • Marital status:      Spouse name: Not on file   • Number of children: Not on file   • Years of education: Not on file   • Highest education level: Not on file   Occupational History   • Not on file   Tobacco Use   • Smoking status: Never Smoker   • Smokeless tobacco: Former User     Types: Snuff, Chew   Vaping Use   • Vaping Use: Never used   Substance and Sexual Activity   • Alcohol use: Not Currently   • Drug use: Never   • Sexual activity: Not on file   Other Topics Concern   • Not on file   Social History Narrative   • Not on file     Social Determinants of Health     Financial Resource Strain:    • Difficulty of Paying Living Expenses: Not on file   Food Insecurity:    • Worried About Running Out of Food in the Last Year: Not on file   • Ran Out of Food in the Last Year: Not on file   Transportation Needs:    • Lack of Transportation (Medical): Not on file   • Lack of Transportation (Non-Medical): Not on file   Physical Activity:    • Days of Exercise per Week: Not on file   • Minutes of Exercise per Session: Not on file   Stress:    • Feeling of Stress : Not on file   Social Connections:    • Frequency of Communication with Friends and Family: Not on file   • Frequency of Social Gatherings with Friends and Family: Not on file   • Attends Gnosticism Services: Not on file   • Active Member of Clubs or Organizations: Not on file   • Attends Club or Organization Meetings: Not on file   • Marital Status: Not on file   Intimate Partner  Violence:    • Fear of Current or Ex-Partner: Not on file   • Emotionally Abused: Not on file   • Physically Abused: Not on file   • Sexually Abused: Not on file   Housing Stability:    • Unable to Pay for Housing in the Last Year: Not on file   • Number of Places Lived in the Last Year: Not on file   • Unstable Housing in the Last Year: Not on file       Allergies   Allergen Reactions   • Triazolam Unspecified     All anti anxiety medications cause hallucinations    • Nsaids      Cannot take because of anti rejection meds.       Medications:    Current Facility-Administered Medications:   •  piperacillin-tazobactam (ZOSYN) 4.5 g in  mL IVPB, 4.5 g, Intravenous, Once **AND** piperacillin-tazobactam (ZOSYN) 4.5 g in  mL IVPB, 4.5 g, Intravenous, Q8HRS, Will Barker M.D.  •  loperamide (IMODIUM) capsule 2 mg, 2 mg, Oral, 4X/DAY PRN, Will Barker M.D., 2 mg at 02/05/22 1747  •  losartan (COZAAR) tablet 100 mg, 100 mg, Oral, Q DAY, Will Barker M.D., 100 mg at 02/06/22 0559  •  ondansetron (ZOFRAN) syringe/vial injection 4 mg, 4 mg, Intravenous, Q6HRS PRN, Rhonda Mojica M.D.  •  ondansetron (ZOFRAN ODT) dispertab 4 mg, 4 mg, Oral, Q6HRS PRN, Rhonda Mojica M.D.  •  acetaminophen (Tylenol) tablet 650 mg, 650 mg, Oral, Q6HRS PRN, Rhonda Mojica M.D., 650 mg at 02/03/22 0604  •  hydrALAZINE (APRESOLINE) injection 10 mg, 10 mg, Intravenous, Q6HRS PRN, Rhonda Mojica M.D.  •  apixaban (ELIQUIS) tablet 5 mg, 5 mg, Oral, BID, Rhonda Mojica M.D., 5 mg at 02/06/22 0558  •  predniSONE (DELTASONE) tablet 5 mg, 5 mg, Oral, QAM, LEATHA JeffriesP.RRobertN., 5 mg at 02/06/22 0558  •  mycophenolate (CELLCEPT) capsule 500 mg, 500 mg, Oral, BID, Rhonda Mojica M.D., 500 mg at 02/06/22 0600  •  tacrolimus (PROGRAF) capsule 1 mg, 1 mg, Oral, QAM, Rhonda Mojica M.D., 1 mg at 02/06/22 0559  •  tacrolimus (PROGRAF) capsule 0.5 mg, 0.5 mg, Oral, Q EVENING, Rhonda Mojica M.D., 0.5 mg at  "22 1747  •  brimonidine (ALPHAGAN) 0.2 % ophthalmic solution 2 Drop, 2 Drop, Both Eyes, BID, 2 Drop at 22 0600 **AND** timolol (TIMOPTIC) 0.5 % ophthalmic solution 2 Drop, 2 Drop, Both Eyes, BID, SILVIA Jeffries, 2 Drop at 22 0600    Physical Exam:   Vital Signs: /70   Pulse 79   Temp 35.9 °C (96.6 °F) (Temporal)   Resp 18   Ht 1.803 m (5' 11\")   Wt 116 kg (255 lb)   SpO2 93%   BMI 35.57 kg/m²   Temp  Av.8 °C (98.2 °F)  Min: 35.9 °C (96.6 °F)  Max: 37.9 °C (100.2 °F)  Vital signs reviewed  Constitutional: Patient is oriented to person, place, and time. Appears well-developed and well-nourished. No distress  Head: Atraumatic, normocephalic  Eyes: Conjunctivae normal, right eye ptosis, states poor vision in right eye chronically  Mouth/Throat: Lips without lesions  Cardiovascular: Normal rate, regular rhythm, normal S1S2 and intact distal pulses. No murmur, gallop, or friction rub. No pedal edema.  Pulmonary/Chest: No respiratory distress. Unlabored respiratory effort, lungs clear to auscultation. No wheezes or rales.   Abdominal: Soft, non tender. BS + x 4. No masses or hepatosplenomegaly.   Musculoskeletal: No joint tenderness, swelling, erythema, or restriction of motion noted.  Neurological: Alert and oriented to person, place, and time. No focal neural deficit noted  Skin: Skin is warm and dry. No rashes or embolic phenomena noted on exposed skin  Psychiatric: Normal mood and affect. Behavior is normal.     LABS:  Recent Labs     22   WBC 6.0      Recent Labs     22   HEMOGLOBIN 12.7*   HEMATOCRIT 38.9*   MCV 89.0   MCH 29.1   PLATELETCT 123*       Recent Labs     22   SODIUM 141 140   POTASSIUM 4.7 5.0   CHLORIDE 111 111   CO2 16* 17*   CREATININE 1.52* 1.61*        Recent Labs     22  0227   ALBUMIN 3.4        MICRO:  Blood Culture   Date Value Ref Range Status   2018 No growth after 5 days of " incubation.  Final        Latest pertinent labs were reviewed    IMAGING STUDIES:  Transplant ultrasound with no hydronephrosis or perinephric fluid collection    Hospital Course/Assessment:   Krishan Acevedo is a 71 y.o. male with renal transplant (2008) secondary to ANCA vasculitis on mycophenolate, tacrolimus, 5 mg of prednisone, atrial flutter status post ablation in January 2021, type 2 diabetes, here with low energy and fatigue following recent COVID-19.  He was also noted to have worsening renal function.  He was tested positive for Covid at outside hospital on 1/25.  He has also been having diarrhea since this diagnosis.  He has been afebrile and without leukocytosis, not on antibiotics.  No urinary symptoms.  UA showed 10-20 white cells, 20-50 red cells.  A urine culture was obtained on 2/1.  Both diarrhea and renal function are improving.  Urine culture from 2/1 growing less than 10,000 CFU of Carbapenem resistant Pseudomonas.  ID consulted for recommendations.    Patient is here with fatigue following COVID-19, renal function and diarrhea are improving without antibiotics. A urine culture was checked on 2/1 and this is growing less than 10,000 CFU Carbapenem resistant Pseudomonas. He has no urinary symptoms, no significant bacteriuria. Recommend avoiding antibiotics at this time to prevent the development of further resistance/adverse effects.    Plan:   -Discontinue antibiotics.  Isolation per infection prevention policies  -Follow clinically for any worsening which patient remains at risk for given underlying immunosuppression    Plan was discussed with the primary team, Dr. Barker    ID will sign off.  Please call with questions.    Fernando Haider M.D.    Please note that this dictation was created using voice recognition software. I have worked with technical experts from [employer] to optimize the interface.  I have made every reasonable attempt to correct obvious errors, but there may be  errors of grammar and possibly content that I did not discover before finalizing the note.

## 2022-02-06 NOTE — PROGRESS NOTES
"[facility] -  PROGRESS NOTE               Author: Tenzin Lyman M.D. Date & Time: 2/6/2022  10:48 AM     HPI:  Krishan Acevedo is a 71 y.o. 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 Was additionally found to have worsening renal function.   Patient shares that he first tested positive for COVID at an outside hospital several days ago. He felt that he was not improving and possibly getting worse at home which prompted him to call EMS. He mentions that for several days he has been having loose stools, noting 3-5 loose but not watery BMs daily, noting his last loose stool was day prior to presentation. He also mentions that he has become more dependent on his wife to bring him food and water citing fatigue with his COVID infection. He does note that for the past several weeks he has been feeling \"like I'm always thirsty\".  While in the ED he was vitally stable. Was placed on 2L NC sating in the mid 90s BP ranged 123/70 from 159/117. Labs notable for Cr of 2.62. BUN 80, COVID-19 positive, UA noted for large occult blood, small leukocyte esterase. US of kidneys was completed with indicated no hydronephrosis or fluid collections, no renal artery stenosis.   In regards to his transplant history. Living donor transplant was completed in 2008 at an outside hospital. Patient is on Prednisone 5mg, Mycophenolate 500mg BID, and Tacrolimus 1mg and 0.5mg daily. His GFR trends in the 30s-40s although in chart he has climbed to the 56-53 ranges in the past.     DAILY NEPHROLOGY SUMMARY:  2/1: Admitted, consult placed  2/2: no overnight events, renal function improving, patient tolerating PO intake  2/3: started on IVF yesterday, renal function improved to around prior baseline range  2/4: renal function " "continues to improve  2/5: Scr down to baseline levels. No new overnight renal events. Feels ok.  2/6: Feels ok. Urine culture from 2/1 positive for carbapenem resistant Pseudomonas aeruginosa. He is on zosyn per sensitivity. No labs today    REVIEW OF SYSTEMS:    10 point ROS reviewed and is as per HPI/daily summary or otherwise negative    PMH/PSH/SH/FH: Reviewed and unchanged since admission note  CURRENT MEDICATIONS: Reviewed from admission to present day    VS:  /70   Pulse 79   Temp 35.9 °C (96.6 °F) (Temporal)   Resp 18   Ht 1.803 m (5' 11\")   Wt 116 kg (255 lb)   SpO2 93%   BMI 35.57 kg/m²   Physical Exam  Constitutional:       Appearance: Normal appearance.   HENT:      Head: Normocephalic and atraumatic.      Mouth/Throat:      Mouth: Mucous membranes are moist.   Eyes:      General: No scleral icterus.  Cardiovascular:      Rate and Rhythm: Normal rate and regular rhythm.      Pulses: Normal pulses.   Pulmonary:      Effort: Pulmonary effort is normal. No respiratory distress.   Abdominal:      Palpations: Abdomen is soft.      Tenderness: There is no abdominal tenderness.   Musculoskeletal:      Right lower leg: No edema.      Left lower leg: No edema.   Skin:     General: Skin is warm and dry.   Neurological:      Mental Status: He is alert and oriented to person, place, and time.   Psychiatric:         Mood and Affect: Mood normal.         Behavior: Behavior normal.         Fluids:  In: 640 [P.O.:640]  Out: -     LABS:  Recent Labs     02/04/22  0227 02/05/22  0425   SODIUM 141 140   POTASSIUM 4.7 5.0   CHLORIDE 111 111   CO2 16* 17*   GLUCOSE 100* 97   BUN 53* 54*   CREATININE 1.52* 1.61*   CALCIUM 9.9 10.3       IMPRESSION:    #History of Renal Transplant  #History of ANCA Vasculitis  #Acute on Chronic kidney injury  #CKD 3  #Acidosis  #Afib  #DM2     Get labs  Suspect worsening renal function was likely secondary to volume depletion in the context of diarrhea and impaired ability to " self hydrate secondary to fatigue from COVID-19 infection.     S/P 1L bolus in ED  S/p IVF from 2/2-2/3  -Tacrolimus level drawn on 2/2/22, : 13.6     Recommendations:  -Continue with all immunosuppression meds while inpatient: prednisone, tacrolimus, mycophenolate. Will repeat Tac level in the outpatient setting. ? Elevated sec to diarrhea which has resolved.  -Avoid nephrotoxins where possible, renally dose medications  -Renal diet        Nephrology outpatient follow up in 1 week after discharge

## 2022-02-07 PROBLEM — E87.20 ACIDOSIS: Status: ACTIVE | Noted: 2022-01-01

## 2022-02-07 PROBLEM — E87.5 HYPERKALEMIA: Status: ACTIVE | Noted: 2022-01-01

## 2022-02-07 NOTE — PROGRESS NOTES
Bedside report received from BRIDGETTE Patel. Patient remained asleep. No signs of distress. Call light within reach,bed alarm active, and wheels locked

## 2022-02-07 NOTE — DISCHARGE PLANNING
Agency/Facility Name: [facility]  Outcome: Left a message for admissions.  Awaiting a call back.

## 2022-02-07 NOTE — CARE PLAN
The patient is Watcher - Medium risk of patient condition declining or worsening    Shift Goals  Clinical Goals: reduce potassium  Patient Goals: edge of bed for meals   Family Goals: NA    Progress made toward(s) clinical / shift goals:  No complaints of pain this morning. Medication provided to decrease potassium     Patient is not progressing towards the following goals: Needs help turning self in bed      Problem: Pain - Standard  Goal: Alleviation of pain or a reduction in pain to the patient’s comfort goal  Outcome: Progressing     Problem: Fall Risk  Goal: Patient will remain free from falls  Outcome: Progressing

## 2022-02-07 NOTE — PROGRESS NOTES
"[facility] -  PROGRESS NOTE               Author: Damian Pack M.D. Date & Time: 2/7/2022  8:54 AM     HPI:  Krishan Acevedo is a 71 y.o. 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 Was additionally found to have worsening renal function.   Patient shares that he first tested positive for COVID at an outside hospital several days ago. He felt that he was not improving and possibly getting worse at home which prompted him to call EMS. He mentions that for several days he has been having loose stools, noting 3-5 loose but not watery BMs daily, noting his last loose stool was day prior to presentation. He also mentions that he has become more dependent on his wife to bring him food and water citing fatigue with his COVID infection. He does note that for the past several weeks he has been feeling \"like I'm always thirsty\".  While in the ED he was vitally stable. Was placed on 2L NC sating in the mid 90s BP ranged 123/70 from 159/117. Labs notable for Cr of 2.62. BUN 80, COVID-19 positive, UA noted for large occult blood, small leukocyte esterase. US of kidneys was completed with indicated no hydronephrosis or fluid collections, no renal artery stenosis.   In regards to his transplant history. Living donor transplant was completed in 2008 at an outside hospital. Patient is on Prednisone 5mg, Mycophenolate 500mg BID, and Tacrolimus 1mg and 0.5mg daily. His GFR trends in the 30s-40s although in chart he has climbed to the 56-53 ranges in the past.     DAILY NEPHROLOGY SUMMARY:  2/1: Admitted, consult placed  2/2: no overnight events, renal function improving, patient tolerating PO intake  2/3: started on IVF yesterday, renal function improved to around prior baseline range  2/4: renal function continues " "to improve  2/5: Scr down to baseline levels. No new overnight renal events. Feels ok.  2/6: Feels ok. Urine culture from 2/1 positive for carbapenem resistant Pseudomonas aeruginosa. He is on zosyn per sensitivity. No labs today  2/7: Potassium is 5.7 today.  Creatinine up to 1.95.  Corrected calcium elevated.  CO2 16.    REVIEW OF SYSTEMS:    Deferred    PMH/PSH/SH/FH: Reviewed and unchanged since admission note  CURRENT MEDICATIONS: Reviewed from admission to present day    VS:  /65   Pulse 92   Temp 36.3 °C (97.3 °F) (Temporal)   Resp 19   Ht 1.803 m (5' 11\")   Wt 116 kg (255 lb)   SpO2 90%   BMI 35.57 kg/m²   Deferred due to covid    Fluids:  In: 1740 [P.O.:1740]  Out: 800     LABS:  Recent Labs     02/05/22  0425 02/06/22  1019 02/07/22  0232   SODIUM 140 136 143   POTASSIUM 5.0 5.4 5.7*   CHLORIDE 111 111 114*   CO2 17* 13* 16*   GLUCOSE 97 138* 96   BUN 54* 62* 69*   CREATININE 1.61* 1.70* 1.95*   CALCIUM 10.3 10.3 10.4       IMPRESSION:    #History of Renal Transplant  #History of ANCA Vasculitis  #Acute on Chronic kidney injury  #CKD 3  #Acidosis  #Afib  #DM2     Get labs  Suspect worsening renal function was likely secondary to volume depletion in the context of diarrhea and impaired ability to self hydrate secondary to fatigue from COVID-19 infection.     S/P 1L bolus in ED  S/p IVF from 2/2-2/3  -Tacrolimus level drawn on 2/2/22, : 13.6     Recommendations:  - Hold losartan  - Repeat UA  - Start veltassa 8.6g daily  - Start sodium bicarbonate 650mg PO TID  - Hold IVF for now  - Continue with all immunosuppression meds while inpatient: prednisone, tacrolimus, mycophenolate.  Elevated sec to diarrhea which has resolved.  - Repeat tac level tomorrow   - Avoid nephrotoxins where possible, renally dose medications  - Renal diet             "

## 2022-02-07 NOTE — PROGRESS NOTES
[facility] Medicine Daily Progress Note    Date of Service  2/7/2022    Chief Complaint  Krishan Acevedo is a 71 y.o. male admitted 2/1/2022 with weakness    Hospital Course  A 71-year-old man with recently diagnosed COVID 19 (at [facility] ED on 1/25/21), Afib/flutter (AC with eliquis), s/p ablation (1/21), ANCA vasculitis, s/p living donor kdney transplant (2008), DM presented with weakness, non-productive cough, SOB, fever, headache, and low energy.  UA negative for UTI however demonstrates presence of blood. CXR demonstrates bilateral hazy opacities and probable vascular congestion/edema. EKG shows sinus tachycardia with paired PVCs and RBBB. Renal ultrasound negative for hydronephrosis or renal stenosis.      Interval Problem Update  2/3: Patient reports ongoing diarrhea for 2 days. Yesterday had 6 and today 2 watery stools. Afebrile, hemodynamically stable. On room air.  BUN/creat 63/1.61, improved, looks like at baseline now.  Resumed home losartan 100 mg daily.  Has pain on left shin on compression. US venous to r/o DVT ordered.     2/4: Patient reports RLQ pain. Afebrile, hemodynamically stable. On room air. Still has diarrhea, today x6.   BUN/creat 53/1.52 improving.   C.diff negative. Stool culture from 2/3 negative. Imodium PRN ordered.     2/5: Diarrhea improving, stool soft now. US venous duplex of lower extremitties showed no DVT bilaterally.  Patient is afebrile, hemodynamically stable. On room air. Encouraged oral hydration. Patient's wife has COVID 19 and cannot take care of patient if he needs any help with ADL. Ordered PT, OT evaluation.     2/6: Diarrhea is getting better. Afebrile, hemodynamically stable. On room air.   PT, OT eval pending  Urine culture from 2/1 positive for carbapenem resistant Pseudomonas aeruginosa. Discussed with ID. Dr. Haider reviewed the chart, less than 10K organisms on culture, no antibiotics recommended.  Blood cultures from 2/1 negative to date  PT  recommended post acute placement.  SNF referral placed.    2/7: Patient is upset being stuck in the hospital, reassurance given. Diarrhea improving had 2 watery bowel movements yesterday and once this morning. Afebrile, hemodynamically stable.  K 5.7. Hyperkalemia protocol. Kayexilate ordered.  BUN/creat 1.95 up trending.  Nephrology following recommendations appreciated: hold losartan, repeat UA, start veltassa, sodium bicarbonate, repeat tacrolimus level tomorrow.     I have personally seen and examined the patient at bedside. I discussed the plan of care with patient, bedside RN, charge RN,  and pharmacy.    Consultants/Specialty  nephrology    Code Status  Full Code    Disposition  Patient is not medically cleared for discharge.   Anticipate discharge to to skilled nursing facility.  I have placed the appropriate orders for post-discharge needs.    Review of Systems  Constitutional: Positive for malaise/fatigue. Negative for chills and fever.   HENT: Negative.    Eyes: Negative.    Respiratory: Negative for cough and shortness of breath.    Cardiovascular: Negative for chest pain and leg swelling.   Gastrointestinal: Positive for diarrhea. Negative for abdominal pain, nausea and vomiting.   Genitourinary: Negative for dysuria.   Musculoskeletal: Negative for myalgias.   Skin: Negative for rash.   Neurological: Positive for weakness.      Physical Exam  Temp:  [36.1 °C (97 °F)-36.6 °C (97.8 °F)] 36.3 °C (97.3 °F)  Pulse:  [87-92] 92  Resp:  [18-19] 19  BP: (107-156)/(65-85) 122/65  SpO2:  [90 %-93 %] 90 %    Physical Exam  Vitals and nursing note reviewed.   Constitutional:       Appearance: He is obese. He is ill-appearing.   HENT:      Head: Normocephalic.      Nose: Nose normal.      Mouth/Throat:      Mouth: Mucous membranes are moist.      Pharynx: Oropharynx is clear.   Eyes:      Pupils: Pupils are equal, round, and reactive to light.   Cardiovascular:      Rate and Rhythm: Normal rate and  regular rhythm.   Pulmonary:      Effort: Pulmonary effort is normal. No respiratory distress.      Breath sounds: Rales present. No wheezing.   Abdominal:      General: Abdomen is flat. Bowel sounds are normal. There is no distension.      Tenderness: There is no abdominal tenderness. There is no guarding.   Musculoskeletal:         General: Tenderness (left shin on compression) present. Normal range of motion.      Cervical back: Normal range of motion.   Skin:     General: Skin is warm.   Neurological:      General: No focal deficit present.      Mental Status: He is alert and oriented to person, place, and time.     Fluids    Intake/Output Summary (Last 24 hours) at 2/7/2022 1015  Last data filed at 2/7/2022 0955  Gross per 24 hour   Intake 1500 ml   Output 500 ml   Net 1000 ml       Laboratory  Recent Labs     02/07/22  0232   WBC 5.8   RBC 4.48*   HEMOGLOBIN 12.9*   HEMATOCRIT 40.5*   MCV 90.4   MCH 28.8   MCHC 31.9*   RDW 50.4*   PLATELETCT 154*   MPV 10.2     Recent Labs     02/05/22  0425 02/06/22  1019 02/07/22  0232   SODIUM 140 136 143   POTASSIUM 5.0 5.4 5.7*   CHLORIDE 111 111 114*   CO2 17* 13* 16*   GLUCOSE 97 138* 96   BUN 54* 62* 69*   CREATININE 1.61* 1.70* 1.95*   CALCIUM 10.3 10.3 10.4                   Imaging  US-EXTREMITY VENOUS LOWER BILAT   Final Result      US-RENAL TRANSPLANT COMP   Final Result      1.  No hydronephrosis or perinephric fluid collection involving the right lower quadrant renal transplant.   2.  No definite evidence of hemodynamically significant renal artery stenosis.   3.  Elevated resistive indices could suggest renal parenchymal disease.      DX-CHEST-PORTABLE (1 VIEW)   Final Result      1.  Bilateral peripheral lower lobe hazy opacities which is nonspecific but can be seen with Covid 19 pneumonia.   2.  Enlarged cardiac silhouette with probable vascular congestion/edema.           Assessment/Plan  * RADHA (acute kidney injury) (HCC)- (present on admission)  Assessment  & Plan  Creatine 2.64 on admission  Ranged from 1.27-1.71 over the past year  1000 ml fluid bolus in ED  Nephrology, Dr. 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 Blount, self-manages lasix administration at home based on fluid volume status  Renal US negative for hydronephrosis or renal stenosis  Dr. Lyman, nephrology consulted, appreciate recommendations  Continue home prednisone, tacrolimus, mycophenolate  Renal dosing for all medication    Hyperkalemia  Assessment & Plan  Potassium 5.7 on 2/7  Nephrology is following   Started veltassa  Monitor     Acidosis  Assessment & Plan  Nephrology is following  Started sodium bicarbonate  Monitor     Positive urine culture  Assessment & Plan  Urine culture from 2/1 positive for carbapenem resistant Pseudomonas aeruginosa.  Discussed with ID. Dr. Haider, who reviewed the chart, less than 10K organisms on culture, no antibiotics recommended.    Pain in left shin  Assessment & Plan  On compression  US venous to r/o DVT    Diarrhea  Assessment & Plan  Improving  Likely viral  C.diff negative.  Stool culture from 2/3 negative.  Imodium PRN    Elevated brain natriuretic peptide (BNP) level- (present on admission)  Assessment & Plan  Caution with IV fluids    Thrombocytopenia (HCC)  Assessment & Plan  Platelets 118 on admission with slight trend down to 115    Anemia of chronic disease  Assessment & Plan  Normocytic anemia  Monitor for bleeding    Epiretinal membrane (ERM) of both eyes- (present on admission)  Assessment & Plan  Continue home Combigan    Long term (current) use of systemic steroids- (present on admission)  Assessment & Plan  Daily prednisone secondary to renal transplant 2008  Continued home dosing of 5mg daily prednisone    Essential  hypertension- (present on admission)  Assessment & Plan  History of  Hold Losartan    Obesity- (present on admission)  Assessment & Plan  Body mass index is 35.57 kg/m².  Outpatient weight loss counseling     Atrial flutter - (present on admission)  Assessment & Plan  History of, s/p ablation 1/21  On Eliquis 5 mg po BID  ST on admission  Eliquis continued       VTE prophylaxis: therapeutic anticoagulation with apixaban    I have performed a physical exam and reviewed and updated ROS and Plan today (2/7/2022). In review of yesterday's note (2/6/2022), there are no changes except as documented above.

## 2022-02-07 NOTE — CARE PLAN
The patient is Stable - Low risk of patient condition declining or worsening    Shift Goals  Clinical Goals: safety and maintian skin integrity  Patient Goals: rest    Progress made toward(s) clinical / shift goals:  Safety measures in place including bed alarm active, wheels locked, & call light within reach. Patient repositions self in bed and skin integrity maintained. He was comfortable and able to sleep in between care.  Pharmacist iris confirmed hyperkalemia policy. See mar for administration.    Patient is not progressing towards the following goals:

## 2022-02-08 NOTE — CARE PLAN
The patient is Stable - Low risk of patient condition declining or worsening    Shift Goals  Clinical Goals: monitor labs, maintain skin integrity  Patient Goals: edge of bed for meals   Family Goals: NA    Progress made toward(s) clinical / shift goals:  Pt remaining clean and dry. Barrier paste applied to buttocks    Patient is not progressing towards the following goals:

## 2022-02-08 NOTE — DISCHARGE PLANNING
Anticipated Discharge Disposition: snf    Action: Reviewed in IDT rounds. Pt not to dc today, with hyperkalemia.   Referral in progress to [facility] lft them a message yesterday and will leave one today to see if they will be ready to accept pt tomorrow.     Barriers to Discharge: med cleared tomorrow likely    Plan: care coordination to follow for dc planning needs.    Addendum at 15:00-[facility] has not beds until next week. [facility] unable to accept pt for a number of days due to new ownership. The only snf with availability is [facility]. Will send referral to [facility] also-await their review.

## 2022-02-08 NOTE — THERAPY
Occupational Therapy   Initial Evaluation     Patient Name: Krihsan Acevedo  Age:  71 y.o., Sex:  male  Medical Record #: [id number]  Today's Date: 2/8/2022     Precautions  Precautions: (P) Fall Risk    Assessment  Patient is 71 y.o. male admitted for fatigue, low energy, COVID (+), diarrhea. Pt normally independent with functional mobility and has been requring assistance with ADLs from wife but she is unable to help due to also having COVID. Pt at a supervision level for bed mobility, maxA for lower body ADLs, Josr for STS for transfers with FWW, pt limited by poor activity tolerance and increased confusion, pt very easily distracted and impulsive. Pt would benefit from continued skilled therapy while admitted as well as recommend post-acute placement.    Plan    Recommend Occupational Therapy 3 times per week until therapy goals are met for the following treatments:  Adaptive Equipment, Neuro Re-Education / Balance, Self Care/Activities of Daily Living, Therapeutic Activities and Therapeutic Exercises.    DC Equipment Recommendations: (P) Unable to determine at this time  Discharge Recommendations: (P) Recommend post-acute placement for additional occupational therapy services prior to discharge home     Objective       02/08/22 1335   Prior Living Situation   Prior Services Intermittent Physical Support for ADL Per Family   Housing / Facility 1 Story House   Steps Into Home 1   Steps In Home 0   Bathroom Set up Walk In Shower;Shower Chair   Equipment Owned 4-Wheel Walker;Single Point Cane;Tub / Shower Seat   Lives with - Patient's Self Care Capacity Spouse   Prior Level of ADL Function   Self Feeding Independent   Grooming / Hygiene Independent   Bathing Independent   Dressing Independent   Toileting Independent   Prior Level of IADL Function   Medication Management Requires Assist   Laundry Requires Assist   Kitchen Mobility Requires Assist   Finances Requires Assist   Home Management Requires Assist   Shopping  Requires Assist   Prior Level Of Mobility Supervision With Device in Home   Driving / Transportation Relatives / Others Provide Transportation   Occupation (Pre-Hospital Vocational) Retired Due To Age   History of Falls   History of Falls Yes   Precautions   Precautions Fall Risk   Cognition    Cognition / Consciousness X   Orientation Level Not Oriented to Year;Not Oriented to Month;Not Oriented to Day   Level of Consciousness Alert   Ability To Follow Commands 1 Step   New Learning Impaired   Attention Impaired   Comments Very easily distracted, confused, often off topic   Active ROM Upper Body   Active ROM Upper Body  WDL   Strength Upper Body   Upper Body Strength  WDL   Balance Assessment   Sitting Balance (Static) Fair   Sitting Balance (Dynamic) Fair -   Standing Balance (Static) Poor +   Standing Balance (Dynamic) Poor +   Weight Shift Sitting Fair   Weight Shift Standing Fair   Comments w/ FWW   Bed Mobility    Supine to Sit Supervised   Scooting Supervised   ADL Assessment   Grooming Supervision   Upper Body Dressing Supervision   Lower Body Dressing Maximal Assist   Toileting   (NT-refused)   How much help from another person does the patient currently need...   Putting on and taking off regular lower body clothing? 2   Bathing (including washing, rinsing, and drying)? 2   Toileting, which includes using a toilet, bedpan, or urinal? 2   Putting on and taking off regular upper body clothing? 3   Taking care of personal grooming such as brushing teeth? 3   Eating meals? 4   6 Clicks Daily Activity Score 16   Functional Mobility   Sit to Stand Minimal Assist   Bed, Chair, Wheelchair Transfer Minimal Assist   Transfer Method Stand Step   Mobility bed mobility, in room mobility, back to bed   Comments w/ FWW   Activity Tolerance   Sitting Edge of Bed 10 min   Standing 5 min   Short Term Goals   Short Term Goal # 1 Pt will complete ADL transfers with supervision   Short Term Goal # 2 Pt will complete LB  dressing with supervision   Short Term Goal # 3 Pt will complete toileting with supervision   Education Group   Education Provided Role of Occupational Therapist   Role of Occupational Therapist Patient Response Patient;Acceptance;Explanation;Reinforcement Needed   Problem List   Problem List Decreased Active Daily Living Skills;Decreased Homemaking Skills;Decreased Functional Mobility;Decreased Activity Tolerance;Impaired Postural Control / Balance   Interdisciplinary Plan of Care Collaboration   IDT Collaboration with  Nursing   Patient Position at End of Therapy In Bed;Bed Alarm On;Call Light within Reach;Tray Table within Reach;Phone within Reach   Collaboration Comments RN updated

## 2022-02-08 NOTE — DISCHARGE PLANNING
Agency[facility]Facility Name: [facility]  Spoke To: Jimena  Outcome: Referral accepted.  Will check on bed availability and call this DPA back.      @[phone]  Agency/Facility Name: [facility]  Spoke To: Jimena  Outcome: Won't have a bed until next week.

## 2022-02-08 NOTE — PROGRESS NOTES
Received report from BRIDGETTE Archibald. Pt is A&O x3, disoriented to situation with delayed responses. Pt on RA, no signs of acute distress. VSS. Pt has no complaints of pain.  POC discussed with patient. Safety precautions in place, bed in lowest position, and call light and personal belongings within reach. Hourly rounding in place.

## 2022-02-08 NOTE — PROGRESS NOTES
"[facility] -  PROGRESS NOTE               Author: SILVIA Felder Date & Time: 2/8/2022  9:30 AM     HPI:  Krishan Acevedo is a 71 y.o. 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 Was additionally found to have worsening renal function.   Patient shares that he first tested positive for COVID at an outside hospital several days ago. He felt that he was not improving and possibly getting worse at home which prompted him to call EMS. He mentions that for several days he has been having loose stools, noting 3-5 loose but not watery BMs daily, noting his last loose stool was day prior to presentation. He also mentions that he has become more dependent on his wife to bring him food and water citing fatigue with his COVID infection. He does note that for the past several weeks he has been feeling \"like I'm always thirsty\".  While in the ED he was vitally stable. Was placed on 2L NC sating in the mid 90s BP ranged 123/70 from 159/117. Labs notable for Cr of 2.62. BUN 80, COVID-19 positive, UA noted for large occult blood, small leukocyte esterase. US of kidneys was completed with indicated no hydronephrosis or fluid collections, no renal artery stenosis.   In regards to his transplant history. Living donor transplant was completed in 2008 at an outside hospital. Patient is on Prednisone 5mg, Mycophenolate 500mg BID, and Tacrolimus 1mg and 0.5mg daily. His GFR trends in the 30s-40s although in chart he has climbed to the 56-53 ranges in the past.     DAILY NEPHROLOGY SUMMARY:  2/1: Admitted, consult placed  2/2: no overnight events, renal function improving, patient tolerating PO intake  2/3: started on IVF yesterday, renal function improved to around prior baseline range  2/4: renal function " "continues to improve  2/5: Scr down to baseline levels. No new overnight renal events. Feels ok.  2/6: Feels ok. Urine culture from 2/1 positive for carbapenem resistant Pseudomonas aeruginosa. He is on zosyn per sensitivity. No labs today  2/7: Potassium is 5.7 today.  Creatinine up to 1.95.  Corrected calcium elevated.  CO2 16.  2/08: Chart and notes reviewed. No new labs this am. +Tachycardia.     REVIEW OF SYSTEMS:    Deferred for Covid-19 Precautions    PMH/PSH/SH/FH: Reviewed and unchanged since admission note  CURRENT MEDICATIONS: Reviewed from admission to present day    VS:  /64   Pulse (!) 101 Comment:  Nurse notified   Temp 36.3 °C (97.4 °F) (Temporal)   Resp 19   Ht 1.803 m (5' 11\")   Wt 116 kg (255 lb)   SpO2 93%   BMI 35.57 kg/m²      Physical Examination:  Deferred due to covid    Fluids:  In: 791.3 [P.O.:580; I.V.:111.3]  Out: 250     LABS:  Recent Labs     02/06/22  1019 02/07/22  0232   SODIUM 136 143   POTASSIUM 5.4 5.7*   CHLORIDE 111 114*   CO2 13* 16*   GLUCOSE 138* 96   BUN 62* 69*   CREATININE 1.70* 1.95*   CALCIUM 10.3 10.4       IMPRESSION:    #History of Renal Transplant  #History of ANCA Vasculitis  #Acute on Chronic kidney injury  #CKD 3  #Acidosis  #Afib  #DM2     Get labs  Suspect worsening renal function was likely secondary to volume depletion in the context of diarrhea and impaired ability to self hydrate secondary to fatigue from COVID-19 infection.     S/P 1L bolus in ED  S/p IVF from 2/2-2/3  -Tacrolimus level drawn on 2/2/22, : 13.6     Recommendations:  - Hold losartan  - Repeat UA  - Start veltassa 8.6g daily  - Start sodium bicarbonate 650mg PO TID  - Hold IVF for now  - Continue with all immunosuppression meds while inpatient: prednisone, tacrolimus, mycophenolate.  Elevated sec to diarrhea which has resolved.  - 12 hr Tacrolimus Level to be drawn tomorrow am 2/9/22 at 0600 am before am dose is given   - Avoid nephrotoxins where possible, renally dose " medications  - Renal diet   - Need daily am labs, will follow with further recommendations    **Discussed with Primary RN

## 2022-02-08 NOTE — CARE PLAN
Orientation: Alert and oriented times four.   HNT: WDL  Respiratory: Lung sounds clear even and unlabored   O2: Room air.   Cardiac: S1S2  GI: contient and voiding.   : audiable and active bowel sounds   IV: PIV flushed and intact   Skin: Raw to buttocks. Barrier cream applied.   VS: stable  Pain: denies  Oob: with assistance.    Patient safety: Hourly rounding completed. Call bell within reach and pt educated on assistance. Pt remained free from falls and injury.      The patient is Watcher - Medium risk of patient condition declining or worsening    Shift Goals  Clinical Goals: monitor labs, maintain skin integrity  Patient Goals: edge of bed for meals   Family Goals: NA    Progress made toward(s) clinical / shift goals:  Safety.     Patient is not progressing towards the following goals:

## 2022-02-09 NOTE — DISCHARGE PLANNING
Received Choice form at   Agency/Facility Name: [facility]  Referral sent per Choice form @ [phone]

## 2022-02-09 NOTE — PROGRESS NOTES
"[facility] -  PROGRESS NOTE               Author: SILVIA Carrera Date & Time: 2/9/2022  9:48 AM     HPI:  Krishan Acevedo is a 71 y.o. 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 Was additionally found to have worsening renal function.   Patient shares that he first tested positive for COVID at an outside hospital several days ago. He felt that he was not improving and possibly getting worse at home which prompted him to call EMS. He mentions that for several days he has been having loose stools, noting 3-5 loose but not watery BMs daily, noting his last loose stool was day prior to presentation. He also mentions that he has become more dependent on his wife to bring him food and water citing fatigue with his COVID infection. He does note that for the past several weeks he has been feeling \"like I'm always thirsty\".  While in the ED he was vitally stable. Was placed on 2L NC sating in the mid 90s BP ranged 123/70 from 159/117. Labs notable for Cr of 2.62. BUN 80, COVID-19 positive, UA noted for large occult blood, small leukocyte esterase. US of kidneys was completed with indicated no hydronephrosis or fluid collections, no renal artery stenosis.   In regards to his transplant history. Living donor transplant was completed in 2008 at an outside hospital. Patient is on Prednisone 5mg, Mycophenolate 500mg BID, and Tacrolimus 1mg and 0.5mg daily. His GFR trends in the 30s-40s although in chart he has climbed to the 56-53 ranges in the past.     DAILY NEPHROLOGY SUMMARY:  2/1: Admitted, consult placed  2/2: no overnight events, renal function improving, patient tolerating PO intake  2/3: started on IVF yesterday, renal function improved to around prior baseline range  2/4: renal " "function continues to improve  2/5: Scr down to baseline levels. No new overnight renal events. Feels ok.  2/6: Feels ok. Urine culture from 2/1 positive for carbapenem resistant Pseudomonas aeruginosa. He is on zosyn per sensitivity. No labs today  2/7: Potassium is 5.7 today.  Creatinine up to 1.95.  Corrected calcium elevated.  CO2 16.  2/08: Chart and notes reviewed. No new labs this am. +Tachycardia.   2/09: Nursing notes and assessments reviewed.  Int tachycardia still. Room air. SBP labile 100s-160s. K 5.5    REVIEW OF SYSTEMS:    Deferred for Covid-19 Precautions    PMH/PSH/SH/FH: Reviewed and unchanged since admission note  CURRENT MEDICATIONS: Reviewed from admission to present day    VS:  /78   Pulse (!) 109 Comment: Nurse notified   Temp 36.2 °C (97.1 °F) (Temporal)   Resp 18   Ht 1.803 m (5' 11\")   Wt 116 kg (255 lb)   SpO2 93%   BMI 35.57 kg/m²      Physical Examination:  Deferred due to covid    Fluids:  In: 120 [P.O.:120]  Out: 1200     LABS:  Recent Labs     02/07/22  0232 02/08/22  0848 02/09/22  0620   SODIUM 143 144  144 145   POTASSIUM 5.7* 5.4  5.4 5.5   CHLORIDE 114* 113*  113* 116*   CO2 16* 17*  17* 18*   GLUCOSE 96 115*  115* 115*   BUN 69* 66*  66* 63*   CREATININE 1.95* 1.72*  1.72* 1.67*   CALCIUM 10.4 11.0*  11.0* 11.2*       IMPRESSION:    #History of Renal Transplant  #History of ANCA Vasculitis  #Acute on Chronic kidney injury  #CKD 3  #Acidosis   -CO2 18  #Afib  #DM2  #HTN   - Losartan on hold due to high K   - Hydralazine PRN  #Chronic microhematuria   - No UTI on repeat UA   - Serologies pending     Get labs  Suspect worsening renal function was likely secondary to volume depletion in the context of diarrhea and impaired ability to self hydrate secondary to fatigue from COVID-19 infection.     S/P 1L bolus in ED  S/p IVF from 2/2-2/3  -Tacrolimus level drawn on 2/2/22, : 13.6     Recommendations:  - Continue to hold losartan  - On veltassa 8.6g daily since " 2/7/22  - Low potassium renal diet   - Continue sodium bicarbonate 650mg PO TID  - Hold IVF for now  - Order serologies  - Continue with all immunosuppression meds while inpatient: prednisone, tacrolimus, mycophenolate.  Elevated sec to diarrhea which is improving  - 12 hr Tacrolimus Level pending 2/9 0600  - Avoid nephrotoxins where possible, renally dose medications  - Need daily am labs, will follow with further recommendations    **Discussed with Primary RN

## 2022-02-09 NOTE — PROGRESS NOTES
Received report from BRIDGETTE Magdaleno. Pt is A&O x3, disoriented to situation with delayed responses and confused. Pt on RA, no signs of acute distress. VSS. Pt has no complaints of pain.  POC discussed with patient. Safety precautions in place, bed in lowest position, and call light and personal belongings within reach. Hourly rounding in place.

## 2022-02-09 NOTE — CARE PLAN
The patient is Stable - Low risk of patient condition declining or worsening    Shift Goals  Clinical Goals: maintain skin integrity  Patient Goals: edge of bed for meals   Family Goals: NA    Progress made toward(s) clinical / shift goals:  Pt remaining clean and dry. Barrier paste applied to buttocks      Patient is not progressing towards the following goals:

## 2022-02-09 NOTE — PROGRESS NOTES
Hospital Medicine Daily Progress Note    Date of Service  2/8/2022    Chief Complaint  Krishan Acevedo is a 71 y.o. male admitted 2/1/2022 with generalized weakness    Hospital Course  This is a 71 year old male with PMHx of hypertension, hyperlipidemia, type 2 diabetes A1c 7.2, atrial flutter on Eliquis, ANCA vasculitis s/p transplant, recent COVID infection 1/25/2022 who was admitted on 02/1/2022 with weakness and diarrhea.    Patient is vaccinated against COVID-19. Diagnosed with COVID on 01/25. Patient remains on room air throughout admission.    Patient with ongoing diarrhea, C. difficile and stool cultures negative.    UA positive for UTI, urine culture positive for carbapenem resistant Pseudomonas aeruginosa. Due to less than 10 K organisms, ID did not recommend any antibiotics at this time. Blood cultures are negative to date.    Patient with RADHA on CKD. Patient initially started on fluid, renal ultrasound unremarkable. Patient started on Veltassa and sodium bicarb. Patient is to follow-up with nephrology outpatient.    Patient evaluated by PT/OT with recommendations for SNF.    Interval Problem Update  Hyperkalemia improving.  Continue with Veltassa.    Pending SNF availability.    I have personally seen and examined the patient at bedside. I discussed the plan of care with patient, bedside RN, charge RN and .    Consultants/Specialty  nephrology    Code Status  Full Code    Disposition  Patient is medically cleared for discharge.   Anticipate discharge to to skilled nursing facility.  I have placed the appropriate orders for post-discharge needs.    Review of Systems  Review of Systems   Neurological: Positive for weakness.   All other systems reviewed and are negative.       Physical Exam  Temp:  [36.2 °C (97.2 °F)-36.9 °C (98.5 °F)] 36.3 °C (97.4 °F)  Pulse:  [] 100  Resp:  [18-20] 18  BP: (140-151)/(64-77) 150/77  SpO2:  [93 %-96 %] 93 %    Physical Exam  Vitals and nursing note  reviewed.   Constitutional:       General: He is not in acute distress.     Appearance: Normal appearance.   HENT:      Head: Normocephalic and atraumatic.   Eyes:      Extraocular Movements: Extraocular movements intact.      Conjunctiva/sclera: Conjunctivae normal.      Pupils: Pupils are equal, round, and reactive to light.   Cardiovascular:      Rate and Rhythm: Normal rate and regular rhythm.      Pulses: Normal pulses.      Heart sounds: No murmur heard.  No friction rub. No gallop.    Pulmonary:      Effort: Pulmonary effort is normal. No respiratory distress.      Breath sounds: Normal breath sounds. No wheezing, rhonchi or rales.   Abdominal:      General: Bowel sounds are normal. There is no distension.      Palpations: Abdomen is soft.      Tenderness: There is no abdominal tenderness.   Musculoskeletal:         General: No swelling or tenderness. Normal range of motion.      Cervical back: Normal range of motion and neck supple. No muscular tenderness.      Right lower leg: No edema.      Left lower leg: No edema.   Skin:     General: Skin is warm and dry.      Capillary Refill: Capillary refill takes less than 2 seconds.      Findings: No bruising, erythema or rash.   Neurological:      General: No focal deficit present.      Mental Status: He is alert and oriented to person, place, and time.         Fluids    Intake/Output Summary (Last 24 hours) at 2/8/2022 1613  Last data filed at 2/8/2022 0900  Gross per 24 hour   Intake 0 ml   Output 600 ml   Net -600 ml       Laboratory  Recent Labs     02/07/22  0232 02/08/22  0848   WBC 5.8 5.9   RBC 4.48* 4.62*   HEMOGLOBIN 12.9* 13.3*   HEMATOCRIT 40.5* 42.1   MCV 90.4 91.1   MCH 28.8 28.8   MCHC 31.9* 31.6*   RDW 50.4* 50.1*   PLATELETCT 154* 165   MPV 10.2 10.0     Recent Labs     02/06/22  1019 02/07/22  0232 02/08/22  0848   SODIUM 136 143 144  144   POTASSIUM 5.4 5.7* 5.4  5.4   CHLORIDE 111 114* 113*  113*   CO2 13* 16* 17*  17*   GLUCOSE 138* 96  115*  115*   BUN 62* 69* 66*  66*   CREATININE 1.70* 1.95* 1.72*  1.72*   CALCIUM 10.3 10.4 11.0*  11.0*                   Imaging  US-EXTREMITY VENOUS LOWER BILAT   Final Result      US-RENAL TRANSPLANT COMP   Final Result      1.  No hydronephrosis or perinephric fluid collection involving the right lower quadrant renal transplant.   2.  No definite evidence of hemodynamically significant renal artery stenosis.   3.  Elevated resistive indices could suggest renal parenchymal disease.      DX-CHEST-PORTABLE (1 VIEW)   Final Result      1.  Bilateral peripheral lower lobe hazy opacities which is nonspecific but can be seen with Covid 19 pneumonia.   2.  Enlarged cardiac silhouette with probable vascular congestion/edema.           Assessment/Plan  * RADHA (acute kidney injury) (HCC)- (present on admission)  Assessment & Plan  Creatine 2.64 on admission  Ranged from 1.27-1.71 over the past year  1000 ml fluid bolus in ED  Nephrology, Dr. Lyman consulted, appreciate recommendations  UA negative for UTI    Hyperkalemia  Assessment & Plan  Potassium 5.7 on 2/7  Nephrology is following   Started veltassa  Monitor     Acidosis  Assessment & Plan  Nephrology is following  Started sodium bicarbonate  Monitor     Positive urine culture  Assessment & Plan  Urine culture from 2/1 positive for carbapenem resistant Pseudomonas aeruginosa.  Discussed with ID. Dr. Haider, who reviewed the chart, less than 10K organisms on culture, no antibiotics recommended.    Pain in left shin  Assessment & Plan  On compression  US venous to r/o DVT    Diarrhea  Assessment & Plan  Improving  Likely viral  C.diff negative.  Stool culture from 2/3 negative.  Imodium PRN    Elevated brain natriuretic peptide (BNP) level- (present on admission)  Assessment & Plan  Caution with IV fluids    Thrombocytopenia (HCC)  Assessment & Plan  Platelets 118 on admission with slight trend down to 115    Anemia of chronic disease  Assessment &  Plan  Normocytic anemia  Monitor for bleeding    Epiretinal membrane (ERM) of both eyes- (present on admission)  Assessment & Plan  Continue home Combigan    Long term (current) use of systemic steroids- (present on admission)  Assessment & Plan  Daily prednisone secondary to renal transplant 2008  Continued home dosing of 5mg daily prednisone    Essential hypertension- (present on admission)  Assessment & Plan  History of  Hold Losartan    Kidney transplant status, living related donor- (present on admission)  Assessment & Plan  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 Blount, self-manages lasix administration at home based on fluid volume status  Renal US negative for hydronephrosis or renal stenosis  Dr. Lyman, nephrology consulted, appreciate recommendations  Continue home prednisone, tacrolimus, mycophenolate  Renal dosing for all medication    Obesity- (present on admission)  Assessment & Plan  Body mass index is 35.57 kg/m².  Outpatient weight loss counseling     Atrial flutter - (present on admission)  Assessment & Plan  History of, s/p ablation 1/21  On Eliquis 5 mg po BID  ST on admission  Eliquis continued       VTE prophylaxis: SCDs/TEDs and therapeutic anticoagulation with Eliquis    I have performed a physical exam and reviewed and updated ROS and Plan today (2/8/2022). In review of yesterday's note (2/7/2022), there are no changes except as documented above.

## 2022-02-09 NOTE — PROGRESS NOTES
Received report from NOC RN and assumed care of pt. Pt is A&Ox3, disoriented to situation. Pt is resting in bed on room air. Pt reports no pain. POC discussed with pt; all questions answered. No other needs at this time. Bed locked in lowest position, call light within reach, bed alarm on pt educated to call for assistance.

## 2022-02-09 NOTE — DISCHARGE PLANNING
Agency/Facility Name: [facility]  Spoke To: [facility]  Outcome: Has a bed available today.    @[id number]  Agency/Facility Name: [facility]  Outcome: DPA left a message for [facility], pt will be ready tomorrow.

## 2022-02-09 NOTE — DIETARY
"Nutrition services: Day 8 of admit.  Krishan Acevedo is a 71 y.o. male with admitting DX of RADHA (acute kidney injury)     Consult received for poor PO noted on weekly screen      Assessment:  Height: 180.3 cm (5' 11\")  Weight: 116 kg (255 lb)  Body mass index is 35.57 kg/m²., BMI classification: class 2 obesity  Diet/Intake: consistent CHO (diabetic), renal, low potassium diet/refusal to < 25% of meals on 2/8/22    Evaluation:   1. Pt with complaint of weakness and diarrhea. DX includes UTI, HTN, HLD, DM2, ANCA vasulitis s/p kidney transplant, and recent COVID infection  2. Poor PO intake noted on weekly rescreen. PO intake was good on 2/6 at % but has dwindled to <25% of all meals on 2/8. Nurse said that he refused his breakfast this morning also.   3. RD is not able to visit pt in his room due to dept policy on enhanced droplet isolation. RD tried to call pt on his room and cell phone. No answer.   4. Nursing is not sure if pt will drink Boost supplements. RD will add Boost Glucose Control to meals TID and monitor PO intake of them.     Malnutrition Risk: criteria not met but risk noted due to current poor PO     Recommendations/Plan:  1. Add Boost Glucose Control to meals TID   2. Encourage intake of >50%  3. Document intake of all meals and supplements as % taken in ADL's to provide interdisciplinary communication across all shifts.   4. Monitor weight.  5. Nutrition rep will continue to see patient for ongoing meal and snack preferences.     RD will follow.  "

## 2022-02-09 NOTE — CARE PLAN
Problem: Nutritional:  Goal: Achieve adequate nutritional intake  Description: Patient will consume >50% of meals and supplements.  Outcome: Not Met   See RD note.

## 2022-02-09 NOTE — THERAPY
Physical Therapy   Daily Treatment     Patient Name: Krishan Acevedo  Age:  71 y.o., Sex:  male  Medical Record #: [id number]  Today's Date: 2/8/2022     Precautions  Precautions: Fall Risk    Assessment    Pt was pleasantly confused, easily distracted requiring multiple vc to redirect to task at hand. He reached EOB with spv. He completed STS with Josr progressing to CGA with fww. Ambulated short distance forward backward and side stepping with frequent rest breaks due to low endurance and fatigue. SP02 at 91% on RA with activity. He presents with impairments in strength, balance and activity tolerance. Will continue to follow while in house to progress at this time.     Plan    Continue current treatment plan.    DC Equipment Recommendations: Unable to determine at this time  Discharge Recommendations: Recommend post-acute placement for additional physical therapy services prior to discharge home         02/08/22 1305   Balance   Sitting Balance (Static) Fair   Sitting Balance (Dynamic) Fair -   Standing Balance (Static) Poor +   Standing Balance (Dynamic) Poor +   Weight Shift Sitting Fair   Weight Shift Standing Fair   Gait Analysis   Gait Level Of Assist Minimal Assist   Assistive Device Front Wheel Walker   Distance (Feet) 5   # of Times Distance was Traveled 2   Comments fatigues quickly, easily distracted, Josr for safety with fww    Bed Mobility    Supine to Sit Supervised   Sit to Supine Supervised   Scooting Supervised   Rolling Supervised   Functional Mobility   Sit to Stand Minimal Assist   Skilled Intervention Verbal Cuing;Sequencing   Comments attempted STS with out fww required Josr, progressed to CGA with AD    Short Term Goals    Short Term Goal # 1 in 6 visits patient will demo all functional transfers with Taty for safe DC    Goal Outcome # 1 goal not met   Short Term Goal # 2 in 6 visits patient will ambualte 200' Tayt for safe DC    Goal Outcome # 2 Goal not met   Short Term Goal # 3 in 6 visits  patient will navigate 1 stairs with Taty for safe DC    Goal Outcome # 3 Goal not met

## 2022-02-09 NOTE — DISCHARGE PLANNING
Anticipated Discharge Disposition: snf    Action: Reviewed choice with wife Penelope and she would like pt to transfer to [place].   Ana CASTRO notified [facility] Liaison to reserve a bed for pt for   tomorrow.  Reviewed in IDT rounds. Pt is med cleared.   CM covering on 2/10/22 to arrange GMT WC transport to [place] in the morning. Covid + pt. Will note in hand off report.   Voalte texted above to BSN, MD, and CNA.     Barriers to Discharge:     Plan: plan transfer to [facility] 2-10-22.

## 2022-02-09 NOTE — PROGRESS NOTES
Hospital Medicine Daily Progress Note    Date of Service  2/9/2022    Chief Complaint  Krishan Acevedo is a 71 y.o. male admitted 2/1/2022 with generalized weakness    Hospital Course  This is a 71 year old male with PMHx of hypertension, hyperlipidemia, type 2 diabetes A1c 7.2, atrial flutter on Eliquis, ANCA vasculitis s/p transplant, recent COVID infection 1/25/2022 who was admitted on 02/1/2022 with weakness and diarrhea.    Patient is vaccinated against COVID-19. Diagnosed with COVID on 01/25. Patient remains on room air throughout admission.    Patient with ongoing diarrhea, C. difficile and stool cultures negative.    UA positive for UTI, urine culture positive for carbapenem resistant Pseudomonas aeruginosa. Due to less than 10 K organisms, ID did not recommend any antibiotics at this time. Blood cultures are negative to date.    Patient with RADHA on CKD. Patient initially started on fluid, renal ultrasound unremarkable. Patient started on Veltassa and sodium bicarb. Patient is to follow-up with nephrology outpatient.    Patient evaluated by PT/OT with recommendations for SNF.    Interval Problem Update  SNF bed available tomorrow, will discharge tomorrow.    I have personally seen and examined the patient at bedside. I discussed the plan of care with patient, bedside RN, charge RN and .    Consultants/Specialty  nephrology    Code Status  Full Code    Disposition  Patient is medically cleared for discharge.   Anticipate discharge to to skilled nursing facility.  I have placed the appropriate orders for post-discharge needs.    Review of Systems  Review of Systems   Neurological: Positive for weakness.   All other systems reviewed and are negative.       Physical Exam  Temp:  [36.2 °C (97.1 °F)-37 °C (98.6 °F)] 36.2 °C (97.1 °F)  Pulse:  [] 109  Resp:  [18] 18  BP: (140-163)/(75-80) 140/78  SpO2:  [92 %-93 %] 93 %    Physical Exam  Vitals and nursing note reviewed.   Constitutional:        General: He is not in acute distress.     Appearance: Normal appearance.   HENT:      Head: Normocephalic and atraumatic.   Eyes:      Extraocular Movements: Extraocular movements intact.      Conjunctiva/sclera: Conjunctivae normal.      Pupils: Pupils are equal, round, and reactive to light.   Cardiovascular:      Rate and Rhythm: Normal rate and regular rhythm.      Pulses: Normal pulses.      Heart sounds: No murmur heard.  No friction rub. No gallop.    Pulmonary:      Effort: Pulmonary effort is normal. No respiratory distress.      Breath sounds: Normal breath sounds. No wheezing, rhonchi or rales.   Abdominal:      General: Bowel sounds are normal. There is no distension.      Palpations: Abdomen is soft.      Tenderness: There is no abdominal tenderness.   Musculoskeletal:         General: No swelling or tenderness. Normal range of motion.      Cervical back: Normal range of motion and neck supple. No muscular tenderness.      Right lower leg: No edema.      Left lower leg: No edema.   Skin:     General: Skin is warm and dry.      Capillary Refill: Capillary refill takes less than 2 seconds.      Findings: No bruising, erythema or rash.   Neurological:      General: No focal deficit present.      Mental Status: He is alert and oriented to person, place, and time.         Fluids    Intake/Output Summary (Last 24 hours) at 2/9/2022 1208  Last data filed at 2/8/2022 1700  Gross per 24 hour   Intake 120 ml   Output 600 ml   Net -480 ml       Laboratory  Recent Labs     02/07/22  0232 02/08/22  0848   WBC 5.8 5.9   RBC 4.48* 4.62*   HEMOGLOBIN 12.9* 13.3*   HEMATOCRIT 40.5* 42.1   MCV 90.4 91.1   MCH 28.8 28.8   MCHC 31.9* 31.6*   RDW 50.4* 50.1*   PLATELETCT 154* 165   MPV 10.2 10.0     Recent Labs     02/07/22  0232 02/08/22  0848 02/09/22  0620   SODIUM 143 144  144 145   POTASSIUM 5.7* 5.4  5.4 5.5   CHLORIDE 114* 113*  113* 116*   CO2 16* 17*  17* 18*   GLUCOSE 96 115*  115* 115*   BUN 69* 66*  66*  63*   CREATININE 1.95* 1.72*  1.72* 1.67*   CALCIUM 10.4 11.0*  11.0* 11.2*                   Imaging  US-EXTREMITY VENOUS LOWER BILAT   Final Result      US-RENAL TRANSPLANT COMP   Final Result      1.  No hydronephrosis or perinephric fluid collection involving the right lower quadrant renal transplant.   2.  No definite evidence of hemodynamically significant renal artery stenosis.   3.  Elevated resistive indices could suggest renal parenchymal disease.      DX-CHEST-PORTABLE (1 VIEW)   Final Result      1.  Bilateral peripheral lower lobe hazy opacities which is nonspecific but can be seen with Covid 19 pneumonia.   2.  Enlarged cardiac silhouette with probable vascular congestion/edema.           Assessment/Plan  * RADHA (acute kidney injury) (HCC)- (present on admission)  Assessment & Plan  Creatine 2.64 on admission  Ranged from 1.27-1.71 over the past year  1000 ml fluid bolus in ED  Nephrology, Dr. Lyman consulted, appreciate recommendations  UA negative for UTI    Hyperkalemia  Assessment & Plan  Potassium 5.7 on 2/7  Nephrology is following   Started veltassa  Monitor     Acidosis  Assessment & Plan  Nephrology is following  Started sodium bicarbonate  Monitor     Positive urine culture  Assessment & Plan  Urine culture from 2/1 positive for carbapenem resistant Pseudomonas aeruginosa.  Discussed with ID. Dr. Haider, who reviewed the chart, less than 10K organisms on culture, no antibiotics recommended.    Pain in left shin  Assessment & Plan  On compression  US venous to r/o DVT    Diarrhea  Assessment & Plan  Improving  Likely viral  C.diff negative.  Stool culture from 2/3 negative.  Imodium PRN    Elevated brain natriuretic peptide (BNP) level- (present on admission)  Assessment & Plan  Caution with IV fluids    Thrombocytopenia (HCC)  Assessment & Plan  Platelets 118 on admission with slight trend down to 115    Anemia of chronic disease  Assessment & Plan  Normocytic anemia  Monitor for  bleeding    Epiretinal membrane (ERM) of both eyes- (present on admission)  Assessment & Plan  Continue home Combigan    Long term (current) use of systemic steroids- (present on admission)  Assessment & Plan  Daily prednisone secondary to renal transplant 2008  Continued home dosing of 5mg daily prednisone    Essential hypertension- (present on admission)  Assessment & Plan  History of  Hold Losartan    Kidney transplant status, living related donor- (present on admission)  Assessment & Plan  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 Blount, self-manages lasix administration at home based on fluid volume status  Renal US negative for hydronephrosis or renal stenosis  Dr. Lyman, nephrology consulted, appreciate recommendations  Continue home prednisone, tacrolimus, mycophenolate  Renal dosing for all medication    Obesity- (present on admission)  Assessment & Plan  Body mass index is 35.57 kg/m².  Outpatient weight loss counseling     Atrial flutter - (present on admission)  Assessment & Plan  History of, s/p ablation 1/21  On Eliquis 5 mg po BID  ST on admission  Eliquis continued       VTE prophylaxis: SCDs/TEDs and therapeutic anticoagulation with Eliquis    I have performed a physical exam and reviewed and updated ROS and Plan today (2/9/2022). In review of yesterday's note (2/8/2022), there are no changes except as documented above.

## 2022-02-10 PROBLEM — E83.52 HYPERCALCEMIA: Status: ACTIVE | Noted: 2022-01-01

## 2022-02-10 NOTE — CARE PLAN
The patient is Stable - Low risk of patient condition declining or worsening    Shift Goals  Clinical Goals: Pt will get up to chair for meals  Patient Goals: edge of bed for meals   Family Goals: NA    Progress made toward(s) clinical / shift goals:  Encourage pt to get up for meals. Provide pt with appropriate assistive device for transfer. Ensure chair alarm is on and call light within reach.    Patient is not progressing towards the following goals:

## 2022-02-10 NOTE — PROGRESS NOTES
Received report from BRIDGETTE Sifuentes. Pt is A&O x3, disoriented to situation with delayed responses and confused. Pt on RA, no signs of acute distress. VSS. Pt has no complaints of pain.  POC discussed with patient. Safety precautions in place, bed in lowest position, and call light and personal belongings within reach. Hourly rounding in place.

## 2022-02-10 NOTE — PROGRESS NOTES
"[facility] -  PROGRESS NOTE               Author: SILVIA Carrera Date & Time: 2/10/2022  9:33 AM     HPI:  Krishan Acevedo is a 71 y.o. 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 Was additionally found to have worsening renal function.   Patient shares that he first tested positive for COVID at an outside hospital several days ago. He felt that he was not improving and possibly getting worse at home which prompted him to call EMS. He mentions that for several days he has been having loose stools, noting 3-5 loose but not watery BMs daily, noting his last loose stool was day prior to presentation. He also mentions that he has become more dependent on his wife to bring him food and water citing fatigue with his COVID infection. He does note that for the past several weeks he has been feeling \"like I'm always thirsty\".  While in the ED he was vitally stable. Was placed on 2L NC sating in the mid 90s BP ranged 123/70 from 159/117. Labs notable for Cr of 2.62. BUN 80, COVID-19 positive, UA noted for large occult blood, small leukocyte esterase. US of kidneys was completed with indicated no hydronephrosis or fluid collections, no renal artery stenosis.   In regards to his transplant history. Living donor transplant was completed in 2008 at an outside hospital. Patient is on Prednisone 5mg, Mycophenolate 500mg BID, and Tacrolimus 1mg and 0.5mg daily. His GFR trends in the 30s-40s although in chart he has climbed to the 56-53 ranges in the past.     DAILY NEPHROLOGY SUMMARY:  2/1: Admitted, consult placed  2/2: no overnight events, renal function improving, patient tolerating PO intake  2/3: started on IVF yesterday, renal function improved to around prior baseline range  2/4: renal " "function continues to improve  2/5: Scr down to baseline levels. No new overnight renal events. Feels ok.  2/6: Feels ok. Urine culture from 2/1 positive for carbapenem resistant Pseudomonas aeruginosa. He is on zosyn per sensitivity. No labs today  2/7: Potassium is 5.7 today.  Creatinine up to 1.95.  Corrected calcium elevated.  CO2 16.  2/08: Chart and notes reviewed. No new labs this am. +Tachycardia.   2/09: Nursing notes and assessments reviewed.  Int tachycardia still. Room air. SBP labile 100s-160s. K 5.5  2/10: Spoke with RN, patient has no complaints at this time.   Plan is for discharge today or tomorrow to SNF pending 1L bolus and Calcium recheck.  Current SCa 11.5  VSS RA  -160s    REVIEW OF SYSTEMS:    Deferred for Covid-19 Precautions    PMH/PSH/SH/FH: Reviewed and unchanged since admission note  CURRENT MEDICATIONS: Reviewed from admission to present day    VS:  /80   Pulse 85   Temp 36.4 °C (97.6 °F) (Temporal)   Resp 17   Ht 1.803 m (5' 11\")   Wt 116 kg (255 lb)   SpO2 93%   BMI 35.57 kg/m²      Physical Examination:  Deferred due to covid    Fluids:  In: 240 [P.O.:240]  Out: 100     LABS:  Recent Labs     02/08/22  0848 02/09/22  0620 02/10/22  0127   SODIUM 144  144 145 144   POTASSIUM 5.4  5.4 5.5 5.2   CHLORIDE 113*  113* 116* 114*   CO2 17*  17* 18* 15*   GLUCOSE 115*  115* 115* 103*   BUN 66*  66* 63* 63*   CREATININE 1.72*  1.72* 1.67* 1.68*   CALCIUM 11.0*  11.0* 11.2* 11.5*       IMPRESSION:    #History of Renal Transplant   -Tacrolimus 22.5    2/8/22  #History of ANCA Vasculitis  #Acute on Chronic kidney injury  #CKD 3  #Acidosis   -CO2 18->15  #Afib  #DM2  #HTN   - Losartan on hold due to high K   - Amlodipine 5mg started today 2/10/22   - Hydralazine PRN  #Chronic microhematuria   - No UTI on repeat UA   - Serologies pending     Get labs  Suspect worsening renal function was likely secondary to volume depletion in the context of diarrhea and impaired ability " to self hydrate secondary to fatigue from COVID-19 infection.     S/P 1L bolus in ED  S/p IVF from 2/2-2/3  -Tacrolimus level drawn on 2/2/22, : 13.6     Recommendations:  - Hold PM tacrolimus dose today, start 0.5mg PO BID tomorrow AM and recheck   trough in 1 week.    - Continue to hold losartan  - On veltassa 8.6g daily since 2/7/22  - Low potassium renal diet   - Increase sodium bicarbonate 1300 mg PO TID  - Hold IVF for now  - Await serologies  - Continue with all immunosuppression meds while inpatient: prednisone,    tacrolimus, mycophenolate.  Elevated sec to diarrhea which is improving  - Avoid nephrotoxins where possible, renally dose medications  - Follow up outpatient in our office within 2 weeks if discharged.

## 2022-02-10 NOTE — PROGRESS NOTES
Hospital Medicine Daily Progress Note    Date of Service  2/10/2022    Chief Complaint  Krishan Acevedo is a 71 y.o. male admitted 2/1/2022 with generalized weakness    Hospital Course  This is a 71 year old male with PMHx of hypertension, hyperlipidemia, type 2 diabetes A1c 7.2, atrial flutter on Eliquis, ANCA vasculitis s/p transplant, recent COVID infection 1/25/2022 who was admitted on 02/1/2022 with weakness and diarrhea.    Patient is vaccinated against COVID-19. Diagnosed with COVID on 01/25. Patient remains on room air throughout admission.    Patient with ongoing diarrhea, C. difficile and stool cultures negative.    UA positive for UTI, urine culture positive for carbapenem resistant Pseudomonas aeruginosa. Due to less than 10 K organisms, ID did not recommend any antibiotics at this time. Blood cultures are negative to date.    Patient with RADHA on CKD. Patient initially started on fluid, renal ultrasound unremarkable. Patient started on Veltassa and sodium bicarb. Patient is to follow-up with nephrology outpatient.    Patient evaluated by PT/OT with recommendations for SNF.    Interval Problem Update  SNF bed available tomorrow, will discharge tomorrow.    Hyperglycemia noted on labs, patient given 1 L bolus, will continue to trend.    Tacrolimus level elevated, patient to continue with tacrolimus 0.5 mg twice daily, have a repeat tacrolimus level next week and adjust dosing as needed.    I have personally seen and examined the patient at bedside. I discussed the plan of care with patient, bedside RN, charge RN and .    Consultants/Specialty  nephrology    Code Status  Full Code    Disposition  Patient is medically cleared for discharge.   Anticipate discharge to to skilled nursing facility.  I have placed the appropriate orders for post-discharge needs.    Review of Systems  Review of Systems   Neurological: Positive for weakness.   All other systems reviewed and are negative.        Physical Exam  Temp:  [36 °C (96.8 °F)-36.7 °C (98 °F)] 36.4 °C (97.6 °F)  Pulse:  [85-99] 85  Resp:  [17-19] 17  BP: (150-168)/(77-83) 155/80  SpO2:  [93 %-98 %] 93 %    Physical Exam  Vitals and nursing note reviewed.   Constitutional:       General: He is not in acute distress.     Appearance: Normal appearance.   HENT:      Head: Normocephalic and atraumatic.   Eyes:      Extraocular Movements: Extraocular movements intact.      Conjunctiva/sclera: Conjunctivae normal.      Pupils: Pupils are equal, round, and reactive to light.   Cardiovascular:      Rate and Rhythm: Normal rate and regular rhythm.      Pulses: Normal pulses.      Heart sounds: No murmur heard.  No friction rub. No gallop.    Pulmonary:      Effort: Pulmonary effort is normal. No respiratory distress.      Breath sounds: Normal breath sounds. No wheezing, rhonchi or rales.   Abdominal:      General: Bowel sounds are normal. There is no distension.      Palpations: Abdomen is soft.      Tenderness: There is no abdominal tenderness.   Musculoskeletal:         General: No swelling or tenderness. Normal range of motion.      Cervical back: Normal range of motion and neck supple. No muscular tenderness.      Right lower leg: No edema.      Left lower leg: No edema.   Skin:     General: Skin is warm and dry.      Capillary Refill: Capillary refill takes less than 2 seconds.      Findings: No bruising, erythema or rash.   Neurological:      General: No focal deficit present.      Mental Status: He is alert and oriented to person, place, and time.         Fluids    Intake/Output Summary (Last 24 hours) at 2/10/2022 1316  Last data filed at 2/9/2022 2203  Gross per 24 hour   Intake --   Output 100 ml   Net -100 ml       Laboratory  Recent Labs     02/08/22  0848   WBC 5.9   RBC 4.62*   HEMOGLOBIN 13.3*   HEMATOCRIT 42.1   MCV 91.1   MCH 28.8   MCHC 31.6*   RDW 50.1*   PLATELETCT 165   MPV 10.0     Recent Labs     02/08/22  0848 02/09/22  0620  02/10/22  0127   SODIUM 144  144 145 144   POTASSIUM 5.4  5.4 5.5 5.2   CHLORIDE 113*  113* 116* 114*   CO2 17*  17* 18* 15*   GLUCOSE 115*  115* 115* 103*   BUN 66*  66* 63* 63*   CREATININE 1.72*  1.72* 1.67* 1.68*   CALCIUM 11.0*  11.0* 11.2* 11.5*                   Imaging  US-EXTREMITY VENOUS LOWER BILAT   Final Result      US-RENAL TRANSPLANT COMP   Final Result      1.  No hydronephrosis or perinephric fluid collection involving the right lower quadrant renal transplant.   2.  No definite evidence of hemodynamically significant renal artery stenosis.   3.  Elevated resistive indices could suggest renal parenchymal disease.      DX-CHEST-PORTABLE (1 VIEW)   Final Result      1.  Bilateral peripheral lower lobe hazy opacities which is nonspecific but can be seen with Covid 19 pneumonia.   2.  Enlarged cardiac silhouette with probable vascular congestion/edema.           Assessment/Plan  * RADHA (acute kidney injury) (HCC)- (present on admission)  Assessment & Plan  Creatine 2.64 on admission  Ranged from 1.27-1.71 over the past year  1000 ml fluid bolus in ED  Nephrology, Dr. Lyman consulted, appreciate recommendations  UA negative for UTI    Hypercalcemia  Assessment & Plan  Noted on labs this morning, will patient given 1 L bolus  We will continue to monitor calcium levels    Hyperkalemia  Assessment & Plan  Potassium 5.7 on 2/7  Nephrology is following   Started veltassa  Monitor     Acidosis  Assessment & Plan  Nephrology is following  Started sodium bicarbonate  Monitor     Positive urine culture  Assessment & Plan  Urine culture from 2/1 positive for carbapenem resistant Pseudomonas aeruginosa.  Discussed with ID. Dr. Haider, who reviewed the chart, less than 10K organisms on culture, no antibiotics recommended.    Pain in left shin  Assessment & Plan  On compression  US venous to r/o DVT    Diarrhea  Assessment & Plan  Improving  Likely viral  C.diff negative.  Stool culture from 2/3  negative.  Imodium PRN    Elevated brain natriuretic peptide (BNP) level- (present on admission)  Assessment & Plan  Caution with IV fluids    Thrombocytopenia (HCC)  Assessment & Plan  Platelets 118 on admission with slight trend down to 115    Anemia of chronic disease  Assessment & Plan  Normocytic anemia  Monitor for bleeding    Epiretinal membrane (ERM) of both eyes- (present on admission)  Assessment & Plan  Continue home Combigan    Long term (current) use of systemic steroids- (present on admission)  Assessment & Plan  Daily prednisone secondary to renal transplant 2008  Continued home dosing of 5mg daily prednisone    Essential hypertension- (present on admission)  Assessment & Plan  History of  Hold Losartan    Kidney transplant status, living related donor- (present on admission)  Assessment & Plan  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 Blount, self-manages lasix administration at home based on fluid volume status  Renal US negative for hydronephrosis or renal stenosis  Dr. Lyman, nephrology consulted, appreciate recommendations  Continue home prednisone, tacrolimus, mycophenolate  Renal dosing for all medication    Obesity- (present on admission)  Assessment & Plan  Body mass index is 35.57 kg/m².  Outpatient weight loss counseling     Atrial flutter - (present on admission)  Assessment & Plan  History of, s/p ablation 1/21  On Eliquis 5 mg po BID  ST on admission  Eliquis continued       VTE prophylaxis: SCDs/TEDs and therapeutic anticoagulation with Eliquis    I have performed a physical exam and reviewed and updated ROS and Plan today (2/10/2022). In review of yesterday's note (2/9/2022), there are no changes except as documented above.

## 2022-02-10 NOTE — PROGRESS NOTES
Assumed care at 0700. Patient A&Ox3. Pt denies pain. Call light and belongings within reach. Bed locked and in lowest position. Bed alarm on.

## 2022-02-10 NOTE — CARE PLAN
The patient is Stable - Low risk of patient condition declining or worsening    Shift Goals  Clinical Goals: maintain skin integrity  Patient Goals: edge of bed for meals   Family Goals: NA    Progress made toward(s) clinical / shift goals:  pt remaining clean and dry. Pt moves self in bed. Barrier cream applied to buttocks    Patient is not progressing towards the following goals:

## 2022-02-10 NOTE — DISCHARGE PLANNING
Per Dr. Robbins plan repeat calcium today and then discharge today or tomorrow to SNF     [facility]     DPA notified to check on SNF availability when able today     CM following for DC Planning needs     3:45pm RNCM called [facility] admissions and left message requesting call back regarding bed availability. Cortney with [facility] states she has 3 admissions from [facility] scheduled for tomorrow but if one of them cancels then he would get the bed. [facility] will have a bed for him on Saturday if not tomorrow.

## 2022-02-11 PROBLEM — E87.0 HYPERNATREMIA: Status: ACTIVE | Noted: 2022-01-01

## 2022-02-11 PROBLEM — G93.40 ACUTE ENCEPHALOPATHY: Status: ACTIVE | Noted: 2022-01-01

## 2022-02-11 NOTE — ASSESSMENT & PLAN NOTE
Appears toxic metabolic vs prednisone?  Mentation is improving though remains slightly confused.  Transient change of mentation last night, now resolved. Comfortable at the time of my assessment.  Continue to monitor, may engage with offers of water or ice chips.

## 2022-02-11 NOTE — CARE PLAN
The patient is Stable - Low risk of patient condition declining or worsening    Shift Goals  Clinical Goals: remain free from falls  Patient Goals: rest  Family Goals: n/a    Progress made toward(s) clinical / shift goals:  bed locked and in lowest position, personal belongings within reach, call light within reach, pt close to nursing station, pt educated on the use of call light.     Patient is not progressing towards the following goals:

## 2022-02-11 NOTE — THERAPY
Missed Therapy     Patient Name: Krishan Acevedo  Age:  71 y.o., Sex:  male  Medical Record #: [id number]  Today's Date: 2/11/2022    Attempted to see pt for OT tx. RN asked therapy to hold at this time d/t possible change in status and increased confusion. Will try again later as able.

## 2022-02-11 NOTE — CARE PLAN
The patient is Stable - Low risk of patient condition declining or worsening    Shift Goals  Clinical Goals: monitor lab values  Patient Goals: rest  Family Goals: n/a    Progress made toward(s) clinical / shift goals:    Problem: Knowledge Deficit - Standard  Goal: Patient and family/care givers will demonstrate understanding of plan of care, disease process/condition, diagnostic tests and medications  Outcome: Progressing     Problem: Pain - Standard  Goal: Alleviation of pain or a reduction in pain to the patient’s comfort goal  Outcome: Progressing     Problem: Skin Integrity  Goal: Skin integrity is maintained or improved  Outcome: Progressing     Problem: Fall Risk  Goal: Patient will remain free from falls  Outcome: Progressing       Patient is not progressing towards the following goals:

## 2022-02-11 NOTE — PROGRESS NOTES
Hospital Medicine Daily Progress Note    Date of Service  2/12/2022    Chief Complaint  Krishan Acevedo is a 71 y.o. male admitted 2/1/2022 with generalized weakness    Hospital Course  This is a 71 year old male with PMHx of hypertension, hyperlipidemia, type 2 diabetes A1c 7.2, atrial flutter on Eliquis, ANCA vasculitis s/p transplant, recent COVID infection 1/25/2022 who was admitted on 02/1/2022 with weakness and diarrhea.    Patient is vaccinated against COVID-19. Diagnosed with COVID on 01/25. Patient remains on room air throughout admission.    UA positive for UTI, urine culture positive for carbapenem resistant Pseudomonas aeruginosa. Due to less than 10 K organisms, ID did not recommend any antibiotics at this time.  Patient with worsening AMS on 02/11, repeat urine is worse, pending repeat urine culture and blood culture.  ID reconsulted.    Patient with ongoing diarrhea, C. difficile and stool cultures negative x2.    Patient with RADHA on CKD. Patient initially started on fluid, renal ultrasound unremarkable. Patient started on Veltassa and sodium bicarb. Patient is to follow-up with nephrology outpatient.    Patient evaluated by PT/OT with recommendations for SNF.    Interval Problem Update  Patient is much more disoriented today, concerns for underlying infection.    UA looks worse than prior, prior urine culture noted carbapenem resistant Pseudomonas aeruginosa.  Pending repeat urine culture and blood culture.  ID consulted for antibiotic recommendation.    Patient continues with diarrhea, C. difficile is negative.    Patient with hypernatremia and hyperkalemia, patient started on D5 fluids, will continue to trend serially.    Tacrolimus level elevated, patient to continue with tacrolimus 0.5 mg twice daily, have a repeat tacrolimus level next week and adjust dosing as needed.    I have personally seen and examined the patient at bedside. I discussed the plan of care with patient, bedside RN, charge  RN and .    Consultants/Specialty  nephrology    Code Status  Full Code    Disposition  Patient is not medically cleared for discharge.   Anticipate discharge to to skilled nursing facility.  I have placed the appropriate orders for post-discharge needs.    Review of Systems  Review of Systems   Unable to perform ROS: Mental status change   All other systems reviewed and are negative.       Physical Exam  Temp:  [36.1 °C (97 °F)-36.8 °C (98.3 °F)] 36.4 °C (97.6 °F)  Pulse:  [] 113  Resp:  [19-24] 24  BP: (113-154)/(80-98) 135/83  SpO2:  [93 %-96 %] 96 %    Physical Exam  Vitals and nursing note reviewed.   Constitutional:       General: He is not in acute distress.     Appearance: Normal appearance.   HENT:      Head: Normocephalic and atraumatic.   Eyes:      Extraocular Movements: Extraocular movements intact.      Conjunctiva/sclera: Conjunctivae normal.      Pupils: Pupils are equal, round, and reactive to light.   Cardiovascular:      Rate and Rhythm: Normal rate and regular rhythm.      Pulses: Normal pulses.      Heart sounds: No murmur heard.  No friction rub. No gallop.    Pulmonary:      Effort: Pulmonary effort is normal. No respiratory distress.      Breath sounds: Normal breath sounds. No wheezing, rhonchi or rales.   Abdominal:      General: Bowel sounds are normal. There is no distension.      Palpations: Abdomen is soft.      Tenderness: There is no abdominal tenderness.   Musculoskeletal:         General: No swelling or tenderness. Normal range of motion.      Cervical back: Normal range of motion and neck supple. No muscular tenderness.      Right lower leg: No edema.      Left lower leg: No edema.   Skin:     General: Skin is warm and dry.      Capillary Refill: Capillary refill takes less than 2 seconds.      Findings: No bruising, erythema or rash.   Neurological:      General: No focal deficit present.      Mental Status: He is alert and oriented to person, place, and time.          Fluids  No intake or output data in the 24 hours ending 02/12/22 1228    Laboratory  Recent Labs     02/11/22  1255 02/12/22  0016   WBC 8.0 9.5   RBC 4.80 4.94   HEMOGLOBIN 14.0 14.1   HEMATOCRIT 43.4 44.6   MCV 90.4 90.3   MCH 29.2 28.5   MCHC 32.3* 31.6*   RDW 51.5* 50.6*   PLATELETCT 188 186   MPV 10.3 10.5     Recent Labs     02/10/22  1323 02/10/22  1323 02/11/22  0138 02/11/22  0138 02/11/22  1255 02/11/22  1751 02/12/22  0016   SODIUM 145   < > 149*   < > 150* 152* 152*   POTASSIUM 5.5  --  5.3  --   --   --  5.2   CHLORIDE 117*  --  121*  --   --   --  124*   CO2 16*  --  17*  --   --   --  15*   GLUCOSE 135*  --  114*  --   --   --  142*   BUN 63*  --  64*  --   --   --  61*   CREATININE 1.79*  --  1.70*  --   --   --  1.82*   CALCIUM 11.5*  --  11.8*  --   --   --  11.9*    < > = values in this interval not displayed.                   Imaging  US-EXTREMITY VENOUS LOWER BILAT   Final Result      US-RENAL TRANSPLANT COMP   Final Result      1.  No hydronephrosis or perinephric fluid collection involving the right lower quadrant renal transplant.   2.  No definite evidence of hemodynamically significant renal artery stenosis.   3.  Elevated resistive indices could suggest renal parenchymal disease.      DX-CHEST-PORTABLE (1 VIEW)   Final Result      1.  Bilateral peripheral lower lobe hazy opacities which is nonspecific but can be seen with Covid 19 pneumonia.   2.  Enlarged cardiac silhouette with probable vascular congestion/edema.      CT-CHEST,ABDOMEN,PELVIS W/O    (Results Pending)   CT-HEAD W/O    (Results Pending)        Assessment/Plan  * RADHA (acute kidney injury) (HCC)- (present on admission)  Assessment & Plan  Creatine 2.64 on admission  Ranged from 1.27-1.71 over the past year  1000 ml fluid bolus in ED  Nephrology, following, appreciate recommendations    Acute encephalopathy  Assessment & Plan  Likely secondary to UTI  Prior urine culture noted Carbapenem resistant Pseudomonas  Started  on Yeison as per ID  CT head, chest, ab, pel ordered    Hypernatremia  Assessment & Plan  Patient started on D5  Monitor sodium every 6    Hypercalcemia  Assessment & Plan  Noted on labs this morning, will patient given 1 L bolus  We will continue to monitor calcium levels (considered moderate now)  IVF    Hyperkalemia  Assessment & Plan  Potassium 5.7 on 2/7  Nephrology is following   Started veltassa  Monitor     Acidosis  Assessment & Plan  Nephrology is following  Started sodium bicarbonate  Monitor     Positive urine culture  Assessment & Plan  Urine culture from 2/1 positive for carbapenem resistant Pseudomonas aeruginosa.  Discussed with ID. Dr. Haider, who reviewed the chart, less than 10K organisms on culture, no antibiotics recommended.  On 02/11, with worsening AMS, UA worse when compared to prior, ID reconsulted  Patient to be started on meropenem and CT chest, ab, pelv ordered.    Pain in left shin  Assessment & Plan  On compression  US venous to r/o DVT    Diarrhea  Assessment & Plan  Improving  Likely viral  C.diff negative x 2  Stool culture from 2/3 negative.  Imodium PRN    Elevated brain natriuretic peptide (BNP) level- (present on admission)  Assessment & Plan  Caution with IV fluids    Thrombocytopenia (HCC)  Assessment & Plan  Platelets 118 on admission with slight trend down to 115    Anemia of chronic disease  Assessment & Plan  Normocytic anemia  Monitor for bleeding    Epiretinal membrane (ERM) of both eyes- (present on admission)  Assessment & Plan  Continue home Combigan    Long term (current) use of systemic steroids- (present on admission)  Assessment & Plan  Daily prednisone secondary to renal transplant 2008  Continued home dosing of 5mg daily prednisone    Essential hypertension- (present on admission)  Assessment & Plan  History of  Hold Losartan    Kidney transplant status, living related donor- (present on admission)  Assessment & Plan  S/P transplant 2008 in [place], [place]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 Blount, self-manages lasix administration at home based on fluid volume status  Renal US negative for hydronephrosis or renal stenosis  Dr. Lyman, nephrology consulted, appreciate recommendations  Continue home prednisone, tacrolimus, mycophenolate  Renal dosing for all medication    Obesity- (present on admission)  Assessment & Plan  Body mass index is 35.57 kg/m².  Outpatient weight loss counseling     Atrial flutter - (present on admission)  Assessment & Plan  History of, s/p ablation 1/21  On Eliquis 5 mg po BID  ST on admission  Eliquis continued       VTE prophylaxis: SCDs/TEDs and therapeutic anticoagulation with Eliquis    I have performed a physical exam and reviewed and updated ROS and Plan today (2/12/2022). In review of yesterday's note (2/11/2022), there are no changes except as documented above.

## 2022-02-11 NOTE — DISCHARGE PLANNING
8am    ANTICIPATED DATE OF DISCHARGE: possibly Monday pending medical clearance     ANTICIPATED DISCHARGE DISPOSITION: SNF ([facility] or [facility])    INSURANCE: Medicare and [employer]     BARRIERS TO DISCHARGE:  Labs within range, medical clearance, decline    ACTION: Dr. Robbins reported labs are not where they need to be this morning and will not be ready to discharge until Monday. DPA notified to update SNFs.     PLAN: CM following for dc to SNF     2pm spoke to Jimena with [facility] and [facility]. [facility] does not have any beds until Monday. [facility] has beds for weekend at this time. CM will follow up with Jimena at [phone] if ready over the weekend.

## 2022-02-11 NOTE — PROGRESS NOTES
"Received report from BRIDGETTE Talbot. Pt is A&O x3, disoriented to situation with delayed responses, confused, and anxious. Pt repeatedly said \"I need to see my wife.\" This nurse had to remind the pt that it was 2200. Therapeutic communication was used. Pt on RA, no signs of acute distress. VSS. Pt has no complaints of pain. Pt running NS at 150ml.hour. IV patent with no sign of infiltration. POC discussed with patient. Safety precautions in place, bed in lowest position, and call light and personal belongings within reach. Hourly rounding in place.   "

## 2022-02-11 NOTE — THERAPY
Missed Therapy     Patient Name: Krishan Acevedo  Age:  71 y.o., Sex:  male  Medical Record #: [id number]  Today's Date: 2/11/2022 02/11/22 1121   Interdisciplinary Plan of Care Collaboration   Collaboration Comments Attempted therapy follow up session. Per RN hold at this time due to increased confusion and change in status and lab values. Will continue to follow up.

## 2022-02-11 NOTE — PROGRESS NOTES
Received report from NOC RN and assumed care of pt. Pt is confused this morning, unable to answer orientation questions. Pt is resting in bed on room air. Incontinence care provided. POC discussed with pt; all questions answered. No other needs at this time. Bed locked in lowest position, call light within reach, bed alarm on.

## 2022-02-11 NOTE — PROGRESS NOTES
"[facility] -  PROGRESS NOTE               Author: Damian Pack M.D. Date & Time: 2/11/2022  11:31 AM     HPI:  Krishan Acevedo is a 71 y.o. 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 Was additionally found to have worsening renal function.   Patient shares that he first tested positive for COVID at an outside hospital several days ago. He felt that he was not improving and possibly getting worse at home which prompted him to call EMS. He mentions that for several days he has been having loose stools, noting 3-5 loose but not watery BMs daily, noting his last loose stool was day prior to presentation. He also mentions that he has become more dependent on his wife to bring him food and water citing fatigue with his COVID infection. He does note that for the past several weeks he has been feeling \"like I'm always thirsty\".  While in the ED he was vitally stable. Was placed on 2L NC sating in the mid 90s BP ranged 123/70 from 159/117. Labs notable for Cr of 2.62. BUN 80, COVID-19 positive, UA noted for large occult blood, small leukocyte esterase. US of kidneys was completed with indicated no hydronephrosis or fluid collections, no renal artery stenosis.   In regards to his transplant history. Living donor transplant was completed in 2008 at an outside hospital. Patient is on Prednisone 5mg, Mycophenolate 500mg BID, and Tacrolimus 1mg and 0.5mg daily. His GFR trends in the 30s-40s although in chart he has climbed to the 56-53 ranges in the past.     DAILY NEPHROLOGY SUMMARY:  2/1: Admitted, consult placed  2/2: no overnight events, renal function improving, patient tolerating PO intake  2/3: started on IVF yesterday, renal function improved to around prior baseline range  2/4: renal function continues " "to improve  2/5: Scr down to baseline levels. No new overnight renal events. Feels ok.  2/6: Feels ok. Urine culture from 2/1 positive for carbapenem resistant Pseudomonas aeruginosa. He is on zosyn per sensitivity. No labs today  2/7: Potassium is 5.7 today.  Creatinine up to 1.95.  Corrected calcium elevated.  CO2 16.  2/08: Chart and notes reviewed. No new labs this am. +Tachycardia.   2/09: Nursing notes and assessments reviewed.  Int tachycardia still. Room air. SBP labile 100s-160s. K 5.5  2/10: Spoke with RN, patient has no complaints at this time.   Plan is for discharge today or tomorrow to SNF pending 1L bolus and Calcium recheck.  Current SCa 11.5  VSS RA  -160s  2/11: Sodium up to 149.  Calcium increasing.  Creatinine stable 1.7.    REVIEW OF SYSTEMS:    Deferred for Covid-19 Precautions    PMH/PSH/SH/FH: Reviewed and unchanged since admission note  CURRENT MEDICATIONS: Reviewed from admission to present day    VS:  BP (!) 180/105 Comment: nurse aware  Pulse (!) 104 Comment: nurse aware  Temp 36.7 °C (98 °F) (Temporal)   Resp 19   Ht 1.803 m (5' 11\")   Wt 116 kg (255 lb)   SpO2 94%   BMI 35.57 kg/m²      Physical Examination:  Deferred due to covid    Fluids:  In: 1000   Out: 500     LABS:  Recent Labs     02/10/22  0127 02/10/22  1323 02/11/22  0138   SODIUM 144 145 149*   POTASSIUM 5.2 5.5 5.3   CHLORIDE 114* 117* 121*   CO2 15* 16* 17*   GLUCOSE 103* 135* 114*   BUN 63* 63* 64*   CREATININE 1.68* 1.79* 1.70*   CALCIUM 11.5* 11.5* 11.8*       IMPRESSION:    #History of Renal Transplant   -Tacrolimus 22.5    2/8/22  #History of ANCA Vasculitis  #Acute on Chronic kidney injury  #CKD 3  #Acidosis  #Afib  #DM2  #HTN   - Losartan on hold due to high K   - Amlodipine 5mg started today 2/10/22   - Hydralazine PRN  #Chronic microhematuria   - No UTI on repeat UA   - Serologies pending     Get labs  Suspect worsening renal function was likely secondary to volume depletion in the context of " diarrhea and impaired ability to self hydrate secondary to fatigue from COVID-19 infection.     S/P 1L bolus in ED  S/p IVF from 2/2-2/3  -Tacrolimus level drawn on 2/2/22, : 13.6     Recommendations:  - Tacrolimus 0.5mg PO BID recheck  trough in 1 week 2/18/21  - Start D5W 75cc/hr for now, titrate as needed to maintain euntremia  - Obtain SPEP, k/l ratio  - Continue to hold losartan  - On veltassa 8.6g daily since 2/7/22  - Low potassium renal diet   - Sodium bicarbonate 1300 mg PO TID  - Await serologies - ANCA,anti gbm  - Avoid nephrotoxins where possible, renally dose medications  - Follow up outpatient in our office within 2 weeks if discharged.

## 2022-02-12 NOTE — PROGRESS NOTES
[facility] Medicine Daily Progress Note    Date of Service  2/12/2022    Chief Complaint  Krishan Acevedo is a 71 y.o. male admitted 2/1/2022 with generalized weakness    Hospital Course  This is a 71 year old male with PMHx of hypertension, hyperlipidemia, type 2 diabetes A1c 7.2, atrial flutter on Eliquis, ANCA vasculitis s/p transplant, recent COVID infection 1/25/2022 who was admitted on 02/1/2022 with weakness and diarrhea.    Patient is vaccinated against COVID-19. Diagnosed with COVID on 01/25. Patient remains on room air throughout admission.    UA positive for UTI, urine culture positive for carbapenem resistant Pseudomonas aeruginosa. Due to less than 10 K organisms, ID did not recommend any antibiotics at this time.  Patient with worsening AMS on 02/11, repeat urine is worse, pending repeat urine culture and blood culture.  ID reconsulted.    Patient with ongoing diarrhea, C. difficile and stool cultures negative x2.    Patient with RADHA on CKD. Patient initially started on fluid, renal ultrasound unremarkable. Patient started on Veltassa and sodium bicarb. Patient is to follow-up with nephrology outpatient.    Patient evaluated by PT/OT with recommendations for SNF.    Interval Problem Update  Patient is much more disoriented today.  N.p.o. due to altered mental status. Head CT ordered.    Labs with worsening hypernatremia and hypercalcemia, D5 continue with serial sodium checks.    Spoke with ID for antibiotic regimen for this Carbapenem resistant Pseudomonas UTI.  Patient to be started on meropenem (sensitive to on 4/1 UC) and CT chest, ab, pelv ordered.    Patient transferred to telemetry, due to electrolyte abnormalities and overall worsening clinical evaluation.  Continue to monitor for sepsis.     Spoke to wife at bedside, patient is still full code.  Wife will bring advance directive tomorrow.  Updated on plan of care and all questions answered.    ---    3PM Updated by nursing staff patient is  tachypnic with RR >40. ABG ordered not hypercapnic or acidotic. Still pending Head CT and CT imaging of chest, ab, pel. I spoke with ICU for possible IMCU transfer as patient has high potential to deteriorate - accepted.     Wife, Penelope updated, all questions answered.     I have personally seen and examined the patient at bedside. I discussed the plan of care with patient, family, bedside RN, charge RN and .    Consultants/Specialty  nephrology    Code Status  Full Code    Disposition  Patient is not medically cleared for discharge.   Anticipate discharge to to skilled nursing facility.  I have placed the appropriate orders for post-discharge needs.    Review of Systems  Review of Systems   Unable to perform ROS: Mental status change   All other systems reviewed and are negative.     Physical Exam  Temp:  [36 °C (96.8 °F)-36.8 °C (98.3 °F)] 36 °C (96.8 °F)  Pulse:  [] 113  Resp:  [19-40] 26  BP: (113-154)/(83-98) 143/92  SpO2:  [93 %-97 %] 97 %    Physical Exam  Vitals and nursing note reviewed.   Constitutional:       General: He is not in acute distress.     Appearance: Normal appearance.   HENT:      Head: Normocephalic and atraumatic.   Eyes:      Extraocular Movements: Extraocular movements intact.      Conjunctiva/sclera: Conjunctivae normal.      Pupils: Pupils are equal, round, and reactive to light.   Cardiovascular:      Rate and Rhythm: Normal rate and regular rhythm.      Pulses: Normal pulses.      Heart sounds: No murmur heard.  No friction rub. No gallop.    Pulmonary:      Effort: Pulmonary effort is normal. No respiratory distress.      Breath sounds: Normal breath sounds. No wheezing, rhonchi or rales.   Abdominal:      General: Bowel sounds are normal. There is no distension.      Palpations: Abdomen is soft.      Tenderness: There is no abdominal tenderness.   Musculoskeletal:         General: No swelling or tenderness. Normal range of motion.      Cervical back: Normal range  of motion and neck supple. No muscular tenderness.      Right lower leg: No edema.      Left lower leg: No edema.   Skin:     General: Skin is warm and dry.      Capillary Refill: Capillary refill takes less than 2 seconds.      Findings: No bruising, erythema or rash.   Neurological:      General: No focal deficit present.      Mental Status: He is alert and oriented to person, place, and time.         Fluids  No intake or output data in the 24 hours ending 02/12/22 1544    Laboratory  Recent Labs     02/11/22  1255 02/12/22  0016   WBC 8.0 9.5   RBC 4.80 4.94   HEMOGLOBIN 14.0 14.1   HEMATOCRIT 43.4 44.6   MCV 90.4 90.3   MCH 29.2 28.5   MCHC 32.3* 31.6*   RDW 51.5* 50.6*   PLATELETCT 188 186   MPV 10.3 10.5     Recent Labs     02/10/22  1323 02/10/22  1323 02/11/22  0138 02/11/22  1255 02/11/22  1751 02/12/22  0016 02/12/22  1153   SODIUM 145   < > 149*   < > 152* 152* 150*   POTASSIUM 5.5  --  5.3  --   --  5.2  --    CHLORIDE 117*  --  121*  --   --  124*  --    CO2 16*  --  17*  --   --  15*  --    GLUCOSE 135*  --  114*  --   --  142*  --    BUN 63*  --  64*  --   --  61*  --    CREATININE 1.79*  --  1.70*  --   --  1.82*  --    CALCIUM 11.5*  --  11.8*  --   --  11.9*  --     < > = values in this interval not displayed.                   Imaging  US-EXTREMITY VENOUS LOWER BILAT   Final Result      US-RENAL TRANSPLANT COMP   Final Result      1.  No hydronephrosis or perinephric fluid collection involving the right lower quadrant renal transplant.   2.  No definite evidence of hemodynamically significant renal artery stenosis.   3.  Elevated resistive indices could suggest renal parenchymal disease.      DX-CHEST-PORTABLE (1 VIEW)   Final Result      1.  Bilateral peripheral lower lobe hazy opacities which is nonspecific but can be seen with Covid 19 pneumonia.   2.  Enlarged cardiac silhouette with probable vascular congestion/edema.      CT-CHEST,ABDOMEN,PELVIS W/O    (Results Pending)   CT-HEAD W/O     (Results Pending)        Assessment/Plan  * RADHA (acute kidney injury) (HCC)- (present on admission)  Assessment & Plan  Creatine 2.64 on admission  Ranged from 1.27-1.71 over the past year  1000 ml fluid bolus in ED  Nephrology, following, appreciate recommendations    Acute encephalopathy  Assessment & Plan  Likely secondary to UTI  Prior urine culture noted Carbapenem resistant Pseudomonas  Started on Yeison as per ID  CT head, chest, ab, pel ordered    Hypernatremia  Assessment & Plan  Patient started on D5  Monitor sodium every 6    Hypercalcemia  Assessment & Plan  Noted on labs this morning, will patient given 1 L bolus  We will continue to monitor calcium levels (considered moderate now)  IVF    Hyperkalemia  Assessment & Plan  Potassium 5.7 on 2/7  Nephrology is following   Started veltassa  Monitor     Acidosis  Assessment & Plan  Nephrology is following  Started sodium bicarbonate  Monitor     Positive urine culture  Assessment & Plan  Urine culture from 2/1 positive for carbapenem resistant Pseudomonas aeruginosa.  Discussed with ID. Dr. Haider, who reviewed the chart, less than 10K organisms on culture, no antibiotics recommended.  On 02/11, with worsening AMS, UA worse when compared to prior, ID reconsulted  Patient to be started on meropenem and CT chest, ab, pelv ordered.    Pain in left shin  Assessment & Plan  On compression  US venous to r/o DVT    Diarrhea  Assessment & Plan  Improving  Likely viral  C.diff negative x 2  Stool culture from 2/3 negative.  Imodium PRN    Elevated brain natriuretic peptide (BNP) level- (present on admission)  Assessment & Plan  Caution with IV fluids    Thrombocytopenia (HCC)  Assessment & Plan  Platelets 118 on admission with slight trend down to 115    Anemia of chronic disease  Assessment & Plan  Normocytic anemia  Monitor for bleeding    Epiretinal membrane (ERM) of both eyes- (present on admission)  Assessment & Plan  Continue home Combigan    Long term  (current) use of systemic steroids- (present on admission)  Assessment & Plan  Daily prednisone secondary to renal transplant 2008  Continued home dosing of 5mg daily prednisone    Essential hypertension- (present on admission)  Assessment & Plan  History of  Hold Losartan    Kidney transplant status, living related donor- (present on admission)  Assessment & Plan  S/P transplant 2008 in [place], [place]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 Blount, self-manages lasix administration at home based on fluid volume status  Renal US negative for hydronephrosis or renal stenosis  Dr. Lmyan, nephrology consulted, appreciate recommendations  Continue home prednisone, tacrolimus, mycophenolate  Renal dosing for all medication    Obesity- (present on admission)  Assessment & Plan  Body mass index is 35.57 kg/m².  Outpatient weight loss counseling     Atrial flutter - (present on admission)  Assessment & Plan  History of, s/p ablation 1/21  On Eliquis 5 mg po BID  ST on admission  Eliquis continued       VTE prophylaxis: SCDs/TEDs and therapeutic anticoagulation with Eliquis    I have performed a physical exam and reviewed and updated ROS and Plan today (2/12/2022). In review of yesterday's note (2/11/2022), there are no changes except as documented above.

## 2022-02-12 NOTE — CARE PLAN
The patient is Watcher - Medium risk of patient condition declining or worsening    Shift Goals  Clinical Goals: pt will remain free from falls  Patient Goals: rest  Family Goals: n/a    Progress made toward(s) clinical / shift goals:  Assess patient's fall risk. Implement fall precautions. Educate patient on proper use of the call light. Ensure patient's bed is locked in lowest position, call light within reach, and bed alarm is on. Pt is in desk bed close to nurse's station.    Patient is not progressing towards the following goals:      Problem: Knowledge Deficit - Standard  Goal: Patient and family/care givers will demonstrate understanding of plan of care, disease process/condition, diagnostic tests and medications  Outcome: Not Progressing- unable to assess pt's understanding

## 2022-02-12 NOTE — PROGRESS NOTES
"[facility] -  PROGRESS NOTE               Author: Damian Pack M.D. Date & Time: 2/12/2022  10:15 AM     HPI:  Krishan Acevedo is a 71 y.o. 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 Was additionally found to have worsening renal function.   Patient shares that he first tested positive for COVID at an outside hospital several days ago. He felt that he was not improving and possibly getting worse at home which prompted him to call EMS. He mentions that for several days he has been having loose stools, noting 3-5 loose but not watery BMs daily, noting his last loose stool was day prior to presentation. He also mentions that he has become more dependent on his wife to bring him food and water citing fatigue with his COVID infection. He does note that for the past several weeks he has been feeling \"like I'm always thirsty\".  While in the ED he was vitally stable. Was placed on 2L NC sating in the mid 90s BP ranged 123/70 from 159/117. Labs notable for Cr of 2.62. BUN 80, COVID-19 positive, UA noted for large occult blood, small leukocyte esterase. US of kidneys was completed with indicated no hydronephrosis or fluid collections, no renal artery stenosis.   In regards to his transplant history. Living donor transplant was completed in 2008 at an outside hospital. Patient is on Prednisone 5mg, Mycophenolate 500mg BID, and Tacrolimus 1mg and 0.5mg daily. His GFR trends in the 30s-40s although in chart he has climbed to the 56-53 ranges in the past.     DAILY NEPHROLOGY SUMMARY:  2/1: Admitted, consult placed  2/2: no overnight events, renal function improving, patient tolerating PO intake  2/3: started on IVF yesterday, renal function improved to around prior baseline range  2/4: renal function continues " "to improve  2/5: Scr down to baseline levels. No new overnight renal events. Feels ok.  2/6: Feels ok. Urine culture from 2/1 positive for carbapenem resistant Pseudomonas aeruginosa. He is on zosyn per sensitivity. No labs today  2/7: Potassium is 5.7 today.  Creatinine up to 1.95.  Corrected calcium elevated.  CO2 16.  2/08: Chart and notes reviewed. No new labs this am. +Tachycardia.   2/09: Nursing notes and assessments reviewed.  Int tachycardia still. Room air. SBP labile 100s-160s. K 5.5  2/10: Spoke with RN, patient has no complaints at this time.   Plan is for discharge today or tomorrow to SNF pending 1L bolus and Calcium recheck.  Current SCa 11.5  VSS RA  -160s  2/11: Sodium up to 149.  Calcium increasing.  Creatinine stable 1.7.  2/12: Sodium up to 152.  Calcium remains elevated.  Creatinine 1.8.  I/Os not documented    REVIEW OF SYSTEMS:    Deferred for Covid-19 Precautions    PMH/PSH/SH/FH: Reviewed and unchanged since admission note  CURRENT MEDICATIONS: Reviewed from admission to present day    VS:  /98   Pulse (!) 116   Temp 36.2 °C (97.2 °F) (Temporal)   Resp 20   Ht 1.803 m (5' 11\")   Wt 116 kg (255 lb)   SpO2 93%   BMI 35.57 kg/m²      Physical Examination:  Deferred due to covid    Fluids:  No intake/output data recorded.    LABS:  Recent Labs     02/10/22  1323 02/10/22  1323 02/11/22  0138 02/11/22  0138 02/11/22  1255 02/11/22  1751 02/12/22  0016   SODIUM 145   < > 149*   < > 150* 152* 152*   POTASSIUM 5.5  --  5.3  --   --   --  5.2   CHLORIDE 117*  --  121*  --   --   --  124*   CO2 16*  --  17*  --   --   --  15*   GLUCOSE 135*  --  114*  --   --   --  142*   BUN 63*  --  64*  --   --   --  61*   CREATININE 1.79*  --  1.70*  --   --   --  1.82*   CALCIUM 11.5*  --  11.8*  --   --   --  11.9*    < > = values in this interval not displayed.       IMPRESSION:    #History of Renal Transplant   -Tacrolimus 22.5    2/8/22  #History of ANCA Vasculitis  #Acute on Chronic " kidney injury  #CKD 3  #Acidosis  #Afib  #DM2  #HTN   - Losartan on hold due to high K   - Amlodipine 5mg started today 2/10/22   - Hydralazine PRN  #Chronic microhematuria   - No UTI on repeat UA   - Serologies pending     Get labs  Suspect worsening renal function was likely secondary to volume depletion in the context of diarrhea and impaired ability to self hydrate secondary to fatigue from COVID-19 infection.     S/P 1L bolus in ED  S/p IVF from 2/2-2/3  -Tacrolimus level drawn on 2/2/22, : 13.6     Recommendations:  - Tacrolimus 0.5mg PO BID recheck  trough in 1 week 2/18/21  - Increase D5W 150cc/hr for now, titrate as needed to maintain euntremia  - Obtain SPEP, k/l ratio  - Continue to hold losartan  - On veltassa 8.6g daily since 2/7/22  - Low potassium renal diet   - Sodium bicarbonate 1300 mg PO TID  - Await serologies - ANCA,anti gbm  - Avoid nephrotoxins where possible, renally dose medications

## 2022-02-12 NOTE — CONSULTS
RCC    Asked to evaluate for IMCU transfer by hospitalist and rapid response RN.  EHR reviewed.  Patient seen and examined.  Patient appears appropriate for IMCU, order is placed.  Case reviewed with Dr. Barajas excepting IMCU physician.    Patient is a 71-year-old male admitted 2/1 for weakness, nonproductive cough, fever and malaise with recent Covid pneumonia diagnosed 1/25.  Patient is vaccinated and boosted.  Chronically anticoagulated post ablation with Eliquis.  Noted to have RADHA on admission with a history of prior kidney transplant 2008 for ANCA positive vasculitis and currently immune compromised on CellCept, prednisone and tacrolimus.  Patient clinically improving and approaching discharge 2/11 but has developed confusion and multiple metabolic abnormalities without hypotension or lactic acidosis.  Nephrology and ID following.  Antibiotics adjusted by ID, meropenem started for imipenem resistant Pseudomonas in his urine from a few weeks ago that was of low colony count.  IV bicarb drip started for RADHA and mildly reduced serum bicarb.  CT brain without abnormality and CT chest/abdomen/pelvis without contrast with some nonspecific multifocal pulmonary opacifications that could be atelectasis, pneumonia or recent Covid etc.  Nonspecific duodenal wall thickening and cholelithiasis without other findings consistent with infection also noted on abdominal CT.  1.2 cm nodular density left upper lobe on chest CT may need follow-up.  Patient is on room air and oxygenating well.  Blood gas 7.42/26/72 on room air.  Mild hypernatremia and hypercalcemic with bicarb 17 and relatively stable RADHA on chemistries.  Reviewed with and evaluated at the bedside with rapid response nurse, bedside nurse and case reviewed with hospitalist at length as well.  Likely better monitored in IMCU than on medical floor.  Significant risk for needing upgrade to ICU and I have checked the patient out to the ICU team on tonight.  Serial  follow-up with no change in neurologic, respiratory or hemodynamic status so far.    This patient has been admitted to the IMCU for observation and remains under the care of the Hospitalist Team, Renal and ID (who all questions and concerns should be relayed to).  While a critical care consultation has not been requested, we are immediately available if the patient requires critical care in the future.

## 2022-02-12 NOTE — PROGRESS NOTES
Brief ID note:    ID reconsulted secondary to increasing disorientation.    Chart reviewed.    71-year-old man with a history of renal transplant in 2008 secondary to ANCA vasculitis on mycophenolate, tacrolimus, 5 mg of prednisone, atrial flutter status post ablation in January 2021 and type 2 diabetes mellitus admitted on 2/1/2022 secondary to fatigue following COVID-19.  Patient was initially seen by ID on 2/6 secondary to bacterial growth on urine cultures.  At that time, cessation of antibiotics was recommended.  Today, he has been noted to be more disoriented with electrolyte abnormalities.  Repeat urinalysis on 2/11 showed positive nitrites, packed WBCs but negative bacteria.  Last urine culture on 2/1 grew CRE Pseudomonas aeruginosa.  Blood cultures on 2/11 are negative to date    It is unclear that a urinary tract infection is causing his disorientation however given these acute changes, it is worth starting antibiotics.    Recommendations:  -Start renally dosed IV meropenem  -Recommend MRI of the brain, CT chest, abdomen and pelvis without contrast secondary to RADHA    Preliminary recommendations discussed with hospitalist, Dr. Churchill and Veronica, ID pharmacist

## 2022-02-12 NOTE — PROGRESS NOTES
Assumed patient care, patient Is A&O to self. Responsive to pain and can shake head yes and no. Fall precautions, and seizure precautions are in place. Tele box is on and monitoring.

## 2022-02-12 NOTE — DISCHARGE PLANNING
Anticipated Discharge Disposition: SNF    Action: Per SNF admission, no beds available for over the weekend.     Barriers to Discharge: pending SNF bed    Plan: cont to f/u with DC needs.

## 2022-02-12 NOTE — INFECTION CONTROL
This patient is considered COVID RECOVERED.    Patient initially tested positive for COVID on 2/1/2022.  Patient is greater than or equal to 10 days from symptom onset and/or positive test, with symptom improvement.  Per the CDC guidance, this patient no longer requires transmission based precautions.  Patient may be placed on any unit per the bed assignment policy, including placement in a semi-private room with a roommate.

## 2022-02-12 NOTE — CARE PLAN
Problem: Knowledge Deficit - Standard  Goal: Patient and family/care givers will demonstrate understanding of plan of care, disease process/condition, diagnostic tests and medications  Outcome: Progressing     Problem: Pain - Standard  Goal: Alleviation of pain or a reduction in pain to the patient’s comfort goal  Outcome: Progressing     Problem: Skin Integrity  Goal: Skin integrity is maintained or improved  Outcome: Progressing     Problem: Fall Risk  Goal: Patient will remain free from falls  Outcome: Progressing   The patient is Stable - Low risk of patient condition declining or worsening    Shift Goals  Clinical Goals: pt remain free from falls   Patient Goals: syl  Family Goals: syl    Progress made toward(s) clinical / shift goals:  Pt educated on plan of care for tonight, no signs of pain, pt turns self, bed alarm in place    Patient is not progressing towards the following goals:N/A    Assumed care of patient at change of shift.   Report received at bedside rounding  Patient is disoriented and restless in bed   Bed alarm in place  Call light and patient belongings are in reach, bed is locked and in lowest position- hourly rounding is in place. See flow sheets for further assessment.

## 2022-02-13 PROBLEM — E11.9 TYPE 2 DIABETES MELLITUS (HCC): Status: ACTIVE | Noted: 2022-01-01

## 2022-02-13 PROBLEM — R93.89 ABNORMAL CT SCAN: Status: ACTIVE | Noted: 2022-01-01

## 2022-02-13 PROBLEM — E21.3 HYPERPARATHYROIDISM (HCC): Status: ACTIVE | Noted: 2022-01-01

## 2022-02-13 PROBLEM — D84.9 IMMUNOCOMPROMISED (HCC): Status: ACTIVE | Noted: 2022-01-01

## 2022-02-13 NOTE — PROGRESS NOTES
Completed MRI screening form with Penelope, wife. Patient has new permanent bridge in his mouth - has not had an MRI since. Discussed with MRI and Dr. Barajas - larry to proceed.

## 2022-02-13 NOTE — PROGRESS NOTES
"[facility] -  PROGRESS NOTE               Author: Damian Pack M.D. Date & Time: 2/13/2022  7:49 AM     HPI:  Krishan Acevedo is a 71 y.o. 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 Was additionally found to have worsening renal function.   Patient shares that he first tested positive for COVID at an outside hospital several days ago. He felt that he was not improving and possibly getting worse at home which prompted him to call EMS. He mentions that for several days he has been having loose stools, noting 3-5 loose but not watery BMs daily, noting his last loose stool was day prior to presentation. He also mentions that he has become more dependent on his wife to bring him food and water citing fatigue with his COVID infection. He does note that for the past several weeks he has been feeling \"like I'm always thirsty\".  While in the ED he was vitally stable. Was placed on 2L NC sating in the mid 90s BP ranged 123/70 from 159/117. Labs notable for Cr of 2.62. BUN 80, COVID-19 positive, UA noted for large occult blood, small leukocyte esterase. US of kidneys was completed with indicated no hydronephrosis or fluid collections, no renal artery stenosis.   In regards to his transplant history. Living donor transplant was completed in 2008 at an outside hospital. Patient is on Prednisone 5mg, Mycophenolate 500mg BID, and Tacrolimus 1mg and 0.5mg daily. His GFR trends in the 30s-40s although in chart he has climbed to the 56-53 ranges in the past.     DAILY NEPHROLOGY SUMMARY:  2/1: Admitted, consult placed  2/2: no overnight events, renal function improving, patient tolerating PO intake  2/3: started on IVF yesterday, renal function improved to around prior baseline range  2/4: renal function continues " "to improve  2/5: Scr down to baseline levels. No new overnight renal events. Feels ok.  2/6: Feels ok. Urine culture from 2/1 positive for carbapenem resistant Pseudomonas aeruginosa. He is on zosyn per sensitivity. No labs today  2/7: Potassium is 5.7 today.  Creatinine up to 1.95.  Corrected calcium elevated.  CO2 16.  2/08: Chart and notes reviewed. No new labs this am. +Tachycardia.   2/09: Nursing notes and assessments reviewed.  Int tachycardia still. Room air. SBP labile 100s-160s. K 5.5  2/10: Spoke with RN, patient has no complaints at this time.   Plan is for discharge today or tomorrow to SNF pending 1L bolus and Calcium recheck.  Current SCa 11.5  VSS RA  -160s  2/11: Sodium up to 149.  Calcium increasing.  Creatinine stable 1.7.  2/12: Sodium up to 152.  Calcium remains elevated.  Creatinine 1.8.  I/Os not documented  2/13: Sodium still at 152.  Calcium trending down.  Creatinine up to 2.06.  On bicarb drip.  Somnolent    REVIEW OF SYSTEMS:    Deferred for Covid-19 Precautions    PMH/PSH/SH/FH: Reviewed and unchanged since admission note  CURRENT MEDICATIONS: Reviewed from admission to present day    VS:  BP (!) 86/52   Pulse 73   Temp 37.1 °C (98.8 °F) (Temporal)   Resp (!) 22   Ht 1.803 m (5' 11\")   Wt 110 kg (243 lb 2.7 oz)   SpO2 99%   BMI 33.91 kg/m²      Physical Examination:  Deferred due to covid    Fluids:  In: 3634.3 [I.V.:606.7; Other:150; Enteral:300]  Out: 400     LABS:  Recent Labs     02/11/22  0138 02/11/22  1255 02/12/22  0016 02/12/22  1153 02/12/22  1811 02/12/22  1916   SODIUM 149*   < > 152* 150* 152*  150*  --    POTASSIUM 5.3  --  5.2  --  5.4  --    CHLORIDE 121*  --  124*  --  123*  --    CO2 17*  --  15*  --  17*  --    GLUCOSE 114*  --  142*  --  173*  --    BUN 64*  --  61*  --  67*  --    CREATININE 1.70*  --  1.82*  --  2.06*  --    CALCIUM 11.8*  --  11.9*  --  11.8* 11.4*    < > = values in this interval not displayed.       IMPRESSION:    #History of Renal " Transplant   -Tacrolimus 22.5    2/8/22  #History of ANCA Vasculitis   - Repeat serologies positive  #Acute on Chronic kidney injury  #CKD 3  #Acidosis  #Afib  #DM2  #HTN   - Losartan on hold due to high K   - Amlodipine 5mg started today 2/10/22   - Hydralazine PRN  #Chronic microhematuria   - No UTI on repeat UA   - Serologies pending     Get labs  Suspect worsening renal function was likely secondary to volume depletion in the context of diarrhea and impaired ability to self hydrate secondary to fatigue from COVID-19 infection.     S/P 1L bolus in ED  S/p IVF from 2/2-2/3  -Tacrolimus level drawn on 2/2/22, : 13.6     Recommendations:  - Repeat ANCA serologies positive with +hematuria, however too unstable for renal biopsy at this time  - Will start with Solumedrol 1g daily x 3  - Tacrolimus 0.5mg PO BID recheck  trough in 1 week 2/18/21  - Hold Cellcept for now given worsening clinical status  - Added D5W at 150 cc/hr for now, titrate as needed to maintain euntremia, continue free water flushes  - Follow up SPEP, k/l ratio  - Continue to hold losartan  - On veltassa 8.6g daily since 2/7/22  - Low potassium renal diet   - Sodium bicarbonate 1300 mg PO TID  - Avoid nephrotoxins where possible, renally dose medications

## 2022-02-13 NOTE — DIETARY
"Nutrition Support Assessment:  Day 12 of admit.  Krishan Acevedo is a 71 y.o. male with admitting DX of RADHA (acute kidney injury)     Current problem list:  1. Hypernatremia  2. Acute encephalopathy  3. Hypercalcemia  4. Acidosis  5. Hyperkalemia  6. Elevated BNP  7. Anemia of chronic disease  8. Thrombocytopenia  9. RADHA  10. Long term use of systemic steroids  11. Essential hypertension  12. Atrial flutter  13. Kidney transplant status     Assessment:  Estimated Nutritional Needs based on:   Height: 180.3 cm (5' 11\")  Weight: 110 kg (243 lb 2.7 oz)  Weight to Use in Calculations: 110 kg (243 lb 2.7 oz) - most recent bed scale wt  Ideal Body Weight: 78 kg (172 lb)  Percent Ideal Body Weight: 141.4  Body mass index is 33.91 kg/m²., BMI classification: obese class I    Calculation/Equation: MSJ x 1.0 = 1872 kcals/day   Total Calories / day: 1872 - 2090 (Calories / k - 19)  Total Grams Protein / day: 66 - 110 (Grams Protein / k.6 - 1.0) - lower protein provision indicated in pt with acute on chronic kidney injury, CKD stage 3  Estimated fluid needs: 2762 - 3315 mL/day (25-30 mL/kg of ABW)     Evaluation:   1. Pt initially admitted to the floor. Transferred to [facility] yesterday for higher level of care.  2. Admitted  with weakness, COVID+ .  3. Cortrak placed and verified (duodenal).  4. Free water flushes of 150 mL q4 hours started yesterday (additional 900 mL of free water per day)  5. Glucose 265, BUN 66, Creatinine 2.15, Pre-Albumin 7.7 (), CRP 5.27 (), A1c 7.2 ()   6. Solu-medrol, Cellcept, Prograf on the MAR.  7. Pt previously on a Consistent CHO, Renal, low potassium diet. Per ADL flow sheet, PO has been sub-optima (pt refusing majority of meals).  8. Wt is down 5% during admit (12 days).  9. Specialized, renal tube feeding formula indicated for pt with RADHA on CKD stage 3.   10. Nephrology following, recommended renal diet with low potassium.     Malnutrition Risk: Pt with severe, acute " malnutrition r/t weakness and diarrhea from recent COVID infection, AEB severe wt loss of 5% in 12 days and <50% of estimated energy needs >5 days.     Recommendations/Plan:  1. Start Novasource Renal @ 25 mL/hr. Advance per protocol to goal rate of 40 mL/hr, providing 1920 kcals, 87 grams of protein and 691 mL of free water per day.  2. Fluids per MD. Current tube feeding formula is low volume. Pt will need ~ 2000 mL of additional free water per day to meet estimated fluid needs.    RD following.

## 2022-02-13 NOTE — PROGRESS NOTES
Hospital Medicine Daily Progress Note    Date of Service  2/13/2022    Chief Complaint  Weakness    Hospital Course  This is a 71 year old male with PMHx of hypertension, hyperlipidemia, type 2 diabetes A1c 7.2, atrial flutter on Eliquis, ANCA vasculitis s/p transplant, recent COVID infection 1/25/2022 who was admitted on 02/1/2022 with weakness and diarrhea.  Patient is vaccinated against COVID-19. Diagnosed with COVID on 01/25. Patient remains on room air throughout admission.  UA positive for UTI, urine culture positive for carbapenem resistant Pseudomonas aeruginosa. Due to less than 10 K organisms, ID did not intially recommend any antibiotics at this time.  Patient with worsening AMS on 02/11, repeat urine is worse, pending repeat urine culture and IV meropenem was started by ID Dr. Krishnamurthy who reconsulted.  Patient with ongoing diarrhea, C. difficile and stool cultures negative x2.  Patient with RADHA on CKD. Patient initially started on fluid, renal ultrasound unremarkable. Patient started on Veltassa and sodium bicarb. Nephrology has followed.  On 2/12 he developed hypotension, lactic acidosis and decreased level of consciousness and was transferred to the [facility]. Initiated on Zyvox for MRSA positive nares and abnormal CT lungs. He was also treated for hypercalcemia. Cortrak placed and enteral feeding initiated.    Interval Problem Update  2/13 more alert and conversant but still with lethargy and some word finding difficulty.  Weak.  Wife at bedside and given updates.      I have personally seen and examined the patient at bedside. I discussed the plan of care with patient, family, bedside RN and infectious disease.    Consultants/Specialty  infectious disease and nephrology    Code Status  Full Code    Disposition  Patient is not medically cleared for discharge.   Anticipate discharge to to home with close outpatient follow-up.  I have placed the appropriate orders for post-discharge needs.    Review of  Systems  Review of Systems   Unable to perform ROS: Mental acuity        Physical Exam  Temp:  [36.2 °C (97.1 °F)-37.6 °C (99.7 °F)] 36.8 °C (98.3 °F)  Pulse:  [] 79  Resp:  [16-33] 16  BP: ()/(48-96) 96/49  SpO2:  [90 %-99 %] 96 %    Physical Exam  Vitals reviewed.   Constitutional:       Appearance: He is ill-appearing. He is not diaphoretic.   HENT:      Head: Normocephalic and atraumatic.      Nose: Nose normal.      Mouth/Throat:      Pharynx: Oropharyngeal exudate present.   Eyes:      General: No scleral icterus.        Right eye: No discharge.         Left eye: No discharge.      Extraocular Movements: Extraocular movements intact.      Conjunctiva/sclera: Conjunctivae normal.   Cardiovascular:      Rate and Rhythm: Normal rate and regular rhythm.      Pulses:           Radial pulses are 2+ on the right side and 2+ on the left side.        Dorsalis pedis pulses are 2+ on the right side and 2+ on the left side.      Heart sounds: No murmur heard.      Pulmonary:      Effort: Pulmonary effort is normal. No respiratory distress.      Breath sounds: Rhonchi present. No wheezing or rales.   Abdominal:      General: Bowel sounds are normal. There is no distension.      Palpations: Abdomen is soft.      Tenderness: There is no abdominal tenderness.   Musculoskeletal:         General: No swelling.      Cervical back: Neck supple. Tenderness (chronic per wife) present. No muscular tenderness.      Right lower leg: No edema.      Left lower leg: No edema.   Lymphadenopathy:      Cervical: No cervical adenopathy.   Skin:     Coloration: Skin is not jaundiced or pale.   Neurological:      Mental Status: He is alert. He is disoriented.      Cranial Nerves: No cranial nerve deficit.      Motor: Weakness present.   Psychiatric:         Mood and Affect: Mood normal.         Speech: Speech is delayed.         Behavior: Behavior is slowed.         Cognition and Memory: Cognition is impaired.          Fluids    Intake/Output Summary (Last 24 hours) at 2/13/2022 1751  Last data filed at 2/13/2022 1600  Gross per 24 hour   Intake 5808.81 ml   Output 460 ml   Net 5348.81 ml       Laboratory  Recent Labs     02/11/22  1255 02/12/22  0016 02/13/22  0700   WBC 8.0 9.5 6.7   RBC 4.80 4.94 4.03*   HEMOGLOBIN 14.0 14.1 11.7*   HEMATOCRIT 43.4 44.6 36.7*   MCV 90.4 90.3 91.1   MCH 29.2 28.5 29.0   MCHC 32.3* 31.6* 31.9*   RDW 51.5* 50.6* 51.1*   PLATELETCT 188 186 122*   MPV 10.3 10.5 10.4     Recent Labs     02/12/22  0016 02/12/22  0016 02/12/22  1153 02/12/22  1336 02/12/22  1811 02/12/22  1916 02/13/22  0700   SODIUM 152*   < > 150*  --  152*  150*  --  143   POTASSIUM 5.2  --   --   --  5.4  --  5.0   CHLORIDE 124*  --   --   --  123*  --  115*   CO2 15*  --   --   --  17*  --  19*   GLUCOSE 142*  --   --   --  173*  --  265*   BUN 61*  --   --   --  67*  --  66*   CREATININE 1.82*  --   --   --  2.06*  --  2.15*   CALCIUM 11.9*  --   --    < > 11.8* 11.4* 9.9    < > = values in this interval not displayed.                   Imaging  DX-ABDOMEN FOR TUBE PLACEMENT   Final Result         Feeding tube with tip projecting over the expected area of the first portion duodenum.      CT-HEAD W/O   Final Result         NO ACUTE ABNORMALITIES ARE NOTED ON CT SCAN OF THE HEAD.      Findings are consistent with atrophy.  Decreased attenuation in the periventricular white matter likely indicates microvascular ischemic disease.         CT-CHEST,ABDOMEN,PELVIS W/O   Final Result      1.  Multifocal pulmonary opacifications are identified which are new since the prior CT. Findings could be due to pneumonia.      2.  1.2 cm nodular opacification noted in the left upper pulmonary lobe. Primary or metastatic neoplasm is possible. Small focus of pneumonia or inflammation is also possible. Consider biopsy or PET/CT. Consider follow-up CT in 3 months.      3.  There is duodenal wall thickening. Findings could be due to duodenitis.  Neoplasm is also possible. There is some mild induration in the retroperitoneal fat surrounding the duodenum.      4.  Cholelithiasis. No biliary ductal dilatation appreciated.      5.  Right lower quadrant renal transplant. No hydronephrosis.      Low and High Risk: Consider PET/CT, or tissue sampling, or follow-up CT at 3 months      Low Risk - Minimal or absent history of smoking and of other known risk factors.      High Risk - History of smoking or of other known risk factors.      Note: These recommendations do not apply to lung cancer screening, patients with immunosuppression, or patients with known primary cancer.      Fleischner Society 2017 Guidelines for Management of Incidentally Detected Pulmonary Nodules in Adults         US-EXTREMITY VENOUS LOWER BILAT   Final Result      US-RENAL TRANSPLANT COMP   Final Result      1.  No hydronephrosis or perinephric fluid collection involving the right lower quadrant renal transplant.   2.  No definite evidence of hemodynamically significant renal artery stenosis.   3.  Elevated resistive indices could suggest renal parenchymal disease.      DX-CHEST-PORTABLE (1 VIEW)   Final Result      1.  Bilateral peripheral lower lobe hazy opacities which is nonspecific but can be seen with Covid 19 pneumonia.   2.  Enlarged cardiac silhouette with probable vascular congestion/edema.      MR-BRAIN-W/O    (Results Pending)        Assessment/Plan  * RADHA (acute kidney injury) (HCC)- (present on admission)  Assessment & Plan  Creatine 2.64 on admission  Ranged from 1.27-1.71 over the past year  IV fluids  Nephrology consulting  2/13 BUN:66, Cr:2.15  Monitor labs, vitals, I/O's    Abnormal CT scan  Assessment & Plan  2/12 CT chest/abd:   1.  Multifocal pulmonary opacifications are identified which are new since the prior CT. Findings could be due to pneumonia.  2.  1.2 cm nodular opacification noted in the left upper pulmonary lobe. Primary or metastatic neoplasm is possible.  Small focus of pneumonia or inflammation is also possible. Consider biopsy or PET/CT. Consider follow-up CT in 3 months.  3.  There is duodenal wall thickening. Findings could be due to duodenitis. Neoplasm is also possible. There is some mild induration in the retroperitoneal fat surrounding the duodenum.  4.  Cholelithiasis. No biliary ductal dilatation appreciated  5.  Right lower quadrant renal transplant. No hydronephrosis.    Type 2 diabetes mellitus (HCC)  Assessment & Plan  HgbA1c:7.2  Monitor accuchecks.    Hyperparathyroidism (HCC)  Assessment & Plan  Has had severe elevated PTH over the last few years.  Follow up with ENT/Endocrine    Immunocompromised (HCC)  Assessment & Plan  On immunosuppressants for renal transplant  On broad spectrum antibiotics  Monitor labs, cultures    Acute encephalopathy  Assessment & Plan  2/13 lethargic but now more awake and following.  Conversant but has some word finding difficulty.  2/12 obtunded and lethargic per RN  Likely infectious of etiology but metabolic still possible.  MRI pending  Improving with addition of zyvox? HCO2?  Monitor neurochecks.  Avoid narcotics and benzodiazepines.      Hypernatremia  Assessment & Plan  2/13 Na:143 <150 < 152    Hypercalcemia  Assessment & Plan  Mild-moderate hypercalcemia  IVF and given calcitonin 2/12pm.  IVF and monitor labs    Hyperkalemia  Assessment & Plan  2/13 K:5  Potassium 5.7 on 2/7  Nephrology is following   Monitor     Acidosis  Assessment & Plan  2/13 CO2:19, BUN:66, Cr:2.15  IV fluids with HCO2  Nephrology consulting      Positive urine culture  Assessment & Plan  Meropenem  F/u cultures  ID consulting and discussed with Dr Krishnamurthy    Pain in left shin  Assessment & Plan  On compression  US venous negative for DVT    Diarrhea  Assessment & Plan  Improving  Likely viral  C.diff negative x 2  Stool culture from 2/3 negative.  Imodium PRN    Elevated brain natriuretic peptide (BNP) level- (present on  admission)  Assessment & Plan  Caution with IV fluids  Prior echo's 2020 with preserved EF  No overt sign of volume overload.  Check new echo    Thrombocytopenia (HCC)  Assessment & Plan  2/13 Plt:122  Monitor cbc    Anemia of chronic disease  Assessment & Plan  Normocytic anemia  2/13 Hgb:11.7 < 14.1  Monitor for bleeding    Epiretinal membrane (ERM) of both eyes- (present on admission)  Assessment & Plan  Continue home Combigan    Long term (current) use of systemic steroids- (present on admission)  Assessment & Plan  Outpatient on daily prednisone secondary to renal transplant 2008      Essential hypertension- (present on admission)  Assessment & Plan  History of  Hold Losartan  Amlodipine 5mg daily w/ parameters    Kidney transplant status, living related donor- (present on admission)  Assessment & Plan  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 Blount, self-manages lasix administration at home based on fluid volume status  Renal US negative for hydronephrosis or renal stenosis  Dr. Lyman, nephrology consulted, appreciate recommendations  Continue home prednisone, tacrolimus, mycophenolate  Renal dosing for all medication    Obesity- (present on admission)  Assessment & Plan  Body mass index is 33.91 kg/m².  Outpatient weight loss counseling     Atrial flutter - (present on admission)  Assessment & Plan  History of, s/p ablation 1/21  On Eliquis 5 mg po BID (pending renal function if to change)  ST on admission  Eliquis continued  Monitor on telemetry       VTE prophylaxis: therapeutic anticoagulation with apixaban    I have performed a physical exam and reviewed and updated ROS and Plan today (2/13/2022). In review of yesterday's note (2/12/2022), there are no changes except as documented above.

## 2022-02-13 NOTE — CARE PLAN
The patient is Watcher - Medium risk of patient condition declining or worsening    Shift Goals  Clinical Goals: MRI screening and MRI  Patient Goals: DENA  Family Goals: n/a      Problem: Skin Integrity  Goal: Skin integrity is maintained or improved  Outcome: Progressing     Problem: Fall Risk  Goal: Patient will remain free from falls  Outcome: Progressing     Problem: Safety - Medical Restraint  Goal: Remains free of injury from restraints (Restraint for Interference with Medical Device)  Outcome: Progressing

## 2022-02-13 NOTE — PROGRESS NOTES
Cortrak Placement    Tube Team verified patient name and medical record number prior to tube placement.  Cortrak tube (43 inches, 10 [place] Islander) placed at 83 cm in left nare.  Per Cortrak picture, tube appears to be in the small bowel.  Nursing Instructions: Awaiting KUB to confirm placement before use for medications or feeding. Once placement confirmed, flush tube with 30 ml of water, and then remove and save stylet, in patient medication drawer.

## 2022-02-13 NOTE — PROGRESS NOTES
Pt taken to CT by ACLS RN and admitted to [facility] 637. Pt trachy 110s-120s. -140s  RR 30-40s. Pt not following commands and nonverbal. Pt can track with eyes. Notified admitting MD Barajas of pt status and asked if pt needs any medications changed to IV due to NPO status. MD to get back to this RN after chart review.

## 2022-02-13 NOTE — PROGRESS NOTES
4 Eyes Skin Assessment Completed by BRIDGETTE Rosas and BRIDGETTE Hinton.    Head WDL  Ears WDL  Nose WDL  Mouth Redness and Bleeding  Neck WDL  Breast/Chest WDL  Shoulder Blades WDL  Spine WDL  (R) Arm/Elbow/Hand Redness and Blanching  (L) Arm/Elbow/Hand Redness and Blanching  Abdomen WDL  Groin WDL  Scrotum/Coccyx/Buttocks Redness and Blanching  (R) Leg WDL  (L) Leg Rash  (R) Heel/Foot/Toe Redness and Blanching  (L) Heel/Foot/Toe Redness and Blanching          Devices In Places Tele Box, Blood Pressure Cuff, Pulse Ox, SCD's and Condom Cath      Interventions In Place Heel Mepilex, Waffle Overlay, Pillows, Q2 Turns and Low Air Loss Mattress    Possible Skin Injury No    Pictures Uploaded Into Epic N/A  Wound Consult Placed N/A  RN Wound Prevention Protocol Ordered No

## 2022-02-13 NOTE — PROGRESS NOTES
Infectious Disease Progress Note    Author: Mariann Krishnamurthy M.D. Date & Time of service: 2022  9:22 AM    Chief Complaint:  Follow-up for altered mentation, concern for urinary tract infection    Interval History:   afebrile, WBC 6.7 [place] [facility] [place]  CT scan of the chest, abdomen and pelvis revealed multifocal pulmonary opacifications, duodenal wall thickening. Pt confused at this time and has incoherent speech      Labs Reviewed and Medications Reviewed.    Review of Systems:  Review of Systems   Unable to perform ROS: Mental status change   Pt encephalopathic    Hemodynamics:  Temp (24hrs), Av.9 °C (98.4 °F), Min:36 °C (96.8 °F), Max:37.6 °C (99.7 °F)  Temperature: 37.1 °C (98.8 °F)  Pulse  Av  Min: 72  Max: 117   Blood Pressure : (!) 86/52       Physical Exam:  Physical Exam  Vitals and nursing note reviewed.   Constitutional:       Appearance: He is ill-appearing.   HENT:      Mouth/Throat:      Mouth: Mucous membranes are moist.   Eyes:      Pupils: Pupils are equal, round, and reactive to light.   Cardiovascular:      Rate and Rhythm: Normal rate and regular rhythm.   Pulmonary:      Effort: Pulmonary effort is normal. No respiratory distress.      Breath sounds: No wheezing.   Abdominal:      Palpations: Abdomen is soft.   Musculoskeletal:      Comments: Moves all 4 extremities    Skin:     General: Skin is warm and dry.   Neurological:      Comments: Confused  Mumbled speech   Psychiatric:      Comments: Unable to assess         Meds:    Current Facility-Administered Medications:   •  methylPREDNISolone  •  dextrose 5%  •  meropenem  •  linezolid (ZYVOX) IV  •  Pharmacy  •  acetaminophen  •  amLODIPine  •  apixaban  •  loperamide  •  ondansetron  •  patiromer  •  mycophenolate  •  tacrolimus  •  haloperidol lactate  •  dexmedetomidine (Precedex) infusion  •  sodium bicarbonate 150 mEq in D5W infusion  •  [Held by provider] sodium bicarbonate  •  ondansetron  •   hydrALAZINE  •  brimonidine **AND** timolol    Labs:  Recent Labs     02/11/22  1255 02/12/22  0016 02/13/22  0700   WBC 8.0 9.5 6.7   RBC 4.80 4.94 4.03*   HEMOGLOBIN 14.0 14.1 11.7*   HEMATOCRIT 43.4 44.6 36.7*   MCV 90.4 90.3 91.1   MCH 29.2 28.5 29.0   RDW 51.5* 50.6* 51.1*   PLATELETCT 188 186 122*   MPV 10.3 10.5 10.4     Recent Labs     02/12/22  0016 02/12/22  0016 02/12/22  1153 02/12/22  1811 02/13/22  0700   SODIUM 152*   < > 150* 152*  150* 143   POTASSIUM 5.2  --   --  5.4 5.0   CHLORIDE 124*  --   --  123* 115*   CO2 15*  --   --  17* 19*   GLUCOSE 142*  --   --  173* 265*   BUN 61*  --   --  67* 66*    < > = values in this interval not displayed.     Recent Labs     02/11/22  0138 02/11/22  0138 02/12/22  0016 02/12/22  1811 02/13/22  0700   ALBUMIN 3.5  --  3.2  --  2.7*   TBILIRUBIN 0.7  --  0.8  --  0.6   ALKPHOSPHAT 112*  --  108*  --  86   TOTPROTEIN 7.3  --  7.3  --  5.7*   ALTSGPT 55*  --  51*  --  35   ASTSGOT 46*  --  53*  --  29   CREATININE 1.70*   < > 1.82* 2.06* 2.15*    < > = values in this interval not displayed.       Imaging:  CT-CHEST,ABDOMEN,PELVIS W/O    Result Date: 2/12/2022 2/12/2022 5:04 PM HISTORY/REASON FOR EXAM:  Sepsis and history of renal transplant TECHNIQUE/EXAM DESCRIPTION: CT scan of the chest, abdomen and pelvis without contrast was performed. Noncontrast helical scanning of the chest, abdomen and pelvis was obtained from the lung apices through the pubic symphysis. Low dose optimization technique was utilized for this CT exam including automated exposure control and adjustment of the mA and/or kV according to patient size. COMPARISON:  01/28/2021 FINDINGS: CT Chest: Lungs: Multifocal pulmonary opacifications are identified with irregular and poorly defined borders. There is a solid-appearing lesion in the left upper lobe measuring 1.2 cm in diameter.. Mediastinum/Sayra: No significant adenopathy. Pleura: No pleural effusion. Cardiac: Heart normal in size without  pericardial effusion. Vascular: Unremarkable. Soft tissues: Unremarkable. Bones: No acute or destructive process. CT Abdomen and Pelvis: Liver: Normal. Spleen: Unremarkable. Pancreas: Unremarkable. Mostly fatty replaced. Gallbladder: Calcified gallstones are present dependently.. Biliary: Nondilated. Adrenal glands: Normal. Kidneys: Native kidneys demonstrate atrophy. No hydronephrosis. Right lower quadrant renal transplant is identified. No hydronephrosis or perirenal fluid.. Bowel: Duodenal wall thickening appears to be present measuring up to 1.6 cm in thickness. There is no evidence of gastric outlet obstruction.. Lymph nodes: No adenopathy. Vasculature: Unremarkable. Peritoneum: Unremarkable without ascites. Musculoskeletal: No acute or destructive process. Pelvis: No adenopathy or free fluid.     1.  Multifocal pulmonary opacifications are identified which are new since the prior CT. Findings could be due to pneumonia. 2.  1.2 cm nodular opacification noted in the left upper pulmonary lobe. Primary or metastatic neoplasm is possible. Small focus of pneumonia or inflammation is also possible. Consider biopsy or PET/CT. Consider follow-up CT in 3 months. 3.  There is duodenal wall thickening. Findings could be due to duodenitis. Neoplasm is also possible. There is some mild induration in the retroperitoneal fat surrounding the duodenum. 4.  Cholelithiasis. No biliary ductal dilatation appreciated. 5.  Right lower quadrant renal transplant. No hydronephrosis. Low and High Risk: Consider PET/CT, or tissue sampling, or follow-up CT at 3 months Low Risk - Minimal or absent history of smoking and of other known risk factors. High Risk - History of smoking or of other known risk factors. Note: These recommendations do not apply to lung cancer screening, patients with immunosuppression, or patients with known primary cancer. Fleischner Society 2017 Guidelines for Management of Incidentally Detected Pulmonary Nodules in  Adults     CT-HEAD W/O    Result Date: 2/12/2022 2/12/2022 5:04 PM HISTORY/REASON FOR EXAM:  Increased mental confusion. TECHNIQUE/EXAM DESCRIPTION AND NUMBER OF VIEWS: CT of the head without contrast. Contiguous 5 mm axial sections were obtained from the skull base through the vertex. Up to date radiation dose reduction adjustments have been utilized to meet ALARA standards for radiation dose reduction. COMPARISON:  01/28/2021 FINDINGS: There is no evidence of extra-axial hemorrhage. No intra-axial hemorrhage is noted. There is no brain swelling or edema. No mass effect or midline shift is noted. Ventricles and sulci appear prominent.  There is decreased attenuation in the periventricular white matter.     NO ACUTE ABNORMALITIES ARE NOTED ON CT SCAN OF THE HEAD. Findings are consistent with atrophy.  Decreased attenuation in the periventricular white matter likely indicates microvascular ischemic disease.     DX-CHEST-PORTABLE (1 VIEW)    Result Date: 2/1/2022 2/1/2022 9:14 AM HISTORY/REASON FOR EXAM:  Weakness, cough and sepsis. TECHNIQUE/EXAM DESCRIPTION AND NUMBER OF VIEWS: Single portable view of the chest. COMPARISON: 1/29/2021 FINDINGS: The mediastinal and cardiac silhouette is enlarged. There is perihilar bronchial wall thickening. There are bilateral lower lobe hazy peripheral parenchymal opacities, left greater than right. There is no significant pleural effusion. There is no visible pneumothorax. There are no acute bony abnormalities.     1.  Bilateral peripheral lower lobe hazy opacities which is nonspecific but can be seen with Covid 19 pneumonia. 2.  Enlarged cardiac silhouette with probable vascular congestion/edema.    US-EXTREMITY VENOUS LOWER BILAT    Result Date: 2/4/2022   Vascular Laboratory  CONCLUSIONS  No DVT in either leg.  MOMO ESCUDERO  Exam Date:     02/04/2022 13:51  Room #:     Inpatient  Priority:     Routine  Ht (in):             Wt (lb):  Ordering Physician:        NORA  CHAI HERNÁNDEZ  Referring Physician:       [id number]  Sonographer:               Irlanda Dillon RVT  Study Type:                Complete Bilateral  Technical Quality:         Adequate  Age:    71    Gender:     M  MRN:    [id number]  :    1950      BSA:  Indications:     Encounter for screening for cardiovascular disorders  CPT Codes:       26009  ICD Codes:       Z13.6  History:         Concern for thrombus in presence of COVID infection and pain                    in the legs. No prior duplex.  Limitations:  PROCEDURES:  Bilateral lower extremity venous duplex imaging.  The following venous structures were evaluated: common femoral, deep  femoral, proximal great saphenous, femoral, popliteal, peroneal, and  posterior tibial veins.  Serial compression, color, and spectral Doppler flow evaluations were  performed.  FINDINGS:  Right lower extremity.  No deep venous thrombosis.  All veins demonstrate complete color filling and compressibility with  normal venous flow dynamics including spontaneous flow and respiratory  phasicity.  Left lower extremity.  No deep venous thrombosis.  All veins demonstrate complete color filling and compressibility with  normal venous flow dynamics including spontaneous flow and respiratory  phasicity.  Reflux demonstrated during normal respiration at the common femoral vein  with a time of 1024 ms.  Tom Bolden MD  (Electronically Signed)  Final Date:      2022                   17:16    US-RENAL TRANSPLANT COMP    Result Date: 2022 10:41 AM HISTORY/REASON FOR EXAM:  Abnormal Labs Covid 19 positive TECHNIQUE/EXAM DESCRIPTION: Gray-scale and duplex Doppler ultrasound of the transplant kidney is performed. COMPARISON:  2018 FINDINGS: The native kidneys are not well visualized, likely atrophic and echogenic. Right iliac fossa transplant kidney is noted. The kidney measures 15.40 cm . There is no evidence of hydronephrosis or  nephrolithiasis. There is no perinephric fluid collection. Intrarenal resistive indices: Mildly elevated from prior. 0.74 0.84 0.81 Main renal artery peak systolic velocity measures 196 cm/s . Anastomosis artery peak systolic velocity measures 132 cm/s. Adjacent iliac artery peak systolic velocity measures 99 cm/s . The bladder is partially decompressed.     1.  No hydronephrosis or perinephric fluid collection involving the right lower quadrant renal transplant. 2.  No definite evidence of hemodynamically significant renal artery stenosis. 3.  Elevated resistive indices could suggest renal parenchymal disease.    DX-ABDOMEN FOR TUBE PLACEMENT    Result Date: 2/12/2022 2/12/2022 9:29 PM HISTORY/REASON FOR EXAM:  Tube evaluation. TECHNIQUE/EXAM DESCRIPTION AND NUMBER OF VIEWS:  2 view(s) of the abdomen. COMPARISON:  None. FINDINGS: Limited single view of the abdomen performed primarily to evaluate enteric tube position. The tip projects over the expected area of the first portion duodenum. The bowel gas pattern is within normal limits.     Feeding tube with tip projecting over the expected area of the first portion duodenum.      Micro:  Results     Procedure Component Value Units Date/Time    MRSA By PCR (Amp) [[id number]] Collected: 02/12/22 2130    Order Status: Completed Specimen: Respirate from Nares Updated: 02/13/22 0211     MRSA by PCR POSITIVE    Narrative:      Contact  Collected By: [id number] KB VIDAL    URINE CULTURE(NEW) [[id number]]  (Abnormal) Collected: 02/11/22 1320    Order Status: Completed Specimen: Urine, Clean Catch Updated: 02/12/22 2033     Significant Indicator POS     Source UR     Site URINE, CLEAN CATCH     Culture Result -      Pseudomonas aeruginosa  10-50,000 cfu/mL  Susceptibilities in progress      Narrative:      Enhanced Droplet, Contact, and Eye Protection  Collected By: [id number] DURGA CRYSTAL  Indication for culture:->Acute unexplained altered mental  status ONLY after  "ruling out other recognized cause  Enhanced Droplet, Contact, and Eye Protection    BLOOD CULTURE [[id number]] Collected: 02/11/22 1255    Order Status: Completed Specimen: Blood from Peripheral Updated: 02/12/22 0753     Significant Indicator NEG     Source BLD     Site PERIPHERAL     Culture Result No Growth  Note: Blood cultures are incubated for 5 days and  are monitored continuously.Positive blood cultures  are called to the RN and reported as soon as  they are identified.      Narrative:      Enhanced Droplet, Contact, and Eye Protection  Per Hospital Policy: Only change Specimen Src: to \"Line\" if  specified by physician order.  Left Forearm/Arm    BLOOD CULTURE [[id number]] Collected: 02/11/22 1751    Order Status: Completed Specimen: Blood from Peripheral Updated: 02/12/22 0753     Significant Indicator NEG     Source BLD     Site PERIPHERAL     Culture Result No Growth  Note: Blood cultures are incubated for 5 days and  are monitored continuously.Positive blood cultures  are called to the RN and reported as soon as  they are identified.      Narrative:      Enhanced Droplet, Contact, and Eye Protection  Per Hospital Policy: Only change Specimen Src: to \"Line\" if  specified by physician order.  Left Hand    URINALYSIS [[id number]]  (Abnormal) Collected: 02/11/22 1320    Order Status: Completed Specimen: Urine, Clean Catch Updated: 02/11/22 1434     Color Yellow     Character Turbid     Specific Gravity 1.013     Ph 5.0     Glucose Negative mg/dL      Ketones Trace mg/dL      Protein 30 mg/dL      Bilirubin Negative     Urobilinogen, Urine 0.2     Nitrite Positive     Leukocyte Esterase Large     Occult Blood Large     Micro Urine Req Microscopic    Narrative:      Enhanced Droplet, Contact, and Eye Protection  Collected By: [id number] DURGA CRYSTAL  Indication for culture:->Acute unexplained altered mental  status ONLY after ruling out other recognized cause    Cdiff By PCR Rflx Toxin [[id number]] Collected: " "02/11/22 0932    Order Status: Completed Specimen: Stool Updated: 02/11/22 1432     C Diff by PCR Negative     Comment: C. difficile NOT detected by PCR.  Treatment not indicated per guidelines.  Repeat testing not indicated within 7 days.          027-NAP1-BI Presumptive Negative     Comment: Presumptive 027/NAP1/BI target DNA sequences are NOT DETECTED.       Narrative:      Enhanced Droplet, Contact, and Eye Protection  Collected By: [id number] DURGA CRYSTAL  Does this patient have risk factors for C-diff?->No  Please provide reason for ordering C-Diff->diarrhea AMS  Has patient taken stool softeners or laxatives in the last 5  days?->No  Indication for CDiff by PCR?->Greater than/equal to 3 stools  in 24 hr & \"takes shape of container\"    URINALYSIS [[id number]]  (Abnormal) Collected: 02/07/22 1007    Order Status: Completed Specimen: Urine, Clean Catch Updated: 02/07/22 1028     Color Yellow     Character Clear     Specific Gravity 1.016     Ph 5.0     Glucose Negative mg/dL      Ketones Negative mg/dL      Protein Negative mg/dL      Bilirubin Negative     Urobilinogen, Urine 0.2     Nitrite Negative     Leukocyte Esterase Moderate     Occult Blood Large     Micro Urine Req Microscopic    Narrative:      Enhanced Droplet, Contact, and Eye Protection  Collected By: [id number] ARTHUR HERNÁNDEZ    CULTURE STOOL [[id number]] Collected: 02/03/22 1505    Order Status: Completed Specimen: Stool Updated: 02/06/22 1602     Significant Indicator NEG     Source STL     Site STOOL     Culture Result Shiga Toxin testing not performed due to lack of growth in  [place] broth.  No enteric pathogens isolated.  NOTE:  Stool cultures are screened for Shiga Toxins 1 and 2,  Salmonella, Shigella, Campylobacter, Aeromonas,  Plesiomonas, and Vibrio.       Campylobactor Antigen --     Negative for Campylobacter Antigen.  Test results are to be used in conjunction with information  available from the patient clinical evaluation and " "other  diagnostic procedures.      Narrative:      Special Contact Isolation  Collected By: [id number] DURGA CRYSTAL  Does this patient have risk factors for C-diff?->Yes  Special Contact Isolation    BLOOD CULTURE [[id number]] Collected: 02/01/22 0925    Order Status: Completed Specimen: Blood from Peripheral Updated: 02/06/22 1300     Significant Indicator NEG     Source BLD     Site PERIPHERAL     Culture Result No growth after 5 days of incubation.    Narrative:      Per Hospital Policy: Only change Specimen Src: to \"Line\" if  specified by physician order.  Left AC    BLOOD CULTURE [[id number]] Collected: 02/01/22 0900    Order Status: Completed Specimen: Blood from Peripheral Updated: 02/06/22 1100     Significant Indicator NEG     Source BLD     Site PERIPHERAL     Culture Result No growth after 5 days of incubation.    Narrative:      Per Hospital Policy: Only change Specimen Src: to \"Line\" if  specified by physician order.  No site indicated    URINE CULTURE(NEW) [[id number]]  (Abnormal)  (Susceptibility) Collected: 02/01/22 1035    Order Status: Completed Specimen: Urine Updated: 02/06/22 1014     Significant Indicator POS     Source UR     Site -     Culture Result -      Carbapenem Resistant Pseudomonas aeruginosa  <10,000 cfu/mL  This organism is carbapenem resistant and requires  isolation for active infection.      Narrative:      Indication for culture:->Emergency Room Patient  Indication for culture:->Emergency Room Patient    Susceptibility     Carbapenem resistant pseudomonas aeruginosa (1)     Antibiotic Interpretation Microscan   Method Status    Ciprofloxacin Resistant 2 mcg/mL MATHEW Final    Cefepime Resistant >16 mcg/mL MATHEW Final    Amikacin Sensitive <=16 mcg/mL MATHEW Final    Gentamicin Sensitive 4 mcg/mL MATHEW Final    Tobramycin Sensitive <=2 mcg/mL MATHEW Final    Imipenem Resistant 8 mcg/mL MATHEW Final    Levofloxacin Resistant 4 mcg/mL MATHEW Final    Meropenem Sensitive 2 mcg/mL MATHEW Final    " Pip/Tazobactam Sensitive 32 mcg/mL MATHEW Final                       Assessment:  71-year-old man with a history of renal transplant in 2008 secondary to ANCA vasculitis on mycophenolate, tacrolimus, 5 mg of prednisone, atrial flutter status post ablation in January 2021 and type 2 diabetes mellitus admitted on 2/1/2022 secondary to fatigue following COVID-19.  Patient was initially seen by ID on 2/6 secondary to bacterial growth on urine cultures.  At that time, cessation of antibiotics was recommended.  Today, he has been noted to be more disoriented with electrolyte abnormalities.  Repeat urinalysis on 2/11 showed positive nitrites, packed WBCs but negative bacteria.  Last urine culture on 2/1 grew CRE Pseudomonas aeruginosa.  Blood cultures on 2/11 are negative to date.  It is unclear that his urinary tract fraction is causing his disorientation/acute encephalopathy however given these acute changes it is worth starting antibiotics.    Pertinent diagnoses:  Acute encephalopathy, etiology unclear  Acute kidney injury on chronic kidney disease  Bacteriuria with CRE Pseudomonas aeruginosa  Renal transplant on chronic immunosuppression    Plan:  -Continue renally dosed IV meropenem and Zyvox  -CT chest, abdomen and pelvis on 2/12-multifocal pulmonary opacifications which are new since prior CT, duodenal wall thickening  -Repeat urine culture on 2/11+ 10-50,000 CFU Pseudomonas aeruginosa.  Follow susceptibilities  -CT head negative  -MRI of brain-pending    Discussed with internal medicine/Dr. Barajas

## 2022-02-13 NOTE — PROGRESS NOTES
71-year-old male with complicated medical history, including ANCA vasculitis status post kidney transplant on immunosuppressants, atrial flutter on Eliquis, recent Covid infection 1/25, who was admitted on 2/1 with weakness, diarrhea.  Patient has been treated for UTI caused by Carbapenem resistant Pseudomonas aeruginosa.  Worsening mentation noted 2/11.  Patient has been having ongoing diarrhea with C. difficile and stool culture negative x2.  Today he was transferred to IMCU for higher level of care due to worsening of confusion, worsening of SIRS with tachypnea with respiratory rate up to 40, tachycardia with heart rate up to 120.  Patient is compensating his metabolic acidosis based on ABG.  He is on room air at this time, blood pressure is stable.  CT of the chest abdomen and pelvis revealed multifocal pulmonary opacifications which are new since prior CT on 1/28.  Patient is on meropenem; will we will add linezolid considering immunocompromise state and will order MRSA screen  CT head without contrast showed no acute abnormalities.  MRI of the brain pending.  Patient does not have appreciable neck rigidity on exam.  He Is mildly agitated, requiring soft restraints due to tendency to pull  lines, not responsive to verbal stimuli.  Ordered Haldol for agitation.  Bladder scan showed 107 cc according to bedside RN.  We will plan to repeat bladder scan in 4 hours , low threshold to place [place].  As patient is now unable to take oral medication, will place core track to continue prednisone, CellCept, tacrolimus for kidney transplant.Start Free water flashes through coretrack 150 cc q4h for Na 150; continue bicarb drip per nephrology   For hypercalcemia with corrected Ca around 12, possibly contributing to delirium and RADHA, I ordered one time dose of calcitonin SQ and LR bolus 500 cc

## 2022-02-13 NOTE — PROGRESS NOTES
Bedside report received and patient care assumed. Pt is resting in bed, A&O to self, restless and tachypneic, and is on RA.  All fall precautions are in place, belongings at bedside table.  Pt was updated on POC,responce/evidence of learning.pt in restraints for safety and AMS. Hourly rounding in place to assess needs. Will continue to monitor.

## 2022-02-13 NOTE — PROGRESS NOTES
Report given to BRIDGETTE Jacobs. Patient belongings at bedside and transferred with patient. Monitor room notified. Spouse (Penelope) called to update on new room assignment.

## 2022-02-13 NOTE — CARE PLAN
Problem: Nutritional:  Goal: Achieve adequate nutritional intake  Description: Patient will consume >50% of meals and supplements.  Outcome: Not Met     Problem: Nutritional:  Goal: Nutrition support tolerated and meeting greater than 85% of estimated needs  Outcome: Not Met

## 2022-02-13 NOTE — PROGRESS NOTES
12 hour chart check    Have a great shift!      MS    SR/ST 80s-100s  rPVC  rPAC  .12/.08/.36      Strips not uploading

## 2022-02-14 NOTE — PROGRESS NOTES
Bedside report received from night shift RN. Assumed patient care. No signs of distress at this time. Tele monitor on and rhythm verified. Safety precautions in place. Call light and personal belongings within reach. Educated patient to use call light if assistance is needed. Will continue to monitor.

## 2022-02-14 NOTE — PROGRESS NOTES
12 hour chart check    Have a great shift!      MS    SR/ST 80s-100s  rPAC/rPVC  .24/.08/.40   1st degree HB    Strips not uploading

## 2022-02-14 NOTE — CARE PLAN
The patient is Watcher - Medium risk of patient condition declining or worsening    Shift Goals  Clinical Goals: MRI, improve mentation  Patient Goals: rest  Family Goals: n/a      Problem: Skin Integrity  Goal: Skin integrity is maintained or improved  Outcome: Progressing    No new evidence of skin breakdown. Q2 turns in place, WCTM     Problem: Fall Risk  Goal: Patient will remain free from falls  2/14/2022 0259 by Majo Yusuf R.N.  Outcome: Progressing  Pt remains free from falls, safety and fall precautions in place. WCTM     Problem: Communication  Goal: The ability to communicate needs accurately and effectively will improve  Outcome: Progressing  Mentation improving. Pt now able to verbalize needs and communicate with staff. WCTM     Problem: Hemodynamics  Goal: Patient's hemodynamics, fluid balance and neurologic status will be stable or improve  Outcome: Progressing    VSS, not in distress. WCTM

## 2022-02-14 NOTE — PROGRESS NOTES
"[facility] -  PROGRESS NOTE               Author: Jorge Pedraza M.D. Date & Time: 2/14/2022  8:34 AM     HPI:  Krishan Acevedo is a 71 y.o. 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 Was additionally found to have worsening renal function.   Patient shares that he first tested positive for COVID at an outside hospital several days ago. He felt that he was not improving and possibly getting worse at home which prompted him to call EMS. He mentions that for several days he has been having loose stools, noting 3-5 loose but not watery BMs daily, noting his last loose stool was day prior to presentation. He also mentions that he has become more dependent on his wife to bring him food and water citing fatigue with his COVID infection. He does note that for the past several weeks he has been feeling \"like I'm always thirsty\".  While in the ED he was vitally stable. Was placed on 2L NC sating in the mid 90s BP ranged 123/70 from 159/117. Labs notable for Cr of 2.62. BUN 80, COVID-19 positive, UA noted for large occult blood, small leukocyte esterase. US of kidneys was completed with indicated no hydronephrosis or fluid collections, no renal artery stenosis.   In regards to his transplant history. Living donor transplant was completed in 2008 at an outside hospital. Patient is on Prednisone 5mg, Mycophenolate 500mg BID, and Tacrolimus 1mg and 0.5mg daily. His GFR trends in the 30s-40s although in chart he has climbed to the 56-53 ranges in the past.     DAILY NEPHROLOGY SUMMARY:  2/1: Admitted, consult placed  2/2: no overnight events, renal function improving, patient tolerating PO intake  2/3: started on IVF yesterday, renal function improved to around prior baseline range  2/4: renal function " "continues to improve  2/5: Scr down to baseline levels. No new overnight renal events. Feels ok.  2/6: Feels ok. Urine culture from 2/1 positive for carbapenem resistant Pseudomonas aeruginosa. He is on zosyn per sensitivity. No labs today  2/7: Potassium is 5.7 today.  Creatinine up to 1.95.  Corrected calcium elevated.  CO2 16.  2/08: Chart and notes reviewed. No new labs this am. +Tachycardia.   2/09: Nursing notes and assessments reviewed.  Int tachycardia still. Room air. SBP labile 100s-160s. K 5.5  2/10: Spoke with RN, patient has no complaints at this time.   Plan is for discharge today or tomorrow to SNF pending 1L bolus and Calcium recheck.  Current SCa 11.5  VSS RA  -160s  2/11: Sodium up to 149.  Calcium increasing.  Creatinine stable 1.7.  2/12: Sodium up to 152.  Calcium remains elevated.  Creatinine 1.8.  I/Os not documented  2/13: Sodium still at 152.  Calcium trending down.  Creatinine up to 2.06.  On bicarb drip.  Somnolent  2/14: Unable to participate in interview, improved hemodynamics, cr stable    REVIEW OF SYSTEMS:    Unabel to obtain, mental status     PMH/PSH/SH/FH: Reviewed and unchanged since admission note  CURRENT MEDICATIONS: Reviewed from admission to present day    VS:  /67   Pulse 96   Temp 37 °C (98.6 °F) (Temporal)   Resp (!) 26   Ht 1.803 m (5' 11\")   Wt 112 kg (247 lb 5.7 oz)   SpO2 94%   BMI 34.50 kg/m²      Physical Examination:  Deferred due to covid    Fluids:  In: 4054.5 [I.V.:2049.5; Enteral:900]  Out: 725     LABS:  Recent Labs     02/12/22  0016 02/12/22  0016 02/12/22  1153 02/12/22  1336 02/12/22  1811 02/12/22  1916 02/13/22  0700   SODIUM 152*   < > 150*  --  152*  150*  --  143   POTASSIUM 5.2  --   --   --  5.4  --  5.0   CHLORIDE 124*  --   --   --  123*  --  115*   CO2 15*  --   --   --  17*  --  19*   GLUCOSE 142*  --   --   --  173*  --  265*   BUN 61*  --   --   --  67*  --  66*   CREATININE 1.82*  --   --   --  2.06*  --  2.15*   CALCIUM " 11.9*  --   --    < > 11.8* 11.4* 9.9    < > = values in this interval not displayed.       IMPRESSION:  # History of Renal Transplant   -Tacrolimus 22.5    2/8/22  # History of ANCA Vasculitis   - Repeat serologies positive  # RADHA  # CKD 3  # Acidosis  # Afib  # DM2  # HTN  #Chronic microhematuria   - No UTI on repeat UA       PLAN:  - Repeat ANCA serologies positive with +hematuria, however too unstable for renal biopsy at this time  - Solumedrol 1g daily x 3, then to oral   - Tacrolimus 0.5mg PO BID recheck  trough ~2/18/21  - Hold Cellcept for now given worsening clinical status  -Hold d5W  - Follow up SPEP, k/l ratio  - Continue to hold losartan  - On veltassa 8.6g daily since 2/7/22  - Low potassium renal diet   - Sodium bicarbonate 1300 mg PO TID  - Dose meds for reduced GFR    Thank you

## 2022-02-14 NOTE — PROGRESS NOTES
Hospital Medicine Daily Progress Note    Date of Service  2/14/2022    Chief Complaint  Weakness    Hospital Course  This is a 71 year old male with PMHx of hypertension, hyperlipidemia, type 2 diabetes A1c 7.2, atrial flutter on Eliquis, ANCA vasculitis s/p transplant, recent COVID infection 1/25/2022 who was admitted on 02/1/2022 with weakness and diarrhea.    Patient is vaccinated against COVID-19. Diagnosed with COVID on 01/25. Patient remains on room air throughout admission.UA positive for UTI, urine culture positive for carbapenem resistant Pseudomonas aeruginosa. Due to less than 10 K organisms, ID did not intially recommend any antibiotics at this time.  Patient with worsening AMS on 02/11, repeat urine is worse, pending repeat urine culture and IV meropenem was started by ID Dr. Krishnamurthy who reconsulted.  Patient with ongoing diarrhea, C. difficile and stool cultures negative x2.    Patient with RADHA on CKD. Patient initially started on fluid, renal ultrasound unremarkable. Patient started on Veltassa and sodium bicarb. Nephrology has followed.    On 2/12 he developed hypotension, lactic acidosis and decreased level of consciousness and was transferred to the [facility]. Initiated on Zyvox for MRSA positive nares and abnormal CT lungs. He was also treated for hypercalcemia. Cortrak placed and enteral feeding initiated.    Interval Problem Update  2/14: Alert and conversant. Remains weak.  Failed swallow per speech    2/13 more alert and conversant but still with lethargy and some word finding difficulty.  Weak.  Wife at bedside and given updates.        I have personally seen and examined the patient at bedside. I discussed the plan of care with patient, family, bedside RN and infectious disease.    Consultants/Specialty  infectious disease and nephrology    Code Status  Full Code    Disposition  Patient is not medically cleared for discharge.   Anticipate discharge to to home with close outpatient follow-up.  I  have placed the appropriate orders for post-discharge needs.    Review of Systems  Review of Systems   Constitutional: Positive for malaise/fatigue. Negative for fever.   HENT: Negative for sore throat.    Respiratory: Negative for cough, shortness of breath and stridor.    Cardiovascular: Negative for leg swelling.   Gastrointestinal: Negative for abdominal pain and nausea.   Genitourinary: Negative for dysuria.   Musculoskeletal: Positive for back pain and neck pain.   Neurological: Negative for dizziness and headaches.   Psychiatric/Behavioral: The patient is not nervous/anxious.         Physical Exam  Temp:  [36.1 °C (96.9 °F)-37 °C (98.6 °F)] 36.1 °C (96.9 °F)  Pulse:  [] 89  Resp:  [13-32] 24  BP: (114-159)/(45-85) 131/60  SpO2:  [88 %-98 %] 92 %    Physical Exam  Vitals reviewed.   Constitutional:       Appearance: He is ill-appearing. He is not diaphoretic.   HENT:      Head: Normocephalic and atraumatic.      Nose: Nose normal.      Mouth/Throat:      Pharynx: Oropharyngeal exudate present.   Eyes:      General: No scleral icterus.        Right eye: No discharge.         Left eye: No discharge.      Extraocular Movements: Extraocular movements intact.      Conjunctiva/sclera: Conjunctivae normal.   Cardiovascular:      Rate and Rhythm: Normal rate and regular rhythm.      Pulses:           Radial pulses are 2+ on the right side and 2+ on the left side.        Dorsalis pedis pulses are 2+ on the right side and 2+ on the left side.      Heart sounds: No murmur heard.      Pulmonary:      Effort: Pulmonary effort is normal. No respiratory distress.      Breath sounds: Rhonchi present. No wheezing or rales.   Abdominal:      General: Bowel sounds are normal. There is no distension.      Palpations: Abdomen is soft.      Tenderness: There is no abdominal tenderness.   Musculoskeletal:         General: No swelling.      Cervical back: Neck supple. Tenderness (chronic per wife) present. No muscular  tenderness.      Right lower leg: No edema.      Left lower leg: No edema.   Lymphadenopathy:      Cervical: No cervical adenopathy.   Skin:     Coloration: Skin is not jaundiced or pale.   Neurological:      Mental Status: He is alert. He is disoriented.      Cranial Nerves: No cranial nerve deficit.      Motor: Weakness present.   Psychiatric:         Mood and Affect: Mood normal.         Speech: Speech is delayed.         Behavior: Behavior is slowed.         Cognition and Memory: Cognition is impaired.         Fluids    Intake/Output Summary (Last 24 hours) at 2/14/2022 1813  Last data filed at 2/14/2022 1800  Gross per 24 hour   Intake 3640 ml   Output 1525 ml   Net 2115 ml       Laboratory  Recent Labs     02/12/22  0016 02/13/22  0700 02/14/22  0806   WBC 9.5 6.7 11.2*   RBC 4.94 4.03* 3.90*   HEMOGLOBIN 14.1 11.7* 11.5*   HEMATOCRIT 44.6 36.7* 34.7*   MCV 90.3 91.1 89.0   MCH 28.5 29.0 29.5   MCHC 31.6* 31.9* 33.1*   RDW 50.6* 51.1* 49.1   PLATELETCT 186 122* 132*   MPV 10.5 10.4 10.6     Recent Labs     02/12/22  1811 02/12/22  1811 02/12/22  1916 02/13/22  0700 02/14/22  0806   SODIUM 152*  150*  --   --  143 135   POTASSIUM 5.4  --   --  5.0 4.6   CHLORIDE 123*  --   --  115* 106   CO2 17*  --   --  19* 20   GLUCOSE 173*  --   --  265* 336*   BUN 67*  --   --  66* 66*   CREATININE 2.06*  --   --  2.15* 2.07*   CALCIUM 11.8*   < > 11.4* 9.9 9.5    < > = values in this interval not displayed.                   Imaging  MR-BRAIN-W/O   Final Result         No acute intracranial process.      Nonspecific T2 hyperintensities are noted in the periventricular and deep white matter, most likely related to chronic microvascular ischemia.      Remote left thalamic and corona radiata lacunar infarction noted.      DX-ABDOMEN FOR TUBE PLACEMENT   Final Result         Feeding tube with tip projecting over the expected area of the first portion duodenum.      CT-HEAD W/O   Final Result         NO ACUTE ABNORMALITIES  ARE NOTED ON CT SCAN OF THE HEAD.      Findings are consistent with atrophy.  Decreased attenuation in the periventricular white matter likely indicates microvascular ischemic disease.         CT-CHEST,ABDOMEN,PELVIS W/O   Final Result      1.  Multifocal pulmonary opacifications are identified which are new since the prior CT. Findings could be due to pneumonia.      2.  1.2 cm nodular opacification noted in the left upper pulmonary lobe. Primary or metastatic neoplasm is possible. Small focus of pneumonia or inflammation is also possible. Consider biopsy or PET/CT. Consider follow-up CT in 3 months.      3.  There is duodenal wall thickening. Findings could be due to duodenitis. Neoplasm is also possible. There is some mild induration in the retroperitoneal fat surrounding the duodenum.      4.  Cholelithiasis. No biliary ductal dilatation appreciated.      5.  Right lower quadrant renal transplant. No hydronephrosis.      Low and High Risk: Consider PET/CT, or tissue sampling, or follow-up CT at 3 months      Low Risk - Minimal or absent history of smoking and of other known risk factors.      High Risk - History of smoking or of other known risk factors.      Note: These recommendations do not apply to lung cancer screening, patients with immunosuppression, or patients with known primary cancer.      Fleischner Society 2017 Guidelines for Management of Incidentally Detected Pulmonary Nodules in Adults         US-EXTREMITY VENOUS LOWER BILAT   Final Result      US-RENAL TRANSPLANT COMP   Final Result      1.  No hydronephrosis or perinephric fluid collection involving the right lower quadrant renal transplant.   2.  No definite evidence of hemodynamically significant renal artery stenosis.   3.  Elevated resistive indices could suggest renal parenchymal disease.      DX-CHEST-PORTABLE (1 VIEW)   Final Result      1.  Bilateral peripheral lower lobe hazy opacities which is nonspecific but can be seen with Covid 19  pneumonia.   2.  Enlarged cardiac silhouette with probable vascular congestion/edema.      EC-ECHOCARDIOGRAM COMPLETE W/O CONT    (Results Pending)        Assessment/Plan  * RADHA (acute kidney injury) (HCC)- (present on admission)  Assessment & Plan  Creatine 2.64 on admission  Ranged from 1.27-1.71 over the past year  IV fluids  Nephrology consulting  2/14 BUN:66, Cr:2.07  2/13 BUN:66, Cr:2.15  Monitor labs, vitals, I/O's    Abnormal CT scan  Assessment & Plan  Future outpatient follow up.  2/12 CT chest/abd:   1.  Multifocal pulmonary opacifications are identified which are new since the prior CT. Findings could be due to pneumonia.  2.  1.2 cm nodular opacification noted in the left upper pulmonary lobe. Primary or metastatic neoplasm is possible. Small focus of pneumonia or inflammation is also possible. Consider biopsy or PET/CT. Consider follow-up CT in 3 months.  3.  There is duodenal wall thickening. Findings could be due to duodenitis. Neoplasm is also possible. There is some mild induration in the retroperitoneal fat surrounding the duodenum.  4.  Cholelithiasis. No biliary ductal dilatation appreciated  5.  Right lower quadrant renal transplant. No hydronephrosis.    Type 2 diabetes mellitus (HCC)  Assessment & Plan  HgbA1c:7.2  Monitor accuchecks.    Hyperparathyroidism (HCC)  Assessment & Plan  Has had severe elevated PTH over the last few years.  Follow up with ENT/Endocrine    Immunocompromised (HCC)  Assessment & Plan  On immunosuppressants for renal transplant  On broad spectrum antibiotics  Monitor labs, cultures    Acute encephalopathy  Assessment & Plan  2/14 Resolving.  2/13 lethargic but now more awake and following.  Conversant but has some word finding difficulty.  2/12 obtunded and lethargic per RN  Likely infectious of etiology but metabolic still possible.  2/14 MRI Brain no acute findings  Improving with addition of zyvox  Monitor neurochecks.  Avoid narcotics and  benzodiazepines.    Hypernatremia  Assessment & Plan  2/13 Na:143 <150 < 152    Hypercalcemia  Assessment & Plan  Mild-moderate hypercalcemia  IVF and given calcitonin 2/12pm.  IVF and monitor labs  2/14 Ca: 9.5    Hyperkalemia  Assessment & Plan  2/14 K:4.6  2/13 K:5  Potassium 5.7 on 2/7  Nephrology is following   Monitor     Acidosis  Assessment & Plan  2/14 CO2:20, BUN:66, Cr:2.07  2/13 CO2:19, BUN:66, Cr:2.15  IV fluids with HCO2  Nephrology consulting    Positive urine culture  Assessment & Plan  Meropenem  F/u cultures  ID consulting and discussed with Dr Krishnamurthy    Pain in left shin  Assessment & Plan  On compression  US venous negative for DVT    Diarrhea  Assessment & Plan  Improving  Likely viral  C.diff negative x 2  Stool culture from 2/3 negative.  Imodium PRN    Elevated brain natriuretic peptide (BNP) level- (present on admission)  Assessment & Plan  Caution with IV fluids  Prior echo's 2020 with preserved EF  No overt sign of volume overload.  Check new echo    Thrombocytopenia (HCC)  Assessment & Plan  2/13 Plt:122  Monitor cbc    Anemia of chronic disease  Assessment & Plan  Normocytic anemia  2/14 Hgb:11.5  2/13 Hgb:11.7 < 14.1  Monitor for bleeding    Epiretinal membrane (ERM) of both eyes- (present on admission)  Assessment & Plan  Continue home Combigan    Long term (current) use of systemic steroids- (present on admission)  Assessment & Plan  Outpatient on daily prednisone secondary to renal transplant 2008      Essential hypertension- (present on admission)  Assessment & Plan  History of  Hold Losartan  Amlodipine 5mg daily w/ parameters    Kidney transplant status, living related donor- (present on admission)  Assessment & Plan  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 Blount, self-manages lasix administration at home based on fluid volume status  Renal US negative for hydronephrosis or renal stenosis  Dr. Lyman, nephrology  consulted, appreciate recommendations  Continue home prednisone, tacrolimus, mycophenolate  Renal dosing for all medication    Obesity- (present on admission)  Assessment & Plan  Body mass index is 33.91 kg/m².  Outpatient weight loss counseling     Atrial flutter - (present on admission)  Assessment & Plan  History of, s/p ablation 1/21  On Eliquis 5 mg po BID (pending renal function if to change)  ST on admission  Eliquis continued  Monitor on telemetry       VTE prophylaxis: therapeutic anticoagulation with apixaban    I have performed a physical exam and reviewed and updated ROS and Plan today (2/14/2022). In review of yesterday's note (2/13/2022), there are no changes except as documented above.

## 2022-02-14 NOTE — PROGRESS NOTES
Bedside report received and patient care assumed. Pt is resting in bed, A&O to self, with no complaints of pain, and is on RA. All fall precautions are in place, belongings at bedside table.  Pt was updated on POC, no questions or concerns. Pt educated on use of call light for assistance. Will continue to monitor.

## 2022-02-14 NOTE — CARE PLAN
The patient is Stable - Low risk of patient condition declining or worsening    Shift Goals  Clinical Goals: MRI, improve mentation  Patient Goals: rest  Family Goals: n/a    Progress made toward(s) clinical / shift goals:    Problem: Pain - Standard  Goal: Alleviation of pain or a reduction in pain to the patient’s comfort goal  Outcome: Progressing     Problem: Fall Risk  Goal: Patient will remain free from falls  Outcome: Progressing     Problem: Safety - Medical Restraint  Goal: Remains free of injury from restraints (Restraint for Interference with Medical Device)  Outcome: Progressing  Goal: Free from restraint(s) (Restraint for Interference with Medical Device)  Outcome: Progressing       Patient is not progressing towards the following goals:      Problem: Knowledge Deficit - Standard  Goal: Patient and family/care givers will demonstrate understanding of plan of care, disease process/condition, diagnostic tests and medications  Outcome: Not Progressing

## 2022-02-14 NOTE — PROGRESS NOTES
Infectious Disease Progress Note    Author: Mariann Krishnamurthy M.D. Date & Time of service: 2022  1:54 PM    Chief Complaint:  Follow-up for altered mentation, concern for urinary tract infection    Interval History:   afebrile, WBC 6.7 [facility] after rapid response.  CT scan of the chest, abdomen and pelvis revealed multifocal pulmonary opacifications, duodenal wall thickening. Pt confused at this time and has incoherent speech   afebrile WBC 11.2.  MRI of the brain is negative for any acute abnormalities.  Repeat urine culture is growing Pseudomonas aeruginosa, susceptible to meropenem.  Encephalopathy is improving.  Being evaluated by speech    Labs Reviewed and Medications Reviewed.    Review of Systems:  Review of Systems   Unable to perform ROS: Mental status change   Pt encephalopathic however slowly improving    Hemodynamics:  Temp (24hrs), Av.5 °C (97.7 °F), Min:36.1 °C (96.9 °F), Max:37 °C (98.6 °F)  Temperature: 36.4 °C (97.6 °F)  Pulse  Av.6  Min: 66  Max: 117   Blood Pressure : 148/82       Physical Exam:  Physical Exam  Vitals and nursing note reviewed.   Constitutional:       Appearance: He is ill-appearing.   HENT:      Mouth/Throat:      Mouth: Mucous membranes are moist.   Eyes:      Pupils: Pupils are equal, round, and reactive to light.   Cardiovascular:      Rate and Rhythm: Normal rate and regular rhythm.   Pulmonary:      Effort: Pulmonary effort is normal. No respiratory distress.      Breath sounds: No wheezing.   Abdominal:      Palpations: Abdomen is soft.   Musculoskeletal:      Comments: Moves all 4 extremities    Skin:     General: Skin is warm and dry.   Neurological:      Comments: Confused  Mumbled speech   Psychiatric:      Comments: Unable to assess         Meds:    Current Facility-Administered Medications:   •  insulin regular **AND** POC blood glucose manual result **AND** NOTIFY MD and PharmD **AND** Administer 20 grams of glucose (approximately  8 ounces of fruit juice) every 15 minutes PRN FSBG less than 70 mg/dL **AND** dextrose 50%  •  methylPREDNISolone  •  meropenem  •  linezolid (ZYVOX) IV  •  Pharmacy  •  acetaminophen  •  amLODIPine  •  apixaban  •  loperamide  •  ondansetron  •  patiromer  •  tacrolimus  •  haloperidol lactate  •  [Held by provider] sodium bicarbonate  •  ondansetron  •  hydrALAZINE  •  brimonidine **AND** timolol    Labs:  Recent Labs     02/12/22 0016 02/13/22 0700 02/14/22  0806   WBC 9.5 6.7 11.2*   RBC 4.94 4.03* 3.90*   HEMOGLOBIN 14.1 11.7* 11.5*   HEMATOCRIT 44.6 36.7* 34.7*   MCV 90.3 91.1 89.0   MCH 28.5 29.0 29.5   RDW 50.6* 51.1* 49.1   PLATELETCT 186 122* 132*   MPV 10.5 10.4 10.6   NEUTSPOLYS  --   --  91.70*   LYMPHOCYTES  --   --  4.70*   MONOCYTES  --   --  2.70   EOSINOPHILS  --   --  0.00   BASOPHILS  --   --  0.10     Recent Labs     02/12/22 1811 02/13/22 0700 02/14/22  0806   SODIUM 152*  150* 143 135   POTASSIUM 5.4 5.0 4.6   CHLORIDE 123* 115* 106   CO2 17* 19* 20   GLUCOSE 173* 265* 336*   BUN 67* 66* 66*     Recent Labs     02/12/22 0016 02/12/22 0016 02/12/22 1811 02/13/22 0700 02/14/22  0806   ALBUMIN 3.2  --   --  2.7* 2.5*   TBILIRUBIN 0.8  --   --  0.6 0.5   ALKPHOSPHAT 108*  --   --  86 108*   TOTPROTEIN 7.3  --   --  5.7* 5.5*   ALTSGPT 51*  --   --  35 39   ASTSGOT 53*  --   --  29 32   CREATININE 1.82*   < > 2.06* 2.15* 2.07*    < > = values in this interval not displayed.       Imaging:  CT-CHEST,ABDOMEN,PELVIS W/O    Result Date: 2/12/2022 2/12/2022 5:04 PM HISTORY/REASON FOR EXAM:  Sepsis and history of renal transplant TECHNIQUE/EXAM DESCRIPTION: CT scan of the chest, abdomen and pelvis without contrast was performed. Noncontrast helical scanning of the chest, abdomen and pelvis was obtained from the lung apices through the pubic symphysis. Low dose optimization technique was utilized for this CT exam including automated exposure control and adjustment of the mA and/or kV according  to patient size. COMPARISON:  01/28/2021 FINDINGS: CT Chest: Lungs: Multifocal pulmonary opacifications are identified with irregular and poorly defined borders. There is a solid-appearing lesion in the left upper lobe measuring 1.2 cm in diameter.. Mediastinum/Sayra: No significant adenopathy. Pleura: No pleural effusion. Cardiac: Heart normal in size without pericardial effusion. Vascular: Unremarkable. Soft tissues: Unremarkable. Bones: No acute or destructive process. CT Abdomen and Pelvis: Liver: Normal. Spleen: Unremarkable. Pancreas: Unremarkable. Mostly fatty replaced. Gallbladder: Calcified gallstones are present dependently.. Biliary: Nondilated. Adrenal glands: Normal. Kidneys: Native kidneys demonstrate atrophy. No hydronephrosis. Right lower quadrant renal transplant is identified. No hydronephrosis or perirenal fluid.. Bowel: Duodenal wall thickening appears to be present measuring up to 1.6 cm in thickness. There is no evidence of gastric outlet obstruction.. Lymph nodes: No adenopathy. Vasculature: Unremarkable. Peritoneum: Unremarkable without ascites. Musculoskeletal: No acute or destructive process. Pelvis: No adenopathy or free fluid.     1.  Multifocal pulmonary opacifications are identified which are new since the prior CT. Findings could be due to pneumonia. 2.  1.2 cm nodular opacification noted in the left upper pulmonary lobe. Primary or metastatic neoplasm is possible. Small focus of pneumonia or inflammation is also possible. Consider biopsy or PET/CT. Consider follow-up CT in 3 months. 3.  There is duodenal wall thickening. Findings could be due to duodenitis. Neoplasm is also possible. There is some mild induration in the retroperitoneal fat surrounding the duodenum. 4.  Cholelithiasis. No biliary ductal dilatation appreciated. 5.  Right lower quadrant renal transplant. No hydronephrosis. Low and High Risk: Consider PET/CT, or tissue sampling, or follow-up CT at 3 months Low Risk -  Minimal or absent history of smoking and of other known risk factors. High Risk - History of smoking or of other known risk factors. Note: These recommendations do not apply to lung cancer screening, patients with immunosuppression, or patients with known primary cancer. Fleischner Society 2017 Guidelines for Management of Incidentally Detected Pulmonary Nodules in Adults     CT-HEAD W/O    Result Date: 2/12/2022 2/12/2022 5:04 PM HISTORY/REASON FOR EXAM:  Increased mental confusion. TECHNIQUE/EXAM DESCRIPTION AND NUMBER OF VIEWS: CT of the head without contrast. Contiguous 5 mm axial sections were obtained from the skull base through the vertex. Up to date radiation dose reduction adjustments have been utilized to meet ALARA standards for radiation dose reduction. COMPARISON:  01/28/2021 FINDINGS: There is no evidence of extra-axial hemorrhage. No intra-axial hemorrhage is noted. There is no brain swelling or edema. No mass effect or midline shift is noted. Ventricles and sulci appear prominent.  There is decreased attenuation in the periventricular white matter.     NO ACUTE ABNORMALITIES ARE NOTED ON CT SCAN OF THE HEAD. Findings are consistent with atrophy.  Decreased attenuation in the periventricular white matter likely indicates microvascular ischemic disease.     DX-CHEST-PORTABLE (1 VIEW)    Result Date: 2/1/2022 2/1/2022 9:14 AM HISTORY/REASON FOR EXAM:  Weakness, cough and sepsis. TECHNIQUE/EXAM DESCRIPTION AND NUMBER OF VIEWS: Single portable view of the chest. COMPARISON: 1/29/2021 FINDINGS: The mediastinal and cardiac silhouette is enlarged. There is perihilar bronchial wall thickening. There are bilateral lower lobe hazy peripheral parenchymal opacities, left greater than right. There is no significant pleural effusion. There is no visible pneumothorax. There are no acute bony abnormalities.     1.  Bilateral peripheral lower lobe hazy opacities which is nonspecific but can be seen with Covid 19  pneumonia. 2.  Enlarged cardiac silhouette with probable vascular congestion/edema.    US-EXTREMITY VENOUS LOWER BILAT    Result Date: 2022   Vascular Laboratory  CONCLUSIONS  No DVT in either leg.  MOMO ESCUDERO  Exam Date:     2022 13:51  Room #:     Inpatient  Priority:     Routine  Ht (in):             Wt (lb):  Ordering Physician:        CHAI MELNEDEZ  Referring Physician:       NORA Dailey  Sonographer:               Irlanda Dillon RVT  Study Type:                Complete Bilateral  Technical Quality:         Adequate  Age:    71    Gender:     M  MRN:    [id number]  :    1950      BSA:  Indications:     Encounter for screening for cardiovascular disorders  CPT Codes:       53360  ICD Codes:       Z13.6  History:         Concern for thrombus in presence of COVID infection and pain                    in the legs. No prior duplex.  Limitations:  PROCEDURES:  Bilateral lower extremity venous duplex imaging.  The following venous structures were evaluated: common femoral, deep  femoral, proximal great saphenous, femoral, popliteal, peroneal, and  posterior tibial veins.  Serial compression, color, and spectral Doppler flow evaluations were  performed.  FINDINGS:  Right lower extremity.  No deep venous thrombosis.  All veins demonstrate complete color filling and compressibility with  normal venous flow dynamics including spontaneous flow and respiratory  phasicity.  Left lower extremity.  No deep venous thrombosis.  All veins demonstrate complete color filling and compressibility with  normal venous flow dynamics including spontaneous flow and respiratory  phasicity.  Reflux demonstrated during normal respiration at the common femoral vein  with a time of 1024 ms.  Tom Bolden MD  (Electronically Signed)  Final Date:      2022                   17:16    US-RENAL TRANSPLANT COMP    Result Date: 2022 10:41 AM HISTORY/REASON FOR EXAM:   Abnormal Labs Covid 19 positive TECHNIQUE/EXAM DESCRIPTION: Gray-scale and duplex Doppler ultrasound of the transplant kidney is performed. COMPARISON:  5/6/2018 FINDINGS: The native kidneys are not well visualized, likely atrophic and echogenic. Right iliac fossa transplant kidney is noted. The kidney measures 15.40 cm . There is no evidence of hydronephrosis or nephrolithiasis. There is no perinephric fluid collection. Intrarenal resistive indices: Mildly elevated from prior. 0.74 0.84 0.81 Main renal artery peak systolic velocity measures 196 cm/s . Anastomosis artery peak systolic velocity measures 132 cm/s. Adjacent iliac artery peak systolic velocity measures 99 cm/s . The bladder is partially decompressed.     1.  No hydronephrosis or perinephric fluid collection involving the right lower quadrant renal transplant. 2.  No definite evidence of hemodynamically significant renal artery stenosis. 3.  Elevated resistive indices could suggest renal parenchymal disease.    DX-ABDOMEN FOR TUBE PLACEMENT    Result Date: 2/12/2022 2/12/2022 9:29 PM HISTORY/REASON FOR EXAM:  Tube evaluation. TECHNIQUE/EXAM DESCRIPTION AND NUMBER OF VIEWS:  2 view(s) of the abdomen. COMPARISON:  None. FINDINGS: Limited single view of the abdomen performed primarily to evaluate enteric tube position. The tip projects over the expected area of the first portion duodenum. The bowel gas pattern is within normal limits.     Feeding tube with tip projecting over the expected area of the first portion duodenum.      Micro:  Results     Procedure Component Value Units Date/Time    URINE CULTURE(NEW) [[id number]]  (Abnormal)  (Susceptibility) Collected: 02/11/22 1320    Order Status: Completed Specimen: Urine, Clean Catch Updated: 02/13/22 1410     Significant Indicator POS     Source UR     Site URINE, CLEAN CATCH     Culture Result Usual skin compa <10,000 cfu/mL      Pseudomonas aeruginosa  10-50,000 cfu/mL      Narrative:      Enhanced  "Droplet, Contact, and Eye Protection  Collected By: [id number] Merrill BERNARD BONILLA  Indication for culture:->Acute unexplained altered mental  status ONLY after ruling out other recognized cause  Enhanced Droplet, Contact, and Eye Protection    Susceptibility     Pseudomonas aeruginosa (1)     Antibiotic Interpretation Microscan   Method Status    Ciprofloxacin Resistant 2 mcg/mL MATHEW Final    Cefepime Sensitive 4 mcg/mL MATHEW Final    Amikacin Sensitive <=16 mcg/mL MATHEW Final    Gentamicin Sensitive 4 mcg/mL MATHEW Final    Tobramycin Sensitive <=2 mcg/mL MATHEW Final    Imipenem Resistant 8 mcg/mL MATHEW Final    Levofloxacin Resistant 4 mcg/mL MATHEW Final    Meropenem Sensitive <=1 mcg/mL MATHEW Final    Pip/Tazobactam Sensitive <=8 mcg/mL MATHEW Final                   MRSA By PCR (Amp) [[id number]] Collected: 02/12/22 2130    Order Status: Completed Specimen: Respirate from Nares Updated: 02/13/22 0211     MRSA by PCR POSITIVE    Narrative:      Contact  Collected By: [id number] KB VIDAL    BLOOD CULTURE [[id number]] Collected: 02/11/22 1255    Order Status: Completed Specimen: Blood from Peripheral Updated: 02/12/22 0753     Significant Indicator NEG     Source BLD     Site PERIPHERAL     Culture Result No Growth  Note: Blood cultures are incubated for 5 days and  are monitored continuously.Positive blood cultures  are called to the RN and reported as soon as  they are identified.      Narrative:      Enhanced Droplet, Contact, and Eye Protection  Per Hospital Policy: Only change Specimen Src: to \"Line\" if  specified by physician order.  Left Forearm/Arm    BLOOD CULTURE [[id number]] Collected: 02/11/22 1751    Order Status: Completed Specimen: Blood from Peripheral Updated: 02/12/22 0753     Significant Indicator NEG     Source BLD     Site PERIPHERAL     Culture Result No Growth  Note: Blood cultures are incubated for 5 days and  are monitored continuously.Positive blood cultures  are called to the RN and reported as soon " "as  they are identified.      Narrative:      Enhanced Droplet, Contact, and Eye Protection  Per Hospital Policy: Only change Specimen Src: to \"Line\" if  specified by physician order.  Left Hand    URINALYSIS [[id number]]  (Abnormal) Collected: 02/11/22 1320    Order Status: Completed Specimen: Urine, Clean Catch Updated: 02/11/22 1434     Color Yellow     Character Turbid     Specific Gravity 1.013     Ph 5.0     Glucose Negative mg/dL      Ketones Trace mg/dL      Protein 30 mg/dL      Bilirubin Negative     Urobilinogen, Urine 0.2     Nitrite Positive     Leukocyte Esterase Large     Occult Blood Large     Micro Urine Req Microscopic    Narrative:      Enhanced Droplet, Contact, and Eye Protection  Collected By: [id number] DURGA CRYSTAL  Indication for culture:->Acute unexplained altered mental  status ONLY after ruling out other recognized cause    Cdiff By PCR Rflx Toxin [[id number]] Collected: 02/11/22 0932    Order Status: Completed Specimen: Stool Updated: 02/11/22 1432     C Diff by PCR Negative     Comment: C. difficile NOT detected by PCR.  Treatment not indicated per guidelines.  Repeat testing not indicated within 7 days.          027-NAP1-BI Presumptive Negative     Comment: Presumptive 027/NAP1/BI target DNA sequences are NOT DETECTED.       Narrative:      Enhanced Droplet, Contact, and Eye Protection  Collected By: [id number] DURGA MILLER.  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  Patient was initially seen by ID on 2/6 secondary to bacterial growth on urine cultures.  At that time, cessation of antibiotics was recommended.  Today, he has been noted to be more disoriented with electrolyte abnormalities.  Repeat urinalysis on 2/11 showed positive nitrites, packed WBCs but negative bacteria.  Last urine culture on 2/1 grew CRE Pseudomonas aeruginosa.  Blood cultures on 2/11 are negative to date.  It is unclear that his urinary tract fraction is causing his disorientation/acute encephalopathy however given these acute changes it is worth starting antibiotics.  However, patient found to have multifocal pulmonary opacifications concerning for pneumonia on imaging.    Pertinent diagnoses:  Acute encephalopathy, etiology unclear.  Improving  Acute kidney injury on chronic kidney disease, improving  Bacteriuria with CRE Pseudomonas aeruginosa  Renal transplant on chronic immunosuppression  Acute hypoxic respiratory failure  Pneumonia, noted on CT scan  Leukocytosis, new.  Multifactorial, patient also on methylprednisone    Plan:  -Continue renally dosed IV meropenem and Zyvox 600 mg twice daily  -Monitor CBC while on Zyvox  -CT chest, abdomen and pelvis on 2/12-multifocal pulmonary opacifications which are new since prior CT, duodenal wall thickening  -Repeat urine culture on 2/11+ 10-50,000 CFU Pseudomonas aeruginosa.  Follow susceptibilities-susceptible to meropenem  -CT head negative  -MRI of brain-negative for any acute abnormalities  -Plan a 7-day course of antibiotics from 2/12.  Stop date 2/18/2022    I have performed a physical exam and reviewed and updated ROS and plan today 2/14/2022.  In review of yesterday's note 2/13/2022, there are no changes except as documented above.      Discussed with internal medicine/Dr. Barajas.  ID signing off.  Please reconsult if needed

## 2022-02-14 NOTE — ASSESSMENT & PLAN NOTE
Future outpatient follow up.  2/12 CT chest/abd:   1.  Multifocal pulmonary opacifications are identified which are new since the prior CT. Findings could be due to pneumonia.  2.  1.2 cm nodular opacification noted in the left upper pulmonary lobe. Primary or metastatic neoplasm is possible. Small focus of pneumonia or inflammation is also possible. Consider biopsy or PET/CT. Consider follow-up CT in 3 months.  3.  There is duodenal wall thickening. Findings could be due to duodenitis. Neoplasm is also possible. There is some mild induration in the retroperitoneal fat surrounding the duodenum.  4.  Cholelithiasis. No biliary ductal dilatation appreciated  5.  Right lower quadrant renal transplant. No hydronephrosis.

## 2022-02-14 NOTE — DISCHARGE PLANNING
Care Transition Team Discharge Planning    Anticipated Discharge Disposition: d/c to SNF when medically stable    Action: Pt was transferred from  to [facility],     Lsw spoke to MD for rounds, and pt is immuno compromised d/t hx kidney placement. Pt admitted w/ sepsis. Pt is stable enough to transfer back to [facility], is on enteral tube feeds, and we are currently waiting for more medical assessment results.      Barriers to Discharge: medical stability, an open bed, transport    Plan: [facility] will continue to follow, and assist w/ d/c planning.

## 2022-02-15 NOTE — CARE PLAN
Problem: Nutritional:  Goal: Achieve adequate nutritional intake  Description: Patient will consume >50% of meals and supplements.  Outcome: Not Progressing     Problem: Nutritional:  Goal: Nutrition support tolerated and meeting greater than 85% of estimated needs  Outcome: Progressing

## 2022-02-15 NOTE — DIETARY
Nutrition Services: Update   [facility] @ 40 mL/hr (goal rate) however has been held 2/2 emesis this AM.  Diet was also initiated 2/14 per SLP with <25% thus far.  D/w RN - recommend continuing to hold TF and see how pt does w/ PO intake.    RD(s) to monitor need for TF to resume 24 hours vs nocturnally if PO intake is poor.

## 2022-02-15 NOTE — CARE PLAN
The patient is Stable - Low risk of patient condition declining or worsening    Shift Goals  Clinical Goals: improved mentation  Patient Goals: rest  Family Goals: DENA    Progress made toward(s) clinical / shift goals:    Problem: Knowledge Deficit - Standard  Goal: Patient and family/care givers will demonstrate understanding of plan of care, disease process/condition, diagnostic tests and medications  Outcome: Progressing     Problem: Skin Integrity  Goal: Skin integrity is maintained or improved  Outcome: Progressing     Problem: Fall Risk  Goal: Patient will remain free from falls  Outcome: Progressing       Patient is not progressing towards the following goals:

## 2022-02-15 NOTE — PROGRESS NOTES
Hospital Medicine Daily Progress Note    Date of Service  2/15/2022    Chief Complaint  Weakness    Hospital Course  This is a 71 year old male with PMHx of hypertension, hyperlipidemia, type 2 diabetes A1c 7.2, atrial flutter on Eliquis, ANCA vasculitis s/p transplant, recent COVID infection 1/25/2022 who was admitted on 02/1/2022 with weakness and diarrhea.    Patient is vaccinated against COVID-19. Diagnosed with COVID on 01/25. Patient remains on room air throughout admission.UA positive for UTI, urine culture positive for carbapenem resistant Pseudomonas aeruginosa. Due to less than 10 K organisms, ID did not intially recommend any antibiotics at this time.  Patient with worsening AMS on 02/11, repeat urine is worse, pending repeat urine culture and IV meropenem was started by ID Dr. Krishnamurthy who reconsulted.  Patient with ongoing diarrhea, C. difficile and stool cultures negative x2.    Patient with RADHA on CKD. Patient initially started on fluid, renal ultrasound unremarkable. Patient started on Veltassa and sodium bicarb. Nephrology has followed.    On 2/12 he developed hypotension, lactic acidosis and decreased level of consciousness and was transferred to the [facility]. Initiated on Zyvox for MRSA positive nares and abnormal CT lungs. He was also treated for hypercalcemia. Cortrak placed and enteral feeding initiated.    Interval Problem Update  Patient seen and examined today.    Patient tolerating treatment and therapies.  All Data, Medication data reviewed.  Case discussed with nursing as available.  Plan of Care reviewed with patient and notified of changes.  2/15 the patient is more conversant, per wife much improved mentally, he did have some nausea vomiting earlier today, he was cleared for oral diet with still a core track in place. Patient with improved urine output, 1.9 L, the patient is switched to oral prednisone coming off high-dose Solu-Medrol with the tentative diagnosis of recurrent vasculitis.  Plans would be for future biopsy once patient is more stable. Ongoing correction of sodium with free water flushes,  Close laboratory follow-up  2/14: Alert and conversant. Remains weak.  Failed swallow per speech    2/13 more alert and conversant but still with lethargy and some word finding difficulty.  Weak.  Wife at bedside and given updates.        I have personally seen and examined the patient at bedside. I discussed the plan of care with patient, family, bedside RN and infectious disease.    Consultants/Specialty  infectious disease and nephrology    Code Status  Full Code    Disposition  Patient is not medically cleared for discharge.   Anticipate discharge to to home with close outpatient follow-up.  I have placed the appropriate orders for post-discharge needs.    Review of Systems  Review of Systems   Constitutional: Positive for malaise/fatigue. Negative for fever.   HENT: Negative for sore throat.    Respiratory: Negative for cough, shortness of breath and stridor.    Cardiovascular: Negative for leg swelling.   Gastrointestinal: Negative for abdominal pain and nausea.   Genitourinary: Negative for dysuria.   Musculoskeletal: Positive for back pain and neck pain.   Neurological: Negative for dizziness and headaches.   Psychiatric/Behavioral: Positive for memory loss. The patient is not nervous/anxious.         Physical Exam  Temp:  [36.1 °C (96.9 °F)-37.1 °C (98.7 °F)] 37.1 °C (98.7 °F)  Pulse:  [] 105  Resp:  [13-28] 18  BP: (114-168)/(60-83) 164/75  SpO2:  [90 %-97 %] 91 %    Physical Exam  Vitals reviewed.   Constitutional:       Appearance: He is obese. He is ill-appearing. He is not diaphoretic.      Interventions: Nasal cannula in place.   HENT:      Head: Normocephalic and atraumatic.      Nose: Nose normal.   Eyes:      General: No scleral icterus.        Right eye: No discharge.         Left eye: No discharge.      Extraocular Movements: Extraocular movements intact.       Conjunctiva/sclera: Conjunctivae normal.   Cardiovascular:      Rate and Rhythm: Normal rate and regular rhythm.      Pulses:           Radial pulses are 2+ on the right side and 2+ on the left side.        Dorsalis pedis pulses are 2+ on the right side and 2+ on the left side.      Heart sounds: No murmur heard.      Pulmonary:      Effort: Pulmonary effort is normal. No respiratory distress.      Breath sounds: Rhonchi present. No wheezing or rales.   Abdominal:      General: Bowel sounds are normal. There is no distension.      Palpations: Abdomen is soft.      Tenderness: There is no abdominal tenderness.   Musculoskeletal:         General: No swelling.      Cervical back: Neck supple. Tenderness (chronic per wife) present. No muscular tenderness.      Right lower leg: No edema.      Left lower leg: No edema.   Lymphadenopathy:      Cervical: No cervical adenopathy.   Skin:     Coloration: Skin is not jaundiced or pale.   Neurological:      Mental Status: He is lethargic and disoriented.      Cranial Nerves: No cranial nerve deficit.      Motor: Weakness present.   Psychiatric:         Mood and Affect: Mood normal.         Speech: Speech is delayed.         Behavior: Behavior is slowed.         Cognition and Memory: Cognition is impaired.         Fluids    Intake/Output Summary (Last 24 hours) at 2/15/2022 0746  Last data filed at 2/15/2022 0553  Gross per 24 hour   Intake 2650 ml   Output 1900 ml   Net 750 ml       Laboratory  Recent Labs     02/13/22  0700 02/14/22  0806 02/15/22  0342   WBC 6.7 11.2* 11.3*   RBC 4.03* 3.90* 4.15*   HEMOGLOBIN 11.7* 11.5* 12.2*   HEMATOCRIT 36.7* 34.7* 37.3*   MCV 91.1 89.0 89.9   MCH 29.0 29.5 29.4   MCHC 31.9* 33.1* 32.7*   RDW 51.1* 49.1 49.5   PLATELETCT 122* 132* 143*   MPV 10.4 10.6 10.7     Recent Labs     02/13/22  0700 02/14/22  0806 02/15/22  0342   SODIUM 143 135 138   POTASSIUM 5.0 4.6 5.2   CHLORIDE 115* 106 107   CO2 19* 20 22   GLUCOSE 265* 336* 337*   BUN 66*  66* 71*   CREATININE 2.15* 2.07* 1.98*   CALCIUM 9.9 9.5 10.5                   Imaging  MR-BRAIN-W/O   Final Result         No acute intracranial process.      Nonspecific T2 hyperintensities are noted in the periventricular and deep white matter, most likely related to chronic microvascular ischemia.      Remote left thalamic and corona radiata lacunar infarction noted.      DX-ABDOMEN FOR TUBE PLACEMENT   Final Result         Feeding tube with tip projecting over the expected area of the first portion duodenum.      CT-HEAD W/O   Final Result         NO ACUTE ABNORMALITIES ARE NOTED ON CT SCAN OF THE HEAD.      Findings are consistent with atrophy.  Decreased attenuation in the periventricular white matter likely indicates microvascular ischemic disease.         CT-CHEST,ABDOMEN,PELVIS W/O   Final Result      1.  Multifocal pulmonary opacifications are identified which are new since the prior CT. Findings could be due to pneumonia.      2.  1.2 cm nodular opacification noted in the left upper pulmonary lobe. Primary or metastatic neoplasm is possible. Small focus of pneumonia or inflammation is also possible. Consider biopsy or PET/CT. Consider follow-up CT in 3 months.      3.  There is duodenal wall thickening. Findings could be due to duodenitis. Neoplasm is also possible. There is some mild induration in the retroperitoneal fat surrounding the duodenum.      4.  Cholelithiasis. No biliary ductal dilatation appreciated.      5.  Right lower quadrant renal transplant. No hydronephrosis.      Low and High Risk: Consider PET/CT, or tissue sampling, or follow-up CT at 3 months      Low Risk - Minimal or absent history of smoking and of other known risk factors.      High Risk - History of smoking or of other known risk factors.      Note: These recommendations do not apply to lung cancer screening, patients with immunosuppression, or patients with known primary cancer.      Fleischner Society 2017 Guidelines for  Management of Incidentally Detected Pulmonary Nodules in Adults         US-EXTREMITY VENOUS LOWER BILAT   Final Result      US-RENAL TRANSPLANT COMP   Final Result      1.  No hydronephrosis or perinephric fluid collection involving the right lower quadrant renal transplant.   2.  No definite evidence of hemodynamically significant renal artery stenosis.   3.  Elevated resistive indices could suggest renal parenchymal disease.      DX-CHEST-PORTABLE (1 VIEW)   Final Result      1.  Bilateral peripheral lower lobe hazy opacities which is nonspecific but can be seen with Covid 19 pneumonia.   2.  Enlarged cardiac silhouette with probable vascular congestion/edema.      EC-ECHOCARDIOGRAM COMPLETE W/O CONT    (Results Pending)        Assessment/Plan  * RADHA (acute kidney injury) (Formerly [facility])- (present on admission)  Assessment & Plan  Creatine 2.64 on admission  Ranged from 1.27-1.71 over the past year  IV fluids  Nephrology consulting, suspecting recurrent vasculitis,  S/p treatment with high-dose steroids  Ins and outs, close laboratory follow-up    Type 2 diabetes mellitus (Formerly [facility])  Assessment & Plan  HgbA1c:7.2  Monitor accuchecks.    Hyperparathyroidism (Formerly [facility])  Assessment & Plan  Has had severe elevated PTH over the last few years.  Follow up with ENT/Endocrine    Immunocompromised (Formerly [facility])  Assessment & Plan  On immunosuppressants for renal transplant  On broad spectrum antibiotics  Monitor labs, cultures    Acute encephalopathy  Assessment & Plan  Appears toxic metabolic, no focal findings,  Avoid narcotics and benzodiazepines.    Hypernatremia  Assessment & Plan  Improving with addition of free water, monitor    Hypercalcemia  Assessment & Plan  Mild-moderate hypercalcemia  IVF and given calcitonin 2/12pm.  IVF and monitor labs      Hyperkalemia  Assessment & Plan  Improved,  Nephrology is following   Monitor     Acidosis  Assessment & Plan  Improving  Nephrology consulting    Positive urine culture  Assessment &  Plan  Meropenem for Pseudomonas    ID consulting / Dr Krishnamurthy    Pain in left shin  Assessment & Plan    US venous negative for DVT    Diarrhea  Assessment & Plan  Improving  Likely viral versus functional  C.diff negative x 2  Stool culture from 2/3 negative.  Imodium PRN    Elevated brain natriuretic peptide (BNP) level- (present on admission)  Assessment & Plan  Caution with IV fluids  Prior echo's 2020 with preserved EF  No overt sign of volume overload.      Thrombocytopenia (HCC)  Assessment & Plan  Mild,  Monitor cbc    Anemia of chronic disease  Assessment & Plan  Normocytic anemia    Monitor for bleeding    Epiretinal membrane (ERM) of both eyes- (present on admission)  Assessment & Plan  Continue home Combigan    Long term (current) use of systemic steroids- (present on admission)  Assessment & Plan  Outpatient on daily prednisone secondary to renal transplant 2008      Essential hypertension- (present on admission)  Assessment & Plan  History of  Hold Losartan  Amlodipine 5mg daily w/ parameters    Kidney transplant status, living related donor- (present on admission)  Assessment & Plan  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 Blount, self-manages lasix administration at home based on fluid volume status  Renal US negative for hydronephrosis or renal stenosis  Dr. Lyman, nephrology consulted, appreciate recommendations  Continue home prednisone, tacrolimus, mycophenolate  Renal dosing for all medication    Obesity- (present on admission)  Assessment & Plan  Body mass index is 33.91 kg/m².  Outpatient weight loss counseling     Atrial flutter - (present on admission)  Assessment & Plan  History of, s/p ablation 1/21  On Eliquis 5 mg po BID (pending renal function if to change)  ST on admission  Eliquis continued  Monitor on telemetry    Abnormal CT scan  Assessment & Plan  Future outpatient follow up.  2/12 CT chest/abd:   1.  Multifocal  pulmonary opacifications are identified which are new since the prior CT. Findings could be due to pneumonia.  2.  1.2 cm nodular opacification noted in the left upper pulmonary lobe. Primary or metastatic neoplasm is possible. Small focus of pneumonia or inflammation is also possible. Consider biopsy or PET/CT. Consider follow-up CT in 3 months.  3.  There is duodenal wall thickening. Findings could be due to duodenitis. Neoplasm is also possible. There is some mild induration in the retroperitoneal fat surrounding the duodenum.  4.  Cholelithiasis. No biliary ductal dilatation appreciated  5.  Right lower quadrant renal transplant. No hydronephrosis.     Plan  Adjusted medication for blood pressure control, as needed's available  Ongoing anticoagulation  Glycemic control, addition of NPH, evaluate after hopefully his p.o. intake will improve  Ongoing antibiotics as suggested by infectious disease, follow cultures  Ongoing steroid use  Appreciate nephrology input  See orders  Medically complex high risk patient    VTE prophylaxis: therapeutic anticoagulation with apixaban    I have performed a physical exam and reviewed and updated ROS and Plan today (2/15/2022). In review of yesterday's note (2/14/2022), there are no changes except as documented above.      Please note that this dictation was created using voice recognition software. I have made every reasonable attempt to correct obvious errors, but I expect that there are errors of grammar and possibly context that I did not discover before finalizing the note.

## 2022-02-15 NOTE — DOCUMENTATION QUERY
"                                                                         [facility]                                                                       Query Response Note      PATIENT:               MOMO ESCUDERO  ACCT #:                  [id number]  MRN:                     [id number]  :                      1950  ADMIT DATE:       2022 8:41 AM  DISCH DATE:          RESPONDING  PROVIDER #:        [id number]           QUERY TEXT:    Acute encephalopathy is documented in the Medical Record.  Can a more specific diagnosis be provided?    NOTE:  If the appropriate response is not listed below, please respond with a new note.    If you have any questions please contact:  Candida Roberts RN CDI [facility]  [email or web address]  Candida Roberts [address]      The patient's Clinical Indicators include:  71 year old male with a history of a kidney transplant  was admitted for Covid PNA and RADHA.     Karl  \"Acute encephalopathy\" & \"Likely infectious of etiology but metabolic still possible.\"   \"Acute encephalopathy likey secondary to UTI\"   Itzel  \"Acute encephalopathy likely secondary to UTI\"    Risk factors:  COVID, kidney transplant, UTI  Treatment: IV Meropenem, Zyvox  Options provided:   -- Acute metabolic encephalopathy due to UTI   -- Acute encephalopathy due to other medical condition, (please specify other medical condition)   -- [Unable to determine      Query created by: Candida Roberts on 2022 4:09 PM    RESPONSE TEXT:    Acute encephalopathy of multiple etiology possibility but clearing with addition of broad spectrum antibiotic and had MRSA nares positive with possible pneumonia on CT chest. In addition there were metabolic derangement that has improved with fluids. Likely infection as most likely etiology but metabolic etiology still plausible.          Electronically signed by:  MOMO HOLGUIN DO 2022 5:58 PM              "

## 2022-02-15 NOTE — PROGRESS NOTES
"[facility] -  PROGRESS NOTE               Author: Jorge Pedraza M.D. Date & Time: 2/15/2022  8:21 AM     HPI:  Krishan Acevedo is a 71 y.o. 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 Was additionally found to have worsening renal function.   Patient shares that he first tested positive for COVID at an outside hospital several days ago. He felt that he was not improving and possibly getting worse at home which prompted him to call EMS. He mentions that for several days he has been having loose stools, noting 3-5 loose but not watery BMs daily, noting his last loose stool was day prior to presentation. He also mentions that he has become more dependent on his wife to bring him food and water citing fatigue with his COVID infection. He does note that for the past several weeks he has been feeling \"like I'm always thirsty\".  While in the ED he was vitally stable. Was placed on 2L NC sating in the mid 90s BP ranged 123/70 from 159/117. Labs notable for Cr of 2.62. BUN 80, COVID-19 positive, UA noted for large occult blood, small leukocyte esterase. US of kidneys was completed with indicated no hydronephrosis or fluid collections, no renal artery stenosis.   In regards to his transplant history. Living donor transplant was completed in 2008 at an outside hospital. Patient is on Prednisone 5mg, Mycophenolate 500mg BID, and Tacrolimus 1mg and 0.5mg daily. His GFR trends in the 30s-40s although in chart he has climbed to the 56-53 ranges in the past.     DAILY NEPHROLOGY SUMMARY:  2/1: Admitted, consult placed  2/2: no overnight events, renal function improving, patient tolerating PO intake  2/3: started on IVF yesterday, renal function improved to around prior baseline range  2/4: renal function " "continues to improve  2/5: Scr down to baseline levels. No new overnight renal events. Feels ok.  2/6: Feels ok. Urine culture from 2/1 positive for carbapenem resistant Pseudomonas aeruginosa. He is on zosyn per sensitivity. No labs today  2/7: Potassium is 5.7 today.  Creatinine up to 1.95.  Corrected calcium elevated.  CO2 16.  2/08: Chart and notes reviewed. No new labs this am. +Tachycardia.   2/09: Nursing notes and assessments reviewed.  Int tachycardia still. Room air. SBP labile 100s-160s. K 5.5  2/10: Spoke with RN, patient has no complaints at this time.   Plan is for discharge today or tomorrow to SNF pending 1L bolus and Calcium recheck.  Current SCa 11.5  VSS RA  -160s  2/11: Sodium up to 149.  Calcium increasing.  Creatinine stable 1.7.  2/12: Sodium up to 152.  Calcium remains elevated.  Creatinine 1.8.  I/Os not documented  2/13: Sodium still at 152.  Calcium trending down.  Creatinine up to 2.06.  On bicarb drip.  Somnolent  2/14: Unable to participate in interview, improved hemodynamics, cr stable  2/15: stable hemodynamics, 1.9L UOP, cr to 1.9, labile blood pressures, ongoing intermittent delirium    REVIEW OF SYSTEMS:    Unabel to obtain, mental status     PMH/PSH/SH/FH: Reviewed and unchanged since admission note  CURRENT MEDICATIONS: Reviewed from admission to present day    VS:  VS:  /81   Pulse (!) 102   Temp 37.1 °C (98.7 °F) (Temporal)   Resp (!) 21   Ht 1.803 m (5' 11\")   Wt 112 kg (247 lb 5.7 oz)   SpO2 91%   BMI 34.50 kg/m²   GENERAL: no acute distress  CV: RRR, No pedal edema  RESP: non-labored  GI: Soft  MSK: No joint deformities   SKIN: No concerning rashes  NEURO: AOx1  PSYCH: Cooperative    Fluids:  In: 2650 [I.V.:300; Enteral:900]  Out: 1900     LABS:  Recent Labs     02/13/22  0700 02/14/22  0806 02/15/22  0342   SODIUM 143 135 138   POTASSIUM 5.0 4.6 5.2   CHLORIDE 115* 106 107   CO2 19* 20 22   GLUCOSE 265* 336* 337*   BUN 66* 66* 71*   CREATININE 2.15* " 2.07* 1.98*   CALCIUM 9.9 9.5 10.5       IMPRESSION:  # History of Renal Transplant  -Tacrolimus 22.5    2/8/22  # History of ANCA Vasculitis  - Repeat serologies positive  # RADHA  # CKD 3  # Acidosis  # Afib  # DM2  # HTN  # Chronic microhematuria  - No UTI on repeat UA       PLAN:  - Solumedrol 1g daily x 3, then to oral pred 60mg daily on 2/16  -tentative plan for renal biopsy once more stable given concern for recurrent ANCA vasculitis   - Tacrolimus 0.5mg PO BID recheck  trough ~2/18/21  - Hold Cellcept for now   - Increase free water flushes to 250cc q4  - Follow up SPEP, k/l ratio  - Continue to hold losartan  - On veltassa 8.6g daily since 2/7/22  - Low potassium renal diet   - Sodium bicarbonate 1300 mg PO TID  - Dose meds for reduced GFR    Thank you

## 2022-02-15 NOTE — CARE PLAN
Problem: Knowledge Deficit - Standard  Goal: Patient and family/care givers will demonstrate understanding of plan of care, disease process/condition, diagnostic tests and medications  Outcome: Progressing     Problem: Pain - Standard  Goal: Alleviation of pain or a reduction in pain to the patient’s comfort goal  Outcome: Progressing     Problem: Skin Integrity  Goal: Skin integrity is maintained or improved  Outcome: Progressing     Problem: Fall Risk  Goal: Patient will remain free from falls  Outcome: Progressing     Problem: Psychosocial  Goal: Patient's ability to verbalize feelings about condition will improve  Outcome: Progressing     Problem: Communication  Goal: The ability to communicate needs accurately and effectively will improve  Outcome: Progressing     Problem: Hemodynamics  Goal: Patient's hemodynamics, fluid balance and neurologic status will be stable or improve  Outcome: Progressing     Problem: Risk for Aspiration  Goal: Patient's risk for aspiration will be absent or decrease  Outcome: Progressing     Problem: Nutrition  Goal: Patient's nutritional and fluid intake will be adequate or improve  Outcome: Progressing     Problem: Urinary Elimination  Goal: Establish and maintain regular urinary output  Outcome: Progressing     Problem: Bowel Elimination  Goal: Establish and maintain regular bowel function  Outcome: Progressing     Problem: Safety - Medical Restraint  Goal: Remains free of injury from restraints (Restraint for Interference with Medical Device)  Outcome: Met  Goal: Free from restraint(s) (Restraint for Interference with Medical Device)  Outcome: Met     Problem: Respiratory  Goal: Patient will achieve/maintain optimum respiratory ventilation and gas exchange  Outcome: Met   The patient is Stable - Low risk of patient condition declining or worsening    Shift Goals  Clinical Goals: improved mentation  Patient Goals: rest  Family Goals: DENA    Progress made toward(s) clinical /  shift goals:  Pt had increased alertness and appropriate mentation.     Patient is not progressing towards the following goals: none

## 2022-02-16 NOTE — PROGRESS NOTES
Per report from BRIDGETTE Hammer, day shift hold pt tube feeds due to emesis and encourage po intake. Will continue water flushes for sodium level reduction per order 250 Q4.

## 2022-02-16 NOTE — PROGRESS NOTES
Informed Dr. Albarado of patient's lack of PO intake. MD wants to attempt to motivate patient to drink Boosts and attempt meals if possible before restarting tube feeds. Will continue to monitor.

## 2022-02-16 NOTE — CARE PLAN
Problem: Knowledge Deficit - Standard  Goal: Patient and family/care givers will demonstrate understanding of plan of care, disease process/condition, diagnostic tests and medications  Outcome: Progressing     Problem: Pain - Standard  Goal: Alleviation of pain or a reduction in pain to the patient’s comfort goal  Outcome: Progressing     Problem: Skin Integrity  Goal: Skin integrity is maintained or improved  Outcome: Progressing     Problem: Fall Risk  Goal: Patient will remain free from falls  Outcome: Progressing     Problem: Psychosocial  Goal: Patient's level of anxiety will decrease  Outcome: Progressing  Goal: Patient's ability to verbalize feelings about condition will improve  Outcome: Progressing  Goal: Patient's ability to re-evaluate and adapt role responsibilities will improve  Outcome: Progressing  Goal: Patient and family will demonstrate ability to cope with life altering diagnosis and/or procedure  Outcome: Progressing  Goal: Spiritual and cultural needs incorporated into hospitalization  Outcome: Progressing     Problem: Communication  Goal: The ability to communicate needs accurately and effectively will improve  Outcome: Progressing     Problem: Discharge Barriers/Planning  Goal: Patient's continuum of care needs are met  Outcome: Progressing     Problem: Hemodynamics  Goal: Patient's hemodynamics, fluid balance and neurologic status will be stable or improve  Outcome: Progressing     Problem: Chest Tube Management  Goal: Complications related to chest tube will be avoided or minimized  Outcome: Progressing     Problem: Fluid Volume  Goal: Fluid volume balance will be maintained  Outcome: Progressing     Problem: Mechanical Ventilation  Goal: Safe management of artificial airway and ventilation  Outcome: Progressing  Goal: Successful weaning off mechanical ventilator, spontaneously maintains adequate gas exchange  Outcome: Progressing  Goal: Patient will be able to express needs and understand  communication  Outcome: Progressing     Problem: Dysphagia  Goal: Dysphagia will improve  Outcome: Progressing     Problem: Risk for Aspiration  Goal: Patient's risk for aspiration will be absent or decrease  Outcome: Progressing     Problem: Nutrition  Goal: Patient's nutritional and fluid intake will be adequate or improve  Outcome: Progressing  Goal: Enteral nutrition will be maintained or improve  Outcome: Progressing  Goal: Enteral nutrition will be maintained or improve  Outcome: Progressing     Problem: Urinary Elimination  Goal: Establish and maintain regular urinary output  Outcome: Progressing     Problem: Bowel Elimination  Goal: Establish and maintain regular bowel function  Outcome: Progressing     Problem: Gastrointestinal Irritability  Goal: Nausea and vomiting will be absent or improve  Outcome: Progressing  Goal: Diarrhea will be absent or improved  Outcome: Progressing     Problem: Rectal Tube  Goal: Fecal output will be contained and skin will remain free from irritation  Outcome: Progressing     Problem: Mobility  Goal: Patient's capacity to carry out activities will improve  Outcome: Progressing     Problem: Self Care  Goal: Patient will have the ability to perform ADLs independently or with assistance (bathe, groom, dress, toilet and feed)  Outcome: Progressing     Problem: Infection - Standard  Goal: Patient will remain free from infection  Outcome: Progressing     Problem: Wound/ / Incision Healing  Goal: Patient's wound/surgical incision will decrease in size and heals properly  Outcome: Progressing   The patient is Stable - Low risk of patient condition declining or worsening    Shift Goals  Clinical Goals: no emesis, increased PO intake  Patient Goals: rest and nutrition  Family Goals: [facility]    Progress made toward(s) clinical / shift goals:  no emesis overnight, low PO intake for dinner but boost consumed 100%    Patient is not progressing towards the following goals: occasional  confusion

## 2022-02-16 NOTE — THERAPY
Physical Therapy   Daily Treatment     Patient Name: Krishan Acevedo  Age:  71 y.o., Sex:  male  Medical Record #: [id number]  Today's Date: 2/15/2022     Precautions  Precautions: Fall Risk;Nasogastric Tube;Swallow Precautions ( See Comments)  Comments: confusion    Assessment    Pt is confused, struggles to name the year, month, reason. Pt shows a heavy posterior lean in sitting and standing, would be unsafe to attempt stand-pivot to chair. See details below. PT to continue.    Plan    Continue current treatment plan.    DC Equipment Recommendations: Unable to determine at this time  Discharge Recommendations: Recommend post-acute placement for additional physical therapy services prior to discharge home       Objective       02/15/22 1614   Total Time Spent   Total Time Spent (Mins) 31   Charge Group   Charges  Yes   PT Therapeutic Activities 2   Precautions   Precautions Fall Risk;Nasogastric Tube;Swallow Precautions ( See Comments)   Vitals   O2 Delivery Device None - Room Air   Pain 0 - 10 Group   Therapist Pain Assessment Prior to Activity;During Activity;Nurse Notified;0   Cognition    Cognition / Consciousness X   Speech/ Communication Delayed Responses   Orientation Level Not Oriented to Place;Not Oriented to Month   Level of Consciousness Alert   Ability To Follow Commands 1 Step   Safety Awareness Impaired   New Learning Impaired   Attention Impaired   Comments stops mid-sentence, not completing his thoughts, some comments out of context.    Active ROM Lower Body    Active ROM Lower Body  WDL   Strength Lower Body   Lower Body Strength  X   Gross Strength Generalized Weakness, Equal Bilaterally   Other Treatments   Other Treatments Provided unable to follow cues to lean forward in sitting.    Neurological Concerns   Neurological Concerns Yes   Sitting Posture During ADL's Posterior Lean   Standing Posture During ADL's Posterior Lean   Balance   Sitting Balance (Static) Poor +   Sitting Balance (Dynamic)  Trace +   Standing Balance (Static) Trace +   Standing Balance (Dynamic) Trace   Weight Shift Sitting Poor   Weight Shift Standing Absent   Skilled Intervention Verbal Cuing;Sequencing   Gait Analysis   Gait Level Of Assist Unable to Participate   Bed Mobility    Supine to Sit Moderate Assist   Sit to Supine Maximal Assist   Scooting Moderate Assist   Rolling Moderate Assist to Rt.   Skilled Intervention Verbal Cuing;Sequencing   Comments heavy posterior lean, struggles to follow cues to lean hands on knees while sitting EOB   Functional Mobility   Sit to Stand Moderate Assist  (x 2 person, heavy posterior lean, unsafe)   Bed, Chair, Wheelchair Transfer Unable to Participate   How much difficulty does the patient currently have...   Turning over in bed (including adjusting bedclothes, sheets and blankets)? 2   Sitting down on and standing up from a chair with arms (e.g., wheelchair, bedside commode, etc.) 2   Moving from lying on back to sitting on the side of the bed? 2   How much help from another person does the patient currently need...   Moving to and from a bed to a chair (including a wheelchair)? 1   Need to walk in a hospital room? 1   Climbing 3-5 steps with a railing? 1   6 clicks Mobility Score 9   Activity Tolerance   Standing 60 seconds   Short Term Goals    Short Term Goal # 1 in 6 visits patient will demo all functional transfers with Taty for safe DC    Goal Outcome # 1 goal not met   Short Term Goal # 2 in 6 visits patient will ambualte 200' Taty for safe DC    Goal Outcome # 2 Goal not met   Short Term Goal # 3 in 6 visits patient will navigate 1 stairs with Taty for safe DC    Goal Outcome # 3 Goal not met   Education Group   Role of Physical Therapist Patient Response Patient;Acceptance;Explanation;Verbal Demonstration   Anticipated Discharge Equipment and Recommendations   DC Equipment Recommendations Unable to determine at this time   Discharge Recommendations Recommend post-acute placement for  additional physical therapy services prior to discharge home   Interdisciplinary Plan of Care Collaboration   IDT Collaboration with  Nursing   Patient Position at End of Therapy In Bed;Call Light within Reach;Tray Table within Reach;Phone within Reach;Bed Alarm On   Collaboration Comments nsg updated   Session Information   Date / Session Number  2/15-3 (1/4, 2/19)

## 2022-02-16 NOTE — THERAPY
"Speech Language Pathology  Daily Treatment     Patient Name: Krishan Acevedo  Age:  71 y.o., Sex:  male  Medical Record #: [id number]  Today's Date: 2/16/2022     Precautions  Precautions: Fall Risk,Nasogastric Tube,Swallow Precautions ( See Comments)  Comments: confusion    Assessment    Patient seen on this date to check tolerance of his soft and bite sized diet wiwth thin liquids. Patient with prolonged mastication and prolonged oral holding (approx. ~30 seconds) prior to swallow initiation. No overt s/sx of aspiration was appreciated during the therapy session during PO trials of soft and bite sized textures and thin liquids. Patient is appropriate to continue his soft and bite sized textures with thin liquids. Patient with poor appetite consuming less than 10% of his meal tray, confusion appears to be a barrier as patient states he \"wants to be sure his 'honey' eats enough\", however no one else was in the room during therapy session.      Recommendations:  Thin Liquids and soft/bite sized solids              -Small bites, small sips and sit upright for all PO intake              -Direct supervision for meals/ 1:1 feeding              -Pills crushed in pudding, float if small              -Oral care after meals to mitigate risk of aspiration PNA    Plan    Continue current treatment plan.    Discharge Recommendations: Recommend post-acute placement for additional speech therapy services prior to discharge home  Objective       02/16/22 1045   Dysphagia    Diet / Liquid Recommendation Thin (0);Soft & Bite-Sized (6) - (Dysphagia III)   Nutritional Liquid Intake Rating Scale Non thickened beverages   Nutritional Food Intake Rating Scale Total oral diet with multiple consistencies without special preparation but with specific food limitations   Nursing Communication Swallow Precaution Sign Posted at Head of Bed   Skilled Intervention Verbal Cueing   Recommended Route of Medication Administration   Medication " "Administration  Float Whole with Puree;Crush all Medications in Puree   Patient / Family Goals   Patient / Family Goal #1 \"I share joann with my wife\"   Goal #1 Outcome Progressing as expected   Short Term Goals   Short Term Goal # 1 Patient will consume a diet of thin liquids and soft and bite sized solids with no s/sx of aspiration given direct supervision/feeding A.   Goal Outcome # 1 Progressing as expected     "

## 2022-02-16 NOTE — THERAPY
"Occupational Therapy  Daily Treatment     Patient Name: Krishan Acevedo  Age:  71 y.o., Sex:  male  Medical Record #: [id number]  Today's Date: 2/15/2022       Precautions: Fall Risk,Nasogastric Tube,Swallow Precautions ( See Comments)  Comments: confusion    Assessment    Pt seen for OT tx.  Pt is A&O x 2.  Pt has delayed responses & inconsistently followed commands.  Pt has limited insight into his deficits.  Pt required Mod A with extra time for supine to sit EOB.  Once EOB pt had strong Post lean & required increased facilitation to maintain midline.  Pt did stand with Mod A x 2 but again had strong post lean.  Pt unsafe to transfer to bedside chair.  Pt incontinent & unaware of loose stool while standing.  Pt required Max A to return to supine in bed as he did not comprehend what was being asked of him when told to lay back in bed.  Pt will need Post Acute services once medically cleared.    Plan    Continue current treatment plan.    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  Pt unable to follow tactile or verbal cues to come forward & stay in midline   Other Treatments   Other Treatments Provided Long time spent try to get pt to follow " commands consistently to sit in midline without falling backwards   Balance   Sitting Balance (Static) Poor +   Sitting Balance (Dynamic) Trace +   Standing Balance (Static) Trace +   Standing Balance (Dynamic) Trace   Weight Shift Sitting Poor   Weight Shift Standing Absent   Bed Mobility    Supine to Sit Moderate Assist   Sit to Supine Maximal Assist   Scooting Moderate Assist   Rolling Moderate Assist to Rt.   Comments pt has difficulty comprehending directions.   Activities of Daily Living   Eating Moderate Assist   Grooming Moderate Assist;Seated   Bathing Maximal Assist   Upper Body Dressing Moderate Assist   Lower Body Dressing Maximal Assist   Toileting Total Assist   Comments cognitive impairments playing a role in his independence   Functional Mobility   Sit to Stand Moderate Assist  (Mod A x 2)   Bed, Chair, Wheelchair Transfer Unable to Participate   Comments pt with strong post lean in both sitting & standing.  Pt appeared unsafe to transfer to bedside chair as he is not consistently following commands   Short Term Goals   Short Term Goal # 1 Pt will complete ADL transfers with supervision   Goal Outcome # 1 Goal not met   Short Term Goal # 2 Pt will complete LB dressing with supervision   Goal Outcome # 2 Progressing slower than expected   Short Term Goal # 3 Pt will complete toileting with supervision   Goal Outcome # 3 Progressing slower than expected

## 2022-02-16 NOTE — DISCHARGE PLANNING
Care Transition Team Discharge Planning    Anticipated Discharge Disposition: d/c to SNF when medically stable    Action: Lsw attended rounds, Md indicates pt needs to mobilize more. Pt's wife will bring in his cpap from home. MD requested pt eat solid food and drink. See if pt is able to do this successfully. If so, d/c feed tube.     Barriers to Discharge: medical stabilty    Plan: Lsw will continue to follow, and assist w/ d/c planning.

## 2022-02-16 NOTE — PROGRESS NOTES
Hospital Medicine Daily Progress Note    Date of Service  2/16/2022    Chief Complaint  Weakness    Hospital Course  This is a 71 year old male with PMHx of hypertension, hyperlipidemia, type 2 diabetes A1c 7.2, atrial flutter on Eliquis, ANCA vasculitis s/p transplant, recent COVID infection 1/25/2022 who was admitted on 02/1/2022 with weakness and diarrhea.    Patient is vaccinated against COVID-19. Diagnosed with COVID on 01/25. Patient remains on room air throughout admission.UA positive for UTI, urine culture positive for carbapenem resistant Pseudomonas aeruginosa. Due to less than 10 K organisms, ID did not intially recommend any antibiotics at this time.  Patient with worsening AMS on 02/11, repeat urine is worse, pending repeat urine culture and IV meropenem was started by ID Dr. Krishnamurthy who reconsulted.  Patient with ongoing diarrhea, C. difficile and stool cultures negative x2.    Patient with RADHA on CKD. Patient initially started on fluid, renal ultrasound unremarkable. Patient started on Veltassa and sodium bicarb. Nephrology has followed.    On 2/12 he developed hypotension, lactic acidosis and decreased level of consciousness and was transferred to the [facility]. Initiated on Zyvox for MRSA positive nares and abnormal CT lungs. He was also treated for hypercalcemia. Cortrak placed and enteral feeding initiated.    Interval Problem Update  Patient seen and examined today.    Patient tolerating treatment and therapies.  All Data, Medication data reviewed.  Case discussed with nursing as available.  Plan of Care reviewed with patient and notified of changes.  2/16 the patient appears to have on and off worsening encephalopathy, afebrile, increasing blood pressures with needs for as needed medication, the patient remains on room air, slight increase in leukocytosis, chemistry with stable sodium, slightly improved renal function with a creatinine of 1.76, calcium of 11.4, albumin at 2.9.  Pulling core track and  encouraging patient to increase his p.o. intake, both fluids and solids.  Ongoing need to increase insulin for improved glycemic control, 1 blood culture from 2/11+ for gram-negative elisabeth.  Patient with fine tremor.  2/15 the patient is more conversant, per wife much improved mentally, he did have some nausea vomiting earlier today, he was cleared for oral diet with still a core track in place. Patient with improved urine output, 1.9 L, the patient is switched to oral prednisone coming off high-dose Solu-Medrol with the tentative diagnosis of recurrent vasculitis. Plans would be for future biopsy once patient is more stable. Ongoing correction of sodium with free water flushes,  Close laboratory follow-up  2/14: Alert and conversant. Remains weak.  Failed swallow per speech    2/13 more alert and conversant but still with lethargy and some word finding difficulty.  Weak.  Wife at bedside and given updates.        I have personally seen and examined the patient at bedside. I discussed the plan of care with patient, family, bedside RN and infectious disease.    Consultants/Specialty  infectious disease and nephrology    Code Status  Full Code    Disposition  Patient is not medically cleared for discharge.   Anticipate discharge to to home with close outpatient follow-up.  Versus rehab/SNF  I have placed the appropriate orders for post-discharge needs.    Review of Systems  Review of Systems   Constitutional: Positive for malaise/fatigue. Negative for fever.   HENT: Negative for sore throat.    Respiratory: Negative for cough, shortness of breath and stridor.    Cardiovascular: Negative for leg swelling.   Gastrointestinal: Negative for abdominal pain and nausea.   Genitourinary: Negative for dysuria.   Musculoskeletal: Positive for back pain and neck pain.   Neurological: Negative for dizziness and headaches.   Psychiatric/Behavioral: Positive for memory loss. The patient is not nervous/anxious.         Physical  Exam  Temp:  [36 °C (96.8 °F)-37.1 °C (98.7 °F)] 36 °C (96.8 °F)  Pulse:  [] 102  Resp:  [14-41] 20  BP: (137-183)/(72-96) 163/81  SpO2:  [90 %-94 %] 92 %    Physical Exam  Vitals reviewed.   Constitutional:       Appearance: He is obese. He is ill-appearing. He is not diaphoretic.      Interventions: Nasal cannula in place.   HENT:      Head: Normocephalic and atraumatic.      Nose: Nose normal.   Eyes:      General: No scleral icterus.        Right eye: No discharge.         Left eye: No discharge.      Extraocular Movements: Extraocular movements intact.      Conjunctiva/sclera: Conjunctivae normal.   Cardiovascular:      Rate and Rhythm: Normal rate and regular rhythm.      Pulses:           Radial pulses are 2+ on the right side and 2+ on the left side.        Dorsalis pedis pulses are 2+ on the right side and 2+ on the left side.      Heart sounds: No murmur heard.  Pulmonary:      Effort: Pulmonary effort is normal. No respiratory distress.      Breath sounds: Rhonchi present. No wheezing or rales.   Abdominal:      General: Bowel sounds are normal. There is no distension.      Palpations: Abdomen is soft.      Tenderness: There is no abdominal tenderness.   Musculoskeletal:         General: No swelling.      Cervical back: Neck supple. Tenderness (chronic per wife) present. No muscular tenderness.      Right lower leg: No edema.      Left lower leg: No edema.   Lymphadenopathy:      Cervical: No cervical adenopathy.   Skin:     Coloration: Skin is not jaundiced or pale.   Neurological:      Mental Status: He is lethargic and disoriented.      Cranial Nerves: No cranial nerve deficit.      Motor: Weakness present.   Psychiatric:         Mood and Affect: Mood normal.         Speech: Speech is delayed.         Behavior: Behavior is slowed.         Cognition and Memory: Cognition is impaired.         Fluids    Intake/Output Summary (Last 24 hours) at 2/16/2022 3472  Last data filed at 2/16/2022 3136  Gross  per 24 hour   Intake 1240 ml   Output 1900 ml   Net -660 ml       Laboratory  Recent Labs     02/14/22  0806 02/15/22  0342 02/16/22  0515   WBC 11.2* 11.3* 14.3*   RBC 3.90* 4.15* 4.46*   HEMOGLOBIN 11.5* 12.2* 13.1*   HEMATOCRIT 34.7* 37.3* 39.5*   MCV 89.0 89.9 88.6   MCH 29.5 29.4 29.4   MCHC 33.1* 32.7* 33.2*   RDW 49.1 49.5 49.4   PLATELETCT 132* 143* 144*   MPV 10.6 10.7 9.8     Recent Labs     02/14/22  0806 02/15/22  0342 02/16/22  0515   SODIUM 135 138 139   POTASSIUM 4.6 5.2 4.3   CHLORIDE 106 107 107   CO2 20 22 21   GLUCOSE 336* 337* 232*   BUN 66* 71* 82*   CREATININE 2.07* 1.98* 1.76*   CALCIUM 9.5 10.5 11.4*                   Imaging  EC-ECHOCARDIOGRAM LTD W/O CONT   Final Result      MR-BRAIN-W/O   Final Result         No acute intracranial process.      Nonspecific T2 hyperintensities are noted in the periventricular and deep white matter, most likely related to chronic microvascular ischemia.      Remote left thalamic and corona radiata lacunar infarction noted.      DX-ABDOMEN FOR TUBE PLACEMENT   Final Result         Feeding tube with tip projecting over the expected area of the first portion duodenum.      CT-HEAD W/O   Final Result         NO ACUTE ABNORMALITIES ARE NOTED ON CT SCAN OF THE HEAD.      Findings are consistent with atrophy.  Decreased attenuation in the periventricular white matter likely indicates microvascular ischemic disease.         CT-CHEST,ABDOMEN,PELVIS W/O   Final Result      1.  Multifocal pulmonary opacifications are identified which are new since the prior CT. Findings could be due to pneumonia.      2.  1.2 cm nodular opacification noted in the left upper pulmonary lobe. Primary or metastatic neoplasm is possible. Small focus of pneumonia or inflammation is also possible. Consider biopsy or PET/CT. Consider follow-up CT in 3 months.      3.  There is duodenal wall thickening. Findings could be due to duodenitis. Neoplasm is also possible. There is some mild  induration in the retroperitoneal fat surrounding the duodenum.      4.  Cholelithiasis. No biliary ductal dilatation appreciated.      5.  Right lower quadrant renal transplant. No hydronephrosis.      Low and High Risk: Consider PET/CT, or tissue sampling, or follow-up CT at 3 months      Low Risk - Minimal or absent history of smoking and of other known risk factors.      High Risk - History of smoking or of other known risk factors.      Note: These recommendations do not apply to lung cancer screening, patients with immunosuppression, or patients with known primary cancer.      Fleischner Society 2017 Guidelines for Management of Incidentally Detected Pulmonary Nodules in Adults         US-EXTREMITY VENOUS LOWER BILAT   Final Result      US-RENAL TRANSPLANT COMP   Final Result      1.  No hydronephrosis or perinephric fluid collection involving the right lower quadrant renal transplant.   2.  No definite evidence of hemodynamically significant renal artery stenosis.   3.  Elevated resistive indices could suggest renal parenchymal disease.      DX-CHEST-PORTABLE (1 VIEW)   Final Result      1.  Bilateral peripheral lower lobe hazy opacities which is nonspecific but can be seen with Covid 19 pneumonia.   2.  Enlarged cardiac silhouette with probable vascular congestion/edema.           Assessment/Plan  * RADHA (acute kidney injury) (HCC)- (present on admission)  Assessment & Plan  Creatine 2.64 on admission  Ranged from 1.27-1.71 over the past year  IV fluids  Nephrology consulting, suspecting recurrent vasculitis,  S/p treatment with high-dose steroids  Ins and outs, close laboratory follow-up    Type 2 diabetes mellitus ([facility])  Assessment & Plan  HgbA1c:7.2  Monitor accuchecks.    Hyperparathyroidism ([facility])  Assessment & Plan  Has had severe elevated PTH over the last few years.  Follow up with ENT/Endocrine    Immunocompromised ([facility])  Assessment & Plan  On immunosuppressants for renal transplant  On broad spectrum  antibiotics  Monitor labs, cultures    Acute encephalopathy  Assessment & Plan  Appears toxic metabolic, no focal findings,  Avoid narcotics and benzodiazepines.    Hypernatremia  Assessment & Plan  Improving with addition of free water, monitor    Hypercalcemia  Assessment & Plan  Mild-moderate hypercalcemia  IVF and given calcitonin 2/12pm.  IVF and monitor labs      Hyperkalemia  Assessment & Plan  Improved,  Nephrology is following   Monitor     Acidosis  Assessment & Plan  Improving  Nephrology consulting    Positive urine culture  Assessment & Plan  Meropenem for Pseudomonas    ID consulting / Dr Krishnamurthy    Pain in left shin  Assessment & Plan    US venous negative for DVT    Diarrhea  Assessment & Plan  Improving  Likely viral versus functional  C.diff negative x 2  Stool culture from 2/3 negative.  Imodium PRN    Elevated brain natriuretic peptide (BNP) level- (present on admission)  Assessment & Plan  Caution with IV fluids  Prior echo's 2020 with preserved EF  No overt sign of volume overload.      Thrombocytopenia (HCC)  Assessment & Plan  Mild,  Monitor cbc    Anemia of chronic disease  Assessment & Plan  Normocytic anemia    Monitor for bleeding    Epiretinal membrane (ERM) of both eyes- (present on admission)  Assessment & Plan  Continue home Combigan    Long term (current) use of systemic steroids- (present on admission)  Assessment & Plan  Outpatient on daily prednisone secondary to renal transplant 2008      Essential hypertension- (present on admission)  Assessment & Plan  History of  Hold Losartan  Amlodipine 5mg daily w/ parameters    Kidney transplant status, living related donor- (present on admission)  Assessment & Plan  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 Blount, self-manages lasix administration at home based on fluid volume status  Renal US negative for hydronephrosis or renal stenosis  Dr. Lyman, nephrology consulted,  appreciate recommendations  Continue home prednisone, tacrolimus, mycophenolate  Renal dosing for all medication    Obesity- (present on admission)  Assessment & Plan  Body mass index is 33.91 kg/m².  Outpatient weight loss counseling     Atrial flutter - (present on admission)  Assessment & Plan  History of, s/p ablation 1/21  On Eliquis 5 mg po BID (pending renal function if to change)  ST on admission  Eliquis continued  Monitor on telemetry    Abnormal CT scan  Assessment & Plan  Future outpatient follow up.  2/12 CT chest/abd:   1.  Multifocal pulmonary opacifications are identified which are new since the prior CT. Findings could be due to pneumonia.  2.  1.2 cm nodular opacification noted in the left upper pulmonary lobe. Primary or metastatic neoplasm is possible. Small focus of pneumonia or inflammation is also possible. Consider biopsy or PET/CT. Consider follow-up CT in 3 months.  3.  There is duodenal wall thickening. Findings could be due to duodenitis. Neoplasm is also possible. There is some mild induration in the retroperitoneal fat surrounding the duodenum.  4.  Cholelithiasis. No biliary ductal dilatation appreciated  5.  Right lower quadrant renal transplant. No hydronephrosis.     Plan  Monitor labs in progress closely, encourage p.o. intake, remove feeding tube  Adjusted medication for blood pressure control, as needed's available  Ongoing anticoagulation  Glycemic control, addition of NPH, evaluate after hopefully his p.o. intake will improve  Ongoing antibiotics as suggested by infectious disease, follow cultures  Ongoing steroid use  Appreciate nephrology input  See orders  Medically complex high risk patient    VTE prophylaxis: therapeutic anticoagulation with apixaban    I have performed a physical exam and reviewed and updated ROS and Plan today (2/16/2022). In review of yesterday's note (2/15/2022), there are no changes except as documented above.      Please note that this dictation was  created using voice recognition software. I have made every reasonable attempt to correct obvious errors, but I expect that there are errors of grammar and possibly context that I did not discover before finalizing the note.

## 2022-02-16 NOTE — PROGRESS NOTES
"[facility] -  PROGRESS NOTE               Author: Jorge Pedraza M.D. Date & Time: 2/16/2022  8:16 AM     HPI:  Krishan Acevedo is a 71 y.o. 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 Was additionally found to have worsening renal function.   Patient shares that he first tested positive for COVID at an outside hospital several days ago. He felt that he was not improving and possibly getting worse at home which prompted him to call EMS. He mentions that for several days he has been having loose stools, noting 3-5 loose but not watery BMs daily, noting his last loose stool was day prior to presentation. He also mentions that he has become more dependent on his wife to bring him food and water citing fatigue with his COVID infection. He does note that for the past several weeks he has been feeling \"like I'm always thirsty\".  While in the ED he was vitally stable. Was placed on 2L NC sating in the mid 90s BP ranged 123/70 from 159/117. Labs notable for Cr of 2.62. BUN 80, COVID-19 positive, UA noted for large occult blood, small leukocyte esterase. US of kidneys was completed with indicated no hydronephrosis or fluid collections, no renal artery stenosis.   In regards to his transplant history. Living donor transplant was completed in 2008 at an outside hospital. Patient is on Prednisone 5mg, Mycophenolate 500mg BID, and Tacrolimus 1mg and 0.5mg daily. His GFR trends in the 30s-40s although in chart he has climbed to the 56-53 ranges in the past.     DAILY NEPHROLOGY SUMMARY:  2/1: Admitted, consult placed  2/2: no overnight events, renal function improving, patient tolerating PO intake  2/3: started on IVF yesterday, renal function improved to around prior baseline range  2/4: renal function " "continues to improve  2/5: Scr down to baseline levels. No new overnight renal events. Feels ok.  2/6: Feels ok. Urine culture from 2/1 positive for carbapenem resistant Pseudomonas aeruginosa. He is on zosyn per sensitivity. No labs today  2/7: Potassium is 5.7 today.  Creatinine up to 1.95.  Corrected calcium elevated.  CO2 16.  2/08: Chart and notes reviewed. No new labs this am. +Tachycardia.   2/09: Nursing notes and assessments reviewed.  Int tachycardia still. Room air. SBP labile 100s-160s. K 5.5  2/10: Spoke with RN, patient has no complaints at this time.   Plan is for discharge today or tomorrow to SNF pending 1L bolus and Calcium recheck.  Current SCa 11.5  VSS RA  -160s  2/11: Sodium up to 149.  Calcium increasing.  Creatinine stable 1.7.  2/12: Sodium up to 152.  Calcium remains elevated.  Creatinine 1.8.  I/Os not documented  2/13: Sodium still at 152.  Calcium trending down.  Creatinine up to 2.06.  On bicarb drip.  Somnolent  2/14: Unable to participate in interview, improved hemodynamics, cr stable  2/15: stable hemodynamics, 1.9L UOP, cr to 1.9, labile blood pressures, ongoing intermittent delirium  2/16: Hypertensive this AM, slight improvement in Cr, ca climbing, net neg 600cc with 1.9L UOP, confused    REVIEW OF SYSTEMS:    Unabel to obtain, mental status     PMH/PSH/SH/FH: Reviewed and unchanged since admission note  CURRENT MEDICATIONS: Reviewed from admission to present day    VS:  VS:  BP (!) 163/81   Pulse (!) 102   Temp 36 °C (96.8 °F) (Temporal)   Resp 20   Ht 1.803 m (5' 11\")   Wt 112 kg (247 lb 5.7 oz)   SpO2 92%   BMI 34.50 kg/m²   GENERAL: no acute distress  CV: RRR, No pedal edema  RESP: non-labored  GI: Soft  MSK: No joint deformities   SKIN: No concerning rashes  NEURO: AOx1  PSYCH: Cooperative    Fluids:  In: 1240 [P.O.:40; Enteral:1080]  Out: 1900     LABS:  Recent Labs     02/14/22  0806 02/15/22  0342 02/16/22  0515   SODIUM 135 138 139   POTASSIUM 4.6 5.2 4.3 "   CHLORIDE 106 107 107   CO2 20 22 21   GLUCOSE 336* 337* 232*   BUN 66* 71* 82*   CREATININE 2.07* 1.98* 1.76*   CALCIUM 9.5 10.5 11.4*       IMPRESSION:  # History of Renal Transplant  -Tacrolimus 22.5    2/8/22  # History of ANCA Vasculitis  - Repeat serologies positive  # RADHA  # CKD 3  # Acidosis  # Afib  # DM2  # HTN  # Chronic microhematuria  - No UTI on repeat UA  # Hypercalcemia: PTH mediated   # Hyperparathyroid: may need further imaging going forward       PLAN:  -Oral pred 60mg daily  -Tentative plan for renal biopsy once more stable given concern for recurrent ANCA vasculitis   - start sensipar 30mg TIW  - starting metoprolol for uncontrolled HTN  - Tacrolimus 0.5mg PO BID recheck  trough 2/18/21  - Hold Cellcept for now   - Free water flushes to 250cc q4  - Continue to hold losartan  - On veltassa 8.6g daily since 2/7/22  - Low potassium renal diet   - Sodium bicarbonate 1300 mg PO TID  - Dose meds for reduced GFR    Thank you

## 2022-02-17 NOTE — PROGRESS NOTES
"[facility] -  PROGRESS NOTE               Author: Jorge Pedraza M.D. Date & Time: 2/17/2022  8:47 AM     HPI:  Krishan Acevedo is a 71 y.o. 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 Was additionally found to have worsening renal function.   Patient shares that he first tested positive for COVID at an outside hospital several days ago. He felt that he was not improving and possibly getting worse at home which prompted him to call EMS. He mentions that for several days he has been having loose stools, noting 3-5 loose but not watery BMs daily, noting his last loose stool was day prior to presentation. He also mentions that he has become more dependent on his wife to bring him food and water citing fatigue with his COVID infection. He does note that for the past several weeks he has been feeling \"like I'm always thirsty\".  While in the ED he was vitally stable. Was placed on 2L NC sating in the mid 90s BP ranged 123/70 from 159/117. Labs notable for Cr of 2.62. BUN 80, COVID-19 positive, UA noted for large occult blood, small leukocyte esterase. US of kidneys was completed with indicated no hydronephrosis or fluid collections, no renal artery stenosis.   In regards to his transplant history. Living donor transplant was completed in 2008 at an outside hospital. Patient is on Prednisone 5mg, Mycophenolate 500mg BID, and Tacrolimus 1mg and 0.5mg daily. His GFR trends in the 30s-40s although in chart he has climbed to the 56-53 ranges in the past.     DAILY NEPHROLOGY SUMMARY:  2/1: Admitted, consult placed  2/2: no overnight events, renal function improving, patient tolerating PO intake  2/3: started on IVF yesterday, renal function improved to around prior baseline range  2/4: renal function " "continues to improve  2/5: Scr down to baseline levels. No new overnight renal events. Feels ok.  2/6: Feels ok. Urine culture from 2/1 positive for carbapenem resistant Pseudomonas aeruginosa. He is on zosyn per sensitivity. No labs today  2/7: Potassium is 5.7 today.  Creatinine up to 1.95.  Corrected calcium elevated.  CO2 16.  2/08: Chart and notes reviewed. No new labs this am. +Tachycardia.   2/09: Nursing notes and assessments reviewed.  Int tachycardia still. Room air. SBP labile 100s-160s. K 5.5  2/10: Spoke with RN, patient has no complaints at this time.   Plan is for discharge today or tomorrow to SNF pending 1L bolus and Calcium recheck.  Current SCa 11.5  VSS RA  -160s  2/11: Sodium up to 149.  Calcium increasing.  Creatinine stable 1.7.  2/12: Sodium up to 152.  Calcium remains elevated.  Creatinine 1.8.  I/Os not documented  2/13: Sodium still at 152.  Calcium trending down.  Creatinine up to 2.06.  On bicarb drip.  Somnolent  2/14: Unable to participate in interview, improved hemodynamics, cr stable  2/15: stable hemodynamics, 1.9L UOP, cr to 1.9, labile blood pressures, ongoing intermittent delirium  2/16: Hypertensive this AM, slight improvement in Cr, ca climbing, net neg 600cc with 1.9L UOP, confused  2/17: 1.4L UOP,ongoing encephalopthy, sodium climbing, minimal PO intake      REVIEW OF SYSTEMS:    Unabel to obtain, mental status     PMH/PSH/SH/FH: Reviewed and unchanged since admission note  CURRENT MEDICATIONS: Reviewed from admission to present day    VS:  VS:  BP (!) 164/84   Pulse 75   Temp 36.6 °C (97.9 °F) (Temporal)   Resp (!) 26   Ht 1.803 m (5' 11\")   Wt 112 kg (247 lb 5.7 oz)   SpO2 91%   BMI 34.50 kg/m²   GENERAL: no acute distress  CV: RRR, No pedal edema  RESP: non-labored  GI: Soft  MSK: No joint deformities   SKIN: No concerning rashes  NEURO: AOx1  PSYCH: Cooperative    Fluids:  In: 250 [Enteral:250]  Out: 1400     LABS:  Recent Labs     02/15/22  0342 " 02/16/22  0515 02/17/22  0352   SODIUM 138 139 146*   POTASSIUM 5.2 4.3 4.3   CHLORIDE 107 107 110   CO2 22 21 24   GLUCOSE 337* 232* 169*   BUN 71* 82* 90*   CREATININE 1.98* 1.76* 1.71*   CALCIUM 10.5 11.4* 10.8*       IMPRESSION:  # History of Renal Transplant  -Tacrolimus 22.5    2/8/22  # History of ANCA Vasculitis  - Repeat serologies positive  # RADHA  # CKD 3  # Acidosis  # Afib  # DM2  # HTN  # Chronic microhematuria  - No UTI on repeat UA  # Hypercalcemia: PTH mediated   # Hyperparathyroid: may need further imaging going forward       PLAN:  -Oral pred 60mg daily  -Tentative plan for renal biopsy next Monday, holding eliquis until then   -Increase free water intake. If unable to intake PO, would run IV  -re-check FK level tomorrow, 2/18  -sensipar 30mg TIW  -Hold Cellcept for now   -Continue to hold losartan  -On veltassa 8.6g daily since 2/7/22  -Low potassium renal diet   -Dose meds for reduced GFR    Discussed with hospitalist     Thank you

## 2022-02-17 NOTE — CARE PLAN
Problem: Nutrition  Goal: Patient's nutritional and fluid intake will be adequate or improve  Outcome: Not Progressing     Problem: Knowledge Deficit - Standard  Goal: Patient and family/care givers will demonstrate understanding of plan of care, disease process/condition, diagnostic tests and medications  Outcome: Progressing     Problem: Pain - Standard  Goal: Alleviation of pain or a reduction in pain to the patient’s comfort goal  Outcome: Progressing     Problem: Skin Integrity  Goal: Skin integrity is maintained or improved  Outcome: Progressing     Problem: Fall Risk  Goal: Patient will remain free from falls  Outcome: Progressing     Problem: Dysphagia  Goal: Dysphagia will improve  Outcome: Progressing     Problem: Risk for Aspiration  Goal: Patient's risk for aspiration will be absent or decrease  Outcome: Progressing     Problem: Urinary Elimination  Goal: Establish and maintain regular urinary output  Outcome: Progressing     Problem: Bowel Elimination  Goal: Establish and maintain regular bowel function  Outcome: Progressing     Problem: Chest Tube Management  Goal: Complications related to chest tube will be avoided or minimized  Outcome: Met     Problem: Mechanical Ventilation  Goal: Safe management of artificial airway and ventilation  Outcome: Met  Goal: Successful weaning off mechanical ventilator, spontaneously maintains adequate gas exchange  Outcome: Met  Goal: Patient will be able to express needs and understand communication  Outcome: Met     Problem: Nutrition  Goal: Enteral nutrition will be maintained or improve  Outcome: Met  Goal: Enteral nutrition will be maintained or improve  Outcome: Met     Problem: Rectal Tube  Goal: Fecal output will be contained and skin will remain free from irritation  Outcome: Met   The patient is Watcher - Medium risk of patient condition declining or worsening    Shift Goals  Clinical Goals: BP>160, incresed meal consumption  Patient Goals: rest and  nutrition  Family Goals: DENA    Progress made toward(s) clinical / shift goals:  Pt BP is stable, low oral intake but pt does consume boost.    Patient is not progressing towards the following goals:Increased confusion from prior 24 hrs. Pt attempted to refuse oral meds and took a lot of coaching.       Problem: Nutrition  Goal: Patient's nutritional and fluid intake will be adequate or improve  Outcome: Not Progressing

## 2022-02-17 NOTE — PROGRESS NOTES
Hospital Medicine Daily Progress Note    Date of Service  2/17/2022    Chief Complaint  Weakness    Hospital Course  This is a 71 year old male with PMHx of hypertension, hyperlipidemia, type 2 diabetes A1c 7.2, atrial flutter on Eliquis, ANCA vasculitis s/p transplant, recent COVID infection 1/25/2022 who was admitted on 02/1/2022 with weakness and diarrhea.    Patient is vaccinated against COVID-19. Diagnosed with COVID on 01/25. Patient remains on room air throughout admission.UA positive for UTI, urine culture positive for carbapenem resistant Pseudomonas aeruginosa. Due to less than 10 K organisms, ID did not intially recommend any antibiotics at this time.  Patient with worsening AMS on 02/11, repeat urine is worse, pending repeat urine culture and IV meropenem was started by ID Dr. Krishnamurthy who reconsulted.  Patient with ongoing diarrhea, C. difficile and stool cultures negative x2.    Patient with RADHA on CKD. Patient initially started on fluid, renal ultrasound unremarkable. Patient started on Veltassa and sodium bicarb. Nephrology has followed.    On 2/12 he developed hypotension, lactic acidosis and decreased level of consciousness and was transferred to the [facility]. Initiated on Zyvox for MRSA positive nares and abnormal CT lungs. He was also treated for hypercalcemia. Cortrak placed and enteral feeding initiated.    Interval Problem Update  Patient seen and examined today.    Patient tolerating treatment and therapies.  All Data, Medication data reviewed.  Case discussed with nursing as available.  Plan of Care reviewed with patient and notified of changes.      2/17 patient with ongoing significant confusion, encephalopathy, confused, appears otherwise nonfocal, difficult p.o. intake, but he takes fluids well, no evidence of aspiration at this time, discussed with nephrology, plans for renal biopsy possibly Monday, no evidence of acute blood loss, hemoglobin is stable, discontinuing anticoagulation for  planned biopsy and secondary reports of melena.  Glycemic control more adequate, renal function further slightly improved.    2/16 the patient appears to have on and off worsening encephalopathy, afebrile, increasing blood pressures with needs for as needed medication, the patient remains on room air, slight increase in leukocytosis, chemistry with stable sodium, slightly improved renal function with a creatinine of 1.76, calcium of 11.4, albumin at 2.9.  Pulling core track and encouraging patient to increase his p.o. intake, both fluids and solids.  Ongoing need to increase insulin for improved glycemic control, 1 blood culture from 2/11+ for gram-negative elisabeth.  Patient with fine tremor.  2/15 the patient is more conversant, per wife much improved mentally, he did have some nausea vomiting earlier today, he was cleared for oral diet with still a core track in place. Patient with improved urine output, 1.9 L, the patient is switched to oral prednisone coming off high-dose Solu-Medrol with the tentative diagnosis of recurrent vasculitis. Plans would be for future biopsy once patient is more stable. Ongoing correction of sodium with free water flushes,  Close laboratory follow-up  2/14: Alert and conversant. Remains weak.  Failed swallow per speech    2/13 more alert and conversant but still with lethargy and some word finding difficulty.  Weak.  Wife at bedside and given updates.        I have personally seen and examined the patient at bedside. I discussed the plan of care with patient, family, bedside RN and infectious disease.    Consultants/Specialty  infectious disease and nephrology    Code Status  Full Code    Disposition  Patient is not medically cleared for discharge.   Anticipate discharge to to home with close outpatient follow-up.  Versus rehab/SNF  I have placed the appropriate orders for post-discharge needs.    Review of Systems  Review of Systems   Constitutional: Positive for malaise/fatigue.  Negative for fever.   HENT: Negative for sore throat.    Respiratory: Negative for cough, shortness of breath and stridor.    Cardiovascular: Negative for leg swelling.   Gastrointestinal: Negative for abdominal pain and nausea.   Genitourinary: Negative for dysuria.   Musculoskeletal: Positive for back pain and neck pain.   Neurological: Negative for dizziness and headaches.   Psychiatric/Behavioral: Positive for memory loss. The patient is not nervous/anxious.         Physical Exam  Temp:  [36.6 °C (97.8 °F)-36.6 °C (97.9 °F)] 36.6 °C (97.9 °F)  Pulse:  [] 96  Resp:  [12-27] 18  BP: (118-170)/() 146/93  SpO2:  [91 %-95 %] 93 %    Physical Exam  Vitals reviewed.   Constitutional:       Appearance: He is obese. He is ill-appearing. He is not diaphoretic.      Interventions: Nasal cannula in place.   HENT:      Head: Normocephalic and atraumatic.      Nose: Nose normal.   Eyes:      General: No scleral icterus.        Right eye: No discharge.         Left eye: No discharge.      Extraocular Movements: Extraocular movements intact.      Conjunctiva/sclera: Conjunctivae normal.   Cardiovascular:      Rate and Rhythm: Normal rate and regular rhythm.      Pulses:           Radial pulses are 2+ on the right side and 2+ on the left side.        Dorsalis pedis pulses are 2+ on the right side and 2+ on the left side.      Heart sounds: No murmur heard.  Pulmonary:      Effort: Pulmonary effort is normal. No respiratory distress.      Breath sounds: Rhonchi present. No wheezing or rales.   Abdominal:      General: Bowel sounds are normal. There is no distension.      Palpations: Abdomen is soft.      Tenderness: There is no abdominal tenderness.   Musculoskeletal:         General: No swelling.      Cervical back: Neck supple. Tenderness (chronic per wife) present. No muscular tenderness.      Right lower leg: No edema.      Left lower leg: No edema.   Lymphadenopathy:      Cervical: No cervical adenopathy.    Skin:     Coloration: Skin is not jaundiced or pale.   Neurological:      Mental Status: He is lethargic and disoriented.      Cranial Nerves: No cranial nerve deficit.      Motor: Weakness present.   Psychiatric:         Mood and Affect: Mood normal.         Speech: Speech is delayed.         Behavior: Behavior is slowed.         Cognition and Memory: Cognition is impaired.         Fluids    Intake/Output Summary (Last 24 hours) at 2/17/2022 0812  Last data filed at 2/17/2022 0635  Gross per 24 hour   Intake --   Output 1300 ml   Net -1300 ml       Laboratory  Recent Labs     02/15/22  0342 02/16/22  0515 02/16/22  1858 02/17/22  0352   WBC 11.3* 14.3*  --  14.3*   RBC 4.15* 4.46*  --  4.43*   HEMOGLOBIN 12.2* 13.1* 12.8* 13.0*   HEMATOCRIT 37.3* 39.5* 39.2* 40.0*   MCV 89.9 88.6  --  90.3   MCH 29.4 29.4  --  29.3   MCHC 32.7* 33.2*  --  32.5*   RDW 49.5 49.4  --  51.2*   PLATELETCT 143* 144*  --  161*   MPV 10.7 9.8  --  10.2     Recent Labs     02/15/22  0342 02/16/22  0515 02/17/22  0352   SODIUM 138 139 146*   POTASSIUM 5.2 4.3 4.3   CHLORIDE 107 107 110   CO2 22 21 24   GLUCOSE 337* 232* 169*   BUN 71* 82* 90*   CREATININE 1.98* 1.76* 1.71*   CALCIUM 10.5 11.4* 10.8*                   Imaging  EC-ECHOCARDIOGRAM LTD W/O CONT   Final Result      MR-BRAIN-W/O   Final Result         No acute intracranial process.      Nonspecific T2 hyperintensities are noted in the periventricular and deep white matter, most likely related to chronic microvascular ischemia.      Remote left thalamic and corona radiata lacunar infarction noted.      DX-ABDOMEN FOR TUBE PLACEMENT   Final Result         Feeding tube with tip projecting over the expected area of the first portion duodenum.      CT-HEAD W/O   Final Result         NO ACUTE ABNORMALITIES ARE NOTED ON CT SCAN OF THE HEAD.      Findings are consistent with atrophy.  Decreased attenuation in the periventricular white matter likely indicates microvascular ischemic  disease.         CT-CHEST,ABDOMEN,PELVIS W/O   Final Result      1.  Multifocal pulmonary opacifications are identified which are new since the prior CT. Findings could be due to pneumonia.      2.  1.2 cm nodular opacification noted in the left upper pulmonary lobe. Primary or metastatic neoplasm is possible. Small focus of pneumonia or inflammation is also possible. Consider biopsy or PET/CT. Consider follow-up CT in 3 months.      3.  There is duodenal wall thickening. Findings could be due to duodenitis. Neoplasm is also possible. There is some mild induration in the retroperitoneal fat surrounding the duodenum.      4.  Cholelithiasis. No biliary ductal dilatation appreciated.      5.  Right lower quadrant renal transplant. No hydronephrosis.      Low and High Risk: Consider PET/CT, or tissue sampling, or follow-up CT at 3 months      Low Risk - Minimal or absent history of smoking and of other known risk factors.      High Risk - History of smoking or of other known risk factors.      Note: These recommendations do not apply to lung cancer screening, patients with immunosuppression, or patients with known primary cancer.      Fleischner Society 2017 Guidelines for Management of Incidentally Detected Pulmonary Nodules in Adults         US-EXTREMITY VENOUS LOWER BILAT   Final Result      US-RENAL TRANSPLANT COMP   Final Result      1.  No hydronephrosis or perinephric fluid collection involving the right lower quadrant renal transplant.   2.  No definite evidence of hemodynamically significant renal artery stenosis.   3.  Elevated resistive indices could suggest renal parenchymal disease.      DX-CHEST-PORTABLE (1 VIEW)   Final Result      1.  Bilateral peripheral lower lobe hazy opacities which is nonspecific but can be seen with Covid 19 pneumonia.   2.  Enlarged cardiac silhouette with probable vascular congestion/edema.           Assessment/Plan  * RADHA (acute kidney injury) (HCC)- (present on  admission)  Assessment & Plan  Creatine 2.64 on admission  Ranged from 1.27-1.71 over the past year  IV fluids  Nephrology consulting, suspecting recurrent vasculitis,  S/p treatment with high-dose steroids  Ins and outs, close laboratory follow-up    Type 2 diabetes mellitus (HCC)  Assessment & Plan  HgbA1c:7.2  Monitor accuchecks.    Hyperparathyroidism (HCC)  Assessment & Plan  Has had severe elevated PTH over the last few years.  Follow up with ENT/Endocrine    Immunocompromised ([facility])  Assessment & Plan  On immunosuppressants for renal transplant  On broad spectrum antibiotics  Monitor labs, cultures    Acute encephalopathy- (present on admission)  Assessment & Plan  Appears toxic metabolic, no focal findings,  Avoid narcotics and benzodiazepines.    Hypernatremia  Assessment & Plan  Improving with addition of free water, monitor    Hypercalcemia  Assessment & Plan  Mild-moderate hypercalcemia  IVF and given calcitonin 2/12pm.  IVF and monitor labs      Hyperkalemia  Assessment & Plan  Improved,  Nephrology is following   Monitor     Acidosis  Assessment & Plan  Improving  Nephrology consulting    Positive urine culture- (present on admission)  Assessment & Plan  Meropenem for Pseudomonas    ID consulting / Dr Krishnamurthy    Pain in left shin- (present on admission)  Assessment & Plan    US venous negative for DVT    Diarrhea  Assessment & Plan  Improving  Likely viral versus functional  C.diff negative x 2  Stool culture from 2/3 negative.  Imodium PRN    Elevated brain natriuretic peptide (BNP) level- (present on admission)  Assessment & Plan  Caution with IV fluids  Prior echo's 2020 with preserved EF  No overt sign of volume overload.      Thrombocytopenia (HCC)  Assessment & Plan  Mild,  Monitor cbc    Anemia of chronic disease  Assessment & Plan  Normocytic anemia    Monitor for bleeding    Epiretinal membrane (ERM) of both eyes- (present on admission)  Assessment & Plan  Continue home Combigan    Long term  (current) use of systemic steroids- (present on admission)  Assessment & Plan  Outpatient on daily prednisone secondary to renal transplant 2008      Essential hypertension- (present on admission)  Assessment & Plan  History of  Hold Losartan  Amlodipine 5mg daily w/ parameters    Kidney transplant status, living related donor- (present on admission)  Assessment & Plan  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 Blount, self-manages lasix administration at home based on fluid volume status  Renal US negative for hydronephrosis or renal stenosis  Dr. Lyman, nephrology consulted, appreciate recommendations  Continue  prednisone, tacrolimus  Renal dosing for all medication    Obesity- (present on admission)  Assessment & Plan  Body mass index is 33.91 kg/m².  Outpatient weight loss counseling     Atrial flutter - (present on admission)  Assessment & Plan  History of, s/p ablation 1/21  On Eliquis 5 mg po BID (pending renal function if to change)  ST on admission  Eliquis continued  Monitor on telemetry    Abnormal CT scan  Assessment & Plan  Future outpatient follow up.  2/12 CT chest/abd:   1.  Multifocal pulmonary opacifications are identified which are new since the prior CT. Findings could be due to pneumonia.  2.  1.2 cm nodular opacification noted in the left upper pulmonary lobe. Primary or metastatic neoplasm is possible. Small focus of pneumonia or inflammation is also possible. Consider biopsy or PET/CT. Consider follow-up CT in 3 months.  3.  There is duodenal wall thickening. Findings could be due to duodenitis. Neoplasm is also possible. There is some mild induration in the retroperitoneal fat surrounding the duodenum.  4.  Cholelithiasis. No biliary ductal dilatation appreciated  5.  Right lower quadrant renal transplant. No hydronephrosis.     Plan  Monitor labs in progress closely, encourage p.o. intake, remove feeding tube  Adjusted  medication for blood pressure control, as needed's available  Hold anticoagulation, monitor H&H  Glycemic control, addition of NPH, evaluate after hopefully his p.o. intake will improve  Ongoing antibiotics as suggested by infectious disease, follow cultures  Ongoing steroid use  Appreciate nephrology input  See orders  Medically complex high risk patient    VTE prophylaxis: therapeutic anticoagulation with apixaban    I have performed a physical exam and reviewed and updated ROS and Plan today (2/17/2022). In review of yesterday's note (2/16/2022), there are no changes except as documented above.      Please note that this dictation was created using voice recognition software. I have made every reasonable attempt to correct obvious errors, but I expect that there are errors of grammar and possibly context that I did not discover before finalizing the note.

## 2022-02-18 NOTE — CARE PLAN
Problem: Nutritional:  Goal: Achieve adequate nutritional intake  Description: Patient will consume >50% of meals and supplements.  Outcome: Progressing     Pt with poor intake of meals <25%, however % x last two recorded Boost Glucose Control supplements. RD continues to monitor for adequate intake.

## 2022-02-18 NOTE — THERAPY
Occupational Therapy Contact Note:     02/17/22 2747   Interdisciplinary Plan of Care Collaboration   Collaboration Comments Attempted to see pt for OT tx on IMC, pt actively txfing to S1. Pt will continue to be followed by therapy on S1.       ALEXANDER German/SANDRA

## 2022-02-18 NOTE — THERAPY
Physical Therapy   Daily Treatment     Patient Name: Krishan Acevedo  Age:  71 y.o., Sex:  male  Medical Record #: [id number]  Today's Date: 2/18/2022     Precautions  Precautions: Fall Risk    Assessment    Pt with improved trunk seated balance, following function based commands; needs physical assist for stepping and upright standing balance, kyphotic with posterior lean but less than prior note indicates; sp02 >90 throughout did note increased WOB; will follow, needs placement.     Plan    Continue current treatment plan.    DC Equipment Recommendations: Unable to determine at this time  Discharge Recommendations: Recommend post-acute placement for additional physical therapy services prior to discharge home      Abridged Subjective/Objective     02/18/22 1150   Cognition    Cognition / Consciousness X   Level of Consciousness Alert   Ability To Follow Commands 1 Step   Safety Awareness Impaired   New Learning Impaired   Attention Impaired   Comments minimal verbal output; did follow function based commands for standing, limited isolated movements to command;   Passive ROM Lower Body   Passive ROM Lower Body WDL   Sensation Lower Body   Lower Extremity Sensation   WDL   Balance   Sitting Balance (Static) Fair   Sitting Balance (Dynamic) Fair -   Standing Balance (Static) Poor +   Standing Balance (Dynamic) Poor   Weight Shift Sitting Fair   Weight Shift Standing Poor   Skilled Intervention Postural Facilitation;Sequencing;Tactile Cuing;Verbal Cuing   Comments B Ue support in sitting/standing; kyphotic needing cues to use UEs on walker appropriately;   Gait Analysis   Gait Level Of Assist Unable to Participate   Comments taking a few shuffled steps along bed but knees buckle and increase in flexion with stepping toward top of bed   Bed Mobility    Supine to Sit   (up with OT)   Sit to Supine Maximal Assist   Functional Mobility   Sit to Stand Moderate Assist  (of 2 with FWW x 3 reps)   Bed, Chair, Wheelchair  Transfer Unable to Participate   Comments cues for glute control and upright trunk control, during pericare; session limited by fecal incontinence   Short Term Goals    Short Term Goal # 1 in 6 visits patient will demo all functional transfers with Taty for safe DC    Goal Outcome # 1 goal not met   Short Term Goal # 2 in 6 visits patient will ambualte 200' Taty for safe DC    Goal Outcome # 2 Goal not met   Short Term Goal # 3 in 6 visits patient will navigate 1 stairs with Taty for safe DC    Goal Outcome # 3 Goal not met   Education Group   Role of Physical Therapist Patient Response Patient;Acceptance;Explanation;Verbal Demonstration

## 2022-02-18 NOTE — THERAPY
Occupational Therapy  Daily Treatment     Patient Name: Krishan Acevedo  Age:  71 y.o., Sex:  male  Medical Record #: [id number]  Today's Date: 2/18/2022     Precautions  Precautions: (P) Fall Risk  Comments: confusion    Assessment    Patient progressing towards short term goals. Patient with mod A for bed mobility. Patient with mod A for LB dressing. Patient with standing at bed side with HHA. Patient with max A for pericare. Increased independence in ADL and functional mobility. Continue OT POC.     Plan    Continue current treatment plan.    DC Equipment Recommendations: Unable to determine at this time  Discharge Recommendations: (P) Recommend post-acute placement for additional occupational therapy services prior to discharge home    Subjective    Patient cooperative during session, motivated. Supine in bed upon arrival.      Objective       02/18/22 1300   Treatment Charges   OT Self Care / ADL 1   OT Neuromuscular Re-education / Balance 1   Precautions   Precautions Fall Risk   Cognition    Comments Patient confused but participatory during session. Able to follow simple one step commands   Bed Mobility    Supine to Sit Moderate Assist   Sit to Supine Maximal Assist   Activities of Daily Living   Grooming   (Min A for washing face sitting EOB, CGA for static sitting balance)   Lower Body Dressing   (Patient able to princess socks at bed levle wiht crossed leg technique once therapist donned to mid foot level)   Toileting   (Patient with BM while standing bed side, Max/total A for pericare following BM. Patient stood for 50% of pericare task  with Min A of 2 for standing balance)   How much help from another person does the patient currently need...   Putting on and taking off regular lower body clothing? 2   Bathing (including washing, rinsing, and drying)? 2   Toileting, which includes using a toilet, bedpan, or urinal? 2   Putting on and taking off regular upper body clothing? 3   Taking care of personal  grooming such as brushing teeth? 3   Eating meals? 2   6 Clicks Daily Activity Score 14   Functional Mobility   Sit to Stand Moderate Assist   Comments Patient with sit to stand from bed with HHA x3 trials. Tolerated standing ~15-20 Seconds   Patient / Family Goals   Patient / Family Goal #1 Get better   Short Term Goals   Short Term Goal # 1 Pt will complete ADL transfers with supervision   Goal Outcome # 1 Progressing as expected   Short Term Goal # 2 Pt will complete LB dressing with supervision   Goal Outcome # 2 Progressing as expected   Short Term Goal # 3 Pt will complete toileting with supervision   Goal Outcome # 3 Progressing slower than expected   Education Group   Additional Comments Educated patient on importance of OOB and safety with functional mobility   Anticipated Discharge Equipment and Recommendations   Discharge Recommendations Recommend post-acute placement for additional occupational therapy services prior to discharge home   Interdisciplinary Plan of Care Collaboration   Collaboration Comments CONSTANTINO vinesg functional mobility and ADL status   Session Information   Date / Session Number  2/18 #3 (1/3 2/21)   Priority 2

## 2022-02-18 NOTE — PROGRESS NOTES
"[facility] -  PROGRESS NOTE               Author: Jorge Pedraza M.D. Date & Time: 2/18/2022  8:47 AM     HPI:  Krishan Acevedo is a 71 y.o. 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 Was additionally found to have worsening renal function.   Patient shares that he first tested positive for COVID at an outside hospital several days ago. He felt that he was not improving and possibly getting worse at home which prompted him to call EMS. He mentions that for several days he has been having loose stools, noting 3-5 loose but not watery BMs daily, noting his last loose stool was day prior to presentation. He also mentions that he has become more dependent on his wife to bring him food and water citing fatigue with his COVID infection. He does note that for the past several weeks he has been feeling \"like I'm always thirsty\".  While in the ED he was vitally stable. Was placed on 2L NC sating in the mid 90s BP ranged 123/70 from 159/117. Labs notable for Cr of 2.62. BUN 80, COVID-19 positive, UA noted for large occult blood, small leukocyte esterase. US of kidneys was completed with indicated no hydronephrosis or fluid collections, no renal artery stenosis.   In regards to his transplant history. Living donor transplant was completed in 2008 at an outside hospital. Patient is on Prednisone 5mg, Mycophenolate 500mg BID, and Tacrolimus 1mg and 0.5mg daily. His GFR trends in the 30s-40s although in chart he has climbed to the 56-53 ranges in the past.     DAILY NEPHROLOGY SUMMARY:  2/1: Admitted, consult placed  2/2: no overnight events, renal function improving, patient tolerating PO intake  2/3: started on IVF yesterday, renal function improved to around prior baseline range  2/4: renal function " "continues to improve  2/5: Scr down to baseline levels. No new overnight renal events. Feels ok.  2/6: Feels ok. Urine culture from 2/1 positive for carbapenem resistant Pseudomonas aeruginosa. He is on zosyn per sensitivity. No labs today  2/7: Potassium is 5.7 today.  Creatinine up to 1.95.  Corrected calcium elevated.  CO2 16.  2/08: Chart and notes reviewed. No new labs this am. +Tachycardia.   2/09: Nursing notes and assessments reviewed.  Int tachycardia still. Room air. SBP labile 100s-160s. K 5.5  2/10: Spoke with RN, patient has no complaints at this time.   Plan is for discharge today or tomorrow to SNF pending 1L bolus and Calcium recheck.  Current SCa 11.5  VSS RA  -160s  2/11: Sodium up to 149.  Calcium increasing.  Creatinine stable 1.7.  2/12: Sodium up to 152.  Calcium remains elevated.  Creatinine 1.8.  I/Os not documented  2/13: Sodium still at 152.  Calcium trending down.  Creatinine up to 2.06.  On bicarb drip.  Somnolent  2/14: Unable to participate in interview, improved hemodynamics, cr stable  2/15: stable hemodynamics, 1.9L UOP, cr to 1.9, labile blood pressures, ongoing intermittent delirium  2/16: Hypertensive this AM, slight improvement in Cr, ca climbing, net neg 600cc with 1.9L UOP, confused  2/17: 1.4L UOP,ongoing encephalopthy, sodium climbing, minimal PO intake    2/18: IOs not recorded, cr 1.5, tremulous, minimal PO intake-serum sodium climbing    REVIEW OF SYSTEMS:    Unabel to obtain, mental status     PMH/PSH/SH/FH: Reviewed and unchanged since admission note  CURRENT MEDICATIONS: Reviewed from admission to present day    VS:  VS:  /57   Pulse 88   Temp 35.9 °C (96.7 °F) (Temporal)   Resp 18   Ht 1.803 m (5' 11\")   Wt 112 kg (247 lb 5.7 oz)   SpO2 90%   BMI 34.50 kg/m²   GENERAL: no acute distress  CV: RRR, No pedal edema  RESP: non-labored  GI: Soft  MSK: No joint deformities   SKIN: No concerning rashes  NEURO: AOx1  PSYCH: Cooperative    Fluids:  No " intake/output data recorded.    LABS:  Recent Labs     02/16/22  0515 02/17/22  0352 02/18/22  0519   SODIUM 139 146* 146*   POTASSIUM 4.3 4.3 4.3   CHLORIDE 107 110 111   CO2 21 24 22   GLUCOSE 232* 169* 85   BUN 82* 90* 92*   CREATININE 1.76* 1.71* 1.51*   CALCIUM 11.4* 10.8* 10.3       IMPRESSION:  # History of Renal Transplant  -Tacrolimus 22.5  2/8, dose reduced   # History of ANCA Vasculitis  - Repeat serologies positive  # RADHA  # CKD 3  # Acidosis  # Afib  # DM2  # HTN  # Chronic microhematuria  - No UTI on repeat UA  # Hypercalcemia: PTH mediated   # Hyperparathyroid: may need further imaging going forward       PLAN:  -Oral pred 60mg daily  -Tentative plan for renal biopsy next Monday after eliquis wash-out. NPO Sunday night  -Would start d5W at 50cc/hr or replace NG tube and restart FWF  -FU 2/18 FK level  -sensipar 30mg TIW  -Hold Cellcept for now   -Continue to hold losartan  -On veltassa 8.6g daily since 2/7/22  -Low potassium renal diet   -Dose meds for reduced GFR    Discussed with hospitalist     Thank you

## 2022-02-18 NOTE — CARE PLAN
The patient is Stable - Low risk of patient condition declining or worsening    Shift Goals  Clinical Goals: increase water and edwige consumption  Patient Goals: rest/comfort  Family Goals: na    Progress made toward(s) clinical / shift goals:  Yes    Problem: Knowledge Deficit - Standard  Goal: Patient and family/care givers will demonstrate understanding of plan of care, disease process/condition, diagnostic tests and medications  Outcome: Progressing     Problem: Fall Risk  Goal: Patient will remain free from falls  Outcome: Progressing     Problem: Psychosocial  Goal: Patient's level of anxiety will decrease  Outcome: Progressing     Problem: Communication  Goal: The ability to communicate needs accurately and effectively will improve  Outcome: Progressing     Problem: Dysphagia  Goal: Dysphagia will improve  Outcome: Progressing       Patient is not progressing towards the following goals:

## 2022-02-18 NOTE — DIETARY
Nutrition Services: Update   Day 17 of admit.  Krishan Acevedo is a 71 y.o. male with admitting DX of RADHA (acute kidney injury).    Pt is currently on Level 6 soft and bite sized diet with level 0 thin liquids with a renal modifier. TF was held on 2/15 due to 2 episodes of emesis and pt transitioned from TF to full PO oral diet. Pt with noted poor intake, mostly <25%.  Pt currently receiving and appears to be drinking Boost Glucose Control supplements with % x 2 recorded Boosts in ADLs. Due to COVID 19 regulations RD unable to visit, but continues to monitor for adequate intake.     Malnutrition Risk: Does not meet criteria, though is at risk with continued poor PO.     Recommendations/Plan:  1. If PO does not improve consider restarting TF to meet nutritional needs.    2. Encourage intake of meals and supplements  3. Document intake of all meals as % taken in ADL's to provide interdisciplinary communication across all shifts.   4. Obtain updated weight.    RD following

## 2022-02-18 NOTE — PROGRESS NOTES
Pt transferred in bed to T at 1600. Pt alert to self only. VSS. Denies pain. Blood glucose 167. Wife is aware of transfer. Report taken from BRIDGETTE Keith

## 2022-02-18 NOTE — PROGRESS NOTES
Hospital Medicine Daily Progress Note    Date of Service  2/18/2022    Chief Complaint  Weakness    Hospital Course  This is a 71 year old male with PMHx of hypertension, hyperlipidemia, type 2 diabetes A1c 7.2, atrial flutter on Eliquis, ANCA vasculitis s/p transplant, recent COVID infection 1/25/2022 who was admitted on 02/1/2022 with weakness and diarrhea.    Patient is vaccinated against COVID-19. Diagnosed with COVID on 01/25. Patient remains on room air throughout admission.UA positive for UTI, urine culture positive for carbapenem resistant Pseudomonas aeruginosa. Due to less than 10 K organisms, ID did not intially recommend any antibiotics at this time.  Patient with worsening AMS on 02/11, repeat urine is worse, pending repeat urine culture and IV meropenem was started by ID Dr. Krishnamurthy who reconsulted.  Patient with ongoing diarrhea, C. difficile and stool cultures negative x2.    Patient with RADHA on CKD. Patient initially started on fluid, renal ultrasound unremarkable. Patient started on Veltassa and sodium bicarb. Nephrology has followed.    On 2/12 he developed hypotension, lactic acidosis and decreased level of consciousness and was transferred to the [facility]. Initiated on Zyvox for MRSA positive nares and abnormal CT lungs. He was also treated for hypercalcemia. Cortrak placed and enteral feeding initiated.    Interval Problem Update  Patient seen and examined today.    Patient tolerating treatment and therapies.  All Data, Medication data reviewed.  Case discussed with nursing as available.  Plan of Care reviewed with patient and notified of changes.      2/18: The patient was transferred from out of floor overnight.  His creatinine is improving.  Today is his last dose of antibiotic.    2/17 patient with ongoing significant confusion, encephalopathy, confused, appears otherwise nonfocal, difficult p.o. intake, but he takes fluids well, no evidence of aspiration at this time, discussed with nephrology,  plans for renal biopsy possibly Monday, no evidence of acute blood loss, hemoglobin is stable, discontinuing anticoagulation for planned biopsy and secondary reports of melena.  Glycemic control more adequate, renal function further slightly improved.    2/16 the patient appears to have on and off worsening encephalopathy, afebrile, increasing blood pressures with needs for as needed medication, the patient remains on room air, slight increase in leukocytosis, chemistry with stable sodium, slightly improved renal function with a creatinine of 1.76, calcium of 11.4, albumin at 2.9.  Pulling core track and encouraging patient to increase his p.o. intake, both fluids and solids.  Ongoing need to increase insulin for improved glycemic control, 1 blood culture from 2/11+ for gram-negative elisabeth.  Patient with fine tremor.  2/15 the patient is more conversant, per wife much improved mentally, he did have some nausea vomiting earlier today, he was cleared for oral diet with still a core track in place. Patient with improved urine output, 1.9 L, the patient is switched to oral prednisone coming off high-dose Solu-Medrol with the tentative diagnosis of recurrent vasculitis. Plans would be for future biopsy once patient is more stable. Ongoing correction of sodium with free water flushes,  Close laboratory follow-up  2/14: Alert and conversant. Remains weak.  Failed swallow per speech    2/13 more alert and conversant but still with lethargy and some word finding difficulty.  Weak.  Wife at bedside and given updates.        I have personally seen and examined the patient at bedside. I discussed the plan of care with patient, family, bedside RN and infectious disease.    Consultants/Specialty  infectious disease and nephrology    Code Status  Full Code    Disposition  Patient is not medically cleared for discharge.   Anticipate discharge to to home with close outpatient follow-up.  Versus rehab/SNF  I have placed the appropriate  orders for post-discharge needs.    Review of Systems  Review of Systems   Constitutional: Positive for malaise/fatigue. Negative for fever.   HENT: Negative for sore throat.    Respiratory: Negative for cough, shortness of breath and stridor.    Cardiovascular: Negative for chest pain, palpitations and leg swelling.   Gastrointestinal: Negative for abdominal pain and nausea.   Genitourinary: Negative for dysuria.   Musculoskeletal: Positive for back pain and neck pain.   Neurological: Negative for dizziness and headaches.   Psychiatric/Behavioral: Positive for memory loss.        Physical Exam  Temp:  [35.9 °C (96.7 °F)-36.7 °C (98 °F)] 35.9 °C (96.7 °F)  Pulse:  [62-94] 88  Resp:  [16-25] 18  BP: (115-144)/(57-81) 115/57  SpO2:  [90 %-93 %] 90 %    Physical Exam  Vitals reviewed.   Constitutional:       Appearance: He is obese. He is ill-appearing. He is not diaphoretic.      Interventions: Nasal cannula in place.   HENT:      Head: Normocephalic and atraumatic.      Nose: Nose normal.   Eyes:      General:         Right eye: No discharge.      Extraocular Movements: Extraocular movements intact.      Conjunctiva/sclera: Conjunctivae normal.   Cardiovascular:      Rate and Rhythm: Normal rate and regular rhythm.      Pulses: Normal pulses.           Radial pulses are 2+ on the right side and 2+ on the left side.        Dorsalis pedis pulses are 2+ on the right side and 2+ on the left side.      Heart sounds: Normal heart sounds.   Pulmonary:      Effort: Pulmonary effort is normal. No respiratory distress.      Breath sounds: Rhonchi present. No wheezing or rales.   Abdominal:      General: Bowel sounds are normal. There is no distension.      Palpations: Abdomen is soft.   Musculoskeletal:         General: No swelling.      Cervical back: Neck supple. Tenderness (chronic per wife) present. No muscular tenderness.      Right lower leg: No edema.      Left lower leg: No edema.   Lymphadenopathy:      Cervical: No  cervical adenopathy.   Skin:     Coloration: Skin is not jaundiced or pale.   Neurological:      Mental Status: He is lethargic and disoriented.      Cranial Nerves: No cranial nerve deficit.      Motor: Weakness present.   Psychiatric:         Mood and Affect: Mood normal.         Speech: Speech is delayed.         Behavior: Behavior is slowed.         Cognition and Memory: Cognition is impaired.         Fluids  No intake or output data in the 24 hours ending 02/18/22 0806    Laboratory  Recent Labs     02/16/22  0515 02/16/22  1858 02/17/22  0352 02/18/22  0519   WBC 14.3*  --  14.3* 14.4*   RBC 4.46*  --  4.43* 4.43*   HEMOGLOBIN 13.1* 12.8* 13.0* 13.0*   HEMATOCRIT 39.5* 39.2* 40.0* 39.5*   MCV 88.6  --  90.3 89.2   MCH 29.4  --  29.3 29.3   MCHC 33.2*  --  32.5* 32.9*   RDW 49.4  --  51.2* 51.5*   PLATELETCT 144*  --  161* 143*   MPV 9.8  --  10.2 10.7     Recent Labs     02/16/22  0515 02/17/22  0352 02/18/22  0519   SODIUM 139 146* 146*   POTASSIUM 4.3 4.3 4.3   CHLORIDE 107 110 111   CO2 21 24 22   GLUCOSE 232* 169* 85   BUN 82* 90* 92*   CREATININE 1.76* 1.71* 1.51*   CALCIUM 11.4* 10.8* 10.3                   Imaging  EC-ECHOCARDIOGRAM LTD W/O CONT   Final Result      MR-BRAIN-W/O   Final Result         No acute intracranial process.      Nonspecific T2 hyperintensities are noted in the periventricular and deep white matter, most likely related to chronic microvascular ischemia.      Remote left thalamic and corona radiata lacunar infarction noted.      DX-ABDOMEN FOR TUBE PLACEMENT   Final Result         Feeding tube with tip projecting over the expected area of the first portion duodenum.      CT-HEAD W/O   Final Result         NO ACUTE ABNORMALITIES ARE NOTED ON CT SCAN OF THE HEAD.      Findings are consistent with atrophy.  Decreased attenuation in the periventricular white matter likely indicates microvascular ischemic disease.         CT-CHEST,ABDOMEN,PELVIS W/O   Final Result      1.  Multifocal  pulmonary opacifications are identified which are new since the prior CT. Findings could be due to pneumonia.      2.  1.2 cm nodular opacification noted in the left upper pulmonary lobe. Primary or metastatic neoplasm is possible. Small focus of pneumonia or inflammation is also possible. Consider biopsy or PET/CT. Consider follow-up CT in 3 months.      3.  There is duodenal wall thickening. Findings could be due to duodenitis. Neoplasm is also possible. There is some mild induration in the retroperitoneal fat surrounding the duodenum.      4.  Cholelithiasis. No biliary ductal dilatation appreciated.      5.  Right lower quadrant renal transplant. No hydronephrosis.      Low and High Risk: Consider PET/CT, or tissue sampling, or follow-up CT at 3 months      Low Risk - Minimal or absent history of smoking and of other known risk factors.      High Risk - History of smoking or of other known risk factors.      Note: These recommendations do not apply to lung cancer screening, patients with immunosuppression, or patients with known primary cancer.      Fleischner Society 2017 Guidelines for Management of Incidentally Detected Pulmonary Nodules in Adults         US-EXTREMITY VENOUS LOWER BILAT   Final Result      US-RENAL TRANSPLANT COMP   Final Result      1.  No hydronephrosis or perinephric fluid collection involving the right lower quadrant renal transplant.   2.  No definite evidence of hemodynamically significant renal artery stenosis.   3.  Elevated resistive indices could suggest renal parenchymal disease.      DX-CHEST-PORTABLE (1 VIEW)   Final Result      1.  Bilateral peripheral lower lobe hazy opacities which is nonspecific but can be seen with Covid 19 pneumonia.   2.  Enlarged cardiac silhouette with probable vascular congestion/edema.           Assessment/Plan  * RADHA (acute kidney injury) (HCC)- (present on admission)  Assessment & Plan  Creatine 2.64 on admission and now 1.5  Back to baseline  Ranged  from 1.27-1.71 over the past year  IV fluids  Nephrology consulting, suspecting recurrent vasculitis,  S/p treatment with high-dose steroids  Ins and outs, close laboratory follow-up    Abnormal CT scan  Assessment & Plan  Future outpatient follow up.  2/12 CT chest/abd:   1.  Multifocal pulmonary opacifications are identified which are new since the prior CT. Findings could be due to pneumonia.  2.  1.2 cm nodular opacification noted in the left upper pulmonary lobe. Primary or metastatic neoplasm is possible. Small focus of pneumonia or inflammation is also possible. Consider biopsy or PET/CT. Consider follow-up CT in 3 months.  3.  There is duodenal wall thickening. Findings could be due to duodenitis. Neoplasm is also possible. There is some mild induration in the retroperitoneal fat surrounding the duodenum.  4.  Cholelithiasis. No biliary ductal dilatation appreciated  5.  Right lower quadrant renal transplant. No hydronephrosis.    Type 2 diabetes mellitus (HCC)  Assessment & Plan  HgbA1c:7.2  Monitor accuchecks.    Hyperparathyroidism (HCC)  Assessment & Plan  Has had severe elevated PTH over the last few years.  Follow up with ENT/Endocrine    Immunocompromised (HCC)  Assessment & Plan  On immunosuppressants for renal transplant  On broad spectrum antibiotics  Monitor labs, cultures    Acute encephalopathy- (present on admission)  Assessment & Plan  Appears toxic metabolic, no focal findings,  Avoid narcotics and benzodiazepines.    Hypernatremia  Assessment & Plan  Improving with addition of free water, monitor    Hypercalcemia  Assessment & Plan  Mild-moderate hypercalcemia  IVF and given calcitonin 2/12pm.  IVF and monitor labs      Hyperkalemia  Assessment & Plan  Improved,  Nephrology is following   Monitor     Acidosis  Assessment & Plan  Improving  Nephrology consulting    Positive urine culture- (present on admission)  Assessment & Plan  Meropenem for Pseudomonas  Last abx today    Pain in left  shin- (present on admission)  Assessment & Plan    US venous negative for DVT    Diarrhea  Assessment & Plan  Improving  Likely viral versus functional  C.diff negative x 2  Stool culture from 2/3 negative.  Imodium PRN    Elevated brain natriuretic peptide (BNP) level- (present on admission)  Assessment & Plan  Caution with IV fluids  Prior echo's 2020 with preserved EF  No overt sign of volume overload.      Thrombocytopenia (HCC)  Assessment & Plan  Mild,  Monitor cbc    Anemia of chronic disease  Assessment & Plan  Normocytic anemia    Monitor for bleeding    Epiretinal membrane (ERM) of both eyes- (present on admission)  Assessment & Plan  Continue home Combigan    Long term (current) use of systemic steroids- (present on admission)  Assessment & Plan  Outpatient on daily prednisone secondary to renal transplant 2008      Essential hypertension- (present on admission)  Assessment & Plan  History of  Hold Losartan  Amlodipine 5mg daily w/ parameters    Kidney transplant status, living related donor- (present on admission)  Assessment & Plan  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 Blount, self-manages lasix administration at home based on fluid volume status  Renal US negative for hydronephrosis or renal stenosis  Dr. Lyman, nephrology consulted, appreciate recommendations  Continue  prednisone, tacrolimus  Renal dosing for all medication      Obesity- (present on admission)  Assessment & Plan  Body mass index is 33.91 kg/m².  Outpatient weight loss counseling     Atrial flutter - (present on admission)  Assessment & Plan  History of, s/p ablation 1/21  On Eliquis 5 mg po BID (pending renal function if to change)  ST on admission  Eliquis continued  Monitor on telemetry       VTE prophylaxis: therapeutic anticoagulation with apixaban    I have performed a physical exam and reviewed and updated ROS and Plan today (2/18/2022). In review of  yesterday's note (2/17/2022), there are no changes except as documented above.

## 2022-02-18 NOTE — DISCHARGE PLANNING
Anticipated Discharge Disposition:   SNF     Action:   Discussed discharge planning needs during rounds. Pt was recently transferred from [facility]. Per MD, pt is not medically cleared. Pt has accepting SNF.     Barriers to Discharge:   Medical clearance    Plan:   Hospital Care Management will continue to follow and assist with discharge planning needs.

## 2022-02-19 NOTE — CARE PLAN
The patient is Stable - Low risk of patient condition declining or worsening    Shift Goals  Clinical Goals: free from falls, Q2 turns  Patient Goals: rest, comfort  Family Goals: wife at bedside    Progress made toward(s) clinical / shift goals:  continue progressing towards goals, safety    Patient is not progressing towards the following goals:

## 2022-02-19 NOTE — PROGRESS NOTES
Hospital Medicine Daily Progress Note    Date of Service  2/19/2022    Chief Complaint  Weakness    Hospital Course  This is a 71 year old male with PMHx of hypertension, hyperlipidemia, type 2 diabetes A1c 7.2, atrial flutter on Eliquis, ANCA vasculitis s/p transplant, recent COVID infection 1/25/2022 who was admitted on 02/1/2022 with weakness and diarrhea.    Patient is vaccinated against COVID-19. Diagnosed with COVID on 01/25. Patient remains on room air throughout admission.UA positive for UTI, urine culture positive for carbapenem resistant Pseudomonas aeruginosa. Due to less than 10 K organisms, ID did not intially recommend any antibiotics at this time.  Patient with worsening AMS on 02/11, repeat urine is worse, pending repeat urine culture and IV meropenem was started by ID Dr. Krishnamurthy who reconsulted.  Patient with ongoing diarrhea, C. difficile and stool cultures negative x2.    Patient with RADHA on CKD. Patient initially started on fluid, renal ultrasound unremarkable. Patient started on Veltassa and sodium bicarb. Nephrology has followed.    On 2/12 he developed hypotension, lactic acidosis and decreased level of consciousness and was transferred to the [facility]. Initiated on Zyvox for MRSA positive nares and abnormal CT lungs. He was also treated for hypercalcemia. Cortrak placed and enteral feeding initiated.    Interval Problem Update  Patient seen and examined today.    Patient tolerating treatment and therapies.  All Data, Medication data reviewed.  Case discussed with nursing as available.  Plan of Care reviewed with patient and notified of changes.    2/19 patient is sleeping comfortably on the bed.  His sodium continued to increase to 149.  I increase D5W to 100 cc/h.  Also discussed with bedside RN to make sure he drinks 1.5 L of fluid a day.  To continue to follow up with a.m. labs.    2/18: The patient was transferred from out of floor overnight.  His creatinine is improving.  Today is his last  dose of antibiotic.    2/17 patient with ongoing significant confusion, encephalopathy, confused, appears otherwise nonfocal, difficult p.o. intake, but he takes fluids well, no evidence of aspiration at this time, discussed with nephrology, plans for renal biopsy possibly Monday, no evidence of acute blood loss, hemoglobin is stable, discontinuing anticoagulation for planned biopsy and secondary reports of melena.  Glycemic control more adequate, renal function further slightly improved.    2/16 the patient appears to have on and off worsening encephalopathy, afebrile, increasing blood pressures with needs for as needed medication, the patient remains on room air, slight increase in leukocytosis, chemistry with stable sodium, slightly improved renal function with a creatinine of 1.76, calcium of 11.4, albumin at 2.9.  Pulling core track and encouraging patient to increase his p.o. intake, both fluids and solids.  Ongoing need to increase insulin for improved glycemic control, 1 blood culture from 2/11+ for gram-negative elisabeth.  Patient with fine tremor.  2/15 the patient is more conversant, per wife much improved mentally, he did have some nausea vomiting earlier today, he was cleared for oral diet with still a core track in place. Patient with improved urine output, 1.9 L, the patient is switched to oral prednisone coming off high-dose Solu-Medrol with the tentative diagnosis of recurrent vasculitis. Plans would be for future biopsy once patient is more stable. Ongoing correction of sodium with free water flushes,  Close laboratory follow-up  2/14: Alert and conversant. Remains weak.  Failed swallow per speech    2/13 more alert and conversant but still with lethargy and some word finding difficulty.  Weak.  Wife at bedside and given updates.        I have personally seen and examined the patient at bedside. I discussed the plan of care with patient, family, bedside RN and infectious  disease.    Consultants/Specialty  infectious disease and nephrology    Code Status  Full Code    Disposition  Patient is not medically cleared for discharge.   Anticipate discharge to to home with close outpatient follow-up.  Versus rehab/SNF  I have placed the appropriate orders for post-discharge needs.    Review of Systems  Review of Systems   Constitutional: Positive for malaise/fatigue. Negative for fever.   HENT: Negative for sore throat.    Respiratory: Negative for cough, shortness of breath and stridor.    Cardiovascular: Negative for chest pain and leg swelling.   Gastrointestinal: Negative for abdominal pain.   Genitourinary: Negative for dysuria.   Musculoskeletal: Positive for back pain and neck pain.   Neurological: Negative for dizziness and headaches.        Physical Exam  Temp:  [36.2 °C (97.2 °F)-36.6 °C (97.8 °F)] 36.5 °C (97.7 °F)  Pulse:  [87-91] 89  Resp:  [18-20] 20  BP: (117-146)/(82-96) 146/96  SpO2:  [92 %-94 %] 92 %    Physical Exam  Vitals reviewed.   Constitutional:       Appearance: He is obese. He is ill-appearing. He is not diaphoretic.      Interventions: Nasal cannula in place.   HENT:      Head: Normocephalic and atraumatic.      Nose: Nose normal.   Eyes:      General:         Right eye: No discharge.      Extraocular Movements: Extraocular movements intact.      Conjunctiva/sclera: Conjunctivae normal.   Cardiovascular:      Rate and Rhythm: Normal rate and regular rhythm.      Pulses: Normal pulses.           Radial pulses are 2+ on the right side and 2+ on the left side.        Dorsalis pedis pulses are 2+ on the right side and 2+ on the left side.   Pulmonary:      Effort: Pulmonary effort is normal. No respiratory distress.      Breath sounds: Rhonchi present. No wheezing or rales.   Abdominal:      General: Bowel sounds are normal. There is no distension.      Palpations: Abdomen is soft.   Musculoskeletal:         General: No swelling.      Cervical back: Neck supple.  Tenderness (chronic per wife) present. No muscular tenderness.      Right lower leg: No edema.      Left lower leg: No edema.   Lymphadenopathy:      Cervical: No cervical adenopathy.   Skin:     Coloration: Skin is not jaundiced or pale.   Neurological:      Mental Status: He is lethargic.      Motor: Weakness present.   Psychiatric:         Speech: Speech is delayed.         Behavior: Behavior is slowed.         Cognition and Memory: Cognition is impaired.         Fluids    Intake/Output Summary (Last 24 hours) at 2/19/2022 0706  Last data filed at 2/19/2022 0458  Gross per 24 hour   Intake 720 ml   Output 2600 ml   Net -1880 ml       Laboratory  Recent Labs     02/16/22  1858 02/17/22  0352 02/18/22  0519   WBC  --  14.3* 14.4*   RBC  --  4.43* 4.43*   HEMOGLOBIN 12.8* 13.0* 13.0*   HEMATOCRIT 39.2* 40.0* 39.5*   MCV  --  90.3 89.2   MCH  --  29.3 29.3   MCHC  --  32.5* 32.9*   RDW  --  51.2* 51.5*   PLATELETCT  --  161* 143*   MPV  --  10.2 10.7     Recent Labs     02/17/22  0352 02/18/22  0519 02/19/22  0407   SODIUM 146* 146* 149*   POTASSIUM 4.3 4.3 4.6   CHLORIDE 110 111 115*   CO2 24 22 22   GLUCOSE 169* 85 89   BUN 90* 92* 88*   CREATININE 1.71* 1.51* 1.55*   CALCIUM 10.8* 10.3 10.3                   Imaging  EC-ECHOCARDIOGRAM LTD W/O CONT   Final Result      MR-BRAIN-W/O   Final Result         No acute intracranial process.      Nonspecific T2 hyperintensities are noted in the periventricular and deep white matter, most likely related to chronic microvascular ischemia.      Remote left thalamic and corona radiata lacunar infarction noted.      DX-ABDOMEN FOR TUBE PLACEMENT   Final Result         Feeding tube with tip projecting over the expected area of the first portion duodenum.      CT-HEAD W/O   Final Result         NO ACUTE ABNORMALITIES ARE NOTED ON CT SCAN OF THE HEAD.      Findings are consistent with atrophy.  Decreased attenuation in the periventricular white matter likely indicates  microvascular ischemic disease.         CT-CHEST,ABDOMEN,PELVIS W/O   Final Result      1.  Multifocal pulmonary opacifications are identified which are new since the prior CT. Findings could be due to pneumonia.      2.  1.2 cm nodular opacification noted in the left upper pulmonary lobe. Primary or metastatic neoplasm is possible. Small focus of pneumonia or inflammation is also possible. Consider biopsy or PET/CT. Consider follow-up CT in 3 months.      3.  There is duodenal wall thickening. Findings could be due to duodenitis. Neoplasm is also possible. There is some mild induration in the retroperitoneal fat surrounding the duodenum.      4.  Cholelithiasis. No biliary ductal dilatation appreciated.      5.  Right lower quadrant renal transplant. No hydronephrosis.      Low and High Risk: Consider PET/CT, or tissue sampling, or follow-up CT at 3 months      Low Risk - Minimal or absent history of smoking and of other known risk factors.      High Risk - History of smoking or of other known risk factors.      Note: These recommendations do not apply to lung cancer screening, patients with immunosuppression, or patients with known primary cancer.      Fleischner Society 2017 Guidelines for Management of Incidentally Detected Pulmonary Nodules in Adults         US-EXTREMITY VENOUS LOWER BILAT   Final Result      US-RENAL TRANSPLANT COMP   Final Result      1.  No hydronephrosis or perinephric fluid collection involving the right lower quadrant renal transplant.   2.  No definite evidence of hemodynamically significant renal artery stenosis.   3.  Elevated resistive indices could suggest renal parenchymal disease.      DX-CHEST-PORTABLE (1 VIEW)   Final Result      1.  Bilateral peripheral lower lobe hazy opacities which is nonspecific but can be seen with Covid 19 pneumonia.   2.  Enlarged cardiac silhouette with probable vascular congestion/edema.           Assessment/Plan  * RADHA (acute kidney injury) (HCC)-  (present on admission)  Assessment & Plan  Creatine 2.64 on admission and now 1.55  Back to baseline  Ranged from 1.27-1.71 over the past year  IV fluids  Nephrology consulting, suspecting recurrent vasculitis,  S/p treatment with high-dose steroids  Ins and outs, close laboratory follow-up    Abnormal CT scan  Assessment & Plan  Future outpatient follow up.  2/12 CT chest/abd:   1.  Multifocal pulmonary opacifications are identified which are new since the prior CT. Findings could be due to pneumonia.  2.  1.2 cm nodular opacification noted in the left upper pulmonary lobe. Primary or metastatic neoplasm is possible. Small focus of pneumonia or inflammation is also possible. Consider biopsy or PET/CT. Consider follow-up CT in 3 months.  3.  There is duodenal wall thickening. Findings could be due to duodenitis. Neoplasm is also possible. There is some mild induration in the retroperitoneal fat surrounding the duodenum.  4.  Cholelithiasis. No biliary ductal dilatation appreciated  5.  Right lower quadrant renal transplant. No hydronephrosis.    Type 2 diabetes mellitus (HCC)  Assessment & Plan  HgbA1c:7.2  Monitor accuchecks.    Hyperparathyroidism (HCC)  Assessment & Plan  Has had severe elevated PTH over the last few years.  Follow up with ENT/Endocrine    Immunocompromised (HCC)  Assessment & Plan  On immunosuppressants for renal transplant  On broad spectrum antibiotics  Monitor labs, cultures    Acute encephalopathy- (present on admission)  Assessment & Plan  Appears toxic metabolic, no focal findings,  Avoid narcotics and benzodiazepines.    Hypernatremia  Assessment & Plan  Sodium is 149 and it is worsening  On D5W infusion  Follow CMP in the morning    Hypercalcemia  Assessment & Plan  Mild-moderate hypercalcemia  IVF and given calcitonin 2/12pm.  IVF and monitor labs      Hyperkalemia  Assessment & Plan  Improved,  Nephrology is following   Monitor     Acidosis  Assessment & Plan  Improving  Nephrology  consulting    Positive urine culture- (present on admission)  Assessment & Plan  Meropenem for Pseudomonas  Last abx today    Pain in left shin- (present on admission)  Assessment & Plan    US venous negative for DVT    Diarrhea  Assessment & Plan  Improving  Likely viral versus functional  C.diff negative x 2  Stool culture from 2/3 negative.  Imodium PRN    Elevated brain natriuretic peptide (BNP) level- (present on admission)  Assessment & Plan  Caution with IV fluids  Prior echo's 2020 with preserved EF  No overt sign of volume overload.      Thrombocytopenia (HCC)  Assessment & Plan  Mild,  Monitor cbc    Anemia of chronic disease  Assessment & Plan  Normocytic anemia    Monitor for bleeding    Epiretinal membrane (ERM) of both eyes- (present on admission)  Assessment & Plan  Continue home Combigan    Long term (current) use of systemic steroids- (present on admission)  Assessment & Plan  Outpatient on daily prednisone secondary to renal transplant 2008      Essential hypertension- (present on admission)  Assessment & Plan  History of  Hold Losartan  Amlodipine 5mg daily w/ parameters    Kidney transplant status, living related donor- (present on admission)  Assessment & Plan  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 Blount, self-manages lasix administration at home based on fluid volume status  Renal US negative for hydronephrosis or renal stenosis  Dr. Lyman, nephrology consulted, appreciate recommendations  Continue  prednisone, tacrolimus  Renal dosing for all medication      Obesity- (present on admission)  Assessment & Plan  Body mass index is 33.91 kg/m².  Outpatient weight loss counseling     Atrial flutter - (present on admission)  Assessment & Plan  History of, s/p ablation 1/21  On Eliquis 5 mg po BID (pending renal function if to change)  ST on admission  Eliquis continued  Monitor on telemetry       VTE prophylaxis: therapeutic  anticoagulation with apixaban    I have performed a physical exam and reviewed and updated ROS and Plan today (2/19/2022). In review of yesterday's note (2/18/2022), there are no changes except as documented above.

## 2022-02-19 NOTE — CARE PLAN
The patient is Watcher - Medium risk of patient condition declining or worsening    Shift Goals  Clinical Goals: q2 turns  Patient Goals: rest/comfort  Family Goals: na    Progress made toward(s) clinical / shift goals:  tolerating turn    Patient is not progressing towards the following goals:  Patient remains only oriented to self

## 2022-02-19 NOTE — CARE PLAN
Problem: Knowledge Deficit - Standard  Goal: Patient and family/care givers will demonstrate understanding of plan of care, disease process/condition, diagnostic tests and medications  Outcome: Progressing  Note: Educated on POC     Problem: Fall Risk  Goal: Patient will remain free from falls  Outcome: Progressing  Note: Fall precautions in place   The patient is Watcher - Medium risk of patient condition declining or worsening    Shift Goals  Clinical Goals: Q2 turns, reorientation  Patient Goals: sleep  Family Goals: n a    Progress made toward(s) clinical / shift goals:  yes    Patient is not progressing towards the following goals:

## 2022-02-19 NOTE — PROGRESS NOTES
"[facility] -  PROGRESS NOTE               Author: Jorge Pedraza M.D. Date & Time: 2/19/2022  8:19 AM     HPI:  Krishan Acevedo is a 71 y.o. 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 Was additionally found to have worsening renal function.   Patient shares that he first tested positive for COVID at an outside hospital several days ago. He felt that he was not improving and possibly getting worse at home which prompted him to call EMS. He mentions that for several days he has been having loose stools, noting 3-5 loose but not watery BMs daily, noting his last loose stool was day prior to presentation. He also mentions that he has become more dependent on his wife to bring him food and water citing fatigue with his COVID infection. He does note that for the past several weeks he has been feeling \"like I'm always thirsty\".  While in the ED he was vitally stable. Was placed on 2L NC sating in the mid 90s BP ranged 123/70 from 159/117. Labs notable for Cr of 2.62. BUN 80, COVID-19 positive, UA noted for large occult blood, small leukocyte esterase. US of kidneys was completed with indicated no hydronephrosis or fluid collections, no renal artery stenosis.   In regards to his transplant history. Living donor transplant was completed in 2008 at an outside hospital. Patient is on Prednisone 5mg, Mycophenolate 500mg BID, and Tacrolimus 1mg and 0.5mg daily. His GFR trends in the 30s-40s although in chart he has climbed to the 56-53 ranges in the past.     DAILY NEPHROLOGY SUMMARY:  2/1: Admitted, consult placed  2/2: no overnight events, renal function improving, patient tolerating PO intake  2/3: started on IVF yesterday, renal function improved to around prior baseline range  2/4: renal function " "continues to improve  2/5: Scr down to baseline levels. No new overnight renal events. Feels ok.  2/6: Feels ok. Urine culture from 2/1 positive for carbapenem resistant Pseudomonas aeruginosa. He is on zosyn per sensitivity. No labs today  2/7: Potassium is 5.7 today.  Creatinine up to 1.95.  Corrected calcium elevated.  CO2 16.  2/08: Chart and notes reviewed. No new labs this am. +Tachycardia.   2/09: Nursing notes and assessments reviewed.  Int tachycardia still. Room air. SBP labile 100s-160s. K 5.5  2/10: Spoke with RN, patient has no complaints at this time.   Plan is for discharge today or tomorrow to SNF pending 1L bolus and Calcium recheck.  Current SCa 11.5  VSS RA  -160s  2/11: Sodium up to 149.  Calcium increasing.  Creatinine stable 1.7.  2/12: Sodium up to 152.  Calcium remains elevated.  Creatinine 1.8.  I/Os not documented  2/13: Sodium still at 152.  Calcium trending down.  Creatinine up to 2.06.  On bicarb drip.  Somnolent  2/14: Unable to participate in interview, improved hemodynamics, cr stable  2/15: stable hemodynamics, 1.9L UOP, cr to 1.9, labile blood pressures, ongoing intermittent delirium  2/16: Hypertensive this AM, slight improvement in Cr, ca climbing, net neg 600cc with 1.9L UOP, confused  2/17: 1.4L UOP,ongoing encephalopthy, sodium climbing, minimal PO intake    2/18: IOs not recorded, cr 1.5, tremulous, minimal PO intake-serum sodium climbing  2/19: 2.6L  UOP, sodium climbing, more confused this a.m, wife at bedside    REVIEW OF SYSTEMS:    Unabel to obtain, mental status     PMH/PSH/SH/FH: Reviewed and unchanged since admission note  CURRENT MEDICATIONS: Reviewed from admission to present day    VS:  VS:  /77   Pulse 83   Temp 36.8 °C (98.3 °F) (Temporal)   Resp 18   Ht 1.803 m (5' 11\")   Wt 112 kg (247 lb 5.7 oz)   SpO2 95%   BMI 34.50 kg/m²   GENERAL: no acute distress  CV: RRR, No pedal edema  RESP: non-labored  GI: Soft  MSK: No joint deformities   SKIN: " No concerning rashes  NEURO: Tremor  PSYCH: Cooperative    Fluids:  In: 720 [P.O.:720]  Out: 2600     LABS:  Recent Labs     02/17/22  0352 02/18/22  0519 02/19/22  0407   SODIUM 146* 146* 149*   POTASSIUM 4.3 4.3 4.6   CHLORIDE 110 111 115*   CO2 24 22 22   GLUCOSE 169* 85 89   BUN 90* 92* 88*   CREATININE 1.71* 1.51* 1.55*   CALCIUM 10.8* 10.3 10.3       IMPRESSION:  # History of Renal Transplant  -Tacrolimus 22.5  2/8, dose reduced   # History of ANCA Vasculitis  - Repeat serologies positive  # RADHA  # CKD 3  # Acidosis  # Afib  # DM2  # HTN  # Chronic microhematuria  - No UTI on repeat UA  # Hypercalcemia: PTH mediated   # Hyperparathyroid: may need further imaging going forward       PLAN:  -Oral pred 60mg daily  -Tentative plan for renal biopsy Monday after eliquis wash-out. NPO Sunday night  -Increase d5w to 150/hr, bridgette has osmotic diuresis  -recheck serum sodium at 1500 today to adjust free water as needed  -FU 2/18 FK level  -sensipar 30mg TIW  -Hold Cellcept for now   -Continue to hold losartan  -On veltassa 8.6g daily since 2/7/22  -Low potassium renal diet   -Dose meds for reduced GFR    Discussed with hospitalist     Thank you

## 2022-02-19 NOTE — PROGRESS NOTES
Bedside report received. Pt A&Ox1. No reports of pain. POC discussed with pt. Pt verbalizes understanding. Call light and belongings within reach. Bed locked and in lowest position. Alarm and fall precautions in place.

## 2022-02-20 NOTE — CARE PLAN
CW filled out medical necessity  The patient is Stable - Low risk of patient condition declining or worsening    Shift Goals  Clinical Goals: safety, maintain skin integrity  Patient Goals: sleep  Family Goals: NA    Progress made toward(s) clinical / shift goals:    Problem: Skin Integrity  Goal: Skin integrity is maintained or improved  Outcome: Progressing     Problem: Fall Risk  Goal: Patient will remain free from falls  Outcome: Progressing       Patient is not progressing towards the following goals:

## 2022-02-20 NOTE — PROGRESS NOTES
Hospital Medicine Daily Progress Note    Date of Service  2/20/2022    Chief Complaint  Weakness    Hospital Course  This is a 71 year old male with PMHx of hypertension, hyperlipidemia, type 2 diabetes A1c 7.2, atrial flutter on Eliquis, ANCA vasculitis s/p transplant, recent COVID infection 1/25/2022 who was admitted on 02/1/2022 with weakness and diarrhea.    Patient is vaccinated against COVID-19. Diagnosed with COVID on 01/25. Patient remains on room air throughout admission.UA positive for UTI, urine culture positive for carbapenem resistant Pseudomonas aeruginosa. Due to less than 10 K organisms, ID did not intially recommend any antibiotics at this time.  Patient with worsening AMS on 02/11, repeat urine is worse, pending repeat urine culture and IV meropenem was started by ID Dr. Krishnamurthy who reconsulted.  Patient with ongoing diarrhea, C. difficile and stool cultures negative x2.    Patient with RADHA on CKD. Patient initially started on fluid, renal ultrasound unremarkable. Patient started on Veltassa and sodium bicarb. Nephrology has followed.    On 2/12 he developed hypotension, lactic acidosis and decreased level of consciousness and was transferred to the [facility]. Initiated on Zyvox for MRSA positive nares and abnormal CT lungs. He was also treated for hypercalcemia. Cortrak placed and enteral feeding initiated.    Interval Problem Update  Patient seen and examined today.    Patient tolerating treatment and therapies.  All Data, Medication data reviewed.  Case discussed with nursing as available.  Plan of Care reviewed with patient and notified of changes.    2/20: Today morning lab is still pending.    2/19 patient is sleeping comfortably on the bed.  His sodium continued to increase to 149.  I increase D5W to 100 cc/h.  Also discussed with bedside RN to make sure he drinks 1.5 L of fluid a day.  To continue to follow up with a.m. labs.    2/18: The patient was transferred from out of floor overnight.  His  creatinine is improving.  Today is his last dose of antibiotic.    2/17 patient with ongoing significant confusion, encephalopathy, confused, appears otherwise nonfocal, difficult p.o. intake, but he takes fluids well, no evidence of aspiration at this time, discussed with nephrology, plans for renal biopsy possibly Monday, no evidence of acute blood loss, hemoglobin is stable, discontinuing anticoagulation for planned biopsy and secondary reports of melena.  Glycemic control more adequate, renal function further slightly improved.    2/16 the patient appears to have on and off worsening encephalopathy, afebrile, increasing blood pressures with needs for as needed medication, the patient remains on room air, slight increase in leukocytosis, chemistry with stable sodium, slightly improved renal function with a creatinine of 1.76, calcium of 11.4, albumin at 2.9.  Pulling core track and encouraging patient to increase his p.o. intake, both fluids and solids.  Ongoing need to increase insulin for improved glycemic control, 1 blood culture from 2/11+ for gram-negative elisabeth.  Patient with fine tremor.  2/15 the patient is more conversant, per wife much improved mentally, he did have some nausea vomiting earlier today, he was cleared for oral diet with still a core track in place. Patient with improved urine output, 1.9 L, the patient is switched to oral prednisone coming off high-dose Solu-Medrol with the tentative diagnosis of recurrent vasculitis. Plans would be for future biopsy once patient is more stable. Ongoing correction of sodium with free water flushes,  Close laboratory follow-up  2/14: Alert and conversant. Remains weak.  Failed swallow per speech    2/13 more alert and conversant but still with lethargy and some word finding difficulty.  Weak.  Wife at bedside and given updates.        I have personally seen and examined the patient at bedside. I discussed the plan of care with patient, family, bedside RN and  infectious disease.    Consultants/Specialty  infectious disease and nephrology    Code Status  Full Code    Disposition  Patient is not medically cleared for discharge.   Anticipate discharge to to home with close outpatient follow-up.  Versus rehab/SNF  I have placed the appropriate orders for post-discharge needs.    Review of Systems  Review of Systems   Constitutional: Positive for malaise/fatigue. Negative for chills and fever.   HENT: Negative for sore throat.    Respiratory: Negative for cough, hemoptysis, shortness of breath and stridor.    Cardiovascular: Negative for chest pain, palpitations and leg swelling.   Gastrointestinal: Negative for abdominal pain.   Genitourinary: Negative for dysuria.   Musculoskeletal: Positive for back pain and neck pain.   Neurological: Negative for dizziness and headaches.        Physical Exam  Temp:  [35.9 °C (96.6 °F)-36.9 °C (98.5 °F)] 36.7 °C (98.1 °F)  Pulse:  [68-86] 68  Resp:  [18-20] 19  BP: (111-145)/(70-79) 139/72  SpO2:  [92 %-95 %] 92 %    Physical Exam  Vitals reviewed.   Constitutional:       Appearance: He is obese. He is ill-appearing. He is not diaphoretic.      Interventions: Nasal cannula in place.   HENT:      Head: Normocephalic and atraumatic.      Nose: Nose normal.   Eyes:      General:         Right eye: No discharge.      Extraocular Movements: Extraocular movements intact.      Conjunctiva/sclera: Conjunctivae normal.   Cardiovascular:      Rate and Rhythm: Normal rate and regular rhythm.      Pulses: Normal pulses.           Radial pulses are 2+ on the right side and 2+ on the left side.        Dorsalis pedis pulses are 2+ on the right side and 2+ on the left side.   Pulmonary:      Effort: Pulmonary effort is normal. No respiratory distress.      Breath sounds: Rhonchi present. No wheezing.   Abdominal:      General: Bowel sounds are normal. There is no distension.      Palpations: Abdomen is soft.   Musculoskeletal:         General: No  swelling.      Cervical back: Neck supple. Tenderness (chronic per wife) present. No muscular tenderness.      Right lower leg: No edema.      Left lower leg: No edema.   Lymphadenopathy:      Cervical: No cervical adenopathy.   Skin:     Coloration: Skin is not jaundiced.   Neurological:      Mental Status: He is lethargic.      Motor: Weakness present.   Psychiatric:         Speech: Speech is delayed.         Behavior: Behavior is slowed.         Cognition and Memory: Cognition is impaired.         Fluids    Intake/Output Summary (Last 24 hours) at 2/20/2022 0824  Last data filed at 2/20/2022 0500  Gross per 24 hour   Intake 2245 ml   Output 3000 ml   Net -755 ml       Laboratory  Recent Labs     02/18/22  0519   WBC 14.4*   RBC 4.43*   HEMOGLOBIN 13.0*   HEMATOCRIT 39.5*   MCV 89.2   MCH 29.3   MCHC 32.9*   RDW 51.5*   PLATELETCT 143*   MPV 10.7     Recent Labs     02/18/22  0519 02/19/22  0407   SODIUM 146* 151*  149*   POTASSIUM 4.3 4.6   CHLORIDE 111 115*   CO2 22 22   GLUCOSE 85 89   BUN 92* 88*   CREATININE 1.51* 1.55*   CALCIUM 10.3 10.3                   Imaging  EC-ECHOCARDIOGRAM LTD W/O CONT   Final Result      MR-BRAIN-W/O   Final Result         No acute intracranial process.      Nonspecific T2 hyperintensities are noted in the periventricular and deep white matter, most likely related to chronic microvascular ischemia.      Remote left thalamic and corona radiata lacunar infarction noted.      DX-ABDOMEN FOR TUBE PLACEMENT   Final Result         Feeding tube with tip projecting over the expected area of the first portion duodenum.      CT-HEAD W/O   Final Result         NO ACUTE ABNORMALITIES ARE NOTED ON CT SCAN OF THE HEAD.      Findings are consistent with atrophy.  Decreased attenuation in the periventricular white matter likely indicates microvascular ischemic disease.         CT-CHEST,ABDOMEN,PELVIS W/O   Final Result      1.  Multifocal pulmonary opacifications are identified which are new  since the prior CT. Findings could be due to pneumonia.      2.  1.2 cm nodular opacification noted in the left upper pulmonary lobe. Primary or metastatic neoplasm is possible. Small focus of pneumonia or inflammation is also possible. Consider biopsy or PET/CT. Consider follow-up CT in 3 months.      3.  There is duodenal wall thickening. Findings could be due to duodenitis. Neoplasm is also possible. There is some mild induration in the retroperitoneal fat surrounding the duodenum.      4.  Cholelithiasis. No biliary ductal dilatation appreciated.      5.  Right lower quadrant renal transplant. No hydronephrosis.      Low and High Risk: Consider PET/CT, or tissue sampling, or follow-up CT at 3 months      Low Risk - Minimal or absent history of smoking and of other known risk factors.      High Risk - History of smoking or of other known risk factors.      Note: These recommendations do not apply to lung cancer screening, patients with immunosuppression, or patients with known primary cancer.      Fleischner Society 2017 Guidelines for Management of Incidentally Detected Pulmonary Nodules in Adults         US-EXTREMITY VENOUS LOWER BILAT   Final Result      US-RENAL TRANSPLANT COMP   Final Result      1.  No hydronephrosis or perinephric fluid collection involving the right lower quadrant renal transplant.   2.  No definite evidence of hemodynamically significant renal artery stenosis.   3.  Elevated resistive indices could suggest renal parenchymal disease.      DX-CHEST-PORTABLE (1 VIEW)   Final Result      1.  Bilateral peripheral lower lobe hazy opacities which is nonspecific but can be seen with Covid 19 pneumonia.   2.  Enlarged cardiac silhouette with probable vascular congestion/edema.      CT-NEEDLE BX-RENAL    (Results Pending)        Assessment/Plan  * RADHA (acute kidney injury) (HCC)- (present on admission)  Assessment & Plan  Creatine 2.64 on admission and now 1.55  Pending today morning labs  Back to  baseline  Ranged from 1.27-1.71 over the past year  IV fluids  Nephrology consulting, suspecting recurrent vasculitis,  S/p treatment with high-dose steroids  Ins and outs, close laboratory follow-up  To have renal biopsy tomorrow  NPO tonight.    Abnormal CT scan  Assessment & Plan  Future outpatient follow up.  2/12 CT chest/abd:   1.  Multifocal pulmonary opacifications are identified which are new since the prior CT. Findings could be due to pneumonia.  2.  1.2 cm nodular opacification noted in the left upper pulmonary lobe. Primary or metastatic neoplasm is possible. Small focus of pneumonia or inflammation is also possible. Consider biopsy or PET/CT. Consider follow-up CT in 3 months.  3.  There is duodenal wall thickening. Findings could be due to duodenitis. Neoplasm is also possible. There is some mild induration in the retroperitoneal fat surrounding the duodenum.  4.  Cholelithiasis. No biliary ductal dilatation appreciated  5.  Right lower quadrant renal transplant. No hydronephrosis.    Type 2 diabetes mellitus (HCC)  Assessment & Plan  HgbA1c:7.2  Monitor accuchecks.    Hyperparathyroidism (HCC)  Assessment & Plan  Has had severe elevated PTH over the last few years.  Follow up with ENT/Endocrine    Immunocompromised (HCC)  Assessment & Plan  On immunosuppressants for renal transplant  On broad spectrum antibiotics  Monitor labs, cultures    Acute encephalopathy- (present on admission)  Assessment & Plan  Appears toxic metabolic, no focal findings,  Avoid narcotics and benzodiazepines.    Hypernatremia  Assessment & Plan  Sodium is 149 and it is worsening  On D5W infusion  Follow CMP in the morning    Hypercalcemia  Assessment & Plan  Mild-moderate hypercalcemia  IVF and given calcitonin 2/12pm.  IVF and monitor labs      Hyperkalemia  Assessment & Plan  Improved,  Nephrology is following   Monitor     Acidosis  Assessment & Plan  Improving  Nephrology consulting    Positive urine culture- (present on  admission)  Assessment & Plan  Meropenem for Pseudomonas  Last abx today    Pain in left shin- (present on admission)  Assessment & Plan    US venous negative for DVT    Diarrhea  Assessment & Plan  Improving  Likely viral versus functional  C.diff negative x 2  Stool culture from 2/3 negative.  Imodium PRN    Elevated brain natriuretic peptide (BNP) level- (present on admission)  Assessment & Plan  Caution with IV fluids  Prior echo's 2020 with preserved EF  No overt sign of volume overload.      Thrombocytopenia (HCC)  Assessment & Plan  Mild,  Monitor cbc    Anemia of chronic disease  Assessment & Plan  Normocytic anemia    Monitor for bleeding    Epiretinal membrane (ERM) of both eyes- (present on admission)  Assessment & Plan  Continue home Combigan    Long term (current) use of systemic steroids- (present on admission)  Assessment & Plan  Outpatient on daily prednisone secondary to renal transplant 2008      Essential hypertension- (present on admission)  Assessment & Plan  History of  Hold Losartan  Amlodipine 5mg daily w/ parameters    Kidney transplant status, living related donor- (present on admission)  Assessment & Plan  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 Blount, self-manages lasix administration at home based on fluid volume status  Renal US negative for hydronephrosis or renal stenosis  Dr. Lyman, nephrology consulted, appreciate recommendations  Continue  prednisone, tacrolimus  Renal dosing for all medication      Obesity- (present on admission)  Assessment & Plan  Body mass index is 33.91 kg/m².  Outpatient weight loss counseling     Atrial flutter - (present on admission)  Assessment & Plan  History of, s/p ablation 1/21  On Eliquis 5 mg po BID (pending renal function if to change)  ST on admission  Eliquis continued  Monitor on telemetry       VTE prophylaxis: therapeutic anticoagulation with apixaban    I have performed a  physical exam and reviewed and updated ROS and Plan today (2/20/2022). In review of yesterday's note (2/19/2022), there are no changes except as documented above.

## 2022-02-20 NOTE — PROGRESS NOTES
"[facility] -  PROGRESS NOTE               Author: Jorge Pedraza M.D. Date & Time: 2/20/2022  8:17 AM     HPI:  Krishan Acevedo is a 71 y.o. 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 Was additionally found to have worsening renal function.   Patient shares that he first tested positive for COVID at an outside hospital several days ago. He felt that he was not improving and possibly getting worse at home which prompted him to call EMS. He mentions that for several days he has been having loose stools, noting 3-5 loose but not watery BMs daily, noting his last loose stool was day prior to presentation. He also mentions that he has become more dependent on his wife to bring him food and water citing fatigue with his COVID infection. He does note that for the past several weeks he has been feeling \"like I'm always thirsty\".  While in the ED he was vitally stable. Was placed on 2L NC sating in the mid 90s BP ranged 123/70 from 159/117. Labs notable for Cr of 2.62. BUN 80, COVID-19 positive, UA noted for large occult blood, small leukocyte esterase. US of kidneys was completed with indicated no hydronephrosis or fluid collections, no renal artery stenosis.   In regards to his transplant history. Living donor transplant was completed in 2008 at an outside hospital. Patient is on Prednisone 5mg, Mycophenolate 500mg BID, and Tacrolimus 1mg and 0.5mg daily. His GFR trends in the 30s-40s although in chart he has climbed to the 56-53 ranges in the past.     DAILY NEPHROLOGY SUMMARY:  2/1: Admitted, consult placed  2/2: no overnight events, renal function improving, patient tolerating PO intake  2/3: started on IVF yesterday, renal function improved to around prior baseline range  2/4: renal function " "continues to improve  2/5: Scr down to baseline levels. No new overnight renal events. Feels ok.  2/6: Feels ok. Urine culture from 2/1 positive for carbapenem resistant Pseudomonas aeruginosa. He is on zosyn per sensitivity. No labs today  2/7: Potassium is 5.7 today.  Creatinine up to 1.95.  Corrected calcium elevated.  CO2 16.  2/08: Chart and notes reviewed. No new labs this am. +Tachycardia.   2/09: Nursing notes and assessments reviewed.  Int tachycardia still. Room air. SBP labile 100s-160s. K 5.5  2/10: Spoke with RN, patient has no complaints at this time.   Plan is for discharge today or tomorrow to SNF pending 1L bolus and Calcium recheck.  Current SCa 11.5  VSS RA  -160s  2/11: Sodium up to 149.  Calcium increasing.  Creatinine stable 1.7.  2/12: Sodium up to 152.  Calcium remains elevated.  Creatinine 1.8.  I/Os not documented  2/13: Sodium still at 152.  Calcium trending down.  Creatinine up to 2.06.  On bicarb drip.  Somnolent  2/14: Unable to participate in interview, improved hemodynamics, cr stable  2/15: stable hemodynamics, 1.9L UOP, cr to 1.9, labile blood pressures, ongoing intermittent delirium  2/16: Hypertensive this AM, slight improvement in Cr, ca climbing, net neg 600cc with 1.9L UOP, confused  2/17: 1.4L UOP,ongoing encephalopthy, sodium climbing, minimal PO intake    2/18: IOs not recorded, cr 1.5, tremulous, minimal PO intake-serum sodium climbing  2/19: 2.6L  UOP, sodium climbing, more confused this a.m, wife at bedside  2/20: 3L UOP, more alert this AM, PO intake slightly increase, answering some questions, no am labs     REVIEW OF SYSTEMS:    Unabel to obtain, mental status     PMH/PSH/SH/FH: Reviewed and unchanged since admission note  CURRENT MEDICATIONS: Reviewed from admission to present day    VS:  VS:  /72   Pulse 68   Temp 36.7 °C (98.1 °F) (Temporal)   Resp 19   Ht 1.803 m (5' 11\")   Wt 112 kg (247 lb 5.7 oz)   SpO2 92%   BMI 34.50 kg/m²   GENERAL: no " acute distress  CV: RRR, No pedal edema  RESP: non-labored  GI: Soft  MSK: No joint deformities   SKIN: No concerning rashes  NEURO: Tremor  PSYCH: Cooperative    Fluids:  In: 2245 [P.O.:490; I.V.:1755]  Out: 3000     LABS:  Recent Labs     02/18/22  0519 02/19/22  0407   SODIUM 146* 151*  149*   POTASSIUM 4.3 4.6   CHLORIDE 111 115*   CO2 22 22   GLUCOSE 85 89   BUN 92* 88*   CREATININE 1.51* 1.55*   CALCIUM 10.3 10.3       IMPRESSION:  # History of Renal Transplant  -Tacrolimus 22.5  2/8, dose reduced   # History of ANCA Vasculitis  - Repeat serologies positive  # RADHA  # CKD 3  # Acidosis  # Afib  # DM2  # HTN  # Chronic microhematuria  - No UTI on repeat UA  # Hypercalcemia: PTH mediated   # Hyperparathyroid: may need further imaging going forward       PLAN:  -Oral pred 60mg daily  -Tentative plan for renal biopsy yuesday AM (given Monday holiday) after eliquis wash-out. NPO Monday night  -Increase d5w to 150/hr, likely has osmotic diuresis  -2/18 FK level 8.9, continue 0.5mg BID  -sensipar 30mg TIW  -Hold Cellcept for now   -Continue to hold losartan  -On veltassa 8.6g daily since 2/7/22  -Low potassium renal diet   -Dose meds for reduced GFR    Discussed with hospitalist     Thank you

## 2022-02-21 NOTE — PROGRESS NOTES
Hospital Medicine Daily Progress Note    Date of Service  2/21/2022    Chief Complaint  Weakness    Hospital Course  This is a 71 year old male with PMHx of hypertension, hyperlipidemia, type 2 diabetes A1c 7.2, atrial flutter on Eliquis, ANCA vasculitis s/p transplant, recent COVID infection 1/25/2022 who was admitted on 02/1/2022 with weakness and diarrhea.    Patient is vaccinated against COVID-19. Diagnosed with COVID on 01/25. Patient remains on room air throughout admission.UA positive for UTI, urine culture positive for carbapenem resistant Pseudomonas aeruginosa. Due to less than 10 K organisms, ID did not intially recommend any antibiotics at this time.  Patient with worsening AMS on 02/11, repeat urine is worse, pending repeat urine culture and IV meropenem was started by ID Dr. Krishnamurthy who reconsulted.  Patient with ongoing diarrhea, C. difficile and stool cultures negative x2.    Patient with RADHA on CKD. Patient initially started on fluid, renal ultrasound unremarkable. Patient started on Veltassa and sodium bicarb. Nephrology has followed.    On 2/12 he developed hypotension, lactic acidosis and decreased level of consciousness and was transferred to the [facility]. Initiated on Zyvox for MRSA positive nares and abnormal CT lungs. He was also treated for hypercalcemia. Cortrak placed and enteral feeding initiated.    Interval Problem Update  Patient seen and examined today.    Patient tolerating treatment and therapies.  All Data, Medication data reviewed.  Case discussed with nursing as available.  Plan of Care reviewed with patient and notified of changes.  2/21: His sodium level has normalized.  I decrease his D5W infusion rate to 50 cc/h.  Encourage oral intake.  N.p.o. at midnight for biopsy tomorrow.    2/20: Today morning lab is still pending.    2/19 patient is sleeping comfortably on the bed.  His sodium continued to increase to 149.  I increase D5W to 100 cc/h.  Also discussed with bedside RN to make  sure he drinks 1.5 L of fluid a day.  To continue to follow up with a.m. labs.    2/18: The patient was transferred from out of floor overnight.  His creatinine is improving.  Today is his last dose of antibiotic.    2/17 patient with ongoing significant confusion, encephalopathy, confused, appears otherwise nonfocal, difficult p.o. intake, but he takes fluids well, no evidence of aspiration at this time, discussed with nephrology, plans for renal biopsy possibly Monday, no evidence of acute blood loss, hemoglobin is stable, discontinuing anticoagulation for planned biopsy and secondary reports of melena.  Glycemic control more adequate, renal function further slightly improved.    2/16 the patient appears to have on and off worsening encephalopathy, afebrile, increasing blood pressures with needs for as needed medication, the patient remains on room air, slight increase in leukocytosis, chemistry with stable sodium, slightly improved renal function with a creatinine of 1.76, calcium of 11.4, albumin at 2.9.  Pulling core track and encouraging patient to increase his p.o. intake, both fluids and solids.  Ongoing need to increase insulin for improved glycemic control, 1 blood culture from 2/11+ for gram-negative elisabeth.  Patient with fine tremor.  2/15 the patient is more conversant, per wife much improved mentally, he did have some nausea vomiting earlier today, he was cleared for oral diet with still a core track in place. Patient with improved urine output, 1.9 L, the patient is switched to oral prednisone coming off high-dose Solu-Medrol with the tentative diagnosis of recurrent vasculitis. Plans would be for future biopsy once patient is more stable. Ongoing correction of sodium with free water flushes,  Close laboratory follow-up  2/14: Alert and conversant. Remains weak.  Failed swallow per speech    2/13 more alert and conversant but still with lethargy and some word finding difficulty.  Weak.  Wife at bedside  and given updates.        I have personally seen and examined the patient at bedside. I discussed the plan of care with patient, family, bedside RN and infectious disease.    Consultants/Specialty  infectious disease and nephrology    Code Status  Full Code    Disposition  Patient is not medically cleared for discharge.   Anticipate discharge to to home with close outpatient follow-up.  Versus rehab/SNF  I have placed the appropriate orders for post-discharge needs.    Review of Systems  Review of Systems   Constitutional: Positive for malaise/fatigue. Negative for chills and fever.   HENT: Negative for sore throat.    Respiratory: Negative for cough, hemoptysis and stridor.    Cardiovascular: Negative for chest pain, palpitations and leg swelling.   Gastrointestinal: Negative for abdominal pain, diarrhea and vomiting.   Genitourinary: Negative for dysuria.   Neurological: Negative for dizziness and headaches.        Physical Exam  Temp:  [36.1 °C (97 °F)-36.4 °C (97.6 °F)] 36.3 °C (97.4 °F)  Pulse:  [65-86] 86  Resp:  [17-19] 19  BP: (114-145)/(66-81) 134/81  SpO2:  [92 %-95 %] 94 %    Physical Exam  Vitals reviewed.   Constitutional:       Appearance: He is obese. He is ill-appearing.      Interventions: Nasal cannula in place.   HENT:      Head: Normocephalic and atraumatic.      Nose: Nose normal.   Eyes:      General:         Right eye: No discharge.      Extraocular Movements: Extraocular movements intact.      Conjunctiva/sclera: Conjunctivae normal.   Cardiovascular:      Rate and Rhythm: Normal rate and regular rhythm.      Pulses: Normal pulses.           Radial pulses are 2+ on the right side and 2+ on the left side.        Dorsalis pedis pulses are 2+ on the right side and 2+ on the left side.   Pulmonary:      Effort: Pulmonary effort is normal. No respiratory distress.      Breath sounds: Rhonchi present.   Abdominal:      General: Bowel sounds are normal.      Palpations: Abdomen is soft.    Musculoskeletal:         General: No swelling.      Cervical back: Neck supple. Tenderness (chronic per wife) present. No muscular tenderness.   Lymphadenopathy:      Cervical: No cervical adenopathy.   Skin:     Coloration: Skin is not jaundiced.   Neurological:      Mental Status: He is lethargic.      Motor: Weakness present.   Psychiatric:         Speech: Speech is delayed.         Behavior: Behavior is slowed.         Cognition and Memory: Cognition is impaired.         Fluids    Intake/Output Summary (Last 24 hours) at 2/21/2022 0647  Last data filed at 2/21/2022 0500  Gross per 24 hour   Intake --   Output 2700 ml   Net -2700 ml       Laboratory      Recent Labs     02/20/22  0956 02/20/22  1456 02/21/22  0245   SODIUM 142 140 135   POTASSIUM 4.2 4.8 5.4   CHLORIDE 112 109 106   CO2 21 19* 21   GLUCOSE 134* 171* 175*   BUN 68* 61* 60*   CREATININE 1.20 1.15 1.13   CALCIUM 9.6 9.4 9.5                   Imaging  EC-ECHOCARDIOGRAM LTD W/O CONT   Final Result      MR-BRAIN-W/O   Final Result         No acute intracranial process.      Nonspecific T2 hyperintensities are noted in the periventricular and deep white matter, most likely related to chronic microvascular ischemia.      Remote left thalamic and corona radiata lacunar infarction noted.      DX-ABDOMEN FOR TUBE PLACEMENT   Final Result         Feeding tube with tip projecting over the expected area of the first portion duodenum.      CT-HEAD W/O   Final Result         NO ACUTE ABNORMALITIES ARE NOTED ON CT SCAN OF THE HEAD.      Findings are consistent with atrophy.  Decreased attenuation in the periventricular white matter likely indicates microvascular ischemic disease.         CT-CHEST,ABDOMEN,PELVIS W/O   Final Result      1.  Multifocal pulmonary opacifications are identified which are new since the prior CT. Findings could be due to pneumonia.      2.  1.2 cm nodular opacification noted in the left upper pulmonary lobe. Primary or metastatic  neoplasm is possible. Small focus of pneumonia or inflammation is also possible. Consider biopsy or PET/CT. Consider follow-up CT in 3 months.      3.  There is duodenal wall thickening. Findings could be due to duodenitis. Neoplasm is also possible. There is some mild induration in the retroperitoneal fat surrounding the duodenum.      4.  Cholelithiasis. No biliary ductal dilatation appreciated.      5.  Right lower quadrant renal transplant. No hydronephrosis.      Low and High Risk: Consider PET/CT, or tissue sampling, or follow-up CT at 3 months      Low Risk - Minimal or absent history of smoking and of other known risk factors.      High Risk - History of smoking or of other known risk factors.      Note: These recommendations do not apply to lung cancer screening, patients with immunosuppression, or patients with known primary cancer.      Fleischner Society 2017 Guidelines for Management of Incidentally Detected Pulmonary Nodules in Adults         US-EXTREMITY VENOUS LOWER BILAT   Final Result      US-RENAL TRANSPLANT COMP   Final Result      1.  No hydronephrosis or perinephric fluid collection involving the right lower quadrant renal transplant.   2.  No definite evidence of hemodynamically significant renal artery stenosis.   3.  Elevated resistive indices could suggest renal parenchymal disease.      DX-CHEST-PORTABLE (1 VIEW)   Final Result      1.  Bilateral peripheral lower lobe hazy opacities which is nonspecific but can be seen with Covid 19 pneumonia.   2.  Enlarged cardiac silhouette with probable vascular congestion/edema.           Assessment/Plan  * RADHA (acute kidney injury) (HCC)- (present on admission)  Assessment & Plan  Creatine 2.64 on admission and now 1.1   Pending today morning labs  Back to baseline  Ranged from 1.27-1.71 over the past year  IV fluids  Nephrology consulting, suspecting recurrent vasculitis,  S/p treatment with high-dose steroids  Ins and outs, close laboratory  follow-up  To have renal biopsy tomorrow  NPO tonight.    Abnormal CT scan  Assessment & Plan  Future outpatient follow up.  2/12 CT chest/abd:   1.  Multifocal pulmonary opacifications are identified which are new since the prior CT. Findings could be due to pneumonia.  2.  1.2 cm nodular opacification noted in the left upper pulmonary lobe. Primary or metastatic neoplasm is possible. Small focus of pneumonia or inflammation is also possible. Consider biopsy or PET/CT. Consider follow-up CT in 3 months.  3.  There is duodenal wall thickening. Findings could be due to duodenitis. Neoplasm is also possible. There is some mild induration in the retroperitoneal fat surrounding the duodenum.  4.  Cholelithiasis. No biliary ductal dilatation appreciated  5.  Right lower quadrant renal transplant. No hydronephrosis.    Type 2 diabetes mellitus (HCC)  Assessment & Plan  HgbA1c:7.2  Monitor accuchecks.    Hyperparathyroidism (HCC)  Assessment & Plan  Has had severe elevated PTH over the last few years.  Follow up with ENT/Endocrine    Immunocompromised (HCC)  Assessment & Plan  On immunosuppressants for renal transplant  On broad spectrum antibiotics  Monitor labs, cultures    Acute encephalopathy- (present on admission)  Assessment & Plan  Appears toxic metabolic, no focal findings,  Avoid narcotics and benzodiazepines.    Hypernatremia  Assessment & Plan  Sodium is 135  improving  On D5W infusion  Follow CMP in the morning    Hypercalcemia  Assessment & Plan  Mild-moderate hypercalcemia  IVF and given calcitonin 2/12pm.  IVF and monitor labs      Hyperkalemia  Assessment & Plan  Improved,  Nephrology is following   Monitor     Acidosis  Assessment & Plan  Improving  Nephrology consulting    Positive urine culture- (present on admission)  Assessment & Plan  Meropenem for Pseudomonas  Last abx today    Pain in left shin- (present on admission)  Assessment & Plan    US venous negative for DVT    Diarrhea  Assessment &  Plan  Improving  Likely viral versus functional  C.diff negative x 2  Stool culture from 2/3 negative.  Imodium PRN    Elevated brain natriuretic peptide (BNP) level- (present on admission)  Assessment & Plan  Caution with IV fluids  Prior echo's 2020 with preserved EF  No overt sign of volume overload.      Thrombocytopenia (HCC)  Assessment & Plan  Mild,  Monitor cbc    Anemia of chronic disease  Assessment & Plan  Normocytic anemia    Monitor for bleeding    Epiretinal membrane (ERM) of both eyes- (present on admission)  Assessment & Plan  Continue home Combigan    Long term (current) use of systemic steroids- (present on admission)  Assessment & Plan  Outpatient on daily prednisone secondary to renal transplant 2008      Essential hypertension- (present on admission)  Assessment & Plan  History of  Hold Losartan  Amlodipine 5mg daily w/ parameters    Kidney transplant status, living related donor- (present on admission)  Assessment & Plan  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 Blount, self-manages lasix administration at home based on fluid volume status  Renal US negative for hydronephrosis or renal stenosis  Dr. Lyman, nephrology consulted, appreciate recommendations  Continue  prednisone, tacrolimus  Renal dosing for all medication      Obesity- (present on admission)  Assessment & Plan  Body mass index is 33.91 kg/m².  Outpatient weight loss counseling     Atrial flutter - (present on admission)  Assessment & Plan  History of, s/p ablation 1/21  On Eliquis 5 mg po BID (pending renal function if to change)  ST on admission  Eliquis continued  Monitor on telemetry      I certify that the patient requires continued medically necessary hospital services for the treatment of acute kidney injury and will remain in the hospital for a few more days days.  Discharge plan is anticipated to be in a few more day.   VTE prophylaxis: therapeutic  anticoagulation with apixaban    I have performed a physical exam and reviewed and updated ROS and Plan today (2/21/2022). In review of yesterday's note (2/20/2022), there are no changes except as documented above.

## 2022-02-21 NOTE — DISCHARGE PLANNING
Anticipated Discharge Disposition:   SNF    Action:   Discussed discharge planning needs during rounds. Per MD, pending renal biopsy tomorrow.     Barriers to Discharge:   Medical clearance.  Placement acceptance and bed availability.    Plan:   Hospital Care Management will continue to follow and assist with discharge planning needs.

## 2022-02-21 NOTE — CARE PLAN
Pt confused and alert to self only. Pt unable to keep gown on, able to turn self x2 BM. He has remained free from falls.       Problem: Pain - Standard  Goal: Alleviation of pain or a reduction in pain to the patient’s comfort goal  Outcome: Progressing     Problem: Skin Integrity  Goal: Skin integrity is maintained or improved  Outcome: Progressing     Problem: Fall Risk  Goal: Patient will remain free from falls  Outcome: Progressing     Problem: Psychosocial  Goal: Patient's level of anxiety will decrease  Outcome: Progressing   The patient is Stable - Low risk of patient condition declining or worsening    Shift Goals  Clinical Goals: safety  Patient Goals: rest  Family Goals: NA    Progress made toward(s) clinical / shift goals:  yes    Patient is not progressing towards the following goals:

## 2022-02-21 NOTE — PROGRESS NOTES
"[facility] -  PROGRESS NOTE               Author: Karishma Cruz M.D. Date & Time: 2/21/2022  11:30 AM     HPI:  Krishan Acevedo is a 71 y.o. 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 Was additionally found to have worsening renal function.   Patient shares that he first tested positive for COVID at an outside hospital several days ago. He felt that he was not improving and possibly getting worse at home which prompted him to call EMS. He mentions that for several days he has been having loose stools, noting 3-5 loose but not watery BMs daily, noting his last loose stool was day prior to presentation. He also mentions that he has become more dependent on his wife to bring him food and water citing fatigue with his COVID infection. He does note that for the past several weeks he has been feeling \"like I'm always thirsty\".  While in the ED he was vitally stable. Was placed on 2L NC sating in the mid 90s BP ranged 123/70 from 159/117. Labs notable for Cr of 2.62. BUN 80, COVID-19 positive, UA noted for large occult blood, small leukocyte esterase. US of kidneys was completed with indicated no hydronephrosis or fluid collections, no renal artery stenosis.   In regards to his transplant history. Living donor transplant was completed in 2008 at an outside hospital. Patient is on Prednisone 5mg, Mycophenolate 500mg BID, and Tacrolimus 1mg and 0.5mg daily. His GFR trends in the 30s-40s although in chart he has climbed to the 56-53 ranges in the past.     DAILY NEPHROLOGY SUMMARY:  2/1: Admitted, consult placed  2/2: no overnight events, renal function improving, patient tolerating PO intake  2/3: started on IVF yesterday, renal function improved to around prior baseline range  2/4: renal function continues " "to improve  2/5: Scr down to baseline levels. No new overnight renal events. Feels ok.  2/6: Feels ok. Urine culture from 2/1 positive for carbapenem resistant Pseudomonas aeruginosa. He is on zosyn per sensitivity. No labs today  2/7: Potassium is 5.7 today.  Creatinine up to 1.95.  Corrected calcium elevated.  CO2 16.  2/08: Chart and notes reviewed. No new labs this am. +Tachycardia.   2/09: Nursing notes and assessments reviewed.  Int tachycardia still. Room air. SBP labile 100s-160s. K 5.5  2/10: Spoke with RN, patient has no complaints at this time.   Plan is for discharge today or tomorrow to SNF pending 1L bolus and Calcium recheck.  Current SCa 11.5  VSS RA  -160s  2/11: Sodium up to 149.  Calcium increasing.  Creatinine stable 1.7.  2/12: Sodium up to 152.  Calcium remains elevated.  Creatinine 1.8.  I/Os not documented  2/13: Sodium still at 152.  Calcium trending down.  Creatinine up to 2.06.  On bicarb drip.  Somnolent  2/14: Unable to participate in interview, improved hemodynamics, cr stable  2/15: stable hemodynamics, 1.9L UOP, cr to 1.9, labile blood pressures, ongoing intermittent delirium  2/16: Hypertensive this AM, slight improvement in Cr, ca climbing, net neg 600cc with 1.9L UOP, confused  2/17: 1.4L UOP,ongoing encephalopthy, sodium climbing, minimal PO intake    2/18: IOs not recorded, cr 1.5, tremulous, minimal PO intake-serum sodium climbing  2/19: 2.6L  UOP, sodium climbing, more confused this a.m, wife at bedside  2/21: remains confused, biopsy was postponed to until tomorrow due to holiday.      REVIEW OF SYSTEMS:    Unabel to obtain, mental status     PMH/PSH/SH/FH:   Reviewed and unchanged since admission note    CURRENT MEDICATIONS:   Reviewed from admission to present day    VS:  /55   Pulse 69   Temp 36.2 °C (97.1 °F) (Temporal)   Resp 18   Ht 1.803 m (5' 11\")   Wt 112 kg (247 lb 5.7 oz)   SpO2 95%   BMI 34.50 kg/m²     Physical Exam  GENERAL: no acute " distress  CV: RRR, No pedal edema  RESP: non-labored  GI: Soft  MSK: No joint deformities   SKIN: No concerning rashes  NEURO: no Tremor  PSYCH: Cooperative  Fluids:  In: -   Out: 2700     LABS:  Recent Labs     02/20/22  0956 02/20/22  1456 02/21/22  0245   SODIUM 142 140 135   POTASSIUM 4.2 4.8 5.4   CHLORIDE 112 109 106   CO2 21 19* 21   GLUCOSE 134* 171* 175*   BUN 68* 61* 60*   CREATININE 1.20 1.15 1.13   CALCIUM 9.6 9.4 9.5       IMAGING:   All imaging reviewed from admission to present day    IMPRESSION:  # History of Renal Transplant  -Tacrolimus 22.5  2/8, dose reduced   # History of ANCA Vasculitis  - Repeat serologies positive  # RADHA  # CKD 3  # Acidosis  # Afib  # DM2  # HTN  # Chronic microhematuria  - No UTI on repeat UA  # Hypercalcemia: PTH mediated   # Hyperparathyroid: may need further imaging going forward       PLAN:  -Oral pred 60mg daily  - awaiting transplant kidney biopsy , which was postponed until tomorrow   - d5w at 50 ml/hr  But restart sod bicarb . If Na trends down recommend to stop D5w  -FU 2/18 FK level  -sensipar 30mg TIW  -Hold Cellcept for now   -Continue to hold losartan  -On veltassa 8.6g daily since 2/7/22  -Low potassium renal diet   -Dose meds for reduced GFR     Thank you

## 2022-02-22 NOTE — CARE PLAN
Pt pulled out 2 IV today. Remains confused, taking off clothes, trying to get up out of bed and only Oriented to self. He has remained free from falls and denies any pain          The patient is Stable - Low risk of patient condition declining or worsening    Shift Goals  Clinical Goals: safety  Patient Goals: DENA  Family Goals: DENA    Progress made toward(s) clinical / shift goals:        Problem: Knowledge Deficit - Standard  Goal: Patient and family/care givers will demonstrate understanding of plan of care, disease process/condition, diagnostic tests and medications  Outcome: Progressing     Problem: Pain - Standard  Goal: Alleviation of pain or a reduction in pain to the patient’s comfort goal  Outcome: Progressing     Problem: Skin Integrity  Goal: Skin integrity is maintained or improved  Outcome: Progressing     Problem: Fall Risk  Goal: Patient will remain free from falls  Outcome: Progressing

## 2022-02-22 NOTE — CARE PLAN
Problem: Nutritional:  Goal: Achieve adequate nutritional intake  Description: Patient will consume >50% of meals and supplements.  Outcome: Not Progressing   PO <25% of meals.

## 2022-02-22 NOTE — THERAPY
Missed Therapy     Patient Name: Krishan Acevedo  Age:  71 y.o., Sex:  male  Medical Record #: [id number]  Today's Date: 2/22/2022    Discussed missed therapy with RN.    Attempted to see pt for dysphagia tx; however, pt is currently NPO for a procedure. SLP will hold today and see as appropriate/able.        02/22/22 6582   Treatment Variance   Reason For Missed Therapy Medical - Patient  in Procedure   Total Time Spent   Total Time Spent (Mins) 2

## 2022-02-22 NOTE — DIETARY
Nutrition Services: Brief Update    RD following pt for adequate PO intake.  Pt previously on a L6/L0 diet with Renal modification.  Supplement order in place, pt receiving Boost Glucose Control TID.  PO per flow sheet has been sub-optimal recently (refused to <25%).  Pt is currently NPO pending biopsy.  Previously on tube feeding. Advance Directive on file states no artificial nutrition desired.    Recommendations/Plan:  1. Diet advancement per MD.  2. Encourage intake of meals and supplements.  3. Document intake of all meals and supplements as % taken in ADLs to provide interdisciplinary communication across all shifts.   4. Monitor weight.  5. Nutrition rep will continue to see patient for ongoing meal and snack preferences.     RD following per dept guidelines.

## 2022-02-22 NOTE — INFECTION CONTROL
This patient is considered COVID RECOVERED.    Patient initially tested positive for COVID-19 on 2/1/2022 Patient is greater than or equal to 15 days from symptom onset and/or positive test, with symptom improvement.    Per the CDC guidance, this patient no longer requires transmission based precautions.  Patient may be placed on any unit per the bed assignment policy, including placement in a semi-private room with a roommate.

## 2022-02-22 NOTE — THERAPY
Physical Therapy   Daily Treatment     Patient Name: Krishan Acevedo  Age:  71 y.o., Sex:  male  Medical Record #: [id number]  Today's Date: 2/21/2022    Precautions: Fall Risk    Assessment  Pt seen for PT treatment session, appears motivated to participate. Required mod-max A for mobility, performed STS to FWW x3 with assist for anterior weight shift. Unable to take steps at this time. SpO2 high 90s, pt reports SOB limiting session. PT will follow.       Plan  Continue current treatment plan.  DC Equipment Recommendations: Unable to determine at this time  Discharge Recommendations: Recommend post-acute placement for additional physical therapy services prior to discharge home       02/21/22 8945   Cognition    Speech/ Communication Delayed Responses  (minimal verbalizations but does speak when encouraged)   Level of Consciousness Alert   Ability To Follow Commands 1 Step   New Learning Impaired   Attention Impaired   Comments follows commands but with incr cueing, tends to be distracted, very odd behavior and affect   Balance   Sitting Balance (Static) Fair   Sitting Balance (Dynamic) Fair -   Standing Balance (Static) Poor   Standing Balance (Dynamic) Poor -   Weight Shift Sitting Fair   Weight Shift Standing Poor   Skilled Intervention Verbal Cuing;Postural Facilitation;Sequencing   Comments standing with FWW, posterior lean   Gait Analysis   Gait Level Of Assist Unable to Participate   Comments attempted stepping, unable to weight shift   Bed Mobility    Supine to Sit Moderate Assist   Sit to Supine Moderate Assist   Skilled Intervention Verbal Cuing;Sequencing   Comments partially limited by cog   Functional Mobility   Sit to Stand Maximal Assist   Bed, Chair, Wheelchair Transfer Unable to Participate   Skilled Intervention Verbal Cuing;Sequencing;Postural Facilitation   Comments STS x3, assist for anterior weight shift   Short Term Goals    Short Term Goal # 1 in 6 visits patient will demo all functional  transfers with Taty for safe DC    Goal Outcome # 1 goal not met   Short Term Goal # 2 in 6 visits patient will ambualte 200' Taty for safe DC    Goal Outcome # 2 Goal not met   Short Term Goal # 3 in 6 visits patient will navigate 1 stairs with Taty for safe DC    Goal Outcome # 3 Goal not met

## 2022-02-22 NOTE — PROGRESS NOTES
Hospital Medicine Daily Progress Note    Date of Service  2/22/2022    Chief Complaint  Weakness    Hospital Course  This is a 71 year old male with PMHx of hypertension, hyperlipidemia, type 2 diabetes A1c 7.2, atrial flutter on Eliquis, ANCA vasculitis s/p transplant, recent COVID infection 1/25/2022 who was admitted on 02/1/2022 with weakness and diarrhea.    Patient is vaccinated against COVID-19. Diagnosed with COVID on 01/25. Patient remains on room air throughout admission.UA positive for UTI, urine culture positive for carbapenem resistant Pseudomonas aeruginosa. Due to less than 10 K organisms, ID did not intially recommend any antibiotics at this time.  Patient with worsening AMS on 02/11, repeat urine is worse, pending repeat urine culture and IV meropenem was started by ID Dr. Krishnamurthy who reconsulted.  Patient with ongoing diarrhea, C. difficile and stool cultures negative x2.    Patient with RADHA on CKD. Patient initially started on fluid, renal ultrasound unremarkable. Patient started on Veltassa and sodium bicarb. Nephrology has followed.    On 2/12 he developed hypotension, lactic acidosis and decreased level of consciousness and was transferred to the [facility]. Initiated on Zyvox for MRSA positive nares and abnormal CT lungs. He was also treated for hypercalcemia. Cortrak placed and enteral feeding initiated.    Interval Problem Update  Patient seen and examined today.    Patient tolerating treatment and therapies.  All Data, Medication data reviewed.  Case discussed with nursing as available.  Plan of Care reviewed with patient and notified of changes.    2/22: He is planning to have biopsy today.  His creatinine got worse today.  And potassium is getting higher.    2/21: His sodium level has normalized.  I decrease his D5W infusion rate to 50 cc/h.  Encourage oral intake.  N.p.o. at midnight for biopsy tomorrow.    2/20: Today morning lab is still pending.    2/19 patient is sleeping comfortably on the  bed.  His sodium continued to increase to 149.  I increase D5W to 100 cc/h.  Also discussed with bedside RN to make sure he drinks 1.5 L of fluid a day.  To continue to follow up with a.m. labs.    2/18: The patient was transferred from out of floor overnight.  His creatinine is improving.  Today is his last dose of antibiotic.    2/17 patient with ongoing significant confusion, encephalopathy, confused, appears otherwise nonfocal, difficult p.o. intake, but he takes fluids well, no evidence of aspiration at this time, discussed with nephrology, plans for renal biopsy possibly Monday, no evidence of acute blood loss, hemoglobin is stable, discontinuing anticoagulation for planned biopsy and secondary reports of melena.  Glycemic control more adequate, renal function further slightly improved.    2/16 the patient appears to have on and off worsening encephalopathy, afebrile, increasing blood pressures with needs for as needed medication, the patient remains on room air, slight increase in leukocytosis, chemistry with stable sodium, slightly improved renal function with a creatinine of 1.76, calcium of 11.4, albumin at 2.9.  Pulling core track and encouraging patient to increase his p.o. intake, both fluids and solids.  Ongoing need to increase insulin for improved glycemic control, 1 blood culture from 2/11+ for gram-negative elisabeth.  Patient with fine tremor.  2/15 the patient is more conversant, per wife much improved mentally, he did have some nausea vomiting earlier today, he was cleared for oral diet with still a core track in place. Patient with improved urine output, 1.9 L, the patient is switched to oral prednisone coming off high-dose Solu-Medrol with the tentative diagnosis of recurrent vasculitis. Plans would be for future biopsy once patient is more stable. Ongoing correction of sodium with free water flushes,  Close laboratory follow-up  2/14: Alert and conversant. Remains weak.  Failed swallow per  speech    2/13 more alert and conversant but still with lethargy and some word finding difficulty.  Weak.  Wife at bedside and given updates.        I have personally seen and examined the patient at bedside. I discussed the plan of care with patient, family, bedside RN and infectious disease.    Consultants/Specialty  infectious disease and nephrology    Code Status  Full Code    Disposition  Patient is not medically cleared for discharge.   Anticipate discharge to to home with close outpatient follow-up.  Versus rehab/SNF  I have placed the appropriate orders for post-discharge needs.    Review of Systems  Review of Systems   Constitutional: Positive for malaise/fatigue. Negative for chills and fever.   HENT: Negative for sore throat.    Respiratory: Negative for cough, hemoptysis and stridor.    Cardiovascular: Negative for chest pain, palpitations and leg swelling.   Gastrointestinal: Negative for abdominal pain, constipation, diarrhea and vomiting.   Genitourinary: Negative for dysuria.   Neurological: Negative for dizziness and headaches.        Physical Exam  Temp:  [35.8 °C (96.5 °F)-36.2 °C (97.2 °F)] 36.2 °C (97.2 °F)  Pulse:  [69-88] 88  Resp:  [18] 18  BP: (103-136)/(55-75) 115/75  SpO2:  [92 %-97 %] 92 %    Physical Exam  Vitals reviewed.   Constitutional:       Appearance: He is obese. He is ill-appearing.      Interventions: Nasal cannula in place.   HENT:      Head: Normocephalic and atraumatic.      Nose: Nose normal.   Eyes:      General:         Right eye: No discharge.      Extraocular Movements: Extraocular movements intact.      Conjunctiva/sclera: Conjunctivae normal.   Cardiovascular:      Rate and Rhythm: Normal rate.      Pulses: Normal pulses.           Radial pulses are 2+ on the right side and 2+ on the left side.        Dorsalis pedis pulses are 2+ on the right side and 2+ on the left side.   Pulmonary:      Effort: Pulmonary effort is normal. No respiratory distress.      Breath  sounds: Rhonchi present.   Abdominal:      General: Bowel sounds are normal.      Palpations: Abdomen is soft.   Musculoskeletal:         General: No swelling or tenderness.      Cervical back: Neck supple. Tenderness: chronic per wife. No muscular tenderness.   Lymphadenopathy:      Cervical: No cervical adenopathy.   Skin:     Coloration: Skin is not jaundiced.   Neurological:      Mental Status: He is lethargic.      Motor: Weakness present.   Psychiatric:         Speech: Speech is delayed.         Behavior: Behavior is slowed.         Cognition and Memory: Cognition is impaired.         Fluids    Intake/Output Summary (Last 24 hours) at 2/22/2022 0750  Last data filed at 2/22/2022 0428  Gross per 24 hour   Intake --   Output 1150 ml   Net -1150 ml       Laboratory      Recent Labs     02/20/22  1456 02/21/22  0245 02/22/22  0151   SODIUM 140 135 144   POTASSIUM 4.8 5.4 5.7*   CHLORIDE 109 106 112   CO2 19* 21 21   GLUCOSE 171* 175* 115*   BUN 61* 60* 59*   CREATININE 1.15 1.13 1.41*   CALCIUM 9.4 9.5 9.9                   Imaging  EC-ECHOCARDIOGRAM LTD W/O CONT   Final Result      MR-BRAIN-W/O   Final Result         No acute intracranial process.      Nonspecific T2 hyperintensities are noted in the periventricular and deep white matter, most likely related to chronic microvascular ischemia.      Remote left thalamic and corona radiata lacunar infarction noted.      DX-ABDOMEN FOR TUBE PLACEMENT   Final Result         Feeding tube with tip projecting over the expected area of the first portion duodenum.      CT-HEAD W/O   Final Result         NO ACUTE ABNORMALITIES ARE NOTED ON CT SCAN OF THE HEAD.      Findings are consistent with atrophy.  Decreased attenuation in the periventricular white matter likely indicates microvascular ischemic disease.         CT-CHEST,ABDOMEN,PELVIS W/O   Final Result      1.  Multifocal pulmonary opacifications are identified which are new since the prior CT. Findings could be due  to pneumonia.      2.  1.2 cm nodular opacification noted in the left upper pulmonary lobe. Primary or metastatic neoplasm is possible. Small focus of pneumonia or inflammation is also possible. Consider biopsy or PET/CT. Consider follow-up CT in 3 months.      3.  There is duodenal wall thickening. Findings could be due to duodenitis. Neoplasm is also possible. There is some mild induration in the retroperitoneal fat surrounding the duodenum.      4.  Cholelithiasis. No biliary ductal dilatation appreciated.      5.  Right lower quadrant renal transplant. No hydronephrosis.      Low and High Risk: Consider PET/CT, or tissue sampling, or follow-up CT at 3 months      Low Risk - Minimal or absent history of smoking and of other known risk factors.      High Risk - History of smoking or of other known risk factors.      Note: These recommendations do not apply to lung cancer screening, patients with immunosuppression, or patients with known primary cancer.      Fleischner Society 2017 Guidelines for Management of Incidentally Detected Pulmonary Nodules in Adults         US-EXTREMITY VENOUS LOWER BILAT   Final Result      US-RENAL TRANSPLANT COMP   Final Result      1.  No hydronephrosis or perinephric fluid collection involving the right lower quadrant renal transplant.   2.  No definite evidence of hemodynamically significant renal artery stenosis.   3.  Elevated resistive indices could suggest renal parenchymal disease.      DX-CHEST-PORTABLE (1 VIEW)   Final Result      1.  Bilateral peripheral lower lobe hazy opacities which is nonspecific but can be seen with Covid 19 pneumonia.   2.  Enlarged cardiac silhouette with probable vascular congestion/edema.      CT-NEEDLE BX-RENAL    (Results Pending)        Assessment/Plan  * RADHA (acute kidney injury) (HCC)- (present on admission)  Assessment & Plan  Creatinine worsening today 1.4 with potassium 5.7  Ranged from 1.27-1.71 over the past year  IV fluids  Nephrology  consulting, suspecting recurrent vasculitis,  S/p treatment with high-dose steroids  Ins and outs, close laboratory follow-up  To have renal biopsy today      Abnormal CT scan  Assessment & Plan  Future outpatient follow up.  2/12 CT chest/abd:   1.  Multifocal pulmonary opacifications are identified which are new since the prior CT. Findings could be due to pneumonia.  2.  1.2 cm nodular opacification noted in the left upper pulmonary lobe. Primary or metastatic neoplasm is possible. Small focus of pneumonia or inflammation is also possible. Consider biopsy or PET/CT. Consider follow-up CT in 3 months.  3.  There is duodenal wall thickening. Findings could be due to duodenitis. Neoplasm is also possible. There is some mild induration in the retroperitoneal fat surrounding the duodenum.  4.  Cholelithiasis. No biliary ductal dilatation appreciated  5.  Right lower quadrant renal transplant. No hydronephrosis.    Type 2 diabetes mellitus (HCC)  Assessment & Plan  HgbA1c:7.2  Monitor accuchecks.    Hyperparathyroidism (HCC)  Assessment & Plan  Has had severe elevated PTH over the last few years.  Follow up with ENT/Endocrine    Immunocompromised (HCC)  Assessment & Plan  On immunosuppressants for renal transplant  On broad spectrum antibiotics  Monitor labs, cultures    Acute encephalopathy- (present on admission)  Assessment & Plan  Appears toxic metabolic, no focal findings,  Avoid narcotics and benzodiazepines.    Hypernatremia  Assessment & Plan  Sodium is 144  improving  On D5W infusion  Follow CMP in the morning    Hypercalcemia  Assessment & Plan  Mild-moderate hypercalcemia  IVF and given calcitonin 2/12pm.  IVF and monitor labs      Hyperkalemia  Assessment & Plan  Potassium 5.7 today  Nephrology is following   Monitor   Hyperkalemia protocol    Acidosis  Assessment & Plan  Improving  Nephrology consulting    Positive urine culture- (present on admission)  Assessment & Plan  Meropenem for Pseudomonas  Last  abx today    Pain in left shin- (present on admission)  Assessment & Plan    US venous negative for DVT    Diarrhea  Assessment & Plan  Improving  Likely viral versus functional  C.diff negative x 2  Stool culture from 2/3 negative.  Imodium PRN    Elevated brain natriuretic peptide (BNP) level- (present on admission)  Assessment & Plan  Caution with IV fluids  Prior echo's 2020 with preserved EF  No overt sign of volume overload.      Thrombocytopenia (HCC)  Assessment & Plan  Mild,  Monitor cbc    Anemia of chronic disease  Assessment & Plan  Normocytic anemia    Monitor for bleeding    Epiretinal membrane (ERM) of both eyes- (present on admission)  Assessment & Plan  Continue home Combigan    Long term (current) use of systemic steroids- (present on admission)  Assessment & Plan  Outpatient on daily prednisone secondary to renal transplant 2008      Essential hypertension- (present on admission)  Assessment & Plan  History of  Hold Losartan  Amlodipine 5mg daily w/ parameters    Kidney transplant status, living related donor- (present on admission)  Assessment & Plan  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 Blount, self-manages lasix administration at home based on fluid volume status  Renal US negative for hydronephrosis or renal stenosis  Dr. Lyman, nephrology consulted, appreciate recommendations  Continue  prednisone, tacrolimus  Renal dosing for all medication      Obesity- (present on admission)  Assessment & Plan  Body mass index is 33.91 kg/m².  Outpatient weight loss counseling     Atrial flutter - (present on admission)  Assessment & Plan  History of, s/p ablation 1/21  On Eliquis 5 mg po BID (pending renal function if to change)  ST on admission  Eliquis continued  Monitor on telemetry        VTE prophylaxis: therapeutic anticoagulation with apixaban    I have performed a physical exam and reviewed and updated ROS and Plan today  (2/22/2022). In review of yesterday's note (2/21/2022), there are no changes except as documented above.

## 2022-02-22 NOTE — CARE PLAN
The patient is Watcher - Medium risk of patient condition declining or worsening    Shift Goals  Clinical Goals: Safety  Patient Goals: Sleep  Family Goals: DENA    Progress made toward(s) clinical / shift goals:  Patient is oriented to self. Patient is easily redirected. Educated patient on safety and using the call light for assistance. Patient nods appropriately but needs reinforcement at times. Patient is able to turn himself and skin remains intact. Patient remains free from falls. Patient is NPO at this time for biopsy in the morning. Bed low, locked, bed alarm on and call light within reach. Will continue to monitor.    Problem: Pain - Standard  Goal: Alleviation of pain or a reduction in pain to the patient’s comfort goal  Outcome: Progressing     Problem: Skin Integrity  Goal: Skin integrity is maintained or improved  Outcome: Progressing     Problem: Fall Risk  Goal: Patient will remain free from falls  Outcome: Progressing       Patient is not progressing towards the following goals:      Problem: Knowledge Deficit - Standard  Goal: Patient and family/care givers will demonstrate understanding of plan of care, disease process/condition, diagnostic tests and medications  Outcome: Not Progressing   Patient is confused and is impulsive at times. Will redirect as needed.

## 2022-02-22 NOTE — OR SURGEON
Immediate Post- Operative Note    PostOp Diagnosis: RENAL INSUFFICIENCY. RLQ RENAL TRANSPLANT KIDNEY.       Procedure(s): CT GUIDED RIGHT RENAL BIOPSY    18G CORES BIOPINCE NEEDLE X 3    SUBMITTED TO ATTENDING PATHOLOGIST      Estimated Blood Loss: <5CC      FINDINGS: NEEDLE POSITION CONFIRMED IN RENAL CORTEX. MINIMAL EXPECTED PERINEPHRIC HEMORRHAGE.         Complications: NONE            2/22/2022     3:32 PM     Brijesh Monet M.D.

## 2022-02-22 NOTE — PROCEDURES
Pt presents to CT suite via transport. Pt was consented by MD at bedside, confirmed by this RN and consent at bedside. Pt transferred to CT table table in supine position. Pt placed on monitor, prepped and draped in a sterile fashion. BIOPSY OF right kidney COMPLETED, 3 CORES OBTAINED AND given to pathologist.  REPORT CALled to josy ANGELES, TAKEN VIA LEONARD with stable VS. CARE ASSUMED BY REPORT RN.

## 2022-02-22 NOTE — PROGRESS NOTES
IR/CT Note:      1444 Dr. Monet and Lesli ANGELES contacted wife Penelope Acevedo for consent.  Wife gives consent via telephone for procedure.  Potassium level discussed with Dr. Monet regarding sedation standards and policy.  Per Dr. Monet will treat for anxiety.

## 2022-02-23 NOTE — CARE PLAN
The patient is Stable - Low risk of patient condition declining or worsening    Shift Goals  Clinical Goals: Safety, Maintain lines  Patient Goals: Sleep  Family Goals: DENA    Progress made toward(s) clinical / shift goals:    Problem: Knowledge Deficit - Standard  Goal: Patient and family/care givers will demonstrate understanding of plan of care, disease process/condition, diagnostic tests and medications  Outcome: Progressing     Problem: Skin Integrity  Goal: Skin integrity is maintained or improved  Outcome: Progressing     Problem: Fall Risk  Goal: Patient will remain free from falls  Outcome: Progressing       Patient is not progressing towards the following goals:

## 2022-02-23 NOTE — PROGRESS NOTES
Hospital Medicine Daily Progress Note    Date of Service  2/23/2022    Chief Complaint  Weakness    Hospital Course  This is a 71 year old male with PMHx of hypertension, hyperlipidemia, type 2 diabetes A1c 7.2, atrial flutter on Eliquis, ANCA vasculitis s/p transplant, recent COVID infection 1/25/2022 who was admitted on 02/1/2022 with weakness and diarrhea.    Patient is vaccinated against COVID-19. Diagnosed with COVID on 01/25. Patient remains on room air throughout admission.UA positive for UTI, urine culture positive for carbapenem resistant Pseudomonas aeruginosa. Due to less than 10 K organisms, ID did not intially recommend any antibiotics at this time.  Patient with worsening AMS on 02/11, repeat urine is worse, pending repeat urine culture and IV meropenem was started by ID Dr. Krishnamurthy who reconsulted.  Patient with ongoing diarrhea, C. difficile and stool cultures negative x2.    Patient with RADHA on CKD. Patient initially started on fluid, renal ultrasound unremarkable. Patient started on Veltassa and sodium bicarb. Nephrology has followed.    On 2/12 he developed hypotension, lactic acidosis and decreased level of consciousness and was transferred to the [facility]. Initiated on Zyvox for MRSA positive nares and abnormal CT lungs. He was also treated for hypercalcemia. Cortrak placed and enteral feeding initiated.    Interval Problem Update  Patient seen and examined today.    Patient tolerating treatment and therapies.  All Data, Medication data reviewed.  Case discussed with nursing as available.  Plan of Care reviewed with patient and notified of changes.    2/23: The patient had renal biopsy done yesterday.  His creatinine continue to get worse.  Nephrology is following and managing that.    2/22: He is planning to have biopsy today.  His creatinine got worse today.  And potassium is getting higher.    2/21: His sodium level has normalized.  I decrease his D5W infusion rate to 50 cc/h.  Encourage oral  intake.  N.p.o. at midnight for biopsy tomorrow.    2/20: Today morning lab is still pending.    2/19 patient is sleeping comfortably on the bed.  His sodium continued to increase to 149.  I increase D5W to 100 cc/h.  Also discussed with bedside RN to make sure he drinks 1.5 L of fluid a day.  To continue to follow up with a.m. labs.    2/18: The patient was transferred from out of floor overnight.  His creatinine is improving.  Today is his last dose of antibiotic.    2/17 patient with ongoing significant confusion, encephalopathy, confused, appears otherwise nonfocal, difficult p.o. intake, but he takes fluids well, no evidence of aspiration at this time, discussed with nephrology, plans for renal biopsy possibly Monday, no evidence of acute blood loss, hemoglobin is stable, discontinuing anticoagulation for planned biopsy and secondary reports of melena.  Glycemic control more adequate, renal function further slightly improved.    2/16 the patient appears to have on and off worsening encephalopathy, afebrile, increasing blood pressures with needs for as needed medication, the patient remains on room air, slight increase in leukocytosis, chemistry with stable sodium, slightly improved renal function with a creatinine of 1.76, calcium of 11.4, albumin at 2.9.  Pulling core track and encouraging patient to increase his p.o. intake, both fluids and solids.  Ongoing need to increase insulin for improved glycemic control, 1 blood culture from 2/11+ for gram-negative elisabeth.  Patient with fine tremor.  2/15 the patient is more conversant, per wife much improved mentally, he did have some nausea vomiting earlier today, he was cleared for oral diet with still a core track in place. Patient with improved urine output, 1.9 L, the patient is switched to oral prednisone coming off high-dose Solu-Medrol with the tentative diagnosis of recurrent vasculitis. Plans would be for future biopsy once patient is more stable. Ongoing  correction of sodium with free water flushes,  Close laboratory follow-up  2/14: Alert and conversant. Remains weak.  Failed swallow per speech    2/13 more alert and conversant but still with lethargy and some word finding difficulty.  Weak.  Wife at bedside and given updates.        I have personally seen and examined the patient at bedside. I discussed the plan of care with patient, family, bedside RN and infectious disease.    Consultants/Specialty  infectious disease and nephrology    Code Status  Full Code    Disposition  Patient is not medically cleared for discharge.   Anticipate discharge to to home with close outpatient follow-up.  Versus rehab/SNF  I have placed the appropriate orders for post-discharge needs.    Review of Systems  Review of Systems   Constitutional: Positive for malaise/fatigue. Negative for chills.   HENT: Negative for sore throat.    Respiratory: Negative for cough, hemoptysis and stridor.    Cardiovascular: Negative for chest pain and palpitations.   Gastrointestinal: Negative for abdominal pain, constipation and diarrhea.   Genitourinary: Negative for dysuria.   Neurological: Negative for dizziness and headaches.        Physical Exam  Temp:  [36.3 °C (97.3 °F)-36.9 °C (98.4 °F)] 36.8 °C (98.2 °F)  Pulse:  [] 103  Resp:  [18-20] 20  BP: (100-141)/(48-93) 141/93  SpO2:  [92 %-100 %] 92 %    Physical Exam  Vitals reviewed.   Constitutional:       Appearance: He is obese. He is ill-appearing.      Interventions: Nasal cannula in place.   HENT:      Head: Normocephalic and atraumatic.      Nose: Nose normal.   Eyes:      General:         Right eye: No discharge.      Extraocular Movements: Extraocular movements intact.      Conjunctiva/sclera: Conjunctivae normal.   Cardiovascular:      Rate and Rhythm: Normal rate.      Pulses: Normal pulses.           Radial pulses are 2+ on the right side and 2+ on the left side.        Dorsalis pedis pulses are 2+ on the right side and 2+ on the  left side.   Pulmonary:      Effort: Pulmonary effort is normal.      Breath sounds: Rhonchi present.   Abdominal:      General: Bowel sounds are normal.      Palpations: Abdomen is soft.   Musculoskeletal:         General: No swelling or tenderness.      Cervical back: Neck supple. Tenderness: chronic per wife. No muscular tenderness.   Lymphadenopathy:      Cervical: No cervical adenopathy.   Skin:     Coloration: Skin is not jaundiced.   Neurological:      Mental Status: He is lethargic.      Motor: Weakness present.   Psychiatric:         Speech: Speech is delayed.         Behavior: Behavior is slowed.         Cognition and Memory: Cognition is impaired.         Fluids    Intake/Output Summary (Last 24 hours) at 2/23/2022 0741  Last data filed at 2/23/2022 0400  Gross per 24 hour   Intake --   Output 900 ml   Net -900 ml       Laboratory  Recent Labs     02/23/22  0454   WBC 11.1*   RBC 3.92*   HEMOGLOBIN 11.5*   HEMATOCRIT 35.7*   MCV 91.1   MCH 29.3   MCHC 32.2*   RDW 53.2*   PLATELETCT 92*   MPV 11.4     Recent Labs     02/21/22  0245 02/22/22  0151 02/23/22  0454   SODIUM 135 144 148*   POTASSIUM 5.4 5.7* 4.7   CHLORIDE 106 112 115*   CO2 21 21 21   GLUCOSE 175* 115* 113*   BUN 60* 59* 62*   CREATININE 1.13 1.41* 1.72*   CALCIUM 9.5 9.9 10.0     Recent Labs     02/22/22  0916   INR 1.07               Imaging  CT-NEEDLE BX-RENAL   Final Result      1.  CT GUIDED RIGHT LOWER QUADRANT/PELVIC RENAL TRANSPLANT KIDNEY BIOPSY.      EC-ECHOCARDIOGRAM LTD W/O CONT   Final Result      MR-BRAIN-W/O   Final Result         No acute intracranial process.      Nonspecific T2 hyperintensities are noted in the periventricular and deep white matter, most likely related to chronic microvascular ischemia.      Remote left thalamic and corona radiata lacunar infarction noted.      DX-ABDOMEN FOR TUBE PLACEMENT   Final Result         Feeding tube with tip projecting over the expected area of the first portion duodenum.       CT-HEAD W/O   Final Result         NO ACUTE ABNORMALITIES ARE NOTED ON CT SCAN OF THE HEAD.      Findings are consistent with atrophy.  Decreased attenuation in the periventricular white matter likely indicates microvascular ischemic disease.         CT-CHEST,ABDOMEN,PELVIS W/O   Final Result      1.  Multifocal pulmonary opacifications are identified which are new since the prior CT. Findings could be due to pneumonia.      2.  1.2 cm nodular opacification noted in the left upper pulmonary lobe. Primary or metastatic neoplasm is possible. Small focus of pneumonia or inflammation is also possible. Consider biopsy or PET/CT. Consider follow-up CT in 3 months.      3.  There is duodenal wall thickening. Findings could be due to duodenitis. Neoplasm is also possible. There is some mild induration in the retroperitoneal fat surrounding the duodenum.      4.  Cholelithiasis. No biliary ductal dilatation appreciated.      5.  Right lower quadrant renal transplant. No hydronephrosis.      Low and High Risk: Consider PET/CT, or tissue sampling, or follow-up CT at 3 months      Low Risk - Minimal or absent history of smoking and of other known risk factors.      High Risk - History of smoking or of other known risk factors.      Note: These recommendations do not apply to lung cancer screening, patients with immunosuppression, or patients with known primary cancer.      Fleischner Society 2017 Guidelines for Management of Incidentally Detected Pulmonary Nodules in Adults         US-EXTREMITY VENOUS LOWER BILAT   Final Result      US-RENAL TRANSPLANT COMP   Final Result      1.  No hydronephrosis or perinephric fluid collection involving the right lower quadrant renal transplant.   2.  No definite evidence of hemodynamically significant renal artery stenosis.   3.  Elevated resistive indices could suggest renal parenchymal disease.      DX-CHEST-PORTABLE (1 VIEW)   Final Result      1.  Bilateral peripheral lower lobe hazy  opacities which is nonspecific but can be seen with Covid 19 pneumonia.   2.  Enlarged cardiac silhouette with probable vascular congestion/edema.           Assessment/Plan  * RADHA (acute kidney injury) (HCC)- (present on admission)  Assessment & Plan  Creatinine worsening today 1.7   worsening  Ranged from 1.27-1.71 over the past year  IV fluids  Nephrology consulting, suspecting recurrent vasculitis,  S/p treatment with high-dose steroids  Ins and outs, close laboratory follow-up        Abnormal CT scan  Assessment & Plan  Future outpatient follow up.  2/12 CT chest/abd:   1.  Multifocal pulmonary opacifications are identified which are new since the prior CT. Findings could be due to pneumonia.  2.  1.2 cm nodular opacification noted in the left upper pulmonary lobe. Primary or metastatic neoplasm is possible. Small focus of pneumonia or inflammation is also possible. Consider biopsy or PET/CT. Consider follow-up CT in 3 months.  3.  There is duodenal wall thickening. Findings could be due to duodenitis. Neoplasm is also possible. There is some mild induration in the retroperitoneal fat surrounding the duodenum.  4.  Cholelithiasis. No biliary ductal dilatation appreciated  5.  Right lower quadrant renal transplant. No hydronephrosis.    Type 2 diabetes mellitus (HCC)  Assessment & Plan  HgbA1c:7.2  Monitor accuchecks.    Hyperparathyroidism (HCC)  Assessment & Plan  Has had severe elevated PTH over the last few years.  Follow up with ENT/Endocrine    Immunocompromised (HCC)  Assessment & Plan  On immunosuppressants for renal transplant  On broad spectrum antibiotics  Monitor labs, cultures    Acute encephalopathy- (present on admission)  Assessment & Plan  Appears toxic metabolic, no focal findings,  Avoid narcotics and benzodiazepines.    Hypernatremia  Assessment & Plan  Sodium is 148  On D5W infusion: to adjust as needed  Follow CMP in the morning    Hypercalcemia  Assessment & Plan  Mild-moderate  hypercalcemia  IVF and given calcitonin 2/12pm.  IVF and monitor labs      Hyperkalemia  Assessment & Plan  Potassium 5.7 today  Nephrology is following   Monitor   Hyperkalemia protocol    Acidosis  Assessment & Plan  Improving  Nephrology consulting    Positive urine culture- (present on admission)  Assessment & Plan  Meropenem for Pseudomonas  Last abx today    Pain in left shin- (present on admission)  Assessment & Plan    US venous negative for DVT    Diarrhea  Assessment & Plan  Improving  Likely viral versus functional  C.diff negative x 2  Stool culture from 2/3 negative.  Imodium PRN    Elevated brain natriuretic peptide (BNP) level- (present on admission)  Assessment & Plan  Caution with IV fluids  Prior echo's 2020 with preserved EF  No overt sign of volume overload.      Thrombocytopenia (HCC)  Assessment & Plan  Mild,  Monitor cbc    Anemia of chronic disease  Assessment & Plan  Normocytic anemia    Monitor for bleeding    Epiretinal membrane (ERM) of both eyes- (present on admission)  Assessment & Plan  Continue home Combigan    Long term (current) use of systemic steroids- (present on admission)  Assessment & Plan  Outpatient on daily prednisone secondary to renal transplant 2008      Essential hypertension- (present on admission)  Assessment & Plan  History of  Hold Losartan  Amlodipine 5mg daily w/ parameters    Kidney transplant status, living related donor- (present on admission)  Assessment & Plan  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 Blount, self-manages lasix administration at home based on fluid volume status  Renal US negative for hydronephrosis or renal stenosis  Dr. Lyman, nephrology consulted, appreciate recommendations  Continue  prednisone, tacrolimus  Renal dosing for all medication      Obesity- (present on admission)  Assessment & Plan  Body mass index is 33.91 kg/m².  Outpatient weight loss counseling     Atrial  flutter - (present on admission)  Assessment & Plan  History of, s/p ablation 1/21  On Eliquis 5 mg po BID (pending renal function if to change)  ST on admission  Eliquis continued  Monitor on telemetry        VTE prophylaxis: therapeutic anticoagulation with apixaban    I have performed a physical exam and reviewed and updated ROS and Plan today (2/23/2022). In review of yesterday's note (2/22/2022), there are no changes except as documented above.

## 2022-02-23 NOTE — CARE PLAN
Problem: Knowledge Deficit - Standard  Goal: Patient and family/care givers will demonstrate understanding of plan of care, disease process/condition, diagnostic tests and medications  Outcome: Progressing     Problem: Pain - Standard  Goal: Alleviation of pain or a reduction in pain to the patient’s comfort goal  Outcome: Progressing     Problem: Skin Integrity  Goal: Skin integrity is maintained or improved  Outcome: Progressing     Problem: Fall Risk  Goal: Patient will remain free from falls  Outcome: Progressing     Problem: Psychosocial  Goal: Patient's level of anxiety will decrease  Outcome: Progressing  Goal: Patient's ability to verbalize feelings about condition will improve  Outcome: Progressing  Goal: Patient's ability to re-evaluate and adapt role responsibilities will improve  Outcome: Progressing  Goal: Patient and family will demonstrate ability to cope with life altering diagnosis and/or procedure  Outcome: Progressing  Goal: Spiritual and cultural needs incorporated into hospitalization  Outcome: Progressing     Problem: Communication  Goal: The ability to communicate needs accurately and effectively will improve  Outcome: Progressing     Problem: Discharge Barriers/Planning  Goal: Patient's continuum of care needs are met  Outcome: Progressing     Problem: Hemodynamics  Goal: Patient's hemodynamics, fluid balance and neurologic status will be stable or improve  Outcome: Progressing     Problem: Fluid Volume  Goal: Fluid volume balance will be maintained  Outcome: Progressing     Problem: Dysphagia  Goal: Dysphagia will improve  Outcome: Progressing     Problem: Risk for Aspiration  Goal: Patient's risk for aspiration will be absent or decrease  Outcome: Progressing     Problem: Nutrition  Goal: Patient's nutritional and fluid intake will be adequate or improve  Outcome: Progressing     Problem: Urinary Elimination  Goal: Establish and maintain regular urinary output  Outcome: Progressing      Problem: Bowel Elimination  Goal: Establish and maintain regular bowel function  Outcome: Progressing     Problem: Gastrointestinal Irritability  Goal: Nausea and vomiting will be absent or improve  Outcome: Progressing  Goal: Diarrhea will be absent or improved  Outcome: Progressing     Problem: Mobility  Goal: Patient's capacity to carry out activities will improve  Outcome: Progressing     Problem: Self Care  Goal: Patient will have the ability to perform ADLs independently or with assistance (bathe, groom, dress, toilet and feed)  Outcome: Progressing     Problem: Infection - Standard  Goal: Patient will remain free from infection  Outcome: Progressing     Problem: Wound/ / Incision Healing  Goal: Patient's wound/surgical incision will decrease in size and heals properly  Outcome: Progressing   The patient is Watcher - Medium risk of patient condition declining or worsening    Shift Goals  Clinical Goals: Safety, Maintain lines  Patient Goals: Sleep  Family Goals: DENA    Progress made toward(s) clinical / shift goals:      Patient is not progressing towards the following goals:

## 2022-02-23 NOTE — PROGRESS NOTES
"[facility] -  PROGRESS NOTE               Author: Karishma Cruz M.D. Date & Time: 2/23/2022  1:11 PM     HPI:  Krishan Acevedo is a 71 y.o. 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 Was additionally found to have worsening renal function.   Patient shares that he first tested positive for COVID at an outside hospital several days ago. He felt that he was not improving and possibly getting worse at home which prompted him to call EMS. He mentions that for several days he has been having loose stools, noting 3-5 loose but not watery BMs daily, noting his last loose stool was day prior to presentation. He also mentions that he has become more dependent on his wife to bring him food and water citing fatigue with his COVID infection. He does note that for the past several weeks he has been feeling \"like I'm always thirsty\".  While in the ED he was vitally stable. Was placed on 2L NC sating in the mid 90s BP ranged 123/70 from 159/117. Labs notable for Cr of 2.62. BUN 80, COVID-19 positive, UA noted for large occult blood, small leukocyte esterase. US of kidneys was completed with indicated no hydronephrosis or fluid collections, no renal artery stenosis.   In regards to his transplant history. Living donor transplant was completed in 2008 at an outside hospital. Patient is on Prednisone 5mg, Mycophenolate 500mg BID, and Tacrolimus 1mg and 0.5mg daily. His GFR trends in the 30s-40s although in chart he has climbed to the 56-53 ranges in the past.     DAILY NEPHROLOGY SUMMARY:  2/1: Admitted, consult placed  2/2: no overnight events, renal function improving, patient tolerating PO intake  2/3: started on IVF yesterday, renal function improved to around prior baseline range  2/4: renal function continues " to improve  2/5: Scr down to baseline levels. No new overnight renal events. Feels ok.  2/6: Feels ok. Urine culture from 2/1 positive for carbapenem resistant Pseudomonas aeruginosa. He is on zosyn per sensitivity. No labs today  2/7: Potassium is 5.7 today.  Creatinine up to 1.95.  Corrected calcium elevated.  CO2 16.  2/08: Chart and notes reviewed. No new labs this am. +Tachycardia.   2/09: Nursing notes and assessments reviewed.  Int tachycardia still. Room air. SBP labile 100s-160s. K 5.5  2/10: Spoke with RN, patient has no complaints at this time.   Plan is for discharge today or tomorrow to SNF pending 1L bolus and Calcium recheck.  Current SCa 11.5  VSS RA  -160s  2/11: Sodium up to 149.  Calcium increasing.  Creatinine stable 1.7.  2/12: Sodium up to 152.  Calcium remains elevated.  Creatinine 1.8.  I/Os not documented  2/13: Sodium still at 152.  Calcium trending down.  Creatinine up to 2.06.  On bicarb drip.  Somnolent  2/14: Unable to participate in interview, improved hemodynamics, cr stable  2/15: stable hemodynamics, 1.9L UOP, cr to 1.9, labile blood pressures, ongoing intermittent delirium  2/16: Hypertensive this AM, slight improvement in Cr, ca climbing, net neg 600cc with 1.9L UOP, confused  2/17: 1.4L UOP,ongoing encephalopthy, sodium climbing, minimal PO intake    2/18: IOs not recorded, cr 1.5, tremulous, minimal PO intake-serum sodium climbing  2/19: 2.6L  UOP, sodium climbing, more confused this a.m, wife at bedside  2/21: remains confused, biopsy was postponed to until tomorrow due to holiday.   2/22: confused. Refused Veltassa for 2 days but talked to him about it.   2/23: more awake and alert, wife is at bedside. UOP is great in velez bacg but not being documented. S/p allograft kidney biopsy 2/22/22.     REVIEW OF SYSTEMS:    10 point ROS reviewed and is as per HPI/daily summary or otherwise negative    PMH/PSH/SH/FH:   Reviewed and unchanged since admission note    CURRENT  "MEDICATIONS:   Reviewed from admission to present day    VS:  /60   Pulse 88   Temp 36.6 °C (97.9 °F) (Temporal)   Resp 18   Ht 1.803 m (5' 11\")   Wt 112 kg (247 lb 5.7 oz)   SpO2 94%   BMI 34.50 kg/m²     Physical Exam  Vitals and nursing note reviewed.   Constitutional:       Appearance: He is obese. He is ill-appearing.   HENT:      Head: Normocephalic and atraumatic.   Eyes:      General: No scleral icterus.  Cardiovascular:      Rate and Rhythm: Normal rate and regular rhythm.      Pulses: Normal pulses.      Comments: No edema  Pulmonary:      Effort: Pulmonary effort is normal. No respiratory distress.   Abdominal:      General: Abdomen is flat. There is no distension.   Musculoskeletal:         General: No swelling or deformity.   Skin:     Coloration: Skin is not jaundiced.      Findings: No bruising or rash.   Neurological:      Mental Status: He is alert. He is disoriented.      Motor: no Tremor present.   Psychiatric:         Behavior: Behavior normal.   Fluids:  In: -   Out: 900     LABS:  Recent Labs     02/21/22  0245 02/22/22  0151 02/23/22  0454   SODIUM 135 144 148*   POTASSIUM 5.4 5.7* 4.7   CHLORIDE 106 112 115*   CO2 21 21 21   GLUCOSE 175* 115* 113*   BUN 60* 59* 62*   CREATININE 1.13 1.41* 1.72*   CALCIUM 9.5 9.9 10.0       IMAGING:   All imaging reviewed from admission to present day    IMPRESSION:  # History of Renal Transplant with RADHA   -Tacrolimus 22.5  2/8, dose reduced, repeated FK level 2/18 8.9 but lab was drawn after the dose so it is not a trough.   # History of ANCA Vasculitis, chronic hematuria   - Repeat serologies positive. ? recurrence of vasculitis in kidney allograft vs COVID induced RADHA   # CKD 3  # Acidosis, improved   # Afib  # DM2  # HTN  # Hypercalcemia: PTH mediated   # Hyperparathyroid: may need further imaging going forward  # Acute encephalopathy  - azotemia is partially driven by steroids. BUN is improving , unlikely uremic        PLAN:  - c/w  pred " 60mg daily  - awaiting transplant kidney biopsy result   -  good UOP which is not being documented. Cr is worse, suspect Du eto pre- renal azotemia. Increase d5w rate to 100 ml/hr for next 24 hrs . Get Urine Na.   - pending Tacro level ( drawn 2/22)  - sensipar 30mg TIW  - Hold Cellcept for now   - bicarb to 650 mg bid.  - Continue to hold losartan  -  decrease veltassa to 8 g q day   - Low potassium renal diet   - Dose meds for reduced GFR     Thank you

## 2022-02-23 NOTE — PROGRESS NOTES
Received bedside report and accepted care of patient.    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 Pt is on 3L via oxymask at this time with no visible or stated sign of distress, discomfort, or SOB. Pt is tolerating diet. Pt has a velez in place contact for . Pt has one PIV running D5 at 50mL/hr.    Pt is impulsive to get out of bed, pulling out PIVs, pulling down mask causing him to dsat. 1200 placed soft wrist restraints, MD order placed. Wife informed.    Patient currently resting in bed in no visible or stated signs of distress. Bed alarm in place, controls on and bed in locked and lowest position. Call light and personal possessions within reach. Patient educated about use of call light and verbalized understanding.

## 2022-02-23 NOTE — PROGRESS NOTES
"Radiology Progress Note   Author: SILVIA Claire Date & Time created: 2/23/2022  10:43 AM   Date of admission  2/1/2022  Note to reader: this note follows the APSO format rather than the historical SOAP format. Assessment and plan located at the top of the note for ease of use.    Chief Complaint  71 y.o. male admitted 2/1/2022 with   Chief Complaint   Patient presents with   • Weakness         HPI  \" 71 y.o. 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 Was additionally found to have worsening renal function.\"     Assessment/Plan  Interval History   Principal Problem:    RADHA (acute kidney injury) (HCC)  Active Problems:    Atrial flutter     Obesity    Kidney transplant status, living related donor    Essential hypertension    Long term (current) use of systemic steroids    Epiretinal membrane (ERM) of both eyes    Anemia of chronic disease    Thrombocytopenia (HCC)    Elevated brain natriuretic peptide (BNP) level    Diarrhea    Pain in left shin    Positive urine culture    Acidosis    Hyperkalemia    Hypercalcemia    Hypernatremia    Acute encephalopathy    Immunocompromised (HCC)    Hyperparathyroidism (HCC)    Type 2 diabetes mellitus (HCC)    Abnormal CT scan      Plan IR  -Pending renal BX results   -Nephrology following   -Possible HD catheter in future if kidney function continues to decline, please place order if needed     -Thank you for allowing Interventional Radiology team to participate in the patients care, if any additional care or requests are needed in the future please do not hesitate call or place IR order   [phone]      IR:   2/22- RLQ Transplant Renal BX   2/23- BX site CDI, no redness or swelling, patient confused, in restraints          Review of Systems  Physical Exam   Review of Systems "   Unable to perform ROS: Mental acuity      Vitals:    02/23/22 0806   BP: 113/69   Pulse: 97   Resp: 20   Temp: 36.3 °C (97.4 °F)   SpO2: 98%        Physical Exam  Constitutional:       Appearance: He is obese.      Interventions: He is restrained.   HENT:      Head: Normocephalic.      Nose: Nose normal.      Mouth/Throat:      Mouth: Mucous membranes are dry.   Eyes:      Pupils: Pupils are equal, round, and reactive to light.   Cardiovascular:      Rate and Rhythm: Normal rate.   Pulmonary:      Effort: No respiratory distress.   Abdominal:      Palpations: Abdomen is soft.      Tenderness: There is no abdominal tenderness.   Genitourinary:     Comments: Gaston  Musculoskeletal:         General: No tenderness or deformity.      Cervical back: Neck supple.   Skin:     General: Skin is warm and dry.      Capillary Refill: Capillary refill takes less than 2 seconds.      Coloration: Skin is not jaundiced or pale.   Neurological:      Mental Status: He is alert. He is disoriented.      Motor: No weakness.   Psychiatric:         Speech: Speech is tangential.         Cognition and Memory: Cognition is impaired. He exhibits impaired recent memory.         Judgment: Judgment is impulsive.             Labs    Recent Labs     02/23/22  0454   WBC 11.1*   RBC 3.92*   HEMOGLOBIN 11.5*   HEMATOCRIT 35.7*   MCV 91.1   MCH 29.3   MCHC 32.2*   RDW 53.2*   PLATELETCT 92*   MPV 11.4     Recent Labs     02/21/22  0245 02/22/22  0151 02/23/22  0454   SODIUM 135 144 148*   POTASSIUM 5.4 5.7* 4.7   CHLORIDE 106 112 115*   CO2 21 21 21   GLUCOSE 175* 115* 113*   BUN 60* 59* 62*   CREATININE 1.13 1.41* 1.72*   CALCIUM 9.5 9.9 10.0     Recent Labs     02/21/22  0245 02/22/22  0151 02/23/22  0454   ALBUMIN 2.6* 2.9* 3.1*   TBILIRUBIN 0.7 0.7 0.7   ALKPHOSPHAT 156* 156* 175*   TOTPROTEIN 5.3* 5.8* 5.8*   ALTSGPT 73* 74* 107*   ASTSGOT 44 45 72*   CREATININE 1.13 1.41* 1.72*     CT-NEEDLE BX-RENAL   Final Result      1.  CT GUIDED RIGHT  LOWER QUADRANT/PELVIC RENAL TRANSPLANT KIDNEY BIOPSY.      EC-ECHOCARDIOGRAM LTD W/O CONT   Final Result      MR-BRAIN-W/O   Final Result         No acute intracranial process.      Nonspecific T2 hyperintensities are noted in the periventricular and deep white matter, most likely related to chronic microvascular ischemia.      Remote left thalamic and corona radiata lacunar infarction noted.      DX-ABDOMEN FOR TUBE PLACEMENT   Final Result         Feeding tube with tip projecting over the expected area of the first portion duodenum.      CT-HEAD W/O   Final Result         NO ACUTE ABNORMALITIES ARE NOTED ON CT SCAN OF THE HEAD.      Findings are consistent with atrophy.  Decreased attenuation in the periventricular white matter likely indicates microvascular ischemic disease.         CT-CHEST,ABDOMEN,PELVIS W/O   Final Result      1.  Multifocal pulmonary opacifications are identified which are new since the prior CT. Findings could be due to pneumonia.      2.  1.2 cm nodular opacification noted in the left upper pulmonary lobe. Primary or metastatic neoplasm is possible. Small focus of pneumonia or inflammation is also possible. Consider biopsy or PET/CT. Consider follow-up CT in 3 months.      3.  There is duodenal wall thickening. Findings could be due to duodenitis. Neoplasm is also possible. There is some mild induration in the retroperitoneal fat surrounding the duodenum.      4.  Cholelithiasis. No biliary ductal dilatation appreciated.      5.  Right lower quadrant renal transplant. No hydronephrosis.      Low and High Risk: Consider PET/CT, or tissue sampling, or follow-up CT at 3 months      Low Risk - Minimal or absent history of smoking and of other known risk factors.      High Risk - History of smoking or of other known risk factors.      Note: These recommendations do not apply to lung cancer screening, patients with immunosuppression, or patients with known primary cancer.      Fleischner Society  2017 Guidelines for Management of Incidentally Detected Pulmonary Nodules in Adults         US-EXTREMITY VENOUS LOWER BILAT   Final Result      US-RENAL TRANSPLANT COMP   Final Result      1.  No hydronephrosis or perinephric fluid collection involving the right lower quadrant renal transplant.   2.  No definite evidence of hemodynamically significant renal artery stenosis.   3.  Elevated resistive indices could suggest renal parenchymal disease.      DX-CHEST-PORTABLE (1 VIEW)   Final Result      1.  Bilateral peripheral lower lobe hazy opacities which is nonspecific but can be seen with Covid 19 pneumonia.   2.  Enlarged cardiac silhouette with probable vascular congestion/edema.          INR   Date Value Ref Range Status   02/22/2022 1.07 0.87 - 1.13 Final     Comment:     INR - Non-therapeutic Reference Range: 0.87-1.13  INR - Therapeutic Reference Range: 2.0-4.0       POC INR   Date Value Ref Range Status   12/02/2019 1.6 (H) 0.9 - 1.2 Final     Comment:     INR - Non-therapeutic Reference Range: 0.9-1.2  INR - Therapeutic Reference Range: 2.0-4.0          Intake/Output Summary (Last 24 hours) at 2/23/2022 1043  Last data filed at 2/23/2022 0400  Gross per 24 hour   Intake --   Output 900 ml   Net -900 ml      Labs not explicitly included in this progress note were reviewed by the author. Radiology/imaging not explicitly included in this progress note was reviewed by the author.     I have performed a physical exam and reviewed and updated ROS and Plan today (2/23/2022).     10 minutes in directly providing and coordinating care and extensive data review.  No time overlap and excludes procedures.

## 2022-02-24 PROBLEM — R57.8 HEMORRHAGIC SHOCK (HCC): Status: ACTIVE | Noted: 2022-01-01

## 2022-02-24 PROBLEM — K92.2 GI BLEED: Status: ACTIVE | Noted: 2022-01-01

## 2022-02-24 NOTE — PROGRESS NOTES
Hospital Medicine Daily Progress Note    Date of Service  2/24/2022    Chief Complaint  Weakness    Hospital Course  This is a 71 year old male with PMHx of hypertension, hyperlipidemia, type 2 diabetes A1c 7.2, atrial flutter on Eliquis, ANCA vasculitis s/p transplant, recent COVID infection 1/25/2022 who was admitted on 02/1/2022 with weakness and diarrhea.    Patient is vaccinated against COVID-19. Diagnosed with COVID on 01/25. Patient remains on room air throughout admission.UA positive for UTI, urine culture positive for carbapenem resistant Pseudomonas aeruginosa. Due to less than 10 K organisms, ID did not intially recommend any antibiotics at this time.  Patient with worsening AMS on 02/11, repeat urine is worse, pending repeat urine culture and IV meropenem was started by ID Dr. Krishnamurthy who reconsulted.  Patient with ongoing diarrhea, C. difficile and stool cultures negative x2.    Patient with RADHA on CKD. Patient initially started on fluid, renal ultrasound unremarkable. Patient started on Veltassa and sodium bicarb. Nephrology has followed.    On 2/12 he developed hypotension, lactic acidosis and decreased level of consciousness and was transferred to the [facility]. Initiated on Zyvox for MRSA positive nares and abnormal CT lungs. He was also treated for hypercalcemia. Cortrak placed and enteral feeding initiated.    Interval Problem Update  Patient seen and examined today.    Patient tolerating treatment and therapies.  All Data, Medication data reviewed.  Case discussed with nursing as available.  Plan of Care reviewed with patient and notified of changes.    2/24: the patient is confused. His radha is worsening and nephro is managing it.  More lethergic today and difficult to be aroused later today per nursing. Vital stable. Ordered CT head and EEG  Also consulted neurologist today  Also left a message for his wife.    2/23: The patient had renal biopsy done yesterday.  His creatinine continue to get worse.   Nephrology is following and managing that.    2/22: He is planning to have biopsy today.  His creatinine got worse today.  And potassium is getting higher.    2/21: His sodium level has normalized.  I decrease his D5W infusion rate to 50 cc/h.  Encourage oral intake.  N.p.o. at midnight for biopsy tomorrow.    2/20: Today morning lab is still pending.    2/19 patient is sleeping comfortably on the bed.  His sodium continued to increase to 149.  I increase D5W to 100 cc/h.  Also discussed with bedside RN to make sure he drinks 1.5 L of fluid a day.  To continue to follow up with a.m. labs.    2/18: The patient was transferred from out of floor overnight.  His creatinine is improving.  Today is his last dose of antibiotic.    2/17 patient with ongoing significant confusion, encephalopathy, confused, appears otherwise nonfocal, difficult p.o. intake, but he takes fluids well, no evidence of aspiration at this time, discussed with nephrology, plans for renal biopsy possibly Monday, no evidence of acute blood loss, hemoglobin is stable, discontinuing anticoagulation for planned biopsy and secondary reports of melena.  Glycemic control more adequate, renal function further slightly improved.    2/16 the patient appears to have on and off worsening encephalopathy, afebrile, increasing blood pressures with needs for as needed medication, the patient remains on room air, slight increase in leukocytosis, chemistry with stable sodium, slightly improved renal function with a creatinine of 1.76, calcium of 11.4, albumin at 2.9.  Pulling core track and encouraging patient to increase his p.o. intake, both fluids and solids.  Ongoing need to increase insulin for improved glycemic control, 1 blood culture from 2/11+ for gram-negative elisabeth.  Patient with fine tremor.  2/15 the patient is more conversant, per wife much improved mentally, he did have some nausea vomiting earlier today, he was cleared for oral diet with still a core  track in place. Patient with improved urine output, 1.9 L, the patient is switched to oral prednisone coming off high-dose Solu-Medrol with the tentative diagnosis of recurrent vasculitis. Plans would be for future biopsy once patient is more stable. Ongoing correction of sodium with free water flushes,  Close laboratory follow-up  2/14: Alert and conversant. Remains weak.  Failed swallow per speech    2/13 more alert and conversant but still with lethargy and some word finding difficulty.  Weak.  Wife at bedside and given updates.        I have personally seen and examined the patient at bedside. I discussed the plan of care with patient, family, bedside RN and infectious disease.    Consultants/Specialty  infectious disease and nephrology    Code Status  Full Code    Disposition  Patient is not medically cleared for discharge.   Anticipate discharge to to home with close outpatient follow-up.  Versus rehab/SNF  I have placed the appropriate orders for post-discharge needs.    Review of Systems  Review of Systems   Constitutional: Positive for malaise/fatigue. Negative for chills.   HENT: Negative for sore throat.    Respiratory: Negative for cough, hemoptysis and stridor.    Cardiovascular: Negative for chest pain, palpitations and orthopnea.   Gastrointestinal: Negative for abdominal pain, constipation, diarrhea and vomiting.   Genitourinary: Negative for dysuria.   Neurological: Negative for dizziness and headaches.        Physical Exam  Temp:  [36.1 °C (96.9 °F)-36.6 °C (97.9 °F)] 36.1 °C (96.9 °F)  Pulse:  [] 100  Resp:  [18-20] 18  BP: (112-135)/(56-74) 135/56  SpO2:  [94 %-98 %] 95 %    Physical Exam  Vitals reviewed.   Constitutional:       Appearance: He is obese. He is ill-appearing.      Interventions: Nasal cannula in place.   HENT:      Head: Normocephalic and atraumatic.      Nose: Nose normal.   Eyes:      General:         Right eye: No discharge.      Extraocular Movements: Extraocular  movements intact.      Conjunctiva/sclera: Conjunctivae normal.   Cardiovascular:      Rate and Rhythm: Normal rate.      Pulses: Normal pulses.           Radial pulses are 2+ on the right side and 2+ on the left side.        Dorsalis pedis pulses are 2+ on the right side and 2+ on the left side.      Heart sounds: Normal heart sounds.   Pulmonary:      Effort: Pulmonary effort is normal.      Breath sounds: Normal breath sounds.   Abdominal:      General: Bowel sounds are normal.      Palpations: Abdomen is soft.   Musculoskeletal:         General: No swelling or tenderness.      Cervical back: Neck supple. Tenderness: chronic per wife. No muscular tenderness.   Lymphadenopathy:      Cervical: No cervical adenopathy.   Skin:     Coloration: Skin is not jaundiced.   Neurological:      Mental Status: He is lethargic.      Motor: Weakness present.   Psychiatric:         Speech: Speech is delayed.         Behavior: Behavior is slowed.         Cognition and Memory: Cognition is impaired.         Fluids    Intake/Output Summary (Last 24 hours) at 2/24/2022 0746  Last data filed at 2/24/2022 0500  Gross per 24 hour   Intake 1350 ml   Output 1200 ml   Net 150 ml       Laboratory  Recent Labs     02/23/22  0454 02/24/22  0343   WBC 11.1* 13.6*   RBC 3.92* 3.74*   HEMOGLOBIN 11.5* 10.8*   HEMATOCRIT 35.7* 33.9*   MCV 91.1 90.6   MCH 29.3 28.9   MCHC 32.2* 31.9*   RDW 53.2* 53.1*   PLATELETCT 92* 107*   MPV 11.4 11.1     Recent Labs     02/22/22  0151 02/23/22  0454 02/24/22  0343   SODIUM 144 148* 142   POTASSIUM 5.7* 4.7 4.7   CHLORIDE 112 115* 113*   CO2 21 21 18*   GLUCOSE 115* 113* 129*   BUN 59* 62* 77*   CREATININE 1.41* 1.72* 2.06*   CALCIUM 9.9 10.0 9.7     Recent Labs     02/22/22  0916   INR 1.07               Imaging  CT-NEEDLE BX-RENAL   Final Result      1.  CT GUIDED RIGHT LOWER QUADRANT/PELVIC RENAL TRANSPLANT KIDNEY BIOPSY.      EC-ECHOCARDIOGRAM LTD W/O CONT   Final Result      MR-BRAIN-W/O   Final Result          No acute intracranial process.      Nonspecific T2 hyperintensities are noted in the periventricular and deep white matter, most likely related to chronic microvascular ischemia.      Remote left thalamic and corona radiata lacunar infarction noted.      DX-ABDOMEN FOR TUBE PLACEMENT   Final Result         Feeding tube with tip projecting over the expected area of the first portion duodenum.      CT-HEAD W/O   Final Result         NO ACUTE ABNORMALITIES ARE NOTED ON CT SCAN OF THE HEAD.      Findings are consistent with atrophy.  Decreased attenuation in the periventricular white matter likely indicates microvascular ischemic disease.         CT-CHEST,ABDOMEN,PELVIS W/O   Final Result      1.  Multifocal pulmonary opacifications are identified which are new since the prior CT. Findings could be due to pneumonia.      2.  1.2 cm nodular opacification noted in the left upper pulmonary lobe. Primary or metastatic neoplasm is possible. Small focus of pneumonia or inflammation is also possible. Consider biopsy or PET/CT. Consider follow-up CT in 3 months.      3.  There is duodenal wall thickening. Findings could be due to duodenitis. Neoplasm is also possible. There is some mild induration in the retroperitoneal fat surrounding the duodenum.      4.  Cholelithiasis. No biliary ductal dilatation appreciated.      5.  Right lower quadrant renal transplant. No hydronephrosis.      Low and High Risk: Consider PET/CT, or tissue sampling, or follow-up CT at 3 months      Low Risk - Minimal or absent history of smoking and of other known risk factors.      High Risk - History of smoking or of other known risk factors.      Note: These recommendations do not apply to lung cancer screening, patients with immunosuppression, or patients with known primary cancer.      Fleischner Society 2017 Guidelines for Management of Incidentally Detected Pulmonary Nodules in Adults         US-EXTREMITY VENOUS LOWER BILAT   Final Result       US-RENAL TRANSPLANT COMP   Final Result      1.  No hydronephrosis or perinephric fluid collection involving the right lower quadrant renal transplant.   2.  No definite evidence of hemodynamically significant renal artery stenosis.   3.  Elevated resistive indices could suggest renal parenchymal disease.      DX-CHEST-PORTABLE (1 VIEW)   Final Result      1.  Bilateral peripheral lower lobe hazy opacities which is nonspecific but can be seen with Covid 19 pneumonia.   2.  Enlarged cardiac silhouette with probable vascular congestion/edema.           Assessment/Plan  * RADHA (acute kidney injury) (HCC)- (present on admission)  Assessment & Plan  Creatinine worsening today 2.06  worsening  Ranged from 1.27-1.71 over the past year  IV fluids  Nephrology consulting, suspecting recurrent vasculitis  S/p treatment with high-dose steroids  Ins and outs, close laboratory follow-up  On IVF  Biopsy pending  Follow cmp in am        Abnormal CT scan  Assessment & Plan  Future outpatient follow up.  2/12 CT chest/abd:   1.  Multifocal pulmonary opacifications are identified which are new since the prior CT. Findings could be due to pneumonia.  2.  1.2 cm nodular opacification noted in the left upper pulmonary lobe. Primary or metastatic neoplasm is possible. Small focus of pneumonia or inflammation is also possible. Consider biopsy or PET/CT. Consider follow-up CT in 3 months.  3.  There is duodenal wall thickening. Findings could be due to duodenitis. Neoplasm is also possible. There is some mild induration in the retroperitoneal fat surrounding the duodenum.  4.  Cholelithiasis. No biliary ductal dilatation appreciated  5.  Right lower quadrant renal transplant. No hydronephrosis.    Type 2 diabetes mellitus (HCC)  Assessment & Plan  HgbA1c:7.2  Monitor accuchecks.    Hyperparathyroidism (HCC)  Assessment & Plan  Has had severe elevated PTH over the last few years.  Follow up with ENT/Endocrine    Immunocompromised  (HCC)  Assessment & Plan  On immunosuppressants for renal transplant  On broad spectrum antibiotics  Monitor labs, cultures    Acute encephalopathy- (present on admission)  Assessment & Plan  Appears toxic metabolic vs prednisone?  On restraint  More lethargic today  Ordered CT head today  Avoid narcotics and benzodiazepines.    Hypernatremia  Assessment & Plan  Sodium is 142  On D5W infusion: to adjust as needed  Follow CMP in the morning    Hypercalcemia  Assessment & Plan  Mild-moderate hypercalcemia  IVF and given calcitonin 2/12pm.  IVF and monitor labs      Hyperkalemia  Assessment & Plan  Potassium 4.7 today  Nephrology is following   Monitor   Hyperkalemia protocol    Acidosis  Assessment & Plan  Improving  Nephrology consulting    Positive urine culture- (present on admission)  Assessment & Plan  Meropenem for Pseudomonas  Last abx today    Pain in left shin- (present on admission)  Assessment & Plan    US venous negative for DVT    Diarrhea  Assessment & Plan  Improving  Likely viral versus functional  C.diff negative x 2  Stool culture from 2/3 negative.  Imodium PRN    Elevated brain natriuretic peptide (BNP) level- (present on admission)  Assessment & Plan  Caution with IV fluids  Prior echo's 2020 with preserved EF  No overt sign of volume overload.      Thrombocytopenia (HCC)  Assessment & Plan  Mild,  Monitor cbc    Anemia of chronic disease  Assessment & Plan  Normocytic anemia    Monitor for bleeding    Epiretinal membrane (ERM) of both eyes- (present on admission)  Assessment & Plan  Continue home Combigan    Long term (current) use of systemic steroids- (present on admission)  Assessment & Plan  Outpatient on daily prednisone secondary to renal transplant 2008      Essential hypertension- (present on admission)  Assessment & Plan  History of  Hold Losartan  Amlodipine 5mg daily w/ parameters    Kidney transplant status, living related donor- (present on admission)  Assessment & Plan  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 Blount, self-manages lasix administration at home based on fluid volume status  Renal US negative for hydronephrosis or renal stenosis  Dr. Lyman, nephrology consulted, appreciate recommendations  Continue  prednisone, tacrolimus  Renal dosing for all medication  Biopsy pending    Obesity- (present on admission)  Assessment & Plan  Body mass index is 33.91 kg/m².  Outpatient weight loss counseling     Atrial flutter - (present on admission)  Assessment & Plan  History of, s/p ablation 1/21  On Eliquis 5 mg po BID (pending renal function if to change)  ST on admission  Eliquis continued  Monitor on telemetry        VTE prophylaxis: therapeutic anticoagulation with apixaban    I have performed a physical exam and reviewed and updated ROS and Plan today (2/24/2022). In review of yesterday's note (2/23/2022), there are no changes except as documented above.

## 2022-02-24 NOTE — CARE PLAN
The patient is Stable - Low risk of patient condition declining or worsening    Shift Goals  Clinical Goals: Safety, Maintain lines  Patient Goals: Sleep  Family Goals: DENA    Progress made toward(s) clinical / shift goals:    Problem: Skin Integrity  Goal: Skin integrity is maintained or improved  Outcome: Progressing     Problem: Fall Risk  Goal: Patient will remain free from falls  Outcome: Progressing       Patient is not progressing towards the following goals:

## 2022-02-24 NOTE — PROGRESS NOTES
Received bedside report and accepted care of patient.    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 Pt is on room air at this time with no visible or stated sign of distress, discomfort, or SOB. Pt is tolerating diet. Pt has a velez in place contact for . Pt has one PIV running D5 at 50mL/hr.    Pt is impulsive to get out of bed, pulling out PIVs, pulling down mask causing him to dsat. soft wrist restraints, MD order renewed    Patient currently resting in bed in no visible or stated signs of distress. Bed alarm in place, controls on and bed in locked and lowest position. Call light and personal possessions within reach. Patient educated about use of call light and verbalized understanding.

## 2022-02-24 NOTE — CARE PLAN
The patient is Stable - Low risk of patient condition declining or worsening    Shift Goals  Clinical Goals: Safety  Patient Goals: pt will not have events of fall or injury  Family Goals: DENA    Progress made toward(s) clinical / shift goals:      Patient is not progressing towards the following goals:

## 2022-02-24 NOTE — THERAPY
Occupational Therapy  Daily Treatment     Patient Name: Krishan Acevedo  Age:  71 y.o., Sex:  male  Medical Record #: [id number]  Today's Date: 2/23/2022     Precautions  Precautions: (P) Fall Risk  Comments: (P) 2 point restraints 2/23, confused    Assessment    Patient progressing towards short term goals. Patient able to tolerate standing bed side with FWW. Patient with pericare at bed side. Educated on safety and importance of OOB activity. Continue OT POC.     Plan    Continue current treatment plan.    DC Equipment Recommendations: Unable to determine at this time  Discharge Recommendations: (P) Recommend post-acute placement for additional occupational therapy services prior to discharge home    Subjective    Patient cooperative during session. Nurse ludwig occupational therapist to work with patient.       Objective       02/23/22 0801   Total Time Spent   Total Time Spent (Mins) 38   Treatment Charges   OT Self Care / ADL 2   OT Neuromuscular Re-education / Balance 1   Precautions   Precautions Fall Risk   Comments 2 point restraints 2/23, confused   Vitals   O2 Delivery Device None - Room Air   Cognition    Comments Follows simple one step commands, easily distracted   Bed Mobility    Supine to Sit Moderate Assist   Sit to Supine Moderate Assist   Scooting Minimal Assist   Comments Nurse tech present during session to assist for safety   Activities of Daily Living   Grooming   (Min A for washing face sitting EOB, Required verbal cues to complete task and stay engaged in task)   Upper Body Dressing Moderate Assist  (Sitting EOB donned gown)   Toileting   (Patient with BM while standing bedside. Max A for pericare care. Patient with min A of 2 for standing balance.)   How much help from another person does the patient currently need...   Putting on and taking off regular lower body clothing? 2   Bathing (including washing, rinsing, and drying)? 2   Toileting, which includes using a toilet, bedpan, or urinal?  2   Putting on and taking off regular upper body clothing? 3   Taking care of personal grooming such as brushing teeth? 3   Eating meals? 2   6 Clicks Daily Activity Score 14   Functional Mobility   Sit to Stand Moderate Assist  (HHA)   Comments Patient with side steps to head of bed with mod A of 2, required assistance for weight shifting and advancing to head of bed   Short Term Goals   Short Term Goal # 1 Pt will complete ADL transfers with supervision   Goal Outcome # 1 Progressing as expected   Short Term Goal # 2 Pt will complete LB dressing with supervision   Goal Outcome # 2 Progressing as expected   Short Term Goal # 3 Pt will complete toileting with supervision   Goal Outcome # 3 Progressing slower than expected   Education Group   Additional Comments educated on importance of OOB activity   Anticipated Discharge Equipment and Recommendations   Discharge Recommendations Recommend post-acute placement for additional occupational therapy services prior to discharge home   Interdisciplinary Plan of Care Collaboration   Collaboration Comments SBAR functional mobility and ADL status   Session Information   Date / Session Number  2/23 #4 (1/3 2/27)   Priority 2

## 2022-02-24 NOTE — PROGRESS NOTES
"[facility] -  PROGRESS NOTE               Author: Karishma Cruz M.D. Date & Time: 2/24/2022  10:13 AM     HPI:  Krishan Acevedo is a 71 y.o. 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 Was additionally found to have worsening renal function.   Patient shares that he first tested positive for COVID at an outside hospital several days ago. He felt that he was not improving and possibly getting worse at home which prompted him to call EMS. He mentions that for several days he has been having loose stools, noting 3-5 loose but not watery BMs daily, noting his last loose stool was day prior to presentation. He also mentions that he has become more dependent on his wife to bring him food and water citing fatigue with his COVID infection. He does note that for the past several weeks he has been feeling \"like I'm always thirsty\".  While in the ED he was vitally stable. Was placed on 2L NC sating in the mid 90s BP ranged 123/70 from 159/117. Labs notable for Cr of 2.62. BUN 80, COVID-19 positive, UA noted for large occult blood, small leukocyte esterase. US of kidneys was completed with indicated no hydronephrosis or fluid collections, no renal artery stenosis.   In regards to his transplant history. Living donor transplant was completed in 2008 at an outside hospital. Patient is on Prednisone 5mg, Mycophenolate 500mg BID, and Tacrolimus 1mg and 0.5mg daily. His GFR trends in the 30s-40s although in chart he has climbed to the 56-53 ranges in the past.     DAILY NEPHROLOGY SUMMARY:  2/1: Admitted, consult placed  2/2: no overnight events, renal function improving, patient tolerating PO intake  2/3: started on IVF yesterday, renal function improved to around prior baseline range  2/4: renal function continues " to improve  2/5: Scr down to baseline levels. No new overnight renal events. Feels ok.  2/6: Feels ok. Urine culture from 2/1 positive for carbapenem resistant Pseudomonas aeruginosa. He is on zosyn per sensitivity. No labs today  2/7: Potassium is 5.7 today.  Creatinine up to 1.95.  Corrected calcium elevated.  CO2 16.  2/08: Chart and notes reviewed. No new labs this am. +Tachycardia.   2/09: Nursing notes and assessments reviewed.  Int tachycardia still. Room air. SBP labile 100s-160s. K 5.5  2/10: Spoke with RN, patient has no complaints at this time.   Plan is for discharge today or tomorrow to SNF pending 1L bolus and Calcium recheck.  Current SCa 11.5  VSS RA  -160s  2/11: Sodium up to 149.  Calcium increasing.  Creatinine stable 1.7.  2/12: Sodium up to 152.  Calcium remains elevated.  Creatinine 1.8.  I/Os not documented  2/13: Sodium still at 152.  Calcium trending down.  Creatinine up to 2.06.  On bicarb drip.  Somnolent  2/14: Unable to participate in interview, improved hemodynamics, cr stable  2/15: stable hemodynamics, 1.9L UOP, cr to 1.9, labile blood pressures, ongoing intermittent delirium  2/16: Hypertensive this AM, slight improvement in Cr, ca climbing, net neg 600cc with 1.9L UOP, confused  2/17: 1.4L UOP,ongoing encephalopthy, sodium climbing, minimal PO intake    2/18: IOs not recorded, cr 1.5, tremulous, minimal PO intake-serum sodium climbing  2/19: 2.6L  UOP, sodium climbing, more confused this a.m, wife at bedside  2/21: remains confused, biopsy was postponed to until tomorrow due to holiday.   2/22: confused. Refused Veltassa for 2 days but talked to him about it.   2/23: more awake and alert, wife is at bedside. UOP is great in velez bacg but not being documented. S/p allograft kidney biopsy 2/22/22.  2/23: could not sleep at night, cr is worsening but UOP is great.     REVIEW OF SYSTEMS:    10 point ROS reviewed and is as per HPI/daily summary or otherwise  "negative    PMH/PSH/SH/FH:   Reviewed and unchanged since admission note    CURRENT MEDICATIONS:   Reviewed from admission to present day    VS:  /56   Pulse 100   Temp 36.1 °C (96.9 °F) (Temporal)   Resp 18   Ht 1.803 m (5' 11\")   Wt 112 kg (247 lb 5.7 oz)   SpO2 95%   BMI 34.50 kg/m²     Physical Exam  Vitals and nursing note reviewed.   Constitutional:       Appearance: He is obese. He is ill-appearing.   HENT:      Head: Normocephalic and atraumatic.   Eyes:      General: No scleral icterus.  Cardiovascular:      Rate and Rhythm: Normal rate and regular rhythm.      Pulses: Normal pulses.      Comments: No edema  Pulmonary:      Effort: Pulmonary effort is normal. No respiratory distress.   Abdominal:      General: Abdomen is flat. There is no distension.   Musculoskeletal:         General: No swelling or deformity.   Skin:     Coloration: Skin is not jaundiced.      Findings: No bruising or rash.   Neurological:      Mental Status: He is alert. He is disoriented.      Motor: no Tremor present.   Psychiatric:         Behavior: Behavior normal.   Fluids:  In: 1350 [P.O.:1350]  Out: 1200     LABS:  Recent Labs     02/22/22  0151 02/23/22  0454 02/24/22  0343   SODIUM 144 148* 142   POTASSIUM 5.7* 4.7 4.7   CHLORIDE 112 115* 113*   CO2 21 21 18*   GLUCOSE 115* 113* 129*   BUN 59* 62* 77*   CREATININE 1.41* 1.72* 2.06*   CALCIUM 9.9 10.0 9.7       IMAGING:   All imaging reviewed from admission to present day    IMPRESSION:  # History of Renal Transplant with RADHA   -Tacrolimus 22.5  2/8, dose reduced, repeated FK level 2/18 8.9 but lab was drawn after the dose so it is not a trough.   # History of ANCA Vasculitis, chronic hematuria   - Repeat serologies positive. ? recurrence of vasculitis in kidney allograft vs COVID induced RADHA   # CKD 3  # Acidosis, improved   # Afib  # DM2  # HTN  # Hypercalcemia: PTH mediated   # Hyperparathyroid: may need further imaging going forward  # Acute encephalopathy  - " azotemia is partially driven by steroids. BUN is improving , unlikely uremic        PLAN:  - unclear reasone for worsening Cr, UA is ordered hopefully will help with iagnosis, also still waiting for Biopsy report.   -  c/w  pred 60mg daily  -  switch IVF to Bicarb d5w  - pending Tacro level ( drawn 2/22)  - sensipar 30mg TIW  - Hold Cellcept for now   - increase bicarb to 650 mg TID.  - Continue to hold losartan  -  veltassa to 8 g q day   - Low potassium renal diet   - Dose meds for reduced GFR    Thank you      1:45 PM  Prelim biopsy report: no rejection, no Vasculitis. IGA deposits with RBC casts ? Postinfectious and ATN.   Decrease prednisone to 30 mg qd.

## 2022-02-24 NOTE — THERAPY
Physical Therapy   Daily Treatment     Patient Name: Krishan Acevedo  Age:  71 y.o., Sex:  male  Medical Record #: [id number]  Today's Date: 2/23/2022       Precautions: Fall Risk  Comments: confusion    Assessment    Pt was pleasant, flat affect, with delayed responses. He required modA to reach EOB with vc for technique. He was able to hold himself up at EOB for balance. He performed STS 3x with modA for lift off and balance with HHA. Attempted to side step but presents with difficulty with balance and sequencing. Pt required initiation to return back to bed with modA. Will continue to follow while in house.     Plan    Continue current treatment plan.    DC Equipment Recommendations: Unable to determine at this time  Discharge Recommendations: Recommend post-acute placement for additional physical therapy services prior to discharge home         02/23/22 9044   Other Treatments   Other Treatments Provided seated EOB for balance, laq and marches with max vc   Balance   Sitting Balance (Static) Fair   Sitting Balance (Dynamic) Fair -   Standing Balance (Static) Poor   Standing Balance (Dynamic) Poor -   Weight Shift Sitting Fair   Weight Shift Standing Poor   Skilled Intervention Verbal Cuing;Sequencing   Comments standing with HHA, posterior lean difficulty weight shifting   Gait Analysis   Gait Level Of Assist Unable to Participate   Comments attempted side stepping but unable to sequence and coordinate movement.   Bed Mobility    Supine to Sit Moderate Assist   Sit to Supine Moderate Assist   Scooting Moderate Assist   Skilled Intervention Verbal Cuing;Sequencing   Comments modA extra time to complete. Vc for coordination and sequencing.   Functional Mobility   Sit to Stand Moderate Assist   Skilled Intervention Verbal Cuing;Sequencing   Comments modA for lift off and balance STS 3x with HHA   Short Term Goals    Short Term Goal # 1 in 6 visits patient will demo all functional transfers with Taty for safe DC     Goal Outcome # 1 goal not met   Short Term Goal # 2 in 6 visits patient will ambualte 200' Taty for safe DC    Goal Outcome # 2 Goal not met   Short Term Goal # 3 in 6 visits patient will navigate 1 stairs with Taty for safe DC    Goal Outcome # 3 Goal not met

## 2022-02-24 NOTE — PROGRESS NOTES
Received bedside report and accepted care of patient.    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 Pt is on room air at this time with no visible or stated sign of distress, discomfort, or SOB. Pt is tolerating diet. Pt has a velez in place contact for     Patient currently resting in bed in no visible or stated signs of distress. Bed alarm in place, controls on and bed in locked and lowest position. Call light and personal possessions within reach. Patient educated about use of call light and verbalized understanding.     Bilateral soft wrist restraints in place

## 2022-02-24 NOTE — THERAPY
"Speech Language Pathology  Daily Treatment     Patient Name: Krishan Acevedo  Age:  71 y.o., Sex:  male  Medical Record #: [id number]  Today's Date: 2/24/2022     Precautions  Precautions: Fall Risk  Comments: confusion    Assessment    Patient seen this Pm upright in bed with use of trend to achieve more upright. Pt with AMS and intermittent ability to follow commands. Pt achieved lip closure to spoon to anticipate oral intake. Trials of water presented with success in 3/3 with no obvious s/sx of aspiration.Then pt \"drifted' with eye gaze and begin to hold bolus. Trials were discontinued with difficulty to redirect patient to task. Concerned patient may not be able to meet nutritional needs, though it appears AMS is recent and MDs are aware. Plan to hold PO intake until pt is awake/alert and appropriate for safe intake. May need to consider alternative means if AMS persists.     RECOMMENDATIONS:  1) Cautiously Continue Soft and Bite sized diet with Thin Liquids given strict 1:1 assist WHEN AWAKE/ALERT/APPROPRIATE  Meds: float in puree  2) Maintain excellent oral care to reduce risk of aspiration pna  3) SLP following for ongoing dysphagia management including diet modification/advancement as appropriate, high follow up to determine appropriateness of continued PO diet.     Plan    Continue current treatment plan.    Discharge Recommendations: Recommend post-acute placement for additional speech therapy services prior to discharge home    Subjective    Patient received awake in bed in bilateral wrist restraints. Mouth open posture, mouth breathing, obvious xerostomia. Oral cares provided but no voicing achieved. Pt nodding head yes to offered liquids.      Objective     02/24/22 1415   Cognitive-Linguistic   Level of Consciousness Alert   Orientation Level Not Oriented to Name;Not Oriented to Age;Not Oriented to Address;Not Oriented to Year;Not Oriented to Month;Not Oriented to Day;Not Oriented to Time;Not Oriented " "to Place;Not Oriented to Reason   Speech / Dysarthria   Comments communicating only with head nods   Dysphagia    Diet / Liquid Recommendation   (NO diet change but proceed with caution)   Nursing Communication Swallow Precaution Sign Posted at Head of Bed   Skilled Intervention Verbal Cueing;Tactile Cueing;Compensatory Strategies   Comments Attempted to assist with lunch meal but patient altered at this time. RN and MD aware with RADHA reportedly worsening, Nephro on board. Lunch meal held due to concerns, should concerns persist then alternative means may need to be considered.   Patient / Family Goals   Patient / Family Goal #1 \"I share spafloraetti with my wife\"   Goal #1 Outcome Progressing slower than expected   Short Term Goals   Short Term Goal # 1 Patient will consume a diet of thin liquids and soft and bite sized solids with no s/sx of aspiration given direct supervision/feeding A.   Goal Outcome # 1 Progressing slower than expected     "

## 2022-02-25 PROBLEM — K72.00 SHOCK LIVER: Status: ACTIVE | Noted: 2022-01-01

## 2022-02-25 PROBLEM — K26.9 DUODENAL ULCER: Status: ACTIVE | Noted: 2022-01-01

## 2022-02-25 NOTE — CARE PLAN
The patient is Unstable - High likelihood or risk of patient condition declining or worsening    Shift Goals  Clinical Goals: Safety, Hydration  Patient Goals: Sleep  Family Goals: DENA    Progress made toward(s) clinical / shift goals:  Safety, hemodynamic stability    Patient is not progressing towards the following goals:      Problem: Knowledge Deficit - Standard  Goal: Patient and family/care givers will demonstrate understanding of plan of care, disease process/condition, diagnostic tests and medications  Outcome: Progressing     Problem: Pain - Standard  Goal: Alleviation of pain or a reduction in pain to the patient’s comfort goal  Outcome: Progressing     Problem: Skin Integrity  Goal: Skin integrity is maintained or improved  Outcome: Progressing     Problem: Psychosocial  Goal: Patient's level of anxiety will decrease  Outcome: Progressing     Problem: Communication  Goal: The ability to communicate needs accurately and effectively will improve  Outcome: Progressing     Problem: Hemodynamics  Goal: Patient's hemodynamics, fluid balance and neurologic status will be stable or improve  Outcome: Progressing     Problem: Fall Risk  Goal: Patient will remain free from falls  Outcome: Met

## 2022-02-25 NOTE — ASSESSMENT & PLAN NOTE
Due to wegeners granulomatosis, s/p transplant 2008  On tacrolimus and steroids  Biopsy - no rejection.  Nephrology onboard

## 2022-02-25 NOTE — CONSULTS
Critical Care Consultation    Date of consult: 2/24/2022    Referring Physician  Jered Hughes MD    Reason for Consultation  Shock, obtundation    History of Presenting Illness  71 y.o. 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 Admitted with RADHA in the setting of prior renal transplant in 2008 on CellCept, prednisone and tacrolimus. He was found to have a Carbapenem resistant Pseudomonas UTI and completed antimicrobials per ID. Patient was transferred to the floor where his RADHA has been being monitored by nephrology and 2 days ago he underwent a renal biopsy. Today the patient became lethargic and confused, a head CT was obtained which showed no acute findings, neurology was consulted for EEG. When he returned to his room the patient was found to have a blood pressure in the fifties systolic and I was consulted emergently for transfer to the ICU. Upon my arrival in the patient's room he is obtunded, pale and his bedside nurse reports that he has had a few maroon stools. Patient was transferred emergently to the ICU where he had 2 additional large-volume maroon stools and was given uncrossed matched blood and his Eliquis was reversed with Kcentra. GI has been consulted for assistance in management.    Code Status  DNAR, I OK    Review of Systems  Review of Systems   Unable to perform ROS: Critical illness       Past Medical History   has a past medical history of Arrhythmia (2021), Arthritis (2020), Atrial flutter (HCC), CAD (coronary artery disease) (2003), Cough (2020), Dialysis patient ([facility]) (2006), DVT (deep venous thrombosis) ([facility]), E coli bacteremia (2014), Kidney failure (03/2008), Kidney transplant pain (2020), Retinal detachment, Sleep apnea, Snoring, and Wegener's granulomatosis with renal involvement ([facility]) (2008).    Surgical  History   has a past surgical history that includes peg placement (10/10/2014); other orthopedic surgery (Left, 1995); and other (Right, 2017).    Family History  family history includes Stroke in his maternal grandmother; Stroke (age of onset: 26) in his brother.    Social History   reports that he has never smoked. He quit smokeless tobacco use about 31 years ago.  His smokeless tobacco use included snuff and chew. He reports previous alcohol use. He reports that he does not use drugs.    Medications  Home Medications     Reviewed by Cristo Hernandez (Pharmacy Tech) on 02/01/22 at 1022  Med List Status: Complete   Medication Last Dose Status   allopurinol (ZYLOPRIM) 300 MG Tab 1/31/2022 Active   atorvastatin (LIPITOR) 10 MG TABS 1/31/2022 Active   Brimonidine Tartrate-Timolol (COMBIGAN) 0.2-0.5 % Solution 2/1/2022 Active   ELIQUIS 5 MG Tab 2/1/2022 Active   fluticasone (FLONASE) 50 MCG/ACT nasal spray 2/1/2022 Active   furosemide (LASIX) 20 MG Tab 1/31/2022 Active   Glucosamine-Chondroit-Vit C-Mn (GLUCOSAMINE 1500 COMPLEX) Cap 2/1/2022 Active   losartan (COZAAR) 100 MG Tab 1/31/2022 Active   MAGNESIUM PO 2/1/2022 Active   Multiple Vitamins-Minerals (CENTRUM SILVER 50+MEN) Tab 2/1/2022 Active   mycophenolate (CELLCEPT) 250 MG Cap 2/1/2022 Active   omeprazole (PRILOSEC) 20 MG delayed-release capsule 1/31/2022 Active   predniSONE (DELTASONE) 5 MG Tab 2/1/2022 Active   tacrolimus (PROGRAF) 1 MG Cap 2/1/2022 Active   vitamin D, Ergocalciferol, (DRISDOL) 33352 units Cap capsule 1/31/2022 Active              Current Facility-Administered Medications   Medication Dose Route Frequency Provider Last Rate Last Admin   • sodium bicarbonate 8.4 % 75 mEq in dextrose 5% 1,000 mL infusion   Intravenous Continuous Karishma Cruz M.D. 100 mL/hr at 02/24/22 0932 New Bag at 02/24/22 0932   • sodium bicarbonate tablet 650 mg  650 mg Oral TID Karishma Cruz M.D.   650 mg at 02/24/22 0900   • norepinephrine (Levophed) 8 mg in 250 mL NS  infusion (premix)  0-30 mcg/min Intravenous Continuous Kervin Porter Jr., D.O. 56.3 mL/hr at 02/24/22 2210 30 mcg/min at 02/24/22 2210   • vasopressin (VASOSTRICT) 20 Units in  mL Infusion  0.03 Units/min Intravenous Continuous Shannen OVIEDO Latona   Dose not Required at 02/24/22 1945   • hydrocortisone sodium succinate PF (Solu-CORTEF) 100 MG injection 50 mg  50 mg Intravenous Q6HRS Kervin Porter Jr., D.O.   50 mg at 02/24/22 2023   • pantoprazole (Protonix) 80 mg in  mL Infusion  8 mg/hr Intravenous Continuous Kervin Porter Jr., D.O. 25 mL/hr at 02/24/22 2118 8 mg/hr at 02/24/22 2118   • phenylephrine (LAINE-SYNEPHRINE) 100 mcg/mL inj (IV Push Syringe) 200 mcg  200 mcg Intravenous Q5 MIN PRN Kervin Porter Jr., D.O.   200 mcg at 02/24/22 2234   • patiromer (VELTASSA) powder for oral suspension 8.4 g  8.4 g Oral DAILY AT NOON Karishma Cruz M.D.   8.4 g at 02/24/22 1210   • cinacalcet (SENSIPAR) tablet 30 mg  30 mg Oral MO, WE + FR Jorge Pedraza M.D.   30 mg at 02/23/22 0827   • insulin NPH (HumuLIN N,NovoLIN N) injection  15 Units Subcutaneous BID INSULIN Frank Albarado M.D.   15 Units at 02/24/22 0848   • acetaminophen (Tylenol) tablet 650 mg  650 mg Oral Q6HRS PRN Farnk Albarado M.D.   650 mg at 02/24/22 0438   • loperamide (IMODIUM) capsule 2 mg  2 mg Oral 4X/DAY PRN Frank Albarado M.D.       • tacrolimus (PROGRAF) capsule 0.5 mg  0.5 mg Oral Q12HRS rFank Albarado M.D.   0.5 mg at 02/24/22 0445   • insulin regular (HumuLIN R,NovoLIN R) injection  3-14 Units Subcutaneous 4X/DAY ACHS Krishan Barajas D.O.   3 Units at 02/24/22 0847    And   • dextrose 50% (D50W) injection 50 mL  50 mL Intravenous Q15 MIN PRN Krishan Barajas D.O.       • ondansetron (ZOFRAN ODT) dispertab 4 mg  4 mg Enteral Tube Q6HRS PRN Lamine Perez M.D.       • haloperidol lactate (HALDOL) injection 2-5 mg  2-5 mg Intravenous Q4HRS PRN Lamine Perez M.D.   5 mg at 02/21/22 0249   • ondansetron (ZOFRAN)  syringe/vial injection 4 mg  4 mg Intravenous Q6HRS PRN Rhonda Mojica M.D.   4 mg at 02/15/22 1035   • hydrALAZINE (APRESOLINE) injection 10 mg  10 mg Intravenous Q6HRS PRN Rhonda Mojica M.D.   10 mg at 02/16/22 0014   • brimonidine (ALPHAGAN) 0.2 % ophthalmic solution 2 Drop  2 Drop Both Eyes BID Maria A. Mo, A.P.R.N.   2 Drop at 02/24/22 0439    And   • timolol (TIMOPTIC) 0.5 % ophthalmic solution 2 Drop  2 Drop Both Eyes BID Maria A. Mo, A.P.R.N.   2 Drop at 02/24/22 0439       Allergies  Allergies   Allergen Reactions   • Triazolam Unspecified     All anti anxiety medications cause hallucinations    • Nsaids      Cannot take because of anti rejection meds.       Vital Signs last 24 hours  Temp:  [35.1 °C (95.2 °F)-36.6 °C (97.9 °F)] 35.6 °C (96.1 °F)  Pulse:  [] 105  Resp:  [12-26] 24  BP: ()/(33-84) 136/68  SpO2:  [92 %-99 %] 99 %    Physical Exam  Physical Exam  Vitals and nursing note reviewed.   Constitutional:       General: He is in acute distress.      Appearance: He is obese. He is toxic-appearing.   HENT:      Head: Normocephalic and atraumatic.      Right Ear: External ear normal.      Left Ear: External ear normal.      Nose: Nose normal.      Mouth/Throat:      Mouth: Mucous membranes are dry.      Pharynx: Oropharynx is clear.      Comments: Poor dentition  Eyes:      Conjunctiva/sclera: Conjunctivae normal.      Pupils: Pupils are equal, round, and reactive to light.   Cardiovascular:      Rate and Rhythm: Regular rhythm. Tachycardia present.   Pulmonary:      Effort: Tachypnea present.      Breath sounds: Rales present. No decreased breath sounds.   Abdominal:      General: There is no distension.      Palpations: Abdomen is soft.      Tenderness: There is no abdominal tenderness.      Comments: Right lower quadrant percutaneous transplant kidney biopsy site dressing clean dry and intact   Musculoskeletal:      Cervical back: Neck supple.      Right lower leg:  Edema present.      Left lower leg: Edema present.   Skin:     General: Skin is dry.      Capillary Refill: Capillary refill takes more than 3 seconds.      Coloration: Skin is pale.      Comments: Cool   Neurological:      Cranial Nerves: No cranial nerve deficit.      Motor: No weakness.      Comments: Obtunded upon my 1st arrival, mental status slowly improved with resuscitation however he remains confused   Psychiatric:         Cognition and Memory: Cognition is impaired. Memory is impaired.         Judgment: Judgment is inappropriate.         Fluids    Intake/Output Summary (Last 24 hours) at 2/24/2022 2307  Last data filed at 2/24/2022 2200  Gross per 24 hour   Intake 249.1 ml   Output 655 ml   Net -405.9 ml       Laboratory  Recent Results (from the past 48 hour(s))   CBC WITH DIFFERENTIAL    Collection Time: 02/23/22  4:54 AM   Result Value Ref Range    WBC 11.1 (H) 4.8 - 10.8 K/uL    RBC 3.92 (L) 4.70 - 6.10 M/uL    Hemoglobin 11.5 (L) 14.0 - 18.0 g/dL    Hematocrit 35.7 (L) 42.0 - 52.0 %    MCV 91.1 81.4 - 97.8 fL    MCH 29.3 27.0 - 33.0 pg    MCHC 32.2 (L) 33.7 - 35.3 g/dL    RDW 53.2 (H) 35.9 - 50.0 fL    Platelet Count 92 (L) 164 - 446 K/uL    MPV 11.4 9.0 - 12.9 fL    Neutrophils-Polys 73.00 (H) 44.00 - 72.00 %    Lymphocytes 17.00 (L) 22.00 - 41.00 %    Monocytes 8.10 0.00 - 13.40 %    Eosinophils 0.30 0.00 - 6.90 %    Basophils 0.20 0.00 - 1.80 %    Immature Granulocytes 1.40 (H) 0.00 - 0.90 %    Nucleated RBC 0.20 /100 WBC    Neutrophils (Absolute) 8.06 (H) 1.82 - 7.42 K/uL    Lymphs (Absolute) 1.88 1.00 - 4.80 K/uL    Monos (Absolute) 0.90 (H) 0.00 - 0.85 K/uL    Eos (Absolute) 0.03 0.00 - 0.51 K/uL    Baso (Absolute) 0.02 0.00 - 0.12 K/uL    Immature Granulocytes (abs) 0.16 (H) 0.00 - 0.11 K/uL    NRBC (Absolute) 0.02 K/uL   Comp Metabolic Panel    Collection Time: 02/23/22  4:54 AM   Result Value Ref Range    Sodium 148 (H) 135 - 145 mmol/L    Potassium 4.7 3.6 - 5.5 mmol/L    Chloride 115 (H) 96  - 112 mmol/L    Co2 21 20 - 33 mmol/L    Anion Gap 12.0 7.0 - 16.0    Glucose 113 (H) 65 - 99 mg/dL    Bun 62 (H) 8 - 22 mg/dL    Creatinine 1.72 (H) 0.50 - 1.40 mg/dL    Calcium 10.0 8.5 - 10.5 mg/dL    AST(SGOT) 72 (H) 12 - 45 U/L    ALT(SGPT) 107 (H) 2 - 50 U/L    Alkaline Phosphatase 175 (H) 30 - 99 U/L    Total Bilirubin 0.7 0.1 - 1.5 mg/dL    Albumin 3.1 (L) 3.2 - 4.9 g/dL    Total Protein 5.8 (L) 6.0 - 8.2 g/dL    Globulin 2.7 1.9 - 3.5 g/dL    A-G Ratio 1.1 g/dL   ESTIMATED GFR    Collection Time: 02/23/22  4:54 AM   Result Value Ref Range    GFR If  47 (A) >60 mL/min/1.73 m 2    GFR If Non  39 (A) >60 mL/min/1.73 m 2   Histology Request    Collection Time: 02/23/22  6:39 AM   Result Value Ref Range    Pathology Request Sent to Histo    POCT glucose device results    Collection Time: 02/23/22  8:28 AM   Result Value Ref Range    POC Glucose, Blood 141 (H) 65 - 99 mg/dL   POCT glucose device results    Collection Time: 02/23/22 12:24 PM   Result Value Ref Range    POC Glucose, Blood 198 (H) 65 - 99 mg/dL   POCT glucose device results    Collection Time: 02/23/22  6:32 PM   Result Value Ref Range    POC Glucose, Blood 139 (H) 65 - 99 mg/dL   POCT glucose device results    Collection Time: 02/23/22 10:11 PM   Result Value Ref Range    POC Glucose, Blood 149 (H) 65 - 99 mg/dL   CBC WITH DIFFERENTIAL    Collection Time: 02/24/22  3:43 AM   Result Value Ref Range    WBC 13.6 (H) 4.8 - 10.8 K/uL    RBC 3.74 (L) 4.70 - 6.10 M/uL    Hemoglobin 10.8 (L) 14.0 - 18.0 g/dL    Hematocrit 33.9 (L) 42.0 - 52.0 %    MCV 90.6 81.4 - 97.8 fL    MCH 28.9 27.0 - 33.0 pg    MCHC 31.9 (L) 33.7 - 35.3 g/dL    RDW 53.1 (H) 35.9 - 50.0 fL    Platelet Count 107 (L) 164 - 446 K/uL    MPV 11.1 9.0 - 12.9 fL    Neutrophils-Polys 62.60 44.00 - 72.00 %    Lymphocytes 23.50 22.00 - 41.00 %    Monocytes 8.70 0.00 - 13.40 %    Eosinophils 0.00 0.00 - 6.90 %    Basophils 0.00 0.00 - 1.80 %    Nucleated RBC 0.10  /100 WBC    Neutrophils (Absolute) 9.10 (H) 1.82 - 7.42 K/uL    Lymphs (Absolute) 3.20 1.00 - 4.80 K/uL    Monos (Absolute) 1.18 (H) 0.00 - 0.85 K/uL    Eos (Absolute) 0.00 0.00 - 0.51 K/uL    Baso (Absolute) 0.00 0.00 - 0.12 K/uL    NRBC (Absolute) 0.02 K/uL   Comp Metabolic Panel    Collection Time: 02/24/22  3:43 AM   Result Value Ref Range    Sodium 142 135 - 145 mmol/L    Potassium 4.7 3.6 - 5.5 mmol/L    Chloride 113 (H) 96 - 112 mmol/L    Co2 18 (L) 20 - 33 mmol/L    Anion Gap 11.0 7.0 - 16.0    Glucose 129 (H) 65 - 99 mg/dL    Bun 77 (HH) 8 - 22 mg/dL    Creatinine 2.06 (H) 0.50 - 1.40 mg/dL    Calcium 9.7 8.5 - 10.5 mg/dL    AST(SGOT) 63 (H) 12 - 45 U/L    ALT(SGPT) 111 (H) 2 - 50 U/L    Alkaline Phosphatase 179 (H) 30 - 99 U/L    Total Bilirubin 0.5 0.1 - 1.5 mg/dL    Albumin 2.7 (L) 3.2 - 4.9 g/dL    Total Protein 5.7 (L) 6.0 - 8.2 g/dL    Globulin 3.0 1.9 - 3.5 g/dL    A-G Ratio 0.9 g/dL   ESTIMATED GFR    Collection Time: 02/24/22  3:43 AM   Result Value Ref Range    GFR If  38 (A) >60 mL/min/1.73 m 2    GFR If Non  32 (A) >60 mL/min/1.73 m 2   DIFFERENTIAL MANUAL    Collection Time: 02/24/22  3:43 AM   Result Value Ref Range    Bands-Stabs 4.30 0.00 - 10.00 %    Myelocytes 0.90 %    Manual Diff Status PERFORMED    PERIPHERAL SMEAR REVIEW    Collection Time: 02/24/22  3:43 AM   Result Value Ref Range    Peripheral Smear Review see below    PLATELET ESTIMATE    Collection Time: 02/24/22  3:43 AM   Result Value Ref Range    Plt Estimation Decreased    MORPHOLOGY    Collection Time: 02/24/22  3:43 AM   Result Value Ref Range    RBC Morphology Normal    ABO Rh Confirm    Collection Time: 02/24/22  3:43 AM   Result Value Ref Range    ABO Rh Confirm A POS    POCT glucose device results    Collection Time: 02/24/22  8:46 AM   Result Value Ref Range    POC Glucose, Blood 198 (H) 65 - 99 mg/dL   URINE SODIUM RANDOM    Collection Time: 02/24/22  9:00 AM   Result Value Ref Range     Sodium, Urine -per volume 26 mmol/L   URINALYSIS    Collection Time: 02/24/22  9:00 AM    Specimen: Urine, Gaston Cath   Result Value Ref Range    Color Yellow     Character Clear     Specific Gravity 1.014 <1.035    Ph 5.0 5.0 - 8.0    Glucose Negative Negative mg/dL    Ketones Negative Negative mg/dL    Protein Negative Negative mg/dL    Bilirubin Negative Negative    Urobilinogen, Urine 0.2 Negative    Nitrite Negative Negative    Leukocyte Esterase Moderate (A) Negative    Occult Blood Small (A) Negative    Micro Urine Req Microscopic    URINE MICROSCOPIC (W/UA)    Collection Time: 02/24/22  9:00 AM   Result Value Ref Range    WBC  (A) /hpf    RBC 10-20 (A) /hpf    Bacteria Negative None /hpf    Epithelial Cells Negative /hpf    Hyaline Cast 3-5 (A) /lpf   POCT glucose device results    Collection Time: 02/24/22  3:33 PM   Result Value Ref Range    POC Glucose, Blood 208 (H) 65 - 99 mg/dL   POCT arterial blood gas device results    Collection Time: 02/24/22  3:49 PM   Result Value Ref Range    Ph 7.417 7.400 - 7.500    Pco2 25.6 (L) 26.0 - 37.0 mmHg    Po2 106 (H) 64 - 87 mmHg    Tco2 17 (L) 20 - 33 mmol/L    S02 98 93 - 99 %    Hco3 16.5 (L) 17.0 - 25.0 mmol/L    BE -7 (L) -4 - 3 mmol/L    Body Temp 97.1 F degrees    O2 Therapy 28 %    iPF Ratio 379     Ph Temp Delon 7.430 7.400 - 7.500    Pco2 Temp Co 24.7 (L) 26.0 - 37.0 mmHg    Po2 Temp Cor 101 (H) 64 - 87 mmHg    Specimen Arterial     DelSys Other     LPM 2 lpm   ABG - LAB    Collection Time: 02/24/22  5:02 PM   Result Value Ref Range    Ph 7.38 (L) 7.40 - 7.50    Pco2 26.6 26.0 - 37.0 mmHg    Po2 133.7 (H) 64.0 - 87.0 mmHg    O2 Saturation 97.0 93.0 - 99.0 %    Hco3 15 (L) 17 - 25 mmol/L    Base Excess -9 (L) -4 - 3 mmol/L    Body Temp see below Centigrade   Comp Metabolic Panel    Collection Time: 02/24/22  5:44 PM   Result Value Ref Range    Sodium 144 135 - 145 mmol/L    Potassium 4.9 3.6 - 5.5 mmol/L    Chloride 114 (H) 96 - 112 mmol/L    Co2 14  (L) 20 - 33 mmol/L    Anion Gap 16.0 7.0 - 16.0    Glucose 183 (H) 65 - 99 mg/dL    Bun 86 (HH) 8 - 22 mg/dL    Creatinine 3.52 (H) 0.50 - 1.40 mg/dL    Calcium 8.5 8.5 - 10.5 mg/dL    AST(SGOT) 244 (H) 12 - 45 U/L    ALT(SGPT) 348 (H) 2 - 50 U/L    Alkaline Phosphatase 124 (H) 30 - 99 U/L    Total Bilirubin 0.5 0.1 - 1.5 mg/dL    Albumin 2.0 (L) 3.2 - 4.9 g/dL    Total Protein 4.2 (L) 6.0 - 8.2 g/dL    Globulin 2.2 1.9 - 3.5 g/dL    A-G Ratio 0.9 g/dL   CBC WITH DIFFERENTIAL    Collection Time: 02/24/22  5:44 PM   Result Value Ref Range    WBC 16.1 (H) 4.8 - 10.8 K/uL    RBC 2.49 (L) 4.70 - 6.10 M/uL    Hemoglobin 7.4 (L) 14.0 - 18.0 g/dL    Hematocrit 22.5 (L) 42.0 - 52.0 %    MCV 90.4 81.4 - 97.8 fL    MCH 29.7 27.0 - 33.0 pg    MCHC 32.9 (L) 33.7 - 35.3 g/dL    RDW 53.5 (H) 35.9 - 50.0 fL    Platelet Count 126 (L) 164 - 446 K/uL    MPV 11.6 9.0 - 12.9 fL    Neutrophils-Polys 91.70 (H) 44.00 - 72.00 %    Lymphocytes 0.90 (L) 22.00 - 41.00 %    Monocytes 2.80 0.00 - 13.40 %    Eosinophils 0.00 0.00 - 6.90 %    Basophils 0.00 0.00 - 1.80 %    Nucleated RBC 0.60 /100 WBC    Neutrophils (Absolute) 15.21 (H) 1.82 - 7.42 K/uL    Lymphs (Absolute) 0.14 (L) 1.00 - 4.80 K/uL    Monos (Absolute) 0.45 0.00 - 0.85 K/uL    Eos (Absolute) 0.00 0.00 - 0.51 K/uL    Baso (Absolute) 0.00 0.00 - 0.12 K/uL    NRBC (Absolute) 0.09 K/uL   MAGNESIUM    Collection Time: 02/24/22  5:44 PM   Result Value Ref Range    Magnesium 2.3 1.5 - 2.5 mg/dL   PHOSPHORUS    Collection Time: 02/24/22  5:44 PM   Result Value Ref Range    Phosphorus 5.2 (H) 2.5 - 4.5 mg/dL   TROPONIN    Collection Time: 02/24/22  5:44 PM   Result Value Ref Range    Troponin T 78 (H) 6 - 19 ng/L   LACTIC ACID    Collection Time: 02/24/22  5:44 PM   Result Value Ref Range    Lactic Acid 4.6 (HH) 0.5 - 2.0 mmol/L   proBrain Natriuretic Peptide, NT    Collection Time: 02/24/22  5:44 PM   Result Value Ref Range    NT-proBNP 1275 (H) 0 - 125 pg/mL   COD - Adult (Type and  Screen)    Collection Time: 02/24/22  5:44 PM   Result Value Ref Range    ABO Grouping Only A     Rh Grouping Only POS     Antibody Screen-Cod NEG    DIFFERENTIAL MANUAL    Collection Time: 02/24/22  5:44 PM   Result Value Ref Range    Bands-Stabs 2.80 0.00 - 10.00 %    Metamyelocytes 1.80 %    Manual Diff Status PERFORMED    PERIPHERAL SMEAR REVIEW    Collection Time: 02/24/22  5:44 PM   Result Value Ref Range    Peripheral Smear Review see below    PLATELET ESTIMATE    Collection Time: 02/24/22  5:44 PM   Result Value Ref Range    Plt Estimation Decreased    MORPHOLOGY    Collection Time: 02/24/22  5:44 PM   Result Value Ref Range    RBC Morphology Present     Poikilocytosis 2+     Schistocytes 1+     Echinocytes 2+     Toxic Gran Marked     Dohle Bodies Few    ESTIMATED GFR    Collection Time: 02/24/22  5:44 PM   Result Value Ref Range    GFR If  21 (A) >60 mL/min/1.73 m 2    GFR If Non African American 17 (A) >60 mL/min/1.73 m 2   UN-XM'D RBC    Collection Time: 02/24/22  6:58 PM   Result Value Ref Range    Component R       R99                 Red Cells, LR       [id number]   transfused   02/24/22   19:00      Product Type R99     Dispense Status transfused     Unit Number (Barcoded) [id number]     Product Code (Barcoded) M2501A72     Blood Type (Barcoded) 5100     Component R       R99                 Red Cells, LR       [id number]   transfused   02/24/22   19:00      Product Type R99     Dispense Status transfused     Unit Number (Barcoded) [id number]     Product Code (Barcoded) V7994X23     Blood Type (Barcoded) 5100    PLATELET MAPPING WITH BASIC TEG    Collection Time: 02/24/22  7:29 PM   Result Value Ref Range    Reaction Time Initial-R 4.2 (L) 4.6 - 9.1 min    React Time Initial Hep 4.2 (L) 4.3 - 8.3 min    Clot Kinetics-K 0.8 0.8 - 2.1 min    Clot Angle-Angle 77.5 63.0 - 78.0 degrees    Maximum Clot Strength-MA 65.5 52.0 - 69.0 mm    TEG Functional Fibrinogen(MA) 34.0 (H)  15.0 - 32.0 mm    Lysis 30 minutes-LY30 0.0 0.0 - 2.6 %    % Inhibition ADP 29.2 (H) 0.0 - 17.0 %    % Inhibition AA 6.0 0.0 - 11.0 %    TEG Algorithm Link Algorithm    POCT glucose device results    Collection Time: 22  7:40 PM   Result Value Ref Range    POC Glucose, Blood 148 (H) 65 - 99 mg/dL   EKG    Collection Time: 22 11:00 PM   Result Value Ref Range    Report       [facility]    Test Date:  2022  Pt Name:    MOMO ESCUDERO                  Department: [facility]  MRN:        [id number]                      Room:       [facility]  Gender:     Male                         Technician: FABIAN  :        [id number]                   Requested By:MEDINA HOUSER JR  Order #:    [id number]                    Reading MD:    Measurements  Intervals                                Axis  Rate:       104                          P:          -34  DC:         152                          QRS:        -9  QRSD:       132                          T:          -30  QT:         384  QTc:        506    Interpretive Statements  SINUS TACHYCARDIA  ATRIAL PREMATURE COMPLEX  RIGHT BUNDLE BRANCH BLOCK  Compared to ECG 2022 23:47:41  Atrial premature complex(es) now present  Sinus rhythm no longer present         Imaging  DX-CHEST-FOR LINE PLACEMENT Perform procedure in: Patient's Room   Final Result      Right internal jugular catheter is been placed and appears appropriately located      CT-HEAD W/O   Final Result         1.  No acute intracranial process.      2. Diffuse atrophy and periventricular white matter changes consistent with chronic small vessel disease.      CT-NEEDLE BX-RENAL   Final Result      1.  CT GUIDED RIGHT LOWER QUADRANT/PELVIC RENAL TRANSPLANT KIDNEY BIOPSY.      EC-ECHOCARDIOGRAM LTD W/O CONT   Final Result      MR-BRAIN-W/O   Final Result         No acute intracranial process.      Nonspecific T2 hyperintensities are noted in the periventricular and deep white matter, most likely related to chronic  microvascular ischemia.      Remote left thalamic and corona radiata lacunar infarction noted.      DX-ABDOMEN FOR TUBE PLACEMENT   Final Result         Feeding tube with tip projecting over the expected area of the first portion duodenum.      CT-HEAD W/O   Final Result         NO ACUTE ABNORMALITIES ARE NOTED ON CT SCAN OF THE HEAD.      Findings are consistent with atrophy.  Decreased attenuation in the periventricular white matter likely indicates microvascular ischemic disease.         CT-CHEST,ABDOMEN,PELVIS W/O   Final Result      1.  Multifocal pulmonary opacifications are identified which are new since the prior CT. Findings could be due to pneumonia.      2.  1.2 cm nodular opacification noted in the left upper pulmonary lobe. Primary or metastatic neoplasm is possible. Small focus of pneumonia or inflammation is also possible. Consider biopsy or PET/CT. Consider follow-up CT in 3 months.      3.  There is duodenal wall thickening. Findings could be due to duodenitis. Neoplasm is also possible. There is some mild induration in the retroperitoneal fat surrounding the duodenum.      4.  Cholelithiasis. No biliary ductal dilatation appreciated.      5.  Right lower quadrant renal transplant. No hydronephrosis.      Low and High Risk: Consider PET/CT, or tissue sampling, or follow-up CT at 3 months      Low Risk - Minimal or absent history of smoking and of other known risk factors.      High Risk - History of smoking or of other known risk factors.      Note: These recommendations do not apply to lung cancer screening, patients with immunosuppression, or patients with known primary cancer.      Fleischner Society 2017 Guidelines for Management of Incidentally Detected Pulmonary Nodules in Adults         US-EXTREMITY VENOUS LOWER BILAT   Final Result      US-RENAL TRANSPLANT COMP   Final Result      1.  No hydronephrosis or perinephric fluid collection involving the right lower quadrant renal transplant.   2.   No definite evidence of hemodynamically significant renal artery stenosis.   3.  Elevated resistive indices could suggest renal parenchymal disease.      DX-CHEST-PORTABLE (1 VIEW)   Final Result      1.  Bilateral peripheral lower lobe hazy opacities which is nonspecific but can be seen with Covid 19 pneumonia.   2.  Enlarged cardiac silhouette with probable vascular congestion/edema.      CT-ABDOMEN-PELVIS W/O    (Results Pending)       Assessment/Plan  * RADHA (acute kidney injury) (HCC)- (present on admission)  Assessment & Plan  Worsening creatinine  Continue Prograf  Increase prednisone 30 daily to stress dose steroids  IVF  Renal dose meds, avoid nephrotoxins  Strict I/Os  Follow renal function    GI bleed  Assessment & Plan  Maroon stool upon arrival in the ICU  Brisk upper versus lower  GI consulted  PPI  Plan for endoscopy  Resuscitate with 2 units PRBCs    Hemorrhagic shock ([facility])  Assessment & Plan  Secondary to GI bleeding  Aggressive colloid resuscitation  Norepinephrine in the bridging vasopressor  PPI  GI consult for EGD  Reverse coagulopathy    Type 2 diabetes mellitus ([facility])  Assessment & Plan  Goal blood glucose 140-180  basal insulin, sliding scale insulin, accuchecks  hypoglycemia protocol  A1c 7.4      Immunocompromised (HCC)- (present on admission)  Assessment & Plan  On CellCept and prednisone for kidney transplant    Acute encephalopathy- (present on admission)  Assessment & Plan  Worsening today  CT unremarkable  EEG pending  Likely toxic metabolic  Continue neurochecks    Positive urine culture- (present on admission)  Assessment & Plan  Pseudomonas UTI  Complete antibiotics on 2/18/2022    Anemia of chronic disease- (present on admission)  Assessment & Plan  Now worsening in the setting of GI bleeding  2 units PRBCs  Reverse Eliquis  Goal hemoglobin 8 in the setting of active bleeding, trend hemoglobin    Long term (current) use of systemic steroids- (present on admission)  Assessment &  Plan  Will stress dose given current shock state    Essential hypertension- (present on admission)  Assessment & Plan  Hold meds in the setting of shock    Kidney transplant status, living related donor- (present on admission)  Assessment & Plan  On tacrolimus and steroids  Now with RADHA  Follow-up biopsy pathology    Atrial flutter - (present on admission)  Assessment & Plan  Stop Eliquis given bleeding  Hold beta-blocker with hypotension  Telemetry monitoring  Optimize electrolytes      Discussed patient condition and risk of morbidity and/or mortality with Hospitalist, Family, RN, RT, Pharmacy, Code status disscussed, Charge nurse / hot rounds, Patient and GI.      The patient remains critically ill.  Critical care time = 110 minutes in directly providing and coordinating critical care and extensive data review.  No time overlap and excludes procedures.

## 2022-02-25 NOTE — ASSESSMENT & PLAN NOTE
Likely due to ATN from hemorrhagic shock.  Oliguric  Sodium bicarb IV until CO2 18. - dc'ed  prednisone daily to stress dose steroids  Renal dose meds, avoid nephrotoxins  Strict I/Os  Follow renal function  Temporary dialysis  Hold cellcept, losartan  Continue sensipar, veltassa

## 2022-02-25 NOTE — PROCEDURES
INPATIENT ROUTINE VIDEO ELECTROENCEPHALOGRAM REPORT      Referring provider:   Dr. Hughes    DOS: 02/25/22 (0 hours and 27 minutes of total recording time).     INDICATION:  Krishan Acevedo 71 y.o. male presenting with altered mental status    CURRENT ANTIEPILEPTIC AND/OR SEDATING REGIMEN: No AEDS    TECHNIQUE: Routine VEEG was set up by a Neurodiagnostic technologist who performed education to the patient and staff. A minimum of 23 electrodes and 23 channel recording was setup and performed by Neurodiagnostic technologist, in accordance with the international 10-20 system. The study was reviewed in bipolar and referential montages. The recording examined the patient in the  awake, drowsy, and sleep state(s).     DESCRIPTION OF THE RECORD:  During maximal wakefulness, the background was continuous and showed a 5 Hz posterior dominant rhythm, with a mixture of mostly polymorphic theta/delta frequencies.  Reactivity and state changes were present.  During drowsiness, delta frequencies were seen along with a loss of myogenic artifact    EEG Sleep: Occasional N2 sleep transients in the form of rudimentary sleep spindles fragments and vertex waves were seen in the leads over the central regions.     ACTIVATION PROCEDURES:   Photic driving was performed in a stepwise fashion from 1-30 Hz and did not elicit any abnormalities on EEG.    ICTAL AND INTERICTAL FINDINGS:   No focal or generalized epileptiform activity noted.     No regional slowing was seen during this routine study.      No definite electrographic or electroclinical seizures.     EKG: Sampling of the EKG recording showed an abnormal rhythm      EVENTS:  Occassional clinical events of myoclonic jerks of feet, head, and/or neck/shoulder were seen on video with no abnormal EEG correlate.    INTERPRETATION:   Abnormal video EEG recording in the awake, drowsy, and sleep state(s):  - Moderate background slowing suggestive of a non-specific encephalopathy. Clinical  correlation recommended.  - No persistent focal asymmetries seen.  - No epileptiform discharges seen   - No seizures. Clinical correlation is recommended.  -Occassional clinical events of myoclonic jerks of feet, head, and/or neck/shoulder were seen on video with no abnormal EEG correlate.    Note: This EEG does not rule out epilepsy.  If the clinical suspicion remains high for seizures, a prolonged recording to capture clinical or subclinical events may be helpful.        Fernando Jenkins MD  Epilepsy and General Neurology  Department of Neurology  Instructor of Clinical Neurology [facility].   Office: [phone]  Fax: [phone]

## 2022-02-25 NOTE — PROCEDURES
Central Line Insertion    Date/Time: 2/25/2022 11:05 AM  Performed by: SILVIA Armenta  Authorized by: SILVIA Armenta     Consent:     Consent obtained:  Written    Consent given by:  Spouse    Risks discussed:  Arterial puncture, infection, bleeding and pneumothorax  Pre-procedure details:     Hand hygiene: Hand hygiene performed prior to insertion      Sterile barrier technique: All elements of maximal sterile technique followed      Skin preparation:  2% chlorhexidine    Skin preparation agent: Skin preparation agent completely dried prior to procedure    Sedation:     Sedation type:  None  Anesthesia:     Anesthesia method:  Local infiltration    Local anesthetic:  Lidocaine 1% w/o epi  Procedure details:     Location:  L internal jugular    Patient position:  Trendelenburg    Procedural supplies:  Triple lumen    Catheter size: 13F, 20 length.    Landmarks identified: yes      Ultrasound guidance: yes      Sterile ultrasound techniques: Sterile gel and sterile probe covers were used      Number of attempts:  1    Successful placement: yes    Post-procedure details:     Post-procedure:  Dressing applied and line sutured    Assessment:  Blood return through all ports, no pneumothorax on x-ray and placement verified by x-ray    Patient tolerance of procedure:  Tolerated well, no immediate complications  Comments:      Guidewire removed          SILVIA Armenta

## 2022-02-25 NOTE — ASSESSMENT & PLAN NOTE
Secondary to GI bleeding  EGD shows 3 duodenal bulb ulcers  Continued to bleed, CTA abdomen and pelvis 2/25 - no active extravasation present.  Norepi, vasopressin, MAP goal >65  PPI drip  Repeat EGD 2/26 with clip placed  Hgb stabilized since  -Continue ICU level of care  -Q12 H/H  -Start sucralfate when tolerating PO  -Continue on PPI drip and supportive care for GIB

## 2022-02-25 NOTE — PROCEDURES
Central Line Insertion    Date/Time: 2/25/2022 5:30 PM  Performed by: Kervin Porter Jr., D.O.  Authorized by: Kervin Porter Jr., D.O.     Consent:     Consent obtained:  Emergent situation  Universal protocol:     Immediately prior to procedure, a time out was called: yes      Patient identity confirmed:  Arm band  Pre-procedure details:     Hand hygiene: Hand hygiene performed prior to insertion      Sterile barrier technique: All elements of maximal sterile technique followed      Skin preparation:  ChloraPrep    Skin preparation agent: Skin preparation agent completely dried prior to procedure    Sedation:     Sedation type:  None  Anesthesia:     Anesthesia method:  Local infiltration  Procedure details:     Location:  R internal jugular    Patient position:  Trendelenburg    Procedural supplies:  Triple lumen    Catheter size:  7 Fr    Landmarks identified: yes      Ultrasound guidance: yes      Sterile ultrasound techniques: Sterile gel and sterile probe covers were used      Number of attempts:  1    Successful placement: yes    Post-procedure details:     Post-procedure:  Dressing applied and line sutured    Assessment:  Blood return through all ports, free fluid flow, no pneumothorax on x-ray and placement verified by x-ray    Patient tolerance of procedure:  Tolerated well, no immediate complications  Comments:      Wire verified is removed by myself and the bedside RN  Chest x-ray reviewed and central line is in good position and available for immediate use

## 2022-02-25 NOTE — PROGRESS NOTES
Manuel Porter and Roya at bedside for EGD.     Timeout at 2212. All agree. Consent obtained by Dr. Pryor from wife.     Medications per MAR.     End time 2237

## 2022-02-25 NOTE — PROGRESS NOTES
[facility] ICU Progress Note      Admit Date: 2/1/2022    Resident(s): Girish Owusu M.D.   Attending:  JAMI PIMENTEL/ Dr. Julio    Patient ID:    Name:  Krishan Acevedo   YOB: 1950  Age:  71 y.o.  male   MRN:  [id number]    Hospital Course (carried forward and updated):  Krishan Acevedo is a 71 y.o. 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  He was admitted to the hospital with acute kidney injury, has been treated with the prednisone and underwent kidney biopsy which showed no signs of rejection, urinary tract infection with Carbapenem resistant Pseudomonas and he completed antibiotics.  CT head without obtained which did not show any acute bleeds or abnormalities, patient had maroon-colored stools therefore GI was consulted and underwent EGD on 2/24/2022 which showed significant duodenal bulb ulcerations x3 which was clipped and cauterized.  Patient was admitted to the intensive care for hemorrhagic shock requiring multiple pressors.    Consultants:  Critical Care  Gastroenterology  Nephrology    Interval Events:  2/25: Patient had 4 maroon-colored stools I updated GI service, recommended CTA of the abdomen to see extravasation.  Patient remained on 2 vasopressors, hemoglobin stable and not requiring any transfusions at this time, kidney function worsened therefore I contacted with nephrology, recommended temporary hemodialysis.  Therefore temporary dialysis catheter was placed today.  Potassium is stable at 5 however increasing from last, continue bicarb drip due to significant acidosis, no signs of overt volume overload.      Vitals Range last 24h:  Temp:  [35.1 °C (95.2 °F)-36.8 °C (98.2 °F)] 36.8 °C (98.2 °F)  Pulse:  [] 94  Resp:  [12-36] 36  BP: ()/(33-84) 115/63  SpO2:  [92 %-100 %] 100 %      Intake/Output  Summary (Last 24 hours) at 2/25/2022 1120  Last data filed at 2/25/2022 0800  Gross per 24 hour   Intake 4232.16 ml   Output 450 ml   Net 3782.16 ml        Review of Systems   Unable to perform ROS: Mental acuity       PHYSICAL EXAM:  Vitals:    02/25/22 0630 02/25/22 0645 02/25/22 0700 02/25/22 0800   BP:    115/63   Pulse: 95 85 92 94   Resp: 15 (!) 22 13 (!) 36   Temp:       TempSrc:    Bladder   SpO2: 100% 100% 100% 100%   Weight:       Height:        Body mass index is 34.5 kg/m².    O2 therapy: Pulse Oximetry: 100 %, O2 (LPM): 6, O2 Delivery Device: Oxymask    Date 02/25/22 0700 - 02/26/22 0659   Shift 3503-5626 4370-7912 7437-6327 24 Hour Total   INTAKE   P.O. 0   0     P.O. 0   0   I.V. 83.7   83.7     Norepinephrine Volume 83.7   83.7   Other 0   0     Medications (PO/Enteral Liquids) 0   0   IV Piggyback 467.5   467.5     Volume (mL) (sodium bicarbonate 8.4 % 75 mEq in dextrose 5% 1,000 mL infusion) 200   200     Volume (mL) (pantoprazole (Protonix) 80 mg in  mL IVPB) 0   0     Volume (mL) (pantoprazole (Protonix) 80 mg in  mL Infusion) 267.5   267.5   Shift Total 551.2   551.2   OUTPUT   Urine 45   45     Output (mL) (Urethral Catheter Non-latex;Straight-tip;Temperature probe) 25   25     Output (mL) ([REMOVED] Urethral Catheter Straight-tip) 20   20   Shift Total 45   45   .2   506.2        Physical Exam  Constitutional:       General: He is not in acute distress.     Appearance: He is ill-appearing.   HENT:      Head: Normocephalic and atraumatic.      Nose: No rhinorrhea.      Mouth/Throat:      Mouth: Mucous membranes are moist.      Pharynx: No posterior oropharyngeal erythema.   Eyes:      General: No scleral icterus.  Cardiovascular:      Rate and Rhythm: Tachycardia present. Rhythm irregular.      Heart sounds: Normal heart sounds. No murmur heard.  Pulmonary:      Effort: No respiratory distress.      Breath sounds: No wheezing, rhonchi or rales.   Abdominal:      General:  Abdomen is flat. There is no distension.      Comments: Maroon colored soft stools present on the bed.   Musculoskeletal:      Right lower leg: No edema.      Left lower leg: No edema.   Skin:     Capillary Refill: Capillary refill takes 2 to 3 seconds.      Coloration: Skin is pale.   Neurological:      Comments: Patient is responding to painful stimuli, however no response to verbal.         Recent Labs     02/24/22  1549 02/24/22  1702   BVYEI05A  --  7.38*   [id number]  --  26.6   [id number]  --  133.7*   UNCV1ESI  --  97.0   ARTHCO3  --  15*   ARTBE  --  -9*   ISTATAPH 7.417  --    ISTATAPCO2 25.6*  --    ISTATAPO2 106*  --    ISTATATCO2 17*  --    [id number] 98  --    ISTATARTHCO3 16.5*  --    ISTATARTBE -7*  --    ISTATTEMP 97.1 F  --    ISTATFIO2 28  --    ISTATSPEC Arterial  --    ISTATAPHTC 7.430  --    YYCFLFAB3TF 101*  --      Recent Labs     02/24/22  0343 02/24/22  1744 02/25/22  0345   SODIUM 142 144 141   POTASSIUM 4.7 4.9 5.0   CHLORIDE 113* 114* 114*   CO2 18* 14* 15*   BUN 77* 86* 84*   CREATININE 2.06* 3.52* 3.56*   MAGNESIUM  --  2.3  --    PHOSPHORUS  --  5.2*  --    CALCIUM 9.7 8.5 8.5     Recent Labs     02/24/22  0343 02/24/22  1744 02/25/22  0345   ALTSGPT 111* 348* 397*   ASTSGOT 63* 244* 288*   ALKPHOSPHAT 179* 124* 133*   TBILIRUBIN 0.5 0.5 0.6   GLUCOSE 129* 183* 214*     Recent Labs     02/24/22  2240 02/25/22  0345 02/25/22  0930   RBC 3.42* 3.49* 3.16*   HEMOGLOBIN 10.0* 10.1* 9.1*   HEMATOCRIT 29.8* 30.2* 27.6*   PLATELETCT 124* 122* 106*     Recent Labs     02/24/22  0343 02/24/22  1744 02/24/22  2240 02/25/22  0345 02/25/22  0930   WBC 13.6* 16.1* 21.3* 25.7* 21.0*   NEUTSPOLYS 62.60 91.70* 88.30* 89.70* 89.50*   LYMPHOCYTES 23.50 0.90* 6.00* 5.10* 6.10*   MONOCYTES 8.70 2.80 3.90 3.20 2.90   EOSINOPHILS 0.00 0.00 0.00 0.00 0.00   BASOPHILS 0.00 0.00 0.30 0.20 0.40   ASTSGOT 63* 244*  --  288*  --    ALTSGPT 111* 348*  --  397*  --    ALKPHOSPHAT 179* 124*  --  133*  --     TBILIRUBIN 0.5 0.5  --  0.6  --        Meds:  • sodium bicarbonate 150 meq in D5W 1000 mL   83 mL/hr at 02/25/22 0934   • tacrolimus  0.5 mg     • [Held by provider] sodium bicarbonate  650 mg     • norepinephrine (Levophed) infusion  0-30 mcg/min 16 mcg/min (02/25/22 1000)   • vasopressin (PITRESSIN) infusion  0.03 Units/min Stopped (02/24/22 1945)   • hydrocortisone sodium succinate PF  50 mg     • pantoprazole (PROTONIX) infusion  8 mg/hr 8 mg/hr (02/25/22 0717)   • [Held by provider] patiromer  8.4 g     • cinacalcet  30 mg     • insulin NPH  15 Units     • acetaminophen  650 mg     • loperamide  2 mg     • [Held by provider] tacrolimus  0.5 mg     • insulin regular  3-14 Units      And   • dextrose 50%  50 mL     • ondansetron  4 mg     • haloperidol lactate  2-5 mg     • ondansetron  4 mg     • hydrALAZINE  10 mg     • brimonidine  2 Drop      And   • timolol  2 Drop          Procedures:  Art line and central line placement 2/24 by Kervin Porter Jr., D.O.  EGD 2/24 by Raymond Pryor M.D.  Central line placement 2/25 by LLOYD Armenta    Imaging:  DX-CHEST-LIMITED (1 VIEW)   Final Result      1.  Interval placement of left IJ catheter with distal tip extending to mid SVC.      2.  Right IJ catheter unchanged in position overlying mid SVC.      3.  Subtle peripheral patchy airspace opacities noted in the lung fields.      DX-CHEST-FOR LINE PLACEMENT Perform procedure in: Patient's Room   Final Result      Right internal jugular catheter is been placed and appears appropriately located      CT-HEAD W/O   Final Result         1.  No acute intracranial process.      2. Diffuse atrophy and periventricular white matter changes consistent with chronic small vessel disease.      CT-NEEDLE BX-RENAL   Final Result      1.  CT GUIDED RIGHT LOWER QUADRANT/PELVIC RENAL TRANSPLANT KIDNEY BIOPSY.      EC-ECHOCARDIOGRAM LTD W/O CONT   Final Result      MR-BRAIN-W/O   Final Result         No acute intracranial process.       Nonspecific T2 hyperintensities are noted in the periventricular and deep white matter, most likely related to chronic microvascular ischemia.      Remote left thalamic and corona radiata lacunar infarction noted.      DX-ABDOMEN FOR TUBE PLACEMENT   Final Result         Feeding tube with tip projecting over the expected area of the first portion duodenum.      CT-HEAD W/O   Final Result         NO ACUTE ABNORMALITIES ARE NOTED ON CT SCAN OF THE HEAD.      Findings are consistent with atrophy.  Decreased attenuation in the periventricular white matter likely indicates microvascular ischemic disease.         CT-CHEST,ABDOMEN,PELVIS W/O   Final Result      1.  Multifocal pulmonary opacifications are identified which are new since the prior CT. Findings could be due to pneumonia.      2.  1.2 cm nodular opacification noted in the left upper pulmonary lobe. Primary or metastatic neoplasm is possible. Small focus of pneumonia or inflammation is also possible. Consider biopsy or PET/CT. Consider follow-up CT in 3 months.      3.  There is duodenal wall thickening. Findings could be due to duodenitis. Neoplasm is also possible. There is some mild induration in the retroperitoneal fat surrounding the duodenum.      4.  Cholelithiasis. No biliary ductal dilatation appreciated.      5.  Right lower quadrant renal transplant. No hydronephrosis.      Low and High Risk: Consider PET/CT, or tissue sampling, or follow-up CT at 3 months      Low Risk - Minimal or absent history of smoking and of other known risk factors.      High Risk - History of smoking or of other known risk factors.      Note: These recommendations do not apply to lung cancer screening, patients with immunosuppression, or patients with known primary cancer.      Fleischner Society 2017 Guidelines for Management of Incidentally Detected Pulmonary Nodules in Adults         US-EXTREMITY VENOUS LOWER BILAT   Final Result      US-RENAL TRANSPLANT COMP   Final  Result      1.  No hydronephrosis or perinephric fluid collection involving the right lower quadrant renal transplant.   2.  No definite evidence of hemodynamically significant renal artery stenosis.   3.  Elevated resistive indices could suggest renal parenchymal disease.      DX-CHEST-PORTABLE (1 VIEW)   Final Result      1.  Bilateral peripheral lower lobe hazy opacities which is nonspecific but can be seen with Covid 19 pneumonia.   2.  Enlarged cardiac silhouette with probable vascular congestion/edema.      CT-CTA ABDOMEN WITH & W/O-POST PROCESS    (Results Pending)       ASSESSEMENT and PLAN:    * Hemorrhagic shock (HCC)  Assessment & Plan  Secondary to GI bleeding  EGD shows 3 duodenal bulb ulcers  Continued to bleed, CTA was ordered - pending  Norepi, vasopressin, MAP goal >65  PPI drip  GI onboard.    Shock liver  Assessment & Plan  AST, ALT elevation after significant GI bleed  Continue to monitor  See hemorrhagic shock.    Duodenal ulcer  Assessment & Plan  See hemorrhagic shock    Type 2 diabetes mellitus ([facility])  Assessment & Plan  Goal blood glucose 140-180  basal insulin, sliding scale insulin, accuchecks  hypoglycemia protocol  A1c 7.4      Immunocompromised ([facility])- (present on admission)  Assessment & Plan  Tacrolimus continue  Cellcept hold  Prednisone continue    Acute encephalopathy- (present on admission)  Assessment & Plan  Likely due to hemorrhagic shock / GI bleed + BUN from RADHA  CT head no acute abnormalities.  EEG -non specific encephalopathy.  Dialysis  Bleeding control per GI    Positive urine culture- (present on admission)  Assessment & Plan  Pseudomonas UTI  Complete antibiotics on 2/18/2022    Anemia of chronic disease- (present on admission)  Assessment & Plan  Now worsening in the setting of GI bleeding  2 units PRBCs  Reverse Eliquis  Goal hemoglobin 8 in the setting of active bleeding, trend hemoglobin    RADHA (acute kidney injury) ([facility])- (present on admission)  Assessment &  Plan  Likely due to ATN from hemorrhagic shock.  Oliguric  Sodium bicarb IV until CO2 18.  Increase prednisone 30 daily to stress dose steroids  Renal dose meds, avoid nephrotoxins  Strict I/Os  Follow renal function  Temporary dialysis  Hold cellcept, losartan  Continue sensiparjimtassa    Long term (current) use of systemic steroids- (present on admission)  Assessment & Plan  Will stress dose given current shock state    Essential hypertension- (present on admission)  Assessment & Plan  Hold meds in the setting of shock    Kidney transplant status, living related donor- (present on admission)  Assessment & Plan  Due to wegeners granulomatosis, s/p transplant 2008  On tacrolimus and steroids  Biopsy - no rejection.  Nephrology onboard    Atrial flutter - (present on admission)  Assessment & Plan  Hold Eliquis given bleeding  Hold beta-blocker with hypotension  Telemetry monitoring  Optimize electrolytes  Rate control <110      DISPO: remain in icu due to hemorrhagic shock    CODE STATUS: dnr intubation ok    Quality Measures:  Feeding: NPO for now  Analgesia: tylenol  Sedation: not indicated  Thromboprophylaxis: scds. Pharm contraindicated  Head of bed: >30 degrees  Ulcer prophylaxis: ppi drip  Glycemic control: Correctional: na / Basal: na  Bowel care: bowel regimen contrainidicated  Indwelling lines: 2 central line, piv, art line  Deescalation of antibiotics: none      Girish Owusu M.D.

## 2022-02-25 NOTE — DISCHARGE PLANNING
Anticipated Discharge Disposition: SNF    Action: RN CM attended IDT rounds and reviewed patient chart.  Patient transferred to ICU after rapid response was called for decline in status.  Continues to require ICU level of care at this time.  Patient has been accepted at [facility] pending medical clearance and bed availability.           Barriers to Discharge: medical clearance; ICU level of care    Plan:  HCM to remain available for discharge planning needs and barriers     Care Transition Team Assessment  Emergency Contact  Penelope Jones (wife/POA) [phone]    Information Source  Orientation Level: Unable to assess  Information Given By: Other (Comments)  Informant's Name: EHR/chart review  Who is responsible for making decisions for patient? : POA  Name(s) of Primary Decision Maker: Penelope Acevedo    Readmission Evaluation  Is this a readmission?: No    Elopement Risk  Legal Hold: No  Ambulatory or Self Mobile in Wheelchair: No-Not an Elopement Risk  Disoriented: Place-At Risk for Elopement,Time-At Risk for Elopement,Situation-At Risk for Elopement  Psychiatric Symptoms: None  History of Wandering: No  Elopement this Admit: No  Vocalizing Wanting to Leave: No  Displays Behaviors, Body Language Wanting to Leave: No-Not at Risk for Elopement  Elopement Risk: Not at Risk for Elopement  Wanderguard On: No (See Comments) (not on a unit where wanderguards can be utilizss)  Personal Belongings: Hospital Clothing Only    Interdisciplinary Discharge Planning  Lives with - Patient's Self Care Capacity: Spouse  Patient or legal guardian wants to designate a caregiver: No  Housing / Facility: [address]  Prior Services: Intermittent Physical Support for ADL Per Family    Discharge Preparedness  What is your plan after discharge?: Skilled nursing facility  What are your discharge supports?: Spouse  Prior Functional Level: Ambulatory  Difficulity with ADLs: None  Difficulity with IADLs: None    Functional  Assesment  Prior Functional Level: Ambulatory    Finances  Financial Barriers to Discharge: No  Prescription Coverage: Yes    Vision / Hearing Impairment  Vision Impairment : Yes  Right Eye Vision: Blind  Left Eye Vision: Other (Comments) (pt unable to answer)    Advance Directive  Advance Directive?: DPOA for Health Care  Durable Power of  Name and Contact : Penelope Acevedo, spouse [phone]    Domestic Abuse  Have you ever been the victim of abuse or violence?: No  Physical Abuse or Sexual Abuse: No  Verbal Abuse or Emotional Abuse: No  Possible Abuse/Neglect Reported to:: Not Applicable    Psychological Assessment  History of Substance Abuse: None  History of Psychiatric Problems: No  Non-compliant with Treatment: No  Newly Diagnosed Illness: No    Discharge Risks or Barriers  Discharge risks or barriers?: Post-acute placement / services,Complex medical needs  Patient risk factors: Cognitive / sensory / physical deficit,Complex medical needs    Anticipated Discharge Information  Discharge Disposition: D/T to SNF with Medicare cert in anticipation of skilled care (03)

## 2022-02-25 NOTE — CODE DOCUMENTATION
1640:  Patient off CT table.  Unresponsive to sternal rub.  +pulse.  Transported to [facility].  1645:  Return to [facility], BP 59/33.  STAT page to Dr. Hughes.

## 2022-02-25 NOTE — THERAPY
Missed Therapy     Patient Name: Krishan Acevedo  Age:  71 y.o., Sex:  male  Medical Record #: [id number]  Today's Date: 2/25/2022    Attempted to see pt for OT session. Now s/p EGD 2/24 with clipped and cauterized duodenal ulcerations, txf'd to [facility] for hemorrhagic shock. Pt is currently not medically stable. Will hold and reattempt as appropriate/able.

## 2022-02-25 NOTE — ASSESSMENT & PLAN NOTE
Improving  Hep panel negative.  AST, ALT elevation after significant GI bleed  Continue to monitor  See hemorrhagic shock.

## 2022-02-25 NOTE — CONSULTS
Date of Consultation:  2/24/2022    Patient: : Krishan Acevedo  MRN: [id number]    Referring Physician: Dr. Porter     GI:Raymond Pryor M.D.     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  Was admitted February 1, 2021 due to progressive fatigue Covid positivity and hypoxemia patient was on prednisone CellCept and tacrolimus.  Patient demonstrated Carbapenem resistant Pseduomonas.  Patient has been on anticoagulation.  Per ICU attending patient had progressive multiple maroon color stool today with progressive confusion and hypotension thus transferred to the ICU for further management.  Patient's blood pressures systolic dropped down to 50 improved on pressor to 90/60.  Globin dropped from 10.8->7.4        Past Medical History:   Diagnosis Date   • Arrhythmia 2021    flutter   • Arthritis 2020    right shoulder, knees, & back   • Cough 2020    dry-on lisinipril, now productive -brown   • Kidney transplant pain 2020    low back   • E coli bacteremia 2014    hospitalized x 5 months   • Kidney failure 03/2008    kidney transplant   • Wegener's granulomatosis with renal involvement ([facility]) 2008   • Dialysis patient ([facility]) 2006    x 18 months   • CAD (coronary artery disease) 2003   • Atrial flutter ([facility])    • DVT (deep venous thrombosis) ([facility])    • Retinal detachment    • Sleep apnea     CPAP   • Snoring          Past Surgical History:   Procedure Laterality Date   • OTHER Right 2017    eye surgery, lasix & cataracts, retinal detatchment   • PEG PLACEMENT  10/10/2014    Performed by Brijesh Orozco M.D. at [facility]   • OTHER ORTHOPEDIC SURGERY Left 1995    knee       Family History   Problem Relation Age of Onset    • Stroke Brother 26        , ? cause   • Stroke Maternal Grandmother            • Clotting Disorder Neg Hx        Social History     Socioeconomic History   • Marital status:    Tobacco Use   • Smoking status: Never Smoker   • Smokeless tobacco: Former User     Types: Snuff, Chew   Vaping Use   • Vaping Use: Never used   Substance and Sexual Activity   • Alcohol use: Not Currently   • Drug use: Never       Review of Systems   Unable to perform ROS: Mental status change         Physical Exam:  Vitals:    22 1900 22 1915 22 1930 22 194   BP:  100/71     Pulse: 99 (!) 112 (!) 117 87   Resp: (!) 22 (!) 23 (!) 24 (!) 22   Temp: (!) 35.3 °C (95.5 °F)      TempSrc: Temporal      SpO2: 95% 96% 95% 96%   Weight:       Height:           Physical Exam  Constitutional:       Appearance: He is obese.   HENT:      Head: Normocephalic and atraumatic.      Nose: Nose normal.      Mouth/Throat:      Mouth: Mucous membranes are moist.   Cardiovascular:      Rate and Rhythm: Regular rhythm. Tachycardia present.      Pulses: Normal pulses.   Pulmonary:      Effort: Pulmonary effort is normal.      Breath sounds: Normal breath sounds.   Abdominal:      General: Abdomen is flat. Bowel sounds are normal. There is no distension.      Palpations: Abdomen is soft. There is no mass.      Tenderness: There is no guarding or rebound.      Hernia: No hernia is present.   Musculoskeletal:      Cervical back: Normal range of motion.           Labs:  Recent Labs     22  0454 22  0343 22  1744   WBC 11.1* 13.6* 16.1*   RBC 3.92* 3.74* 2.49*   HEMOGLOBIN 11.5* 10.8* 7.4*   HEMATOCRIT 35.7* 33.9* 22.5*   MCV 91.1 90.6 90.4   MCH 29.3 28.9 29.7   MCHC 32.2* 31.9* 32.9*   RDW 53.2* 53.1* 53.5*   PLATELETCT 92* 107* 126*   MPV 11.4 11.1 11.6     Recent Labs     22  0454 22  0343 22  1744   SODIUM 148* 142 144   POTASSIUM 4.7 4.7 4.9   CHLORIDE 115* 113* 114*   CO2 21 18* 14*    GLUCOSE 113* 129* 183*   BUN 62* 77* 86*     Recent Labs     02/22/22  0916   INR 1.07       Recent Labs     02/22/22  0916 02/23/22  0454 02/24/22  0343 02/24/22  1744   ASTSGOT  --  72* 63* 244*   ALTSGPT  --  107* 111* 348*   TBILIRUBIN  --  0.7 0.5 0.5   ALKPHOSPHAT  --  175* 179* 124*   GLOBULIN  --  2.7 3.0 2.2   INR 1.07  --   --   --          Imaging:  DX-CHEST-FOR LINE PLACEMENT Perform procedure in: Patient's Room  Narrative: 2/24/2022 6:28 PM    HISTORY/REASON FOR EXAM: .  Central line placement    TECHNIQUE/EXAM DESCRIPTION AND NUMBER OF VIEWS:  Single view of the chest.    COMPARISON: 1 view chest 8/29/2014    FINDINGS:  Right internal jugular catheter has been placed.    LUNGS: There is a mild airspace process at the right lung base. The left lung is partially obscured by overlying device.    HEART and MEDIASTINUM: Heart and mediastinum: enlarged.    Pleura: There are no pleural effusion or pneumothoraces.    Osseous structures: No significant bony abnormality.  Impression: Right internal jugular catheter is been placed and appears appropriately located  CT-HEAD W/O  Narrative: 2/24/2022 4:24 PM    HISTORY/REASON FOR EXAM:  Mental status change, unknown cause    TECHNIQUE/EXAM DESCRIPTION AND NUMBER OF VIEWS:  CT scan of the head without contrast.    Contiguous 5 mm axial sections were obtained from the skull base through the vertex.    Up to date radiation dose reduction adjustments have been utilized to meet ALARA standards for radiation dose reduction.    COMPARISON: 2/12/2022    FINDINGS:     No acute hemorrhage, mass-effect, or midline shift.   There is diffuse atrophy.   Periventricular white matter changes are consistent with chronic small vessel disease.   Ventricles and cisterns are patent. No ischemia or intracranial mass   is identified. The paranasal sinuses and mastoid air cells are clear.  Impression: 1.  No acute intracranial process.    2. Diffuse atrophy and periventricular white  matter changes consistent with chronic small vessel disease.            Impressions:  1. Hematochezia   2. Anemia- likely from acute bleeding; unclear if rapid upper vs lower GI  3. Leukocytosis  4. Hypotension on pressor  5. Electrolyte disturbance  6. Renal transplant on immunosuppressive medication  7. Encephalopathy  8. Worsening RADHA  9. CHF  10. COVID history  11. Aflutter on chronic anticoagulation.     71-year-old male with multiple conditions atrial flutter on chronic anticoagulation as well as history of renal transplant on immunosuppressive medication who is being seen for maroon color stool hypotension suggestive of hematochezia.  Location of bleeding unclear whether upper or lower.  Currently has RADHA with worsening kidney function.  Patient hypotensive in ICU on pressor support.       Recommendations:  1. Plan for urgent EGD, possibly tonight, when patient more stabilized   2. If EGD negative, consider CTA with consideration of worsening kidney injury  3. Reversal of anticoagulation.  4. Obtain consent from patient or family member  5. PPI therapy.       This note was generated using voice recognition software which has a small chance of producing errors of grammar and possibly content. I have made every reasonable attempt to find and correct any obvious errors, but expect that some may not be found prior to finalization of this note.

## 2022-02-25 NOTE — PROCEDURES
Conscious Sedation Procedure Note    Indication: GI bleed    Consent: I have discussed with the patient and/or the patient representative the indication, alternatives, and the possible risks and/or complications of the planned procedure and the anesthesia methods. The patient and/or patient representative appear to understand and agree to proceed.    Physician Involvement: The attending physician was present and supervising this procedure.    Pre-Sedation Documentation and Exam: I have personally completed a history, physical exam & review of systems for this patient (see notes).  Vital signs have been reviewed (see flow sheet for vitals).    Airway Assessment: normal, dentition not prohibitive    Prior History of Anesthesia Complications: none    ASA Classification: Class 3 - A patient with severe systemic disease that limits activity but is not incapacitating and E Status - the procedure is performed on an Emergency basis    Sedation/ Anesthesia Plan: intravenous sedation    Medications Used: ketamine 175 mg given over 3 doses (75, 50, 50) intravenously    Monitoring and Safety: The patient was placed on a cardiac monitor and vital signs, pulse oximetry and level of consciousness were continuously evaluated throughout the procedure. The patient was closely monitored until recovery from the medications was complete and the patient had returned to baseline status. Respiratory therapy was on standby at all times during the procedure.    Post-Sedation Vital Signs: Vital signs were reviewed and were stable after the procedure (see flow sheet for vitals)            Post-Sedation Exam: Lungs: clear and Cardiovascular: Unchanged           Complications: hypotension which was present prior to procedure    Sedation Time: 1011 to 1038. 27 total minutes    CPT:  27423, and 12674

## 2022-02-25 NOTE — PROGRESS NOTES
0330: ROSALINE Pride notified that pt had another large bloody bm, SBP in the 110s while titrating levo gtt down, HR in the low 100s. Drawing CBC now.

## 2022-02-25 NOTE — PROGRESS NOTES
"[facility] -  PROGRESS NOTE               Author: Kimberly Valentine M.D. Date & Time: 2/25/2022  10:02 AM     HPI:  Krishan Acevedo is a 71 y.o. 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 Was additionally found to have worsening renal function.   Patient shares that he first tested positive for COVID at an outside hospital several days ago. He felt that he was not improving and possibly getting worse at home which prompted him to call EMS. He mentions that for several days he has been having loose stools, noting 3-5 loose but not watery BMs daily, noting his last loose stool was day prior to presentation. He also mentions that he has become more dependent on his wife to bring him food and water citing fatigue with his COVID infection. He does note that for the past several weeks he has been feeling \"like I'm always thirsty\".  While in the ED he was vitally stable. Was placed on 2L NC sating in the mid 90s BP ranged 123/70 from 159/117. Labs notable for Cr of 2.62. BUN 80, COVID-19 positive, UA noted for large occult blood, small leukocyte esterase. US of kidneys was completed with indicated no hydronephrosis or fluid collections, no renal artery stenosis.   In regards to his transplant history. Living donor transplant was completed in 2008 at an outside hospital. Patient is on Prednisone 5mg, Mycophenolate 500mg BID, and Tacrolimus 1mg and 0.5mg daily. His GFR trends in the 30s-40s although in chart he has climbed to the 56-53 ranges in the past.      DAILY NEPHROLOGY SUMMARY:  2/1: Admitted, consult placed  2/2: no overnight events, renal function improving, patient tolerating PO intake  2/3: started on IVF yesterday, renal function improved to around prior baseline range  2/4: renal function " continues to improve  2/5: Scr down to baseline levels. No new overnight renal events. Feels ok.  2/6: Feels ok. Urine culture from 2/1 positive for carbapenem resistant Pseudomonas aeruginosa. He is on zosyn per sensitivity. No labs today  2/7: Potassium is 5.7 today.  Creatinine up to 1.95.  Corrected calcium elevated.  CO2 16.  2/08: Chart and notes reviewed. No new labs this am. +Tachycardia.   2/09: Nursing notes and assessments reviewed.  Int tachycardia still. Room air. SBP labile 100s-160s. K 5.5  2/10: Spoke with RN, patient has no complaints at this time.   Plan is for discharge today or tomorrow to SNF pending 1L bolus and Calcium recheck.  Current SCa 11.5  VSS RA  -160s  2/11: Sodium up to 149.  Calcium increasing.  Creatinine stable 1.7.  2/12: Sodium up to 152.  Calcium remains elevated.  Creatinine 1.8.  I/Os not documented  2/13: Sodium still at 152.  Calcium trending down.  Creatinine up to 2.06.  On bicarb drip.  Somnolent  2/14: Unable to participate in interview, improved hemodynamics, cr stable  2/15: stable hemodynamics, 1.9L UOP, cr to 1.9, labile blood pressures, ongoing intermittent delirium  2/16: Hypertensive this AM, slight improvement in Cr, ca climbing, net neg 600cc with 1.9L UOP, confused  2/17: 1.4L UOP,ongoing encephalopthy, sodium climbing, minimal PO intake    2/18: IOs not recorded, cr 1.5, tremulous, minimal PO intake-serum sodium climbing  2/19: 2.6L  UOP, sodium climbing, more confused this a.m, wife at bedside  2/21: remains confused, biopsy was postponed to until tomorrow due to holiday.   2/22: confused. Refused Veltassa for 2 days but talked to him about it.   2/23: more awake and alert, wife is at bedside. UOP is great in velez bacg but not being documented. S/p allograft kidney biopsy 2/22/22.  2/23: could not sleep at night, cr is worsening but UOP is great.   2/24: Patient was transferred to ICU due to altered mentation and GI bleed.  Underwent endoscopy which  showed duodenal ulcers.  In Shock On norepinephrine@ 18. UOP has decreased. Not intubated yet.       ROS  Unable to obtain as the patient is lethargic    Physical Exam  Constitutional:       Comments: Appears lethargic   HENT:      Head: Normocephalic and atraumatic.      Nose: Nose normal.      Mouth/Throat:      Mouth: Mucous membranes are dry.   Eyes:      Extraocular Movements: Extraocular movements intact.      Pupils: Pupils are equal, round, and reactive to light.   Cardiovascular:      Rate and Rhythm: Normal rate and regular rhythm.      Pulses: Normal pulses.      Heart sounds: Normal heart sounds.   Pulmonary:      Effort: Pulmonary effort is normal.      Breath sounds: Normal breath sounds.   Abdominal:      Palpations: Abdomen is soft.   Musculoskeletal:         General: Normal range of motion.      Cervical back: Normal range of motion.   Skin:     General: Skin is warm.   Neurological:      Comments: Unable to assess.  Moves extremities spontaneously         PAST FAMILY HISTORY: Reviewed and Unchanged  SOCIAL HISTORY: Reviewed and Unchanged  CURRENT MEDICATIONS: Reviewed  IMAGING STUDIES: Reviewed    Fluids:  In: 3680.9 [I.V.:534.3]  Out: 405     LABS:  Recent Results (from the past 24 hour(s))   POCT glucose device results    Collection Time: 02/24/22  3:33 PM   Result Value Ref Range    POC Glucose, Blood 208 (H) 65 - 99 mg/dL   POCT arterial blood gas device results    Collection Time: 02/24/22  3:49 PM   Result Value Ref Range    Ph 7.417 7.400 - 7.500    Pco2 25.6 (L) 26.0 - 37.0 mmHg    Po2 106 (H) 64 - 87 mmHg    Tco2 17 (L) 20 - 33 mmol/L    S02 98 93 - 99 %    Hco3 16.5 (L) 17.0 - 25.0 mmol/L    BE -7 (L) -4 - 3 mmol/L    Body Temp 97.1 F degrees    O2 Therapy 28 %    iPF Ratio 379     Ph Temp Delon 7.430 7.400 - 7.500    Pco2 Temp Co 24.7 (L) 26.0 - 37.0 mmHg    Po2 Temp Cor 101 (H) 64 - 87 mmHg    Specimen Arterial     DelSys Other     LPM 2 lpm   ABG - LAB    Collection Time: 02/24/22  5:02  PM   Result Value Ref Range    Ph 7.38 (L) 7.40 - 7.50    Pco2 26.6 26.0 - 37.0 mmHg    Po2 133.7 (H) 64.0 - 87.0 mmHg    O2 Saturation 97.0 93.0 - 99.0 %    Hco3 15 (L) 17 - 25 mmol/L    Base Excess -9 (L) -4 - 3 mmol/L    Body Temp see below Centigrade   Comp Metabolic Panel    Collection Time: 02/24/22  5:44 PM   Result Value Ref Range    Sodium 144 135 - 145 mmol/L    Potassium 4.9 3.6 - 5.5 mmol/L    Chloride 114 (H) 96 - 112 mmol/L    Co2 14 (L) 20 - 33 mmol/L    Anion Gap 16.0 7.0 - 16.0    Glucose 183 (H) 65 - 99 mg/dL    Bun 86 (HH) 8 - 22 mg/dL    Creatinine 3.52 (H) 0.50 - 1.40 mg/dL    Calcium 8.5 8.5 - 10.5 mg/dL    AST(SGOT) 244 (H) 12 - 45 U/L    ALT(SGPT) 348 (H) 2 - 50 U/L    Alkaline Phosphatase 124 (H) 30 - 99 U/L    Total Bilirubin 0.5 0.1 - 1.5 mg/dL    Albumin 2.0 (L) 3.2 - 4.9 g/dL    Total Protein 4.2 (L) 6.0 - 8.2 g/dL    Globulin 2.2 1.9 - 3.5 g/dL    A-G Ratio 0.9 g/dL   CBC WITH DIFFERENTIAL    Collection Time: 02/24/22  5:44 PM   Result Value Ref Range    WBC 16.1 (H) 4.8 - 10.8 K/uL    RBC 2.49 (L) 4.70 - 6.10 M/uL    Hemoglobin 7.4 (L) 14.0 - 18.0 g/dL    Hematocrit 22.5 (L) 42.0 - 52.0 %    MCV 90.4 81.4 - 97.8 fL    MCH 29.7 27.0 - 33.0 pg    MCHC 32.9 (L) 33.7 - 35.3 g/dL    RDW 53.5 (H) 35.9 - 50.0 fL    Platelet Count 126 (L) 164 - 446 K/uL    MPV 11.6 9.0 - 12.9 fL    Neutrophils-Polys 91.70 (H) 44.00 - 72.00 %    Lymphocytes 0.90 (L) 22.00 - 41.00 %    Monocytes 2.80 0.00 - 13.40 %    Eosinophils 0.00 0.00 - 6.90 %    Basophils 0.00 0.00 - 1.80 %    Nucleated RBC 0.60 /100 WBC    Neutrophils (Absolute) 15.21 (H) 1.82 - 7.42 K/uL    Lymphs (Absolute) 0.14 (L) 1.00 - 4.80 K/uL    Monos (Absolute) 0.45 0.00 - 0.85 K/uL    Eos (Absolute) 0.00 0.00 - 0.51 K/uL    Baso (Absolute) 0.00 0.00 - 0.12 K/uL    NRBC (Absolute) 0.09 K/uL   MAGNESIUM    Collection Time: 02/24/22  5:44 PM   Result Value Ref Range    Magnesium 2.3 1.5 - 2.5 mg/dL   PHOSPHORUS    Collection Time: 02/24/22  5:44  PM   Result Value Ref Range    Phosphorus 5.2 (H) 2.5 - 4.5 mg/dL   TROPONIN    Collection Time: 02/24/22  5:44 PM   Result Value Ref Range    Troponin T 78 (H) 6 - 19 ng/L   LACTIC ACID    Collection Time: 02/24/22  5:44 PM   Result Value Ref Range    Lactic Acid 4.6 (HH) 0.5 - 2.0 mmol/L   proBrain Natriuretic Peptide, NT    Collection Time: 02/24/22  5:44 PM   Result Value Ref Range    NT-proBNP 1275 (H) 0 - 125 pg/mL   COD - Adult (Type and Screen)    Collection Time: 02/24/22  5:44 PM   Result Value Ref Range    ABO Grouping Only A     Rh Grouping Only POS     Antibody Screen-Cod NEG    DIFFERENTIAL MANUAL    Collection Time: 02/24/22  5:44 PM   Result Value Ref Range    Bands-Stabs 2.80 0.00 - 10.00 %    Metamyelocytes 1.80 %    Manual Diff Status PERFORMED    PERIPHERAL SMEAR REVIEW    Collection Time: 02/24/22  5:44 PM   Result Value Ref Range    Peripheral Smear Review see below    PLATELET ESTIMATE    Collection Time: 02/24/22  5:44 PM   Result Value Ref Range    Plt Estimation Decreased    MORPHOLOGY    Collection Time: 02/24/22  5:44 PM   Result Value Ref Range    RBC Morphology Present     Poikilocytosis 2+     Schistocytes 1+     Echinocytes 2+     Toxic Gran Marked     Dohle Bodies Few    ESTIMATED GFR    Collection Time: 02/24/22  5:44 PM   Result Value Ref Range    GFR If  21 (A) >60 mL/min/1.73 m 2    GFR If Non African American 17 (A) >60 mL/min/1.73 m 2   UN-XM'D RBC    Collection Time: 02/24/22  6:58 PM   Result Value Ref Range    Component R       R99                 Red Cells, LR       [id number]   transfused   02/24/22   19:00      Product Type R99     Dispense Status transfused     Unit Number (Barcoded) [id number]     Product Code (Barcoded) D1752F42     Blood Type (Barcoded) 5100     Component R       R99                 Red Cells, LR       [id number]   transfused   02/24/22   19:00      Product Type R99     Dispense Status transfused     Unit Number (Barcoded)  [id number]     Product Code (Barcoded) J4832N43     Blood Type (Barcoded) 5100    PLATELET MAPPING WITH BASIC TEG    Collection Time: 02/24/22  7:29 PM   Result Value Ref Range    Reaction Time Initial-R 4.2 (L) 4.6 - 9.1 min    React Time Initial Hep 4.2 (L) 4.3 - 8.3 min    Clot Kinetics-K 0.8 0.8 - 2.1 min    Clot Angle-Angle 77.5 63.0 - 78.0 degrees    Maximum Clot Strength-MA 65.5 52.0 - 69.0 mm    TEG Functional Fibrinogen(MA) 34.0 (H) 15.0 - 32.0 mm    Lysis 30 minutes-LY30 0.0 0.0 - 2.6 %    % Inhibition ADP 29.2 (H) 0.0 - 17.0 %    % Inhibition AA 6.0 0.0 - 11.0 %    TEG Algorithm Link Algorithm    POCT glucose device results    Collection Time: 02/24/22  7:40 PM   Result Value Ref Range    POC Glucose, Blood 148 (H) 65 - 99 mg/dL   CBC WITH DIFFERENTIAL    Collection Time: 02/24/22 10:40 PM   Result Value Ref Range    WBC 21.3 (H) 4.8 - 10.8 K/uL    RBC 3.42 (L) 4.70 - 6.10 M/uL    Hemoglobin 10.0 (L) 14.0 - 18.0 g/dL    Hematocrit 29.8 (L) 42.0 - 52.0 %    MCV 87.1 81.4 - 97.8 fL    MCH 29.2 27.0 - 33.0 pg    MCHC 33.6 (L) 33.7 - 35.3 g/dL    RDW 57.5 (H) 35.9 - 50.0 fL    Platelet Count 124 (L) 164 - 446 K/uL    MPV 11.4 9.0 - 12.9 fL    Neutrophils-Polys 88.30 (H) 44.00 - 72.00 %    Lymphocytes 6.00 (L) 22.00 - 41.00 %    Monocytes 3.90 0.00 - 13.40 %    Eosinophils 0.00 0.00 - 6.90 %    Basophils 0.30 0.00 - 1.80 %    Immature Granulocytes 1.50 (H) 0.00 - 0.90 %    Nucleated RBC 0.40 /100 WBC    Neutrophils (Absolute) 18.80 (H) 1.82 - 7.42 K/uL    Lymphs (Absolute) 1.28 1.00 - 4.80 K/uL    Monos (Absolute) 0.83 0.00 - 0.85 K/uL    Eos (Absolute) 0.00 0.00 - 0.51 K/uL    Baso (Absolute) 0.07 0.00 - 0.12 K/uL    Immature Granulocytes (abs) 0.32 (H) 0.00 - 0.11 K/uL    NRBC (Absolute) 0.08 K/uL   EKG    Collection Time: 02/24/22 11:00 PM   Result Value Ref Range    Report       [facility]    Test Date:  2022-02-24  Pt Name:    MOMO ESCUDERO                  Department: [facility]  MRN:        [id number]                       Room:       [facility]  Gender:     Male                         Technician: FABIAN  :        1950                   Requested By:MEDINA BETTY HOUSER   Order #:    [id number]                    Reading MD: Jones Garcia MD    Measurements  Intervals                                Axis  Rate:       104                          P:          -34  IL:         152                          QRS:        -9  QRSD:       132                          T:          -30  QT:         384  QTc:        506    Interpretive Statements  SINUS TACHYCARDIA  ATRIAL PREMATURE COMPLEX  RIGHT BUNDLE BRANCH BLOCK  Compared to ECG 2022 23:47:41  Atrial premature complex(es) now present  Sinus rhythm no longer present  Electronically Signed On 2022 5:29:32 PST by Jones Garcia MD     CBC WITH DIFFERENTIAL    Collection Time: 22  3:45 AM   Result Value Ref Range    WBC 25.7 (H) 4.8 - 10.8 K/uL    RBC 3.49 (L) 4.70 - 6.10 M/uL    Hemoglobin 10.1 (L) 14.0 - 18.0 g/dL    Hematocrit 30.2 (L) 42.0 - 52.0 %    MCV 86.5 81.4 - 97.8 fL    MCH 28.9 27.0 - 33.0 pg    MCHC 33.4 (L) 33.7 - 35.3 g/dL    RDW 59.6 (H) 35.9 - 50.0 fL    Platelet Count 122 (L) 164 - 446 K/uL    MPV 11.4 9.0 - 12.9 fL    Neutrophils-Polys 89.70 (H) 44.00 - 72.00 %    Lymphocytes 5.10 (L) 22.00 - 41.00 %    Monocytes 3.20 0.00 - 13.40 %    Eosinophils 0.00 0.00 - 6.90 %    Basophils 0.20 0.00 - 1.80 %    Immature Granulocytes 1.80 (H) 0.00 - 0.90 %    Nucleated RBC 0.30 /100 WBC    Neutrophils (Absolute) 23.11 (H) 1.82 - 7.42 K/uL    Lymphs (Absolute) 1.30 1.00 - 4.80 K/uL    Monos (Absolute) 0.82 0.00 - 0.85 K/uL    Eos (Absolute) 0.00 0.00 - 0.51 K/uL    Baso (Absolute) 0.05 0.00 - 0.12 K/uL    Immature Granulocytes (abs) 0.46 (H) 0.00 - 0.11 K/uL    NRBC (Absolute) 0.07 K/uL   Comp Metabolic Panel    Collection Time: 22  3:45 AM   Result Value Ref Range    Sodium 141 135 - 145 mmol/L    Potassium 5.0 3.6 - 5.5 mmol/L    Chloride 114 (H) 96  - 112 mmol/L    Co2 15 (L) 20 - 33 mmol/L    Anion Gap 12.0 7.0 - 16.0    Glucose 214 (H) 65 - 99 mg/dL    Bun 84 (HH) 8 - 22 mg/dL    Creatinine 3.56 (H) 0.50 - 1.40 mg/dL    Calcium 8.5 8.5 - 10.5 mg/dL    AST(SGOT) 288 (H) 12 - 45 U/L    ALT(SGPT) 397 (H) 2 - 50 U/L    Alkaline Phosphatase 133 (H) 30 - 99 U/L    Total Bilirubin 0.6 0.1 - 1.5 mg/dL    Albumin 2.0 (L) 3.2 - 4.9 g/dL    Total Protein 4.3 (L) 6.0 - 8.2 g/dL    Globulin 2.3 1.9 - 3.5 g/dL    A-G Ratio 0.9 g/dL   ESTIMATED GFR    Collection Time: 02/25/22  3:45 AM   Result Value Ref Range    GFR If  20 (A) >60 mL/min/1.73 m 2    GFR If Non African American 17 (A) >60 mL/min/1.73 m 2   POCT glucose device results    Collection Time: 02/25/22  6:33 AM   Result Value Ref Range    POC Glucose, Blood 178 (H) 65 - 99 mg/dL       (click the triangle to expand results)      IMPRESSION:  # History of Renal Transplant with [facility]   - in the beginning of admission 2/2 ? 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 Then worsened again likely 2/2 ATN from acute GI bleed and shock. Currently oliguric.   - Hematuria +  -Tacrolimus 22.5  2/8, dose reduced, repeated FK level 2/18 8.9 but lab was drawn after the dose so it is not a trough. repeat FK level  on 2/22 = 4.2.   # History of ANCA Vasculitis  - Repeat serologies positive ( MPO 42, PR3 61 on 2/10)  but no signs of vasculitis on Allograft biopsy   # CKD 3  # Acidosis 2/2 Renal failure  # Afib  # DM2  # HTN  # Hypercalcemia: PTH mediated   # Hyperparathyroid: may need further imaging going forward  # Acute encephalopathy  - azotemia is partially driven by steroids and GI bleeding. But can not rule out uremia at this time.   # Acute upper GI bleed 2/2 duodenal ulcers, s/p repair  # anemia of acute blood loss    # Elevated LFT? shoch liver   # NSTEMI     PLAN:  - HD today for uremia once HD cath is placed  by ICU team  - V hydrocortisone/Decadron per primary team. Will need to re-start prednisone once hydrocortisone course is finished and pt is cleared for PO intake   - As the patient is  N.p.o, Tacrolimus has to be switched to IV tacrolimus 0.5 mg as a slow drip over 24 hours to minimize side effects. Please monitor for arrhythmias, anaphylaxis reaction. If side effects appear can be switched to Cyclosporin IV since has less side effect profile. Once patient is cleared to PO intake please let us know so we will re-start tacrolimus PO 1 mg in am and 0.5 mg in pm)   - switch IVF to Bicarb d5w  - Lasix trial 80 mg once   - re-start sensipar 30mg TIW once cleared for PO intake   - Hold Cellcept for now   - Continue to hold losartan  - Low potassium renal diet once back to PO   - Dose meds for reduced GFR    Discussed with Primary team and pharmacy.   Thank you    The patient was evaluated with the Resident.  I reviewed the note and discussed the findings.  Please see plan in the note for full details regarding this patient.  The chart was reviewed and summarized.  Available labs, imaging, and pertinent patient data were also reviewed.  Available nursing, consultant, and other records were also reviewed.  I am actively involved in the patient's care.

## 2022-02-25 NOTE — PROGRESS NOTES
This RN entered pt room and found pt to have large bloody bm. ROSALINE Yangy notified. Ordered to continue to monitor for signs of bleeding in vitals (I.e. hypotension requiring increase in levo gtt, tachycardia). PT HR currently 105, BP 97/51 with MAP 67. Continue Q6 CBC lab draws

## 2022-02-25 NOTE — PROCEDURES
Esophagogastroduodenoscopy  Date of Procedure: 2/24/2022  Attending Physician: Raymond Pryor MD    Indications: GI bleeding  Instrument: Olympus Flexible Endoscope  [facility]      Pre-Anesthesia Assessment:  Prior to the procedure, a History and Physical was performed, and patient medications and allergies were reviewed. The patient’s tolerance of previous anesthesia was also reviewed. The risks and benefits of the procedure and the sedation options and risks were discussed with patient's wife including but not limited to infection, bleeding, aspiration, perforation, adverse medication reaction, missed diagnosis, and missed lesions. Penelope verbalized understanding. All questions were answered, and informed consent was obtained.     After I obtained informed consent from the patient, the patient was placed in the supine position. Appropriate time-out protocol was followed: the correct patient, the correct procedure, and the correct equipment in the room were confirmed. Throughout the procedure, the patient’s blood pressure, pulse, and oxygen saturations were monitored continuously. The Olympus flexible gastroscope was gently passed through the incisoral orifice into the oral cavity and under direct visualization the esophagus was intubated. The endoscope was passed down the esophagus, through the stomach and into the 2nd portion of the duodenum. Retroflexion was performed at the gastroesophageal junction. Color, texture, mucosa and anatomy of esophagus, stomach, and duodenum were carefully examined with the scope.  After completion of the examination, the endoscope as removed. The patient tolerated the procedure well. There were no immediate postoperative complications.       Findings:    EGD:  Esophagus: normal appearing, no esphagitis  Stomach: some food material but no fresh blood. Retroflexion normal  Duodenum: 3 deep ulceration (1cm, 2.5cm, 2cm) noted in the duodenal bulb of which visible  vessels were noted without any active bleeding.  The visible vessels were cauterized with gold probe in multiple areas.  Subsequently 4 clips were placed on the 2 of the ulceration on the left side.  There was 1 ulcer with a visible vessel that was difficult to visualize on the sweep.  Attempt to cauterized with gold probe with difficult secondary to tangential view.  One Hemoclip was placed in attempt to approximate this area with some difficulty.  These ulceration appeared to be semifibrotic.    Impressions:   1.  3 ulceration in the duodenal bulb/duodenal sweep with visible vessel treated with gold probe cautery and hemoclips      Recommendations:   1.  Monitor for postprocedure complication including perforation bleeding  2.  Continue PPI drip  3.  If recurrence some bleeding may need repeat EGD and or surgical consultation/CT angiography  4. If able, can use carafate for further protection  5. Avoid NSAIDS      This note was generated using voice recognition software which has a small chance of producing errors of grammar and possibly content. I have made every reasonable attempt to find and correct any obvious errors, but expect that some may not be found prior to finalization of this note

## 2022-02-25 NOTE — ASSESSMENT & PLAN NOTE
Goal blood glucose 140-180  basal insulin, sliding scale insulin, accuchecks  hypoglycemia protocol  A1c 7.4

## 2022-02-25 NOTE — PROGRESS NOTES
1520:  RRT team to bedside.  Gregory ANGELES called for a staff concern for worsening mentation.     1549:  ABG drawn by RT    1554:  Dr. Hughes returned page.  RRT RN updated MD on patient's worsening mentation, ETCO2 and ABG results.  Plan to change head CT to STAT and obtain EEG.     1605:  Patient transported to CT scan.    1630:  CT scan in progress.    1640:  Patient obtunded, unresponsive to sternal rub post CT scan. Transported to S142 accompanied by ACLS RN x2. RRT called.  See RRT documentation.

## 2022-02-25 NOTE — PROCEDURES
"Arterial Line Insertion    Date/Time: 2/24/2022 5:15 PM  Performed by: Kervin Porter Jr., D.O.  Authorized by: Kervin Porter Jr., D.O.   Consent: The procedure was performed in an emergent situation.  Patient identity confirmed: arm band  Time out: Immediately prior to procedure a \"time out\" was called to verify the correct patient, procedure, equipment, support staff and site/side marked as required.  Preparation: Patient was prepped and draped in the usual sterile fashion.  Indications: hemodynamic monitoring  Location: right radial  Needle gauge: 20  Seldinger technique: Seldinger technique used  Number of attempts: 1  Post-procedure: line sutured and dressing applied  Post-procedure CMS: unchanged  Patient tolerance: patient tolerated the procedure well with no immediate complications        "

## 2022-02-25 NOTE — PROGRESS NOTES
Date of Consultation:  2/24/2022    Patient: : Krishan Acevedo  MRN: [id number]    Referring Physician: Dr. Porter     GI: Panchito Spears M.D. with [facility]     Reason for Consultation: GI bleed    History of Present Illness:   71-year-old male with very complicated past medical history including ANCA vasculitis status post live donor kidney transplant 2008 atrial fibrillation's/flutter status post ablation January 2021 type 2 diabetes Covid infection who is being consulted for GI bleed by ICU.  Was admitted February 1, 2021 due to progressive fatigue Covid positivity and hypoxemia patient was on prednisone CellCept and tacrolimus.  Patient demonstrated Carbapenem resistant Pseduomonas.  Patient has been on anticoagulation.  Per ICU attending patient had progressive multiple maroon color stool today with progressive confusion and hypotension thus transferred to the ICU for further management.  Patient's blood pressures systolic dropped down to 50 improved on pressor to 90/60.  Globin dropped from 10.8->7.4    INTERVAL HISTORY:    2/25/22: Continues to have large maroon colored stools x 4 since EGD last night. Hgb stable 10.1. Still on pressors. MAP: 75.   No further BMs since this morning.    EGD 2/24/22:  3 ulcerations in the duodenal bulb/duodenal sweep with visible vessel treated with gold probe cautery and hemoclips.         Past Medical History:   Diagnosis Date   • Arrhythmia 2021    flutter   • Arthritis 2020    right shoulder, knees, & back   • Cough 2020    dry-on lisinipril, now productive -brown   • Kidney transplant pain 2020    low back   • E coli bacteremia 2014    hospitalized x 5 months   • Kidney failure 03/2008    kidney transplant   • Wegener's granulomatosis with renal involvement ([facility]) 2008   • Dialysis patient ([facility]) 2006    x 18 months   • CAD (coronary artery disease) 2003   • Atrial flutter ([facility])    • DVT (deep venous thrombosis) ([facility])    • Retinal detachment    •  Sleep apnea     CPAP   • Snoring          Past Surgical History:   Procedure Laterality Date   • OTHER Right 2017    eye surgery, lasix & cataracts, retinal detatchment   • PEG PLACEMENT  10/10/2014    Performed by Brijesh Orozco M.D. at [facility]   • OTHER ORTHOPEDIC SURGERY Left     knee       Family History   Problem Relation Age of Onset   • Stroke Brother 26        , ? cause   • Stroke Maternal Grandmother            • Clotting Disorder Neg Hx        Social History     Socioeconomic History   • Marital status:    Tobacco Use   • Smoking status: Never Smoker   • Smokeless tobacco: Former User     Types: Snuff, Chew   Vaping Use   • Vaping Use: Never used   Substance and Sexual Activity   • Alcohol use: Not Currently   • Drug use: Never       Review of Systems   Unable to perform ROS: Mental status change         Physical Exam:  Vitals:    22 0600 22 0615 22 0630 22 0645   BP:       Pulse: 100 100 95 85   Resp: (!) 22 18 15 (!) 22   Temp: 36.8 °C (98.2 °F)      TempSrc: Temporal      SpO2: 100% 100% 100% 100%   Weight:       Height:           Physical Exam  Constitutional:       Appearance: He is obese. He is ill-appearing.   HENT:      Head: Normocephalic and atraumatic.      Nose: Nose normal.      Mouth/Throat:      Mouth: Mucous membranes are moist.   Cardiovascular:      Rate and Rhythm: Normal rate and regular rhythm.      Pulses: Normal pulses.   Pulmonary:      Effort: Pulmonary effort is normal.      Breath sounds: Normal breath sounds.   Abdominal:      General: Abdomen is flat. Bowel sounds are normal. There is no distension.      Palpations: Abdomen is soft. There is no mass.      Tenderness: There is no guarding or rebound.      Hernia: No hernia is present.   Musculoskeletal:      Cervical back: Normal range of motion.   Skin:     General: Skin is warm.      Coloration: Skin is pale.   Neurological:      Mental Status: He is  unresponsive.           Labs:  Recent Labs     02/24/22  1744 02/24/22  2240 02/25/22  0345   WBC 16.1* 21.3* 25.7*   RBC 2.49* 3.42* 3.49*   HEMOGLOBIN 7.4* 10.0* 10.1*   HEMATOCRIT 22.5* 29.8* 30.2*   MCV 90.4 87.1 86.5   MCH 29.7 29.2 28.9   MCHC 32.9* 33.6* 33.4*   RDW 53.5* 57.5* 59.6*   PLATELETCT 126* 124* 122*   MPV 11.6 11.4 11.4     Recent Labs     02/24/22  0343 02/24/22  1744 02/25/22  0345   SODIUM 142 144 141   POTASSIUM 4.7 4.9 5.0   CHLORIDE 113* 114* 114*   CO2 18* 14* 15*   GLUCOSE 129* 183* 214*   BUN 77* 86* 84*     Recent Labs     02/22/22  0916   INR 1.07       Recent Labs     02/22/22  0916 02/23/22  0454 02/24/22 0343 02/24/22 1744 02/25/22 0345   ASTSGOT  --    < > 63* 244* 288*   ALTSGPT  --    < > 111* 348* 397*   TBILIRUBIN  --    < > 0.5 0.5 0.6   ALKPHOSPHAT  --    < > 179* 124* 133*   GLOBULIN  --    < > 3.0 2.2 2.3   INR 1.07  --   --   --   --     < > = values in this interval not displayed.         Imaging:  DX-CHEST-FOR LINE PLACEMENT Perform procedure in: Patient's Room  Narrative: 2/24/2022 6:28 PM    HISTORY/REASON FOR EXAM: .  Central line placement    TECHNIQUE/EXAM DESCRIPTION AND NUMBER OF VIEWS:  Single view of the chest.    COMPARISON: 1 view chest 8/29/2014    FINDINGS:  Right internal jugular catheter has been placed.    LUNGS: There is a mild airspace process at the right lung base. The left lung is partially obscured by overlying device.    HEART and MEDIASTINUM: Heart and mediastinum: enlarged.    Pleura: There are no pleural effusion or pneumothoraces.    Osseous structures: No significant bony abnormality.  Impression: Right internal jugular catheter is been placed and appears appropriately located  CT-HEAD W/O  Narrative: 2/24/2022 4:24 PM    HISTORY/REASON FOR EXAM:  Mental status change, unknown cause    TECHNIQUE/EXAM DESCRIPTION AND NUMBER OF VIEWS:  CT scan of the head without contrast.    Contiguous 5 mm axial sections were obtained from the skull base  through the vertex.    Up to date radiation dose reduction adjustments have been utilized to meet ALARA standards for radiation dose reduction.    COMPARISON: 2/12/2022    FINDINGS:     No acute hemorrhage, mass-effect, or midline shift.   There is diffuse atrophy.   Periventricular white matter changes are consistent with chronic small vessel disease.   Ventricles and cisterns are patent. No ischemia or intracranial mass   is identified. The paranasal sinuses and mastoid air cells are clear.  Impression: 1.  No acute intracranial process.    2. Diffuse atrophy and periventricular white matter changes consistent with chronic small vessel disease.            Impressions:  1. Hematochezia   2. Anemia- likely from acute bleeding; unclear if rapid upper vs lower GI  3. Leukocytosis  4. Hypotension on pressor  5. Electrolyte disturbance  6. Renal transplant on immunosuppressive medication  7. Encephalopathy  8. Worsening RADHA  9. CHF  10. COVID history  11. Aflutter on chronic anticoagulation.     71-year-old male with multiple conditions atrial flutter on chronic anticoagulation as well as history of renal transplant on immunosuppressive medication who is being seen for maroon color stool hypotension suggestive of hematochezia.  Location of bleeding unclear whether upper or lower.  Currently has RADHA with worsening kidney function.  Patient hypotensive in ICU on pressor support.     -Continues to have maroon colored stools x 4 since EGD last night. Hgb: 10.1 stable.        Recommendations:  1. Continue PPI drip  2. Continue to trend Hgb transfuse >7  3.  Recommend CT angiography if continued concerns for bleeding - with reflex to IR  4. If able, can use carafate for further protection  5. Avoid NSAID's    GI Attending -    Patient seen, examined, chart reviewed and case discussed and plan arrived at with A.P.R.N.  Agree with this note.    If recurrent bleeding then order STAT CT angio and reflex to IR    Panchito  MICA Spears.

## 2022-02-25 NOTE — EEG PROGRESS NOTE
Tech went to go do routine EEG for pt. Pt. Was at CT, tech volted Neurohospitalist to see if EEG can be done in the AM, because of restricted time for test. [facility].

## 2022-02-25 NOTE — ASSESSMENT & PLAN NOTE
Likely due to hemorrhagic shock / GI bleed + BUN from RADHA  CT head no acute abnormalities.  EEG -non specific encephalopathy.  Dialysis  Bleeding control per G

## 2022-02-25 NOTE — PROGRESS NOTES
Rapid response was called due to patient being less responsive, hypotensive and not being able to get CT head.  Dr. Dang was consulted who is going to admit the patient to ICU and take over the care of the patient.  I tried calling the wife again and still couldn't get a hold of her.

## 2022-02-25 NOTE — THERAPY
PT treatment session deferred as RN reports pt is not medically stable. Will reattempt PT treatment session as able and appropriate. Thanks    Jeni Storm, PT, DPT  [place] [id number]

## 2022-02-25 NOTE — THERAPY
Missed Therapy     Patient Name: Krishan Acevedo  Age:  71 y.o., Sex:  male  Medical Record #: [id number]  Today's Date: 2/25/2022    Discussed missed therapy with RN       02/25/22 1105   Treatment Variance   Reason For Missed Therapy Medical - Patient Is Not Medically Stable   Total Time Spent   Total Time Spent (Mins) 5   Interdisciplinary Plan of Care Collaboration   IDT Collaboration with  Nursing   Collaboration Comments This SLP attempted to see patient for dysphagia tx session. Patient has been tx to ICU for HLOC d/t GI bleed and lethargy since SLP last saw patient yesterday. Per RN, hold, as patient is not appropriate for PO trials/reassessment today. SLP will hold tx and re-attempt tomorrow as able/appropriate. Thank you.

## 2022-02-25 NOTE — ASSESSMENT & PLAN NOTE
Hold Eliquis given bleeding  Hold beta-blocker with hypotension  Telemetry monitoring  Optimize electrolytes  Rate control <110

## 2022-02-26 PROBLEM — D62 ACUTE BLOOD LOSS ANEMIA: Status: ACTIVE | Noted: 2022-01-01

## 2022-02-26 NOTE — PROGRESS NOTES
Date of Consultation:  2/24/2022    Patient: : Krishan Acevedo  MRN: [id number]    Referring Physician: Dr. Porter     GI: Judie Kee, [facility]     Reason for Consultation: GI bleed    History of Present Illness:   71-year-old male with very complicated past medical history including ANCA vasculitis status post live donor kidney transplant 2008 atrial fibrillation's/flutter status post ablation January 2021 type 2 diabetes Covid infection who is being consulted for GI bleed by ICU.  Was admitted February 1, 2021 due to progressive fatigue Covid positivity and hypoxemia patient was on prednisone CellCept and tacrolimus.  Patient demonstrated Carbapenem resistant Pseduomonas.  Patient has been on anticoagulation.  Per ICU attending patient had progressive multiple maroon color stool today with progressive confusion and hypotension thus transferred to the ICU for further management.  Patient's blood pressures systolic dropped down to 50 improved on pressor to 90/60.  Globin dropped from 10.8->7.4    INTERVAL HISTORY:    2/25/22: Continues to have large maroon colored stools x 4 since EGD last night. Hgb stable 10.1. Still on pressors. MAP: 75.   No further BMs since this morning.    EGD 2/24/22:  3 ulcerations in the duodenal bulb/duodenal sweep with visible vessel treated with gold probe cautery and hemoclips.     2/26/22: Continues to have black tarry stools. Hgb trending down 7.3. VS: normotensive on 1 pressor. BUN: 63. Crt: 3.06. CTA negative for active GI hemorrhage.        Past Medical History:   Diagnosis Date   • Arrhythmia 2021    flutter   • Arthritis 2020    right shoulder, knees, & back   • Cough 2020    dry-on lisinipril, now productive -brown   • Kidney transplant pain 2020    low back   • E coli bacteremia 2014    hospitalized x 5 months   • Kidney failure 03/2008    kidney transplant   • Wegener's granulomatosis with renal involvement ([facility]) 2008   • Dialysis patient ([facility]) 2006    x 18 months   •  CAD (coronary artery disease)    • Atrial flutter ([facility])    • DVT (deep venous thrombosis) ([facility])    • Retinal detachment    • Sleep apnea     CPAP   • Snoring          Past Surgical History:   Procedure Laterality Date   • [place] UPPER GI ENDOSCOPY,DIAGNOSIS  2022    Procedure: GASTROSCOPY;  Surgeon: Raymond Pryor M.D.;  Location: SURGERY SAME DAY [facility];  Service: Gastroenterology   • OTHER Right 2017    eye surgery, lasix & cataracts, retinal detatchment   • PEG PLACEMENT  10/10/2014    Performed by Brijesh Orozco M.D. at SURGERY [facility]   • OTHER ORTHOPEDIC SURGERY Left     knee       Family History   Problem Relation Age of Onset   • Stroke Brother 26        , ? cause   • Stroke Maternal Grandmother            • Clotting Disorder Neg Hx        Social History     Socioeconomic History   • Marital status:    Tobacco Use   • Smoking status: Never Smoker   • Smokeless tobacco: Former User     Types: Snuff, Chew   Vaping Use   • Vaping Use: Never used   Substance and Sexual Activity   • Alcohol use: Not Currently   • Drug use: Never       Review of Systems   Unable to perform ROS: Mental status change         Physical Exam:  Vitals:    22 0600 22 0615 22 0630 22 0645   BP: 107/59      Pulse: 70 64 73 67   Resp: (!) 27 (!) 26 (!) 54 (!) 10   Temp:       TempSrc: Bladder      SpO2: 96%      Weight: 104 kg (229 lb 4.5 oz)      Height:           Physical Exam  Constitutional:       Appearance: He is obese. He is ill-appearing.   HENT:      Head: Normocephalic and atraumatic.      Nose: Nose normal.      Mouth/Throat:      Mouth: Mucous membranes are moist.   Cardiovascular:      Rate and Rhythm: Normal rate and regular rhythm.      Pulses: Normal pulses.   Pulmonary:      Effort: Pulmonary effort is normal.      Breath sounds: Normal breath sounds.   Abdominal:      General: Abdomen is flat. Bowel sounds are normal. There is no distension.      Palpations:  Abdomen is soft. There is no mass.      Tenderness: There is no guarding or rebound.      Hernia: No hernia is present.   Musculoskeletal:      Cervical back: Normal range of motion.   Skin:     General: Skin is warm.      Coloration: Skin is pale.   Neurological:      Mental Status: He is unresponsive.           Labs:  Recent Labs     02/25/22  2152 02/26/22  0400 02/26/22  1037   WBC 15.4* 12.2* 9.1   RBC 2.84* 2.72* 2.55*   HEMOGLOBIN 8.2* 7.8* 7.3*   HEMATOCRIT 24.4* 23.5* 22.1*   MCV 85.9 86.4 86.7   MCH 28.9 28.7 28.6   MCHC 33.6* 33.2* 33.0*   RDW 60.5* 60.9* 61.5*   PLATELETCT 76* 67* 60*   MPV 10.9 10.5 10.9     Recent Labs     02/24/22  1744 02/25/22  0345 02/26/22  0400   SODIUM 144 141 142   POTASSIUM 4.9 5.0 3.9   CHLORIDE 114* 114* 108   CO2 14* 15* 25   GLUCOSE 183* 214* 160*   BUN 86* 84* 63*           Recent Labs     02/24/22  1744 02/25/22  0345 02/26/22  0400   ASTSGOT 244* 288* 100*   ALTSGPT 348* 397* 263*   TBILIRUBIN 0.5 0.6 0.4   ALKPHOSPHAT 124* 133* 105*   GLOBULIN 2.2 2.3 1.8*         Imaging:  CT-CTA ABDOMEN WITH & W/O-POST PROCESS  Narrative: 2/25/2022 12:32 PM    HISTORY/REASON FOR EXAM:  Duodenal ulcer. Melena.    TECHNIQUE/EXAM DESCRIPTION:  CT angiogram without and with contrast of the abdomen and pelvis, with reconstructions. CT angiographic technique was utilized.    Intravenous contrast: 100 mL of nonionic Omnipaque 350 contrast.    Sagittal and coronal reconstructed images of the aorta were generated utilizing multiplanar volume reformat technique.    Low dose optimization technique was utilized for this CT exam including automated exposure control and adjustment of the mA and/or kV according to patient size.    COMPARISON: CT 2/12/2022    FINDINGS:    Aorta: Pre-contrast images demonstrate no evidence of displaced calcified intima or intramural hematoma.  Diameter: The abdominal aorta is normal in caliber.  Dissection: Absent.  Celiac artery origin: Widely patent.  Superior  mesenteric artery origin: Widely patent.  Renal artery origins: Severe stenosis proximally with diminutive mid and distal renal arteries  Inferior mesenteric artery: Patent.  Common iliac arteries: Nonaneurysmal and patent.    Heart: Normal size, partially visualized. Calcification of the aortic valve. No significant pericardial effusion.    3D angiographic/MIP images of the vasculature confirm the vascular findings as described above.    Lungs: Bibasilar atelectasis.  Liver: Homogeneous enhancement. No masses identified.  Biliary system: No ductal dilatation is appreciated. Multiple calcifications are in the gallbladder  Spleen: Unremarkable.  Adrenal glands: Unremarkable.  Pancreas: Fatty atrophy. Otherwise grossly unremarkable.  Kidneys: Marked atrophy of the native kidneys. Renal transplant in the right iliac fossa. There is poor enhancement of the transplant kidney. There is air in the collecting system.  Bowel: Multiple clips are noted in the duodenum. No contrast extravasation is appreciated. There are scattered air-fluid levels without significant bowel dilatation.  Ascites: None.  Lymph nodes: No adenopathy is identified.  Bladder: Partially decompressed with a Gaston catheter. Air is in the bladder from catheter insertion  Bones: Degenerative changes are in the spine and sacroiliac joints  Impression: 1.  Surgical clips visualized in the duodenum. No active extravasation is appreciated    2.  Marked atrophy of the native kidneys.    3.  Transplant kidney in the right iliac fossa with poor enhancement. Air in the collecting system is likely related to prior instrumentation.    4.  Cholelithiasis.  DX-CHEST-LIMITED (1 VIEW)  Narrative: 2/25/2022 10:51 AM    HISTORY/REASON FOR EXAM:  Line placement. Left IJ CVC.    TECHNIQUE/EXAM DESCRIPTION AND NUMBER OF VIEWS:  Single AP view of the chest.    COMPARISON:  Chest x-ray 2/24/2022    FINDINGS:  Lines/tubes:  Interval placement of left IJ catheter with distal tip  overlying the mid SVC. Right IJ catheter is noted in place with distal tip overlying the mid SVC    Lungs:  There are subtle patchy peripheral opacities in the lung fields.    Pleura:  There is no pleural effusion or pneumothorax.    Heart and mediastinum:  The heart silhouette is within normal limits.    Bones:  Normal  Impression: 1.  Interval placement of left IJ catheter with distal tip extending to mid SVC.    2.  Right IJ catheter unchanged in position overlying mid SVC.    3.  Subtle peripheral patchy airspace opacities noted in the lung fields.            Impressions:  1. Hematochezia   2. Anemia- likely from acute bleeding; unclear if rapid upper vs lower GI  3. Leukocytosis  4. Hypotension on pressor  5. Electrolyte disturbance  6. Renal transplant on immunosuppressive medication  7. Encephalopathy  8. Worsening RADHA  9. CHF  10. COVID history  11. Aflutter on chronic anticoagulation.     71-year-old male with multiple conditions atrial flutter on chronic anticoagulation as well as history of renal transplant on immunosuppressive medication who is being seen for maroon color stool hypotension suggestive of hematochezia.  Location of bleeding unclear whether upper or lower.  Currently has RADHA with worsening kidney function.  Patient hypotensive in ICU on pressor support.     -Continues to have maroon colored stools x 4 since EGD last night. Hgb: 10.1 stable.   -Continues to have black tarry stools, Hgb trending down 7.3. CTA negative for GI hemorrhage.       Recommendations:  1. Continue PPI drip  2. Continue to trend Hgb transfuse >7  3.  Risks, benefits, and alternatives of EGD were discussed with patient and/or family member(s).  Consenting person(s) were given opportunities to ask questions and discuss other options.  Risks including but not limited to perforation, infection, bleeding, missed lesion(s), possible need for surgery(ies) and/or interventional radiology, possible need for repeat procedure(s)  and/or additional testing, hospitalization possibly prolonged, cardiac and/or pulmonary event, aspiration, hypoxia, stroke, medication and/or anesthesia reaction, indefinite diagnosis, discomfort, unsuccessful and/or incomplete procedure, ineffective therapy and/or persistent symptoms, damage to adjacent organs and/or vascular structures, and other adverse events possibly life-threatening.  Interactive discussion was undertaken with Layman's terms. I answered questions in full and to satisfaction.  Consenting person(s) stated understanding and acceptance of these risks, and wished to proceed.  Informed consent was given in clear state of mind.     Plan for EGD at bedside 2/26/22 at 1400 with Dr. Goetz.      GI attending attestation:  Kim Goetz, DO, FACP    I saw and evaluated the patient.  I agree with the assessment and recommendations above.    The patient is a very pleasant 71-year-old man with history of ANCA vasculitis status post renal transplant and atrial letter on anticoagulation who was admitted for progressive Covid related symptoms.  His Covid infection has cleared but he continues to have decompensation.  EGD 2 days ago demonstrated large duodenal ulcers and multiple hemoclips were placed over visible vessels.  However, he continues to have evidence of bleeding with persistent maroon-colored stools and melena.  CTA was negative.  His hemoglobin has down trended to 7.3 from 10.1 previously.  He also has hypotension requiring pressor support.  Will proceed with EGD with therapeutic and diagnostic intent.  -N.p.o.  -Diagnostic EGD with possible intervention  -maintain central access  -Continue supportive care and IVF  -Trend hemoglobin  -Continue Protonix drip 8 mg/hour  -Avoid NSAIDs  -Transfuse for goal hemoglobin > 7  -Transfuse for goal platelet count > 50  -Transfuse for goal INR <2.5      Thank you for inviting me to participate in the care of this patient. Please do not hesitate to call GI  consultants with additional questions/concerns or changes in the patient's clinical status at [phone].

## 2022-02-26 NOTE — PROGRESS NOTES
Dr Sheth updated around 0600 regarding inability to get consent. Per Dr Sheth, Dr Owusu updated regarding situation.

## 2022-02-26 NOTE — CARE PLAN
The patient is Watcher - Medium risk of patient condition declining or worsening    Shift Goals  Clinical Goals: stable BP, Q2 turns, monitor HGB  Patient Goals: syl, incomprehensible  Family Goals: improve    Progress made toward(s) clinical / shift goals:  levophed titrating down, skin checked, trending hemoglobin, CT scan completed    Patient is not progressing towards the following goals:      Problem: Knowledge Deficit - Standard  Goal: Patient and family/care givers will demonstrate understanding of plan of care, disease process/condition, diagnostic tests and medications  Outcome: Not Progressing  Not able to communicate     Problem: Communication  Goal: The ability to communicate needs accurately and effectively will improve  Outcome: Not Progressing  Not able to communicate     Problem: Nutrition  Goal: Patient's nutritional and fluid intake will be adequate or improve  Outcome: Not Progressing   lethargic

## 2022-02-26 NOTE — PROGRESS NOTES
RN attempted to call patient's wife Penelope for blood transfusion consent. No answer x3- Voicemail left providing details and call back number.

## 2022-02-26 NOTE — CARE PLAN
The patient is Watcher - Medium risk of patient condition declining or worsening    Shift Goals  Clinical Goals: Stable vitals, q6h hgb, q2h turns   Patient Goals: DENA  Family Goals: DENA    Progress made toward(s) clinical / shift goals:  in progress    Problem: Pain - Standard  Goal: Alleviation of pain or a reduction in pain to the patient’s comfort goal  Outcome: Progressing     Problem: Skin Integrity  Goal: Skin integrity is maintained or improved  Outcome: Progressing     Problem: Communication  Goal: The ability to communicate needs accurately and effectively will improve  Outcome: Progressing     Problem: Risk for Aspiration  Goal: Patient's risk for aspiration will be absent or decrease  Outcome: Progressing       Patient is not progressing towards the following goals:    Problem: Knowledge Deficit - Standard  Goal: Patient and family/care givers will demonstrate understanding of plan of care, disease process/condition, diagnostic tests and medications  Outcome: Not Progressing  Note: Patient lethargic and confused. Reoriented frequently regarding plan of care.     Problem: Nutrition  Goal: Patient's nutritional and fluid intake will be adequate or improve  Outcome: Not Progressing  Note: Patient currently NPO- will re-evaluate once cleared for nutrition

## 2022-02-26 NOTE — OP REPORT
EGD Report    Date:  2/26/2022    Surgeon: Kim Goetz D.O., [facility]    Indications: Melena, maroon stools, history of duodenal ulcers, anemia, hypertension    Procedure: Diagnostic EGD with control of bleeding        Procedure in detail, Findings and Endoscopic Diagnosis:      -Prior to the procedure, a History and Physical was performed, and patient medications and allergies were reviewed. The patient’s tolerance of previous anesthesia was also reviewed. The risks and benefits of the procedure and the sedation options and risks were discussed with the patient. All questions were answered, and informed consent was obtained. The patient was deemed in satisfactory condition to undergo the procedure.    -Prior Anticoagulants: the patient has taken no previous anticoagulant or antiplatelet agents.    -ASA Grade Assessment: see anesthesia note     Anesthesia/medications:  see anesthesia note     -The patient was placed in the left lateral decubitus position. The scope was passed under direct vision. Continuous oxygen was provided via nasal cannula and intravenous sedation was administered in divided doses throughout the procedure. The patient’s blood pressure, pulse, and oxygen saturation were monitored continuously throughout the procedure.    -The adult gastroscope was gently advanced under direct visualization over the tongue, down the esophagus, through the stomach and into the third portion of the duodenum. The color, texture, mucosa and anatomy of esophagus, stomach and duodenum were carefully examined with the scope. The scope was then withdrawn from the patient and the procedure terminated. Further details are in the finding section, based on anatomical location.  Retroflexion was performed in the stomach.    -The patient tolerated the procedure well and there were no immediate complications. The patient was then transferred to the recovery room in stable condition.    EBL: Minimal    Complications:  No  immediate complications      Findings:  Duodenum: There were two large ulcerations in the duodenal bulb/sweep.  Multiple hemoclips were visible.  In the larger of the two ulcers, there was a protuberant visible vessel.  Placement of one Hemoclip was successful.      Stomach: There was mild patchy erythema in the antrum and fundus.  No evidence of active bleeding was identified.    Esophagus: The DH was located 37 cm from the incisors.  The EGF was located 34 cm from the incisors.  The SCJ was located 34 cm from the incisors.  There was LA class a esophagitis with a small area of minimal oozing that resolved at the end of the procedure.      Summary of Findings:  -Two large duodenal ulcers with hemoclips in place and one visible vessel; one Hemoclip placed  -Patchy erythematous gastropathy  -Hiatal hernia  -LA class a esophagitis with minimal oozing        Recommendations:  -Continue care in the ICU  -Diet per the primary team (recommend clear liquid diet if initiated)  -Resume current medications  -Maintain central access  -Continue supportive care and IVF  -Trend hemoglobin  -Continue Protonix drip 8 mg/hour  -Start sucralfate 1 g solution 4 times daily x14 days (once taking p.o.)  -Avoid NSAIDs  -Transfuse for goal hemoglobin > 7  -Transfuse for goal platelet count > 50  -Transfuse for goal INR <2.5  -If recurrent GI bleeding, recommend IR or surgical evaluation and treatment      Kim Goetz DO, FACP      Thank you for inviting me to participate in the care of this patient. Please do not hesitate to call GI consultants with additional questions/concerns or changes in the patient's clinical status at [phone].

## 2022-02-26 NOTE — PROGRESS NOTES
[facility] ICU Progress Note      Admit Date: 2/1/2022    Resident(s): Girish Owusu M.D.   Attending:  JAMI PIMENTEL/ Dr. Julio    Patient ID:    Name:  Krishan Acevedo   YOB: 1950  Age:  71 y.o.  male   MRN:  [id number]    Hospital Course (carried forward and updated):  Krishan Acevedo is a 71 y.o. 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  He was admitted to the hospital with acute kidney injury, has been treated with the prednisone and underwent kidney biopsy which showed no signs of rejection, urinary tract infection with Carbapenem resistant Pseudomonas and he completed antibiotics.  CT head without obtained which did not show any acute bleeds or abnormalities, patient had maroon-colored stools therefore GI was consulted and underwent EGD on 2/24/2022 which showed significant duodenal bulb ulcerations x3 which was clipped and cauterized.  Patient was admitted to the intensive care for hemorrhagic shock requiring multiple pressors.    Consultants:  Critical Care  Gastroenterology  Nephrology    Interval Events:  2/25: Patient had 4 maroon-colored stools I updated GI service, recommended CTA of the abdomen to see extravasation.  Patient remained on 2 vasopressors, hemoglobin stable and not requiring any transfusions at this time, kidney function worsened therefore I contacted with nephrology, recommended temporary hemodialysis.  Therefore temporary dialysis catheter was placed today.  Potassium is stable at 5 however increasing from last, continue bicarb drip due to significant acidosis, no signs of overt volume overload.    2/26: Had HD 1L net out. Levofed rate is improving. Patient is alert to verbal stimuli. Still bleeding 3 maroon colored stool, Hb down to 7.8, will trend one more time and transfuse if kept decreasing.  Dc'ed bicarb drip.      Vitals Range last 24h:  Temp:  [36.8 °C (98.2 °F)] 36.8 °C (98.2 °F)  Pulse:  [62-94] 67  Resp:  [10-54] 10  BP: ()/(50-85) 107/59  SpO2:  [93 %-100 %] 96 %      Intake/Output Summary (Last 24 hours) at 2/26/2022 0758  Last data filed at 2/26/2022 0600  Gross per 24 hour   Intake 3877.82 ml   Output 2611 ml   Net 1266.82 ml        Review of Systems   Unable to perform ROS: Mental acuity       PHYSICAL EXAM:  Vitals:    02/26/22 0600 02/26/22 0615 02/26/22 0630 02/26/22 0645   BP: 107/59      Pulse: 70 64 73 67   Resp: (!) 27 (!) 26 (!) 54 (!) 10   Temp:       TempSrc: Bladder      SpO2: 96%      Weight: 104 kg (229 lb 4.5 oz)      Height:        Body mass index is 31.98 kg/m².    O2 therapy: Pulse Oximetry: 96 %, O2 (LPM): 1, O2 Delivery Device: Oxymask         Physical Exam  Constitutional:       General: He is not in acute distress.     Appearance: He is ill-appearing.   HENT:      Head: Normocephalic and atraumatic.      Nose: No rhinorrhea.      Mouth/Throat:      Mouth: Mucous membranes are moist.      Pharynx: No posterior oropharyngeal erythema.   Eyes:      General: No scleral icterus.  Cardiovascular:      Rate and Rhythm: Tachycardia present. Rhythm irregular.      Heart sounds: Normal heart sounds. No murmur heard.  Pulmonary:      Effort: No respiratory distress.      Breath sounds: No wheezing, rhonchi or rales.   Abdominal:      General: Abdomen is flat. There is no distension.      Comments: Hyperactive bowel sounds   Musculoskeletal:      Right lower leg: No edema.      Left lower leg: No edema.   Skin:     Capillary Refill: Capillary refill takes 2 to 3 seconds.      Coloration: Skin is pale.   Neurological:      Comments: Patient is responding to verbal and painful stimuli         Recent Labs     02/24/22  1549 02/24/22  1702   ZONCP41Z  --  7.38*   [id number]  --  26.6   SFDQL900H  --  133.7*   PQRW2YVZ  --  97.0   ARTHCO3  --  15*   ARTBE  --  -9*   ISTATAPH 7.417   --    ISTATAPCO2 25.6*  --    ISTATAPO2 106*  --    ISTATATCO2 17*  --    UNUWWLY4QOW 98  --    ISTATARTHCO3 16.5*  --    ISTATARTBE -7*  --    ISTATTEMP 97.1 F  --    ISTATFIO2 28  --    ISTATSPEC Arterial  --    ISTATAPHTC 7.430  --    [id number] 101*  --      Recent Labs     02/24/22 1744 02/25/22  0345 02/26/22  0400   SODIUM 144 141 142   POTASSIUM 4.9 5.0 3.9   CHLORIDE 114* 114* 108   CO2 14* 15* 25   BUN 86* 84* 63*   CREATININE 3.52* 3.56* 3.06*   MAGNESIUM 2.3  --   --    PHOSPHORUS 5.2*  --   --    CALCIUM 8.5 8.5 7.6*     Recent Labs     02/24/22 1744 02/25/22 0345 02/26/22  0400   ALTSGPT 348* 397* 263*   ASTSGOT 244* 288* 100*   ALKPHOSPHAT 124* 133* 105*   TBILIRUBIN 0.5 0.6 0.4   GLUCOSE 183* 214* 160*     Recent Labs     02/25/22  1615 02/25/22 2152 02/26/22  0400   RBC 2.95* 2.84* 2.72*   HEMOGLOBIN 8.5* 8.2* 7.8*   HEMATOCRIT 25.8* 24.4* 23.5*   PLATELETCT 85* 76* 67*     Recent Labs     02/24/22 1744 02/24/22  2240 02/25/22 0345 02/25/22  0930 02/25/22  1615 02/25/22 2152 02/26/22  0400   WBC 16.1*   < > 25.7*   < > 17.5* 15.4* 12.2*   NEUTSPOLYS 91.70*   < > 89.70*   < > 88.20* 90.00* 88.60*   LYMPHOCYTES 0.90*   < > 5.10*   < > 7.40* 5.50* 6.60*   MONOCYTES 2.80   < > 3.20   < > 3.00 3.50 3.50   EOSINOPHILS 0.00   < > 0.00   < > 0.00 0.00 0.00   BASOPHILS 0.00   < > 0.20   < > 0.20 0.10 0.20   ASTSGOT 244*  --  288*  --   --   --  100*   ALTSGPT 348*  --  397*  --   --   --  263*   ALKPHOSPHAT 124*  --  133*  --   --   --  105*   TBILIRUBIN 0.5  --  0.6  --   --   --  0.4    < > = values in this interval not displayed.       Meds:  • NS       • maintenance IV with additives   4 mL/hr at 02/25/22 1527   • diphenhydrAMINE  25 mg     • insulin regular  3-14 Units      And   • dextrose 50%  50 mL     • heparin  2,800 Units     • [Held by provider] sodium bicarbonate  650 mg     • norepinephrine (Levophed) infusion  0-30 mcg/min 3 mcg/min (02/26/22 9347)   • vasopressin (PITRESSIN) infusion   0.03 Units/min Stopped (02/24/22 1945)   • hydrocortisone sodium succinate PF  50 mg     • pantoprazole (PROTONIX) infusion  8 mg/hr 8 mg/hr (02/26/22 0415)   • [Held by provider] patiromer  8.4 g     • [Held by provider] cinacalcet  30 mg     • insulin NPH  15 Units     • acetaminophen  650 mg     • loperamide  2 mg     • [Held by provider] tacrolimus  0.5 mg     • ondansetron  4 mg     • haloperidol lactate  2-5 mg     • ondansetron  4 mg     • hydrALAZINE  10 mg     • brimonidine  2 Drop      And   • timolol  2 Drop          Procedures:  Art line and central line placement 2/24 by Kervin Porter Jr., D.O.  EGD 2/24 by Raymond Pryor M.D.  Central line placement 2/25 by LOLYD Armenta    Imaging:  CT-CTA ABDOMEN WITH & W/O-POST PROCESS   Final Result      1.  Surgical clips visualized in the duodenum. No active extravasation is appreciated      2.  Marked atrophy of the native kidneys.      3.  Transplant kidney in the right iliac fossa with poor enhancement. Air in the collecting system is likely related to prior instrumentation.      4.  Cholelithiasis.      DX-CHEST-LIMITED (1 VIEW)   Final Result      1.  Interval placement of left IJ catheter with distal tip extending to mid SVC.      2.  Right IJ catheter unchanged in position overlying mid SVC.      3.  Subtle peripheral patchy airspace opacities noted in the lung fields.      DX-CHEST-FOR LINE PLACEMENT Perform procedure in: Patient's Room   Final Result      Right internal jugular catheter is been placed and appears appropriately located      CT-HEAD W/O   Final Result         1.  No acute intracranial process.      2. Diffuse atrophy and periventricular white matter changes consistent with chronic small vessel disease.      CT-NEEDLE BX-RENAL   Final Result      1.  CT GUIDED RIGHT LOWER QUADRANT/PELVIC RENAL TRANSPLANT KIDNEY BIOPSY.      EC-ECHOCARDIOGRAM LTD W/O CONT   Final Result      MR-BRAIN-W/O   Final Result         No acute intracranial  process.      Nonspecific T2 hyperintensities are noted in the periventricular and deep white matter, most likely related to chronic microvascular ischemia.      Remote left thalamic and corona radiata lacunar infarction noted.      DX-ABDOMEN FOR TUBE PLACEMENT   Final Result         Feeding tube with tip projecting over the expected area of the first portion duodenum.      CT-HEAD W/O   Final Result         NO ACUTE ABNORMALITIES ARE NOTED ON CT SCAN OF THE HEAD.      Findings are consistent with atrophy.  Decreased attenuation in the periventricular white matter likely indicates microvascular ischemic disease.         CT-CHEST,ABDOMEN,PELVIS W/O   Final Result      1.  Multifocal pulmonary opacifications are identified which are new since the prior CT. Findings could be due to pneumonia.      2.  1.2 cm nodular opacification noted in the left upper pulmonary lobe. Primary or metastatic neoplasm is possible. Small focus of pneumonia or inflammation is also possible. Consider biopsy or PET/CT. Consider follow-up CT in 3 months.      3.  There is duodenal wall thickening. Findings could be due to duodenitis. Neoplasm is also possible. There is some mild induration in the retroperitoneal fat surrounding the duodenum.      4.  Cholelithiasis. No biliary ductal dilatation appreciated.      5.  Right lower quadrant renal transplant. No hydronephrosis.      Low and High Risk: Consider PET/CT, or tissue sampling, or follow-up CT at 3 months      Low Risk - Minimal or absent history of smoking and of other known risk factors.      High Risk - History of smoking or of other known risk factors.      Note: These recommendations do not apply to lung cancer screening, patients with immunosuppression, or patients with known primary cancer.      Fleischner Society 2017 Guidelines for Management of Incidentally Detected Pulmonary Nodules in Adults         US-EXTREMITY VENOUS LOWER BILAT   Final Result      US-RENAL TRANSPLANT COMP    Final Result      1.  No hydronephrosis or perinephric fluid collection involving the right lower quadrant renal transplant.   2.  No definite evidence of hemodynamically significant renal artery stenosis.   3.  Elevated resistive indices could suggest renal parenchymal disease.      DX-CHEST-PORTABLE (1 VIEW)   Final Result      1.  Bilateral peripheral lower lobe hazy opacities which is nonspecific but can be seen with Covid 19 pneumonia.   2.  Enlarged cardiac silhouette with probable vascular congestion/edema.          ASSESSEMENT and PLAN:    * Hemorrhagic shock (HCC)  Assessment & Plan  Secondary to GI bleeding  EGD shows 3 duodenal bulb ulcers  Continued to bleed, CTA abdomen and pelvis 2/25 - no active extravasation present.  Norepi, vasopressin, MAP goal >65  PPI drip  GI onboard.    Shock liver  Assessment & Plan  Improving  Hep panel negative.  AST, ALT elevation after significant GI bleed  Continue to monitor  See hemorrhagic shock.    Acute encephalopathy- (present on admission)  Assessment & Plan  Likely due to hemorrhagic shock / GI bleed + BUN from RADHA  CT head no acute abnormalities.  EEG -non specific encephalopathy.  Dialysis  Bleeding control per GI    Thrombocytopenia (HCC)  Assessment & Plan  Unknown etiology.  Has been noted 2 weeks back.  Might be secondary to liver failure or shock  No signs of infection at this time.    Acute blood loss anemia- (present on admission)  Assessment & Plan  Now worsening in the setting of GI bleeding  2 units PRBCs  Hold apixaban  Goal hemoglobin 8 in the setting of active bleeding, trend hemoglobin    RADHA (acute kidney injury) (HCC)- (present on admission)  Assessment & Plan  Likely due to ATN from hemorrhagic shock.  Oliguric  Sodium bicarb IV until CO2 18. - dc'ed  prednisone daily to stress dose steroids  Renal dose meds, avoid nephrotoxins  Strict I/Os  Follow renal function  Temporary dialysis  Hold cellcept, losartan  Continue sensipar,  veltassa    Essential hypertension- (present on admission)  Assessment & Plan  Hold meds in the setting of shock    Duodenal ulcer  Assessment & Plan  See hemorrhagic shock    Type 2 diabetes mellitus (HCC)  Assessment & Plan  Goal blood glucose 140-180  basal insulin, sliding scale insulin, accuchecks  hypoglycemia protocol  A1c 7.4      Immunocompromised (HCC)- (present on admission)  Assessment & Plan  Tacrolimus continue  Cellcept hold  Prednisone continue    Positive urine culture- (present on admission)  Assessment & Plan  Pseudomonas UTI  Completed antibiotics on 2/18/2022    Long term (current) use of systemic steroids- (present on admission)  Assessment & Plan  Will stress dose given current shock state    Kidney transplant status, living related donor- (present on admission)  Assessment & Plan  Due to wegeners granulomatosis, s/p transplant 2008  On tacrolimus and steroids  Biopsy - no rejection.  Nephrology onboard    Atrial flutter - (present on admission)  Assessment & Plan  Hold Eliquis given bleeding  Hold beta-blocker with hypotension  Telemetry monitoring  Optimize electrolytes  Rate control <110      DISPO: remain in icu due to hemorrhagic shock    CODE STATUS: dnr intubation ok    Quality Measures:  Feeding: ,NPO for now, per dietary  Analgesia: tylenol  Sedation: not indicated  Thromboprophylaxis: scds. Pharm contraindicated  Head of bed: >30 degrees  Ulcer prophylaxis: ppi drip  Glycemic control: Correctional: na / Basal: na  Bowel care: bowel regimen contrainidicated  Indwelling lines: 2 central line, piv, art line  Deescalation of antibiotics: none      Girish Owusu M.D.

## 2022-02-26 NOTE — PROGRESS NOTES
[facility] Services Progress Note    Hemodialysis treatment #1 x 2 hours completed as ordered per Dr. ДМИТРИЙ Cruz  Treatment started at 1646 and ended at 1848.      Net UF Removed: 1,000 mL (see Dialysis I & O flow sheet)    Patient tolerated treatment with vasopressor support x1 titrated by PCN for BP support.   Patient VS stable during and post treatment.   See Acute HD flow sheets on clinical data notes under media for details.      Post tx access: Left HD catheter flushed with saline then locked with heparin 1000 units/ml per designated amount in each lumen (see MAR) then clamped, capped aseptically and labeled properly. CVC dressings clean, dry, intact, reinforced. No s/s of infection to HD catheter site. Heparin lock to be aspirated prior to next dialysis/CVC use by dialysis RN only. Please do not flush or draw from ports.    Please notify Nephrologist/Dialysis for follow-up.     Report given to primary care nurse PRATEEK Cavanaugh RN.

## 2022-02-26 NOTE — PROGRESS NOTES
"[facility] -  PROGRESS NOTE               Author: Karishma Cruz M.D. Date & Time: 2/26/2022  1:59 PM     HPI:  Krishan Acevedo is a 71 y.o. 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 Was additionally found to have worsening renal function.   Patient shares that he first tested positive for COVID at an outside hospital several days ago. He felt that he was not improving and possibly getting worse at home which prompted him to call EMS. He mentions that for several days he has been having loose stools, noting 3-5 loose but not watery BMs daily, noting his last loose stool was day prior to presentation. He also mentions that he has become more dependent on his wife to bring him food and water citing fatigue with his COVID infection. He does note that for the past several weeks he has been feeling \"like I'm always thirsty\".  While in the ED he was vitally stable. Was placed on 2L NC sating in the mid 90s BP ranged 123/70 from 159/117. Labs notable for Cr of 2.62. BUN 80, COVID-19 positive, UA noted for large occult blood, small leukocyte esterase. US of kidneys was completed with indicated no hydronephrosis or fluid collections, no renal artery stenosis.   In regards to his transplant history. Living donor transplant was completed in 2008 at an outside hospital. Patient is on Prednisone 5mg, Mycophenolate 500mg BID, and Tacrolimus 1mg and 0.5mg daily. His GFR trends in the 30s-40s although in chart he has climbed to the 56-53 ranges in the past.        DAILY NEPHROLOGY SUMMARY:  2/1: Admitted, consult placed  2/2: no overnight events, renal function improving, patient tolerating PO intake  2/3: started on IVF yesterday, renal function improved to around prior baseline range  2/4: renal function " continues to improve  2/5: Scr down to baseline levels. No new overnight renal events. Feels ok.  2/6: Feels ok. Urine culture from 2/1 positive for carbapenem resistant Pseudomonas aeruginosa. He is on zosyn per sensitivity. No labs today  2/7: Potassium is 5.7 today.  Creatinine up to 1.95.  Corrected calcium elevated.  CO2 16.  2/08: Chart and notes reviewed. No new labs this am. +Tachycardia.   2/09: Nursing notes and assessments reviewed.  Int tachycardia still. Room air. SBP labile 100s-160s. K 5.5  2/10: Spoke with RN, patient has no complaints at this time.   Plan is for discharge today or tomorrow to SNF pending 1L bolus and Calcium recheck.  Current SCa 11.5  VSS RA  -160s  2/11: Sodium up to 149.  Calcium increasing.  Creatinine stable 1.7.  2/12: Sodium up to 152.  Calcium remains elevated.  Creatinine 1.8.  I/Os not documented  2/13: Sodium still at 152.  Calcium trending down.  Creatinine up to 2.06.  On bicarb drip.  Somnolent  2/14: Unable to participate in interview, improved hemodynamics, cr stable  2/15: stable hemodynamics, 1.9L UOP, cr to 1.9, labile blood pressures, ongoing intermittent delirium  2/16: Hypertensive this AM, slight improvement in Cr, ca climbing, net neg 600cc with 1.9L UOP, confused  2/17: 1.4L UOP,ongoing encephalopthy, sodium climbing, minimal PO intake    2/18: IOs not recorded, cr 1.5, tremulous, minimal PO intake-serum sodium climbing  2/19: 2.6L  UOP, sodium climbing, more confused this a.m, wife at bedside  2/21: remains confused, biopsy was postponed to until tomorrow due to holiday.   2/22: confused. Refused Veltassa for 2 days but talked to him about it.   2/23: more awake and alert, wife is at bedside. UOP is great in velez bacg but not being documented. S/p allograft kidney biopsy 2/22/22.  2/23: could not sleep at night, cr is worsening but UOP is great.   2/24-2/25: Patient was transferred to ICU due to altered mentation and GI bleed.  Underwent endoscopy  "which showed duodenal ulcers.  In Shock On norepinephrine@ 18. UOP has decreased. Not intubated yet.   2/26: got HD yesterday, Mentation is better today. UOP is better.     REVIEW OF SYSTEMS:    10 point ROS reviewed and is as per HPI/daily summary or otherwise negative    PMH/PSH/SH/FH:   Reviewed and unchanged since admission note    CURRENT MEDICATIONS:   Reviewed from admission to present day    VS:  /60   Pulse 81   Temp 36.8 °C (98.2 °F) (Bladder)   Resp 12   Ht 1.803 m (5' 11\")   Wt 104 kg (229 lb 4.5 oz)   SpO2 96%   BMI 31.98 kg/m²     Physical Exam  Vitals and nursing note reviewed.   Constitutional:       General: He is not in acute distress.     Appearance: He is ill-appearing.   HENT:      Head: Normocephalic and atraumatic.   Cardiovascular:      Rate and Rhythm: Normal rate and regular rhythm.      Pulses: Normal pulses.      Heart sounds: No murmur heard.    No gallop.   Pulmonary:      Effort: Pulmonary effort is normal. No respiratory distress.      Breath sounds: No wheezing or rales.   Abdominal:      General: Abdomen is flat. Bowel sounds are normal. There is no distension.      Tenderness: There is no abdominal tenderness.   Musculoskeletal:         General: No swelling or deformity.   Skin:     Coloration: Skin is pale. Skin is not jaundiced.      Findings: No bruising or lesion.   Neurological:      Mental Status: He is alert. He is disoriented.   Psychiatric:         Behavior: Behavior normal.         Fluids:  In: 3877.8 [I.V.:2202.1; Dialysis:500]  Out: 2631     LABS:  Recent Labs     02/24/22  1744 02/25/22  0345 02/26/22  0400   SODIUM 144 141 142   POTASSIUM 4.9 5.0 3.9   CHLORIDE 114* 114* 108   CO2 14* 15* 25   GLUCOSE 183* 214* 160*   BUN 86* 84* 63*   CREATININE 3.52* 3.56* 3.06*   CALCIUM 8.5 8.5 7.6*       IMAGING:   All imaging reviewed from admission to present day    IMPRESSION:  # History of Renal Transplant with RADHA   - in the beginning of admission 2/2 ? 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 Then worsened again likely 2/2 ATN from acute GI bleed and shock. Currently oliguric.   - Hematuria +  -Tacrolimus 22.5  2/8, dose reduced, repeated FK level 2/18 8.9 but lab was drawn after the dose so it is not a trough. repeat FK level  on 2/22 = 4.2.   # History of ANCA Vasculitis  - Repeat serologies positive ( MPO 42, PR3 61 on 2/10)  but no signs of vasculitis on Allograft biopsy   # CKD 3  # Acidosis 2/2 Renal failure  # Afib  # DM2  # HTN  # Hypercalcemia: PTH mediated   # Hyperparathyroid: may need further imaging going forward  # Acute encephalopathy  - azotemia is partially driven by steroids and GI bleeding. But can not rule out uremia at this time.   # Acute upper GI bleed 2/2 duodenal ulcers, s/p repair  # anemia of acute blood loss    # Elevated LFT? shoch liver   # NSTEMI      PLAN:  - HD #1 yesterday and HD #2 today for uremia. No UF . Daily eval for RRT need    - hydrocortisone/Decadron per primary team. Will need to re-start prednisone once hydrocortisone course is finished and pt is cleared for PO intake   - c/w IV tacrolimus 0.5 mg as a slow drip over 24 hours to minimize side effects. Please monitor for arrhythmias, anaphylaxis reaction. If side effects appear can be switched to Cyclosporin IV since has less side effect profile. Once patient is cleared to PO intake please switch back to tacrolimus PO 1 mg in am and 0.5 mg in pm.   - re-start sensipar 30mg TIW once cleared for PO intake   - Hold Cellcept for now   - Continue to holding losartan  - Low potassium renal diet once back to PO   - Dose meds for reduced GFR  - strict IO    Discussed with Primary Team.   Thank you.

## 2022-02-26 NOTE — ASSESSMENT & PLAN NOTE
Unknown etiology.  Has been noted 2 weeks back.  Might be secondary to liver failure or shock  No signs of infection at this time.

## 2022-02-27 NOTE — PROGRESS NOTES
Date of Consultation:  2/24/2022    Prog note 2/27/22    Patient: : Krishan Acevedo  MRN: [id number]    Referring Physician: Dr. Porter     GI: Garry Leija M.D.     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  Was admitted February 1, 2021 due to progressive fatigue Covid positivity and hypoxemia patient was on prednisone CellCept and tacrolimus.  Patient demonstrated Carbapenem resistant Pseduomonas.  Patient has been on anticoagulation.  Per ICU attending patient had progressive multiple maroon color stool today with progressive confusion and hypotension thus transferred to the ICU for further management.  Patient's blood pressures systolic dropped down to 50 improved on pressor to 90/60.  Globin dropped from 10.8->7.4    INTERVAL HISTORY:    2/25/22: Continues to have large maroon colored stools x 4 since EGD last night. Hgb stable 10.1. Still on pressors. MAP: 75.   No further BMs since this morning.    EGD 2/24/22:  3 ulcerations in the duodenal bulb/duodenal sweep with visible vessel treated with gold probe cautery and hemoclips.     2/26/22: Continues to have black tarry stools. Hgb trending down 7.3. VS: normotensive on 1 pressor. BUN: 63. Crt: 3.06. CTA negative for active GI hemorrhage.    2/26 egd  Summary of Findings:  -Two large duodenal ulcers with hemoclips in place and one visible vessel; one Hemoclip placed  -Patchy erythematous gastropathy  -Hiatal hernia  -LA class a esophagitis with minimal oozing    2/27: awake and alert today. Denies abd pain.        Past Medical History:   Diagnosis Date   • Arrhythmia 2021    flutter   • Arthritis 2020    right shoulder, knees, & back   • Cough 2020    dry-on  lisinipril, now productive -brown   • Kidney transplant pain     low back   • E coli bacteremia     hospitalized x 5 months   • Kidney failure 2008    kidney transplant   • Wegener's granulomatosis with renal involvement (HCC)    • Dialysis patient (HCC) 2006    x 18 months   • CAD (coronary artery disease)    • Atrial flutter (HCC)    • DVT (deep venous thrombosis) ([facility])    • Retinal detachment    • Sleep apnea     CPAP   • Snoring          Past Surgical History:   Procedure Laterality Date   • [facility] UPPER GI ENDOSCOPY,DIAGNOSIS  2022    Procedure: GASTROSCOPY;  Surgeon: Raymond Pryor M.D.;  Location: SURGERY SAME DAY [facility];  Service: Gastroenterology   • OTHER Right 2017    eye surgery, lasix & cataracts, retinal detatchment   • PEG PLACEMENT  10/10/2014    Performed by Brijesh Orozco M.D. at [facility]   • OTHER ORTHOPEDIC SURGERY Left     knee       Family History   Problem Relation Age of Onset   • Stroke Brother 26        , ? cause   • Stroke Maternal Grandmother            • Clotting Disorder Neg Hx        Social History     Socioeconomic History   • Marital status:    Tobacco Use   • Smoking status: Never Smoker   • Smokeless tobacco: Former User     Types: Snuff, Chew   Vaping Use   • Vaping Use: Never used   Substance and Sexual Activity   • Alcohol use: Not Currently   • Drug use: Never       Review of Systems   Unable to perform ROS: Mental status change         Physical Exam:  Vitals:    22 0715 22 0730 22 0745 22 0800   BP:    113/54   Pulse: 66 62 61 (!) 56   Resp: 12 12 12 12   Temp:       TempSrc:       SpO2: 97% 99% 99% 97%   Weight:       Height:           Phys Exam    HENMT:  Normocephalic, Atraumatic, Oropharynx moist mucous membranes, No oral exudates, Nose normal.    Abdomen: Bowel sounds normal, Soft, No tenderness, No guarding, No rebound,  Skin: Warm, Dry, No erythema, No rash, no  induration.      Labs:  Recent Labs     02/26/22  1905 02/27/22  0134 02/27/22  0506   WBC 13.0* 8.3 8.4   RBC 3.17* 2.85* 2.99*   HEMOGLOBIN 9.2* 8.4* 8.7*   HEMATOCRIT 27.4* 25.4* 26.3*   MCV 86.4 89.1 88.0   MCH 29.0 29.5 29.1   MCHC 33.6* 33.1* 33.1*   RDW 57.2* 59.9* 59.5*   PLATELETCT 73* 57* 54*   MPV 11.2 11.2 10.6     Recent Labs     02/25/22  0345 02/26/22  0400 02/27/22  0506   SODIUM 141 142 143   POTASSIUM 5.0 3.9 4.2   CHLORIDE 114* 108 110   CO2 15* 25 26   GLUCOSE 214* 160* 92   BUN 84* 63* 32*           Recent Labs     02/25/22  0345 02/26/22  0400 02/27/22  0506   ASTSGOT 288* 100* 54*   ALTSGPT 397* 263* 177*   TBILIRUBIN 0.6 0.4 0.5   ALKPHOSPHAT 133* 105* 100*   GLOBULIN 2.3 1.8* 2.0         Imaging:  CT-CTA ABDOMEN WITH & W/O-POST PROCESS  Narrative: 2/25/2022 12:32 PM    HISTORY/REASON FOR EXAM:  Duodenal ulcer. Melena.    TECHNIQUE/EXAM DESCRIPTION:  CT angiogram without and with contrast of the abdomen and pelvis, with reconstructions. CT angiographic technique was utilized.    Intravenous contrast: 100 mL of nonionic Omnipaque 350 contrast.    Sagittal and coronal reconstructed images of the aorta were generated utilizing multiplanar volume reformat technique.    Low dose optimization technique was utilized for this CT exam including automated exposure control and adjustment of the mA and/or kV according to patient size.    COMPARISON: CT 2/12/2022    FINDINGS:    Aorta: Pre-contrast images demonstrate no evidence of displaced calcified intima or intramural hematoma.  Diameter: The abdominal aorta is normal in caliber.  Dissection: Absent.  Celiac artery origin: Widely patent.  Superior mesenteric artery origin: Widely patent.  Renal artery origins: Severe stenosis proximally with diminutive mid and distal renal arteries  Inferior mesenteric artery: Patent.  Common iliac arteries: Nonaneurysmal and patent.    Heart: Normal size, partially visualized. Calcification of the aortic valve. No  significant pericardial effusion.    3D angiographic/MIP images of the vasculature confirm the vascular findings as described above.    Lungs: Bibasilar atelectasis.  Liver: Homogeneous enhancement. No masses identified.  Biliary system: No ductal dilatation is appreciated. Multiple calcifications are in the gallbladder  Spleen: Unremarkable.  Adrenal glands: Unremarkable.  Pancreas: Fatty atrophy. Otherwise grossly unremarkable.  Kidneys: Marked atrophy of the native kidneys. Renal transplant in the right iliac fossa. There is poor enhancement of the transplant kidney. There is air in the collecting system.  Bowel: Multiple clips are noted in the duodenum. No contrast extravasation is appreciated. There are scattered air-fluid levels without significant bowel dilatation.  Ascites: None.  Lymph nodes: No adenopathy is identified.  Bladder: Partially decompressed with a Gaston catheter. Air is in the bladder from catheter insertion  Bones: Degenerative changes are in the spine and sacroiliac joints  Impression: 1.  Surgical clips visualized in the duodenum. No active extravasation is appreciated    2.  Marked atrophy of the native kidneys.    3.  Transplant kidney in the right iliac fossa with poor enhancement. Air in the collecting system is likely related to prior instrumentation.    4.  Cholelithiasis.  DX-CHEST-LIMITED (1 VIEW)  Narrative: 2/25/2022 10:51 AM    HISTORY/REASON FOR EXAM:  Line placement. Left IJ CVC.    TECHNIQUE/EXAM DESCRIPTION AND NUMBER OF VIEWS:  Single AP view of the chest.    COMPARISON:  Chest x-ray 2/24/2022    FINDINGS:  Lines/tubes:  Interval placement of left IJ catheter with distal tip overlying the mid SVC. Right IJ catheter is noted in place with distal tip overlying the mid SVC    Lungs:  There are subtle patchy peripheral opacities in the lung fields.    Pleura:  There is no pleural effusion or pneumothorax.    Heart and mediastinum:  The heart silhouette is within normal  limits.    Bones:  Normal  Impression: 1.  Interval placement of left IJ catheter with distal tip extending to mid SVC.    2.  Right IJ catheter unchanged in position overlying mid SVC.    3.  Subtle peripheral patchy airspace opacities noted in the lung fields.        Impressions:  1. Hematochezia   2. Anemia- likely from acute bleeding; unclear if rapid upper vs lower GI  3. Leukocytosis  4. Hypotension on pressor  5. Electrolyte disturbance  6. Renal transplant on immunosuppressive medication  7. Encephalopathy  8. Worsening RADHA  9. CHF  10. COVID history  11. Aflutter on chronic anticoagulation.       -advance diet as tolerated  -PPI gtt for 48 more hours and then BID for 12 weeks  -carafate QID for 14 days; HD cannot chelate aluminium  -may resume anticoagulation      My total time spent caring for the patient on the day of the encounter was 56 minutes including critical care time.   This does not include time spent on separately billable procedures/tests.    Garry Leija MD, MEd  [facility]

## 2022-02-27 NOTE — PROGRESS NOTES
Held NPH Insulin due to two low fingerstick blood sugars (80, 81) as ordered by Dr Owusu. Per MD continue to hold until blood sugars >180 then give.

## 2022-02-27 NOTE — CARE PLAN
The patient is Watcher - Medium risk of patient condition declining or worsening    Shift Goals  Clinical Goals: Increase engagement  Patient Goals: Unable to assess  Family Goals: Unable to assess    Progress made toward(s) clinical / shift goals:  in progress    Problem: Pain - Standard  Goal: Alleviation of pain or a reduction in pain to the patient’s comfort goal  Outcome: Progressing     Problem: Skin Integrity  Goal: Skin integrity is maintained or improved  Outcome: Progressing     Problem: Psychosocial  Goal: Patient's level of anxiety will decrease  Outcome: Progressing  Goal: Patient's ability to verbalize feelings about condition will improve  Outcome: Progressing     Patient is not progressing towards the following goals:    Problem: Knowledge Deficit - Standard  Goal: Patient and family/care givers will demonstrate understanding of plan of care, disease process/condition, diagnostic tests and medications  Outcome: Not Progressing  Note: Patient oriented to self and occasionally place- will continue to reorient patient as necessary.     Problem: Self Care  Goal: Patient will have the ability to perform ADLs independently or with assistance (bathe, groom, dress, toilet and feed)  Outcome: Not Progressing  Note: Patient still lethargic and unable participate- will continue to encourage patient with ADLs when appropriate

## 2022-02-27 NOTE — THERAPY
Speech Language Pathology  Daily Treatment     Patient Name: Krishan Acevedo  Age:  71 y.o., Sex:  male  Medical Record #: [id number]  Today's Date: 2/27/2022     Precautions  Precautions: Fall Risk,Swallow Precautions ( See Comments)  Comments: confusion    Assessment    Pt seen this date for swallow reassessment, currently NPO/NN 2/2 GI bleed and tx to ICU. Clears only trialed per GI recs. PO trials of ice, MTL water, liquidized and thins assessed. Intermittently delayed swallow initiation appreciated and vocal quality remained clear. Of note, pt speaking in whispered voice but was able to project with adequate volume when cued. Pt allowed 2 trials of water to spill anteriorly, but with encouragement consumed 3 additional trials with timely swallow and no anterior spillage. No s/sx of aspiration appreciated. Pt required 1:1 feeding assistance due to lethargy/fatigue and UE weakness.     Recommend initiation of a clear liquid diet (per GI note) with adherence to the following: upright at 90* for PO, 1:1 feeding assistance, slow rate, small bites/sips. Float meds in applesauce. SLP following.     Plan    Continue current treatment plan.    Discharge Recommendations: Recommend post-acute placement for additional speech therapy services prior to discharge home    Subjective    Pt alert, confused, participated with encouragement, made jokes during session.      Objective       02/27/22 1213   Dysphagia    Dysphagia X   Positioning / Behavior Modification Self Monitoring;Modulate Rate or Bite Size   Other Treatments PO trials clears of ice, MTL, TN0, LQ3   Diet / Liquid Recommendation Thin (0);Liquidised (3) - (Nectar Thick Full Liquid);Other (Comments)  (clear liquid diet)   Nutritional Liquid Intake Rating Scale Non thickened beverages   Nutritional Food Intake Rating Scale Total oral diet of a single consistency   Skilled Intervention Verbal Cueing;Compensatory Strategies   Recommended Route of Medication  "Administration   Medication Administration  Other (See Comments)  (float in applesauce)   Patient / Family Goals   Patient / Family Goal #1 \"I share spafloraetti with my wife\"   Goal #1 Outcome Progressing slower than expected   Short Term Goals   Short Term Goal # 1 Patient will consume a diet of thin liquids and soft and bite sized solids with no s/sx of aspiration given direct supervision/feeding A.   Goal Outcome # 1 Goal not met   Short Term Goal # 2 NEW 2/27: Pt will consume a thin liquid/clear liquid diet with no s/sx of aspiration with min cues.     "

## 2022-02-27 NOTE — PROCEDURES
Conscious Sedation Procedure Note    Indication: GI bleed, endoscopy    Consent: I have discussed with the patient and/or the patient representative the indication, alternatives, and the possible risks and/or complications of the planned procedure and the anesthesia methods. The patient and/or patient representative appear to understand and agree to proceed.    Physician Involvement: The attending physician was present and supervising this procedure.    Pre-Sedation Documentation and Exam: I have personally completed a history, physical exam & review of systems for this patient (see notes).  Vital signs have been reviewed (see flow sheet for vitals).    Airway Assessment: dentition not prohibitive, normal neck range of motion    Prior History of Anesthesia Complications: none    ASA Classification: Class 3 - A patient with severe systemic disease that limits activity but is not incapacitating and E Status - the procedure is performed on an Emergency basis    Sedation/ Anesthesia Plan: intravenous sedation    Medications Used: ketamine 50 mg intravenously    Monitoring and Safety: The patient was placed on a cardiac monitor and vital signs, pulse oximetry and level of consciousness were continuously evaluated throughout the procedure. The patient was closely monitored until recovery from the medications was complete and the patient had returned to baseline status. Respiratory therapy was on standby at all times during the procedure.    Post-Sedation Vital Signs: Vital signs were reviewed and were stable after the procedure (see flow sheet for vitals)            Post-Sedation Exam: Lungs: clear and Cardiovascular: regular rate and rhythm           Complications: none    Sedation Time: 1438 to 1456. 18 Total minutes    CPT: 54993 and 05451

## 2022-02-27 NOTE — PROGRESS NOTES
Hemodialysis done today, started @ 1545 and ended @ 1846 with no fluid removed as ordered, 1 unit of PRBC given, no adverse reaction noted, VSS post HD. Report given to BRIDGETTE Dillon. See flow sheet for details.

## 2022-02-27 NOTE — PROGRESS NOTES
[facility] Progress Note      Admit Date: 2/1/2022    Resident(s): Kervin Alvarez M.D.   Attending:  JAMI PIMENTEL/ Dr. Julio    Patient ID:    Name:  Krishan Acevedo   YOB: 1950  Age:  71 y.o.  male   MRN:  [id number]    Hospital Course (carried forward and updated):  Krishan Acevedo is a 71 y.o. 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  He was admitted to the hospital with acute kidney injury, has been treated with the prednisone and underwent kidney biopsy which showed no signs of rejection, urinary tract infection with Carbapenem resistant Pseudomonas and he completed antibiotics.  CT head without obtained which did not show any acute bleeds or abnormalities, patient had maroon-colored stools therefore GI was consulted and underwent EGD on 2/24/2022 which showed significant duodenal bulb ulcerations x3 which was clipped and cauterized.  Patient was admitted to the intensive care for hemorrhagic shock requiring multiple pressors.    Consultants:  Critical Care  Gastroenterology  Nephrology    Interval Events:  2/25: Patient had 4 maroon-colored stools I updated GI service, recommended CTA of the abdomen to see extravasation.  Patient remained on 2 vasopressors, hemoglobin stable and not requiring any transfusions at this time, kidney function worsened therefore I contacted with nephrology, recommended temporary hemodialysis.  Therefore temporary dialysis catheter was placed today.  Potassium is stable at 5 however increasing from last, continue bicarb drip due to significant acidosis, no signs of overt volume overload.    2/26: Had HD 1L net out. Levofed rate is improving. Patient is alert to verbal stimuli. Still bleeding 3 maroon colored stool, Hb down to 7.8, will trend one more time and transfuse if kept decreasing.  Dc'ed bicarb drip.    2/27: repeat EGD yesterday afternoon, 1 clip for exposed vessel, continue ICU level of care    Not awake enough this morning to meaningfully participate. Plan to attempt to assess patient for improvement and initiating oral meds/diet. Hemoglobin stabilized in the interim.      Vitals Range last 24h:  Temp:  [37 °C (98.6 °F)] 37 °C (98.6 °F)  Pulse:  [49-83] 56  Resp:  [8-35] 12  BP: (100-139)/(48-66) 113/54  SpO2:  [91 %-100 %] 97 %      Intake/Output Summary (Last 24 hours) at 2/27/2022 1009  Last data filed at 2/27/2022 0800  Gross per 24 hour   Intake 1495.85 ml   Output 1175 ml   Net 320.85 ml        Review of Systems   Unable to perform ROS: Mental acuity       PHYSICAL EXAM:  Vitals:    02/27/22 0715 02/27/22 0730 02/27/22 0745 02/27/22 0800   BP:    113/54   Pulse: 66 62 61 (!) 56   Resp: 12 12 12 12   Temp:       TempSrc:       SpO2: 97% 99% 99% 97%   Weight:       Height:        Body mass index is 31.98 kg/m².    O2 therapy: Pulse Oximetry: 97 %, O2 (LPM): 1, O2 Delivery Device: Oxymask    Date 02/27/22 0700 - 02/28/22 0659   Shift 7968-9529 4605-7742 7029-9564 24 Hour Total   INTAKE   I.V. 3   3     Norepinephrine Volume 3   3   IV Piggyback 116   116     Volume (mL) (pantoprazole (Protonix) 80 mg in  mL Infusion) 100   100     Volume (mL) (tacrolimus 0.5 mg in  mL) 16   16   Shift Total 119   119   OUTPUT   Urine 50   50     Output (mL) (Urethral Catheter Non-latex;Straight-tip;Temperature probe) 50   50   Stool 0   0     Measurable Stool (mL) 0   0   Shift Total 50   50   NET 69   69        Physical Exam  Constitutional:       General: He is not in acute distress.     Appearance: He is ill-appearing.   HENT:      Head: Normocephalic and atraumatic.      Nose: No rhinorrhea.      Mouth/Throat:      Mouth: Mucous membranes are moist.      Pharynx: No posterior oropharyngeal erythema.   Eyes:      General: No scleral icterus.  Cardiovascular:      Rate and Rhythm:  Tachycardia present. Rhythm irregular.      Heart sounds: Normal heart sounds. No murmur heard.  Pulmonary:      Effort: No respiratory distress.      Breath sounds: No wheezing, rhonchi or rales.   Abdominal:      General: Abdomen is flat. There is no distension.      Comments: Hyperactive bowel sounds   Musculoskeletal:      Right lower leg: No edema.      Left lower leg: No edema.   Skin:     Capillary Refill: Capillary refill takes 2 to 3 seconds.      Coloration: Skin is pale.   Neurological:      Comments: Patient is responding to verbal and painful stimuli         Recent Labs     02/24/22  1549 02/24/22  1702   [id number]  --  7.38*   [id number]  --  26.6   [id number]  --  133.7*   YSTV3YPH  --  97.0   ARTHCO3  --  15*   ARTBE  --  -9*   ISTATAPH 7.417  --    ISTATAPCO2 25.6*  --    ISTATAPO2 106*  --    ISTATATCO2 17*  --    WKNYJHF8DUQ 98  --    ISTATARTHCO3 16.5*  --    ISTATARTBE -7*  --    ISTATTEMP 97.1 F  --    ISTATFIO2 28  --    ISTATSPEC Arterial  --    ISTATAPHTC 7.430  --    [id number] 101*  --      Recent Labs     02/24/22  1744 02/25/22  0345 02/26/22  0400 02/27/22  0506   SODIUM 144 141 142 143   POTASSIUM 4.9 5.0 3.9 4.2   CHLORIDE 114* 114* 108 110   CO2 14* 15* 25 26   BUN 86* 84* 63* 32*   CREATININE 3.52* 3.56* 3.06* 2.04*   MAGNESIUM 2.3  --   --   --    PHOSPHORUS 5.2*  --   --   --    CALCIUM 8.5 8.5 7.6* 7.8*     Recent Labs     02/25/22  0345 02/26/22  0400 02/27/22  0506   ALTSGPT 397* 263* 177*   ASTSGOT 288* 100* 54*   ALKPHOSPHAT 133* 105* 100*   TBILIRUBIN 0.6 0.4 0.5   GLUCOSE 214* 160* 92     Recent Labs     02/26/22  1905 02/27/22  0134 02/27/22  0506   RBC 3.17* 2.85* 2.99*   HEMOGLOBIN 9.2* 8.4* 8.7*   HEMATOCRIT 27.4* 25.4* 26.3*   PLATELETCT 73* 57* 54*     Recent Labs     02/25/22  0345 02/25/22  0930 02/26/22  0400 02/26/22  1037 02/26/22  1905 02/27/22  0134 02/27/22  0506   WBC 25.7*   < > 12.2*   < > 13.0* 8.3 8.4   NEUTSPOLYS 89.70*   < > 88.60*   < > 86.90* 84.70*  85.90*   LYMPHOCYTES 5.10*   < > 6.60*   < > 7.30* 9.70* 8.80*   MONOCYTES 3.20   < > 3.50   < > 4.50 4.50 4.40   EOSINOPHILS 0.00   < > 0.00   < > 0.00 0.00 0.00   BASOPHILS 0.20   < > 0.20   < > 0.10 0.10 0.10   ASTSGOT 288*  --  100*  --   --   --  54*   ALTSGPT 397*  --  263*  --   --   --  177*   ALKPHOSPHAT 133*  --  105*  --   --   --  100*   TBILIRUBIN 0.6  --  0.4  --   --   --  0.5    < > = values in this interval not displayed.       Meds:  • maintenance IV with additives   4 mL/hr at 02/26/22 1512   • diphenhydrAMINE  25 mg     • insulin regular  3-14 Units      And   • dextrose 50%  50 mL     • heparin  2,800 Units     • [Held by provider] sodium bicarbonate  650 mg     • norepinephrine (Levophed) infusion  0-30 mcg/min 1 mcg/min (02/27/22 0700)   • vasopressin (PITRESSIN) infusion  0.03 Units/min Stopped (02/24/22 1945)   • hydrocortisone sodium succinate PF  50 mg     • pantoprazole (PROTONIX) infusion  8 mg/hr 8 mg/hr (02/27/22 0106)   • [Held by provider] patiromer  8.4 g     • [Held by provider] cinacalcet  30 mg     • acetaminophen  650 mg     • loperamide  2 mg     • [Held by provider] tacrolimus  0.5 mg     • ondansetron  4 mg     • haloperidol lactate  2-5 mg     • ondansetron  4 mg     • hydrALAZINE  10 mg     • brimonidine  2 Drop      And   • timolol  2 Drop          Procedures:  Art line and central line placement 2/24 by Kervin Porter Jr., D.O.  EGD 2/24 by Raymond Pryor M.D.  Central line placement 2/25 by LLOYD Armenta    Imaging:  CT-CTA ABDOMEN WITH & W/O-POST PROCESS   Final Result      1.  Surgical clips visualized in the duodenum. No active extravasation is appreciated      2.  Marked atrophy of the native kidneys.      3.  Transplant kidney in the right iliac fossa with poor enhancement. Air in the collecting system is likely related to prior instrumentation.      4.  Cholelithiasis.      DX-CHEST-LIMITED (1 VIEW)   Final Result      1.  Interval placement of left IJ  catheter with distal tip extending to mid SVC.      2.  Right IJ catheter unchanged in position overlying mid SVC.      3.  Subtle peripheral patchy airspace opacities noted in the lung fields.      DX-CHEST-FOR LINE PLACEMENT Perform procedure in: Patient's Room   Final Result      Right internal jugular catheter is been placed and appears appropriately located      CT-HEAD W/O   Final Result         1.  No acute intracranial process.      2. Diffuse atrophy and periventricular white matter changes consistent with chronic small vessel disease.      CT-NEEDLE BX-RENAL   Final Result      1.  CT GUIDED RIGHT LOWER QUADRANT/PELVIC RENAL TRANSPLANT KIDNEY BIOPSY.      EC-ECHOCARDIOGRAM LTD W/O CONT   Final Result      MR-BRAIN-W/O   Final Result         No acute intracranial process.      Nonspecific T2 hyperintensities are noted in the periventricular and deep white matter, most likely related to chronic microvascular ischemia.      Remote left thalamic and corona radiata lacunar infarction noted.      DX-ABDOMEN FOR TUBE PLACEMENT   Final Result         Feeding tube with tip projecting over the expected area of the first portion duodenum.      CT-HEAD W/O   Final Result         NO ACUTE ABNORMALITIES ARE NOTED ON CT SCAN OF THE HEAD.      Findings are consistent with atrophy.  Decreased attenuation in the periventricular white matter likely indicates microvascular ischemic disease.         CT-CHEST,ABDOMEN,PELVIS W/O   Final Result      1.  Multifocal pulmonary opacifications are identified which are new since the prior CT. Findings could be due to pneumonia.      2.  1.2 cm nodular opacification noted in the left upper pulmonary lobe. Primary or metastatic neoplasm is possible. Small focus of pneumonia or inflammation is also possible. Consider biopsy or PET/CT. Consider follow-up CT in 3 months.      3.  There is duodenal wall thickening. Findings could be due to duodenitis. Neoplasm is also possible. There is some  mild induration in the retroperitoneal fat surrounding the duodenum.      4.  Cholelithiasis. No biliary ductal dilatation appreciated.      5.  Right lower quadrant renal transplant. No hydronephrosis.      Low and High Risk: Consider PET/CT, or tissue sampling, or follow-up CT at 3 months      Low Risk - Minimal or absent history of smoking and of other known risk factors.      High Risk - History of smoking or of other known risk factors.      Note: These recommendations do not apply to lung cancer screening, patients with immunosuppression, or patients with known primary cancer.      Fleischner Society 2017 Guidelines for Management of Incidentally Detected Pulmonary Nodules in Adults         US-EXTREMITY VENOUS LOWER BILAT   Final Result      US-RENAL TRANSPLANT COMP   Final Result      1.  No hydronephrosis or perinephric fluid collection involving the right lower quadrant renal transplant.   2.  No definite evidence of hemodynamically significant renal artery stenosis.   3.  Elevated resistive indices could suggest renal parenchymal disease.      DX-CHEST-PORTABLE (1 VIEW)   Final Result      1.  Bilateral peripheral lower lobe hazy opacities which is nonspecific but can be seen with Covid 19 pneumonia.   2.  Enlarged cardiac silhouette with probable vascular congestion/edema.          ASSESSEMENT and PLAN:    * Hemorrhagic shock (HCC)  Assessment & Plan  Secondary to GI bleeding  EGD shows 3 duodenal bulb ulcers  Continued to bleed, CTA abdomen and pelvis 2/25 - no active extravasation present.  Norepi, vasopressin, MAP goal >65  PPI drip  Repeat EGD 2/26 with clip placed  Hgb stabilized since  -Continue ICU level of care  -Q12 H/H  -Start sucralfate when tolerating PO  -Continue on PPI drip and supportive care for GIB    Acute encephalopathy- (present on admission)  Assessment & Plan  Likely due to hemorrhagic shock / GI bleed + BUN from RADHA  CT head no acute abnormalities.  EEG -non specific  encephalopathy.  Dialysis  Bleeding control per G    Shock liver  Assessment & Plan  Improving  Hep panel negative.  AST, ALT elevation after significant GI bleed  Continue to monitor  See hemorrhagic shock.    Duodenal ulcer  Assessment & Plan  See hemorrhagic shock    Type 2 diabetes mellitus (HCC)  Assessment & Plan  Goal blood glucose 140-180  basal insulin, sliding scale insulin, accuchecks  hypoglycemia protocol  A1c 7.4      Immunocompromised (HCC)- (present on admission)  Assessment & Plan  Tacrolimus continue  Cellcept hold  Prednisone continue    Positive urine culture- (present on admission)  Assessment & Plan  Pseudomonas UTI  Completed antibiotics on 2/18/2022    Thrombocytopenia (HCC)  Assessment & Plan  Unknown etiology.  Has been noted 2 weeks back.  Might be secondary to liver failure or shock  No signs of infection at this time.    Acute blood loss anemia- (present on admission)  Assessment & Plan  Due to gastritis and ulcers, s/p repeat EGD 2/26  See above    RADHA (acute kidney injury) (HCC)- (present on admission)  Assessment & Plan  Likely due to ATN from hemorrhagic shock.  Oliguric  Sodium bicarb IV until CO2 18. - dc'ed  prednisone daily to stress dose steroids  Renal dose meds, avoid nephrotoxins  Strict I/Os  Follow renal function  Temporary dialysis  Hold cellcept, losartan  Continue sensipar, veltassa    Long term (current) use of systemic steroids- (present on admission)  Assessment & Plan  Will stress dose given current shock state    Essential hypertension- (present on admission)  Assessment & Plan  Hold meds in the setting of shock    Kidney transplant status, living related donor- (present on admission)  Assessment & Plan  Due to wegeners granulomatosis, s/p transplant 2008  On tacrolimus and steroids  Biopsy - no rejection.  Nephrology onboard    Atrial flutter - (present on admission)  Assessment & Plan  Hold Eliquis given bleeding  Hold beta-blocker with hypotension  Telemetry  monitoring  Optimize electrolytes  Rate control <110      DISPO: remain in icu due to hemorrhagic shock    CODE STATUS: dnr intubation ok    Quality Measures:  Feeding: ,NPO for now, per dietary  Analgesia: tylenol  Sedation: not indicated  Thromboprophylaxis: scds. Pharm contraindicated  Head of bed: >30 degrees  Ulcer prophylaxis: ppi drip  Glycemic control: Correctional: na / Basal: na  Bowel care: bowel regimen contrainidicated  Indwelling lines: 2 central line, piv, art line  Deescalation of antibiotics: none      Kervin Alvarez M.D.

## 2022-02-27 NOTE — CARE PLAN
The patient is Stable - Low risk of patient condition declining or worsening    Shift Goals  Clinical Goals: Wean vasopressors  Patient Goals: Unable to assess  Family Goals: Unable to assess    Progress made toward(s) clinical / shift goals:  Titrating Levophed as ordered, appropriate for speech evaluation    Patient is not progressing towards the following goals:      Problem: Knowledge Deficit - Standard  Goal: Patient and family/care givers will demonstrate understanding of plan of care, disease process/condition, diagnostic tests and medications  Outcome: Progressing  Patient is more oriented and aware of situation, place and self. Patient's wife Penelope is present, actively asking questions of specialists and nursing staff and is receptive to education related to procedures and medications patient is receiving.      Problem: Psychosocial  Goal: Patient's ability to verbalize feelings about condition will improve  Outcome: Progressing  Patient is verbalizing feelings of comfort, anxiety and pain. Patient is improving ability to communicate needs. Patient's mood is improving.      Problem: Communication  Goal: The ability to communicate needs accurately and effectively will improve  Outcome: Progressing  Patient and patient family actively engages with nursing and interprofessional team staff related to questions, feelings and current admitting diagnosis. Patient is alert, aware and joking with spouse and staff.

## 2022-02-27 NOTE — PROGRESS NOTES
"[facility] -  PROGRESS NOTE               Author: Karishma Cruz M.D. Date & Time: 2/27/2022  11:51 AM     HPI:  Krishan Acevedo is a 71 y.o. 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 Was additionally found to have worsening renal function.   Patient shares that he first tested positive for COVID at an outside hospital several days ago. He felt that he was not improving and possibly getting worse at home which prompted him to call EMS. He mentions that for several days he has been having loose stools, noting 3-5 loose but not watery BMs daily, noting his last loose stool was day prior to presentation. He also mentions that he has become more dependent on his wife to bring him food and water citing fatigue with his COVID infection. He does note that for the past several weeks he has been feeling \"like I'm always thirsty\".  While in the ED he was vitally stable. Was placed on 2L NC sating in the mid 90s BP ranged 123/70 from 159/117. Labs notable for Cr of 2.62. BUN 80, COVID-19 positive, UA noted for large occult blood, small leukocyte esterase. US of kidneys was completed with indicated no hydronephrosis or fluid collections, no renal artery stenosis.   In regards to his transplant history. Living donor transplant was completed in 2008 at an outside hospital. Patient is on Prednisone 5mg, Mycophenolate 500mg BID, and Tacrolimus 1mg and 0.5mg daily. His GFR trends in the 30s-40s although in chart he has climbed to the 56-53 ranges in the past.        DAILY NEPHROLOGY SUMMARY:  2/1: Admitted, consult placed  2/2: no overnight events, renal function improving, patient tolerating PO intake  2/3: started on IVF yesterday, renal function improved to around prior baseline range  2/4: renal function " continues to improve  2/5: Scr down to baseline levels. No new overnight renal events. Feels ok.  2/6: Feels ok. Urine culture from 2/1 positive for carbapenem resistant Pseudomonas aeruginosa. He is on zosyn per sensitivity. No labs today  2/7: Potassium is 5.7 today.  Creatinine up to 1.95.  Corrected calcium elevated.  CO2 16.  2/08: Chart and notes reviewed. No new labs this am. +Tachycardia.   2/09: Nursing notes and assessments reviewed.  Int tachycardia still. Room air. SBP labile 100s-160s. K 5.5  2/10: Spoke with RN, patient has no complaints at this time.   Plan is for discharge today or tomorrow to SNF pending 1L bolus and Calcium recheck.  Current SCa 11.5  VSS RA  -160s  2/11: Sodium up to 149.  Calcium increasing.  Creatinine stable 1.7.  2/12: Sodium up to 152.  Calcium remains elevated.  Creatinine 1.8.  I/Os not documented  2/13: Sodium still at 152.  Calcium trending down.  Creatinine up to 2.06.  On bicarb drip.  Somnolent  2/14: Unable to participate in interview, improved hemodynamics, cr stable  2/15: stable hemodynamics, 1.9L UOP, cr to 1.9, labile blood pressures, ongoing intermittent delirium  2/16: Hypertensive this AM, slight improvement in Cr, ca climbing, net neg 600cc with 1.9L UOP, confused  2/17: 1.4L UOP,ongoing encephalopthy, sodium climbing, minimal PO intake    2/18: IOs not recorded, cr 1.5, tremulous, minimal PO intake-serum sodium climbing  2/19: 2.6L  UOP, sodium climbing, more confused this a.m, wife at bedside  2/21: remains confused, biopsy was postponed to until tomorrow due to holiday.   2/22: confused. Refused Veltassa for 2 days but talked to him about it.   2/23: more awake and alert, wife is at bedside. UOP is great in velez bacg but not being documented. S/p allograft kidney biopsy 2/22/22.  2/23: could not sleep at night, cr is worsening but UOP is great.   2/24-2/25: Patient was transferred to ICU due to altered mentation and GI bleed.  Underwent endoscopy  "which showed duodenal ulcers.  In Shock On norepinephrine@ 18. UOP has decreased. Not intubated yet.   2/26: got HD yesterday, Mentation is better today. UOP is better.   2/27: awake and communicative, wife and friend at bedside. UOP 1.3L.     REVIEW OF SYSTEMS:    10 point ROS reviewed and is as per HPI/daily summary or otherwise negative    PMH/PSH/SH/FH:   Reviewed and unchanged since admission note    CURRENT MEDICATIONS:   Reviewed from admission to present day    VS:  /54   Pulse (!) 56   Temp 37 °C (98.6 °F) (Bladder)   Resp 12   Ht 1.803 m (5' 11\")   Wt 104 kg (229 lb 4.5 oz)   SpO2 97%   BMI 31.98 kg/m²     Physical Exam  Vitals and nursing note reviewed.   Constitutional:       General: He is not in acute distress.     Appearance: He is ill-appearing.   HENT:      Head: Normocephalic and atraumatic.   Cardiovascular:      Rate and Rhythm: Normal rate and regular rhythm.      Pulses: Normal pulses.      Heart sounds: No murmur heard.    No gallop.   Pulmonary:      Effort: Pulmonary effort is normal. No respiratory distress.      Breath sounds: No wheezing or rales.   Abdominal:      General: Abdomen is flat. Bowel sounds are normal. There is no distension.      Tenderness: There is no abdominal tenderness.   Musculoskeletal:         General: No swelling or deformity.   Skin:     Coloration: Skin is pale. Skin is not jaundiced.      Findings: No bruising or lesion.   Neurological:      Mental Status: He is alert. Oriented to person and place  Psychiatric:         Behavior: Behavior normal.   Fluids:  In: 1622.3 [I.V.:154.3; Blood:300; Dialysis:500; Enteral:30]  Out: 1575     LABS:  Recent Labs     02/25/22  0345 02/26/22  0400 02/27/22  0506   SODIUM 141 142 143   POTASSIUM 5.0 3.9 4.2   CHLORIDE 114* 108 110   CO2 15* 25 26   GLUCOSE 214* 160* 92   BUN 84* 63* 32*   CREATININE 3.56* 3.06* 2.04*   CALCIUM 8.5 7.6* 7.8*       IMAGING:   All imaging reviewed from admission to present " day    IMPRESSION:  # History of Renal Transplant with RADHA   - in the beginning of admission 2/2 ? 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 Then worsened again likely 2/2 ATN from acute GI bleed and shock. Currently oliguric.   - Hematuria +  -Tacrolimus 22.5  2/8, dose reduced, repeated FK level 2/18 8.9 but lab was drawn after the dose so it is not a trough. repeat FK level  on 2/22 = 4.2.  - s/p HD on 2/25 and 2/26 for uremic encephalopathy.   # History of ANCA Vasculitis  - Repeat serologies positive ( MPO 42, PR3 61 on 2/10)  but no signs of vasculitis on Allograft biopsy   # CKD 3  # Acidosis 2/2 Renal failure  # Afib  # DM2  # HTN  # Hypercalcemia: PTH mediated   # Hyperparathyroid: may need further imaging going forward  # Acute encephalopathy  - azotemia is partially driven by steroids and GI bleeding. But can not rule out uremia at this time.   # Acute upper GI bleed 2/2 duodenal ulcers  - EGD 2/26: Two large duodenal ulcers with hemoclips in place and one visible vessel; one Hemoclip place. Patchy erythematous gastropathy. Hiatal hernia. LA class a esophagitis with minimal oozing.   # anemia of acute blood loss    # Elevated LFT? shoch liver   # NSTEMI      PLAN:  - no indication for HD today.  UOP improved to 1.3L w/p diuretics. Daily eval for RRT need    - hydrocortisone/Decadron per primary team. Will need to re-start prednisone 30 mg qd once hydrocortisone course is finished and pt is cleared for PO intake. Then taper back to 5 mg over several days.   - c/w IV tacrolimus 0.5 mg as a slow drip over 24 hours to minimize side effects. Please monitor for arrhythmias, anaphylaxis reaction. If side effects appear can be switched to Cyclosporin IV since has less side effect profile. Once patient is cleared to PO intake please switch back to tacrolimus PO 1 mg in am and 0.5 mg in pm ( total of 1.5mg a  Day).    - re-start sensipar 30mg TIW once cleared for PO intake   - c/w holding Cellcept for now   - Continue to holding losartan  - Low potassium renal diet once back to PO   - Dose meds for reduced GFR  - strict IO    discussed with primary team.

## 2022-02-28 NOTE — CARE PLAN
The patient is Stable - Low risk of patient condition declining or worsening    Shift Goals  Clinical Goals: Stable vitals  Patient Goals: Water  Family Goals: DENA    Progress made toward(s) clinical / shift goals:  Patient's HR and BP at goals off pressors since 2/27 at 12:00. Patient alert this morning and appropriate for PO intake, consumed sips of water and 25% of breakfast.     Patient is not progressing towards the following goals:

## 2022-02-28 NOTE — DISCHARGE PLANNING
Anticipated Discharge Disposition: [facility] or [facility] when medically cleared     Action: RNCM attended IDT rounds and reviewed chart. Pt has order to transfer to floor when bed available.     Barriers to Discharge: medical clearance, bed availability     Plan:  f/u with medical team to discuss DC needs and barriers

## 2022-02-28 NOTE — THERAPY
"Physical Therapy   Daily Treatment     Patient Name: Krishan Acevedo  Age:  71 y.o., Sex:  male  Medical Record #: [id number]  Today's Date: 2/28/2022     Precautions  Precautions: Fall Risk;Swallow Precautions ( See Comments)    Assessment    Patient remains limited by impaired cognition, impaired balance and coordination, and functional weakness. He mobilized as detailed below, he was able to stand from EOB with assist but unable to weight shift for steps. He had nonsensical speech throughout session but was able to follow simple commands for mobility. Will continue to follow.    Plan    Continue current treatment plan.    DC Equipment Recommendations: Unable to determine at this time  Discharge Recommendations: Recommend post-acute placement for additional physical therapy services prior to discharge home      Subjective    \"I moved from [place] to [place] when I was 12.\"     Objective       02/28/22 1247   Total Time Spent   Total Time Spent (Mins) 27   Charge Group   Charges  Yes   PT Therapeutic Activities 2   Precautions   Precautions Fall Risk;Swallow Precautions ( See Comments)   Vitals   O2 (LPM) 1   O2 Delivery Device Silicone Nasal Cannula   Pain 0 - 10 Group   Therapist Pain Assessment   (no pain complaint during session)   Cognition    Cognition / Consciousness X   Speech/ Communication Delayed Responses   Level of Consciousness Alert   Ability To Follow Commands 1 Step   Safety Awareness Impaired   New Learning Impaired   Attention Impaired   Comments nonsensical speech but followed commands   Passive ROM Lower Body   Passive ROM Lower Body WDL   Active ROM Lower Body    Active ROM Lower Body  WDL   Strength Lower Body   Lower Body Strength  X   Comments gross weakness persists but patient able to stand with assist   Sensation Lower Body   Lower Extremity Sensation   Not Tested   Balance   Sitting Balance (Static) Fair   Sitting Balance (Dynamic) Fair -   Standing Balance (Static) Poor   Standing " Balance (Dynamic) Poor -   Weight Shift Sitting Fair   Weight Shift Standing Absent   Skilled Intervention Compensatory Strategies;Facilitation;Postural Facilitation;Sequencing;Tactile Cuing;Verbal Cuing   Comments able to maintain sitting balance EOB; HHA x2 in standing and posterior lean   Gait Analysis   Gait Level Of Assist Unable to Participate   Comments unable to weight shift for steps; patient also having BM during mobility   Bed Mobility    Supine to Sit Minimal Assist   Sit to Supine Moderate Assist   Scooting Minimal Assist  (seated)   Skilled Intervention Compensatory Strategies;Facilitation;Verbal Cuing;Sequencing;Tactile Cuing   Functional Mobility   Sit to Stand Minimal Assist  (with elevated bed surface)   Bed, Chair, Wheelchair Transfer Unable to Participate   ICU Target Mobility Level   ICU Mobility - Targeted Level Level 3A   How much difficulty does the patient currently have...   Turning over in bed (including adjusting bedclothes, sheets and blankets)? 1   Sitting down on and standing up from a chair with arms (e.g., wheelchair, bedside commode, etc.) 1   Moving from lying on back to sitting on the side of the bed? 1   How much help from another person does the patient currently need...   Moving to and from a bed to a chair (including a wheelchair)? 2   Need to walk in a hospital room? 1   Climbing 3-5 steps with a railing? 1   6 clicks Mobility Score 7   Activity Tolerance   Sitting in Chair NT   Sitting Edge of Bed 15 min   Standing 3-5 min   Comments limited by cognition, fatigue   Patient / Family Goals    Patient / Family Goal #1 to go home    Goal #1 Outcome Goal not met   Short Term Goals    Short Term Goal # 1 in 6 visits patient will demo all functional transfers with Taty for safe DC    Goal Outcome # 1 goal not met   Short Term Goal # 2 in 6 visits patient will ambualte 200' Taty for safe DC    Goal Outcome # 2 Goal not met   Short Term Goal # 3 in 6 visits patient will navigate 1  stairs with Taty for safe DC    Goal Outcome # 3 Goal not met   Anticipated Discharge Equipment and Recommendations   DC Equipment Recommendations Unable to determine at this time   Discharge Recommendations Recommend post-acute placement for additional physical therapy services prior to discharge home   Interdisciplinary Plan of Care Collaboration   IDT Collaboration with  Nursing   Patient Position at End of Therapy In Bed;Other (Comments)  (RN and CNA at bedside to assist with pericare)   Collaboration Comments RN present during majority of session   Session Information   Date / Session Number  2/28-7 (1/4, 3/5)

## 2022-02-28 NOTE — PROGRESS NOTES
Date of Consultation:  2/24/2022    Prog note 2/27/22    Patient: : Krishan Acevedo  MRN: [id number]    Referring Physician: Dr. Porter     GI: SILVIA Peña     Reason for Consultation: GI bleed    History of Present Illness:   71-year-old male with very complicated past medical history including ANCA vasculitis status post live donor kidney transplant 2008 atrial fibrillation's/flutter status post ablation January 2021 type 2 diabetes Covid infection who is being consulted for GI bleed by ICU.  Was admitted February 1, 2021 due to progressive fatigue Covid positivity and hypoxemia patient was on prednisone CellCept and tacrolimus.  Patient demonstrated Carbapenem resistant Pseduomonas.  Patient has been on anticoagulation.  Per ICU attending patient had progressive multiple maroon color stool today with progressive confusion and hypotension thus transferred to the ICU for further management.  Patient's blood pressures systolic dropped down to 50 improved on pressor to 90/60.  Globin dropped from 10.8->7.4    INTERVAL HISTORY:    2/25/22: Continues to have large maroon colored stools x 4 since EGD last night. Hgb stable 10.1. Still on pressors. MAP: 75.   No further BMs since this morning.    EGD 2/24/22:  3 ulcerations in the duodenal bulb/duodenal sweep with visible vessel treated with gold probe cautery and hemoclips.     2/26/22: Continues to have black tarry stools. Hgb trending down 7.3. VS: normotensive on 1 pressor. BUN: 63. Crt: 3.06. CTA negative for active GI hemorrhage.    2/26 egd  Summary of Findings:  -Two large duodenal ulcers with hemoclips in place and one visible vessel; one Hemoclip placed  -Patchy erythematous gastropathy  -Hiatal hernia  -LA class a esophagitis with minimal oozing    2/27: awake and alert today. Denies abd pain.  2/28/2022: Hgb stable. No overt bleeding. Awake but confused.         Past Medical History:   Diagnosis Date   • Arrhythmia 2021    flutter   • Arthritis 2020     right shoulder, knees, & back   • Cough     dry-on lisinipril, now productive -brown   • Kidney transplant pain     low back   • E coli bacteremia     hospitalized x 5 months   • Kidney failure 2008    kidney transplant   • Wegener's granulomatosis with renal involvement (HCC)    • Dialysis patient (HCC) 2006    x 18 months   • CAD (coronary artery disease)    • Atrial flutter ([facility])    • DVT (deep venous thrombosis) ([facility])    • Retinal detachment    • Sleep apnea     CPAP   • Snoring          Past Surgical History:   Procedure Laterality Date   • [place] UPPER GI ENDOSCOPY,DIAGNOSIS  2022    Procedure: GASTROSCOPY;  Surgeon: Kim Goetz D.O.;  Location: SURGERY SAME DAY [facility];  Service: Gastroenterology   • [place] UPPER GI ENDOSCOPY,DIAGNOSIS  2022    Procedure: GASTROSCOPY;  Surgeon: Raymond Pryor M.D.;  Location: SURGERY SAME DAY [facility];  Service: Gastroenterology   • OTHER Right 2017    eye surgery, lasix & cataracts, retinal detatchment   • PEG PLACEMENT  10/10/2014    Performed by Brijesh Orozco M.D. at [facility]   • OTHER ORTHOPEDIC SURGERY Left     knee       Family History   Problem Relation Age of Onset   • Stroke Brother 26        , ? cause   • Stroke Maternal Grandmother            • Clotting Disorder Neg Hx        Social History     Socioeconomic History   • Marital status:    Tobacco Use   • Smoking status: Never Smoker   • Smokeless tobacco: Former User     Types: Snuff, Chew   Vaping Use   • Vaping Use: Never used   Substance and Sexual Activity   • Alcohol use: Not Currently   • Drug use: Never       Review of Systems   Unable to perform ROS: Mental status change         Physical Exam:  Vitals:    22 0500 22 0600 22 0800 22 1200   BP: 130/60 128/61  142/65   Pulse: (!) 51 (!) 51 (!) 59 (!) 53   Resp: 12 (!) 10 20 18   Temp:    36.1 °C (96.9 °F)   TempSrc:  Bladder Bladder Temporal   SpO2:  97% 97% 97%    Weight:       Height:           Phys Exam    HENMT:  Normocephalic, Atraumatic, Oropharynx moist mucous membranes, No oral exudates, Nose normal.    Abdomen: Bowel sounds normal, Soft, No tenderness, No guarding, No rebound,  Skin: Warm, Dry, No erythema, No rash, no induration.      Labs:  Recent Labs     02/27/22  0506 02/27/22  1633 02/28/22  0426   WBC 8.4 7.3 8.4   RBC 2.99* 2.90* 3.02*   HEMOGLOBIN 8.7* 8.4* 8.8*   HEMATOCRIT 26.3* 25.8* 26.9*   MCV 88.0 89.0 89.1   MCH 29.1 29.0 29.1   MCHC 33.1* 32.6* 32.7*   RDW 59.5* 59.9* 59.6*   PLATELETCT 54* 58* 61*   MPV 10.6 10.5 10.9     Recent Labs     02/26/22  0400 02/27/22  0506 02/28/22  0426   SODIUM 142 143 142   POTASSIUM 3.9 4.2 4.1   CHLORIDE 108 110 110   CO2 25 26 23   GLUCOSE 160* 92 126*   BUN 63* 32* 44*           Recent Labs     02/26/22  0400 02/27/22  0506 02/28/22  0426   ASTSGOT 100* 54* 32   ALTSGPT 263* 177* 138*   TBILIRUBIN 0.4 0.5 0.5   ALKPHOSPHAT 105* 100* 108*   GLOBULIN 1.8* 2.0 2.0         Imaging:  CT-CTA ABDOMEN WITH & W/O-POST PROCESS  Narrative: 2/25/2022 12:32 PM    HISTORY/REASON FOR EXAM:  Duodenal ulcer. Melena.    TECHNIQUE/EXAM DESCRIPTION:  CT angiogram without and with contrast of the abdomen and pelvis, with reconstructions. CT angiographic technique was utilized.    Intravenous contrast: 100 mL of nonionic Omnipaque 350 contrast.    Sagittal and coronal reconstructed images of the aorta were generated utilizing multiplanar volume reformat technique.    Low dose optimization technique was utilized for this CT exam including automated exposure control and adjustment of the mA and/or kV according to patient size.    COMPARISON: CT 2/12/2022    FINDINGS:    Aorta: Pre-contrast images demonstrate no evidence of displaced calcified intima or intramural hematoma.  Diameter: The abdominal aorta is normal in caliber.  Dissection: Absent.  Celiac artery origin: Widely patent.  Superior mesenteric artery origin: Widely  patent.  Renal artery origins: Severe stenosis proximally with diminutive mid and distal renal arteries  Inferior mesenteric artery: Patent.  Common iliac arteries: Nonaneurysmal and patent.    Heart: Normal size, partially visualized. Calcification of the aortic valve. No significant pericardial effusion.    3D angiographic/MIP images of the vasculature confirm the vascular findings as described above.    Lungs: Bibasilar atelectasis.  Liver: Homogeneous enhancement. No masses identified.  Biliary system: No ductal dilatation is appreciated. Multiple calcifications are in the gallbladder  Spleen: Unremarkable.  Adrenal glands: Unremarkable.  Pancreas: Fatty atrophy. Otherwise grossly unremarkable.  Kidneys: Marked atrophy of the native kidneys. Renal transplant in the right iliac fossa. There is poor enhancement of the transplant kidney. There is air in the collecting system.  Bowel: Multiple clips are noted in the duodenum. No contrast extravasation is appreciated. There are scattered air-fluid levels without significant bowel dilatation.  Ascites: None.  Lymph nodes: No adenopathy is identified.  Bladder: Partially decompressed with a Gasotn catheter. Air is in the bladder from catheter insertion  Bones: Degenerative changes are in the spine and sacroiliac joints  Impression: 1.  Surgical clips visualized in the duodenum. No active extravasation is appreciated    2.  Marked atrophy of the native kidneys.    3.  Transplant kidney in the right iliac fossa with poor enhancement. Air in the collecting system is likely related to prior instrumentation.    4.  Cholelithiasis.  DX-CHEST-LIMITED (1 VIEW)  Narrative: 2/25/2022 10:51 AM    HISTORY/REASON FOR EXAM:  Line placement. Left IJ CVC.    TECHNIQUE/EXAM DESCRIPTION AND NUMBER OF VIEWS:  Single AP view of the chest.    COMPARISON:  Chest x-ray 2/24/2022    FINDINGS:  Lines/tubes:  Interval placement of left IJ catheter with distal tip overlying the mid SVC. Right IJ  catheter is noted in place with distal tip overlying the mid SVC    Lungs:  There are subtle patchy peripheral opacities in the lung fields.    Pleura:  There is no pleural effusion or pneumothorax.    Heart and mediastinum:  The heart silhouette is within normal limits.    Bones:  Normal  Impression: 1.  Interval placement of left IJ catheter with distal tip extending to mid SVC.    2.  Right IJ catheter unchanged in position overlying mid SVC.    3.  Subtle peripheral patchy airspace opacities noted in the lung fields.        Impressions:  1. Upper GI bleed with large duodenal ulcers s/p hemostasis  2. Anemia- likely from acute bleeding; unclear if rapid upper vs lower GI  3. Leukocytosis  4. Hypotension on pressor  5. Electrolyte disturbance  6. Renal transplant on immunosuppressive medication  7. Encephalopathy  8. Worsening RADHA  9. CHF  10. COVID history  11. Aflutter on chronic anticoagulation.     Plan:  -advance diet as tolerated  -PPI gtt for 24 more hours and then Omeprazole 40 mg BID for 12 weeks  -carafate QID for 14 days  -may resume anticoagulation  - Outpatient follow up with [facility] in 4-8 weeks. Consider repeat EGD in 8 weeks to check for healing of ulcer\    GI will sign off.    ROSALINE Peña  [facility]

## 2022-02-28 NOTE — PROGRESS NOTES
Hospital Medicine Daily Progress Note    Date of Service  2/28/2022    Chief Complaint  Krishan Acevedo is a 71 y.o. male admitted 2/1/2022 with weakness    Hospital Course  71-year-old male with previous medical history of hypertension, dyslipidemia, type 2 diabetes, atrial flutter s/p ablation in January 2021, anticoagulation on apixaban, ANCA vasculitis s/p renal transplant, recent Covid infection in January.  Patient was admitted with complaint of weakness on February 11.  At that time he was found to be Covid positive RADHA with a creatinine of 2.6 from a baseline of 1.2-1.5.  He was eventually found to have a Carbapenem resistant Pseudomonas UTI and completed antibiotic therapy per ID.  Given his worsening renal function, he underwent renal biopsy on February 22.  On February 24, the patient was found to be altered, and subsequently hypotensive.  He was transferred to the ICU where he was found to have bloody stools.  GI was consulted, the patient had an EGD done on February 24.  He was found to have peptic ulcer disease in the duodenal bulb with visible vessel which was treated with cautery and hemoclips.  He subsequently became unstable and underwent a second EGD on February 26 or another Hemoclip was placed in a duodenal ulcer.    Pt with some word finding dificulties limiting ROS and HPI.  He states he feels good, has no physical complaints  AFebrile  50s  -160  On 1 LNC    Interval Problem Update    Pt with some word finding dificulties limiting ROS and HPI.  He states he feels good, has no physical complaints    AFebrile  50s  -160  On 1 LNC      I have personally seen and examined the patient at bedside. I discussed the plan of care with patient, bedside RN, pharmacy and pulmonary.    Consultants/Specialty  nephrology and pulmonary    Code Status  DNAR, I OK    Disposition  Patient is not medically cleared for discharge.   Anticipate discharge to to home with organized home healthcare and  close outpatient follow-up.  I have placed the appropriate orders for post-discharge needs.    Review of Systems  Review of Systems   Unable to perform ROS: Other        Physical Exam  Pulse:  [51-85] 51  Resp:  [10-20] 10  BP: (112-149)/(53-67) 128/61  SpO2:  [89 %-97 %] 97 %    Physical Exam  Vitals reviewed.   Constitutional:       General: He is not in acute distress.     Appearance: He is well-developed. He is obese. He is not diaphoretic.   HENT:      Head: Normocephalic and atraumatic.   Eyes:      Conjunctiva/sclera: Conjunctivae normal.   Neck:      Vascular: No JVD.   Cardiovascular:      Rate and Rhythm: Normal rate.      Heart sounds: Murmur heard.     No gallop.   Pulmonary:      Effort: Pulmonary effort is normal. No respiratory distress.      Breath sounds: No stridor. No wheezing or rales.   Abdominal:      Palpations: Abdomen is soft.      Tenderness: There is no abdominal tenderness. There is no guarding or rebound.   Musculoskeletal:         General: No tenderness.      Right lower leg: Edema present.      Left lower leg: Edema present.   Skin:     General: Skin is warm and dry.      Coloration: Skin is pale.      Findings: No rash.   Neurological:      Mental Status: He is alert and oriented to person, place, and time.   Psychiatric:         Mood and Affect: Mood normal.         Thought Content: Thought content normal.         Fluids    Intake/Output Summary (Last 24 hours) at 2/28/2022 0746  Last data filed at 2/28/2022 0600  Gross per 24 hour   Intake 1362.31 ml   Output 1290 ml   Net 72.31 ml       Laboratory  Recent Labs     02/27/22  0506 02/27/22  1633 02/28/22  0426   WBC 8.4 7.3 8.4   RBC 2.99* 2.90* 3.02*   HEMOGLOBIN 8.7* 8.4* 8.8*   HEMATOCRIT 26.3* 25.8* 26.9*   MCV 88.0 89.0 89.1   MCH 29.1 29.0 29.1   MCHC 33.1* 32.6* 32.7*   RDW 59.5* 59.9* 59.6*   PLATELETCT 54* 58* 61*   MPV 10.6 10.5 10.9     Recent Labs     02/26/22  0400 02/27/22  0506 02/28/22  0426   SODIUM 142 143 142    POTASSIUM 3.9 4.2 4.1   CHLORIDE 108 110 110   CO2 25 26 23   GLUCOSE 160* 92 126*   BUN 63* 32* 44*   CREATININE 3.06* 2.04* 2.40*   CALCIUM 7.6* 7.8* 8.3*                   Imaging  CT-CTA ABDOMEN WITH & W/O-POST PROCESS   Final Result      1.  Surgical clips visualized in the duodenum. No active extravasation is appreciated      2.  Marked atrophy of the native kidneys.      3.  Transplant kidney in the right iliac fossa with poor enhancement. Air in the collecting system is likely related to prior instrumentation.      4.  Cholelithiasis.      DX-CHEST-LIMITED (1 VIEW)   Final Result      1.  Interval placement of left IJ catheter with distal tip extending to mid SVC.      2.  Right IJ catheter unchanged in position overlying mid SVC.      3.  Subtle peripheral patchy airspace opacities noted in the lung fields.      DX-CHEST-FOR LINE PLACEMENT Perform procedure in: Patient's Room   Final Result      Right internal jugular catheter is been placed and appears appropriately located      CT-HEAD W/O   Final Result         1.  No acute intracranial process.      2. Diffuse atrophy and periventricular white matter changes consistent with chronic small vessel disease.      CT-NEEDLE BX-RENAL   Final Result      1.  CT GUIDED RIGHT LOWER QUADRANT/PELVIC RENAL TRANSPLANT KIDNEY BIOPSY.      EC-ECHOCARDIOGRAM LTD W/O CONT   Final Result      MR-BRAIN-W/O   Final Result         No acute intracranial process.      Nonspecific T2 hyperintensities are noted in the periventricular and deep white matter, most likely related to chronic microvascular ischemia.      Remote left thalamic and corona radiata lacunar infarction noted.      DX-ABDOMEN FOR TUBE PLACEMENT   Final Result         Feeding tube with tip projecting over the expected area of the first portion duodenum.      CT-HEAD W/O   Final Result         NO ACUTE ABNORMALITIES ARE NOTED ON CT SCAN OF THE HEAD.      Findings are consistent with atrophy.  Decreased  attenuation in the periventricular white matter likely indicates microvascular ischemic disease.         CT-CHEST,ABDOMEN,PELVIS W/O   Final Result      1.  Multifocal pulmonary opacifications are identified which are new since the prior CT. Findings could be due to pneumonia.      2.  1.2 cm nodular opacification noted in the left upper pulmonary lobe. Primary or metastatic neoplasm is possible. Small focus of pneumonia or inflammation is also possible. Consider biopsy or PET/CT. Consider follow-up CT in 3 months.      3.  There is duodenal wall thickening. Findings could be due to duodenitis. Neoplasm is also possible. There is some mild induration in the retroperitoneal fat surrounding the duodenum.      4.  Cholelithiasis. No biliary ductal dilatation appreciated.      5.  Right lower quadrant renal transplant. No hydronephrosis.      Low and High Risk: Consider PET/CT, or tissue sampling, or follow-up CT at 3 months      Low Risk - Minimal or absent history of smoking and of other known risk factors.      High Risk - History of smoking or of other known risk factors.      Note: These recommendations do not apply to lung cancer screening, patients with immunosuppression, or patients with known primary cancer.      Fleischner Society 2017 Guidelines for Management of Incidentally Detected Pulmonary Nodules in Adults         US-EXTREMITY VENOUS LOWER BILAT   Final Result      US-RENAL TRANSPLANT COMP   Final Result      1.  No hydronephrosis or perinephric fluid collection involving the right lower quadrant renal transplant.   2.  No definite evidence of hemodynamically significant renal artery stenosis.   3.  Elevated resistive indices could suggest renal parenchymal disease.      DX-CHEST-PORTABLE (1 VIEW)   Final Result      1.  Bilateral peripheral lower lobe hazy opacities which is nonspecific but can be seen with Covid 19 pneumonia.   2.  Enlarged cardiac silhouette with probable vascular congestion/edema.            Assessment/Plan  * Hemorrhagic shock (HCC)  Assessment & Plan  Duodenal ulcer s/p EGD  2/24 & 2/26 with cautery and hemoclip  Cont PPI and sucralfate  Follow daily CBC  On prphylactic AC but will hold full dose as of yet    Abnormal CT scan  Assessment & Plan  Future outpatient follow up.  2/12 CT chest/abd:   1.  Multifocal pulmonary opacifications are identified which are new since the prior CT. Findings could be due to pneumonia.  2.  1.2 cm nodular opacification noted in the left upper pulmonary lobe. Primary or metastatic neoplasm is possible. Small focus of pneumonia or inflammation is also possible. Consider biopsy or PET/CT. Consider follow-up CT in 3 months.  3.  There is duodenal wall thickening. Findings could be due to duodenitis. Neoplasm is also possible. There is some mild induration in the retroperitoneal fat surrounding the duodenum.  4.  Cholelithiasis. No biliary ductal dilatation appreciated  5.  Right lower quadrant renal transplant. No hydronephrosis.    Type 2 diabetes mellitus (HCC)  Assessment & Plan  HgbA1c:7.2  Monitor accuchecks.    Hyperparathyroidism (HCC)  Assessment & Plan  Has had severe elevated PTH over the last few years.  Follow up with ENT/Endocrine    Immunocompromised (HCC)- (present on admission)  Assessment & Plan  On immunosuppressants for renal transplant  Monitor labs, cultures    Acute encephalopathy- (present on admission)  Assessment & Plan  Appears toxic metabolic vs prednisone?  Mentation is improving though still slightly confused    Hypernatremia  Assessment & Plan  Resolved  Cont to monitor    Hypercalcemia  Assessment & Plan  Mild-moderate hypercalcemia  IVF and given calcitonin 2/12pm.  IVF and monitor labs      Hyperkalemia  Assessment & Plan  resolved    Acidosis  Assessment & Plan  Improving  Nephrology consulting    Positive urine culture- (present on admission)  Assessment & Plan  Pseudomonas  S/p completed course of Meropenem    Pain in left  shin- (present on admission)  Assessment & Plan    US venous negative for DVT    Diarrhea  Assessment & Plan  Improving  Likely viral versus functional  C.diff negative x 2  Stool culture from 2/3 negative.  Imodium PRN    Elevated brain natriuretic peptide (BNP) level- (present on admission)  Assessment & Plan  Caution with IV fluids  Prior echo's 2020 with preserved EF  No overt sign of volume overload.      Thrombocytopenia (HCC)  Assessment & Plan  Mild,  Monitor cbc    Acute blood loss anemia- (present on admission)  Assessment & Plan  Normocytic anemia  No further signs of bleeding    Epiretinal membrane (ERM) of both eyes- (present on admission)  Assessment & Plan  Continue home Combigan    RADHA (acute kidney injury) (HCC)- (present on admission)  Assessment & Plan  Creatinine worsening today 2.06  Worsening  ?rejection vs recurrent vasculitis  Ranged from 1.27-1.71 over the past year  Renal Bx done 2/22: tissue sent out and results are yet pending  Follow daily BMP, UOP  Renally dose medications as appropriate        Long term (current) use of systemic steroids- (present on admission)  Assessment & Plan  Outpatient on daily prednisone secondary to renal transplant 2008  Resume home dosing of prednisone 30mg and DC hydrocortisone      Essential hypertension- (present on admission)  Assessment & Plan  History of  Hold Losartan  Amlodipine 5mg daily w/ parameters    Kidney transplant status, living related donor- (present on admission)  Assessment & Plan  S/P transplant 2008 in [place], [place]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 Blount, self-manages lasix administration at home based on fluid volume status  Renal US negative for hydronephrosis or renal stenosis  Neph following  On mycophenalate and tacrolimus with 30mg of prednisone at baseline  DC hydrocortisone and transition to chronic prednisone dosing  Follow daily BMP  Renally dose medications as appropriate  Renal Bx done  2/22; results sent out and pending    Obesity- (present on admission)  Assessment & Plan  Body mass index is 33.91 kg/m².  Outpatient weight loss counseling     Atrial flutter - (present on admission)  Assessment & Plan  History of, s/p ablation 1/21  On Eliquis 5 mg po BID as outpt  Given 2 significant episodes of GIB will cont to hold full dose anticoagulation for now         VTE prophylaxis: heparin ppx    I have performed a physical exam and reviewed and updated ROS and Plan today (2/28/2022). In review of yesterday's note (2/27/2022), there are no changes except as documented above.

## 2022-02-28 NOTE — ASSESSMENT & PLAN NOTE
Duodenal ulcer s/p EGD  2/24 & 2/26 with cautery and hemoclip  Cont PPI and sucralfate. Appreciate GI assistance with source control.  Follow daily CBC  On prphylactic AC but will hold full dose as of yet

## 2022-02-28 NOTE — PROGRESS NOTES
Discussed contact isolation status with Amadou Chen from ID. Patient must stay on contact for length of hospital stay due to  in urine on 2/6/22  per Amadou.

## 2022-02-28 NOTE — PROGRESS NOTES
ICU transfer note    Krishan Acevedo is a 71 y.o. 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  He was admitted to the hospital with acute kidney injury, has been treated with the prednisone and underwent kidney biopsy which showed no signs of rejection, urinary tract infection with Carbapenem resistant Pseudomonas and he completed antibiotics.  CT head without obtained which did not show any acute bleeds or abnormalities, patient had maroon-colored stools therefore GI was consulted and underwent EGD on 2/24/2022 which showed significant duodenal bulb ulcerations x3 which was clipped and cauterized.  Patient was admitted to the intensive care for hemorrhagic shock requiring multiple pressors.    2/25: Continued to have maroon colored stools.  CTA abd negative for extravasation.  Still on pressors.    2/26: Had HD 1L net out. Levofed rate is improving. Patient is alert to verbal stimuli. Still bleeding 3 maroon colored stool. Transfused 1 u PRBC.  Dc'd bicarb drip.     2/27: Repeat EGD yesterday afternoon, 1 clip for exposed vessel     2/28: Off pressors.  Hgb stable.  Patient is awake and following commands.    Patient no longer requires ICU level of care.  Orders placed to transfer out.

## 2022-02-28 NOTE — CARE PLAN
The patient is Stable - Low risk of patient condition declining or worsening    Shift Goals  Clinical Goals: stable vitals, rest  Patient Goals: syl  Family Goals: syl    Progress made toward(s) clinical / shift goals:  in progress      Problem: Pain - Standard  Goal: Alleviation of pain or a reduction in pain to the patient’s comfort goal  Outcome: Progressing     Problem: Skin Integrity  Goal: Skin integrity is maintained or improved  Outcome: Progressing     Problem: Psychosocial  Goal: Patient's ability to verbalize feelings about condition will improve  Outcome: Progressing     Problem: Communication  Goal: The ability to communicate needs accurately and effectively will improve  Outcome: Progressing       Patient is not progressing towards the following goals:      Problem: Knowledge Deficit - Standard  Goal: Patient and family/care givers will demonstrate understanding of plan of care, disease process/condition, diagnostic tests and medications  Outcome: Not Progressing     Problem: Nutrition  Goal: Patient's nutritional and fluid intake will be adequate or improve  Outcome: Not Progressing

## 2022-02-28 NOTE — PROGRESS NOTES
"[facility] -  PROGRESS NOTE               Author: Jose Alberto Olivas M.D. Date & Time: 2/28/2022  12:32 PM     HPI:  Krishan Acevedo is a 71 y.o. 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 Was additionally found to have worsening renal function.   Patient shares that he first tested positive for COVID at an outside hospital several days ago. He felt that he was not improving and possibly getting worse at home which prompted him to call EMS. He mentions that for several days he has been having loose stools, noting 3-5 loose but not watery BMs daily, noting his last loose stool was day prior to presentation. He also mentions that he has become more dependent on his wife to bring him food and water citing fatigue with his COVID infection. He does note that for the past several weeks he has been feeling \"like I'm always thirsty\".  While in the ED he was vitally stable. Was placed on 2L NC sating in the mid 90s BP ranged 123/70 from 159/117. Labs notable for Cr of 2.62. BUN 80, COVID-19 positive, UA noted for large occult blood, small leukocyte esterase. US of kidneys was completed with indicated no hydronephrosis or fluid collections, no renal artery stenosis.   In regards to his transplant history. Living donor transplant was completed in 2008 at an outside hospital. Patient is on Prednisone 5mg, Mycophenolate 500mg BID, and Tacrolimus 1mg and 0.5mg daily. His GFR trends in the 30s-40s although in chart he has climbed to the 56-53 ranges in the past.        DAILY NEPHROLOGY SUMMARY:  2/1: Admitted, consult placed  2/2: no overnight events, renal function improving, patient tolerating PO intake  2/3: started on IVF yesterday, renal function improved to around prior baseline range  2/4: renal function " continues to improve  2/5: Scr down to baseline levels. No new overnight renal events. Feels ok.  2/6: Feels ok. Urine culture from 2/1 positive for carbapenem resistant Pseudomonas aeruginosa. He is on zosyn per sensitivity. No labs today  2/7: Potassium is 5.7 today.  Creatinine up to 1.95.  Corrected calcium elevated.  CO2 16.  2/08: Chart and notes reviewed. No new labs this am. +Tachycardia.   2/09: Nursing notes and assessments reviewed.  Int tachycardia still. Room air. SBP labile 100s-160s. K 5.5  2/10: Spoke with RN, patient has no complaints at this time.   Plan is for discharge today or tomorrow to SNF pending 1L bolus and Calcium recheck.  Current SCa 11.5  VSS RA  -160s  2/11: Sodium up to 149.  Calcium increasing.  Creatinine stable 1.7.  2/12: Sodium up to 152.  Calcium remains elevated.  Creatinine 1.8.  I/Os not documented  2/13: Sodium still at 152.  Calcium trending down.  Creatinine up to 2.06.  On bicarb drip.  Somnolent  2/14: Unable to participate in interview, improved hemodynamics, cr stable  2/15: stable hemodynamics, 1.9L UOP, cr to 1.9, labile blood pressures, ongoing intermittent delirium  2/16: Hypertensive this AM, slight improvement in Cr, ca climbing, net neg 600cc with 1.9L UOP, confused  2/17: 1.4L UOP,ongoing encephalopthy, sodium climbing, minimal PO intake    2/18: IOs not recorded, cr 1.5, tremulous, minimal PO intake-serum sodium climbing  2/19: 2.6L  UOP, sodium climbing, more confused this a.m, wife at bedside  2/21: remains confused, biopsy was postponed to until tomorrow due to holiday.   2/22: confused. Refused Veltassa for 2 days but talked to him about it.   2/23: more awake and alert, wife is at bedside. UOP is great in velez bacg but not being documented. S/p allograft kidney biopsy 2/22/22.  2/23: could not sleep at night, cr is worsening but UOP is great.   2/24-2/25: Patient was transferred to ICU due to altered mentation and GI bleed.  Underwent endoscopy  "which showed duodenal ulcers.  In Shock On norepinephrine@ 18. UOP has decreased. Not intubated yet.   2/26: got HD yesterday, Mentation is better today. UOP is better.   2/27: awake and communicative, wife and friend at bedside. UOP 1.3L.   2/28: Pt confused this am, adequate U/O    REVIEW OF SYSTEMS:    10 point ROS not reviewed second to mental status    PMH/PSH/SH/FH:   Reviewed and unchanged since admission note    CURRENT MEDICATIONS:   Reviewed from admission to present day    VS:  /65   Pulse (!) 53   Temp 36.1 °C (96.9 °F) (Temporal)   Resp 18   Ht 1.803 m (5' 11\")   Wt 105 kg (231 lb 4.2 oz)   SpO2 97%   BMI 32.25 kg/m²     Physical Exam  Neck:      Comments: RI temp cath      Vitals and nursing note reviewed.   Constitutional:       General: He is not in acute distress.     Appearance: He is ill-appearing.   HENT:      Head: Normocephalic and atraumatic.   Cardiovascular:      Rate and Rhythm: Normal rate and regular rhythm.      Pulses: Normal pulses.      Heart sounds: No murmur heard.    No gallop.   Pulmonary:      Effort: Pulmonary effort is normal. No respiratory distress.      Breath sounds: No wheezing or rales.   Abdominal:      General: Abdomen is flat. Bowel sounds are normal. There is no distension.      Tenderness: There is no abdominal tenderness.   Musculoskeletal:         General: No swelling or deformity.   Skin:     Coloration: Skin is pale. Skin is not jaundiced.      Findings: No bruising or lesion.   Neurological:      Mental Status: He is alert. Oriented to person and place  Psychiatric:         Behavior: Behavior normal.   Fluids:  In: 1362.3 [P.O.:640; I.V.:14.4]  Out: 1290     LABS:  Recent Labs     02/26/22  0400 02/27/22  0506 02/28/22  0426   SODIUM 142 143 142   POTASSIUM 3.9 4.2 4.1   CHLORIDE 108 110 110   CO2 25 26 23   GLUCOSE 160* 92 126*   BUN 63* 32* 44*   CREATININE 3.06* 2.04* 2.40*   CALCIUM 7.6* 7.8* 8.3*       IMAGING:   All imaging reviewed from " admission to present day    IMPRESSION:  # History of Renal Transplant with RADHA   -  (Allograft biopsy 2/22 showed primary IGA nephropathy [id number]), Cr improved to 1.13-1.4 on 2/21-2/22 ( his baseline). Then worsened again likely 2/2 ATN from acute GI bleed and shock. Currently oliguric.   - Hematuria +  -Tacrolimus 22.5  2/8, dose reduced, repeated FK level 2/18 8.9 but lab was drawn after the dose so it is not a trough. repeat FK level  on 2/22 = 4.2.  - s/p HD on 2/25 and 2/26 for uremic encephalopathy.   # History of ANCA Vasculitis  - Repeat serologies positive ( MPO 42, PR3 61 on 2/10)  but no signs of vasculitis on Allograft biopsy   # CKD 3  # Acidosis 2/2 Renal failure  # Afib  # DM2  # HTN  # Hypercalcemia: PTH mediated   # Hyperparathyroid: may need further imaging going forward  # Acute encephalopathy  - azotemia is partially driven by steroids and GI bleeding. But can not rule out uremia at this time.   # Acute upper GI bleed 2/2 duodenal ulcers  - EGD 2/26: Two large duodenal ulcers with hemoclips in place and one visible vessel; one Hemoclip place. Patchy erythematous gastropathy. Hiatal hernia. LA class a esophagitis with minimal oozing.   # anemia of acute blood loss    # Elevated LFT? shoch liver   # NSTEMI      PLAN:  - no indication for HD Monday   - Pt now on PO immunosuppression pred/ tacro  - re-start sensipar 30mg TIW once cleared for PO intake   - c/w holding Cellcept for now   - Continue to holding losartan  - Low potassium renal diet once back to PO   - Dose meds for reduced GFR  - strict IO

## 2022-03-01 NOTE — PROGRESS NOTES
"[facility] -  PROGRESS NOTE               Author: Jose Alberto Olivas M.D. Date & Time: 3/1/2022  1:34 PM     HPI:  Krsihan Acevedo is a 71 y.o. 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 Was additionally found to have worsening renal function.   Patient shares that he first tested positive for COVID at an outside hospital several days ago. He felt that he was not improving and possibly getting worse at home which prompted him to call EMS. He mentions that for several days he has been having loose stools, noting 3-5 loose but not watery BMs daily, noting his last loose stool was day prior to presentation. He also mentions that he has become more dependent on his wife to bring him food and water citing fatigue with his COVID infection. He does note that for the past several weeks he has been feeling \"like I'm always thirsty\".  While in the ED he was vitally stable. Was placed on 2L NC sating in the mid 90s BP ranged 123/70 from 159/117. Labs notable for Cr of 2.62. BUN 80, COVID-19 positive, UA noted for large occult blood, small leukocyte esterase. US of kidneys was completed with indicated no hydronephrosis or fluid collections, no renal artery stenosis.   In regards to his transplant history. Living donor transplant was completed in 2008 at an outside hospital. Patient is on Prednisone 5mg, Mycophenolate 500mg BID, and Tacrolimus 1mg and 0.5mg daily. His GFR trends in the 30s-40s although in chart he has climbed to the 56-53 ranges in the past.        DAILY NEPHROLOGY SUMMARY:  2/1: Admitted, consult placed  2/2: no overnight events, renal function improving, patient tolerating PO intake  2/3: started on IVF yesterday, renal function improved to around prior baseline range  2/4: renal function " continues to improve  2/5: Scr down to baseline levels. No new overnight renal events. Feels ok.  2/6: Feels ok. Urine culture from 2/1 positive for carbapenem resistant Pseudomonas aeruginosa. He is on zosyn per sensitivity. No labs today  2/7: Potassium is 5.7 today.  Creatinine up to 1.95.  Corrected calcium elevated.  CO2 16.  2/08: Chart and notes reviewed. No new labs this am. +Tachycardia.   2/09: Nursing notes and assessments reviewed.  Int tachycardia still. Room air. SBP labile 100s-160s. K 5.5  2/10: Spoke with RN, patient has no complaints at this time.   Plan is for discharge today or tomorrow to SNF pending 1L bolus and Calcium recheck.  Current SCa 11.5  VSS RA  -160s  2/11: Sodium up to 149.  Calcium increasing.  Creatinine stable 1.7.  2/12: Sodium up to 152.  Calcium remains elevated.  Creatinine 1.8.  I/Os not documented  2/13: Sodium still at 152.  Calcium trending down.  Creatinine up to 2.06.  On bicarb drip.  Somnolent  2/14: Unable to participate in interview, improved hemodynamics, cr stable  2/15: stable hemodynamics, 1.9L UOP, cr to 1.9, labile blood pressures, ongoing intermittent delirium  2/16: Hypertensive this AM, slight improvement in Cr, ca climbing, net neg 600cc with 1.9L UOP, confused  2/17: 1.4L UOP,ongoing encephalopthy, sodium climbing, minimal PO intake    2/18: IOs not recorded, cr 1.5, tremulous, minimal PO intake-serum sodium climbing  2/19: 2.6L  UOP, sodium climbing, more confused this a.m, wife at bedside  2/21: remains confused, biopsy was postponed to until tomorrow due to holiday.   2/22: confused. Refused Veltassa for 2 days but talked to him about it.   2/23: more awake and alert, wife is at bedside. UOP is great in velez bacg but not being documented. S/p allograft kidney biopsy 2/22/22.  2/23: could not sleep at night, cr is worsening but UOP is great.   2/24-2/25: Patient was transferred to ICU due to altered mentation and GI bleed.  Underwent endoscopy  "which showed duodenal ulcers.  In Shock On norepinephrine@ 18. UOP has decreased. Not intubated yet.   2/26: got HD yesterday, Mentation is better today. UOP is better.   2/27: awake and communicative, wife and friend at bedside. UOP 1.3L.   2/28: Pt confused this am, adequate U/O  3/1: trasnferred out of ICU, remains confused  REVIEW OF SYSTEMS:    10 point ROS not reviewed second to mental status    PMH/PSH/SH/FH:   Reviewed and unchanged since admission note    CURRENT MEDICATIONS:   Reviewed from admission to present day    VS:  /72   Pulse 68   Temp 36.7 °C (98 °F) (Temporal)   Resp 16   Ht 1.803 m (5' 11\")   Wt 108 kg (238 lb 12.1 oz)   SpO2 98%   BMI 33.30 kg/m²     Physical Exam  Neck:      Comments: [facility] temp cath      Vitals and nursing note reviewed.   Constitutional:       General: He is not in acute distress.     Appearance: He is ill-appearing.   HENT:      Head: Normocephalic and atraumatic.   Cardiovascular:      Rate and Rhythm: Normal rate and regular rhythm.      Pulses: Normal pulses.      Heart sounds: No murmur heard.    No gallop.   Pulmonary:      Effort: Pulmonary effort is normal. No respiratory distress.      Breath sounds: No wheezing or rales.   Abdominal:      General: Abdomen is flat. Bowel sounds are normal. There is no distension.      Tenderness: There is no abdominal tenderness.   Musculoskeletal:         General: No swelling or deformity.   Skin:     Coloration: Skin is pale. Skin is not jaundiced.      Findings: No bruising or lesion.   Neurological:      Mental Status: He is alert. Oriented to person and place  Psychiatric:         Behavior: Behavior normal.   Fluids:  In: 1013 [P.O.:680; I.V.:8]  Out: 350     LABS:  Recent Labs     02/27/22  0506 02/28/22  0426 03/01/22  0858   SODIUM 143 142 141   POTASSIUM 4.2 4.1 3.8   CHLORIDE 110 110 109   CO2 26 23 25   GLUCOSE 92 126* 215*   BUN 32* 44* 45*   CREATININE 2.04* 2.40* 2.25*   CALCIUM 7.8* 8.3* 8.4* "       IMAGING:   All imaging reviewed from admission to present day    IMPRESSION:  # History of Renal Transplant with RADHA   -  (Allograft biopsy 2/22 showed primary IGA nephropathy [id number]), Cr improved to 1.13-1.4 on 2/21-2/22 ( his baseline). Then worsened again likely 2/2 ATN from acute GI bleed and shock. Currently oliguric.   - Hematuria +  -Tacrolimus 22.5  2/8, dose reduced, repeated FK level 2/18 8.9 but lab was drawn after the dose so it is not a trough. repeat FK level  on 2/22 = 4.2.  - s/p HD on 2/25 and 2/26 for uremic encephalopathy.   # History of ANCA Vasculitis  - Repeat serologies positive ( MPO 42, PR3 61 on 2/10)  but no signs of vasculitis on Allograft biopsy   # CKD 3  # Afib  # DM2  # HTN  # Hypercalcemia: PTH mediated   # Hyperparathyroid: may need further imaging going forward  # Acute encephalopathy  - multifactorial   # Acute upper GI bleed 2/2 duodenal ulcers  - EGD 2/26: Two large duodenal ulcers with hemoclips in place and one visible vessel; one Hemoclip place. Patchy erythematous gastropathy. Hiatal hernia. LA class a esophagitis with minimal oozing.   # anemia of acute blood loss    # Elevated LFT? shoch liver   # NSTEMI      PLAN:  - no indication for HD Tuesday  -remove HD cath in am Weds, if remains stable  - Pt now on PO immunosuppression pred/ tacro  - re-start sensipar 30mg TIW once cleared for PO intake   - c/w holding Cellcept for now   - Continue to holding losartan  - Low potassium renal diet once back to PO   - Dose meds for reduced GFR  - strict IO

## 2022-03-01 NOTE — THERAPY
Speech Language Pathology  Daily Treatment     Patient Name: Krishan Acevedo  Age:  71 y.o., Sex:  male  Medical Record #: [id number]  Today's Date: 2/28/2022     Precautions  Precautions: Fall Risk,Swallow Precautions ( See Comments)  Comments: confusion    Assessment  Patient seen this date for dysphagia tx session. Per GI notes, okay to ADAT today. Patient sleeping, but rousing to verbal and tactile cues. Confusion and odd/inappropriate statements made by pt intermittently throughout session. Patient pleasant and easily redirected. Per RN, patient has been tolerating clears without difficulty. Patient consumed PO trials of thins via straw, purees, pudding, and soft solids. Patient required 1:1 feeding d/t confusion. Patient consumed all PO trials without s/sx of aspiration. Mastication appeared adequate and no oral residue or bolus loss noted today, which is improvement compared to yesterday's session with SLP.     Ok to upgrade to SB6/TN0 diet from SLP standpoint. Please continue 1:1 feeding. Small straw sips ok.   Per RN, only upgrade to full liquids today per GI recs of ADAT.   SLP is following.     Plan  Continue current treatment plan.    Discharge Recommendations: Recommend post-acute placement for additional speech therapy services prior to discharge home     Objective     02/28/22 1520   Precautions   Precautions Fall Risk;Swallow Precautions ( See Comments)   Vitals   O2 (LPM) 1   O2 Delivery Device Silicone Nasal Cannula   Pain 0 - 10 Group   Therapist Pain Assessment Post Activity Pain Same as Prior to Activity;Nurse Notified;0   Short Term Goals   Short Term Goal # 1 Patient will consume a diet of thin liquids and soft and bite sized solids with no s/sx of aspiration given direct supervision/feeding A.   Goal Outcome # 1 Progressing as expected   Short Term Goal # 2 NEW 2/27: Pt will consume a thin liquid/clear liquid diet with no s/sx of aspiration with min cues.   Goal Outcome # 2  Goal met    Anticipated Discharge Needs   Discharge Recommendations Recommend post-acute placement for additional speech therapy services prior to discharge home

## 2022-03-01 NOTE — PROGRESS NOTES
Assuming patient care of T- 803. Report received bedside by Margaux ANGELES. Patient visually assessed and updated on POC.     Bed is locked and in lowest position. Bed alarm checked for proper functioning and is currently on.  All obstacles clear from floor and personal belongings at bedside.    RN call button is w/in reach and education provided on proper use.     Medications, labs and orders reviewed.

## 2022-03-01 NOTE — PROGRESS NOTES
4 Eyes Skin Assessment Completed by Chauncey RN and Franchesca RN.    Head WDL  Ears Redness and Blanching  Nose WDL  Mouth WDL  Neck: Right Side Tape and gauze, Left side HD cath  Breast/Chest Redness  Shoulder Blades WDL  Spine WDL  (R) Arm/Elbow/Hand WDL  (L) Arm/Elbow/Hand WDL  Abdomen Redness  Groin Redness and Non-Blanching  Scrotum/Coccyx/Buttocks Redness and Non-Blanching  (R) Leg Jaundice  (L) Leg Jaundice, Tape and guaze   (R) Heel/Foot/Toe WDL  (L) Heel/Foot/Toe WDL          Devices In Places Tele Box      Interventions In Place Pillows    Possible Skin Injury No    Pictures Uploaded Into Epic N/A  Wound Consult Placed N/A  RN Wound Prevention Protocol Ordered N/A

## 2022-03-01 NOTE — ASSESSMENT & PLAN NOTE
Hgb improved today.  S/p EGD on 2/26.  PPI transitioned from IV to PO per GI recommendations. Appreciate assistance.

## 2022-03-01 NOTE — PROGRESS NOTES
Hospital Medicine Daily Progress Note    Date of Service  3/1/2022    Chief Complaint  Krishan Acevedo is a 71 y.o. male admitted 2/1/2022 with weakness    Hospital Course  71-year-old male with previous medical history of hypertension, dyslipidemia, type 2 diabetes, atrial flutter s/p ablation in January 2021, anticoagulation on apixaban, ANCA vasculitis s/p renal transplant, recent Covid infection in January.  Patient was admitted with complaint of weakness on February 11.  At that time he was found to be Covid positive ARDHA with a creatinine of 2.6 from a baseline of 1.2-1.5.  He was eventually found to have a Carbapenem resistant Pseudomonas UTI and completed antibiotic therapy per ID.  Given his worsening renal function, he underwent renal biopsy on February 22.  On February 24, the patient was found to be altered, and subsequently hypotensive.  He was transferred to the ICU where he was found to have bloody stools.  GI was consulted, the patient had an EGD done on February 24.  He was found to have peptic ulcer disease in the duodenal bulb with visible vessel which was treated with cautery and hemoclips.  He subsequently became unstable and underwent a second EGD on February 26 or another Hemoclip was placed in a duodenal ulcer.      Interval Problem Update  Patient reports that he feels weak, especially in his abdomen. He is confused, thinks I saw him yesterday. Hgb improved today.  PPI transitioned from IV to PO per GI recommendations. Appreciate assistance.   Renal function improving. Anticipate no long term HD needs. Appreciate nephrology assistance.    I have personally seen and examined the patient at bedside. I discussed the plan of care with patient, bedside RN, pharmacy and pulmonary.    Consultants/Specialty  nephrology and pulmonary    Code Status  DNAR, I OK    Disposition  Patient is not medically cleared for discharge.   Anticipate discharge to to home with organized home healthcare and close  outpatient follow-up.  I have placed the appropriate orders for post-discharge needs.    Review of Systems  Review of Systems   Unable to perform ROS: Mental acuity        Physical Exam  Temp:  [36.1 °C (97 °F)-37.1 °C (98.7 °F)] 36.2 °C (97.2 °F)  Pulse:  [60-78] 65  Resp:  [9-18] 17  BP: (120-163)/(58-74) 132/71  SpO2:  [93 %-98 %] 97 %    Physical Exam  Vitals reviewed.   Constitutional:       General: He is not in acute distress.     Appearance: He is well-developed. He is obese. He is not diaphoretic.   HENT:      Head: Normocephalic and atraumatic.      Right Ear: External ear normal.      Left Ear: External ear normal.      Nose: Nose normal. No congestion.      Mouth/Throat:      Mouth: Mucous membranes are moist.      Pharynx: Oropharynx is clear.   Eyes:      General: No scleral icterus.     Conjunctiva/sclera: Conjunctivae normal.   Neck:      Vascular: No JVD.   Cardiovascular:      Rate and Rhythm: Normal rate.      Heart sounds: Murmur heard.     No gallop.   Pulmonary:      Effort: Pulmonary effort is normal. No respiratory distress.      Breath sounds: No stridor. No wheezing or rales.   Abdominal:      Palpations: Abdomen is soft.      Tenderness: There is no abdominal tenderness. There is no guarding or rebound.   Musculoskeletal:         General: No tenderness.      Right lower leg: Edema present.      Left lower leg: Edema present.   Skin:     General: Skin is warm and dry.      Coloration: Skin is pale.      Findings: No rash.   Neurological:      Mental Status: He is alert and oriented to person, place, and time. He is confused.   Psychiatric:         Mood and Affect: Mood normal.         Thought Content: Thought content normal.         Fluids    Intake/Output Summary (Last 24 hours) at 3/1/2022 1544  Last data filed at 3/1/2022 1400  Gross per 24 hour   Intake 793 ml   Output --   Net 793 ml       Laboratory  Recent Labs     02/28/22  0426 02/28/22  1825 03/01/22  0858   WBC 8.4 7.1 9.6   RBC  3.02* 3.07* 3.22*   HEMOGLOBIN 8.8* 8.9* 9.7*   HEMATOCRIT 26.9* 27.3* 29.1*   MCV 89.1 88.9 90.4   MCH 29.1 29.0 30.1   MCHC 32.7* 32.6* 33.3*   RDW 59.6* 59.0* 60.0*   PLATELETCT 61* 71* 66*   MPV 10.9 11.5 9.7     Recent Labs     02/27/22  0506 02/28/22  0426 03/01/22  0858   SODIUM 143 142 141   POTASSIUM 4.2 4.1 3.8   CHLORIDE 110 110 109   CO2 26 23 25   GLUCOSE 92 126* 215*   BUN 32* 44* 45*   CREATININE 2.04* 2.40* 2.25*   CALCIUM 7.8* 8.3* 8.4*                   Imaging  CT-CTA ABDOMEN WITH & W/O-POST PROCESS   Final Result      1.  Surgical clips visualized in the duodenum. No active extravasation is appreciated      2.  Marked atrophy of the native kidneys.      3.  Transplant kidney in the right iliac fossa with poor enhancement. Air in the collecting system is likely related to prior instrumentation.      4.  Cholelithiasis.      DX-CHEST-LIMITED (1 VIEW)   Final Result      1.  Interval placement of left IJ catheter with distal tip extending to mid SVC.      2.  Right IJ catheter unchanged in position overlying mid SVC.      3.  Subtle peripheral patchy airspace opacities noted in the lung fields.      DX-CHEST-FOR LINE PLACEMENT Perform procedure in: Patient's Room   Final Result      Right internal jugular catheter is been placed and appears appropriately located      CT-HEAD W/O   Final Result         1.  No acute intracranial process.      2. Diffuse atrophy and periventricular white matter changes consistent with chronic small vessel disease.      CT-NEEDLE BX-RENAL   Final Result      1.  CT GUIDED RIGHT LOWER QUADRANT/PELVIC RENAL TRANSPLANT KIDNEY BIOPSY.      EC-ECHOCARDIOGRAM LTD W/O CONT   Final Result      MR-BRAIN-W/O   Final Result         No acute intracranial process.      Nonspecific T2 hyperintensities are noted in the periventricular and deep white matter, most likely related to chronic microvascular ischemia.      Remote left thalamic and corona radiata lacunar infarction noted.       DX-ABDOMEN FOR TUBE PLACEMENT   Final Result         Feeding tube with tip projecting over the expected area of the first portion duodenum.      CT-HEAD W/O   Final Result         NO ACUTE ABNORMALITIES ARE NOTED ON CT SCAN OF THE HEAD.      Findings are consistent with atrophy.  Decreased attenuation in the periventricular white matter likely indicates microvascular ischemic disease.         CT-CHEST,ABDOMEN,PELVIS W/O   Final Result      1.  Multifocal pulmonary opacifications are identified which are new since the prior CT. Findings could be due to pneumonia.      2.  1.2 cm nodular opacification noted in the left upper pulmonary lobe. Primary or metastatic neoplasm is possible. Small focus of pneumonia or inflammation is also possible. Consider biopsy or PET/CT. Consider follow-up CT in 3 months.      3.  There is duodenal wall thickening. Findings could be due to duodenitis. Neoplasm is also possible. There is some mild induration in the retroperitoneal fat surrounding the duodenum.      4.  Cholelithiasis. No biliary ductal dilatation appreciated.      5.  Right lower quadrant renal transplant. No hydronephrosis.      Low and High Risk: Consider PET/CT, or tissue sampling, or follow-up CT at 3 months      Low Risk - Minimal or absent history of smoking and of other known risk factors.      High Risk - History of smoking or of other known risk factors.      Note: These recommendations do not apply to lung cancer screening, patients with immunosuppression, or patients with known primary cancer.      Fleischner Society 2017 Guidelines for Management of Incidentally Detected Pulmonary Nodules in Adults         US-EXTREMITY VENOUS LOWER BILAT   Final Result      US-RENAL TRANSPLANT COMP   Final Result      1.  No hydronephrosis or perinephric fluid collection involving the right lower quadrant renal transplant.   2.  No definite evidence of hemodynamically significant renal artery stenosis.   3.  Elevated  resistive indices could suggest renal parenchymal disease.      DX-CHEST-PORTABLE (1 VIEW)   Final Result      1.  Bilateral peripheral lower lobe hazy opacities which is nonspecific but can be seen with Covid 19 pneumonia.   2.  Enlarged cardiac silhouette with probable vascular congestion/edema.           Assessment/Plan  * Hemorrhagic shock (HCC)  Assessment & Plan  Duodenal ulcer s/p EGD  2/24 & 2/26 with cautery and hemoclip  Cont PPI and sucralfate. Appreciate GI assistance with source control.  Follow daily CBC  On prphylactic AC but will hold full dose as of yet    Duodenal ulcer  Assessment & Plan  Hgb improved today.  S/p EGD on 2/26.  PPI transitioned from IV to PO per GI recommendations. Appreciate assistance.     Abnormal CT scan  Assessment & Plan  Future outpatient follow up.  2/12 CT chest/abd:   1.  Multifocal pulmonary opacifications are identified which are new since the prior CT. Findings could be due to pneumonia.  2.  1.2 cm nodular opacification noted in the left upper pulmonary lobe. Primary or metastatic neoplasm is possible. Small focus of pneumonia or inflammation is also possible. Consider biopsy or PET/CT. Consider follow-up CT in 3 months.  3.  There is duodenal wall thickening. Findings could be due to duodenitis. Neoplasm is also possible. There is some mild induration in the retroperitoneal fat surrounding the duodenum.  4.  Cholelithiasis. No biliary ductal dilatation appreciated  5.  Right lower quadrant renal transplant. No hydronephrosis.    Type 2 diabetes mellitus (HCC)  Assessment & Plan  HgbA1c:7.2  Monitor accuchecks.    Hyperparathyroidism (HCC)  Assessment & Plan  Has had severe elevated PTH over the last few years.  Follow up with ENT/Endocrine    Immunocompromised (HCC)- (present on admission)  Assessment & Plan  On immunosuppressants for renal transplant  Monitor labs, cultures    Acute encephalopathy- (present on admission)  Assessment & Plan  Appears toxic metabolic  vs prednisone?  Mentation is improving though still slightly confused    Hypernatremia  Assessment & Plan  Resolved  Cont to monitor    Hypercalcemia  Assessment & Plan  Mild-moderate hypercalcemia  IVF and given calcitonin 2/12pm.  IVF and monitor labs      Hyperkalemia  Assessment & Plan  resolved    Acidosis  Assessment & Plan  Improving  Nephrology consulting    Positive urine culture- (present on admission)  Assessment & Plan  Pseudomonas  S/p completed course of Meropenem    Pain in left shin- (present on admission)  Assessment & Plan    US venous negative for DVT    Diarrhea- (present on admission)  Assessment & Plan  Improving  Likely viral versus functional  C.diff negative x 2  Stool culture from 2/3 negative.  Imodium PRN    Elevated brain natriuretic peptide (BNP) level- (present on admission)  Assessment & Plan  Caution with IV fluids  Prior echo's 2020 with preserved EF  No overt sign of volume overload.      Thrombocytopenia (HCC)  Assessment & Plan  Mild,  Monitor cbc    Acute blood loss anemia- (present on admission)  Assessment & Plan  Normocytic anemia  Hgb improved today.  Monitor.    Epiretinal membrane (ERM) of both eyes- (present on admission)  Assessment & Plan  Continue home Combigan    RADHA (acute kidney injury) (HCC)- (present on admission)  Assessment & Plan  Creatinine worsening today 2.06  Worsening  ?rejection vs recurrent vasculitis  Ranged from 1.27-1.71 over the past year  Renal Bx done 2/22: tissue sent out and results are yet pending  Follow daily BMP, UOP  Renally dose medications as appropriate  Renal function improving. Anticipate no long term HD needs. Appreciate nephrology assistance.    Long term (current) use of systemic steroids- (present on admission)  Assessment & Plan  Outpatient on daily prednisone secondary to renal transplant 2008  Resume home dosing of prednisone 30mg and DC hydrocortisone      Essential hypertension- (present on admission)  Assessment & Plan  BP at  goal for acute setting.  Hold Losartan.  Monitor.    Kidney transplant status, living related donor- (present on admission)  Assessment & Plan  S/P transplant 2008 in [place], [place]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 Blount, self-manages lasix administration at home based on fluid volume status  Renal US negative for hydronephrosis or renal stenosis  Neph following  On mycophenalate and tacrolimus with 30mg of prednisone at baseline  DC hydrocortisone and transition to chronic prednisone dosing  Follow daily BMP  Renally dose medications as appropriate  Renal Bx done 2/22; results sent out and pending    Obesity- (present on admission)  Assessment & Plan  Body mass index is 33.91 kg/m².  Outpatient weight loss counseling     Atrial flutter - (present on admission)  Assessment & Plan  History of, s/p ablation 1/21  On Eliquis 5 mg po BID as outpt  Given 2 significant episodes of GIB will cont to hold full dose anticoagulation for now         VTE prophylaxis: heparin ppx    I have performed a physical exam and reviewed and updated ROS and Plan today (3/1/2022). In review of yesterday's note (2/28/2022), there are no changes except as documented above.

## 2022-03-01 NOTE — PROGRESS NOTES
Assumed care of pt. Bedside report received from BRIDGETTE Grossman. Pt was updated on plan of care. Call light, phone and personal belongings in reach. Bed alarm on and working properly, bed in lowest position, and locked.

## 2022-03-01 NOTE — THERAPY
Speech Language Pathology  Daily Treatment     Patient Name: Krishan Acevedo  Age:  71 y.o., Sex:  male  Medical Record #: [id number]  Today's Date: 3/1/2022     Precautions  Precautions: Fall Risk,Swallow Precautions ( See Comments)  Comments: limited verbal communication    Assessment    Patient was seen on this date for a dysphagia treatment. He was provided trials of thin liquids and soft and bite sized textures. The patient expectorated x1 bite of soft and bite sized piece after poor bolus formation, but consumed all other soft and bite sized trials without difficulty. Thin liquids were consumed without coughing, throat clearing, change in vocal quality or respiratory status. Patient continues to make odd statements with moments of word salad throughout the therapy session. Became tearful at one point. Patient required 1:1 feeding s/t confusion.       1. Collaborated with RN regarding diet upgrades, recommend upgrade to minced moist (GI soft) diet with thin liquids   2. Anticipate quick upgrade to soft and bite sized diet with thins  3. SLP is following.       Plan    Continue current treatment plan.    Discharge Recommendations: Recommend post-acute placement for additional speech therapy services prior to discharge home     Objective       03/01/22 1419   Vitals   O2 (LPM) 2   O2 Delivery Device Silicone Nasal Cannula   Pain 0 - 10 Group   Therapist Pain Assessment Post Activity Pain Same as Prior to Activity;Nurse Notified;0   Dysphagia    Positioning / Behavior Modification Modulate Rate or Bite Size   Other Treatments PO trials of thins and SB6 textures   Diet / Liquid Recommendation Minced & Moist (5) - (Dysphagia II);Thin (0)   Nutritional Liquid Intake Rating Scale Non thickened beverages   Nutritional Food Intake Rating Scale Total oral diet with multiple consistencies but requiring special preparation or compensations   Nursing Communication Swallow Precaution Sign Posted at Head of Bed   Skilled  "Intervention Verbal Cueing   Recommended Route of Medication Administration   Medication Administration  Float Whole with [place]   Patient / Family Goals   Patient / Family Goal #1 \"I share joann with my wife\"   Goal #1 Outcome Progressing slower than expected   Short Term Goals   Short Term Goal # 1 Patient will consume a diet of thin liquids and soft and bite sized solids with no s/sx of aspiration given direct supervision/feeding A.   Goal Outcome # 1 Progressing as expected     "

## 2022-03-01 NOTE — THERAPY
Physical Therapy   Daily Treatment     Patient Name: Krishan Acevedo  Age:  71 y.o., Sex:  male  Medical Record #: [id number]  Today's Date: 3/1/2022     Precautions  Precautions: Fall Risk;Swallow Precautions ( See Comments)  Comments: confusion    Assessment    PT pleasant not vocalizing today but would follow commands with tactile and verbal cues. As well became emotional and tearful with mobility. He required more assist today unclear if fatigued from earlier OT session. He reached EOB with modA performed seated exercises with max tc and extra time to complete. Than returned back to supine, deferred standing due to fatigue and increased assist today. Will continue to follow, RN notified and aware.     Plan    Continue current treatment plan.    DC Equipment Recommendations: Unable to determine at this time  Discharge Recommendations: Recommend post-acute placement for additional physical therapy services prior to discharge home         03/01/22 0915   Other Treatments   Other Treatments Provided seated EOB exercises with max vc and tactile cue, extra time to complete laq, ankle pumps and marches   Balance   Sitting Balance (Static) Fair   Sitting Balance (Dynamic) Fair -   Comments deferred standing due to cognition and fatigue   Gait Analysis   Gait Level Of Assist Unable to Participate   Bed Mobility    Supine to Sit Moderate Assist   Sit to Supine Moderate Assist   Scooting Moderate Assist   Comments more assist today unclear if fatigued from earlier OT Session   Functional Mobility   Sit to Stand Unable to Participate   Short Term Goals    Short Term Goal # 1 in 6 visits patient will demo all functional transfers with Taty for safe DC    Goal Outcome # 1 goal not met   Short Term Goal # 2 in 6 visits patient will ambualte 200' Taty for safe DC    Goal Outcome # 2 Goal not met   Short Term Goal # 3 in 6 visits patient will navigate 1 stairs with Taty for safe DC    Goal Outcome # 3 Goal not met

## 2022-03-01 NOTE — PROGRESS NOTES
Monitor Summary     Rhythm: SB-SR    Rate:  57-72     Ectopy: PVC    Monitor Strip     RI .21  QRS .08  QT .46

## 2022-03-01 NOTE — DISCHARGE PLANNING
Agency/Facility Name: [facility]  Outcome: DPA left v-mail regarding referral status with request for callback.    1015-  Agency/Facility Name: [facility]  Spoke To: Jimena  Outcome: Facility requesting updates regarding Pt dialysis and 1:1 feedings. DPA to gather more information regarding Pt needs and will update admissions.

## 2022-03-01 NOTE — PROGRESS NOTES
GI Team (Thomas Sorto) on the unit for rounds. CBC results reviewed with GI NP, who stated they still want to start subcutaneous Heparin today, ok to give 1400 dose.

## 2022-03-01 NOTE — THERAPY
Occupational Therapy  Daily Treatment     Patient Name: Krishan Acevedo  Age:  71 y.o., Sex:  male  Medical Record #: [id number]  Today's Date: 3/1/2022     Precautions  Precautions: Fall Risk,Swallow Precautions ( See Comments)  Comments: limited verbal communication    Assessment    Patient required assist for all basic mobility and once position, able to sit EOB to eat breakfast until fatigued at close S level.     Plan    Continue current treatment plan.    DC Equipment Recommendations: Unable to determine at this time  Discharge Recommendations: Recommend post-acute placement for additional occupational therapy services prior to discharge home    Subjective    Nod head to eating breakfast     Objective       03/01/22 0916   Cognition    Comments nonverbal during session. gestured intermittently. able to be redirected to task when distracted. Able to sequence to eat breakfast EOB and cross midline with object for transfer. agreeable and participatory   Bed Mobility    Comments max cues, elevated HOB, second person assist to return for safety and patient fatigue   Activities of Daily Living   Eating Supervision  (coffee, spooned mash, yogurt poured into mash, EOB)   Grooming Standby Assist  (EOB hand hygiene prior to eating)   Bathing Maximal Assist  (received soiled from male urinal)   Upper Body Dressing   (nurse aware did not change gown due to not done with bfast)   Lower Body Dressing Supervision  (sock donning in bed)   Toileting   (NT)   Patient / Family Goals   Patient / Family Goal #1 Get better   Goal #1 Outcome Progressing as expected   Short Term Goals   Short Term Goal # 1 Pt will complete ADL transfers with supervision   Goal Outcome # 1 Progressing slower than expected   Short Term Goal # 2 Pt will complete LB dressing with supervision   Goal Outcome # 2 Progressing as expected   Short Term Goal # 3 Pt will complete toileting with supervision   Goal Outcome # 3 Progressing slower than expected

## 2022-03-01 NOTE — PROGRESS NOTES
Patient transferred to [place] [address] room 803 on monitor with RN. Bedside RN to RN report given to Miley SORENSEN throughout trip.

## 2022-03-02 PROBLEM — R53.81 PHYSICAL DECONDITIONING: Status: ACTIVE | Noted: 2022-01-01

## 2022-03-02 NOTE — DISCHARGE PLANNING
Agency[facility]ility Name: [facility]   Spoke To: Jimena  Outcome: Both [facility] are unable to accommodate 1:1 feedings due to staffing.     SW notified

## 2022-03-02 NOTE — PROGRESS NOTES
Assuming patient care of T- 803. Report received bedside by Rustam ANGELES. Patient visually assessed and updated on POC.     Bed is locked and in lowest position. Bed alarm checked for proper functioning and is currently on.  All obstacles clear from floor and personal belongings at bedside.    RN call button is w/in reach and education provided on proper use.     Medications, labs and orders reviewed.

## 2022-03-02 NOTE — DISCHARGE PLANNING
Anticipated Discharge Disposition: SNF    Action: Discussed pt in IDT rounds. Pt is medically clear for SNF. LSW sent voalte message to SLP Ana to verify whether or not pt needs 1:1 feedings.    Barriers to Discharge: SNF acceptance.    Plan: Care coordination will follow up pending clarification of 1:1 feeding.

## 2022-03-02 NOTE — PROGRESS NOTES
"[facility] -  PROGRESS NOTE               Author: Jose Alberto Olivas M.D. Date & Time: 3/2/2022  12:55 PM     HPI:  Krishan Acevedo is a 71 y.o. 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 Was additionally found to have worsening renal function.   Patient shares that he first tested positive for COVID at an outside hospital several days ago. He felt that he was not improving and possibly getting worse at home which prompted him to call EMS. He mentions that for several days he has been having loose stools, noting 3-5 loose but not watery BMs daily, noting his last loose stool was day prior to presentation. He also mentions that he has become more dependent on his wife to bring him food and water citing fatigue with his COVID infection. He does note that for the past several weeks he has been feeling \"like I'm always thirsty\".  While in the ED he was vitally stable. Was placed on 2L NC sating in the mid 90s BP ranged 123/70 from 159/117. Labs notable for Cr of 2.62. BUN 80, COVID-19 positive, UA noted for large occult blood, small leukocyte esterase. US of kidneys was completed with indicated no hydronephrosis or fluid collections, no renal artery stenosis.   In regards to his transplant history. Living donor transplant was completed in 2008 at an outside hospital. Patient is on Prednisone 5mg, Mycophenolate 500mg BID, and Tacrolimus 1mg and 0.5mg daily. His GFR trends in the 30s-40s although in chart he has climbed to the 56-53 ranges in the past.        DAILY NEPHROLOGY SUMMARY:  2/1: Admitted, consult placed  2/2: no overnight events, renal function improving, patient tolerating PO intake  2/3: started on IVF yesterday, renal function improved to around prior baseline range  2/4: renal function " continues to improve  2/5: Scr down to baseline levels. No new overnight renal events. Feels ok.  2/6: Feels ok. Urine culture from 2/1 positive for carbapenem resistant Pseudomonas aeruginosa. He is on zosyn per sensitivity. No labs today  2/7: Potassium is 5.7 today.  Creatinine up to 1.95.  Corrected calcium elevated.  CO2 16.  2/08: Chart and notes reviewed. No new labs this am. +Tachycardia.   2/09: Nursing notes and assessments reviewed.  Int tachycardia still. Room air. SBP labile 100s-160s. K 5.5  2/10: Spoke with RN, patient has no complaints at this time.   Plan is for discharge today or tomorrow to SNF pending 1L bolus and Calcium recheck.  Current SCa 11.5  VSS RA  -160s  2/11: Sodium up to 149.  Calcium increasing.  Creatinine stable 1.7.  2/12: Sodium up to 152.  Calcium remains elevated.  Creatinine 1.8.  I/Os not documented  2/13: Sodium still at 152.  Calcium trending down.  Creatinine up to 2.06.  On bicarb drip.  Somnolent  2/14: Unable to participate in interview, improved hemodynamics, cr stable  2/15: stable hemodynamics, 1.9L UOP, cr to 1.9, labile blood pressures, ongoing intermittent delirium  2/16: Hypertensive this AM, slight improvement in Cr, ca climbing, net neg 600cc with 1.9L UOP, confused  2/17: 1.4L UOP,ongoing encephalopthy, sodium climbing, minimal PO intake    2/18: IOs not recorded, cr 1.5, tremulous, minimal PO intake-serum sodium climbing  2/19: 2.6L  UOP, sodium climbing, more confused this a.m, wife at bedside  2/21: remains confused, biopsy was postponed to until tomorrow due to holiday.   2/22: confused. Refused Veltassa for 2 days but talked to him about it.   2/23: more awake and alert, wife is at bedside. UOP is great in velez bacg but not being documented. S/p allograft kidney biopsy 2/22/22.  2/23: could not sleep at night, cr is worsening but UOP is great.   2/24-2/25: Patient was transferred to ICU due to altered mentation and GI bleed.  Underwent endoscopy  "which showed duodenal ulcers.  In Shock On norepinephrine@ 18. UOP has decreased. Not intubated yet.   2/26: got HD yesterday, Mentation is better today. UOP is better.   2/27: awake and communicative, wife and friend at bedside. UOP 1.3L.   2/28: Pt confused this am, adequate U/O  3/1: trasnferred out of ICU, remains confused  REVIEW OF SYSTEMS:    10 point ROS not reviewed second to mental status    PMH/PSH/SH/FH:   Reviewed and unchanged since admission note    CURRENT MEDICATIONS:   Reviewed from admission to present day    VS:  /67   Pulse 67   Temp 36.6 °C (97.8 °F) (Temporal)   Resp 19   Ht 1.803 m (5' 11\")   Wt 108 kg (237 lb 14 oz)   SpO2 91%   BMI 33.18 kg/m²     Physical Exam  Neck:      Comments: [facility] temp cath      Vitals and nursing note reviewed.   Constitutional:       General: He is not in acute distress.     Appearance: He is ill-appearing.   HENT:      Head: Normocephalic and atraumatic.   Cardiovascular:      Rate and Rhythm: Normal rate and regular rhythm.      Pulses: Normal pulses.      Heart sounds: No murmur heard.    No gallop.   Pulmonary:      Effort: Pulmonary effort is normal. No respiratory distress.      Breath sounds: No wheezing or rales.   Abdominal:      General: Abdomen is flat. Bowel sounds are normal. There is no distension.      Tenderness: There is no abdominal tenderness.   Musculoskeletal:         General: No swelling or deformity.   Skin:     Coloration: Skin is pale. Skin is not jaundiced.      Findings: No bruising or lesion.   Neurological:      Mental Status: He is alert. Oriented to person and place  Psychiatric:         Behavior: Behavior normal.   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 Then worsened again likely 2/2 ATN from acute GI bleed and shock.    # History of ANCA Vasculitis  - Repeat serologies positive ( MPO 42, PR3 61 on 2/10)  but no signs of vasculitis on Allograft biopsy   # CKD 3  # Afib  # DM2  # HTN  # Hypercalcemia: PTH mediated   # Hyperparathyroid: may need further imaging going forward  # Acute encephalopathy  - multifactorial   # Acute upper GI bleed 2/2 duodenal ulcers  - EGD 2/26: Two large duodenal ulcers with hemoclips in place and one visible vessel; one Hemoclip place. Patchy erythematous gastropathy. Hiatal hernia. LA class a esophagitis with minimal oozing.   # anemia of acute blood loss    # Elevated LFT? shoch liver   # NSTEMI      PLAN:  -remove HD cath today, weds  - Pt now on PO immunosuppression pred/ tacro  - re-start sensipar 30mg TIW once cleared for PO intake   - c/w holding Cellcept for now   - Continue to holding losartan  - Low potassium renal diet once back to PO   - Dose meds for reduced GFR  - strict IO

## 2022-03-02 NOTE — PROGRESS NOTES
Monitor Summary     Rhythm:     Rate:  62-82    Ectopy: PVC. Coup    Monitor Strip     CT .20  QRS .09  QT .40

## 2022-03-02 NOTE — CARE PLAN
The patient is Watcher - Medium risk of patient condition declining or worsening    Shift Goals  Clinical Goals: Monitor Urine Output  Patient Goals: Comfort and Rest  Family Goals: DENA    Progress made toward(s) clinical / shift goals:        Problem: Knowledge Deficit - Standard  Goal: Patient and family/care givers will demonstrate understanding of plan of care, disease process/condition, diagnostic tests and medications  Outcome: Progressing     Problem: Pain - Standard  Goal: Alleviation of pain or a reduction in pain to the patient’s comfort goal  Outcome: Progressing

## 2022-03-03 PROBLEM — R13.10 DYSPHAGIA: Status: ACTIVE | Noted: 2022-01-01

## 2022-03-03 NOTE — CONSULTS
Neurology Initial Consult H&P  Neurohospitalist Service, [facility] Neurosciences    Referring Physician: Rhonda Mojica M.D.    Chief Complaint   Patient presents with   • Weakness       HPI: Krishan Acevedo is a 71 y.o. man with a prolonged and complicated hospital course.  He was admitted about 30 days ago and developed hemorrhagic shock in the setting of GI bleeds.  He has had an extensive hospital course with duodenal bulb cautery and hemoclips, acute renal injury, Pseudomonas UTI.  According to bedside RN and chart review the patient has been quite confused for most of his hospital stay.  He had an EEG done about 1 week ago that showed generalized background slowing without any specific epileptiform abnormalities.  Of note, the patient does have atrial fibrillation status post ablation.  He was previously on apixaban prior to this admission and GI bleeding.    Tonight, the patient's nurse states that at the end of his shift, the patient was pleasantly confused and occasionally refusing medications at times, but when he later returned in the evening for his next shift, he noted a significant mental status change and difficulty getting the patient to respond.  There is concern for some possible left-sided weakness as well as tremulousness, and a stroke alert was prompted.    When the patient went for his CT scan seem to return back to his baseline and was reportedly conversant with staff in a normal manner. He does seem slightly disoriented still and is not a reliable historian, which is in line with his prior progress notes. He does not recall the event.        Review of systems: In addition to what is detailed in the HPI above, all other systems reviewed and are negative.    Past Medical History:    has a past medical history of Arrhythmia (2021), Arthritis (2020), Atrial flutter (HCC), CAD (coronary artery disease) (2003), Cough (2020), Dialysis patient ([facility]) (2006), DVT (deep venous thrombosis)  ([facility]), E coli bacteremia (2014), Kidney failure (03/2008), Kidney transplant pain (2020), Retinal detachment, Sleep apnea, Snoring, and Wegener's granulomatosis with renal involvement ([facility]) (2008).    FHx:  family history includes Stroke in his maternal grandmother; Stroke (age of onset: 26) in his brother.    SHx:   reports that he has never smoked. He quit smokeless tobacco use about 31 years ago.  His smokeless tobacco use included snuff and chew. He reports previous alcohol use. He reports that he does not use drugs.    Allergies:  Allergies   Allergen Reactions   • Triazolam Unspecified     All anti anxiety medications cause hallucinations    • Nsaids      Cannot take because of anti rejection meds.       Medications:    Current Facility-Administered Medications:   •  QUEtiapine (Seroquel) tablet 12.5 mg, 12.5 mg, Oral, Once, Zully Napoles M.D.  •  predniSONE (DELTASONE) tablet 30 mg, 30 mg, Oral, DAILY, 30 mg at 03/02/22 0519 **FOLLOWED BY** [START ON 3/5/2022] predniSONE (DELTASONE) tablet 20 mg, 20 mg, Oral, DAILY **FOLLOWED BY** [START ON 3/8/2022] predniSONE (DELTASONE) tablet 10 mg, 10 mg, Oral, DAILY **FOLLOWED BY** [START ON 3/11/2022] predniSONE (DELTASONE) tablet 5 mg, 5 mg, Oral, DAILY, Rhonda Mojica M.D.  •  omeprazole (PRILOSEC) capsule 20 mg, 20 mg, Oral, Q12HRS, Kane Olson D.O., 20 mg at 03/02/22 1717  •  heparin injection 5,000 Units, 5,000 Units, Subcutaneous, Q8HRS, Kane Olson, D.O., 5,000 Units at 03/02/22 1434  •  sucralfate (CARAFATE) 1 GM/10ML suspension 1 g, 1 g, Oral, Q6HRS, Kervin Alvarez M.D., 1 g at 03/02/22 1716  •  tacrolimus (PROGRAF) capsule 1 mg, 1 mg, Oral, QAM, 1 mg at 03/02/22 0519 **AND** tacrolimus (PROGRAF) capsule 0.5 mg, 0.5 mg, Oral, Q EVENING, Karishma Cruz M.D., 0.5 mg at 03/02/22 1724  •  diphenhydrAMINE (BENADRYL) injection 25 mg, 25 mg, Intravenous, Q6HRS PRN, Scott Julio M.D.  •  insulin regular (HumuLIN R,NovoLIN R) injection, 3-14  Units, Subcutaneous, Q6HRS, 3 Units at 03/02/22 1727 **AND** POC blood glucose manual result, , , Q AC AND BEDTIME(S) **AND** NOTIFY MD and PharmD, , , Once **AND** Administer 20 grams of glucose (approximately 8 ounces of fruit juice) every 15 minutes PRN FSBG less than 70 mg/dL, , , PRN **AND** dextrose 50% (D50W) injection 50 mL, 50 mL, Intravenous, Q15 MIN PRN, Girish Owusu M.D.  •  heparin injection 2,800 Units, 2,800 Units, Intracatheter, ACUTE DIALYSIS PRN, Karishma Cruz M.D., 2,800 Units at 02/26/22 1800  •  [Held by provider] sodium bicarbonate tablet 650 mg, 650 mg, Oral, TID, Karishma Cruz M.D., 650 mg at 02/24/22 0900  •  [Held by provider] patiromer (VELTASSA) powder for oral suspension 8.4 g, 8.4 g, Oral, DAILY AT NOON, Karishma Cruz M.D., 8.4 g at 02/24/22 1210  •  cinacalcet (SENSIPAR) tablet 30 mg, 30 mg, Oral, MO, WE + FR, Jorge Pderaza M.D., 30 mg at 03/02/22 0907  •  acetaminophen (Tylenol) tablet 650 mg, 650 mg, Oral, Q6HRS PRN, Frank Albarado M.D., 650 mg at 02/24/22 0438  •  loperamide (IMODIUM) capsule 2 mg, 2 mg, Oral, 4X/DAY PRN, Frank Albarado M.D.  •  ondansetron (ZOFRAN ODT) dispertab 4 mg, 4 mg, Enteral Tube, Q6HRS PRN, Lamine Perez M.D.  •  haloperidol lactate (HALDOL) injection 2-5 mg, 2-5 mg, Intravenous, Q4HRS PRN, Lamine Perez M.D., 5 mg at 02/21/22 0249  •  ondansetron (ZOFRAN) syringe/vial injection 4 mg, 4 mg, Intravenous, Q6HRS PRN, Rhonda Mojica M.D., 4 mg at 02/15/22 1035  •  hydrALAZINE (APRESOLINE) injection 10 mg, 10 mg, Intravenous, Q6HRS PRN, Rhonda Mojica M.D., 10 mg at 02/16/22 0014  •  brimonidine (ALPHAGAN) 0.2 % ophthalmic solution 2 Drop, 2 Drop, Both Eyes, BID, 2 Drop at 03/02/22 1716 **AND** timolol (TIMOPTIC) 0.5 % ophthalmic solution 2 Drop, 2 Drop, Both Eyes, BID, Maria Hassan, A.P.R.N., 2 Drop at 03/02/22 1716    Physical Examination:     General: Patient is awake and in no acute distress  Eye: Examination of optic disks not  "indicated at this time given acuity of consult  Neck: There is normal range of motion  CV: regular rate   Extremities:  clear, dry, intact, without peripheral edema    NEUROLOGICAL EXAM:     /70   Pulse 77   Temp 37 °C (98.6 °F) (Temporal)   Resp 16   Ht 1.803 m (5' 11\")   Wt 108 kg (237 lb 14 oz)   SpO2 97%   BMI 33.18 kg/m²       Mental status: Awake, alert and oriented to self and place only.  Speech and language: Speech is clear and fluent. The patient is able to name and repeat, and follow commands  Cranial nerve exam: Pupils are equal, round and reactive to light bilaterally. Visual fields are full. There is no nystagmus. Extraocular muscles are intact. Face is symmetric. Sensation in the face is intact to light touch. Palate elevates symmetrically. Tongue is midline.  Motor exam: Power 4/5 throughout all extremities. There is sustained antigravity with no downward drift in bilateral arms and legs.  There is no pronator drift. Tone is normal. No abnormal movements were seen on exam.  Sensory exam: Reacts to tactile and pinprick in all 4 extremities, no neglect to double stim   Deep tendon reflexes:  2+ throughout. Toes down-going bilaterally.  Coordination: No ataxia on bilateral finger-to-nose testing  Gait: Deferred due to patient preference    NIHSS: National Institutes of Health Stroke Scale    [0] 1a:Level of Consciousness    0-alert 1-drowsy   2-stupor   3-coma  [1] 1b:LOC Questions                  0-both  1-one      2-neither  [0] 1c:LOC Commands                   0-both  1-one      2-neither  [0] 2: Best Gaze                     0-nl    1-partial  2-forced  [0] 3: Visual Fields                   0-nl    1-partial  2-complete 3-bilat  [0] 4: Facial Paresis                0-nl    1-minor    2-partial  3-full  MOTOR                       0-nl  [0] 5: Right Arm           1-drift  [0] 6: Left Arm             2-some effort vs gravity  [0] 7: Right Leg           3-no effort vs gravity  [0] 8: " Left Leg             4-no movement                             x-untestable  [0] 9: Limb Ataxia                    0-abs   1-1_limb   2-2+_limbs       x-untestable  [0] 10:Sensory                        0-nl    1-partial  2-dense  [0] 11:Best Language/Aphasia         0-nl    1-mild/mod 2-severe   3-mute  [0] 12:Dysarthria                     0-nl    1-mild/mod 2-severe       x-untestable  [0] 13:Neglect/Inattention            0-none  1-partial  2-complete  [1] TOTAL    NIH Time 2145    Objective Data:    Labs:  Lab Results   Component Value Date/Time    PROTHROMBTM 13.6 02/22/2022 09:16 AM    INR 1.07 02/22/2022 09:16 AM      Lab Results   Component Value Date/Time    WBC 7.3 03/02/2022 04:05 AM    RBC 3.08 (L) 03/02/2022 04:05 AM    HEMOGLOBIN 8.9 (L) 03/02/2022 04:05 AM    HEMATOCRIT 28.3 (L) 03/02/2022 04:05 AM    MCV 91.9 03/02/2022 04:05 AM    MCH 28.9 03/02/2022 04:05 AM    MCHC 31.4 (L) 03/02/2022 04:05 AM    MPV 9.9 03/02/2022 04:05 AM    NEUTSPOLYS 86.20 (H) 03/02/2022 04:05 AM    LYMPHOCYTES 9.40 (L) 03/02/2022 04:05 AM    MONOCYTES 3.60 03/02/2022 04:05 AM    EOSINOPHILS 0.10 03/02/2022 04:05 AM    BASOPHILS 0.10 03/02/2022 04:05 AM    ANISOCYTOSIS 1+ 08/30/2014 01:45 AM      Lab Results   Component Value Date/Time    SODIUM 144 03/02/2022 04:05 AM    POTASSIUM 3.5 (L) 03/02/2022 04:05 AM    CHLORIDE 111 03/02/2022 04:05 AM    CO2 25 03/02/2022 04:05 AM    GLUCOSE 116 (H) 03/02/2022 04:05 AM    BUN 43 (H) 03/02/2022 04:05 AM    CREATININE 2.10 (H) 03/02/2022 04:05 AM      Lab Results   Component Value Date/Time    CHOLSTRLTOT 101 01/20/2021 07:48 AM    LDL 44 01/20/2021 07:48 AM    HDL 30 (A) 01/20/2021 07:48 AM    TRIGLYCERIDE 133 01/20/2021 07:48 AM       Lab Results   Component Value Date/Time    ALKPHOSPHAT 136 (H) 03/02/2022 04:05 AM    ASTSGOT 46 (H) 03/02/2022 04:05 AM    ALTSGPT 108 (H) 03/02/2022 04:05 AM    TBILIRUBIN 0.5 03/02/2022 04:05 AM        Imaging/Testing:    I interpreted and/or  reviewed the patient's neuroimaging    CT-CTA ABDOMEN WITH & W/O-POST PROCESS   Final Result      1.  Surgical clips visualized in the duodenum. No active extravasation is appreciated      2.  Marked atrophy of the native kidneys.      3.  Transplant kidney in the right iliac fossa with poor enhancement. Air in the collecting system is likely related to prior instrumentation.      4.  Cholelithiasis.      DX-CHEST-LIMITED (1 VIEW)   Final Result      1.  Interval placement of left IJ catheter with distal tip extending to mid SVC.      2.  Right IJ catheter unchanged in position overlying mid SVC.      3.  Subtle peripheral patchy airspace opacities noted in the lung fields.      DX-CHEST-FOR LINE PLACEMENT Perform procedure in: Patient's Room   Final Result      Right internal jugular catheter is been placed and appears appropriately located      CT-HEAD W/O   Final Result         1.  No acute intracranial process.      2. Diffuse atrophy and periventricular white matter changes consistent with chronic small vessel disease.      CT-NEEDLE BX-RENAL   Final Result      1.  CT GUIDED RIGHT LOWER QUADRANT/PELVIC RENAL TRANSPLANT KIDNEY BIOPSY.      EC-ECHOCARDIOGRAM LTD W/O CONT   Final Result      MR-BRAIN-W/O   Final Result         No acute intracranial process.      Nonspecific T2 hyperintensities are noted in the periventricular and deep white matter, most likely related to chronic microvascular ischemia.      Remote left thalamic and corona radiata lacunar infarction noted.      DX-ABDOMEN FOR TUBE PLACEMENT   Final Result         Feeding tube with tip projecting over the expected area of the first portion duodenum.      CT-HEAD W/O   Final Result         NO ACUTE ABNORMALITIES ARE NOTED ON CT SCAN OF THE HEAD.      Findings are consistent with atrophy.  Decreased attenuation in the periventricular white matter likely indicates microvascular ischemic disease.         CT-CHEST,ABDOMEN,PELVIS W/O   Final Result       1.  Multifocal pulmonary opacifications are identified which are new since the prior CT. Findings could be due to pneumonia.      2.  1.2 cm nodular opacification noted in the left upper pulmonary lobe. Primary or metastatic neoplasm is possible. Small focus of pneumonia or inflammation is also possible. Consider biopsy or PET/CT. Consider follow-up CT in 3 months.      3.  There is duodenal wall thickening. Findings could be due to duodenitis. Neoplasm is also possible. There is some mild induration in the retroperitoneal fat surrounding the duodenum.      4.  Cholelithiasis. No biliary ductal dilatation appreciated.      5.  Right lower quadrant renal transplant. No hydronephrosis.      Low and High Risk: Consider PET/CT, or tissue sampling, or follow-up CT at 3 months      Low Risk - Minimal or absent history of smoking and of other known risk factors.      High Risk - History of smoking or of other known risk factors.      Note: These recommendations do not apply to lung cancer screening, patients with immunosuppression, or patients with known primary cancer.      Fleischner Society 2017 Guidelines for Management of Incidentally Detected Pulmonary Nodules in Adults         US-EXTREMITY VENOUS LOWER BILAT   Final Result      US-RENAL TRANSPLANT COMP   Final Result      1.  No hydronephrosis or perinephric fluid collection involving the right lower quadrant renal transplant.   2.  No definite evidence of hemodynamically significant renal artery stenosis.   3.  Elevated resistive indices could suggest renal parenchymal disease.      DX-CHEST-PORTABLE (1 VIEW)   Final Result      1.  Bilateral peripheral lower lobe hazy opacities which is nonspecific but can be seen with Covid 19 pneumonia.   2.  Enlarged cardiac silhouette with probable vascular congestion/edema.      CT-HEAD W/O    (Results Pending)       Assessment and Plan:    Krishan Acevedo is a 71 y.o. man with atrial fibrillation status post ablation (no  longer on apixaban given prolonged hospitalization for GI bleed and hemorrhagic shock) who has had a prolonged hospitalization with fluctuant confusion this evening had an episode of acute mental status change with minimal responsiveness and some questionable left-sided weakness per RN.  The patient symptoms resolved on the way to the CT scan.  I reviewed the CT head which does not show any acute intracranial pathologies.  The patient is currently at his baseline.    Without having seen the event myself, it is difficult to say whether the patient had a discrete episode of something like a seizure or TIA versus some other process such as delirium and sundowning.  He did not get any sedating medications this evening, and given his history of A. fib and being off anticoagulation it is certainly possible that he did have a TIA event.  However, he was on telemetry at the time and no atrial fibrillation was reported.  He appears to be in sinus rhythm.    I am not certain whether there was a neurologic cause to tonight's event or whether there was a vascular neurologic cause, but I do recommend that the patient go back on his Eliquis whenever is medically safe and appropriate from his GI standpoint.  This will definitively protect against any stroke or TIA events in the future in the setting of recurrent A. fib.    I do not have any further recommendations from a neurologic standpoint at this time.  Please call neurology back with any new questions or concerns.    The evaluation of the patient, and recommended management, was discussed with the resident staff.     Giovanny Dillon D.O.  Neurohospitalist, Acute Care Services

## 2022-03-03 NOTE — PROGRESS NOTES
Hospital Medicine Daily Progress Note    Date of Service  3/3/2022    Chief Complaint  Krishan Acevedo is a 71 y.o. male admitted 2/1/2022 with weakness    Hospital Course  71-year-old male with previous medical history of hypertension, dyslipidemia, type 2 diabetes, atrial flutter s/p ablation in January 2021, anticoagulation on apixaban, ANCA vasculitis s/p renal transplant, recent Covid infection in January.  Patient was admitted with complaint of weakness on February 11.  At that time he was found to be Covid positive RADHA with a creatinine of 2.6 from a baseline of 1.2-1.5.  He was eventually found to have a Carbapenem resistant Pseudomonas UTI and completed antibiotic therapy per ID.  Given his worsening renal function, he underwent renal biopsy on February 22.  On February 24, the patient was found to be altered, and subsequently hypotensive.  He was transferred to the ICU where he was found to have bloody stools.  GI was consulted, the patient had an EGD done on February 24.  He was found to have peptic ulcer disease in the duodenal bulb with visible vessel which was treated with cautery and hemoclips.  He subsequently became unstable and underwent a second EGD on February 26 or another Hemoclip was placed in a duodenal ulcer.      Interval Problem Update  HD catheter removed yesterday.   Transient change of mentation last night, now resolved. Comfortable at the time of my assessment.  Apixaban resumed.   No other Neurology recommendations, appreciate assessment.  No longer requiring 1:1 feeding.  Case coordination in process.    I have personally seen and examined the patient at bedside. I discussed the plan of care with patient, bedside RN, charge RN,  and pharmacy.    Consultants/Specialty  GI, nephrology, neurology and pulmonary    Code Status  DNAR, I OK    Disposition  Patient is medically cleared pending placement for discharge.   Anticipate discharge to to skilled nursing facility.  I  have placed the appropriate orders for post-discharge needs.    Review of Systems  Review of Systems   Unable to perform ROS: Mental acuity        Physical Exam  Temp:  [36.2 °C (97.1 °F)-37 °C (98.6 °F)] 36.2 °C (97.1 °F)  Pulse:  [72-79] 79  Resp:  [16-18] 18  BP: (135-153)/(70-79) 153/79  SpO2:  [94 %-97 %] 94 %    Physical Exam  Vitals reviewed.   Constitutional:       General: He is not in acute distress.     Appearance: He is well-developed. He is obese. He is not diaphoretic.   HENT:      Head: Normocephalic and atraumatic.      Right Ear: External ear normal.      Left Ear: External ear normal.      Nose: Nose normal. No congestion.      Mouth/Throat:      Mouth: Mucous membranes are moist.      Pharynx: Oropharynx is clear.   Eyes:      General: No scleral icterus.     Conjunctiva/sclera: Conjunctivae normal.   Neck:      Vascular: No JVD.   Cardiovascular:      Rate and Rhythm: Normal rate.      Heart sounds: Murmur heard.     No gallop.   Pulmonary:      Effort: Pulmonary effort is normal. No respiratory distress.      Breath sounds: No stridor. No wheezing or rales.   Abdominal:      Palpations: Abdomen is soft.      Tenderness: There is no abdominal tenderness. There is no guarding or rebound.   Musculoskeletal:         General: No tenderness.      Right lower leg: Edema present.      Left lower leg: Edema present.   Skin:     General: Skin is warm and dry.      Coloration: Skin is pale.      Findings: No rash.   Neurological:      Mental Status: He is alert and oriented to person, place, and time. He is confused.   Psychiatric:         Mood and Affect: Mood normal.         Thought Content: Thought content normal.         Fluids    Intake/Output Summary (Last 24 hours) at 3/3/2022 1013  Last data filed at 3/2/2022 1358  Gross per 24 hour   Intake 240 ml   Output --   Net 240 ml       Laboratory  Recent Labs     03/02/22  0405 03/02/22  2315 03/03/22  0033   WBC 7.3 8.1 7.0   RBC 3.08* 3.04* 2.99*    HEMOGLOBIN 8.9* 8.8* 8.7*   HEMATOCRIT 28.3* 27.6* 27.1*   MCV 91.9 90.8 90.6   MCH 28.9 28.9 29.1   MCHC 31.4* 31.9* 32.1*   RDW 60.2* 61.7* 61.2*   PLATELETCT 56* 64* 67*   MPV 9.9 10.4 11.2     Recent Labs     03/01/22  0858 03/02/22  0405 03/02/22  2315   SODIUM 141 144 142   POTASSIUM 3.8 3.5* 3.9   CHLORIDE 109 111 111   CO2 25 25 23   GLUCOSE 215* 116* 94   BUN 45* 43* 41*   CREATININE 2.25* 2.10* 1.89*   CALCIUM 8.4* 8.2* 8.0*     Recent Labs     03/02/22  2315   APTT 40.4*   INR 1.10               Imaging  CT-HEAD W/O   Final Result         1. No acute intracranial findings. No evidence of acute intracranial hemorrhage or mass lesion.      2. White matter lucencies most consistent with chronic small vessel ischemic change.                     CT-CTA ABDOMEN WITH & W/O-POST PROCESS   Final Result      1.  Surgical clips visualized in the duodenum. No active extravasation is appreciated      2.  Marked atrophy of the native kidneys.      3.  Transplant kidney in the right iliac fossa with poor enhancement. Air in the collecting system is likely related to prior instrumentation.      4.  Cholelithiasis.      DX-CHEST-LIMITED (1 VIEW)   Final Result      1.  Interval placement of left IJ catheter with distal tip extending to mid SVC.      2.  Right IJ catheter unchanged in position overlying mid SVC.      3.  Subtle peripheral patchy airspace opacities noted in the lung fields.      DX-CHEST-FOR LINE PLACEMENT Perform procedure in: Patient's Room   Final Result      Right internal jugular catheter is been placed and appears appropriately located      CT-HEAD W/O   Final Result         1.  No acute intracranial process.      2. Diffuse atrophy and periventricular white matter changes consistent with chronic small vessel disease.      CT-NEEDLE BX-RENAL   Final Result      1.  CT GUIDED RIGHT LOWER QUADRANT/PELVIC RENAL TRANSPLANT KIDNEY BIOPSY.      EC-ECHOCARDIOGRAM LTD W/O CONT   Final Result       MR-BRAIN-W/O   Final Result         No acute intracranial process.      Nonspecific T2 hyperintensities are noted in the periventricular and deep white matter, most likely related to chronic microvascular ischemia.      Remote left thalamic and corona radiata lacunar infarction noted.      DX-ABDOMEN FOR TUBE PLACEMENT   Final Result         Feeding tube with tip projecting over the expected area of the first portion duodenum.      CT-HEAD W/O   Final Result         NO ACUTE ABNORMALITIES ARE NOTED ON CT SCAN OF THE HEAD.      Findings are consistent with atrophy.  Decreased attenuation in the periventricular white matter likely indicates microvascular ischemic disease.         CT-CHEST,ABDOMEN,PELVIS W/O   Final Result      1.  Multifocal pulmonary opacifications are identified which are new since the prior CT. Findings could be due to pneumonia.      2.  1.2 cm nodular opacification noted in the left upper pulmonary lobe. Primary or metastatic neoplasm is possible. Small focus of pneumonia or inflammation is also possible. Consider biopsy or PET/CT. Consider follow-up CT in 3 months.      3.  There is duodenal wall thickening. Findings could be due to duodenitis. Neoplasm is also possible. There is some mild induration in the retroperitoneal fat surrounding the duodenum.      4.  Cholelithiasis. No biliary ductal dilatation appreciated.      5.  Right lower quadrant renal transplant. No hydronephrosis.      Low and High Risk: Consider PET/CT, or tissue sampling, or follow-up CT at 3 months      Low Risk - Minimal or absent history of smoking and of other known risk factors.      High Risk - History of smoking or of other known risk factors.      Note: These recommendations do not apply to lung cancer screening, patients with immunosuppression, or patients with known primary cancer.      [employer]leischner Society 2017 Guidelines for Management of Incidentally Detected Pulmonary Nodules in Adults         US-EXTREMITY  VENOUS LOWER BILAT   Final Result      US-RENAL TRANSPLANT COMP   Final Result      1.  No hydronephrosis or perinephric fluid collection involving the right lower quadrant renal transplant.   2.  No definite evidence of hemodynamically significant renal artery stenosis.   3.  Elevated resistive indices could suggest renal parenchymal disease.      DX-CHEST-PORTABLE (1 VIEW)   Final Result      1.  Bilateral peripheral lower lobe hazy opacities which is nonspecific but can be seen with Covid 19 pneumonia.   2.  Enlarged cardiac silhouette with probable vascular congestion/edema.           Assessment/Plan  * Hemorrhagic shock (HCC)  Assessment & Plan  Duodenal ulcer s/p EGD  2/24 & 2/26 with cautery and hemoclip  Cont PPI and sucralfate. Appreciate GI assistance with source control.  Follow daily CBC  On prphylactic AC but will hold full dose as of yet    Dysphagia- (present on admission)  Assessment & Plan  Improving.  No longer requiring 1:1 feeding.    Duodenal ulcer  Assessment & Plan  Hgb improved today.  S/p EGD on 2/26.  PPI transitioned from IV to PO per GI recommendations. Appreciate assistance.     Abnormal CT scan  Assessment & Plan  Future outpatient follow up.  2/12 CT chest/abd:   1.  Multifocal pulmonary opacifications are identified which are new since the prior CT. Findings could be due to pneumonia.  2.  1.2 cm nodular opacification noted in the left upper pulmonary lobe. Primary or metastatic neoplasm is possible. Small focus of pneumonia or inflammation is also possible. Consider biopsy or PET/CT. Consider follow-up CT in 3 months.  3.  There is duodenal wall thickening. Findings could be due to duodenitis. Neoplasm is also possible. There is some mild induration in the retroperitoneal fat surrounding the duodenum.  4.  Cholelithiasis. No biliary ductal dilatation appreciated  5.  Right lower quadrant renal transplant. No hydronephrosis.    Type 2 diabetes mellitus (HCC)  Assessment &  Plan  HgbA1c:7.2  Monitor accuchecks.    Hyperparathyroidism ([facility])  Assessment & Plan  Has had severe elevated PTH over the last few years.  Follow up with ENT/Endocrine    Immunocompromised ([facility])- (present on admission)  Assessment & Plan  On immunosuppressants for renal transplant  Monitor labs, cultures    Acute encephalopathy- (present on admission)  Assessment & Plan  Appears toxic metabolic vs prednisone?  Mentation is improving though remains slightly confused.  Transient change of mentation last night, now resolved. Comfortable at the time of my assessment.  Continue to monitor, may engage with offers of water or ice chips.    Hypernatremia  Assessment & Plan  Resolved  Cont to monitor    Hypercalcemia  Assessment & Plan  Mild-moderate hypercalcemia  IVF and given calcitonin 2/12pm.  IVF and monitor labs      Hyperkalemia  Assessment & Plan  resolved    Acidosis  Assessment & Plan  Improving  Nephrology consulting    Positive urine culture- (present on admission)  Assessment & Plan  Pseudomonas  S/p completed course of Meropenem    Pain in left shin- (present on admission)  Assessment & Plan    US venous negative for DVT    Diarrhea- (present on admission)  Assessment & Plan  Improving  Likely viral versus functional  C.diff negative x 2  Stool culture from 2/3 negative.  Imodium PRN    Elevated brain natriuretic peptide (BNP) level- (present on admission)  Assessment & Plan  Caution with IV fluids  Prior echo's 2020 with preserved EF  No overt sign of volume overload.      Thrombocytopenia ([facility])  Assessment & Plan  Mild,  Monitor cbc    Acute blood loss anemia- (present on admission)  Assessment & Plan  Normocytic anemia  Hgb improved today.  Monitor.    Epiretinal membrane (ERM) of both eyes- (present on admission)  Assessment & Plan  Continue home Combigan    RADHA (acute kidney injury) ([facility])- (present on admission)  Assessment & Plan  Creatinine worsening today 2.06  Worsening  ?rejection vs recurrent  vasculitis  Ranged from 1.27-1.71 over the past year  Renal Bx done 2/22: tissue sent out and results are yet pending  Follow daily BMP, UOP  Renally dose medications as appropriate  Renal function improving. Appreciate Nephrology assistance.  HD catheter removed yesterday.     Long term (current) use of systemic steroids- (present on admission)  Assessment & Plan  Outpatient on daily prednisone secondary to renal transplant 2008  Resume home dosing of prednisone 30mg and DC hydrocortisone      Essential hypertension- (present on admission)  Assessment & Plan  BP remains at goal for acute setting.  Hold Losartan.  Monitor.    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 Blount, self-manages lasix administration at home based on fluid volume status  Renal US negative for hydronephrosis or renal stenosis  Neph following  On mycophenalate and tacrolimus with 30mg of prednisone at baseline  DC hydrocortisone and transition to chronic prednisone dosing  Follow daily BMP  Renally dose medications as appropriate  Renal Bx done 2/22; results sent out and pending    Obesity- (present on admission)  Assessment & Plan  Body mass index is 33.91 kg/m².  Outpatient weight loss counseling     Atrial flutter - (present on admission)  Assessment & Plan  History of, s/p ablation 1/21  On Eliquis 5 mg po BID as outpt  GI cleared for restart of anticoagulation. Apixaban resumed.   Rate controlled.       VTE prophylaxis: therapeutic anticoagulation with apixaban    I have performed a physical exam and reviewed and updated ROS and Plan today (3/3/2022). In review of yesterday's note (3/2/2022), there are no changes except as documented above.

## 2022-03-03 NOTE — PROGRESS NOTES
Hospital Medicine Daily Progress Note    Date of Service  3/2/2022    Chief Complaint  Krishan cAevedo is a 71 y.o. male admitted 2/1/2022 with weakness    Hospital Course  71-year-old male with previous medical history of hypertension, dyslipidemia, type 2 diabetes, atrial flutter s/p ablation in January 2021, anticoagulation on apixaban, ANCA vasculitis s/p renal transplant, recent Covid infection in January.  Patient was admitted with complaint of weakness on February 11.  At that time he was found to be Covid positive RADHA with a creatinine of 2.6 from a baseline of 1.2-1.5.  He was eventually found to have a Carbapenem resistant Pseudomonas UTI and completed antibiotic therapy per ID.  Given his worsening renal function, he underwent renal biopsy on February 22.  On February 24, the patient was found to be altered, and subsequently hypotensive.  He was transferred to the ICU where he was found to have bloody stools.  GI was consulted, the patient had an EGD done on February 24.  He was found to have peptic ulcer disease in the duodenal bulb with visible vessel which was treated with cautery and hemoclips.  He subsequently became unstable and underwent a second EGD on February 26 or another Hemoclip was placed in a duodenal ulcer.      Interval Problem Update  Continued mild confusion. Refused Mg supplementation this am, allowed it this afternoon. Tolerated potassium, phos supplements.  Renal function improving. Ok to remove HD cath per Nephrology.      I have personally seen and examined the patient at bedside. I discussed the plan of care with patient, bedside RN, charge RN,  and pharmacy.    Consultants/Specialty  nephrology and pulmonary    Code Status  DNAR, I OK    Disposition  Patient is medically cleared pending HD cath removal for discharge.   Anticipate discharge to to skilled nursing facility.  I have placed the appropriate orders for post-discharge needs.    Review of Systems  Review of  Systems   Unable to perform ROS: Mental acuity        Physical Exam  Temp:  [36.4 °C (97.5 °F)-37 °C (98.6 °F)] 36.4 °C (97.5 °F)  Pulse:  [62-72] 72  Resp:  [16-19] 18  BP: (117-144)/(65-70) 135/70  SpO2:  [91 %-97 %] 97 %    Physical Exam  Vitals reviewed.   Constitutional:       General: He is not in acute distress.     Appearance: He is well-developed. He is obese. He is not diaphoretic.   HENT:      Head: Normocephalic and atraumatic.      Right Ear: External ear normal.      Left Ear: External ear normal.      Nose: Nose normal. No congestion.      Mouth/Throat:      Mouth: Mucous membranes are moist.      Pharynx: Oropharynx is clear.   Eyes:      General: No scleral icterus.     Conjunctiva/sclera: Conjunctivae normal.   Neck:      Vascular: No JVD.   Cardiovascular:      Rate and Rhythm: Normal rate.      Heart sounds: Murmur heard.     No gallop.   Pulmonary:      Effort: Pulmonary effort is normal. No respiratory distress.      Breath sounds: No stridor. No wheezing or rales.   Abdominal:      Palpations: Abdomen is soft.      Tenderness: There is no abdominal tenderness. There is no guarding or rebound.   Musculoskeletal:         General: No tenderness.      Right lower leg: Edema present.      Left lower leg: Edema present.   Skin:     General: Skin is warm and dry.      Coloration: Skin is pale.      Findings: No rash.   Neurological:      Mental Status: He is alert and oriented to person, place, and time. He is confused.   Psychiatric:         Mood and Affect: Mood normal.         Thought Content: Thought content normal.         Fluids    Intake/Output Summary (Last 24 hours) at 3/2/2022 1651  Last data filed at 3/2/2022 1358  Gross per 24 hour   Intake 450 ml   Output 200 ml   Net 250 ml       Laboratory  Recent Labs     02/28/22  1825 03/01/22  0858 03/02/22  0405   WBC 7.1 9.6 7.3   RBC 3.07* 3.22* 3.08*   HEMOGLOBIN 8.9* 9.7* 8.9*   HEMATOCRIT 27.3* 29.1* 28.3*   MCV 88.9 90.4 91.9   MCH 29.0 30.1  28.9   MCHC 32.6* 33.3* 31.4*   RDW 59.0* 60.0* 60.2*   PLATELETCT 71* 66* 56*   MPV 11.5 9.7 9.9     Recent Labs     02/28/22  0426 03/01/22  0858 03/02/22  0405   SODIUM 142 141 144   POTASSIUM 4.1 3.8 3.5*   CHLORIDE 110 109 111   CO2 23 25 25   GLUCOSE 126* 215* 116*   BUN 44* 45* 43*   CREATININE 2.40* 2.25* 2.10*   CALCIUM 8.3* 8.4* 8.2*                   Imaging  CT-CTA ABDOMEN WITH & W/O-POST PROCESS   Final Result      1.  Surgical clips visualized in the duodenum. No active extravasation is appreciated      2.  Marked atrophy of the native kidneys.      3.  Transplant kidney in the right iliac fossa with poor enhancement. Air in the collecting system is likely related to prior instrumentation.      4.  Cholelithiasis.      DX-CHEST-LIMITED (1 VIEW)   Final Result      1.  Interval placement of left IJ catheter with distal tip extending to mid SVC.      2.  Right IJ catheter unchanged in position overlying mid SVC.      3.  Subtle peripheral patchy airspace opacities noted in the lung fields.      DX-CHEST-FOR LINE PLACEMENT Perform procedure in: Patient's Room   Final Result      Right internal jugular catheter is been placed and appears appropriately located      CT-HEAD W/O   Final Result         1.  No acute intracranial process.      2. Diffuse atrophy and periventricular white matter changes consistent with chronic small vessel disease.      CT-NEEDLE BX-RENAL   Final Result      1.  CT GUIDED RIGHT LOWER QUADRANT/PELVIC RENAL TRANSPLANT KIDNEY BIOPSY.      EC-ECHOCARDIOGRAM LTD W/O CONT   Final Result      MR-BRAIN-W/O   Final Result         No acute intracranial process.      Nonspecific T2 hyperintensities are noted in the periventricular and deep white matter, most likely related to chronic microvascular ischemia.      Remote left thalamic and corona radiata lacunar infarction noted.      DX-ABDOMEN FOR TUBE PLACEMENT   Final Result         Feeding tube with tip projecting over the expected area  of the first portion duodenum.      CT-HEAD W/O   Final Result         NO ACUTE ABNORMALITIES ARE NOTED ON CT SCAN OF THE HEAD.      Findings are consistent with atrophy.  Decreased attenuation in the periventricular white matter likely indicates microvascular ischemic disease.         CT-CHEST,ABDOMEN,PELVIS W/O   Final Result      1.  Multifocal pulmonary opacifications are identified which are new since the prior CT. Findings could be due to pneumonia.      2.  1.2 cm nodular opacification noted in the left upper pulmonary lobe. Primary or metastatic neoplasm is possible. Small focus of pneumonia or inflammation is also possible. Consider biopsy or PET/CT. Consider follow-up CT in 3 months.      3.  There is duodenal wall thickening. Findings could be due to duodenitis. Neoplasm is also possible. There is some mild induration in the retroperitoneal fat surrounding the duodenum.      4.  Cholelithiasis. No biliary ductal dilatation appreciated.      5.  Right lower quadrant renal transplant. No hydronephrosis.      Low and High Risk: Consider PET/CT, or tissue sampling, or follow-up CT at 3 months      Low Risk - Minimal or absent history of smoking and of other known risk factors.      High Risk - History of smoking or of other known risk factors.      Note: These recommendations do not apply to lung cancer screening, patients with immunosuppression, or patients with known primary cancer.      Fleischner Society 2017 Guidelines for Management of Incidentally Detected Pulmonary Nodules in Adults         US-EXTREMITY VENOUS LOWER BILAT   Final Result      US-RENAL TRANSPLANT COMP   Final Result      1.  No hydronephrosis or perinephric fluid collection involving the right lower quadrant renal transplant.   2.  No definite evidence of hemodynamically significant renal artery stenosis.   3.  Elevated resistive indices could suggest renal parenchymal disease.      DX-CHEST-PORTABLE (1 VIEW)   Final Result      1.   Bilateral peripheral lower lobe hazy opacities which is nonspecific but can be seen with Covid 19 pneumonia.   2.  Enlarged cardiac silhouette with probable vascular congestion/edema.           Assessment/Plan  * Hemorrhagic shock (HCC)  Assessment & Plan  Duodenal ulcer s/p EGD  2/24 & 2/26 with cautery and hemoclip  Cont PPI and sucralfate. Appreciate GI assistance with source control.  Follow daily CBC  On prphylactic AC but will hold full dose as of yet    Duodenal ulcer  Assessment & Plan  Hgb improved today.  S/p EGD on 2/26.  PPI transitioned from IV to PO per GI recommendations. Appreciate assistance.     Abnormal CT scan  Assessment & Plan  Future outpatient follow up.  2/12 CT chest/abd:   1.  Multifocal pulmonary opacifications are identified which are new since the prior CT. Findings could be due to pneumonia.  2.  1.2 cm nodular opacification noted in the left upper pulmonary lobe. Primary or metastatic neoplasm is possible. Small focus of pneumonia or inflammation is also possible. Consider biopsy or PET/CT. Consider follow-up CT in 3 months.  3.  There is duodenal wall thickening. Findings could be due to duodenitis. Neoplasm is also possible. There is some mild induration in the retroperitoneal fat surrounding the duodenum.  4.  Cholelithiasis. No biliary ductal dilatation appreciated  5.  Right lower quadrant renal transplant. No hydronephrosis.    Type 2 diabetes mellitus (HCC)  Assessment & Plan  HgbA1c:7.2  Monitor accuchecks.    Hyperparathyroidism (HCC)  Assessment & Plan  Has had severe elevated PTH over the last few years.  Follow up with ENT/Endocrine    Immunocompromised (HCC)- (present on admission)  Assessment & Plan  On immunosuppressants for renal transplant  Monitor labs, cultures    Acute encephalopathy- (present on admission)  Assessment & Plan  Appears toxic metabolic vs prednisone?  Mentation is improving though remains slightly confused.    Hypernatremia  Assessment &  Plan  Resolved  Cont to monitor    Hypercalcemia  Assessment & Plan  Mild-moderate hypercalcemia  IVF and given calcitonin 2/12pm.  IVF and monitor labs      Hyperkalemia  Assessment & Plan  resolved    Acidosis  Assessment & Plan  Improving  Nephrology consulting    Positive urine culture- (present on admission)  Assessment & Plan  Pseudomonas  S/p completed course of Meropenem    Pain in left shin- (present on admission)  Assessment & Plan    US venous negative for DVT    Diarrhea- (present on admission)  Assessment & Plan  Improving  Likely viral versus functional  C.diff negative x 2  Stool culture from 2/3 negative.  Imodium PRN    Elevated brain natriuretic peptide (BNP) level- (present on admission)  Assessment & Plan  Caution with IV fluids  Prior echo's 2020 with preserved EF  No overt sign of volume overload.      Thrombocytopenia (HCC)  Assessment & Plan  Mild,  Monitor cbc    Acute blood loss anemia- (present on admission)  Assessment & Plan  Normocytic anemia  Hgb improved today.  Monitor.    Epiretinal membrane (ERM) of both eyes- (present on admission)  Assessment & Plan  Continue home Combigan    RADHA (acute kidney injury) (HCC)- (present on admission)  Assessment & Plan  Creatinine worsening today 2.06  Worsening  ?rejection vs recurrent vasculitis  Ranged from 1.27-1.71 over the past year  Renal Bx done 2/22: tissue sent out and results are yet pending  Follow daily BMP, UOP  Renally dose medications as appropriate  Renal function improving. Ok to remove HD cath per Nephrology. Appreciate assistance.    Long term (current) use of systemic steroids- (present on admission)  Assessment & Plan  Outpatient on daily prednisone secondary to renal transplant 2008  Resume home dosing of prednisone 30mg and DC hydrocortisone      Essential hypertension- (present on admission)  Assessment & Plan  BP remains at goal for acute setting.  Hold Losartan.  Monitor.    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 Blount, self-manages lasix administration at home based on fluid volume status  Renal US negative for hydronephrosis or renal stenosis  Neph following  On mycophenalate and tacrolimus with 30mg of prednisone at baseline  DC hydrocortisone and transition to chronic prednisone dosing  Follow daily BMP  Renally dose medications as appropriate  Renal Bx done 2/22; results sent out and pending    Obesity- (present on admission)  Assessment & Plan  Body mass index is 33.91 kg/m².  Outpatient weight loss counseling     Atrial flutter - (present on admission)  Assessment & Plan  History of, s/p ablation 1/21  On Eliquis 5 mg po BID as outpt  Given 2 significant episodes of GIB will cont to hold full dose anticoagulation for now  Rate controlled.       VTE prophylaxis: heparin ppx    I have performed a physical exam and reviewed and updated ROS and Plan today (3/2/2022). In review of yesterday's note (3/1/2022), there are no changes except as documented above.

## 2022-03-03 NOTE — CODE DOCUMENTATION
2119 - Code stroke IR paged to ER charge.     2122 - While waiting for CT scan, patient spontaneously conversant and alert, rapidly following commands. Some expressive aphasia/word finding still noted, but markedly improved from prior exam.     2130 - Patient to stat head CT. Dr. Dillon with neurology updated by phone. No additional scans ordered at this time.     2155 - Patient returned to T803. Dr. Dillon at bedside to evaluate patient.

## 2022-03-03 NOTE — CARE PLAN
The patient is Watcher - Medium risk of patient condition declining or worsening    Shift Goals  Clinical Goals: Monitor Neuro Status  Patient Goals: Rest  Family Goals: DENA    Progress made toward(s) clinical / shift goals:        Problem: Knowledge Deficit - Standard  Goal: Patient and family/care givers will demonstrate understanding of plan of care, disease process/condition, diagnostic tests and medications  Outcome: Progressing     Problem: Pain - Standard  Goal: Alleviation of pain or a reduction in pain to the patient’s comfort goal  Outcome: Progressing     Problem: Skin Integrity  Goal: Skin integrity is maintained or improved  Outcome: Progressing

## 2022-03-03 NOTE — PROGRESS NOTES
Patient presented with a change in LOC, escalated concerns to on call MD, Rapid Response called for further observation.

## 2022-03-03 NOTE — PROGRESS NOTES
Assuming patient care of T- 803. Report received bedside by Aida ANGELES. Patient visually assessed and updated on POC.     Bed is locked and in lowest position. Bed alarm checked for proper functioning and is currently on.  All obstacles clear from floor and personal belongings at bedside.    RN call button is w/in reach and education provided on proper use.     Medications, labs and orders reviewed.

## 2022-03-03 NOTE — PROGRESS NOTES
Assumed care of patient, bedside report received from NICK RN. Updated on POC, call light within reach and fall precautions in place. Bed locked and in lowest position. Patient instructed to call for assistance before getting out of bed. All questions answered, no other needs at this time.

## 2022-03-03 NOTE — DISCHARGE PLANNING
Agency/Facility Name: [facility] and [facility]  Spoke To: Jimena  Outcome: Per Jimena, she asked for updated speech notes and will decide if they can accept pt.    DPA sent updated referral.

## 2022-03-03 NOTE — CARE PLAN
The patient is Watcher - Medium risk of patient condition declining or worsening    Shift Goals  Clinical Goals: monitor urine output/ turn   Patient Goals: Comfort and Rest  Family Goals: DENA    Progress made toward(s) clinical / shift goals:  RN attempting strict I/Os educated patient on using urinal. Patient turned Q2hour. Patient placed on waffle overlay to help skin integrity     Patient is not progressing towards the following goals:    Problem: Knowledge Deficit - Standard  Goal: Patient and family/care givers will demonstrate understanding of plan of care, disease process/condition, diagnostic tests and medications  Outcome: Progressing     Problem: Skin Integrity  Goal: Skin integrity is maintained or improved  Outcome: Progressing

## 2022-03-03 NOTE — THERAPY
Speech Language Pathology  Daily Treatment     Patient Name: Krishan Acevedo  Age:  71 y.o., Sex:  male  Medical Record #: [id number]  Today's Date: 3/2/2022     Precautions: Fall Risk,Swallow Precautions ( See Comments)  Comments: pleasantly confused    Assessment  Patient was seen on this date for dysphagia treatment. Patient awake and pleasantly confused. Pt was talkative and required redirection to task. Pt required assistance with repositioning self up in bed and opening juice box; otherwise, was able to self feed independently. PO consisted of [employer] chicken bake (cut up into small pieces to resemble level 6 diet), canned peaches, and thin liquids (cup/straw). Onset of swallow trigger was timely. Mild belching response occurred following trials of thin liquids concerning for possible esophageal dysfunction. NO overt s/sx of aspiration appreciated across all trials tested. Mastication mildly prolonged but functional for soft solids.      Recommendations  1. Upgrade to soft & bite size and thin liquid diet  2.  Swallowing Instructions & Precautions:   Supervision: Assistance with tray set up; Distant supervision - check on patient 2-3 times per meal  Positioning: Fully upright and midline during oral intake  Medication: Whole with liquid  Strategies: Small bites/sips  Oral Care: BID    Plan  Continue current treatment plan.    Discharge Recommendations: Anticipate that the patient will have no further speech therapy needs after discharge from the hospital       03/02/22 1648   Vitals   O2 Delivery Device None - Room Air   Short Term Goals   Short Term Goal # 1 Patient will consume a diet of thin liquids and soft and bite sized solids with no s/sx of aspiration given direct supervision/feeding A.   Goal Outcome # 1 Progressing as expected

## 2022-03-03 NOTE — PROGRESS NOTES
"[facility] -  PROGRESS NOTE               Author: Jose Alberto Olivas M.D. Date & Time: 3/3/2022  12:35 PM     HPI:  Krishan Acevedo is a 71 y.o. 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 Was additionally found to have worsening renal function.   Patient shares that he first tested positive for COVID at an outside hospital several days ago. He felt that he was not improving and possibly getting worse at home which prompted him to call EMS. He mentions that for several days he has been having loose stools, noting 3-5 loose but not watery BMs daily, noting his last loose stool was day prior to presentation. He also mentions that he has become more dependent on his wife to bring him food and water citing fatigue with his COVID infection. He does note that for the past several weeks he has been feeling \"like I'm always thirsty\".  While in the ED he was vitally stable. Was placed on 2L NC sating in the mid 90s BP ranged 123/70 from 159/117. Labs notable for Cr of 2.62. BUN 80, COVID-19 positive, UA noted for large occult blood, small leukocyte esterase. US of kidneys was completed with indicated no hydronephrosis or fluid collections, no renal artery stenosis.   In regards to his transplant history. Living donor transplant was completed in 2008 at an outside hospital. Patient is on Prednisone 5mg, Mycophenolate 500mg BID, and Tacrolimus 1mg and 0.5mg daily. His GFR trends in the 30s-40s although in chart he has climbed to the 56-53 ranges in the past.        DAILY NEPHROLOGY SUMMARY:  2/1: Admitted, consult placed  2/2: no overnight events, renal function improving, patient tolerating PO intake  2/3: started on IVF yesterday, renal function improved to around prior baseline range  2/4: renal function " continues to improve  2/5: Scr down to baseline levels. No new overnight renal events. Feels ok.  2/6: Feels ok. Urine culture from 2/1 positive for carbapenem resistant Pseudomonas aeruginosa. He is on zosyn per sensitivity. No labs today  2/7: Potassium is 5.7 today.  Creatinine up to 1.95.  Corrected calcium elevated.  CO2 16.  2/08: Chart and notes reviewed. No new labs this am. +Tachycardia.   2/09: Nursing notes and assessments reviewed.  Int tachycardia still. Room air. SBP labile 100s-160s. K 5.5  2/10: Spoke with RN, patient has no complaints at this time.   Plan is for discharge today or tomorrow to SNF pending 1L bolus and Calcium recheck.  Current SCa 11.5  VSS RA  -160s  2/11: Sodium up to 149.  Calcium increasing.  Creatinine stable 1.7.  2/12: Sodium up to 152.  Calcium remains elevated.  Creatinine 1.8.  I/Os not documented  2/13: Sodium still at 152.  Calcium trending down.  Creatinine up to 2.06.  On bicarb drip.  Somnolent  2/14: Unable to participate in interview, improved hemodynamics, cr stable  2/15: stable hemodynamics, 1.9L UOP, cr to 1.9, labile blood pressures, ongoing intermittent delirium  2/16: Hypertensive this AM, slight improvement in Cr, ca climbing, net neg 600cc with 1.9L UOP, confused  2/17: 1.4L UOP,ongoing encephalopthy, sodium climbing, minimal PO intake    2/18: IOs not recorded, cr 1.5, tremulous, minimal PO intake-serum sodium climbing  2/19: 2.6L  UOP, sodium climbing, more confused this a.m, wife at bedside  2/21: remains confused, biopsy was postponed to until tomorrow due to holiday.   2/22: confused. Refused Veltassa for 2 days but talked to him about it.   2/23: more awake and alert, wife is at bedside. UOP is great in velez bacg but not being documented. S/p allograft kidney biopsy 2/22/22.  2/23: could not sleep at night, cr is worsening but UOP is great.   2/24-2/25: Patient was transferred to ICU due to altered mentation and GI bleed.  Underwent endoscopy  "which showed duodenal ulcers.  In Shock On norepinephrine@ 18. UOP has decreased. Not intubated yet.   2/26: got HD yesterday, Mentation is better today. UOP is better.   2/27: awake and communicative, wife and friend at bedside. UOP 1.3L.   2/28: Pt confused this am, adequate U/O  3/1: trasnferred out of ICU, remains confused  3/3: Pt not responding this am  REVIEW OF SYSTEMS:    10 point ROS not reviewed second to mental status    PMH/PSH/SH/FH:   Reviewed and unchanged since admission note    CURRENT MEDICATIONS:   Reviewed from admission to present day    VS:  /79   Pulse 79   Temp 36.2 °C (97.1 °F) (Temporal)   Resp 18   Ht 1.803 m (5' 11\")   Wt 108 kg (237 lb 14 oz)   SpO2 94%   BMI 33.18 kg/m²     Physical Exam  Vitals and nursing note reviewed.   Constitutional:       General: He is not in acute distress.     Appearance: He is ill-appearing.   HENT:      Head: Normocephalic and atraumatic.   Cardiovascular:      Rate and Rhythm: Normal rate and regular rhythm.      Pulses: Normal pulses.      Heart sounds: No murmur heard.    No gallop.   Pulmonary:      Effort: Pulmonary effort is normal. No respiratory distress.      Breath sounds: No wheezing or rales.   Abdominal:      General: Abdomen is flat. Bowel sounds are normal. There is no distension.      Tenderness: There is no abdominal tenderness.   Musculoskeletal:         General: No swelling or deformity.   Skin:     Coloration: Skin is pale. Skin is not jaundiced.      Findings: No bruising or lesion.   Neurological:      Mental Status: He is alert. Oriented to person and place  Psychiatric:         Behavior: Behavior normal.   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 Then worsened again likely 2/2 ATN from acute GI bleed and shock.    # History of ANCA Vasculitis  - Repeat serologies positive ( MPO 42, PR3 61 on 2/10)  but no signs of vasculitis on Allograft biopsy   # CKD 3  # Afib  # DM2  # HTN  # Hypercalcemia: PTH mediated, decreasing  # Hyperparathyroid: may need further imaging going forward  # Acute encephalopathy  - multifactorial   # Acute upper GI bleed 2/2 duodenal ulcers  - EGD 2/26: Two large duodenal ulcers with hemoclips in place and one visible vessel; one Hemoclip place. Patchy erythematous gastropathy. Hiatal hernia. LA class a esophagitis with minimal oozing.   # anemia of acute blood loss    # Elevated LFT? shoch liver   # NSTEMI      PLAN:  - Pt now on PO immunosuppression pred/ tacro  - decrease sensipar 30mg 2 x week  - c/w holding Cellcept for now   - Dose meds for reduced GFR  - OK to dc to SNF

## 2022-03-03 NOTE — THERAPY
Physical Therapy   Daily Treatment     Patient Name: Krishan Acevedo  Age:  71 y.o., Sex:  male  Medical Record #: [id number]  Today's Date: 3/3/2022     Precautions  Precautions: Fall Risk;Swallow Precautions ( See Comments)  Comments: non verbal this session    Assessment    Pt continues to present with impaired cognition, command following, balance, weakness, and decreased activity tolerance. Pt requiring max cues to open eyes and participate in session 2/2 lethargy. Pt required MaxA for bed mobility and Total A for STS. Unable to transfer to chair given no ability to weight shift in standing. Recommend placement. Will continue to follow.     Plan    Continue current treatment plan.    DC Equipment Recommendations: Unable to determine at this time  Discharge Recommendations: Recommend post-acute placement for additional physical therapy services prior to discharge home      Subjective    Pt non verbal this session     Objective     03/03/22 1023   Total Time Spent   Total Time Spent (Mins) 28   Charge Group   Charges  Yes   PT Therapeutic Activities 1  (13 min)   PT Neuromuscular Re-Education / Balance 1  (10 min)   Precautions   Precautions Fall Risk;Swallow Precautions ( See Comments)   Comments non verbal this session   Vitals   O2 Delivery Device None - Room Air   Vitals Comments VSS   Pain 0 - 10 Group   Therapist Pain Assessment Post Activity Pain Same as Prior to Activity;Nurse Notified;0   Cognition    Cognition / Consciousness X   Speech/ Communication Delayed Responses  (non verbal this session)   Level of Consciousness Responds to voice  (however, delayed)   Ability To Follow Commands 1 Step  (with repetitive cues and increased time)   Safety Awareness Impaired   New Learning Impaired   Attention Impaired   Sequencing Impaired   Initiation Impaired   Comments Pt nonverbal during session, very lethargic requiring Max VC/TC to open eyes and participate. Nodded head appropriately x1   Neuro-Muscular  Treatments   Neuro-Muscular Treatments Anterior weight shift;Compensatory Strategies;Postural Facilitation;Sequencing;Tactile Cuing;Weight Shift Right;Weight Shift Left;Verbal Cuing;Joint Approximation;Postural Changes;Tapping;Sensory Stimuli;Facilitation   Comments Pt with L lateral lean requring Mod/Max A to sit EOB, improved to SPV seated midline for few minutes. Pt's seated balance waxed and waned throughout session 2/2 lethargy/attention   Neurological Concerns   Neurological Concerns Yes   Sitting Posture During ADL's Lateral Lean Left   Standing Posture During ADL's Lateral Lean Left   Balance   Sitting Balance (Static) Trace +   Sitting Balance (Dynamic) Trace   Standing Balance (Static) Dependent   Standing Balance (Dynamic) Dependent   Weight Shift Sitting Poor   Weight Shift Standing Absent   Skilled Intervention Verbal Cuing;Tactile Cuing;Sequencing;Postural Facilitation;Facilitation;Compensatory Strategies   Comments via B HHA in standing, required Max cues for use of BUE support in seated   Gait Analysis   Gait Level Of Assist Unable to Participate   Comments unable to weight shift in standing to take steps   Bed Mobility    Supine to Sit Maximal Assist   Sit to Supine Total Assist   Scooting Total Assist   Rolling Maximum Assist to Lt.;Maximal Assist to Rt.   Skilled Intervention Verbal Cuing;Tactile Cuing;Sequencing;Postural Facilitation;Facilitation;Compensatory Strategies   Comments HOB elevated, max cues, x2 persons   Functional Mobility   Sit to Stand Total Assist   Bed, Chair, Wheelchair Transfer Unable to Participate   Mobility via HHA x2 for STS x2 attempts   Skilled Intervention Verbal Cuing;Tactile Cuing;Sequencing;Postural Facilitation;Facilitation;Compensatory Strategies   How much difficulty does the patient currently have...   Turning over in bed (including adjusting bedclothes, sheets and blankets)? 1   Sitting down on and standing up from a chair with arms (e.g., wheelchair, bedside  commode, etc.) 1   Moving from lying on back to sitting on the side of the bed? 1   How much help from another person does the patient currently need...   Moving to and from a bed to a chair (including a wheelchair)? 1   Need to walk in a hospital room? 1   Climbing 3-5 steps with a railing? 1   6 clicks Mobility Score 6   Activity Tolerance   Sitting in Chair unable   Sitting Edge of Bed 10 min   Standing 1 min total   Short Term Goals    Short Term Goal # 1 in 6 visits patient will demo STS with ModA and FWW to improve functional indpendence   Goal Outcome # 1 goal not met   Short Term Goal # 2 Pt will perform supine <> sit with Swapna in 6 visits to improve functional independence   Goal Outcome # 2 Goal not met   Short Term Goal # 3 Pt will perform transfers with FWW and ModA in 6 visits to initiate OOB mobility   Goal Outcome # 3 Goal not met   Short Term Goal # 4 Pt will ambulate 15ft with FWW and ModA in 6 visits to initiate ambulation   Education Group   Education Provided Role of Physical Therapist   Role of Physical Therapist Patient Response Patient;Acceptance;Explanation;Reinforcement Needed   Anticipated Discharge Equipment and Recommendations   DC Equipment Recommendations Unable to determine at this time   Discharge Recommendations Recommend post-acute placement for additional physical therapy services prior to discharge home   Interdisciplinary Plan of Care Collaboration   IDT Collaboration with  Nursing   Patient Position at End of Therapy In Bed;Bed Alarm On;Call Light within Reach;Tray Table within Reach;Phone within Reach   Collaboration Comments RN updated   Session Information   Date / Session Number  3/3 9 (3/4, 3/5)

## 2022-03-03 NOTE — DISCHARGE PLANNING
Anticipated Discharge Disposition: SNF    Action: notified MATTHEW Pina that patient no longer needs 1:1 feeding. Barrier to SNF acceptance is removed.     Barriers to Discharge: SNF acceptance.     Plan: continue to follow for SNF acceptance and bed.

## 2022-03-04 NOTE — PROGRESS NOTES
Assumed care of patient, bedside report received from Chauncey ANGELES. Updated on POC, call light within reach and fall precautions in place. Bed locked and in lowest position. Patient instructed to call for assistance before getting out of bed. All questions answered, no other needs at this time.

## 2022-03-04 NOTE — DISCHARGE PLANNING
Agency[facility]Facility Name: [facility]  Spoke To: Jimena  Outcome: Per Jimena, she will follow up with the two facilities and call me back.    Agency[facility]Facility Name: [facility]  Spoke To: Jimena  Outcome: Per Jimena, pt is accepted and bed will possibly be available Monday. DPA will follow up.    Received Choice form at 1335  Agency/Facility Name: [facility]  Referral sent per Choice form @ 1341     Agency/Facility Name: [facility]  Spoke To: Magy  Outcome: Per Magy, there aren't any beds available today, but there will possibly be to,orrow 3/5. Dpa will follow up.  LSW notified    @3708  Agency/Facility Name: [facility]  Spoke To: Magy  Outcome: Per Magy, there are no beds available today but there will be tomorrow. DPA will follow up tomorrow.

## 2022-03-04 NOTE — PROGRESS NOTES
"[facility] -  PROGRESS NOTE               Author: Jose Alberto Olivas M.D. Date & Time: 3/4/2022  11:57 AM     HPI:  Krishan Acevedo is a 71 y.o. 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 Was additionally found to have worsening renal function.   Patient shares that he first tested positive for COVID at an outside hospital several days ago. He felt that he was not improving and possibly getting worse at home which prompted him to call EMS. He mentions that for several days he has been having loose stools, noting 3-5 loose but not watery BMs daily, noting his last loose stool was day prior to presentation. He also mentions that he has become more dependent on his wife to bring him food and water citing fatigue with his COVID infection. He does note that for the past several weeks he has been feeling \"like I'm always thirsty\".  While in the ED he was vitally stable. Was placed on 2L NC sating in the mid 90s BP ranged 123/70 from 159/117. Labs notable for Cr of 2.62. BUN 80, COVID-19 positive, UA noted for large occult blood, small leukocyte esterase. US of kidneys was completed with indicated no hydronephrosis or fluid collections, no renal artery stenosis.   In regards to his transplant history. Living donor transplant was completed in 2008 at an outside hospital. Patient is on Prednisone 5mg, Mycophenolate 500mg BID, and Tacrolimus 1mg and 0.5mg daily. His GFR trends in the 30s-40s although in chart he has climbed to the 56-53 ranges in the past.        DAILY NEPHROLOGY SUMMARY:  2/1: Admitted, consult placed  2/2: no overnight events, renal function improving, patient tolerating PO intake  2/3: started on IVF yesterday, renal function improved to around prior baseline range  2/4: renal function " continues to improve  2/5: Scr down to baseline levels. No new overnight renal events. Feels ok.  2/6: Feels ok. Urine culture from 2/1 positive for carbapenem resistant Pseudomonas aeruginosa. He is on zosyn per sensitivity. No labs today  2/7: Potassium is 5.7 today.  Creatinine up to 1.95.  Corrected calcium elevated.  CO2 16.  2/08: Chart and notes reviewed. No new labs this am. +Tachycardia.   2/09: Nursing notes and assessments reviewed.  Int tachycardia still. Room air. SBP labile 100s-160s. K 5.5  2/10: Spoke with RN, patient has no complaints at this time.   Plan is for discharge today or tomorrow to SNF pending 1L bolus and Calcium recheck.  Current SCa 11.5  VSS RA  -160s  2/11: Sodium up to 149.  Calcium increasing.  Creatinine stable 1.7.  2/12: Sodium up to 152.  Calcium remains elevated.  Creatinine 1.8.  I/Os not documented  2/13: Sodium still at 152.  Calcium trending down.  Creatinine up to 2.06.  On bicarb drip.  Somnolent  2/14: Unable to participate in interview, improved hemodynamics, cr stable  2/15: stable hemodynamics, 1.9L UOP, cr to 1.9, labile blood pressures, ongoing intermittent delirium  2/16: Hypertensive this AM, slight improvement in Cr, ca climbing, net neg 600cc with 1.9L UOP, confused  2/17: 1.4L UOP,ongoing encephalopthy, sodium climbing, minimal PO intake    2/18: IOs not recorded, cr 1.5, tremulous, minimal PO intake-serum sodium climbing  2/19: 2.6L  UOP, sodium climbing, more confused this a.m, wife at bedside  2/21: remains confused, biopsy was postponed to until tomorrow due to holiday.   2/22: confused. Refused Veltassa for 2 days but talked to him about it.   2/23: more awake and alert, wife is at bedside. UOP is great in velez bacg but not being documented. S/p allograft kidney biopsy 2/22/22.  2/23: could not sleep at night, cr is worsening but UOP is great.   2/24-2/25: Patient was transferred to ICU due to altered mentation and GI bleed.  Underwent endoscopy  "which showed duodenal ulcers.  In Shock On norepinephrine@ 18. UOP has decreased. Not intubated yet.   2/26: got HD yesterday, Mentation is better today. UOP is better.   2/27: awake and communicative, wife and friend at bedside. UOP 1.3L.   2/28: Pt confused this am, adequate U/O  3/1: trasnferred out of ICU, remains confused  3/3: Pt not responding this am  3/4: Pt more responsive this am  REVIEW OF SYSTEMS:    10 point ROS not reviewed second to mental status    PMH/PSH/SH/FH:   Reviewed and unchanged since admission note    CURRENT MEDICATIONS:   Reviewed from admission to present day    VS:  /78   Pulse 94   Temp 36.7 °C (98 °F) (Temporal)   Resp 16   Ht 1.803 m (5' 11\")   Wt 108 kg (237 lb 14 oz)   SpO2 98%   BMI 33.18 kg/m²     Physical Exam  Vitals and nursing note reviewed.   Constitutional:       General: He is not in acute distress.     Appearance: He is ill-appearing.   HENT:      Head: Normocephalic and atraumatic.   Cardiovascular:      Rate and Rhythm: Normal rate and regular rhythm.      Pulses: Normal pulses.      Heart sounds: No murmur heard.    No gallop.   Pulmonary:      Effort: Pulmonary effort is normal. No respiratory distress.      Breath sounds: No wheezing or rales.   Abdominal:      General: Abdomen is flat. Bowel sounds are normal. There is no distension.      Tenderness: There is no abdominal tenderness.   Musculoskeletal:         General: No swelling or deformity.   Skin:     Coloration: Skin is pale. Skin is not jaundiced.      Findings: No bruising or lesion.   Neurological:      Mental Status: He is alert. Oriented to person and place  Psychiatric:         Behavior: Behavior normal.   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 Then worsened again likely 2/2 ATN from acute GI bleed and shock.    # History of ANCA Vasculitis  - Repeat serologies positive ( MPO 42, PR3 61 on 2/10)  but no signs of vasculitis on Allograft biopsy   # CKD 3  # Afib  # DM2  # HTN  # Hypercalcemia: PTH mediated, decreasing  # Hyperparathyroid: may need further imaging going forward  # Acute encephalopathy  - multifactorial   # Acute upper GI bleed 2/2 duodenal ulcers  - EGD 2/26: Two large duodenal ulcers with hemoclips in place and one visible vessel; one Hemoclip place. Patchy erythematous gastropathy. Hiatal hernia. LA class a esophagitis with minimal oozing.   # anemia of acute blood loss    # Elevated LFT? shoch liver   # NSTEMI      PLAN:  - Pt now on PO immunosuppression pred/ tacro  - decreased sensipar 30mg 2 x week 3/3  - c/w holding Cellcept for now   - Dose meds for reduced GFR  - am labs  - OK to dc to SNF

## 2022-03-04 NOTE — CARE PLAN
The patient is Watcher - Medium risk of patient condition declining or worsening    Shift Goals  Clinical Goals: monitor mentation  Patient Goals: mobility  Family Goals: DENA    Progress made toward(s) clinical / shift goals:  Pt able to get to edge of bed with 2 assist today. Unable to tolerate standing, RN to encourage ROM. Encourage patient to communicate appropriately and voice concerns.   Problem: Communication  Goal: The ability to communicate needs accurately and effectively will improve  Outcome: Progressing     Problem: Risk for Aspiration  Goal: Patient's risk for aspiration will be absent or decrease  Outcome: Progressing       Patient is not progressing towards the following goals:

## 2022-03-04 NOTE — PROGRESS NOTES
Pharmacy Pharmacotherapy Consult for LOS >30 days    Admit Date: 2/1/2022      Medications were reviewed for appropriateness and ongoing need.     Current Facility-Administered Medications   Medication Dose Route Frequency Provider Last Rate Last Admin   • apixaban (ELIQUIS) tablet 5 mg  5 mg Oral BID Rhonda Mojica M.D.   5 mg at 03/04/22 0446   • cinacalcet (SENSIPAR) tablet 30 mg  30 mg Oral MO + FR Jose Alberto Olivas M.D.   30 mg at 03/04/22 1042   • [START ON 3/5/2022] predniSONE (DELTASONE) tablet 20 mg  20 mg Oral DAILY Rhonda Mojica M.D.        Followed by   • [START ON 3/8/2022] predniSONE (DELTASONE) tablet 10 mg  10 mg Oral DAILY Rhonda Mojica M.D.        Followed by   • [START ON 3/11/2022] predniSONE (DELTASONE) tablet 5 mg  5 mg Oral DAILY Rhonda Mojica M.D.       • omeprazole (PRILOSEC) capsule 20 mg  20 mg Oral Q12HRS Kane Olson D.ORobert   20 mg at 03/04/22 0444   • sucralfate (CARAFATE) 1 GM/10ML suspension 1 g  1 g Oral Q6HRS Kervin Alvarez M.D.   1 g at 03/04/22 0445   • tacrolimus (PROGRAF) capsule 1 mg  1 mg Oral QA Karishma Cruz M.D.   1 mg at 03/04/22 0445    And   • tacrolimus (PROGRAF) capsule 0.5 mg  0.5 mg Oral Q EVENING Karishma Cruz M.D.   0.5 mg at 03/03/22 1800   • insulin regular (HumuLIN R,NovoLIN R) injection  3-14 Units Subcutaneous Q6HRS Girish Owusu M.D.   3 Units at 03/03/22 1801    And   • dextrose 50% (D50W) injection 50 mL  50 mL Intravenous Q15 MIN PRN Girish Owusu M.D.       • heparin injection 2,800 Units  2,800 Units Intracatheter ACUTE DIALYSIS PRN Karishma Cruz M.D.   2,800 Units at 02/26/22 1800   • acetaminophen (Tylenol) tablet 650 mg  650 mg Oral Q6HRS PRN Frank Albarado M.D.   650 mg at 02/24/22 0438   • loperamide (IMODIUM) capsule 2 mg  2 mg Oral 4X/DAY PRN Frank Albarado M.D.       • ondansetron (ZOFRAN ODT) dispertab 4 mg  4 mg Enteral Tube Q6HRS PRN Lamine Perez M.D.       • haloperidol lactate (HALDOL) injection 2-5 mg  2-5 mg  Intravenous Q4HRS PRN Lamine Perez M.D.   5 mg at 02/21/22 0249   • ondansetron (ZOFRAN) syringe/vial injection 4 mg  4 mg Intravenous Q6HRS PRN Rhonda Mojica M.D.   4 mg at 02/15/22 1035   • hydrALAZINE (APRESOLINE) injection 10 mg  10 mg Intravenous Q6HRS PRN Rhonda Mojica M.D.   10 mg at 02/16/22 0014   • brimonidine (ALPHAGAN) 0.2 % ophthalmic solution 2 Drop  2 Drop Both Eyes BID Maria A. Mo, A.P.R.N.   2 Drop at 03/04/22 0445    And   • timolol (TIMOPTIC) 0.5 % ophthalmic solution 2 Drop  2 Drop Both Eyes BID Maria A. Mo, A.P.R.N.   2 Drop at 03/04/22 0445       Recommendations:  1) DC Zofran ODT PRN - not doses since ordered  2) DC Hydralazine IV PRN - no doses since 2/15/22 and med is on national shortage  3) Resume all other meds    - D/W Dr Mojica and she was agreeable with the above recommendations.    Bryant Hayward, PharmD

## 2022-03-04 NOTE — CARE PLAN
Problem: Knowledge Deficit - Standard  Goal: Patient and family/care givers will demonstrate understanding of plan of care, disease process/condition, diagnostic tests and medications  Outcome: Not Met     Problem: Skin Integrity  Goal: Skin integrity is maintained or improved  Outcome: Progressing     Problem: Communication  Goal: The ability to communicate needs accurately and effectively will improve  Outcome: Progressing     Problem: Dysphagia  Goal: Dysphagia will improve  Outcome: Progressing     Problem: Risk for Aspiration  Goal: Patient's risk for aspiration will be absent or decrease  Outcome: Progressing   The patient is Stable - Low risk of patient condition declining or worsening    Shift Goals  Clinical Goals: monitor mentation  Patient Goals: mobility  Family Goals: DENA    Progress made toward(s) clinical / shift goals:  Patient showing no new signs of skin breakdown. Patient resting comfortably in bed.    Patient is not progressing towards the following goals:      Problem: Knowledge Deficit - Standard  Goal: Patient and family/care givers will demonstrate understanding of plan of care, disease process/condition, diagnostic tests and medications  Outcome: Not Met

## 2022-03-04 NOTE — THERAPY
Occupational Therapy  Daily Treatment     Patient Name: Krishan Acevedo  Age:  71 y.o., Sex:  male  Medical Record #: [id number]  Today's Date: 3/3/2022     Precautions  Precautions: (P) Fall Risk,Swallow Precautions ( See Comments)  Comments: non verbal this session    Assessment    Pt seen for OT tx session. Pt w/ decreased level of arousal today, was non verbal but nodded appropriately and gave 1 thumbs up. Pt required max A for supine<>sit and for seated grooming w/ cues for initiation and follow through. BP monitored, no s/s of orthostatic hypotension. Pt demo'd impaired cognition, functional mobility, and activity tolerance impacting functional independence.     Plan    Continue current treatment plan.    DC Equipment Recommendations: (P) Unable to determine at this time  Discharge Recommendations: (P) Recommend post-acute placement for additional occupational therapy services prior to discharge home    Subjective    Non verbal during session, gave 1 thumbs up.      Objective       03/03/22 1401   Precautions   Precautions Fall Risk;Swallow Precautions ( See Comments)   Vitals   O2 Delivery Device None - Room Air   Pain 0 - 10 Group   Therapist Pain Assessment Post Activity Pain Same as Prior to Activity;Nurse Notified  (no indications of pain during session)   Cognition    Cognition / Consciousness X   Speech/ Communication Delayed Responses   Level of Consciousness Responds to voice   Ability To Follow Commands 1 Step   Safety Awareness Impaired   New Learning Impaired   Attention Impaired   Sequencing Impaired   Initiation Impaired   Comments Pt very lethargic, gave 1 thumbs up, non verbal during session, followed most 1 step commands w/ verbal and tactile cuing w/ increased processing time   Active ROM Upper Body   Active ROM Upper Body  WDL   Strength Upper Body   Upper Body Strength  WDL   Neuro-Muscular Treatments   Neuro-Muscular Treatments Anterior weight shift;Weight Shift Right;Weight Shift  Left;Joint Approximation;Postural Changes   Comments Pt w/ strong L lateral lean, unable to self correct   Balance   Sitting Balance (Static) Trace +   Sitting Balance (Dynamic) Trace   Standing Balance (Static) Dependent   Standing Balance (Dynamic) Dependent   Weight Shift Sitting Poor   Weight Shift Standing Absent   Skilled Intervention Tactile Cuing;Verbal Cuing;Facilitation   Comments w/ B UE support   Bed Mobility    Supine to Sit Maximal Assist   Sit to Supine Maximal Assist   Scooting Total Assist   Rolling Maximum Assist to Lt.;Maximal Assist to Rt.   Skilled Intervention Tactile Cuing;Verbal Cuing   Activities of Daily Living   Grooming Moderate Assist;Seated  (w/ HHA)   Upper Body Dressing Maximal Assist   Lower Body Dressing Total Assist   Skilled Intervention Tactile Cuing;Verbal Cuing;Facilitation   How much help from another person does the patient currently need...   Putting on and taking off regular lower body clothing? 1   Bathing (including washing, rinsing, and drying)? 1   Toileting, which includes using a toilet, bedpan, or urinal? 1   Putting on and taking off regular upper body clothing? 2   Taking care of personal grooming such as brushing teeth? 2   Eating meals? 3   6 Clicks Daily Activity Score 10   Functional Mobility   Sit to Stand Maximal Assist  (x2 people for safety, 2nd stand total A)   Bed, Chair, Wheelchair Transfer Unable to Participate   Transfer Method Stand Step   Mobility supine<>sit<>stand EOB   Skilled Intervention Tactile Cuing;Verbal Cuing;Facilitation   Activity Tolerance   Sitting in Chair NT   Sitting Edge of Bed 15 min   Standing 1 min total   Patient / Family Goals   Patient / Family Goal #1 Get better   Goal #1 Outcome Progressing as expected   Short Term Goals   Short Term Goal # 1 Pt will complete ADL transfers with supervision   Goal Outcome # 1 Progressing slower than expected   Short Term Goal # 2 Pt will complete LB dressing with supervision   Goal Outcome #  2 Progressing slower than expected   Short Term Goal # 3 Pt will complete toileting with supervision   Goal Outcome # 3 Progressing slower than expected   Education Group   Role of Occupational Therapist Patient Response Patient;Acceptance;Explanation;Verbal Demonstration   Anticipated Discharge Equipment and Recommendations   DC Equipment Recommendations Unable to determine at this time   Discharge Recommendations Recommend post-acute placement for additional occupational therapy services prior to discharge home   Interdisciplinary Plan of Care Collaboration   IDT Collaboration with  Nursing   Patient Position at End of Therapy In Bed;Bed Alarm On;Call Light within Reach;Phone within Reach;Tray Table within Reach   Collaboration Comments report given   Session Information   Date / Session Number  3/3, #6 (2/3, 3/5)   Priority 2

## 2022-03-04 NOTE — CARE PLAN
The patient is Stable - Low risk of patient condition declining or worsening    Shift Goals  Clinical Goals: Mobilize pt  Patient Goals: mobility  Family Goals: DENA    Progress made toward(s) clinical / shift goals:  discussed goal for mobility and ROM. Encouraged ROM while I bed. Patient being turned through out shift. Plan to get patient edge of bed during med pass. Mentation improved from prior shift, patient encouraged to verbalize needs.     Patient is not progressing towards the following goals:na    Problem: Knowledge Deficit - Standard  Goal: Patient and family/care givers will demonstrate understanding of plan of care, disease process/condition, diagnostic tests and medications  Outcome: Progressing     Problem: Psychosocial  Goal: Patient's level of anxiety will decrease  Outcome: Progressing

## 2022-03-04 NOTE — DISCHARGE PLANNING
Anticipated Discharge Disposition: SNF    Action: Discussed pt in AM rounds. Pt is medically clear to DC pending SNF. Per SLP Ana pt was upgraded to soft and bite sized and the 1:1 recommendation was removed. LSW sent message to MATTHEW Jung to request she follow up with SNFs for acceptance and bed.    Addendum @[phone]  LSW made phone call to Jimena and left VM with callback request.    Addendum @[phone]  LSW received phone call from Jimena who reported [facility] likely won't have a bed until Monday. LSW faxed blanket SNF choice to MATTHEW as pt is medically clear pending SNF placement.    Barriers to Discharge: SNF acceptance and bed.    Plan: Care coordination will follow up with SNF referrals.

## 2022-03-05 NOTE — DISCHARGE INSTRUCTIONS
Patient to have tacrolimus level repeated next week, and adjust dosing as needed.    Discharge Instructions    Discharged to other by medical transportation with self. Discharged via ambulance, hospital escort: Yes.  Special equipment needed: Not Applicable    Be sure to schedule a follow-up appointment with your primary care doctor or any specialists as instructed.     Discharge Plan:   Diet Plan: Discussed  Activity Level: Discussed  Confirmed Follow up Appointment: Appointment Scheduled  Confirmed Symptoms Management: Discussed  Medication Reconciliation Updated: Yes  Influenza Vaccine Indication: Patient Refuses    I understand that a diet low in cholesterol, fat, and sodium is recommended for good health. Unless I have been given specific instructions below for another diet, I accept this instruction as my diet prescription.   Other diet: soft and bite sized    Special Instructions: None    · Is patient discharged on Warfarin / Coumadin?   No     Depression / Suicide Risk    As you are discharged from this [facility] facility, it is important to learn how to keep safe from harming yourself.    Recognize the warning signs:  · Abrupt changes in personality, positive or negative- including increase in energy   · Giving away possessions  · Change in eating patterns- significant weight changes-  positive or negative  · Change in sleeping patterns- unable to sleep or sleeping all the time   · Unwillingness or inability to communicate  · Depression  · Unusual sadness, discouragement and loneliness  · Talk of wanting to die  · Neglect of personal appearance   · Rebelliousness- reckless behavior  · Withdrawal from people/activities they love  · Confusion- inability to concentrate     If you or a loved one observes any of these behaviors or has concerns about self-harm, here's what you can do:  · Talk about it- your feelings and reasons for harming yourself  · Remove any means that you might use to hurt yourself  (examples: pills, rope, extension cords, firearm)  · Get professional help from the community (Mental Health, Substance Abuse, psychological counseling)  · Do not be alone:Call your Safe Contact- someone whom you trust who will be there for you.  · Call your local CRISIS HOTLINE 980-7674 or 778-580-2372  · Call your local Children's Mobile Crisis Response Team [place] (387) 489-1254 or www.[employer]  · Call the toll free National Suicide Prevention Hotlines   · National Suicide Prevention Lifeline 331-066-ACIF (7685)  · National Hope Line Network 800-SUICIDE (436-7944)

## 2022-03-05 NOTE — PROGRESS NOTES
Assumed care at 1900, bedside report received from Aida ANGELES. Pt. Is not on the monitor. Initial assessment completed, orders reviewed, call light within reach, bed alarm is in use, and hourly rounding in place. POC addressed with patient, no additional questions at this time.

## 2022-03-05 NOTE — PROGRESS NOTES
"[facility] -  PROGRESS NOTE               Author: Jose Alberto Olivas M.D. Date & Time: 3/5/2022  9:43 AM     HPI:  Krishan Acevedo is a 71 y.o. 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 Was additionally found to have worsening renal function.   Patient shares that he first tested positive for COVID at an outside hospital several days ago. He felt that he was not improving and possibly getting worse at home which prompted him to call EMS. He mentions that for several days he has been having loose stools, noting 3-5 loose but not watery BMs daily, noting his last loose stool was day prior to presentation. He also mentions that he has become more dependent on his wife to bring him food and water citing fatigue with his COVID infection. He does note that for the past several weeks he has been feeling \"like I'm always thirsty\".  While in the ED he was vitally stable. Was placed on 2L NC sating in the mid 90s BP ranged 123/70 from 159/117. Labs notable for Cr of 2.62. BUN 80, COVID-19 positive, UA noted for large occult blood, small leukocyte esterase. US of kidneys was completed with indicated no hydronephrosis or fluid collections, no renal artery stenosis.   In regards to his transplant history. Living donor transplant was completed in 2008 at an outside hospital. Patient is on Prednisone 5mg, Mycophenolate 500mg BID, and Tacrolimus 1mg and 0.5mg daily. His GFR trends in the 30s-40s although in chart he has climbed to the 56-53 ranges in the past.        DAILY NEPHROLOGY SUMMARY:  2/1: Admitted, consult placed  2/2: no overnight events, renal function improving, patient tolerating PO intake  2/3: started on IVF yesterday, renal function improved to around prior baseline range  2/4: renal function " continues to improve  2/5: Scr down to baseline levels. No new overnight renal events. Feels ok.  2/6: Feels ok. Urine culture from 2/1 positive for carbapenem resistant Pseudomonas aeruginosa. He is on zosyn per sensitivity. No labs today  2/7: Potassium is 5.7 today.  Creatinine up to 1.95.  Corrected calcium elevated.  CO2 16.  2/08: Chart and notes reviewed. No new labs this am. +Tachycardia.   2/09: Nursing notes and assessments reviewed.  Int tachycardia still. Room air. SBP labile 100s-160s. K 5.5  2/10: Spoke with RN, patient has no complaints at this time.   Plan is for discharge today or tomorrow to SNF pending 1L bolus and Calcium recheck.  Current SCa 11.5  VSS RA  -160s  2/11: Sodium up to 149.  Calcium increasing.  Creatinine stable 1.7.  2/12: Sodium up to 152.  Calcium remains elevated.  Creatinine 1.8.  I/Os not documented  2/13: Sodium still at 152.  Calcium trending down.  Creatinine up to 2.06.  On bicarb drip.  Somnolent  2/14: Unable to participate in interview, improved hemodynamics, cr stable  2/15: stable hemodynamics, 1.9L UOP, cr to 1.9, labile blood pressures, ongoing intermittent delirium  2/16: Hypertensive this AM, slight improvement in Cr, ca climbing, net neg 600cc with 1.9L UOP, confused  2/17: 1.4L UOP,ongoing encephalopthy, sodium climbing, minimal PO intake    2/18: IOs not recorded, cr 1.5, tremulous, minimal PO intake-serum sodium climbing  2/19: 2.6L  UOP, sodium climbing, more confused this a.m, wife at bedside  2/21: remains confused, biopsy was postponed to until tomorrow due to holiday.   2/22: confused. Refused Veltassa for 2 days but talked to him about it.   2/23: more awake and alert, wife is at bedside. UOP is great in velez bacg but not being documented. S/p allograft kidney biopsy 2/22/22.  2/23: could not sleep at night, cr is worsening but UOP is great.   2/24-2/25: Patient was transferred to ICU due to altered mentation and GI bleed.  Underwent endoscopy  "which showed duodenal ulcers.  In Shock On norepinephrine@ 18. UOP has decreased. Not intubated yet.   2/26: got HD yesterday, Mentation is better today. UOP is better.   2/27: awake and communicative, wife and friend at bedside. UOP 1.3L.   2/28: Pt confused this am, adequate U/O  3/1: trasnferred out of ICU, remains confused  3/3: Pt not responding this am  3/4: Pt more responsive this am  3/5: More encephalopthic today, but no complaints  REVIEW OF SYSTEMS:    10 point ROS not reviewed second to mental status    PMH/PSH/SH/FH:   Reviewed and unchanged since admission note    CURRENT MEDICATIONS:   Reviewed from admission to present day    VS:  /69   Pulse 85   Temp 36.4 °C (97.5 °F) (Temporal)   Resp 17   Ht 1.803 m (5' 11\")   Wt 106 kg (233 lb 11 oz)   SpO2 93%   BMI 32.59 kg/m²     Physical Exam  Vitals and nursing note reviewed.   Constitutional:       General: He is not in acute distress.     Appearance: He is ill-appearing.   HENT:      Head: Normocephalic and atraumatic.   Cardiovascular:      Rate and Rhythm: Normal rate and regular rhythm.      Pulses: Normal pulses.      Heart sounds: No murmur heard.    No gallop.   Pulmonary:      Effort: Pulmonary effort is normal. No respiratory distress.      Breath sounds: No wheezing or rales.   Abdominal:      General: Abdomen is flat. Bowel sounds are normal. There is no distension.      Tenderness: There is no abdominal tenderness.   Musculoskeletal:         General: No swelling or deformity.   Skin:     Coloration: Skin is pale. Skin is not jaundiced.      Findings: No bruising or lesion.   Neurological:      Mental Status: He is alert. Oriented to person and place  Psychiatric:         Behavior: Behavior normal.   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 Then worsened again likely 2/2 ATN from acute GI bleed and shock.    # History of ANCA Vasculitis  - Repeat serologies positive ( MPO 42, PR3 61 on 2/10)  but no signs of vasculitis on Allograft biopsy   # CKD 3  # Afib  # DM2  # HTN  # Hypocalcemia: second to sensipar, decreased s/3  # Hyperparathyroid: may need further imaging going forward  # Acute encephalopathy  - multifactorial   # Acute upper GI bleed 2/2 duodenal ulcers  - EGD 2/26: Two large duodenal ulcers with hemoclips in place and one visible vessel; one Hemoclip place. Patchy erythematous gastropathy. Hiatal hernia. LA class a esophagitis with minimal oozing.   # anemia of acute blood loss    # Elevated LFT? shoch liver   # NSTEMI      PLAN:  - Pt now on PO immunosuppression pred/ tacro, will add back cellcept  - decreased sensipar 30mg 2 x week 3/3  - OK to dc to SNF  - will sign off

## 2022-03-05 NOTE — PROGRESS NOTES
Hospital Medicine Daily Progress Note    Date of Service  3/4/2022    Chief Complaint  Krishan Acevedo is a 71 y.o. male admitted 2/1/2022 with weakness    Hospital Course  71-year-old male with previous medical history of hypertension, dyslipidemia, type 2 diabetes, atrial flutter s/p ablation in January 2021, anticoagulation on apixaban, ANCA vasculitis s/p renal transplant, recent Covid infection in January.  Patient was admitted with complaint of weakness on February 11.  At that time he was found to be Covid positive RADHA with a creatinine of 2.6 from a baseline of 1.2-1.5.  He was eventually found to have a Carbapenem resistant Pseudomonas UTI and completed antibiotic therapy per ID.  Given his worsening renal function, he underwent renal biopsy on February 22.  On February 24, the patient was found to be altered, and subsequently hypotensive.  He was transferred to the ICU where he was found to have bloody stools.  GI was consulted, the patient had an EGD done on February 24.  He was found to have peptic ulcer disease in the duodenal bulb with visible vessel which was treated with cautery and hemoclips.  He subsequently became unstable and underwent a second EGD on February 26 or another Hemoclip was placed in a duodenal ulcer.      Interval Problem Update  No new complaints.  Case coordination in process for SNF.      I have personally seen and examined the patient at bedside. I discussed the plan of care with patient, bedside RN, charge RN,  and pharmacy.    Consultants/Specialty  GI, nephrology, neurology and pulmonary    Code Status  DNAR, I OK    Disposition  Patient is medically cleared pending placement for discharge.   Anticipate discharge to to skilled nursing facility.  I have placed the appropriate orders for post-discharge needs.    Review of Systems  Review of Systems   Unable to perform ROS: Mental acuity        Physical Exam  Temp:  [36.7 °C (98 °F)-37 °C (98.6 °F)] 37 °C (98.6  °F)  Pulse:  [73-94] 76  Resp:  [16] 16  BP: (130-152)/(68-78) 130/73  SpO2:  [97 %-98 %] 98 %    Physical Exam  Vitals reviewed.   Constitutional:       General: He is not in acute distress.     Appearance: He is well-developed. He is obese. He is not diaphoretic.   HENT:      Head: Normocephalic and atraumatic.      Right Ear: External ear normal.      Left Ear: External ear normal.      Nose: Nose normal. No congestion.      Mouth/Throat:      Mouth: Mucous membranes are moist.      Pharynx: Oropharynx is clear.   Eyes:      General: No scleral icterus.     Conjunctiva/sclera: Conjunctivae normal.   Neck:      Vascular: No JVD.   Cardiovascular:      Rate and Rhythm: Normal rate.      Heart sounds: Murmur heard.     No gallop.   Pulmonary:      Effort: Pulmonary effort is normal. No respiratory distress.      Breath sounds: No stridor. No wheezing or rales.   Abdominal:      Palpations: Abdomen is soft.      Tenderness: There is no abdominal tenderness. There is no guarding or rebound.   Musculoskeletal:         General: No tenderness.      Right lower leg: Edema present.      Left lower leg: Edema present.   Skin:     General: Skin is warm and dry.      Coloration: Skin is pale.      Findings: No rash.   Neurological:      Mental Status: He is alert and oriented to person, place, and time. He is confused.   Psychiatric:         Mood and Affect: Mood normal.         Thought Content: Thought content normal.         Fluids    Intake/Output Summary (Last 24 hours) at 3/4/2022 5299  Last data filed at 3/4/2022 1300  Gross per 24 hour   Intake --   Output 1325 ml   Net -1325 ml       Laboratory  Recent Labs     03/02/22  2315 03/03/22  0033 03/04/22  0155   WBC 8.1 7.0 7.6   RBC 3.04* 2.99* 3.00*   HEMOGLOBIN 8.8* 8.7* 8.9*   HEMATOCRIT 27.6* 27.1* 27.2*   MCV 90.8 90.6 90.7   MCH 28.9 29.1 29.7   MCHC 31.9* 32.1* 32.7*   RDW 61.7* 61.2* 63.5*   PLATELETCT 64* 67* 70*   MPV 10.4 11.2 11.7     Recent Labs      03/02/22  0405 03/02/22  2315   SODIUM 144 142   POTASSIUM 3.5* 3.9   CHLORIDE 111 111   CO2 25 23   GLUCOSE 116* 94   BUN 43* 41*   CREATININE 2.10* 1.89*   CALCIUM 8.2* 8.0*     Recent Labs     03/02/22  2315   APTT 40.4*   INR 1.10               Imaging  CT-HEAD W/O   Final Result         1. No acute intracranial findings. No evidence of acute intracranial hemorrhage or mass lesion.      2. White matter lucencies most consistent with chronic small vessel ischemic change.                     CT-CTA ABDOMEN WITH & W/O-POST PROCESS   Final Result      1.  Surgical clips visualized in the duodenum. No active extravasation is appreciated      2.  Marked atrophy of the native kidneys.      3.  Transplant kidney in the right iliac fossa with poor enhancement. Air in the collecting system is likely related to prior instrumentation.      4.  Cholelithiasis.      DX-CHEST-LIMITED (1 VIEW)   Final Result      1.  Interval placement of left IJ catheter with distal tip extending to mid SVC.      2.  Right IJ catheter unchanged in position overlying mid SVC.      3.  Subtle peripheral patchy airspace opacities noted in the lung fields.      DX-CHEST-FOR LINE PLACEMENT Perform procedure in: Patient's Room   Final Result      Right internal jugular catheter is been placed and appears appropriately located      CT-HEAD W/O   Final Result         1.  No acute intracranial process.      2. Diffuse atrophy and periventricular white matter changes consistent with chronic small vessel disease.      CT-NEEDLE BX-RENAL   Final Result      1.  CT GUIDED RIGHT LOWER QUADRANT/PELVIC RENAL TRANSPLANT KIDNEY BIOPSY.      EC-ECHOCARDIOGRAM LTD W/O CONT   Final Result      MR-BRAIN-W/O   Final Result         No acute intracranial process.      Nonspecific T2 hyperintensities are noted in the periventricular and deep white matter, most likely related to chronic microvascular ischemia.      Remote left thalamic and corona radiata lacunar  infarction noted.      DX-ABDOMEN FOR TUBE PLACEMENT   Final Result         Feeding tube with tip projecting over the expected area of the first portion duodenum.      CT-HEAD W/O   Final Result         NO ACUTE ABNORMALITIES ARE NOTED ON CT SCAN OF THE HEAD.      Findings are consistent with atrophy.  Decreased attenuation in the periventricular white matter likely indicates microvascular ischemic disease.         CT-CHEST,ABDOMEN,PELVIS W/O   Final Result      1.  Multifocal pulmonary opacifications are identified which are new since the prior CT. Findings could be due to pneumonia.      2.  1.2 cm nodular opacification noted in the left upper pulmonary lobe. Primary or metastatic neoplasm is possible. Small focus of pneumonia or inflammation is also possible. Consider biopsy or PET/CT. Consider follow-up CT in 3 months.      3.  There is duodenal wall thickening. Findings could be due to duodenitis. Neoplasm is also possible. There is some mild induration in the retroperitoneal fat surrounding the duodenum.      4.  Cholelithiasis. No biliary ductal dilatation appreciated.      5.  Right lower quadrant renal transplant. No hydronephrosis.      Low and High Risk: Consider PET/CT, or tissue sampling, or follow-up CT at 3 months      Low Risk - Minimal or absent history of smoking and of other known risk factors.      High Risk - History of smoking or of other known risk factors.      Note: These recommendations do not apply to lung cancer screening, patients with immunosuppression, or patients with known primary cancer.      Fleischner Society 2017 Guidelines for Management of Incidentally Detected Pulmonary Nodules in Adults         US-EXTREMITY VENOUS LOWER BILAT   Final Result      US-RENAL TRANSPLANT COMP   Final Result      1.  No hydronephrosis or perinephric fluid collection involving the right lower quadrant renal transplant.   2.  No definite evidence of hemodynamically significant renal artery stenosis.    3.  Elevated resistive indices could suggest renal parenchymal disease.      DX-CHEST-PORTABLE (1 VIEW)   Final Result      1.  Bilateral peripheral lower lobe hazy opacities which is nonspecific but can be seen with Covid 19 pneumonia.   2.  Enlarged cardiac silhouette with probable vascular congestion/edema.           Assessment/Plan  * Hemorrhagic shock (HCC)  Assessment & Plan  Duodenal ulcer s/p EGD  2/24 & 2/26 with cautery and hemoclip  Cont PPI and sucralfate. Appreciate GI assistance with source control.  Follow daily CBC  On prphylactic AC but will hold full dose as of yet    Dysphagia- (present on admission)  Assessment & Plan  Improving.  No longer requiring 1:1 feeding.    Duodenal ulcer  Assessment & Plan  Hgb improved today.  S/p EGD on 2/26.  PPI transitioned from IV to PO per GI recommendations. Appreciate assistance.     Abnormal CT scan  Assessment & Plan  Future outpatient follow up.  2/12 CT chest/abd:   1.  Multifocal pulmonary opacifications are identified which are new since the prior CT. Findings could be due to pneumonia.  2.  1.2 cm nodular opacification noted in the left upper pulmonary lobe. Primary or metastatic neoplasm is possible. Small focus of pneumonia or inflammation is also possible. Consider biopsy or PET/CT. Consider follow-up CT in 3 months.  3.  There is duodenal wall thickening. Findings could be due to duodenitis. Neoplasm is also possible. There is some mild induration in the retroperitoneal fat surrounding the duodenum.  4.  Cholelithiasis. No biliary ductal dilatation appreciated  5.  Right lower quadrant renal transplant. No hydronephrosis.    Type 2 diabetes mellitus (HCC)  Assessment & Plan  HgbA1c:7.2  Monitor accuchecks.    Hyperparathyroidism (HCC)  Assessment & Plan  Has had severe elevated PTH over the last few years.  Follow up with ENT/Endocrine    Immunocompromised (HCC)- (present on admission)  Assessment & Plan  On immunosuppressants for renal  transplant  Monitor labs, cultures    Acute encephalopathy- (present on admission)  Assessment & Plan  Appears toxic metabolic vs prednisone?  Mentation is improving though remains slightly confused.  Transient change of mentation last night, now resolved. Comfortable at the time of my assessment.  Continue to monitor, may engage with offers of water or ice chips.    Hypernatremia  Assessment & Plan  Resolved  Cont to monitor    Hypercalcemia  Assessment & Plan  Mild-moderate hypercalcemia  IVF and given calcitonin 2/12pm.  IVF and monitor labs      Hyperkalemia  Assessment & Plan  resolved    Acidosis  Assessment & Plan  Improving  Nephrology consulting    Positive urine culture- (present on admission)  Assessment & Plan  Pseudomonas  S/p completed course of Meropenem    Pain in left shin- (present on admission)  Assessment & Plan    US venous negative for DVT    Diarrhea- (present on admission)  Assessment & Plan  Improving  Likely viral versus functional  C.diff negative x 2  Stool culture from 2/3 negative.  Imodium PRN    Elevated brain natriuretic peptide (BNP) level- (present on admission)  Assessment & Plan  Caution with IV fluids  Prior echo's 2020 with preserved EF  No overt sign of volume overload.      Thrombocytopenia (HCC)  Assessment & Plan  Mild,  Monitor cbc    Acute blood loss anemia- (present on admission)  Assessment & Plan  Normocytic anemia  Hgb improved today.  Monitor.    Epiretinal membrane (ERM) of both eyes- (present on admission)  Assessment & Plan  Continue home Combigan    RADHA (acute kidney injury) (HCC)- (present on admission)  Assessment & Plan  Creatinine worsening today 2.06  Worsening  ?rejection vs recurrent vasculitis  Ranged from 1.27-1.71 over the past year  Renal Bx done 2/22: tissue sent out and results are yet pending  Follow daily BMP, UOP  Renally dose medications as appropriate  Renal function improving. Appreciate Nephrology assistance.  HD catheter removed 3/2.     Long  term (current) use of systemic steroids- (present on admission)  Assessment & Plan  Outpatient on daily prednisone secondary to renal transplant 2008  Resume home dosing of prednisone 30mg and DC hydrocortisone      Essential hypertension- (present on admission)  Assessment & Plan  BP remains at goal for acute setting.  Hold Losartan.  Monitor.    Kidney transplant status, living related donor- (present on admission)  Assessment & Plan  S/P transplant 2008 in [place], [place]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 Blount, self-manages lasix administration at home based on fluid volume status  Renal US negative for hydronephrosis or renal stenosis  Neph following  On mycophenalate and tacrolimus with 30mg of prednisone at baseline  DC hydrocortisone and transition to chronic prednisone dosing  Follow daily BMP  Renally dose medications as appropriate  Renal Bx done 2/22; results sent out and pending    Obesity- (present on admission)  Assessment & Plan  Body mass index is 33.91 kg/m².  Outpatient weight loss counseling     Atrial flutter - (present on admission)  Assessment & Plan  History of, s/p ablation 1/21  On Eliquis 5 mg po BID as outpt  GI cleared for restart of anticoagulation. Apixaban resumed.   Rate controlled.       VTE prophylaxis: therapeutic anticoagulation with apixaban    I have performed a physical exam and reviewed and updated ROS and Plan today (3/4/2022). In review of yesterday's note (3/3/2022), there are no changes except as documented above.

## 2022-03-05 NOTE — DISCHARGE PLANNING
Anticipated Discharge Disposition: SNF    Action: Spoke with the pt and wife at bedside. Discussed SNF options. Agreed to go to [facility] as they have bed available today. Wife will be going to SNF shortly for a tour. Informed her that each patient is allowed 2 healthy visitors at a time at the SNF. She verbalized understanding.   IMM provided to the pt, wife gave verbal consent. Copy to the pt, faxed original to [facility].   Transportation has been arranged with [facility] by [facility] for [id number]  via [employer]. COBRA has been completed. Pending DC summary.     1320 - PC to pt's wife Charo Provided her with the information for  time. She verbalized understanding. States she visited the SNF and they are very happy with it.     Barriers to Discharge: none    Plan: [facility] today at [phone]  RN to RN report to be called at [phone]

## 2022-03-05 NOTE — CARE PLAN
Problem: Skin Integrity  Goal: Skin integrity is maintained or improved  Outcome: Progressing     Problem: Discharge Barriers/Planning  Goal: Patient's continuum of care needs are met  Outcome: Progressing     Problem: Hemodynamics  Goal: Patient's hemodynamics, fluid balance and neurologic status will be stable or improve  Outcome: Progressing     Problem: Dysphagia  Goal: Dysphagia will improve  Outcome: Progressing     Problem: Risk for Aspiration  Goal: Patient's risk for aspiration will be absent or decrease  Outcome: Progressing     Problem: Nutrition  Goal: Patient's nutritional and fluid intake will be adequate or improve  Outcome: Progressing   The patient is Watcher - Medium risk of patient condition declining or worsening    Shift Goals  Clinical Goals: Mobilize pt  Patient Goals: mobility  Family Goals: DENA    Progress made toward(s) clinical / shift goals:  Q2 turns, pillows, barrier cream and waffle used to prevent skin breakdown. Patient checked for incontinent episodes. Patient pending SNF placement.    Patient is not progressing towards the following goals:

## 2022-03-05 NOTE — FLOWSHEET NOTE
Patient's peripheral IV removed prior to discharge.  RN to RN report called to [facility].  Assisted patient over to [facility] for medical transport.  COBRA and AVS packet sent along with medical transporters and patient at this time.

## 2022-03-05 NOTE — DISCHARGE SUMMARY
Discharge Summary    CHIEF COMPLAINT ON ADMISSION  Chief Complaint   Patient presents with   • Weakness       Reason for Admission  EMS     CODE STATUS  DNAR, I OK    HPI & HOSPITAL COURSE  Krishan Acevedo is a 71 year old male with PMHx of hypertension, hyperlipidemia, type 2 diabetes A1c 7.2, atrial flutter on Eliquis, ANCA vasculitis s/p transplant, recent COVID infection 1/25/2022 who was admitted on 02/1/2022 with weakness and diarrhea.    Patient is vaccinated against COVID-19. Diagnosed with COVID on 01/25. Patient remains on room air throughout admission.    UA positive for UTI, urine culture positive for carbapenem resistant Pseudomonas aeruginosa. Due to less than 10 K organisms, ID did not recommend any antibiotics at this time.  Patient with worsening AMS on 02/11, repeat urine is worse, pending repeat urine culture and blood culture.  ID reconsulted and prescribed a 7-day course of antibiotics from 2/12-2/18.  He tolerated this and clinical status improved.    Patient had ongoing diarrhea, C. difficile and stool cultures were negative x2.    Patient with RADHA on CKD. Patient initially started on fluid, renal ultrasound unremarkable. Patient started on Veltassa and sodium bicarb.   He developed GI bleeding on 2/24 and was transferred to ICU for altered mentation and hypovolemic shock requiring pressors.  EGD revealed duodenal ulcers.  During this time, he required hemodialysis which improved mentation.  He transferred out of ICU and renal function continued to improve, dialysis access was discontinued.    Patient was evaluated by PT/OT with recommendations for SNF.      Therefore, he is discharged in good and stable condition to skilled nursing facility.    The patient met 2-midnight criteria for an inpatient stay at the time of discharge.      FOLLOW UP ITEMS POST DISCHARGE  Follow-up renal function with nephrologist  Improvement in mobility, ADLs  Follow-up with PCP    DISCHARGE DIAGNOSES  Principal  Problem:    Hemorrhagic shock (HCC) POA: No  Active Problems:    Atrial flutter  POA: Yes    Obesity POA: Yes    Kidney transplant status, living related donor POA: Yes    Essential hypertension POA: Yes      Overview: Formatting of this note might be different from the original.      Last Assessment & Plan:       continue metoprolol 25 bid.    Long term (current) use of systemic steroids POA: Yes    RADHA (acute kidney injury) (HCC) POA: Yes    Epiretinal membrane (ERM) of both eyes POA: Yes    Acute blood loss anemia POA: Yes    Thrombocytopenia (HCC) POA: Unknown    Elevated brain natriuretic peptide (BNP) level POA: Yes    Diarrhea POA: Yes    Pain in left shin POA: Yes    Positive urine culture POA: Yes    Acidosis POA: Unknown    Hyperkalemia POA: No    Hypercalcemia POA: Unknown    Hypernatremia POA: Unknown    Acute encephalopathy POA: Yes    Immunocompromised (HCC) POA: Yes    Hyperparathyroidism (HCC) POA: Unknown    Type 2 diabetes mellitus (HCC) POA: Unknown    Abnormal CT scan POA: Unknown    Duodenal ulcer POA: No    Shock liver POA: Unknown    Physical deconditioning POA: Unknown    Dysphagia POA: Yes  Resolved Problems:    * No resolved hospital problems. *      FOLLOW UP  Future Appointments   Date Time Provider Department Center   4/5/2022 10:20 AM Jones Garcia M.D. [facility] None   6/15/2022 12:50 PM Mary Jane Oliveros M.D. [facility] None     [facility]  [address] [place] [address] [address] [place] [id number] [phone]  Call  Please call [facility] to schedule a Nephrology hospital follow up. Thank you.     Damian Diaz M.D.  [address] [facility] [id number] [phone]    Call  Please call your primary care provider to schedule a hospital follow up. Thank you.     [address] [id number] [phone]          MEDICATIONS ON DISCHARGE     Medication List      START taking these medications      Instructions    acetaminophen 325 MG Tabs  Commonly known as: Tylenol   Take 2 Tablets by mouth every 6 hours as needed for Mild Pain, Moderate Pain or Fever.  Dose: 650 mg     amLODIPine 5 MG Tabs  Commonly known as: NORVASC   Take 1 Tablet by mouth every day.  Dose: 5 mg     cinacalcet 30 MG Tabs  Start taking on: March 7, 2022  Commonly known as: SENSIPAR   Take 1 Tablet by mouth every Monday and Friday.  Dose: 30 mg     sucralfate 1 GM/10ML Susp  Commonly known as: CARAFATE   Take 10 mL by mouth every 6 hours.  Dose: 1 g        CHANGE how you take these medications      Instructions   omeprazole 20 MG delayed-release capsule  What changed: when to take this  Commonly known as: PRILOSEC   Take 1 Capsule by mouth every 12 hours.  Dose: 20 mg     * predniSONE 20 MG Tabs  What changed:   · medication strength  · how much to take  · when to take this  Commonly known as: DELTASONE   Take 1 Tablet by mouth every day for 3 days.  Dose: 20 mg     * predniSONE 10 MG Tabs  Start taking on: March 8, 2022  What changed: You were already taking a medication with the same name, and this prescription was added. Make sure you understand how and when to take each.  Commonly known as: DELTASONE   Take 1 Tablet by mouth every day for 3 days.  Dose: 10 mg     * predniSONE 5 MG Tabs  Start taking on: March 11, 2022  What changed: You were already taking a medication with the same name, and this prescription was added. Make sure you understand how and when to take each.  Commonly known as: DELTASONE   Take 1 Tablet by mouth every day.  Dose: 5 mg     * tacrolimus 0.5 MG Caps  What changed:   · medication strength  · how much to take  · when to take this  · additional instructions  Commonly known as: PROGRAF   Take 1 Capsule by mouth 2 times a day.  Dose: 0.5 mg     * tacrolimus 0.5 MG Caps  What changed: You were already taking a medication with the same name, and this prescription was added. Make sure you understand how and when to take  each.  Commonly known as: PROGRAF   Take 1 Capsule by mouth every evening.  Dose: 0.5 mg     * tacrolimus 1 MG Caps  Start taking on: March 6, 2022  What changed: You were already taking a medication with the same name, and this prescription was added. Make sure you understand how and when to take each.  Commonly known as: PROGRAF   Take 1 Capsule by mouth every morning.  Dose: 1 mg         * This list has 6 medication(s) that are the same as other medications prescribed for you. Read the directions carefully, and ask your doctor or other care provider to review them with you.            CONTINUE taking these medications      Instructions   apixaban 5mg Tabs  Commonly known as: Eliquis   Doctor's comments: Requesting 1 year supply  Take 1 Tablet by mouth 2 times a day.  Dose: 5 mg     atorvastatin 10 MG Tabs  Commonly known as: LIPITOR   Take 10 mg by mouth every evening.  Dose: 10 mg     Centrum Silver 50+Men Tabs   Take 1 Tab by mouth every morning.  Dose: 1 Tablet     Combigan 0.2-0.5 % Soln  Generic drug: Brimonidine Tartrate-Timolol   Administer 2 Drops into both eyes 2 times a day.  Dose: 2 Drop     fluticasone 50 MCG/ACT nasal spray  Commonly known as: FLONASE   Administer 1 Spray into affected nostril(S) every day.  Dose: 1 Spray     Glucosamine 1500 Complex Caps   Take 2 Caps by mouth 2 Times a Day.  Dose: 2 Capsule     mycophenolate 250 MG Caps  Commonly known as: CELLCEPT   Take 2 Capsules by mouth 2 times a day.  Dose: 500 mg     vitamin D2 (Ergocalciferol) 1.25 MG (13658 UT) Caps capsule  Commonly known as: Drisdol   Take 50,000 Units by mouth every Monday.  Dose: 50,000 Units        STOP taking these medications    allopurinol 300 MG Tabs  Commonly known as: ZYLOPRIM     furosemide 20 MG Tabs  Commonly known as: LASIX     losartan 100 MG Tabs  Commonly known as: COZAAR     MAGNESIUM PO            Allergies  Allergies   Allergen Reactions   • Triazolam Unspecified     All anti anxiety medications cause  hallucinations    • Nsaids      Cannot take because of anti rejection meds.       DIET  Orders Placed This Encounter   Procedures   • Diet Order Diet: Level 6 - Soft and Bite Sized (LOW FIBER; ASSIST WITH TRAY SET UP); Liquid level: Level 0 - Thin; Tray Modifications (optional): SLP - Deliver to Nursing Station     Standing Status:   Standing     Number of Occurrences:   1     Order Specific Question:   Diet:     Answer:   Level 6 - Soft and Bite Sized [23]     Comments:   LOW FIBER; ASSIST WITH TRAY SET UP     Order Specific Question:   Liquid level     Answer:   Level 0 - Thin     Order Specific Question:   Tray Modifications (optional)     Answer:   SLP - Deliver to Nursing Station       ACTIVITY  As tolerated and directed by skilled nursing.  Weight bearing as tolerated    LINES, DRAINS, AND WOUNDS  This is an automated list. Peripheral IVs will be removed prior to discharge.  Peripheral IV 02/24/22 22 G Anterior;Proximal;Right Upper arm (Active)   Site Assessment Clean;Dry;Intact 03/05/22 0900   Dressing Type Transparent;Occlusive 03/05/22 0900   Line Status Scrubbed the hub prior to access;Flushed;Saline locked 03/05/22 0900   Dressing Status Clean;Dry;Intact 03/05/22 0900   Dressing Intervention N/A 03/04/22 2000   Dressing Change Due 03/01/22 03/01/22 0200   Date Primary Tubing Changed 02/26/22 02/26/22 0800   NEXT Primary Tubing Change  03/01/22 02/26/22 0800   Infiltration Grading ([facility], [facility]) 0 03/05/22 0900   Phlebitis Scale ([facility] Only) 0 03/05/22 0900          Peripheral IV 02/24/22 22 G Anterior;Proximal;Right Upper arm (Active)   Site Assessment Clean;Dry;Intact 03/05/22 0900   Dressing Type Transparent;Occlusive 03/05/22 0900   Line Status Scrubbed the hub prior to access;Flushed;Saline locked 03/05/22 0900   Dressing Status Clean;Dry;Intact 03/05/22 0900   Dressing Intervention N/A 03/04/22 2000   Dressing Change Due 03/01/22 03/01/22 0200   Date Primary Tubing Changed 02/26/22 02/26/22 0800    NEXT Primary Tubing Change  03/01/22 02/26/22 0800   Infiltration Grading ([facility], [facility]) 0 03/05/22 0900   Phlebitis Scale ([facility] Only) 0 03/05/22 0900               MENTAL STATUS ON TRANSFER  At baseline     CONSULTATIONS  Critical care  Gastroenterology  Infectious disease  Nephrology  Neurology  Interventional radiology    PROCEDURES  2/22/2022 CT-guided right renal biopsy  2/24/2022 arterial line insertion  2/24/2022 Central line insertion  2/24/2022 esophagogastroduodenoscopy  2/25/2022 video electroencephalogram  2/25/2022 central line insertion  2/26/2022 esophagogastroduodenoscopy with control of bleeding    LABORATORY  Lab Results   Component Value Date    SODIUM 140 03/05/2022    POTASSIUM 3.8 03/05/2022    CHLORIDE 109 03/05/2022    CO2 23 03/05/2022    GLUCOSE 125 (H) 03/05/2022    BUN 32 (H) 03/05/2022    CREATININE 1.59 (H) 03/05/2022        Lab Results   Component Value Date    WBC 7.6 03/05/2022    HEMOGLOBIN 8.6 (L) 03/05/2022    HEMATOCRIT 27.6 (L) 03/05/2022    PLATELETCT 64 (L) 03/05/2022      CT-HEAD W/O   Final Result         1. No acute intracranial findings. No evidence of acute intracranial hemorrhage or mass lesion.      2. White matter lucencies most consistent with chronic small vessel ischemic change.                     CT-CTA ABDOMEN WITH & W/O-POST PROCESS   Final Result      1.  Surgical clips visualized in the duodenum. No active extravasation is appreciated      2.  Marked atrophy of the native kidneys.      3.  Transplant kidney in the right iliac fossa with poor enhancement. Air in the collecting system is likely related to prior instrumentation.      4.  Cholelithiasis.      DX-CHEST-LIMITED (1 VIEW)   Final Result      1.  Interval placement of left IJ catheter with distal tip extending to mid SVC.      2.  Right IJ catheter unchanged in position overlying mid SVC.      3.  Subtle peripheral patchy airspace opacities noted in the lung fields.      DX-CHEST-FOR LINE PLACEMENT  Perform procedure in: Patient's Room   Final Result      Right internal jugular catheter is been placed and appears appropriately located      CT-HEAD W/O   Final Result         1.  No acute intracranial process.      2. Diffuse atrophy and periventricular white matter changes consistent with chronic small vessel disease.      CT-NEEDLE BX-RENAL   Final Result      1.  CT GUIDED RIGHT LOWER QUADRANT/PELVIC RENAL TRANSPLANT KIDNEY BIOPSY.      EC-ECHOCARDIOGRAM LTD W/O CONT   Final Result      MR-BRAIN-W/O   Final Result         No acute intracranial process.      Nonspecific T2 hyperintensities are noted in the periventricular and deep white matter, most likely related to chronic microvascular ischemia.      Remote left thalamic and corona radiata lacunar infarction noted.      DX-ABDOMEN FOR TUBE PLACEMENT   Final Result         Feeding tube with tip projecting over the expected area of the first portion duodenum.      CT-HEAD W/O   Final Result         NO ACUTE ABNORMALITIES ARE NOTED ON CT SCAN OF THE HEAD.      Findings are consistent with atrophy.  Decreased attenuation in the periventricular white matter likely indicates microvascular ischemic disease.         CT-CHEST,ABDOMEN,PELVIS W/O   Final Result      1.  Multifocal pulmonary opacifications are identified which are new since the prior CT. Findings could be due to pneumonia.      2.  1.2 cm nodular opacification noted in the left upper pulmonary lobe. Primary or metastatic neoplasm is possible. Small focus of pneumonia or inflammation is also possible. Consider biopsy or PET/CT. Consider follow-up CT in 3 months.      3.  There is duodenal wall thickening. Findings could be due to duodenitis. Neoplasm is also possible. There is some mild induration in the retroperitoneal fat surrounding the duodenum.      4.  Cholelithiasis. No biliary ductal dilatation appreciated.      5.  Right lower quadrant renal transplant. No hydronephrosis.      Low and High Risk:  Consider PET/CT, or tissue sampling, or follow-up CT at 3 months      Low Risk - Minimal or absent history of smoking and of other known risk factors.      High Risk - History of smoking or of other known risk factors.      Note: These recommendations do not apply to lung cancer screening, patients with immunosuppression, or patients with known primary cancer.      Fleischner Society 2017 Guidelines for Management of Incidentally Detected Pulmonary Nodules in Adults         US-EXTREMITY VENOUS LOWER BILAT   Final Result      US-RENAL TRANSPLANT COMP   Final Result      1.  No hydronephrosis or perinephric fluid collection involving the right lower quadrant renal transplant.   2.  No definite evidence of hemodynamically significant renal artery stenosis.   3.  Elevated resistive indices could suggest renal parenchymal disease.      DX-CHEST-PORTABLE (1 VIEW)   Final Result      1.  Bilateral peripheral lower lobe hazy opacities which is nonspecific but can be seen with Covid 19 pneumonia.   2.  Enlarged cardiac silhouette with probable vascular congestion/edema.         Total time of the discharge process exceeds 48 minutes.

## 2022-03-05 NOTE — DISCHARGE PLANNING
Received Transport Form @ 1100  Spoke to Pérez @ [facility]    Transport is scheduled for 3/5 @0476-4201 going to [facility].

## 2022-03-05 NOTE — FLOWSHEET NOTE
Assumed care of pt, received bedside report from Chauncey ANGELES. Pt sitting up in bed, no complaints of pain, room air, A/Ox2. Fall and safety precautions in place, strip alarm in place. Discussed POC with pt, pt verbalizes understanding. No further needs at this time.

## 2022-03-16 NOTE — ED PROVIDER NOTES
ED Provider Note    CHIEF COMPLAINT  Chief Complaint   Patient presents with   • Chest Pain   • N/V       HPI  Krishan Acevedo is a 72 y.o. male with a past medical significant for  Renal transplant  Recent UTI  Recent Covid   atrial fibrillation on Eliquis    He sent here from the nephrology clinic for sudden onset of diarrhea vomiting chest pain.  It was believed that he had altered mental status but it is unknown his baseline.  He is ANO x2.  Patient was confused to person place and also that he was not at the clinic.  He was sent here for further evaluation    In reviewing the chart patient has recent admission for Pseudomonas in the urine less than 10,000.  He had a GI bleed required to go to the ICU    The patient states he is here notices mild chest pain at this point over the front of his chest.  It is not significant.  Nonradiating nontearing.  Not going to his neck.  No nausea vomiting.  Patient thinks he is in [place].  However he knows it is 2022 and he knows his name.        REVIEW OF SYSTEMS  Patient denies any fever or chills.  He denies any shortness of breath  He denies any nausea or vomiting now he had diarrhea.  No specific weakness.    PAST MEDICAL HISTORY  Past Medical History:   Diagnosis Date   • Arrhythmia     flutter   • Arthritis 2020    right shoulder, knees, & back   • Atrial flutter ([facility])    • CAD (coronary artery disease)    • Cough 2020    dry-on lisinipril, now productive -brown   • Dialysis patient ([facility]) 2006    x 18 months   • DVT (deep venous thrombosis) ([facility])    • E coli bacteremia     hospitalized x 5 months   • Kidney failure 2008    kidney transplant   • Kidney transplant pain 2020    low back   • Retinal detachment    • Sleep apnea     CPAP   • Snoring    • Wegener's granulomatosis with renal involvement ([facility])        FAMILY HISTORY  Family History   Problem Relation Age of Onset   • Stroke Brother 26        , ? cause   • Stroke Maternal  "Grandmother            • Clotting Disorder Neg Hx        SOCIAL HISTORY  Social History     Socioeconomic History   • Marital status:    Tobacco Use   • Smoking status: Never Smoker   • Smokeless tobacco: Former User     Types: Snuff, Chew   Vaping Use   • Vaping Use: Never used   Substance and Sexual Activity   • Alcohol use: Not Currently   • Drug use: Never       SURGICAL HISTORY  Past Surgical History:   Procedure Laterality Date   • SD UPPER GI ENDOSCOPY,DIAGNOSIS  2022    Procedure: GASTROSCOPY;  Surgeon: Kim Goetz D.O.;  Location: SURGERY SAME DAY [facility];  Service: Gastroenterology   • SD UPPER GI ENDOSCOPY,DIAGNOSIS  2022    Procedure: GASTROSCOPY;  Surgeon: Raymond Pryor M.D.;  Location: SURGERY SAME DAY [facility];  Service: Gastroenterology   • OTHER Right 2017    eye surgery, lasix & cataracts, retinal detatchment   • PEG PLACEMENT  10/10/2014    Performed by Brijesh Orozco M.D. at SURGERY [facility]   • OTHER ORTHOPEDIC SURGERY Left     knee       CURRENT MEDICATIONS  Home Medications    **Home medications have not yet been reviewed for this encounter**         ALLERGIES  Allergies   Allergen Reactions   • Triazolam Unspecified     All anti anxiety medications cause hallucinations    • Nsaids      Cannot take because of anti rejection meds.       PHYSICAL EXAM  VITAL SIGNS: /61   Pulse 76   Temp 36.3 °C (97.3 °F) (Temporal)   Resp 18   Ht 1.778 m (5' 10\")   Wt 113 kg (250 lb)   SpO2 94%   BMI 35.87 kg/m²    Constitutional: Well developed, Well nourished, no acute distress,   HENT: Normocephalic, Atraumatic, Oropharynx moist, No oral exudates, Nose normal.   Eyes: PERRLA, EOMI, Conjunctiva normal, No discharge.   Musculoskeletal: Neck normal range of motion, No tenderness, Supple,  Cardiovascular: Regular rhythm rate of 76.  Extremities warm to touch.  Thorax & Lungs: Lungs are clear to auscultation bilaterally no wheezes rales  Abdomen: Bowel sounds " normal, Soft, No tenderness, No masses, No pulsatile masses.   Skin: Warm, Dry, No erythema, No rash.   : No CVA tenderness.   Neurologic: Alert & oriented person and date.  Not to place.  Psychiatric:  Calm, not anxious      RADIOLOGY/PROCEDURES  DX-CHEST-PORTABLE (1 VIEW)   Final Result      1.  There are changes most suggestive of mild vascular congestion/edema.        Results for orders placed or performed during the hospital encounter of 03/16/22   CBC with Differential   Result Value Ref Range    WBC 9.7 4.8 - 10.8 K/uL    RBC 3.13 (L) 4.70 - 6.10 M/uL    Hemoglobin 9.3 (L) 14.0 - 18.0 g/dL    Hematocrit 29.5 (L) 42.0 - 52.0 %    MCV 94.2 81.4 - 97.8 fL    MCH 29.7 27.0 - 33.0 pg    MCHC 31.5 (L) 33.7 - 35.3 g/dL    RDW 70.0 (H) 35.9 - 50.0 fL    Platelet Count 112 (L) 164 - 446 K/uL    MPV 9.9 9.0 - 12.9 fL    Neutrophils-Polys 93.00 (H) 44.00 - 72.00 %    Lymphocytes 4.40 (L) 22.00 - 41.00 %    Monocytes 1.70 0.00 - 13.40 %    Eosinophils 0.90 0.00 - 6.90 %    Basophils 0.00 0.00 - 1.80 %    Nucleated RBC 0.00 /100 WBC    Neutrophils (Absolute) 9.02 (H) 1.82 - 7.42 K/uL    Lymphs (Absolute) 0.43 (L) 1.00 - 4.80 K/uL    Monos (Absolute) 0.16 0.00 - 0.85 K/uL    Eos (Absolute) 0.09 0.00 - 0.51 K/uL    Baso (Absolute) 0.00 0.00 - 0.12 K/uL    NRBC (Absolute) 0.00 K/uL    Anisocytosis 1+    Complete Metabolic Panel (CMP)   Result Value Ref Range    Sodium 139 135 - 145 mmol/L    Potassium 3.8 3.6 - 5.5 mmol/L    Chloride 107 96 - 112 mmol/L    Co2 18 (L) 20 - 33 mmol/L    Anion Gap 14.0 7.0 - 16.0    Glucose 171 (H) 65 - 99 mg/dL    Bun 21 8 - 22 mg/dL    Creatinine 1.81 (H) 0.50 - 1.40 mg/dL    Calcium 9.3 8.5 - 10.5 mg/dL    AST(SGOT) 20 12 - 45 U/L    ALT(SGPT) 42 2 - 50 U/L    Alkaline Phosphatase 176 (H) 30 - 99 U/L    Total Bilirubin 0.6 0.1 - 1.5 mg/dL    Albumin 2.6 (L) 3.2 - 4.9 g/dL    Total Protein 6.4 6.0 - 8.2 g/dL    Globulin 3.8 (H) 1.9 - 3.5 g/dL    A-G Ratio 0.7 g/dL   Troponin   Result Value  Ref Range    Troponin T 87 (H) 6 - 19 ng/L   PT/INR   Result Value Ref Range    PT 18.1 (H) 12.0 - 14.6 sec    INR 1.55 (H) 0.87 - 1.13   APTT   Result Value Ref Range    APTT 42.6 (H) 24.7 - 36.0 sec   LACTIC ACID   Result Value Ref Range    Lactic Acid 1.3 0.5 - 2.0 mmol/L   COV-2, FLU A/B, AND RSV BY PCR (2-4 HOURS CEPHEID): Collect NP swab in VTM    Specimen: Respirate   Result Value Ref Range    SARS-CoV-2 Source NP Swab    ESTIMATED GFR   Result Value Ref Range    GFR (CKD-EPI) 39 (A) >60 mL/min/1.73 m 2   DIFFERENTIAL MANUAL   Result Value Ref Range    Manual Diff Status PERFORMED    PERIPHERAL SMEAR REVIEW   Result Value Ref Range    Peripheral Smear Review see below    PLATELET ESTIMATE   Result Value Ref Range    Plt Estimation Decreased    MORPHOLOGY   Result Value Ref Range    RBC Morphology Present     Polychromia 2+     Toxic Gran Slight    EKG   Result Value Ref Range    Report       [facility]    Test Date:  2022  Pt Name:    MOMO ESCUDERO                  Department: ER  MRN:        [id number]                      Room:       [facility]  Gender:     Male                         Technician: [id number]  :        [id number]                   Requested By:ER TRIAGE PROTOCOL  Order #:    [id number]                    Reading MD: Jose Alberto HENLEY MD    Measurements  Intervals                                Axis  Rate:       99                           P:          -15  NM:         196                          QRS:        -30  QRSD:       140                          T:          -43  QT:         372  QTc:        478    Interpretive Statements  Patient has sinus rhythm rate of 99 right axis deviation and a bundle branch  block.  T wave inversions in the anterior leads which show no change from  previous no ST segment elevations abnormal no acute ischemic findings no  significant change from previous EKG 3-22.  Electronically Signed O n 3- 15:49:16 PDT by Jose Alberto  "NANCI HENLEY MD          COURSE & MEDICAL DECISION MAKING  Pertinent Labs & Imaging studies reviewed. (See chart for details)  Is a 72-year-old gentleman who has high risk of infection as he is immunosuppressed.  He was sent here from his skilled nursing facility after going to the nephrology clinic and found to be vomiting difficult to get up.  He did not have a anyone with him.  He states that his wife was \"following me by car\".    Weakness  Could be dehydration infection metabolic or hematologic abnormality among others.    Plan  CBC metabolic panel troponin  EKG  IV fluids  Cultures  Lactate      3:49 PM  Patient reexamined feeling much better.  Awake and alert.  Wife also came by the bedside tachycardia resolved.  He said he was post to be in isolation he was nonspecific with nephrologist he was still feeling too weak with difficulty with physical therapy and with eating.  At this point the wife states he looks much better and I agree.  At this point patient be discharged home.  He is to continue his physical therapy occupational therapy have asked him return if he looks worse in any way.  Is my impression and probably the patient was too weak going to the clinic.  His confusion has improved      FINAL IMPRESSION  1.  Weakness  2.  Recent Covid infection  3.  Confusion resolved      Electronically signed by: Jose Alberto Henley M.D., 3/16/2022 1:45 PM    "

## 2022-03-16 NOTE — ED NOTES
Assist RN: medicated per MAR. Urinal provided and patient notified of need for urine sample. Call light within reach.

## 2022-03-16 NOTE — ED TRIAGE NOTES
BIB EMS from Nephrology appointment with   Chief Complaint   Patient presents with   • Chest Pain   • N/V     Above complaints while at doctors appointment with one episode of emesis PTA. Received 4 mg Zofran PO. C/o 6/10 chest pressure. Hx of A Fib. .

## 2022-03-17 PROBLEM — K92.2 GI BLEED: Status: ACTIVE | Noted: 2022-01-01

## 2022-03-17 PROBLEM — D72.829 LEUKOCYTOSIS: Status: ACTIVE | Noted: 2022-01-01

## 2022-03-17 NOTE — ED NOTES
2 IVs established, protonix and 250 ML NS bolus infusing. Pt had BM dark red with blood clots, approx 300 ml. Placed on O2 via NC, Had episode of CP has resolved. MD aware and troponin added to labs.

## 2022-03-17 NOTE — CONSULTS
DATE OF SERVICE:  03/17/2022     GASTROENTEROLOGY CONSULTATION     CONSULTATION REQUESTED BY:  Jorge Carmichael MD     REASON FOR CONSULTATION:  Gastrointestinal hemorrhage.     IDENTIFICATION:  A 72-year-old  male with multiple medical problems.     CHIEF COMPLAINT:  Rectal bleeding.     HISTORY OF PRESENT ILLNESS:  History was obtained via interview of the patient   and his wife who helped provide history.  Extensive records were reviewed and   the case was discussed with the emergency department attending.  The patient   has a pertinent history of atrial fibrillation/flutter with prior deep venous   thrombosis.  He is chronically anticoagulated on Eliquis.  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  He is maintained on Prograf and CellCept as well as   low-dose prednisone for graft rejection prevention.     The patient had recent hospitalization for GI bleeding.  Dr. Pryor,   gastroenterology consultant, done an EGD on 2/24/2022 with placement of   multiple Hemoclips to a duodenal ulcer.  The patient had recurrent bleeding   and Dr. Kim Goetz did repeat EGD on 2/26/2022 with placement of a single   Hemoclip.  The patient was ultimately transferred out to [facility] for   rehabilitation.  Yesterday, he was apparently feeling unwell and was seen in   the ER.  He was sent home after evaluation.  This morning, he was found with   blood in his diaper per the chart.     The patient is a limited historian.  He denies any nausea or vomiting at this   time.  He denies abdominal pain.  No fevers or chills.  No cough.  His initial   hemoglobin here is 7.3 and he has now received 1 unit of packed red blood   cells.  He is actively receiving Kcentra administration given chronic therapy   with Eliquis.  His BUN was 27 with a creatinine of 2.15.  Troponin T was 112.    COVID is negative.  BNP is 1945.  Electrocardiogram shows sinus  tachycardia   with right bundle branch block.     ALLERGIES:  NSAIDS.     MEDICATIONS:  Home mediations include amlodipine, Sensipar, omeprazole,   prednisone, pantoprazole, sucralfate, Prograf, Eliquis, CellCept, vitamin D,   atorvastatin.     PAST MEDICAL HISTORY:  1.  Obesity.  2.  Duodenal ulcer.  3.  Atrial fibrillation.  4.  DVT.     PAST SURGICAL HISTORY:  1.  Renal transplant.  2.  Cardiac ablation.  3.  EGD, multiple, as above.     SOCIAL HISTORY:  No tobacco, drugs or alcohol.  He is  and lives in   [facility].     FAMILY HISTORY:  Noncontributory.  Specifically, no luminal GI disease, liver   disease, or pancreatic disease.     REVIEW OF SYSTEMS:  See the HPI.  Otherwise, all systems negative per CMS   criteria.     PHYSICAL EXAMINATION:  GENERAL:  Ill-appearing Elderly  male, in no immediate distress,   cooperative.  VITAL SIGNS:  Blood pressure 110/60, heart rate 95, respiratory rate 18,   oxygen saturation 95% on room air, afebrile.  HEENT:  Normocephalic and atraumatic.  Sclerae are anicteric.  Conjunctivae   pale.  Oropharynx is moist and clear with a Mallampati score 3.  NECK:  No thyroid abnormality or lymphadenopathy.  LUNGS:  Clear to auscultation without wheezes.  CARDIOVASCULAR:  Regular rate and rhythm without murmurs.  ABDOMEN:  Obese.  Bowel sounds present, soft, nontender, nondistended.  EXTREMITIES:  No clubbing, cyanosis or edema.  SKIN:  No jaundice or spider angiomata.  NEUROLOGIC:  Grossly nonfocal, alert and oriented.  PSYCHIATRIC:  Affect is appropriate.     LABORATORY DATA:  See the HPI.  Otherwise, unremarkable.     IMPRESSION:  A 72-year-old  male with recurrent gastrointestinal   bleeding, presumably from an upper GI source, although cannot rule out a lower   source at this time.  Plan for urgent EGD.  He does have a history of recent   endoscopic therapeutics for duodenal ulcers on 2 occasions in late February.    I suspect he is likely bleeding from the  same source.  He might require   interventional radiology guided coil embolization if unable to stop it at this   time.  Otherwise, other considerations would include Kalina-Christine tear or a   source distal to the ligament of Treitz.  It would be unlikely that he would   have a portal hypertensive source given no mention of esophageal or gastric   varices on prior EGD reports.     PROBLEM LIST:  1.  Gastrointestinal hemorrhage.  2.  Anemia with component of acute blood loss anemia/hemorrhagic anemia.  3.  Coagulopathy.  4.  Elevated troponin T. -- possible contribution from decreased renal   clearance.  5.  Atrial fibrillation.  6.  Obesity.  7.  Immunosuppressed in the setting of prior kidney transplant.     PLAN AND RECOMMENDATIONS:  1.  I agree with admission to the ICU.  2.  Check thromboelastography and correct as needed.  3.  Hold Eliquis.  4.  Kcentra ____.  5.  Pantoprazole 80 mg IV bolus followed by 8 mg IV per hour.  6.  Transfuse 1 unit of packed red blood cells right now.  7.  Urgent EGD with anesthesiologist assistance given high risk.     Complex patient with guarded prognosis.     Thank you for allowing me to participate in the care of the patient.  I will   defer to the ICU team and hospitalist for management of his other comorbid   conditions.        ______________________________  MD ANGELICA PALAFOX/CONNOR/SMITA    DD:  03/17/2022 14:12  DT:  03/17/2022 14:43    Job#:  [id number]    CC:MD Jorge Cummins MD(User)  Lola Manley MD(User)

## 2022-03-17 NOTE — ED NOTES
Pharmacy Medication Reconciliation      ~Medication reconciliation updated and complete per [facility]  ~Allergies have been verified and updated per MAR  ~No oral ABX within the last 30 days  ~Patient home pharmacy:[facility]

## 2022-03-17 NOTE — ASSESSMENT & PLAN NOTE
On rate controlled  Was on eliquis but had GI bleed x 2, not a candidate for continuation of therapy for this problem  Has bled score 4 points, Risk was 8.9% in one validation study (Lip 2011) and 8.70 bleeds per 100 patient-years in another validation study (Pisters 2010). Alternatives to anticoagulation should be considered: Patient is at high risk for major bleeding.

## 2022-03-17 NOTE — PROGRESS NOTES
Hospital Medicine Daily Progress Note    Date of Service  3/17/2022    Chief Complaint  Krishan Acevedo is a 72 y.o. male presenting 3/17/2022 with blood in stools    Was asked to review patient's case for admission.  At this time, patient appears tired, reports SOB, appears significantly pale.    BP in ED room 10 on my evaluation was 87/64. HR 117bpm.    I agreed with EDP Dr. Carmichael on 250ml bolus with 1 unit PRBC, and reassess BP.    - If patient retains hypotension after fluids, patient would need ICU evaluation for hemorrhagic shock.  Given patient's complaint of SOB, he will likely need IV lasix and closer monitoring to which the telemetry floor will not be able to provide as best compared to ICU.     Consult note or admission HPI to follow once re-evaluation is complete    Addendum: Dr. Kumar aware of patient and will consult given renal transplant history.    Recently discharged 03/05/2022 from [facility]. Found to have a duodenal ulcer causing hemorrhagic shock, required ICU stay.  Also treated for UTI with Pseudomonas, ID was consulted at that time.    Addendum#2: consulted Critical Care, Dr. Manley agreed patient should be admitted to ICU.  Admission deferred to Critical Care.

## 2022-03-17 NOTE — ANESTHESIA PROCEDURE NOTES
Airway    Date/Time: 3/17/2022 2:11 PM  Performed by: Mitch Galdamez M.D.  Authorized by: Mitch Galdamez M.D.     Location:  OR  Urgency:  Elective  Indications for Airway Management:  Anesthesia      Spontaneous Ventilation: absent    Sedation Level:  Deep  Preoxygenated: Yes    Patient Position:  Sniffing  Final Airway Type:  Endotracheal airway  Final Endotracheal Airway:  ETT  Cuffed: Yes    Technique Used for Successful ETT Placement:  Direct laryngoscopy    Insertion Site:  Oral  Blade Type:  Larissa  Laryngoscope Blade/Videolaryngoscope Blade Size:  3  ETT Size (mm):  7.0  Measured from:  Teeth  ETT to Teeth (cm):  21  Placement Verified by: auscultation and capnometry    Cormack-Lehane Classification:  Grade I - full view of glottis  Number of Attempts at Approach:  1

## 2022-03-17 NOTE — ED NOTES
Franchesca at [facility] contacted and stated pt is ok to be transferred back to [facility] facility, gave Franchesca RN update  Pt is cleared to return to [facility] per ERP    SW setting up [facility] transport for pt to return

## 2022-03-17 NOTE — DISCHARGE PLANNING
MEAGAN asked to assist with transportation of Pt home.  MEAGAN placed call to Pt's Spouse and was informed that this Pt is currently staying at [facility].  MEAGAN called and updated [facility], that this Pt has been medical cleared and that this SW is setting up transportation back.      MEAGAN called [facility] and was informed that they do not have anymore transportation available for this evening.  MEAGAN completed [facility] PCS form and faxed all required documentation to [facility].  MEAGAN called [facility] and was able to set up transport for 1900.

## 2022-03-17 NOTE — OR SURGEON
Immediate Post OP Note    PreOp Diagnosis: gi hemorrhage      PostOp Diagnosis: duodenal ulceration, bleeding, gi hemorrhare.       Procedure(s):  GASTROSCOPY - Wound Class: None  EGD, WITH CLIP PLACEMENT    Surgeon(s):  Roni Campbell M.D.    Anesthesiologist/Type of Anesthesia:  Anesthesiologist: Mitch Galdamez M.D./General    Surgical Staff:  Circulator: Jorge Pinzon R.N.  Endoscopy Technician: Ilda Chirinos    Specimens removed if any:  * No specimens in log *    Estimated Blood Loss: < 100 cc    Findings: actively oozing visible vessel in an ulcer bed within the duodenal bulb.  The ulcer was large, measuring about 30 mm in diameter, pablito circumferential.  There were three hemoclips in place from prior endoscopic therapy.  I used epinephrine (1:56836) a total of 10 cc injected into the region around the vessel in four quadrant fashion.  The vessel continue to ooze and a total of three hemoclips were placed with apparent hemostasis.    Complications: no immediate    HIGH RISK FOR REBLEEDING.    SEE DETAILED DICTATION.          3/17/2022 2:33 PM Roni Campbell M.D.

## 2022-03-17 NOTE — ANESTHESIA PREPROCEDURE EVALUATION
Case: [id number] Date/Time: 03/17/22 1400    Procedure: GASTROSCOPY    Location:  ENDOSCOPIC ULTRASOUND ROOM / SURGERY [facility]    Surgeons: Roni Campbell M.D.          Relevant Problems   PULMONARY   (positive) LLL pneumonia      NEURO   (positive) S/P ablation of atrial fibrillation: PVI with roof ablation, typical RA flutter.  Jan 2021.  Dr Forrester       CARDIAC   (positive) ANCA-associated vasculitis (HCC)   (positive) Acute venous embolism and thrombosis of deep vessels of proximal lower extremity (HCC)   (positive) Aortic regurgitation   (positive) Atrial fibrillation (HCC)   (positive) Atrial flutter    (positive) Coronary artery calcification seen on CT scan   (positive) Essential hypertension      GI   (positive) Duodenal ulcer         (positive) RADHA (acute kidney injury) (HCC)   (positive) Acute renal failure (HCC)   (positive) Shock liver   (positive) Stage 3b chronic kidney disease (HCC)      ENDO   (positive) Type 2 diabetes mellitus (HCC)      Other   (positive) Gouty arthritis       Physical Exam    Airway   Mallampati: II  TM distance: >3 FB  Neck ROM: full       Cardiovascular - normal exam  Rhythm: regular  Rate: normal  (-) murmur     Dental - normal exam           Pulmonary - normal exam  Breath sounds clear to auscultation     Abdominal    Neurological - normal exam                 Anesthesia Plan    ASA 4- EMERGENT       Plan - general       Airway plan will be ETT          Induction: intravenous    Postoperative Plan: Postoperative administration of opioids is intended.    Pertinent diagnostic labs and testing reviewed    Informed Consent:    Anesthetic plan and risks discussed with patient.    Use of blood products discussed with: patient whom consented to blood products.

## 2022-03-17 NOTE — ED TRIAGE NOTES
"Chief Complaint   Patient presents with   • Bloody Stools     Pt of [facility], woke up with blood in his diaper, tachycardia and decreased mentation. HX of kidney transplant, saw nephrology yesterday.     /66   Pulse (!) 108   Temp 36.3 °C (97.4 °F)   Ht 1.778 m (5' 10\")   Wt 113 kg (250 lb)   SpO2 98%   BMI 35.87 kg/m²     "

## 2022-03-17 NOTE — OR NURSING
1435: To PACU from Endo. 6lpm oxygen via mask.Tachypnic, pale. Blood infusing on the right PIV.   1442: Coughing. Arouses to tactile stimuli. IVF NS infusion started on the left PIV. Dr Galdamez made aware of pt low BP.  1448: Dr Campbell rounded on pt.   1500: BP better.   1505: Handoff report to receiving RN Michael  1510: Discharge from PACU to ICU

## 2022-03-17 NOTE — ED NOTES
This RN attempted to call report to [facility]. Spoke with Margaux. [facility] unsure if a COVID room is available. Margaux to call this RN back.

## 2022-03-17 NOTE — CONSULTS
Hospital Medicine Consultation    Date of Service  3/17/2022    Referring Physician  Dr. Carmichael    Consulting Physician  Anjum Nolen M.D.    Reason for Consultation  Admission evaluation    History of Presenting Illness  72-year-old male PMH renal transplant, atrial flutter, CAD, prior DVT, TUSHAR with CPAP, Wegener's granulomatosis with renal involvement admitted on 3/17/2022 for suspected lower GI bleed while on Eliquis.  Patient currently living at [facility].  Patient reports having days of bloody stools, unclear the delay in coming to hospital.  Patient has been on Eliquis. He reported being tired, pale, and SOB. He states he has not been eating well.  Recently discharged 03/05/2022 from [facility]. Found to have a duodenal ulcer causing hemorrhagic shock, required ICU stay.  Also treated for UTI with Pseudomonas, ID was consulted at that time.  Patient's first Covid positive test 2/1/2022.  Triple vaccinated.    Hgb 7.3. on arrival.    Review of Systems  Review of Systems   Constitutional: Positive for chills and malaise/fatigue. Negative for fever.   HENT: Negative for congestion and hearing loss.    Eyes: Negative for blurred vision and pain.   Respiratory: Negative for cough and shortness of breath.    Cardiovascular: Negative for chest pain and palpitations.   Gastrointestinal: Positive for blood in stool and diarrhea. Negative for abdominal pain, nausea and vomiting.   Genitourinary: Negative for dysuria and hematuria.   Musculoskeletal: Negative for back pain and joint pain.   Skin: Negative for itching and rash.   Neurological: Positive for weakness. Negative for dizziness, loss of consciousness and headaches.   Psychiatric/Behavioral: The patient is not nervous/anxious and does not have insomnia.    All other systems reviewed and are negative.      Past Medical History   has a past medical history of Arrhythmia (2021), Arthritis (2020), Atrial flutter (HCC), CAD (coronary artery disease)  (2003), Cough (2020), Dialysis patient ([facility]) (2006), DVT (deep venous thrombosis) ([facility]), E coli bacteremia (2014), Kidney failure (03/2008), Kidney transplant pain (2020), Retinal detachment, Sleep apnea, Snoring, and Wegener's granulomatosis with renal involvement ([facility]) (2008).    Surgical History   has a past surgical history that includes peg placement (10/10/2014); other orthopedic surgery (Left, 1995); other (Right, 2017); pr upper gi endoscopy,diagnosis (2/24/2022); and pr upper gi endoscopy,diagnosis (2/26/2022).    Family History  family history includes Stroke in his maternal grandmother; Stroke (age of onset: 26) in his brother.    Social History   reports that he has never smoked. He quit smokeless tobacco use about 31 years ago.  His smokeless tobacco use included snuff and chew. He reports previous alcohol use. He reports that he does not use drugs.    Medications  Prior to Admission Medications   Prescriptions Last Dose Informant Patient Reported? Taking?   Brimonidine Tartrate-Timolol 0.2-0.5 % Solution 3/16/2022 at 2000 MAR from Other Facility Yes No   Sig: Administer 2 Drops into both eyes 2 times a day.   Glucosamine-Chondroit-Vit C-Mn (GLUCOSAMINE 1500 COMPLEX) Cap 3/16/2022 at 2000 MAR from Other Facility Yes No   Sig: Take 2 Caps by mouth 2 Times a Day.   Multiple Vitamins-Minerals (CENTRUM SILVER 50+MEN) Tab 3/16/2022 at 0800 MAR from Other Facility Yes No   Sig: Take 1 Tab by mouth every morning.   amLODIPine (NORVASC) 5 MG Tab 3/16/2022 at 0800 MAR from Other Facility Yes Yes   Sig: Take 5 mg by mouth every morning.   apixaban (ELIQUIS) 5mg Tab 3/16/2022 at 2000 MAR from Other Facility No No   Sig: Take 1 Tablet by mouth 2 times a day.   atorvastatin (LIPITOR) 10 MG TABS 3/16/2022 at 2000 MAR from Other Facility Yes No   Sig: Take 10 mg by mouth every evening.   cinacalcet (SENSIPAR) 30 MG Tab 3/14/2022 at 0800 MAR from Other Facility Yes Yes   Sig: Take 30 mg by mouth two times a week.  Monday & Friday   fluticasone (FLONASE) 50 MCG/ACT nasal spray 3/16/2022 at 0800 MAR from Other Facility Yes No   Sig: Administer 1 Spray into affected nostril(S) every day.   mycophenolate (CELLCEPT) 250 MG Cap 3/16/2022 at 2000 MAR from Other Facility No No   Sig: Take 2 Capsules by mouth 2 times a day.   omeprazole (PRILOSEC) 20 MG delayed-release capsule 3/8/2022 at D/C MAR from Other Facility Yes Yes   Sig: Take 20 mg by mouth 2 times a day.   pantoprazole (PROTONIX) 40 MG Tablet Delayed Response 3/17/2022 at 0600 MAR from Other Facility Yes Yes   Sig: Take 40 mg by mouth 2 times a day.   predniSONE (DELTASONE) 10 MG Tab 3/10/2022 at COMPLETE MAR from Other Facility Yes Yes   Sig: Take 10 mg by mouth every day. 3 days   predniSONE (DELTASONE) 20 MG Tab 3/7/2022 at COMPLETE MAR from Other Facility Yes Yes   Sig: Take 20 mg by mouth every day. 2 days   predniSONE (DELTASONE) 5 MG Tab 3/16/2022 at 0800 MAR from Other Facility Yes Yes   Sig: Take 5 mg by mouth every day.   sucralfate (CARAFATE) 1 GM/10ML Suspension 3/17/2022 at 0600 MAR from Other Facility Yes Yes   Sig: Take 1 g by mouth every 6 hours.   tacrolimus (PROGRAF) 0.5 MG Cap 3/16/2022 at 2000 MAR from Other Facility No No   Sig: Take 1 Capsule by mouth every evening.   tacrolimus (PROGRAF) 1 MG Cap 3/16/2022 at 0800 MAR from Other Facility No No   Sig: Take 1 Capsule by mouth every morning.   vitamin D, Ergocalciferol, (DRISDOL) 11796 units Cap capsule 3/14/2022 at 0800 MAR from Other Facility Yes No   Sig: Take 50,000 Units by mouth every Monday.      Facility-Administered Medications: None       Allergies  Allergies   Allergen Reactions   • Nsaids Unspecified     Cannot take because of anti rejection meds.   • Triazolam Anxiety     All anti anxiety medications cause hallucinations        Physical Exam  Temp:  [36.2 °C (97.1 °F)-37.9 °C (100.2 °F)] 36.2 °C (97.1 °F)  Pulse:  [] 93  Resp:  [13-44] 16  BP: ()/(43-72) 104/54  SpO2:  [96 %-100  %] 100 %    Physical Exam  Vitals and nursing note reviewed.   Constitutional:       General: He is in acute distress.      Appearance: He is obese. He is ill-appearing.   HENT:      Head: Normocephalic and atraumatic.      Nose: Nose normal. No congestion.   Eyes:      General: No scleral icterus.     Extraocular Movements: Extraocular movements intact.   Cardiovascular:      Rate and Rhythm: Tachycardia present. Rhythm irregular.      Pulses: Normal pulses.      Heart sounds: Normal heart sounds. No murmur heard.  Pulmonary:      Effort: Respiratory distress present.      Breath sounds: Normal breath sounds.      Comments: tachypneic  Abdominal:      General: Bowel sounds are normal. There is no distension.      Tenderness: There is abdominal tenderness (epigastric).   Musculoskeletal:         General: No swelling. Normal range of motion.      Cervical back: Normal range of motion and neck supple.   Skin:     General: Skin is warm.      Capillary Refill: Capillary refill takes more than 3 seconds.      Coloration: Skin is pale (significantly). Skin is not jaundiced.   Neurological:      Mental Status: He is alert and oriented to person, place, and time. Mental status is at baseline.      Motor: Weakness present.   Psychiatric:         Mood and Affect: Mood normal.         Behavior: Behavior normal.         Thought Content: Thought content normal.         Fluids  Date 03/17/22 0700 - 03/18/22 0659   Shift 8723-7420 7385-8623 9443-5670 24 Hour Total   INTAKE   Blood 600 515  1115   Shift Total 600 515  1115   OUTPUT   Shift Total       Weight (kg) 113.4 113.4 113.4 113.4       Laboratory  Recent Labs     03/16/22  1234 03/17/22  1010   WBC 9.7 11.0*   RBC 3.13* 2.44*   HEMOGLOBIN 9.3* 7.3*   HEMATOCRIT 29.5* 23.4*   MCV 94.2 95.9   MCH 29.7 29.9   MCHC 31.5* 31.2*   RDW 70.0* 70.9*   PLATELETCT 112* 109*   MPV 9.9 10.2     Recent Labs     03/16/22  1234 03/17/22  1010   SODIUM 139 141   POTASSIUM 3.8 4.2   CHLORIDE  107 112   CO2 18* 18*   GLUCOSE 171* 129*   BUN 21 27*   CREATININE 1.81* 2.15*   CALCIUM 9.3 8.5     Recent Labs     03/16/22  1234 03/17/22  1010   APTT 42.6* 47.1*   INR 1.55* 1.96*                 Imaging  DX-CHEST-PORTABLE (1 VIEW)   Final Result      1.  Patchy bilateral peripheral interstitial opacity. These findings are consistent with Covid 19 pneumonia.      2.  Cardiomegaly.      US-RENAL TRANSPLANT COMP    (Results Pending)       Assessment/Plan  Service: Hospital Medicine    Acute blood loss anemia due Eliquis use due to subacute duodenal ulcer  Acute hemorrhagic shock  Acute on chronic atrial flutter  Renal transplant status    -Evaluated patient is a medical consult for EDP.  Patient having significant anemia due to blood loss, having maroon-colored stools now for the past couple days.  Patient is on Eliquis.  Patient's acute blood loss due to Eliquis.  He has had duodenal ulcer bleeding recently.   -I agreed with Dr. Carmichael to start fluid bolus and 1 unit PRBC at the moment.  Blood pressure remained unstable.  Discontinued Eliquis.  -I further consulted critical care Dr. Manley who gladly accepted patient as he was undergoing hemorrhagic shock.  Patient is in critical status.  -Patient continued on IV Protonix, gastroenterology consultants to evaluate patient.  -I have consulted nephrology for follow-up on patient's renal transplant status.  Dr. Kumar was to see patient.    Thank you for consulting Internal Medicine Hospitalist team.  We are glad to help in your patient's case.  Hospitalist team to sign off patient's case to Critical Care.

## 2022-03-17 NOTE — ASSESSMENT & PLAN NOTE
- Patient with BRBPR, no known episodes of hematemesis. S/p EGD w/recurrent actively bleeding duodenal ulcer treated with epi injection and 3 hemoclips w/o any further active bleeding noted on 3/5.   - admitted to ICU on 3/17  - 2 large bore IVs for access  - s/p 2u pRBC with stable H/H, check H/H at 2 pm today  - continue protonix gtt for 48 hrs  - appreciate GI recs  - s/p IVF boluses - patient is on RA and was likely hypovolemic on admission, hemodynamic stable now.  - reglan prn nausea  - INR elevated at 1.9, holding all anticoagulation and given Kcentra to reverse Eliquis

## 2022-03-17 NOTE — ED NOTES
Report to BRIDGETTE Bowers. Aware that [facility] RN is to call [facility] with decision to transfer patient.

## 2022-03-17 NOTE — OR NURSING
Pt to preop from ED with active GI bleed. Pt finishing first unit of blood. Second unit ordered and given per Dr Galdamez. Reversal for blood thinner started By Thao RN with Pharmacy @ [facility]. Pt signs consents and wife updated on plan by GI MD. Pt to procedure room for emergent EGD.

## 2022-03-17 NOTE — ED NOTES
MADELIN at . Report given and paperwork for [facility] given. MADELIN team transported pt out w/ belongings in stable condition back to [facility]

## 2022-03-17 NOTE — CONSULTS
[facility] -  CONSULTATION NOTE               Author: Aiyana Kumar M.D. 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 Pt had a prolonged hospitalization for COVID-19 2/1/22-3/5/22 and the stay was complicated by RADHA and transplant renal biopsy showed no rejection but did show primary IGA Nephropathy and his Cr had improved but then he developed ATN from GI bleed and his Cr was 1.6 at discharge. His baseline Cr prior to that hospitalization was ~1.1-1.4. Pt was seen yest in outpt Nephrology clinic and at that time he was confused and weak and had diarrhea and no recent labs since hospital dc so he was sent to the ER yest due to concern for his mental status and weakness. In the ER yest his labs showed Cr 1.59, he was given IVF's and his mental status improved so he was discharged back to his SNF. This am while at his SNF he was noted to have blood in his diaper and altered mental status so he was sent to [facility] ER. In the ER he was tachycardic, hypotensive with SBP's 80's and Cr 2.15, and his Hgb dropped from 9.3 yest to 7.3 in ER today. He is very drowsy at this time. He reports abd discomfort but no n/v. He cannot tell me if/when he noted blood in his diaper. He is confused. He is currently receiving a blood transfusion and he received fluids.     REVIEW OF SYSTEMS:    Unable to obtain, pt confused    PAST MEDICAL HISTORY:   Past Medical History:   Diagnosis Date   • Arrhythmia 2021    flutter   • Arthritis 2020    right shoulder, knees, & back   • Atrial flutter (HCC)    • CAD (coronary artery disease) 2003    • Cough     dry-on lisinipril, now productive -brown   • Dialysis patient (HCC) 2006    x 18 months   • DVT (deep venous thrombosis) ([facility])    • E coli bacteremia 2014    hospitalized x 5 months   • Kidney failure 2008    kidney transplant   • Kidney transplant pain     low back   • Retinal detachment    • Sleep apnea     CPAP   • Snoring    • Wegener's granulomatosis with renal involvement ([facility])    Living Unrelated Renal Transplant --on tacrolimus, cellcept, pred as outpt  RADHA 2022--transplant renal biopsy 2022 showed no acute rejection and showed primary IgA nephropathy  COVID-19 Viral Illness 2022  GI Bleed 2022    PAST SURGICAL HISTORY:   Past Surgical History:   Procedure Laterality Date   • MO UPPER GI ENDOSCOPY,DIAGNOSIS  2022    Procedure: GASTROSCOPY;  Surgeon: Kim Goetz D.O.;  Location: SURGERY SAME DAY [facility];  Service: Gastroenterology   • MO UPPER GI ENDOSCOPY,DIAGNOSIS  2022    Procedure: GASTROSCOPY;  Surgeon: Raymond Pryor M.D.;  Location: SURGERY SAME DAY [facility];  Service: Gastroenterology   • OTHER Right 2017    eye surgery, lasix & cataracts, retinal detatchment   • PEG PLACEMENT  10/10/2014    Performed by Brijesh Orozco M.D. at [facility]   • OTHER ORTHOPEDIC SURGERY Left     knee   Living Unrelated Renal Transplant     FAMILY HISTORY:   Family History   Problem Relation Age of Onset   • Stroke Brother 26        , ? cause   • Stroke Maternal Grandmother            • Clotting Disorder Neg Hx        SOCIAL HISTORY:   Social History     Tobacco Use   Smoking Status Never Smoker   Smokeless Tobacco Former User   • Types: Snuff, Chew     Social History     Substance and Sexual Activity   Alcohol Use Not Currently     Social History     Substance and Sexual Activity   Drug Use Never       CURRENT MEDICATIONS:   Reviewed and documented in chart    LABORATORY STUDIES:   Recent Labs     22  1234 22  1010  "  SODIUM 139 141   POTASSIUM 3.8 4.2   CHLORIDE 107 112   CO2 18* 18*   GLUCOSE 171* 129*   BUN 21 27*   CREATININE 1.81* 2.15*   CALCIUM 9.3 8.5       ALLERGIES:  Nsaids and Triazolam    VS:  BP (!) 90/60   Pulse (!) 102   Temp 36.8 °C (98.2 °F)   Resp (!) 26   Ht 1.778 m (5' 10\")   Wt 113 kg (250 lb)   SpO2 97%   BMI 35.87 kg/m²     Physical Exam  Vitals and nursing note reviewed.   Constitutional:       Appearance: He is ill-appearing.      Comments: Drowsy but does arouse, confused   HENT:      Head: Normocephalic and atraumatic.   Eyes:      General: No scleral icterus.  Cardiovascular:      Rate and Rhythm: Regular rhythm. Tachycardia present.   Pulmonary:      Effort: Pulmonary effort is normal. No respiratory distress.      Breath sounds: Normal breath sounds.   Abdominal:      General: Bowel sounds are normal. There is no distension.      Palpations: Abdomen is soft.      Tenderness: There is abdominal tenderness (per-umbilical and right upper and lower quadrant).   Musculoskeletal:         General: No deformity.      Right lower leg: No edema.      Left lower leg: No edema.   Skin:     General: Skin is warm and dry.      Findings: No rash.   Neurological:      General: No focal deficit present.      Comments: Oriented only to self and place   Psychiatric:         Attention and Perception: He is inattentive.      Comments: Drowsy, confused         FLUID BALANCE:  No intake/output data recorded.    IMAGING:  All imaging reviewed from admission to present day    IMPRESSION:  # RADHA on CKD Stage Stage III-A--pt with RADHA during recent hospitalization and Cr had improved to ~1.6 at dc on 3/5/22, now with RADHA again  - Had renal biopsy 2/2022 that was negative for rejection and showed primary IgA Nephropathy  - Had ATN in 2/2022 secondary to GI Bleed  - Unclear recent baseline but Cr higher than at time of last discharge  - Now with RADHA again likely related to hypoperusion vs ATN   # Anemia--secondary to GI " Bleed  - Recent upper GI bleed in 2/2022 secondary to Duodenal ulcer  - On eliquis as outpt for AFib and h/o DVT  - Currently getting PRBC Transfusion  GI consulted  # Living Unrelated Renal Transplant  - On tacrolimus, MMF, prednisone as outpt  # Hypotension--likely hypovolemia related to GI bleed  - Currently getting PRBC transfusion  - not on pressors at this time  # Thrombocytopenia--mild, monitor  # Metabolic Acidosis--mild, monitor  # Elevated Troponin--monitor  # Altered Mental Status--monitor  # Atrial Fibrillation--on eliquis as outpt      PLAN:  - No compelling indication for RRT but will monitor closely  - Recommend IVF hydration with NS  - Continue tacrolimus and prednisone  - Hold cellcept for now  - Consider stress dose steroids   - Check ultrasound of transplant kidney  - Receiving PRBC transfusion now  - GI consulted  - Serial CBC's and transfuse for Hgb less than 7  - Pressors if needed to maintain MAP greater than 65  - Dose adjust all meds for decreased GFR  - Continue   - Daily evaluation for RRT needs  - Spoke to wife at bedside and she reports that pt would want dialysis if indicated in future.     Thank you for the consultation!

## 2022-03-17 NOTE — PROCEDURES
DATE OF PROCEDURE:  03/17/2022     PROCEDURE PERFORMED:  Esophagogastroduodenoscopy with hemostasis.     INSTRUMENT UTILIZED:  Olympus flexible forward viewing gastroscope.     INDICATIONS:  Elderly gentleman with a recent history of GI bleeding related   to duodenal ulceration presents with evidence of recurrent overt GI bleeding.     CONSENT:  Full RBA discussion held prior to the procedure and a signed a   witnessed consent form placed on the chart.     SEDATION/ANESTHESIA:  Provided by Mitch Galdamez MD     TOLERANCE:  Good.     PATHOLOGY SPECIMENS:  None.     PROCEDURAL DETAIL:  After adequate sedation, the Olympus flexible forward   viewing gastroscope was advanced per the oral route into the esophagus.  The   esophageal mucosa was carefully inspected.  The gastroesophageal junction was   located at 39 cm from the incisors.  There was evidence of esophageal   candidiasis within the esophagus.  The instrument was advanced into the   stomach.  Air was insufflated and the gastric mucosa inspected including a   retroflexed view of the gastric cardia.  The stomach was unremarkable with the   exception of some clot and blood flowing proximally through the pylorus from   the duodenum.  The instrument was passed through the pyloric channel into the   duodenum.  There was immediately noted to be some clot and active oozing from   a duodenal ulcer.  Within the distal bulb, there was an approximately 30 mm   ulceration, which was pablito-circumferential.  There were three Hemoclips in   place from prior recent endoscopic therapies.  Just proximal to one of the   Hemoclips at the 7 o'clock position, there was a visible vessel measuring   about 1 mm, which was actively pulsating and oozing.  We injected epinephrine   (1:10,000), total of 10 mL in a 4-quadrant fashion surrounding the vessel.    The vessel continued to ooze.  Next, we placed three Hemoclips after placement   of the third Hemoclip there was apparent hemostasis  without any further   oozing.  The area was observed for approximately 2 minutes.  The bowel was   decompressed.  Next, the stomach was decompressed and the procedure concluded.     No immediate complications.     IMPRESSIONS AND FINDINGS:  1.  Actively bleeding duodenal ulcer, recurrent.  This is very high risk for   rebleeding.  If this patient has repeat bleeding, he may require   interventional radiology guided coil embolization versus repeat EGD with   therapeutics.  2.  Coagulopathy -- patient actively receiving Kcentra now as he has been on   Eliquis.     PLAN AND RECOMMENDATIONS:   1.  Observe for any adverse events from this procedure.  2.  Return to the ICU as soon as possible, on transfer from postanesthesia   care unit.  3.  N.p.o. for the time being as he is high risk for rebleeding.  If there is   no evidence of recurrent bleeding tomorrow, we can start clear liquid diet,   avoiding red.  4.  Continue pantoprazole 8 mg IV per hour drip.  5.  Finish the Kcentra infusion.  6.  If the patient has recurrent overt bleeding check thromboelastography and   correct as needed.  7.  Serial hemoglobin and hematocrit with transfusions as needed for   hemoglobin less than 7.  8.  Plan to check H. pylori urea breath test later and treat if appropriate.  9.  Plan to treat probable candidal esophagitis with fluconazole later.        ______________________________  MD ANGELICA PALAFOX/ASS    DD:  03/17/2022 14:40  DT:  03/17/2022 14:58    Job#:  [id number]    CC:MD Lola Cummins MD(User)  Jorge Carmichael MD(User)

## 2022-03-17 NOTE — H&P
Critical Care History & Physical Note    Date of Service  3/17/2022    Primary Care Physician  Damian Diaz M.D.    Consultants  GI and nephrology    Code Status  Full Code    Chief Complaint  Chief Complaint   Patient presents with   • Bloody Stools     Pt of [facility], woke up with blood in his diaper, tachycardia and decreased mentation. HX of kidney transplant, saw nephrology yesterday.       History of Presenting Illness  Krishan Acevedo is a 72 y.o. male with history of ANCA glomerulonephritis s/p renal transplant 2008, atrial fibrillation and prior DVT on Eliquis, recent COVID infection and GI bleed 2/2 duodenal ulcer early 2/2022 who presented 3/17/2022 with rectal bleeding.  Patient was seen in ER on 3/16 PM w/diarrhea, vomiting and CP - w/u was unremarkable and he improved after getting IVFs so he was discharged back to SNF. He woke up 3/17 AM with blood in his diaper, tachycardia and confusion so he was sent to ER. He also reports L side abdominal and n/v.     In ED, patient was started on protonix drip and given 250cc IVFs and 1u pRBC after which his BP and HR improved. He was taken to GI suite for EGD - found to have recurrent actively bleeding duodenal ulcer treated with epi injection and 3 hemoclips with hemostasis. He was started on Kcentra and 2nd unit of pRBCs during the procedure as well. Patient arrived in ICU alert and HDS.     Review of Systems  Review of Systems   Constitutional: Positive for malaise/fatigue. Negative for chills, fever and weight loss.   Respiratory: Negative for cough and wheezing.    Gastrointestinal: Positive for blood in stool, diarrhea and melena.       Past Medical History   has a past medical history of Arrhythmia (2021), Arthritis (2020), Atrial flutter (HCC), CAD (coronary artery disease) (2003), Cough (2020), Dialysis patient ([facility]) (2006), DVT (deep venous thrombosis) (HCC), E coli bacteremia (2014), Kidney failure (03/2008), Kidney transplant pain (2020),  Retinal detachment, Sleep apnea, Snoring, and Wegener's granulomatosis with renal involvement (HCC) (2008).    Surgical History   has a past surgical history that includes peg placement (10/10/2014); other orthopedic surgery (Left, 1995); other (Right, 2017); pr upper gi endoscopy,diagnosis (2/24/2022); and pr upper gi endoscopy,diagnosis (2/26/2022).     Family History  family history includes Stroke in his maternal grandmother; Stroke (age of onset: 26) in his brother.   Family history reviewed with patient. There is no family history that is pertinent to the chief complaint.     Social History   reports that he has never smoked. He quit smokeless tobacco use about 31 years ago.  His smokeless tobacco use included snuff and chew. He reports previous alcohol use. He reports that he does not use drugs.    Allergies  Allergies   Allergen Reactions   • Nsaids Unspecified     Cannot take because of anti rejection meds.   • Triazolam Anxiety     All anti anxiety medications cause hallucinations        Medications  Prior to Admission Medications   Prescriptions Last Dose Informant Patient Reported? Taking?   Brimonidine Tartrate-Timolol 0.2-0.5 % Solution 3/16/2022 at 2000 MAR from Other Facility Yes No   Sig: Administer 2 Drops into both eyes 2 times a day.   Glucosamine-Chondroit-Vit C-Mn (GLUCOSAMINE 1500 COMPLEX) Cap 3/16/2022 at 2000 MAR from Other Facility Yes No   Sig: Take 2 Caps by mouth 2 Times a Day.   Multiple Vitamins-Minerals (CENTRUM SILVER 50+MEN) Tab 3/16/2022 at 0800 MAR from Other Facility Yes No   Sig: Take 1 Tab by mouth every morning.   amLODIPine (NORVASC) 5 MG Tab 3/16/2022 at 0800 MAR from Other Facility Yes Yes   Sig: Take 5 mg by mouth every morning.   apixaban (ELIQUIS) 5mg Tab 3/16/2022 at 2000 MAR from Other Facility No No   Sig: Take 1 Tablet by mouth 2 times a day.   atorvastatin (LIPITOR) 10 MG TABS 3/16/2022 at 2000 MAR from Other Facility Yes No   Sig: Take 10 mg by mouth every evening.    cinacalcet (SENSIPAR) 30 MG Tab 3/14/2022 at 0800 MAR from Other Facility Yes Yes   Sig: Take 30 mg by mouth two times a week. Monday & Friday   fluticasone (FLONASE) 50 MCG/ACT nasal spray 3/16/2022 at 0800 MAR from Other Facility Yes No   Sig: Administer 1 Spray into affected nostril(S) every day.   mycophenolate (CELLCEPT) 250 MG Cap 3/16/2022 at 2000 MAR from Other Facility No No   Sig: Take 2 Capsules by mouth 2 times a day.   omeprazole (PRILOSEC) 20 MG delayed-release capsule 3/8/2022 at D/C MAR from Other Facility Yes Yes   Sig: Take 20 mg by mouth 2 times a day.   pantoprazole (PROTONIX) 40 MG Tablet Delayed Response 3/17/2022 at 0600 MAR from Other Facility Yes Yes   Sig: Take 40 mg by mouth 2 times a day.   predniSONE (DELTASONE) 10 MG Tab 3/10/2022 at COMPLETE MAR from Other Facility Yes Yes   Sig: Take 10 mg by mouth every day. 3 days   predniSONE (DELTASONE) 20 MG Tab 3/7/2022 at COMPLETE MAR from Other Facility Yes Yes   Sig: Take 20 mg by mouth every day. 2 days   predniSONE (DELTASONE) 5 MG Tab 3/16/2022 at 0800 MAR from Other Facility Yes Yes   Sig: Take 5 mg by mouth every day.   sucralfate (CARAFATE) 1 GM/10ML Suspension 3/17/2022 at 0600 MAR from Other Facility Yes Yes   Sig: Take 1 g by mouth every 6 hours.   tacrolimus (PROGRAF) 0.5 MG Cap 3/16/2022 at 2000 MAR from Other Facility No No   Sig: Take 1 Capsule by mouth every evening.   tacrolimus (PROGRAF) 1 MG Cap 3/16/2022 at 0800 MAR from Other Facility No No   Sig: Take 1 Capsule by mouth every morning.   vitamin D, Ergocalciferol, (DRISDOL) 39319 units Cap capsule 3/14/2022 at 0800 MAR from Other Facility Yes No   Sig: Take 50,000 Units by mouth every Monday.      Facility-Administered Medications: None       Physical Exam  Temp:  [36.2 °C (97.2 °F)-37.9 °C (100.2 °F)] 36.2 °C (97.2 °F)  Pulse:  [] 92  Resp:  [13-44] 26  BP: ()/(43-72) 101/60  SpO2:  [96 %-100 %] 100 %  Blood Pressure : 105/63   Temperature: 36.6 °C (97.9 °F)    Pulse: 97   Respiration: 13   Pulse Oximetry: 100 %       Physical Exam  Vitals and nursing note reviewed.   Constitutional:       General: He is not in acute distress.     Appearance: He is not toxic-appearing.   HENT:      Head: Normocephalic and atraumatic.      Nose: Nose normal.      Mouth/Throat:      Mouth: Mucous membranes are moist.   Eyes:      General: No scleral icterus.     Conjunctiva/sclera: Conjunctivae normal.   Cardiovascular:      Rate and Rhythm: Normal rate.   Pulmonary:      Effort: Pulmonary effort is normal. No respiratory distress.      Breath sounds: No wheezing.   Musculoskeletal:         General: No deformity or signs of injury.   Skin:     General: Skin is warm and dry.   Neurological:      General: No focal deficit present.      Mental Status: He is alert.         Laboratory:  Recent Labs     03/16/22  1234 03/17/22  1010   WBC 9.7 11.0*   RBC 3.13* 2.44*   HEMOGLOBIN 9.3* 7.3*   HEMATOCRIT 29.5* 23.4*   MCV 94.2 95.9   MCH 29.7 29.9   MCHC 31.5* 31.2*   RDW 70.0* 70.9*   PLATELETCT 112* 109*   MPV 9.9 10.2     Recent Labs     03/16/22  1234 03/17/22  1010   SODIUM 139 141   POTASSIUM 3.8 4.2   CHLORIDE 107 112   CO2 18* 18*   GLUCOSE 171* 129*   BUN 21 27*   CREATININE 1.81* 2.15*   CALCIUM 9.3 8.5     Recent Labs     03/16/22  1234 03/17/22  1010   ALTSGPT 42 28   ASTSGOT 20 18   ALKPHOSPHAT 176* 134*   TBILIRUBIN 0.6 0.4   GLUCOSE 171* 129*     Recent Labs     03/16/22  1234 03/17/22  1010   APTT 42.6* 47.1*   INR 1.55* 1.96*     Recent Labs     03/17/22  1010   NTPROBNP 1943*         Recent Labs     03/16/22  1234 03/17/22  1010   TROPONINT 87* 112*       Imaging:  DX-CHEST-PORTABLE (1 VIEW)   Final Result      1.  Patchy bilateral peripheral interstitial opacity. These findings are consistent with Covid 19 pneumonia.      2.  Cardiomegaly.      US-RENAL TRANSPLANT COMP    (Results Pending)       Assessment/Plan:  I anticipate this patient will require at least two midnights for  appropriate medical management, necessitating inpatient admission.    * GI bleed- (present on admission)  Assessment & Plan  Patient with BRBPR, no known episodes of hematemesis. S/p EGD w/recurrent actively bleeding duodenal ulcer treated with epi injection and 3 hemoclips w/o any further active bleeding noted.     - admit to ICU to monitor closely  - 2 large bore IVs for access  - s/p 2u pRBC  - continue protonix gtt  - appreciate GI recs  - PRN IVF boluses - patient is on RA and was likely hypovolemic on admission   - reglan prn nausea  - INR elevated at 1.9, holding all anticoagulation and given Kcentra to reverse Eliquis    Leukocytosis  Assessment & Plan  Likely reactive in the setting of GIB - no overt e/o infection.    - f/u UA  - monitor    Thrombocytopenia (HCC)- (present on admission)  Assessment & Plan  Chronic - plt usually in 50-70s, now in 100s    - monitor    Acute blood loss anemia- (present on admission)  Assessment & Plan  2/2 GIB - see plan above    Chronic anticoagulation- (present on admission)  Assessment & Plan  Hold all anticoagulation in the setting of GIB    Essential hypertension- (present on admission)  Assessment & Plan  - hold home anti-hypertensives in the setting of acute GIB    Kidney transplant status, living related donor- (present on admission)  Assessment & Plan  Hx of ANCA glomerulonephritis s/p renal transplant 2008    - appreciate nephrology recs  - monitor renal function  - avoid nephrotoxic meds     Atrial flutter - (present on admission)  Assessment & Plan  - hold eliquis      VTE prophylaxis: SCDs/TEDs and pharmacologic prophylaxis contraindicated due to GI bleed         Lola Manley MD  Pulmonary and Critical Care Medicine  Pending sale to [facility]

## 2022-03-17 NOTE — DISCHARGE PLANNING
SW placed call to [facility] and was able to reschedule Pt transport back to [facility] for 2100. ER RN updated via voalt message.

## 2022-03-17 NOTE — ASSESSMENT & PLAN NOTE
Hx of ANCA glomerulonephritis s/p renal transplant 2008  - appreciate nephrology recs  - monitor renal function  - avoid nephrotoxic meds   - hold tacrolimus per nephro recommendations,   - level tacrolimus pending  - continue with cellcept and prednisone  - US kidney today  - dose medications based on renal function

## 2022-03-17 NOTE — ED PROVIDER NOTES
ED Provider Note    Scribed for Jorge Carmichael M.D. by Chelsea Garcia. 3/17/2022  9:57 AM    Primary care provider: Damian Diaz M.D.  Means of arrival: Ambulance  History obtained from: Patient  History limited by: none    CHIEF COMPLAINT  Chief Complaint   Patient presents with   • Bloody Stools     Pt of [facility], woke up with blood in his diaper, tachycardia and decreased mentation. HX of kidney transplant, saw nephrology yesterday.     HPI  Krishan Acevedo is a 72 y.o. male with history of kidney transplant who presents to the Emergency Department via ambulance for [facility] for evaluation of blood stools onset this morning. Per triage note, he woke up with blood in his diaper, tachycardia, and decreased mentation. Patient states he is unsure if he lives in a care or rehab facility or why he may be there. He states he thinks lives in [place]. He admits to associated symptoms of nausea and vomiting (2-3 episodes this morning, but unsure if hematemesis), left-sided abdominal pain, and mild chest pain, but denies fevers, rash, cough, headache, dizziness, or kidney pain. No alleviating factors were reported. Per triage note, he was seen by Nephrology yesterday. Patient admits to experiencing bleeding before due to kidney disease. He reports he had a kidney transplant in 2008 and denies ever experiencing rejection. He reports he takes Eliquis, but is unsure of why. He denies being followed by any GI specialists.     REVIEW OF SYSTEMS  Pertinent positives include: bloody stools, tachycardia, decreased mentation, nausea, vomiting, left-sided abdominal pain, and chest pain.  Pertinent negatives include: fevers, rash, cough, headache, dizziness, or kidney pain.  10+ systems reviewed and negative.      PAST MEDICAL HISTORY  Past Medical History:   Diagnosis Date   • Arrhythmia 2021    flutter   • Arthritis 2020    right shoulder, knees, & back   • Atrial flutter (HCC)    • CAD (coronary artery  disease)    • Cough     dry-on lisinipril, now productive -brown   • Dialysis patient ([facility]) 2006    x 18 months   • DVT (deep venous thrombosis) ([facility])    • E coli bacteremia     hospitalized x 5 months   • Kidney failure 2008    kidney transplant   • Kidney transplant pain     low back   • Retinal detachment    • Sleep apnea     CPAP   • Snoring    • Wegener's granulomatosis with renal involvement ([facility])      FAMILY HISTORY  Family History   Problem Relation Age of Onset   • Stroke Brother 26        , ? cause   • Stroke Maternal Grandmother            • Clotting Disorder Neg Hx      SOCIAL HISTORY  Social History     Tobacco Use   • Smoking status: Never Smoker   • Smokeless tobacco: Former User     Types: Snuff, Chew   Vaping Use   • Vaping Use: Never used   Substance Use Topics   • Alcohol use: Not Currently   • Drug use: Never     Social History     Substance and Sexual Activity   Drug Use Never     SURGICAL HISTORY  Past Surgical History:   Procedure Laterality Date   • AZ UPPER GI ENDOSCOPY,DIAGNOSIS  2022    Procedure: GASTROSCOPY;  Surgeon: Kim Goetz D.O.;  Location: SURGERY SAME DAY [facility];  Service: Gastroenterology   • AZ UPPER GI ENDOSCOPY,DIAGNOSIS  2022    Procedure: GASTROSCOPY;  Surgeon: Raymond Pryor M.D.;  Location: SURGERY SAME DAY [facility];  Service: Gastroenterology   • OTHER Right 2017    eye surgery, lasix & cataracts, retinal detatchment   • PEG PLACEMENT  10/10/2014    Performed by Brijesh Orozco M.D. at [facility]   • OTHER ORTHOPEDIC SURGERY Left     knee     CURRENT MEDICATIONS  Current Outpatient Medications   Medication Instructions   • acetaminophen (TYLENOL) 650 mg, Oral, EVERY 6 HOURS PRN   • amLODIPine (NORVASC) 5 mg, Oral, DAILY   • apixaban (ELIQUIS) 5 mg, Oral, 2 TIMES DAILY   • atorvastatin (LIPITOR) 10 mg, Oral, EVERY EVENING   • Brimonidine Tartrate-Timolol (COMBIGAN) 0.2-0.5 % Solution 2 Drops, Both Eyes, 2  "TIMES DAILY   • cinacalcet (SENSIPAR) 30 mg, Oral, EVERY MO , FR   • fluticasone (FLONASE) 50 MCG/ACT nasal spray 1 Spray, Nasal, DAILY   • Glucosamine-Chondroit-Vit C-Mn (GLUCOSAMINE 1500 COMPLEX) Cap 2 Capsules, Oral, 2 TIMES DAILY   • Multiple Vitamins-Minerals (CENTRUM SILVER 50+MEN) Tab 1 Tablet, Oral, EVERY MORNING   • mycophenolate (CELLCEPT) 500 mg, Oral, 2 TIMES DAILY   • omeprazole (PRILOSEC) 20 mg, Oral, EVERY 12 HOURS   • predniSONE (DELTASONE) 5 mg, Oral, DAILY   • sucralfate (CARAFATE) 1 g, Oral, EVERY 6 HOURS   • tacrolimus (PROGRAF) 0.5 mg, Oral, 2 TIMES DAILY   • tacrolimus (PROGRAF) 1 mg, Oral, EVERY MORNING   • tacrolimus (PROGRAF) 0.5 mg, Oral, EVERY EVENING   • vitamin D2 (Ergocalciferol) (DRISDOL) 50,000 Units, Oral, EVERY MON     ALLERGIES  Allergies   Allergen Reactions   • Nsaids Unspecified     Cannot take because of anti rejection meds.   • Triazolam Anxiety     All anti anxiety medications cause hallucinations        PHYSICAL EXAM  VITAL SIGNS: /66   Pulse (!) 108   Temp 36.3 °C (97.4 °F)   Resp 20   Ht 1.778 m (5' 10\")   Wt 113 kg (250 lb)   SpO2 98%   BMI 35.87 kg/m²   Reviewed and tachypneic, tachycardic, normotensive  Constitutional: Well developed, Well nourished, pale.  HENT: Normocephalic, atraumatic, bilateral external ears normal, wearing a mask.   Eyes: PERRLA, conjunctiva pink, no scleral icterus.   Cardiovascular: Tachycardic rate and regular rhythm. No murmurs, rubs or gallops.  No dependent edema or calf tenderness  Respiratory: Lungs clear to auscultation bilaterally. No wheezes, rales, or rhonchi.  Abdominal:  Abdomen soft, diffusely-tender, non distended. No rebound, or guarding. Do not distinctly palpate the kidney.  Skin: No erythema, no rash. Pallor.  Genitourinary: No costovertebral angle tenderness.   Rectal: Stool is black-red color. Strongly heme positive stool.  Musculoskeletal: no deformities.   Neurologic: Alert & oriented x 3, cranial nerves 2-12 " intact by passive exam.  No focal deficit noted.  Psychiatric: Affect normal, Judgment normal, Mood normal.     DIFFERENTIAL DIAGNOSIS:  Upper GI bleed, lower GI bleed, gastritis, ulcers, polyps, diverticulitis.     EKG Interpretation:  Interpreted by me    Rhythm:  Tachycardic sinus rhythm   Rate: 111  Axis: normal  Ectopy: PAC  Conduction: RB pattern  ST Segments: no acute change  T Waves: no acute change  Q Waves: none  Clinical Impression: Sinus tachycardia, PAC, and RBBB     RADIOLOGY/PROCEDURES  DX-CHEST-PORTABLE (1 VIEW)   Final Result      1.  Patchy bilateral peripheral interstitial opacity. These findings are consistent with Covid 19 pneumonia.      2.  Cardiomegaly.        Radiologist interpretation have been reviewed by me.     LABORATORY:  Results for orders placed or performed during the hospital encounter of 03/17/22   APTT   Result Value Ref Range    APTT 47.1 (H) 24.7 - 36.0 sec   Prothrombin Time   Result Value Ref Range    PT 20.9 (H) 12.0 - 14.6 sec    INR 1.96 (H) 0.87 - 1.13   CBC WITH DIFFERENTIAL   Result Value Ref Range    WBC 11.0 (H) 4.8 - 10.8 K/uL    RBC 2.44 (L) 4.70 - 6.10 M/uL    Hemoglobin 7.3 (L) 14.0 - 18.0 g/dL    Hematocrit 23.4 (L) 42.0 - 52.0 %    MCV 95.9 81.4 - 97.8 fL    MCH 29.9 27.0 - 33.0 pg    MCHC 31.2 (L) 33.7 - 35.3 g/dL    RDW 70.9 (H) 35.9 - 50.0 fL    Platelet Count 109 (L) 164 - 446 K/uL    MPV 10.2 9.0 - 12.9 fL    Neutrophils-Polys 75.20 (H) 44.00 - 72.00 %    Lymphocytes 17.50 (L) 22.00 - 41.00 %    Monocytes 6.20 0.00 - 13.40 %    Eosinophils 0.30 0.00 - 6.90 %    Basophils 0.20 0.00 - 1.80 %    Immature Granulocytes 0.60 0.00 - 0.90 %    Nucleated RBC 0.00 /100 WBC    Neutrophils (Absolute) 8.31 (H) 1.82 - 7.42 K/uL    Lymphs (Absolute) 1.93 1.00 - 4.80 K/uL    Monos (Absolute) 0.68 0.00 - 0.85 K/uL    Eos (Absolute) 0.03 0.00 - 0.51 K/uL    Baso (Absolute) 0.02 0.00 - 0.12 K/uL    Immature Granulocytes (abs) 0.07 0.00 - 0.11 K/uL    NRBC (Absolute) 0.00 K/uL    Comp Metabolic Panel   Result Value Ref Range    Sodium 141 135 - 145 mmol/L    Potassium 4.2 3.6 - 5.5 mmol/L    Chloride 112 96 - 112 mmol/L    Co2 18 (L) 20 - 33 mmol/L    Anion Gap 11.0 7.0 - 16.0    Glucose 129 (H) 65 - 99 mg/dL    Bun 27 (H) 8 - 22 mg/dL    Creatinine 2.15 (H) 0.50 - 1.40 mg/dL    Calcium 8.5 8.4 - 10.2 mg/dL    AST(SGOT) 18 12 - 45 U/L    ALT(SGPT) 28 2 - 50 U/L    Alkaline Phosphatase 134 (H) 30 - 99 U/L    Total Bilirubin 0.4 0.1 - 1.5 mg/dL    Albumin 2.1 (L) 3.2 - 4.9 g/dL    Total Protein 5.1 (L) 6.0 - 8.2 g/dL    Globulin 3.0 1.9 - 3.5 g/dL    A-G Ratio 0.7 g/dL   SARS-COV Antigen GARFIELD   Result Value Ref Range    SARS-CoV-2 Source NP Swab     SARS-COV ANTIGEN GARFIELD NotDetected NotDetected   ESTIMATED GFR   Result Value Ref Range    GFR (CKD-EPI) 32 (A) >60 mL/min/1.73 m 2   TROPONIN   Result Value Ref Range    Troponin T 112 (H) 6 - 19 ng/L     Lab results reviewed by me.     INTERVENTIONS:Indications IV Fluid: NS infusion 250 mL for Hypotension  Medications   pantoprazole (Protonix) 80 mg in  mL Infusion (8 mg/hr Intravenous New Bag 3/17/22 1030)   pantoprazole (Protonix) injection 80 mg (80 mg Intravenous Given 3/17/22 1027)   NS infusion 250 mL (250 mL Intravenous New Bag 3/17/22 1126)   Packed red blood cells 2 units  Response: Improved.    ED COURSE:  Nursing notes, VS, PMSFHx reviewed in chart.     Review of patient medical records reveal history of ANCA glomerulonephritis which caused kidney failure. History of  A-fib and DVT, requires Eliquis. Admitted February 1, 2022 with COVID-19 pseudomonas infection and ultimately an upper GI bleed. Dr. Pryor found a duodenal ulcer.    9:57 AM - Patient seen and examined at bedside. Rectal exam was performed with female nurse chaperone. Patient will be treated with Protonix 80 mg injection once and Protonix 80 mg for his symptoms. Ordered eGFR, COD - Adult (type and screen), CMP, CBC w/ diff, Prothrombin time, APTT, and SARS-COV2  Antigen GARFIELD to evaluate.     10:32 AM Paged GI and Hospitalist.     10:41 AM I discussed the patient's case and the above findings with Dr. Nolen (Hospitalist) who will assess the patient for hospitalization.     10:43 AM Patient was reevaluated at bedside. Patient consented to blood. Patient is now hypotensive at 84 systolic. Patient will be treated with NS infusion 250 mL. Ordered Troponin.    11:56 AM Patient was reevaluated at bedside. After a bolus for hypotension, HR is down from 113 bpm but BP still in 80's so expedite blood transfusion.    12:40 PM Patient was reevaluated at bedside. HR is 96. Systolic 102 with blood transfusing.      I have explained to the patient the risks and benefits of transfusion of blood products.  This includes, as appropriate, the risk of mild allergic reaction, hemolytic reaction, transfusion-associated lung injury, febrile reactions, circulatory or iron overload, and infection.    We discussed possible alternatives and their risks, including directed donation, autologous transfusion, and no transfusion, including IV or oral iron supplementation, as appropriate.  I believe the patient understands the risks and benefits and was able to express understanding.    CRITICAL CARE  The very real possibilty of a deterioration of this patient's condition required the highest level of my preparedness for sudden, emergent intervention.  I provided critical care services, which included medication orders, frequent reevaluations of the patient's condition and response to treatment, ordering and reviewing test results, and discussing the case with various consultants. The critical care time associated with the care of the patient was 35 minutes. Review chart for interventions. This time is exclusive of any other billable procedures.     Case discussed 3 times with Dr. Nolen, twice with the intensivist, once with Dr. Nova nephrology and once with KAVEH Hines.    MEDICAL DECISION  MAKING:  Critically ill anticoagulated patient presents with upper GI bleed and anemia likely due to duodenal ulcer that failed a prior treatment.  He developed hypotension while here in the ER was treated with IV fluids and blood transfusion.  Patient will be admitted to the ICU.  He will need EGD and possibly interventional radiology.  He will need transfusion of blood pressure support.  Also has an intermediate elevation of troponin and chronic chest pain without acute ischemic changes on EKG.  Will need to be followed in cardiology consultation may be necessary.    PLAN:  As above    CONDITION: Critical.     FINAL IMPRESSION  1. Upper GI bleed    2. Duodenal ulcer    3. Anticoagulated    4.     The critical care time associated with the care of the patient was 35 minutes.    5.      Hypovolemic shock   Chelsea RODRIGUES (Scribe), am scribing for, and in the presence of, Jorge Carmichael M.D..    Electronically signed by: Chelsea Garcia (Scribe), 3/17/2022    Jorge RODRIGUES M.D. personally performed the services described in this documentation, as scribed by Chelsea Garcia in my presence, and it is both accurate and complete.    The note accurately reflects work and decisions made by me.  Jorge Carmichael M.D.  3/17/2022  2:04 PM

## 2022-03-17 NOTE — ED NOTES
No signs of transfusion reaction. VSS. Patient is now asleep in NAD. Will continue to assess and treat as appropriate.

## 2022-03-18 NOTE — ASSESSMENT & PLAN NOTE
"Patient with initial DVT in 2019, unclear if episode was provoked or unprovoked, last bilateral lower ext duplex was negative in Feb 2022  Given recent GI bleed x 2 times with multiple hemoclips and per GI \"at risk for major bleeding post EGD, patient might not be a candidate for continuation of anticoagulation. See above the has bled score as well under a fib/flutter, which was the othr reaason why he was on eliquis.  Once stable consider consultation with IR for IVC filter.  "

## 2022-03-18 NOTE — CARE PLAN
The patient is   Shift Goals  Clinical Goals: safety  Patient Goals: saftey    Progress made toward(s) clinical / shift goals:  ongoing    Patient is not progressing towards the following goals:

## 2022-03-18 NOTE — PROGRESS NOTES
Assumed care of pt, received bedside report from BRIDGETTE Quigley. Pt sitting up in bed, no complaints of pain, room air, A/Ox3, disoriented to time. Fall and safety precautions in place, strip alarm in place. Discussed POC with pt, pt verbalizes understanding. No further needs at this time.

## 2022-03-18 NOTE — CARE PLAN
The patient is Watcher - Medium risk of patient condition declining or worsening    Shift Goals  Clinical Goals: maintain SBP>90  Patient Goals: DENA    Progress made toward(s) clinical / shift goals:      Finished 2nd unit PRBC on unit.    Pt disoriented at this time.     1800 pt freq pvc. Notified Dr. Manley. No new orders    Patient is not progressing towards the following goals:

## 2022-03-18 NOTE — ANESTHESIA TIME REPORT
Anesthesia Start and Stop Event Times     Date Time Event    3/17/2022 1355 Ready for Procedure     1355 Anesthesia Start     1445 Anesthesia Stop        Responsible Staff  03/17/22    Name Role Begin End    Mitch Galdamez M.D. Anesth 1355 1445        Preop Diagnosis (Free Text):  Pre-op Diagnosis     Gi bleed        Preop Diagnosis (Codes):    Premium Reason  Non-Premium    Comments: emergency

## 2022-03-18 NOTE — PROGRESS NOTES
[facility] -  PROGRESS NOTE               Author: Aiyana Kumar M.D. 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 Pt had a prolonged hospitalization for COVID-19 2/1/22-3/5/22 and the stay was complicated by RADHA and transplant renal biopsy showed no rejection but did show primary IGA Nephropathy and his Cr had improved but then he developed ATN from GI bleed and his Cr was 1.6 at discharge. His baseline Cr prior to that hospitalization was ~1.1-1.4. Pt was seen yest in outpt Nephrology clinic and at that time he was confused and weak and had diarrhea and no recent labs since hospital dc so he was sent to the ER yest due to concern for his mental status and weakness. In the ER yest his labs showed Cr 1.59, he was given IVF's and his mental status improved so he was discharged back to his SNF. This am while at his SNF he was noted to have blood in his diaper and altered mental status so he was sent to [facility] ER. In the ER he was tachycardic, hypotensive with SBP's 80's and Cr 2.15, and his Hgb dropped from 9.3 yest to 7.3 in ER today. He is very drowsy at this time. He reports abd discomfort but no n/v. He cannot tell me if/when he noted blood in his diaper. He is confused. He is currently receiving a blood transfusion and he received fluids.     DAILY NEPHROLOGY SUMMARY:  3/18: No events, EGD yest showed duodenal ulcer with active bleeding treated with hemoclips, burgundy stools overnight, no BM so far this am, BP stable, currently not on pressors, BUN/Cr higher, more alert today, denies any CP/SOB/LE edema, does report some abd discomfort    REVIEW OF SYSTEMS:    10 point ROS reviewed and is as per HPI/daily summary or  "otherwise negative    PMH/PSH/SH/FH:   Reviewed and unchanged since admission note    CURRENT MEDICATIONS:   Reviewed from admission to present day    VS:  /58   Pulse 99   Temp 36.2 °C (97.2 °F) (Temporal)   Resp 13   Ht 1.778 m (5' 10\")   Wt 113 kg (250 lb)   SpO2 97%   BMI 35.87 kg/m²     Physical Exam  Vitals and nursing note reviewed.   Constitutional:       Appearance: Normal appearance.   HENT:      Head: Normocephalic and atraumatic.   Eyes:      General: No scleral icterus.  Cardiovascular:      Rate and Rhythm: Normal rate and regular rhythm.      Comments: No edema  Pulmonary:      Effort: Pulmonary effort is normal. No respiratory distress.      Breath sounds: Normal breath sounds.   Abdominal:      General: Bowel sounds are normal. There is no distension.      Palpations: Abdomen is soft.      Tenderness: There is abdominal tenderness (mild epigastric).   Musculoskeletal:         General: No deformity.      Right lower leg: No edema.      Left lower leg: No edema.   Skin:     General: Skin is warm and dry.      Findings: No rash.   Neurological:      General: No focal deficit present.      Mental Status: He is alert.      Comments: Oriented x2   Psychiatric:         Mood and Affect: Mood normal.         Behavior: Behavior normal.         Fluids:  In: 3210.8 [I.V.:1133.3; Blood:1115]  Out: -     LABS:  Recent Labs     03/16/22  1234 03/17/22  1010 03/18/22  0345   SODIUM 139 141 143   POTASSIUM 3.8 4.2 4.5   CHLORIDE 107 112 115*   CO2 18* 18* 18*   GLUCOSE 171* 129* 87   BUN 21 27* 31*   CREATININE 1.81* 2.15* 2.72*   CALCIUM 9.3 8.5 8.3*       IMAGING:   All imaging reviewed from admission to present day    IMPRESSION:  # RADHA on CKD Stage Stage III-A--pt with RADHA during recent hospitalization and Cr had improved to ~1.6 at dc on 3/5/22, now with RADHA again  - Had renal biopsy 2/2022 that was negative for rejection and showed primary IgA Nephropathy  - Had ATN in 2/2022 secondary to GI " Bleed  - Unclear recent baseline but Cr higher than at time of last discharge  - Now with RADHA again likely related to hypoperusion vs ATN   # Anemia--secondary to GI Bleed  - Recent upper GI bleed in 2/2022 secondary to Duodenal ulcer  - On eliquis as outpt for AFib and h/o DVT  - Received PRBC transfusion 3/17  - EGD 3/17/22 showed bleeding duodenal ulcer treated with hemoclips  # Living Unrelated Renal Transplant  - On tacrolimus, MMF, prednisone as outpt  # Hypotension--likely hypovolemia related to GI bleed  -   # Thrombocytopenia--mild, monitor  # Metabolic Acidosis--mild, monitor  # Elevated Troponin--monitor  # Altered Mental Status--monitor  # Atrial Fibrillation--on eliquis as outpt        PLAN:  - No compelling indication for RRT but will monitor closely  - Recommend IVF hydration with NS at gentle rate of 75cc/hr  - Continue cellcept and prednisone  - Consider stress dose steroids if hypotensive  - Hold tonight's tacrolimus dose and decrease tacrolimus to 0.5mg q12hrs starting tomorrow  - Check ultrasound of transplant kidney  - GI following  - Transfuse for Hgb less than 7  - Pressors if needed to maintain MAP greater than 65  - Dose adjust all meds for decreased GFR  - Daily evaluation for RRT needs    **Discussed with ICU RN

## 2022-03-18 NOTE — CARE PLAN
Problem: Knowledge Deficit - Standard  Goal: Patient and family/care givers will demonstrate understanding of plan of care, disease process/condition, diagnostic tests and medications  Outcome: Progressing     Problem: Fall Risk  Goal: Patient will remain free from falls  Outcome: Progressing   The patient is Watcher - Medium risk of patient condition declining or worsening    Shift Goals  Clinical Goals: Hgb remain >7  Patient Goals: Eating/Rest    Progress made toward(s) clinical / shift goals:  Pt will maintain Hgb >7, MAP will remain >65.    Patient is not progressing towards the following goals:       Ftsg Text: The defect edges were debeveled with a #15 scalpel blade.  Given the location of the defect, shape of the defect and the proximity to free margins a full thickness skin graft was deemed most appropriate.  Using a sterile surgical marker, the primary defect shape was transferred to the donor site. The area thus outlined was incised deep to adipose tissue with a #15 scalpel blade.  The harvested graft was then trimmed of adipose tissue until only dermis and epidermis was left.  The skin margins of the secondary defect were undermined to an appropriate distance in all directions utilizing iris scissors.  The secondary defect was closed with interrupted buried subcutaneous sutures.  The skin edges were then re-apposed with running  sutures.  The skin graft was then placed in the primary defect and oriented appropriately.

## 2022-03-18 NOTE — PROGRESS NOTES
Gastroenterology Progress Note     Author: Roni Campbell M.D.   Date & Time Created: 3/18/2022 8:48 AM    Chief Complaint:  I have a gi bleed    Interval History:  S: Patient examined and interviewed, course reviewed.   Discussed with ICU RN.  Hg is 8.8 this morning, after two units of packed red cells.  He denies abdominal pain, nausea, vomiting, cp, palpitations.  Per nursing he was agitated at times overnoc.  He wants water by mouth.  His Mg is low as and his creatinine is rising (2.72, BUN 31).  He is on ppi gtt.      Review of Systems:  ROS    Physical Exam:  Physical Exam  Vitals and nursing note reviewed.   Constitutional:       General: He is not in acute distress.     Appearance: He is obese.   HENT:      Head: Normocephalic and atraumatic.      Mouth/Throat:      Mouth: Mucous membranes are dry.   Eyes:      General: No scleral icterus.        Right eye: No discharge.         Left eye: No discharge.      Pupils: Pupils are equal, round, and reactive to light.   Cardiovascular:      Rate and Rhythm: Normal rate and regular rhythm.      Heart sounds: No murmur heard.    No gallop.   Pulmonary:      Effort: Pulmonary effort is normal. No respiratory distress.      Breath sounds: Normal breath sounds. No stridor.   Abdominal:      General: Abdomen is flat. Bowel sounds are normal. There is no distension.      Tenderness: There is abdominal tenderness. There is no guarding.   Musculoskeletal:         General: No swelling or tenderness.   Skin:     Coloration: Skin is not jaundiced.      Findings: No bruising.   Neurological:      General: No focal deficit present.      Mental Status: He is alert.   Psychiatric:         Mood and Affect: Mood normal.         Labs:          Recent Labs     03/16/22  1234 03/17/22  1010 03/18/22  0345   SODIUM 139 141 143   POTASSIUM 3.8 4.2 4.5   CHLORIDE 107 112 115*   CO2 18* 18* 18*   BUN 21 27* 31*   CREATININE 1.81* 2.15* 2.72*   MAGNESIUM  --   --  1.1*   PHOSPHORUS   --   --  3.5   CALCIUM 9.3 8.5 8.3*     Recent Labs     22  1234 22  1010 22  0345   ALTSGPT 42 28  --    ASTSGOT 20 18  --    ALKPHOSPHAT 176* 134*  --    TBILIRUBIN 0.6 0.4  --    GLUCOSE 171* 129* 87     Recent Labs     22  1234 22  1010 22  0345   RBC 3.13* 2.44* 2.89*   HEMOGLOBIN 9.3* 7.3* 8.8*   HEMATOCRIT 29.5* 23.4* 27.3*   PLATELETCT 112* 109* 93*   PROTHROMBTM 18.1* 20.9*  --    APTT 42.6* 47.1*  --    INR 1.55* 1.96*  --      Recent Labs     22  1234 22  1010 22  0345   WBC 9.7 11.0* 8.7   NEUTSPOLYS 93.00* 75.20* 66.50   LYMPHOCYTES 4.40* 17.50* 24.10   MONOCYTES 1.70 6.20 8.00   EOSINOPHILS 0.90 0.30 0.60   BASOPHILS 0.00 0.20 0.20   ASTSGOT 20 18  --    ALTSGPT 42 28  --    ALKPHOSPHAT 176* 134*  --    TBILIRUBIN 0.6 0.4  --      Hemodynamics:  Temp (24hrs), Av.6 °C (97.9 °F), Min:36.1 °C (96.9 °F), Max:37.9 °C (100.2 °F)  Temperature: 36.2 °C (97.2 °F)  Pulse  Av.8  Min: 55  Max: 117   Blood Pressure : 113/58     Respiratory:    Respiration: 13, Pulse Oximetry: 97 %           Fluids:    Intake/Output Summary (Last 24 hours) at 3/18/2022 0848  Last data filed at 3/18/2022 0700  Gross per 24 hour   Intake 3235.83 ml   Output --   Net 3235.83 ml     Weight: 113 kg (250 lb)  GI/Nutrition:  Orders Placed This Encounter   Procedures   • Diet Order Diet: Clear Liquid (no red)     Standing Status:   Standing     Number of Occurrences:   1     Order Specific Question:   Diet:     Answer:   Clear Liquid [10]     Comments:   no red     Medical Decision Making, by Problem:  Active Hospital Problems    Diagnosis    • *GI bleed [K92.2]    • Leukocytosis [D72.829]    • Acute blood loss anemia [D62]    • Thrombocytopenia (HCC) [D69.6]    • Chronic anticoagulation [Z79.01]    • Essential hypertension [I10]    • Atrial flutter  [I48.92]    • Kidney transplant status, living related donor [Z94.0]      IMPRESSION(S):  1) GI bleed  2) Anemia with component of  acute GI blood loss  3) Hematochezia/melena- as above  4) Duodenal ulcer with visible vessel, actively bleeding - status post endoscopic hemostasis yesterday.  High risk for rebleeding  5) Renal insufficiency  6) Hypomagnesemia  7) Atrial fibrillation- long term eliquis  2  Plan:  1) Continue pantoprazole gtt  2) CLD now, no reds  3) Sucralfate solution 1 gm po qid   4) Check gastrin, INR with next hemoglobin/cbc check today  5) CBC at 1200 please  6) Replete Mg   7) Repeat BUN/Cr later today and consider work up     Complex patient, management of intravenous medications, high risk for rebleed.     Quality-Core Measures   Reviewed items::  Medications reviewed, Labs reviewed, Radiology images reviewed and EKG reviewed

## 2022-03-18 NOTE — ANESTHESIA POSTPROCEDURE EVALUATION
Patient: Krishna Acevedo    Procedure Summary     Date: 03/17/22 Room / Location:  ENDOSCOPIC ULTRASOUND ROOM / SURGERY [facility]    Anesthesia Start: 1355 Anesthesia Stop: 1445    Procedures:       GASTROSCOPY (N/A Abdomen)      EGD, WITH CLIP PLACEMENT (Abdomen) Diagnosis: (Same)    Surgeons: Roni Campbell M.D. Responsible Provider: Mitch Galdamez M.D.    Anesthesia Type: general ASA Status: 4 - Emergent          Final Anesthesia Type: general  Last vitals  BP   Blood Pressure : 140/75    Temp   36.2 °C (97.2 °F)    Pulse   (!) 105   Resp   18    SpO2   94 %      Anesthesia Post Evaluation    Patient location during evaluation: PACU  Patient participation: complete - patient participated  Level of consciousness: awake and alert  Pain score: 2    Airway patency: patent  Anesthetic complications: no  Cardiovascular status: hemodynamically stable  Respiratory status: acceptable  Hydration status: euvolemic    PONV: none          No complications documented.     Nurse Pain Score: 0 (NPRS)

## 2022-03-18 NOTE — PROGRESS NOTES
"Pulmonary Progress Note    Date of admission  3/17/2022    Chief Complaint  72 y.o. male admitted 3/17/2022 with GI bleed    Hospital Course  Per Dr Manley note from 3/17/21: \" 72 y.o. male with history of ANCA glomerulonephritis s/p renal transplant 2008, atrial fibrillation and prior DVT on Eliquis, recent COVID infection and GI bleed 2/2 duodenal ulcer early 2/2022 who presented 3/17/2022 with rectal bleeding.  Patient was seen in ER on 3/16 PM w/diarrhea, vomiting and CP - w/u was unremarkable and he improved after getting IVFs so he was discharged back to SNF. He woke up 3/17 AM with blood in his diaper, tachycardia and confusion so he was sent to ER. He also reports L side abdominal and n/v.    In ED, patient was started on protonix drip and given 250cc IVFs and 1u pRBC after which his BP and HR improved. He was taken to GI suite for EGD - found to have recurrent actively bleeding duodenal ulcer treated with epi injection and 3 hemoclips with hemostasis. He was started on Kcentra and 2nd unit of pRBCs during the procedure as well. Patient arrived in ICU alert and HDS\"    Interval Problem Update  Reviewed last 24 hour events:  GI: He is s/p- EGD where he was found with active bleeding and s/p endoscopy hemostasis yesterday (3/17) with 3 hemoclips, noted that he did have an EGD on 3/5 and had 4 hemoclips at that time. GI following. On PPI for 48 hrs, then daily with sucralfate. Clear liquids no reds for 24 hrs. Will check gastric, cbc at 2 pm today  Card: hemodynamic stable, underlying A fib but rate controlled.  Hem: Hb/Ht stable pos transfussion of 2 RBCs  :follow up urine output,  Balance +3.9 lt  Neuro: no localize deficit, alert and oriented  Renal: Nephro consulted, appreciate recs, holding tacrolimus, continue cellcept and  Prednisone, Check mag, phos at 2pm  Pulm: on room air, hx of DVT for what he was on DOAC, once fully stabilized will consider discussion for IVC filter.    Review of " Systems  Review of Systems   Constitutional: Positive for malaise/fatigue. Negative for chills, fever and weight loss.   HENT: Negative for congestion, nosebleeds and sore throat.    Eyes: Negative for discharge and redness.   Respiratory: Negative for cough, shortness of breath and wheezing.    Cardiovascular: Negative for chest pain, palpitations and leg swelling.   Gastrointestinal: Positive for abdominal pain and blood in stool. Negative for nausea and vomiting.   Genitourinary: Negative for dysuria.   Musculoskeletal: Negative for myalgias.   Skin: Negative for rash.   Neurological: Negative for dizziness and headaches.   Endo/Heme/Allergies: Does not bruise/bleed easily.   Psychiatric/Behavioral: Negative for memory loss.   All other systems reviewed and are negative.       Vital Signs for last 24 hours   Temp:  [36.1 °C (96.9 °F)-37.9 °C (100.2 °F)] 36.2 °C (97.2 °F)  Pulse:  [] 105  Resp:  [12-35] 18  BP: ()/(43-76) 140/75  SpO2:  [87 %-100 %] 94 %    Hemodynamic parameters for last 24 hours       Respiratory Information for the last 24 hours       Physical Exam   Physical Exam  Vitals and nursing note reviewed.   Constitutional:       General: He is not in acute distress.     Appearance: He is not toxic-appearing.   HENT:      Head: Normocephalic and atraumatic.      Nose: Nose normal.      Mouth/Throat:      Mouth: Mucous membranes are dry.   Eyes:      Extraocular Movements: Extraocular movements intact.   Cardiovascular:      Rate and Rhythm: Tachycardia present. Rhythm irregular.      Pulses: Normal pulses.      Heart sounds: No murmur heard.    No friction rub. No gallop.   Pulmonary:      Effort: Pulmonary effort is normal.      Breath sounds: Normal breath sounds. No wheezing or rhonchi.   Abdominal:      General: Bowel sounds are normal. There is no distension.      Palpations: Abdomen is soft.      Tenderness: There is abdominal tenderness. There is no guarding or rebound.    Musculoskeletal:         General: Normal range of motion.      Cervical back: Normal range of motion. No rigidity.      Right lower leg: No edema.      Left lower leg: No edema.   Skin:     General: Skin is warm.      Capillary Refill: Capillary refill takes less than 2 seconds.      Findings: No rash.   Neurological:      General: No focal deficit present.      Mental Status: He is alert and oriented to person, place, and time.   Psychiatric:         Mood and Affect: Mood normal.         Medications  Current Facility-Administered Medications   Medication Dose Route Frequency Provider Last Rate Last Admin   • sucralfate (CARAFATE) 1 GM/10ML suspension 1 g  1 g Oral Q6HRS Roni Campbell M.D.   1 g at 03/18/22 1142   • magnesium sulfate IVPB premix 4 g  4 g Intravenous Once Kenton Churchill M.D. 25 mL/hr at 03/18/22 1139 4 g at 03/18/22 1139   • [START ON 3/19/2022] tacrolimus (PROGRAF) capsule 0.5 mg  0.5 mg Oral Q12HRS Aiyana Kumar M.D.       • NS infusion   Intravenous Continuous Aiyana Kumar M.D. 75 mL/hr at 03/18/22 1138 New Bag at 03/18/22 1138   • pantoprazole (Protonix) 80 mg in  mL Infusion  8 mg/hr Intravenous Continuous Lola Manley M.D. 25 mL/hr at 03/18/22 0928 8 mg/hr at 03/18/22 0928   • polyethylene glycol/lytes (MIRALAX) PACKET 1 Packet  1 Packet Oral QDAY PRN Lola Manley M.D.        And   • magnesium hydroxide (MILK OF MAGNESIA) suspension 30 mL  30 mL Oral QDAY PRN Lola Manley M.D.        And   • bisacodyl (DULCOLAX) suppository 10 mg  10 mg Rectal QDAY PRN Lola Manley M.D.       • mycophenolate (CELLCEPT) capsule 500 mg  500 mg Oral BID Lola Manley M.D.   500 mg at 03/18/22 0507   • predniSONE (DELTASONE) tablet 5 mg  5 mg Oral DAILY Lola Manley M.D.   5 mg at 03/18/22 0507     Facility-Administered Medications Ordered in Other Encounters   Medication Dose Route Frequency Provider Last Rate Last Admin   • lidocaine PF  (XYLOCAINE-MPF) 2 % injection PF   Intravenous PRPAUL Galdamez M.D.   80 mg at 03/17/22 1411   • propofol (DIPRIVAN) injection   Intravenous PRPAUL Galdamez M.D.   100 mg at 03/17/22 1411   • rocuronium (ZEMURON) injection   Intravenous PRPAUL Galdamez M.D.   50 mg at 03/17/22 1411   • phenylephrine (LAINE-SYNEPHRINE) injection   Intravenous PRPAUL Galdamez M.D.   150 mcg at 03/17/22 1411   • sugammadex (BRIDION) injection   Intravenous PRPAUL Galdamez M.D.   200 mg at 03/17/22 1428       Fluids    Intake/Output Summary (Last 24 hours) at 3/18/2022 1247  Last data filed at 3/18/2022 1200  Gross per 24 hour   Intake 3987.5 ml   Output 0 ml   Net 3987.5 ml       Laboratory          Recent Labs     03/16/22  1234 03/17/22  1010 03/18/22  0345   SODIUM 139 141 143   POTASSIUM 3.8 4.2 4.5   CHLORIDE 107 112 115*   CO2 18* 18* 18*   BUN 21 27* 31*   CREATININE 1.81* 2.15* 2.72*   MAGNESIUM  --   --  1.1*   PHOSPHORUS  --   --  3.5   CALCIUM 9.3 8.5 8.3*     Recent Labs     03/16/22  1234 03/17/22  1010 03/18/22  0345   ALTSGPT 42 28  --    ASTSGOT 20 18  --    ALKPHOSPHAT 176* 134*  --    TBILIRUBIN 0.6 0.4  --    GLUCOSE 171* 129* 87     Recent Labs     03/16/22  1234 03/17/22  1010 03/18/22  0345   WBC 9.7 11.0* 8.7   NEUTSPOLYS 93.00* 75.20* 66.50   LYMPHOCYTES 4.40* 17.50* 24.10   MONOCYTES 1.70 6.20 8.00   EOSINOPHILS 0.90 0.30 0.60   BASOPHILS 0.00 0.20 0.20   ASTSGOT 20 18  --    ALTSGPT 42 28  --    ALKPHOSPHAT 176* 134*  --    TBILIRUBIN 0.6 0.4  --      Recent Labs     03/16/22  1234 03/17/22  1010 03/18/22  0345   RBC 3.13* 2.44* 2.89*   HEMOGLOBIN 9.3* 7.3* 8.8*   HEMATOCRIT 29.5* 23.4* 27.3*   PLATELETCT 112* 109* 93*   PROTHROMBTM 18.1* 20.9*  --    APTT 42.6* 47.1*  --    INR 1.55* 1.96*  --        Imaging  X-Ray:  My impression is: Patchy bilateral peripheral interstitial opacities and cardiomegaly      Lower ext venous duplex 2/4/22  FINDINGS:   Right lower extremity.    No deep  venous thrombosis.    All veins demonstrate complete color filling and compressibility with    normal venous flow dynamics including spontaneous flow and respiratory    phasicity.     Left lower extremity.    No deep venous thrombosis.    All veins demonstrate complete color filling and compressibility with    normal venous flow dynamics including spontaneous flow and respiratory    phasicity.    Reflux demonstrated during normal respiration at the common femoral vein    with a time of 1024 ms.    Lower ext venous duplex 12/15/21  Vascular Laboratory   CONCLUSIONS   Dilation of the right common femoral vein with residual chronic    thrombus/scarring, non-occlusive. Deep venous reflux within the femoral    vein at the proximal and mid thigh, and the popliteal vein. Incompetent     vein at the right proximal/mid calf level.    No left lower extremity venous thrombus and no evidence of deep or    superficial venous reflux.    Lower ext venous duplex 11/12/19  Vascular Laboratory   CONCLUSIONS   Deep venous thrombosis within the right distal femoral vein and popliteal    veins.    Limited evaluation of the posterior tibial and peroneal veins. Thrombus not    excluded.    Assessment/Plan  * GI bleed- (present on admission)  Assessment & Plan  - Patient with BRBPR, no known episodes of hematemesis. S/p EGD w/recurrent actively bleeding duodenal ulcer treated with epi injection and 3 hemoclips w/o any further active bleeding noted on 3/5.   - admitted to ICU on 3/17  - 2 large bore IVs for access  - s/p 2u pRBC with stable H/H, check H/H at 2 pm today  - continue protonix gtt for 48 hrs  - appreciate GI recs  - s/p IVF boluses - patient is on RA and was likely hypovolemic on admission, hemodynamic stable now.  - reglan prn nausea  - INR elevated at 1.9, holding all anticoagulation and given Kcentra to reverse Eliquis      Leukocytosis  Assessment & Plan  Likely reactive in the setting of GIB - no overt e/o  "infection.    - f/u UA  - monitor    Thrombocytopenia (HCC)- (present on admission)  Assessment & Plan  Chronic - plt usually in 50-70s, now in 100s    - monitor    Acute blood loss anemia- (present on admission)  Assessment & Plan  2/2 GIB - see plan above    Chronic anticoagulation- (present on admission)  Assessment & Plan  Hold all anticoagulation in the setting of GIB and has bled score    Acute venous embolism and thrombosis of deep vessels of proximal lower extremity (HCC)- (present on admission)  Assessment & Plan  Patient with initial DVT in 2019, unclear if episode was provoked or unprovoked, last bilateral lower ext duplex was negative in Feb 2022  Given recent GI bleed x 2 times with multiple hemoclips and per GI \"at risk for major bleeding post EGD, patient might not be a candidate for continuation of anticoagulation. See above the has bled score as well under a fib/flutter, which was the othr reaason why he was on eliquis.  Once stable consider consultation with IR for IVC filter.    Essential hypertension- (present on admission)  Assessment & Plan  - hold home anti-hypertensives in the setting of acute GIB    Kidney transplant status, living related donor- (present on admission)  Assessment & Plan  Hx of ANCA glomerulonephritis s/p renal transplant 2008  - appreciate nephrology recs  - monitor renal function  - avoid nephrotoxic meds   - hold tacrolimus per nephro recommendations,   - level tacrolimus pending  - continue with cellcept and prednisone  - US kidney today  - dose medications based on renal function    Atrial flutter - (present on admission)  Assessment & Plan  On rate controlled  Was on eliquis but had GI bleed x 2, not a candidate for continuation of therapy for this problem  Has bled score 4 points, Risk was 8.9% in one validation study (Lip 2011) and 8.70 bleeds per 100 patient-years in another validation study (Pisters 2010). Alternatives to anticoagulation should be considered: " Patient is at high risk for major bleeding.         VTE:  Contraindicated  Ulcer: PPI  Lines: None    I have performed a physical exam and reviewed and updated ROS and Plan today (3/18/2022). In review of yesterday's note (3/17/2022), there are no changes except as documented above.     Discussed patient condition and risk of morbidity and/or mortality with RN, RT, Pharmacy, Charge nurse / hot rounds and Patient  The patient remains critically ill.  Critical care time = 58 minutes in directly providing and coordinating critical care and extensive data review.  No time overlap and excludes procedures.

## 2022-03-18 NOTE — PROGRESS NOTES
4 Eyes Skin Assessment Completed by Michael Briggs, RN and BRIDGETTE Nazario.    Head WDL  Ears WDL  Nose WDL  Mouth WDL  Neck WDL  Breast/Chest WDL  Shoulder Blades WDL  Spine WDL  (R) Arm/Elbow/Hand WDL  (L) Arm/Elbow/Hand WDL  Abdomen WDL  Groin WDL  Scrotum/Coccyx/Buttocks WDL  (R) Leg WDL  (L) Leg WDL  (R) Heel/Foot/Toe Jaundice  (L) Heel/Foot/Toe WDL          Devices In Places Blood Pressure Cuff, Pulse Ox and SCD's      Interventions In Place Low Air Loss Mattress    Possible Skin Injury No    Pictures Uploaded Into Epic N/A  Wound Consult Placed N/A  RN Wound Prevention Protocol Ordered No

## 2022-03-19 NOTE — CARE PLAN
The patient is Watcher - Medium risk of patient condition declining or worsening    Shift Goals  Clinical Goals: Hgb >7, monitor vitals  Patient Goals: rest    Progress made toward(s) clinical / shift goals:  monitoring vitals Q4 hours, will assess Hgb in morning with AM labs    Patient is not progressing towards the following goals:      Problem: Knowledge Deficit - Standard  Goal: Patient and family/care givers will demonstrate understanding of plan of care, disease process/condition, diagnostic tests and medications  Outcome: Progressing  Note: Safety precautions in place, patient verbalized understanding of fall precatuions.     Problem: Fall Risk  Goal: Patient will remain free from falls  Outcome: Progressing  Note: Patient calls staff appropriately.      Problem: Risk for Fluid Volume Deficit Related to Bleeding  Goal: Fluid volume balance will be maintained  Outcome: Progressing  Note: Monitoring abnormal lab values including H&H

## 2022-03-19 NOTE — DISCHARGE PLANNING
Anticipated Discharge Disposition: [facility]    Action: Discussed pt in IDT rounds. Per MD, pt came from [facility]. Per MD, anticipates medical clearance in 2-3 days.     Per chart review, pt is confused and Ox2. LSW called pt's spouse Marianne at [phone] to discuss pt going back to [facility]. Marianne states they have been happy with [facility] and consented for pt to go back to [facility] when medically cleared.    LSW placed SNF referral per protocol and faxed SNF choice form to [facility].    Barriers to Discharge: None    Plan: LSW to assist with transfer to SNF when pt is medically cleared.

## 2022-03-19 NOTE — ASSESSMENT & PLAN NOTE
Remains on chronic immunosuppressive therapy with a combination of tacrolimus 1 mg every morning and 0.5 mg nightly, CellCept 500 mg twice daily, prednisone 5 mg daily

## 2022-03-19 NOTE — ASSESSMENT & PLAN NOTE
At this point Protonix 40 mg IV twice daily while in the hospital, point discharge GI is okay with the patient starting oral PPI

## 2022-03-19 NOTE — PROGRESS NOTES
[facility] Medicine Daily Progress Note    Date of Service  3/19/2022    Chief Complaint  Krishan Acevedo is a 72 y.o. male admitted 3/17/2022 with Gi bleed    Hospital Course  Patient is a 72 year old male with history of ANCA glomerulonephritis s/p renal transplant 2008, atrial fibrillation and prior DVT on Eliquis, recent COVID infection and GI bleed 2/2 duodenal ulcer early 2/2022. He presented to [facility] on 3/17/2022 with rectal bleeding.  He was seen in the ER on 3/16 with diarrhea, vomiting and chest pain, however his work up with unremarkable and he improved with fluids so he was discharged to a SNF. On 3/17, he woke up with blood in his diaper, tachycardia and confusion so he was sent to back to the ER. He also reported left sided abdominal pain, nausea and vomiting and at time.     In the ER, he was started on a protonix drip and given 250cc IVFs and transfused one unit of PRBC. He was also started on Kcentra and then underwent an EGD. This revealed an acute, recurrently bleeding  duodenal ulcer.  He was treated with epi injection and 3 hemoclips were placed.     Interval Problem Update  Axox3, He denies pain, denies any GI bleeding overnight, States he did not sleep well due to multiple issues with his IV overnight (now resolved)   H/h decreased slightly, no chest pain, no sob. ROS otherwise negative.     I have personally seen and examined the patient at bedside. I discussed the plan of care with patient, nursing, case management, nephrology and GI    Consultants/Specialty  [facility]    Code Status  Full Code    Disposition  Patient is not medically cleared for discharge.   Anticipate discharge to snf    Review of Systems  Review of Systems   Constitutional: Negative.  Negative for chills, diaphoresis, fever, malaise/fatigue and weight loss.   HENT: Negative.  Negative for sore throat.    Eyes: Negative.  Negative for blurred vision.   Respiratory: Negative.  Negative for cough  and shortness of breath.    Cardiovascular: Negative.  Negative for chest pain, palpitations and leg swelling.   Gastrointestinal: Negative.  Negative for abdominal pain, nausea and vomiting.   Genitourinary: Negative.  Negative for dysuria.   Musculoskeletal: Negative.  Negative for myalgias.   Skin: Negative.  Negative for itching and rash.   Neurological: Positive for weakness. Negative for dizziness, focal weakness and headaches.   Endo/Heme/Allergies: Negative.  Does not bruise/bleed easily.   Psychiatric/Behavioral: Negative.  Negative for depression, substance abuse and suicidal ideas.   All other systems reviewed and are negative.       Physical Exam  Temp:  [36.2 °C (97.2 °F)-36.9 °C (98.4 °F)] 36.9 °C (98.4 °F)  Pulse:  [100-105] 100  Resp:  [13-20] 20  BP: (114-140)/(57-75) 138/68  SpO2:  [92 %-97 %] 97 %    Physical Exam  Vitals and nursing note reviewed. Exam conducted with a chaperone present.   Constitutional:       General: He is not in acute distress.     Appearance: Normal appearance. He is not diaphoretic.   HENT:      Head: Normocephalic.      Nose: Nose normal. No congestion.      Mouth/Throat:      Mouth: Mucous membranes are moist.      Pharynx: Oropharynx is clear. No oropharyngeal exudate or posterior oropharyngeal erythema.   Eyes:      Pupils: Pupils are equal, round, and reactive to light.   Cardiovascular:      Rate and Rhythm: Normal rate and regular rhythm.      Pulses: Normal pulses.      Heart sounds: Normal heart sounds. No murmur heard.    No gallop.   Pulmonary:      Effort: Pulmonary effort is normal. No respiratory distress.      Breath sounds: Normal breath sounds. No wheezing or rales.   Abdominal:      General: Abdomen is flat. Bowel sounds are normal. There is no distension.      Palpations: Abdomen is soft.      Tenderness: There is no abdominal tenderness. There is no guarding.   Musculoskeletal:         General: No swelling or deformity. Normal range of motion.       Cervical back: Normal range of motion. No rigidity.   Skin:     General: Skin is warm and dry.      Capillary Refill: Capillary refill takes less than 2 seconds.      Coloration: Skin is not jaundiced.   Neurological:      General: No focal deficit present.      Mental Status: He is alert and oriented to person, place, and time.      Cranial Nerves: No cranial nerve deficit.      Sensory: No sensory deficit.      Motor: No weakness.   Psychiatric:         Mood and Affect: Mood normal.         Behavior: Behavior normal.         Fluids    Intake/Output Summary (Last 24 hours) at 3/19/2022 0734  Last data filed at 3/19/2022 0540  Gross per 24 hour   Intake 751.67 ml   Output 550 ml   Net 201.67 ml       Laboratory  Recent Labs     03/17/22  1010 03/18/22  0345 03/18/22  1430   WBC 11.0* 8.7 7.4   RBC 2.44* 2.89* 2.57*   HEMOGLOBIN 7.3* 8.8* 7.8*   HEMATOCRIT 23.4* 27.3* 23.8*   MCV 95.9 94.5 92.6   MCH 29.9 30.4 30.4   MCHC 31.2* 32.2* 32.8*   RDW 70.9* 62.1* 58.1*   PLATELETCT 109* 93* 84*   MPV 10.2 10.4 9.7     Recent Labs     03/16/22  1234 03/17/22  1010 03/18/22  0345   SODIUM 139 141 143   POTASSIUM 3.8 4.2 4.5   CHLORIDE 107 112 115*   CO2 18* 18* 18*   GLUCOSE 171* 129* 87   BUN 21 27* 31*   CREATININE 1.81* 2.15* 2.72*   CALCIUM 9.3 8.5 8.3*     Recent Labs     03/16/22  1234 03/17/22  1010 03/18/22  1215 03/18/22  2132 03/19/22  0638   APTT 42.6* 47.1*  --   --   --    INR 1.55* 1.96* 1.22* 1.31* 1.29*               Imaging  US-RENAL TRANSPLANT COMP   Final Result      1.  Prior right lower quadrant renal transplant. The transplanted kidney demonstrates elevated resistive indices which can be seen with renal parenchymal disease/projection.      2.  No evidence of anastomotic stenosis.      3.  No evidence of hydronephrosis or perinephric fluid.      DX-CHEST-PORTABLE (1 VIEW)   Final Result      1.  Patchy bilateral peripheral interstitial opacity. These findings are consistent with Covid 19 pneumonia.       2.  Cardiomegaly.           Assessment/Plan  No new Assessment & Plan notes have been filed under this hospital service since the last note was generated.  Service: Hospital Medicine       VTE prophylaxis: SCDs/TEDs    I have performed a physical exam and reviewed and updated ROS and Plan today (3/19/2022). In review of yesterday's note (3/18/2022), there are no changes except as documented above.

## 2022-03-19 NOTE — PROGRESS NOTES
Gastroenterology Progress Note     Author: Roni Campbell M.D.   Date & Time Created: 3/19/2022 7:22 AM    Chief Complaint:  I had a bleed    Interval History:  S: Patient examined and interviewed, course reviewed.   Hemoglobin noted to be 7.8 thi AM (from 8.8).  Patient had a large green-brown bowel movement overnight.  On PPI gtt, taking CLD. Patient hungry.    No abd pain, nausea, vomiting, bright red blood per rectum.  No cp, palpitations.     Review of Systems:  ROS   All systems negative per cms criteria except as noted above.     Physical Exam:  Physical Exam  Vitals and nursing note reviewed.   Constitutional:       General: He is not in acute distress.     Appearance: He is obese.   HENT:      Head: Normocephalic and atraumatic.      Mouth/Throat:      Mouth: Mucous membranes are dry.   Eyes:      General: No scleral icterus.        Right eye: No discharge.         Left eye: No discharge.      Pupils: Pupils are equal, round, and reactive to light.   Cardiovascular:      Rate and Rhythm: Normal rate and regular rhythm.      Heart sounds: No murmur heard.    No gallop.   Pulmonary:      Effort: Pulmonary effort is normal. No respiratory distress.      Breath sounds: Normal breath sounds. No stridor.   Abdominal:      General: Abdomen is flat. Bowel sounds are normal. There is no distension.      Tenderness: There is abdominal tenderness. There is no guarding.   Musculoskeletal:         General: No swelling or tenderness.   Skin:     Coloration: Skin is not jaundiced.      Findings: No bruising.   Neurological:      General: No focal deficit present.      Mental Status: He is alert.   Psychiatric:         Mood and Affect: Mood normal.         Labs:          Recent Labs     03/16/22  1234 03/17/22  1010 03/18/22  0345 03/18/22  1430   SODIUM 139 141 143  --    POTASSIUM 3.8 4.2 4.5  --    CHLORIDE 107 112 115*  --    CO2 18* 18* 18*  --    BUN 21 27* 31*  --    CREATININE 1.81* 2.15* 2.72*  --     MAGNESIUM  --   --  1.1* 1.7   PHOSPHORUS  --   --  3.5 3.0   CALCIUM 9.3 8.5 8.3*  --      Recent Labs     22  1234 22  1010 22  0345   ALTSGPT 42   --    ASTSGOT   --    ALKPHOSPHAT 176* 134*  --    TBILIRUBIN 0.6 0.4  --    GLUCOSE 171* 129* 87     Recent Labs     22  1234 22  1010 22  0345 22  1215 22  1430 22  2132   RBC 3.13* 2.44* 2.89*  --  2.57*  --    HEMOGLOBIN 9.3* 7.3* 8.8*  --  7.8*  --    HEMATOCRIT 29.5* 23.4* 27.3*  --  23.8*  --    PLATELETCT 112* 109* 93*  --  84*  --    PROTHROMBTM 18.1* 20.9*  --  14.5  --  15.3*   APTT 42.6* 47.1*  --   --   --   --    INR 1.55* 1.96*  --  1.22*  --  1.31*     Recent Labs     22  1234 22  1010 22  0345 22  1430   WBC 9.7 11.0* 8.7 7.4   NEUTSPOLYS 93.00* 75.20* 66.50 81.20*   LYMPHOCYTES 4.40* 17.50* 24.10 12.00*   MONOCYTES 1.70 6.20 8.00 6.00   EOSINOPHILS 0.90 0.30 0.60 0.30   BASOPHILS 0.00 0.20 0.20 0.00   ASTSGOT 20 18  --   --    ALTSGPT 42 28  --   --    ALKPHOSPHAT 176* 134*  --   --    TBILIRUBIN 0.6 0.4  --   --      Hemodynamics:  Temp (24hrs), Av.7 °C (98 °F), Min:36.2 °C (97.2 °F), Max:36.9 °C (98.4 °F)  Temperature: 36.9 °C (98.4 °F)  Pulse  Av.2  Min: 55  Max: 117   Blood Pressure : 138/68     Respiratory:    Respiration: 20, Pulse Oximetry: 97 %           Fluids:    Intake/Output Summary (Last 24 hours) at 3/18/2022 0848  Last data filed at 3/18/2022 0700  Gross per 24 hour   Intake 3235.83 ml   Output --   Net 3235.83 ml        GI/Nutrition:  Orders Placed This Encounter   Procedures   • Diet Order Diet: Clear Liquid (no red)     Standing Status:   Standing     Number of Occurrences:   1     Order Specific Question:   Diet:     Answer:   Clear Liquid [10]     Comments:   no red     Medical Decision Making, by Problem:  Active Hospital Problems    Diagnosis    • *GI bleed [K92.2]    • Leukocytosis [D72.829]    • Acute blood loss anemia [D62]    •  Thrombocytopenia (HCC) [D69.6]    • Chronic anticoagulation [Z79.01]    • Essential hypertension [I10]    • Atrial flutter  [I48.92]    • Kidney transplant status, living related donor [Z94.0]      IMPRESSION(S):  1) GI bleed  2) Anemia with component of acute GI blood loss  3) Hematochezia/melena- as above  4) Duodenal ulcer with visible vessel, actively bleeding - status post endoscopic hemostasis yesterday.  High risk for rebleeding  5) Renal insufficiency- improving   6) Hypomagnesemia  7) Atrial fibrillation/flutter (status post ablation)- long term eliquis therapy was on board     Plan:  1) Continue pantoprazole gtt x 24 additional hours, then transition to oral PPI BID until he follows up in our clinic in 8-12 weeks.  2)  Sucralfate solution 1 gm po qid  X 2 weeks total  4) Soft texture diet today  5) Await gastrin level   6) Serial labs with transfusions as needed   7) I will defer to hospitalist to manage other comorbid conditions    GI to sign off.  I will arrange follow up with my APRN for eight weeks.  Continue PPI BID until follow up.     Complex patient, management of intravenous medications, high risk for rebleed.     Quality-Core Measures   Reviewed items::  Medications reviewed, Labs reviewed, Radiology images reviewed and EKG reviewed

## 2022-03-19 NOTE — DOCUMENTATION QUERY
[facility]                                                                       Query Response Note      PATIENT:               MOMO ESCUDERO  ACCT #:                  [id number]  MRN:                     [id number]  :                      1950  ADMIT DATE:       3/17/2022 9:43 AM  DISCH DATE:          RESPONDING  PROVIDER #:        [id number]           QUERY TEXT:    Based on your clinical judgement and the documented clinical findings regarding the atrial fibrillation, please select the most appropriate diagnosis for the findings.      The patient's Clinical Indicators include:  Documentation in this medical record notes that this patient is on Eliquis for Afib and history of DVT.  EKG shows sinus tachycardia, atrial premature complexes present and RBBB.  Due to the presence of current GI bleed, patient's Eliquis is being held and due to current bleeding alternate to anticoagulation should be considered.  Trop T is elevated at 112 and NTproBNP is 1943.      Risk:  Rate control of afib with out anticoagulation in the setting of GI bleeding.    Thank you,  Nuria Winters, RN, BSN  Clinical   [facility]  connect via [employer]  [phone]  [email or web address]  Options provided:   -- Paroxysmal atrial fibrillation (self-terminating or intermittent spontaneously or with intervention within 7 days of onset)   -- Permanent atrial fibrillation (persistent or longstanding persistent AF where cardioversion cannot or will not be performed   -- Other persistent atrial fibrillation (AF that does not terminate within 7 days or that requires repeat pharmacological or electrical cardioversion.   -- Longstanding persistent atrial fibrillation (AF that is persistent and continuous, lasting longer than 1 year)   -- Chronic atrial fibrillation, unspecified (may refer to persistent, longstanding persistent or  permanent AF.  A more specific descriptive term is preferred over the nonspecific AF   -- Unable to determine      Query created by: Nuria Winters on 3/18/2022 6:45 PM    RESPONSE TEXT:    Paroxysmal atrial fibrillation (self-terminating or intermittent spontaneously or with intervention within 7 days of onset)          Electronically signed by:  RHONDA YODER MD 3/18/2022 7:10 PM

## 2022-03-19 NOTE — PROGRESS NOTES
[facility] -  PROGRESS NOTE               Author: Aiyana Kumar M.D. 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 Pt had a prolonged hospitalization for COVID-19 2/1/22-3/5/22 and the stay was complicated by RADHA and transplant renal biopsy showed no rejection but did show primary IGA Nephropathy and his Cr had improved but then he developed ATN from GI bleed and his Cr was 1.6 at discharge. His baseline Cr prior to that hospitalization was ~1.1-1.4. Pt was seen yest in outpt Nephrology clinic and at that time he was confused and weak and had diarrhea and no recent labs since hospital dc so he was sent to the ER yest due to concern for his mental status and weakness. In the ER yest his labs showed Cr 1.59, he was given IVF's and his mental status improved so he was discharged back to his SNF. This am while at his SNF he was noted to have blood in his diaper and altered mental status so he was sent to [facility] ER. In the ER he was tachycardic, hypotensive with SBP's 80's and Cr 2.15, and his Hgb dropped from 9.3 yest to 7.3 in ER today. He is very drowsy at this time. He reports abd discomfort but no n/v. He cannot tell me if/when he noted blood in his diaper. He is confused. He is currently receiving a blood transfusion and he received fluids.     DAILY NEPHROLOGY SUMMARY:  3/18: No events, EGD yest showed duodenal ulcer with active bleeding treated with hemoclips, burgundy stools overnight, no BM so far this am, BP stable, currently not on pressors, BUN/Cr higher, more alert today, denies any CP/SOB/LE edema, does report some abd discomfort  3/19: No events, transferred out of ICU, feels better, more oriented, no bloody  "BM's overnight, BP stable, Cr improving, denies any CP/SOB/LE edema/abd pain    REVIEW OF SYSTEMS:    10 point ROS reviewed and is as per HPI/daily summary or otherwise negative    PMH/PSH/SH/FH:   Reviewed and unchanged since admission note    CURRENT MEDICATIONS:   Reviewed from admission to present day    VS:  /80   Pulse 97   Temp 36.9 °C (98.4 °F) (Oral)   Resp 18   Ht 1.778 m (5' 10\")   Wt 113 kg (250 lb)   SpO2 97%   BMI 35.87 kg/m²     Physical Exam  Vitals and nursing note reviewed.   Constitutional:       Appearance: Normal appearance.   HENT:      Head: Normocephalic and atraumatic.   Eyes:      General: No scleral icterus.  Cardiovascular:      Rate and Rhythm: Normal rate and regular rhythm.   Pulmonary:      Effort: Pulmonary effort is normal. No respiratory distress.      Breath sounds: Normal breath sounds.   Abdominal:      General: Bowel sounds are normal. There is no distension.      Palpations: Abdomen is soft.      Tenderness: There is no abdominal tenderness.   Musculoskeletal:         General: No deformity.      Right lower leg: No edema.      Left lower leg: No edema.   Skin:     General: Skin is warm and dry.      Findings: No rash.   Neurological:      General: No focal deficit present.      Mental Status: He is alert and oriented to person, place, and time.   Psychiatric:         Mood and Affect: Mood normal.         Behavior: Behavior normal.         Fluids:  In: 776.7 [P.O.:600; I.V.:36.3]  Out: 550     LABS:  Recent Labs     03/17/22  1010 03/18/22  0345 03/19/22  0638   SODIUM 141 143 141   POTASSIUM 4.2 4.5 4.4   CHLORIDE 112 115* 115*   CO2 18* 18* 15*   GLUCOSE 129* 87 108*   BUN 27* 31* 24*   CREATININE 2.15* 2.72* 2.02*   CALCIUM 8.5 8.3* 8.3*       IMAGING:   All imaging reviewed from admission to present day    IMPRESSION:  # RADHA on CKD Stage Stage III-A--pt with RADHA during recent hospitalization and Cr had improved to ~1.6 at [place] on 3/5/22, now with RADHA again  - Had " renal biopsy 2/2022 that was negative for rejection and showed primary IgA Nephropathy  - Had ATN in 2/2022 secondary to GI Bleed  - Unclear recent baseline but Cr higher than at time of last discharge  - Now with RADHA again likely related to hypoperusion vs ATN   # Anemia--secondary to GI Bleed  - Recent upper GI bleed in 2/2022 secondary to Duodenal ulcer  - On eliquis as outpt for AFib and h/o DVT  - Received PRBC transfusion 3/17  - EGD 3/17/22 showed bleeding duodenal ulcer treated with hemoclips  # Living Unrelated Renal Transplant  - On tacrolimus, MMF, prednisone as outpt  # Hypotension--likely hypovolemia related to GI bleed  - Resolved  # Thrombocytopenia--mild, monitor  # Metabolic Acidosis--worse  # Elevated Troponin--monitor  # Altered Mental Status--monitor  # Atrial Fibrillation--on eliquis as outpt        PLAN:  - No compelling indication for RRT but will monitor closely  - Change IVF's to 1/2NS with 75meq sodium bicarb  - Continue cellcept and prednisone  - Consider stress dose steroids if hypotensive  - Decreased tacrolimus to 0.5mg BID starting today  - tacrolimus level ordered yest but will be inaccurate as it is not a trough level  - GI signed off  - Transfuse for Hgb less than 7  - Pressors if needed to maintain MAP greater than 65  - Dose adjust all meds for decreased GFR    **Discussed with Hospitalist

## 2022-03-19 NOTE — ASSESSMENT & PLAN NOTE
No anticoagulation most likely will need to be on anticoagulation permanently most likely would benefit from an IVC filter.  However patient is at this point not interested in IVC filter

## 2022-03-19 NOTE — DISCHARGE PLANNING
Received Choice form at [phone]  Agency/Facility Name: [facility]  Referral sent per Choice form @ [phone]

## 2022-03-19 NOTE — PROGRESS NOTES
Report called to kasey Pina transferred to [facility] with all belongings, family at bedside.

## 2022-03-19 NOTE — ASSESSMENT & PLAN NOTE
-accus with sliding scale coverage, monitor blood sugars most recently blood sugars down to 82 and stable  -diabetic diet  -diabetic education  -follow glycohemoglobin levels long term  -continue with oral antihyperglycemics  -monitor for hypoglycemic episodes and adjust control if he should get low

## 2022-03-19 NOTE — PROGRESS NOTES
Telemetry Shift Summary     Rhythm: ST/SR  Rate:   Measurements: .16/.14/.40  Ectopy (reported by Monitor Tech): f-o PVC, r-o coup, r-o coup, r trig, PAC, coup     Normal Values  Rhythm: Sinus  HR:   Measurements: 0.12-0.20/0.06-0.10/0.30-0.52

## 2022-03-20 PROBLEM — E87.6 HYPOKALEMIA: Status: ACTIVE | Noted: 2022-01-01

## 2022-03-20 NOTE — PROGRESS NOTES
Telemetry Shift Summary     Rhythm: SR-ST  HR Range:   Ectopy: rPVC, rPAC, trigeminy, couplets    Measurements: 0.16/0.16/0.42           Normal Values  Rhythm SR  HR Range    Measurements 0.12-0.20 / 0.06-0.10  / 0.30-0.52

## 2022-03-20 NOTE — PROGRESS NOTES
Telemetry Shift Summary    Rhythm SR - ST  HR Range 82 - 101  Ectopy r-o PACs, o - f PVCs, rCOUP, rTRIG, & rBIG  Measurements 0.24/0.14/0.36        Normal Values  Rhythm SR  HR Range    Measurements 0.12-0.20 / 0.06-0.10  / 0.30-0.52

## 2022-03-20 NOTE — CARE PLAN
The patient is Watcher - Medium risk of patient condition declining or worsening    Shift Goals  Clinical Goals: Monitor hgb, stools, and increase mobility  Patient Goals: Sleep and increase mobility    Progress made toward(s) clinical / shift goals:  Monitoring HGB as ordered per MD, Charting frequency and consistency of stool, working with patient to increase mobility.     Patient is not progressing towards the following goals:      Problem: Knowledge Deficit - Standard  Goal: Patient and family/care givers will demonstrate understanding of plan of care, disease process/condition, diagnostic tests and medications  Outcome: Progressing  Note: Patient demonstrated verbal understanding of education provided. Reinforcement required.     Problem: Skin Integrity  Goal: Skin integrity is maintained or improved  Outcome: Progressing  Note: Implementation of waffle cushion on bed a chair put into place this shift.

## 2022-03-20 NOTE — PROGRESS NOTES
[facility] -  PROGRESS NOTE               Author: Aiyana Kumar M.D. 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 Pt had a prolonged hospitalization for COVID-19 2/1/22-3/5/22 and the stay was complicated by RADHA and transplant renal biopsy showed no rejection but did show primary IGA Nephropathy and his Cr had improved but then he developed ATN from GI bleed and his Cr was 1.6 at discharge. His baseline Cr prior to that hospitalization was ~1.1-1.4. Pt was seen yest in outpt Nephrology clinic and at that time he was confused and weak and had diarrhea and no recent labs since hospital dc so he was sent to the ER yest due to concern for his mental status and weakness. In the ER yest his labs showed Cr 1.59, he was given IVF's and his mental status improved so he was discharged back to his SNF. This am while at his SNF he was noted to have blood in his diaper and altered mental status so he was sent to [facility] ER. In the ER he was tachycardic, hypotensive with SBP's 80's and Cr 2.15, and his Hgb dropped from 9.3 yest to 7.3 in ER today. He is very drowsy at this time. He reports abd discomfort but no n/v. He cannot tell me if/when he noted blood in his diaper. He is confused. He is currently receiving a blood transfusion and he received fluids.     DAILY NEPHROLOGY SUMMARY:  3/18: No events, EGD yest showed duodenal ulcer with active bleeding treated with hemoclips, burgundy stools overnight, no BM so far this am, BP stable, currently not on pressors, BUN/Cr higher, more alert today, denies any CP/SOB/LE edema, does report some abd discomfort  3/19: No events, transferred out of ICU, feels better, more oriented, no bloody  "BM's overnight, BP stable, Cr improving, denies any CP/SOB/LE edema/abd pain  3/20: No events, Cr trending down, good UOP, BP stable, on RA, denies any CP/SOB/abd pain/LE edema, feels weak/tired    REVIEW OF SYSTEMS:    10 point ROS reviewed and is as per HPI/daily summary or otherwise negative    PMH/PSH/SH/FH:   Reviewed and unchanged since admission note    CURRENT MEDICATIONS:   Reviewed from admission to present day    VS:  /71   Pulse (!) 112   Temp 36.6 °C (97.9 °F) (Oral)   Resp 18   Ht 1.778 m (5' 10\")   Wt 113 kg (250 lb)   SpO2 97%   BMI 35.87 kg/m²     Physical Exam  Vitals and nursing note reviewed.   Constitutional:       Appearance: Normal appearance.   HENT:      Head: Normocephalic and atraumatic.   Eyes:      General: No scleral icterus.  Cardiovascular:      Rate and Rhythm: Normal rate and regular rhythm.   Pulmonary:      Effort: Pulmonary effort is normal. No respiratory distress.      Breath sounds: Normal breath sounds.   Abdominal:      General: Bowel sounds are normal. There is no distension.      Palpations: Abdomen is soft.      Tenderness: There is no abdominal tenderness.   Musculoskeletal:         General: No deformity.      Right lower leg: No edema.      Left lower leg: No edema.   Skin:     General: Skin is warm and dry.      Findings: No rash.   Neurological:      General: No focal deficit present.      Mental Status: He is alert and oriented to person, place, and time.   Psychiatric:         Mood and Affect: Mood normal.         Behavior: Behavior normal.         Fluids:  In: 3707.1 [P.O.:870; I.V.:1575]  Out: 1025     LABS:  Recent Labs     03/18/22  0345 03/19/22  0638 03/20/22  0500   SODIUM 143 141 143   POTASSIUM 4.5 4.4 3.4*   CHLORIDE 115* 115* 117*   CO2 18* 15* 17*   GLUCOSE 87 108* 79   BUN 31* 24* 17   CREATININE 2.72* 2.02* 1.66*   CALCIUM 8.3* 8.3* 8.1*       IMAGING:   All imaging reviewed from admission to present day    IMPRESSION:  # RADHA on CKD Stage " Stage III-A--pt with RADHA during recent hospitalization and Cr had improved to ~1.6 at dc on 3/5/22, now with RADHA again  - Had renal biopsy 2/2022 that was negative for rejection and showed primary IgA Nephropathy  - Had ATN in 2/2022 secondary to GI Bleed  - Unclear recent baseline but Cr higher on this admit than at time of last discharge  - Now with RADHA again likely related to hypoperusion vs ATN   # Anemia--secondary to GI Bleed  - Recent upper GI bleed in 2/2022 secondary to Duodenal ulcer  - On eliquis as outpt for AFib and h/o DVT  - Received PRBC transfusion 3/17  - EGD 3/17/22 showed bleeding duodenal ulcer treated with hemoclips  # Living Unrelated Renal Transplant  - On tacrolimus, MMF, prednisone as outpt  # Hypotension--likely hypovolemia related to GI bleed  - Resolved  # Thrombocytopenia--mild, monitor  # Metabolic Acidosis--worse  # Elevated Troponin--monitor  # Altered Mental Status--improved  # Atrial Fibrillation--on eliquis as outpt  # Hypokalemia  # Hypomagnesemia  # Hypophosphatemia        PLAN:  - No compelling indication for RRT, Cr trending down  - Change IVF's to D5W with 75meq sodium bicarb  - Continue cellcept and prednisone  - Consider stress dose steroids if hypotensive  - Continue tacrolimus to 0.5mg BID  - Check trough tacrolimus level in am (3/21/22)  - tacrolimus level ordered 3/18 but will be inaccurate as it is not a trough level  - GI signed off  - Transfuse for Hgb less than 7  - Dose adjust all meds for decreased GFR  - Avoid IV contrast/nephrotoxins/NSAIDs  - IV Mg ordered  - IV K-Phos ordered  - Recheck Mg and Phos level in am    **Discussed with Hospitalist

## 2022-03-21 NOTE — CARE PLAN
The patient is Watcher - Medium risk of patient condition declining or worsening    Shift Goals  Clinical Goals: Monitor hgb  Patient Goals: sleep, comfort    Progress made toward(s) clinical / shift goals:    Problem: Knowledge Deficit - Standard  Goal: Patient and family/care givers will demonstrate understanding of plan of care, disease process/condition, diagnostic tests and medications  Outcome: Progressing     Problem: Skin Integrity  Goal: Skin integrity is maintained or improved  Outcome: Progressing     Problem: Fall Risk  Goal: Patient will remain free from falls  Outcome: Progressing     Problem: Risk for Fluid Volume Deficit Related to Bleeding  Goal: Fluid volume balance will be maintained  Outcome: Progressing  Goal: Patient will show no signs and symptoms of excessive bleeding  Outcome: Progressing     Problem: Risk for Bleeding  Goal: Patient will take measures to prevent bleeding and recognizes signs of bleeding that need to be reported immediately to a health care professional  Outcome: Progressing  Goal: Patient will not experience bleeding as evidenced by normal blood pressure, stable hematocrit and hemoglobin levels and desired ranges for coagulation profiles  Outcome: Progressing     Problem: Pain - Standard  Goal: Alleviation of pain or a reduction in pain to the patient’s comfort goal  Outcome: Progressing       Patient is not progressing towards the following goals:

## 2022-03-21 NOTE — PROGRESS NOTES
Telemetry Shift Summary     Rhythm: SR  HR Range: 65-85  Ectopy: fPVCs, rPAC, coupl/trigem/bigem             Normal Values  Rhythm SR  HR Range    Measurements 0.12-0.20 / 0.06-0.10  / 0.30-0.52

## 2022-03-21 NOTE — PROGRESS NOTES
Monitor Summary     Rhythm:SR 76-91  Measurements: 0.24/0.16/0.38  ECTOPIES: o-f PVC, r-o TRIG, r-o COUP, r PAC, rBIG, r PAC, r TRIP        Normal Values  Rhythm SR  HR Range    Measurements 0.12-0.20 / 0.06-0.10  / 0.30-0.52

## 2022-03-21 NOTE — PROGRESS NOTES
Gastroenterology Progress Note               Author:  Kim Goetz D.O. Date & Time Created: 3/21/2022 9:36 AM       Patient ID:  Name:             Krishan Acevedo  YOB: 1950  Age:                 72 y.o.  male  MRN:               [id number]      Referring Provider:  Keily Rowland MD      Medical Decision Making, by Problem:  Active Hospital Problems    Diagnosis    • Hypokalemia [E87.6]    • GI bleed [K92.2]    • Leukocytosis [D72.829]    • Type 2 diabetes mellitus (HCC) [E11.9]    • Acidosis [E87.2]    • Acute blood loss anemia [D62]    • Thrombocytopenia (HCC) [D69.6]    • Atrial fibrillation (HCC) [I48.91]    • Chronic diastolic heart failure (HCC) [I50.32]    • RADHA (acute kidney injury) (HCC) [N17.9]    • Chronic anticoagulation [Z79.01]    • Acute venous embolism and thrombosis of deep vessels of proximal lower extremity (HCC) [I82.4Y9]    • Essential hypertension [I10]    • Atrial flutter  [I48.92]    • Kidney transplant status, living related donor [Z94.0]              Assessment/Recommendations:  The patient is a very pleasant 72-year-old man with a history of duodenal ulcer (multiple recurrent bleeds while on anticoagulation requiring endoscopic intervention x3 in the past 2 months), ANCA vasculitis/glomerulonephritis with RF (requiring living donor renal transplant in 2008 on prednisone, mycophenolate and tacrolimus), HTN, diabetes, A. fib/a flutter (on Eliquis), prior DVT (on Eliquis), and Covid-19 infection (admitted to the hospital from 2/1/2020 2-3/5/2022) who was admitted with rectal bleeding.    Duodenal ulcer: Chronic in nature requiring endoscopic intervention x3 over the past 2 months.  He typically responds to endoscopic intervention but has recurrent bleeding after reinitiation of Eliquis.  EGD on 3/17/2022 demonstrated 30 mm actively bleeding duodenal bulb ulcer (treated with epinephrine and 3 hemoclips).  He continues to have downtrending hemoglobin without overt GI  bleeding or hemodynamic instability.  Suspect continued bleeding from the duodenal ulcer but other sources cannot be ruled out.  Would recommend tagged RBC scan to further localize.  Will consider repeat endoscopic evaluation tomorrow depending upon response to PRBC transfusion and tagged RBC scan.  Ultimately, he may require IR embolization/treatment versus surgical intervention for management.  -Clear liquid diet  -N.p.o. after midnight  -Consider diagnostic EGD with possible intervention tomorrow  -2 large-bore IVs  -Continue supportive care and IVF  -Trend hemoglobin  -Continue Protonix 80 mg IV x1 followed by Protonix drip 8 mg/hour  -Continue sucralfate 1 g p.o. 4 times daily  -Avoid NSAIDs  -Transfuse for goal hemoglobin > 7  -Transfuse for goal platelet count > 50  -Transfuse for goal INR <2.5  -H. pylori stool antigen (ordered)  -Follow-up pending gastrin level  -Tagged RBC scan  -Low threshold for transfer to [facility] where IR is available  -Low threshold for surgical consultation  -Continue to hold anticoagulation  -Consider holding anticoagulation until resolution of the duodenal ulcer (defer to the primary team)    ARF: Downtrending creatinine.  In the setting of GI bleeding.  He has a history of ANCA vasculitis/glomerulonephritis with RF status post living donor renal transplant in 2008.  He is currently being followed by nephrology.  -Trend creatinine  -Appreciate nephrology recs        Kim Goetz D.O.        Thank you for inviting me to participate in the care of this patient. Please do not hesitate to call GI consultants with additional questions/concerns or changes in the patient's clinical status at [phone].    ________________________________________________________________________________________________________________________________________________________      Presenting Chief Complaint:  Rectal bleeding      Interval History:  EGD 3/17/2022: 30 mm actively  bleeding duodenal bulb ulcer (10 mL 1: 10,000 epinephrine injected, 3 hemoclips placed), esophageal candidiasis    3/18/2022: Patient examined and interviewed, course reviewed. Discussed with ICU RN.  Hg is 8.8 this morning, after two units of packed red cells.  He denies abdominal pain, nausea, vomiting, cp, palpitations.  Per nursing he was agitated at times overnoc.  He wants water by mouth.  His Mg is low as and his creatinine is rising (2.72, BUN 31).  He is on ppi gtt.      3/19/2022: Hemoglobin noted to be 7.8 thi AM (from 8.8).  Patient had a large green-brown bowel movement overnight.  On PPI gtt, taking CLD. Patient hungry.    3/21/2022: GI was reengaged due to continuing downtrend in hemoglobin.  The patient has remained afebrile and hemodynamically stable.  He has had multiple loose brown stools without melena or hematochezia.  He is tolerating p.o. liquids.  He denies abdominal pain and nausea/vomiting.  He reports feeling tired as he has not gotten much sleep in the past 2 nights.  His hemoglobin is 6.7 today (was 7 on 3/20/2022 and 7.5 on 3/19/2022).  His platelet count is decreased at 85 which is within his baseline since admission.  Creatinine is improved to 1.35 (was up to 2.72 on 3/18/2022).     Initial GI HPI:   History was obtained via interview of the patient and his wife who helped provide history.  Extensive records were reviewed and the case was discussed with the emergency department attending.  The patient  has a pertinent history of atrial fibrillation/flutter with prior deep venous thrombosis.  He is chronically anticoagulated on Eliquis.  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  He is maintained on Prograf and CellCept as well as   low-dose prednisone for graft rejection prevention.     The patient had recent hospitalization for GI bleeding.  Dr. Pryor, gastroenterology consultant, done an EGD on 2/24/2022 with  placement of multiple Hemoclips to a duodenal ulcer.  The patient had recurrent bleeding and Dr. Kim Goetz did repeat EGD on 2/26/2022 with placement of a single Hemoclip.  The patient was ultimately transferred out to [facility] for rehabilitation.  Yesterday, he was apparently feeling unwell and was seen in the ER.  He was sent home after evaluation.  This morning, he was found with blood in his diaper per the chart.     The patient is a limited historian.  He denies any nausea or vomiting at this time.  He denies abdominal pain.  No fevers or chills.  No cough.  His initial hemoglobin here is 7.3 and he has now received 1 unit of packed red blood cells.  He is actively receiving Kcentra administration given chronic therapy with Eliquis.  His BUN was 27 with a creatinine of 2.15.  Troponin T was 112. COVID is negative.  BNP is 1943.  Electrocardiogram shows sinus tachycardia with right bundle branch block.      Review of Systems:  Review of Systems   Constitutional: Positive for malaise/fatigue. Negative for fever.   Respiratory: Negative for shortness of breath.    Cardiovascular: Negative for chest pain and leg swelling.   Gastrointestinal: Negative for abdominal pain, blood in stool, constipation, diarrhea, melena, nausea and vomiting.   Skin: Negative for rash.         Hospital Medications:  Current Facility-Administered Medications   Medication Dose Frequency Provider Last Rate Last Admin   • magnesium sulfate IVPB premix 2 g  2 g Once Keily Rowland M.D.       • sodium bicarbonate tablet 650 mg  650 mg TID Karishma Cruz M.D.       • potassium phosphate IVPB 30 mmol in 500 mL D5W (premix)  30 mmol Once Karishma Cruz M.D.       • potassium chloride (KCL) ivpb 10 mEq  10 mEq Q HOUR Karishma Cruz M.D.       • 0.45% NaCl with KCl 20 mEq infusion   Continuous Karishma Cruz M.D.       • potassium chloride (KCL) ivpb 10 mEq  10 mEq Q HOUR PRN Karishma Cruz M.D.       • potassium phosphate 15 mmol in dextrose 5% 250 mL  "ivpb  15 mmol Once PRN Karishma Cruz M.D.       • magnesium sulfate IVPB premix 2 g  2 g Once PRN Karishma Cruz M.D.       • sucralfate (CARAFATE) 1 GM/10ML suspension 1 g  1 g Q6HRS Roni Campbell M.D.   1 g at 03/21/22 0611   • tacrolimus (PROGRAF) capsule 0.5 mg  0.5 mg Q12HRS Aiyana Kumar M.D.   0.5 mg at 03/21/22 0809   • pantoprazole (Protonix) 80 mg in  mL Infusion  8 mg/hr Continuous Lola Manley M.D. 25 mL/hr at 03/21/22 0047 8 mg/hr at 03/21/22 0047   • polyethylene glycol/lytes (MIRALAX) PACKET 1 Packet  1 Packet QDAY PRN Lola Manley M.D.        And   • magnesium hydroxide (MILK OF MAGNESIA) suspension 30 mL  30 mL QDAY PRN Lola Manley M.D.        And   • bisacodyl (DULCOLAX) suppository 10 mg  10 mg QDAY PRN Lola Manley M.D.       • mycophenolate (CELLCEPT) capsule 500 mg  500 mg BID Lola Manley M.D.   500 mg at 03/21/22 0611   • predniSONE (DELTASONE) tablet 5 mg  5 mg DAILY Lola Manley M.D.   5 mg at 03/21/22 0611   Last reviewed on 3/17/2022 11:29 AM by Cristo Eckert        Vital signs:  Weight/BMI: Body mass index is 35.87 kg/m².  /83   Pulse 80   Temp 36.6 °C (97.8 °F)   Resp 18   Ht 1.778 m (5' 10\")   Wt 113 kg (250 lb)   SpO2 96%   Vitals:    03/20/22 1947 03/20/22 2356 03/21/22 0642 03/21/22 0658   BP: 139/70 115/56 134/83    Pulse: 90 87 84 80   Resp: 20 20 18 18   Temp: 36.3 °C (97.4 °F) 36.8 °C (98.2 °F) 36.6 °C (97.8 °F)    TempSrc: Oral Oral     SpO2: 98% 97% 96% 96%   Weight:       Height:         Oxygen Therapy:  Pulse Oximetry: 96 %, O2 (LPM): 0, O2 Delivery Device: None - Room Air    Intake/Output Summary (Last 24 hours) at 3/21/2022 0936  Last data filed at 3/21/2022 0611  Gross per 24 hour   Intake 898.33 ml   Output 1050 ml   Net -151.67 ml         Physical Exam:  Physical Exam  Constitutional:       General: He is not in acute distress.     Appearance: Normal appearance. He is ill-appearing.   HENT:      " Head: Normocephalic and atraumatic.      Nose: Nose normal.   Eyes:      General: No scleral icterus.     Extraocular Movements: Extraocular movements intact.      Conjunctiva/sclera: Conjunctivae normal.   Cardiovascular:      Rate and Rhythm: Normal rate and regular rhythm.      Heart sounds: Murmur (Grade 3-6 systolic murmur) heard.     No gallop.   Pulmonary:      Effort: Pulmonary effort is normal. No respiratory distress.      Breath sounds: Normal breath sounds. No wheezing or rales.   Abdominal:      General: Abdomen is flat. Bowel sounds are normal. There is no distension.      Palpations: Abdomen is soft. There is no mass.      Tenderness: There is no abdominal tenderness. There is no guarding or rebound.   Musculoskeletal:         General: Normal range of motion.      Cervical back: Normal range of motion.      Right lower leg: No edema.      Left lower leg: No edema.   Skin:     General: Skin is warm and dry.      Coloration: Skin is not jaundiced.   Neurological:      General: No focal deficit present.      Mental Status: He is alert and oriented to person, place, and time.   Psychiatric:         Mood and Affect: Mood normal.         Behavior: Behavior normal.         Judgment: Judgment normal.           Labs:  Recent Labs     03/18/22  1430 03/19/22  0638 03/20/22  0500 03/21/22  0456   SODIUM  --  141 143 139   POTASSIUM  --  4.4 3.4* 2.8*   CHLORIDE  --  115* 117* 111   CO2  --  15* 17* 19*   BUN  --  24* 17 11   CREATININE  --  2.02* 1.66* 1.35   MAGNESIUM 1.7  --  1.5 1.5   PHOSPHORUS 3.0  --  2.5 2.4*   CALCIUM  --  8.3* 8.1* 8.0*     Recent Labs     03/19/22  0638 03/20/22  0500 03/21/22  0456   GLUCOSE 108* 79 100*     Recent Labs     03/19/22  0638 03/20/22  0500 03/21/22  0456   WBC 5.8 4.8 4.6*   NEUTSPOLYS 74.80* 65.20 63.30   LYMPHOCYTES 17.80* 25.40 25.30   MONOCYTES 5.80 7.30 8.80   EOSINOPHILS 0.70 0.80 1.30   BASOPHILS 0.20 0.00 0.20     Recent Labs     03/18/22  1215 03/18/22  1430  03/18/22  2132 03/19/22  0638 03/20/22  0500 03/21/22  0456   RBC  --    < >  --  2.42* 2.32* 2.19*   HEMOGLOBIN  --    < >  --  7.5* 7.0* 6.7*   HEMATOCRIT  --    < >  --  23.2* 21.9* 20.5*   PLATELETCT  --    < >  --  105* 91* 85*   PROTHROMBTM 14.5  --  15.3* 15.1*  --   --    INR 1.22*  --  1.31* 1.29*  --   --     < > = values in this interval not displayed.     Recent Results (from the past 24 hour(s))   CBC WITH DIFFERENTIAL    Collection Time: 03/21/22  4:56 AM   Result Value Ref Range    WBC 4.6 (L) 4.8 - 10.8 K/uL    RBC 2.19 (L) 4.70 - 6.10 M/uL    Hemoglobin 6.7 (L) 14.0 - 18.0 g/dL    Hematocrit 20.5 (L) 42.0 - 52.0 %    MCV 93.6 81.4 - 97.8 fL    MCH 30.6 27.0 - 33.0 pg    MCHC 32.7 (L) 33.7 - 35.3 g/dL    RDW 58.9 (H) 35.9 - 50.0 fL    Platelet Count 85 (L) 164 - 446 K/uL    MPV 9.5 9.0 - 12.9 fL    Neutrophils-Polys 63.30 44.00 - 72.00 %    Lymphocytes 25.30 22.00 - 41.00 %    Monocytes 8.80 0.00 - 13.40 %    Eosinophils 1.30 0.00 - 6.90 %    Basophils 0.20 0.00 - 1.80 %    Immature Granulocytes 1.10 (H) 0.00 - 0.90 %    Nucleated RBC 0.00 /100 WBC    Neutrophils (Absolute) 2.88 1.82 - 7.42 K/uL    Lymphs (Absolute) 1.15 1.00 - 4.80 K/uL    Monos (Absolute) 0.40 0.00 - 0.85 K/uL    Eos (Absolute) 0.06 0.00 - 0.51 K/uL    Baso (Absolute) 0.01 0.00 - 0.12 K/uL    Immature Granulocytes (abs) 0.05 0.00 - 0.11 K/uL    NRBC (Absolute) 0.00 K/uL   MAGNESIUM    Collection Time: 03/21/22  4:56 AM   Result Value Ref Range    Magnesium 1.5 1.5 - 2.5 mg/dL   Basic Metabolic Panel    Collection Time: 03/21/22  4:56 AM   Result Value Ref Range    Sodium 139 135 - 145 mmol/L    Potassium 2.8 (L) 3.6 - 5.5 mmol/L    Chloride 111 96 - 112 mmol/L    Co2 19 (L) 20 - 33 mmol/L    Glucose 100 (H) 65 - 99 mg/dL    Bun 11 8 - 22 mg/dL    Creatinine 1.35 0.50 - 1.40 mg/dL    Calcium 8.0 (L) 8.4 - 10.2 mg/dL    Anion Gap 9.0 7.0 - 16.0   PHOSPHORUS    Collection Time: 03/21/22  4:56 AM   Result Value Ref Range    Phosphorus  2.4 (L) 2.5 - 4.5 mg/dL   ESTIMATED GFR    Collection Time: 03/21/22  4:56 AM   Result Value Ref Range    GFR (CKD-EPI) 55 (A) >60 mL/min/1.73 m 2   COD - Adult (Type and Screen)    Collection Time: 03/21/22  4:56 AM   Result Value Ref Range    ABO Grouping Only A     Rh Grouping Only POS     Antibody Screen-Cod NEG     Component R       R99                 Red Cells, LR       [id number]   issued       03/21/22   06:32      Product Type R99     Dispense Status issued     Unit Number (Barcoded) [id number]     Product Code (Barcoded) [id number]     Blood Type (Barcoded) 6200        Radiology Review:  US-RENAL TRANSPLANT COMP   Final Result      1.  Prior right lower quadrant renal transplant. The transplanted kidney demonstrates elevated resistive indices which can be seen with renal parenchymal disease/projection.      2.  No evidence of anastomotic stenosis.      3.  No evidence of hydronephrosis or perinephric fluid.      DX-CHEST-PORTABLE (1 VIEW)   Final Result      1.  Patchy bilateral peripheral interstitial opacity. These findings are consistent with Covid 19 pneumonia.      2.  Cardiomegaly.      NM-GI BLEEDING SCAN    (Results Pending)         MDM (Data Review):   -Records reviewed and summarized in current documentation  -I personally reviewed and interpreted the laboratory results  -I personally reviewed the radiology images        Core Quality Measures   Reviewed items::  Labs, Medications and Radiology reports reviewed

## 2022-03-21 NOTE — DISCHARGE PLANNING
Anticipated Discharge Disposition: [facility]      Action: Discussed pt during IDT rounds. Pt accepted and planned to DC back to [facility] once medically cleared.     Barriers to Discharge: Medical Clearance     Plan: Case management to follow up once pt MC

## 2022-03-21 NOTE — CARE PLAN
Problem: Risk for Fluid Volume Deficit Related to Bleeding  Goal: Fluid volume balance will be maintained  Outcome: Progressing  Goal: Patient will show no signs and symptoms of excessive bleeding  Outcome: Progressing   The patient is Watcher - Medium risk of patient condition declining or worsening    Shift Goals  Clinical Goals: transfuse as needed, complete NM gi bleed scan  Patient Goals: feel better    Progress made toward(s) clinical / shift goals:  Pt completed 1 unit PRBC and NM GI bleed scan    Patient is not progressing towards the following goals:

## 2022-03-21 NOTE — PROGRESS NOTES
[facility] Medicine Daily Progress Note    Date of Service  3/21/2022    Chief Complaint  Krishan Acevedo is a 72 y.o. male admitted 3/17/2022 with Gi bleed    Hospital Course  Patient is a 72 year old male with history of ANCA glomerulonephritis s/p renal transplant 2008, atrial fibrillation and prior DVT on Eliquis, recent COVID infection and GI bleed 2/2 duodenal ulcer early 2/2022. He presented to [facility] on 3/17/2022 with rectal bleeding.  He was seen in the ER on 3/16 with diarrhea, vomiting and chest pain, however his work up with unremarkable and he improved with fluids so he was discharged to a SNF. On 3/17, he woke up with blood in his diaper, tachycardia and confusion so he was sent to back to the ER. He also reported left sided abdominal pain, nausea and vomiting and at time.     In the ER, he was started on a protonix drip and given 250cc IVFs and transfused one unit of PRBC. He was also started on Kcentra and then underwent an EGD. This revealed an acute, recurrently bleeding  duodenal ulcer.  He was treated with epi injection and 3 hemoclips were placed.     Interval Problem Update  He remains axox3, reports he is tired, difficulty sleeping last night due to noise and vitals. He denies melena or hematochezia overnight, no abdominal pain, no sob, no cp. ROS otherwise negative. Updated regarding plan and tagged rbc scan.     I have personally seen and examined the patient at bedside. I discussed the plan of care with patient, nursing, case management, nephrology and GI    Consultants/Specialty  [facility]    Code Status  Full Code    Disposition  Patient is not medically cleared for discharge.   Anticipate discharge to snf    Review of Systems  Review of Systems   Constitutional: Negative.  Negative for chills, diaphoresis, fever, malaise/fatigue and weight loss.   HENT: Negative.  Negative for sore throat.    Eyes: Negative.  Negative for blurred vision.   Respiratory:  Negative.  Negative for cough and shortness of breath.    Cardiovascular: Negative.  Negative for chest pain, palpitations and leg swelling.   Gastrointestinal: Negative.  Negative for abdominal pain, nausea and vomiting.   Genitourinary: Negative.  Negative for dysuria.   Musculoskeletal: Negative.  Negative for myalgias.   Skin: Negative.  Negative for itching and rash.   Neurological: Positive for weakness. Negative for dizziness, focal weakness and headaches.   Endo/Heme/Allergies: Negative.  Does not bruise/bleed easily.   Psychiatric/Behavioral: Negative.  Negative for depression, substance abuse and suicidal ideas.   All other systems reviewed and are negative.       Physical Exam  Temp:  [36.2 °C (97.1 °F)-36.9 °C (98.5 °F)] 36.7 °C (98.1 °F)  Pulse:  [] 100  Resp:  [18-20] 18  BP: (115-149)/(56-90) 131/87  SpO2:  [95 %-98 %] 95 %    Physical Exam  Vitals and nursing note reviewed. Exam conducted with a chaperone present.   Constitutional:       General: He is not in acute distress.     Appearance: Normal appearance. He is not diaphoretic.   HENT:      Head: Normocephalic.      Nose: Nose normal. No congestion.      Mouth/Throat:      Mouth: Mucous membranes are moist.      Pharynx: Oropharynx is clear. No oropharyngeal exudate or posterior oropharyngeal erythema.   Eyes:      Pupils: Pupils are equal, round, and reactive to light.   Cardiovascular:      Rate and Rhythm: Normal rate and regular rhythm.      Pulses: Normal pulses.      Heart sounds: Normal heart sounds. No murmur heard.    No gallop.   Pulmonary:      Effort: Pulmonary effort is normal. No respiratory distress.      Breath sounds: Normal breath sounds. No wheezing or rales.   Abdominal:      General: Abdomen is flat. Bowel sounds are normal. There is no distension.      Palpations: Abdomen is soft.      Tenderness: There is no abdominal tenderness. There is no guarding.   Musculoskeletal:         General: No swelling or deformity.  Normal range of motion.      Cervical back: Normal range of motion. No rigidity.   Skin:     General: Skin is warm and dry.      Capillary Refill: Capillary refill takes less than 2 seconds.      Coloration: Skin is not jaundiced.   Neurological:      General: No focal deficit present.      Mental Status: He is alert and oriented to person, place, and time.      Cranial Nerves: No cranial nerve deficit.      Sensory: No sensory deficit.      Motor: No weakness.   Psychiatric:         Mood and Affect: Mood normal.         Behavior: Behavior normal.         Fluids    Intake/Output Summary (Last 24 hours) at 3/21/2022 1151  Last data filed at 3/21/2022 0945  Gross per 24 hour   Intake 1248.33 ml   Output 1050 ml   Net 198.33 ml       Laboratory  Recent Labs     03/19/22  0638 03/20/22  0500 03/21/22  0456   WBC 5.8 4.8 4.6*   RBC 2.42* 2.32* 2.19*   HEMOGLOBIN 7.5* 7.0* 6.7*   HEMATOCRIT 23.2* 21.9* 20.5*   MCV 95.9 94.4 93.6   MCH 31.0 30.2 30.6   MCHC 32.3* 32.0* 32.7*   RDW 62.7* 61.3* 58.9*   PLATELETCT 105* 91* 85*   MPV 11.2 9.8 9.5     Recent Labs     03/19/22  0638 03/20/22  0500 03/21/22  0456   SODIUM 141 143 139   POTASSIUM 4.4 3.4* 2.8*   CHLORIDE 115* 117* 111   CO2 15* 17* 19*   GLUCOSE 108* 79 100*   BUN 24* 17 11   CREATININE 2.02* 1.66* 1.35   CALCIUM 8.3* 8.1* 8.0*     Recent Labs     03/18/22  1215 03/18/22  2132 03/19/22  0638   INR 1.22* 1.31* 1.29*               Imaging  US-RENAL TRANSPLANT COMP   Final Result      1.  Prior right lower quadrant renal transplant. The transplanted kidney demonstrates elevated resistive indices which can be seen with renal parenchymal disease/projection.      2.  No evidence of anastomotic stenosis.      3.  No evidence of hydronephrosis or perinephric fluid.      DX-CHEST-PORTABLE (1 VIEW)   Final Result      1.  Patchy bilateral peripheral interstitial opacity. These findings are consistent with Covid 19 pneumonia.      2.  Cardiomegaly.      NM-GI BLEEDING SCAN     (Results Pending)        Assessment/Plan  * GI bleed- (present on admission)  Assessment & Plan  Due to recurrent duodenal ulcer  PPI  Carafate  S/p egd and clips 3/18/22  Following  Continue to transfuse as needed  GI following  See above    Acute blood loss anemia- (present on admission)  Assessment & Plan  Due to recurrent duodenal ulcer  S/p hemoclip  H/H continues to slowly decrease, required another prbc transfusion this am - will continue to follow and transfuse as needed  Tagged RBC scan pending  Continue PPI iv  Continue carafate  Following  GI following, will consider repeat scope tomorrow if needed    Hypokalemia  Assessment & Plan  Replacing  Low mag also contributing - will also replace mag  following    RADHA (acute kidney injury) (HCC)- (present on admission)  Assessment & Plan  Acute on chronic  Much improved, now back near baseline  Nephrology following  S/p kidney transplant    Kidney transplant status, living related donor- (present on admission)  Assessment & Plan  Nephrology following  On cellcept, prednisone and tacrolimus  monitoring    Type 2 diabetes mellitus (HCC)- (present on admission)  Assessment & Plan  SSI  Hypoglycemic protocol      Acidosis- (present on admission)  Assessment & Plan  Much improved  Adjusting fluids  CKD contributing  following    Thrombocytopenia (HCC)- (present on admission)  Assessment & Plan  Chronic  fluctuating  following    Atrial fibrillation (HCC)- (present on admission)  Assessment & Plan  Chronic  Following  No anticoagulation at this time due to gi bleed, due to recurrent bleeds, likely not a candidate to restart anticoagulation in the future    Chronic diastolic heart failure (HCC)- (present on admission)  Assessment & Plan  No evidence of acute exacerbation  following    Chronic anticoagulation- (present on admission)  Assessment & Plan  Held due to recurrent GI bleed  Recent repeat US showed no dvt, consider IVC filter when stable  following       VTE  prophylaxis: SCDs/TEDs    I have performed a physical exam and reviewed and updated ROS and Plan today (3/21/2022). In review of yesterday's note (3/20/2022), there are no changes except as documented above.

## 2022-03-21 NOTE — PROGRESS NOTES
[facility] -  PROGRESS NOTE               Author: Karishma Cruz M.D. 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 Pt had a prolonged hospitalization for COVID-19 2/1/22-3/5/22 and the stay was complicated by RADHA and transplant renal biopsy showed no rejection but did show primary IGA Nephropathy and his Cr had improved but then he developed ATN from GI bleed and his Cr was 1.6 at discharge. His baseline Cr prior to that hospitalization was ~1.1-1.4. Pt was seen yest in outpt Nephrology clinic and at that time he was confused and weak and had diarrhea and no recent labs since hospital dc so he was sent to the ER yest due to concern for his mental status and weakness. In the ER yest his labs showed Cr 1.59, he was given IVF's and his mental status improved so he was discharged back to his SNF. This am while at his SNF he was noted to have blood in his diaper and altered mental status so he was sent to [facility] ER. In the ER he was tachycardic, hypotensive with SBP's 80's and Cr 2.15, and his Hgb dropped from 9.3 yest to 7.3 in ER today. He is very drowsy at this time. He reports abd discomfort but no n/v. He cannot tell me if/when he noted blood in his diaper. He is confused. He is currently receiving a blood transfusion and he received fluids.      DAILY NEPHROLOGY SUMMARY:  3/18: No events, EGD yest showed duodenal ulcer with active bleeding treated with hemoclips, burgundy stools overnight, no BM so far this am, BP stable, currently not on pressors, BUN/Cr higher, more alert today, denies any CP/SOB/LE edema, does report some abd discomfort  3/19: No events, transferred out of ICU, feels better, more oriented, no bloody BM's  "overnight, BP stable, Cr improving, denies any CP/SOB/LE edema/abd pain  3/20: No events, Cr trending down, good UOP, BP stable, on RA, denies any CP/SOB/abd pain/LE edema, feels weak/tired.   3/21: no events, awake, denied diarrhea or bloody stool. Getting PRBC transfusion.     REVIEW OF SYSTEMS:    10 point ROS reviewed and is as per HPI/daily summary or otherwise negative    PMH/PSH/SH/FH:   Reviewed and unchanged since admission note    CURRENT MEDICATIONS:   Reviewed from admission to present day    VS:  /83   Pulse 80   Temp 36.6 °C (97.8 °F)   Resp 18   Ht 1.778 m (5' 10\")   Wt 113 kg (250 lb)   SpO2 96%   BMI 35.87 kg/m²     Physical Exam  Vitals and nursing note reviewed.   Constitutional:       Appearance: Normal appearance.   HENT:      Head: Normocephalic and atraumatic.   Eyes:      General: No scleral icterus.  Cardiovascular:      Rate and Rhythm: Normal rate and regular rhythm.   Pulmonary:      Effort: Pulmonary effort is normal. No respiratory distress.      Breath sounds: Normal breath sounds. Crackles in LLL only , nor on R side   Abdominal:      General: Bowel sounds are normal. There is no distension.      Palpations: Abdomen is soft.      Tenderness: There is no abdominal tenderness.   Musculoskeletal:         General: No deformity.      Right lower leg: No edema.      Left lower leg: No edema.   Skin:     General: Skin is warm and dry.      Findings: No rash.   Neurological:      General: No focal deficit present.      Mental Status: He is alert and oriented to person, place, and time.   Psychiatric:         Mood and Affect: Mood normal.         Behavior: Behavior normal.  Fluids:  In: 2150.8 [P.O.:450; I.V.:141.3]  Out: 1050     LABS:  Recent Labs     03/19/22  0638 03/20/22  0500 03/21/22  0456   SODIUM 141 143 139   POTASSIUM 4.4 3.4* 2.8*   CHLORIDE 115* 117* 111   CO2 15* 17* 19*   GLUCOSE 108* 79 100*   BUN 24* 17 11   CREATININE 2.02* 1.66* 1.35   CALCIUM 8.3* 8.1* 8.0* "       IMAGING:   All imaging reviewed from admission to present day    IMPRESSION:  # RADHA on CKD Stage Stage III-A--pt with RADHA during recent hospitalization and Cr had improved to ~1.6 at dc on 3/5/22, now with RADHA again  - Had renal biopsy 2/2022 that was negative for rejection and showed primary IgA Nephropathy  - Had ATN in 2/2022 secondary to GI Bleed  - Now with RADHA again likely related to hypoperusion vs ATN   # Anemia--secondary to GI Bleed  - Recent upper GI bleed in 2/2022 secondary to Duodenal ulcer  - On eliquis as outpt for AFib and h/o DVT  - Received PRBC transfusion 3/17  - EGD 3/17/22 showed bleeding duodenal ulcer treated with hemoclips  # Living Unrelated Renal Transplant  - On tacrolimus, MMF, prednisone as outpt  # Hypotension--likely hypovolemia related to GI bleed, resolved   # Thrombocytopenia--mild, monitor  # Metabolic Acidosis--worse  # Elevated Troponin  - per primary team   # Altered Mental Status--improved  # Atrial Fibrillation--on eliquis as outpt  # Hypokalemia  # Hypomagnesemia  # Hypophosphatemia        PLAN:  - No compelling indication for RRT, Cr trending down  - Continue cellcept and prednisone  - Continue tacrolimus to 0.5mg BID  - tacrolimus level ordered 3/18 but will be inaccurate as it is not a trough level. awaiting trough tacrolimus level (3/21/22)  - Transfuse for Hgb less than 7  - Dose adjust all meds for decreased GFR  - Avoid IV contrast/nephrotoxins/NSAIDs  - IV Mg ordered, Kcl supplementation ordered.   - IV K-Phos ordered  - IVF changed to 1/2 NS with KCL. Added Sod bicarb 650 mg tid PO  - Recheck BMP with mag at 6 pm and supplement even more( PRN supplementation was ordered).   Awaiting Nuclear RBC scan for GI bleed.   Once more stable will consider neutraphos po for maintenance.     Discussed with hospitalist.

## 2022-03-21 NOTE — PROGRESS NOTES
Monitor Summary     Rhythm:SR, ST   Measurements: 0.20/0.14/0.36  ECTOPIES: o-f PVC, r-o TRIG, r-o COUP, r PAC, rBIG, r PAC, r TRIP        Normal Values  Rhythm SR  HR Range    Measurements 0.12-0.20 / 0.06-0.10  / 0.30-0.52

## 2022-03-22 NOTE — DIETARY
"Nutrition services: Day 5 of admit.  Krishan Acevedo is a 72 y.o. male with admitting DX of GI bleed.    Pt is NPO/clear liquids day 5 today. Intake of clear liquids diet has been %. Even if pt consumes 100% of clear liquids diet meal trays, current diet order is nutritionally inadequate. To bolster kcal and protein intake, RD will order Boost Breeze TID w/ meals per Poor PO Intake Protocol (PO intake <50% of nutritional needs x past 48 hrs).    Pt to continue on clear liquids diet this a.m. per GI note. Current chart notes/clinical picture reviewed. Mag 1.3 (L), mag ox and mag sulfate IVPB given this a.m. per MAR. K+ w/ drop overnight from 4.4 to 3.9; Kphos given per MAR.    Assessment:  Height: 177.8 cm (5' 10\")  Weight: 113 kg (250 lb)  Body mass index is 35.87 kg/m²., BMI classification: Obesity class II  Diet/Intake: Clear liquids      Recommendations/Plan:  1. Diet progression >clear liquids as tolerated to be per MD/GI.   2. Document intake of all PO as % taken in ADL's to provide interdisciplinary communication across all shifts.   3. Monitor weight.  4. If it is not medically feasible to advance diet to >clear liquids within 48-72 hours, consider nutrition support to meet needs, if within pt's GOC.    RD following.  "

## 2022-03-22 NOTE — ASSESSMENT & PLAN NOTE
Continue with rate control  No anticoagulation due to recent GI bleed, this I discussed with gastroenterology and they feel if the patient ever goes back on anticoagulation he will rebleed.

## 2022-03-22 NOTE — CARE PLAN
The patient is Watcher - Medium risk of patient condition declining or worsening    Shift Goals  Clinical Goals: (P) Restore F&E balance, Get EGD done tomorrow  Patient Goals: Sleep comfortably    Progress made toward(s) clinical / shift goals:  Replacing electrolytes as ordered. EGD scheduled for tomorrow, pt NPO.     Problem: Skin Integrity  Goal: Skin integrity is maintained or improved  Outcome: Progressing  Note: Patient on waffle overlay and provided clean sheets when soiled.  Pillows in use for support and repositioning.  Barrier paste used on sacrum to prevent breakdown.        Patient is not progressing towards the following goals:      Problem: Bowel Elimination  Goal: Establish and maintain regular bowel function  Outcome: Not Progressing  Note: Pt unable to control bowel function and frequently soils his briefs. Education provided on using call light to notify RN when he has bowel movement so bedpan can be used. Patient verbally understands but requires reinforcement.       0

## 2022-03-22 NOTE — PROGRESS NOTES
[facility] Medicine Daily Progress Note    Date of Service  3/22/2022    Chief Complaint  Krishan Acevedo is a 72 y.o. male admitted 3/17/2022 with gastrointestinal bleeding    Hospital Course  Krishan is a 72-year-old gentleman comes in with ongoing gastrointestinal bleeding from a skilled facility while undergoing physical rehabilitation.  The patient is found to have a duodenal ulcer.  The patient underwent Hemoclip.  Since then his H&H has stabilized.  He is still on a PPI drip, fluid resuscitation and the GI is following him very carefully.  Repeat EGD for today canceled due to stabilization of his H&H.    Interval Problem Update  Hold on EGD today  Monitor H&H  Transfuse if indicated below 7 and 21  Monitor current condition if patient stabilizes she should be able to go back to the skilled facility for ongoing therapies.    I have personally seen and examined the patient at bedside. I discussed the plan of care with patient.    Consultants/Specialty  GI and nephrology    Code Status  Full Code    Disposition  Patient is not medically cleared for discharge.   Anticipate discharge to to skilled nursing facility.  I have placed the appropriate orders for post-discharge needs.    Review of Systems  Review of Systems   Constitutional: Positive for malaise/fatigue. Negative for chills, diaphoresis and fever.   HENT: Negative.  Negative for nosebleeds and sinus pain.    Eyes: Negative.  Negative for double vision, photophobia, discharge and redness.   Respiratory: Positive for shortness of breath. Negative for cough, sputum production, wheezing and stridor.    Cardiovascular: Negative.  Negative for chest pain, orthopnea, leg swelling and PND.   Gastrointestinal: Positive for abdominal pain. Negative for blood in stool and heartburn.   Genitourinary: Negative.  Negative for dysuria, flank pain and frequency.   Musculoskeletal: Negative.  Negative for back pain, falls and myalgias.   Skin: Negative.  Negative for  itching.   Neurological: Positive for weakness. Negative for dizziness, tingling, sensory change, focal weakness and seizures.   Endo/Heme/Allergies: Negative.  Negative for polydipsia. Does not bruise/bleed easily.   Psychiatric/Behavioral: Negative.  Negative for depression, substance abuse and suicidal ideas. The patient does not have insomnia.    All other systems reviewed and are negative.       Physical Exam  Temp:  [36.6 °C (97.8 °F)-36.8 °C (98.2 °F)] 36.8 °C (98.2 °F)  Pulse:  [86-95] 90  Resp:  [14-18] 16  BP: (128-155)/(61-99) 135/67  SpO2:  [94 %-99 %] 94 %    Physical Exam  Vitals and nursing note reviewed. Exam conducted with a chaperone present.   Constitutional:       General: He is not in acute distress.     Appearance: Normal appearance. He is obese. He is ill-appearing. He is not toxic-appearing.   HENT:      Head: Normocephalic and atraumatic.      Right Ear: External ear normal.      Left Ear: External ear normal.      Nose: Nose normal.      Mouth/Throat:      Mouth: Mucous membranes are moist.      Pharynx: Oropharynx is clear.   Eyes:      Extraocular Movements: Extraocular movements intact.      Conjunctiva/sclera: Conjunctivae normal.      Pupils: Pupils are equal, round, and reactive to light.   Neck:      Thyroid: No thyromegaly.      Vascular: No JVD.   Cardiovascular:      Rate and Rhythm: Normal rate and regular rhythm.      Pulses: Normal pulses.      Heart sounds: Normal heart sounds. No murmur heard.  Pulmonary:      Effort: Pulmonary effort is normal.      Breath sounds: Normal breath sounds. No wheezing or rales.   Chest:      Chest wall: No tenderness.   Abdominal:      General: Abdomen is flat. Bowel sounds are normal. There is distension.      Palpations: Abdomen is soft. There is no mass.      Tenderness: There is no abdominal tenderness. There is no guarding or rebound.   Musculoskeletal:         General: No tenderness. Normal range of motion.      Cervical back: Normal range  of motion and neck supple.   Lymphadenopathy:      Cervical: No cervical adenopathy.   Skin:     General: Skin is warm and dry.      Capillary Refill: Capillary refill takes less than 2 seconds.      Findings: No erythema or rash.   Neurological:      General: No focal deficit present.      Mental Status: He is alert and oriented to person, place, and time. Mental status is at baseline.      GCS: GCS eye subscore is 4. GCS verbal subscore is 5. GCS motor subscore is 6.      Cranial Nerves: No cranial nerve deficit.      Deep Tendon Reflexes: Reflexes are normal and symmetric.   Psychiatric:         Mood and Affect: Mood normal.         Behavior: Behavior normal.         Thought Content: Thought content normal.         Judgment: Judgment normal.         Fluids    Intake/Output Summary (Last 24 hours) at 3/22/2022 1233  Last data filed at 3/21/2022 2148  Gross per 24 hour   Intake --   Output 215 ml   Net -215 ml       Laboratory  Recent Labs     03/21/22  0456 03/21/22  1410 03/22/22  0454   WBC 4.6* 5.0 5.0   RBC 2.19* 2.88* 3.03*   HEMOGLOBIN 6.7* 8.7* 9.1*   HEMATOCRIT 20.5* 27.2* 27.8*   MCV 93.6 94.4 91.7   MCH 30.6 30.2 30.0   MCHC 32.7* 32.0* 32.7*   RDW 58.9* 60.3* 60.7*   PLATELETCT 85* 104* 98*   MPV 9.5 10.0 9.1     Recent Labs     03/21/22  0456 03/21/22  2328 03/22/22  0454   SODIUM 139 138 140   POTASSIUM 2.8* 4.4 3.9   CHLORIDE 111 110 111   CO2 19* 20 20   GLUCOSE 100* 91 82   BUN 11 8 7*   CREATININE 1.35 1.15 1.14   CALCIUM 8.0* 8.5 8.4                   Imaging  NM-GI BLEEDING SCAN   Final Result      No nuclear medicine evidence of active gastrointestinal hemorrhage.      US-RENAL TRANSPLANT COMP   Final Result      1.  Prior right lower quadrant renal transplant. The transplanted kidney demonstrates elevated resistive indices which can be seen with renal parenchymal disease/projection.      2.  No evidence of anastomotic stenosis.      3.  No evidence of hydronephrosis or perinephric fluid.       DX-CHEST-PORTABLE (1 VIEW)   Final Result      1.  Patchy bilateral peripheral interstitial opacity. These findings are consistent with Covid 19 pneumonia.      2.  Cardiomegaly.           Assessment/Plan  * GI bleed- (present on admission)  Assessment & Plan  Duodenal ulcer at this point has been hemoclipped  Continue PPI  Continue Carafate  No transfusion at this point indicated  EGD canceled today since hemoglobin is improving    Acute blood loss anemia- (present on admission)  Assessment & Plan  Monitor H&H, Currently going upwards, most recently 9.1 hemoglobin    Atrial fibrillation (HCC)- (present on admission)  Assessment & Plan  Continue with rate control  Holding anticoagulation recent GI bleed    RADHA (acute kidney injury) (HCC)- (present on admission)  Assessment & Plan  Monitor renal functions.  Patient has a kidney transplant    Acute venous embolism and thrombosis of deep vessels of proximal lower extremity (HCC)- (present on admission)  Assessment & Plan  With recent acute venous thromboembolism the patient has been on anticoagulation.  The anticoagulation is held due to GI bleeding    Kidney transplant status, living related donor- (present on admission)  Assessment & Plan  With history of kidney transplant the patient is on chronic immunosuppressive therapy  Continue CellCept 500 mg twice daily, tacrolimus 0.5 mg nightly along with tacrolimus 1 mg every morning, continue prednisone 5 mg daily          Atrial flutter - (present on admission)  Assessment & Plan  Patient's atrial flutter is ongoing.  Continue with Eliquis for anticoagulation, once acute gastroenterology has cleared him  Continue with rate control    Hypokalemia  Assessment & Plan  Potassium has been corrected, most recent level 3.9    Leukocytosis  Assessment & Plan  Stress response    Type 2 diabetes mellitus (HCC)- (present on admission)  Assessment & Plan  -accus with sliding scale coverage  -diabetic diet  -diabetic  education  -follow glycohemoglobin levels long term  -continue with oral antihyperglycemics  -monitor for hypoglycemic episodes and adjust control if he should get low      Acidosis- (present on admission)  Assessment & Plan  Patient had metabolic acidosis secondary to the ongoing gastrointestinal bleeding.  This is now resolving.    Thrombocytopenia (HCC)- (present on admission)  Assessment & Plan  Currently platelet counts are stable and no transfusion is indicated    Chronic diastolic heart failure (HCC)- (present on admission)  Assessment & Plan  Currently not in acute exacerbation    Essential hypertension- (present on admission)  Assessment & Plan  Blood pressure management optimization  Recently patient was on metoprolol which is held due to low blood pressure with a GI bleed    Chronic anticoagulation- (present on admission)  Assessment & Plan  Chronic anticoagulation currently being held  Consider IVC filter         VTE prophylaxis: pharmacologic prophylaxis contraindicated due to Ongoing gastrointestinal bleeding    I have performed a physical exam and reviewed and updated ROS and Plan today (3/22/2022). In review of yesterday's note (3/21/2022), there are no changes except as documented above.

## 2022-03-22 NOTE — ASSESSMENT & PLAN NOTE
Patient at this point does not want a filter although he may benefit from an IVC filter placement.

## 2022-03-22 NOTE — PROGRESS NOTES
Telemetry Shift Summary    Rhythm SR w/ 1st Degree AV Block and BBB  HR Range 80-87  Ectopy r-oPVC, r-oCoup  Measurements 0.24/0.12/0.36        Normal Values  Rhythm SR  HR Range    Measurements 0.12-0.20 / 0.06-0.10  / 0.30-0.52

## 2022-03-22 NOTE — PROGRESS NOTES
[facility] -  PROGRESS NOTE               Author: Karishma Cruz M.D. 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 Pt had a prolonged hospitalization for COVID-19 2/1/22-3/5/22 and the stay was complicated by RADHA and transplant renal biopsy showed no rejection but did show primary IGA Nephropathy and his Cr had improved but then he developed ATN from GI bleed and his Cr was 1.6 at discharge. His baseline Cr prior to that hospitalization was ~1.1-1.4. Pt was seen yest in outpt Nephrology clinic and at that time he was confused and weak and had diarrhea and no recent labs since hospital dc so he was sent to the ER yest due to concern for his mental status and weakness. In the ER yest his labs showed Cr 1.59, he was given IVF's and his mental status improved so he was discharged back to his SNF. This am while at his SNF he was noted to have blood in his diaper and altered mental status so he was sent to [facility] ER. In the ER he was tachycardic, hypotensive with SBP's 80's and Cr 2.15, and his Hgb dropped from 9.3 yest to 7.3 in ER today. He is very drowsy at this time. He reports abd discomfort but no n/v. He cannot tell me if/when he noted blood in his diaper. He is confused. He is currently receiving a blood transfusion and he received fluids.      DAILY NEPHROLOGY SUMMARY:  3/18: No events, EGD yest showed duodenal ulcer with active bleeding treated with hemoclips, burgundy stools overnight, no BM so far this am, BP stable, currently not on pressors, BUN/Cr higher, more alert today, denies any CP/SOB/LE edema, does report some abd discomfort  3/19: No events, transferred out of ICU, feels better, more oriented, no bloody BM's  "overnight, BP stable, Cr improving, denies any CP/SOB/LE edema/abd pain  3/20: No events, Cr trending down, good UOP, BP stable, on RA, denies any CP/SOB/abd pain/LE edema, feels weak/tired.   3/21: no events, awake, denied diarrhea or bloody stool. Getting PRBC transfusion.   3/22: feeling well, no complaints. S/p Nuclear RBC scan without signs of active bleeding.     REVIEW OF SYSTEMS:    10 point ROS reviewed and is as per HPI/daily summary or otherwise negative    PMH/PSH/SH/FH:   Reviewed and unchanged since admission note    CURRENT MEDICATIONS:   Reviewed from admission to present day    VS:  /99   Pulse 94   Temp 36.7 °C (98.1 °F) (Oral)   Resp 16   Ht 1.778 m (5' 10\")   Wt 113 kg (250 lb)   SpO2 96%   BMI 35.87 kg/m²     Physical Exam  Vitals and nursing note reviewed.   Constitutional:       Appearance: Normal appearance.   HENT:      Head: Normocephalic and atraumatic.   Eyes:      General: No scleral icterus.  Cardiovascular:      Rate and Rhythm: Normal rate and regular rhythm.   Pulmonary:      Effort: Pulmonary effort is normal. No respiratory distress.      Breath sounds: Normal breath sounds. Crackles in LLL only , nor on R side   Abdominal:      General: Bowel sounds are normal. There is no distension.      Palpations: Abdomen is soft.      Tenderness: There is no abdominal tenderness.   Musculoskeletal:         General: No deformity.      Right lower leg: No edema.      Left lower leg: No edema.   Skin:     General: Skin is warm and dry.      Findings: No rash.   Neurological:      General: No focal deficit present.      Mental Status: He is alert and oriented to person, place, and time.   Psychiatric:         Mood and Affect: Mood normal.         Behavior: Behavior normal.  Fluids:  In: 350 [Blood:350]  Out: 215     LABS:  Recent Labs     03/21/22  0456 03/21/22  2328 03/22/22  0454   SODIUM 139 138 140   POTASSIUM 2.8* 4.4 3.9   CHLORIDE 111 110 111   CO2 19* 20 20   GLUCOSE 100* 91 " 82   BUN 11 8 7*   CREATININE 1.35 1.15 1.14   CALCIUM 8.0* 8.5 8.4       IMAGING:   All imaging reviewed from admission to present day    IMPRESSION:  # RADHA , resolved   - on CKD Stage Stage III-A--pt with RADHA during recent hospitalization and Cr had improved to ~1.6 at dc on 3/5/22, now with RADHA again  - Had renal biopsy 2/2022 that was negative for rejection and showed primary IgA Nephropathy  - Had ATN in 2/2022 secondary to GI Bleed  - Now with RADHA again likely related to hypoperusion vs ATN   # Anemia  -secondary to GI Bleed  - Recent upper GI bleed in 2/2022 secondary to Duodenal ulcer  - On eliquis as outpt for AFib and h/o DVT  - Received PRBC transfusion 3/17  - EGD 3/17/22 showed bleeding duodenal ulcer treated with hemoclips.  - management per GI  # Living Unrelated Renal Transplant  - On tacrolimus, MMF, prednisone as outpt.  # Hypotension,  resolved   -likely hypovolemia related to GI bleed  # Thrombocytopenia--mild, monitor  # Metabolic Acidosis, improved   # Elevated Troponin  - per primary team   # Altered Mental Status--improved  # Atrial Fibrillation--on eliquis as outpt  # Hypokalemia, resolved  # Hypomagnesemia  # Hypophosphatemia, resolved        PLAN:  - Continue cellcept. Prednisone, tacrolimus to 0.5mg BID  - tacrolimus level ordered 3/18 but will be inaccurate as it is not a trough level. awaiting trough tacrolimus level (3/21/22)  - Transfuse for Hgb less than 7  - Dose adjust all meds for GFR  - Avoid IV contrast/nephrotoxins/NSAIDs  - c/w 1/2 NS with KCL.  - start k phos neutral BID   - start Mag ox 400 mg qd. Give 2g of Mag sulfate once   - c/w Sod bicarb 650 mg tid PO    Thank you.

## 2022-03-22 NOTE — CARE PLAN
Problem: Nutritional:  Goal: Achieve adequate nutritional intake  Description: Patient will tolerate diet >Clear liquids and consume >50% of meals (and supplements if needed).   Outcome: Not Met     See RD note.

## 2022-03-22 NOTE — PROGRESS NOTES
Gastroenterology Progress Note               Author:  Kim Goetz D.O. Date & Time Created: 3/22/2022 8:08 AM       Patient ID:  Name:             Krishan Acevedo  YOB: 1950  Age:                 72 y.o.  male  MRN:               [id number]      Referring Provider:  Keily Rowland MD      Medical Decision Making, by Problem:  Active Hospital Problems    Diagnosis    • Hypokalemia [E87.6]    • GI bleed [K92.2]    • Leukocytosis [D72.829]    • Type 2 diabetes mellitus (HCC) [E11.9]    • Acidosis [E87.2]    • Acute blood loss anemia [D62]    • Thrombocytopenia (HCC) [D69.6]    • Atrial fibrillation (HCC) [I48.91]    • Chronic diastolic heart failure (HCC) [I50.32]    • RADHA (acute kidney injury) (HCC) [N17.9]    • Chronic anticoagulation [Z79.01]    • Acute venous embolism and thrombosis of deep vessels of proximal lower extremity (HCC) [I82.4Y9]    • Essential hypertension [I10]    • Atrial flutter  [I48.92]    • Kidney transplant status, living related donor [Z94.0]              Assessment/Recommendations:  The patient is a very pleasant 72-year-old man with a history of duodenal ulcer (multiple recurrent bleeds while on anticoagulation requiring endoscopic intervention x3 in the past 2 months), ANCA vasculitis/glomerulonephritis with RF (requiring living donor renal transplant in 2008 on prednisone, mycophenolate and tacrolimus), HTN, diabetes, A. fib/a flutter (on Eliquis), prior DVT (on Eliquis), and Covid-19 infection (admitted to the hospital from 2/1/2020 2-3/5/2022) who was admitted with rectal bleeding.    Duodenal ulcer: Chronic in nature requiring endoscopic intervention x3 over the past 2 months.  He typically responds to endoscopic intervention but has recurrent bleeding after reinitiation of Eliquis.  EGD on 3/17/2022 demonstrated 30 mm actively bleeding duodenal bulb ulcer (treated with epinephrine and 3 hemoclips).  TAG RBC scan was negative.  He had a supratherapuetic response to  PRBCs and his Hb has now remained stable following transfusion.  No overt GI bleeding or hemodynamic instability.  Given stable Hb and normal TAG RBC scan, will monitor clinically.  -Clear liquid diet  -Continue supportive care and IVF  -Trend hemoglobin  -Continue Protonix drip 8 mg/hour and consider transitioning to Protonix 40 mg IV BID tomorrow  -Continue sucralfate 1 g p.o. 4 times daily x14 days total  -Avoid NSAIDs  -Transfuse for goal hemoglobin > 7  -Transfuse for goal platelet count > 50  -Transfuse for goal INR <2.5  -Follow-up pending gastrin level and H. Pylori stool Ag  -Low threshold for transfer to [facility] where IR is available  -Low threshold for surgical consultation  -Continue to hold anticoagulation  -Consider holding anticoagulation until resolution of the duodenal ulcer (defer to the primary team)    ARF: Downtrending creatinine.  In the setting of GI bleeding.  He has a history of ANCA vasculitis/glomerulonephritis with RF status post living donor renal transplant in 2008.  He is currently being followed by nephrology.  -Trend creatinine  -Appreciate nephrology recs        Kim Goetz D.O.        Thank you for inviting me to participate in the care of this patient. Please do not hesitate to call GI consultants with additional questions/concerns or changes in the patient's clinical status at [phone].    ________________________________________________________________________________________________________________________________________________________      Presenting Chief Complaint:  Rectal bleeding      Interval History:  EGD 3/17/2022: 30 mm actively bleeding duodenal bulb ulcer (10 mL 1: 10,000 epinephrine injected, 3 hemoclips placed), esophageal candidiasis    3/18/2022: Patient examined and interviewed, course reviewed. Discussed with ICU RN.  Hg is 8.8 this morning, after two units of packed red cells.  He denies abdominal pain, nausea, vomiting, cp,  palpitations.  Per nursing he was agitated at times overnoc.  He wants water by mouth.  His Mg is low as and his creatinine is rising (2.72, BUN 31).  He is on ppi gtt.      3/19/2022: Hemoglobin noted to be 7.8 thi AM (from 8.8).  Patient had a large green-brown bowel movement overnight.  On PPI gtt, taking CLD. Patient hungry.    3/21/2022: GI was reengaged due to continuing downtrend in hemoglobin.  The patient has remained afebrile and hemodynamically stable.  He has had multiple loose brown stools without melena or hematochezia.  He is tolerating p.o. liquids.  He denies abdominal pain and nausea/vomiting.  He reports feeling tired as he has not gotten much sleep in the past 2 nights.  His hemoglobin is 6.7 today (was 7 on 3/20/2022 and 7.5 on 3/19/2022).  His platelet count is decreased at 85 which is within his baseline since admission.  Creatinine is improved to 1.35 (was up to 2.72 on 3/18/2022).    3/22/2022: There were no acute events overnight.  The patient has remained hemodynamically stable and afebrile.  He received 1 unit PRBCs for supratherapeutic response (hemoglobin went from 6.7-8.7).  Hemoglobin is improved to 9.1 this morning.  Creatinine continues to downtrend.  Tagged RBC scan was negative for active bleeding.  He denies nausea/vomiting, melena, and hematochezia.  He would like to resume a clear liquid diet.      Initial GI HPI:   History was obtained via interview of the patient and his wife who helped provide history.  Extensive records were reviewed and the case was discussed with the emergency department attending.  The patient  has a pertinent history of atrial fibrillation/flutter with prior deep venous thrombosis.  He is chronically anticoagulated on Eliquis.  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  He is maintained on Prograf and CellCept as well as   low-dose prednisone for graft rejection prevention.      The patient had recent hospitalization for GI bleeding.  Dr. Pryor, gastroenterology consultant, done an EGD on 2/24/2022 with placement of multiple Hemoclips to a duodenal ulcer.  The patient had recurrent bleeding and Dr. Kim Goetz did repeat EGD on 2/26/2022 with placement of a single Hemoclip.  The patient was ultimately transferred out to [facility] for rehabilitation.  Yesterday, he was apparently feeling unwell and was seen in the ER.  He was sent home after evaluation.  This morning, he was found with blood in his diaper per the chart.     The patient is a limited historian.  He denies any nausea or vomiting at this time.  He denies abdominal pain.  No fevers or chills.  No cough.  His initial hemoglobin here is 7.3 and he has now received 1 unit of packed red blood cells.  He is actively receiving Kcentra administration given chronic therapy with Eliquis.  His BUN was 27 with a creatinine of 2.15.  Troponin T was 112. COVID is negative.  BNP is 1945.  Electrocardiogram shows sinus tachycardia with right bundle branch block.      Review of Systems:  Review of Systems   Constitutional: Positive for chills and malaise/fatigue. Negative for fever.   Respiratory: Negative for shortness of breath.    Cardiovascular: Negative for chest pain and leg swelling.   Gastrointestinal: Negative for abdominal pain, blood in stool, constipation, diarrhea, melena, nausea and vomiting.   Skin: Negative for rash.         Hospital Medications:  Current Facility-Administered Medications   Medication Dose Frequency Provider Last Rate Last Admin   • sodium bicarbonate tablet 650 mg  650 mg TID Karishma Cruz M.D.   650 mg at 03/21/22 2045   • 0.45% NaCl with KCl 20 mEq infusion   Continuous Karishma Cruz M.D. 75 mL/hr at 03/22/22 0346 New Bag at 03/22/22 0346   • sucralfate (CARAFATE) 1 GM/10ML suspension 1 g  1 g Q6HRS Roni Campbell M.D.   1 g at 03/22/22 0503   • tacrolimus (PROGRAF) capsule 0.5 mg  0.5 mg Q12HRS Aiyana DUTTON  "ANTHONY Kumar   0.5 mg at 03/22/22 0503   • pantoprazole (Protonix) 80 mg in  mL Infusion  8 mg/hr Continuous Lola Manley M.D. 25 mL/hr at 03/22/22 0108 8 mg/hr at 03/22/22 0108   • polyethylene glycol/lytes (MIRALAX) PACKET 1 Packet  1 Packet QDAY PRN oLla Manley M.D.        And   • magnesium hydroxide (MILK OF MAGNESIA) suspension 30 mL  30 mL QDAY PRN Lola Manley M.D.        And   • bisacodyl (DULCOLAX) suppository 10 mg  10 mg QDAY PRN Lola Manley M.D.       • mycophenolate (CELLCEPT) capsule 500 mg  500 mg BID Lola Manley M.D.   500 mg at 03/22/22 0503   • predniSONE (DELTASONE) tablet 5 mg  5 mg DAILY Lola Manley M.D.   5 mg at 03/22/22 0503   Last reviewed on 3/17/2022 11:29 AM by Cristo Eckert        Vital signs:  Weight/BMI: Body mass index is 35.87 kg/m².  /99   Pulse 94   Temp 36.7 °C (98.1 °F) (Oral)   Resp 16   Ht 1.778 m (5' 10\")   Wt 113 kg (250 lb)   SpO2 96%   Vitals:    03/21/22 2111 03/21/22 2328 03/22/22 0338 03/22/22 0721   BP: 150/80 149/69 138/61 128/99   Pulse: 89 92 95 94   Resp: 18 14 14 16   Temp: 36.7 °C (98 °F) 36.6 °C (97.8 °F) 36.7 °C (98.1 °F) 36.7 °C (98.1 °F)   TempSrc: Oral Oral Oral Oral   SpO2: 97% 98% 96% 96%   Weight:       Height:         Oxygen Therapy:  Pulse Oximetry: 96 %, O2 (LPM): 0, O2 Delivery Device: None - Room Air    Intake/Output Summary (Last 24 hours) at 3/22/2022 0808  Last data filed at 3/21/2022 2148  Gross per 24 hour   Intake 350 ml   Output 215 ml   Net 135 ml         Physical Exam:  Physical Exam  Constitutional:       General: He is not in acute distress.     Appearance: Normal appearance. He is ill-appearing.   HENT:      Head: Normocephalic and atraumatic.      Nose: Nose normal.   Eyes:      General: No scleral icterus.     Extraocular Movements: Extraocular movements intact.      Conjunctiva/sclera: Conjunctivae normal.   Cardiovascular:      Rate and Rhythm: Normal rate and " regular rhythm.      Heart sounds: Murmur (Grade 3-6 systolic murmur) heard.     No gallop.   Pulmonary:      Effort: Pulmonary effort is normal. No respiratory distress.      Breath sounds: Normal breath sounds. No wheezing or rales.   Abdominal:      General: Abdomen is flat. Bowel sounds are normal. There is no distension.      Palpations: Abdomen is soft. There is no mass.      Tenderness: There is no abdominal tenderness. There is no guarding or rebound.   Musculoskeletal:         General: Normal range of motion.      Cervical back: Normal range of motion.      Right lower leg: No edema.      Left lower leg: No edema.   Skin:     General: Skin is warm and dry.      Coloration: Skin is not jaundiced.   Neurological:      General: No focal deficit present.      Mental Status: He is alert and oriented to person, place, and time.   Psychiatric:         Mood and Affect: Mood normal.         Behavior: Behavior normal.         Judgment: Judgment normal.           Labs:  Recent Labs     03/21/22 0456 03/21/22 2328 03/22/22 0454   SODIUM 139 138 140   POTASSIUM 2.8* 4.4 3.9   CHLORIDE 111 110 111   CO2 19* 20 20   BUN 11 8 7*   CREATININE 1.35 1.15 1.14   MAGNESIUM 1.5 1.4* 1.3*   PHOSPHORUS 2.4* 3.8 3.0   CALCIUM 8.0* 8.5 8.4     Recent Labs     03/21/22 0456 03/21/22 2328 03/22/22 0454   ALTSGPT 17  --   --    ASTSGOT 17  --   --    ALKPHOSPHAT 98  --   --    TBILIRUBIN 0.3  --   --    DBILIRUBIN <0.2  --   --    GLUCOSE 100* 91 82     Recent Labs     03/21/22 0456 03/21/22 1410 03/22/22 0454   WBC 4.6* 5.0 5.0   NEUTSPOLYS 63.30 69.70 62.30   LYMPHOCYTES 25.30 21.50* 24.20   MONOCYTES 8.80 6.80 10.10   EOSINOPHILS 1.30 0.20 1.40   BASOPHILS 0.20 0.20 0.20   ASTSGOT 17  --   --    ALTSGPT 17  --   --    ALKPHOSPHAT 98  --   --    TBILIRUBIN 0.3  --   --      Recent Labs     03/21/22 0456 03/21/22 1410 03/22/22 0454   RBC 2.19* 2.88* 3.03*   HEMOGLOBIN 6.7* 8.7* 9.1*   HEMATOCRIT 20.5* 27.2* 27.8*    PLATELETCT 85* 104* 98*     Recent Results (from the past 24 hour(s))   CBC WITH DIFFERENTIAL    Collection Time: 03/21/22  2:10 PM   Result Value Ref Range    WBC 5.0 4.8 - 10.8 K/uL    RBC 2.88 (L) 4.70 - 6.10 M/uL    Hemoglobin 8.7 (L) 14.0 - 18.0 g/dL    Hematocrit 27.2 (L) 42.0 - 52.0 %    MCV 94.4 81.4 - 97.8 fL    MCH 30.2 27.0 - 33.0 pg    MCHC 32.0 (L) 33.7 - 35.3 g/dL    RDW 60.3 (H) 35.9 - 50.0 fL    Platelet Count 104 (L) 164 - 446 K/uL    MPV 10.0 9.0 - 12.9 fL    Neutrophils-Polys 69.70 44.00 - 72.00 %    Lymphocytes 21.50 (L) 22.00 - 41.00 %    Monocytes 6.80 0.00 - 13.40 %    Eosinophils 0.20 0.00 - 6.90 %    Basophils 0.20 0.00 - 1.80 %    Immature Granulocytes 1.60 (H) 0.00 - 0.90 %    Nucleated RBC 0.00 /100 WBC    Neutrophils (Absolute) 3.47 1.82 - 7.42 K/uL    Lymphs (Absolute) 1.07 1.00 - 4.80 K/uL    Monos (Absolute) 0.34 0.00 - 0.85 K/uL    Eos (Absolute) 0.01 0.00 - 0.51 K/uL    Baso (Absolute) 0.01 0.00 - 0.12 K/uL    Immature Granulocytes (abs) 0.08 0.00 - 0.11 K/uL    NRBC (Absolute) 0.00 K/uL   Basic Metabolic Panel    Collection Time: 03/21/22 11:28 PM   Result Value Ref Range    Sodium 138 135 - 145 mmol/L    Potassium 4.4 3.6 - 5.5 mmol/L    Chloride 110 96 - 112 mmol/L    Co2 20 20 - 33 mmol/L    Glucose 91 65 - 99 mg/dL    Bun 8 8 - 22 mg/dL    Creatinine 1.15 0.50 - 1.40 mg/dL    Calcium 8.5 8.4 - 10.2 mg/dL    Anion Gap 8.0 7.0 - 16.0   MAGNESIUM    Collection Time: 03/21/22 11:28 PM   Result Value Ref Range    Magnesium 1.4 (L) 1.5 - 2.5 mg/dL   PHOSPHORUS    Collection Time: 03/21/22 11:28 PM   Result Value Ref Range    Phosphorus 3.8 2.5 - 4.5 mg/dL   ESTIMATED GFR    Collection Time: 03/21/22 11:28 PM   Result Value Ref Range    GFR (CKD-EPI) 67 >60 mL/min/1.73 m 2   CBC WITH DIFFERENTIAL    Collection Time: 03/22/22  4:54 AM   Result Value Ref Range    WBC 5.0 4.8 - 10.8 K/uL    RBC 3.03 (L) 4.70 - 6.10 M/uL    Hemoglobin 9.1 (L) 14.0 - 18.0 g/dL    Hematocrit 27.8 (L) 42.0  - 52.0 %    MCV 91.7 81.4 - 97.8 fL    MCH 30.0 27.0 - 33.0 pg    MCHC 32.7 (L) 33.7 - 35.3 g/dL    RDW 60.7 (H) 35.9 - 50.0 fL    Platelet Count 98 (L) 164 - 446 K/uL    MPV 9.1 9.0 - 12.9 fL    Neutrophils-Polys 62.30 44.00 - 72.00 %    Lymphocytes 24.20 22.00 - 41.00 %    Monocytes 10.10 0.00 - 13.40 %    Eosinophils 1.40 0.00 - 6.90 %    Basophils 0.20 0.00 - 1.80 %    Immature Granulocytes 1.80 (H) 0.00 - 0.90 %    Nucleated RBC 0.40 /100 WBC    Neutrophils (Absolute) 3.14 1.82 - 7.42 K/uL    Lymphs (Absolute) 1.22 1.00 - 4.80 K/uL    Monos (Absolute) 0.51 0.00 - 0.85 K/uL    Eos (Absolute) 0.07 0.00 - 0.51 K/uL    Baso (Absolute) 0.01 0.00 - 0.12 K/uL    Immature Granulocytes (abs) 0.09 0.00 - 0.11 K/uL    NRBC (Absolute) 0.02 K/uL   Basic Metabolic Panel    Collection Time: 03/22/22  4:54 AM   Result Value Ref Range    Sodium 140 135 - 145 mmol/L    Potassium 3.9 3.6 - 5.5 mmol/L    Chloride 111 96 - 112 mmol/L    Co2 20 20 - 33 mmol/L    Glucose 82 65 - 99 mg/dL    Bun 7 (L) 8 - 22 mg/dL    Creatinine 1.14 0.50 - 1.40 mg/dL    Calcium 8.4 8.4 - 10.2 mg/dL    Anion Gap 9.0 7.0 - 16.0   MAGNESIUM    Collection Time: 03/22/22  4:54 AM   Result Value Ref Range    Magnesium 1.3 (L) 1.5 - 2.5 mg/dL   PHOSPHORUS    Collection Time: 03/22/22  4:54 AM   Result Value Ref Range    Phosphorus 3.0 2.5 - 4.5 mg/dL   ESTIMATED GFR    Collection Time: 03/22/22  4:54 AM   Result Value Ref Range    GFR (CKD-EPI) 67 >60 mL/min/1.73 m 2       Radiology Review:  NM-GI BLEEDING SCAN   Final Result      No nuclear medicine evidence of active gastrointestinal hemorrhage.      US-RENAL TRANSPLANT COMP   Final Result      1.  Prior right lower quadrant renal transplant. The transplanted kidney demonstrates elevated resistive indices which can be seen with renal parenchymal disease/projection.      2.  No evidence of anastomotic stenosis.      3.  No evidence of hydronephrosis or perinephric fluid.      DX-CHEST-PORTABLE (1 VIEW)    Final Result      1.  Patchy bilateral peripheral interstitial opacity. These findings are consistent with Covid 19 pneumonia.      2.  Cardiomegaly.            MDM (Data Review):   -Records reviewed and summarized in current documentation  -I personally reviewed and interpreted the laboratory results  -I personally reviewed the radiology images        Core Quality Measures   Reviewed items::  Labs, Medications and Radiology reports reviewed

## 2022-03-22 NOTE — PROGRESS NOTES
Rounded with pharmacy regarding IV compatibility of 1/2 NS w/ 20K and Magnesium sulfate. Per Pharmacy [facility].

## 2022-03-22 NOTE — PROGRESS NOTES
Telemetry Shift Summary     Rhythm SR w/ 1st Degree AV Block and BBB  HR Range 80-87  Ectopy r-fPVC, rPAC r-oCoup, rBigem, rTrigem  Measurements 0.22/0.12/0.34           Normal Values  Rhythm SR  HR Range    Measurements 0.12-0.20 / 0.06-0.10  / 0.30-0.52

## 2022-03-23 NOTE — PROGRESS NOTES
[facility] -  PROGRESS NOTE               Author: Karishma Cruz M.D. Date & Time: 3/23/2022  8:33 AM     HPI:  72yoM with PMH significant for Living Unrelated Renal Transplant 2008 secondary to ANCA vasculitis, HTN, DM II, AFib/Aflutter, recent hospital admission for COVID-19 viral illness 2/1/22-3/5/22, now admitted from SNF with rectal bleeding and with RADHA and altered mental status. Pt had a prolonged hospitalization for COVID-19 2/1/22-3/5/22 and the stay was complicated by RADHA and transplant renal biopsy showed no rejection but did show primary IGA Nephropathy and his Cr had improved but then he developed ATN from GI bleed and his Cr was 1.6 at discharge. His baseline Cr prior to that hospitalization was ~1.1-1.4. Pt was seen yest in outpt Nephrology clinic and at that time he was confused and weak and had diarrhea and no recent labs since hospital dc so he was sent to the ER yest due to concern for his mental status and weakness. In the ER yest his labs showed Cr 1.59, he was given IVF's and his mental status improved so he was discharged back to his SNF. This am while at his SNF he was noted to have blood in his diaper and altered mental status so he was sent to [facility] ER. In the ER he was tachycardic, hypotensive with SBP's 80's and Cr 2.15, and his Hgb dropped from 9.3 yest to 7.3 in ER today. He is very drowsy at this time. He reports abd discomfort but no n/v. He cannot tell me if/when he noted blood in his diaper. He is confused. He is currently receiving a blood transfusion and he received fluids.      DAILY NEPHROLOGY SUMMARY:  3/18: No events, EGD yest showed duodenal ulcer with active bleeding treated with hemoclips, burgundy stools overnight, no BM so far this am, BP stable, currently not on pressors, BUN/Cr higher, more alert today, denies any CP/SOB/LE edema, does report some abd discomfort  3/19: No events, transferred out of ICU, feels better, more oriented, no bloody BM's  "overnight, BP stable, Cr improving, denies any CP/SOB/LE edema/abd pain  3/20: No events, Cr trending down, good UOP, BP stable, on RA, denies any CP/SOB/abd pain/LE edema, feels weak/tired.   3/21: no events, awake, denied diarrhea or bloody stool. Getting PRBC transfusion.   3/22: feeling well, no complaints. S/p Nuclear RBC scan without signs of active bleeding.   3/23: no labs are available today. Otherwise he is feeling well.     REVIEW OF SYSTEMS:    10 point ROS reviewed and is as per HPI/daily summary or otherwise negative    PMH/PSH/SH/FH:   Reviewed and unchanged since admission note    CURRENT MEDICATIONS:   Reviewed from admission to present day    VS:  /71   Pulse (!) 112   Temp 36.9 °C (98.4 °F) (Oral)   Resp 16   Ht 1.778 m (5' 10\")   Wt 100 kg (220 lb 7.4 oz)   SpO2 97%   BMI 31.63 kg/m²     Physical Exam  Vitals and nursing note reviewed.   Constitutional:       Appearance: Normal appearance.   HENT:      Head: Normocephalic and atraumatic.   Eyes:      General: No scleral icterus.  Cardiovascular:      Rate and Rhythm: Normal rate and regular rhythm.   Pulmonary:      Effort: Pulmonary effort is normal. No respiratory distress.      Breath sounds: Normal breath sounds. Crackles in LLL only , nor on R side   Abdominal:      General: Bowel sounds are normal. There is no distension.      Palpations: Abdomen is soft.      Tenderness: There is no abdominal tenderness.   Musculoskeletal:         General: No deformity.      Right lower leg: No edema.      Left lower leg: No edema.   Skin:     General: Skin is warm and dry.      Findings: No rash.   Neurological:      General: No focal deficit present.      Mental Status: He is alert and oriented to person, place, and time.   Psychiatric:         Mood and Affect: Mood normal.         Behavior: Behavior normal.  Fluids:  No intake/output data recorded.    LABS:  Recent Labs     03/21/22  0456 03/21/22  2328 03/22/22  0454   SODIUM 139 138 140 "   POTASSIUM 2.8* 4.4 3.9   CHLORIDE 111 110 111   CO2 19* 20 20   GLUCOSE 100* 91 82   BUN 11 8 7*   CREATININE 1.35 1.15 1.14   CALCIUM 8.0* 8.5 8.4       IMAGING:   All imaging reviewed from admission to present day    IMPRESSION:  # RADHA , resolved   - on CKD Stage Stage III-A--pt with RADHA during recent hospitalization and Cr had improved to ~1.6 at dc on 3/5/22, now with RADHA again  - Had renal biopsy 2/2022 that was negative for rejection and showed primary IgA Nephropathy.   - Had ATN in 2/2022 secondary to GI Bleed  - Now with RADHA again likely related to hypoperusion vs ATN   # Anemia  -secondary to GI Bleed  - Recent upper GI bleed in 2/2022 secondary to Duodenal ulcer  - On eliquis as outpt for AFib and h/o DVT  - Received PRBC transfusion 3/17  - EGD 3/17/22 showed bleeding duodenal ulcer treated with hemoclips.  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  - Tacro level 5.9 on 3/21 ( Within the goal)  # Hypotension,  resolved   -likely hypovolemia related to GI bleed  # Thrombocytopenia--mild, monitor  # Metabolic Acidosis, improved   # Elevated Troponin  - per primary team   # Altered Mental Status--improved  # Atrial Fibrillation--on eliquis as outpt  # Hypokalemia, resolved  # Hypomagnesemia   # Hypophosphatemia, resolved        PLAN:  -  No new labs today. Please order and follow to see stability or need for electroilyte supplementation.   - Continue cellcept. Prednisone, tacrolimus to 0.5mg BID  - Transfuse for Hgb less than 7. Follow GI recs.   - Dose adjust all meds for GFR  - Avoid IV contrast/nephrotoxins/NSAIDs  - c/w 1/2 NS with KCL. Can transition to PO hydration once cleared for PO intake.   - c/w k phos neutral BID. Up- titrate to keep Phos >2.5   - c/w Mag ox 400 mg qd.  Up-titrate to keep Mag >1.8 as long as patient can tolerate without diarrhea  - c/w Sod bicarb 650 mg tid PO ( goal Serum Bicarb 22-28)  -  Kidney function is stable and there was no signs of vasculitis in transplanted kidney. IF there is a concern for GI bleed due to vasculitis please consult Rheumatology( MPO AB 48, PR3 AB 61 on 2/11/22).      Thank you.

## 2022-03-23 NOTE — CARE PLAN
The patient is Watcher - Medium risk of patient condition declining or worsening    Shift Goals  Clinical Goals: (P) Restore F&E balance, monitor HH  Patient Goals: (P) Rest comfortably    Progress made toward(s) clinical / shift goals:     Problem: Hemodynamics  Goal: Patient's hemodynamics, fluid balance and neurologic status will be stable or improve  Outcome: Progressing  Note: Patient receiving F&E replacement per MAR.    Patient is not progressing towards the following goals:    Problem: Bowel Elimination  Goal: Establish and maintain regular bowel function    3/23/2022 0319 by Zoë Greenwood, Student  Outcome: Not Progressing  Note: Patient remains incontinent of bowel. Requires frequent brief and sheet changes throughout shift. Educated patient on using the call light before he has a BM so a bedpan can be used. Reinforcement required.

## 2022-03-23 NOTE — PROGRESS NOTES
Telemetry Shift Summary    Rhythm ST w 1st degree and BBB  HR Range 106 - 114  Ectopy r-f PVCs, rCOUP, rBIG, & rTRIG  Measurements 0.24/0.14/0.36        Normal Values  Rhythm SR  HR Range    Measurements 0.12-0.20 / 0.06-0.10  / 0.30-0.52

## 2022-03-23 NOTE — PROGRESS NOTES
Telemetry Shift Summary     Rhythm SR,ST, 1st degree AVB, inverted T wave   HR Range   Ectopy oPVC, rPAC, rCoup rBigem  Measurements 0.14/0.08/0.36           Normal Values  Rhythm SR  HR Range    Measurements 0.12-0.20 / 0.06-0.10  / 0.30-0.52

## 2022-03-23 NOTE — PROGRESS NOTES
Hospital Medicine Daily Progress Note    Date of Service  3/23/2022    Chief Complaint  Krishan Acevedo is a 72 y.o. male admitted 3/17/2022 with gastrointestinal bleeding    Hospital Course  3/22/2022-Krishan is a 72-year-old gentleman comes in with ongoing gastrointestinal bleeding from a skilled facility while undergoing physical rehabilitation.  The patient is found to have a duodenal ulcer.  The patient underwent Hemoclip.  Since then his H&H has stabilized.  He is still on a PPI drip, fluid resuscitation and the GI is following him very carefully.  Repeat EGD for today canceled due to stabilization of his H&H.  3/23/2022-today patient appears more improved.  No bleeding overnight.  Hemoglobin counts are elevating.  Patient transition to twice daily PPI infusions.  Continuous infusions off.  At this point fluids can be discontinued, patient is eating.  Discharge planning at this point patient came from the skilled facility where patient can return to ongoing therapy    Interval Problem Update  Monitor H&H  PPI twice daily at this point  Oral intake  No anticoagulation  Optimize heart rate control    I have personally seen and examined the patient at bedside. I discussed the plan of care with patient.    Consultants/Specialty  GI and nephrology    Code Status  Full Code    Disposition  Patient is not medically cleared for discharge.   Anticipate discharge to to skilled nursing facility.  I have placed the appropriate orders for post-discharge needs.    Review of Systems  Review of Systems   Constitutional: Positive for malaise/fatigue. Negative for chills, fever and weight loss.   HENT: Negative.  Negative for congestion, hearing loss, sore throat and tinnitus.    Eyes: Negative.  Negative for double vision, photophobia and pain.   Respiratory: Negative for cough and shortness of breath.    Cardiovascular: Negative.  Negative for chest pain, palpitations and claudication.   Gastrointestinal: Positive for  abdominal pain. Negative for heartburn and nausea.   Genitourinary: Negative.  Negative for dysuria, frequency and urgency.   Musculoskeletal: Negative.  Negative for myalgias and neck pain.   Skin: Negative.  Negative for rash.   Neurological: Positive for weakness. Negative for dizziness, tingling and sensory change.   Endo/Heme/Allergies: Negative.  Negative for polydipsia. Does not bruise/bleed easily.   Psychiatric/Behavioral: Negative.  Negative for depression, substance abuse and suicidal ideas.   All other systems reviewed and are negative.       Physical Exam  Temp:  [36.4 °C (97.5 °F)-36.9 °C (98.4 °F)] 36.9 °C (98.4 °F)  Pulse:  [] 112  Resp:  [16] 16  BP: (143-147)/(71-74) 146/71  SpO2:  [97 %-98 %] 97 %    Physical Exam  Vitals and nursing note reviewed. Exam conducted with a chaperone present.   Constitutional:       General: He is not in acute distress.     Appearance: Normal appearance. He is obese. He is ill-appearing. He is not toxic-appearing.   HENT:      Head: Normocephalic and atraumatic.      Right Ear: External ear normal.      Left Ear: External ear normal.      Nose: Nose normal.      Mouth/Throat:      Mouth: Mucous membranes are moist.      Pharynx: Oropharynx is clear.   Eyes:      Extraocular Movements: Extraocular movements intact.      Conjunctiva/sclera: Conjunctivae normal.      Pupils: Pupils are equal, round, and reactive to light.   Neck:      Thyroid: No thyromegaly.      Vascular: No JVD.   Cardiovascular:      Rate and Rhythm: Tachycardia present. Rhythm irregular.      Pulses: Normal pulses.      Heart sounds: Normal heart sounds. No murmur heard.  Pulmonary:      Effort: Pulmonary effort is normal.      Breath sounds: Normal breath sounds. No wheezing or rales.   Chest:      Chest wall: No tenderness.   Abdominal:      General: Abdomen is flat. Bowel sounds are normal. There is distension.      Palpations: Abdomen is soft. There is no mass.      Tenderness: There is no  abdominal tenderness. There is no guarding or rebound.   Musculoskeletal:         General: No tenderness. Normal range of motion.      Cervical back: Normal range of motion and neck supple.      Right lower leg: No edema.      Left lower leg: No edema.   Lymphadenopathy:      Cervical: No cervical adenopathy.   Skin:     General: Skin is warm and dry.      Capillary Refill: Capillary refill takes less than 2 seconds.      Findings: No erythema or rash.   Neurological:      General: No focal deficit present.      Mental Status: He is alert and oriented to person, place, and time. Mental status is at baseline.      GCS: GCS eye subscore is 4. GCS verbal subscore is 5. GCS motor subscore is 6.      Cranial Nerves: No cranial nerve deficit.      Sensory: No sensory deficit.      Coordination: Coordination abnormal.      Gait: Gait abnormal.      Deep Tendon Reflexes: Reflexes are normal and symmetric.   Psychiatric:         Mood and Affect: Mood normal.         Behavior: Behavior normal.         Thought Content: Thought content normal.         Judgment: Judgment normal.         Fluids  No intake or output data in the 24 hours ending 03/23/22 1230    Laboratory  Recent Labs     03/21/22  1410 03/22/22  0454 03/23/22  0228   WBC 5.0 5.0 6.9   RBC 2.88* 3.03* 3.17*   HEMOGLOBIN 8.7* 9.1* 9.4*   HEMATOCRIT 27.2* 27.8* 29.4*   MCV 94.4 91.7 92.7   MCH 30.2 30.0 29.7   MCHC 32.0* 32.7* 32.0*   RDW 60.3* 60.7* 61.3*   PLATELETCT 104* 98* 108*   MPV 10.0 9.1 9.5     Recent Labs     03/21/22  0456 03/21/22  2328 03/22/22  0454   SODIUM 139 138 140   POTASSIUM 2.8* 4.4 3.9   CHLORIDE 111 110 111   CO2 19* 20 20   GLUCOSE 100* 91 82   BUN 11 8 7*   CREATININE 1.35 1.15 1.14   CALCIUM 8.0* 8.5 8.4                   Imaging  NM-GI BLEEDING SCAN   Final Result      No nuclear medicine evidence of active gastrointestinal hemorrhage.      US-RENAL TRANSPLANT COMP   Final Result      1.  Prior right lower quadrant renal transplant. The  transplanted kidney demonstrates elevated resistive indices which can be seen with renal parenchymal disease/projection.      2.  No evidence of anastomotic stenosis.      3.  No evidence of hydronephrosis or perinephric fluid.      DX-CHEST-PORTABLE (1 VIEW)   Final Result      1.  Patchy bilateral peripheral interstitial opacity. These findings are consistent with Covid 19 pneumonia.      2.  Cardiomegaly.           Assessment/Plan  * GI bleed- (present on admission)  Assessment & Plan  At this point Protonix 40 mg IV twice daily while in the hospital, point discharge GI is okay with the patient starting oral PPI    Acute blood loss anemia- (present on admission)  Assessment & Plan  H&H at this point stabilizing, most recent hemoglobin 9.4 and uptrend is noted    Atrial fibrillation (HCC)- (present on admission)  Assessment & Plan  Rate control    RADHA (acute kidney injury) (HCC)- (present on admission)  Assessment & Plan  Status post renal transplant    Acute venous embolism and thrombosis of deep vessels of proximal lower extremity (HCC)- (present on admission)  Assessment & Plan  Patient at this point does not want a filter although he may benefit from an IVC filter placement.    Kidney transplant status, living related donor- (present on admission)  Assessment & Plan  Remains on chronic immunosuppressive therapy with a combination of tacrolimus 1 mg every morning and 0.5 mg nightly, CellCept 500 mg twice daily, prednisone 5 mg daily            Atrial flutter - (present on admission)  Assessment & Plan  Continue with rate control  No anticoagulation due to recent GI bleed, this I discussed with gastroenterology and they feel if the patient ever goes back on anticoagulation he will rebleed.    Hypokalemia  Assessment & Plan  Resolved    Leukocytosis  Assessment & Plan  Resolved    Type 2 diabetes mellitus (HCC)- (present on admission)  Assessment & Plan  -accus with sliding scale coverage, monitor blood sugars  most recently blood sugars down to 82 and stable  -diabetic diet  -diabetic education  -follow glycohemoglobin levels long term  -continue with oral antihyperglycemics  -monitor for hypoglycemic episodes and adjust control if he should get low      Acidosis- (present on admission)  Assessment & Plan  Resolved    Thrombocytopenia (HCC)- (present on admission)  Assessment & Plan  Secondary to continuous bleeding the patient has also developed thrombocytopenia    Chronic diastolic heart failure (HCC)- (present on admission)  Assessment & Plan  No fluid overload noted.    Essential hypertension- (present on admission)  Assessment & Plan  Blood pressure currently well controlled.  Patient was previously metoprolol 25 mg twice daily    Chronic anticoagulation- (present on admission)  Assessment & Plan  No anticoagulation most likely will need to be on anticoagulation permanently most likely would benefit from an IVC filter.  However patient is at this point not interested in IVC filter         VTE prophylaxis: pharmacologic prophylaxis contraindicated due to Ongoing gastrointestinal bleeding    I have performed a physical exam and reviewed and updated ROS and Plan today (3/23/2022). In review of yesterday's note (3/22/2022), there are no changes except as documented above.

## 2022-03-23 NOTE — PROGRESS NOTES
Gastroenterology Progress Note               Author:  Judie WAGGONER Date & Time Created: 3/23/2022 8:23 AM       Patient ID:  Name:             Krishan Acevedo  YOB: 1950  Age:                 72 y.o.  male  MRN:               [id number]      Referring Provider:  Keily Rowland MD      Medical Decision Making, by Problem:  Active Hospital Problems    Diagnosis    • Hypokalemia [E87.6]    • GI bleed [K92.2]    • Leukocytosis [D72.829]    • Type 2 diabetes mellitus (HCC) [E11.9]    • Acidosis [E87.2]    • Acute blood loss anemia [D62]    • Thrombocytopenia (HCC) [D69.6]    • Atrial fibrillation (HCC) [I48.91]    • Chronic diastolic heart failure (HCC) [I50.32]    • RADHA (acute kidney injury) (HCC) [N17.9]    • Chronic anticoagulation [Z79.01]    • Acute venous embolism and thrombosis of deep vessels of proximal lower extremity (HCC) [I82.4Y9]    • Essential hypertension [I10]    • Atrial flutter  [I48.92]    • Kidney transplant status, living related donor [Z94.0]              Assessment/Recommendations:  The patient is a very pleasant 72-year-old man with a history of duodenal ulcer (multiple recurrent bleeds while on anticoagulation requiring endoscopic intervention x3 in the past 2 months), ANCA vasculitis/glomerulonephritis with RF (requiring living donor renal transplant in 2008 on prednisone, mycophenolate and tacrolimus), HTN, diabetes, A. fib/a flutter (on Eliquis), prior DVT (on Eliquis), and Covid-19 infection (admitted to the hospital from 2/1/2020 2-3/5/2022) who was admitted with rectal bleeding.    Duodenal ulcer: Chronic in nature requiring endoscopic intervention x3 over the past 2 months.  He typically responds to endoscopic intervention but has recurrent bleeding after reinitiation of Eliquis.  EGD on 3/17/2022 demonstrated 30 mm actively bleeding duodenal bulb ulcer (treated with epinephrine and 3 hemoclips).  TAG RBC scan was negative.  He had a supratherapuetic response to  PRBCs and his Hb has remained stable following transfusion.  No overt GI bleeding or hemodynamic instability.  Given stable Hb and normal TAG RBC scan, will monitor clinically.  Gastrin level was slightly elevated at 150 and this elevation is more consistent with PPI use and not Zollinger Romo syndrome.  He has been on anticoagulation which is contributing to his recurrent bleeding.  Will defer to the primary team for consideration of IVC filter verse resuming anticoagulation dependent upon his thrombosis risk.  -Advance diet as tolerated  -Continue supportive care and IVF as indicated  -Trend hemoglobin  -Continue Protonix 40 mg IV BID and transition to prilosec 40 mg po BID at discharge  -Continue sucralfate 1 g p.o. 4 times daily x14 days total  -Avoid NSAIDs  -Transfuse for goal hemoglobin > 7  -Transfuse for goal platelet count > 50  -Transfuse for goal INR <2.5  -Follow-up pending H. Pylori stool Ag  -Consider holding anticoagulation until resolution of the duodenal ulcer (defer to the primary team)  -Consider out patient EGD in 8 weeks    ARF: Downtrending creatinine.  In the setting of GI bleeding.  He has a history of ANCA vasculitis/glomerulonephritis with RF status post living donor renal transplant in 2008.  He is currently being followed by nephrology.  -Trend creatinine  -Appreciate nephrology recs        Judie eKe, MARCY.P.R.PAUL.    GI WILL SIGN OFF. PLEASE CALL IF ANY QUESTIONS OR CONCERNS.      Thank you for inviting me to participate in the care of this patient. Please do not hesitate to call GI consultants with additional questions/concerns or changes in the patient's clinical status at [phone].    GI attending attestation:  Kim Goetz DO, FACP    I saw and evaluated the patient.  I agree with the assessment and recommendations above as edited by me.    Thank you for inviting me to participate in the care of this patient.  GI to sign off at this time.  If the patient develops  changes in clinical course/decompensation or you have any additional questions or concerns, please don't hesitate to reengage GI consultants.  The patient should follow up with [facility] in 6 weeks after discharge.  The patient will be called to schedule an appointment time.  If the patient does not receive a call from GI consultants scheduling or they have additional questions, recommend the patient call the clinic at [phone] to schedule an appointment.      ________________________________________________________________________________________________________________________________________________________      Presenting Chief Complaint:  Rectal bleeding      Interval History:  EGD 3/17/2022: 30 mm actively bleeding duodenal bulb ulcer (10 mL 1: 10,000 epinephrine injected, 3 hemoclips placed), esophageal candidiasis    3/18/2022: Patient examined and interviewed, course reviewed. Discussed with ICU RN.  Hg is 8.8 this morning, after two units of packed red cells.  He denies abdominal pain, nausea, vomiting, cp, palpitations.  Per nursing he was agitated at times overnoc.  He wants water by mouth.  His Mg is low as and his creatinine is rising (2.72, BUN 31).  He is on ppi gtt.      3/19/2022: Hemoglobin noted to be 7.8 thi AM (from 8.8).  Patient had a large green-brown bowel movement overnight.  On PPI gtt, taking CLD. Patient hungry.    3/21/2022: GI was reengaged due to continuing downtrend in hemoglobin.  The patient has remained afebrile and hemodynamically stable.  He has had multiple loose brown stools without melena or hematochezia.  He is tolerating p.o. liquids.  He denies abdominal pain and nausea/vomiting.  He reports feeling tired as he has not gotten much sleep in the past 2 nights.  His hemoglobin is 6.7 today (was 7 on 3/20/2022 and 7.5 on 3/19/2022).  His platelet count is decreased at 85 which is within his baseline since admission.  Creatinine is improved to  1.35 (was up to 2.72 on 3/18/2022).    3/22/2022: There were no acute events overnight.  The patient has remained hemodynamically stable and afebrile.  He received 1 unit PRBCs for supratherapeutic response (hemoglobin went from 6.7-8.7).  Hemoglobin is improved to 9.1 this morning.  Creatinine continues to downtrend.  Tagged RBC scan was negative for active bleeding.  He denies nausea/vomiting, melena, and hematochezia.  He would like to resume a clear liquid diet.    3/23/22: Stable. No acute overnight events. Hgb improving 9.4 (9.1 on 3/22/22). Denies abdominal pain, N/V, melena.       Initial GI HPI:   History was obtained via interview of the patient and his wife who helped provide history.  Extensive records were reviewed and the case was discussed with the emergency department attending.  The patient  has a pertinent history of atrial fibrillation/flutter with prior deep venous thrombosis.  He is chronically anticoagulated on Eliquis.  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  He is maintained on Prograf and CellCept as well as   low-dose prednisone for graft rejection prevention.     The patient had recent hospitalization for GI bleeding.  Dr. Pryor, gastroenterology consultant, done an EGD on 2/24/2022 with placement of multiple Hemoclips to a duodenal ulcer.  The patient had recurrent bleeding and Dr. Kim Goetz did repeat EGD on 2/26/2022 with placement of a single Hemoclip.  The patient was ultimately transferred out to [facility] for rehabilitation.  Yesterday, he was apparently feeling unwell and was seen in the ER.  He was sent home after evaluation.  This morning, he was found with blood in his diaper per the chart.     The patient is a limited historian.  He denies any nausea or vomiting at this time.  He denies abdominal pain.  No fevers or chills.  No cough.  His initial hemoglobin here is 7.3 and he has now received 1  unit of packed red blood cells.  He is actively receiving Kcentra administration given chronic therapy with Eliquis.  His BUN was 27 with a creatinine of 2.15.  Troponin T was 112. COVID is negative.  BNP is 1945.  Electrocardiogram shows sinus tachycardia with right bundle branch block.      Review of Systems:  Review of Systems   Constitutional: Positive for chills and malaise/fatigue. Negative for fever.   Respiratory: Negative for shortness of breath.    Cardiovascular: Negative for chest pain and leg swelling.   Gastrointestinal: Negative for abdominal pain, blood in stool, constipation, diarrhea, melena, nausea and vomiting.   Skin: Negative for rash.         Hospital Medications:  Current Facility-Administered Medications   Medication Dose Frequency Provider Last Rate Last Admin   • phosphorus (K-Phos-Neutral) per tablet 250 mg  250 mg BID Karishma Cruz M.D.   250 mg at 03/23/22 0524   • magnesium oxide (MAG-OX) tablet 400 mg  400 mg DAILY Karishma Cruz M.D.   400 mg at 03/23/22 0524   • sodium bicarbonate tablet 650 mg  650 mg TID Karishma Cruz M.D.   650 mg at 03/22/22 2111   • 0.45% NaCl with KCl 20 mEq infusion   Continuous Karishma Cruz M.D. 75 mL/hr at 03/23/22 0526 New Bag at 03/23/22 0526   • sucralfate (CARAFATE) 1 GM/10ML suspension 1 g  1 g Q6HRS Roni Campbell M.D.   1 g at 03/23/22 0525   • tacrolimus (PROGRAF) capsule 0.5 mg  0.5 mg Q12HRS Aiyana Kumar M.D.   0.5 mg at 03/23/22 0524   • pantoprazole (Protonix) 80 mg in  mL Infusion  8 mg/hr Continuous Lola Manley M.D. 25 mL/hr at 03/22/22 2250 8 mg/hr at 03/22/22 2250   • polyethylene glycol/lytes (MIRALAX) PACKET 1 Packet  1 Packet QDAY PRN Lola Manley M.D.        And   • magnesium hydroxide (MILK OF MAGNESIA) suspension 30 mL  30 mL QDAY PRN Lola Manley M.D.        And   • bisacodyl (DULCOLAX) suppository 10 mg  10 mg QDAY PRN Lola Manley M.D.       • mycophenolate (CELLCEPT) capsule 500 mg  500 mg  "BID Lola ANTHONY Manley   500 mg at 03/23/22 0525   • predniSONE (DELTASONE) tablet 5 mg  5 mg DAILY Lola ANTHONY Manley   5 mg at 03/23/22 0524   Last reviewed on 3/17/2022 11:29 AM by Laisha Aldana [facility]        Vital signs:  Weight/BMI: Body mass index is 31.63 kg/m².  /71   Pulse (!) 112   Temp 36.9 °C (98.4 °F) (Oral)   Resp 16   Ht 1.778 m (5' 10\")   Wt 100 kg (220 lb 7.4 oz)   SpO2 97%   Vitals:    03/22/22 1503 03/22/22 2114 03/22/22 2254 03/23/22 0601   BP: 147/73 143/74 146/71    Pulse: 95 (!) 112     Resp: 16 16     Temp: 36.4 °C (97.5 °F) 36.9 °C (98.4 °F)     TempSrc: Oral Oral     SpO2: 98% 97%     Weight:    100 kg (220 lb 7.4 oz)   Height:         Oxygen Therapy:  Pulse Oximetry: 97 %, O2 (LPM): 0, O2 Delivery Device: None - Room Air  No intake or output data in the 24 hours ending 03/23/22 0823      Physical Exam:  Physical Exam  Constitutional:       General: He is not in acute distress.     Appearance: Normal appearance. He is ill-appearing.   HENT:      Head: Normocephalic and atraumatic.      Nose: Nose normal.   Eyes:      General: No scleral icterus.     Extraocular Movements: Extraocular movements intact.      Conjunctiva/sclera: Conjunctivae normal.   Cardiovascular:      Rate and Rhythm: Normal rate and regular rhythm.      Heart sounds: Murmur (Grade 3-6 systolic murmur) heard.     No gallop.   Pulmonary:      Effort: Pulmonary effort is normal. No respiratory distress.      Breath sounds: Normal breath sounds. No wheezing or rales.   Abdominal:      General: Abdomen is flat. Bowel sounds are normal. There is no distension.      Palpations: Abdomen is soft. There is no mass.      Tenderness: There is no abdominal tenderness. There is no guarding or rebound.   Musculoskeletal:         General: Normal range of motion.      Cervical back: Normal range of motion.      Right lower leg: No edema.      Left lower leg: No edema.   Skin:     General: Skin is warm and dry.      " Coloration: Skin is not jaundiced.   Neurological:      General: No focal deficit present.      Mental Status: He is alert and oriented to person, place, and time.   Psychiatric:         Mood and Affect: Mood normal.         Behavior: Behavior normal.         Judgment: Judgment normal.           Labs:  Recent Labs     03/21/22 0456 03/21/22 2328 03/22/22 0454 03/23/22 0228   SODIUM 139 138 140  --    POTASSIUM 2.8* 4.4 3.9  --    CHLORIDE 111 110 111  --    CO2 19* 20 20  --    BUN 11 8 7*  --    CREATININE 1.35 1.15 1.14  --    MAGNESIUM 1.5 1.4* 1.3*  --    PHOSPHORUS 2.4* 3.8 3.0 2.9   CALCIUM 8.0* 8.5 8.4  --      Recent Labs     03/21/22 0456 03/21/22 2328 03/22/22 0454   ALTSGPT 17  --   --    ASTSGOT 17  --   --    ALKPHOSPHAT 98  --   --    TBILIRUBIN 0.3  --   --    DBILIRUBIN <0.2  --   --    GLUCOSE 100* 91 82     Recent Labs     03/21/22 0456 03/21/22 1410 03/22/22 0454 03/23/22 0228   WBC 4.6* 5.0 5.0 6.9   NEUTSPOLYS 63.30 69.70 62.30 73.50*   LYMPHOCYTES 25.30 21.50* 24.20 15.40*   MONOCYTES 8.80 6.80 10.10 9.00   EOSINOPHILS 1.30 0.20 1.40 0.40   BASOPHILS 0.20 0.20 0.20 0.40   ASTSGOT 17  --   --   --    ALTSGPT 17  --   --   --    ALKPHOSPHAT 98  --   --   --    TBILIRUBIN 0.3  --   --   --      Recent Labs     03/21/22 1410 03/22/22 0454 03/23/22 0228   RBC 2.88* 3.03* 3.17*   HEMOGLOBIN 8.7* 9.1* 9.4*   HEMATOCRIT 27.2* 27.8* 29.4*   PLATELETCT 104* 98* 108*     Recent Results (from the past 24 hour(s))   CBC WITH DIFFERENTIAL    Collection Time: 03/23/22  2:28 AM   Result Value Ref Range    WBC 6.9 4.8 - 10.8 K/uL    RBC 3.17 (L) 4.70 - 6.10 M/uL    Hemoglobin 9.4 (L) 14.0 - 18.0 g/dL    Hematocrit 29.4 (L) 42.0 - 52.0 %    MCV 92.7 81.4 - 97.8 fL    MCH 29.7 27.0 - 33.0 pg    MCHC 32.0 (L) 33.7 - 35.3 g/dL    RDW 61.3 (H) 35.9 - 50.0 fL    Platelet Count 108 (L) 164 - 446 K/uL    MPV 9.5 9.0 - 12.9 fL    Neutrophils-Polys 73.50 (H) 44.00 - 72.00 %    Lymphocytes 15.40 (L) 22.00 -  41.00 %    Monocytes 9.00 0.00 - 13.40 %    Eosinophils 0.40 0.00 - 6.90 %    Basophils 0.40 0.00 - 1.80 %    Immature Granulocytes 1.30 (H) 0.00 - 0.90 %    Nucleated RBC 0.30 /100 WBC    Neutrophils (Absolute) 5.07 1.82 - 7.42 K/uL    Lymphs (Absolute) 1.06 1.00 - 4.80 K/uL    Monos (Absolute) 0.62 0.00 - 0.85 K/uL    Eos (Absolute) 0.03 0.00 - 0.51 K/uL    Baso (Absolute) 0.03 0.00 - 0.12 K/uL    Immature Granulocytes (abs) 0.09 0.00 - 0.11 K/uL    NRBC (Absolute) 0.02 K/uL   PHOSPHORUS    Collection Time: 03/23/22  2:28 AM   Result Value Ref Range    Phosphorus 2.9 2.5 - 4.5 mg/dL       Radiology Review:  NM-GI BLEEDING SCAN   Final Result      No nuclear medicine evidence of active gastrointestinal hemorrhage.      US-RENAL TRANSPLANT COMP   Final Result      1.  Prior right lower quadrant renal transplant. The transplanted kidney demonstrates elevated resistive indices which can be seen with renal parenchymal disease/projection.      2.  No evidence of anastomotic stenosis.      3.  No evidence of hydronephrosis or perinephric fluid.      DX-CHEST-PORTABLE (1 VIEW)   Final Result      1.  Patchy bilateral peripheral interstitial opacity. These findings are consistent with Covid 19 pneumonia.      2.  Cardiomegaly.            MDM (Data Review):   -Records reviewed and summarized in current documentation  -I personally reviewed and interpreted the laboratory results  -I personally reviewed the radiology images        Core Quality Measures   Reviewed items::  Labs, Medications and Radiology reports reviewed

## 2022-03-24 NOTE — CARE PLAN
The patient is Stable - Low risk of patient condition declining or worsening    Shift Goals  Clinical Goals: rest, monitor hemoglobin  Patient Goals: sleep    Progress made toward(s) clinical / shift goals:  Hemoglobin monitored with AM lab work. Patient sleeping off and on.     Patient is not progressing towards the following goals:      Problem: Risk for Bleeding  Goal: Patient will take measures to prevent bleeding and recognizes signs of bleeding that need to be reported immediately to a health care professional  Outcome: Progressing  Note: Patient H&H monitored with labwork. No obvious signs of bleeding. Patient A&O x4.      Problem: Pain - Standard  Goal: Alleviation of pain or a reduction in pain to the patient’s comfort goal  Outcome: Progressing  Note: Patient with no complaints of pain. Patient able to reposition himself with frequent body changes.

## 2022-03-24 NOTE — DISCHARGE PLANNING
Anticipated Discharge Disposition: [facility]    Action:     LSW notified by DPA that pt can return, requested transport for [id number].    LSW faxed transport forms to [facility] requested [facility] 1330.    Discussed pt in IDT rounds. LSW notified MD and RN that pt will transfer about 1330.     LSW spoke with pt after rounds. Pt gave verbal consent to transfer. Pt requested this LSW notify his spouse Penelope. LSW called Penelope and left VM.    Barriers to Discharge: None    Plan: LSW to follow and assist as needed.    1250: LSW placed transfer packet in pt's chart. LSW spoke to Penelope at bedside. Penelope confirmed she received this LSWs . LSW informed RN that packet is in chart.

## 2022-03-24 NOTE — DISCHARGE PLANNING
Agency/Facility Name: [facility]  Outcome: Left Vmail regarding bed availability    0980  Agency/Facility Name: [facility]  Spoke To: Ann  Outcome: Per ann she is going to see if they can make room for pt. She will call this DPA back about transport for a later time    1015  Agency/Facility Name: [facility]  Spoke To: Ann  Outcome: Per ann they can take the pt today and DPA will have LSW set up transport for [phone] today

## 2022-03-24 NOTE — PROGRESS NOTES
Clinical Pharmacy Note  Warfarin Consult    Anais Figueroa is a 78 y.o. male receiving warfarin managed by pharmacy. Patient being bridged with none. Warfarin Indication: atrial fib  Target INR range: 2-3   Dose prior to admission: 2.5mg Mondays, 5mg all other days    Current warfarin drug-drug interactions: none    Recent Labs     03/23/19  2241 03/24/19  0542 03/24/19  1605 03/25/19  0502   HGB 11.8* 11.8*  --  11.8*   HCT 34.5* 34.0*  --  34.0*   INR 3.60* 3.60* 2.23* 1.34*       Assessment/Plan:    Warfarin 5 mg tonight. Daily PT/INR until stable within therapeutic range. Thank you for the consult. Will continue to follow. [facility] -  PROGRESS NOTE               Author: Karishma Cruz M.D. Date & Time: 3/24/2022  8:13 AM     HPI:  72yoM with PMH significant for Living Unrelated Renal Transplant 2008 secondary to ANCA vasculitis, HTN, DM II, AFib/Aflutter, recent hospital admission for COVID-19 viral illness 2/1/22-3/5/22, now admitted from SNF with rectal bleeding and with RADHA and altered mental status. Pt had a prolonged hospitalization for COVID-19 2/1/22-3/5/22 and the stay was complicated by RADHA and transplant renal biopsy showed no rejection but did show primary IGA Nephropathy and his Cr had improved but then he developed ATN from GI bleed and his Cr was 1.6 at discharge. His baseline Cr prior to that hospitalization was ~1.1-1.4. Pt was seen yest in outpt Nephrology clinic and at that time he was confused and weak and had diarrhea and no recent labs since hospital dc so he was sent to the ER yest due to concern for his mental status and weakness. In the ER yest his labs showed Cr 1.59, he was given IVF's and his mental status improved so he was discharged back to his SNF. This am while at his SNF he was noted to have blood in his diaper and altered mental status so he was sent to [facility] ER. In the ER he was tachycardic, hypotensive with SBP's 80's and Cr 2.15, and his Hgb dropped from 9.3 yest to 7.3 in ER today. He is very drowsy at this time. He reports abd discomfort but no n/v. He cannot tell me if/when he noted blood in his diaper. He is confused. He is currently receiving a blood transfusion and he received fluids.      DAILY NEPHROLOGY SUMMARY:  3/18: No events, EGD yest showed duodenal ulcer with active bleeding treated with hemoclips, burgundy stools overnight, no BM so far this am, BP stable, currently not on pressors, BUN/Cr higher, more alert today, denies any CP/SOB/LE edema, does report some abd discomfort  3/19: No events, transferred out of ICU, feels better, more oriented, no bloody BM's  "overnight, BP stable, Cr improving, denies any CP/SOB/LE edema/abd pain  3/20: No events, Cr trending down, good UOP, BP stable, on RA, denies any CP/SOB/abd pain/LE edema, feels weak/tired.   3/21: no events, awake, denied diarrhea or bloody stool. Getting PRBC transfusion.   3/22: feeling well, no complaints. S/p Nuclear RBC scan without signs of active bleeding.   3/23: no labs are available today. Otherwise he is feeling well.   3/24: Otherwise no complaints. Awaiting discharge    REVIEW OF SYSTEMS:    10 point ROS reviewed and is as per HPI/daily summary or otherwise negative    PMH/PSH/SH/FH:   Reviewed and unchanged since admission note    CURRENT MEDICATIONS:   Reviewed from admission to present day    VS:  /74   Pulse (!) 101   Temp 37 °C (98.6 °F) (Axillary)   Resp 18   Ht 1.778 m (5' 10\")   Wt 101 kg (221 lb 9 oz)   SpO2 96%   BMI 31.79 kg/m²     Physical Exam  Vitals and nursing note reviewed.   Constitutional:       Appearance: Normal appearance.   HENT:      Head: Normocephalic and atraumatic.   Eyes:      General: No scleral icterus.  Cardiovascular:      Rate and Rhythm: Normal rate and regular rhythm.   Pulmonary:      Effort: Pulmonary effort is normal. No respiratory distress.      Breath sounds: Normal breath sounds. Crackles in LLL only , nor on R side   Abdominal:      General: Bowel sounds are normal. There is no distension.      Palpations: Abdomen is soft.      Tenderness: There is no abdominal tenderness.   Musculoskeletal:         General: No deformity.      Right lower leg: No edema.      Left lower leg: No edema.   Skin:     General: Skin is warm and dry.      Findings: No rash.   Neurological:      General: No focal deficit present.      Mental Status: He is alert and oriented to person, place, and time.   Psychiatric:         Mood and Affect: Mood normal.         Behavior: Behavior normal.  Fluids:  No intake/output data recorded.    LABS:  Recent Labs     03/21/22  2328 " 03/22/22  0454   SODIUM 138 140   POTASSIUM 4.4 3.9   CHLORIDE 110 111   CO2 20 20   GLUCOSE 91 82   BUN 8 7*   CREATININE 1.15 1.14   CALCIUM 8.5 8.4       IMAGING:   All imaging reviewed from admission to present day    IMPRESSION:  # RADHA , resolved   - on CKD Stage Stage III-A--pt with RADHA during recent hospitalization and Cr had improved to ~1.6 at dc on 3/5/22, now with RADHA again  - Had renal biopsy 2/2022 that was negative for rejection and showed primary IgA Nephropathy.   - Had ATN in 2/2022 secondary to GI Bleed  - Now with RADHA again likely related to hypoperusion vs ATN   # Anemia  -secondary to GI Bleed  - Recent upper GI bleed in 2/2022 secondary to Duodenal ulcer  - On eliquis as outpt for AFib and h/o DVT  - Received PRBC transfusion 3/17  - EGD 3/17/22 showed bleeding duodenal ulcer treated with hemoclips.  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  - Tacro level 5.9 on 3/21 ( Within the goal)  # Hypotension,  resolved   -likely hypovolemia related to GI bleed  # Thrombocytopenia--mild, monitor  # Metabolic Acidosis, improved   # Elevated Troponin  - per primary team   # Altered Mental Status--improved  # Atrial Fibrillation--on eliquis as outpt  # Hypokalemia, resolved  # Hypomagnesemia   # Hypophosphatemia, resolved        PLAN:  - Continue cellcept. Prednisone, tacrolimus to 0.5mg BID  - Transfuse for Hgb less than 7. Follow GI recs.   - Dose adjust all meds for GFR  - Avoid IV contrast/nephrotoxins/NSAIDs  - stop IVF. C/w po hydration  - c/w k phos neutral BID. Up- titrate to keep Phos >2.5   - increase Mag ox 400 mg  To BID.  Up-titrate to keep Mag >1.8 as long as patient can tolerate without diarrhea  - c/w Sod bicarb 650 mg tid PO ( goal Serum Bicarb 22-28)  - Kidney function is stable and there was no signs of vasculitis in transplanted kidney. IF there is a concern for GI bleed due to vasculitis please consult  Rheumatology( MPO AB 48, PR3 AB 61 on 2/11/22).      Nephrology signs off.   Thank you.

## 2022-03-24 NOTE — PROGRESS NOTES
Telemetry Shift Summary     Rhythm: ST  Rate:   Measurements: .22/.10/.32  Ectopy (reported by Monitor Tech): f PVC, o-r bigem, trigem, r coup, r-o PAC     Normal Values  Rhythm: Sinus  HR:   Measurements: 0.12-0.20/0.06-0.10/0.30-0.52

## 2022-03-24 NOTE — CARE PLAN
Problem: Risk for Bleeding  Goal: Patient will take measures to prevent bleeding and recognizes signs of bleeding that need to be reported immediately to a health care professional  Outcome: Progressing  Goal: Patient will not experience bleeding as evidenced by normal blood pressure, stable hematocrit and hemoglobin levels and desired ranges for coagulation profiles  Outcome: Progressing   The patient is Stable - Low risk of patient condition declining or worsening    Shift Goals  Clinical Goals: Monitor Hemoglobin, control diarrhea  Patient Goals: rest    Progress made toward(s) clinical / shift goals:  Pt maintain Hemoglobin and has had less diarrhea since taking immodium.    Patient is not progressing towards the following goals:

## 2022-03-24 NOTE — DISCHARGE PLANNING
DC Transport Scheduled    Received request at: 1019    Transport Company Scheduled:  [employer]  Spoke with Kathie at [facility] to schedule transport.    Scheduled Date: 03/24/2022  Scheduled Time: 1330    Destination: [facility]    Notified care team of scheduled transport via Voalte.     If there are any changes needed to the DC transportation scheduled, please contact [facility] at ext. [phone] between the hours of 5326-8351 Mon-Fri. If outside those hours, contact the ED Case Manager at ext. [phone].

## 2022-03-24 NOTE — DISCHARGE SUMMARY
Discharge Summary    CHIEF COMPLAINT ON ADMISSION  Chief Complaint   Patient presents with   • Bloody Stools     Pt of [facility], woke up with blood in his diaper, tachycardia and decreased mentation. HX of kidney transplant, saw nephrology yesterday.       Reason for Admission  EMS     CODE STATUS  Full Code    HPI & HOSPITAL COURSE  Mr. Krishan Fowler is a 72-year-old gentleman who comes in from [facility] because he had initially rectal bleeding.  The patient upon admission was tachycardic, altered, laboratory evaluations were off and thus was admitted to the telemetric unit.  Under monitoring the patient's cardiac status showed sinus tachycardia.  Patient's bleeding was consistent and progressive.  Gastroenterology was consulted they took the patient for evaluation EGD showed a duodenal ulcer that had to be hemoclipped.  Afterwards the patient had several units of blood transfused and the patient had to be monitored on a PPI drip.  The patient was on anticoagulation with a NOAC and this had to be discontinued due to fact that the patient has sleep length on this therapy.  At this point gastroenterology is not recommending that the patient go back on any sort of anticoagulation.  The patient says that he only had anticoagulation for atrial fibrillation.  Per previous records the patient apparently also had DVTs in the remote past.  Discussion was held with the patient and the staff regarding placing an IVC filter.  Patient at this point declines an IVC filter.  Patient will be continued on rate control for his atrial fibrillation.  After discussion with gastroenterology patient will remain on oral PPI therapy and at this point off of any anticoagulation and NSAIDs.  Patient does have a remote history of heart failure and at this point he had to be given fluid resuscitation to maintain his blood pressure.  Blood pressure medications have been adjusted and at this point the patient will need continued ongoing  management.  The patient has chronic renal failure status post renal transplant and this point remains on chronic immunosuppression which has been continued.  Overall patient's condition however has stabilized but he is extremely debilitated and will need placement to a skilled nursing facility for ongoing therapies.  Patient at this point [facility].    Therefore, he is discharged in good and stable condition to skilled nursing facility.    The patient met 2-midnight criteria for an inpatient stay at the time of discharge.      FOLLOW UP ITEMS POST DISCHARGE  Follow-up with gastroenterology after discharge  Follow-up with the primary care physician in 7 to 10 days    DISCHARGE DIAGNOSES  Principal Problem:    GI bleed POA: Yes  Active Problems:    Atrial flutter  POA: Yes    Kidney transplant status, living related donor POA: Yes    Acute venous embolism and thrombosis of deep vessels of proximal lower extremity (HCC) POA: Yes    RADHA (acute kidney injury) (HCC) POA: Yes    Atrial fibrillation (HCC) POA: Yes    Acute blood loss anemia POA: Yes    Essential hypertension POA: Yes      Overview: Formatting of this note might be different from the original.      Last Assessment & Plan:       continue metoprolol 25 bid.    Chronic diastolic heart failure (HCC) POA: Yes    Thrombocytopenia (HCC) POA: Yes    Acidosis POA: Yes    Type 2 diabetes mellitus (HCC) POA: Yes    Leukocytosis POA: Unknown    Hypokalemia POA: Unknown    Chronic anticoagulation POA: Yes  Resolved Problems:    * No resolved hospital problems. *      FOLLOW UP  Future Appointments   Date Time Provider Department Center   4/5/2022 10:20 AM Jones Garcia M.D. [facility] None   6/15/2022 12:50 PM Mary Jane Oliveros M.D. [facility] None     No follow-up provider specified.    MEDICATIONS ON DISCHARGE     Medication List      START taking these medications      Instructions   loperamide 2 MG Caps  Commonly known as:  IMODIUM   Take 1 Capsule by mouth 4 times a day as needed for Diarrhea.  Dose: 2 mg     magnesium oxide 400 MG Tabs tablet  Start taking on: March 25, 2022  Commonly known as: MAG-OX   Take 1 Tablet by mouth every day.  Dose: 400 mg     phosphorus 250 MG tablet  Commonly known as: K-Phos-Neutral   Take 1 Tablet by mouth 2 times a day.  Dose: 1 Tablet     sodium bicarbonate 650 MG Tabs  Commonly known as: SODIUM BICARBONATE   Take 1 Tablet by mouth in the morning, at noon, and at bedtime.  Dose: 650 mg        CHANGE how you take these medications      Instructions   omeprazole 20 MG delayed-release capsule  What changed:   · how much to take  · when to take this  Commonly known as: PRILOSEC   Take 2 Capsules by mouth 2 times a day.  Dose: 40 mg     predniSONE 5 MG Tabs  What changed: Another medication with the same name was removed. Continue taking this medication, and follow the directions you see here.  Commonly known as: DELTASONE   Take 5 mg by mouth every day.  Dose: 5 mg        CONTINUE taking these medications      Instructions   atorvastatin 10 MG Tabs  Commonly known as: LIPITOR   Take 10 mg by mouth every evening.  Dose: 10 mg     Brimonidine Tartrate-Timolol 0.2-0.5 % Soln   Administer 2 Drops into both eyes 2 times a day.  Dose: 2 Drop     cinacalcet 30 MG Tabs  Commonly known as: SENSIPAR   Take 30 mg by mouth two times a week. Monday & Friday  Dose: 30 mg     fluticasone 50 MCG/ACT nasal spray  Commonly known as: FLONASE   Administer 1 Spray into affected nostril(S) every day.  Dose: 1 Spray     mycophenolate 250 MG Caps  Commonly known as: CELLCEPT   Take 2 Capsules by mouth 2 times a day.  Dose: 500 mg     sucralfate 1 GM/10ML Susp  Commonly known as: CARAFATE   Take 1 g by mouth every 6 hours.  Dose: 1 g     * tacrolimus 0.5 MG Caps  Commonly known as: PROGRAF   Take 1 Capsule by mouth every evening.  Dose: 0.5 mg     * tacrolimus 1 MG Caps  Commonly known as: PROGRAF   Take 1 Capsule by mouth every  morning.  Dose: 1 mg     vitamin D2 (Ergocalciferol) 1.25 MG (44282 UT) Caps capsule  Commonly known as: Drisdol   Take 50,000 Units by mouth every Monday.  Dose: 50,000 Units         * This list has 2 medication(s) that are the same as other medications prescribed for you. Read the directions carefully, and ask your doctor or other care provider to review them with you.            STOP taking these medications    amLODIPine 5 MG Tabs  Commonly known as: NORVASC     apixaban 5mg Tabs  Commonly known as: Eliquis     Centrum Silver 50+Men Tabs     Glucosamine 1500 Complex Caps     pantoprazole 40 MG Tbec  Commonly known as: PROTONIX            Allergies  Allergies   Allergen Reactions   • Nsaids Unspecified     Cannot take because of anti rejection meds.   • Triazolam Anxiety     All anti anxiety medications cause hallucinations        DIET  Orders Placed This Encounter   Procedures   • Diet Order Diet: Consistent CHO (Diabetic)     Standing Status:   Standing     Number of Occurrences:   1     Order Specific Question:   Diet:     Answer:   Consistent CHO (Diabetic) [4]       ACTIVITY  As tolerated.  Weight bearing as tolerated    LINES, DRAINS, AND WOUNDS  This is an automated list. Peripheral IVs will be removed prior to discharge.  Peripheral IV 03/23/22 20 G Posterior;Right Forearm (Active)   Site Assessment Clean;Intact;Dry 03/23/22 2010   Dressing Type Transparent 03/23/22 2010   Line Status Infusing 03/23/22 2010   Dressing Status Clean;Dry 03/23/22 2010   Dressing Intervention N/A 03/23/22 2010   NEXT Primary Tubing Change  03/26/22 03/23/22 2010   Infiltration Grading ([facility]) 0 03/23/22 2010   Phlebitis Scale ([facility] Only) 0 03/23/22 2010          Peripheral IV 03/23/22 20 G Posterior;Right Forearm (Active)   Site Assessment Clean;Intact;Dry 03/23/22 2010   Dressing Type Transparent 03/23/22 2010   Line Status Infusing 03/23/22 2010   Dressing Status Clean;Dry 03/23/22 2010   Dressing Intervention N/A  03/23/22 2010   NEXT Primary Tubing Change  03/26/22 03/23/22 2010   Infiltration Grading ([facility] [place]) 0 03/23/22 2010   Phlebitis Scale ([facility] Only) 0 03/23/22 2010               MENTAL STATUS ON TRANSFER             CONSULTATIONS  Gastroenterology  Nephrology    PROCEDURES  Esophagogastroduodenoscopy with hemostasis on 3/17/2022    LABORATORY  Lab Results   Component Value Date    SODIUM 140 03/22/2022    POTASSIUM 3.9 03/22/2022    CHLORIDE 111 03/22/2022    CO2 20 03/22/2022    GLUCOSE 82 03/22/2022    BUN 7 (L) 03/22/2022    CREATININE 1.14 03/22/2022        Lab Results   Component Value Date    WBC 7.1 03/24/2022    HEMOGLOBIN 8.8 (L) 03/24/2022    HEMATOCRIT 26.9 (L) 03/24/2022    PLATELETCT 98 (L) 03/24/2022        Total time of the discharge process exceeds 35 minutes.

## 2022-03-24 NOTE — DISCHARGE INSTRUCTIONS
Discharge Instructions    Discharged to home by car with relative. Discharged via wheelchair, hospital escort: Yes.  Special equipment needed: Not Applicable    Be sure to schedule a follow-up appointment with your primary care doctor or any specialists as instructed.     Discharge Plan:   Diet Plan: Discussed  Activity Level: Discussed  Confirmed Follow up Appointment: Appointment Scheduled  Confirmed Symptoms Management: Discussed  Medication Reconciliation Updated: Yes  Influenza Vaccine Indication: Not indicated: Previously immunized this influenza season and > 8 years of age    I understand that a diet low in cholesterol, fat, and sodium is recommended for good health. Unless I have been given specific instructions below for another diet, I accept this instruction as my diet prescription.   Other diet: GI soft    Special Instructions: None    · Is patient discharged on Warfarin / Coumadin?   No     Depression / Suicide Risk    As you are discharged from this [facility] facility, it is important to learn how to keep safe from harming yourself.    Recognize the warning signs:  · Abrupt changes in personality, positive or negative- including increase in energy   · Giving away possessions  · Change in eating patterns- significant weight changes-  positive or negative  · Change in sleeping patterns- unable to sleep or sleeping all the time   · Unwillingness or inability to communicate  · Depression  · Unusual sadness, discouragement and loneliness  · Talk of wanting to die  · Neglect of personal appearance   · Rebelliousness- reckless behavior  · Withdrawal from people/activities they love  · Confusion- inability to concentrate     If you or a loved one observes any of these behaviors or has concerns about self-harm, here's what you can do:  · Talk about it- your feelings and reasons for harming yourself  · Remove any means that you might use to hurt yourself (examples: pills, rope, extension cords,  firearm)  · Get professional help from the community (Mental Health, Substance Abuse, psychological counseling)  · Do not be alone:Call your Safe Contact- someone whom you trust who will be there for you.  · Call your local CRISIS HOTLINE 731-4392 or 517-534-9879  · Call your local Children's Mobile Crisis Response Team [place] (209) 058-1805 or www.[employer]  · Call the toll free National Suicide Prevention Hotlines   · National Suicide Prevention Lifeline 138-603-RSEV (3779)  · [employer] 800-SUICIDE (935-8247)      Gastrointestinal Bleeding  Gastrointestinal (GI) bleeding is bleeding somewhere along the path that food travels through the body (digestive tract). This path is anywhere between the mouth and the opening of the butt (anus). You may have blood in your poop (stool) or have black poop. If you throw up (vomit), there may be blood in it.  This condition can be mild, serious, or even life-threatening. If you have a lot of bleeding, you may need to stay in the hospital.  What are the causes?  This condition may be caused by:  · Irritation and swelling of the esophagus (esophagitis). The esophagus is part of the body that moves food from your mouth to your stomach.  · Swollen veins in the butt (hemorrhoids).  · Areas of painful tearing in the opening of the butt (anal fissures). These are often caused by passing hard poop.  · Pouches that form on the colon over time (diverticulosis).  · Irritation and swelling (diverticulitis) in areas where pouches have formed on the colon.  · Growths (polyps) or cancer. Colon cancer often starts out as growths that are not cancer.  · Irritation of the stomach lining (gastritis).  · Sores (ulcers) in the stomach.  What increases the risk?  You are more likely to develop this condition if you:  · Have a certain type of infection in your stomach (Helicobacter pylori infection).  · Take certain medicines.  · Smoke.  · Drink alcohol.  What are the  signs or symptoms?  Common symptoms of this condition include:  · Throwing up (vomiting) material that has bright red blood in it. It may look like coffee grounds.  · Changes in your poop. The poop may:  ? Have red blood in it.  ? Be black, look like tar, and smell stronger than normal.  ? Be red.  · Pain or cramping in the belly (abdomen).  How is this treated?  Treatment for this condition depends on the cause of the bleeding. For example:  · Sometimes, the bleeding can be stopped during a procedure that is done to find the problem (endoscopy or colonoscopy).  · Medicines can be used to:  ? Help control irritation, swelling, or infection.  ? Reduce acid in your stomach.  · Certain problems can be treated with:  ? Creams.  ? Medicines that are put in the butt (suppositories).  ? Warm baths.  · Surgery is sometimes needed.  · If you lose a lot of blood, you may need a blood transfusion.  If bleeding is mild, you may be allowed to go home. If there is a lot of bleeding, you will need to stay in the hospital.  Follow these instructions at home:    · Take over-the-counter and prescription medicines only as told by your doctor.  · Eat foods that have a lot of fiber in them. These foods include beans, whole grains, and fresh fruits and vegetables. You can also try eating 1-3 prunes each day.  · Drink enough fluid to keep your pee (urine) pale yellow.  · Keep all follow-up visits as told by your doctor. This is important.  Contact a doctor if:  · Your symptoms do not get better.  Get help right away if:  · Your bleeding does not stop.  · You feel dizzy or you pass out (faint).  · You feel weak.  · You have very bad cramps in your back or belly.  · You pass large clumps of blood (clots) in your poop.  · Your symptoms are getting worse.  · You have chest pain or fast heartbeats.  Summary  · GI bleeding is bleeding somewhere along the path that food travels through the body (digestive tract).  · This bleeding can be caused  by many things. Treatment depends on the cause of the bleeding.  · Take medicines only as told by your doctor.  · Keep all follow-up visits as told by your doctor. This is important.  This information is not intended to replace advice given to you by your health care provider. Make sure you discuss any questions you have with your health care provider.  Document Released: 09/26/2009 Document Revised: 07/31/2019 Document Reviewed: 07/31/2019  [email or web address] Patient Education © 2020 [email or web address]    Low-Fiber Eating Plan  Fiber is found in fruits, vegetables, whole grains, and beans. Eating a diet low in fiber helps to reduce how often you have bowel movements and how much you produce during a bowel movement. A low-fiber eating plan may help your digestive system heal if:  · You have certain conditions, such as Crohn's disease or diverticulitis.  · You recently had radiation therapy on your pelvis or bowel.  · You recently had intestinal surgery.  · You have a new surgical opening in your abdomen (colostomy or ileostomy).  · Your intestine is narrowed (stricture).  Your health care provider will determine how long you need to stay on this diet. Your health care provider may recommend that you work with a diet and nutrition specialist (dietitian).  What are tips for following this plan?  General guidelines  · Follow recommendations from your dietitian about how much fiber you should have each day.  · Most people on this eating plan should try to eat less than 10 grams (g) of fiber each day. Your daily fiber goal is _________________ g.  · Take vitamin and mineral supplements as told by your health care provider or dietitian. Chewable or liquid forms are best when on this eating plan.  Reading food labels  · Check food labels for the amount of dietary fiber.  · Choose foods that have less than 2 grams of fiber in one serving.  Cooking  · Use white flour and other allowed grains for baking and cooking.  · Cook meat using  methods that keep it tender, such as braising or poaching.  · Cook eggs until the yolk is completely solid.  · Cook with healthy oils, such as olive oil or canola oil.  Meal planning    · Eat 5-6 small meals throughout the day instead of 3 large meals.  · If you are lactose intolerant:  ? Choose low-lactose dairy foods.  ? Do not eat dairy foods, if told by your dietitian.  · Limit fat and oils to less than 8 teaspoons a day.  · Eat small portions of desserts.  What foods are allowed?  The items listed below may not be a complete list. Talk with your dietitian about what dietary choices are best for you.  Grains  All bread and crackers made with white flour. Waffles, pancakes, and Frisian toast. Bagels. Pretzels. [place] toast, zwieback, and matzoh. Cooked and dried cereals that do not contain whole grains, added fiber, seeds, or dried fruit. Cornmeal. [place]. Hot and cold cereals made with refined corn, wheat, rice, or oats. Plain pasta and noodles. White rice.  Vegetables  Well-cooked or canned vegetables without skin, seeds, or stems. Cooked potatoes without skins. Vegetable juice.  Fruits  Soft-cooked or canned fruits without skin and seeds. Peeled ripe banana. Applesauce. Fruit juice without pulp.  Meats and other protein foods  Ground meat. Tender cuts of meat or poultry. Eggs. Fish, seafood, and shellfish. Smooth nut butters. Tofu.  Dairy  All milk products and drinks. Lactose-free milks, including rice, soy, and almond milks. Yogurt without fruit, nuts, chocolate, or granola mix-ins. Sour cream. Cottage cheese. Cheese.  Beverages  Decaf coffee. Fruit and vegetable juices or smoothies (in small amounts, with no pulp or skins, and with fruits from allowed list). Sports drinks. Herbal tea.  Fats and oils  Olive oil, canola oil, sunflower oil, flaxseed oil, and grapeseed oil. Mayonnaise. Cream cheese. Margarine. Butter.  Sweets and desserts  Plain cakes and cookies. Cream pies and pies made with allowed fruits.  Pudding. Custard. Fruit gelatin. Sherbet. Popsicles. Ice cream without nuts. Plain hard candy. Honey. Jelly. Molasses. Syrups, including chocolate syrup. Chocolate. Marshmallows. Gumdrops.  Seasoning and other foods  Bouillon. Broth. Cream soups made from allowed foods. Strained soup. Casseroles made with allowed foods. Ketchup. Mild mustard. Mild salad dressings. Plain gravies. Vinegar. Spices in moderation. Salt. Sugar.  What foods are not allowed?  The items listed below may not be a complete list. Talk with your dietitian about what dietary choices are best for you.  Grains  Whole wheat and whole grain breads and crackers. Multigrain breads and crackers. Rye bread. Whole grain or multigrain cereals. Cereals with nuts, raisins, or coconut. Bran. Coarse wheat cereals. Granola. High-fiber cereals. Cornmeal or corn bread. Whole grain pasta. Wild or brown rice. Quinoa. Popcorn. Buckwheat. Wheat germ.  Vegetables  Potato skins. Raw or undercooked vegetables. All beans and bean sprouts. Cooked greens. Corn. Peas. Cabbage. Beets. Broccoli. [place] sprouts. Cauliflower. Mushrooms. Onions. Peppers. Parsnips. Okra. Sauerkraut.  Fruit  Raw or dried fruit. Berries. Fruit juice with pulp. Prune juice.  Meats and other protein foods  Tough, fibrous meats with gristle. Fatty meat. Poultry with skin. Fried meat, poultry, or fish. Deli or lunch meats. Sausage, mckinley, and hot dogs. Nuts and chunky nut butter. Dried peas, beans, and lentils.  Dairy  Yogurt with fruit, nuts, chocolate, or granola mix-ins.  Beverages  Caffeinated coffee and teas.  Fats and oils  Avocado. Coconut.  Sweets and desserts  Desserts, cookies, or candies that contain nuts or coconut. Dried fruit. Jams and preserves with seeds. Marmalade. Any dessert made with fruits or grains that are not allowed.  Seasoning and other foods  Corn tortilla chips. Soups made with vegetables or grains that are not allowed. Relish. Horseradish. Pickles.  Olives.  Summary  · Most people on a low-fiber eating plan should eat less than 10 grams of fiber a day. Follow recommendations from your dietitian about how much fiber you should have each day.  · Always check food labels to see the dietary fiber content of packaged foods. In general, a low-fiber food will have fewer than 2 grams of fiber per serving.  · In general, try to avoid whole grains, raw fruits and vegetables, dried fruit, tough cuts of meat, nuts, and seeds.  · Take a vitamin and mineral supplement as told by your health care provider or dietitian.  This information is not intended to replace advice given to you by your health care provider. Make sure you discuss any questions you have with your health care provider.  Document Released: 06/09/2003 Document Revised: 04/10/2020 Document Reviewed: 02/20/2018  Elsevier Patient Education © 2020 [employer]vier Inc.

## 2022-04-07 NOTE — CARE PLAN
Problem: Nutritional:  Goal: Achieve adequate nutritional intake  Description: Patient will consume >50% of meals and supplements.  Outcome: Not Met  Very poor PO intake on oral diet.     Problem: Nutritional:  Goal: Nutrition support tolerated and meeting greater than 85% of estimated needs  Outcome: Not Met   Cortrak out.       Banner Transposition Flap Text: The defect edges were debeveled with a #15 scalpel blade.  Given the location of the defect and the proximity to free margins a Banner transposition flap was deemed most appropriate.  Using a sterile surgical marker, an appropriate flap drawn around the defect. The area thus outlined was incised deep to adipose tissue with a #15 scalpel blade.  The skin margins were undermined to an appropriate distance in all directions utilizing iris scissors.

## 2022-04-12 PROBLEM — Z71.89 ACP (ADVANCE CARE PLANNING): Status: ACTIVE | Noted: 2022-01-01

## 2022-04-12 PROBLEM — H57.12 ACUTE LEFT EYE PAIN: Status: ACTIVE | Noted: 2022-01-01

## 2022-04-12 NOTE — ED PROVIDER NOTES
ED Provider Note    Scribed for Thom Baron M.D. by Anuel Kilpatrick. 4/11/2022, 8:18 PM.    Primary care provider: Damian Diaz M.D.  Means of arrival: Walk in  History obtained from: Patient  History limited by: None    CHIEF COMPLAINT  Chief Complaint   Patient presents with    Eye Pain     Pt woke up w/ L eye pain, unable to see well from L eye/having vision changes when he woke up. Previous hx of R eye detached retina. States pain is 10/10 aching       HPI  Krishan Acevedo is a 72 y.o. male with a history of right eye retinal detachment who presents to the Emergency Department for evaluation of sharp left eye pain. Patient states he was showering when his eye pain suddenly onset. He rates his pain as 10/10. Patient endorses associated photophobia, but denies seeing medeiros or flashes of light when the pain onset. Patient states that his current pain is not dissimilar to when he had his retinal detachment. He notes he had two surgeries in the past to correct his right eye retina detachment. Patient states it is possible but unlikely he got a chemical in his eye. Patient states Dr. Addison is his ophthalmologist.    REVIEW OF SYSTEMS  Pertinent positives include sharp left eye pain and photophobia. Pertinent negatives include seeing medeiros or flashes of light. All other systems negative.  C    PAST MEDICAL HISTORY   has a past medical history of Arrhythmia (2021), Arthritis (2020), Atrial flutter (HCC), CAD (coronary artery disease) (2003), Cough (2020), Dialysis patient ([facility]) (2006), DVT (deep venous thrombosis) ([facility]), E coli bacteremia (2014), Kidney failure (03/2008), Kidney transplant pain (2020), Retinal detachment, Sleep apnea, Snoring, and Wegener's granulomatosis with renal involvement ([facility]) (2008).    SURGICAL HISTORY   has a past surgical history that includes peg placement (10/10/2014); other orthopedic surgery (Left, 1995); other (Right, 2017); upper gi endoscopy,diagnosis  "(2022); upper gi endoscopy,diagnosis (2022); upper gi endoscopy,diagnosis (N/A, 3/17/2022); and upper gi endoscopy,ctrl bleed (3/17/2022).    SOCIAL HISTORY  Social History     Tobacco Use    Smoking status: Never Smoker    Smokeless tobacco: Former User     Types: Snuff, Chew   Vaping Use    Vaping Use: Never used   Substance Use Topics    Alcohol use: Not Currently    Drug use: Never      Social History     Substance and Sexual Activity   Drug Use Never       FAMILY HISTORY  Family History   Problem Relation Age of Onset    Stroke Brother 26        , ? cause    Stroke Maternal Grandmother             Clotting Disorder Neg Hx        CURRENT MEDICATIONS  Home Medications       Reviewed by Haydee Ortega R.N. (Registered Nurse) on 22 at 1960  Med List Status: Partial     Medication Last Dose Status   atorvastatin (LIPITOR) 10 MG TABS  Active   Brimonidine Tartrate-Timolol 0.2-0.5 % Solution  Active   cinacalcet (SENSIPAR) 30 MG Tab  Active   fluticasone (FLONASE) 50 MCG/ACT nasal spray  Active   loperamide (IMODIUM) 2 MG Cap  Active   magnesium oxide (MAG-OX) 400 MG Tab tablet  Active   mycophenolate (CELLCEPT) 250 MG Cap  Active   omeprazole (PRILOSEC) 20 MG delayed-release capsule  Active   phosphorus (K-PHOS-NEUTRAL) 250 MG tablet  Active   predniSONE (DELTASONE) 5 MG Tab  Active   sodium bicarbonate (SODIUM BICARBONATE) 650 MG Tab  Active   sucralfate (CARAFATE) 1 GM/10ML Suspension  Active   tacrolimus (PROGRAF) 0.5 MG Cap  Active   tacrolimus (PROGRAF) 1 MG Cap  Active   vitamin D, Ergocalciferol, (DRISDOL) 42932 units Cap capsule  Active                    ALLERGIES  Allergies   Allergen Reactions    Nsaids Unspecified     Cannot take because of anti rejection meds.    Triazolam Anxiety     All anti anxiety medications cause hallucinations        PHYSICAL EXAM  VITAL SIGNS: BP (!) 176/90   Pulse 87   Resp (!) 26   Ht 1.778 m (5' 10\")   Wt 113 kg (250 lb)   SpO2 95%   BMI 35.87 " kg/m²     Constitutional: Well developed, Well nourished, mild distress.   HENT: Normocephalic, Atraumatic, mask in place.  Eyes: Conjunctiva normal, No discharge. Pressure = 16. Pupils = 3. Pupils are reactive. No obvious dye uptake during slit lamp exam.  Skin: Warm, Dry, No erythema, No rash.   Neurologic: Alert & oriented x 3, Normal motor function,  No focal deficits noted.   Psychiatric: Affect normal, Judgment normal, Mood normal.       Results for orders placed or performed during the hospital encounter of 04/11/22   Sed Rate   Result Value Ref Range    Sed Rate Westergren 45 (H) 0 - 20 mm/hour   CRP QUANTITIVE (NON-CARDIAC)   Result Value Ref Range    Stat C-Reactive Protein 8.69 (H) 0.00 - 0.75 mg/dL   CBC WITH DIFFERENTIAL   Result Value Ref Range    WBC 8.4 4.8 - 10.8 K/uL    RBC 3.07 (L) 4.70 - 6.10 M/uL    Hemoglobin 8.9 (L) 14.0 - 18.0 g/dL    Hematocrit 29.2 (L) 42.0 - 52.0 %    MCV 95.1 81.4 - 97.8 fL    MCH 29.0 27.0 - 33.0 pg    MCHC 30.5 (L) 33.7 - 35.3 g/dL    RDW 61.9 (H) 35.9 - 50.0 fL    Platelet Count 152 (L) 164 - 446 K/uL    MPV 9.8 9.0 - 12.9 fL    Neutrophils-Polys 60.20 44.00 - 72.00 %    Lymphocytes 29.80 22.00 - 41.00 %    Monocytes 8.90 0.00 - 13.40 %    Eosinophils 0.40 0.00 - 6.90 %    Basophils 0.10 0.00 - 1.80 %    Immature Granulocytes 0.60 0.00 - 0.90 %    Nucleated RBC 0.00 /100 WBC    Neutrophils (Absolute) 5.03 1.82 - 7.42 K/uL    Lymphs (Absolute) 2.49 1.00 - 4.80 K/uL    Monos (Absolute) 0.74 0.00 - 0.85 K/uL    Eos (Absolute) 0.03 0.00 - 0.51 K/uL    Baso (Absolute) 0.01 0.00 - 0.12 K/uL    Immature Granulocytes (abs) 0.05 0.00 - 0.11 K/uL    NRBC (Absolute) 0.00 K/uL   COMP METABOLIC PANEL   Result Value Ref Range    Sodium 134 (L) 135 - 145 mmol/L    Potassium 5.0 3.6 - 5.5 mmol/L    Chloride 101 96 - 112 mmol/L    Co2 24 20 - 33 mmol/L    Anion Gap 9.0 7.0 - 16.0    Glucose 108 (H) 65 - 99 mg/dL    Bun 19 8 - 22 mg/dL    Creatinine 2.12 (H) 0.50 - 1.40 mg/dL    Calcium  9.0 8.5 - 10.5 mg/dL    AST(SGOT) 29 12 - 45 U/L    ALT(SGPT) 18 2 - 50 U/L    Alkaline Phosphatase 153 (H) 30 - 99 U/L    Total Bilirubin 0.6 0.1 - 1.5 mg/dL    Albumin 2.6 (L) 3.2 - 4.9 g/dL    Total Protein 6.3 6.0 - 8.2 g/dL    Globulin 3.7 (H) 1.9 - 3.5 g/dL    A-G Ratio 0.7 g/dL   ESTIMATED GFR   Result Value Ref Range    GFR (CKD-EPI) 32 (A) >60 mL/min/1.73 m 2         COURSE & MEDICAL DECISION MAKING  Pertinent Labs & Imaging studies reviewed. (See chart for details)    8:18 PM - Patient seen and examined at bedside. Patient will be treated with opthaine 0.5 % 1 drop and fluorescin ophthalmic strip 1 mg. Ordered slit lamp exam to evaluate his symptoms. The differential diagnoses include but are not limited to: Retinal detachment, corneal abrasion, conjunctivitis, foreign body.     9:22 PM - Patient will be treated with Norco 5 - 325 mg. Paged Ophthalmology    9:31 PM - I discussed the patient's case and the above findings with Dr. Addison (Ophthamology) who recommended increasing the patient's steroid dose to 60 mg, getting a CT of his head, and to see him tomorrow morning. Ordered CBC with diff, CMP, sed rate, and CRP quantitive. Patient will be medicated with morphine 4 mg.    9:39 PM - Patient was reevaluated at bedside. Informed the patient of Dr. Addison's decision making. Had a long discussion in regards to the pros and cons of his options, patient asked for some time to think.    10:31 PM - Patient states his pain has improved significantly and along with that, he declined the CTA scan.    Patient was turned over at 1 AM pending sed rate.  To Dr. London.    Medical Decision Making: Patient presents with sudden eye pain and vision loss in his left eye.  At this point time I cannot visualize the retina.  Patient refuses CTA because he only has 1 kidney that is a transplant.  I do not see any obvious corneal abrasion or ulceration.  Patient's eye pressures normal at 16.  I will have the patient follow-up  with the patient's ophthalmologist in the morning he has been given 60 mg of prednisone as recommended by ophthalmology for possible uveitis.         FINAL IMPRESSION  1. Left eye pain    2. Vision loss    3. RADHA (acute kidney injury) (HCC)          Anuel RODRIGUES (Scribe), am scribing for, and in the presence of, Thom Baron M.D.    Electronically signed by: Anuel Kilpatrick (Elizabethibalis), 4/11/2022    Thom RODRIGUES M.D. personally performed the services described in this documentation, as scribed by Anuel Kilpatrick in my presence, and it is both accurate and complete.    The note accurately reflects work and decisions made by me.  Thom Baron M.D.  4/12/2022  1:31 AM

## 2022-04-12 NOTE — DISCHARGE PLANNING
MEAGAN notified Pt is medically cleared and can return to [facility].   MEAGAN spoke with Madelaine at [facility]-She is in agreement for Pt to return and has arranged for GMT to pick Pt up between [id number] with francheska bahena.     Transfer packet has been completed with COBRA attached .  MEAGAN contacted Pt wife Penelope and updated her on transfer and transfer time back to [facility].     MEAGAN reviewed Medications with Madelaine at [facility] and no changes or prescriptions needed at this time.     RN has been updated.

## 2022-04-12 NOTE — H&P
Hospital Medicine History & Physical Note    Date of Service  4/12/2022    Primary Care Physician  Damian Diaz M.D.    Consultants  ophthalmology    Specialist Names: Dr. Addison     Code Status  DNAR/DNI    Chief Complaint  Chief Complaint   Patient presents with   • Eye Pain     Pt woke up w/ L eye pain, unable to see well from L eye/having vision changes when he woke up. Previous hx of R eye detached retina. States pain is 10/10 aching       History of Presenting Illness  Krishan Acevedo is a 72 y.o. male with past medical history of right-sided retinal detachment, kidney transplant who presented 4/11/2022 with sharp left-sided pain today while showering.  Reports pain is 9 out of 10 intensity, sharp, nonradiating, exacerbated with light and movement relieved with closing his eyes.  He denies any trauma to the eye.  He denies any fever or chills at this time.   In the ER, his ophthalmologist was contacted who recommended obtaining a CTA of the head.  Patient however refused as he only has 1 kidney.  Ophthalmology recommended increasing prednisone dose to 60 mg.  Initial plan was to be discharged home meds follow-up with ophthalmology however his sedimentation rate came back elevated and patient will be hospitalized for ophthalmology follow up in the morning.    I discussed the plan of care with patient.    Review of Systems  Review of Systems   Constitutional: Negative for chills and fever.   HENT: Negative for hearing loss and tinnitus.    Eyes: Positive for pain and redness. Negative for blurred vision and double vision.   Respiratory: Negative for cough and hemoptysis.    Cardiovascular: Negative for chest pain and palpitations.   Gastrointestinal: Negative for heartburn and nausea.   Genitourinary: Negative for dysuria and urgency.   Musculoskeletal: Negative for myalgias and neck pain.   Skin: Negative for itching and rash.   Neurological: Negative for dizziness and headaches.    Endo/Heme/Allergies: Does not bruise/bleed easily.   Psychiatric/Behavioral: Negative for depression and suicidal ideas.       Past Medical History   has a past medical history of Arrhythmia (2021), Arthritis (2020), Atrial flutter ([facility]), CAD (coronary artery disease) (2003), Cough (2020), Dialysis patient ([facility]) (2006), DVT (deep venous thrombosis) ([facility]), E coli bacteremia (2014), Kidney failure (03/2008), Kidney transplant pain (2020), Retinal detachment, Sleep apnea, Snoring, and Wegener's granulomatosis with renal involvement ([facility]) (2008).    Surgical History   has a past surgical history that includes peg placement (10/10/2014); other orthopedic surgery (Left, 1995); other (Right, 2017); pr upper gi endoscopy,diagnosis (2/24/2022); pr upper gi endoscopy,diagnosis (2/26/2022); pr upper gi endoscopy,diagnosis (N/A, 3/17/2022); and pr upper gi endoscopy,ctrl bleed (3/17/2022).     Family History  family history includes Stroke in his maternal grandmother; Stroke (age of onset: 26) in his brother.   Family history reviewed with patient. There is no family history that is pertinent to the chief complaint.     Social History   reports that he has never smoked. He quit smokeless tobacco use about 31 years ago.  His smokeless tobacco use included snuff and chew. He reports previous alcohol use. He reports that he does not use drugs.    Allergies  Allergies   Allergen Reactions   • Nsaids Unspecified     Cannot take because of anti rejection meds.   • Triazolam Anxiety     All anti anxiety medications cause hallucinations        Medications  Prior to Admission Medications   Prescriptions Last Dose Informant Patient Reported? Taking?   Brimonidine Tartrate-Timolol 0.2-0.5 % Solution  MAR from Other Facility Yes No   Sig: Administer 2 Drops into both eyes 2 times a day.   atorvastatin (LIPITOR) 10 MG TABS  MAR from Other Facility Yes No   Sig: Take 10 mg by mouth every evening.   cinacalcet (SENSIPAR) 30 MG Tab  MAR from  Other Facility Yes No   Sig: Take 30 mg by mouth two times a week. Monday & Friday   fluticasone (FLONASE) 50 MCG/ACT nasal spray  MAR from Other Facility Yes No   Sig: Administer 1 Spray into affected nostril(S) every day.   loperamide (IMODIUM) 2 MG Cap   No No   Sig: Take 1 Capsule by mouth 4 times a day as needed for Diarrhea.   magnesium oxide (MAG-OX) 400 MG Tab tablet   No No   Sig: Take 1 Tablet by mouth every day.   mycophenolate (CELLCEPT) 250 MG Cap  MAR from Other Facility No No   Sig: Take 2 Capsules by mouth 2 times a day.   omeprazole (PRILOSEC) 20 MG delayed-release capsule   No No   Sig: Take 2 Capsules by mouth 2 times a day.   phosphorus (K-PHOS-NEUTRAL) 250 MG tablet   No No   Sig: Take 1 Tablet by mouth 2 times a day.   predniSONE (DELTASONE) 5 MG Tab  MAR from Other Facility Yes No   Sig: Take 5 mg by mouth every day.   sodium bicarbonate (SODIUM BICARBONATE) 650 MG Tab   No No   Sig: Take 1 Tablet by mouth in the morning, at noon, and at bedtime.   sucralfate (CARAFATE) 1 GM/10ML Suspension  MAR from Other Facility Yes No   Sig: Take 1 g by mouth every 6 hours.   tacrolimus (PROGRAF) 0.5 MG Cap  MAR from Other Facility No No   Sig: Take 1 Capsule by mouth every evening.   tacrolimus (PROGRAF) 1 MG Cap  MAR from Other Facility No No   Sig: Take 1 Capsule by mouth every morning.   vitamin D, Ergocalciferol, (DRISDOL) 41174 units Cap capsule  MAR from Other Facility Yes No   Sig: Take 50,000 Units by mouth every Monday.      Facility-Administered Medications: None       Physical Exam  Temp:  [36.2 °C (97.1 °F)] 36.2 °C (97.1 °F)  Pulse:  [87-92] 91  Resp:  [12-26] 18  BP: (129-176)/(62-90) 160/74  SpO2:  [92 %-96 %] 93 %  Blood Pressure : 160/74   Temperature: 36.2 °C (97.1 °F)   Pulse: 91   Respiration: 18   Pulse Oximetry: 93 %       Physical Exam  Vitals and nursing note reviewed.   Constitutional:       General: He is not in acute distress.     Appearance: Normal appearance. He is not  ill-appearing.   HENT:      Head: Normocephalic and atraumatic.      Right Ear: Tympanic membrane normal.      Left Ear: Tympanic membrane normal.      Nose: Nose normal.      Mouth/Throat:      Mouth: Mucous membranes are moist.      Pharynx: Oropharynx is clear.   Eyes:      Pupils: Pupils are equal, round, and reactive to light.      Comments: Patient closing eyes due to pain and photophobia.   Conjunctival injection noted    Cardiovascular:      Rate and Rhythm: Normal rate and regular rhythm.      Pulses: Normal pulses.      Heart sounds: Normal heart sounds.   Pulmonary:      Effort: Pulmonary effort is normal. No respiratory distress.      Breath sounds: Normal breath sounds. No stridor. No wheezing or rhonchi.   Abdominal:      General: Bowel sounds are normal. There is no distension.      Palpations: Abdomen is soft. There is no mass.      Tenderness: There is no abdominal tenderness.      Hernia: No hernia is present.   Musculoskeletal:         General: No swelling, tenderness, deformity or signs of injury. Normal range of motion.      Cervical back: Neck supple.   Skin:     General: Skin is warm.      Capillary Refill: Capillary refill takes less than 2 seconds.      Coloration: Skin is not jaundiced or pale.      Findings: No bruising or erythema.   Neurological:      General: No focal deficit present.      Mental Status: He is alert and oriented to person, place, and time. Mental status is at baseline.      Cranial Nerves: No cranial nerve deficit.      Sensory: No sensory deficit.      Motor: No weakness.      Coordination: Coordination normal.   Psychiatric:         Mood and Affect: Mood normal.         Behavior: Behavior normal.         Laboratory:  Recent Labs     04/11/22  2200   WBC 8.4   RBC 3.07*   HEMOGLOBIN 8.9*   HEMATOCRIT 29.2*   MCV 95.1   MCH 29.0   MCHC 30.5*   RDW 61.9*   PLATELETCT 152*   MPV 9.8     Recent Labs     04/11/22  2200   SODIUM 134*   POTASSIUM 5.0   CHLORIDE 101   CO2 24    GLUCOSE 108*   BUN 19   CREATININE 2.12*   CALCIUM 9.0     Recent Labs     04/11/22  2200   ALTSGPT 18   ASTSGOT 29   ALKPHOSPHAT 153*   TBILIRUBIN 0.6   GLUCOSE 108*       Imaging:  No orders to display       no X-Ray or EKG requiring interpretation    Assessment/Plan:  I anticipate this patient is appropriate for observation status at this time.    * Acute left eye pain- (present on admission)  Assessment & Plan  Ophthalmology consulted by ER physician recommended increasing steroid dose to home 60 mg and to obtain CTA head. Patient refused as he has only 1 kidney. He is aware of risks/benefits of obtaining imaging.   Continue with prednisone 60 mg daily.   Follows with Dr. Addison. Follow up with opthamologist in am regarding further treatment and care.         Acute-on-chronic kidney injury (HCC)  Assessment & Plan  RADHA on CKD   Gentle IV Hydration   S/p renal transplant   Avoid nephrotoxic agents  Renally adjust all medications     ACP (advance care planning)  Assessment & Plan  Patient wishes to be DNR/DNI. Has ACP documents stating no aggressive measures. Time spent 9 minutes.    Thrombocytopenia (HCC)- (present on admission)  Assessment & Plan  Chronic  No active bleeding noted     Atrial fibrillation (HCC)- (present on admission)  Assessment & Plan  Chronic  Not on AC due to recent admission for GI bleed    Chronic diastolic heart failure (HCC)- (present on admission)  Assessment & Plan  Chronic   Not in exacerbation   Monitor     Hyperlipidemia- (present on admission)  Assessment & Plan  C/w statin     Long term (current) use of systemic steroids- (present on admission)  Assessment & Plan  Steroids increased to 60 mg given acute left vision changes     Essential hypertension- (present on admission)  Assessment & Plan  norvasc 5 mg daily     Kidney transplant status, living related donor- (present on admission)  Assessment & Plan  S/p transplant  Check Prograf levels and resume   C/w Cellcept 250 mg daily    Continue with sodium bicarb 650 mg TID     Obesity- (present on admission)  Assessment & Plan   on dietary and lifestyle modification       VTE prophylaxis: heparin ppx

## 2022-04-12 NOTE — ED NOTES
Pt laying in bed on his right side, eyes closed, equal chest rise noted, no signs of distress noted, will continue to monitor

## 2022-04-12 NOTE — ASSESSMENT & PLAN NOTE
RADHA on CKD   Gentle IV Hydration   S/p renal transplant   Avoid nephrotoxic agents  Renally adjust all medications

## 2022-04-12 NOTE — ED TRIAGE NOTES
"Chief Complaint   Patient presents with   • Eye Pain     Pt woke up w/ L eye pain, unable to see well from L eye/having vision changes when he woke up. Previous hx of R eye detached retina. States pain is 10/10 aching       BIB [facility] to [facility], pt on monitor and in gown, labs drawn and sent. Pt consists of: above complaint, pt from [facility], pt is A&OX3 - baseline, disoriented to time. Pt able to anxwer questions. Hx of GIB, kidney transplant, DM. Had a fall on 4/9 at [facility]. .    Medications given en route:n/a     BP (!) 167/90   Pulse 87   Resp 12   Ht 1.778 m (5' 10\")   Wt 113 kg (250 lb)   SpO2 96%   BMI 35.87 kg/m²   "

## 2022-04-12 NOTE — DISCHARGE PLANNING
SW notified Pt will be admitted .     MEAGAN spoke to Meredith at [facility] to cancel transportation to [facility]   MEAGAN spoke to Oren at [facility] and notified him that [facility]

## 2022-04-12 NOTE — ED NOTES
Med rec completed per med list from [facility]   Unable to get MAR faxed over   Unknown last doses of medications taken   Allergies reviewed

## 2022-04-12 NOTE — ED PROVIDER NOTES
ED Provider Note    Patient signed out from Dr. Baron with sed rate pending.  Please see his note for the initial evaluation and management.  ESR returned moderately elevated at 45 and patient also noted to have RADHA.  I discussed the findings with the patient and he is agreeable to admission.  Discussed with the hospitalist who graciously agreed to admit patient for further care.

## 2022-04-12 NOTE — DISCHARGE SUMMARY
Discharge Summary    CHIEF COMPLAINT ON ADMISSION  Chief Complaint   Patient presents with   • Eye Pain     Pt woke up w/ L eye pain, unable to see well from L eye/having vision changes when he woke up. Previous hx of R eye detached retina. States pain is 10/10 aching       Reason for Admission  EMS     Admission Date  4/11/2022    CODE STATUS  DNAR/DNI    HPI & HOSPITAL COURSE  This is a 72 y.o. male here with left eye pain. Patient with history of right eye retinal detachment, kidney transplant, atrial fibrillation not on anticoagulation due to GI bleed, duodenal ulcer, who presents for left eye pain. Patient with severe left eye pain, worse with movement and light exposure. Ocular pressure normal 16. Patient was admitted for pain control and ophthalmology evaluation. Patient started on prednisone 60 mg daily per ophthalmology recommendations. CTA head ordered however patient politely refused given his history of kidney transplant and wanting to avoid contrast exposure. Patient with slightly elevated creatinine and lower EGFR however unclear what his baseline kidney function is. He was given gentle IV hydration. Ophthalmology recommend follow up in their office today for evaluation. Patient prescribed 60 mg prednisone, he is to follow up with ophthalmology later today. He reports improved eye pain by time of discharge.    Therefore, he is discharged in fair and stable condition to [facility] with close outpatient follow up.      Discharge Date  4/12/2022    FOLLOW UP ITEMS POST DISCHARGE  Take medications as prescribed.  Follow up with ophthalmology office today 4/12/22.    DISCHARGE DIAGNOSES  Principal Problem:    Acute left eye pain POA: Yes  Active Problems:    Obesity POA: Yes    Kidney transplant status, living related donor POA: Yes    Acute-on-chronic kidney injury (HCC) POA: Unknown    Essential hypertension POA: Yes      Overview: Formatting of this note might be different from the original.      Last  Assessment & Plan:       continue metoprolol 25 bid.    Long term (current) use of systemic steroids POA: Yes    Hyperlipidemia POA: Yes    Chronic diastolic heart failure (HCC) POA: Yes    Atrial fibrillation (HCC) POA: Yes    Thrombocytopenia (HCC) POA: Yes    ACP (advance care planning) POA: Unknown  Resolved Problems:    * No resolved hospital problems. *      FOLLOW UP  Future Appointments   Date Time Provider Department Center   5/17/2022  1:00 PM Rubin Forrester M.D. [facility] None   5/23/2022  2:40 PM Jones Garcia M.D. [facility] None   6/15/2022 12:50 PM Mary Jane Oliveros M.D. [facility] None     Sanchez Addison M.D.  [address] [place] [address]  [place] [address]  [phone]    Call today  To be seen today. Call after 8 am to get an appointment. Tell them its an ER follow up.      MEDICATIONS ON DISCHARGE     Medication List      CHANGE how you take these medications      Instructions   predniSONE 20 MG Tabs  What changed:   · medication strength  · how much to take  Commonly known as: DELTASONE   Take 3 Tablets by mouth every day for 7 days.  Dose: 60 mg        CONTINUE taking these medications      Instructions   atorvastatin 10 MG Tabs  Commonly known as: LIPITOR   Take 10 mg by mouth every evening.  Dose: 10 mg     bisacodyl 10 MG Supp  Commonly known as: DULCOLAX   Insert 10 mg into the rectum 1 time a day as needed. Indications: Constipation  Dose: 10 mg     Brimonidine Tartrate-Timolol 0.2-0.5 % Soln   Administer 2 Drops into both eyes 2 times a day.  Dose: 2 Drop     cinacalcet 30 MG Tabs  Commonly known as: SENSIPAR   Take 30 mg by mouth two times a week. Monday & Friday  Dose: 30 mg     fluticasone 50 MCG/ACT nasal spray  Commonly known as: FLONASE   Administer 1 Spray into affected nostril(S) every day.  Dose: 1 Spray     insulin lispro 100 UNIT/ML  Commonly known as: HumaLOG   Inject 2-10 Units under the skin 3 times a day before meals. Sliding Scale   151-200= 2 units  201-250= 4 units  251-300= 6  units  301-350= 8 units  351-400= 10 units  Dose: 2-10 Units     loperamide 2 MG Caps  Commonly known as: IMODIUM   Take 2 mg by mouth 4 times a day as needed for Diarrhea.  Dose: 2 mg     magnesium oxide 400 MG Tabs tablet  Commonly known as: MAG-OX   Take 1 Tablet by mouth every day.  Dose: 400 mg     melatonin 3 MG Tabs   Take 3 mg by mouth at bedtime.  Dose: 3 mg     mycophenolate 250 MG Caps  Commonly known as: CELLCEPT   Take 2 Capsules by mouth 2 times a day.  Dose: 500 mg     omeprazole 20 MG delayed-release capsule  Commonly known as: PRILOSEC   Take 2 Capsules by mouth 2 times a day.  Dose: 40 mg     ondansetron 4 MG Tbdp  Commonly known as: ZOFRAN ODT   Take 4 mg by mouth every 6 hours as needed for Nausea.  Dose: 4 mg     phosphorus 250 MG tablet  Commonly known as: K-Phos-Neutral   Take 1 Tablet by mouth every day.  Dose: 1 Tablet     sodium bicarbonate 650 MG Tabs  Commonly known as: SODIUM BICARBONATE   Take 1 Tablet by mouth in the morning, at noon, and at bedtime.  Dose: 650 mg     sucralfate 1 GM/10ML Susp  Commonly known as: CARAFATE   Take 1 g by mouth every 6 hours.  Dose: 1 g     * tacrolimus 0.5 MG Caps  Commonly known as: PROGRAF   Take 1 Capsule by mouth every evening.  Dose: 0.5 mg     * tacrolimus 1 MG Caps  Commonly known as: PROGRAF   Take 1 Capsule by mouth every morning.  Dose: 1 mg     vitamin D2 (Ergocalciferol) 1.25 MG (66422 UT) Caps capsule  Commonly known as: Drisdol   Take 50,000 Units by mouth every Monday.  Dose: 50,000 Units         * This list has 2 medication(s) that are the same as other medications prescribed for you. Read the directions carefully, and ask your doctor or other care provider to review them with you.                Allergies  Allergies   Allergen Reactions   • Nsaids Unspecified     Cannot take because of anti rejection meds.   • Triazolam Anxiety     All anti anxiety medications cause hallucinations        DIET  Orders Placed This Encounter   Procedures    • Diet Order Diet: Renal; Second Modifier: (optional): Cardiac     Standing Status:   Standing     Number of Occurrences:   1     Order Specific Question:   Diet:     Answer:   Renal [8]     Order Specific Question:   Second Modifier: (optional)     Answer:   Cardiac [6]       ACTIVITY  As tolerated.  Weight bearing as tolerated    CONSULTATIONS  none    PROCEDURES  none    LABORATORY  Lab Results   Component Value Date    SODIUM 134 (L) 04/11/2022    POTASSIUM 5.0 04/11/2022    CHLORIDE 101 04/11/2022    CO2 24 04/11/2022    GLUCOSE 108 (H) 04/11/2022    BUN 19 04/11/2022    CREATININE 2.12 (H) 04/11/2022        Lab Results   Component Value Date    WBC 8.4 04/11/2022    HEMOGLOBIN 8.9 (L) 04/11/2022    HEMATOCRIT 29.2 (L) 04/11/2022    PLATELETCT 152 (L) 04/11/2022        Total time of the discharge process exceeds 31 minutes.

## 2022-04-12 NOTE — ASSESSMENT & PLAN NOTE
Ophthalmology consulted by ER physician recommended increasing steroid dose to home 60 mg and to obtain CTA head. Patient refused as he has only 1 kidney. He is aware of risks/benefits of obtaining imaging.   Continue with prednisone 60 mg daily.   Follows with Dr. Addison. Follow up with opthamologist in am regarding further treatment and care.

## 2022-04-12 NOTE — DISCHARGE PLANNING
Medical Social Work    MSW received a call from bedside RN that pt is ready to return to [facility].  MSW contacted Oren with [facility] who will accept pt back; room 415, receiving physician Dr. Tatum.  MSW faxed PCS and facesheet to [facility] and made follow up phone call to Meredith with [facility] to arrange transport for 0145.  Bedside RN and [facility] made aware of transport time.  COBRA and transfer packet in pt's chart for transfer.

## 2022-04-12 NOTE — ED NOTES
Patient transferred to hospital bed, tolerated well.  NS infusing per MAR.  Patient given water, tolerated well with straw.  500 mL urine output

## 2022-04-12 NOTE — ED NOTES
Patient medicated per MAR, tolerated well taking pills individually with water and a straw.  VSS, resp even and unlabored, NAD.  FSBG 105 - insulin held

## 2022-04-12 NOTE — ED NOTES
was called to set up transportation for pt back to [facility] [place] [place]

## 2022-04-12 NOTE — ASSESSMENT & PLAN NOTE
S/p transplant  Check Prograf levels and resume   C/w Cellcept 250 mg daily   Continue with sodium bicarb 650 mg TID

## 2022-04-12 NOTE — ASSESSMENT & PLAN NOTE
Patient wishes to be DNR/DNI. Has ACP documents stating no aggressive measures. Time spent 9 minutes.

## 2022-04-15 PROBLEM — N39.0 UTI (URINARY TRACT INFECTION): Status: ACTIVE | Noted: 2022-01-01

## 2022-04-15 PROBLEM — A41.9 SEPSIS (HCC): Status: ACTIVE | Noted: 2022-01-01

## 2022-04-15 NOTE — ED PROVIDER NOTES
ED Provider Note    CHIEF COMPLAINT  Chief Complaint   Patient presents with   • ALOC     Pt was found in eye doctor bathroom altered on the toilet after a BM. 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 Pt knows his name. Unknown baseline.        HPI  Krishan Acevedo is a 72 y.o. male who presents to the emergency department by ambulance after being found down, altered in the bathroom of his eye doctor's office.    Patient is disoriented, no self only.  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  States he lives in [place] with his wife.  Not provide other history.    Blood glucose appropriate prior to arrival.    HPI is limited due to patient altered mental status.    REVIEW OF SYSTEMS  See HPI for further details.  Limited as above    PAST MEDICAL HISTORY   has a past medical history of Arrhythmia (2021), Arthritis (2020), Atrial flutter (HCC), CAD (coronary artery disease) (2003), Cough (2020), Dialysis patient ([facility]) (2006), DVT (deep venous thrombosis) ([facility]), E coli bacteremia (2014), Kidney failure (03/2008), Kidney transplant pain (2020), Retinal detachment, Sleep apnea, Snoring, and Wegener's granulomatosis with renal involvement ([facility]) (2008).    SOCIAL HISTORY  Social History     Tobacco Use   • Smoking status: Never Smoker   • Smokeless tobacco: Former User     Types: Snuff, Chew   Vaping Use   • Vaping Use: Never used   Substance and Sexual Activity   • Alcohol use: Not Currently   • Drug use: Never   • Sexual activity: Not on file       SURGICAL HISTORY   has a past surgical history that includes peg placement (10/10/2014); other orthopedic surgery (Left, 1995); other (Right, 2017); upper gi endoscopy,diagnosis (2/24/2022); upper gi endoscopy,diagnosis (2/26/2022); upper gi endoscopy,diagnosis (N/A, 3/17/2022); and upper gi endoscopy,ctrl bleed (3/17/2022).    CURRENT MEDICATIONS  Home Medications    **Home medications have not yet been reviewed for this  "encounter**         ALLERGIES  Allergies   Allergen Reactions   • Nsaids Unspecified     Cannot take because of anti rejection meds.   • Triazolam Anxiety     All anti anxiety medications cause hallucinations        PHYSICAL EXAM  VITAL SIGNS: /74   Pulse (!) 122   Temp 36.3 °C (97.3 °F) (Temporal)   Resp 16   Ht 1.854 m (6' 1\")   Wt 113 kg (250 lb)   SpO2 94%   BMI 32.98 kg/m²   Pulse ox interpretation: I interpret this pulse ox as normal.  Constitutional: Alert in no apparent distress.  HENT: Normocephalic, atraumatic. Bilateral external ears normal, Nose normal.  Dry mucous membranes.    Eyes: Pupils are equal and reactive, Conjunctiva normal.   Neck: Normal range of motion, Supple  Lymphatic: No lymphadenopathy noted.   Cardiovascular: Tachycardic, irregularly irregular.  Thorax & Lungs: Normal breath sounds, diminished bilateral bases.  No wheezing/rales/ronchi. No increased work of breathing, clipped speech or retractions.   Abdomen: Soft, non-distended.  No grimace or withdrawal to palpation.  No palpable pulsatile mass.  Skin: Warm, Dry, No erythema, No rash.   Musculoskeletal: Good range of motion in all major joints. No major deformities noted.   Neurologic: Alert and oriented to self.  Moves 4 extremities, strong , plantarflexion, dorsiflexion bilaterally.  Psychiatric: Difficult to assess      DIAGNOSTIC STUDIES / PROCEDURES    LABS  Results for orders placed or performed during the hospital encounter of 04/15/22   Lactic acid (lactate)   Result Value Ref Range    Lactic Acid 2.7 (H) 0.5 - 2.0 mmol/L   CBC WITH DIFFERENTIAL   Result Value Ref Range    WBC 12.5 (H) 4.8 - 10.8 K/uL    RBC 3.45 (L) 4.70 - 6.10 M/uL    Hemoglobin 10.1 (L) 14.0 - 18.0 g/dL    Hematocrit 31.9 (L) 42.0 - 52.0 %    MCV 92.5 81.4 - 97.8 fL    MCH 29.3 27.0 - 33.0 pg    MCHC 31.7 (L) 33.7 - 35.3 g/dL    RDW 59.8 (H) 35.9 - 50.0 fL    Platelet Count 215 164 - 446 K/uL    MPV 9.6 9.0 - 12.9 fL    Neutrophils-Polys " 73.30 (H) 44.00 - 72.00 %    Lymphocytes 19.70 (L) 22.00 - 41.00 %    Monocytes 5.80 0.00 - 13.40 %    Eosinophils 0.00 0.00 - 6.90 %    Basophils 0.20 0.00 - 1.80 %    Immature Granulocytes 1.00 (H) 0.00 - 0.90 %    Nucleated RBC 0.20 /100 WBC    Neutrophils (Absolute) 9.16 (H) 1.82 - 7.42 K/uL    Lymphs (Absolute) 2.47 1.00 - 4.80 K/uL    Monos (Absolute) 0.73 0.00 - 0.85 K/uL    Eos (Absolute) 0.00 0.00 - 0.51 K/uL    Baso (Absolute) 0.02 0.00 - 0.12 K/uL    Immature Granulocytes (abs) 0.13 (H) 0.00 - 0.11 K/uL    NRBC (Absolute) 0.03 K/uL   COMP METABOLIC PANEL   Result Value Ref Range    Sodium 142 135 - 145 mmol/L    Potassium 3.6 3.6 - 5.5 mmol/L    Chloride 106 96 - 112 mmol/L    Co2 20 20 - 33 mmol/L    Anion Gap 16.0 7.0 - 16.0    Glucose 137 (H) 65 - 99 mg/dL    Bun 26 (H) 8 - 22 mg/dL    Creatinine 2.11 (H) 0.50 - 1.40 mg/dL    Calcium 9.7 8.5 - 10.5 mg/dL    AST(SGOT) 39 12 - 45 U/L    ALT(SGPT) 34 2 - 50 U/L    Alkaline Phosphatase 151 (H) 30 - 99 U/L    Total Bilirubin 0.5 0.1 - 1.5 mg/dL    Albumin 2.8 (L) 3.2 - 4.9 g/dL    Total Protein 6.4 6.0 - 8.2 g/dL    Globulin 3.6 (H) 1.9 - 3.5 g/dL    A-G Ratio 0.8 g/dL   URINALYSIS    Specimen: Urine, Clean Catch   Result Value Ref Range    Micro Urine Req Microscopic    ESTIMATED GFR   Result Value Ref Range    GFR (CKD-EPI) 32 (A) >60 mL/min/1.73 m 2     RADIOLOGY  DX-CHEST-PORTABLE (1 VIEW)   Final Result      Unchanged BILATERAL patchy airspace disease compatible with pneumonia. Covid pneumonia is a possibility.      CT-HEAD W/O   Final Result      1.  No evidence of acute hemorrhage, mass or large territorial infarction   2.  Remote LEFT basal ganglia lacunar infarction   3.  Mild atrophy   4.  White matter changes             COURSE & MEDICAL DECISION MAKING  Nursing notes and vital signs were reviewed. (See chart for details)  The patients records were reviewed, history was obtained from the patient;    Patient seen evaluated bedside, altered,  confused, alert and oriented to self only.  Cannot provide significant history.  No acute distress.  Denies complaints of pain.  Abdominal exam is benign.  Tachycardic, atrial fibrillation but blood pressure is appropriate.  Add labs per septic protocol.  Neurologically intact and nonfocal despite confusion or disorientation.  At CT head.  Will discuss with wife.    I had a conversation with patient's wife, Penelope.  She states that he does not currently live with her but [facility].  He does have history of kidney transplant in 2008.  She states he cannot be left alone or care for himself because he is too weak, she is very surprised that he was dropped off at a doctor's appointment this morning.  She was not aware of this.  He was admitted earlier this week for dehydration and eye pain.    Mild nonspecific leukocytosis, WBC 12.5, leftward shift, mild bandemia.  This is increased from evaluation on 4/11.  Lactate 2.7.  Persistent RADHA without other electrolyte abnormality.  CT without acute changes.  Chronic bilateral patchy infiltrates consistent with history of a recent Covid infection.  Otherwise unchanged.  No acute respiratory distress, never hypotensive or hypoxic.  Awaiting urinalysis.    11:46 AM Dr. Galicia is aware of this patient.  States he was recently started on a steroid course, he is concerned that this may be causing his altered mentation.  Agrees with hospitalist admission for IV fluid hydration and correction of RADHA, hold any steroids, await cultures.    12:01 PM Dr. Alvarez is aware of the patient agreeable to consultation.    12:33 PM cath UA does demonstrate infection with nitrate, WBCs.  Rocephin added.  Cultures pending.    ED evaluation for altered mental status may be multifactorial.  Patient does have evidence for UTI, gentle fluid resuscitation and broad-spectrum antibiotics were initiated.  Primary physician also states he has recently been  treated with steroid burst, this could be contributing to his mentation as well.  We will hold and assess for resolution.  RADHA, clinical dehydration, continue IV fluid hydration.  He will be hospitalized for further evaluation and treatment.    FINAL IMPRESSION  (R41.82) Altered mental status, unspecified altered mental status type  (N17.9) RADHA (acute kidney injury) (HCC)  (J18.9) Chronic pneumonia      Electronically signed by: Margaux Blount D.O., 4/15/2022 11:46 AM      This dictation was created using voice recognition software. The accuracy of the dictation is limited to the abilities of the software. I expect there may be some errors of grammar and possibly content. The nursing notes were reviewed and certain aspects of this information were incorporated into this note.

## 2022-04-15 NOTE — ASSESSMENT & PLAN NOTE
Reportedly recently seen by ophthalmology Dr. Addison and started on prednisone for optic nerve inflammation, suspect optic neuritis  Neurology now signed off  Continue supportive care  Continue steroid taper  Vision had improved, difficult to access today  Continue to follow

## 2022-04-15 NOTE — ASSESSMENT & PLAN NOTE
Baseline Cr is around 1.5. Cr 2.1 on admission, likely sec to sepsis and UTI  Had improved, now worsening, see above, nephrology to eval  Tacrolimus and cellcept levels pending  Renal ultrasound without hydronephrosis  IVF, avoid nephrotoxins and renal dosing meds  Continue to follow

## 2022-04-15 NOTE — ED NOTES
Pharmacy Medication Reconciliation    ~Med rec updated and complete per MAR-[facility] [place]  ~Allergies have been verified per MAR  ~Pt home pharmacy:[facility] [place]      ~Patient MAR reports that patient was a 5 day course of Bactrim Ds that was started on 03/27/2022    ~Patient MAR reports that patient started a 7 day course of Prednisone on 04/12/2022

## 2022-04-15 NOTE — ASSESSMENT & PLAN NOTE
This is Severe Sepsis Present on admission  SIRS criteria identified on my evaluation include: Tachycardia, with heart rate greater than 90 BPM and Leukocytosis, with WBC greater than 12,000  Clinical indicators of end organ dysfunction include Creatinine >2.0 (Without ESRD or CKD)  Source is urinary  Sepsis protocol initiated  Crystalloid Fluid Administration: Fluid resuscitation ordered per standard protocol - 30 mL/kg per current or ideal body weight  IV antibiotics as appropriate for source of sepsis  Reassessment: I have reassessed the patient's hemodynamic status  resolving

## 2022-04-15 NOTE — ED TRIAGE NOTES
"Chief Complaint   Patient presents with   • ALOC     Pt was found in eye doctor bathroom altered on the toilet after a BM. 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 Pt knows his name. Unknown baseline.        Pt BIB EMS from eye doctor. Pt ao to self, unknown baseline.pt was at eye doctor by himself.  Protocol ordered.           /75   Pulse (!) 120   Temp 36.3 °C (97.3 °F) (Temporal)   Resp 16   Ht 1.854 m (6' 1\")   Wt 113 kg (250 lb)   SpO2 94%   BMI 32.98 kg/m²     "

## 2022-04-15 NOTE — ASSESSMENT & PLAN NOTE
Remote history of Kidney transplant   Cellcept and tacrolimus levels pending  neprhology to eval  Metabolic acidosis

## 2022-04-15 NOTE — ASSESSMENT & PLAN NOTE
Likely multifactorial toxic metabolic encephalopathy due to sepsis, LP no significant findings. Acute to subacute stroke on MRI  Neurology now signed off  Head CT unremarkable for acute changes  Continue antibiotics for C. difficile and Pseudomonas in urine  Frequent reorientation   Serial neuro exams  Continue supportive care  Continues to fluctuate, worse this am, query if worsening renal function or anti rejection meds contributing - nephrology to eval - will check tacrolimus levels  RPR negative

## 2022-04-15 NOTE — H&P
Hospital Medicine History & Physical Note    Date of Service  4/15/2022    Primary Care Physician  Damian Diaz M.D.    Consultants  none    Code Status  Full Code    Chief Complaint  Chief Complaint   Patient presents with   • ALOC     Pt was found in eye doctor bathroom altered on the toilet after a BM. 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 Pt knows his name. Unknown baseline.        History of Presenting Illness  Krishan Acevedo is a 72 y.o. male with history of remote history of kidney transplant, atrial fibrillation not on anticoagulation due to GI bleed, duodenal ulcer, R eye retinal detachment, who presented 4/15/2022 by ambulance after being found down, altered in the bathroom of his eye doctor's office. Patient is currently disoriented, only to self. Per chart review, patient was recently discharged on 4/12 for eye pain and was prescribed with prednisone 60mg daily for 7 days and was instructed to follow up with eye doctor as outpatient. Patient presenting to eye doctor's appointment today and was found down in the bathroom.   Patient resides at [facility] after prolonged greer with COVID. Per chart review, patient's current mental status is very deviated from his baseline.   Patient is noted to have tachycardia with  and reports chills with shivering at bedside.   Here labs remarkable for wbc 12.5, Cr 2.11, lactic acid 2.7. UA pos UTI. Head CT unremarkable for acute changes.   Patient is admitted for further evaluation and managements.     I discussed the plan of care with patient, bedside RN and ERP.    Review of Systems  Review of Systems   Unable to perform ROS: Mental acuity   Constitutional: Positive for chills.       Past Medical History   has a past medical history of Arrhythmia (2021), Arthritis (2020), Atrial flutter ([facility]), CAD (coronary artery disease) (2003), Cough (2020), Dialysis patient ([facility]) (2006), DVT (deep venous thrombosis) ([facility]), E coli  bacteremia (2014), Kidney failure (03/2008), Kidney transplant pain (2020), Retinal detachment, Sleep apnea, Snoring, and Wegener's granulomatosis with renal involvement (HCC) (2008).    Surgical History   has a past surgical history that includes peg placement (10/10/2014); other orthopedic surgery (Left, 1995); other (Right, 2017); pr upper gi endoscopy,diagnosis (2/24/2022); pr upper gi endoscopy,diagnosis (2/26/2022); pr upper gi endoscopy,diagnosis (N/A, 3/17/2022); and pr upper gi endoscopy,ctrl bleed (3/17/2022).     Family History  family history includes Stroke in his maternal grandmother; Stroke (age of onset: 26) in his brother.   Family history reviewed with patient. There is no family history that is pertinent to the chief complaint.     Social History   reports that he has never smoked. He quit smokeless tobacco use about 31 years ago.  His smokeless tobacco use included snuff and chew. He reports previous alcohol use. He reports that he does not use drugs.    Allergies  Allergies   Allergen Reactions   • Nsaids Unspecified     Cannot take because of anti rejection meds.   • Triazolam Anxiety     All anti anxiety medications cause hallucinations        Medications  Prior to Admission Medications   Prescriptions Last Dose Informant Patient Reported? Taking?   Brimonidine Tartrate-Timolol 0.2-0.5 % Solution 4/15/2022 at 0800 MAR from Other Facility Yes No   Sig: Administer 2 Drops into both eyes 2 times a day.   atorvastatin (LIPITOR) 10 MG TABS 4/14/2022 at 2000 MAR from Other Facility Yes No   Sig: Take 10 mg by mouth every evening.   bisacodyl (DULCOLAX) 10 MG Suppos PRN at PRN MAR from Other Facility Yes No   Sig: Insert 10 mg into the rectum 1 time a day as needed. Indications: Constipation   cinacalcet (SENSIPAR) 30 MG Tab 4/15/2022 at 0800 MAR from Other Facility Yes No   Sig: Take 30 mg by mouth two times a week. Monday & Friday   fluticasone (FLONASE) 50 MCG/ACT nasal spray 4/15/2022 at 0800 MAR  from Other Facility Yes No   Sig: Administer 1 Spray into affected nostril(S) every day.   insulin lispro (HUMALOG) 100 UNIT/ML 4/15/2022 at 0800 MAR from Other Facility Yes No   Sig: Inject 2-10 Units under the skin 3 times a day before meals. Sliding Scale   151-200= 2 units  201-250= 4 units  251-300= 6 units  301-350= 8 units  351-400= 10 units   loperamide (IMODIUM) 2 MG Cap PRN at PRN MAR from Other Facility Yes No   Sig: Take 2 mg by mouth 4 times a day as needed for Diarrhea.   magnesium oxide (MAG-OX) 400 MG Tab tablet 4/15/2022 at 0800 MAR from Other Facility No No   Sig: Take 1 Tablet by mouth every day.   melatonin 3 MG Tab 4/14/2022 at 2000 MAR from Other Facility Yes No   Sig: Take 3 mg by mouth at bedtime.   mycophenolate (CELLCEPT) 250 MG Cap 4/15/2022 at 0800 MAR from Other Facility No No   Sig: Take 2 Capsules by mouth 2 times a day.   omeprazole (PRILOSEC) 20 MG delayed-release capsule 4/15/2022 at 0800 MAR from Other Facility No No   Sig: Take 2 Capsules by mouth 2 times a day.   ondansetron (ZOFRAN ODT) 4 MG TABLET DISPERSIBLE 4/1/2022 at 1200 MAR from Other Facility Yes No   Sig: Take 4 mg by mouth every 6 hours as needed for Nausea.   phosphorus (K-PHOS-NEUTRAL) 250 MG tablet 4/15/2022 at 0800 MAR from Other Facility Yes No   Sig: Take 1 Tablet by mouth every day.   predniSONE (DELTASONE) 20 MG Tab 4/15/2022 at 0800 MAR from Other Facility No No   Sig: Take 3 Tablets by mouth every day for 7 days.   sodium bicarbonate (SODIUM BICARBONATE) 650 MG Tab 4/15/2022 at 0600 MAR from Other Facility No No   Sig: Take 1 Tablet by mouth in the morning, at noon, and at bedtime.   sucralfate (CARAFATE) 1 GM/10ML Suspension 4/15/2022 at 0630 MAR from Other Facility Yes No   Sig: Take 1 g by mouth 4 Times a Day,Before Meals and at Bedtime.   sulfamethoxazole-trimethoprim (BACTRIM DS) 800-160 MG tablet 4/1/2022 at COMPLETE MAR from Other Facility Yes Yes   Sig: Take 1 Tablet by mouth 2 times a day. 5 days    tacrolimus (PROGRAF) 0.5 MG Cap 4/14/2022 at 2000 MAR from Other Facility No No   Sig: Take 1 Capsule by mouth every evening.   tacrolimus (PROGRAF) 1 MG Cap 4/15/2022 at 0800 MAR from Other Facility No No   Sig: Take 1 Capsule by mouth every morning.   vitamin D, Ergocalciferol, (DRISDOL) 78006 units Cap capsule 4/11/2022 at 0800 MAR from Other Facility Yes No   Sig: Take 50,000 Units by mouth every Monday.      Facility-Administered Medications: None       Physical Exam  Temp:  [36.3 °C (97.3 °F)] 36.3 °C (97.3 °F)  Pulse:  [120-122] 120  Resp:  [16-18] 18  BP: (122-140)/(67-75) 122/67  SpO2:  [93 %-94 %] 93 %  Blood Pressure : 122/67   Temperature: 36.3 °C (97.3 °F)   Pulse: (!) 120   Respiration: 18   Pulse Oximetry: 93 %       Physical Exam  Vitals and nursing note reviewed.   Constitutional:       General: He is in acute distress.      Appearance: He is obese. He is ill-appearing.      Comments: Somnolent    HENT:      Head: Normocephalic and atraumatic.      Mouth/Throat:      Mouth: Mucous membranes are dry.      Pharynx: Oropharynx is clear.   Eyes:      Pupils: Pupils are equal, round, and reactive to light.   Neck:      Vascular: No carotid bruit.   Cardiovascular:      Rate and Rhythm: Regular rhythm. Tachycardia present.   Pulmonary:      Effort: Pulmonary effort is normal. No respiratory distress.      Breath sounds: Normal breath sounds. No wheezing.   Abdominal:      General: Abdomen is flat. Bowel sounds are normal. There is no distension.      Palpations: Abdomen is soft. There is no mass.      Tenderness: There is abdominal tenderness (mild diffuse).   Musculoskeletal:         General: No swelling or tenderness. Normal range of motion.      Cervical back: Neck supple.   Skin:     General: Skin is warm and dry.   Neurological:      General: No focal deficit present.      Mental Status: He is disoriented.      Comments: Confused, somnolent. AO self only     Psychiatric:      Comments: Unable to  assess         Laboratory:  Recent Labs     04/15/22  0949   WBC 12.5*   RBC 3.45*   HEMOGLOBIN 10.1*   HEMATOCRIT 31.9*   MCV 92.5   MCH 29.3   MCHC 31.7*   RDW 59.8*   PLATELETCT 215   MPV 9.6     Recent Labs     04/15/22  0949   SODIUM 142   POTASSIUM 3.6   CHLORIDE 106   CO2 20   GLUCOSE 137*   BUN 26*   CREATININE 2.11*   CALCIUM 9.7     Recent Labs     04/15/22  0949   ALTSGPT 34   ASTSGOT 39   ALKPHOSPHAT 151*   TBILIRUBIN 0.5   GLUCOSE 137*         No results for input(s): NTPROBNP in the last 72 hours.      No results for input(s): TROPONINT in the last 72 hours.    Imaging:  DX-CHEST-PORTABLE (1 VIEW)   Final Result      Unchanged BILATERAL patchy airspace disease compatible with pneumonia. Covid pneumonia is a possibility.      CT-HEAD W/O   Final Result      1.  No evidence of acute hemorrhage, mass or large territorial infarction   2.  Remote LEFT basal ganglia lacunar infarction   3.  Mild atrophy   4.  White matter changes             X-Ray:  I have personally reviewed the images and compared with prior images.    Assessment/Plan:  I anticipate this patient will require at least two midnights for appropriate medical management, necessitating inpatient admission.    * Sepsis (HCC)- (present on admission)  Assessment & Plan  This is Severe Sepsis Present on admission  SIRS criteria identified on my evaluation include: Tachycardia, with heart rate greater than 90 BPM and Leukocytosis, with WBC greater than 12,000  Clinical indicators of end organ dysfunction include Creatinine >2.0 (Without ESRD or CKD)  Source is urinary  Sepsis protocol initiated  Crystalloid Fluid Administration: Fluid resuscitation ordered per standard protocol - 30 mL/kg per current or ideal body weight  IV antibiotics as appropriate for source of sepsis  Reassessment: I have reassessed the patient's hemodynamic status    UTI (urinary tract infection)  Assessment & Plan  UA pos UTI, urine culture pending  Per chart review, patient  was noted to have pos UTI and bacteremia with pseudomonas infection in 2/2022, will start Cefepime for empirical coverage with    Acute encephalopathy- (present on admission)  Assessment & Plan  Likely sec to toxic metabolic due to sepsis. Head CT unremarkable for acute changes.   Check TSH, cortisol  IVF and antibiotics  Cont monitoring    Acute left eye pain- (present on admission)  Assessment & Plan  Follows ophthalmology Dr. Addison. I discussed with Dr. Addison over the phone and he recommends to continue prednisone 60mg daily for a few weeks given concerning optic nerve inflammations  Will continue prednisone      Duodenal ulcer- (present on admission)  Assessment & Plan  Hx of, will cont PPI    Immunocompromised (HCC)- (present on admission)  Assessment & Plan  Kidney transplant patient, will cont home immunosuppressants      Acute-on-chronic kidney injury (HCC)- (present on admission)  Assessment & Plan  Baseline Cr is around 1.5. Cr 2.1 on admission, likely sec to sepsis and UTI  IVF, avoid nephrotoxins and renal dosing meds  Check bladder scan  Cont monitoring    Kidney transplant status, living related donor- (present on admission)  Assessment & Plan  Remote history of Kidney transplant   Cont Cellcept and tacrolimus      VTE prophylaxis: enoxaparin ppx

## 2022-04-16 PROBLEM — G93.41 ACUTE METABOLIC ENCEPHALOPATHY: Status: ACTIVE | Noted: 2022-01-01

## 2022-04-16 NOTE — PROGRESS NOTES
Bedside report received from RNJil. Assumed care of patient at 0645. Patient sleeping comfortably in bed. Fall precautions and hourly rounding in place. Call light placed nearby patient.

## 2022-04-16 NOTE — CARE PLAN
The patient is Watcher - Medium risk of patient condition declining or worsening    Shift Goals  Clinical Goals: stool sample, maintain skin integrity  Patient Goals: sleep  Family Goals: n/a    Progress made toward(s) clinical / shift goals:    Problem: Fall Risk  Goal: Patient will remain free from falls  Outcome: Progressing  Note: Hourly rounding.  Strip alarm in place.       Patient is not progressing towards the following goals:      Problem: Communication  Goal: The ability to communicate needs accurately and effectively will improve  Outcome: Not Progressing  Flowsheets (Taken 4/16/2022 3498)  Communication:   Reoriented patient to environment   Oriented patient to call light  Note: Pt unable to make all needs known.  Pt is incontinent.

## 2022-04-16 NOTE — PROGRESS NOTES
4 Eyes Skin Assessment Completed by BRIDGETTE Ley and BRIDGETTE Ellis.    Head WDL  Ears WDL  Nose WDL  Mouth WDL  Neck WDL  Breast/Chest WDL  Shoulder Blades WDL  Spine WDL  (R) Arm/Elbow/Hand WDL  (L) Arm/Elbow/Hand Redness and Swelling  Abdomen WDL  Groin Redness and Blanching  Scrotum/Coccyx/Buttocks Redness and Blanching  (R) Leg WDL  (L) Leg WDL  (R) Heel/Foot/Toe WDL  (L) Heel/Foot/Toe WDL          Devices In Places Pulse Ox      Interventions In Place Pillows and Q2 Turns    Possible Skin Injury Yes    Pictures Uploaded Into Epic N/A  Wound Consult Placed N/A  RN Wound Prevention Protocol Ordered No

## 2022-04-16 NOTE — THERAPY
"Speech Language Pathology   Clinical Swallow Evaluation     Patient Name: Krishan Acevedo  AGE:  72 y.o., SEX:  male  Medical Record #: [id number]  Today's Date: 4/16/2022     Precautions  Precautions: Fall Risk,Swallow Precautions ( See Comments)    Assessment  Patient is 72 y.o. male admitted 4/15 after being found down in the bathroom with AMS from his eye doctor's office. Found to have sepsis from UTI and encephalopathy. PMHx of recent admit to [facility] d/t COVID and was at [facility] recently. PMHx of remote hx of kidney transplant, A fib not on anticoag d/t GI bleed, duodenal ulcer, R eye retinal detachment. Head CT negative for acute process. CXR reports \"Unchanged BILATERAL patchy airspace disease compatible with pneumonia. Covid pneumonia is a possibility.\" Last seen by SLP on 3/2/22 w/ recs for SB6/TN0 diet. Wife reports patient was on same diet at [facility], but had poor participation in all therapies recently while there.     Level of Consciousness: Drowsy  Affect/Behavior: Agitated, Appears depressed, Uncooperative  Follows Directives: No  Orientation: Self  Hearing: Functional hearing  Vision: Needs further testing      Prior Living Situation & Level of Function:  Patient was recently at [facility], then went to [facility] and wife reports poor participation with therapies there. Only home for short amount of time.       Oral Mechanism Evaluation  Facial Symmetry: Pt did not follow commands to assess  Facial Sensation: Pt did not follow commands to assess  Labial Observations: Pt did not follow commands to assess  Lingual Observations: Pt did not follow commands for assessment  Dentition: Good  Comments:      Voice  Quality: WFL  Resonance: WFL  Intensity: Appropriate  Cough: WFL  Comments:      Current Method of Nutrition   NPO until cleared by speech pathology      Subjective  \"No!\"        Assessment  Positioning: Sutton's (60-90 degrees)  Bolus Administration: SLP and wife   Oxygen Requirements: Room Air  Factor(s) " Affecting Performance: Impaired mental status, Impaired command following    Swallowing Trials  Ice: Impaired  Thin Liquid (TN0): Impaired  Mildly Thick Liquid (MT2): Not tested  Liquidised (LQ3): Not tested  Pureed (PU4): Not tested  Minced & Moist (MM5): Not tested  Soft & Bite Sized (SB6): Not tested  Easy to Chew (EC7): Not tested  Regular (RG7): Not tested    Comments:  Patient strict NPO/NN pending SLP evaluation. Patient wife wife present at bedside, who assisted in providing recent history, as patient is altered and not a good historian at this time. Patient only oriented to self and difficult to rouse. Patient agitated with being woken up, but wife reported he was asking for water earlier. Patient initially orally averse, pulling mouth away during oral care attempts via swab. Patient unable to complete formal evaluation for oral motor assessment, but upon inspection, no gross deficits noted. Patient's vocal quality strong and clear. Patient consumed PO trials of single ice chips and thins via tsp and straw. Patient presented with anterior bolus loss of ice chips and water and wet/gurgly vocal quality on thins via straw, which is all concerning for penetration/aspiration. Patient had prolonged bolus holding and weak laryngeal elevation upon palpation, which increases risk for penetration/aspiration. Patient refused all other PO trials and became increasingly agitated, cussing at his wife when she was encouraging patient to participate.        Clinical Impressions  Moderate-Severe oropharyngeal dysphagia, likely acute related to poor participation, sepsis from UTI, and AMS. Swallow safety is impaired; swallow efficiency is impaired. Pt appears to be at high risk for aspiration PNA, and high for malnutrition/dehydration. Non-oral nutrition is indicated. Swallow prognosis is fair given agitation and poor participation. Patient appears to be a fair candidate for behavioral swallow rehabilitation.         "  Recommendations  1.  Recommend patient continue strict NPO/NN pending SLP reassessment tomorrow   2.  Instrumentation: None indicated at this time  3.  Swallowing Instructions & Precautions:   Medication: Non Oral   Oral Care: Q4h  4.  Patient will need cognitive evaluation if mentation does not clear.       Plan  Recommend Speech Therapy 3 times per week until therapy goals are met for the following treatments:  Dysphagia Training and Patient / Family / Caregiver Education.    Discharge Recommendations: Recommend post-acute placement for additional speech therapy services prior to discharge home     Objective     04/16/22 1355   Precautions   Precautions Fall Risk;Swallow Precautions ( See Comments)   Vitals   O2 Delivery Device None - Room Air   Oral Motor Eval    Is Patient Able to Complete Oral Motor Eval No   Barriers To Oral Feeding   Barriers To Oral Feeding Generalized Weakness;Impaired Cognition / Attention;Agitation;Oral Defensiveness   Laryngeal Function   Voice Quality Within Functional Limits   Volutional Cough Not Tested   Excursion Upon Swallow Weak    Max Phonation Time (Seconds) 2   Patient / Family Goals   Patient / Family Goal #1 \"No more\"   Short Term Goals   Short Term Goal # 1 Patient will consume prefeeding trials with SLP only with no overt s/sx of aspiration.   Anticipated Discharge Needs   Discharge Recommendations Recommend post-acute placement for additional speech therapy services prior to discharge home     "

## 2022-04-16 NOTE — CARE PLAN
The patient is Watcher - Medium risk of patient condition declining or worsening    Shift Goals  Clinical Goals: Maintain skin integrity, reorientation  Patient Goals: Rest  Family Goals: n/a    Progress made toward(s) clinical / shift goals:    Problem: Communication  Goal: The ability to communicate needs accurately and effectively will improve  Outcome: Progressing     Problem: Hemodynamics  Goal: Patient's hemodynamics, fluid balance and neurologic status will be stable or improve  Outcome: Progressing     Problem: Mobility  Goal: Patient's capacity to carry out activities will improve  Outcome: Progressing     Problem: Self Care  Goal: Patient will have the ability to perform ADLs independently or with assistance (bathe, groom, dress, toilet and feed)  Outcome: Progressing     Problem: Fall Risk  Goal: Patient will remain free from falls  Outcome: Progressing     Problem: Pain - Standard  Goal: Alleviation of pain or a reduction in pain to the patient’s comfort goal  Outcome: Progressing     Problem: Skin Integrity  Goal: Skin integrity is maintained or improved  Outcome: Progressing       Patient is not progressing towards the following goals:      Problem: Psychosocial  Goal: Patient's level of anxiety will decrease  Outcome: Not Progressing     Problem: Knowledge Deficit - Standard  Goal: Patient and family/care givers will demonstrate understanding of plan of care, disease process/condition, diagnostic tests and medications  Outcome: Not Progressing

## 2022-04-16 NOTE — PROGRESS NOTES
Hospital Medicine Daily Progress Note    Date of Service  4/16/2022    Chief Complaint  Krishan Acevedo is a 72 y.o. male admitted 4/15/2022 with altered mental status    Hospital Course  72 y.o. male with history of remote history of kidney transplant, atrial fibrillation not on anticoagulation due to GI bleed, duodenal ulcer, R eye retinal detachment, who presented 4/15/2022 by ambulance after being found down, altered in the bathroom of his eye doctor's office. Patient is currently disoriented, only to self. Per chart review, patient was recently discharged on 4/12 for eye pain and was prescribed with prednisone 60mg daily for 7 days and was instructed to follow up with eye doctor as outpatient. Patient presenting to eye doctor's appointment today and was found down in the bathroom.   Patient resides at [facility] after prolonged greer with COVID. Per chart review, patient's current mental status is very deviated from his baseline.   Patient is noted to have tachycardia with  and reports chills with shivering at bedside.   Here labs remarkable for wbc 12.5, Cr 2.11, lactic acid 2.7. UA pos UTI. Head CT unremarkable for acute changes.     Interval Problem Update  Patient evaluated at bedside  Patient disoriented but calm this AM  HR in the 110s, on RA  Leukocytosis resolved  Procalcitonin low  Renal function improving  Urine cultures with no growth to date  Blood cultures with no growth to date  Continue cefepime antibiotic for now, h/o pseudomonal bacteremia on 2/2022  Pending KUB and renal ultrasound  Labs on a.m.    I have personally seen and examined the patient at bedside. I discussed the plan of care with patient and bedside RN.    Consultants/Specialty  None    Code Status  Full Code    Disposition  Patient is not medically cleared for discharge.   Anticipate discharge TBD  I have placed the appropriate orders for post-discharge needs.    Review of Systems  Review of Systems   Unable to perform ROS:  Mental status change      Physical Exam  Temp:  [36.1 °C (96.9 °F)-36.8 °C (98.3 °F)] 36.2 °C (97.1 °F)  Pulse:  [] 102  Resp:  [16-19] 18  BP: (123-155)/(66-86) 153/86  SpO2:  [94 %-97 %] 96 %    Physical Exam  Constitutional:       General: He is not in acute distress.     Appearance: Normal appearance.   HENT:      Head: Normocephalic and atraumatic.      Nose: Nose normal. No congestion.      Mouth/Throat:      Mouth: Mucous membranes are moist.   Eyes:      Extraocular Movements: Extraocular movements intact.      Pupils: Pupils are equal, round, and reactive to light.   Cardiovascular:      Rate and Rhythm: Normal rate and regular rhythm.      Pulses: Normal pulses.      Heart sounds: Normal heart sounds.   Pulmonary:      Effort: Pulmonary effort is normal.      Breath sounds: Normal breath sounds.   Abdominal:      General: Bowel sounds are normal.      Palpations: Abdomen is soft.      Tenderness: There is no abdominal tenderness.   Musculoskeletal:         General: No swelling. Normal range of motion.      Cervical back: Normal range of motion and neck supple.   Skin:     General: Skin is warm.      Coloration: Skin is not jaundiced.   Neurological:      General: No focal deficit present.      Mental Status: He is alert and oriented to person, place, and time. Mental status is at baseline.      Cranial Nerves: No cranial nerve deficit.   Psychiatric:         Mood and Affect: Mood normal.         Behavior: Behavior normal.         Thought Content: Thought content normal.         Judgment: Judgment normal.         Fluids    Intake/Output Summary (Last 24 hours) at 4/16/2022 1439  Last data filed at 4/16/2022 0715  Gross per 24 hour   Intake 150 ml   Output --   Net 150 ml       Laboratory  Recent Labs     04/15/22  0949 04/16/22  0946   WBC 12.5* 10.1   RBC 3.45* 3.04*   HEMOGLOBIN 10.1* 8.9*   HEMATOCRIT 31.9* 27.9*   MCV 92.5 91.8   MCH 29.3 29.3   MCHC 31.7* 31.9*   RDW 59.8* 60.5*   PLATELETCT 215  156*   MPV 9.6 9.7     Recent Labs     04/15/22  0949 04/16/22  0104   SODIUM 142 137   POTASSIUM 3.6 3.5*   CHLORIDE 106 107   CO2 20 19*   GLUCOSE 137* 112*   BUN 26* 25*   CREATININE 2.11* 1.97*   CALCIUM 9.7 8.7     Recent Labs     04/15/22  0949   INR 1.23*               Imaging  DX-CHEST-PORTABLE (1 VIEW)   Final Result      Unchanged BILATERAL patchy airspace disease compatible with pneumonia. Covid pneumonia is a possibility.      CT-HEAD W/O   Final Result      1.  No evidence of acute hemorrhage, mass or large territorial infarction   2.  Remote LEFT basal ganglia lacunar infarction   3.  Mild atrophy   4.  White matter changes         [id number] VIEW    (Results Pending)   US-RENAL    (Results Pending)        Assessment/Plan  * Sepsis (HCC)- (present on admission)  Assessment & Plan  This is Severe Sepsis Present on admission  SIRS criteria identified on my evaluation include: Tachycardia, with heart rate greater than 90 BPM and Leukocytosis, with WBC greater than 12,000  Clinical indicators of end organ dysfunction include Creatinine >2.0 (Without ESRD or CKD)  Source is urinary  Sepsis protocol initiated  Crystalloid Fluid Administration: Fluid resuscitation ordered per standard protocol - 30 mL/kg per current or ideal body weight  IV antibiotics as appropriate for source of sepsis  Reassessment: I have reassessed the patient's hemodynamic status    UTI (urinary tract infection)- (present on admission)  Assessment & Plan  UA pos UTI, urine culture pending  Per chart review, patient was noted to have pos UTI and bacteremia with pseudomonas infection in 2/2022, will start Cefepime for empirical coverage with    Hypokalemia- (present on admission)  Assessment & Plan  Replete as necessary    Duodenal ulcer- (present on admission)  Assessment & Plan  Hx of, will cont PPI    Immunocompromised (HCC)- (present on admission)  Assessment & Plan  Kidney transplant patient, will cont home  immunosuppressants      Acute metabolic encephalopathy- (present on admission)  Assessment & Plan  Likely sec to toxic metabolic due to sepsis. Head CT unremarkable for acute changes.   Check TSH, cortisol  IVF and antibiotics  Pending KUB and bladder scan  Cont monitoring    Acute-on-chronic kidney injury (HCC)- (present on admission)  Assessment & Plan  Baseline Cr is around 1.5. Cr 2.1 on admission, likely sec to sepsis and UTI  Renal function at baseline  IVF, avoid nephrotoxins and renal dosing meds  Pending renal ultrasound  Labs on a.m.    Kidney transplant status, living related donor- (present on admission)  Assessment & Plan  Remote history of Kidney transplant   Cont Cellcept and tacrolimus    Acute left eye pain- (present on admission)  Assessment & Plan  Follows ophthalmology Dr. Addison. I discussed with Dr. Addison over the phone and he recommends to continue prednisone 60mg daily for a few weeks given concerning optic nerve inflammations  Will continue prednisone      Leukocytosis- (present on admission)  Assessment & Plan  Secondary to sepsis  Resolved       VTE prophylaxis: heparin ppx    I have performed a physical exam and reviewed and updated ROS and Plan today (4/16/2022). In review of yesterday's note (4/15/2022), there are no changes except as documented above.

## 2022-04-17 NOTE — THERAPY
"Speech Language Pathology  Daily Treatment     Patient Name: Krishan Acevedo  Age:  72 y.o., Sex:  male  Medical Record #: [id number]  Today's Date: 4/17/2022     Precautions  Precautions: (P) Fall Risk,Swallow Precautions ( See Comments)    Assessment    Pt seen this date for swallow reassessment. PO trials of ice, MTL, LQ3, MM5, SB6, TN0 assessed. Timely swallow initiation and clear vocal appreciated. Pt demonstrated mildly prolonged but functional mastication of soft and bite size textures. No s/sx of aspiration appreciated with any consistencies consumed.     Recommend soft and bite size/thin liquid diet with adherence to the following: upright at 90*, slow rate, small bites/sips. Float meds in puree. Pt may require assistance with tray setup. SLP following.     Plan    Continue current treatment plan.    Discharge Recommendations: (P) Recommend post-acute placement for additional speech therapy services prior to discharge home    Subjective    Pt awake, confused, reporting place as \"[facility]\" despite re-orientation, and stating he was transferred through [facility].      Objective       04/17/22 0851   Total Time Spent   Total Time Spent (Mins) 16   Charge Group   SLP Swallowing Dysfunction Treatment Swallowing Dysfunction Treatment   Precautions   Precautions Fall Risk;Swallow Precautions ( See Comments)   Vitals   O2 Delivery Device None - Room Air   Pain 0 - 10 Group   Therapist Pain Assessment Post Activity Pain Same as Prior to Activity;Nurse Notified;0   Dysphagia    Dysphagia X   Positioning / Behavior Modification Self Monitoring;Modulate Rate or Bite Size   Other Treatments PO trials ice, MTL, LQ3, MM5, SB6, TN0   Diet / Liquid Recommendation Thin (0);Soft & Bite-Sized (6) - (Dysphagia III)   Nutritional Liquid Intake Rating Scale Non thickened beverages   Nutritional Food Intake Rating Scale Total oral diet with multiple consistencies but requiring special preparation or compensations   Skilled " "Intervention Compensatory Strategies;Verbal Cueing   Recommended Route of Medication Administration   Medication Administration  Float Whole with Puree   Patient / Family Goals   Patient / Family Goal #1 \"No more\"   Goal #1 Outcome Progressing as expected   Short Term Goals   Short Term Goal # 1 Patient will consume prefeeding trials with SLP only with no overt s/sx of aspiration.   Goal Outcome # 1 Goal met, new goal added   Short Term Goal # 1 B  Pt will consume a diet of SB/TN with no s/sx of aspiration and min cues.   Education Group   Education Provided Dysphagia   Dysphagia Patient Response Patient;Acceptance;Demonstration;Explanation;Verbal Demonstration;Reinforcement Needed   Anticipated Discharge Needs   Discharge Recommendations Recommend post-acute placement for additional speech therapy services prior to discharge home   Therapy Recommendations Upon DC Dysphagia Training;Community Re-Integration;Patient / Family / Caregiver Education   Interdisciplinary Plan of Care Collaboration   IDT Collaboration with  Nursing   Patient Position at End of Therapy Seated;In Bed;Bed Alarm On;Call Light within Reach   Collaboration Comments RN aware of results/recs     "

## 2022-04-17 NOTE — THERAPY
Physical Therapy   Initial Evaluation     Patient Name: Krishan Acevedo  Age:  72 y.o., Sex:  male  Medical Record #: [id number]  Today's Date: 4/17/2022     Precautions: Fall Risk;Swallow Precautions    Assessment  Patient is a 72 y.o. male admitted from [facility] after being found down with AMS. Pt seen for PT evaluation at this time. PLOF obtained via chart review as pt confused and disoriented and unreliable historian at this time. Pt was previously admitted 2/1 with COVID, was s/p ablation for a.flutter 1/21 and was independent without AD with all mobility prior to last admit. Pt was Dc'd to SNF on 3/24. Pt presents with generalized weakness, LLE slightly weaker than RLE. Today pt required min-mod A for mobility, completed STS to FWW, presented with posterior lean requiring assist and was able to take 1-2 shuffled side steps along EOB. Pt currently most limited by c/o L hand pain, noticeable swelling, reports incr pain when using FWW. RN notified. PT will follow in acute setting, recommend sitting EOB with nursing for all meals to promote incr activity tolerance. Recommend return to snf.     Plan  Recommend Physical Therapy 3 times per week until therapy goals are met for the following treatments:  Bed Mobility, Gait Training, Neuro Re-Education / Balance, Self Care/Home Evaluation, Stair Training, Therapeutic Activities, and Therapeutic Exercises  DC Equipment Recommendations: Unable to determine at this time  Discharge Recommendations: Recommend post-acute placement for additional physical therapy services prior to discharge home          04/17/22 1101   Prior Living Situation   Prior Services None   Housing / Facility 1 Story House   Steps Into Home 1   Steps In Home 0   Bathroom Set up Walk In Shower;Grab Bars (build in bench)   Equipment Owned None   Lives with -  Spouse   Comments Pt was transferred from [facility], has been there since 3/24/22 per chart. was admitted 2/1 with COVID s/p a flutter and  ablation on 1/21. Per PT evaluation last admit with spouse present, pt was indep without AD, home set-up above. current chart indicates pt resides at [facility], however unclear if actual LTC resident or at [facility] since March for therapies. pt confused and unable to determine.   Prior Level of Functional Mobility   Comments as above, was independent prior to last admit in February, has been at SNF since March   Cognition    Level of Consciousness Alert   Comments pleasant, cooperative, follows commands. is confused and disoriented to situation, needs redirection at times and occasionally with odd comments   Vision   Vision Comments reports able to see figures but not details   Balance Assessment   Sitting Balance (Static) Fair   Sitting Balance (Dynamic) Fair -   Standing Balance (Static) Poor   Standing Balance (Dynamic) Poor -   Weight Shift Sitting Fair   Weight Shift Standing Poor   Comments standing with FWW with mild posterior lean   Gait Analysis   Gait Level Of Assist Unable to Participate   Comments able to take 1-2 side steps along EOB with FWW and assist for balance   Bed Mobility    Supine to Sit Supervised   Sit to Supine Minimal Assist (at BLEs)   Scooting Supervised   Comments use of rails   Functional Mobility   Sit to Stand Moderate Assist (with height of bed slightly elevated)   Bed, Chair, WC Transfer Unable to Participate   Comments incr L hand pain when using walker standing   Short Term Goals    Short Term Goal # 1 pt will perform supine <> sit without bed features with SPV in 6 visits to get in/out of bed at home   Short Term Goal # 2 pt will perform STS with SPV in 6 visits for improved independence   Short Term Goal # 3 pt will perform SPT with min A in 6 visits to sit in chair   Short Term Goal # 4 pt will ambulate 50ft with FWW and min A in 6 visits to initiate gait

## 2022-04-17 NOTE — PROGRESS NOTES
Hospital Medicine Daily Progress Note    Date of Service  4/17/2022    Chief Complaint  Krishan Acevedo is a 72 y.o. male admitted 4/15/2022 with altered mental status    Hospital Course  72 y.o. male with history of remote history of kidney transplant, atrial fibrillation not on anticoagulation due to GI bleed, duodenal ulcer, R eye retinal detachment, who presented 4/15/2022 by ambulance after being found down, altered in the bathroom of his eye doctor's office. Patient is currently disoriented, only to self. Per chart review, patient was recently discharged on 4/12 for eye pain and was prescribed with prednisone 60mg daily for 7 days and was instructed to follow up with eye doctor as outpatient. Patient presenting to eye doctor's appointment today and was found down in the bathroom.   Patient resides at [facility] after prolonged greer with COVID. Per chart review, patient's current mental status is very deviated from his baseline.   Patient is noted to have tachycardia with  and reports chills with shivering at bedside.   Here labs remarkable for wbc 12.5, Cr 2.11, lactic acid 2.7. UA pos UTI. Head CT unremarkable for acute changes.     Interval Problem Update  Patient evaluated at bedside  Patient disoriented this AM  Iron studies suggestive of anemia of chronic inflammation, B12 high  Renal ultrasound without hydronephrosis  C. difficile positive  Urine cultures growing Pseudomonas, pending sensitivities  Blood cultures with no growth to date  Discontinue cefepime and switch to p.o. vancomycin and Zosyn antibiotic regimen  Pending labs  Labs in a.m.    I have personally seen and examined the patient at bedside. I discussed the plan of care with patient and bedside RN.    Consultants/Specialty  None    Code Status  Full Code    Disposition  Patient is not medically cleared for discharge.   Anticipate discharge TBD  I have placed the appropriate orders for post-discharge needs.    Review of  Systems  Review of Systems   Unable to perform ROS: Mental status change      Physical Exam  Temp:  [36.1 °C (96.9 °F)-36.5 °C (97.7 °F)] 36.3 °C (97.4 °F)  Pulse:  [] 103  Resp:  [17-18] 18  BP: (122-139)/(62-72) 139/62  SpO2:  [94 %-97 %] 94 %    Physical Exam  Constitutional:       General: He is not in acute distress.     Appearance: Normal appearance.   HENT:      Head: Normocephalic and atraumatic.      Nose: Nose normal. No congestion.      Mouth/Throat:      Mouth: Mucous membranes are moist.   Eyes:      Extraocular Movements: Extraocular movements intact.      Pupils: Pupils are equal, round, and reactive to light.   Cardiovascular:      Rate and Rhythm: Normal rate and regular rhythm.      Pulses: Normal pulses.      Heart sounds: Normal heart sounds.   Pulmonary:      Effort: Pulmonary effort is normal.      Breath sounds: Normal breath sounds.   Abdominal:      General: Bowel sounds are normal.      Palpations: Abdomen is soft.      Tenderness: There is no abdominal tenderness.   Musculoskeletal:         General: No swelling. Normal range of motion.      Cervical back: Normal range of motion and neck supple.   Skin:     General: Skin is warm.      Coloration: Skin is not jaundiced.   Neurological:      General: No focal deficit present.      Mental Status: He is alert and oriented to person, place, and time. Mental status is at baseline.      Cranial Nerves: No cranial nerve deficit.   Psychiatric:         Mood and Affect: Mood normal.         Behavior: Behavior normal.         Thought Content: Thought content normal.         Judgment: Judgment normal.         Fluids  No intake or output data in the 24 hours ending 04/17/22 1245    Laboratory  Recent Labs     04/15/22  0949 04/16/22  0946   WBC 12.5* 10.1   RBC 3.45* 3.04*   HEMOGLOBIN 10.1* 8.9*   HEMATOCRIT 31.9* 27.9*   MCV 92.5 91.8   MCH 29.3 29.3   MCHC 31.7* 31.9*   RDW 59.8* 60.5*   PLATELETCT 215 156*   MPV 9.6 9.7     Recent Labs      04/15/22  0949 04/16/22  0104   SODIUM 142 137   POTASSIUM 3.6 3.5*   CHLORIDE 106 107   CO2 20 19*   GLUCOSE 137* 112*   BUN 26* 25*   CREATININE 2.11* 1.97*   CALCIUM 9.7 8.7     Recent Labs     04/15/22  0949   INR 1.23*               Imaging  [id number] VIEW   Final Result      No acute findings.      US-RENAL   Final Result      1.  Normal appearance of the right lower quadrant renal transplant with no hydronephrosis.   2.  Small echogenic native left kidney consistent with renal disease.   3.  Nonvisualization of the native right kidney and bladder.      DX-CHEST-PORTABLE (1 VIEW)   Final Result      Unchanged BILATERAL patchy airspace disease compatible with pneumonia. Covid pneumonia is a possibility.      CT-HEAD W/O   Final Result      1.  No evidence of acute hemorrhage, mass or large territorial infarction   2.  Remote LEFT basal ganglia lacunar infarction   3.  Mild atrophy   4.  White matter changes              Assessment/Plan  * Sepsis (HCC)- (present on admission)  Assessment & Plan  This is Severe Sepsis Present on admission  SIRS criteria identified on my evaluation include: Tachycardia, with heart rate greater than 90 BPM and Leukocytosis, with WBC greater than 12,000  Clinical indicators of end organ dysfunction include Creatinine >2.0 (Without ESRD or CKD)  Source is urinary  Sepsis protocol initiated  Crystalloid Fluid Administration: Fluid resuscitation ordered per standard protocol - 30 mL/kg per current or ideal body weight  IV antibiotics as appropriate for source of sepsis  Reassessment: I have reassessed the patient's hemodynamic status    UTI (urinary tract infection)- (present on admission)  Assessment & Plan  Urine cultures growing Pseudomonas  Continue Zosyn antibiotic regimen    Hypokalemia- (present on admission)  Assessment & Plan  Replete as necessary    Duodenal ulcer- (present on admission)  Assessment & Plan  Hx of, will cont PPI    Immunocompromised (HCC)- (present on  admission)  Assessment & Plan  Kidney transplant patient, will cont home immunosuppressants    Acute metabolic encephalopathy- (present on admission)  Assessment & Plan  Likely sec to toxic metabolic due to sepsis. Head CT unremarkable for acute changes.   Check TSH, cortisol  IVF and antibiotics  Pending KUB and bladder scan  Cont monitoring    Acute-on-chronic kidney injury (HCC)- (present on admission)  Assessment & Plan  Baseline Cr is around 1.5. Cr 2.1 on admission, likely sec to sepsis and UTI  Renal function at baseline  Renal ultrasound without hydronephrosis  IVF, avoid nephrotoxins and renal dosing meds  Labs in a.m.    Kidney transplant status, living related donor- (present on admission)  Assessment & Plan  Remote history of Kidney transplant   Cont Cellcept and tacrolimus    Acute left eye pain- (present on admission)  Assessment & Plan  Follows ophthalmology Dr. Addison. I discussed with Dr. Addison over the phone and he recommends to continue prednisone 60mg daily for a few weeks given concerning optic nerve inflammations  Will continue prednisone    Leukocytosis- (present on admission)  Assessment & Plan  Secondary to sepsis  Resolved       VTE prophylaxis: heparin ppx    I have performed a physical exam and reviewed and updated ROS and Plan today (4/17/2022). In review of yesterday's note (4/16/2022), there are no changes except as documented above.

## 2022-04-17 NOTE — CARE PLAN
The patient is Watcher.     Shift Goals  Clinical Goals: Maintain skin integrity, reorientation  Patient Goals: Rest  Family Goals: n/a    Progress made toward(s) clinical / shift goals:    Problem: Psychosocial  Goal: Patient's ability to verbalize feelings about condition will improve  Outcome: Progressing     Problem: Communication  Goal: The ability to communicate needs accurately and effectively will improve  Outcome: Progressing     Problem: Fall Risk  Goal: Patient will remain free from falls  Outcome: Progressing     Problem: Skin Integrity  Goal: Skin integrity is maintained or improved  Outcome: Progressing       Patient is not progressing towards the following goals:

## 2022-04-18 PROBLEM — A49.8 CLOSTRIDIUM DIFFICILE INFECTION: Status: ACTIVE | Noted: 2022-01-01

## 2022-04-18 NOTE — CARE PLAN
Pt. A&O x1-2, needs reorientation frequently, hallucination - saying those wood boxes are moving around in the room, but those are cabinet doors. Able to follow commends  Pt. On aspiration precaution throughout shift, elevated head of bed at lease 30 degree during meals.       The patient is Stable - Low risk of patient condition declining or worsening    Shift Goals  Clinical Goals: Skin integrity, reinforce orientation  Patient Goals: comfort  Family Goals: n/a    Problem: Fall Risk  Goal: Patient will remain free from falls  Outcome: Progressing  Note: Pt. is confused based line,reinforced safety concerns, Max x1-2 person assist w/incontinent changes, able to turn self in bed     Problem: Pain - Standard  Goal: Alleviation of pain or a reduction in pain to the patient’s comfort goal  Outcome: Progressing  Flowsheets (Taken 4/18/2022 1119)  Non Verbal Scale:  • Restlessness  • Grimacing  Note: Pain Tylenol 650mg PRN q4hr for Left hand, wrist pain on movement.      Progress made toward(s) clinical / shift goals: Safety,

## 2022-04-18 NOTE — PROGRESS NOTES
Hospital Medicine Daily Progress Note    Date of Service  4/18/2022    Chief Complaint  Krishan Acevedo is a 72 y.o. male admitted 4/15/2022 with altered mental status    Hospital Course  72 y.o. male with history of remote history of kidney transplant, atrial fibrillation not on anticoagulation due to GI bleed, duodenal ulcer, R eye retinal detachment, who presented 4/15/2022 by ambulance after being found down, altered in the bathroom of his eye doctor's office. Patient is currently disoriented, only to self. Per chart review, patient was recently discharged on 4/12 for eye pain and was prescribed with prednisone 60mg daily for 7 days and was instructed to follow up with eye doctor as outpatient. Patient presenting to eye doctor's appointment today and was found down in the bathroom.   Patient resides at [facility] after prolonged greer with COVID. Per chart review, patient's current mental status is very deviated from his baseline.   Patient is noted to have tachycardia with  and reports chills with shivering at bedside.   Here labs remarkable for wbc 12.5, Cr 2.11, lactic acid 2.7. UA pos UTI.    Patient was admitted for acute metabolic encephalopathy. 4/15 head CT with no evidence of acute hemorrhage, mass or large territorial infarct but patient noted for remote left basilar ganglier lacunar infarct, mild atrophy, and white matter changes.  C. difficile positive and urinalysis with Pseudomonas of which he has had in the past.  He was started on vancomycin and Zosyn for the above. No signs of reversible causes for his altered mental status on labs or imaging.  Concerned that this is patient's new baseline from likely undiagnosed dementia/old left basilar ganglier lacunar infarct.     Interval Problem Update  Patient evaluated at bedside  Patient disoriented this AM  Renal function improving  Urine cultures growing Pseudomonas, resistant to ciprofloxacin, levofloxacin, meropenem and and cefepime,  sensitive   Blood cultures with no growth to date  Continue PO Vancomycin and Zosyn antibiotic regimen  Labs in a.m.    I have personally seen and examined the patient at bedside. I discussed the plan of care with patient and bedside RN.    Consultants/Specialty  None    Code Status  Full Code    Disposition  Patient is not medically cleared for discharge.   Anticipate discharge TBD  I have placed the appropriate orders for post-discharge needs.    Review of Systems  Review of Systems   Unable to perform ROS: Mental status change      Physical Exam  Temp:  [36.2 °C (97.1 °F)-37.1 °C (98.7 °F)] 36.2 °C (97.2 °F)  Pulse:  [] 99  Resp:  [18-20] 19  BP: (135-162)/() 162/69  SpO2:  [96 %-99 %] 98 %    Physical Exam  Constitutional:       General: He is not in acute distress.     Appearance: Normal appearance.   HENT:      Head: Normocephalic and atraumatic.      Nose: Nose normal. No congestion.      Mouth/Throat:      Mouth: Mucous membranes are moist.   Eyes:      Extraocular Movements: Extraocular movements intact.      Pupils: Pupils are equal, round, and reactive to light.   Cardiovascular:      Rate and Rhythm: Normal rate and regular rhythm.      Pulses: Normal pulses.      Heart sounds: Normal heart sounds.   Pulmonary:      Effort: Pulmonary effort is normal.      Breath sounds: Normal breath sounds.   Abdominal:      General: Bowel sounds are normal.      Palpations: Abdomen is soft.      Tenderness: There is no abdominal tenderness.   Musculoskeletal:         General: No swelling. Normal range of motion.      Cervical back: Normal range of motion and neck supple.   Skin:     General: Skin is warm.      Coloration: Skin is not jaundiced.   Neurological:      General: No focal deficit present.      Mental Status: He is alert and oriented to person, place, and time. Mental status is at baseline.      Cranial Nerves: No cranial nerve deficit.   Psychiatric:         Mood and Affect: Mood normal.          Behavior: Behavior normal.         Thought Content: Thought content normal.         Judgment: Judgment normal.         Fluids    Intake/Output Summary (Last 24 hours) at 4/18/2022 1611  Last data filed at 4/18/2022 0850  Gross per 24 hour   Intake 220 ml   Output --   Net 220 ml       Laboratory  Recent Labs     04/16/22  0946 04/17/22  1205 04/18/22  1026   WBC 10.1 8.6 9.3   RBC 3.04* 2.80* 3.22*   HEMOGLOBIN 8.9* 8.2* 9.3*   HEMATOCRIT 27.9* 26.8* 30.0*   MCV 91.8 95.7 93.2   MCH 29.3 29.3 28.9   MCHC 31.9* 30.6* 31.0*   RDW 60.5* 61.5* 60.2*   PLATELETCT 156* 132* 165   MPV 9.7 9.9 9.6     Recent Labs     04/16/22  0104 04/17/22  1205 04/18/22  1026   SODIUM 137 139 141   POTASSIUM 3.5* 3.7 4.1   CHLORIDE 107 108 107   CO2 19* 21 24   GLUCOSE 112* 148* 148*   BUN 25* 23* 19   CREATININE 1.97* 1.53* 1.31   CALCIUM 8.7 9.2 9.3                   Imaging  [id number] VIEW   Final Result      No acute findings.      US-RENAL   Final Result      1.  Normal appearance of the right lower quadrant renal transplant with no hydronephrosis.   2.  Small echogenic native left kidney consistent with renal disease.   3.  Nonvisualization of the native right kidney and bladder.      DX-CHEST-PORTABLE (1 VIEW)   Final Result      Unchanged BILATERAL patchy airspace disease compatible with pneumonia. Covid pneumonia is a possibility.      CT-HEAD W/O   Final Result      1.  No evidence of acute hemorrhage, mass or large territorial infarction   2.  Remote LEFT basal ganglia lacunar infarction   3.  Mild atrophy   4.  White matter changes              Assessment/Plan  * Acute metabolic encephalopathy- (present on admission)  Assessment & Plan  Likely sec to toxic metabolic due to sepsis.   Head CT unremarkable for acute changes.   Antibiotics for C. difficile and Pseudomonas in urine  Frequent reorientation   Avoid sedatives  Up to chair for all meals  Cont monitoring    Clostridium difficile infection  Assessment & Plan  C.  difficile positive  Not fulminant  On PO vancomycin  Monitor    Sepsis (HCC)- (present on admission)  Assessment & Plan  This is Severe Sepsis Present on admission  SIRS criteria identified on my evaluation include: Tachycardia, with heart rate greater than 90 BPM and Leukocytosis, with WBC greater than 12,000  Clinical indicators of end organ dysfunction include Creatinine >2.0 (Without ESRD or CKD)  Source is urinary  Sepsis protocol initiated  Crystalloid Fluid Administration: Fluid resuscitation ordered per standard protocol - 30 mL/kg per current or ideal body weight  IV antibiotics as appropriate for source of sepsis  Reassessment: I have reassessed the patient's hemodynamic status    UTI (urinary tract infection)- (present on admission)  Assessment & Plan  Urine cultures growing Pseudomonas  Continue Zosyn antibiotic regimen    Hypokalemia- (present on admission)  Assessment & Plan  Replete as necessary    Duodenal ulcer- (present on admission)  Assessment & Plan  Hx of, will cont PPI    Immunocompromised (HCC)- (present on admission)  Assessment & Plan  Kidney transplant patient, will cont home immunosuppressants    Acute-on-chronic kidney injury (HCC)- (present on admission)  Assessment & Plan  Baseline Cr is around 1.5. Cr 2.1 on admission, likely sec to sepsis and UTI  Renal function at baseline  Renal ultrasound without hydronephrosis  IVF, avoid nephrotoxins and renal dosing meds  Labs in a.m.    Kidney transplant status, living related donor- (present on admission)  Assessment & Plan  Remote history of Kidney transplant   Cont Cellcept and tacrolimus    Acute left eye pain- (present on admission)  Assessment & Plan  Follows ophthalmology Dr. Addison. I discussed with Dr. Addison over the phone and he recommends to continue prednisone 60mg daily for a few weeks given concerning optic nerve inflammations  Will continue prednisone    Leukocytosis- (present on admission)  Assessment & Plan  Secondary to  sepsis  Resolved       VTE prophylaxis: heparin ppx    I have performed a physical exam and reviewed and updated ROS and Plan today (4/18/2022). In review of yesterday's note (4/17/2022), there are no changes except as documented above.

## 2022-04-18 NOTE — PROGRESS NOTES
Received report from night shift RN, Celio . Assumed care of pt at 0815. Pt reports no nausea, vomiting, numbness, tingling, at this time. Mild pain in his left wrist. AOx1. Updated pt on plan of care. Pt resting comfortably in bed. Fall precautions and education done. Educated on use of call light which is placed nearby patient. Hourly rounding in place. Questions and concerns answered at this time. Patient reports no other needs at the moment.

## 2022-04-18 NOTE — CARE PLAN
The patient is Stable - Low risk of patient condition declining or worsening    Shift Goals  Clinical Goals: maintain skin integrity, reorientation  Patient Goals: stop being so confused  Family Goals: n/a    Progress made toward(s) clinical / shift goals:    Problem: Psychosocial  Goal: Patient's level of anxiety will decrease  Outcome: Progressing     Problem: Communication  Goal: The ability to communicate needs accurately and effectively will improve  Outcome: Progressing     Problem: Hemodynamics  Goal: Patient's hemodynamics, fluid balance and neurologic status will be stable or improve  Outcome: Progressing       Patient is not progressing towards the following goals:

## 2022-04-19 NOTE — THERAPY
Speech Language Pathology  Daily Treatment     Patient Name: Krishan Acevedo  Age:  72 y.o., Sex:  male  Medical Record #: [id number]  Today's Date: 4/19/2022     Precautions  Precautions: (P) Fall Risk,Swallow Precautions ( See Comments)    Assessment    Pt seen this date for dysphagia intervention with SB/TN breakfast tray. Per RN, pt tolerating current diet without difficulty but continues to require 1:1 feeding assistance. Timely swallow intiaition and clear vocal quality appreciated. Pt demonstrated mildly prolonged but functional mastication of soft and bite size textures. No s/sx of aspiration appreciated. Pt required 1:1 feeding assistance throughout session.     Recommend soft and bite size/thin liquid diet with adherence to the following: upright at 90*, slow rate, small bites/sips. Float meds in puree. Pt may require assistance with tray setup. SLP following to check diet tolerance, suspect pt is at or close to baseline swallow function.     Plan    Continue current treatment plan.    Discharge Recommendations: (P) Anticipate that the patient will have no further speech therapy needs after discharge from the hospital    Subjective    Pt awake, followed directions and participated in session.      Objective       04/19/22 1040   Total Time Spent   Total Time Spent (Mins) 20   Charge Group   SLP Swallowing Dysfunction Treatment Swallowing Dysfunction Treatment   Precautions   Precautions Fall Risk;Swallow Precautions ( See Comments)   Vitals   O2 Delivery Device None - Room Air   Pain 0 - 10 Group   Therapist Pain Assessment Post Activity Pain Same as Prior to Activity;Nurse Notified;5  (buttock)   Dysphagia    Dysphagia X   Positioning / Behavior Modification Self Monitoring;Modulate Rate or Bite Size   Other Treatments SB/TN breakfast tray   Diet / Liquid Recommendation Thin (0);Soft & Bite-Sized (6) - (Dysphagia III)   Nutritional Liquid Intake Rating Scale Non thickened beverages   Nutritional Food  "Intake Rating Scale Total oral diet with multiple consistencies but requiring special preparation or compensations   Skilled Intervention Compensatory Strategies;Verbal Cueing   Recommended Route of Medication Administration   Medication Administration  Float Whole with Puree   Patient / Family Goals   Patient / Family Goal #1 \"No more\"   Goal #1 Outcome Progressing as expected   Short Term Goals   Short Term Goal # 1 B  Pt will consume a diet of SB/TN with no s/sx of aspiration and min cues.   Goal Outcome  # 1 B Progressing as expected   Education Group   Education Provided Dysphagia   Dysphagia Patient Response Patient;Acceptance;Demonstration;Verbal Demonstration;Reinforcement Needed   Anticipated Discharge Needs   Discharge Recommendations Anticipate that the patient will have no further speech therapy needs after discharge from the hospital   Therapy Recommendations Upon DC Not Indicated   Interdisciplinary Plan of Care Collaboration   IDT Collaboration with  Nursing   Patient Position at End of Therapy Bed Alarm On;In Bed;Tray Table within Reach   Collaboration Comments RN aware of session     "

## 2022-04-19 NOTE — CARE PLAN
"The patient is Stable - Low risk of patient condition declining or worsening    Shift Goals  Clinical Goals: maintain skin integrity, reorient patient, remain free from falls  Patient Goals: \"drink water\"  Family Goals: n/a    Progress made toward(s) clinical / shift goals:    Problem: Fall Risk  Goal: Patient will remain free from falls  Outcome: Progressing  Note: Strip alarm in place.  3 bed rails up.  Hourly rounding.     Problem: Skin Integrity  Goal: Skin integrity is maintained or improved  Outcome: Progressing  Note: Sacrum has some blanchable redness.  Using barrier cream.       Patient is not progressing towards the following goals:      Problem: Knowledge Deficit - Standard  Goal: Patient and family/care givers will demonstrate understanding of plan of care, disease process/condition, diagnostic tests and medications  Outcome: Not Progressing  Note: Pt is confused, AOx1-2.  Unaware of what POC is despite multiple attempts of reorienting.     "

## 2022-04-19 NOTE — CONSULTS
Neurology Initial Consult H&P  [facility]    Referring Physician: Keily Rowland M.D.    Chief Complaint   Patient presents with   • ALOC     Pt was found in eye doctor bathroom altered on the toilet after a BM. 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 Pt knows his name. Unknown baseline.        History of Present Illness: Krishan Acevedo is a 72 y.o. male with history of Wegener's granulomatosis with ANCA + glomerulonephritis s/p kidney transplant, Atrial flutter s/p ablation (off anticoagulation due to GI bleed), duodenal ulcer, DVT, E. Coli bacteremia, right retinal detachment, COVID infection (1/25/2022), CAD, and TUSHAR who presented to the hospital on 4/15/2022 for altered mental status. The patient is unable to provide history, thus is obtained from his wife at bedside. Per the wife, he has had a complicated medical history with long greer with COVID, pseudomonas UTI, RADHA, and GIB from 2/1/22 to 3/5/22. He is currently living at [facility] or [facility] after recent admission for GI bleed secondary to a duodenal ulcer requiring hemoclipping and acute on chronic renal failure from 3/17/22 to 3/24/22. He was recently seen in [facility] ER on 4/11/22 for sharp left eye pain and vision changes. Ophthalmology was consulted at that time, prescribed prednisone 60 mg daily for 7 days for reported diagnosis of optic neuritis, and recommended to follow up in their office on 4/12/22. On 4/15/22, he was brought to his ophthalmologist appointment and found to be very altered in the bathroom with disorientation, and sent to [facility] ER for evaluation.      In the ER, CT head wo contrast 4/15/22 showed no acute hemorrhage, large territory infarct, and no mass with chronic left basal ganglia infarct. Labs were significant for WBC 12.5, Cr 2.11, lactic acid 2.7, and UA consistent with UTI. Stool sample positive for C. difficile and urinalysis positive for pseudomonas, currently treated  "with vancomycin and Zosyn. Given his persistent disorientation, neurology was consulted today for acute encephalopathy.     Currently, the patient is laying in bed, lethargic, awakens to verbal stimuli, oriented to self only, and following commands. He reports being outside a boxing ring and asks if he should box today. Reports persistent left eye vision loss described as \"blurry with a ring in the center,\" chills and shivering. Denies headache, fever, cough, facial or focal weakness, acute numbness/tingling, seizure, or LOC.     Past medical history:   Past Medical History:   Diagnosis Date   • Arrhythmia 2021    flutter   • Arthritis 2020    right shoulder, knees, & back   • Atrial flutter ([facility])    • CAD (coronary artery disease) 2003   • Cough 2020    dry-on lisinipril, now productive -brown   • Dialysis patient ([facility]) 2006    x 18 months   • DVT (deep venous thrombosis) ([facility])    • E coli bacteremia 2014    hospitalized x 5 months   • Kidney failure 03/2008    kidney transplant   • Kidney transplant pain 2020    low back   • Retinal detachment    • Sleep apnea     CPAP   • Snoring    • Wegener's granulomatosis with renal involvement ([facility]) 2008       Past surgical history:   Past Surgical History:   Procedure Laterality Date   • OK UPPER GI ENDOSCOPY,DIAGNOSIS N/A 3/17/2022    Procedure: GASTROSCOPY;  Surgeon: Roni Campbell M.D.;  Location: SURGERY [facility];  Service: Gastroenterology   • OK UPPER GI ENDOSCOPY,CTRL BLEED  3/17/2022    Procedure: EGD, WITH CLIP PLACEMENT;  Surgeon: Roni Campbell M.D.;  Location: SURGERY [facility];  Service: Gastroenterology   • OK UPPER GI ENDOSCOPY,DIAGNOSIS  2/26/2022    Procedure: GASTROSCOPY;  Surgeon: Kim Goetz D.O.;  Location: SURGERY SAME DAY [facility];  Service: Gastroenterology   • OK UPPER GI ENDOSCOPY,DIAGNOSIS  2/24/2022    Procedure: GASTROSCOPY;  Surgeon: Raymond Pryor M.D.;  Location: SURGERY SAME DAY [facility];  Service: Gastroenterology   • " OTHER Right 2017    eye surgery, lasix & cataracts, retinal detatchment   • PEG PLACEMENT  10/10/2014    Performed by Brijesh Orozco M.D. at SURGERY [facility]   • OTHER ORTHOPEDIC SURGERY Left     knee       Family history:   Family History   Problem Relation Age of Onset   • Stroke Brother 26        , ? cause   • Stroke Maternal Grandmother            • Clotting Disorder Neg Hx        Social history:   Social History     Socioeconomic History   • Marital status:      Spouse name: Not on file   • Number of children: Not on file   • Years of education: Not on file   • Highest education level: Not on file   Occupational History   • Not on file   Tobacco Use   • Smoking status: Never Smoker   • Smokeless tobacco: Former User     Types: Snuff, Chew   Vaping Use   • Vaping Use: Never used   Substance and Sexual Activity   • Alcohol use: Not Currently   • Drug use: Never   • Sexual activity: Not on file   Other Topics Concern   • Not on file   Social History Narrative   • Not on file     Social Determinants of Health     Financial Resource Strain: Not on file   Food Insecurity: Not on file   Transportation Needs: Not on file   Physical Activity: Not on file   Stress: Not on file   Social Connections: Not on file   Intimate Partner Violence: Not on file   Housing Stability: Not on file       Allergies:  Allergies   Allergen Reactions   • Nsaids Unspecified     Cannot take because of anti rejection meds.   • Triazolam Anxiety     All anti anxiety medications cause hallucinations        Medications:    Current Facility-Administered Medications:   •  vancomycin 50 mg/mL oral soln 125 mg, 125 mg, Oral, Q6HRS, Celso Madrid M.D., 125 mg at 22 1015  •  [COMPLETED] piperacillin-tazobactam (ZOSYN) 3.375 g in  mL IVPB, 3.375 g, Intravenous, Once, Stopped at 22 1210 **AND** piperacillin-tazobactam (ZOSYN) 4.5 g in  mL IVPB, 4.5 g, Intravenous, Q8HRS, Celso OMLINA  Karlene Madrid M.D., Last Rate: 25 mL/hr at 04/19/22 1315, 4.5 g at 04/19/22 1315  •  heparin injection 5,000 Units, 5,000 Units, Subcutaneous, Q8HRS, Celso D Karlene Madrid M.D., 5,000 Units at 04/19/22 1315  •  atorvastatin (LIPITOR) tablet 10 mg, 10 mg, Oral, Q EVENING, Niko London M.D., 10 mg at 04/18/22 1646  •  cinacalcet (SENSIPAR) tablet 30 mg, 30 mg, Oral, MO + FR, Niko London M.D., 30 mg at 04/18/22 0448  •  fluticasone (FLONASE) nasal spray 50 mcg, 1 Spray, Nasal, DAILY, Niko London M.D., 50 mcg at 04/19/22 0429  •  mycophenolate (CELLCEPT) capsule 500 mg, 500 mg, Oral, BID, Niko London M.D., 500 mg at 04/19/22 0429  •  omeprazole (PRILOSEC) capsule 40 mg, 40 mg, Oral, BID, Niko London M.D., 40 mg at 04/19/22 0428  •  sodium bicarbonate tablet 650 mg, 650 mg, Oral, TID, Niko London M.D., 650 mg at 04/19/22 1015  •  tacrolimus (PROGRAF) capsule 0.5 mg, 0.5 mg, Oral, Q EVENING, Niko London M.D., 0.5 mg at 04/18/22 1736  •  tacrolimus (PROGRAF) capsule 1 mg, 1 mg, Oral, QAM, Niko London M.D., 1 mg at 04/19/22 0429  •  lactated ringers infusion, , Intravenous, Continuous, Niko London M.D., Last Rate: 83 mL/hr at 04/19/22 0231, New Bag at 04/19/22 0231  •  labetalol (NORMODYNE/TRANDATE) injection 10 mg, 10 mg, Intravenous, Q4HRS PRN, Niko London M.D.  •  ondansetron (ZOFRAN) syringe/vial injection 4 mg, 4 mg, Intravenous, Q4HRS PRN, Niko London M.D.  •  ondansetron (ZOFRAN ODT) dispertab 4 mg, 4 mg, Oral, Q4HRS PRN, Niko London M.D.  •  predniSONE (DELTASONE) tablet 60 mg, 60 mg, Oral, DAILY, Niko London M.D., 60 mg at 04/19/22 0546  •  acetaminophen (Tylenol) tablet 650 mg, 650 mg, Oral, Q4HRS PRN, Niko London M.D., 650 mg at 04/18/22 0447  •  [CANCELED] Special Contact Isolation, , , CONTINUOUS **AND** [COMPLETED] C Diff by PCR rflx Toxin, , , Once **AND** Pharmacy Consult Request, 1 Each, Other, PHARMACY TO DOSE, Gregory Tee M.D.    Review of systems: In addition to what is  detailed in the HPI above, all other systems reviewed and are negative.    Physical Examination:     Vitals:    04/18/22 1800 04/18/22 2200 04/19/22 0600 04/19/22 0844   BP: 153/75 132/64 139/69 (!) 162/83   Pulse: 81 79 90 85   Resp: 19 18 16 18   Temp: 36.8 °C (98.2 °F) 37.2 °C (98.9 °F) 36.5 °C (97.7 °F) 36.3 °C (97.4 °F)   TempSrc: Temporal Temporal Temporal Temporal   SpO2: 100% 97% 97% 97%   Weight:       Height:           General: Patient in no acute distress, pleasant and cooperative.  HEENT: Normocephalic, no signs of acute trauma.   Neck: Supple, no meningeal signs or carotid bruits. There is normal range of motion. No tenderness on exam.   Chest: Regular and unlabored breaths on RA. No cough.   CV: RRR.   Skin: No signs of acute rashes or trauma.   Musculoskeletal: Joints exhibit full range of motion, with chronic right shoulder pain to movement. There are no signs of joint or muscle swelling. There is no tenderness to deep palpation of muscles.   Psychiatric: No hallucinatory behavior. Endorses symptoms of depression but denies suicidal ideation. Mood and affect depressive and withdrawn on exam.     NEUROLOGICAL EXAM:   Mental status, orientation: Lethargic, awakens to verbal stimuli, following commands, oriented to self only, disoriented to place, time, and situation.   Speech and language: Speech is clear and fluent. The patient is able to name, repeat and comprehend.   Cranial nerve exam: Pupils are 2-3 mm bilaterally and equally reactive to light. Visual fields are absent to right (baseline) and intact to left by confrontation with loss of visual acuity to left on field test. There is no nystagmus on primary or secondary gaze. Intact full EOM in all directions of gaze. Face appears symmetric. Sensation in the face is intact to light touch. Uvula is midline. Palate elevates symmetrically. Tongue is midline and without any signs of tongue biting or fasciculations. Sternocleidomastoid muscles exhibit is  normal strength bilaterally. Shoulder shrug is intact bilaterally.   Motor exam: Strength is at least 4/5 in all extremities with generalized weakness. Tone is normal. Asterixis movements with hand flapping noted on exam.   Sensory exam Reveals decreased sense of light touch and pinprick in bilateral hands and feet, baseline per wife with neuropathy.   Deep tendon reflexes:  2+ throughout. Plantar responses are flexor. There is no clonus.   Coordination: No carmelo ataxia with with observed movements.   Gait: Deferred per patient preference.       ANCILLARY DATA REVIEWED:     Labs:  Lab Results   Component Value Date/Time    PROTHROMBTM 15.1 (H) 04/15/2022 09:49 AM    INR 1.23 (H) 04/15/2022 09:49 AM      Lab Results   Component Value Date/Time    WBC 9.2 04/19/2022 02:06 AM    RBC 2.94 (L) 04/19/2022 02:06 AM    HEMOGLOBIN 8.5 (L) 04/19/2022 02:06 AM    HEMATOCRIT 27.6 (L) 04/19/2022 02:06 AM    MCV 93.9 04/19/2022 02:06 AM    MCH 28.9 04/19/2022 02:06 AM    MCHC 30.8 (L) 04/19/2022 02:06 AM    MPV 9.8 04/19/2022 02:06 AM    NEUTSPOLYS 79.30 (H) 04/19/2022 02:06 AM    LYMPHOCYTES 13.70 (L) 04/19/2022 02:06 AM    MONOCYTES 5.80 04/19/2022 02:06 AM    EOSINOPHILS 0.00 04/19/2022 02:06 AM    BASOPHILS 0.10 04/19/2022 02:06 AM    HYPOCHROMIA 1+ 03/05/2022 01:09 AM    ANISOCYTOSIS 1+ 03/16/2022 12:34 PM      Lab Results   Component Value Date/Time    SODIUM 142 04/19/2022 02:06 AM    POTASSIUM 4.1 04/19/2022 02:06 AM    CHLORIDE 107 04/19/2022 02:06 AM    CO2 23 04/19/2022 02:06 AM    GLUCOSE 114 (H) 04/19/2022 02:06 AM    BUN 18 04/19/2022 02:06 AM    CREATININE 1.39 04/19/2022 02:06 AM      Lab Results   Component Value Date/Time    CHOLSTRLTOT 101 01/20/2021 07:48 AM    LDL 44 01/20/2021 07:48 AM    HDL 30 (A) 01/20/2021 07:48 AM    TRIGLYCERIDE 133 01/20/2021 07:48 AM       Lab Results   Component Value Date/Time    ALKPHOSPHAT 124 (H) 04/19/2022 02:06 AM    ASTSGOT 19 04/19/2022 02:06 AM    ALTSGPT 23 04/19/2022  02:06 AM    TBILIRUBIN 0.4 04/19/2022 02:06 AM        Imaging/Testing:    I interpreted and/or reviewed the patient's neuroimaging    [id number] VIEW   Final Result      No acute findings.      US-RENAL   Final Result      1.  Normal appearance of the right lower quadrant renal transplant with no hydronephrosis.   2.  Small echogenic native left kidney consistent with renal disease.   3.  Nonvisualization of the native right kidney and bladder.      DX-CHEST-PORTABLE (1 VIEW)   Final Result      Unchanged BILATERAL patchy airspace disease compatible with pneumonia. Covid pneumonia is a possibility.      CT-HEAD W/O   Final Result      1.  No evidence of acute hemorrhage, mass or large territorial infarction   2.  Remote LEFT basal ganglia lacunar infarction   3.  Mild atrophy   4.  White matter changes               Assessment and Plan:    Krishan Acevedo is a 72 y.o. male with relevant history of Wegener's granulomatosis with ANCA + glomerulonephritis s/p kidney transplant, Atrial flutter s/p ablation (off anticoagulation due to GI bleed), duodenal ulcer, DVT, E. Coli bacteremia, right retinal detachment, COVID infection (1/25/2022), CAD, and TUSHAR who presented to the hospital on 4/15/2022 for altered mental status. CT head wo contrast 4/15/22 showed no acute hemorrhage, large territory infarct, and no mass with chronic left basal ganglia infarct. Initial labs were significant for WBC 12.5, Cr 2.11, lactic acid 3.0, and UA consistent with UTI. CXR 4/15/22 showed unchanged bilateral patchy airspace compatible with pneumonia. Stool sample positive for C. difficile and urinalysis positive for pseudomonas. He is currently being treated with vancomycin and Zosyn. Given his persistent disorientation, neurology was consulted today. Leukocytosis appears to be improving with WBC 9.2 today. Creatinine also improving now 1.39. However, neurological exam is significant for disorientation but no other focal deficits noted,  consistent with toxic/metabolic encephalopathy likely multifactorial with pseudomonas UTI, C.difficile, and pneumonia. Left eye decreased vision is currently being treated with prednisone 60mg daily x7 days per ophthalmology. Will obtain an MRI brain without contrast to evaluate for any structural pathology which would explain his encephalopathy. Also, consider lumbar puncture to evaluate for meningitis/encephalitis which may contribute to his encephalopathy.     Plan:    -q4h and PRN neuro assessment. VS per nursing/unit protocol.   -Obtain MRI Brain wo contrast.   -Lumbar puncture to evaluate for meningitis/encephalitis and assist in workup of optic neurits. CSF analysis to include: protein, glucose, cell count, culture, meningitis/encephalitis panel, paraneoplastic panel, autoimmune panel, oligoclonal bands, aquaporin 4 Ab  -Attempt to minimize risk of delirium such as avoid day time napping and promote night time sleep, monitor for constipation, remove lines/tubing that is not needed, avoid early lab draws and vital checks, limit polypharmacy as able, and keep close to the window  -Frequent reorientation and redirection  -Fall precautions  -Follow up with neuro-opthalmology  -DVT PPX: SCDs and heparin SQ q8hr.      The evaluation of the patient, and recommended management, was discussed with Dr. Nova, Dr. Rowland, and bedside RN.     Hussein Chavez, MSN [facility]-BC  Nurse Practitioner  [facility]  t) [phone]

## 2022-04-19 NOTE — CARE PLAN
The patient is Stable - Low risk of patient condition declining or worsening    Shift Goals  Clinical Goals: maintain skin integrity, reorient, improved appetite  Patient Goals: food  Family Goals: n/a    Progress made toward(s) clinical / shift goals:  Patient resting in bed, turning self in bed. A&O to self. No acute changes.     Patient is not progressing towards the following goals:      Problem: Psychosocial  Goal: Patient's level of anxiety will decrease  Outcome: Progressing  Goal: Patient's ability to verbalize feelings about condition will improve  Outcome: Progressing  Goal: Patient's ability to re-evaluate and adapt role responsibilities will improve  Outcome: Progressing  Goal: Patient and family will demonstrate ability to cope with life altering diagnosis and/or procedure  Outcome: Progressing  Goal: Spiritual and cultural needs incorporated into hospitalization  Outcome: Progressing     Problem: Communication  Goal: The ability to communicate needs accurately and effectively will improve  Outcome: Progressing     Problem: Hemodynamics  Goal: Patient's hemodynamics, fluid balance and neurologic status will be stable or improve  Outcome: Progressing     Problem: Mobility  Goal: Patient's capacity to carry out activities will improve  Outcome: Progressing     Problem: Self Care  Goal: Patient will have the ability to perform ADLs independently or with assistance (bathe, groom, dress, toilet and feed)  Outcome: Progressing     Problem: Fall Risk  Goal: Patient will remain free from falls  Outcome: Progressing     Problem: Pain - Standard  Goal: Alleviation of pain or a reduction in pain to the patient’s comfort goal  Outcome: Progressing     Problem: Skin Integrity  Goal: Skin integrity is maintained or improved  Outcome: Progressing     Problem: Knowledge Deficit - Standard  Goal: Patient and family/care givers will demonstrate understanding of plan of care, disease process/condition, diagnostic tests  and medications  Outcome: Progressing     Problem: Fluid Volume  Goal: Fluid volume balance will be maintained  Outcome: Progressing     Problem: Urinary - Renal Perfusion  Goal: Ability to achieve and maintain adequate renal perfusion and functioning will improve  Outcome: Progressing     Problem: Respiratory  Goal: Patient will achieve/maintain optimum respiratory ventilation and gas exchange  Outcome: Progressing     Problem: Mechanical Ventilation  Goal: Safe management of artificial airway and ventilation  Outcome: Progressing  Goal: Successful weaning off mechanical ventilator, spontaneously maintains adequate gas exchange  Outcome: Progressing  Goal: Patient will be able to express needs and understand communication  Outcome: Progressing     Problem: Physical Regulation  Goal: Diagnostic test results will improve  Outcome: Progressing  Goal: Signs and symptoms of infection will decrease  Outcome: Progressing

## 2022-04-19 NOTE — PROGRESS NOTES
Hospital Medicine Daily Progress Note    Date of Service  4/19/2022    Chief Complaint  Krishan Acevedo is a 72 y.o. male admitted 4/15/2022 with altered mental status    Hospital Course  72 y.o. male with history of ANCA + glomerulonephritis, remote history of kidney transplant, atrial fibrillation (not on anticoagulation due to GI bleed), duodenal ulcer, R eye retinal detachment, who presented 4/15/2022 by ambulance after being found down, altered in the bathroom of his eye doctor's office. Per chart review, he had been recently discharged on 4/12 after evaluation for eye pain. At that time, he was prescribed prednisone 60mg daily for 7 days and was instructed to follow up with eye doctor as outpatient. Patient went to his eye doctor's appointment and was found down in the bathroom.   Patient resides at [facility] after prolonged greer with COVID. Per chart review, patient's admitting mental status was very deviated from his baseline.   On admission, he was tachycardia with  and reported chills with shivering at bedside.   Here labs remarkable for wbc 12.5, Cr 2.11, lactic acid 2.7. UA pos UTI.    Patient was admitted for acute metabolic encephalopathy. On 4/15, a head CT showed no evidence of acute hemorrhage, mass or large territorial infarct but patient noted for remote left basilar ganglier lacunar infarct, mild atrophy, and white matter changes.  C. difficile positive and urinalysis with Pseudomonas of which he has had in the past.  He was started on vancomycin and Zosyn for the above.    Interval Problem Update  He is currently axox2, he tells me he is blind at baseline, can see only shadows. Ongoing diarrhea, Denies pain, no headache, on sob or cough, no rashes. He is calm and cooperative, no focal weakness. ROS otherwise negative.  Urine cultures growing Pseudomonas, resistant to ciprofloxacin, levofloxacin, meropenem and and cefepime, sensitive     I have personally seen and examined the patient  at bedside. I discussed the plan of care with patient and bedside RN.    Consultants/Specialty  Neurology    Code Status  Full Code    Disposition  Patient is not medically cleared for discharge.   Anticipate discharge TBD  I have placed the appropriate orders for post-discharge needs.    Review of Systems  Review of Systems   Unable to perform ROS: Mental status change   Constitutional: Negative.  Negative for chills, diaphoresis, fever, malaise/fatigue and weight loss.   HENT: Negative.  Negative for sore throat.    Eyes: Negative.  Negative for blurred vision.        Bilateral blindness   Respiratory: Negative.  Negative for cough and shortness of breath.    Cardiovascular: Negative.  Negative for chest pain, palpitations and leg swelling.   Gastrointestinal: Negative.  Negative for abdominal pain, nausea and vomiting.   Genitourinary: Negative.  Negative for dysuria.   Musculoskeletal: Negative.  Negative for myalgias.   Skin: Negative.  Negative for itching and rash.   Neurological: Negative.  Negative for dizziness, focal weakness, weakness and headaches.   Endo/Heme/Allergies: Negative.  Does not bruise/bleed easily.   Psychiatric/Behavioral: Negative.  Negative for depression, substance abuse and suicidal ideas.   All other systems reviewed and are negative.     Physical Exam  Temp:  [36.3 °C (97.4 °F)-37.2 °C (98.9 °F)] 36.3 °C (97.4 °F)  Pulse:  [79-90] 85  Resp:  [16-19] 18  BP: (132-162)/(64-83) 162/83  SpO2:  [97 %-100 %] 97 %    Physical Exam  Constitutional:       General: He is not in acute distress.     Appearance: Normal appearance.   HENT:      Head: Normocephalic and atraumatic.      Nose: Nose normal. No congestion.      Mouth/Throat:      Mouth: Mucous membranes are moist.   Eyes:      Extraocular Movements: Extraocular movements intact.      Pupils: Pupils are equal, round, and reactive to light.   Cardiovascular:      Rate and Rhythm: Normal rate and regular rhythm.      Pulses: Normal  pulses.      Heart sounds: Normal heart sounds.   Pulmonary:      Effort: Pulmonary effort is normal.      Breath sounds: Normal breath sounds.   Abdominal:      General: Bowel sounds are normal.      Palpations: Abdomen is soft.      Tenderness: There is no abdominal tenderness.   Musculoskeletal:         General: No swelling. Normal range of motion.      Cervical back: Normal range of motion and neck supple.   Skin:     General: Skin is warm.      Coloration: Skin is not jaundiced.   Neurological:      General: No focal deficit present.      Mental Status: He is alert and oriented to person, place, and time. Mental status is at baseline.      Cranial Nerves: No cranial nerve deficit.   Psychiatric:         Mood and Affect: Affect normal.         Speech: Speech is delayed and tangential.         Behavior: Behavior is cooperative.         Thought Content: Thought content is not paranoid.         Cognition and Memory: Cognition is impaired. Memory is impaired. He exhibits impaired recent memory and impaired remote memory.         Fluids    Intake/Output Summary (Last 24 hours) at 4/19/2022 1333  Last data filed at 4/19/2022 0930  Gross per 24 hour   Intake 720 ml   Output --   Net 720 ml       Laboratory  Recent Labs     04/17/22  1205 04/18/22  1026 04/19/22  0206   WBC 8.6 9.3 9.2   RBC 2.80* 3.22* 2.94*   HEMOGLOBIN 8.2* 9.3* 8.5*   HEMATOCRIT 26.8* 30.0* 27.6*   MCV 95.7 93.2 93.9   MCH 29.3 28.9 28.9   MCHC 30.6* 31.0* 30.8*   RDW 61.5* 60.2* 61.5*   PLATELETCT 132* 165 175   MPV 9.9 9.6 9.8     Recent Labs     04/17/22  1205 04/18/22  1026 04/19/22  0206   SODIUM 139 141 142   POTASSIUM 3.7 4.1 4.1   CHLORIDE 108 107 107   CO2 21 24 23   GLUCOSE 148* 148* 114*   BUN 23* 19 18   CREATININE 1.53* 1.31 1.39   CALCIUM 9.2 9.3 9.0                   Imaging  [id number] VIEW   Final Result      No acute findings.      US-RENAL   Final Result      1.  Normal appearance of the right lower quadrant renal transplant  with no hydronephrosis.   2.  Small echogenic native left kidney consistent with renal disease.   3.  Nonvisualization of the native right kidney and bladder.      DX-CHEST-PORTABLE (1 VIEW)   Final Result      Unchanged BILATERAL patchy airspace disease compatible with pneumonia. Covid pneumonia is a possibility.      CT-HEAD W/O   Final Result      1.  No evidence of acute hemorrhage, mass or large territorial infarction   2.  Remote LEFT basal ganglia lacunar infarction   3.  Mild atrophy   4.  White matter changes              Assessment/Plan  * Acute metabolic encephalopathy- (present on admission)  Assessment & Plan  Likely multifactorial, with history of +ANCA and recent dx of optic neuritis - concern for encephalitis or vasculitis, could also have a degree of toxic metabolic encephalopathy due to sepsis. Will check sed and CRP  Head CT unremarkable for acute changes  Will likely need an MRI - neurology to eval, discussed with [facility]  Antibiotics for C. difficile and Pseudomonas in urine  Frequent reorientation   Serial neuro exams  Avoid sedatives  Up to chair for all meals  Cont monitoring    Hypokalemia- (present on admission)  Assessment & Plan  Replete as necessary    Kidney transplant status, living related donor- (present on admission)  Assessment & Plan  Remote history of Kidney transplant   Cont Cellcept and tacrolimus    Clostridium difficile infection  Assessment & Plan  C. difficile positive  Continue oral vancomycin  Following  Euvolemic this am    UTI (urinary tract infection)- (present on admission)  Assessment & Plan  Urine cultures growing Pseudomonas  Continue Zosyn antibiotic regimen    Sepsis (HCC)- (present on admission)  Assessment & Plan  This is Severe Sepsis Present on admission  SIRS criteria identified on my evaluation include: Tachycardia, with heart rate greater than 90 BPM and Leukocytosis, with WBC greater than 12,000  Clinical indicators of end organ dysfunction include  Creatinine >2.0 (Without ESRD or CKD)  Source is urinary  Sepsis protocol initiated  Crystalloid Fluid Administration: Fluid resuscitation ordered per standard protocol - 30 mL/kg per current or ideal body weight  IV antibiotics as appropriate for source of sepsis  Reassessment: I have reassessed the patient's hemodynamic status  resolving    Acute left eye pain- (present on admission)  Assessment & Plan  Reportedly recently seen by ophthalmology Dr. Addison and started on prednisone for optic nerve inflammation  Currently remains on steroids  Neurology to eval     Leukocytosis- (present on admission)  Assessment & Plan  Secondary to sepsis  Resolved    Duodenal ulcer- (present on admission)  Assessment & Plan  Hx of, will cont PPI    Immunocompromised (HCC)- (present on admission)  Assessment & Plan  Kidney transplant patient, will cont home immunosuppressants    Acute-on-chronic kidney injury (HCC)- (present on admission)  Assessment & Plan  Baseline Cr is around 1.5. Cr 2.1 on admission, likely sec to sepsis and UTI  improved  Renal ultrasound without hydronephrosis  IVF, avoid nephrotoxins and renal dosing meds  Continue to follow       VTE prophylaxis: heparin ppx    I have performed a physical exam and reviewed and updated ROS and Plan today (4/19/2022). In review of yesterday's note (4/18/2022), there are no changes except as documented above.

## 2022-04-20 NOTE — ASSESSMENT & PLAN NOTE
Acute on chronic  Continues to slowly decrease  Decreased overnight, h/h stable, no s/o bleeding  Will continue to follow

## 2022-04-20 NOTE — DISCHARGE PLANNING
Anticipated Discharge Disposition: TBD, possible return to SNF        Action: CM spoke with patient spouse, Penelope in regards for CM assessment and to get SNF choice as patient came from [facility]. Penelope stated that should the team recommend SNF again, she is more than okay for him to return to [facility]. CM obtained verbal signature for SNF choice form.        Barriers to Discharge: Medical clearance, Poss. SNF placement        Plan: Hospital Care Management will continue to follow and assist with discharge planning needs.

## 2022-04-20 NOTE — CARE PLAN
The patient is Watcher - Medium risk of patient condition declining or worsening    Shift Goals  Clinical Goals: reorient, maintain skin integrity  Patient Goals: more blankets  Family Goals: n/a    Progress made toward(s) clinical / shift goals:    Problem: Communication  Goal: The ability to communicate needs accurately and effectively will improve  Outcome: Progressing     Problem: Hemodynamics  Goal: Patient's hemodynamics, fluid balance and neurologic status will be stable or improve  Outcome: Progressing     Problem: Self Care  Goal: Patient will have the ability to perform ADLs independently or with assistance (bathe, groom, dress, toilet and feed)  Outcome: Progressing     Problem: Fall Risk  Goal: Patient will remain free from falls  Outcome: Progressing     Problem: Pain - Standard  Goal: Alleviation of pain or a reduction in pain to the patient’s comfort goal  Outcome: Progressing       Patient is not progressing towards the following goals:      Problem: Psychosocial  Goal: Patient's level of anxiety will decrease  Outcome: Not Progressing     Problem: Mobility  Goal: Patient's capacity to carry out activities will improve  Outcome: Not Progressing

## 2022-04-20 NOTE — PROCEDURES
Procedure  : Wally Herndon PGY 3    Attending physician: Dr. Bourne     Procedure: Lumbar puncture with Conscious sedation       Informed consent was obtained from the patient wife. A time-out was performed. Patient underwent conscious sedation at 1513 with 1mg of versed at 1513 and 25 fentanyl at 1518 with RT and ICU nurses at the bedside. I wore a mask with protective eyewear,  gown and sterile gloves throughout the procedure. The patient was  placed in the sitting upright position with help from the nursing staff. The area was  cleansed and draped in usual sterile fashion using betadine scrub.  Anesthesia was achieved with 1% lidocaine. A 22 gauge 3.5-inch spinal  needle was placed in the L3-L4  lumbar interspace. On the 2nd attempt, clear colored cerebral spinal fluid was obtained. CSF was collected into 3 tubes. These were sent for the usual  tests, including 1 tube to be held for further analysis if needed. A  sterile bandaid was placed over the puncture site. The patient had no  immediate complications and tolerated the procedure well.  Estimated  blood loss was 5ml. Overall this was a difficult procedure to body habitus. Procedure end time was 1530.

## 2022-04-20 NOTE — DISCHARGE PLANNING
Received Choice form at 1004  Agency/Facility Name: [facility]  Referral sent per Choice form @ 1002

## 2022-04-20 NOTE — PROGRESS NOTES
Neurology Progress Note  [facility]    Referring Physician: Keily Rowland M.D.    Chief Complaint   Patient presents with   • ALOC     Pt was found in eye doctor bathroom altered on the toilet after a BM. 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 Pt knows his name. Unknown baseline.        HPI: Refer to initial documented Neurology H&P, as detailed in the patient's chart.    Interval History 2022: No acute events overnight. Patient is currently laying in bed, lethargic, awakens to verbal stimuli, oriented to self and time, disoriented to place and situation, and following commands. 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 Reoriented to place and situation. Left eye vision improved today with improved acuity. Still complains of chills. Denies headache, fever, cough, shortness of breath, facial or focal weakness, acute numbness/tingling, seizure, or LOC.     Past Medical History:   Past Medical History:   Diagnosis Date   • Arrhythmia     flutter   • Arthritis     right shoulder, knees, & back   • Atrial flutter ([facility])    • CAD (coronary artery disease)    • Cough 2020    dry-on lisinipril, now productive -brown   • Dialysis patient ([facility]) 2006    x 18 months   • DVT (deep venous thrombosis) ([facility])    • E coli bacteremia 2014    hospitalized x 5 months   • Kidney failure 2008    kidney transplant   • Kidney transplant pain 2020    low back   • Retinal detachment    • Sleep apnea     CPAP   • Snoring    • Wegener's granulomatosis with renal involvement ([facility])         FHx:  Family History   Problem Relation Age of Onset   • Stroke Brother 26        , ? cause   • Stroke Maternal Grandmother            • Clotting Disorder Neg Hx         SHx:  Social History     Socioeconomic History   • Marital status:      Spouse name: Not on file   • Number of children: Not on file   • Years of education: Not on file   • Highest education level: Not on  file   Occupational History   • Not on file   Tobacco Use   • Smoking status: Never Smoker   • Smokeless tobacco: Former User     Types: Snuff, Chew   Vaping Use   • Vaping Use: Never used   Substance and Sexual Activity   • Alcohol use: Not Currently   • Drug use: Never   • Sexual activity: Not on file   Other Topics Concern   • Not on file   Social History Narrative   • Not on file     Social Determinants of Health     Financial Resource Strain: Not on file   Food Insecurity: Not on file   Transportation Needs: Not on file   Physical Activity: Not on file   Stress: Not on file   Social Connections: Not on file   Intimate Partner Violence: Not on file   Housing Stability: Not on file        Medications:    Current Facility-Administered Medications:   •  vancomycin 50 mg/mL oral soln 125 mg, 125 mg, Oral, Q6HRS, Celso Madrid M.D., 125 mg at 04/20/22 0817  •  [COMPLETED] piperacillin-tazobactam (ZOSYN) 3.375 g in  mL IVPB, 3.375 g, Intravenous, Once, Stopped at 04/17/22 1210 **AND** piperacillin-tazobactam (ZOSYN) 4.5 g in  mL IVPB, 4.5 g, Intravenous, Q8HRS, Celso Madrid M.D., Stopped at 04/20/22 1018  •  [Held by provider] heparin injection 5,000 Units, 5,000 Units, Subcutaneous, Q8HRS, Celso Madrid M.D., 5,000 Units at 04/20/22 0618  •  atorvastatin (LIPITOR) tablet 10 mg, 10 mg, Oral, Q EVENING, Niko London M.D., 10 mg at 04/19/22 1621  •  cinacalcet (SENSIPAR) tablet 30 mg, 30 mg, Oral, MO + FR, Niko London M.D., 30 mg at 04/18/22 0448  •  fluticasone (FLONASE) nasal spray 50 mcg, 1 Spray, Nasal, DAILY, Niko London M.D., 50 mcg at 04/20/22 0619  •  mycophenolate (CELLCEPT) capsule 500 mg, 500 mg, Oral, BID, Niko London M.D., 500 mg at 04/20/22 0619  •  omeprazole (PRILOSEC) capsule 40 mg, 40 mg, Oral, BID, Niko London M.D., 40 mg at 04/20/22 0618  •  sodium bicarbonate tablet 650 mg, 650 mg, Oral, TID, Niko London M.D., 650 mg at 04/20/22 0817  •   tacrolimus (PROGRAF) capsule 0.5 mg, 0.5 mg, Oral, Q EVENING, Niko London M.D., 0.5 mg at 04/19/22 1620  •  tacrolimus (PROGRAF) capsule 1 mg, 1 mg, Oral, QAM, Niko London M.D., 1 mg at 04/20/22 0618  •  lactated ringers infusion, , Intravenous, Continuous, Niko London M.D., Last Rate: 83 mL/hr at 04/19/22 0231, New Bag at 04/19/22 0231  •  labetalol (NORMODYNE/TRANDATE) injection 10 mg, 10 mg, Intravenous, Q4HRS PRN, Niko London M.D.  •  ondansetron (ZOFRAN) syringe/vial injection 4 mg, 4 mg, Intravenous, Q4HRS PRN, Niko London M.D.  •  ondansetron (ZOFRAN ODT) dispertab 4 mg, 4 mg, Oral, Q4HRS PRN, Niko London M.D.  •  predniSONE (DELTASONE) tablet 60 mg, 60 mg, Oral, DAILY, Niko London M.D., 60 mg at 04/20/22 0618  •  acetaminophen (Tylenol) tablet 650 mg, 650 mg, Oral, Q4HRS PRN, Niko London M.D., 650 mg at 04/18/22 0447    Allergies:  Allergies   Allergen Reactions   • Nsaids Unspecified     Cannot take because of anti rejection meds.   • Triazolam Anxiety     All anti anxiety medications cause hallucinations         Review of systems: In addition to what is detailed in the interval history above, all other systems reviewed and are negative.      Physical Examination:   Vitals:    04/19/22 1648 04/19/22 2005 04/20/22 0359 04/20/22 0726   BP: 144/65 126/63 131/75 138/53   Pulse: 78 71 100 60   Resp: 18 17 18 18   Temp: 36.1 °C (96.9 °F) 36.7 °C (98 °F) 36.8 °C (98.2 °F) 36 °C (96.8 °F)   TempSrc: Temporal Temporal Temporal Temporal   SpO2: 98% 97% 97% 91%   Weight:       Height:         General: Patient in no acute distress, pleasant and cooperative.  HEENT: Normocephalic, no signs of acute trauma.   Neck: Supple, no meningeal signs or carotid bruits. There is normal range of motion. No tenderness on exam.   Chest: Regular and unlabored breaths on RA. No cough.   CV: RRR.   Skin: No signs of acute rashes or trauma.   Musculoskeletal: Joints exhibit full range of motion, with chronic right  shoulder pain to movement. There are no signs of joint or muscle swelling. There is no tenderness to deep palpation of muscles.   Psychiatric: No hallucinatory behavior. Endorses symptoms of depression but denies suicidal ideation. Mood and affect depressive and withdrawn on exam.      NEUROLOGICAL EXAM:   Mental status, orientation: Lethargic, awakens to verbal stimuli, following commands, oriented to self and time, disoriented to place and situation.   Speech and language: Speech is clear and fluent. The patient is able to name, repeat and comprehend.   Cranial nerve exam: Pupils are 2-3 mm bilaterally and equally reactive to light. Visual fields are absent to right (baseline) and intact to left to field test. There is no nystagmus on primary or secondary gaze. Intact full EOM in all directions of gaze. Face appears symmetric. Sensation in the face is intact to light touch. Uvula is midline. Palate elevates symmetrically. Tongue is midline and without any signs of tongue biting or fasciculations. Sternocleidomastoid muscles exhibit is normal strength bilaterally. Shoulder shrug is intact bilaterally.   Motor exam: Strength is at least 4/5 in all extremities with generalized weakness. Tone is normal. Asterixis movements with hand flapping noted on exam.   Sensory exam Reveals decreased sense of light touch and pinprick in bilateral feet, baseline with neuropathy.   Deep tendon reflexes:  Plantar responses are flexor. There is no clonus.   Coordination: No carmelo ataxia with with observed movements.   Gait: Deferred per patient preference.     Ancillary Data Reviewed:    Labs:  Lab Results   Component Value Date/Time    PROTHROMBTM 15.1 (H) 04/15/2022 09:49 AM    INR 1.23 (H) 04/15/2022 09:49 AM      Lab Results   Component Value Date/Time    WBC 10.0 04/20/2022 12:53 AM    RBC 2.83 (L) 04/20/2022 12:53 AM    HEMOGLOBIN 8.1 (L) 04/20/2022 12:53 AM    HEMATOCRIT 26.3 (L) 04/20/2022 12:53 AM    MCV 92.9 04/20/2022  12:53 AM    MCH 28.6 04/20/2022 12:53 AM    MCHC 30.8 (L) 04/20/2022 12:53 AM    MPV 9.9 04/20/2022 12:53 AM    NEUTSPOLYS 76.90 (H) 04/20/2022 12:53 AM    LYMPHOCYTES 15.60 (L) 04/20/2022 12:53 AM    MONOCYTES 6.20 04/20/2022 12:53 AM    EOSINOPHILS 0.00 04/20/2022 12:53 AM    BASOPHILS 0.20 04/20/2022 12:53 AM    HYPOCHROMIA 1+ 03/05/2022 01:09 AM    ANISOCYTOSIS 1+ 03/16/2022 12:34 PM      Lab Results   Component Value Date/Time    SODIUM 140 04/20/2022 12:53 AM    POTASSIUM 3.2 (L) 04/20/2022 12:53 AM    CHLORIDE 105 04/20/2022 12:53 AM    CO2 24 04/20/2022 12:53 AM    GLUCOSE 105 (H) 04/20/2022 12:53 AM    BUN 19 04/20/2022 12:53 AM    CREATININE 1.37 04/20/2022 12:53 AM      Lab Results   Component Value Date/Time    CHOLSTRLTOT 101 01/20/2021 07:48 AM    LDL 44 01/20/2021 07:48 AM    HDL 30 (A) 01/20/2021 07:48 AM    TRIGLYCERIDE 133 01/20/2021 07:48 AM       Lab Results   Component Value Date/Time    ALKPHOSPHAT 127 (H) 04/20/2022 12:53 AM    ASTSGOT 20 04/20/2022 12:53 AM    ALTSGPT 19 04/20/2022 12:53 AM    TBILIRUBIN 0.4 04/20/2022 12:53 AM        Imaging/Testing:    I interpreted and/or reviewed the patient's neuroimaging    [id number] VIEW   Final Result      No acute findings.      US-RENAL   Final Result      1.  Normal appearance of the right lower quadrant renal transplant with no hydronephrosis.   2.  Small echogenic native left kidney consistent with renal disease.   3.  Nonvisualization of the native right kidney and bladder.      DX-CHEST-PORTABLE (1 VIEW)   Final Result      Unchanged BILATERAL patchy airspace disease compatible with pneumonia. Covid pneumonia is a possibility.      CT-HEAD W/O   Final Result      1.  No evidence of acute hemorrhage, mass or large territorial infarction   2.  Remote LEFT basal ganglia lacunar infarction   3.  Mild atrophy   4.  White matter changes         MR-BRAIN-W/O    (Results Pending)       Assessment and Plan:    Krishan Acevedo is a 72 y.o. 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 Coli bacteremia, right retinal detachment, COVID infection (1/25/2022), CAD, and TUSHAR who presented to the hospital on 4/15/2022 for altered mental status. CT head wo contrast 4/15/22 showed no acute hemorrhage, large territory infarct, and no mass with chronic left basal ganglia infarct. Initial labs were significant for WBC 12.5, Cr 2.11, lactic acid 3.0, and UA consistent with UTI. CXR 4/15/22 showed unchanged bilateral patchy airspace compatible with pneumonia. Stool sample positive for C. difficile and urinalysis positive for pseudomonas. He is currently being treated with vancomycin and Zosyn. Given his persistent disorientation, neurology was consulted 4/19/22. Leukocytosis appears to be improving with WBC 10 today. Creatinine also improving now 1.37. However, neurological exam is significant for disorientation but no other focal deficits noted, consistent with toxic/metabolic encephalopathy likely multifactorial with pseudomonas UTI, C.difficile, and pneumonia. Left eye decreased vision is currently being treated with prednisone 60mg daily x7 days per ophthalmology. Will obtain an MRI brain without contrast to evaluate for any structural pathology which would explain his encephalopathy. Also recommend lumbar puncture to evaluate for encephalitis which may contribute to his encephalopathy.      Plan:     -q4h and PRN neuro assessment. VS per nursing/unit protocol.   -Obtain MRI Brain wo contrast- ordered and pending.   -Lumbar puncture to evaluate for meningitis/encephalitis and assist in workup of optic neurits. CSF analysis to include: protein, glucose, cell count, culture, meningitis/encephalitis panel, paraneoplastic panel, autoimmune panel, oligoclonal bands, aquaporin 4 Ab  -Attempt to minimize risk of delirium such  as avoid day time napping and promote night time sleep, monitor for constipation, remove lines/tubing that is not needed, avoid early lab draws and vital checks, limit polypharmacy as able, and keep close to the window  -Frequent reorientation and redirection  -Fall precautions  -Follow up with neuro-opthalmology  -DVT PPX: SCDs and heparin- held for LP    The evaluation of the patient, and recommended management, was discussed with Dr. Nova, Dr. Rowland, and bedside RN. I have performed a physical exam and reviewed and updated ROS and Plan today (4/20/2022). In review of yesterday's note (4/19/2022), there are no changes except as documented above.    Hussein Chavez, MSN [facility]-BC  Nurse Practitioner  [facility]  t) [phone]

## 2022-04-20 NOTE — THERAPY
Occupational Therapy   Initial Evaluation     Patient Name: Krishan Acevedo  Age:  72 y.o., Sex:  male  Medical Record #: [id number]  Today's Date: 4/20/2022     Precautions  Precautions: Fall Risk,Swallow Precautions ( See Comments)    Assessment  Patient is 72 y.o. male with a diagnosis of ALOC, found down.   Pt currently limited by decreased functional mobility, activity tolerance, cognition, sensation, strength, AROM, coordination, balance, and pain which are affecting pt's ability to complete ADLs/IADLs at baseline. Pt would benefit from OT services in the acute care setting to maximize functional recovery.      Plan    Recommend Occupational Therapy 3 times per week until therapy goals are met for the following treatments:  Self Care/Activities of Daily Living and Therapeutic Activities.       Discharge Recommendations: (P) Recommend post-acute placement for additional occupational therapy services prior to discharge home        04/20/22 1043   Prior Living Situation   Prior Services None   Housing / Facility 1 Story House   Steps Into Home 1   Steps In Home 0   Bathroom Set up Walk In Shower   Equipment Owned None   Lives with - Patient's Self Care Capacity Spouse   Comments admitted to hospital from SNF   Prior Level of ADL Function   Self Feeding Unable To Determine At This Time   Dressing Unable To Determine At This Time   ADL Assessment   Grooming Moderate Assist   Upper Body Dressing Maximal Assist   Lower Body Dressing Total Assist   Toileting Total Assist   Functional Mobility   Sit to Stand Maximal Assist  (x2)   Bed, Chair, Wheelchair Transfer Unable to Participate   Short Term Goals   Short Term Goal # 1 min A with UB dressing   Short Term Goal # 2 mod A with LB dressing   Short Term Goal # 3 mod A with ADL txfs   Anticipated Discharge Equipment and Recommendations   Discharge Recommendations Recommend post-acute placement for additional occupational therapy services prior to discharge home

## 2022-04-20 NOTE — CARE PLAN
The patient is Stable - Low risk of patient condition declining or worsening    Shift Goals  Clinical Goals: reorient, maintain skin integrity  Patient Goals: more blankets  Family Goals: n/a    Progress made toward(s) clinical / shift goals:    Problem: Fall Risk  Goal: Patient will remain free from falls  Outcome: Progressing     Problem: Skin Integrity  Goal: Skin integrity is maintained or improved  Outcome: Progressing       Patient is not progressing towards the following goals:      Problem: Mobility  Goal: Patient's capacity to carry out activities will improve  Outcome: Not Progressing

## 2022-04-20 NOTE — PROGRESS NOTES
"Hospital Medicine Daily Progress Note    Date of Service  4/20/2022    Chief Complaint  Krishan Acevedo is a 72 y.o. male admitted 4/15/2022 with altered mental status    Hospital Course  72 y.o. male with history of ANCA + glomerulonephritis, remote history of kidney transplant, atrial fibrillation (not on anticoagulation due to GI bleed), duodenal ulcer, R eye retinal detachment, who presented 4/15/2022 by ambulance after being found down, altered in the bathroom of his eye doctor's office. Per chart review, he had been recently discharged on 4/12 after evaluation for eye pain. At that time, he was prescribed prednisone 60mg daily for 7 days and was instructed to follow up with eye doctor as outpatient. Patient went to his eye doctor's appointment and was found down in the bathroom.   Patient resides at [facility] after prolonged greer with COVID. Per chart review, patient's admitting mental status was very deviated from his baseline.   On admission, he was tachycardia with  and reported chills with shivering at bedside.   Here labs remarkable for wbc 12.5, Cr 2.11, lactic acid 2.7. UA pos UTI.    Patient was admitted for acute metabolic encephalopathy. On 4/15, a head CT showed no evidence of acute hemorrhage, mass or large territorial infarct but patient noted for remote left basilar ganglier lacunar infarct, mild atrophy, and white matter changes.  C. difficile positive and urinalysis with Pseudomonas of which he has had in the past.  He was started on vancomycin and Zosyn for the above.    Interval Problem Update  He remains axox2, he denies headache or eye pain, denies chest pain. No n/v.. States he can see \"shadows\", remains encephalopathic, vitals stable, ongoing diarrhea, denies nausea or abdominal pain. ROS otherwise negative    I have personally seen and examined the patient at bedside. I discussed the plan of care with case management, neurology, patient and bedside " RN.    Consultants/Specialty  Neurology    Code Status  Full Code    Disposition  Patient is not medically cleared for discharge.   Anticipate discharge TBD  I have placed the appropriate orders for post-discharge needs.    Review of Systems  Review of Systems   Unable to perform ROS: Mental status change   Constitutional: Negative.  Negative for chills, diaphoresis, fever, malaise/fatigue and weight loss.   HENT: Negative.  Negative for sore throat.    Eyes: Negative.  Negative for blurred vision.        Bilateral blindness   Respiratory: Negative.  Negative for cough and shortness of breath.    Cardiovascular: Negative.  Negative for chest pain, palpitations and leg swelling.   Gastrointestinal: Positive for diarrhea. Negative for abdominal pain, nausea and vomiting.   Genitourinary: Negative.  Negative for dysuria.   Musculoskeletal: Negative.  Negative for myalgias.   Skin: Negative.  Negative for itching and rash.   Neurological: Positive for weakness. Negative for dizziness, focal weakness and headaches.   Endo/Heme/Allergies: Negative.  Does not bruise/bleed easily.   Psychiatric/Behavioral: Positive for memory loss. Negative for depression, substance abuse and suicidal ideas.   All other systems reviewed and are negative.     Physical Exam  Temp:  [36 °C (96.8 °F)-36.8 °C (98.2 °F)] 36 °C (96.8 °F)  Pulse:  [] 60  Resp:  [17-18] 18  BP: (126-144)/(53-75) 138/53  SpO2:  [91 %-98 %] 91 %    Physical Exam  Constitutional:       General: He is not in acute distress.     Appearance: Normal appearance.   HENT:      Head: Normocephalic and atraumatic.      Nose: Nose normal. No congestion.      Mouth/Throat:      Mouth: Mucous membranes are moist.   Eyes:      Extraocular Movements: Extraocular movements intact.      Pupils: Pupils are equal, round, and reactive to light.   Cardiovascular:      Rate and Rhythm: Normal rate and regular rhythm.      Pulses: Normal pulses.      Heart sounds: Normal heart sounds.    Pulmonary:      Effort: Pulmonary effort is normal.      Breath sounds: Normal breath sounds.   Abdominal:      General: Bowel sounds are normal.      Palpations: Abdomen is soft.      Tenderness: There is no abdominal tenderness.   Musculoskeletal:         General: No swelling. Normal range of motion.      Cervical back: Normal range of motion and neck supple.   Skin:     General: Skin is warm.      Coloration: Skin is not jaundiced.   Neurological:      General: No focal deficit present.      Mental Status: He is alert and oriented to person, place, and time. Mental status is at baseline.      Cranial Nerves: No cranial nerve deficit.   Psychiatric:         Mood and Affect: Affect normal.         Speech: Speech is delayed and tangential.         Behavior: Behavior is cooperative.         Thought Content: Thought content is not paranoid.         Cognition and Memory: Cognition is impaired. Memory is impaired. He exhibits impaired recent memory and impaired remote memory.         Fluids    Intake/Output Summary (Last 24 hours) at 4/20/2022 1226  Last data filed at 4/20/2022 0800  Gross per 24 hour   Intake 460 ml   Output --   Net 460 ml       Laboratory  Recent Labs     04/18/22  1026 04/19/22  0206 04/20/22  0053   WBC 9.3 9.2 10.0   RBC 3.22* 2.94* 2.83*   HEMOGLOBIN 9.3* 8.5* 8.1*   HEMATOCRIT 30.0* 27.6* 26.3*   MCV 93.2 93.9 92.9   MCH 28.9 28.9 28.6   MCHC 31.0* 30.8* 30.8*   RDW 60.2* 61.5* 61.0*   PLATELETCT 165 175 163*   MPV 9.6 9.8 9.9     Recent Labs     04/18/22  1026 04/19/22  0206 04/20/22  0053   SODIUM 141 142 140   POTASSIUM 4.1 4.1 3.2*   CHLORIDE 107 107 105   CO2 24 23 24   GLUCOSE 148* 114* 105*   BUN 19 18 19   CREATININE 1.31 1.39 1.37   CALCIUM 9.3 9.0 9.0                   Imaging  [id number] VIEW   Final Result      No acute findings.      US-RENAL   Final Result      1.  Normal appearance of the right lower quadrant renal transplant with no hydronephrosis.   2.  Small echogenic  native left kidney consistent with renal disease.   3.  Nonvisualization of the native right kidney and bladder.      DX-CHEST-PORTABLE (1 VIEW)   Final Result      Unchanged BILATERAL patchy airspace disease compatible with pneumonia. Covid pneumonia is a possibility.      CT-HEAD W/O   Final Result      1.  No evidence of acute hemorrhage, mass or large territorial infarction   2.  Remote LEFT basal ganglia lacunar infarction   3.  Mild atrophy   4.  White matter changes         MR-BRAIN-W/O    (Results Pending)        Assessment/Plan  * Acute metabolic encephalopathy- (present on admission)  Assessment & Plan  Likely multifactorial, with history of +ANCA and recent dx of optic neuritis - concern for encephalitis or vasculitis, could also have a degree of toxic metabolic encephalopathy due to sepsis. CRP elevated  Neurology following, their input is appreciated  Awaiting MRI and LP  Head CT unremarkable for acute changes  Continue antibiotics for C. difficile and Pseudomonas in urine  Frequent reorientation   Serial neuro exams  Avoid sedatives  following    Hypokalemia- (present on admission)  Assessment & Plan  Due to diarrhea  Replacing  Will check mag  following    Kidney transplant status, living related donor- (present on admission)  Assessment & Plan  Remote history of Kidney transplant   Cont Cellcept and tacrolimus    Clostridium difficile infection  Assessment & Plan  C. difficile positive  Continue oral vancomycin  Following  Euvolemic on exam    UTI (urinary tract infection)- (present on admission)  Assessment & Plan  Urine cultures growing Pseudomonas  Continue Zosyn antibiotic regimen    Sepsis (HCC)- (present on admission)  Assessment & Plan  This is Severe Sepsis Present on admission  SIRS criteria identified on my evaluation include: Tachycardia, with heart rate greater than 90 BPM and Leukocytosis, with WBC greater than 12,000  Clinical indicators of end organ dysfunction include Creatinine >2.0  (Without ESRD or CKD)  Source is urinary  Sepsis protocol initiated  Crystalloid Fluid Administration: Fluid resuscitation ordered per standard protocol - 30 mL/kg per current or ideal body weight  IV antibiotics as appropriate for source of sepsis  Reassessment: I have reassessed the patient's hemodynamic status  resolving    Acute left eye pain- (present on admission)  Assessment & Plan  Reportedly recently seen by ophthalmology Dr. Addison and started on prednisone for optic nerve inflammation, suspect optic neuritis  Currently remains on steroids  Neurology following  MRI and LP pending  Continue supportive care    Leukocytosis- (present on admission)  Assessment & Plan  Secondary to sepsis  Resolved    Duodenal ulcer- (present on admission)  Assessment & Plan  Hx of, will cont PPI    Immunocompromised (HCC)- (present on admission)  Assessment & Plan  Kidney transplant patient, will cont home immunosuppressants    Thrombocytopenia (HCC)- (present on admission)  Assessment & Plan  Acute on chronic  Mild  following    Acute-on-chronic kidney injury (HCC)- (present on admission)  Assessment & Plan  Baseline Cr is around 1.5. Cr 2.1 on admission, likely sec to sepsis and UTI  improved  Renal ultrasound without hydronephrosis  IVF, avoid nephrotoxins and renal dosing meds  In stable range       VTE prophylaxis: heparin ppx    I have performed a physical exam and reviewed and updated ROS and Plan today (4/20/2022). In review of yesterday's note (4/19/2022), there are no changes except as documented above.

## 2022-04-21 PROBLEM — I63.9 STROKE (HCC): Status: ACTIVE | Noted: 2022-01-01

## 2022-04-21 NOTE — PROGRESS NOTES
Patient has not urinated all night, tried to use urinal. Patient states he has the urge to go urinate, but unable to do so. bladder scanned per protocol. 424 mL. Mentioned possible need for a straight catheter for retention and patient refused. Educated on risks associated MD notified.

## 2022-04-21 NOTE — PROGRESS NOTES
Bedside report received and patient care assumed. Pt is resting in bed, A&Ox1 (oriented to person) much more pleasant than what was received in report, although still disoriented, with no complaints of pain, and is on RA. IV abx running Linen changes PRN. All fall precautions are in place, belongings at bedside table.  Pt was updated on POC, no questions or concerns. Pt educated on use of call light for assistance. Will continue to monitor.

## 2022-04-21 NOTE — THERAPY
Physical Therapy   Daily Treatment     Patient Name: Krishan Acevedo  Age:  72 y.o., Sex:  male  Medical Record #: [id number]  Today's Date: 4/20/2022          Assessment    Pt known to therapist, pt's arousal has improved from last admission as far as articulation and communication; physical function is slightly worse, needing max A of 2 for upright mobility; will follow, needs placement.     Plan    Continue current treatment plan.    DC Equipment Recommendations: Unable to determine at this time  Discharge Recommendations: Recommend post-acute placement for additional physical therapy services prior to discharge home      Subjective/Objective     04/20/22 1115   Cognition    Cognition / Consciousness X   Level of Consciousness Alert   Ability To Follow Commands 1 Step   Safety Awareness Impaired   New Learning Impaired   Attention Impaired   Sequencing Impaired   Initiation Impaired   Comments pleasant and cooperative, but poor historian;   Passive ROM Lower Body   Passive ROM Lower Body WDL   Strength Lower Body   Lower Body Strength  X   Comments grossly weak, no focal deficits;   Sensation Lower Body   Lower Extremity Sensation   X   Comments lack of light touch in feet;   Sitting Lower Body Exercises   Comments long arc quads pre mobility 1-2 reps   Balance   Sitting Balance (Static) Fair   Sitting Balance (Dynamic) Fair -   Standing Balance (Static) Poor   Standing Balance (Dynamic) Poor -   Weight Shift Sitting Fair   Weight Shift Standing Poor   Skilled Intervention Postural Facilitation;Sequencing;Tactile Cuing;Verbal Cuing   Comments B UE support in sitting/standing; no hip initiaion into extension   Gait Analysis   Gait Level Of Assist Unable to Participate   Bed Mobility    Supine to Sit Standby Assist  (use of railing; increased time)   Sit to Supine Minimal Assist  (LE management)   Functional Mobility   Sit to Stand Maximal Assist  (of 2 x 2 reps, normal bed height; couldn't use FWW)   Comments  attempting FWW use, pt shaking hands out and appeared to have difficutly with motor control;   Short Term Goals    Short Term Goal # 1 pt will perform supine <> sit without bed features with SPV in 6 visits to get in/out of bed at home   Goal Outcome # 1 goal not met   Short Term Goal # 2 pt will perform STS with SPV in 6 visits for improved independence   Goal Outcome # 2 Goal not met   Short Term Goal # 3 pt will perform SPT with min A in 6 visits to sit in chair   Goal Outcome # 3 Goal not met   Short Term Goal # 4 pt will ambulate 50ft with FWW and min A in 6 visits to initiate gait   Goal Outcome # 4 Goal not met

## 2022-04-21 NOTE — PROGRESS NOTES
Received report from night shift RNMaddy . Assumed care of pt at 0645. Pt reports no pain, nausea, vomiting, numbness, tingling, at this time. AOx1, confused, delusional. Updated pt on plan of care. Pt resting comfortably in bed. Fall precautions and education done. Educated on use of call light which is placed nearby patient. Hourly rounding in place. Questions and concerns answered at this time. Patient reports no other needs at the moment.

## 2022-04-21 NOTE — PROGRESS NOTES
Hospital Medicine Daily Progress Note    Date of Service  4/21/2022    Chief Complaint  Krishan Acevedo is a 72 y.o. male admitted 4/15/2022 with altered mental status    Hospital Course  72 y.o. male with history of ANCA + glomerulonephritis, remote history of kidney transplant, atrial fibrillation (not on anticoagulation due to GI bleed), duodenal ulcer, R eye retinal detachment, who presented 4/15/2022 by ambulance after being found down, altered in the bathroom of his eye doctor's office. Per chart review, he had been recently discharged on 4/12 after evaluation for eye pain. At that time, he was prescribed prednisone 60mg daily for 7 days and was instructed to follow up with eye doctor as outpatient. Patient went to his eye doctor's appointment and was found down in the bathroom.   Patient resides at [facility] after prolonged greer with COVID. Per chart review, patient's admitting mental status was very deviated from his baseline.   On admission, he was tachycardia with  and reported chills with shivering at bedside.   Here labs remarkable for wbc 12.5, Cr 2.11, lactic acid 2.7. UA pos UTI.    Patient was admitted for acute metabolic encephalopathy. On 4/15, a head CT showed no evidence of acute hemorrhage, mass or large territorial infarct but patient noted for remote left basilar ganglier lacunar infarct, mild atrophy, and white matter changes.  C. difficile positive and urinalysis with Pseudomonas of which he has had in the past.  He was started on vancomycin and Zosyn for the above.    Interval Problem Update  He remains axox2, confused thinks he is a miguelito and is going to bestow blessing on three homeless women today. His left sided vision is improving, diarrhea improving, denies pain. No sob, no cp. ROS otherwise negative. No focal weakness. I updated his wife at bedside.    I have personally seen and examined the patient at bedside. I discussed the plan of care with case management, neurology,  patient and bedside RN.    Consultants/Specialty  Neurology    Code Status  Full Code    Disposition  Patient is not medically cleared for discharge.   Anticipate discharge TBD  I have placed the appropriate orders for post-discharge needs.    Review of Systems  Review of Systems   Unable to perform ROS: Mental status change   Constitutional: Negative.  Negative for chills, diaphoresis, fever, malaise/fatigue and weight loss.   HENT: Negative.  Negative for sore throat.    Eyes: Negative.  Negative for blurred vision.        Bilateral blindness   Respiratory: Negative.  Negative for cough and shortness of breath.    Cardiovascular: Negative.  Negative for chest pain, palpitations and leg swelling.   Gastrointestinal: Positive for diarrhea. Negative for abdominal pain, nausea and vomiting.   Genitourinary: Negative.  Negative for dysuria.   Musculoskeletal: Negative.  Negative for myalgias.   Skin: Negative.  Negative for itching and rash.   Neurological: Positive for weakness. Negative for dizziness, focal weakness and headaches.   Endo/Heme/Allergies: Negative.  Does not bruise/bleed easily.   Psychiatric/Behavioral: Positive for memory loss. Negative for depression, substance abuse and suicidal ideas.   All other systems reviewed and are negative.     Physical Exam  Temp:  [35.9 °C (96.6 °F)-36.8 °C (98.2 °F)] 36.4 °C (97.5 °F)  Pulse:  [] 102  Resp:  [15-20] 20  BP: (122-173)/() 143/94  SpO2:  [95 %-100 %] 100 %    Physical Exam  Constitutional:       General: He is not in acute distress.     Appearance: Normal appearance.   HENT:      Head: Normocephalic and atraumatic.      Nose: Nose normal. No congestion.      Mouth/Throat:      Mouth: Mucous membranes are moist.   Eyes:      Extraocular Movements: Extraocular movements intact.      Pupils: Pupils are equal, round, and reactive to light.   Cardiovascular:      Rate and Rhythm: Normal rate and regular rhythm.      Pulses: Normal pulses.      Heart  sounds: Normal heart sounds.   Pulmonary:      Effort: Pulmonary effort is normal.      Breath sounds: Normal breath sounds.   Abdominal:      General: Bowel sounds are normal.      Palpations: Abdomen is soft.      Tenderness: There is no abdominal tenderness.   Musculoskeletal:         General: No swelling. Normal range of motion.      Cervical back: Normal range of motion and neck supple.   Skin:     General: Skin is warm.      Coloration: Skin is not jaundiced.   Neurological:      General: No focal deficit present.      Mental Status: He is alert and oriented to person, place, and time. Mental status is at baseline.      Cranial Nerves: No cranial nerve deficit.   Psychiatric:         Mood and Affect: Affect normal.         Speech: Speech is delayed and tangential.         Behavior: Behavior is cooperative.         Thought Content: Thought content is not paranoid.         Cognition and Memory: Cognition is impaired. Memory is impaired. He exhibits impaired recent memory and impaired remote memory.         Fluids    Intake/Output Summary (Last 24 hours) at 4/21/2022 1326  Last data filed at 4/21/2022 0100  Gross per 24 hour   Intake 250 ml   Output --   Net 250 ml       Laboratory  Recent Labs     04/19/22  0206 04/20/22  0053 04/21/22  0054   WBC 9.2 10.0 11.4*   RBC 2.94* 2.83* 2.91*   HEMOGLOBIN 8.5* 8.1* 8.5*   HEMATOCRIT 27.6* 26.3* 27.4*   MCV 93.9 92.9 94.2   MCH 28.9 28.6 29.2   MCHC 30.8* 30.8* 31.0*   RDW 61.5* 61.0* 63.0*   PLATELETCT 175 163* 157*   MPV 9.8 9.9 9.4     Recent Labs     04/19/22  0206 04/20/22  0053 04/21/22  0054   SODIUM 142 140 144   POTASSIUM 4.1 3.2* 4.3   CHLORIDE 107 105 111   CO2 23 24 25   GLUCOSE 114* 105* 102*   BUN 18 19 17   CREATININE 1.39 1.37 1.31   CALCIUM 9.0 9.0 9.1                   Imaging  MR-BRAIN-W/O   Final Result      1.  Tiny right temporal occipital cortical infarct and a couple punctate recent left centrum semiovale infarcts.   2.  Chronic microvascular  ischemic type changes and a few tiny chronic lacunar infarcts.   3.  Generalized cerebral volume loss.      [id number] VIEW   Final Result      No acute findings.      US-RENAL   Final Result      1.  Normal appearance of the right lower quadrant renal transplant with no hydronephrosis.   2.  Small echogenic native left kidney consistent with renal disease.   3.  Nonvisualization of the native right kidney and bladder.      DX-CHEST-PORTABLE (1 VIEW)   Final Result      Unchanged BILATERAL patchy airspace disease compatible with pneumonia. Covid pneumonia is a possibility.      CT-HEAD W/O   Final Result      1.  No evidence of acute hemorrhage, mass or large territorial infarction   2.  Remote LEFT basal ganglia lacunar infarction   3.  Mild atrophy   4.  White matter changes         EC-ECHOCARDIOGRAM COMPLETE W/O CONT    (Results Pending)        Assessment/Plan  * Acute metabolic encephalopathy- (present on admission)  Assessment & Plan  Likely multifactorial toxic metabolic encephalopathy due to sepsis, LP no significant findings. Acute to subacute stroke on MRI  Neurology following  Head CT unremarkable for acute changes  Continue antibiotics for C. difficile and Pseudomonas in urine  Frequent reorientation   Serial neuro exams  Avoid sedatives  Following  Continue supportive care    Stroke (HCC)  Assessment & Plan  Acute to subacute  Subcortical infarct, L centrum semiovale  Likely embolic  H/o a flutter, could not tolerate anticoagulation due to recurrent severe gi bleeds  Permissive htn  Echo  Tele  Following  Speech, PT and OT to eval    Hypokalemia- (present on admission)  Assessment & Plan  Due to diarrhea  resolved    Kidney transplant status, living related donor- (present on admission)  Assessment & Plan  Remote history of Kidney transplant   Cont Cellcept and tacrolimus    Hypomagnesemia  Assessment & Plan  Severely low   Replacing  following    Clostridium difficile infection  Assessment & Plan  C.  difficile positive  Continue oral vancomycin  Following  Euvolemic on exam    UTI (urinary tract infection)- (present on admission)  Assessment & Plan  Urine cultures growing Pseudomonas  Continue Zosyn antibiotic regimen    Sepsis (HCC)- (present on admission)  Assessment & Plan  This is Severe Sepsis Present on admission  SIRS criteria identified on my evaluation include: Tachycardia, with heart rate greater than 90 BPM and Leukocytosis, with WBC greater than 12,000  Clinical indicators of end organ dysfunction include Creatinine >2.0 (Without ESRD or CKD)  Source is urinary  Sepsis protocol initiated  Crystalloid Fluid Administration: Fluid resuscitation ordered per standard protocol - 30 mL/kg per current or ideal body weight  IV antibiotics as appropriate for source of sepsis  Reassessment: I have reassessed the patient's hemodynamic status  resolving    Acute left eye pain- (present on admission)  Assessment & Plan  Reportedly recently seen by ophthalmology Dr. Addison and started on prednisone for optic nerve inflammation, suspect optic neuritis  Currently remains on steroids  Neurology following  Continue supportive care  Pain resolved  Continue steroids  Vision improving    Leukocytosis- (present on admission)  Assessment & Plan  Resolved but now mild, suspect due to steroids  following    Duodenal ulcer- (present on admission)  Assessment & Plan  Hx of, will cont PPI    Immunocompromised (HCC)- (present on admission)  Assessment & Plan  Kidney transplant patient, will cont home immunosuppressants    Thrombocytopenia (HCC)- (present on admission)  Assessment & Plan  Acute on chronic  Mild  Decreasing slowly    Acute-on-chronic kidney injury (HCC)- (present on admission)  Assessment & Plan  Baseline Cr is around 1.5. Cr 2.1 on admission, likely sec to sepsis and UTI  improved  Renal ultrasound without hydronephrosis  IVF, avoid nephrotoxins and renal dosing meds  In stable range       VTE prophylaxis:  heparin ppx    I have performed a physical exam and reviewed and updated ROS and Plan today (4/21/2022). In review of yesterday's note (4/20/2022), there are no changes except as documented above.

## 2022-04-21 NOTE — DISCHARGE PLANNING
[facility]itional [facility] Coordination    Referral from:  Dr Rowland  Insurance Provider on Facesheet: Medicare  Potential Rehab Diagnosis: NTBI    Chart review indicates patient may need on going medical management and may have therapy needs to possibly meet inpatient rehab facility criteria with the goal of returning to community.    D/C support: Spouse     Physiatry consultation forwarded per protocol.     Last Covid test:  N/a    Metabolic encephalopathy, sepsis - physiatry to consult, TCC will follow.     Thank you for the referral.

## 2022-04-21 NOTE — CARE PLAN
The patient is Watcher - Medium risk of patient condition declining or worsening    Shift Goals  Clinical Goals: Q4 neuro checks  Patient Goals: rest  Family Goals: DENA    Progress made toward(s) clinical / shift goals:    Problem: Psychosocial  Goal: Patient's ability to verbalize feelings about condition will improve  Outcome: Progressing  Goal: Patient's ability to re-evaluate and adapt role responsibilities will improve  Outcome: Progressing     Problem: Mobility  Goal: Patient's capacity to carry out activities will improve  Outcome: Progressing     Problem: Psychosocial - Patient Condition  Goal: Patient's ability to verbalize feelings about condition will improve  Outcome: Progressing  Goal: Patient's ability to re-evaluate and adapt role responsibilities will improve  Outcome: Progressing     Problem: Discharge Planning - Stroke  Goal: Ensure Stroke Core Measures are met prior to discharge  Outcome: Progressing     Problem: Hemodynamic Monitoring  Goal: Patient's hemodynamics, fluid balance and neurologic status will be stable or improve  Outcome: Progressing     Problem: Respiratory - Stroke Patient  Goal: Patient will achieve/maintain optimum respiratory rate/effort  Outcome: Progressing     Problem: Dysphagia  Goal: Dysphagia will improve  Outcome: Progressing     Problem: Urinary Elimination  Goal: Establish and maintain regular urinary output  Outcome: Progressing     Problem: Bowel Elimination  Goal: Establish and maintain regular bowel function  Outcome: Progressing     Problem: Mobility - Stroke  Goal: Patient's capacity to carry out activities will improve  Outcome: Progressing       Patient is not progressing towards the following goals:      Problem: Self Care  Goal: Patient will have the ability to perform ADLs independently or with assistance (bathe, groom, dress, toilet and feed)  Outcome: Not Progressing     Problem: Knowledge Deficit - Stroke Education  Goal: Patient's knowledge of stroke and  risk factors will improve  Outcome: Not Progressing     Problem: Neuro Status  Goal: Neuro status will remain stable or improve  Outcome: Not Progressing

## 2022-04-21 NOTE — PROGRESS NOTES
Neurology Progress Note  [facility]    Referring Physician: Keily Rowland M.D.    Chief Complaint   Patient presents with   • ALOC     Pt was found in eye doctor bathroom altered on the toilet after a BM. 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 Pt knows his name. Unknown baseline.        HPI: Refer to initial documented Neurology H&P, as detailed in the patient's chart.    Interval History 2022: No acute events overnight.  Patient is currently laying in bed, lethargic, awakens to verbal stimuli, following commands, oriented to self, place, and month, but disoriented to year, and situation.  He now knows he is in a hospital but is unsure of the reason.  Reoriented to year and situation.  Left eye vision has improved to near baseline.  Denies headache, fever, cough, shortness of breath, facial or focal weakness, acute numbness/tingling, seizure, or LOC.    Past Medical History:   Past Medical History:   Diagnosis Date   • Arrhythmia     flutter   • Arthritis 2020    right shoulder, knees, & back   • Atrial flutter ([facility])    • CAD (coronary artery disease)    • Cough 2020    dry-on lisinipril, now productive -brown   • Dialysis patient ([facility]) 2006    x 18 months   • DVT (deep venous thrombosis) ([facility])    • E coli bacteremia 2014    hospitalized x 5 months   • Kidney failure 2008    kidney transplant   • Kidney transplant pain 2020    low back   • Retinal detachment    • Sleep apnea     CPAP   • Snoring    • Wegener's granulomatosis with renal involvement ([facility])         FHx:  Family History   Problem Relation Age of Onset   • Stroke Brother 26        , ? cause   • Stroke Maternal Grandmother            • Clotting Disorder Neg Hx         SHx:  Social History     Socioeconomic History   • Marital status:      Spouse name: Not on file   • Number of children: Not on file   • Years of education: Not on file   • Highest education level: Not on file    Occupational History   • Not on file   Tobacco Use   • Smoking status: Never Smoker   • Smokeless tobacco: Former User     Types: Snuff, Chew   Vaping Use   • Vaping Use: Never used   Substance and Sexual Activity   • Alcohol use: Not Currently   • Drug use: Never   • Sexual activity: Not on file   Other Topics Concern   • Not on file   Social History Narrative   • Not on file     Social Determinants of Health     Financial Resource Strain: Not on file   Food Insecurity: Not on file   Transportation Needs: Not on file   Physical Activity: Not on file   Stress: Not on file   Social Connections: Not on file   Intimate Partner Violence: Not on file   Housing Stability: Not on file        Medications:    Current Facility-Administered Medications:   •  labetalol (NORMODYNE/TRANDATE) injection 10 mg, 10 mg, Intravenous, Q4HRS PRN, Keily Rowland M.D.  •  vancomycin 50 mg/mL oral soln 125 mg, 125 mg, Oral, Q6HRS, Celso Madrid M.D., 125 mg at 04/21/22 0922  •  [COMPLETED] piperacillin-tazobactam (ZOSYN) 3.375 g in  mL IVPB, 3.375 g, Intravenous, Once, Stopped at 04/17/22 1210 **AND** piperacillin-tazobactam (ZOSYN) 4.5 g in  mL IVPB, 4.5 g, Intravenous, Q8HRS, Celso Madrid M.D., Last Rate: 25 mL/hr at 04/21/22 1453, 4.5 g at 04/21/22 1453  •  heparin injection 5,000 Units, 5,000 Units, Subcutaneous, Q8HRS, Celso Madrid M.D., 5,000 Units at 04/21/22 1459  •  atorvastatin (LIPITOR) tablet 10 mg, 10 mg, Oral, Q EVENING, Niko London M.D., 10 mg at 04/20/22 1630  •  cinacalcet (SENSIPAR) tablet 30 mg, 30 mg, Oral, MO + FR, Niko London M.D., 30 mg at 04/18/22 0448  •  fluticasone (FLONASE) nasal spray 50 mcg, 1 Spray, Nasal, DAILY, Niko London M.D., 50 mcg at 04/21/22 0531  •  mycophenolate (CELLCEPT) capsule 500 mg, 500 mg, Oral, BID, Niko London M.D., 500 mg at 04/21/22 0530  •  omeprazole (PRILOSEC) capsule 40 mg, 40 mg, Oral, BID, Niko London M.D., 40 mg at  04/21/22 0530  •  sodium bicarbonate tablet 650 mg, 650 mg, Oral, TID, Niko London M.D., 650 mg at 04/21/22 1459  •  tacrolimus (PROGRAF) capsule 0.5 mg, 0.5 mg, Oral, Q EVENING, Niko London M.D., 0.5 mg at 04/20/22 1628  •  tacrolimus (PROGRAF) capsule 1 mg, 1 mg, Oral, QAM, Niko London M.D., 1 mg at 04/21/22 0530  •  lactated ringers infusion, , Intravenous, Continuous, Niko London M.D., Last Rate: 83 mL/hr at 04/20/22 1647, New Bag at 04/20/22 1647  •  ondansetron (ZOFRAN) syringe/vial injection 4 mg, 4 mg, Intravenous, Q4HRS PRN, Niko London M.D.  •  ondansetron (ZOFRAN ODT) dispertab 4 mg, 4 mg, Oral, Q4HRS PRN, Niko London M.D.  •  predniSONE (DELTASONE) tablet 60 mg, 60 mg, Oral, DAILY, Niko London M.D., 60 mg at 04/21/22 0530  •  acetaminophen (Tylenol) tablet 650 mg, 650 mg, Oral, Q4HRS PRN, Niko London M.D., 650 mg at 04/21/22 0244    Allergies:  Allergies   Allergen Reactions   • Nsaids Unspecified     Cannot take because of anti rejection meds.   • Triazolam Anxiety     All anti anxiety medications cause hallucinations         Review of systems: In addition to what is detailed in the interval history above, all other systems reviewed and are negative.    Physical Examination:   Vitals:    04/20/22 1700 04/20/22 2200 04/21/22 0600 04/21/22 0900   BP: (!) 172/90 153/94 122/46 143/94   Pulse: 95 92 97 (!) 102   Resp: 16 18 16 20   Temp:  36.8 °C (98.2 °F) 36.7 °C (98.1 °F) 36.4 °C (97.5 °F)   TempSrc:  Temporal Temporal Temporal   SpO2: 95% 99% 97% 100%   Weight:       Height:         General: Patient in no acute distress, pleasant and cooperative.  HEENT: Normocephalic, no signs of acute trauma.   Neck: Supple, no meningeal signs or carotid bruits. There is normal range of motion. No tenderness on exam.   Chest: Regular and unlabored breaths on RA. No cough.   CV: RRR.   Skin: No signs of acute rashes or trauma.   Musculoskeletal: Joints exhibit full range of motion, with chronic right  shoulder pain to movement. There are no signs of joint or muscle swelling. There is no tenderness to deep palpation of muscles.   Psychiatric: No hallucinatory behavior. Endorses symptoms of depression but denies suicidal ideation. Mood and affect depressive and withdrawn on exam.      NEUROLOGICAL EXAM:   Mental status, orientation: Lethargic,awakens to verbal stimuli, following commands, oriented to self, place, and month, but disoriented to year, and situation.  He now knows he is in a hospital but is unsure of the reason.   Speech and language: Speech is clear and fluent. The patient is able to name, repeat, and comprehend.   Cranial nerve exam: Pupils are 2-3 mm bilaterally and equally reactive to light. Visual fields are absent to right (baseline) and intact to left to field test. There is no nystagmus on primary or secondary gaze. Intact full EOM in all directions of gaze. Face appears symmetric. Sensation in the face is intact to light touch. Uvula is midline. Palate elevates symmetrically. Tongue is midline and without any signs of tongue biting or fasciculations. Sternocleidomastoid muscles exhibit is normal strength bilaterally. Shoulder shrug is intact bilaterally.   Motor exam: Strength is at least 4/5 in all extremities with generalized weakness. Tone is normal. Asterixis movements with hand flapping noted on exam.   Sensory exam Reveals decreased sense of light touch and pinprick in bilateral feet, baseline with neuropathy.   Deep tendon reflexes:  Plantar responses are flexor. There is no clonus.   Coordination: No carmelo ataxia with with observed movements.   Gait: Deferred per patient preference.     NIH Stroke Scale  4/21/22 13:00    1a. Level of Consciousness (Alert, drowsy, etc): 1= Drowsy    1b. LOC Questions (Month, age): 1= Answers one correctly    1c. LOC Commands (Open/close eyes make fist/let go): 0= Obeys both correctly    2.   Best Gaze (Eyes open - patient follows examiner's finger on  face): 0= Normal    3.   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   Facial Paresis (Show teeth, raise eyebrows and squeeze eyes shut): 0= Normal     5a. Motor Arm - Left (Elevate arm to 90 degrees if patient is sitting, 45 degrees if  supine): 0= No drift    5b. Motor Arm - Right (Elevate arm to 90 degrees if patient is sitting, 45 degrees if supine): 0= No drift    6a. Motor Leg - Left (Elevate leg 30 degrees with patient supine): 0= No drift    6b. Motor Leg - Right  (Elevate leg 30 degrees with patient supine): 0= No drift    7.   Limb Ataxia (Finger-nose, heel down shin): 0= No ataxia    8.   Sensory (Pin prick to face, arm, trunk and leg - compare side to side): 0= Normal Baseline neuropathy of bilateral feet    9.  Best Language (Name item, describe a picture and read sentences): 0= No aphasia    10. Dysarthria (Evaluate speech clarity by patient repeating listed words): 0= Normal articulation    11. Extinction and Inattention (Use information from prior testing to identify neglect or  double simultaneous stimuli testing): 0= No neglect    Total NIH Score: 2        Ancillary Data Reviewed:    Labs:  Lab Results   Component Value Date/Time    PROTHROMBTM 15.1 (H) 04/15/2022 09:49 AM    INR 1.23 (H) 04/15/2022 09:49 AM      Lab Results   Component Value Date/Time    WBC 11.4 (H) 04/21/2022 12:54 AM    RBC 2.91 (L) 04/21/2022 12:54 AM    HEMOGLOBIN 8.5 (L) 04/21/2022 12:54 AM    HEMATOCRIT 27.4 (L) 04/21/2022 12:54 AM    MCV 94.2 04/21/2022 12:54 AM    MCH 29.2 04/21/2022 12:54 AM    MCHC 31.0 (L) 04/21/2022 12:54 AM    MPV 9.4 04/21/2022 12:54 AM    NEUTSPOLYS 80.20 (H) 04/21/2022 12:54 AM    LYMPHOCYTES 12.50 (L) 04/21/2022 12:54 AM    MONOCYTES 5.80 04/21/2022 12:54 AM    EOSINOPHILS 0.00 04/21/2022 12:54 AM    BASOPHILS 0.30 04/21/2022 12:54 AM    HYPOCHROMIA 1+ 03/05/2022 01:09 AM    ANISOCYTOSIS 1+ 03/16/2022 12:34  PM      Lab Results   Component Value Date/Time    SODIUM 144 04/21/2022 12:54 AM    POTASSIUM 4.3 04/21/2022 12:54 AM    CHLORIDE 111 04/21/2022 12:54 AM    CO2 25 04/21/2022 12:54 AM    GLUCOSE 102 (H) 04/21/2022 12:54 AM    BUN 17 04/21/2022 12:54 AM    CREATININE 1.31 04/21/2022 12:54 AM      Lab Results   Component Value Date/Time    CHOLSTRLTOT 101 01/20/2021 07:48 AM    LDL 44 01/20/2021 07:48 AM    HDL 30 (A) 01/20/2021 07:48 AM    TRIGLYCERIDE 133 01/20/2021 07:48 AM       Lab Results   Component Value Date/Time    ALKPHOSPHAT 138 (H) 04/21/2022 12:54 AM    ASTSGOT 18 04/21/2022 12:54 AM    ALTSGPT 19 04/21/2022 12:54 AM    TBILIRUBIN 0.4 04/21/2022 12:54 AM        Imaging/Testing:    I interpreted and/or reviewed the patient's neuroimaging    EC-ECHOCARDIOGRAM COMPLETE W/O CONT   Final Result      MR-BRAIN-W/O   Final Result      1.  Tiny right temporal occipital cortical infarct and a couple punctate recent left centrum semiovale infarcts.   2.  Chronic microvascular ischemic type changes and a few tiny chronic lacunar infarcts.   3.  Generalized cerebral volume loss.      [id number] VIEW   Final Result      No acute findings.      US-RENAL   Final Result      1.  Normal appearance of the right lower quadrant renal transplant with no hydronephrosis.   2.  Small echogenic native left kidney consistent with renal disease.   3.  Nonvisualization of the native right kidney and bladder.      DX-CHEST-PORTABLE (1 VIEW)   Final Result      Unchanged BILATERAL patchy airspace disease compatible with pneumonia. Covid pneumonia is a possibility.      CT-HEAD W/O   Final Result      1.  No evidence of acute hemorrhage, mass or large territorial infarction   2.  Remote LEFT basal ganglia lacunar infarction   3.  Mild atrophy   4.  White matter changes             Assessment and Plan:    Krishan Acevedo is a 72 y.o. 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 Coli bacteremia, right retinal detachment, COVID infection (1/25/2022), CAD, and TUSHAR who presented to the hospital on 4/15/2022 for altered mental status. CT head wo contrast 4/15/22 showed no acute hemorrhage, large territory infarct, and no mass with chronic left basal ganglia infarct. Initial labs were significant for WBC 12.5, Cr 2.11, lactic acid 3.0, and UA consistent with UTI. CXR 4/15/22 showed unchanged bilateral patchy airspace compatible with pneumonia. Stool sample positive for C. difficile and urinalysis positive for pseudomonas. He is currently being treated with vancomycin and Zosyn. Given his persistent disorientation, neurology was consulted 4/19/22. Leukocytosis appears to be stable with WBC 11 today. Creatinine is improving now 1.31.  Left eye decreased vision is currently being treated with prednisone 60mg daily x7 days per ophthalmology, improving to near baseline on exam today.     MRI brain without contrast revealed tiny acute/subacute right temporal/occipital infarcts as well as left centrum semiovale infarct, but no evidence to suggest encephalitis.  Etiology of the small acute bilateral strokes is cardioembolic due to A flutter off of anticoagulation given recent GI bleed. Lumbar puncture for CSF analysis revealed slightly elevated protein, but normal glucose, normal WBC and RBC, and negative meningitis/encephalitis panel.     Neurological exam is significant for disorientation but no other focal deficits noted, consistent with toxic/metabolic encephalopathy likely multifactorial with pseudomonas UTI, C.difficile, and pneumonia.     Plan:     -q4h and PRN neuro assessment. VS per nursing/unit protocol.   -Permissive HTN not indicated given etiology of acute stroke is cardioembolic. Normotensive BP goal 110-130/60-80.   -Antihypertensives per primary team.   -TTE with EF 60%, mild to  moderate mitral regurg, moderate aortic insufficiency, but normal left atrial size and no evidence of mural thrombus or vegetation.   -Anticoagulation is recommended from a neurological perspective for secondary stroke prevention given known A flutter once cleared per gastroenterology given recent GI bleeds  -Continue atorvastatin 10 mg PO q HS for LDL goal <70. Check lipid panel.   -Counseled patient at length regarding life style and risk factor modification for secondary stroke prevention.   -Antibiotics and all other medical management per primary team  -CSF culture, paraneoplastic panel, autoimmune panel, oligoclonal bands, aquaporin 4 Ab in process  -Attempt to minimize risk of delirium such as avoid day time napping and promote night time sleep, monitor for constipation, remove lines/tubing that is not needed, avoid early lab draws and vital checks, limit polypharmacy as able, and keep close to the window  -Fall precautions  -Follow up with neuro-opthalmology  -DVT PPX: SCDs and heparin q8hr SQ    No further recommendations or further studies from a neurological standpoint at this time. Please re-consult if you have further questions or there is a change in status.    The evaluation of the patient, and recommended management, was discussed with Dr. Nova, Dr. Rowland, and bedside RN. I have performed a physical exam and reviewed and updated ROS and Plan today (4/21/2022). In review of yesterday's note (4/20/2022), there are no changes except as documented above.    Hussein Chavez, MSN [facility]  Nurse Practitioner  [facility]  (t) [phone]

## 2022-04-21 NOTE — ASSESSMENT & PLAN NOTE
Acute to subacute  Subcortical infarct, L centrum semiovale  Likely embolic  H/o a flutter, could not tolerate anticoagulation due to recurrent severe gi bleeds  Echo with no acute findings  Speech, PT and OT following  Plan to return to [facility] when medically stable

## 2022-04-21 NOTE — THERAPY
"Speech Language Pathology  Daily Treatment     Patient Name: Krishan Acevedo  Age:  72 y.o., Sex:  male  Medical Record #: [id number]  Today's Date: 4/21/2022     Precautions  Precautions: Fall Risk,Swallow Precautions ( See Comments)    Update:  MRI 4/20 PM : \"IMPRESSION:   1.  Tiny right temporal occipital cortical infarct and a couple punctate recent left centrum semiovale infarcts.  2.  Chronic microvascular ischemic type changes and a few tiny chronic lacunar infarcts.  3.  Generalized cerebral volume loss.\"    Assessment    Patient seen this PM upright in bed for repeat PO trials. New order received given new CVA identified on imaging, however no significant change in status reported by RN. Therefore addressed as treatment vs evaluation. Patient was presented with various PO consistencies (ice, water, puree, cracker) and tolerated all without s/sx of aspiration. As identified before, pt exhibits prolonged mastication and need for alternation of liquids/solids to clear effectively. Pt with adequate volitional cough/swallow. Pt presents appropriate to resume diet, Soft and bite sized with thin liquids. ST to continue per POC to ensure toleration. Consider cognitive-linguistic evaluation given acute/subacute CVA identified.     Recommendations:  1) Resume SOFT and BITE SIZED diet (SB6) with THIN Liquids (TN0) with adherence to the following:  -upright at 90*   -slow rate   -small bites/sips   - MAINTAIN ORAL CARE  2) Float meds in puree.   3) Set up meal tray and assist as needed to promote oral intake  4)  SLP following to check diet tolerance, suspect pt is at or close to baseline swallow function. Consider cog eval if indicated    Plan    Continue current treatment plan.    Discharge Recommendations: Anticipate that the patient will have no further speech therapy needs after discharge from the hospital    Subjective    Pt received resting in bed, alerted to name. Minimal verbal expression, pt required " "repetition/simplification of questions and multiple cues to participate. Pt uninterested in  PO other than water, requiring encouragement. Oriented to self/, did not answer additional questions. \"what's this about?\"     Objective     22 1420   Dysphagia    Dysphagia X   Positioning / Behavior Modification Self Monitoring;Alternate Solids and Liquids;Modulate Rate or Bite Size   Other Treatments Reassessment across consistencies due to new order placed   Diet / Liquid Recommendation Thin (0);Soft & Bite-Sized (6) - (Dysphagia III)   Nutritional Liquid Intake Rating Scale Non thickened beverages   Nutritional Food Intake Rating Scale Total oral diet with multiple consistencies but requiring special preparation or compensations   Skilled Intervention Compensatory Strategies;Verbal Cueing   Comments Pt with limited interest across PO trials, poor intake noted from meal tray at bedside   Recommended Route of Medication Administration   Medication Administration  Float Whole with Puree   Patient / Family Goals   Patient / Family Goal #1 \"No more\"   Goal #1 Outcome Progressing as expected   Short Term Goals   Short Term Goal # 1 B  Pt will consume a diet of SB/TN with no s/sx of aspiration and min cues.   Goal Outcome  # 1 B Progressing as expected     "

## 2022-04-21 NOTE — PROGRESS NOTES
Possible med discrepancy in [employer]. Intended to pull versed 4mg, which comes in two bottles, but did not pull out drawer fully and had to pull again another 4mg, total of 4 bottles of versed. Intended to return 2 bottles of versed but documented 3 bottles of versed as returned. [employer] would only allow a waste of 2mg, but 3 mg was wasted with Arely Teague RN. A total of 1 mg of Versed was used in LP procedure, see MAR.

## 2022-04-22 NOTE — THERAPY
"Physical Therapy   Daily Treatment     Patient Name: Krishan Acevedo  Age:  72 y.o., Sex:  male  Medical Record #: [id number]  Today's Date: 4/22/2022     Precautions  Precautions: Fall Risk;Swallow Precautions ( See Comments)    Assessment    Pt received in bed, agreeable to PT session. Required mod A for sup<>sit, initially SBA to maintain sitting. Pt able to perform a few LAQ at edge of bed, required max A with 2P for standing briefly to scoot up. Pt became fatigued quickly and required support for sitting, then mod A to return to bed. Will continue to follow. Recommending further rehab prior to return home.    Plan    Continue current treatment plan.    DC Equipment Recommendations: Unable to determine at this time  Discharge Recommendations: Recommend post-acute placement for additional physical therapy services prior to discharge home      Subjective    \"When is the ceremony for the kids?\"     Objective       04/22/22 1001   Precautions   Precautions Fall Risk;Swallow Precautions ( See Comments)   Pain 0 - 10 Group   Therapist Pain Assessment Post Activity Pain Same as Prior to Activity;Nurse Notified  (c/o pain in R wrist/hand, improved when not moving it)   Cognition    Ability To Follow Commands 1 Step   Comments Pleasant and cooperative. Somewhat disoriented to the situation and makes strange comments at times.   Sitting Lower Body Exercises   Comments LAQ 2x5 each LE in sitting EOB. Fatigued quickly and began requiring assist for balance.   Balance   Sitting Balance (Static) Fair   Sitting Balance (Dynamic) Fair -   Skilled Intervention Verbal Cuing;Tactile Cuing;Facilitation   Comments BUE support in sitting, began to lose balance with fatigue and required facilitation back to midline position. Unable to achieve full standing position with 2P assist.   Gait Analysis   Gait Level Of Assist Unable to Participate   Comments Unable to take steps in standing, returned to sitting.   Bed Mobility    Supine to " Sit Moderate Assist   Sit to Supine Moderate Assist   Comments Use of rails, able to get to sidelying and unable to bring trunk up without assist.   Functional Mobility   Sit to Stand Maximal Assist  (2P HHA)   Bed, Chair, Wheelchair Transfer Unable to Participate   Comments 2P HHA for sit>stand   How much difficulty does the patient currently have...   Turning over in bed (including adjusting bedclothes, sheets and blankets)? 1   Sitting down on and standing up from a chair with arms (e.g., wheelchair, bedside commode, etc.) 1   Moving from lying on back to sitting on the side of the bed? 1   How much help from another person does the patient currently need...   Moving to and from a bed to a chair (including a wheelchair)? 1   Need to walk in a hospital room? 1   Climbing 3-5 steps with a railing? 1   6 clicks Mobility Score 6   Activity Tolerance   Sitting in Chair unable to get there   Sitting Edge of Bed 15 min   Standing 30 sec   Short Term Goals    Short Term Goal # 1 pt will perform supine <> sit without bed features with SPV in 6 visits to get in/out of bed at home   Goal Outcome # 1 goal not met   Short Term Goal # 2 pt will perform STS with SPV in 6 visits for improved independence   Goal Outcome # 2 Goal not met   Short Term Goal # 3 pt will perform SPT with min A in 6 visits to sit in chair   Goal Outcome # 3 Goal not met   Short Term Goal # 4 pt will ambulate 50ft with FWW and min A in 6 visits to initiate gait   Goal Outcome # 4 Goal not met   Anticipated Discharge Equipment and Recommendations   DC Equipment Recommendations Unable to determine at this time   Discharge Recommendations Recommend post-acute placement for additional physical therapy services prior to discharge home   Interdisciplinary Plan of Care Collaboration   IDT Collaboration with  Nursing   Patient Position at End of Therapy In Bed;Call Light within Reach;Tray Table within Reach;Phone within Reach;Bed Alarm On   Collaboration  Comments KEE, recs   Session Information   Date / Session Number  4/22-3(3/3, 4/23)

## 2022-04-22 NOTE — CONSULTS
Physical Medicine and Rehabilitation Consultation              Date of initial consultation: 4/22/2022  Requesting provider: Keily Rowland MD   Consulting provider: Sharla Vanessa D.O.  Reason for consultation: assess for acute inpatient rehab appropriateness  LOS: 7 Day(s)    Chief complaint: Acute/subacute right temporal/occipital CVA     HPI: The patient is a 72 y.o. 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 coli bacteremia, history of visual impairment due to right retinal detachment, COVID infection in January 2022, CAD and obstructive sleep apnea;  who presented on 4/15/2022  9:43 AM with altered mental status.  Per documentation, patient was found in the bathroom at the eye doctor after positive loss of consciousness.  Patient was evaluated in the emergency department at [facility] and a CT head was obtained on 4/15 which showed no acute hemorrhage, no large territory infarct and no mass with evidence of a chronic left basal ganglia infarct.  Initial work-up included leukocytosis with WBC up to 12.5 and a creatinine of 2.11.  Patient was also found to have a UA consistent with UTI and a lactic acid of 3.0.  Original chest x-ray obtained on 4/15 showed unchanged bilateral patchy airspace compatible with pneumonia.  Patient was also found to have a stool sample positive for C. difficile and his UA was positive for Pseudomonas.  Patient is on vancomycin and Zosyn for C. Difficile, UTI and pneumonia.  Patient has continued to have persistent disorientation and neurology was consulted on 4/19/2022.  An MRI was obtained on 4/20 which revealed small acute/subacute right temporal/occipital infarcts as well as a left centrum semiovale infarct.  Images did not suggest any evidence of encephalitis.   "Etiology of patient's small acute bilateral CVA suspected to be cardioembolic due to history of atrial flutter off anticoagulation.  Lumbar puncture was also obtained due to patient's persistent encephalopathy and CNF revealed slightly elevated protein but was negative for evidence of meningitis or encephalitis.  JAVIER was also obtained which showed an EF of 60% with mild to moderate mitral valve regurgitation and moderate aortic insufficiency but no evidence of mural thrombus or vegetations.  Per neurology's recommendations patient is to be on secondary stroke prophylaxis with the statin and anticoagulation would be recommended from a neurologic perspective but risks benefits should be discussed and reviewed by the hospitalist due to patient's recent GI bleeds.  Functionally, patient has been willing to participate with therapy.  Unfortunately, patient's functional status has declined he is currently max assist x2 for sit to stand and he has been able to participate in functional gait.  Chart review indicates that [facility].  Prior to last admission in February 2022 patient was independent but has been living at SNF since March.    Patient seen and examined at bedside.  Patient reports he feels \"alright.\"   Patient is pleasantly confused.  He knows it is 2022.  When I ask him the month he says there is no name for this month.  When asked him where he is patient thinks that he is at rehab.  Patient does live at [facility].  Patient reports some blurry vision.  Denies shortness of breath, chest pain, abdominal pain.  Patient is unsure if he has had loose bowel movements.  Patient is only complaint is that he is stuck in the blankets.    Social Hx:  Chart review indicates the patient was previously living at home in a one-story house with one-step to enter with his spouse prior to February 2022.  Patient has been at [facility] since March 2022.  1 OHLLIE  At prior level " of function, patient was independent mobility and ADLs in 2022 however has had functional decline since 2022.      THERAPY:  Restrictions: Fall risk  PT: Functional mobility    PT note: Max assist x2 for sit to stand, unable to participate in functional transfers or gait.  Is able to perform bed mobility at a min assist level.    OT: ADLs   OT note: Total assist for lower body dressing, max assist for upper body dressing, max assist x2 for sit to stand    SLP:    SLP note: Currently on a dysphagia diet with soft and bite-size diet with thin liquids.    IMAGIN/20 MRI brain    IMPRESSION:     1.  Tiny right temporal occipital cortical infarct and a couple punctate recent left centrum semiovale infarcts.  2.  Chronic microvascular ischemic type changes and a few tiny chronic lacunar infarcts.  3.  Generalized cerebral volume loss.     echocardiogram  EF 60% no evidence of vegetations or mural thrombus    4/15 CT head  IMPRESSION:     1.  No evidence of acute hemorrhage, mass or large territorial infarction  2.  Remote LEFT basal ganglia lacunar infarction  3.  Mild atrophy  4.  White matter changes       PROCEDURES:  None    PMH:  Past Medical History:   Diagnosis Date   • Arrhythmia     flutter   • Arthritis     right shoulder, knees, & back   • Atrial flutter ([facility])    • CAD (coronary artery disease)    • Cough 2020    dry-on lisinipril, now productive -brown   • Dialysis patient ([facility]) 2006    x 18 months   • DVT (deep venous thrombosis) ([facility])    • E coli bacteremia 2014    hospitalized x 5 months   • Kidney failure 2008    kidney transplant   • Kidney transplant pain 2020    low back   • Retinal detachment    • Sleep apnea     CPAP   • Snoring    • Wegener's granulomatosis with renal involvement ([facility])        PSH:  Past Surgical History:   Procedure Laterality Date   • OR UPPER GI ENDOSCOPY,DIAGNOSIS N/A 3/17/2022    Procedure: GASTROSCOPY;  Surgeon: Roni HERNÁNDEZ  ANTHONY Campbell;  Location: SURGERY [facility];  Service: Gastroenterology   • LA UPPER GI ENDOSCOPY,CTRL BLEED  3/17/2022    Procedure: EGD, WITH CLIP PLACEMENT;  Surgeon: Roni Campbell M.D.;  Location: SURGERY [facility];  Service: Gastroenterology   • LA UPPER GI ENDOSCOPY,DIAGNOSIS  2022    Procedure: GASTROSCOPY;  Surgeon: Kim Goetz D.O.;  Location: SURGERY SAME DAY [facility];  Service: Gastroenterology   • LA UPPER GI ENDOSCOPY,DIAGNOSIS  2022    Procedure: GASTROSCOPY;  Surgeon: Raymond Pryor M.D.;  Location: SURGERY SAME DAY [facility];  Service: Gastroenterology   • OTHER Right 2017    eye surgery, lasix & cataracts, retinal detatchment   • PEG PLACEMENT  10/10/2014    Performed by Brijesh Orozco M.D. at [facility] ORS   • OTHER ORTHOPEDIC SURGERY Left     knee       FHX:  Family History   Problem Relation Age of Onset   • Stroke Brother 26        , ? cause   • Stroke Maternal Grandmother            • Clotting Disorder Neg Hx        Medications:  Current Facility-Administered Medications   Medication Dose   • potassium chloride SA (Kdur) tablet 60 mEq  60 mEq   • labetalol (NORMODYNE/TRANDATE) injection 10 mg  10 mg   • vancomycin 50 mg/mL oral soln 125 mg  125 mg   • piperacillin-tazobactam (ZOSYN) 4.5 g in  mL IVPB  4.5 g   • heparin injection 5,000 Units  5,000 Units   • atorvastatin (LIPITOR) tablet 10 mg  10 mg   • cinacalcet (SENSIPAR) tablet 30 mg  30 mg   • fluticasone (FLONASE) nasal spray 50 mcg  1 Spray   • mycophenolate (CELLCEPT) capsule 500 mg  500 mg   • omeprazole (PRILOSEC) capsule 40 mg  40 mg   • sodium bicarbonate tablet 650 mg  650 mg   • tacrolimus (PROGRAF) capsule 0.5 mg  0.5 mg   • tacrolimus (PROGRAF) capsule 1 mg  1 mg   • lactated ringers infusion     • ondansetron (ZOFRAN) syringe/vial injection 4 mg  4 mg   • ondansetron (ZOFRAN ODT) dispertab 4 mg  4 mg   • predniSONE (DELTASONE) tablet 60 mg  60 mg   • acetaminophen  "(Tylenol) tablet 650 mg  650 mg       Allergies:  Allergies   Allergen Reactions   • Nsaids Unspecified     Cannot take because of anti rejection meds.   • Triazolam Anxiety     All anti anxiety medications cause hallucinations        Physical Exam:  Vitals: /83   Pulse (!) 113   Temp 36.7 °C (98 °F) (Temporal)   Resp 18   Ht 1.854 m (6' 0.99\")   Wt 113 kg (249 lb 1.9 oz)   SpO2 97%   Gen: NAD lying comfortably in bed nursing at bedside  Head:  NC/AT  Eyes/ Nose/ Mouth: PERRLA, moist mucous membranes, closes right eye during most of visit.  Cardio:  good distal perfusion, warm extremities  Pulm: normal respiratory effort, no cyanosis, breathing comfortably on room air  Abd: Soft NTND, negative borborygmi   Ext: No peripheral edema. No calf tenderness. No clubbing. + Left hand swelling    Mental status: Is oriented to year says it is \"22.\"Reports the month has no name.  [facility]  Speech: fluent, no aphasia or dysarthria    CRANIAL NERVES:  2,3: Visual acuity grossly intact to right visual field.  Patient has difficulty naming objects or number of fingers in left visual field.  3,4,6: EOMI bilaterally, no nystagmus, + diplopia  5: sensation intact to light touch bilaterally and symmetric  7: no facial asymmetry  8: hearing grossly intact  9,10: symmetric palate elevation      Motor:      Upper Extremity  Myotome R L   Shoulder flexion C5 5/5 5/5   Elbow flexion C5 5/5 5/5   Wrist extension C6 5/5 5/5   Elbow extension C7 5/5 5/5   Finger flexion C8 5/5 5/5   Finger abduction T1 5/5 5/5     Lower Extremity Myotome R L   Hip flexion L2 5/5 5/5   Knee extension L3 5/5 5/5   Ankle dorsiflexion L4 5/5 5/5   Toe extension L5 5/5 5/5   Ankle plantarflexion S1 5/5 5/5     Sensory:   intact to light touch through out bilateral upper and lower extremities      DTRs: 2+ in bilateral  biceps,   No clonus at bilateral ankles  Negative babinski b/l  Negative Aguirre b/l     Tone: no spasticity " noted, no cogwheeling noted    Coordination:   + Positive dysmetric finger-to-nose in the right visual field with right hand, delayed motor planning with finger-to-nose left visual field with left hand  intact fine motor with fingers bilaterally      Labs: Reviewed and significant for   Recent Labs     04/20/22 0053 04/21/22 0054 04/22/22  0123   RBC 2.83* 2.91* 2.69*   HEMOGLOBIN 8.1* 8.5* 7.8*   HEMATOCRIT 26.3* 27.4* 25.1*   PLATELETCT 163* 157* 127*     Recent Labs     04/20/22 0053 04/21/22 0054 04/22/22  0123   SODIUM 140 144 139   POTASSIUM 3.2* 4.3 3.2*   CHLORIDE 105 111 106   CO2 24 25 22   GLUCOSE 105* 102* 93   BUN 19 17 16   CREATININE 1.37 1.31 1.02   CALCIUM 9.0 9.1 9.0     Recent Results (from the past 24 hour(s))   EC-ECHOCARDIOGRAM COMPLETE W/O CONT    Collection Time: 04/21/22  2:54 PM   Result Value Ref Range    Eject.Frac. MOD BP 61.6     Eject.Frac. MOD 4C 59.98     Eject.Frac. MOD 2C 65.34     Left Ventrical Ejection Fraction 60    Comp Metabolic Panel    Collection Time: 04/22/22  1:23 AM   Result Value Ref Range    Sodium 139 135 - 145 mmol/L    Potassium 3.2 (L) 3.6 - 5.5 mmol/L    Chloride 106 96 - 112 mmol/L    Co2 22 20 - 33 mmol/L    Anion Gap 11.0 7.0 - 16.0    Glucose 93 65 - 99 mg/dL    Bun 16 8 - 22 mg/dL    Creatinine 1.02 0.50 - 1.40 mg/dL    Calcium 9.0 8.5 - 10.5 mg/dL    AST(SGOT) 16 12 - 45 U/L    ALT(SGPT) 14 2 - 50 U/L    Alkaline Phosphatase 124 (H) 30 - 99 U/L    Total Bilirubin 0.4 0.1 - 1.5 mg/dL    Albumin 2.1 (L) 3.2 - 4.9 g/dL    Total Protein 4.9 (L) 6.0 - 8.2 g/dL    Globulin 2.8 1.9 - 3.5 g/dL    A-G Ratio 0.8 g/dL   CBC WITH DIFFERENTIAL    Collection Time: 04/22/22  1:23 AM   Result Value Ref Range    WBC 11.1 (H) 4.8 - 10.8 K/uL    RBC 2.69 (L) 4.70 - 6.10 M/uL    Hemoglobin 7.8 (L) 14.0 - 18.0 g/dL    Hematocrit 25.1 (L) 42.0 - 52.0 %    MCV 93.3 81.4 - 97.8 fL    MCH 29.0 27.0 - 33.0 pg    MCHC 31.1 (L) 33.7 - 35.3 g/dL    RDW 60.8 (H) 35.9 - 50.0 fL     Platelet Count 127 (L) 164 - 446 K/uL    MPV 9.5 9.0 - 12.9 fL    Neutrophils-Polys 82.50 (H) 44.00 - 72.00 %    Lymphocytes 12.80 (L) 22.00 - 41.00 %    Monocytes 3.60 0.00 - 13.40 %    Eosinophils 0.00 0.00 - 6.90 %    Basophils 0.10 0.00 - 1.80 %    Immature Granulocytes 1.00 (H) 0.00 - 0.90 %    Nucleated RBC 0.30 /100 WBC    Neutrophils (Absolute) 9.14 (H) 1.82 - 7.42 K/uL    Lymphs (Absolute) 1.42 1.00 - 4.80 K/uL    Monos (Absolute) 0.40 0.00 - 0.85 K/uL    Eos (Absolute) 0.00 0.00 - 0.51 K/uL    Baso (Absolute) 0.01 0.00 - 0.12 K/uL    Immature Granulocytes (abs) 0.11 0.00 - 0.11 K/uL    NRBC (Absolute) 0.03 K/uL   MAGNESIUM    Collection Time: 04/22/22  1:23 AM   Result Value Ref Range    Magnesium 2.1 1.5 - 2.5 mg/dL   Lipid Profile    Collection Time: 04/22/22  1:23 AM   Result Value Ref Range    Cholesterol,Tot 98 (L) 100 - 199 mg/dL    Triglycerides 124 0 - 149 mg/dL    HDL 35 (A) >=40 mg/dL    LDL 38 <100 mg/dL   ESTIMATED GFR    Collection Time: 04/22/22  1:23 AM   Result Value Ref Range    GFR (CKD-EPI) 77 >60 mL/min/1.73 m 2         ASSESSMENT:  Patient is a 72 y.o. male admitted with altered mental status and loss of consciousness now found to have subacute/acute septal lobe CVAs    C Code / Diagnosis to Support: 0001.1 - Stroke: Left Body Involvement (Right Brain)    Rehabilitation: Impaired ADLs and mobility  Patient is a poor candidate for inpatient rehab based on poor endurance, unable to tolerate 3 hours of therapy 5 days a week IRF level therapy.  Patient previously at SNF level prior to hospital admission.    Barriers to transfer include: Insurance authorization, TCCs to verify disposition, medical clearance and bed availability     Additional Recommendations:  Acute/subacute bilateral CVAs  -Subacute/acute right occipital lobe CVA  - Subacute/acute left centrum ovale infarct  -MRI with evidence of bilateral subacute/acute infarcts, etiology suspected to be secondary to patient's history  of atrial flutter  - Neurology consulted, secondary stroke prophylaxis with statin, not appropriate for anticoagulation at this time due to patient's history of significant GI bleeds  - Greatest impairments at this time appear to be impaired to perform sit to stand due to impaired balance; this correlates to patient's occipital lobe infarcts  - Continue with PT/OT primary focus should be to improve sitting tolerance and visual cues  -Patient with impaired visual field to the left, room is set up for activity on the left.  Discussed with nursing moving patient to room where activity in room will be in his right visual field.    History of atrial flutter  -Not currently anticoagulated due to history of GI bleeds    Metabolic encephalopathy  -Multiple factorial encephalopathy  - Suspected to be secondary to sepsis may also be exacerbated by occipital lobe  - May also be exasperated by prednisone use secondary to patient's recent prescription for decreased patients retinal tear  - Documentation reveals the patient should be on prednisone 60 mg forfor 7 days,  potential for improved encephalopathy once off prednisone, will discuss with hospitalist    History of kidney transplant secondary to Wegener's granulomatosis  -Patient is immune compromised  - Continues on home dose tacrolimus, prednisone, CellCept    C. Difficile  -Currently being treated with vancomycin     UTI  -Cultures positive for Pseudomonas  - Continues on Zosyn    Disposition  -Patient is currently functioning below his level of baseline, will need postacute rehab  - Patient was previously at [facility] after hospitalizations in February 2022  - Given patient's significantly the impaired endurance, anticipate return to SNF level therapy as patient is unlikely to tolerate IRF level therapy with years of therapy 5 days a week    Medical Complexity:  Encephalopathy  Acute CVA  C. difficile  UTI  History of duodenal ulcer  History of kidney  transplant  History of atrial flutter, not on anticoagulation  Impaired mobility and ADLs    DVT PPX: Heparin      Thank you for allowing us to participate in the care of this patient.     Patient was seen for 88 minutes on unit/floor of which > 50% of time was spent on counseling and coordination of care regarding the above, including prognosis, risk reduction, benefits of treatment, and options for next stage of care.    Sharla Vanessa D.O.   Physical Medicine and Rehabilitation     Please note that this dictation was created using voice recognition software. I have made every reasonable attempt to correct obvious errors, but there may be errors of grammar and possibly content that I did not discover before finalizing the note.

## 2022-04-22 NOTE — THERAPY
Occupational Therapy  Daily Treatment     Patient Name: Krishan Acevedo  Age:  72 y.o., Sex:  male  Medical Record #: [id number]  Today's Date: 4/22/2022     Precautions  Precautions: Fall Risk,Swallow Precautions ( See Comments)    Assessment    Pt currently limited by decreased functional mobility, activity tolerance, cognition, sensation, strength, AROM, coordination, balance, and pain which are affecting pt's ability to complete ADLs/IADLs at baseline. Pt would benefit from OT services in the acute care setting to maximize functional recovery.      Plan    Continue current treatment plan.       Discharge Recommendations: (P) Recommend post-acute placement for additional occupational therapy services prior to discharge home         04/22/22 1028   Activities of Daily Living   Grooming Moderate Assist   Upper Body Dressing Maximal Assist   Lower Body Dressing Total Assist   Toileting Total Assist   Functional Mobility   Sit to Stand Maximal Assist   Bed, Chair, Wheelchair Transfer Unable to Participate   Short Term Goals   Short Term Goal # 1 min A with UB dressing   Goal Outcome # 1 Progressing slower than expected   Short Term Goal # 2 mod A with LB dressing   Goal Outcome # 2 Progressing slower than expected   Short Term Goal # 3 mod A with ADL txfs   Goal Outcome # 3 Progressing slower than expected   Anticipated Discharge Equipment and Recommendations   Discharge Recommendations Recommend post-acute placement for additional occupational therapy services prior to discharge home

## 2022-04-22 NOTE — CARE PLAN
The patient is Watcher - Medium risk of patient condition declining or worsening    Shift Goals  Clinical Goals: Q4 neuro checks, control BM's  Patient Goals: sleep  Family Goals: DENA    Progress made toward(s) clinical / shift goals:    Problem: Self Care  Goal: Patient will have the ability to perform ADLs independently or with assistance (bathe, groom, dress, toilet and feed)  Outcome: Not Progressing       Patient is not progressing towards the following goals:      Problem: Self Care  Goal: Patient will have the ability to perform ADLs independently or with assistance (bathe, groom, dress, toilet and feed)  Outcome: Not Progressing

## 2022-04-22 NOTE — DISCHARGE PLANNING
Follow up for post acute services patient come from skilled nursing. Patient resides at [facility] care after prolonged greer with COVID Per chart review anticipate return to skilled nursing when medically cleared. Patient choice to return to skilled nursing. TCC no longer following

## 2022-04-22 NOTE — DISCHARGE PLANNING
Anticipated Discharge Disposition:   [facility] has accepted pt back.    Action:   Discussed with IDT. Pt had strokes and no anticoagulation at this time due to hx GI bleed.     Barriers to Discharge:  Medical clearance.     Plan:   Follow up with IDT and update [facility].

## 2022-04-22 NOTE — PROGRESS NOTES
Hospital Medicine Daily Progress Note    Date of Service  4/22/2022    Chief Complaint  Krishan Acevedo is a 72 y.o. male admitted 4/15/2022 with altered mental status    Hospital Course  72 y.o. male with history of ANCA + glomerulonephritis, remote history of kidney transplant, atrial fibrillation (not on anticoagulation due to GI bleed), duodenal ulcer, R eye retinal detachment, who presented 4/15/2022 by ambulance after being found down, altered in the bathroom of his eye doctor's office. Per chart review, he had been recently discharged on 4/12 after evaluation for eye pain. At that time, he was prescribed prednisone 60mg daily for 7 days and was instructed to follow up with eye doctor as outpatient. Patient went to his eye doctor's appointment and was found down in the bathroom.   Patient resides at [facility] after prolonged greer with COVID. Per chart review, patient's admitting mental status was very deviated from his baseline.   On admission, he was tachycardia with  and reported chills with shivering at bedside.   Here labs remarkable for wbc 12.5, Cr 2.11, lactic acid 2.7. UA pos UTI.    Patient was admitted for acute metabolic encephalopathy. On 4/15, a head CT showed no evidence of acute hemorrhage, mass or large territorial infarct but patient noted for remote left basilar ganglier lacunar infarct, mild atrophy, and white matter changes.  C. difficile positive and urinalysis with Pseudomonas of which he has had in the past.  He was started on vancomycin and Zosyn for the above.    Interval Problem Update  Mild improvement in confusion today, axox2, denies pain, vision L stable, no headache, no sob, stable on RA, afib 100's, no melena or hematochezia but h/h slowly decreasing. ROS otherwise negative    I have personally seen and examined the patient at bedside. I discussed the plan of care with case management, patient and bedside RN.    Consultants/Specialty  Neurology    Code Status  Full  Code    Disposition  Patient is not medically cleared for discharge.   Anticipate discharge TBD  I have placed the appropriate orders for post-discharge needs.    Review of Systems  Review of Systems   Unable to perform ROS: Mental status change   Constitutional: Negative.  Negative for chills, diaphoresis, fever, malaise/fatigue and weight loss.   HENT: Negative.  Negative for sore throat.    Eyes: Negative.  Negative for blurred vision.        Bilateral blindness   Respiratory: Negative.  Negative for cough and shortness of breath.    Cardiovascular: Negative.  Negative for chest pain, palpitations and leg swelling.   Gastrointestinal: Positive for diarrhea. Negative for abdominal pain, nausea and vomiting.   Genitourinary: Negative.  Negative for dysuria.   Musculoskeletal: Negative.  Negative for myalgias.   Skin: Negative.  Negative for itching and rash.   Neurological: Positive for weakness. Negative for dizziness, focal weakness and headaches.   Endo/Heme/Allergies: Negative.  Does not bruise/bleed easily.   Psychiatric/Behavioral: Positive for memory loss. Negative for depression, substance abuse and suicidal ideas.   All other systems reviewed and are negative.     Physical Exam  Temp:  [36.2 °C (97.2 °F)-36.9 °C (98.4 °F)] 36.3 °C (97.3 °F)  Pulse:  [] 106  Resp:  [16-20] 20  BP: (142-158)/(78-93) 156/93  SpO2:  [92 %-98 %] 97 %    Physical Exam  Constitutional:       General: He is not in acute distress.     Appearance: Normal appearance.   HENT:      Head: Normocephalic and atraumatic.      Nose: Nose normal. No congestion.      Mouth/Throat:      Mouth: Mucous membranes are moist.   Eyes:      Extraocular Movements: Extraocular movements intact.      Pupils: Pupils are equal, round, and reactive to light.   Cardiovascular:      Rate and Rhythm: Normal rate and regular rhythm.      Pulses: Normal pulses.      Heart sounds: Normal heart sounds.   Pulmonary:      Effort: Pulmonary effort is normal.       Breath sounds: Normal breath sounds.   Abdominal:      General: Bowel sounds are normal.      Palpations: Abdomen is soft.      Tenderness: There is no abdominal tenderness.   Musculoskeletal:         General: No swelling. Normal range of motion.      Cervical back: Normal range of motion and neck supple.   Skin:     General: Skin is warm.      Coloration: Skin is not jaundiced.   Neurological:      General: No focal deficit present.      Mental Status: He is alert and oriented to person, place, and time. Mental status is at baseline.      Cranial Nerves: No cranial nerve deficit.   Psychiatric:         Mood and Affect: Affect normal.         Speech: Speech is delayed and tangential.         Behavior: Behavior is cooperative.         Thought Content: Thought content is not paranoid.         Cognition and Memory: Cognition is impaired. Memory is impaired. He exhibits impaired recent memory and impaired remote memory.         Fluids  No intake or output data in the 24 hours ending 04/22/22 1520    Laboratory  Recent Labs     04/20/22  0053 04/21/22  0054 04/22/22  0123   WBC 10.0 11.4* 11.1*   RBC 2.83* 2.91* 2.69*   HEMOGLOBIN 8.1* 8.5* 7.8*   HEMATOCRIT 26.3* 27.4* 25.1*   MCV 92.9 94.2 93.3   MCH 28.6 29.2 29.0   MCHC 30.8* 31.0* 31.1*   RDW 61.0* 63.0* 60.8*   PLATELETCT 163* 157* 127*   MPV 9.9 9.4 9.5     Recent Labs     04/20/22  0053 04/21/22  0054 04/22/22  0123   SODIUM 140 144 139   POTASSIUM 3.2* 4.3 3.2*   CHLORIDE 105 111 106   CO2 24 25 22   GLUCOSE 105* 102* 93   BUN 19 17 16   CREATININE 1.37 1.31 1.02   CALCIUM 9.0 9.1 9.0             Recent Labs     04/22/22  0123   TRIGLYCERIDE 124   HDL 35*   LDL 38       Imaging  EC-ECHOCARDIOGRAM COMPLETE W/O CONT   Final Result      MR-BRAIN-W/O   Final Result      1.  Tiny right temporal occipital cortical infarct and a couple punctate recent left centrum semiovale infarcts.   2.  Chronic microvascular ischemic type changes and a few tiny chronic lacunar  infarcts.   3.  Generalized cerebral volume loss.      UZ-EXEUAXN-3 VIEW   Final Result      No acute findings.      US-RENAL   Final Result      1.  Normal appearance of the right lower quadrant renal transplant with no hydronephrosis.   2.  Small echogenic native left kidney consistent with renal disease.   3.  Nonvisualization of the native right kidney and bladder.      DX-CHEST-PORTABLE (1 VIEW)   Final Result      Unchanged BILATERAL patchy airspace disease compatible with pneumonia. Covid pneumonia is a possibility.      CT-HEAD W/O   Final Result      1.  No evidence of acute hemorrhage, mass or large territorial infarction   2.  Remote LEFT basal ganglia lacunar infarction   3.  Mild atrophy   4.  White matter changes              Assessment/Plan  * Acute metabolic encephalopathy- (present on admission)  Assessment & Plan  Likely multifactorial toxic metabolic encephalopathy due to sepsis, LP no significant findings. Acute to subacute stroke on MRI  Neurology following  Head CT unremarkable for acute changes  Continue antibiotics for C. difficile and Pseudomonas in urine  Frequent reorientation   Serial neuro exams  Avoid sedatives  Following  Continue supportive care    Stroke (HCC)  Assessment & Plan  Acute to subacute  Subcortical infarct, L centrum semiovale  Likely embolic  H/o a flutter, could not tolerate anticoagulation due to recurrent severe gi bleeds  Permissive htn  Echo  Tele  Following  Speech, PT and OT to eval    Hypokalemia- (present on admission)  Assessment & Plan  Due to diarrhea  Replacing  following    Kidney transplant status, living related donor- (present on admission)  Assessment & Plan  Remote history of Kidney transplant   Cont Cellcept and tacrolimus    Hypomagnesemia  Assessment & Plan  Severely low   Replacing  following    Clostridium difficile infection  Assessment & Plan  C. difficile positive  Continue oral vancomycin  Following  Euvolemic on exam    UTI (urinary tract  infection)- (present on admission)  Assessment & Plan  Urine cultures growing Pseudomonas  Continue Zosyn antibiotic regimen    Sepsis (HCC)- (present on admission)  Assessment & Plan  This is Severe Sepsis Present on admission  SIRS criteria identified on my evaluation include: Tachycardia, with heart rate greater than 90 BPM and Leukocytosis, with WBC greater than 12,000  Clinical indicators of end organ dysfunction include Creatinine >2.0 (Without ESRD or CKD)  Source is urinary  Sepsis protocol initiated  Crystalloid Fluid Administration: Fluid resuscitation ordered per standard protocol - 30 mL/kg per current or ideal body weight  IV antibiotics as appropriate for source of sepsis  Reassessment: I have reassessed the patient's hemodynamic status  resolving    Acute left eye pain- (present on admission)  Assessment & Plan  Reportedly recently seen by ophthalmology Dr. Addison and started on prednisone for optic nerve inflammation, suspect optic neuritis  Currently remains on steroids  Neurology following  Continue supportive care  Pain resolved  Continue steroids  Vision improving    Leukocytosis- (present on admission)  Assessment & Plan  Resolved but now mild, suspect due to steroids  following    Duodenal ulcer- (present on admission)  Assessment & Plan  Hx of, will cont PPI    Immunocompromised (HCC)- (present on admission)  Assessment & Plan  Kidney transplant patient, will cont home immunosuppressants    Thrombocytopenia (HCC)- (present on admission)  Assessment & Plan  Acute on chronic  Mild  Decreasing slowly    Acute-on-chronic kidney injury (HCC)- (present on admission)  Assessment & Plan  Baseline Cr is around 1.5. Cr 2.1 on admission, likely sec to sepsis and UTI  improved  Renal ultrasound without hydronephrosis  IVF, avoid nephrotoxins and renal dosing meds  In stable range       VTE prophylaxis: heparin ppx    I have performed a physical exam and reviewed and updated ROS and Plan today  (4/22/2022). In review of yesterday's note (4/21/2022), there are no changes except as documented above.

## 2022-04-23 NOTE — CARE PLAN
The patient is Watcher - Medium risk of patient condition declining or worsening    Shift Goals  Clinical Goals: q4 neuro checks  Patient Goals: rest  Family Goals: DENA    Progress made toward(s) clinical / shift goals:    Problem: Psychosocial  Goal: Patient's level of anxiety will decrease  Outcome: Progressing     Problem: Communication  Goal: The ability to communicate needs accurately and effectively will improve  Outcome: Progressing     Problem: Hemodynamics  Goal: Patient's hemodynamics, fluid balance and neurologic status will be stable or improve  Outcome: Progressing     Problem: Mobility  Goal: Patient's capacity to carry out activities will improve  Outcome: Progressing     Problem: Self Care  Goal: Patient will have the ability to perform ADLs independently or with assistance (bathe, groom, dress, toilet and feed)  Outcome: Progressing     Problem: Mobility - Stroke  Goal: Patient's capacity to carry out activities will improve  Outcome: Progressing       Patient is not progressing towards the following goals:

## 2022-04-23 NOTE — CONSULTS
Consults   INFECTIOUS DISEASES INPATIENT CONSULT NOTE     Date of Service: 4/23/2022    Consult Requested By: Keily Rowland M.D.    Reason for Consultation: Encephalopathy, C. difficile, positive urine culture     History of Present Illness:   Krishan Acevedo is a 72 y.o.  admitted 4/15/2022. 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  He was admitted in 2/22 secondary to fatigue following COVID-19 as well as encephalopathy.  Urine cultures at the time grew CRE Pseudomonas and coronary ossifications on film either new pneumonia or residual COVID.  ID signed the time and recommended a 7-day antibiotic course.  He continues to reside in a skilled facility after never fully recovering from his COVID infection in April he was evaluated for eye pain and prescribed a steroid burst with prednisone 60 mg daily for 7 days.  He was admitted after being found down in the bathroom at his eye doctor's office.    Hospital Course:   He has had no fevers and vitals relatively unremarkable.  No leukocytosis on arrival but has now increased.  Acute kidney injury on arrival and has been improving.    Review Of Systems:  Review of Systems   Unable to perform ROS: Mental status change         PMH:   Past Medical History:   Diagnosis Date   • Arrhythmia 2021    flutter   • Arthritis 2020    right shoulder, knees, & back   • Atrial flutter ([facility])    • CAD (coronary artery disease) 2003   • Cough 2020    dry-on lisinipril, now productive -brown   • Dialysis patient ([facility]) 2006    x 18 months   • DVT (deep venous thrombosis) ([facility])    • E coli bacteremia 2014    hospitalized x 5 months   • Kidney failure 03/2008    kidney transplant   • Kidney transplant pain 2020    low back   • Retinal detachment    • Sleep apnea     CPAP   • Snoring    • Wegener's granulomatosis with renal  involvement (HCC)        PSH:  Past Surgical History:   Procedure Laterality Date   • IL UPPER GI ENDOSCOPY,DIAGNOSIS N/A 3/17/2022    Procedure: GASTROSCOPY;  Surgeon: Roni Campbell M.D.;  Location: [facility];  Service: Gastroenterology   • IL UPPER GI ENDOSCOPY,CTRL BLEED  3/17/2022    Procedure: EGD, WITH CLIP PLACEMENT;  Surgeon: Roni Campbell M.D.;  Location: SURGERY [facility];  Service: Gastroenterology   • IL UPPER GI ENDOSCOPY,DIAGNOSIS  2022    Procedure: GASTROSCOPY;  Surgeon: Kim Goetz D.O.;  Location: SURGERY SAME DAY [facility];  Service: Gastroenterology   • IL UPPER GI ENDOSCOPY,DIAGNOSIS  2022    Procedure: GASTROSCOPY;  Surgeon: Raymond Pryor M.D.;  Location: SURGERY SAME DAY [facility];  Service: Gastroenterology   • OTHER Right 2017    eye surgery, lasix & cataracts, retinal detatchment   • PEG PLACEMENT  10/10/2014    Performed by Brijesh Orozco M.D. at [facility] ORS   • OTHER ORTHOPEDIC SURGERY Left     knee       FAMILY HX:  Family History   Problem Relation Age of Onset   • Stroke Brother 26        , ? cause   • Stroke Maternal Grandmother            • Clotting Disorder Neg Hx        SOCIAL HX:  Social History     Socioeconomic History   • Marital status:      Spouse name: Not on file   • Number of children: Not on file   • Years of education: Not on file   • Highest education level: Not on file   Occupational History   • Not on file   Tobacco Use   • Smoking status: Never Smoker   • Smokeless tobacco: Former User     Types: Snuff, Chew   Vaping Use   • Vaping Use: Never used   Substance and Sexual Activity   • Alcohol use: Not Currently   • Drug use: Never   • Sexual activity: Not on file   Other Topics Concern   • Not on file   Social History Narrative   • Not on file     Social Determinants of Health     Financial Resource Strain: Not on file   Food Insecurity: Not on file   Transportation Needs: Not on file    Physical Activity: Not on file   Stress: Not on file   Social Connections: Not on file   Intimate Partner Violence: Not on file   Housing Stability: Not on file     Social History     Tobacco Use   Smoking Status Never Smoker   Smokeless Tobacco Former User   • Types: Snuff, Chew     Social History     Substance and Sexual Activity   Alcohol Use Not Currently       Allergies/Intolerances:  Allergies   Allergen Reactions   • Nsaids Unspecified     Cannot take because of anti rejection meds.   • Triazolam Anxiety     All anti anxiety medications cause hallucinations        History reviewed from chart    Other Current Medications:    Current Facility-Administered Medications:   •  labetalol (NORMODYNE/TRANDATE) injection 10 mg, 10 mg, Intravenous, Q4HRS PRN, Keily Rowland M.D.  •  vancomycin 50 mg/mL oral soln 125 mg, 125 mg, Oral, Q6HRS, Celso Madrid M.D., 125 mg at 04/23/22 0441  •  [COMPLETED] piperacillin-tazobactam (ZOSYN) 3.375 g in  mL IVPB, 3.375 g, Intravenous, Once, Stopped at 04/17/22 1210 **AND** piperacillin-tazobactam (ZOSYN) 4.5 g in  mL IVPB, 4.5 g, Intravenous, Q8HRS, Celso Madrid M.D., Last Rate: 25 mL/hr at 04/23/22 0632, 4.5 g at 04/23/22 0632  •  heparin injection 5,000 Units, 5,000 Units, Subcutaneous, Q8HRS, Celso Madrid M.D., 5,000 Units at 04/23/22 0440  •  atorvastatin (LIPITOR) tablet 10 mg, 10 mg, Oral, Q EVENING, Niko London M.D., 10 mg at 04/22/22 1650  •  cinacalcet (SENSIPAR) tablet 30 mg, 30 mg, Oral, MO + FR, Niko London M.D., 30 mg at 04/22/22 0515  •  fluticasone (FLONASE) nasal spray 50 mcg, 1 Spray, Nasal, DAILY, Niko London M.D., 50 mcg at 04/23/22 0440  •  mycophenolate (CELLCEPT) capsule 500 mg, 500 mg, Oral, BID, Niko London M.D., 500 mg at 04/23/22 0440  •  omeprazole (PRILOSEC) capsule 40 mg, 40 mg, Oral, BID, Niko London M.D., 40 mg at 04/23/22 0440  •  sodium bicarbonate tablet 650 mg, 650 mg, Oral, TID, Niko  "ANTHONY London, 650 mg at 22  •  tacrolimus (PROGRAF) capsule 0.5 mg, 0.5 mg, Oral, Q EVENING, Niko London M.D., 0.5 mg at 22 1650  •  tacrolimus (PROGRAF) capsule 1 mg, 1 mg, Oral, QAM, Niko London M.D., 1 mg at 22 0440  •  lactated ringers infusion, , Intravenous, Continuous, Keily Rowland M.D., Last Rate: 50 mL/hr at 22 0804, Rate Change at 22 0804  •  ondansetron (ZOFRAN) syringe/vial injection 4 mg, 4 mg, Intravenous, Q4HRS PRN, Niko London M.D.  •  ondansetron (ZOFRAN ODT) dispertab 4 mg, 4 mg, Oral, Q4HRS PRN, Niko London M.D.  •  predniSONE (DELTASONE) tablet 60 mg, 60 mg, Oral, DAILY, Niko London M.D., 60 mg at 22 044  •  acetaminophen (Tylenol) tablet 650 mg, 650 mg, Oral, Q4HRS PRN, Niko London M.D., 650 mg at 22 0520  [unfilled]    Most Recent Vital Signs:  /81   Pulse (!) 123 Comment: Informed RN  Temp 36.8 °C (98.2 °F) (Temporal)   Resp 19   Ht 1.854 m (6' 0.99\")   Wt 113 kg (249 lb 1.9 oz)   SpO2 96%   BMI 32.87 kg/m²   Temp  Av.6 °C (97.9 °F)  Min: 35.9 °C (96.6 °F)  Max: 37.9 °C (100.2 °F)    Physical Exam:  Physical Exam  Constitutional:       Appearance: Normal appearance.   HENT:      Head: Normocephalic and atraumatic.      Right Ear: External ear normal.      Left Ear: External ear normal.      Mouth/Throat:      Mouth: Mucous membranes are dry.      Pharynx: Oropharynx is clear.   Eyes:      Extraocular Movements: Extraocular movements intact.      Conjunctiva/sclera: Conjunctivae normal.      Pupils: Pupils are equal, round, and reactive to light.   Cardiovascular:      Rate and Rhythm: Normal rate and regular rhythm.      Heart sounds: Normal heart sounds.   Pulmonary:      Effort: Pulmonary effort is normal.      Breath sounds: Normal breath sounds.   Abdominal:      General: Abdomen is flat. Bowel sounds are normal.      Palpations: Abdomen is soft.   Musculoskeletal:         General: Normal range of motion. " "     Right lower leg: No edema.      Left lower leg: No edema.   Skin:     General: Skin is warm and dry.   Neurological:      Comments: Somnolent, oriented x1   Psychiatric:         Behavior: Behavior normal.             Pertinent Lab Results:  Recent Labs     04/21/22  0054 04/22/22  0123 04/23/22  0718   WBC 11.4* 11.1* 15.6*      Recent Labs     04/21/22  0054 04/22/22  0123 04/23/22  0718   HEMOGLOBIN 8.5* 7.8* 8.9*   HEMATOCRIT 27.4* 25.1* 28.8*   MCV 94.2 93.3 94.4   MCH 29.2 29.0 29.2   PLATELETCT 157* 127* 146*         Recent Labs     04/21/22 0054 04/22/22  0123   SODIUM 144 139   POTASSIUM 4.3 3.2*   CHLORIDE 111 106   CO2 25 22   CREATININE 1.31 1.02        Recent Labs     04/21/22 0054 04/22/22  0123   ALBUMIN 2.4* 2.1*        Pertinent Micro:  Results     Procedure Component Value Units Date/Time    CSF CULTURE [[id number]] Collected: 04/20/22 1530    Order Status: Completed Specimen: CSF from Tap Updated: 04/22/22 1138     Significant Indicator NEG     Source CSF     Site TAP     Culture Result No growth at 48 hours.     Gram Stain Result No organisms seen.    Narrative:      Special Contact Isolation  Collected By: [id number] ЕКАТЕРИНА HERNÁNDEZ  Special Contact Isolation    GRAM STAIN [[id number]] Collected: 04/20/22 1530    Order Status: Completed Specimen: CSF Updated: 04/20/22 1636     Significant Indicator .     Source CSF     Site TAP     Gram Stain Result No organisms seen.    Narrative:      Special Contact Isolation  Collected By: [id number] ЕКАТЕРИНА HERNÁNDEZ  Special Contact Isolation    BLOOD CULTURE [[id number]] Collected: 04/15/22 1000    Order Status: Completed Specimen: Blood from Peripheral Updated: 04/20/22 1100     Significant Indicator NEG     Source BLD     Site PERIPHERAL     Culture Result No growth after 5 days of incubation.    Narrative:      Per Hospital Policy: Only change Specimen Src: to \"Line\" if  specified by physician order.  Left AC    BLOOD CULTURE [[id number]] Collected: 04/15/22 1009 " "   Order Status: Completed Specimen: Blood from Peripheral Updated: 04/20/22 1100     Significant Indicator NEG     Source BLD     Site PERIPHERAL     Culture Result No growth after 5 days of incubation.    Narrative:      Per Hospital Policy: Only change Specimen Src: to \"Line\" if  specified by physician order.  Right Forearm/Arm    URINE CULTURE(NEW) [[id number]]  (Abnormal)  (Susceptibility) Collected: 04/15/22 1200    Order Status: Completed Specimen: Urine, Clean Catch Updated: 04/18/22 0946     Significant Indicator POS     Source UR     Site URINE, CLEAN CATCH     Culture Result -      Pseudomonas aeruginosa  >100,000 cfu/mL      Narrative:      Indication for culture:->Evaluation for sepsis without a  clear source of infection  Indication for culture:->Evaluation for sepsis without a    Susceptibility     Pseudomonas aeruginosa (1)     Antibiotic Interpretation Microscan   Method Status    Ciprofloxacin Resistant >2 mcg/mL MATHEW Final    Cefepime Intermediate 16 mcg/mL MATHEW Final    Amikacin Sensitive <=16 mcg/mL MATHEW Final    Gentamicin Sensitive <=2 mcg/mL MATHEW Final    Tobramycin Sensitive <=2 mcg/mL MATHEW Final    Levofloxacin Resistant >4 mcg/mL MATHEW Final    Meropenem Resistant >8 mcg/mL MATHEW Final    Pip/Tazobactam Sensitive 32 mcg/mL MATHEW Final                   C Diff Toxin [[id number]]  (Abnormal) Collected: 04/16/22 0000    Order Status: Completed Updated: 04/16/22 1639     C.Diff Toxin A&B Positive     Comment: TOXIN POSITIVE  Toxin detected by EIA; C. difficile detected by PCR.  If clinically correlated, treatment indicated per guidelines.  Test of cure is not recommended.         Narrative:      [facility] tel. [phone] 04/16/2022, 16:38, RB PERF. RESULTS CALLED TO: RN: [id number]  Special Contact Isolation  Collected By: [id number] BEATA SANCHEZ  Does this patient have risk factors for C-diff?->Yes  Has patient taken stool softeners or laxatives in the last 5  days?->No    C Diff by PCR rflx Toxin [[id number]] Collected: " 04/16/22 0000    Order Status: Completed Specimen: Stool Updated: 04/16/22 1636     C Diff by PCR See Toxin     027-NAP1-BI Presumptive See Toxin     Comment: Presumptive 027/NAP1/BI target DNA sequences are DETECTED.       Narrative:      Special Contact Isolation  Collected By: [id number] BEATA SANCHEZ  Does this patient have risk factors for C-diff?->Yes  Has patient taken stool softeners or laxatives in the last 5  days?->No        Blood Culture   Date Value Ref Range Status   05/04/2018 No growth after 5 days of incubation.  Final        Studies:  CT-HEAD W/O    Result Date: 4/15/2022  4/15/2022 10:34 AM HISTORY/REASON FOR EXAM:  Altered mental status. TECHNIQUE/EXAM DESCRIPTION AND NUMBER OF VIEWS: CT of the head without contrast. The study was performed on a [employer] CT scanner. Contiguous 5 mm axial sections were obtained from the skull base through the vertex. Up to date radiation dose reduction adjustments have been utilized to meet ALARA standards for radiation dose reduction. COMPARISON:  3/2/2022 FINDINGS: The calvariae and skull base are unremarkable. Focal hypodensities redemonstrated in the LEFT lentiform nucleus. There are no extraaxial fluid collections. There is a pattern of cerebral atrophy manifest as enlargement of sulcal markings and ventricular prominence.  The ventricular system and basal cisterns are otherwise unremarkable. There are areas of hypodensity in the white matter most consistent with small vessel ischemic change versus demyelination or gliosis. There are no hemorrhagic lesions. There are no mass effects or shift of midline structures. The brainstem and posterior fossa structures are unremarkable. The paranasal sinuses and mastoids in the field of view are unremarkable.     1.  No evidence of acute hemorrhage, mass or large territorial infarction 2.  Remote LEFT basal ganglia lacunar infarction 3.  Mild atrophy 4.  White matter changes     [id number] VIEW    Result Date:  4/16/2022 4/16/2022 4:40 PM HISTORY/REASON FOR EXAM:  Abdominal Pain. TECHNIQUE/EXAM DESCRIPTION AND NUMBER OF VIEWS:  1 view(s) of the abdomen. COMPARISON: Ultrasound from the same day FINDINGS: AP view of the abdomen demonstrates a nonobstructive bowel gas pattern. There are scattered loops of air-filled bowel. No pneumatosis or portal venous air is identified. Surgical clips project over the pelvis and right upper quadrant. Degenerative changes are in the spine.     No acute findings.    DX-CHEST-PORTABLE (1 VIEW)    Result Date: 4/15/2022  4/15/2022 10:14 AM HISTORY/REASON FOR EXAM:  possible sepsis. Altered level of consciousness TECHNIQUE/EXAM DESCRIPTION AND NUMBER OF VIEWS: Single portable view of the chest. COMPARISON: 2/17/2022 FINDINGS: HEART: Stable size. LUNGS: Patchy opacities in both lungs are not demonstrably changed. Lung volumes are low. PLEURA: No effusion or pneumothorax.     Unchanged BILATERAL patchy airspace disease compatible with pneumonia. Covid pneumonia is a possibility.    MR-BRAIN-W/O    Result Date: 4/21/2022 4/20/2022 6:30 PM HISTORY/REASON FOR EXAM:  Mental status change, unknown cause. TECHNIQUE/EXAM DESCRIPTION: MRI of the brain without contrast. T1 sagittal, T2 fast spin-echo axial, T1 coronal, FLAIR coronal, diffusion-weighted and apparent diffusion coefficient (ADC map) axial images were obtained of the whole brain. The study was performed on a [employer] 1.5 Tasha MRI scanner. COMPARISON:  Head CT 4/15/2022. Brain MRI 2/14/2022 FINDINGS: There are a few scattered tiny acute/subacute lacunar infarcts. There is a tiny cortical infarct in the posterior right temporal occipital region on series 5 image 19. A couple punctate left centrum semiovale lacunar infarcts in the left frontal white matter. Given that these are seen bilaterally with a could possibly be embolic in etiology. Mild generalized volume loss.  Confluent and scattered T2 hyperintensities in the periventricular and  subcortical cerebral white matter are nonspecific, most likely representing chronic microvascular ischemic changes. Chronic tiny lacunar infarct in the left anterior thalamus and a couple tiny chronic right centrum semiovale infarcts. No acute intracranial hemorrhage, extra-axial fluid collection, mass effect, midline shift or hydrocephalus. Proximal vascular flow voids are patent. There is mucosal thickening in the left ethmoid sinus. Nonspecific mild mastoid effusions.  Bone marrow signal is normal. Both globes demonstrate prior lens surgery.     1.  Tiny right temporal occipital cortical infarct and a couple punctate recent left centrum semiovale infarcts. 2.  Chronic microvascular ischemic type changes and a few tiny chronic lacunar infarcts. 3.  Generalized cerebral volume loss.    US-RENAL    Result Date: 4/16/2022 4/16/2022 3:36 PM HISTORY/REASON FOR EXAM:  Abnormal Labs History of renal transplant. TECHNIQUE/EXAM DESCRIPTION: Renal ultrasound. COMPARISON:  3/18/2022 FINDINGS: The native right kidney is not visualized. The native left kidney measures 7.75 cm and appears echogenic with a probable 11 mm cyst. There is a right lower quadrant renal transplant measuring 10.5 cm in diameter with preserved cortical medullary differentiation. There is no hydronephrosis. The bladder is not adequately evaluated due to patient combativeness. Exam overall limited due to patient combativeness.     1.  Normal appearance of the right lower quadrant renal transplant with no hydronephrosis. 2.  Small echogenic native left kidney consistent with renal disease. 3.  Nonvisualization of the native right kidney and bladder.    EC-ECHOCARDIOGRAM COMPLETE W/O CONT    Result Date: 4/21/2022  Transthoracic Echo Report Echocardiography Laboratory CONCLUSIONS Normal left ventricular systolic function. Mild to moderate eccentric mitral regurgitation. At least moderate aortic insufficiency. The left atrium is normal in size. MOMO ESCUDERO  Exam Date:         2022                    13:05 Exam Location:     Inpatient Priority:          Routine Ordering Physician:        COREY LATIF Referring Physician:       [id number] Nichols Sonographer:               Elton Ortega RDCS,                            RVT Age:    72     Gender:    M MRN:    [id number] :    [id number] BSA:    2.34   Ht (in):    72     Wt (lb):    249 Exam Type:     Complete Indications:     CVA ICD Codes:       436 CPT Codes:       34447 BP:   122    /   46     HR:   104 Technical Quality:       Technically difficult study -                          adequate information is obtained MEASUREMENTS  (Male / Female) Normal Values 2D ECHO LV Diastolic Diameter PLAX        5.3 cm                4.2 - 5.9 / 3.9 - 5.3 cm LV Systolic Diameter PLAX         3.9 cm                2.1 - 4.0 cm IVS Diastolic Thickness           1.2 cm                LVPW Diastolic Thickness          1.1 cm                LVOT Diameter                     2.6 cm                Estimated LV Ejection Fraction    60 %                  LV Ejection Fraction MOD BP       61.6 %                >= 55  % LV Ejection Fraction MOD 4C       60 %                  LV Ejection Fraction MOD 2C       65.3 %                DOPPLER AV Peak Velocity                  1.9 m/s               AV Peak Gradient                  14.4 mmHg             AV Mean Gradient                  9.4 mmHg              AI Pressure Half Time             269 ms                LVOT Peak Velocity                0.94 m/s              AV Area Cont Eq vti               3.1 cm2               MR ERO PISA                       0.07 cm2              MR Regurgitant Volume PISA        13.2 cm3              TR Peak Velocity                  209 cm/s              * Indicates values subject to auto-interpretation LV EF:  60    % FINDINGS Left Ventricle The left ventricle is normal in size and thickness.  Normal left ventricular systolic function. The left ventricular  ejection fraction is visually estimated to be 60%. Normal regional wall motion. Diastolic function is difficult to assess with arrhythmia. Right Ventricle The right ventricle is normal in size and systolic function. Right Atrium The right atrium is normal in size. The inferior vena cava is not well visualized. Left Atrium The left atrium is normal in size. Left atrial volume index is 29 mL/sq m. Mitral Valve Structurally normal mitral valve without significant stenosis. Mild to moderate eccentric mitral regurgitation. Aortic Valve Aortic valve sclerosis without significant stenosis. At least moderate aortic insufficiency. Tricuspid Valve Structurally normal tricuspid valve without significant stenosis. Mild tricuspid regurgitation. Estimated right ventricular systolic pressure is greater than 25 mmHg. Pulmonic Valve Structurally normal pulmonic valve without significant stenosis. Mild pulmonic insufficiency. Pericardium Normal pericardium without effusion. Aorta Normal aortic root for body surface area. The ascending aorta diameter is 3.2 cm. Easton Bundy MD (Electronically Signed) Final Date:     21 April 2022                 15:30      ASSESSMENT/PLAN:     72 y.o.  admitted 4/15/2022. 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 Admitted in 2/22 secondary to fatigue following COVID-19 as well as encephalopathy.  Urine cultures at the time grew CRE Pseudomonas and coronary ossifications on film either new pneumonia or residual COVID.  ID signed the time and recommended a 7-day antibiotic course.  He continues to reside in a skilled facility after never fully recovering from his COVID infection in April he was evaluated for eye pain and prescribed a steroid burst with prednisone 60 mg daily for 7 days.  He was admitted after being found down in the  bathroom at his eye doctor's office.    Hospital Course:   He has had no fevers and vitals relatively unremarkable.  No leukocytosis on arrival but has now increased.  Acute kidney injury on arrival and has been improving.    Problem List     Leukocytosis, increasing, patient has been on high-dose steroids since admit  Thrombocytopenia  Acute encephalopathy, thought to be multifactorial with toxic encephalopathy, acute to subacute stroke on MRI and had been on high-dose steroids  -LP on 4/19 with WBC of 1, glucose of 68 although did have high protein at 92 - negative meningeoencephalitis panel and negative cultures.  This is in the setting of acute to subacute stroke.  -Neurology following and noninfectious work-up initiated  Positive urine culture  -Urine culture on 4/15 +PSAR   C. difficile infection  -Toxin positive on 4/16  Optic neuritis  -Per notes ophthalmology is recommending a 7-day course of prednisone 60 mg   CVA, subacute on MRI  History of renal transplant in 2008  Immunosuppressed secondary to above on tacrolimus and mycophenolate, +/- prednisone   RADHA, now improved    Plan     --- Patient oriented only x1, unclear baseline and reported subacute CVA and was admitted for acute encephalopathy, LP has been performed and no obvious infectious source of the encephalopathy.  --- He has increasing leukocytosis but also with C. difficile infection and has been on steroids  --- Syphilis screen ordered  --- Unclear if the positive urine culture represents a true UTI or merely asymptomatic bacteriuria.  Regardless he has had a sufficient course of Zosyn for a Pseudomonas UTI and Zosyn was stopped today  --- Continue p.o. vancomycin 125 mg every 6 complete a 14-day antibiotic course  --- Blood cultures ordered today and pending   --- Monitor labs    Plan of care discussed with MITZI Rowland M.D.. ID will follow peripherally.     Jaimee Delgado M.D.

## 2022-04-23 NOTE — PROGRESS NOTES
Hospital Medicine Daily Progress Note    Date of Service  4/23/2022    Chief Complaint  Krishan Acevedo is a 72 y.o. male admitted 4/15/2022 with altered mental status    Hospital Course  72 y.o. male with history of ANCA + glomerulonephritis, remote history of kidney transplant, atrial fibrillation (not on anticoagulation due to GI bleed), duodenal ulcer, R eye retinal detachment, who presented 4/15/2022 by ambulance after being found down, altered in the bathroom of his eye doctor's office. Per chart review, he had been recently discharged on 4/12 after evaluation for eye pain. At that time, he was prescribed prednisone 60mg daily for 7 days and was instructed to follow up with eye doctor as outpatient. Patient went to his eye doctor's appointment and was found down in the bathroom.   Patient resides at [facility] after prolonged greer with COVID. Per chart review, patient's admitting mental status was very deviated from his baseline.   On admission, he was tachycardia with  and reported chills with shivering at bedside.   Here labs remarkable for wbc 12.5, Cr 2.11, lactic acid 2.7. UA pos UTI.    Patient was admitted for acute metabolic encephalopathy. On 4/15, a head CT showed no evidence of acute hemorrhage, mass or large territorial infarct but patient noted for remote left basilar ganglier lacunar infarct, mild atrophy, and white matter changes.  C. difficile positive and urinalysis with Pseudomonas of which he has had in the past.  He was started on vancomycin and Zosyn for the above.    Interval Problem Update  Mentation much improved today, he is more appropriate, alert and oriented to person, place and year. He denies pain, he is following commands, no melena or hematochezia, L sided vision stable, denies eye pain, diarrhea much improved, no abdominal pain, ros otherwise negative.     I have personally seen and examined the patient at bedside. I discussed the plan of care with case management, ID,  patient and bedside RN.    Consultants/Specialty  Neurology  ID    Code Status  Full Code    Disposition  Patient is not medically cleared for discharge.   Anticipate discharge TBD  I have placed the appropriate orders for post-discharge needs.    Review of Systems  Review of Systems   Unable to perform ROS: Mental status change   Constitutional: Negative.  Negative for chills, diaphoresis, fever, malaise/fatigue and weight loss.   HENT: Negative.  Negative for sore throat.    Eyes: Negative.  Negative for blurred vision.        Chronic right sided blindness   Respiratory: Negative.  Negative for cough and shortness of breath.    Cardiovascular: Negative.  Negative for chest pain, palpitations and leg swelling.   Gastrointestinal: Positive for diarrhea. Negative for abdominal pain, nausea and vomiting.   Genitourinary: Negative.  Negative for dysuria.   Musculoskeletal: Negative.  Negative for myalgias.   Skin: Negative.  Negative for itching and rash.   Neurological: Positive for weakness. Negative for dizziness, focal weakness and headaches.   Endo/Heme/Allergies: Negative.  Does not bruise/bleed easily.   Psychiatric/Behavioral: Positive for memory loss. Negative for depression, substance abuse and suicidal ideas.   All other systems reviewed and are negative.     Physical Exam  Temp:  [36.1 °C (97 °F)-37.1 °C (98.8 °F)] 36.8 °C (98.2 °F)  Pulse:  [] 123  Resp:  [17-20] 19  BP: (140-159)/(75-95) 150/81  SpO2:  [90 %-98 %] 96 %    Physical Exam  Constitutional:       General: He is not in acute distress.     Appearance: Normal appearance.   HENT:      Head: Normocephalic and atraumatic.      Nose: Nose normal. No congestion.      Mouth/Throat:      Mouth: Mucous membranes are moist.   Eyes:      Extraocular Movements: Extraocular movements intact.      Pupils: Pupils are equal, round, and reactive to light.   Cardiovascular:      Rate and Rhythm: Normal rate and regular rhythm.      Pulses: Normal pulses.       Heart sounds: Normal heart sounds.   Pulmonary:      Effort: Pulmonary effort is normal.      Breath sounds: Normal breath sounds.   Abdominal:      General: Bowel sounds are normal.      Palpations: Abdomen is soft.      Tenderness: There is no abdominal tenderness.   Musculoskeletal:         General: No swelling. Normal range of motion.      Cervical back: Normal range of motion and neck supple.   Skin:     General: Skin is warm.      Coloration: Skin is not jaundiced.   Neurological:      General: No focal deficit present.      Mental Status: He is alert and oriented to person, place, and time. Mental status is at baseline.      Cranial Nerves: No cranial nerve deficit.   Psychiatric:         Mood and Affect: Affect normal.         Speech: Speech is not delayed or tangential.         Behavior: Behavior is cooperative.         Thought Content: Thought content is not paranoid.         Cognition and Memory: Cognition is impaired. Memory is impaired. He exhibits impaired recent memory and impaired remote memory.         Fluids  No intake or output data in the 24 hours ending 04/23/22 1212    Laboratory  Recent Labs     04/21/22  0054 04/22/22  0123 04/23/22  0718   WBC 11.4* 11.1* 15.6*   RBC 2.91* 2.69* 3.05*   HEMOGLOBIN 8.5* 7.8* 8.9*   HEMATOCRIT 27.4* 25.1* 28.8*   MCV 94.2 93.3 94.4   MCH 29.2 29.0 29.2   MCHC 31.0* 31.1* 30.9*   RDW 63.0* 60.8* 64.4*   PLATELETCT 157* 127* 146*   MPV 9.4 9.5 9.9     Recent Labs     04/21/22  0054 04/22/22  0123 04/23/22  0919   SODIUM 144 139 140   POTASSIUM 4.3 3.2* 3.8   CHLORIDE 111 106 107   CO2 25 22 19*   GLUCOSE 102* 93 108*   BUN 17 16 17   CREATININE 1.31 1.02 1.13   CALCIUM 9.1 9.0 9.3             Recent Labs     04/22/22  0123   TRIGLYCERIDE 124   HDL 35*   LDL 38       Imaging  EC-ECHOCARDIOGRAM COMPLETE W/O CONT   Final Result      MR-BRAIN-W/O   Final Result      1.  Tiny right temporal occipital cortical infarct and a couple punctate recent left centrum  semiovale infarcts.   2.  Chronic microvascular ischemic type changes and a few tiny chronic lacunar infarcts.   3.  Generalized cerebral volume loss.      [id number] VIEW   Final Result      No acute findings.      US-RENAL   Final Result      1.  Normal appearance of the right lower quadrant renal transplant with no hydronephrosis.   2.  Small echogenic native left kidney consistent with renal disease.   3.  Nonvisualization of the native right kidney and bladder.      DX-CHEST-PORTABLE (1 VIEW)   Final Result      Unchanged BILATERAL patchy airspace disease compatible with pneumonia. Covid pneumonia is a possibility.      CT-HEAD W/O   Final Result      1.  No evidence of acute hemorrhage, mass or large territorial infarction   2.  Remote LEFT basal ganglia lacunar infarction   3.  Mild atrophy   4.  White matter changes              Assessment/Plan  * Acute metabolic encephalopathy- (present on admission)  Assessment & Plan  Likely multifactorial toxic metabolic encephalopathy due to sepsis, LP no significant findings. Acute to subacute stroke on MRI  Neurology now signed off  Head CT unremarkable for acute changes  Continue antibiotics for C. difficile and Pseudomonas in urine  Frequent reorientation   Serial neuro exams  Avoid sedatives  Much improved  Continue supportive care    Stroke (HCC)  Assessment & Plan  Acute to subacute  Subcortical infarct, L centrum semiovale  Likely embolic  H/o a flutter, could not tolerate anticoagulation due to recurrent severe gi bleeds  Echo with no acute findings  Tele  Following  Speech, PT and OT following  Plan to return to [facility] when medically stable    Hypokalemia- (present on admission)  Assessment & Plan  Due to diarrhea  Replacing  following    Kidney transplant status, living related donor- (present on admission)  Assessment & Plan  Remote history of Kidney transplant   Cont Cellcept and tacrolimus    Hypomagnesemia  Assessment & Plan  Severely low, replaced,  now wnl    Clostridium difficile infection  Assessment & Plan  C. difficile positive  Continue oral vancomycin  Following  Euvolemic on exam    UTI (urinary tract infection)- (present on admission)  Assessment & Plan  Urine cultures growing Pseudomonas  Was on zosyn  ID to eval    Sepsis ([facility])- (present on admission)  Assessment & Plan  This is Severe Sepsis Present on admission  SIRS criteria identified on my evaluation include: Tachycardia, with heart rate greater than 90 BPM and Leukocytosis, with WBC greater than 12,000  Clinical indicators of end organ dysfunction include Creatinine >2.0 (Without ESRD or CKD)  Source is urinary  Sepsis protocol initiated  Crystalloid Fluid Administration: Fluid resuscitation ordered per standard protocol - 30 mL/kg per current or ideal body weight  IV antibiotics as appropriate for source of sepsis  Reassessment: I have reassessed the patient's hemodynamic status  resolving    Acute left eye pain- (present on admission)  Assessment & Plan  Reportedly recently seen by ophthalmology Dr. Addison and started on prednisone for optic nerve inflammation, suspect optic neuritis  Neurology now signed off  Continue supportive care  Continue steroids  Vision much improved, now stable  following    Leukocytosis- (present on admission)  Assessment & Plan  Resolved but now mild, suspect due to steroids  following    Duodenal ulcer- (present on admission)  Assessment & Plan  Hx of, will cont PPI    Immunocompromised ([facility])- (present on admission)  Assessment & Plan  Kidney transplant patient, will cont home immunosuppressants    Positive urine culture- (present on admission)  Assessment & Plan  Pseudomonas, was on zosyn  ID to eval     Thrombocytopenia (HCC)- (present on admission)  Assessment & Plan  Acute on chronic  Mild  Improving along with hbg  Follow intermittently    Acute-on-chronic kidney injury (HCC)- (present on admission)  Assessment & Plan  Baseline Cr is around 1.5. Cr 2.1 on  admission, likely sec to sepsis and UTI  improved  Renal ultrasound without hydronephrosis  IVF, avoid nephrotoxins and renal dosing meds  In stable range       VTE prophylaxis: heparin ppx    I have performed a physical exam and reviewed and updated ROS and Plan today (4/23/2022). In review of yesterday's note (4/22/2022), there are no changes except as documented above.

## 2022-04-23 NOTE — CARE PLAN
The patient is Watcher - Medium risk of patient condition declining or worsening    Shift Goals  Clinical Goals: Q4 neuros  Patient Goals: sleep  Family Goals: DENA    Progress made toward(s) clinical / shift goals:    Problem: Psychosocial  Goal: Patient's level of anxiety will decrease  Outcome: Not Progressing  Goal: Patient's ability to verbalize feelings about condition will improve  Outcome: Not Progressing  Goal: Patient's ability to re-evaluate and adapt role responsibilities will improve  Outcome: Not Progressing     Problem: Communication  Goal: The ability to communicate needs accurately and effectively will improve  Outcome: Not Progressing     Problem: Mobility  Goal: Patient's capacity to carry out activities will improve  Outcome: Not Progressing     Problem: Self Care  Goal: Patient will have the ability to perform ADLs independently or with assistance (bathe, groom, dress, toilet and feed)  Outcome: Not Progressing     Problem: Psychosocial - Patient Condition  Goal: Patient's ability to verbalize feelings about condition will improve  Outcome: Not Progressing  Goal: Patient's ability to re-evaluate and adapt role responsibilities will improve  Outcome: Not Progressing     Problem: Mobility - Stroke  Goal: Patient's capacity to carry out activities will improve  Outcome: Not Progressing       Patient is not progressing towards the following goals:      Problem: Psychosocial  Goal: Patient's level of anxiety will decrease  Outcome: Not Progressing  Goal: Patient's ability to verbalize feelings about condition will improve  Outcome: Not Progressing  Goal: Patient's ability to re-evaluate and adapt role responsibilities will improve  Outcome: Not Progressing     Problem: Communication  Goal: The ability to communicate needs accurately and effectively will improve  Outcome: Not Progressing     Problem: Mobility  Goal: Patient's capacity to carry out activities will improve  Outcome: Not Progressing      Problem: Self Care  Goal: Patient will have the ability to perform ADLs independently or with assistance (bathe, groom, dress, toilet and feed)  Outcome: Not Progressing     Problem: Psychosocial - Patient Condition  Goal: Patient's ability to verbalize feelings about condition will improve  Outcome: Not Progressing  Goal: Patient's ability to re-evaluate and adapt role responsibilities will improve  Outcome: Not Progressing     Problem: Mobility - Stroke  Goal: Patient's capacity to carry out activities will improve  Outcome: Not Progressing

## 2022-04-24 NOTE — DIETARY
NUTRITION SERVICES - Alert received for newly identified wound. No wound team consult pending, no nutrition recommendations at this time. RD will monitor per dept policy.

## 2022-04-24 NOTE — CARE PLAN
The patient is Stable - Low risk of patient condition declining or worsening    Shift Goals  Clinical Goals: [id number]  Patient Goals: rest, keep warm  Family Goals: DENA    Progress made toward(s) clinical / shift goals:        Problem: Psychosocial  Goal: Patient's level of anxiety will decrease  Outcome: Progressing  Goal: Patient's ability to verbalize feelings about condition will improve  Outcome: Progressing  Goal: Patient's ability to re-evaluate and adapt role responsibilities will improve  Outcome: Progressing  Goal: Patient and family will demonstrate ability to cope with life altering diagnosis and/or procedure  Outcome: Progressing  Goal: Spiritual and cultural needs incorporated into hospitalization  Outcome: Progressing     Problem: Communication  Goal: The ability to communicate needs accurately and effectively will improve  Outcome: Progressing     Problem: Hemodynamics  Goal: Patient's hemodynamics, fluid balance and neurologic status will be stable or improve  Outcome: Progressing     Problem: Mobility  Goal: Patient's capacity to carry out activities will improve  Outcome: Progressing     Problem: Self Care  Goal: Patient will have the ability to perform ADLs independently or with assistance (bathe, groom, dress, toilet and feed)  Outcome: Progressing     Problem: Fall Risk  Goal: Patient will remain free from falls  Outcome: Progressing     Problem: Pain - Standard  Goal: Alleviation of pain or a reduction in pain to the patient’s comfort goal  Outcome: Progressing     Problem: Skin Integrity  Goal: Skin integrity is maintained or improved  Outcome: Progressing     Problem: Knowledge Deficit - Standard  Goal: Patient and family/care givers will demonstrate understanding of plan of care, disease process/condition, diagnostic tests and medications  Outcome: Progressing     Problem: Fluid Volume  Goal: Fluid volume balance will be maintained  Outcome: Progressing     Problem: Urinary - Renal  Perfusion  Goal: Ability to achieve and maintain adequate renal perfusion and functioning will improve  Outcome: Progressing     Problem: Respiratory  Goal: Patient will achieve/maintain optimum respiratory ventilation and gas exchange  Outcome: Progressing     Problem: Mechanical Ventilation  Goal: Safe management of artificial airway and ventilation  Outcome: Progressing  Goal: Successful weaning off mechanical ventilator, spontaneously maintains adequate gas exchange  Outcome: Progressing  Goal: Patient will be able to express needs and understand communication  Outcome: Progressing     Problem: Physical Regulation  Goal: Diagnostic test results will improve  Outcome: Progressing  Goal: Signs and symptoms of infection will decrease  Outcome: Progressing     Problem: Optimal Care of the Stroke Patient  Goal: Optimal emergency care for the stroke patient  Outcome: Progressing  Goal: Optimal acute care for the stroke patient  Outcome: Progressing     Problem: Knowledge Deficit - Stroke Education  Goal: Patient's knowledge of stroke and risk factors will improve  Outcome: Progressing     Problem: Psychosocial - Patient Condition  Goal: Patient's ability to verbalize feelings about condition will improve  Outcome: Progressing  Goal: Patient's ability to re-evaluate and adapt role responsibilities will improve  Outcome: Progressing     Problem: Discharge Planning - Stroke  Goal: Ensure Stroke Core Measures are met prior to discharge  Outcome: Progressing  Goal: Patient’s continuum of care needs will be met  Outcome: Progressing     Problem: Neuro Status  Goal: Neuro status will remain stable or improve  Outcome: Progressing     Problem: Hemodynamic Monitoring  Goal: Patient's hemodynamics, fluid balance and neurologic status will be stable or improve  Outcome: Progressing     Problem: Respiratory - Stroke Patient  Goal: Patient will achieve/maintain optimum respiratory rate/effort  Outcome: Progressing     Problem:  Dysphagia  Goal: Dysphagia will improve  Outcome: Progressing     Problem: Risk for Aspiration  Goal: Patient's risk for aspiration will be absent or decrease  Outcome: Progressing     Problem: Urinary Elimination  Goal: Establish and maintain regular urinary output  Outcome: Progressing     Problem: Bowel Elimination  Goal: Establish and maintain regular bowel function  Outcome: Progressing     Problem: Mobility - Stroke  Goal: Patient's capacity to carry out activities will improve  Outcome: Progressing  Goal: Spasticity will be prevented or improved  Outcome: Progressing  Goal: Subluxation will be prevented or improved  Outcome: Progressing

## 2022-04-24 NOTE — CARE PLAN
The patient is Stable - Low risk of patient condition declining or worsening    Shift Goals  Clinical Goals: safety  Patient Goals: rest  Family Goals: syl    Progress made toward(s) clinical / shift goals:    Problem: Psychosocial  Goal: Patient's level of anxiety will decrease  Outcome: Progressing     Problem: Communication  Goal: The ability to communicate needs accurately and effectively will improve  Outcome: Progressing     Problem: Pain - Standard  Goal: Alleviation of pain or a reduction in pain to the patient’s comfort goal  Outcome: Progressing       Patient is not progressing towards the following goals:

## 2022-04-24 NOTE — PROGRESS NOTES
Pt bed alarm went off, CNA and Charge RN (author of note) responded immediately. Pt was already sitting on the floor with arm wrapped around bed rail, and sheets were wrapped around pts lap. Pt was assisted up from the floor with 4 staff members. Pt reported no pain or being hurt anywhere. Post fall assessment complete. Fall precautions in place at time of fall. MD notified.

## 2022-04-24 NOTE — CARE PLAN
The patient is Watcher - Medium risk of patient condition declining or worsening    Shift Goals  Clinical Goals: safety  Patient Goals: rest  Family Goals: DENA    Progress made toward(s) clinical / shift goals: patient had a fall overnight but remained safe the remaining of the night. Fall precautions in place.     Problem: Psychosocial  Goal: Patient's level of anxiety will decrease  Outcome: Progressing  Goal: Patient's ability to re-evaluate and adapt role responsibilities will improve  Outcome: Progressing  Goal: Patient and family will demonstrate ability to cope with life altering diagnosis and/or procedure  Outcome: Progressing  Goal: Spiritual and cultural needs incorporated into hospitalization  Outcome: Progressing     Problem: Hemodynamics  Goal: Patient's hemodynamics, fluid balance and neurologic status will be stable or improve  Outcome: Progressing     Problem: Pain - Standard  Goal: Alleviation of pain or a reduction in pain to the patient’s comfort goal  Outcome: Progressing     Problem: Skin Integrity  Goal: Skin integrity is maintained or improved  Outcome: Progressing     Problem: Fluid Volume  Goal: Fluid volume balance will be maintained  Outcome: Progressing     Problem: Urinary - Renal Perfusion  Goal: Ability to achieve and maintain adequate renal perfusion and functioning will improve  Outcome: Progressing     Problem: Respiratory  Goal: Patient will achieve/maintain optimum respiratory ventilation and gas exchange  Outcome: Progressing     Problem: Mechanical Ventilation  Goal: Safe management of artificial airway and ventilation  Outcome: Progressing  Goal: Successful weaning off mechanical ventilator, spontaneously maintains adequate gas exchange  Outcome: Progressing  Goal: Patient will be able to express needs and understand communication  Outcome: Progressing     Problem: Physical Regulation  Goal: Diagnostic test results will improve  Outcome: Progressing  Goal: Signs and symptoms of  infection will decrease  Outcome: Progressing     Problem: Optimal Care of the Stroke Patient  Goal: Optimal emergency care for the stroke patient  Outcome: Progressing  Goal: Optimal acute care for the stroke patient  Outcome: Progressing     Problem: Knowledge Deficit - Stroke Education  Goal: Patient's knowledge of stroke and risk factors will improve  Outcome: Progressing     Problem: Psychosocial - Patient Condition  Goal: Patient's ability to re-evaluate and adapt role responsibilities will improve  Outcome: Progressing     Problem: Discharge Planning - Stroke  Goal: Ensure Stroke Core Measures are met prior to discharge  Outcome: Progressing  Goal: Patient’s continuum of care needs will be met  Outcome: Progressing     Problem: Neuro Status  Goal: Neuro status will remain stable or improve  Outcome: Progressing     Problem: Hemodynamic Monitoring  Goal: Patient's hemodynamics, fluid balance and neurologic status will be stable or improve  Outcome: Progressing     Problem: Respiratory - Stroke Patient  Goal: Patient will achieve/maintain optimum respiratory rate/effort  Outcome: Progressing     Problem: Dysphagia  Goal: Dysphagia will improve  Outcome: Progressing     Problem: Risk for Aspiration  Goal: Patient's risk for aspiration will be absent or decrease  Outcome: Progressing     Problem: Urinary Elimination  Goal: Establish and maintain regular urinary output  Outcome: Progressing     Problem: Bowel Elimination  Goal: Establish and maintain regular bowel function  Outcome: Progressing     Problem: Mobility - Stroke  Goal: Spasticity will be prevented or improved  Outcome: Progressing  Goal: Subluxation will be prevented or improved  Outcome: Progressing       Patient is not progressing towards the following goals:      Problem: Psychosocial  Goal: Patient's ability to verbalize feelings about condition will improve  Outcome: Not Met     Problem: Communication  Goal: The ability to communicate needs  accurately and effectively will improve  Outcome: Not Met     Problem: Mobility  Goal: Patient's capacity to carry out activities will improve  Outcome: Not Met     Problem: Self Care  Goal: Patient will have the ability to perform ADLs independently or with assistance (bathe, groom, dress, toilet and feed)  Outcome: Not Met     Problem: Fall Risk  Goal: Patient will remain free from falls  Outcome: Not Met     Problem: Knowledge Deficit - Standard  Goal: Patient and family/care givers will demonstrate understanding of plan of care, disease process/condition, diagnostic tests and medications  Outcome: Not Met     Problem: Psychosocial - Patient Condition  Goal: Patient's ability to verbalize feelings about condition will improve  Outcome: Not Met     Problem: Mobility - Stroke  Goal: Patient's capacity to carry out activities will improve  Outcome: Not Met

## 2022-04-24 NOTE — PROGRESS NOTES
Infectious Disease Progress Note    Author: Jaimee Delgado M.D. Date & Time of service: 2022  8:54 AM    Chief Complaint:  Encephalopathy, C. difficile, positive urine culture      Interval History:    Review of Systems:  Review of Systems   Unable to perform ROS: Medical condition       Hemodynamics:  Temp (24hrs), Av.7 °C (98 °F), Min:36.5 °C (97.7 °F), Max:36.8 °C (98.3 °F)  Temperature: 36.8 °C (98.3 °F)  Pulse  Av.6  Min: 55  Max: 139   Blood Pressure : 130/73       Physical Exam:  Physical Exam  Cardiovascular:      Rate and Rhythm: Normal rate and regular rhythm.      Heart sounds: Normal heart sounds.   Pulmonary:      Effort: Pulmonary effort is normal.      Breath sounds: Normal breath sounds.   Abdominal:      General: Abdomen is flat. Bowel sounds are normal. There is no distension.      Palpations: Abdomen is soft.      Tenderness: There is no abdominal tenderness.   Musculoskeletal:         General: Normal range of motion.      Right lower leg: No edema.      Left lower leg: No edema.   Skin:     General: Skin is warm and dry.   Neurological:      Comments: Moving all extremities, oriented x1   Psychiatric:         Mood and Affect: Mood normal.         Behavior: Behavior normal.         Meds:    Current Facility-Administered Medications:   •  atenolol  •  labetalol  •  vancomycin 50 mg/mL  •  heparin  •  atorvastatin  •  cinacalcet  •  fluticasone  •  mycophenolate  •  omeprazole  •  sodium bicarbonate  •  tacrolimus  •  tacrolimus  •  LR  •  ondansetron  •  ondansetron  •  predniSONE  •  acetaminophen    Labs:  Recent Labs     22  0123 22  0718 22  0228   WBC 11.1* 15.6* 14.4*   RBC 2.69* 3.05* 3.11*   HEMOGLOBIN 7.8* 8.9* 9.1*   HEMATOCRIT 25.1* 28.8* 29.6*   MCV 93.3 94.4 95.2   MCH 29.0 29.2 29.3   RDW 60.8* 64.4* 66.0*   PLATELETCT 127* 146* 132*   MPV 9.5 9.9 10.4   NEUTSPOLYS 82.50* 94.50* 89.50*   LYMPHOCYTES 12.80* 2.60* 7.50*   MONOCYTES 3.60 1.70 1.90    EOSINOPHILS 0.00 0.00 0.00   BASOPHILS 0.10 0.10 0.10   RBCMORPHOLO  --   --  Present     Recent Labs     04/22/22  0123 04/23/22  0919 04/24/22  0228   SODIUM 139 140 143   POTASSIUM 3.2* 3.8 3.8   CHLORIDE 106 107 110   CO2 22 19* 20   GLUCOSE 93 108* 86   BUN 16 17 19     Recent Labs     04/22/22  0123 04/23/22  0919 04/24/22  0228   ALBUMIN 2.1* 2.3* 2.2*   TBILIRUBIN 0.4 0.5 0.4   ALKPHOSPHAT 124* 151* 151*   TOTPROTEIN 4.9* 5.3* 5.2*   ALTSGPT 14 16 12   ASTSGOT 16 16 21   CREATININE 1.02 1.13 1.25       Imaging:  CT-HEAD W/O    Result Date: 4/15/2022  4/15/2022 10:34 AM HISTORY/REASON FOR EXAM:  Altered mental status. TECHNIQUE/EXAM DESCRIPTION AND NUMBER OF VIEWS: CT of the head without contrast. The study was performed on a Qwenty CT scanner. Contiguous 5 mm axial sections were obtained from the skull base through the vertex. Up to date radiation dose reduction adjustments have been utilized to meet ALARA standards for radiation dose reduction. COMPARISON:  3/2/2022 FINDINGS: The calvariae and skull base are unremarkable. Focal hypodensities redemonstrated in the LEFT lentiform nucleus. There are no extraaxial fluid collections. There is a pattern of cerebral atrophy manifest as enlargement of sulcal markings and ventricular prominence.  The ventricular system and basal cisterns are otherwise unremarkable. There are areas of hypodensity in the white matter most consistent with small vessel ischemic change versus demyelination or gliosis. There are no hemorrhagic lesions. There are no mass effects or shift of midline structures. The brainstem and posterior fossa structures are unremarkable. The paranasal sinuses and mastoids in the field of view are unremarkable.     1.  No evidence of acute hemorrhage, mass or large territorial infarction 2.  Remote LEFT basal ganglia lacunar infarction 3.  Mild atrophy 4.  White matter changes     [id number] VIEW    Result Date: 4/16/2022 4/16/2022 4:40 PM HISTORY/REASON  FOR EXAM:  Abdominal Pain. TECHNIQUE/EXAM DESCRIPTION AND NUMBER OF VIEWS:  1 view(s) of the abdomen. COMPARISON: Ultrasound from the same day FINDINGS: AP view of the abdomen demonstrates a nonobstructive bowel gas pattern. There are scattered loops of air-filled bowel. No pneumatosis or portal venous air is identified. Surgical clips project over the pelvis and right upper quadrant. Degenerative changes are in the spine.     No acute findings.    DX-CHEST-PORTABLE (1 VIEW)    Result Date: 4/15/2022  4/15/2022 10:14 AM HISTORY/REASON FOR EXAM:  possible sepsis. Altered level of consciousness TECHNIQUE/EXAM DESCRIPTION AND NUMBER OF VIEWS: Single portable view of the chest. COMPARISON: 2/17/2022 FINDINGS: HEART: Stable size. LUNGS: Patchy opacities in both lungs are not demonstrably changed. Lung volumes are low. PLEURA: No effusion or pneumothorax.     Unchanged BILATERAL patchy airspace disease compatible with pneumonia. Covid pneumonia is a possibility.    MR-BRAIN-W/O    Result Date: 4/21/2022 4/20/2022 6:30 PM HISTORY/REASON FOR EXAM:  Mental status change, unknown cause. TECHNIQUE/EXAM DESCRIPTION: MRI of the brain without contrast. T1 sagittal, T2 fast spin-echo axial, T1 coronal, FLAIR coronal, diffusion-weighted and apparent diffusion coefficient (ADC map) axial images were obtained of the whole brain. The study was performed on a [employer] Signa 1.5 Tasha MRI scanner. COMPARISON:  Head CT 4/15/2022. Brain MRI 2/14/2022 FINDINGS: There are a few scattered tiny acute/subacute lacunar infarcts. There is a tiny cortical infarct in the posterior right temporal occipital region on series 5 image 19. A couple punctate left centrum semiovale lacunar infarcts in the left frontal white matter. Given that these are seen bilaterally with a could possibly be embolic in etiology. Mild generalized volume loss.  Confluent and scattered T2 hyperintensities in the periventricular and subcortical cerebral white matter are  nonspecific, most likely representing chronic microvascular ischemic changes. Chronic tiny lacunar infarct in the left anterior thalamus and a couple tiny chronic right centrum semiovale infarcts. No acute intracranial hemorrhage, extra-axial fluid collection, mass effect, midline shift or hydrocephalus. Proximal vascular flow voids are patent. There is mucosal thickening in the left ethmoid sinus. Nonspecific mild mastoid effusions.  Bone marrow signal is normal. Both globes demonstrate prior lens surgery.     1.  Tiny right temporal occipital cortical infarct and a couple punctate recent left centrum semiovale infarcts. 2.  Chronic microvascular ischemic type changes and a few tiny chronic lacunar infarcts. 3.  Generalized cerebral volume loss.    US-RENAL    Result Date: 4/16/2022 4/16/2022 3:36 PM HISTORY/REASON FOR EXAM:  Abnormal Labs History of renal transplant. TECHNIQUE/EXAM DESCRIPTION: Renal ultrasound. COMPARISON:  3/18/2022 FINDINGS: The native right kidney is not visualized. The native left kidney measures 7.75 cm and appears echogenic with a probable 11 mm cyst. There is a right lower quadrant renal transplant measuring 10.5 cm in diameter with preserved cortical medullary differentiation. There is no hydronephrosis. The bladder is not adequately evaluated due to patient combativeness. Exam overall limited due to patient combativeness.     1.  Normal appearance of the right lower quadrant renal transplant with no hydronephrosis. 2.  Small echogenic native left kidney consistent with renal disease. 3.  Nonvisualization of the native right kidney and bladder.    EC-ECHOCARDIOGRAM COMPLETE W/O CONT    Result Date: 4/21/2022  Transthoracic Echo Report Echocardiography Laboratory CONCLUSIONS Normal left ventricular systolic function. Mild to moderate eccentric mitral regurgitation. At least moderate aortic insufficiency. The left atrium is normal in size. MOMO ESCUDERO Exam Date:         04/21/2022                     13:05 Exam Location:     Inpatient Priority:          Routine Ordering Physician:        COREY LATIF Referring Physician:       [id number] Nichols Sonographer:               Elton Ortega RDCS,                            RVT Age:    72     Gender:    M MRN:    [id number] :    [id number] BSA:    2.34   Ht (in):    72     Wt (lb):    249 Exam Type:     Complete Indications:     CVA ICD Codes:       436 CPT Codes:       83231 BP:   122    /   46     HR:   104 Technical Quality:       Technically difficult study -                          adequate information is obtained MEASUREMENTS  (Male / Female) Normal Values 2D ECHO LV Diastolic Diameter PLAX        5.3 cm                4.2 - 5.9 / 3.9 - 5.3 cm LV Systolic Diameter PLAX         3.9 cm                2.1 - 4.0 cm IVS Diastolic Thickness           1.2 cm                LVPW Diastolic Thickness          1.1 cm                LVOT Diameter                     2.6 cm                Estimated LV Ejection Fraction    60 %                  LV Ejection Fraction MOD BP       61.6 %                >= 55  % LV Ejection Fraction MOD 4C       60 %                  LV Ejection Fraction MOD 2C       65.3 %                DOPPLER AV Peak Velocity                  1.9 m/s               AV Peak Gradient                  14.4 mmHg             AV Mean Gradient                  9.4 mmHg              AI Pressure Half Time             269 ms                LVOT Peak Velocity                0.94 m/s              AV Area Cont Eq vti               3.1 cm2               MR ERO PISA                       0.07 cm2              MR Regurgitant Volume PISA        13.2 cm3              TR Peak Velocity                  209 cm/s              * Indicates values subject to auto-interpretation LV EF:  60    % FINDINGS Left Ventricle The left ventricle is normal in size and thickness.  Normal left ventricular systolic function. The left ventricular ejection fraction is visually estimated to  "be 60%. Normal regional wall motion. Diastolic function is difficult to assess with arrhythmia. Right Ventricle The right ventricle is normal in size and systolic function. Right Atrium The right atrium is normal in size. The inferior vena cava is not well visualized. Left Atrium The left atrium is normal in size. Left atrial volume index is 29 mL/sq m. Mitral Valve Structurally normal mitral valve without significant stenosis. Mild to moderate eccentric mitral regurgitation. Aortic Valve Aortic valve sclerosis without significant stenosis. At least moderate aortic insufficiency. Tricuspid Valve Structurally normal tricuspid valve without significant stenosis. Mild tricuspid regurgitation. Estimated right ventricular systolic pressure is greater than 25 mmHg. Pulmonic Valve Structurally normal pulmonic valve without significant stenosis. Mild pulmonic insufficiency. Pericardium Normal pericardium without effusion. Aorta Normal aortic root for body surface area. The ascending aorta diameter is 3.2 cm. Easton Bundy MD (Electronically Signed) Final Date:     21 April 2022                 15:30      Micro:  Results     Procedure Component Value Units Date/Time    BLOOD CULTURE [[id number]] Collected: 04/23/22 0941    Order Status: Sent Specimen: Blood from Peripheral Updated: 04/23/22 1023    Narrative:      Special Contact Isolation  Per Hospital Policy: Only change Specimen Src: to \"Line\" if  specified by physician order.    BLOOD CULTURE [[id number]] Collected: 04/23/22 0941    Order Status: Sent Specimen: Blood from Peripheral Updated: 04/23/22 1022    Narrative:      Special Contact Isolation  Per Hospital Policy: Only change Specimen Src: to \"Line\" if  specified by physician order.    CSF CULTURE [[id number]] Collected: 04/20/22 1530    Order Status: Completed Specimen: CSF from Tap Updated: 04/23/22 0915     Significant Indicator NEG     Source CSF     Site TAP     Culture Result No growth at 72 hours.     Gram " "Stain Result No organisms seen.    Narrative:      Special Contact Isolation  Collected By: [id number] ЕКАТЕРИНА HERNÁNDEZ  Special Contact Isolation    GRAM STAIN [[id number]] Collected: 04/20/22 1530    Order Status: Completed Specimen: CSF Updated: 04/20/22 1636     Significant Indicator .     Source CSF     Site TAP     Gram Stain Result No organisms seen.    Narrative:      Special Contact Isolation  Collected By: [id number] ЕКАТЕРИНА HERNÁNDEZ  Special Contact Isolation    BLOOD CULTURE [[id number]] Collected: 04/15/22 1000    Order Status: Completed Specimen: Blood from Peripheral Updated: 04/20/22 1100     Significant Indicator NEG     Source BLD     Site PERIPHERAL     Culture Result No growth after 5 days of incubation.    Narrative:      Per Hospital Policy: Only change Specimen Src: to \"Line\" if  specified by physician order.  Left AC    BLOOD CULTURE [[id number]] Collected: 04/15/22 1009    Order Status: Completed Specimen: Blood from Peripheral Updated: 04/20/22 1100     Significant Indicator NEG     Source BLD     Site PERIPHERAL     Culture Result No growth after 5 days of incubation.    Narrative:      Per Hospital Policy: Only change Specimen Src: to \"Line\" if  specified by physician order.  Right Forearm/Arm    URINE CULTURE(NEW) [[id number]]  (Abnormal)  (Susceptibility) Collected: 04/15/22 1200    Order Status: Completed Specimen: Urine, Clean Catch Updated: 04/18/22 0946     Significant Indicator POS     Source UR     Site URINE, CLEAN CATCH     Culture Result -      Pseudomonas aeruginosa  >100,000 cfu/mL      Narrative:      Indication for culture:->Evaluation for sepsis without a  clear source of infection  Indication for culture:->Evaluation for sepsis without a    Susceptibility     Pseudomonas aeruginosa (1)     Antibiotic Interpretation Microscan   Method Status    Ciprofloxacin Resistant >2 mcg/mL MATHEW Final    Cefepime Intermediate 16 mcg/mL MATHEW Final    Amikacin Sensitive <=16 mcg/mL MATHEW Final    Gentamicin " Sensitive <=2 mcg/mL MATHEW Final    Tobramycin Sensitive <=2 mcg/mL MATHEW Final    Levofloxacin Resistant >4 mcg/mL MATHEW Final    Meropenem Resistant >8 mcg/mL MATHEW Final    Pip/Tazobactam Sensitive 32 mcg/mL MATHEW Final                         Assessment:  Active Hospital Problems    Diagnosis    • *Acute metabolic encephalopathy [G93.41]    • Stroke (HCC) [I63.9]    • Clostridium difficile infection [A49.8]    • Sepsis (HCC) [A41.9]    • UTI (urinary tract infection) [N39.0]    • Acute left eye pain [H57.12]    • Hypokalemia [E87.6]    • Leukocytosis [D72.829]    • Duodenal ulcer [K26.9]    • Immunocompromised (HCC) [D84.9]    • Positive urine culture [R82.79]    • Thrombocytopenia (HCC) [D69.6]    • Acute-on-chronic kidney injury (HCC) [N17.9, N18.9]    • Hypomagnesemia [E83.42]    • Kidney transplant status, living related donor [Z94.0]      Interval 24 hours:      AF, tachycardia, O2 RA  Labs reviewed  Micro reviewed    Patient is comfortable. He knew his name  but otherwise did not answering questions.  Continued on p.o. vancomycin as below.    Assessment:  72 y.o.  admitted 4/15/2022. 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 Admitted in 2/22 secondary to fatigue following COVID-19 as well as encephalopathy.  Urine cultures at the time grew CRE Pseudomonas and coronary ossifications on film either new pneumonia or residual COVID.  ID signed the time and recommended a 7-day antibiotic course.  He continues to reside in a skilled facility after never fully recovering from his COVID infection in April he was evaluated for eye pain and prescribed a steroid burst with prednisone 60 mg daily for 7 days.  He was admitted after being found down in the bathroom at his eye doctor's office.     Hospital Course:   He has had no fevers and vitals relatively  unremarkable.  No leukocytosis on arrival but has now increased.  Acute kidney injury on arrival and has been improving.     Problem List      Leukocytosis, patient has been on high-dose steroids since admit, improved today   Thrombocytopenia  Acute encephalopathy, thought to be multifactorial with toxic encephalopathy, acute to subacute stroke on MRI and had been on high-dose steroids  -LP on 4/19 with WBC of 1, glucose of 68 although did have high protein at 92 - negative meningeoencephalitis panel and negative cultures.  This is in the setting of acute to subacute stroke.  -Neurology following and noninfectious work-up initiated  -Syphilis screen negative  Positive urine culture  -Urine culture on 4/15 +PSAR   C. difficile infection  -Toxin positive on 4/16  Optic neuritis  -Per notes ophthalmology is recommending a 7-day course of prednisone 60 mg   CVA, subacute on MRI  History of renal transplant in 2008  Immunosuppressed secondary to above on tacrolimus and mycophenolate, +/- prednisone   RADHA, ongoing      Plan      --- Patient oriented x1, unclear baseline and reported subacute CVA and was admitted for acute encephalopathy, LP has been performed and no obvious infectious source of the encephalopathy.  --- Increasing leukocytosis but also with C. difficile infection and has been on steroids, improved somewhat after stopping antibiotics yesterday  --- Unclear if the positive urine culture represents a true UTI or merely asymptomatic bacteriuria.  Regardless he has had a sufficient course of Zosyn for a Pseudomonas UTI and Zosyn was stopped today  --- Continue p.o. vancomycin 125 mg every 6 complete a 14-day antibiotic course  --- f/up blood cultures  - NGTD -notify ID if any new positive blood cultures  --- Monitor labs     Plan of care discussed Dr. Kashif M.D.  ID will sign of.

## 2022-04-24 NOTE — ASSESSMENT & PLAN NOTE
Chronic, intermittent  Currently in SR  Tolerating BB,  titrate as needed  Not candidate for anticoagulation at this time due to recurrent severe gi bleeds

## 2022-04-24 NOTE — PROGRESS NOTES
Hospital Medicine Daily Progress Note    Date of Service  4/24/2022    Chief Complaint  Krishan Acevedo is a 72 y.o. male admitted 4/15/2022 with altered mental status    Hospital Course  72 y.o. male with history of ANCA + glomerulonephritis, remote history of kidney transplant, atrial fibrillation (not on anticoagulation due to GI bleed), duodenal ulcer, R eye retinal detachment, who presented 4/15/2022 by ambulance after being found down, altered in the bathroom of his eye doctor's office. Per chart review, he had been recently discharged on 4/12 after evaluation for eye pain. At that time, he was prescribed prednisone 60mg daily for 7 days and was instructed to follow up with eye doctor as outpatient. Patient went to his eye doctor's appointment and was found down in the bathroom.   Patient resides at [facility] after prolonged greer with COVID. Per chart review, patient's admitting mental status was very deviated from his baseline.   On admission, he was tachycardia with  and reported chills with shivering at bedside.   Here labs remarkable for wbc 12.5, Cr 2.11, lactic acid 2.7. UA pos UTI.    Patient was admitted for acute metabolic encephalopathy. On 4/15, a head CT showed no evidence of acute hemorrhage, mass or large territorial infarct but patient noted for remote left basilar ganglier lacunar infarct, mild atrophy, and white matter changes.  C. difficile positive and urinalysis with Pseudomonas of which he has had in the past.  He was started on vancomycin and Zosyn for the above.    Interval Problem Update  Diarrhea improving, mentation improved from earlier in the week, about the same today as yesterday. He is more appropriate but remains oriented only to person and city. He denies pain. Afib this am up to 140, currently 110-120 - will start atenolol for rate control. He denies pain and is following commands. ROS otherwise negative    I have personally seen and examined the patient at bedside.  I discussed the plan of care with case management, ID, patient and bedside RN.    Consultants/Specialty  Neurology  ID    Code Status  Full Code    Disposition  Patient is not medically cleared for discharge.   Anticipate discharge TBD  I have placed the appropriate orders for post-discharge needs.    Review of Systems  Review of Systems   Unable to perform ROS: Mental status change   Constitutional: Negative.  Negative for chills, diaphoresis, fever, malaise/fatigue and weight loss.   HENT: Negative.  Negative for sore throat.    Eyes: Negative.  Negative for blurred vision.        Chronic right sided blindness   Respiratory: Negative.  Negative for cough and shortness of breath.    Cardiovascular: Negative.  Negative for chest pain, palpitations and leg swelling.   Gastrointestinal: Positive for diarrhea. Negative for abdominal pain, nausea and vomiting.   Genitourinary: Negative.  Negative for dysuria.   Musculoskeletal: Negative.  Negative for myalgias.   Skin: Negative.  Negative for itching and rash.   Neurological: Positive for weakness. Negative for dizziness, focal weakness and headaches.   Endo/Heme/Allergies: Negative.  Does not bruise/bleed easily.   Psychiatric/Behavioral: Positive for memory loss. Negative for depression, substance abuse and suicidal ideas.   All other systems reviewed and are negative.     Physical Exam  Temp:  [36.4 °C (97.6 °F)-36.8 °C (98.3 °F)] 36.4 °C (97.6 °F)  Pulse:  [119-139] 129  Resp:  [19-20] 20  BP: (127-164)/() 132/54  SpO2:  [95 %-98 %] 98 %    Physical Exam  Constitutional:       General: He is not in acute distress.     Appearance: Normal appearance.   HENT:      Head: Normocephalic and atraumatic.      Nose: Nose normal. No congestion.      Mouth/Throat:      Mouth: Mucous membranes are moist.   Eyes:      Extraocular Movements: Extraocular movements intact.      Pupils: Pupils are equal, round, and reactive to light.   Cardiovascular:      Rate and Rhythm:  Tachycardia present. Rhythm irregular.      Pulses: Normal pulses.      Heart sounds: Normal heart sounds.   Pulmonary:      Effort: Pulmonary effort is normal.      Breath sounds: Normal breath sounds.   Abdominal:      General: Bowel sounds are normal.      Palpations: Abdomen is soft.      Tenderness: There is no abdominal tenderness.   Musculoskeletal:         General: No swelling. Normal range of motion.      Cervical back: Normal range of motion and neck supple.   Skin:     General: Skin is warm.      Coloration: Skin is not jaundiced.   Neurological:      General: No focal deficit present.      Mental Status: He is alert and oriented to person, place, and time. Mental status is at baseline.      Cranial Nerves: No cranial nerve deficit.   Psychiatric:         Mood and Affect: Affect normal.         Speech: Speech is not delayed or tangential.         Behavior: Behavior is cooperative.         Thought Content: Thought content is not paranoid.         Cognition and Memory: Cognition is impaired. Memory is impaired. He exhibits impaired recent memory and impaired remote memory.         Fluids  No intake or output data in the 24 hours ending 04/24/22 1115    Laboratory  Recent Labs     04/22/22  0123 04/23/22  0718 04/24/22  0228   WBC 11.1* 15.6* 14.4*   RBC 2.69* 3.05* 3.11*   HEMOGLOBIN 7.8* 8.9* 9.1*   HEMATOCRIT 25.1* 28.8* 29.6*   MCV 93.3 94.4 95.2   MCH 29.0 29.2 29.3   MCHC 31.1* 30.9* 30.7*   RDW 60.8* 64.4* 66.0*   PLATELETCT 127* 146* 132*   MPV 9.5 9.9 10.4     Recent Labs     04/22/22  0123 04/23/22  0919 04/24/22  0228   SODIUM 139 140 143   POTASSIUM 3.2* 3.8 3.8   CHLORIDE 106 107 110   CO2 22 19* 20   GLUCOSE 93 108* 86   BUN 16 17 19   CREATININE 1.02 1.13 1.25   CALCIUM 9.0 9.3 9.8             Recent Labs     04/22/22  0123   TRIGLYCERIDE 124   HDL 35*   LDL 38       Imaging  EC-ECHOCARDIOGRAM COMPLETE W/O CONT   Final Result      MR-BRAIN-W/O   Final Result      1.  Tiny right temporal  occipital cortical infarct and a couple punctate recent left centrum semiovale infarcts.   2.  Chronic microvascular ischemic type changes and a few tiny chronic lacunar infarcts.   3.  Generalized cerebral volume loss.      VO-[id number]-9 VIEW   Final Result      No acute findings.      US-RENAL   Final Result      1.  Normal appearance of the right lower quadrant renal transplant with no hydronephrosis.   2.  Small echogenic native left kidney consistent with renal disease.   3.  Nonvisualization of the native right kidney and bladder.      DX-CHEST-PORTABLE (1 VIEW)   Final Result      Unchanged BILATERAL patchy airspace disease compatible with pneumonia. Covid pneumonia is a possibility.      CT-HEAD W/O   Final Result      1.  No evidence of acute hemorrhage, mass or large territorial infarction   2.  Remote LEFT basal ganglia lacunar infarction   3.  Mild atrophy   4.  White matter changes              Assessment/Plan  * Acute metabolic encephalopathy- (present on admission)  Assessment & Plan  Likely multifactorial toxic metabolic encephalopathy due to sepsis, LP no significant findings. Acute to subacute stroke on MRI  Neurology now signed off  Head CT unremarkable for acute changes  Continue antibiotics for C. difficile and Pseudomonas in urine  Frequent reorientation   Serial neuro exams  Continue supportive care  Slowly improving    Stroke (HCC)  Assessment & Plan  Acute to subacute  Subcortical infarct, L centrum semiovale  Likely embolic  H/o a flutter, could not tolerate anticoagulation due to recurrent severe gi bleeds  Echo with no acute findings  Speech, PT and OT following  Plan to return to [facility] when medically stable    Hypokalemia- (present on admission)  Assessment & Plan  Due to diarrhea  Replaced  resolved    Kidney transplant status, living related donor- (present on admission)  Assessment & Plan  Remote history of Kidney transplant   Continue Cellcept and  tacrolimus    Hypomagnesemia  Assessment & Plan  Severely low, replaced, now wnl    Clostridium difficile infection  Assessment & Plan  C. difficile positive  Continue oral vancomycin  Following  Euvolemic on exam    UTI (urinary tract infection)- (present on admission)  Assessment & Plan  Urine cultures growing Pseudomonas  Completed course of zosyn  ID following     Sepsis ([facility])- (present on admission)  Assessment & Plan  This is Severe Sepsis Present on admission  SIRS criteria identified on my evaluation include: Tachycardia, with heart rate greater than 90 BPM and Leukocytosis, with WBC greater than 12,000  Clinical indicators of end organ dysfunction include Creatinine >2.0 (Without ESRD or CKD)  Source is urinary  Sepsis protocol initiated  Crystalloid Fluid Administration: Fluid resuscitation ordered per standard protocol - 30 mL/kg per current or ideal body weight  IV antibiotics as appropriate for source of sepsis  Reassessment: I have reassessed the patient's hemodynamic status  resolving    Acute left eye pain- (present on admission)  Assessment & Plan  Reportedly recently seen by ophthalmology Dr. Addison and started on prednisone for optic nerve inflammation, suspect optic neuritis  Neurology now signed off  Continue supportive care  Continue steroids  Vision much improved, now stable  following    Leukocytosis- (present on admission)  Assessment & Plan  Earlier resolved but now mild, suspect due to steroids  Following intermittently    Duodenal ulcer- (present on admission)  Assessment & Plan  Hx of, will cont PPI    Immunocompromised ([facility])- (present on admission)  Assessment & Plan  Kidney transplant patient  Continue chronic immunosuppressants    Positive urine culture- (present on admission)  Assessment & Plan  Pseudomonas, was on zosyn  ID to eval     Thrombocytopenia ([facility])- (present on admission)  Assessment & Plan  Acute on chronic  Has been fluctuating  Follow intermittently    Atrial  fibrillation (HCC)- (present on admission)  Assessment & Plan  Chronic, intermittent  Rate up to 140's today, now improved  Start BB and titrate as needed  Not candidate for anticoagulation at this time due to recurrent severe gi bleeds    Acute-on-chronic kidney injury (HCC)- (present on admission)  Assessment & Plan  Baseline Cr is around 1.5. Cr 2.1 on admission, likely sec to sepsis and UTI  improved  Renal ultrasound without hydronephrosis  IVF, avoid nephrotoxins and renal dosing meds  In stable range       VTE prophylaxis: heparin ppx    I have performed a physical exam and reviewed and updated ROS and Plan today (4/24/2022). In review of yesterday's note (4/23/2022), there are no changes except as documented above.

## 2022-04-25 NOTE — THERAPY
Physical Therapy   Daily Treatment     Patient Name: Krishan Acevedo  Age:  72 y.o., Sex:  male  Medical Record #: [id number]  Today's Date: 4/25/2022    Precautions: Fall Risk;Swallow Precautions    Assessment  Pt seen for PT treatment session. Details below. Pt presents with decr engagement (mentation and physical functioning) compared to previous sessions, required max/total A for mobility and had difficulty maintaining balance at EOB. Unable to complete STS this session. PT will follow.     Plan  Continue current treatment plan.  DC Equipment Recommendations: Unable to determine at this time  Discharge Recommendations: Recommend post-acute placement for additional physical therapy services prior to discharge home         04/25/22 0966   Cognition    Level of Consciousness Responds to voice (alert but also lethargic, takes a while before actually opening eyes)   Ability To Follow Commands 1 Step (follows < 50% of the time. lethargy/weakness vs behavior/cognition)   New Learning Impaired   Attention Impaired   Sequencing Impaired   Initiation Impaired   Comments pt with decr in mentation and engagement this session. pt soft spoken and with minimal verbalizations, mostly speaks just to report pain (though this is even sporadic and appearing unprovoked at times). pt requires significant incr in cueing and repeated direction to participate. previously pt talkative though with nonsensical comments, today required max encouragement for minimal engagment with therapist.   Balance   Sitting Balance (Static) Poor   Sitting Balance (Dynamic) Poor -   Weight Shift Sitting Poor   Skilled Intervention Verbal Cuing;Tactile Cuing;Sequencing;Postural Facilitation   Comments decr effort, pt requiring almost continuous support to maintain upright, presents with both posterior and anterior LOB.   Bed Mobility    Supine to Sit Total Assist   Sit to Supine Maximal Assist   Scooting Maximal Assist   Skilled Intervention Verbal  "Cuing;Tactile Cuing;Sequencing;Postural Facilitation;Compensatory Strategies   Comments not attempting to help with initiating movement to EOB   Functional Mobility   Sit to Stand Refused   Comments total A for attempted STS to scoot laterally along EOB, pt resisting and stating \"put me back in the bed now\"   Short Term Goals    Short Term Goal # 1 pt will perform supine <> sit without bed features with SPV in 6 visits to get in/out of bed at home   Goal Outcome # 1 goal not met   Short Term Goal # 2 pt will perform STS with SPV in 6 visits for improved independence   Goal Outcome # 2 Goal not met   Short Term Goal # 3 pt will perform SPT with min A in 6 visits to sit in chair   Goal Outcome # 3 Goal not met   Short Term Goal # 4 pt will ambulate 50ft with FWW and min A in 6 visits to initiate gait   Goal Outcome # 4 Goal not met     "

## 2022-04-25 NOTE — PROGRESS NOTES
Hospital Medicine Daily Progress Note    Date of Service  4/25/2022    Chief Complaint  Krishan Acevedo is a 72 y.o. male admitted 4/15/2022 with altered mental status    Hospital Course  72 y.o. male with history of ANCA + glomerulonephritis, remote history of kidney transplant, atrial fibrillation (not on anticoagulation due to GI bleed), duodenal ulcer, R eye retinal detachment, who presented 4/15/2022 by ambulance after being found down, altered in the bathroom of his eye doctor's office. Per chart review, he had been recently discharged on 4/12 after evaluation for eye pain. At that time, he was prescribed prednisone 60mg daily for 7 days and was instructed to follow up with eye doctor as outpatient. Patient went to his eye doctor's appointment and was found down in the bathroom.   Patient resides at [facility] after prolonged greer with COVID. Per chart review, patient's admitting mental status was very deviated from his baseline.   On admission, he was tachycardia with  and reported chills with shivering at bedside.   Here labs remarkable for wbc 12.5, Cr 2.11, lactic acid 2.7. UA pos UTI.    Patient was admitted for acute metabolic encephalopathy. On 4/15, a head CT showed no evidence of acute hemorrhage, mass or large territorial infarct but patient noted for remote left basilar ganglier lacunar infarct, mild atrophy, and white matter changes.  C. difficile positive and urinalysis with Pseudomonas of which he has had in the past.  He was started on vancomycin and Zosyn for the above.    Interval Problem Update  Loose stool this am, no diarrhea in 2 days. He is currently axox2, able to tell me his name and that we are at [facility], he cannot tell me the date or the year and he is able to follow only some instructions. He is currently in SR, appears to have mild dehydration, he denies pain, he is less interactive than yesterday, no focal weakness. ROS otherwise negative or unobtainable    I have  personally seen and examined the patient at bedside. I discussed the plan of care with case management, ID, patient and bedside RN.    Consultants/Specialty  Neurology  ID  Palliative care pending    Code Status  Full Code    Disposition  Patient is not medically cleared for discharge.   Anticipate discharge TBD  I have placed the appropriate orders for post-discharge needs.    Review of Systems  Review of Systems   Unable to perform ROS: Mental status change   Constitutional: Negative.  Negative for chills, diaphoresis, fever, malaise/fatigue and weight loss.   HENT: Negative.  Negative for sore throat.    Eyes: Negative.  Negative for blurred vision.        Chronic right sided blindness   Respiratory: Negative.  Negative for cough and shortness of breath.    Cardiovascular: Negative.  Negative for chest pain, palpitations and leg swelling.   Gastrointestinal: Negative for abdominal pain, diarrhea, nausea and vomiting.   Genitourinary: Negative.  Negative for dysuria.   Musculoskeletal: Negative.  Negative for myalgias.   Skin: Negative.  Negative for itching and rash.   Neurological: Positive for weakness. Negative for dizziness, focal weakness and headaches.   Endo/Heme/Allergies: Negative.  Does not bruise/bleed easily.   Psychiatric/Behavioral: Positive for memory loss. Negative for depression, substance abuse and suicidal ideas.   All other systems reviewed and are negative.     Physical Exam  Temp:  [35.9 °C (96.6 °F)-36.8 °C (98.2 °F)] 36.5 °C (97.7 °F)  Pulse:  [] 81  Resp:  [18-20] 18  BP: (122-144)/(62-94) 122/68  SpO2:  [94 %-98 %] 96 %    Physical Exam  Constitutional:       General: He is not in acute distress.     Appearance: Normal appearance.   HENT:      Head: Normocephalic and atraumatic.      Nose: Nose normal. No congestion.      Mouth/Throat:      Mouth: Mucous membranes are moist.   Eyes:      Extraocular Movements: Extraocular movements intact.      Pupils: Pupils are equal, round, and  reactive to light.   Cardiovascular:      Rate and Rhythm: Tachycardia present. Rhythm irregular.      Pulses: Normal pulses.      Heart sounds: Normal heart sounds.   Pulmonary:      Effort: Pulmonary effort is normal.      Breath sounds: Normal breath sounds.   Abdominal:      General: Bowel sounds are normal.      Palpations: Abdomen is soft.      Tenderness: There is no abdominal tenderness.   Musculoskeletal:         General: No swelling. Normal range of motion.      Cervical back: Normal range of motion and neck supple.   Skin:     General: Skin is warm.      Coloration: Skin is not jaundiced.   Neurological:      General: No focal deficit present.      Mental Status: He is alert. Mental status is at baseline.      Cranial Nerves: No cranial nerve deficit.      Comments: Increased tone and bradykinesia   Psychiatric:         Mood and Affect: Affect normal.         Speech: Speech is not delayed or tangential.         Behavior: Behavior is cooperative.         Thought Content: Thought content is not paranoid.         Cognition and Memory: Cognition is impaired. Memory is impaired. He exhibits impaired recent memory and impaired remote memory.         Fluids    Intake/Output Summary (Last 24 hours) at 4/25/2022 1024  Last data filed at 4/25/2022 0932  Gross per 24 hour   Intake 240 ml   Output --   Net 240 ml       Laboratory  Recent Labs     04/23/22  0718 04/24/22  0228 04/25/22  0347   WBC 15.6* 14.4* 9.3   RBC 3.05* 3.11* 2.96*   HEMOGLOBIN 8.9* 9.1* 8.7*   HEMATOCRIT 28.8* 29.6* 28.4*   MCV 94.4 95.2 95.9   MCH 29.2 29.3 29.4   MCHC 30.9* 30.7* 30.6*   RDW 64.4* 66.0* 67.7*   PLATELETCT 146* 132* 87*   MPV 9.9 10.4 10.7     Recent Labs     04/23/22  0919 04/24/22  0228 04/25/22  0347   SODIUM 140 143 146*   POTASSIUM 3.8 3.8 3.7   CHLORIDE 107 110 111   CO2 19* 20 22   GLUCOSE 108* 86 110*   BUN 17 19 26*   CREATININE 1.13 1.25 1.47*   CALCIUM 9.3 9.8 9.9                   Imaging  EC-ECHOCARDIOGRAM COMPLETE  W/O CONT   Final Result      MR-BRAIN-W/O   Final Result      1.  Tiny right temporal occipital cortical infarct and a couple punctate recent left centrum semiovale infarcts.   2.  Chronic microvascular ischemic type changes and a few tiny chronic lacunar infarcts.   3.  Generalized cerebral volume loss.      [id number] VIEW   Final Result      No acute findings.      US-RENAL   Final Result      1.  Normal appearance of the right lower quadrant renal transplant with no hydronephrosis.   2.  Small echogenic native left kidney consistent with renal disease.   3.  Nonvisualization of the native right kidney and bladder.      DX-CHEST-PORTABLE (1 VIEW)   Final Result      Unchanged BILATERAL patchy airspace disease compatible with pneumonia. Covid pneumonia is a possibility.      CT-HEAD W/O   Final Result      1.  No evidence of acute hemorrhage, mass or large territorial infarction   2.  Remote LEFT basal ganglia lacunar infarction   3.  Mild atrophy   4.  White matter changes              Assessment/Plan  * Acute metabolic encephalopathy- (present on admission)  Assessment & Plan  Likely multifactorial toxic metabolic encephalopathy due to sepsis, LP no significant findings. Acute to subacute stroke on MRI  Neurology now signed off  Head CT unremarkable for acute changes  Continue antibiotics for C. difficile and Pseudomonas in urine  Frequent reorientation   Serial neuro exams  Continue supportive care  Fluctuating  RPR negative    Stroke (HCC)  Assessment & Plan  Acute to subacute  Subcortical infarct, L centrum semiovale  Likely embolic  H/o a flutter, could not tolerate anticoagulation due to recurrent severe gi bleeds  Echo with no acute findings  Speech, PT and OT following  Plan to return to [facility] when medically stable    Hypokalemia- (present on admission)  Assessment & Plan  Due to diarrhea  Replaced  resolved    Kidney transplant status, living related donor- (present on admission)  Assessment &  Plan  Remote history of Kidney transplant   Continue Cellcept and tacrolimus    Hypomagnesemia  Assessment & Plan  Severely low, replaced, now wnl    Clostridium difficile infection  Assessment & Plan  C. difficile positive  Continue oral vancomycin  Following      UTI (urinary tract infection)- (present on admission)  Assessment & Plan  Urine cultures growing Pseudomonas  Completed course of zosyn  ID following     Sepsis (HCC)- (present on admission)  Assessment & Plan  This is Severe Sepsis Present on admission  SIRS criteria identified on my evaluation include: Tachycardia, with heart rate greater than 90 BPM and Leukocytosis, with WBC greater than 12,000  Clinical indicators of end organ dysfunction include Creatinine >2.0 (Without ESRD or CKD)  Source is urinary  Sepsis protocol initiated  Crystalloid Fluid Administration: Fluid resuscitation ordered per standard protocol - 30 mL/kg per current or ideal body weight  IV antibiotics as appropriate for source of sepsis  Reassessment: I have reassessed the patient's hemodynamic status  resolving    Acute left eye pain- (present on admission)  Assessment & Plan  Reportedly recently seen by ophthalmology Dr. Addison and started on prednisone for optic nerve inflammation, suspect optic neuritis  Neurology now signed off  Continue supportive care  Continue steroids  Vision much improved  Continue to follow    Leukocytosis- (present on admission)  Assessment & Plan  Earlier resolved but now mild, suspect due to steroids  Following intermittently    Duodenal ulcer- (present on admission)  Assessment & Plan  Hx of, will cont PPI    Immunocompromised (HCC)- (present on admission)  Assessment & Plan  Kidney transplant patient  Continue chronic immunosuppressants    Hypernatremia- (present on admission)  Assessment & Plan  Mild dehydration on exam  Will change fluids  following    Positive urine culture- (present on admission)  Assessment & Plan  Pseudomonas, was on  zosyn  ID to eval     Thrombocytopenia (HCC)- (present on admission)  Assessment & Plan  Acute on chronic  Continues fluctuating  Decreased overnight, h/h stable, no s/o bleeding  Will continue to follow    Atrial fibrillation (HCC)- (present on admission)  Assessment & Plan  Chronic, intermittent  Currently in SR  Tolerating BB,  titrate as needed  Not candidate for anticoagulation at this time due to recurrent severe gi bleeds    Acute-on-chronic kidney injury (HCC)- (present on admission)  Assessment & Plan  Baseline Cr is around 1.5. Cr 2.1 on admission, likely sec to sepsis and UTI  Fluctuating  Mild dehydration on exam today, with creat increasing - will adjust fluids and follow  Renal ultrasound without hydronephrosis  IVF, avoid nephrotoxins and renal dosing meds  In stable range       VTE prophylaxis: heparin ppx    I have performed a physical exam and reviewed and updated ROS and Plan today (4/25/2022). In review of yesterday's note (4/24/2022), there are no changes except as documented above.

## 2022-04-25 NOTE — CARE PLAN
The patient is Watcher - Medium risk of patient condition declining or worsening    Shift Goals  Clinical Goals: safety  Patient Goals: rest  Family Goals: syl    Progress made toward(s) clinical / shift goals:  patient remained safe and rested      Problem: Psychosocial  Goal: Patient's ability to verbalize feelings about condition will improve  Outcome: Progressing  Goal: Patient's ability to re-evaluate and adapt role responsibilities will improve  Outcome: Progressing  Goal: Patient and family will demonstrate ability to cope with life altering diagnosis and/or procedure  Outcome: Progressing  Goal: Spiritual and cultural needs incorporated into hospitalization  Outcome: Progressing     Problem: Hemodynamics  Goal: Patient's hemodynamics, fluid balance and neurologic status will be stable or improve  Outcome: Progressing     Problem: Self Care  Goal: Patient will have the ability to perform ADLs independently or with assistance (bathe, groom, dress, toilet and feed)  Outcome: Progressing     Problem: Fall Risk  Goal: Patient will remain free from falls  Outcome: Progressing     Problem: Pain - Standard  Goal: Alleviation of pain or a reduction in pain to the patient’s comfort goal  Outcome: Progressing     Problem: Skin Integrity  Goal: Skin integrity is maintained or improved  Outcome: Progressing     Problem: Knowledge Deficit - Standard  Goal: Patient and family/care givers will demonstrate understanding of plan of care, disease process/condition, diagnostic tests and medications  Outcome: Progressing     Problem: Fluid Volume  Goal: Fluid volume balance will be maintained  Outcome: Progressing     Problem: Respiratory  Goal: Patient will achieve/maintain optimum respiratory ventilation and gas exchange  Outcome: Progressing     Problem: Mechanical Ventilation  Goal: Safe management of artificial airway and ventilation  Outcome: Progressing  Goal: Successful weaning off mechanical ventilator, spontaneously  maintains adequate gas exchange  Outcome: Progressing  Goal: Patient will be able to express needs and understand communication  Outcome: Progressing     Problem: Physical Regulation  Goal: Diagnostic test results will improve  Outcome: Progressing  Goal: Signs and symptoms of infection will decrease  Outcome: Progressing     Problem: Optimal Care of the Stroke Patient  Goal: Optimal emergency care for the stroke patient  Outcome: Progressing  Goal: Optimal acute care for the stroke patient  Outcome: Progressing     Problem: Knowledge Deficit - Stroke Education  Goal: Patient's knowledge of stroke and risk factors will improve  Outcome: Progressing     Problem: Psychosocial - Patient Condition  Goal: Patient's ability to verbalize feelings about condition will improve  Outcome: Progressing  Goal: Patient's ability to re-evaluate and adapt role responsibilities will improve  Outcome: Progressing     Problem: Discharge Planning - Stroke  Goal: Ensure Stroke Core Measures are met prior to discharge  Outcome: Progressing  Goal: Patient’s continuum of care needs will be met  Outcome: Progressing     Problem: Hemodynamic Monitoring  Goal: Patient's hemodynamics, fluid balance and neurologic status will be stable or improve  Outcome: Progressing     Problem: Respiratory - Stroke Patient  Goal: Patient will achieve/maintain optimum respiratory rate/effort  Outcome: Progressing     Problem: Dysphagia  Goal: Dysphagia will improve  Outcome: Progressing     Problem: Risk for Aspiration  Goal: Patient's risk for aspiration will be absent or decrease  Outcome: Progressing     Problem: Urinary Elimination  Goal: Establish and maintain regular urinary output  Outcome: Progressing     Problem: Bowel Elimination  Goal: Establish and maintain regular bowel function  Outcome: Progressing     Problem: Mobility - Stroke  Goal: Spasticity will be prevented or improved  Outcome: Progressing  Goal: Subluxation will be prevented or  improved  Outcome: Progressing         Problem: Psychosocial  Goal: Patient's level of anxiety will decrease  Outcome: Not Met     Problem: Communication  Goal: The ability to communicate needs accurately and effectively will improve  Outcome: Not Met     Problem: Mobility  Goal: Patient's capacity to carry out activities will improve  Outcome: Not Met     Problem: Urinary - Renal Perfusion  Goal: Ability to achieve and maintain adequate renal perfusion and functioning will improve  Outcome: Not Met     Problem: Neuro Status  Goal: Neuro status will remain stable or improve  Outcome: Not Met     Problem: Mobility - Stroke  Goal: Patient's capacity to carry out activities will improve  Outcome: Not Met

## 2022-04-25 NOTE — DISCHARGE PLANNING
Anticipated Discharge Disposition:   Possible Hospice Placement    Action:   Discussed during IDT rounds. Per MD pt had subacute stroke, pneumonia complicated by COVID, optic neuritis and has been encepalophatic. Discussed ensued regarding possible hospice placement . MD said that palliative care RN already met with pt.     JONH talked to Stephanie RN at Palliative Care. She confirmed talking to pt's wife but seems not ready for Hospice yet since wife's best friend passed away today. Also wife is concerned about cost of hospice room and board. Although pt has long term insurance apparently. Informed Stephanie that the family would need to call insurance if it covers for hospice room and board.     Stephanie said she will follow up with wife tomorrow.     Barriers to Discharge:   Medical clearance vs hospice placement.     Plan:   Follow up with MD and Palliative RN tomorrow.

## 2022-04-25 NOTE — CONSULTS
"Reason for PC Consult: Advance Care Planning    Consulted by: Dr. Rowland    Assessment:  General: Krishan Acevedo is a 72 year-old male pt with a PMHx ANCA + glomerulonephritis, s/p kidney transplant in 2008, atrial fibrillation (not on anticoagulation due to GI bleed), duodenal ulcer, R eye retinal detachment. He presented to the ED on 4/15/2022 after being found down in the bathroom of his eye doctor’s office. He has been residing at [facility] following a prolonged greer with COVID. CT of the head on 4/15 showed no evidence of acute hemorrhage, mass or large territorial infarct but noted remote left basilar ganglier lacunar infarct. He has tested positive for C. difficile and Pseudomonas in the urine. He remains confused on [employer].     Social: Krishan lives at home with his wife, Penelope. They have been  for 34 years. Prior to shannon COVID pt was “able to live a pretty normal life.\" He exercised with his wife when she asked him to, preformed all of his own ADLs, and walked without assistive devices. Prior to his retinal detachment he enjoyed woodworking.     Consults: Neurology, PM&R, ID    Dyspnea: No  Last BM: 04/25/22  Pain: Unable to determine  Depression: Unable to determine  Dementia: Unable to Determine    Spiritual:  Is Rastafari or spirituality important for coping with this illness? Unable to determine  Has a  or spiritual provider visit been requested? Unable to determine    Palliative Performance Scale: 40%    Advance Directive: Advance Directive  DPOA: Yes- Penelope  POLST: No    Code Status: Full    Outcome:  1045: Consult received and chart reviewed. Discussed with MD, updates appreciated. Pt has an AD scanned into Epic (to view this, please hover over code status). This document names his wife, Penelope, as DPOA-HC.     1245: PC RN called pt's wife and DPOA-HC, Penelope. Introduced self and role of palliative care. Penelope updated RN on the social and medical history. Assessed Penelope's " "understanding of current medical status, overall health picture, and options for future care. Demonstrates a good understanding of the current clinical picture.    Explored patient’s expressed values, beliefs, and preferences in order to identify GOC. Penelope states that she and Krishan have talked about their healthcare goals some and \"he doesn’t want to prolong a life that is terminal.” They have not discussed what patient would consider an acceptable quality of life. Penelope states that pt has been in and out of the health care system quite frequently and following a prolonged hospitalization in 2014 he has not been able to return to his previous level of functioning but did \"regain some quality of life.\"  Penelope is concerned that pt may never return home. Pt was refusing therapy fairly frequently at [facility] previously but Penelope feels that this is related to his confusion.      Discussed the issue of code status. Penelope states “we don’t want him to have to have CPR, I think a do not resuscitate is the way that the family would go at this point.” Penelope states that she is concerned about burden of this outweighing the benefit and that pt's brother agreed with her. Penelope also states that she would never want to see Krishan go through being on a mechanical ventilator again. Confirmed that Penelope would like pt's code status changed to reflect DNR/DNI status. MD notified.     PC RN introduced the philosophy of hospice. Educated that hospice is a group of caregivers who provide end of life care, focused on remaining quality of life rather than quantity of life. Discussed that the focus switches from disease modifying treatment to management of symptoms such as dyspnea, anxiety, nausea, pain, etc. I counseled that they provide EOL care needs and are available 24/7, though they do not provide 24/7 direct care. Penelope states that she may be forced to chose SNF or long term care for pt as she does not feel that she could care for pt " at home. Discussed the option to proceed with hospice in a group home setting as a future care option. Penelope verbalized understanding.     Active listening, reflection, reminiscing, validation & normalization, and empathic support utilized throughout this encounter.  All questions answered and contact information provided.      Updated: MD     Plan: POLST prior to discharge. Continue to monitor clinical picture and support pt/family.    Thank you for allowing Palliative Care to participate in this patient's care. Please feel free to call [phone] with any questions or concerns.

## 2022-04-25 NOTE — CARE PLAN
The patient is Stable - Low risk of patient condition declining or worsening    Shift Goals  Clinical Goals: safety, Q 2 turn.  Patient Goals: comfort  Family Goals: DENA        Patient is not progressing towards the following goals:      Problem: Communication  Goal: The ability to communicate needs accurately and effectively will improve  Outcome: Progressing   Pt is not following commands. Notified MD.   Problem: Mobility  Goal: Patient's capacity to carry out activities will improve  Outcome: Progressing  PT saw pt, Pt didn't do as well as last time. MD aware. Continue monitoring for now.

## 2022-04-25 NOTE — THERAPY
"Speech Language Pathology  Daily Treatment     Patient Name: Krishan Acevedo  Age:  72 y.o., Sex:  male  Medical Record #: [id number]  Today's Date: 4/25/2022     Precautions  Precautions: Fall Risk,Swallow Precautions ( See Comments)    Assessment    Patient was seen for dysphagia tx with lunch tray soft/bite sized solids and thin liquids and therapeutic trials of mildly thick liquids and liquidized solids. Patient was lethargic, A&Ox1 (name only).  Per RN and EMR, pt \"spit his breakfast out\" and could not spit water when oral care was being provided. Decreased vocal intensity noted, needing consistent reminders to speak in a louder volume. Patient refused food items from lunch tray, only agreeable to apple juice and applesauce. Wet/gurgly vocal quality and reflexive cough response appreciated with PO of thin liquids via tsp/cup sips, concerning for airway invasion. Bolus hold of 5-15sec appreciated and delayed swallow response with all PO intake appreciated. However, no s/sx of aspiration occurred with LQ3 or MT2 and vocal quality remained clear. Given pt's lethargy and s/sx of aspiration with thin liquids, diet downgrade is indicated. Pt is at risk for malnutrition/dehydration if mental status does not improve, in which case supplemental non oral nutrition may be indicated.    Recommendations:  Diet of mildly thick liquids and liquidized solids       -Upright during meals, small bites/sips, 1:1 feeding assistance              -Oral care after meals   -Pills crushed in applesauce  Consider Cortrak for supplemental nutrition if PO intake remains limited, if in alignment with GOC.    Plan    Continue current treatment plan.    Discharge Recommendations: Recommend post-acute placement for additional speech therapy services prior to discharge home       Objective       04/25/22 1226   Dysphagia    Positioning / Behavior Modification Self Monitoring;Modulate Rate or Bite Size   Other Treatments therapeutic trials of MT2 " "and LQ3, attempted SB6 and TN0 from lunch tray   Diet / Liquid Recommendation Mildly Thick (2) - (Nectar Thick);Liquidised (3) - (Nectar Thick Full Liquid)   Nutritional Liquid Intake Rating Scale Thickened beverages (mildly thick unless otherwise specified)   Nutritional Food Intake Rating Scale Total oral diet of a single consistency   Recommended Route of Medication Administration   Medication Administration  Other (See Comments)  (crushed in applesauce)   Patient / Family Goals   Patient / Family Goal #1 \"No more\"   Goal #1 Outcome Progressing slower than expected   Short Term Goals   Short Term Goal # 1 4/25: Patient will consume a diet of liquidized solids and mildly thick liquids with no s/sx of aspiration with 1:1 feeding during meals   Short Term Goal # 1 B  Pt will consume a diet of SB/TN with no s/sx of aspiration and min cues.   Goal Outcome  # 1 B Goal not met     "

## 2022-04-25 NOTE — PROGRESS NOTES
Pt was lethargic. Didn't follow command. Spit his breakfast out and couldn't get water out of his mouth well when we brushed his teeth. Pt didn't move any extremity for this RN during neuro assessment and refused to open his eyes for pupil check. Notified Dr Rowland about newest status. Dr Rowland assessed pt and order to keep monitoring pt for now because pt neuro status have been going back and forth. Contacted speech for reeval of pt per order.

## 2022-04-26 NOTE — THERAPY
Missed Therapy     Patient Name: Krishan Acevedo  Age:  72 y.o., Sex:  male  Medical Record #: [id number]  Today's Date: 4/26/2022    Discussed missed therapy with RN.    Attempted to see patient for a swallowing reassessment today as his swallowing has worsened since yesterday with pt pocketing crushed pills, requiring oral suctioning. Patient was sleeping upon arrival and shook his head no when asked if he could wake up for therapy or to try to eat or drink something. Patient was unarousable despite verbal/tactile cues. SLP will reattempt as appropriate/able.     Medical team may consider making pt NPO if arousal does not improve. Consider Cortrak for supplemental nutrition/hydration/meds if in alignment with GOC.       04/26/22 2196   Treatment Variance   Reason For Missed Therapy Medical - Patient Unarousable   Total Time Spent   Total Time Spent (Mins) 5      labored

## 2022-04-26 NOTE — PROGRESS NOTES
"Attempted to give morning meds, Pt was awake and able to nod head and whisper \"ok\". Was able to swallowing morning morning Vanco, but crushed pills in applesauce were pouched in the L side of the mouth and pt was unable to swallow after multiple cues given. Suction was utilized and mouth was cleaned. MD notified of medications unable to be administered. Celcept, prograf and omeprazole unable to be crushed, MD aware.    "

## 2022-04-26 NOTE — CARE PLAN
Problem: Pain - Standard  Goal: Alleviation of pain or a reduction in pain to the patient’s comfort goal  Outcome: Progressing  Flowsheets (Taken 4/26/2022 1355)  Non Verbal Scale:   Grimacing   Moaning   Noisy Breathing  OB Pain Intervention:   Medication - See MAR   Repositioned   Therapeutic touch   Relaxation technique   Spiritual Care consult     Problem: Skin Integrity  Goal: Skin integrity is maintained or improved  Outcome: Progressing  Note: Apply moisture barrier cream. Keep dry & clean   The patient is Watcher - Medium risk of patient condition declining or worsening    Shift Goals  Clinical Goals: Confort  Patient Goals: DENA  Family Goals: DENA    Progress made toward(s) clinical / shift goals: Increase Lactic level, notified MD. Pt. On comfort care

## 2022-04-26 NOTE — THERAPY
Missed Therapy     Patient Name: Krishan Acevedo  Age:  72 y.o., Sex:  male  Medical Record #: [id number]  Today's Date: 4/26/2022    Discussed missed therapy with RN.    Second attempt to see pt for a swallowing reassessment after RN contacted SLP stating pt was more alert. Pt was unarousable despite verbal/tactile cues and desatting to 82%. O2 sats increased to 90% with cues for deep breaths. However, pt continued to desat to low 80's. RN was notified and she provided O2 via nasal cannula. Pt's wife arrived while clinician was in the room and MD was contacted. Following discussion w/ MD and spouse, pt has now transitioned to comfort care. Due to change in POC, SLP will no longer actively follow patient.      04/26/22 1222   Treatment Variance   Reason For Missed Therapy Medical - Patient Unarousable;Medical - Patient Is Not Medically Stable   Total Time Spent   Total Time Spent (Mins) 10

## 2022-04-26 NOTE — PROGRESS NOTES
Spiritual Care Note      Patient Information     Patient's Name: Krishan Acevedo   MRN: [id number]    YOB: 1950   Age and Gender: 72 y.o. male   Service Area: [facility]   Room (and Bed): [place] [address]   Ethnicity or Nationality: white   Primary Language: English   Catholic/Spiritual preference: Non-Catholic   Place of Residence: [place]   Medical Diagnosis(-es)/Procedure(s): Acute metabolic encephalopathy  Stroke  Hypokalemia  Kidney transplant status, living related donor  Hypomagnesemia  Clostridium difficile infection  UTI   Sepsis   Acute left eye pain  Leukocytosis  Duodenal ulcer  Immunocompromised  Hypernatremia  Positive urine culture  Thrombocytopenia  Atrial fibrillation  Acute-on-chronic kidney injury    Code Status: Comfort Care/DNR    Date of Admission: 4/15/2022   Length of Stay: 11 days        Spiritual Screen Results:  Is your spiritual health or inner well-being important to you as you cope with your medical condition?: Unable to determine  Would you like to receive a visit from our Spiritual Care team or your own Anabaptism or spiritual leader?: Unable to determine      Nature of the Visit:  Initial, On shift    Crisis Visit:  Comfort Care    Referral From/Origin of Request:  Epic physician    Observations/Symptoms:  Patient unable to communicate meaningfully.    Plan:  Visit Upon Request      Spiritual Care Provider Information:  Chaplain PRATEEK Leslie  [phone]   [email or web address]

## 2022-04-26 NOTE — PROGRESS NOTES
After in person update with wife, she has requested that he be placed on comfort care, palliative care is with her now, providing support. I updated nephrology and comfort care orders are being placed.

## 2022-04-26 NOTE — PALLIATIVE CARE
"Palliative Care follow-up  PC RN met with patient's wife, Penelope, at bedside. Pt recently provided with detailed clinical update by Dr. Rowland. She demonstrates a good understanding of the current clinical picture and states \"I just don't think he is going to pull through this.\" She voices her desire for pt to be made as comfortable as possible, reassurance provided and education/support provided for bedside RN as well. Penelope was provided with a list of grief/breavement resources and she was grateful for this. Active listening, reflection, reminiscing, therapeutic touch, validation & normalization, and empathic support utilized throughout this encounter.  All questions answered.     Updated: MD and RN    Plan: Comfort care, will continue to monitor clinical picture and support pt/family.     Thank you for allowing Palliative Care to support this patient and family. Contact [phone] for additional assistance, change in patient status, or with any questions/concerns.   "

## 2022-04-26 NOTE — CARE PLAN
The patient is Stable - Low risk of patient condition declining or worsening    Shift Goals  Clinical Goals: Q2 turns ad saftey  Patient Goals: DENA  Family Goals: DENA    Progress made toward(s) clinical / shift goals:    Problem: Psychosocial  Goal: Patient's level of anxiety will decrease  Outcome: Progressing  Goal: Patient's ability to verbalize feelings about condition will improve  Outcome: Progressing  Goal: Patient's ability to re-evaluate and adapt role responsibilities will improve  Outcome: Progressing  Goal: Patient and family will demonstrate ability to cope with life altering diagnosis and/or procedure  Outcome: Progressing  Goal: Spiritual and cultural needs incorporated into hospitalization  Outcome: Progressing     Problem: Communication  Goal: The ability to communicate needs accurately and effectively will improve  Outcome: Progressing     Problem: Hemodynamics  Goal: Patient's hemodynamics, fluid balance and neurologic status will be stable or improve  Outcome: Progressing     Problem: Mobility  Goal: Patient's capacity to carry out activities will improve  Outcome: Progressing     Problem: Self Care  Goal: Patient will have the ability to perform ADLs independently or with assistance (bathe, groom, dress, toilet and feed)  Outcome: Progressing     Problem: Fall Risk  Goal: Patient will remain free from falls  Outcome: Progressing     Problem: Pain - Standard  Goal: Alleviation of pain or a reduction in pain to the patient’s comfort goal  Outcome: Progressing     Problem: Skin Integrity  Goal: Skin integrity is maintained or improved  Outcome: Progressing     Problem: Knowledge Deficit - Standard  Goal: Patient and family/care givers will demonstrate understanding of plan of care, disease process/condition, diagnostic tests and medications  Outcome: Progressing     Problem: Fluid Volume  Goal: Fluid volume balance will be maintained  Outcome: Progressing     Problem: Urinary - Renal  Perfusion  Goal: Ability to achieve and maintain adequate renal perfusion and functioning will improve  Outcome: Progressing     Problem: Respiratory  Goal: Patient will achieve/maintain optimum respiratory ventilation and gas exchange  Outcome: Progressing     Problem: Mechanical Ventilation  Goal: Safe management of artificial airway and ventilation  Outcome: Progressing  Goal: Successful weaning off mechanical ventilator, spontaneously maintains adequate gas exchange  Outcome: Progressing  Goal: Patient will be able to express needs and understand communication  Outcome: Progressing     Problem: Physical Regulation  Goal: Diagnostic test results will improve  Outcome: Progressing  Goal: Signs and symptoms of infection will decrease  Outcome: Progressing     Problem: Optimal Care of the Stroke Patient  Goal: Optimal emergency care for the stroke patient  Outcome: Progressing  Goal: Optimal acute care for the stroke patient  Outcome: Progressing     Problem: Knowledge Deficit - Stroke Education  Goal: Patient's knowledge of stroke and risk factors will improve  Outcome: Progressing     Problem: Psychosocial - Patient Condition  Goal: Patient's ability to verbalize feelings about condition will improve  Outcome: Progressing  Goal: Patient's ability to re-evaluate and adapt role responsibilities will improve  Outcome: Progressing     Problem: Discharge Planning - Stroke  Goal: Ensure Stroke Core Measures are met prior to discharge  Outcome: Progressing  Goal: Patient’s continuum of care needs will be met  Outcome: Progressing     Problem: Neuro Status  Goal: Neuro status will remain stable or improve  Outcome: Progressing     Problem: Hemodynamic Monitoring  Goal: Patient's hemodynamics, fluid balance and neurologic status will be stable or improve  Outcome: Progressing     Problem: Respiratory - Stroke Patient  Goal: Patient will achieve/maintain optimum respiratory rate/effort  Outcome: Progressing     Problem:  Dysphagia  Goal: Dysphagia will improve  Outcome: Progressing     Problem: Risk for Aspiration  Goal: Patient's risk for aspiration will be absent or decrease  Outcome: Progressing     Problem: Urinary Elimination  Goal: Establish and maintain regular urinary output  Outcome: Progressing     Problem: Bowel Elimination  Goal: Establish and maintain regular bowel function  Outcome: Progressing     Problem: Mobility - Stroke  Goal: Patient's capacity to carry out activities will improve  Outcome: Progressing  Goal: Spasticity will be prevented or improved  Outcome: Progressing  Goal: Subluxation will be prevented or improved  Outcome: Progressing       Patient is not progressing towards the following goals:

## 2022-04-26 NOTE — PROGRESS NOTES
Hospital Medicine Daily Progress Note    Date of Service  4/26/2022    Chief Complaint  Krishan Acevedo is a 72 y.o. male admitted 4/15/2022 with altered mental status    Hospital Course  72 y.o. male with history of ANCA + glomerulonephritis, remote history of kidney transplant, atrial fibrillation (not on anticoagulation due to GI bleed), duodenal ulcer, R eye retinal detachment, who presented 4/15/2022 by ambulance after being found down, altered in the bathroom of his eye doctor's office. Per chart review, he had been recently discharged on 4/12 after evaluation for eye pain. At that time, he was prescribed prednisone 60mg daily for 7 days and was instructed to follow up with eye doctor as outpatient. Patient went to his eye doctor's appointment and was found down in the bathroom.   Patient resides at [facility] after prolonged greer with COVID. Per chart review, patient's admitting mental status was very deviated from his baseline.   On admission, he was tachycardia with  and reported chills with shivering at bedside.   Here labs remarkable for wbc 12.5, Cr 2.11, lactic acid 2.7. UA pos UTI.    Patient was admitted for acute metabolic encephalopathy. On 4/15, a head CT showed no evidence of acute hemorrhage, mass or large territorial infarct but patient noted for remote left basilar ganglier lacunar infarct, mild atrophy, and white matter changes.  C. difficile positive and urinalysis with Pseudomonas of which he has had in the past.  He was started on vancomycin and Zosyn for the above.    Interval Problem Update  Reportedly no diarrhea overnight, he is more somnolent today, he did tell me his name but otherwise is not answering questions, not following commands. Remains on room air. Discussed with nursing he has intermittently been more alert today, he was pocketing food earlier - speech will re eval. Discussed with nephrology and wife - she is not quite ready for hospice but did change his code  status to dnr/ dni. ROS otherwise unobtainable    I have personally seen and examined the patient at bedside. I discussed the plan of care with case management, ID, nephrology, wife, patient and bedside RN.    Consultants/Specialty  Neurology  ID  Palliative care   Nephrology    Code Status  DNAR/DNI    Disposition  Patient is not medically cleared for discharge.   Anticipate discharge TBD  I have placed the appropriate orders for post-discharge needs.    Review of Systems  Review of Systems   Unable to perform ROS: Mental status change   Constitutional: Negative.    HENT: Negative.    Eyes: Negative.         Chronic right sided blindness   Respiratory: Negative.    Cardiovascular: Negative.    Genitourinary: Negative.    Musculoskeletal: Negative.    Skin: Negative.    Endo/Heme/Allergies: Negative.    All other systems reviewed and are negative.     Physical Exam  Temp:  [36.4 °C (97.5 °F)-37.1 °C (98.8 °F)] 36.4 °C (97.5 °F)  Pulse:  [85-98] 95  Resp:  [18-24] 18  BP: (123-154)/(56-89) 123/58  SpO2:  [92 %-99 %] 94 %    Physical Exam  Constitutional:       General: He is not in acute distress.     Appearance: Normal appearance.   HENT:      Head: Normocephalic and atraumatic.      Nose: Nose normal. No congestion.      Mouth/Throat:      Mouth: Mucous membranes are moist.   Eyes:      Extraocular Movements: Extraocular movements intact.      Pupils: Pupils are equal, round, and reactive to light.   Cardiovascular:      Rate and Rhythm: Normal rate and regular rhythm.      Pulses: Normal pulses.      Heart sounds: Normal heart sounds.   Pulmonary:      Effort: Pulmonary effort is normal.      Breath sounds: Normal breath sounds.   Abdominal:      General: Bowel sounds are normal.      Palpations: Abdomen is soft.      Tenderness: There is no abdominal tenderness.   Musculoskeletal:         General: No swelling. Normal range of motion.      Cervical back: Normal range of motion and neck supple.   Skin:     General:  Skin is warm.      Coloration: Skin is not jaundiced.   Neurological:      General: No focal deficit present.      Mental Status: He is alert. Mental status is at baseline.      Cranial Nerves: No cranial nerve deficit.      Comments: Increased tone and bradykinesia   Psychiatric:         Mood and Affect: Affect normal.         Speech: Speech is not delayed or tangential.         Behavior: Behavior is cooperative.         Thought Content: Thought content is not paranoid.         Cognition and Memory: Cognition is impaired. Memory is impaired. He exhibits impaired recent memory and impaired remote memory.         Fluids    Intake/Output Summary (Last 24 hours) at 4/26/2022 1139  Last data filed at 4/25/2022 1400  Gross per 24 hour   Intake 120 ml   Output --   Net 120 ml       Laboratory  Recent Labs     04/24/22 0228 04/25/22 0347 04/26/22  0104   WBC 14.4* 9.3 6.2   RBC 3.11* 2.96* 3.45*   HEMOGLOBIN 9.1* 8.7* 10.2*   HEMATOCRIT 29.6* 28.4* 33.1*   MCV 95.2 95.9 95.9   MCH 29.3 29.4 29.6   MCHC 30.7* 30.6* 30.8*   RDW 66.0* 67.7* 69.7*   PLATELETCT 132* 87* 73*   MPV 10.4 10.7 12.9     Recent Labs     04/24/22 0228 04/25/22 0347 04/26/22  0104   SODIUM 143 146* 144   POTASSIUM 3.8 3.7 3.9   CHLORIDE 110 111 110   CO2 20 22 16*   GLUCOSE 86 110* 118*   BUN 19 26* 34*   CREATININE 1.25 1.47* 1.84*   CALCIUM 9.8 9.9 10.0                   Imaging  EC-ECHOCARDIOGRAM COMPLETE W/O CONT   Final Result      MR-BRAIN-W/O   Final Result      1.  Tiny right temporal occipital cortical infarct and a couple punctate recent left centrum semiovale infarcts.   2.  Chronic microvascular ischemic type changes and a few tiny chronic lacunar infarcts.   3.  Generalized cerebral volume loss.      OI-[id number]-4 VIEW   Final Result      No acute findings.      US-RENAL   Final Result      1.  Normal appearance of the right lower quadrant renal transplant with no hydronephrosis.   2.  Small echogenic native left kidney consistent with  renal disease.   3.  Nonvisualization of the native right kidney and bladder.      DX-CHEST-PORTABLE (1 VIEW)   Final Result      Unchanged BILATERAL patchy airspace disease compatible with pneumonia. Covid pneumonia is a possibility.      CT-HEAD W/O   Final Result      1.  No evidence of acute hemorrhage, mass or large territorial infarction   2.  Remote LEFT basal ganglia lacunar infarction   3.  Mild atrophy   4.  White matter changes              Assessment/Plan  * Acute metabolic encephalopathy- (present on admission)  Assessment & Plan  Likely multifactorial toxic metabolic encephalopathy due to sepsis, LP no significant findings. Acute to subacute stroke on MRI  Neurology now signed off  Head CT unremarkable for acute changes  Continue antibiotics for C. difficile and Pseudomonas in urine  Frequent reorientation   Serial neuro exams  Continue supportive care  Continues to fluctuate, worse this am, query if worsening renal function or anti rejection meds contributing - nephrology to eval - will check tacrolimus levels  RPR negative    Stroke (HCC)  Assessment & Plan  Acute to subacute  Subcortical infarct, L centrum semiovale  Likely embolic  H/o a flutter, could not tolerate anticoagulation due to recurrent severe gi bleeds  Echo with no acute findings  Speech, PT and OT following  Plan to return to [facility] when medically stable    Hypokalemia- (present on admission)  Assessment & Plan  Due to diarrhea  Replaced  resolved    Kidney transplant status, living related donor- (present on admission)  Assessment & Plan  Remote history of Kidney transplant   Cellcept and tacrolimus levels pending  neprhology to eval  Metabolic acidosis    Hypomagnesemia  Assessment & Plan  Severely low, replaced, now wnl    Clostridium difficile infection  Assessment & Plan  C. difficile positive  Continue oral vancomycin through 4/27  Diarrhea resolved  Following      UTI (urinary tract infection)- (present on  admission)  Assessment & Plan  Urine cultures growing Pseudomonas  Completed course of zosyn  ID following     Sepsis ([facility])- (present on admission)  Assessment & Plan  This is Severe Sepsis Present on admission  SIRS criteria identified on my evaluation include: Tachycardia, with heart rate greater than 90 BPM and Leukocytosis, with WBC greater than 12,000  Clinical indicators of end organ dysfunction include Creatinine >2.0 (Without ESRD or CKD)  Source is urinary  Sepsis protocol initiated  Crystalloid Fluid Administration: Fluid resuscitation ordered per standard protocol - 30 mL/kg per current or ideal body weight  IV antibiotics as appropriate for source of sepsis  Reassessment: I have reassessed the patient's hemodynamic status  resolving    Acute left eye pain- (present on admission)  Assessment & Plan  Reportedly recently seen by ophthalmology Dr. Addison and started on prednisone for optic nerve inflammation, suspect optic neuritis  Neurology now signed off  Continue supportive care  Continue steroid taper  Vision had improved, difficult to access today  Continue to follow    Leukocytosis- (present on admission)  Assessment & Plan  Earlier resolved but now mild, suspect due to steroids  Following intermittently    Duodenal ulcer- (present on admission)  Assessment & Plan  Hx of, will cont PPI    Immunocompromised ([facility])- (present on admission)  Assessment & Plan  Kidney transplant patient  Continue chronic immunosuppressants    Hypernatremia- (present on admission)  Assessment & Plan  Mild   resolved    Positive urine culture- (present on admission)  Assessment & Plan  Pseudomonas, completed treatment    Thrombocytopenia ([facility])- (present on admission)  Assessment & Plan  Acute on chronic  Continues to slowly decrease  Decreased overnight, h/h stable, no s/o bleeding  Will continue to follow    Atrial fibrillation ([facility])- (present on admission)  Assessment & Plan  Chronic, intermittent  Currently in  SR  Tolerating BB,  titrate as needed  Not candidate for anticoagulation at this time due to recurrent severe gi bleeds    Acute-on-chronic kidney injury (HCC)- (present on admission)  Assessment & Plan  Baseline Cr is around 1.5. Cr 2.1 on admission, likely sec to sepsis and UTI  Had improved, now worsening, see above, nephrology to eval  Tacrolimus and cellcept levels pending  Renal ultrasound without hydronephrosis  IVF, avoid nephrotoxins and renal dosing meds  Continue to follow       VTE prophylaxis: heparin ppx    I have performed a physical exam and reviewed and updated ROS and Plan today (4/26/2022). In review of yesterday's note (4/25/2022), there are no changes except as documented above.

## 2022-04-26 NOTE — CONSULTS
[facility] -  PROGRESS NOTE               Author: Girish Owusu M.D. Date & Time: 4/26/2022  10:38 AM     HPI:  72 years old male with medical history notable for living unrelated renal transplant in 2008, secondary to Wegener's granulomatosis ANCA vasculitis, type 2 diabetes mellitus, atrial fibrillation, hypertension.  Patient was recently admitted to the hospital due to COVID-19 on February 2022 to March 5, 2022, subsequently sent to the skilled nursing facility, however presented again to the hospital on March due to lower GI bleed, patient underwent endoscopy which revealed duodenal ulcer which was treated with the clips.  Also at that time patient developed acute kidney injury therefore transplant renal biopsy was obtained which did not reveal any acute rejection however did reveal IgA nephropathy, also during hospital stay he developed ATN due to GI bleed and potential hemorrhagic shock.  Patient was discharged back to the [facility] nursing facility, however patient presented again with altered level of consciousness during ophthalmology visit therefore sent to the emergency department with an ambulance.  He was significantly disoriented on arrival, blood pressure was stable, heart rate tachycardia of 120, labs were pertinent for white blood cell count of 12.5 (patient has been treated with prednisone over the last couple of days before admission), creatinine of 2.1, lactic acid 2.7, urinalysis positive for white blood cell and nitrate therefore patient received antibiotics including cefepime, ceftriaxone, Zosyn.  During hospital stay C. difficile was checked due to diarrhea which revealed positive toxin therefore he was initiated on oral vancomycin.  Patient's creatinine improved however for the last 2 or 3 days it has been slowly rising now in the RADHA level therefore nephrology was consulted.    DAILY NEPHROLOGY SUMMARY:  4/26: No fever or chills overnight, blood pressure stable, on  "room air with normal pulse rate and respiratory rate.  Patient is disoriented and not able to give any review of systems.  His creatinine worsened to 1.8 with significant anion gap metabolic acidosis.  Has hypercalcemia secondary to hyperparathyroidism with a corrected 11.5.  Patient was kept on fluids with bicarb, significant bandemia present.    REVIEW OF SYSTEMS:    Unable to obtain due to ALOC    PMH/PSH/SH/FH: Reviewed and unchanged since admission note  CURRENT MEDICATIONS: Reviewed from admission to present day    VS:  /58   Pulse 95   Temp 36.4 °C (97.5 °F) (Temporal)   Resp 18   Ht 1.854 m (6' 0.99\")   Wt 113 kg (249 lb 1.9 oz)   SpO2 94%   BMI 32.87 kg/m²   Physical Exam  Constitutional:       General: He is not in acute distress.     Appearance: He is not ill-appearing.   HENT:      Nose: No rhinorrhea.      Mouth/Throat:      Mouth: Mucous membranes are dry.      Pharynx: No posterior oropharyngeal erythema.   Eyes:      General: No scleral icterus.     Pupils: Pupils are equal, round, and reactive to light.   Cardiovascular:      Rate and Rhythm: Normal rate and regular rhythm.      Heart sounds: No murmur heard.  Pulmonary:      Effort: No respiratory distress.      Breath sounds: Rhonchi (coarse, due to upper respiratory sound, hard to hear due to noise) present. No wheezing or rales.   Abdominal:      General: Abdomen is flat. There is no distension.      Tenderness: There is no abdominal tenderness. There is no guarding or rebound.   Musculoskeletal:      Cervical back: No rigidity.      Right lower leg: No edema.      Left lower leg: No edema.   Skin:     Capillary Refill: Capillary refill takes less than 2 seconds.      Coloration: Skin is pale.   Neurological:      Mental Status: He is disoriented.         Fluids:  In: 240 [P.O.:240]  Out: -     LABS:  Recent Labs     04/24/22  0228 04/25/22  0347 04/26/22  0104   SODIUM 143 146* 144   POTASSIUM 3.8 3.7 3.9   CHLORIDE 110 111 110 "   CO2 20 22 16*   GLUCOSE 86 110* 118*   BUN 19 26* 34*   CREATININE 1.25 1.47* 1.84*   CALCIUM 9.8 9.9 10.0       IMPRESSION:  · Acute kidney injury: Volume status hypovolemia vs euvolemia. Given cdiff, this could be prerenal azothemia. He was started on PPI last month, also received antibiotics on this admission, has significant WBC in UA with mild RBC, therefore concern for acute interstitial nephritis. Renal ultrasound 4/15 is negative for hydronephrosis. Recent kidney bx did not reveal any rejection, although showed IgA nephropathy. Tacrolimus level 6.1 on 4/12. No new tacrolimus level. 4/21 TTE shows EF of 60%, therefore not likely cardiorenal. No contrast studies identified last 48 hours.  · High anion gap metabolic acidosis likely due to RADHA  · Altered mental status  · Renal transplant, due to ANCA vasculitis, living unrelated donor, 2008  · Duodenal ulcer, march 2022  · Covid 19 pneumonia Feb 2022 - March 2022  · Cdiff, toxin positive  · Hypercalcemia, mild, due to primary hyperparathyroidism, on cinecalcet  · Type 2 diabetes mellitus  · Chronic immunosuppression  · Leukocytosis with bandemia    PLAN:  -No RRT indicated at this time.  -Treat prerenal azothemia with fluids. Agree with bicarb drip given significant acidosis on labs. Trend labs, IO, daily weights.  -Now off from antibiotics. Will trend and see if kidney functions will improve. AIN could also be induced by omeprazole, however discontinuation of this medication will be challenging due to recent duodenal ulcer.  -Check tacrolimus level, right before next dose. Mycophenolate level is pending. The patient's appropriate tacrolimus level is expected to be around 5.  -Check urine protein/creatinine ratio.  -Added CPK, patient is complaining muscle pain.  -Treatment of cdiff per primary team and ID.  -If no improvement, we will consider kidney bx.    Thank you for your consult  We will continue to follow.

## 2022-04-26 NOTE — PROGRESS NOTES
NOC Cross Cover Progress Note    Called by bedside RN this morning with report that patient's swallowing is worse and he is now unable to swallow morning medications even if crushed and in applesauce per SLP recommendations. Per bedside RN, patient pocketing medications in cheek requiring oral suctioning. Further, several medications on MAR are unable to be crushed, including cellcept and prilosec. The MAR suggests these medications have been given as scheduled throughout admission, ?crushed.     Airway patent throughout event. SpO2 requirements unchanged. Patient remains awake/alert.     PO meds held. Cellcept order has been changed to liquid formulary to avoid crushing once PO okay. Given patient's risk for aspiration a cortrak may be indicated for medication administration, if congruent with goals of care.     Chart review reveals worsening RADHA: BUN 34 from 26, Cr 1.84 from 1.47, bicarb 16 from 22. On oral bicarb and has been given per orders. Given patient unable to take PO meds, IVF changed to 150 mEq sodium bicarb in D5W.  Further management deferred to day team.       Linsey Wegener, APRN  NOC Hospitalist

## 2022-04-27 NOTE — PROGRESS NOTES
@ 2151 pulse ox was alarming. Pt was apneic, Pulseless and pupil were none reactive. Verified by Noris Sanders RN. Wife noted that She took important belongings home and all clothes could be discarded.

## 2022-04-27 NOTE — PROGRESS NOTES
Notified Dr. Rowland regarding to NIH score 29. MD reassessed pt. At bedside, noticed changes. Renal MD seem Pt. Too d/t increased lactic acid 4.9.  Later wife visited patient. RN - Celio & I updated current declines. Dr. Rowland & Palliative care Tahira talked to wife on treatments options & comfort care. Wife agreed comfort care, Versed & morphine IV given for comfort. Atropine admitted x1 for excessive oral secretions. Tune q 2hr, mouth care done, mouth suction PRN.

## 2022-04-27 NOTE — PROGRESS NOTES
Kati from Lab called with critical result of Lactic 4.9 at 1130am. Critical lab result read back to Kati Rowland notified of critical lab result at 1145.  Critical lab result read back by Dr. Rowland who update code status in the morning round around 9am. Palliative care team  - Jamaal & Dr. Rowland talked to wife on patients' declining conditions at bedside.

## 2022-04-27 NOTE — CARE PLAN
End of life care PT!  The patient is Unstable - High likelihood or risk of patient condition declining or worsening    Shift Goals  Clinical Goals: Comfort Measures  Patient Goals: DENA  Family Goals: DENA    Progress made toward(s) clinical / shift goals:      Patient is not progressing towards the following goals:      Problem: Psychosocial  Goal: Patient's level of anxiety will decrease  Outcome: Not Progressing  Goal: Patient's ability to verbalize feelings about condition will improve  Outcome: Not Progressing  Goal: Patient's ability to re-evaluate and adapt role responsibilities will improve  Outcome: Not Progressing  Goal: Patient and family will demonstrate ability to cope with life altering diagnosis and/or procedure  Outcome: Not Progressing  Goal: Spiritual and cultural needs incorporated into hospitalization  Outcome: Not Progressing     Problem: Communication  Goal: The ability to communicate needs accurately and effectively will improve  Outcome: Not Progressing     Problem: Hemodynamics  Goal: Patient's hemodynamics, fluid balance and neurologic status will be stable or improve  Outcome: Not Progressing     Problem: Mobility  Goal: Patient's capacity to carry out activities will improve  Outcome: Not Progressing     Problem: Self Care  Goal: Patient will have the ability to perform ADLs independently or with assistance (bathe, groom, dress, toilet and feed)  Outcome: Not Progressing     Problem: Fall Risk  Goal: Patient will remain free from falls  Outcome: Not Progressing     Problem: Pain - Standard  Goal: Alleviation of pain or a reduction in pain to the patient’s comfort goal  Outcome: Not Progressing     Problem: Skin Integrity  Goal: Skin integrity is maintained or improved  Outcome: Not Progressing     Problem: Knowledge Deficit - Standard  Goal: Patient and family/care givers will demonstrate understanding of plan of care, disease process/condition, diagnostic tests and medications  Outcome:  Not Progressing     Problem: Fluid Volume  Goal: Fluid volume balance will be maintained  Outcome: Not Progressing     Problem: Urinary - Renal Perfusion  Goal: Ability to achieve and maintain adequate renal perfusion and functioning will improve  Outcome: Not Progressing     Problem: Respiratory  Goal: Patient will achieve/maintain optimum respiratory ventilation and gas exchange  Outcome: Not Progressing     Problem: Mechanical Ventilation  Goal: Safe management of artificial airway and ventilation  Outcome: Not Progressing  Goal: Successful weaning off mechanical ventilator, spontaneously maintains adequate gas exchange  Outcome: Not Progressing  Goal: Patient will be able to express needs and understand communication  Outcome: Not Progressing     Problem: Physical Regulation  Goal: Diagnostic test results will improve  Outcome: Not Progressing  Goal: Signs and symptoms of infection will decrease  Outcome: Not Progressing     Problem: Optimal Care of the Stroke Patient  Goal: Optimal emergency care for the stroke patient  Outcome: Not Progressing  Goal: Optimal acute care for the stroke patient  Outcome: Not Progressing     Problem: Knowledge Deficit - Stroke Education  Goal: Patient's knowledge of stroke and risk factors will improve  Outcome: Not Progressing     Problem: Psychosocial - Patient Condition  Goal: Patient's ability to verbalize feelings about condition will improve  Outcome: Not Progressing  Goal: Patient's ability to re-evaluate and adapt role responsibilities will improve  Outcome: Not Progressing     Problem: Discharge Planning - Stroke  Goal: Ensure Stroke Core Measures are met prior to discharge  Outcome: Not Progressing  Goal: Patient’s continuum of care needs will be met  Outcome: Not Progressing     Problem: Neuro Status  Goal: Neuro status will remain stable or improve  Outcome: Not Progressing     Problem: Hemodynamic Monitoring  Goal: Patient's hemodynamics, fluid balance and neurologic  status will be stable or improve  Outcome: Not Progressing     Problem: Respiratory - Stroke Patient  Goal: Patient will achieve/maintain optimum respiratory rate/effort  Outcome: Not Progressing     Problem: Dysphagia  Goal: Dysphagia will improve  Outcome: Not Progressing     Problem: Risk for Aspiration  Goal: Patient's risk for aspiration will be absent or decrease  Outcome: Not Progressing     Problem: Urinary Elimination  Goal: Establish and maintain regular urinary output  Outcome: Not Progressing     Problem: Bowel Elimination  Goal: Establish and maintain regular bowel function  Outcome: Not Progressing     Problem: Mobility - Stroke  Goal: Patient's capacity to carry out activities will improve  Outcome: Not Progressing  Goal: Spasticity will be prevented or improved  Outcome: Not Progressing  Goal: Subluxation will be prevented or improved  Outcome: Not Progressing

## 2022-04-28 LAB
BACTERIA BLD CULT: NORMAL
BACTERIA BLD CULT: NORMAL
SIGNIFICANT IND 70042: NORMAL
SIGNIFICANT IND 70042: NORMAL
SITE SITE: NORMAL
SITE SITE: NORMAL
SOURCE SOURCE: NORMAL
SOURCE SOURCE: NORMAL

## 2022-05-01 LAB
MYCOPHENOLATE SERPL LC/MS/MS-MCNC: 1.5 UG/ML (ref 1–3.5)
MYCOPHENOLATE-G SERPL LC/MS/MS-MCNC: 136 UG/ML (ref 35–100)

## 2022-05-10 LAB — TEST NAME 95000: NORMAL

## 2022-05-10 NOTE — DISCHARGE SUMMARY
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    Comorbid Conditions at the Time of Death  Principal Problem:    Acute metabolic encephalopathy POA: Yes  Active Problems:    Stroke (HCC) POA: Unknown    Hypomagnesemia POA: Unknown    Kidney transplant status, living related donor POA: Yes    Hypokalemia POA: Yes    Acute-on-chronic kidney injury (HCC) POA: Yes    Atrial fibrillation (HCC) POA: Yes    Thrombocytopenia (HCC) POA: Yes    Positive urine culture POA: Yes    Hypernatremia POA: Yes    Immunocompromised (HCC) POA: Yes    Duodenal ulcer POA: Yes    Leukocytosis POA: Yes    Acute left eye pain POA: Yes    Sepsis (HCC) POA: Yes    UTI (urinary tract infection) POA: Yes    Clostridium difficile infection POA: No  Resolved Problems:    * No resolved hospital problems. *      History of Presenting Illness and Hospital Course  This is a 72 y.o. male admitted 4/15/2022 with 72 y.o. male with history of ANCA + glomerulonephritis, remote history of kidney transplant, atrial fibrillation (not on anticoagulation due to GI bleed), duodenal ulcer, R eye retinal detachment, who presented 4/15/2022 by ambulance after being found down, altered in the bathroom of his eye doctor's office. Per chart review, he had been recently discharged on 4/12 after evaluation for eye pain. At that time, he was prescribed prednisone 60mg daily for 7 days and was instructed to follow up with eye doctor as outpatient. Patient went to his eye doctor's appointment and was found down in the bathroom.   Patient resides at [facility] after prolonged greer with COVID. Per chart review, patient's admitting mental status was very deviated from his baseline.   On admission, he was tachycardia with  and reported chills with shivering at bedside.   Here labs remarkable for wbc 12.5, Cr 2.11, lactic acid 2.7. UA pos UTI.     Patient was admitted for acute metabolic encephalopathy. On 4/15, a head CT showed no evidence of  acute hemorrhage, mass or large territorial infarct but patient noted for remote left basilar ganglier lacunar infarct, mild atrophy, and white matter changes.  C. difficile positive and urinalysis with Pseudomonas of which he has had in the past.  He was started on vancomycin and Zosyn for the above.    Due to ongoing decline and after discussion with his wife and palliative care he was transitioned to comfort care.     Death Date: 04/26/22   Death Time: 2150         Pronounced By (RN1): Travis Funk RN  Pronounced By (RN2): Noris Fowler RN

## 2023-01-27 NOTE — PROGRESS NOTES
Assumed care of patient at this time. Patient resting in bed, call light within reach. Bed alarm on.    [phone]

## 2023-03-08 NOTE — ED NOTES
Pt sleeping, NAD  Awaiting return call from [facility]   Drysol Counseling:  I discussed with the patient the risks of drysol/aluminum chloride including but not limited to skin rash, itching, irritation, burning.

## 2023-10-25 NOTE — PROGRESS NOTES
R lateral epicondyleIntermediate Joint Aspiration/Injection    Date/Time: 10/25/2023 1:45 PM    Performed by: Joyce Hilario MD  Authorized by: Joyce Hilario MD    Consent Done?:  Yes (Verbal)  Indications:  Pain  Timeout: Prior to procedure the correct patient, procedure, and site was verified      Location:  Elbow  Prep: Patient was prepped and draped in usual sterile fashion    Needle size:  25 G  Medications:  40 mg methylPREDNISolone acetate 40 mg/mL  Patient tolerance:  Patient tolerated the procedure well with no immediate complications       Received report from night shift RNJil . Assumed care of pt at 0645. Pt reports no pain, nausea, vomiting, numbness, tingling, at this time. AOx1, agitated and uncooperative, reoriented. Updated pt on plan of care. Pt resting comfortably in bed. Fall precautions and education done. Educated on use of call light which is placed nearby patient. Hourly rounding in place. Questions and concerns answered at this time. Patient reports no other needs at the moment.

## 2025-04-14 NOTE — PROGRESS NOTES
"Primary RN explained to patient that he is in need of another IV in order to infuse all medications ordered by MD. Pt immediately refused, stated \"everyione morning a new nurse comes in here and wants to practice on me\". Pt educated of indications of each ordered IV. Pt stated \"he needs to speak with the doctor instead\". Primary RN notified MD that patient is refusing third line at this moment and is currently not getting sodium bicarb and magnesium. No new orders.   " Patient is awake/alert, pleasant and cooperative.

## (undated) DEVICE — DEVICE HEMOSTATIC CLIPPING RESOLUTION 360 DEGREES (20EA/BX)

## (undated) DEVICE — CONTAINER, SPECIMEN, STERILE

## (undated) DEVICE — CANISTER SUCTION RIGID RED 1500CC (40EA/CA)

## (undated) DEVICE — TOWEL STOP TIMEOUT SAFETY FLAG (40EA/CA)

## (undated) DEVICE — BITE BLOCK ADULT 60FR (100EA/CA)

## (undated) DEVICE — FILM CASSETTE ENDO

## (undated) DEVICE — ELECTRODE 850 FOAM ADHESIVE - HYDROGEL RADIOTRNSPRNT (50/PK)

## (undated) DEVICE — SET EXTENSION WITH 2 PORTS (48EA/CA) ***PART #2C8610 IS A SUBSTITUTE*****

## (undated) DEVICE — CATHERTER CLEAR SINGLE USE INJECTION THERAPY NEEDLE 25GA X 4MM  2.3MM X 240CM (5EA/BX)

## (undated) DEVICE — CATHETER IV 20 GA X 1-1/4 ---SURG.& SDS ONLY--- (50EA/BX)

## (undated) DEVICE — KIT ANESTHESIA W/CIRCUIT & 3/LT BAG W/FILTER (20EA/CA)

## (undated) DEVICE — GOLD PROBE 7FR (5/BX)

## (undated) DEVICE — GLOVE, LITE (PAIR)

## (undated) DEVICE — TUBE CONNECTING SUCTION - CLEAR PLASTIC STERILE 72 IN (50EA/CA)

## (undated) DEVICE — CANNULA O2 COMFORT SOFT EAR ADULT 7 FT TUBING (50/CA)

## (undated) DEVICE — NEPTUNE 4 PORT MANIFOLD - (20/PK)

## (undated) DEVICE — KIT CUSTOM PROCEDURE SINGLE FOR ENDO  (15/CA)

## (undated) DEVICE — GOWN WARMING STANDARD FLEX - (30/CA)